# Patient Record
Sex: MALE | Race: WHITE | NOT HISPANIC OR LATINO | Employment: OTHER | URBAN - METROPOLITAN AREA
[De-identification: names, ages, dates, MRNs, and addresses within clinical notes are randomized per-mention and may not be internally consistent; named-entity substitution may affect disease eponyms.]

---

## 2017-01-23 ENCOUNTER — TELEPHONE (OUTPATIENT)
Dept: INTERNAL MEDICINE | Facility: CLINIC | Age: 72
End: 2017-01-23

## 2017-01-23 NOTE — TELEPHONE ENCOUNTER
----- Message from Grisel Munguia sent at 1/23/2017  8:00 AM CST -----  Contact: [phone]  Please call pt spouse in regards to an appt for today/Pt is coughing and need labs drawn. Please advise.

## 2017-01-23 NOTE — TELEPHONE ENCOUNTER
----- Message from Grisel Munguia sent at 1/23/2017  8:00 AM CST -----  Contact: [phone]  Please call pt spouse in regards to an appt for today/Pt is coughing and need labs drawn. Please advise.

## 2017-01-27 ENCOUNTER — OFFICE VISIT (OUTPATIENT)
Dept: INTERNAL MEDICINE | Facility: CLINIC | Age: 72
End: 2017-01-27
Payer: MEDICARE

## 2017-01-27 VITALS
BODY MASS INDEX: 44.54 KG/M2 | HEIGHT: 69 IN | DIASTOLIC BLOOD PRESSURE: 74 MMHG | HEART RATE: 92 BPM | SYSTOLIC BLOOD PRESSURE: 128 MMHG | WEIGHT: 300.69 LBS

## 2017-01-27 DIAGNOSIS — J45.30 MILD PERSISTENT ASTHMA WITHOUT COMPLICATION: ICD-10-CM

## 2017-01-27 DIAGNOSIS — I10 ESSENTIAL HYPERTENSION: ICD-10-CM

## 2017-01-27 DIAGNOSIS — M25.50 POLYARTHRALGIA: ICD-10-CM

## 2017-01-27 DIAGNOSIS — J40 BRONCHITIS: Primary | ICD-10-CM

## 2017-01-27 PROCEDURE — 99214 OFFICE O/P EST MOD 30 MIN: CPT | Mod: S$GLB,,, | Performed by: INTERNAL MEDICINE

## 2017-01-27 PROCEDURE — 3078F DIAST BP <80 MM HG: CPT | Mod: S$GLB,,, | Performed by: INTERNAL MEDICINE

## 2017-01-27 PROCEDURE — 1126F AMNT PAIN NOTED NONE PRSNT: CPT | Mod: S$GLB,,, | Performed by: INTERNAL MEDICINE

## 2017-01-27 PROCEDURE — 3074F SYST BP LT 130 MM HG: CPT | Mod: S$GLB,,, | Performed by: INTERNAL MEDICINE

## 2017-01-27 PROCEDURE — 99999 PR PBB SHADOW E&M-EST. PATIENT-LVL III: CPT | Mod: PBBFAC,,, | Performed by: INTERNAL MEDICINE

## 2017-01-27 PROCEDURE — 1157F ADVNC CARE PLAN IN RCRD: CPT | Mod: S$GLB,,, | Performed by: INTERNAL MEDICINE

## 2017-01-27 PROCEDURE — 1160F RVW MEDS BY RX/DR IN RCRD: CPT | Mod: S$GLB,,, | Performed by: INTERNAL MEDICINE

## 2017-01-27 PROCEDURE — 99499 UNLISTED E&M SERVICE: CPT | Mod: S$GLB,,, | Performed by: INTERNAL MEDICINE

## 2017-01-27 PROCEDURE — 1159F MED LIST DOCD IN RCRD: CPT | Mod: S$GLB,,, | Performed by: INTERNAL MEDICINE

## 2017-01-27 RX ORDER — BENZONATATE 100 MG/1
100 CAPSULE ORAL 3 TIMES DAILY PRN
Qty: 30 CAPSULE | Refills: 0 | Status: SHIPPED | OUTPATIENT
Start: 2017-01-27 | End: 2017-02-06

## 2017-01-27 RX ORDER — MELOXICAM 15 MG/1
TABLET ORAL
Qty: 90 TABLET | Refills: 1 | Status: SHIPPED | OUTPATIENT
Start: 2017-01-27 | End: 2017-07-05 | Stop reason: SDUPTHER

## 2017-01-27 RX ORDER — AZITHROMYCIN 250 MG/1
TABLET, FILM COATED ORAL
Qty: 6 TABLET | Refills: 0 | Status: SHIPPED | OUTPATIENT
Start: 2017-01-27 | End: 2017-01-31 | Stop reason: SDUPTHER

## 2017-01-27 NOTE — PROGRESS NOTES
Subjective:       Patient ID: Lopez Savage is a 71 y.o. male.    Chief Complaint: Follow-up    HPI Pt. Here for f/u for HTN and asthma; he reports productive cough for past 2 months; he is compliant with meds and he has restarted advair but is only taking it QOD and he states it helps; he has not gotten labs yet; weight is elevated but stable; he needs refill on mobic for polyarthralgia in B/L hands and knees which helps  Review of Systems   Constitutional: Negative for chills, fatigue and fever.   HENT: Negative for congestion, rhinorrhea and sore throat.    Respiratory: Positive for cough. Negative for shortness of breath and wheezing.    Cardiovascular: Negative for chest pain.   Gastrointestinal: Negative for abdominal pain, nausea and vomiting.   Genitourinary: Negative for dysuria.   Musculoskeletal: Positive for arthralgias.        B/L intermitent pain in hands, knees and ankles   Skin: Negative for rash.   Neurological: Negative for dizziness and headaches.   Psychiatric/Behavioral: Negative for sleep disturbance. The patient is not nervous/anxious.        Objective:      Physical Exam   Constitutional: He is oriented to person, place, and time.   Morbid obesity   Eyes: EOM are normal.   Neck: Normal range of motion.   Cardiovascular: Normal rate, regular rhythm and normal heart sounds.    Pulmonary/Chest: Effort normal and breath sounds normal.   Abdominal: Soft. There is no tenderness. There is no rebound and no guarding.   Musculoskeletal: Normal range of motion.   Pain noted with ROM of B/L hands, knees and ankles   Neurological: He is alert and oriented to person, place, and time.   Skin: No rash noted.       Assessment:       1. Bronchitis Active   2. Mild persistent asthma without complication Active   3. Essential hypertension Well controlled   4. Polyarthralgia Well controlled   5. Body mass index 40.0-44.9, adult Sub-optimally controlled       Plan:         Bronchitis  Comments:  start z-pack and  tessalon prn  Orders:  -     azithromycin (Z-ALTAGRACIA) 250 MG tablet; Take 2 tablets by mouth on day 1; Take 1 tablet by mouth on days 2-5  Dispense: 6 tablet; Refill: 0  -     benzonatate (TESSALON) 100 MG capsule; Take 1 capsule (100 mg total) by mouth 3 (three) times daily as needed for Cough.  Dispense: 30 capsule; Refill: 0    Mild persistent asthma without complication  Comments:  asked pt. to take advair daily    Essential hypertension  Comments:  continue current regimen and encouraged low Na diet     Polyarthralgia  Comments:  continue mobic prn   Orders:  -     meloxicam (MOBIC) 15 MG tablet; TAKE 1 TABLET ONE TIME DAILY PRN; AVOID ALL OTHER NSAIDs  Dispense: 90 tablet; Refill: 1    Body mass index 40.0-44.9, adult  Comments:  encouraged diet and explained risks

## 2017-01-27 NOTE — MR AVS SNAPSHOT
[facility] Internal Medicine   [place]  [place] [address]  Phone: [phone]  Fax: [phone]                  Lopez Savage   2017 1:40 PM   Office Visit    Description:  Male : 1945   Provider:  Chuy Tristan MD   Department:  [facility]           Reason for Visit     Follow-up           Diagnoses this Visit        Comments    Bronchitis    -  Primary start z-pack and tessalon prn    Mild persistent asthma without complication     asked pt. to take advair daily    Essential hypertension     continue current regimen and encouraged low Na diet     Polyarthralgia     continue mobic prn     Body mass index 40.0-44.9, adult     encouraged diet and explained risks            To Do List           Future Appointments        Provider Department Dept Phone    2017 7:00 AM LAB, [place] [facility] [phone]    2017 7:30 AM SPECIMEN, [place] [facility] [phone]    3/3/2017 2:00 PM Chuy Tristan MD [facility] [phone]      Goals (5 Years of Data)     None      Follow-Up and Disposition     Return in about 1 month (around 2017).       These Medications        Disp Refills Start End    azithromycin (Z-ALTAGRACIA) 250 MG tablet 6 tablet 0 2017    Take 2 tablets by mouth on day 1; Take 1 tablet by mouth on days 2-5    Pharmacy: [facility] - [facility] [address] [facility] Ph #: [phone]       benzonatate (TESSALON) 100 MG capsule 30 capsule 0 2017    Take 1 capsule (100 mg total) by mouth 3 (three) times daily as needed for Cough. - Oral    Pharmacy: [facility] [address] [facility] Ph #: [phone]         [facility]     [facility] Care [facility] -  Assistance  Registered nurses in the [facility] provide clinical advisement, health education, appointment booking, and other advisory  services.  Call for this free service at [phone].             Medications           Message regarding Medications     Verify the changes and/or additions to your medication regime listed below are the same as discussed with your clinician today.  If any of these changes or additions are incorrect, please notify your healthcare provider.        START taking these NEW medications        Refills    azithromycin (Z-ALTAGRACIA) 250 MG tablet 0    Sig: Take 2 tablets by mouth on day 1; Take 1 tablet by mouth on days 2-5    Class: Normal    benzonatate (TESSALON) 100 MG capsule 0    Sig: Take 1 capsule (100 mg total) by mouth 3 (three) times daily as needed for Cough.    Class: Normal    Route: Oral           Verify that the below list of medications is an accurate representation of the medications you are currently taking.  If none reported, the list may be blank. If incorrect, please contact your healthcare provider. Carry this list with you in case of emergency.           Current Medications     albuterol (PROVENTIL HFA) 90 mcg/actuation inhaler Inhale 2 puffs into the lungs every 6 (six) hours as needed for Wheezing or Shortness of Breath.    fluticasone-salmeterol 250-50 mcg/dose (ADVAIR DISKUS) 250-50 mcg/dose diskus inhaler INHALE 1 INHALATION BY MOUTH TWICE A DAY    furosemide (LASIX) 20 MG tablet Take 1 tablet (20 mg total) by mouth 2 (two) times daily.    hydrochlorothiazide (HYDRODIURIL) 25 MG tablet Take 25 mg by mouth once daily.    losartan (COZAAR) 50 MG tablet Take 1 tablet (50 mg total) by mouth once daily.    meloxicam (MOBIC) 15 MG tablet TAKE 1 TABLET ONE TIME DAILY    omeprazole (PRILOSEC) 40 MG capsule TAKE 1 CAPSULE ONE TIME DAILY    potassium chloride SA (KLOR-CON M15) 15 MEQ tablet Take 15 mEq by mouth 2 (two) times daily.    triamcinolone acetonide 0.1% (KENALOG) 0.1 % cream AAA bid x 2-3 weeks then discontinue.  Use prn flares.    azithromycin (Z-ALTAGRACIA) 250 MG tablet Take 2 tablets by mouth on day  "1; Take 1 tablet by mouth on days 2-5    benzonatate (TESSALON) 100 MG capsule Take 1 capsule (100 mg total) by mouth 3 (three) times daily as needed for Cough.    betamethasone dipropionate (DIPROLENE) 0.05 % ointment Apply topically 2 (two) times daily.           Clinical Reference Information           Vital Signs - Last Recorded  Most recent update: 1/27/2017  1:52 PM by Elizabeth Esqueda MA    BP Pulse Ht Wt BMI    128/74 (BP Location: Left arm, Patient Position: Sitting, BP Method: Manual) 92 5' 9" (1.753 m) (!) 136.4 kg (300 lb 11.3 oz) 44.41 kg/m2      Blood Pressure          Most Recent Value    BP  128/74      Allergies as of 1/27/2017     Codeine    Tetanus Vaccines And Toxoid      Immunizations Administered on Date of Encounter - 1/27/2017     None      [facility] Sign-Up     Activating your [facility] account is as easy as 1-2-3!     1) Visit [email or web address], select Sign Up Now, enter this activation code and your date of birth, then select Next.  [email or web address]  Expires: 3/13/2017  2:21 PM      2) Create a username and password to use when you visit [facility] in the future and select a security question in case you lose your password and select Next.    3) Enter your e-mail address and click Sign Up!    Additional Information  If you have questions, please e-mail [email or web address] or call [phone] to talk to our [facility] staff. Remember, [facility] is NOT to be used for urgent needs. For medical emergencies, dial 911.         "

## 2017-01-31 ENCOUNTER — TELEPHONE (OUTPATIENT)
Dept: INTERNAL MEDICINE | Facility: CLINIC | Age: 72
End: 2017-01-31

## 2017-01-31 DIAGNOSIS — J40 BRONCHITIS: ICD-10-CM

## 2017-01-31 RX ORDER — AZITHROMYCIN 250 MG/1
TABLET, FILM COATED ORAL
Qty: 6 TABLET | Refills: 0 | Status: SHIPPED | OUTPATIENT
Start: 2017-01-31 | End: 2017-02-05

## 2017-01-31 NOTE — TELEPHONE ENCOUNTER
Spoke to pt's wife, Brenda and states that pt antibiotics was not received at [facility]. Pls send in Z-Steve.

## 2017-01-31 NOTE — TELEPHONE ENCOUNTER
----- Message from Grisel Munguia sent at 1/31/2017 12:14 PM CST -----  Contact: [phone]  Pt states this medication should have been sent to his local [facility] in [place] azithromycin (Z-ALTAGRACIA) 250 MG tablet/[phone]/Pt ask if it could be done today /Pt states he is getting worse. Please advise.

## 2017-02-03 DIAGNOSIS — J40 BRONCHITIS: ICD-10-CM

## 2017-02-03 RX ORDER — AZITHROMYCIN 250 MG/1
TABLET, FILM COATED ORAL
Qty: 6 TABLET | Refills: 0 | OUTPATIENT
Start: 2017-02-03

## 2017-02-03 RX ORDER — HYDROCHLOROTHIAZIDE 25 MG/1
25 TABLET ORAL DAILY
Qty: 90 TABLET | Refills: 1 | Status: SHIPPED | OUTPATIENT
Start: 2017-02-03 | End: 2017-10-05

## 2017-02-08 RX ORDER — BENZONATATE 100 MG/1
CAPSULE ORAL
Qty: 30 CAPSULE | Refills: 0 | Status: SHIPPED | OUTPATIENT
Start: 2017-02-08 | End: 2017-03-03

## 2017-02-27 ENCOUNTER — LAB VISIT (OUTPATIENT)
Dept: LAB | Facility: HOSPITAL | Age: 72
End: 2017-02-27
Attending: INTERNAL MEDICINE
Payer: MEDICARE

## 2017-02-27 DIAGNOSIS — D64.9 ANEMIA, UNSPECIFIED TYPE: ICD-10-CM

## 2017-02-27 DIAGNOSIS — R79.9 ABNORMAL FINDING OF BLOOD CHEMISTRY: ICD-10-CM

## 2017-02-27 DIAGNOSIS — I10 ESSENTIAL HYPERTENSION: ICD-10-CM

## 2017-02-27 DIAGNOSIS — Z00.00 PREVENTATIVE HEALTH CARE: ICD-10-CM

## 2017-02-27 LAB
ALBUMIN SERPL BCP-MCNC: 3.5 G/DL
ALP SERPL-CCNC: 67 U/L
ALT SERPL W/O P-5'-P-CCNC: 27 U/L
ANION GAP SERPL CALC-SCNC: 10 MMOL/L
AST SERPL-CCNC: 18 U/L
BASOPHILS # BLD AUTO: 0.04 K/UL
BASOPHILS NFR BLD: 0.4 %
BILIRUB SERPL-MCNC: 0.5 MG/DL
BUN SERPL-MCNC: 16 MG/DL
CALCIUM SERPL-MCNC: 9.7 MG/DL
CHLORIDE SERPL-SCNC: 105 MMOL/L
CHOLEST/HDLC SERPL: 4.5 {RATIO}
CO2 SERPL-SCNC: 25 MMOL/L
CREAT SERPL-MCNC: 1.2 MG/DL
DIFFERENTIAL METHOD: ABNORMAL
EOSINOPHIL # BLD AUTO: 0.2 K/UL
EOSINOPHIL NFR BLD: 2.3 %
ERYTHROCYTE [DISTWIDTH] IN BLOOD BY AUTOMATED COUNT: 13.8 %
EST. GFR  (AFRICAN AMERICAN): >60 ML/MIN/1.73 M^2
EST. GFR  (NON AFRICAN AMERICAN): >60 ML/MIN/1.73 M^2
GLUCOSE SERPL-MCNC: 110 MG/DL
HCT VFR BLD AUTO: 39.7 %
HDL/CHOLESTEROL RATIO: 22.4 %
HDLC SERPL-MCNC: 125 MG/DL
HDLC SERPL-MCNC: 28 MG/DL
HGB BLD-MCNC: 12.7 G/DL
LDLC SERPL CALC-MCNC: 68.6 MG/DL
LYMPHOCYTES # BLD AUTO: 1.7 K/UL
LYMPHOCYTES NFR BLD: 18.2 %
MCH RBC QN AUTO: 27.8 PG
MCHC RBC AUTO-ENTMCNC: 32 %
MCV RBC AUTO: 87 FL
MONOCYTES # BLD AUTO: 0.8 K/UL
MONOCYTES NFR BLD: 8.7 %
NEUTROPHILS # BLD AUTO: 6.5 K/UL
NEUTROPHILS NFR BLD: 70.1 %
NONHDLC SERPL-MCNC: 97 MG/DL
PLATELET # BLD AUTO: 203 K/UL
PMV BLD AUTO: 11.4 FL
POTASSIUM SERPL-SCNC: 4.2 MMOL/L
PROT SERPL-MCNC: 7.2 G/DL
RBC # BLD AUTO: 4.57 M/UL
SODIUM SERPL-SCNC: 140 MMOL/L
TRIGL SERPL-MCNC: 142 MG/DL
WBC # BLD AUTO: 9.24 K/UL

## 2017-02-27 PROCEDURE — 80053 COMPREHEN METABOLIC PANEL: CPT

## 2017-02-27 PROCEDURE — 80061 LIPID PANEL: CPT

## 2017-02-27 PROCEDURE — 36415 COLL VENOUS BLD VENIPUNCTURE: CPT | Mod: PO

## 2017-02-27 PROCEDURE — 85025 COMPLETE CBC W/AUTO DIFF WBC: CPT

## 2017-03-03 ENCOUNTER — TELEPHONE (OUTPATIENT)
Dept: INTERNAL MEDICINE | Facility: CLINIC | Age: 72
End: 2017-03-03

## 2017-03-03 ENCOUNTER — OFFICE VISIT (OUTPATIENT)
Dept: INTERNAL MEDICINE | Facility: CLINIC | Age: 72
End: 2017-03-03
Payer: MEDICARE

## 2017-03-03 VITALS
HEIGHT: 69 IN | DIASTOLIC BLOOD PRESSURE: 78 MMHG | SYSTOLIC BLOOD PRESSURE: 134 MMHG | WEIGHT: 299.38 LBS | HEART RATE: 91 BPM | BODY MASS INDEX: 44.34 KG/M2

## 2017-03-03 DIAGNOSIS — I10 ESSENTIAL HYPERTENSION: ICD-10-CM

## 2017-03-03 DIAGNOSIS — H66.92 LEFT OTITIS MEDIA, UNSPECIFIED CHRONICITY, UNSPECIFIED OTITIS MEDIA TYPE: Primary | ICD-10-CM

## 2017-03-03 DIAGNOSIS — N39.0 URINARY TRACT INFECTION WITHOUT HEMATURIA, SITE UNSPECIFIED: ICD-10-CM

## 2017-03-03 DIAGNOSIS — J40 BRONCHITIS: ICD-10-CM

## 2017-03-03 DIAGNOSIS — J45.30 MILD PERSISTENT ASTHMA WITHOUT COMPLICATION: ICD-10-CM

## 2017-03-03 PROCEDURE — 3075F SYST BP GE 130 - 139MM HG: CPT | Mod: S$GLB,,, | Performed by: INTERNAL MEDICINE

## 2017-03-03 PROCEDURE — 99999 PR PBB SHADOW E&M-EST. PATIENT-LVL III: CPT | Mod: PBBFAC,,, | Performed by: INTERNAL MEDICINE

## 2017-03-03 PROCEDURE — 1160F RVW MEDS BY RX/DR IN RCRD: CPT | Mod: S$GLB,,, | Performed by: INTERNAL MEDICINE

## 2017-03-03 PROCEDURE — 1159F MED LIST DOCD IN RCRD: CPT | Mod: S$GLB,,, | Performed by: INTERNAL MEDICINE

## 2017-03-03 PROCEDURE — 1126F AMNT PAIN NOTED NONE PRSNT: CPT | Mod: S$GLB,,, | Performed by: INTERNAL MEDICINE

## 2017-03-03 PROCEDURE — 1157F ADVNC CARE PLAN IN RCRD: CPT | Mod: S$GLB,,, | Performed by: INTERNAL MEDICINE

## 2017-03-03 PROCEDURE — 3078F DIAST BP <80 MM HG: CPT | Mod: S$GLB,,, | Performed by: INTERNAL MEDICINE

## 2017-03-03 PROCEDURE — 99499 UNLISTED E&M SERVICE: CPT | Mod: S$GLB,,, | Performed by: INTERNAL MEDICINE

## 2017-03-03 PROCEDURE — 99214 OFFICE O/P EST MOD 30 MIN: CPT | Mod: S$GLB,,, | Performed by: INTERNAL MEDICINE

## 2017-03-03 RX ORDER — CIPROFLOXACIN 500 MG/1
500 TABLET ORAL 2 TIMES DAILY
Qty: 14 TABLET | Refills: 0 | Status: SHIPPED | OUTPATIENT
Start: 2017-03-03 | End: 2017-03-10

## 2017-03-03 RX ORDER — AMOXICILLIN AND CLAVULANATE POTASSIUM 875; 125 MG/1; MG/1
1 TABLET, FILM COATED ORAL 2 TIMES DAILY
Qty: 14 TABLET | Refills: 0 | Status: SHIPPED | OUTPATIENT
Start: 2017-03-03 | End: 2017-03-03

## 2017-03-03 NOTE — PROGRESS NOTES
Subjective:       Patient ID: Lopez Savage is a 71 y.o. male.    Chief Complaint: Follow-up (6 mths follow-up)    HPI Pt. Here for f/u for bronchitis; he states z-pack helped bronchitis and has  residual cough; he is taking advair daily; he is compliant with meds; weight is elevated but stable; pt. Reports L earache for past 1 week; I reviewed labs dated 2/27/17; he has mild signs of UTI and he reports occassional dysuria; he has seen urology in the past and I advised him to f/u   Review of Systems   Constitutional: Negative for chills, fatigue and fever.   HENT: Negative for congestion, rhinorrhea and sore throat.         L earache   Respiratory: Positive for cough. Negative for shortness of breath and wheezing.    Cardiovascular: Negative for chest pain.   Gastrointestinal: Negative for abdominal pain, nausea and vomiting.   Genitourinary: Positive for dysuria.   Musculoskeletal: Negative for arthralgias.   Skin: Negative for rash.   Neurological: Negative for dizziness and headaches.   Psychiatric/Behavioral: Negative for sleep disturbance. The patient is not nervous/anxious.        Objective:      Physical Exam   Constitutional: He is oriented to person, place, and time.   Morbid obesity    HENT:   L ear TM erythema   Eyes: EOM are normal.   Neck: Normal range of motion.   Cardiovascular: Normal rate, regular rhythm and normal heart sounds.    Pulmonary/Chest: Effort normal and breath sounds normal.   Abdominal: Soft. There is no tenderness. There is no rebound and no guarding.   Musculoskeletal: Normal range of motion.   Neurological: He is alert and oriented to person, place, and time.   Skin: No rash noted.       Assessment:       1. Left otitis media, unspecified chronicity, unspecified otitis media type Active   2. Urinary tract infection without hematuria, site unspecified Active   3. Bronchitis Well controlled   4. Essential hypertension Well controlled   5. Mild persistent asthma without complication  Well controlled   6. Body mass index 40.0-44.9, adult Sub-optimally controlled       Plan:         Left otitis media, unspecified chronicity, unspecified otitis media type  Comments:  start cipro x 7 days   Orders:  -     Discontinue: amoxicillin-clavulanate 875-125mg (AUGMENTIN) 875-125 mg per tablet; Take 1 tablet by mouth 2 (two) times daily.  Dispense: 14 tablet; Refill: 0  -     ciprofloxacin HCl (CIPRO) 500 MG tablet; Take 1 tablet (500 mg total) by mouth 2 (two) times daily.  Dispense: 14 tablet; Refill: 0    Urinary tract infection without hematuria, site unspecified  Comments:  start cipro x 7 days and f/u urology   Orders:  -     ciprofloxacin HCl (CIPRO) 500 MG tablet; Take 1 tablet (500 mg total) by mouth 2 (two) times daily.  Dispense: 14 tablet; Refill: 0    Bronchitis  Comments:  residual cough noted; continue tessalon prn     Essential hypertension  Comments:  continue current regimen and encouraged low Na diet and weight loss    Mild persistent asthma without complication  Comments:  continue advair and rescue inhaler prn     Body mass index 40.0-44.9, adult  Comments:  encouraged diet and explained risks

## 2017-03-03 NOTE — TELEPHONE ENCOUNTER
Spoke to pharmacist at [facility] and stated that pt's insurance denied the Cipro. Dr. Tristan substituted the Cipro for Levaquin 500mg QD times 7 days with no refills.

## 2017-03-03 NOTE — MR AVS SNAPSHOT
[facility] Medicine   [place]  [place] [address]  Phone: [phone]  Fax: [phone]                  Lopez Savage   3/3/2017 2:00 PM   Office Visit    Description:  Male : 1945   Provider:  Chuy Tristan MD   Department:  [facility]           Reason for Visit     Follow-up           Diagnoses this Visit        Comments    Left otitis media, unspecified chronicity, unspecified otitis media type    -  Primary start cipro x 7 days     Urinary tract infection without hematuria, site unspecified     start cipro x 7 days and f/u urology     Bronchitis     residual cough noted; continue tessalon prn     Essential hypertension     continue current regimen and encouraged low Na diet and weight loss    Mild persistent asthma without complication     continue advair and rescue inhaler prn     Body mass index 40.0-44.9, adult                To Do List           Future Appointments        Provider Department Dept Phone    3/17/2017 3:40 PM Chuy Tristan MD [facility] [phone]      Goals (5 Years of Data)     None      Follow-Up and Disposition     Return in about 2 weeks (around 3/17/2017).       These Medications        Disp Refills Start End    ciprofloxacin HCl (CIPRO) 500 MG tablet 14 tablet 0 3/3/2017 3/10/2017    Take 1 tablet (500 mg total) by mouth 2 (two) times daily. - Oral    Pharmacy: [id number] - [place] - [address] [facility], [address] Ph #: [phone]         [facility] On Call     [facility] Nurse Care [facility] -  Assistance  Registered nurses in the [facility] provide clinical advisement, health education, appointment booking, and other advisory services.  Call for this free service at [phone].             Medications           Message regarding Medications     Verify the changes and/or additions to your medication regime listed below are the same as discussed with your clinician today.  If any of  these changes or additions are incorrect, please notify your healthcare provider.        START taking these NEW medications        Refills    ciprofloxacin HCl (CIPRO) 500 MG tablet 0    Sig: Take 1 tablet (500 mg total) by mouth 2 (two) times daily.    Class: Normal    Route: Oral      STOP taking these medications     benzonatate (TESSALON) 100 MG capsule TAKE 1 CAPSULE THREE TIMES DAILY AS NEEDED  FOR  COUGH           Verify that the below list of medications is an accurate representation of the medications you are currently taking.  If none reported, the list may be blank. If incorrect, please contact your healthcare provider. Carry this list with you in case of emergency.           Current Medications     albuterol (PROVENTIL HFA) 90 mcg/actuation inhaler Inhale 2 puffs into the lungs every 6 (six) hours as needed for Wheezing or Shortness of Breath.    betamethasone dipropionate (DIPROLENE) 0.05 % ointment Apply topically 2 (two) times daily.    ciprofloxacin HCl (CIPRO) 500 MG tablet Take 1 tablet (500 mg total) by mouth 2 (two) times daily.    fluticasone-salmeterol 250-50 mcg/dose (ADVAIR DISKUS) 250-50 mcg/dose diskus inhaler INHALE 1 INHALATION BY MOUTH TWICE A DAY    furosemide (LASIX) 20 MG tablet Take 1 tablet (20 mg total) by mouth 2 (two) times daily.    hydrochlorothiazide (HYDRODIURIL) 25 MG tablet Take 1 tablet (25 mg total) by mouth once daily.    losartan (COZAAR) 50 MG tablet Take 1 tablet (50 mg total) by mouth once daily.    meloxicam (MOBIC) 15 MG tablet TAKE 1 TABLET ONE TIME DAILY PRN; AVOID ALL OTHER NSAIDs    omeprazole (PRILOSEC) 40 MG capsule TAKE 1 CAPSULE ONE TIME DAILY    potassium chloride SA (KLOR-CON M15) 15 MEQ tablet Take 15 mEq by mouth 2 (two) times daily.    triamcinolone acetonide 0.1% (KENALOG) 0.1 % cream AAA bid x 2-3 weeks then discontinue.  Use prn flares.           Clinical Reference Information           Your Vitals Were     BP Pulse Height Weight BMI    134/78 (BP  "Location: Left arm, Patient Position: Sitting, BP Method: Manual) 91 5' 9" (1.753 m) 135.8 kg (299 lb 6.2 oz) 44.21 kg/m2      Blood Pressure          Most Recent Value    BP  134/78      Allergies as of 3/3/2017     Codeine    Tetanus Vaccines And Toxoid      Immunizations Administered on Date of Encounter - 3/3/2017     None      [facility] Sign-Up     Activating your [facility] account is as easy as 1-2-3!     1) Visit [email or web address], select Sign Up Now, enter this activation code and your date of birth, then select Next.  [email or web address]  Expires: 3/13/2017  2:21 PM      2) Create a username and password to use when you visit [facility] in the future and select a security question in case you lose your password and select Next.    3) Enter your e-mail address and click Sign Up!    Additional Information  If you have questions, please e-mail [email or web address] or call [phone] to talk to our [facility] staff. Remember, [facility] is NOT to be used for urgent needs. For medical emergencies, dial 911.         Language Assistance Services     ATTENTION: Language assistance services are available, free of charge. Please call [phone].      ATENCIÓN: Si habla español, tiene a alexander disposición servicios gratuitos de asistencia lingüística. Llame al [phone].     CHÚ Ý: N?u b?n nói Ti?ng Vi?t, có các d?ch v? h? tr? ngôn ng? mi?n phí dành cho b?n. G?i s? [phone].         [facility] Internal Medicine complies with applicable Federal civil rights laws and does not discriminate on the basis of race, color, national origin, age, disability, or sex.        "

## 2017-03-17 ENCOUNTER — OFFICE VISIT (OUTPATIENT)
Dept: INTERNAL MEDICINE | Facility: CLINIC | Age: 72
End: 2017-03-17
Payer: MEDICARE

## 2017-03-17 VITALS
HEART RATE: 79 BPM | BODY MASS INDEX: 44.21 KG/M2 | DIASTOLIC BLOOD PRESSURE: 72 MMHG | WEIGHT: 298.5 LBS | SYSTOLIC BLOOD PRESSURE: 132 MMHG | HEIGHT: 69 IN

## 2017-03-17 DIAGNOSIS — N39.0 URINARY TRACT INFECTION WITHOUT HEMATURIA, SITE UNSPECIFIED: ICD-10-CM

## 2017-03-17 DIAGNOSIS — R05.9 COUGH: Primary | ICD-10-CM

## 2017-03-17 DIAGNOSIS — K57.90 DIVERTICULOSIS OF INTESTINE WITHOUT BLEEDING, UNSPECIFIED INTESTINAL TRACT LOCATION: ICD-10-CM

## 2017-03-17 DIAGNOSIS — J45.30 MILD PERSISTENT ASTHMA WITHOUT COMPLICATION: ICD-10-CM

## 2017-03-17 DIAGNOSIS — J30.9 ALLERGIC RHINITIS, UNSPECIFIED ALLERGIC RHINITIS TRIGGER, UNSPECIFIED RHINITIS SEASONALITY: ICD-10-CM

## 2017-03-17 DIAGNOSIS — K21.9 GASTROESOPHAGEAL REFLUX DISEASE, ESOPHAGITIS PRESENCE NOT SPECIFIED: ICD-10-CM

## 2017-03-17 DIAGNOSIS — D64.9 ANEMIA, UNSPECIFIED TYPE: ICD-10-CM

## 2017-03-17 DIAGNOSIS — I10 ESSENTIAL HYPERTENSION: ICD-10-CM

## 2017-03-17 DIAGNOSIS — R41.3 MEMORY LOSS: ICD-10-CM

## 2017-03-17 PROCEDURE — 99214 OFFICE O/P EST MOD 30 MIN: CPT | Mod: S$GLB,,, | Performed by: INTERNAL MEDICINE

## 2017-03-17 PROCEDURE — 99999 PR PBB SHADOW E&M-EST. PATIENT-LVL III: CPT | Mod: PBBFAC,,, | Performed by: INTERNAL MEDICINE

## 2017-03-17 PROCEDURE — 99499 UNLISTED E&M SERVICE: CPT | Mod: S$GLB,,, | Performed by: INTERNAL MEDICINE

## 2017-03-17 PROCEDURE — 3078F DIAST BP <80 MM HG: CPT | Mod: S$GLB,,, | Performed by: INTERNAL MEDICINE

## 2017-03-17 PROCEDURE — 1157F ADVNC CARE PLAN IN RCRD: CPT | Mod: S$GLB,,, | Performed by: INTERNAL MEDICINE

## 2017-03-17 PROCEDURE — 3075F SYST BP GE 130 - 139MM HG: CPT | Mod: S$GLB,,, | Performed by: INTERNAL MEDICINE

## 2017-03-17 PROCEDURE — 1159F MED LIST DOCD IN RCRD: CPT | Mod: S$GLB,,, | Performed by: INTERNAL MEDICINE

## 2017-03-17 PROCEDURE — 1160F RVW MEDS BY RX/DR IN RCRD: CPT | Mod: S$GLB,,, | Performed by: INTERNAL MEDICINE

## 2017-03-17 PROCEDURE — 1126F AMNT PAIN NOTED NONE PRSNT: CPT | Mod: S$GLB,,, | Performed by: INTERNAL MEDICINE

## 2017-03-17 NOTE — MR AVS SNAPSHOT
[facility] Internal Medicine   [place]  [place] [address]  Phone: [phone]  Fax: [phone]                  Lopez Savage   3/17/2017 3:40 PM   Office Visit    Description:  Male : 1945   Provider:  Chuy Tristan MD   Department:  [facility] Internal [facility]           Reason for Visit     Cough           Diagnoses this Visit        Comments    Cough    -  Primary get CXR; start claritin for allergies and continue prilosec for GERD; re-evaluate in 1 month     Allergic rhinitis, unspecified allergic rhinitis trigger, unspecified rhinitis seasonality     asked pt. to try OTC claritin prn     Gastroesophageal reflux disease, esophagitis presence not specified     continue prilosec prn     Mild persistent asthma without complication     continue current regimen     Urinary tract infection without hematuria, site unspecified     completed antbx; repeat U/A and f/u urology    Memory loss     refer to neurology    Essential hypertension     continue current regimen and encouraged low Na diet and weight loss    Anemia, unspecified type     monitor    Body mass index 40.0-44.9, adult                To Do List           Future Appointments        Provider Department Dept Phone    3/20/2017 10:00 AM [place] XR1 [address] LB LIMIT [facility] [phone]    3/20/2017 10:30 AM SPECIMEN, [place] [facility] [phone]    2017 2:00 PM Chuy Tristan MD [facility] Internal [facility] [phone]    2017 4:20 PM Jim Diez MD [facility] Neurology [phone]      Goals (5 Years of Data)     None      Follow-Up and Disposition     Return in about 1 month (around 2017).      [facility]     [facility] Care Line -  Assistance  Registered nurses in the [facility] provide clinical advisement, health education, appointment booking, and other advisory services.  Call for this free service at [phone].             Medications     "       Message regarding Medications     Verify the changes and/or additions to your medication regime listed below are the same as discussed with your clinician today.  If any of these changes or additions are incorrect, please notify your healthcare provider.             Verify that the below list of medications is an accurate representation of the medications you are currently taking.  If none reported, the list may be blank. If incorrect, please contact your healthcare provider. Carry this list with you in case of emergency.           Current Medications     albuterol (PROVENTIL HFA) 90 mcg/actuation inhaler Inhale 2 puffs into the lungs every 6 (six) hours as needed for Wheezing or Shortness of Breath.    fluticasone-salmeterol 250-50 mcg/dose (ADVAIR DISKUS) 250-50 mcg/dose diskus inhaler INHALE 1 INHALATION BY MOUTH TWICE A DAY    furosemide (LASIX) 20 MG tablet Take 1 tablet (20 mg total) by mouth 2 (two) times daily.    hydrochlorothiazide (HYDRODIURIL) 25 MG tablet Take 1 tablet (25 mg total) by mouth once daily.    losartan (COZAAR) 50 MG tablet Take 1 tablet (50 mg total) by mouth once daily.    meloxicam (MOBIC) 15 MG tablet TAKE 1 TABLET ONE TIME DAILY PRN; AVOID ALL OTHER NSAIDs    omeprazole (PRILOSEC) 40 MG capsule TAKE 1 CAPSULE ONE TIME DAILY    potassium chloride SA (KLOR-CON M15) 15 MEQ tablet Take 15 mEq by mouth 2 (two) times daily.    triamcinolone acetonide 0.1% (KENALOG) 0.1 % cream AAA bid x 2-3 weeks then discontinue.  Use prn flares.    betamethasone dipropionate (DIPROLENE) 0.05 % ointment Apply topically 2 (two) times daily.           Clinical Reference Information           Your Vitals Were     BP Pulse Height Weight BMI    132/72 (BP Location: Left arm, Patient Position: Sitting, BP Method: Manual) 79 5' 9" (1.753 m) 135.4 kg (298 lb 8.1 oz) 44.08 kg/m2      Blood Pressure          Most Recent Value    BP  132/72      Allergies as of 3/17/2017     Codeine    Tetanus Vaccines And " Toxoid      Immunizations Administered on Date of Encounter - 3/17/2017     None      Orders Placed During Today's Visit      Normal Orders This Visit    Ambulatory Referral to Neurology     Future Labs/Procedures Expected by Expires    Urinalysis  3/17/2017 11/17/2017    X-Ray Chest PA And Lateral  3/17/2017 3/17/2018      Language Assistance Services     ATTENTION: Language assistance services are available, free of charge. Please call [phone].      ATENCIÓN: Si habla español, tiene a alexander disposición servicios gratuitos de asistencia lingüística. Llame al [phone].     CHÚ Ý: N?u b?n nói Ti?ng Vi?t, có các d?ch v? h? tr? ngôn ng? mi?n phí dành cho b?n. G?i s? [phone].         [facility] Internal Medicine complies with applicable Federal civil rights laws and does not discriminate on the basis of race, color, national origin, age, disability, or sex.

## 2017-03-17 NOTE — PROGRESS NOTES
Subjective:       Patient ID: Lopez Savage is a 71 y.o. male.    Chief Complaint: Cough    HPI  Pt. Here for f/u for HTN and reports residual cough with fatigue; he reports allergies and is not on anything for it; he takes prilosec for GERD; he takes advair and uses rescue inhaler 3 x week; he is compliant with meds; I reviewed labs dated 2/27/17; HDL is low; he has completed cipro for UTI and f/u urology who referred him to sub specialist which he cancelled; I advised him to schedule appt.; anemia is stable; he reports short term memory loss and he would like referal to neurology for further evaluation; weight is elevated but stable; colonoscopy dated 9/28/12 showed polyps which were removed and diverticulosis with repeat in 5 years  Review of Systems   Constitutional: Negative for chills, fatigue and fever.   HENT: Positive for postnasal drip and rhinorrhea. Negative for congestion and sore throat.    Respiratory: Positive for cough. Negative for shortness of breath and wheezing.    Cardiovascular: Negative for chest pain.   Gastrointestinal: Negative for abdominal pain, nausea and vomiting.        GERD well controlled with prilosec    Genitourinary: Negative for dysuria.   Musculoskeletal: Negative for arthralgias.   Skin: Negative for rash.   Neurological: Negative for dizziness and headaches.        Short term memory loss   Psychiatric/Behavioral: Negative for sleep disturbance. The patient is not nervous/anxious.        Objective:      Physical Exam   Constitutional: He is oriented to person, place, and time.   obese   Eyes: EOM are normal.   Neck: Normal range of motion.   Cardiovascular: Normal rate, regular rhythm and normal heart sounds.    Pulmonary/Chest: Effort normal and breath sounds normal. No respiratory distress. He has no wheezes. He has no rales.   Abdominal: Soft. There is no tenderness. There is no rebound and no guarding.   Musculoskeletal: Normal range of motion.   Neurological: He is alert  and oriented to person, place, and time.   Skin: No rash noted.       Assessment:       1. Cough Active   2. Allergic rhinitis, unspecified allergic rhinitis trigger, unspecified rhinitis seasonality Active   3. Gastroesophageal reflux disease, esophagitis presence not specified Well controlled   4. Mild persistent asthma without complication Active   5. Urinary tract infection without hematuria, site unspecified Active   6. Memory loss Active   7. Essential hypertension Well controlled   8. Anemia, unspecified type Stable   9. Diverticulosis of intestine without bleeding, unspecified intestinal tract location Active   10. Body mass index 40.0-44.9, adult Sub-optimally controlled       Plan:         Cough  Comments:  get CXR; start claritin for allergies and continue prilosec for GERD; re-evaluate in 1 month   Orders:  -     X-Ray Chest PA And Lateral; Future; Expected date: 3/17/17    Allergic rhinitis, unspecified allergic rhinitis trigger, unspecified rhinitis seasonality  Comments:  asked pt. to try OTC claritin prn     Gastroesophageal reflux disease, esophagitis presence not specified  Comments:  continue prilosec prn     Mild persistent asthma without complication  Comments:  continue current regimen     Urinary tract infection without hematuria, site unspecified  Comments:  completed antbx; repeat U/A and f/u urology  Orders:  -     Urinalysis; Future; Expected date: 3/17/17    Memory loss  Comments:  refer to neurology  Orders:  -     Ambulatory Referral to Neurology    Essential hypertension  Comments:  continue current regimen and encouraged low Na diet and weight loss    Anemia, unspecified type  Comments:  monitor    Diverticulosis of intestine without bleeding, unspecified intestinal tract location    Body mass index 40.0-44.9, adult  Comments:  encouraged diet and explained risks

## 2017-03-20 ENCOUNTER — HOSPITAL ENCOUNTER (OUTPATIENT)
Dept: RADIOLOGY | Facility: HOSPITAL | Age: 72
Discharge: HOME | End: 2017-03-20
Attending: INTERNAL MEDICINE
Payer: MEDICARE

## 2017-03-20 DIAGNOSIS — R05.9 COUGH: ICD-10-CM

## 2017-03-20 PROCEDURE — 71020 XR CHEST PA AND LATERAL: CPT | Mod: 26,,, | Performed by: RADIOLOGY

## 2017-03-20 PROCEDURE — 71020 XR CHEST PA AND LATERAL: CPT | Mod: TC,PO

## 2017-03-24 ENCOUNTER — OFFICE VISIT (OUTPATIENT)
Dept: FAMILY MEDICINE | Facility: CLINIC | Age: 72
End: 2017-03-24
Payer: MEDICARE

## 2017-03-24 VITALS
HEIGHT: 69 IN | WEIGHT: 297.19 LBS | SYSTOLIC BLOOD PRESSURE: 128 MMHG | DIASTOLIC BLOOD PRESSURE: 70 MMHG | BODY MASS INDEX: 44.02 KG/M2 | HEART RATE: 87 BPM | OXYGEN SATURATION: 98 %

## 2017-03-24 DIAGNOSIS — Z00.00 ENCOUNTER FOR PREVENTIVE HEALTH EXAMINATION: Primary | ICD-10-CM

## 2017-03-24 DIAGNOSIS — I77.819 ECTATIC AORTA: ICD-10-CM

## 2017-03-24 DIAGNOSIS — J41.0 SIMPLE CHRONIC BRONCHITIS: ICD-10-CM

## 2017-03-24 DIAGNOSIS — I10 ESSENTIAL HYPERTENSION: ICD-10-CM

## 2017-03-24 DIAGNOSIS — K21.9 GASTROESOPHAGEAL REFLUX DISEASE WITHOUT ESOPHAGITIS: ICD-10-CM

## 2017-03-24 DIAGNOSIS — Z13.5 SCREENING FOR GLAUCOMA: ICD-10-CM

## 2017-03-24 PROBLEM — N39.0 URINARY TRACT INFECTION WITHOUT HEMATURIA: Status: RESOLVED | Noted: 2017-03-17 | Resolved: 2017-03-24

## 2017-03-24 PROCEDURE — 99999 PR PBB SHADOW E&M-EST. PATIENT-LVL IV: CPT | Mod: PBBFAC,,, | Performed by: NURSE PRACTITIONER

## 2017-03-24 PROCEDURE — G0439 PPPS, SUBSEQ VISIT: HCPCS | Mod: S$GLB,,, | Performed by: NURSE PRACTITIONER

## 2017-03-24 PROCEDURE — 99499 UNLISTED E&M SERVICE: CPT | Mod: S$GLB,,, | Performed by: NURSE PRACTITIONER

## 2017-03-24 PROCEDURE — 3074F SYST BP LT 130 MM HG: CPT | Mod: S$GLB,,, | Performed by: NURSE PRACTITIONER

## 2017-03-24 PROCEDURE — 3078F DIAST BP <80 MM HG: CPT | Mod: S$GLB,,, | Performed by: NURSE PRACTITIONER

## 2017-03-24 NOTE — Clinical Note
Primary Care Providers: Chuy Tristan MD, MD (General)  Your patient was seen today for a HRA visit. Gap(s) in care (HEDIS gaps) have been identified during this visit that require additional testing and possible follow up.  Orders Placed This Encounter     Ambulatory referral to Optometry         Referral Priority:Routine         Referral Type:Vision (Optometry)         Referral Reason:Specialty Services Required         Requested Specialty:Optometry         Number of Visits Requested:1   These orders were placed using [facility] approved protocol and any results will be forwarded to your office for appropriate follow up. I have included a copy of my visit note; please review the note and feel free to contact me with any questions.   Thank you for allowing me to participate in the care of your patients. Simran Diez NP

## 2017-03-24 NOTE — PROGRESS NOTES
"Lopez Savage presented for a  Medicare AWV and comprehensive Health Risk Assessment today. The following components were reviewed and updated:    · Medical history  · Family History  · Social history  · Allergies and Current Medications  · Health Risk Assessment  · Health Maintenance  · Care Team     ** See Completed Assessments for Annual Wellness Visit within the encounter summary.**       The following assessments were completed:  · Living Situation  · CAGE  · Depression Screening  · Timed Get Up and Go  · Whisper Test  · Cognitive Function Screening  · Nutrition Screening  · ADL Screening  · PAQ Screening    Vitals:    03/24/17 1308   BP: 128/70   BP Location: Right arm   Patient Position: Sitting   BP Method: Manual   Pulse: 87   SpO2: 98%   Weight: 134.8 kg (297 lb 2.9 oz)   Height: 5' 9" (1.753 m)     Body mass index is 43.89 kg/(m^2).     Physical Exam   Constitutional: He is oriented to person, place, and time. He appears well-developed. No distress.   Morbidly obese   HENT:   Head: Normocephalic and atraumatic.   Eyes: EOM are normal. Pupils are equal, round, and reactive to light.   Neck: Neck supple. No JVD present. No tracheal deviation present.   Cardiovascular: Normal rate, regular rhythm, normal heart sounds and intact distal pulses.    No murmur heard.  Pulmonary/Chest: Effort normal and breath sounds normal. No respiratory distress. He has no wheezes. He has no rales.   Abdominal: Soft. Bowel sounds are normal. He exhibits no distension and no mass. There is no tenderness.   Musculoskeletal: Normal range of motion. He exhibits no edema or tenderness.   Neurological: He is alert and oriented to person, place, and time. Coordination normal.   Skin: Skin is warm and dry. No erythema. No pallor.   Psychiatric: He has a normal mood and affect. His behavior is normal. Judgment and thought content normal. Cognition and memory are normal. He expresses no homicidal and no suicidal ideation.   Nursing note " and vitals reviewed.        Diagnoses and health risks identified today and associated recommendations/orders:    1. Encounter for preventive health examination    2. Essential hypertension  Chronic; stable on medication.  Followed by PCP.    3. Ectatic aorta  Chronic; stable.  Followed by Cardiology.    4. Simple chronic bronchitis  Chronic; stable on medication.  Followed by PCP.    5. Gastroesophageal reflux disease without esophagitis  Chronic; stable on medication.  Followed by PCP.    6. Body mass index 40.0-44.9, adult  Chronic, stable. Therapeutic lifestyle changes discussed. Followed by PCP.    7. Screening for glaucoma  - Ambulatory referral to Optometry      Provided Lopez with a 5-10 year written screening schedule and personal prevention plan. Recommendations were developed using the USPSTF age appropriate recommendations. Education, counseling, and referrals were provided as needed. After Visit Summary printed and given to patient which includes a list of additional screenings\tests needed.    Return in 5 weeks (on 4/28/2017) for follow-up with PCP, HRA visit in 1 year.    Simran Diez NP

## 2017-03-24 NOTE — MR AVS SNAPSHOT
[facility]   [address]  [place] [address]  Phone: [phone]  Fax: [phone]                  Lopez Savage   3/24/2017 1:00 PM   Office Visit    Description:  Male : 1945   Provider:  Simran Diez NP   Department:  [facility]           Reason for Visit     Health Risk Assessment           Diagnoses this Visit        Comments    Encounter for preventive health examination         Screening for glaucoma                To Do List           Future Appointments        Provider Department Dept Phone    3/29/2017 2:45 PM Pedro Ching OD [place] - Optometry [phone]    2017 2:00 PM Chuy Tristan MD [facility] Internal Medicine [phone]    2017 4:20 PM Jim Diez MD [facility] Neurology [phone]      Goals (5 Years of Data)     None      Follow-Up and Disposition     Return in 5 weeks (on 2017) for follow-up with PCP, HRA visit in 1 year.      [facility]     [facility] -  Assistance  Registered nurses in the [facility] provide clinical advisement, health education, appointment booking, and other advisory services.  Call for this free service at [phone].             Medications           Message regarding Medications     Verify the changes and/or additions to your medication regime listed below are the same as discussed with your clinician today.  If any of these changes or additions are incorrect, please notify your healthcare provider.        STOP taking these medications     potassium chloride SA (KLOR-CON M15) 15 MEQ tablet Take 15 mEq by mouth 2 (two) times daily.           Verify that the below list of medications is an accurate representation of the medications you are currently taking.  If none reported, the list may be blank. If incorrect, please contact your healthcare provider. Carry this list with you in case of emergency.           Current Medications     albuterol  "(PROVENTIL HFA) 90 mcg/actuation inhaler Inhale 2 puffs into the lungs every 6 (six) hours as needed for Wheezing or Shortness of Breath.    betamethasone dipropionate (DIPROLENE) 0.05 % ointment Apply topically 2 (two) times daily.    fluticasone-salmeterol 250-50 mcg/dose (ADVAIR DISKUS) 250-50 mcg/dose diskus inhaler INHALE 1 INHALATION BY MOUTH TWICE A DAY    furosemide (LASIX) 20 MG tablet Take 1 tablet (20 mg total) by mouth 2 (two) times daily.    losartan (COZAAR) 50 MG tablet Take 1 tablet (50 mg total) by mouth once daily.    meloxicam (MOBIC) 15 MG tablet TAKE 1 TABLET ONE TIME DAILY PRN; AVOID ALL OTHER NSAIDs    omeprazole (PRILOSEC) 40 MG capsule TAKE 1 CAPSULE ONE TIME DAILY    triamcinolone acetonide 0.1% (KENALOG) 0.1 % cream AAA bid x 2-3 weeks then discontinue.  Use prn flares.    hydrochlorothiazide (HYDRODIURIL) 25 MG tablet Take 1 tablet (25 mg total) by mouth once daily.           Clinical Reference Information           Your Vitals Were     BP Pulse Height Weight SpO2 BMI    128/70 (BP Location: Right arm, Patient Position: Sitting, BP Method: Manual) 87 5' 9" (1.753 m) 134.8 kg (297 lb 2.9 oz) 98% 43.89 kg/m2      Blood Pressure          Most Recent Value    BP  128/70      Allergies as of 3/24/2017     Codeine    Tetanus Vaccines And Toxoid      Immunizations Administered on Date of Encounter - 3/24/2017     None      Orders Placed During Today's Visit      Normal Orders This Visit    Ambulatory referral to Optometry       Instructions      Counseling and Referral of Other Preventative  (Italic type indicates deductible and co-insurance are waived)    Patient Name: Lopez Savage  Today's Date: 3/24/2017      SERVICE LIMITATIONS RECOMMENDATION    Vaccines    · Pneumococcal (once after 65)    · Influenza (annually)    · Hepatitis B (if medium/high risk)    · Prevnar 13      Hepatitis B medium/high risk factors:       - End-stage renal disease       - Hemophiliacs who received Factor VII or    "      IX concentrates       - Clients of institutions for the mentally             retarded       - Persons who live in the same house as          a HepB carrier       - Homosexual men       - Illicit injectable drug abusers     Pneumococcal: Done, no repeat necessary     Influenza: Done, repeat in one year     Hepatitis B: N/A     Prevnar 13: Done, repeat at next scheduled date    Prostate cancer screening (annually to age 75)     Prostate specific antigen (PSA) Shared decision making with Provider. Sometimes a co-pay may be required if the patient decides to have this test. The USPSTF no longer recommends prostate cancer screening routinely in medicine: every 1 year    Colorectal cancer screening (to age 75)    · Fecal occult blood test (annual)  · Flexible sigmoidoscopy (5y)  · Screening colonoscopy (10y)  · Barium enema   Last done 09/28/12, recommend to repeat every 10  years    Diabetes self-management training (no USPSTF recommendations)  Requires referral by treating physician for patient with diabetes or renal disease. 10 hours of initial DSMT sessions of no less than 30 minutes each in a continuous 12-month period. 2 hours of follow-up DSMT in subsequent years.  N/A    Glaucoma screening (no USPSTF recommendation)  Diabetes mellitus, family history   , age 50 or over    American, age 65 or over  Scheduled, see appointments    Medical nutrition therapy for diabetes or renal disease (no recommended schedule)  Requires referral by treating physician for patient with diabetes or renal disease or kidney transplant within the past 3 years.  Can be provided in same year as diabetes self-management training (DSMT), and CMS recommends medical nutrition therapy take place after DSMT. Up to 3 hours for initial year and 2 hours in subsequent years.  N/A    Cardiovascular screening blood tests (every 5 years)  · Fasting lipid panel  Order as a panel if possible  Done this year, repeat every year     Diabetes screening tests (at least every 3 years, Medicare covers annually or at 6-month intervals for prediabetic patients)  · Fasting blood sugar (FBS) or glucose tolerance test (GTT)  Patient must be diagnosed with one of the following:       - Hypertension       - Dyslipidemia       - Obesity (BMI 30kg/m2)       - Previous elevated impaired FBS or GTT       ... or any two of the following:       - Overweight (BMI 25 but <30)       - Family history of diabetes       - Age 65 or older       - History of gestational diabetes or birth of baby weighing more than 9 pounds  Done this year, repeat every year    Abdominal aortic aneurysm screening (once)  · Sonogram   Limited to patients who meet one of the following criteria:       - Men who are 65-75 years old and have smoked more than 100 cigarette in their lifetime       - Anyone with a family history of abdominal aortic aneurysm       - Anyone recommended for screening by the USPSTF  Done this year, repeat every year    HIV screening (annually for increased risk patients)  · HIV-1 and HIV-2 by EIA, or FAMILIA, rapid antibody test or oral mucosa transudate  Patients must be at increased risk for HIV infection per USPSTF guidelines or pregnant. Tests covered annually for patient at increased risk or as requested by the patient. Pregnant patients may receive up to 3 tests during pregnancy.  Risks discussed, screening is not recommended    Smoking cessation counseling (up to 8 sessions per year)  Patients must be asymptomatic of tobacco-related conditions to receive as a preventative service.  Former Smoker    Subsequent annual wellness visit  At least 12 months since last AWV  Return in one year     The following information is provided to all patients.  This information is to help you find resources for any of the problems found today that may be affecting your health:                Living healthy guide: www.[facility]      Understanding Diabetes:  www.diabetes.org      Eating healthy: www.cdc.gov/healthyweight      CDC home safety checklist: www.cdc.gov/steadi/patient.html      Agency on Aging: www.goea.[place].gov      Alcoholics anonymous (AA): www.aa.org      Physical Activity: www.rubén.nih.gov/ap7bmqz      Tobacco use: www.quitwithusla.org          Language Assistance Services     ATTENTION: Language assistance services are available, free of charge. Please call [phone].      ATENCIÓN: Si adalberto george, tiene a alexander disposición servicios gratuitos de asistencia lingüística. Llame al [phone].     CHÚ Ý: N?u b?n nói Ti?ng Vi?t, có các d?ch v? h? tr? ngôn ng? mi?n phí dành cho b?n. G?i s? [phone].         [facility] complies with applicable Federal civil rights laws and does not discriminate on the basis of race, color, national origin, age, disability, or sex.

## 2017-03-24 NOTE — PATIENT INSTRUCTIONS
Counseling and Referral of Other Preventative  (Italic type indicates deductible and co-insurance are waived)    Patient Name: Lopez Savage  Today's Date: 3/24/2017      SERVICE LIMITATIONS RECOMMENDATION    Vaccines    · Pneumococcal (once after 65)    · Influenza (annually)    · Hepatitis B (if medium/high risk)    · Prevnar 13      Hepatitis B medium/high risk factors:       - End-stage renal disease       - Hemophiliacs who received Factor VII or         IX concentrates       - Clients of institutions for the mentally             retarded       - Persons who live in the same house as          a HepB carrier       - Homosexual men       - Illicit injectable drug abusers     Pneumococcal: Done, no repeat necessary     Influenza: Done, repeat in one year     Hepatitis B: N/A     Prevnar 13: Done, repeat at next scheduled date    Prostate cancer screening (annually to age 75)     Prostate specific antigen (PSA) Shared decision making with Provider. Sometimes a co-pay may be required if the patient decides to have this test. The USPSTF no longer recommends prostate cancer screening routinely in medicine: every 1 year    Colorectal cancer screening (to age 75)    · Fecal occult blood test (annual)  · Flexible sigmoidoscopy (5y)  · Screening colonoscopy (10y)  · Barium enema   Last done 09/28/12, recommend to repeat every 10  years    Diabetes self-management training (no USPSTF recommendations)  Requires referral by treating physician for patient with diabetes or renal disease. 10 hours of initial DSMT sessions of no less than 30 minutes each in a continuous 12-month period. 2 hours of follow-up DSMT in subsequent years.  N/A    Glaucoma screening (no USPSTF recommendation)  Diabetes mellitus, family history   , age 50 or over    American, age 65 or over  Scheduled, see appointments    Medical nutrition therapy for diabetes or renal disease (no recommended schedule)  Requires referral by  treating physician for patient with diabetes or renal disease or kidney transplant within the past 3 years.  Can be provided in same year as diabetes self-management training (DSMT), and CMS recommends medical nutrition therapy take place after DSMT. Up to 3 hours for initial year and 2 hours in subsequent years.  N/A    Cardiovascular screening blood tests (every 5 years)  · Fasting lipid panel  Order as a panel if possible  Done this year, repeat every year    Diabetes screening tests (at least every 3 years, Medicare covers annually or at 6-month intervals for prediabetic patients)  · Fasting blood sugar (FBS) or glucose tolerance test (GTT)  Patient must be diagnosed with one of the following:       - Hypertension       - Dyslipidemia       - Obesity (BMI 30kg/m2)       - Previous elevated impaired FBS or GTT       ... or any two of the following:       - Overweight (BMI 25 but <30)       - Family history of diabetes       - Age 65 or older       - History of gestational diabetes or birth of baby weighing more than 9 pounds  Done this year, repeat every year    Abdominal aortic aneurysm screening (once)  · Sonogram   Limited to patients who meet one of the following criteria:       - Men who are 65-75 years old and have smoked more than 100 cigarette in their lifetime       - Anyone with a family history of abdominal aortic aneurysm       - Anyone recommended for screening by the USPSTF  Done this year, repeat every year    HIV screening (annually for increased risk patients)  · HIV-1 and HIV-2 by EIA, or FAMILIA, rapid antibody test or oral mucosa transudate  Patients must be at increased risk for HIV infection per USPSTF guidelines or pregnant. Tests covered annually for patient at increased risk or as requested by the patient. Pregnant patients may receive up to 3 tests during pregnancy.  Risks discussed, screening is not recommended    Smoking cessation counseling (up to 8 sessions per year)  Patients must be  asymptomatic of tobacco-related conditions to receive as a preventative service.  Former Smoker    Subsequent annual wellness visit  At least 12 months since last AWV  Return in one year     The following information is provided to all patients.  This information is to help you find resources for any of the problems found today that may be affecting your health:                Living healthy guide: www.[facility].[place].[facility]      Understanding Diabetes: www.diabetes.org      Eating healthy: www.cdc.gov/healthyweight      CDC home safety checklist: www.cdc.gov/steadi/patient.html      Agency on Aging: www.goea.[place].[facility]      Alcoholics anonymous (AA): www.aa.org      Physical Activity: www.rubén.nih.gov/em1cyhb      Tobacco use: www.quitwithusla.org

## 2017-03-29 ENCOUNTER — OFFICE VISIT (OUTPATIENT)
Dept: OPTOMETRY | Facility: CLINIC | Age: 72
End: 2017-03-29
Payer: MEDICARE

## 2017-03-29 DIAGNOSIS — Z13.5 GLAUCOMA SCREENING: ICD-10-CM

## 2017-03-29 DIAGNOSIS — H52.13 MYOPIA WITH PRESBYOPIA, BILATERAL: ICD-10-CM

## 2017-03-29 DIAGNOSIS — H52.4 MYOPIA WITH PRESBYOPIA, BILATERAL: ICD-10-CM

## 2017-03-29 DIAGNOSIS — H25.13 NUCLEAR SCLEROSIS, BILATERAL: Primary | ICD-10-CM

## 2017-03-29 PROCEDURE — 99999 PR PBB SHADOW E&M-EST. PATIENT-LVL II: CPT | Mod: PBBFAC,,, | Performed by: OPTOMETRIST

## 2017-03-29 PROCEDURE — 92004 COMPRE OPH EXAM NEW PT 1/>: CPT | Mod: S$GLB,,, | Performed by: OPTOMETRIST

## 2017-03-29 PROCEDURE — 92015 DETERMINE REFRACTIVE STATE: CPT | Mod: S$GLB,,, | Performed by: OPTOMETRIST

## 2017-03-29 NOTE — LETTER
March 30, 2017      Simran Diez NP  [address] [place] [address]           [place] - Optometry  [address]  [place] [address]  Phone: [phone]  Fax: [phone]          Patient: Lopez Savage   MR Number: [id number]   YOB: 1945   Date of Visit: 3/29/2017       Dear Simran Diez:    Thank you for referring Lopez Savage to me for evaluation. Attached you will find relevant portions of my assessment and plan of care.    If you have questions, please do not hesitate to call me. I look forward to following Lopez Savage along with you.    Sincerely,    Pedro Ching, OD    Enclosure  CC:  No Recipients    If you would like to receive this communication electronically, please contact [email or web address] or [phone] to request more information on [employer] access.    For providers and/or their staff who would like to refer a patient to [facility], please contact us through our one-stop-shop provider referral line, Frank Summers, at [phone].    If you feel you have received this communication in error or would no longer like to receive these types of communications, please e-mail [email or web address]

## 2017-03-29 NOTE — PROGRESS NOTES
HPI     LOKI: 5+ years  Pt states va has decreased all distances with glasses. Denies f/f  +itching    No gtts        Last edited by Pamela López on 3/29/2017  2:33 PM.     ROS     Positive for: Cardiovascular, Eyes    Negative for: Constitutional, Gastrointestinal, Neurological, Skin,   Genitourinary, Musculoskeletal, HENT, Endocrine, Respiratory, Psychiatric,   Allergic/Imm, Heme/Lymph    Last edited by Pedro Ching, OD on 3/29/2017  2:49 PM. (History)        Assessment /Plan     For exam results, see Encounter Report.    Nuclear sclerosis, bilateral    Glaucoma screening    Myopia with presbyopia, bilateral      1. Reduced VA 2 to Cat OU.  Drivers test due in 6 mos    PLAN:    Pt will discussed w wife, and call back to sched alberto Gibson

## 2017-04-28 ENCOUNTER — HOSPITAL ENCOUNTER (OUTPATIENT)
Dept: RADIOLOGY | Facility: HOSPITAL | Age: 72
Discharge: HOME | End: 2017-04-28
Attending: INTERNAL MEDICINE
Payer: MEDICARE

## 2017-04-28 ENCOUNTER — OFFICE VISIT (OUTPATIENT)
Dept: INTERNAL MEDICINE | Facility: CLINIC | Age: 72
End: 2017-04-28
Payer: MEDICARE

## 2017-04-28 VITALS
HEART RATE: 85 BPM | WEIGHT: 295.19 LBS | DIASTOLIC BLOOD PRESSURE: 76 MMHG | BODY MASS INDEX: 43.72 KG/M2 | SYSTOLIC BLOOD PRESSURE: 134 MMHG | HEIGHT: 69 IN

## 2017-04-28 DIAGNOSIS — M25.571 ARTHRALGIA OF RIGHT FOOT: ICD-10-CM

## 2017-04-28 DIAGNOSIS — R05.9 COUGH: Primary | ICD-10-CM

## 2017-04-28 DIAGNOSIS — J45.30 MILD PERSISTENT ASTHMA WITHOUT COMPLICATION: ICD-10-CM

## 2017-04-28 DIAGNOSIS — J30.9 ALLERGIC RHINITIS, UNSPECIFIED ALLERGIC RHINITIS TRIGGER, UNSPECIFIED RHINITIS SEASONALITY: ICD-10-CM

## 2017-04-28 DIAGNOSIS — I10 ESSENTIAL HYPERTENSION: ICD-10-CM

## 2017-04-28 PROCEDURE — 3075F SYST BP GE 130 - 139MM HG: CPT | Mod: S$GLB,,, | Performed by: INTERNAL MEDICINE

## 2017-04-28 PROCEDURE — 1159F MED LIST DOCD IN RCRD: CPT | Mod: S$GLB,,, | Performed by: INTERNAL MEDICINE

## 2017-04-28 PROCEDURE — 1126F AMNT PAIN NOTED NONE PRSNT: CPT | Mod: S$GLB,,, | Performed by: INTERNAL MEDICINE

## 2017-04-28 PROCEDURE — 3078F DIAST BP <80 MM HG: CPT | Mod: S$GLB,,, | Performed by: INTERNAL MEDICINE

## 2017-04-28 PROCEDURE — 73630 X-RAY EXAM OF FOOT: CPT | Mod: TC,PO,RT

## 2017-04-28 PROCEDURE — 1160F RVW MEDS BY RX/DR IN RCRD: CPT | Mod: S$GLB,,, | Performed by: INTERNAL MEDICINE

## 2017-04-28 PROCEDURE — 99999 PR PBB SHADOW E&M-EST. PATIENT-LVL III: CPT | Mod: PBBFAC,,, | Performed by: INTERNAL MEDICINE

## 2017-04-28 PROCEDURE — 99214 OFFICE O/P EST MOD 30 MIN: CPT | Mod: S$GLB,,, | Performed by: INTERNAL MEDICINE

## 2017-04-28 PROCEDURE — 99499 UNLISTED E&M SERVICE: CPT | Mod: S$GLB,,, | Performed by: INTERNAL MEDICINE

## 2017-04-28 PROCEDURE — 73630 X-RAY EXAM OF FOOT: CPT | Mod: 26,RT,, | Performed by: RADIOLOGY

## 2017-04-28 RX ORDER — DICLOFENAC SODIUM 10 MG/G
2 GEL TOPICAL 2 TIMES DAILY PRN
Qty: 100 G | Refills: 0 | Status: SHIPPED | OUTPATIENT
Start: 2017-04-28 | End: 2018-10-04

## 2017-04-28 RX ORDER — FLUTICASONE PROPIONATE 50 MCG
1 SPRAY, SUSPENSION (ML) NASAL DAILY PRN
Qty: 1 BOTTLE | Refills: 1 | Status: SHIPPED | OUTPATIENT
Start: 2017-04-28 | End: 2017-10-04

## 2017-04-28 NOTE — PROGRESS NOTES
Subjective:       Patient ID: Lopez Savage is a 71 y.o. male.    Chief Complaint: No chief complaint on file.    HPI Pt. Here for f/u for cough; CXR was negative; he continues to have cough; claritin does not help allergies; he has lost a few pounds; he is using advair and asthma is well controlled; pt. Reports R lateral foot pain for past 1 month intermittently; he denies injury; pain is worse with ambulation; pain is 7/10; tyelenol helps some   Review of Systems   Constitutional: Negative for chills, fatigue and fever.   HENT: Positive for postnasal drip and rhinorrhea. Negative for congestion and sore throat.    Respiratory: Negative for cough, shortness of breath and wheezing.    Cardiovascular: Negative for chest pain.   Gastrointestinal: Negative for abdominal pain, nausea and vomiting.   Genitourinary: Negative for dysuria.   Musculoskeletal: Positive for arthralgias.        R lateral foot pain which is worse with walking and standing   Skin: Negative for rash.   Neurological: Negative for dizziness and headaches.   Psychiatric/Behavioral: Negative for sleep disturbance. The patient is not nervous/anxious.        Objective:      Physical Exam   Constitutional: He is oriented to person, place, and time.   Morbid obesity   Eyes: EOM are normal.   Neck: Normal range of motion.   Cardiovascular: Normal rate, regular rhythm and normal heart sounds.    Pulmonary/Chest: Effort normal and breath sounds normal.   Abdominal: Soft. There is no tenderness. There is no rebound and no guarding.   Musculoskeletal: Normal range of motion. He exhibits tenderness.   R lateral foot tenderness    Neurological: He is alert and oriented to person, place, and time.   Skin: No rash noted.       Assessment:       1. Cough Active   2. Allergic rhinitis, unspecified allergic rhinitis trigger, unspecified rhinitis seasonality Sub-optimally controlled   3. Essential hypertension Well controlled   4. Mild persistent asthma without  complication Well controlled   5. Arthralgia of right foot Active   6. Body mass index 40.0-44.9, adult Sub-optimally controlled       Plan:         Diagnoses and all orders for this visit:    Cough  Comments:  monitor with better control of allergies    Allergic rhinitis, unspecified allergic rhinitis trigger, unspecified rhinitis seasonality  Comments:  asked pt. to take claritin QPM and start flonase QAM  Orders:  -     fluticasone (FLONASE) 50 mcg/actuation nasal spray; 1 spray by Each Nare route daily as needed for Rhinitis or Allergies.    Essential hypertension  Comments:  continue current regimen and encouraged low Na diet and weight loss    Mild persistent asthma without complication  Comments:  continue current regimen     Arthralgia of right foot  Comments:  start voltaren gel PRN BID; get R foot xray  Orders:  -     X-Ray Foot 2 View Right; Future  -     diclofenac sodium (VOLTAREN) 1 % Gel; Apply 2 g topically 2 (two) times daily as needed.    Body mass index 40.0-44.9, adult  Comments:  encouraged diet and explained risks

## 2017-04-28 NOTE — MR AVS SNAPSHOT
[facility]   [address]  [place] [address]  Phone: [phone]  Fax: [phone]                  Lopez Savage   2017 2:00 PM   Office Visit    Description:  Male : 1945   Provider:  Chuy Tristan MD   Department:  [facility]           Diagnoses this Visit        Comments    Cough    -  Primary monitor with better control of allergies    Allergic rhinitis, unspecified allergic rhinitis trigger, unspecified rhinitis seasonality     asked pt. to take claritin QPM and start flonase QAM    Essential hypertension     continue current regimen and encouraged low Na diet and weight loss    Mild persistent asthma without complication     continue current regimen     Arthralgia of right foot     start voltaren gel PRN BID; get R foot xray    Body mass index 40.0-44.9, adult     encouraged diet and explained risks            To Do List           Future Appointments        Provider Department Dept Phone    2017 4:20 PM Jim Diez MD [facility] Neurology [phone]    2017 1:40 PM Chuy Tristan MD [facility] Medicine [phone]      Goals (5 Years of Data)     None      Follow-Up and Disposition     Return in about 1 month (around 2017).       These Medications        Disp Refills Start End    fluticasone (FLONASE) 50 mcg/actuation nasal spray 1 Bottle 1 2017     1 spray by Each Nare route daily as needed for Rhinitis or Allergies. - Each Nare    Pharmacy: [id number] - [place] - [address] [address], [address] Ph #: [phone]       diclofenac sodium (VOLTAREN) 1 % Gel 100 g 0 2017     Apply 2 g topically 2 (two) times daily as needed. - Topical    Pharmacy: [id number] - [place] - [address] [address], [address] Ph #: [phone]         [facility]     [facility] Care [facility] -  Assistance  Unless otherwise directed by your provider, please contact [facility], our nurse care line  that is available for  assistance.     Registered nurses in the [facility] provide: appointment scheduling, clinical advisement, health education, and other advisory services.  Call: [phone] (toll free)               Medications           Message regarding Medications     Verify the changes and/or additions to your medication regime listed below are the same as discussed with your clinician today.  If any of these changes or additions are incorrect, please notify your healthcare provider.        START taking these NEW medications        Refills    fluticasone (FLONASE) 50 mcg/actuation nasal spray 1    Si spray by Each Nare route daily as needed for Rhinitis or Allergies.    Class: Normal    Route: Each Nare    diclofenac sodium (VOLTAREN) 1 % Gel 0    Sig: Apply 2 g topically 2 (two) times daily as needed.    Class: Normal    Route: Topical           Verify that the below list of medications is an accurate representation of the medications you are currently taking.  If none reported, the list may be blank. If incorrect, please contact your healthcare provider. Carry this list with you in case of emergency.           Current Medications     albuterol (PROVENTIL HFA) 90 mcg/actuation inhaler Inhale 2 puffs into the lungs every 6 (six) hours as needed for Wheezing or Shortness of Breath.    fluticasone-salmeterol 250-50 mcg/dose (ADVAIR DISKUS) 250-50 mcg/dose diskus inhaler INHALE 1 INHALATION BY MOUTH TWICE A DAY    hydrochlorothiazide (HYDRODIURIL) 25 MG tablet Take 1 tablet (25 mg total) by mouth once daily.    meloxicam (MOBIC) 15 MG tablet TAKE 1 TABLET ONE TIME DAILY PRN; AVOID ALL OTHER NSAIDs    omeprazole (PRILOSEC) 40 MG capsule TAKE 1 CAPSULE ONE TIME DAILY    triamcinolone acetonide 0.1% (KENALOG) 0.1 % cream AAA bid x 2-3 weeks then discontinue.  Use prn flares.    betamethasone dipropionate (DIPROLENE) 0.05 % ointment Apply topically 2 (two) times daily.    diclofenac sodium  "(VOLTAREN) 1 % Gel Apply 2 g topically 2 (two) times daily as needed.    fluticasone (FLONASE) 50 mcg/actuation nasal spray 1 spray by Each Nare route daily as needed for Rhinitis or Allergies.    furosemide (LASIX) 20 MG tablet Take 1 tablet (20 mg total) by mouth 2 (two) times daily.    losartan (COZAAR) 50 MG tablet Take 1 tablet (50 mg total) by mouth once daily.           Clinical Reference Information           Your Vitals Were     BP Pulse Height Weight BMI    134/76 (BP Location: Left arm, Patient Position: Sitting, BP Method: Manual) 85 5' 9" (1.753 m) 133.9 kg (295 lb 3.1 oz) 43.59 kg/m2      Blood Pressure          Most Recent Value    BP  134/76      Allergies as of 4/28/2017     Codeine    Tetanus Vaccines And Toxoid      Immunizations Administered on Date of Encounter - 4/28/2017     None      Orders Placed During Today's Visit     Future Labs/Procedures Expected by Expires    X-Ray Foot 2 View Right  4/28/2017 4/28/2018      Language Assistance Services     ATTENTION: Language assistance services are available, free of charge. Please call [phone].      ATENCIÓN: Si habla español, tiene a alexander disposición servicios gratuitos de asistencia lingüística. Llame al [phone].     VIOLETA Ý: N?u b?n nói Ti?ng Vi?t, có các d?ch v? h? tr? ngôn ng? mi?n phí dành cho b?n. G?i s? [phone].         [facility] complies with applicable Federal civil rights laws and does not discriminate on the basis of race, color, national origin, age, disability, or sex.        "

## 2017-05-04 ENCOUNTER — LAB VISIT (OUTPATIENT)
Dept: LAB | Facility: HOSPITAL | Age: 72
End: 2017-05-04
Attending: PSYCHIATRY & NEUROLOGY
Payer: MEDICARE

## 2017-05-04 ENCOUNTER — OFFICE VISIT (OUTPATIENT)
Dept: NEUROLOGY | Facility: CLINIC | Age: 72
End: 2017-05-04
Payer: MEDICARE

## 2017-05-04 VITALS
BODY MASS INDEX: 43.95 KG/M2 | HEIGHT: 69 IN | DIASTOLIC BLOOD PRESSURE: 86 MMHG | HEART RATE: 84 BPM | SYSTOLIC BLOOD PRESSURE: 135 MMHG | WEIGHT: 296.75 LBS

## 2017-05-04 DIAGNOSIS — G47.19 EXCESSIVE DAYTIME SLEEPINESS: ICD-10-CM

## 2017-05-04 DIAGNOSIS — R41.3 MEMORY CHANGES: ICD-10-CM

## 2017-05-04 DIAGNOSIS — R42 VERTIGO: ICD-10-CM

## 2017-05-04 DIAGNOSIS — R41.3 MEMORY CHANGES: Primary | ICD-10-CM

## 2017-05-04 LAB
CREAT SERPL-MCNC: 1 MG/DL
EST. GFR  (AFRICAN AMERICAN): >60 ML/MIN/1.73 M^2
EST. GFR  (NON AFRICAN AMERICAN): >60 ML/MIN/1.73 M^2
TSH SERPL DL<=0.005 MIU/L-ACNC: 1.53 UIU/ML
VIT B12 SERPL-MCNC: 376 PG/ML

## 2017-05-04 PROCEDURE — 36415 COLL VENOUS BLD VENIPUNCTURE: CPT

## 2017-05-04 PROCEDURE — 99205 OFFICE O/P NEW HI 60 MIN: CPT | Mod: S$GLB,,, | Performed by: PSYCHIATRY & NEUROLOGY

## 2017-05-04 PROCEDURE — 1126F AMNT PAIN NOTED NONE PRSNT: CPT | Mod: S$GLB,,, | Performed by: PSYCHIATRY & NEUROLOGY

## 2017-05-04 PROCEDURE — 3079F DIAST BP 80-89 MM HG: CPT | Mod: S$GLB,,, | Performed by: PSYCHIATRY & NEUROLOGY

## 2017-05-04 PROCEDURE — 99999 PR PBB SHADOW E&M-EST. PATIENT-LVL IV: CPT | Mod: PBBFAC,,, | Performed by: PSYCHIATRY & NEUROLOGY

## 2017-05-04 PROCEDURE — 1159F MED LIST DOCD IN RCRD: CPT | Mod: S$GLB,,, | Performed by: PSYCHIATRY & NEUROLOGY

## 2017-05-04 PROCEDURE — 86592 SYPHILIS TEST NON-TREP QUAL: CPT

## 2017-05-04 PROCEDURE — 82607 VITAMIN B-12: CPT

## 2017-05-04 PROCEDURE — 84443 ASSAY THYROID STIM HORMONE: CPT

## 2017-05-04 PROCEDURE — 82565 ASSAY OF CREATININE: CPT

## 2017-05-04 PROCEDURE — 3075F SYST BP GE 130 - 139MM HG: CPT | Mod: S$GLB,,, | Performed by: PSYCHIATRY & NEUROLOGY

## 2017-05-04 RX ORDER — MECLIZINE HCL 12.5 MG 12.5 MG/1
12.5 TABLET ORAL 3 TIMES DAILY PRN
Qty: 270 TABLET | Refills: 3 | Status: SHIPPED | OUTPATIENT
Start: 2017-05-04 | End: 2018-10-11

## 2017-05-04 NOTE — MR AVS SNAPSHOT
[facility]  [address] [place] [address] Ave  [place] [address]  Phone: [phone]  Fax: [phone]                  Lopez Savage   2017 4:20 PM   Office Visit    Description:  Male : 1945   Provider:  Jim Diez MD   Department:  [place] - Neurology           Reason for Visit     Memory Loss           Diagnoses this Visit        Comments    Memory changes    -  Primary     Excessive daytime sleepiness         Vertigo                To Do List           Future Appointments        Provider Department Dept Phone    2017 1:40 PM Chuy Tristan MD [facility] Internal Medicine [phone]      Goals (5 Years of Data)     None      Follow-Up and Disposition     Return in about 3 months (around 2017).       These Medications        Disp Refills Start End    meclizine (ANTIVERT) 12.5 mg tablet 270 tablet 3 2017     Take 1 tablet (12.5 mg total) by mouth 3 (three) times daily as needed. - Oral    Pharm[facility]: [id number] [place] [address] [address], [address] Ph #: [phone]         [facility]     [facility] Nurse Care Line -  Assistance  Unless otherwise directed by your provider, please contact [facility], our nurse care line that is available for  assistance.     Registered nurses in the [facility] provide: appointment scheduling, clinical advisement, health education, and other advisory services.  Call: [phone] (toll free)               Medications           Message regarding Medications     Verify the changes and/or additions to your medication regime listed below are the same as discussed with your clinician today.  If any of these changes or additions are incorrect, please notify your healthcare provider.        START taking these NEW medications        Refills    meclizine (ANTIVERT) 12.5 mg tablet 3    Sig: Take 1 tablet (12.5 mg total) by mouth 3 (three) times daily as needed.    Class: Normal    Route: Oral          "  Verify that the below list of medications is an accurate representation of the medications you are currently taking.  If none reported, the list may be blank. If incorrect, please contact your healthcare provider. Carry this list with you in case of emergency.           Current Medications     albuterol (PROVENTIL HFA) 90 mcg/actuation inhaler Inhale 2 puffs into the lungs every 6 (six) hours as needed for Wheezing or Shortness of Breath.    diclofenac sodium (VOLTAREN) 1 % Gel Apply 2 g topically 2 (two) times daily as needed.    fluticasone (FLONASE) 50 mcg/actuation nasal spray 1 spray by Each Nare route daily as needed for Rhinitis or Allergies.    fluticasone-salmeterol 250-50 mcg/dose (ADVAIR DISKUS) 250-50 mcg/dose diskus inhaler INHALE 1 INHALATION BY MOUTH TWICE A DAY    hydrochlorothiazide (HYDRODIURIL) 25 MG tablet Take 1 tablet (25 mg total) by mouth once daily.    meloxicam (MOBIC) 15 MG tablet TAKE 1 TABLET ONE TIME DAILY PRN; AVOID ALL OTHER NSAIDs    omeprazole (PRILOSEC) 40 MG capsule TAKE 1 CAPSULE ONE TIME DAILY    triamcinolone acetonide 0.1% (KENALOG) 0.1 % cream AAA bid x 2-3 weeks then discontinue.  Use prn flares.    betamethasone dipropionate (DIPROLENE) 0.05 % ointment Apply topically 2 (two) times daily.    furosemide (LASIX) 20 MG tablet Take 1 tablet (20 mg total) by mouth 2 (two) times daily.    losartan (COZAAR) 50 MG tablet Take 1 tablet (50 mg total) by mouth once daily.    meclizine (ANTIVERT) 12.5 mg tablet Take 1 tablet (12.5 mg total) by mouth 3 (three) times daily as needed.           Clinical Reference Information           Your Vitals Were     BP Pulse Height Weight BMI    135/86 84 5' 9" (1.753 m) 134.6 kg (296 lb 11.8 oz) 43.82 kg/m2      Blood Pressure          Most Recent Value    BP  135/86      Allergies as of 5/4/2017     Codeine    Tetanus Vaccines And Toxoid      Immunizations Administered on Date of Encounter - 5/4/2017     None      Orders Placed During Today's " Visit      Normal Orders This Visit    Ambulatory consult to Sleep Disorders     Ambulatory Referral to Neuropsychology     Ambulatory Referral to Physical/Occupational Therapy     Future Labs/Procedures Expected by Expires    Creatinine, serum  5/4/2017 7/3/2018    MRI Brain W WO Contrast  5/4/2017 5/4/2018    RPR  5/4/2017 7/3/2018    TSH  5/4/2017 7/3/2018    Vitamin B12  5/4/2017 7/3/2018      Instructions      Magnetic Resonance Imaging (MRI)     You will be asked to hold very still during the scan.   Magnetic resonance imaging (MRI) is a test that lets your doctor see detailed pictures of the inside of your body. MRI combines the use of strong magnets and radio waves to form an MRI image.  How do I get ready for an MRI?  · You may need to stop eating or drinking before the test. Each health care facility has its own guidelines on this. It also depends on the type of exam you are having. Ask your health care provider if you should stop eating or drinking before the test.  · Ask your provider if you should stop taking any medicine before the test.  · Follow your normal daily routine unless your provider tells you otherwise.  · You'll be asked to remove your watch, jewelry, hearing aids, credit cards, pens, pocket knives, eyeglasses, and other metal objects.  · You may be asked to remove your makeup. Makeup may contain some metal.  · Most MRI tests take 30 to 60 minutes. Depending on the type of MRI you are having, the test may take longer. Give yourself extra time to check in.     MRI uses strong magnets. Metal is affected by magnets and can distort the image. The magnet used in MRI can cause metal objects in your body to move. If you have a metal implant, you may not be able to have an MRI unless the implant is certified as MRI safe. People with these implants should not have an MRI:  · Ear (cochlear) implants  · Certain clips used for brain aneurysms  · Certain metal coils put in blood vessels  · Most  defibrillators  · Most pacemakers  Be sure to tell the radiologist or technologist if you:  · Have had any previous surgeries  · Have a pacemaker, surgical clips, metal plate or pins, an artificial joint, staples or screws, ear (cochlear) implants, or other implants  · Wear a medicated adhesive patch  · Have metal splinters in your body  · Have implanted nerve stimulators or drug-infusion ports  · Have tattoos or body piercings. Some tattoo inks contain metal.  · Work with metal  · Have braces. You must remove any dental work.  · Have a bullet or other metal in your body  Also tell the radiologist or technologist if you:  · Are pregnant or think you may be  · Are afraid of small, enclosed spaces (claustrophobic)  · Are allergic to X-ray dye (contrast medium), iodine, shellfish, or any medicines  · Have other allergies  · Are breastfeeding  · Have a history of cancer  · Have any serious health problems. This includes kidney disease or a liver transplant. You may not be able to have the contrast material used for MRI.      What happens during an MRI?  · You may be asked to wear a hospital gown.  · You may be given earplugs to wear if you need them.  · You may be injected with a special dye (contrast) that improves the MRI image.   · Youll lie down on a platform that slides into the magnet.  What happens after an MRI?  · You can get back to normal activities right away. If you were given contrast, it will pass naturally through your body within a day. You may be told to drink more water or other fluids during this time.   · Your doctor will discuss the test results with you during a follow-up appointment or over the phone.  · Your next appointment is: __________________  Date Last Reviewed: 6/2/2015  © 4145-0830 "[employer]. [address] [place] [address], [place] [address]. All rights reserved. This information is not intended as a substitute for professional medical care. Always follow your healthcare  professional's instructions.             Language Assistance Services     ATTENTION: Language assistance services are available, free of charge. Please call [phone].      ATENCIÓN: Si habla george, tiene a alexander disposición servicios gratuitos de asistencia lingüística. Llame al [phone].     CHÚ Ý: N?u b?n nói Ti?ng Vi?t, có các d?ch v? h? tr? ngôn ng? mi?n phí dành cho b?n. G?i s? [phone].         [facility] complies with applicable Federal civil rights laws and does not discriminate on the basis of race, color, national origin, age, disability, or sex.

## 2017-05-04 NOTE — LETTER
May 10, 2017      Chuy Tristan MD  [address] [facility]  [place] [address]           [facility]  [address] [place] [address]  [place] [address]  Phone: [phone]  Fax: [phone]          Patient: Lopez Savage   MR Number: [id number]   YOB: 1945   Date of Visit: 5/4/2017       Dear Dr. Chuy Tristan:    Thank you for referring Lopez Savage to me for evaluation. Attached you will find relevant portions of my assessment and plan of care.    If you have questions, please do not hesitate to call me. I look forward to following Lopez Savage along with you.    Sincerely,    Jim Diez MD    Enclosure  CC:  No Recipients    If you would like to receive this communication electronically, please contact [email or web address] or [phone] to request more information on [employer] access.    For providers and/or their staff who would like to refer a patient to [facility], please contact us through our one-stop-shop provider referral line, Frank Summers, at [phone].    If you feel you have received this communication in error or would no longer like to receive these types of communications, please e-mail [email or web address]

## 2017-05-05 LAB — RPR SER QL: NORMAL

## 2017-05-10 NOTE — PROGRESS NOTES
[facility] NEUROLOGY  [facility] [place]    Date: May 10, 2017   Patient Name: Lopez Savage   MRN: [id number]   PCP: Chuy Tristan  Referring Provider: Chuy Tristan MD    Assessment:   Lopez Savage is a 71 y.o. male presenting with mild progressive memory changes with borderline performance on MOCA (22/30).  We'll expand workup a full neuropsychiatric evaluation as well as lab screening for reversible causes of dementia and brain imaging to rule out underlying pathology. With regard to his excessive daytime sleepiness and somnolence, have provided a referral to sleep medicine as patient will likely benefit from a sleep study.  It is possible that ARTURO could be contributing to some of his cognitive issues.  Lastly, with regard to the patient's vertigo, have provided the patient with referral to vestibular rehabilitation with PT/OT.    Plan:     Problem List Items Addressed This Visit     None      Visit Diagnoses     Memory changes    -  Primary    Relevant Orders    Vitamin B12 (Completed)    TSH (Completed)    RPR (Completed)    MRI Brain W WO Contrast    Ambulatory Referral to Neuropsychology    Creatinine, serum (Completed)    Excessive daytime sleepiness        Relevant Orders    Ambulatory consult to Sleep Disorders    Vertigo        Relevant Orders    Ambulatory Referral to Physical/Occupational Therapy        Jim Diez MD  [facility]   Department of Neurology  Patient note was created using Dragon Dictation.  Any errors in syntax or even information may not have been identified and edited on initial review prior to signing this note.  Subjective:   Patient seen in consultation at the request of Dr. Tristan for the evaluation of memory changes. A copy of this note will be sent to the referring physician.      HPI:   Mr. Lopez Savage is a 71 y.o. male who presents with a chief complaint of Memory changes.  The patient reports that he has been increasingly forgetful over the  past year and often forgets stepsor forgets things on his to do list.  Occasionally, he struggles to find a word.  He denies any major changes in his personality or behavior and denies any dangerous behaviors such as getting lostin familiar areas or forgetting to turn off the stove.  He is managing all of his activities of daily living including his finances.  He does feel that his forgetfulness have progressively worsened within the last 6 months in particular the point that other family members are noticing it.  He also states that he does not sleepand wakes multiple times per night due to urinary frequency.  He complains that he must sleep nearly sitting up on pillows and has been told that he snores.  He does not feel refreshed in the morning despite getting a full night sleep.  Lastly, he complains of vertigo stating that he experiences a sensation of the world and he does not.  He states this is often made worse by lying on his left side in bed  He states that lying on his right side and keeping still seems to alleviate the sensation.    PAST MEDICAL HISTORY:  Past Medical History:   Diagnosis Date    Allergy     Asthma     CHRONIC BRONCHITIS     GERD (gastroesophageal reflux disease)     HTN (hypertension)     HTN (hypertension) 10/15/2014    Joint pain     Mitral regurgitation     3-4+ under surveillance CT surgery    Mitral regurgitation     Mitral valve regurgitation     Obesity     Osteoarthritis of knee     Ulcerative colitis     Urinary tract infection     Valvular regurgitation      PAST SURGICAL HISTORY:  Past Surgical History:   Procedure Laterality Date    ADENOIDECTOMY      CYSTOSCOPY W/ RETROGRADES      with penile biopsy    JOINT REPLACEMENT      Penile Biopsy      TONSILLECTOMY      TOTAL KNEE ARTHROPLASTY      bilaeral     CURRENT MEDS:  Current Outpatient Prescriptions   Medication Sig Dispense Refill    albuterol (PROVENTIL HFA) 90 mcg/actuation inhaler Inhale 2 puffs  into the lungs every 6 (six) hours as needed for Wheezing or Shortness of Breath. 1 Inhaler 3    diclofenac sodium (VOLTAREN) 1 % Gel Apply 2 g topically 2 (two) times daily as needed. 100 g 0    fluticasone (FLONASE) 50 mcg/actuation nasal spray 1 spray by Each Nare route daily as needed for Rhinitis or Allergies. 1 Bottle 1    fluticasone-salmeterol 250-50 mcg/dose (ADVAIR DISKUS) 250-50 mcg/dose diskus inhaler INHALE 1 INHALATION BY MOUTH TWICE A DAY 60 each 6    hydrochlorothiazide (HYDRODIURIL) 25 MG tablet Take 1 tablet (25 mg total) by mouth once daily. 90 tablet 1    meloxicam (MOBIC) 15 MG tablet TAKE 1 TABLET ONE TIME DAILY PRN; AVOID ALL OTHER NSAIDs 90 tablet 1    omeprazole (PRILOSEC) 40 MG capsule TAKE 1 CAPSULE ONE TIME DAILY 90 capsule 3    triamcinolone acetonide 0.1% (KENALOG) 0.1 % cream AAA bid x 2-3 weeks then discontinue.  Use prn flares. 454 g 3    betamethasone dipropionate (DIPROLENE) 0.05 % ointment Apply topically 2 (two) times daily. 45 g 1    furosemide (LASIX) 20 MG tablet Take 1 tablet (20 mg total) by mouth 2 (two) times daily. 60 tablet 11    losartan (COZAAR) 50 MG tablet Take 1 tablet (50 mg total) by mouth once daily. 90 tablet 3    meclizine (ANTIVERT) 12.5 mg tablet Take 1 tablet (12.5 mg total) by mouth 3 (three) times daily as needed. 270 tablet 3     No current facility-administered medications for this visit.      ALLERGIES:  Review of patient's allergies indicates:   Allergen Reactions    Codeine      Other reaction(s): Unknown    Tetanus vaccines and toxoid      Other reaction(s): Unknown     FAMILY HISTORY:  Family History   Problem Relation Age of Onset    Cancer Mother     Bone cancer Mother     Glaucoma Mother     Cataracts Mother     Kidney disease Father     COPD Sister     Lung disease Daughter     Heart disease Daughter      congenital    Melanoma Neg Hx     Prostate cancer Neg Hx     Macular degeneration Neg Hx     Retinal detachment Neg  "Hx     Strabismus Neg Hx     Amblyopia Neg Hx     Blindness Neg Hx      SOCIAL HISTORY:  Social History   Substance Use Topics    Smoking status: Former Smoker     Packs/day: 2.00     Years: 6.00     Quit date: 1/1/1960    Smokeless tobacco: None    Alcohol use No      Comment: remote hx of heavy alcohol use     Review of Systems:  12 review of systems is negative except for the symptoms mentioned in HPI.      Objective:     Vitals:    05/04/17 1559   BP: 135/86   Pulse: 84   Weight: 134.6 kg (296 lb 11.8 oz)   Height: 5' 9" (1.753 m)     General: NAD, well nourished   Eyes: no tearing, discharge, no erythema   ENT: moist mucous membranes of the oral cavity, nares patent    Neck: Supple, full range of motion  Cardiovascular: Warm and well perfused, pulses equal and symmetrical  Lungs: Normal work of breathing, normal chest wall excursions  Skin: No rash, lesions, or breakdown on exposed skin  Psychiatry: Mood and affect are appropriate   Abdomen: soft, non tender, non distended  Extremeties: No cyanosis, clubbing or edema.    Neurological   MENTAL STATUS: Alert and oriented to person, place, and time. Attention and concentration within normal limits. Speech without dysarthria, able to name and repeat without difficulty. Recent and remote memory within normal limits   CRANIAL NERVES: Visual fields intact. PERRL. EOMI. Facial sensation intact. Face symmetrical. Hearing grossly intact. Full shoulder shrug bilaterally. Tongue protrudes midline   SENSORY: Sensation is intact to light touch throughout.    MOTOR: Normal bulk and tone.  5/5 deltoid, biceps, triceps, interosseous, hand  bilaterally. 5/5 iliopsoas, knee extension/flexion, foot dorsi/plantarflexion bilaterally.    REFLEXES: Symmetric and 2+ throughout.   CEREBELLAR/COORDINATION/GAIT: Gait steady with normal arm swing and stride length.  Heel to shin intact. Finger to nose intact. Normal rapid alternating movements.     MOCA Results: "   Visuospatial/Executive:   Namin/3  Attention: 3/6  Language: 3/3  Abstraction:   Delayed Recall: 3/5  Orientation:   Total:   (Extra point for education level)

## 2017-05-11 DIAGNOSIS — I10 ESSENTIAL HYPERTENSION: ICD-10-CM

## 2017-05-12 RX ORDER — LOSARTAN POTASSIUM 50 MG/1
50 TABLET ORAL DAILY
Qty: 90 TABLET | Refills: 3 | Status: SHIPPED | OUTPATIENT
Start: 2017-05-12 | End: 2018-06-15 | Stop reason: SDUPTHER

## 2017-05-16 ENCOUNTER — HOSPITAL ENCOUNTER (OUTPATIENT)
Dept: RADIOLOGY | Facility: HOSPITAL | Age: 72
Discharge: HOME | End: 2017-05-16
Attending: PSYCHIATRY & NEUROLOGY
Payer: MEDICARE

## 2017-05-16 DIAGNOSIS — R41.3 MEMORY CHANGES: ICD-10-CM

## 2017-05-16 PROCEDURE — A9585 GADOBUTROL INJECTION: HCPCS | Mod: PO | Performed by: PSYCHIATRY & NEUROLOGY

## 2017-05-16 PROCEDURE — 25500020 PHARM REV CODE 255: Mod: PO | Performed by: PSYCHIATRY & NEUROLOGY

## 2017-05-16 PROCEDURE — 70553 MRI BRAIN STEM W/O & W/DYE: CPT | Mod: TC,PO

## 2017-05-16 RX ORDER — GADOBUTROL 604.72 MG/ML
10 INJECTION INTRAVENOUS
Status: COMPLETED | OUTPATIENT
Start: 2017-05-16 | End: 2017-05-16

## 2017-05-16 RX ADMIN — GADOBUTROL 10 ML: 604.72 INJECTION INTRAVENOUS at 02:05

## 2017-05-23 ENCOUNTER — TELEPHONE (OUTPATIENT)
Dept: INTERNAL MEDICINE | Facility: CLINIC | Age: 72
End: 2017-05-23

## 2017-05-23 NOTE — TELEPHONE ENCOUNTER
I spoke with patient's wife Mrs Brenda Savage i explain to her that she need to call Dr Jim Diez office in order for them to get test results. Verbalizes understanding.

## 2017-05-23 NOTE — TELEPHONE ENCOUNTER
----- Message from Jane Kelly sent at 5/23/2017  8:14 AM CDT -----  Contact: [phone]  Patient's wife called. Patient has been waiting for test results. Please contact as soon as possible.

## 2017-06-12 ENCOUNTER — OFFICE VISIT (OUTPATIENT)
Dept: INTERNAL MEDICINE | Facility: CLINIC | Age: 72
End: 2017-06-12
Payer: MEDICARE

## 2017-06-12 VITALS
HEIGHT: 69 IN | HEART RATE: 92 BPM | BODY MASS INDEX: 44.6 KG/M2 | WEIGHT: 301.13 LBS | SYSTOLIC BLOOD PRESSURE: 136 MMHG | DIASTOLIC BLOOD PRESSURE: 64 MMHG

## 2017-06-12 DIAGNOSIS — J30.9 ALLERGIC RHINITIS, UNSPECIFIED ALLERGIC RHINITIS TRIGGER, UNSPECIFIED RHINITIS SEASONALITY: ICD-10-CM

## 2017-06-12 DIAGNOSIS — I10 ESSENTIAL HYPERTENSION: Primary | ICD-10-CM

## 2017-06-12 DIAGNOSIS — L30.9 DERMATITIS: ICD-10-CM

## 2017-06-12 DIAGNOSIS — E66.01 MORBID OBESITY WITH BMI OF 40.0-44.9, ADULT: ICD-10-CM

## 2017-06-12 DIAGNOSIS — M25.571 ARTHRALGIA OF RIGHT FOOT: ICD-10-CM

## 2017-06-12 DIAGNOSIS — R41.3 MEMORY LOSS: ICD-10-CM

## 2017-06-12 PROCEDURE — 99999 PR PBB SHADOW E&M-EST. PATIENT-LVL III: CPT | Mod: PBBFAC,,, | Performed by: INTERNAL MEDICINE

## 2017-06-12 PROCEDURE — 1159F MED LIST DOCD IN RCRD: CPT | Mod: S$GLB,,, | Performed by: INTERNAL MEDICINE

## 2017-06-12 PROCEDURE — 1126F AMNT PAIN NOTED NONE PRSNT: CPT | Mod: S$GLB,,, | Performed by: INTERNAL MEDICINE

## 2017-06-12 PROCEDURE — 99499 UNLISTED E&M SERVICE: CPT | Mod: S$GLB,,, | Performed by: INTERNAL MEDICINE

## 2017-06-12 PROCEDURE — 99214 OFFICE O/P EST MOD 30 MIN: CPT | Mod: S$GLB,,, | Performed by: INTERNAL MEDICINE

## 2017-06-12 RX ORDER — MOMETASONE FUROATE 1 MG/G
CREAM TOPICAL DAILY PRN
Qty: 45 G | Refills: 0 | Status: SHIPPED | OUTPATIENT
Start: 2017-06-12 | End: 2017-10-04

## 2017-06-12 NOTE — PROGRESS NOTES
Subjective:       Patient ID: Lpoez Savage is a 71 y.o. male.    Chief Complaint: Follow-up    HPI Pt. Here for f/u for allergies; he is taking claritin and flonase prn which did not help much; I offered astelin and allergy referral and he refuses both; he continues to have cough and CXR dated 3/17/17 was negative; he states he will deal with symptoms; he has rash to R medial ankle which itches after walking in back yard without shoes; he is not taking anything for it; he states voltaren gel helps R foot lateral pain; R foot xray showed plantar spur and moderate degenerative changes; he has gained weight; of note, pt. Has f/u neurology for memory loss; workup is underway; he also was refereed to sleep medicine for sleep study; he has not f/u; he refuses sleep study; I explained risks of non-compliance  Review of Systems   Constitutional: Negative for chills, fatigue and fever.   HENT: Positive for postnasal drip and rhinorrhea. Negative for congestion and sore throat.    Respiratory: Positive for cough. Negative for shortness of breath and wheezing.    Cardiovascular: Negative for chest pain.   Gastrointestinal: Negative for abdominal pain, nausea and vomiting.   Genitourinary: Negative for dysuria.   Musculoskeletal: Negative for arthralgias.   Skin: Positive for rash.        Silver dollar size area of itchy rash to medial ankle   Neurological: Negative for dizziness and headaches.   Psychiatric/Behavioral: Negative for sleep disturbance. The patient is not nervous/anxious.        Objective:      Physical Exam   Constitutional: He is oriented to person, place, and time.   Morbid obesity   Eyes: EOM are normal.   Neck: Normal range of motion.   Cardiovascular: Normal rate, regular rhythm and normal heart sounds.    Pulmonary/Chest: Effort normal and breath sounds normal.   Abdominal: Soft. There is no tenderness. There is no rebound and no guarding.   Musculoskeletal: Normal range of motion.   Neurological: He is  alert and oriented to person, place, and time.   Skin: Rash noted.   silver dollar size area of papular rash to R medial ankle area       Assessment:       1. Essential hypertension Well controlled   2. Dermatitis Active   3. Allergic rhinitis, unspecified allergic rhinitis trigger, unspecified rhinitis seasonality Sub-optimally controlled   4. Memory loss Active   5. Arthralgia of right foot Well controlled   6. Morbid obesity with BMI of 40.0-44.9, adult Sub-optimally controlled       Plan:         Lopez was seen today for follow-up.    Diagnoses and all orders for this visit:    Essential hypertension  Comments:  continue current regimen and encouraged low  Na diet and weight loss    Dermatitis  Comments:  start elocon prn and re-evaluate in 1 month   Orders:  -     mometasone 0.1% (ELOCON) 0.1 % cream; Apply topically daily as needed.    Allergic rhinitis, unspecified allergic rhinitis trigger, unspecified rhinitis seasonality  Comments:  continue current regimen; I advised pt. that cough is likeley related to refractory allergies; he refuses to try astelin as alternative to flonase prn; he also refuses allergy referal     Memory loss  Comments:  f/u neurology who is managing; pt. refuses sleep study as per neurology request    Arthralgia of right foot  Comments:  continue voltaren gel prn    Morbid obesity with BMI of 40.0-44.9, adult  Comments:  encouraged diet and explained risks

## 2017-07-05 DIAGNOSIS — M25.50 POLYARTHRALGIA: ICD-10-CM

## 2017-07-05 RX ORDER — MELOXICAM 15 MG/1
TABLET ORAL
Qty: 90 TABLET | Refills: 1 | Status: SHIPPED | OUTPATIENT
Start: 2017-07-05 | End: 2018-10-11 | Stop reason: ALTCHOICE

## 2017-08-04 ENCOUNTER — OFFICE VISIT (OUTPATIENT)
Dept: NEUROLOGY | Facility: CLINIC | Age: 72
End: 2017-08-04
Payer: MEDICARE

## 2017-08-04 VITALS
HEIGHT: 69 IN | HEART RATE: 85 BPM | BODY MASS INDEX: 44.6 KG/M2 | SYSTOLIC BLOOD PRESSURE: 121 MMHG | DIASTOLIC BLOOD PRESSURE: 79 MMHG | WEIGHT: 301.13 LBS

## 2017-08-04 DIAGNOSIS — E66.01 MORBID OBESITY WITH BMI OF 40.0-44.9, ADULT: ICD-10-CM

## 2017-08-04 DIAGNOSIS — R41.3 MEMORY LOSS: ICD-10-CM

## 2017-08-04 DIAGNOSIS — R42 VERTIGO: Primary | ICD-10-CM

## 2017-08-04 PROCEDURE — 3074F SYST BP LT 130 MM HG: CPT | Mod: S$GLB,,, | Performed by: PSYCHIATRY & NEUROLOGY

## 2017-08-04 PROCEDURE — 99999 PR PBB SHADOW E&M-EST. PATIENT-LVL III: CPT | Mod: PBBFAC,,, | Performed by: PSYCHIATRY & NEUROLOGY

## 2017-08-04 PROCEDURE — 1159F MED LIST DOCD IN RCRD: CPT | Mod: S$GLB,,, | Performed by: PSYCHIATRY & NEUROLOGY

## 2017-08-04 PROCEDURE — 3078F DIAST BP <80 MM HG: CPT | Mod: S$GLB,,, | Performed by: PSYCHIATRY & NEUROLOGY

## 2017-08-04 PROCEDURE — 99214 OFFICE O/P EST MOD 30 MIN: CPT | Mod: S$GLB,,, | Performed by: PSYCHIATRY & NEUROLOGY

## 2017-08-04 PROCEDURE — 3008F BODY MASS INDEX DOCD: CPT | Mod: S$GLB,,, | Performed by: PSYCHIATRY & NEUROLOGY

## 2017-08-04 PROCEDURE — 99499 UNLISTED E&M SERVICE: CPT | Mod: S$GLB,,, | Performed by: PSYCHIATRY & NEUROLOGY

## 2017-08-04 PROCEDURE — 1125F AMNT PAIN NOTED PAIN PRSNT: CPT | Mod: S$GLB,,, | Performed by: PSYCHIATRY & NEUROLOGY

## 2017-08-04 RX ORDER — DONEPEZIL HYDROCHLORIDE 5 MG/1
5 TABLET, FILM COATED ORAL NIGHTLY
Qty: 90 TABLET | Refills: 3 | Status: SHIPPED | OUTPATIENT
Start: 2017-08-04 | End: 2019-02-15 | Stop reason: SDUPTHER

## 2017-08-04 RX ORDER — DONEPEZIL HYDROCHLORIDE 5 MG/1
5 TABLET, FILM COATED ORAL NIGHTLY
Qty: 30 TABLET | Refills: 11 | Status: SHIPPED | OUTPATIENT
Start: 2017-08-04 | End: 2017-09-14 | Stop reason: SDUPTHER

## 2017-08-04 NOTE — PATIENT INSTRUCTIONS
Taking Medicine Safely    Medicine is given to help treat or prevent illness. But if you dont take it correctly, it might not help. It might even harm you. Your healthcare provider or pharmacist can help you learn the right way to take your medicine. Listed below are some tips to help you take medicine safely.  Safety tips  Do's and don'ts include the following:   · Have a routine for taking each medicine. Make it part of something you do each day, such as brushing your teeth or eating a meal.  · When you go to the hospital or your healthcare providers office, bring all your current medicines in their original boxes or bottles. If you cant do that, bring an up-to-date list of your medicines.  · Don't stop taking a prescription medicine unless your healthcare provider tells you to. Doing so could make your condition worse.  · Don't share medicines.  · Let your healthcare provider and pharmacist know of any allergies you have and if there have been any changes in your health since you were last prescribed these medicines.   · Taking prescription medicines with alcohol, street drugs, herbs, supplements, or even some over-the-counter medicines can be harmful. Talk to your healthcare provider or pharmacist before using any of these things while taking a prescription medicine.  · When filling your prescriptions, try using the same pharmacy for all your medicines. If not, let the pharmacist know what medicines you are already on.  · Consider putting a week's worth of medicines in a container marked for each day of the week.   · Keep medicines out of the reach of children and pets. Be sure all medicine bottles have safety caps.  · Keep narcotics and sedatives out of sight or in a locked box or safe.  · Keep your medicines in a dry and cool location (not in the bathroom.)   · Don't use medicine that has  or that doesnt look or smell right. Get rid of it properly. To find out the right way to get rid of  medicine:  ¨ Call your [facility] or [facility] household trash and recycling service and ask if a medicine take-back program is available in your community.  ¨ Call your local pharmacy and ask the right way to get rid of the medicine.  ¨ Go to www.fda.gov/ForConsumers/ConsumerUpdates/vax413957 to learn how to get rid of medicines safely.  · Medicine that comes in a container for a single dose should be used only 1 time. If you use the container a second time, it may have germs in it that can cause illness. These illnesses include hepatitis B and C. They also include infections of the brain or spinal cord (meningitis and epidural abscess).   Using generic medicines  Medicines have brand names and generic (chemical) names. When a medicine is first made, it is sold only under its brand name. Later, it can be made and sold as a generic. Generic medicines cost less than brand-name medicines and most work just as well. Most people can use the generic medicine instead of the brand-name medicine, unless their healthcare provider says otherwise.   Date Last Reviewed: 8/1/2016  © 0961-8669 [employer]. [address] [place] [address], [place] [address]. All rights reserved. This information is not intended as a substitute for professional medical care. Always follow your healthcare professional's instructions.

## 2017-08-04 NOTE — PROGRESS NOTES
[facility] NEUROLOGY  [facility] [place]    Date: August 12, 2017   Patient Name: Lopez Savage   MRN: [id number]   PCP: Chuy Tristan  Referring Provider: No ref. provider found    Assessment:   Lopez Savage is a 71 y.o. male presenting with mild progressive memory changes with borderline performance on MOCA (22/30).  Dementia workup has returned largely negative. Will start trial of aricept 5 mg. Patient may use meclizine as needed for vertigo. He denies sleep study at present but concern remains that it could be contributing to his cognitive symptoms.    Plan:     Problem List Items Addressed This Visit        Neuro    Memory loss    Current Assessment & Plan     -- start aricept 5  Mg daily            ENT    Vertigo - Primary    Current Assessment & Plan     -- start meclizine PRN            Endocrine    Morbid obesity with BMI of 40.0-44.9, adult    Current Assessment & Plan     -- reinforced suspicions of ARTURO, patient precontemplative about sleep study           Other Visit Diagnoses    None.         Jim Diez MD  [facility]   Department of Neurology  Patient note was created using Dragon Dictation.  Any errors in syntax or even information may not have been identified and edited on initial review prior to signing this note.  Subjective:   Patient seen in consultation at the request of Dr. Tristan for the evaluation of memory changes. A copy of this note will be sent to the referring physician.      HPI:   Mr. Lopez Savage is a 71 y.o. male who presents with a chief complaint of Memory changes.  Since the patient's last visit, he reports he has been doing relatively well.  He denies any significant cognitive changes but is persistently bothered by her short-term memory loss.  He did complete workup for reversible causes of dementia with B12, TSH, and RPR (normal) and MRI brain was notable for mild chronic ischemic change. He endorses an isolated episode of self limited vertigo  that began as he was climbing on his horse. He states that once he was in the saddle and sat still with his eyes closed, his dizziness subsided within minutes. He continues to endorse symptoms of ARTURO but is precontemplative about undergoing a sleep study.    PAST MEDICAL HISTORY:  Past Medical History:   Diagnosis Date    Allergy     Asthma     CHRONIC BRONCHITIS     GERD (gastroesophageal reflux disease)     HTN (hypertension)     HTN (hypertension) 10/15/2014    Joint pain     Mitral regurgitation     3-4+ under surveillance CT surgery    Mitral regurgitation     Mitral valve regurgitation     Obesity     Osteoarthritis of knee     Ulcerative colitis     Urinary tract infection     Valvular regurgitation      PAST SURGICAL HISTORY:  Past Surgical History:   Procedure Laterality Date    ADENOIDECTOMY      CYSTOSCOPY W/ RETROGRADES      with penile biopsy    JOINT REPLACEMENT      Penile Biopsy      TONSILLECTOMY      TOTAL KNEE ARTHROPLASTY      bilaeral     CURRENT MEDS:  Current Outpatient Prescriptions   Medication Sig Dispense Refill    albuterol (PROVENTIL HFA) 90 mcg/actuation inhaler Inhale 2 puffs into the lungs every 6 (six) hours as needed for Wheezing or Shortness of Breath. 1 Inhaler 3    diclofenac sodium (VOLTAREN) 1 % Gel Apply 2 g topically 2 (two) times daily as needed. 100 g 0    fluticasone-salmeterol 250-50 mcg/dose (ADVAIR DISKUS) 250-50 mcg/dose diskus inhaler INHALE 1 INHALATION BY MOUTH TWICE A DAY 60 each 6    hydrochlorothiazide (HYDRODIURIL) 25 MG tablet Take 1 tablet (25 mg total) by mouth once daily. 90 tablet 1    losartan (COZAAR) 50 MG tablet Take 1 tablet (50 mg total) by mouth once daily. 90 tablet 3    meclizine (ANTIVERT) 12.5 mg tablet Take 1 tablet (12.5 mg total) by mouth 3 (three) times daily as needed. 270 tablet 3    meloxicam (MOBIC) 15 MG tablet TAKE 1 TABLET ONE TIME DAILY AS NEEDED; AVOID ALL OTHER NSAIDS. 90 tablet 1    betamethasone  "dipropionate (DIPROLENE) 0.05 % ointment Apply topically 2 (two) times daily. 45 g 1    donepezil (ARICEPT) 5 MG tablet Take 1 tablet (5 mg total) by mouth every evening. 30 tablet 11    donepezil (ARICEPT) 5 MG tablet Take 1 tablet (5 mg total) by mouth every evening. 90 tablet 3    fluticasone (FLONASE) 50 mcg/actuation nasal spray 1 spray by Each Nare route daily as needed for Rhinitis or Allergies. 1 Bottle 1    furosemide (LASIX) 20 MG tablet Take 1 tablet (20 mg total) by mouth 2 (two) times daily. 60 tablet 11    mometasone 0.1% (ELOCON) 0.1 % cream Apply topically daily as needed. 45 g 0    omeprazole (PRILOSEC) 40 MG capsule Take 1 capsule (40 mg total) by mouth once daily. 90 capsule 3     No current facility-administered medications for this visit.      ALLERGIES:  Review of patient's allergies indicates:   Allergen Reactions    Codeine      Other reaction(s): Unknown    Tetanus vaccines and toxoid      Other reaction(s): Unknown     FAMILY HISTORY:  Family History   Problem Relation Age of Onset    Cancer Mother     Bone cancer Mother     Glaucoma Mother     Cataracts Mother     Kidney disease Father     COPD Sister     Lung disease Daughter     Heart disease Daughter      congenital    Melanoma Neg Hx     Prostate cancer Neg Hx     Macular degeneration Neg Hx     Retinal detachment Neg Hx     Strabismus Neg Hx     Amblyopia Neg Hx     Blindness Neg Hx      SOCIAL HISTORY:  Social History   Substance Use Topics    Smoking status: Former Smoker     Packs/day: 2.00     Years: 6.00     Quit date: 1/1/1960    Smokeless tobacco: Not on file    Alcohol use No      Comment: remote hx of heavy alcohol use     Review of Systems:  12 review of systems is negative except for the symptoms mentioned in HPI.      Objective:     Vitals:    08/04/17 1401   BP: 121/79   Pulse: 85   Weight: (!) 136.6 kg (301 lb 2.4 oz)   Height: 5' 9" (1.753 m)     General: NAD, well nourished   Eyes: no tearing, " discharge, no erythema   ENT: moist mucous membranes of the oral cavity, nares patent    Neck: Supple, full range of motion  Cardiovascular: Warm and well perfused, pulses equal and symmetrical  Lungs: Normal work of breathing, normal chest wall excursions  Skin: No rash, lesions, or breakdown on exposed skin  Psychiatry: Mood and affect are appropriate   Abdomen: soft, non tender, non distended  Extremeties: No cyanosis, clubbing or edema.    Neurological   MENTAL STATUS: Alert and oriented to person, place, and time. Attention and concentration within normal limits. Speech without dysarthria, able to name and repeat without difficulty. Recent and remote memory within normal limits   CRANIAL NERVES: Visual fields intact. PERRL. EOMI. Facial sensation intact. Face symmetrical. Hearing grossly intact. Full shoulder shrug bilaterally. Tongue protrudes midline   SENSORY: Sensation is intact to light touch throughout.    MOTOR: Normal bulk and tone.  5/5 deltoid, biceps, triceps, interosseous, hand  bilaterally. 5/5 iliopsoas, knee extension/flexion, foot dorsi/plantarflexion bilaterally.    REFLEXES: Symmetric and 2+ throughout.   CEREBELLAR/COORDINATION/GAIT: Gait steady with normal arm swing and stride length.  Normal rapid alternating movements.     MOCA Results 17:   Visuospatial/Executive:   Namin/3  Attention: 3/6  Language: 3/3  Abstraction:   Delayed Recall: 3/5  Orientation:   Total:   (Extra point for education level)

## 2017-08-10 DIAGNOSIS — L30.9 DERMATITIS: ICD-10-CM

## 2017-08-10 RX ORDER — OMEPRAZOLE 40 MG/1
40 CAPSULE, DELAYED RELEASE ORAL DAILY
Qty: 90 CAPSULE | Refills: 3 | Status: SHIPPED | OUTPATIENT
Start: 2017-08-10 | End: 2017-12-04 | Stop reason: ALTCHOICE

## 2017-08-10 NOTE — TELEPHONE ENCOUNTER
----- Message from Elaine Munguia sent at 8/10/2017  1:54 PM CDT -----  Contact: Brenda (wife)/[phone]  Patient is requesting a refill on their medication.  Please advise as it needs to be sent to [facility] mail order pharmacy.    omeprazole (PRILOSEC) 40 MG capsule    [facility] - [place], [place] - [address]

## 2017-08-10 NOTE — TELEPHONE ENCOUNTER
----- Message from Elaine Munguia sent at 8/10/2017  1:54 PM CDT -----  Contact: Brenda (wife)/[phone]  Patient is requesting a refill on their medication.  Please advise as it needs to be sent to [facility] mail order pharmacy.    omeprazole (PRILOSEC) 40 MG capsule    [facility]IL [facility] - [place], [place] - [address]

## 2017-08-12 PROBLEM — R42 VERTIGO: Status: ACTIVE | Noted: 2017-08-12

## 2017-08-31 ENCOUNTER — TELEPHONE (OUTPATIENT)
Dept: OPTOMETRY | Facility: CLINIC | Age: 72
End: 2017-08-31

## 2017-08-31 DIAGNOSIS — I34.0 MITRAL VALVE INSUFFICIENCY, UNSPECIFIED ETIOLOGY: Primary | ICD-10-CM

## 2017-08-31 DIAGNOSIS — Z12.11 ENCOUNTER FOR SCREENING COLONOSCOPY: Primary | ICD-10-CM

## 2017-08-31 RX ORDER — POLYETHYLENE GLYCOL 3350, SODIUM SULFATE ANHYDROUS, SODIUM BICARBONATE, SODIUM CHLORIDE, POTASSIUM CHLORIDE 236; 22.74; 6.74; 5.86; 2.97 G/4L; G/4L; G/4L; G/4L; G/4L
4 POWDER, FOR SOLUTION ORAL ONCE
Qty: 4000 ML | Refills: 0 | Status: SHIPPED | OUTPATIENT
Start: 2017-08-31 | End: 2017-08-31

## 2017-08-31 NOTE — TELEPHONE ENCOUNTER
Patient scheduled for COLONOSCOPY with Dr. Duarte on  10/03/2017.  Arm[facility] instructions explained and mailed to patient . Lab will call two days before with arrival time at [facility]. Make sure to have someone to drive you home after procedure.  Verbalized understanding.

## 2017-08-31 NOTE — TELEPHONE ENCOUNTER
----- Message from Ivonne Whipple sent at 8/31/2017  8:54 AM CDT -----  Contact: nany friedman  Mr. Friedman was advised contact Pamela to schedule an appointment. You can reach Mrs. Friedman at [phone] or [phone]

## 2017-08-31 NOTE — TELEPHONE ENCOUNTER
----- Message from Saima Quinteros sent at 8/31/2017  9:16 AM CDT -----  Contact: mrs. friedman [phone]  Mrs. Friedman would like to know when patient has to do another scope

## 2017-09-14 ENCOUNTER — HOSPITAL ENCOUNTER (OUTPATIENT)
Dept: CARDIOLOGY | Facility: CLINIC | Age: 72
Discharge: HOME | End: 2017-09-14
Payer: MEDICARE

## 2017-09-14 ENCOUNTER — OFFICE VISIT (OUTPATIENT)
Dept: CARDIOTHORACIC SURGERY | Facility: CLINIC | Age: 72
End: 2017-09-14
Payer: MEDICARE

## 2017-09-14 VITALS
WEIGHT: 300 LBS | OXYGEN SATURATION: 99 % | HEIGHT: 69 IN | TEMPERATURE: 98 F | BODY MASS INDEX: 44.43 KG/M2 | SYSTOLIC BLOOD PRESSURE: 132 MMHG | DIASTOLIC BLOOD PRESSURE: 69 MMHG | HEART RATE: 82 BPM

## 2017-09-14 DIAGNOSIS — I34.0 MITRAL VALVE INSUFFICIENCY, UNSPECIFIED ETIOLOGY: ICD-10-CM

## 2017-09-14 DIAGNOSIS — I34.0 MITRAL VALVE INSUFFICIENCY, UNSPECIFIED ETIOLOGY: Primary | ICD-10-CM

## 2017-09-14 LAB
AORTIC VALVE REGURGITATION: ABNORMAL
ESTIMATED PA SYSTOLIC PRESSURE: 33.64
MITRAL VALVE MOBILITY: ABNORMAL
MITRAL VALVE REGURGITATION: ABNORMAL
RETIRED EF AND QEF - SEE NOTES: 55 (ref 55–65)
TRICUSPID VALVE REGURGITATION: ABNORMAL

## 2017-09-14 PROCEDURE — 93306 TTE W/DOPPLER COMPLETE: CPT | Mod: S$GLB,,, | Performed by: INTERNAL MEDICINE

## 2017-09-14 PROCEDURE — 1126F AMNT PAIN NOTED NONE PRSNT: CPT | Mod: S$GLB,,, | Performed by: THORACIC SURGERY (CARDIOTHORACIC VASCULAR SURGERY)

## 2017-09-14 PROCEDURE — 1159F MED LIST DOCD IN RCRD: CPT | Mod: S$GLB,,, | Performed by: THORACIC SURGERY (CARDIOTHORACIC VASCULAR SURGERY)

## 2017-09-14 PROCEDURE — 3078F DIAST BP <80 MM HG: CPT | Mod: S$GLB,,, | Performed by: THORACIC SURGERY (CARDIOTHORACIC VASCULAR SURGERY)

## 2017-09-14 PROCEDURE — 3075F SYST BP GE 130 - 139MM HG: CPT | Mod: S$GLB,,, | Performed by: THORACIC SURGERY (CARDIOTHORACIC VASCULAR SURGERY)

## 2017-09-14 PROCEDURE — 3008F BODY MASS INDEX DOCD: CPT | Mod: S$GLB,,, | Performed by: THORACIC SURGERY (CARDIOTHORACIC VASCULAR SURGERY)

## 2017-09-14 PROCEDURE — 99999 PR PBB SHADOW E&M-EST. PATIENT-LVL III: CPT | Mod: PBBFAC,,, | Performed by: THORACIC SURGERY (CARDIOTHORACIC VASCULAR SURGERY)

## 2017-09-14 PROCEDURE — 99214 OFFICE O/P EST MOD 30 MIN: CPT | Mod: S$GLB,,, | Performed by: THORACIC SURGERY (CARDIOTHORACIC VASCULAR SURGERY)

## 2017-09-14 NOTE — PROGRESS NOTES
" 71 year old male with known mitral regurgitation. Here today for 1 year follow up.  He currently does not complain of syncope, chest pain, or pre syncope. He uses his inhaler for occassional SOB when he over exerts himself. Reports having a cough for the past month but otherwise unremarkable.  He denies orthopnea, PND, or pedal edema. He presents for echo results. He feels he is gaining weight.  Earlier this month was placed on Aricept for some "memory" concerns.  He says his dementia work up was negative.    Allergies: reviewed  Medications: reviewed  Past medical history: unchanged except as described in HPI  Past surgical history: reviewed and unchanged from previous    ROS:  Review of Systems   Constitutional: Negative for fever and malaise/fatigue.   HENT: Negative for congestion and sore throat.    Eyes: Negative for blurred vision, double vision and discharge.   Respiratory: see HPI  Cardiovascular: see HPI  Gastrointestinal: Negative for abdominal pain, constipation, diarrhea, nausea and vomiting.   Genitourinary: Negative for dysuria, frequency and urgency.   Musculoskeletal: Negative for back pain and myalgias.   Skin: Negative for itching and rash.   Neurological: Negative for dizziness, speech change, seizures, weakness and headaches.   Endo/Heme/Allergies: Does not bruise/bleed easily.   Psychiatric/Behavioral: Negative for depression. The patient is not nervous/anxious.      PE;  Physical Exam   Constitutional: Patient appears well-developed and well-nourished.   HENT:   Head: Normocephalic and atraumatic.   Eyes: Pupils are equal, round, and reactive to light.   Neck: Normal range of motion. Neck supple.   Cardiovascular: Normal rate, regular rhythm and normal heart sounds.  + murmur  Pulmonary/Chest: Effort normal and breath sounds normal.   Abdominal: Soft. Bowel sounds are normal.   Musculoskeletal: Normal range of motion.   Neurological: Alert and oriented to person, place, and time.   Skin: Skin " is warm and dry.   Psychiatric: Normal mood and affect. Behavior is normal.       Data:  1 - Normal left ventricular systolic function (EF 55-60%).     2 - No wall motion abnormalities.     3 - Moderate left atrial enlargement.     4 - Normal right ventricular systolic function .     5 - The estimated PA systolic pressure is greater than 34 mmHg.     6 - Trivial to mild aortic regurgitation.     7 - Moderate to severe mitral regurgitation.     8 - Mild tricuspid regurgitation.     9 - Prolapse and flail of the posterior antonio valve leaflet ( see text).   LA volume index is 44.83 (33.87 2016)    Assessment:  Moderate to severe MR    Plan:    CTS Attending Note:    I have personally taken the history and examined this patient and agree with the NP's note as stated above. Minimal sx, and even that not clearly related to his MR. ECHO reviewed. LV size and function remain NL. LA may be slightly more dilated. Given his co morbid conditions, most worrisome the apparent recent concern regarding memory, risk > benefit for mitral repair.

## 2017-10-02 ENCOUNTER — TELEPHONE (OUTPATIENT)
Dept: GASTROENTEROLOGY | Facility: CLINIC | Age: 72
End: 2017-10-02

## 2017-10-02 DIAGNOSIS — Z12.11 SCREENING FOR COLON CANCER: Primary | ICD-10-CM

## 2017-10-02 NOTE — TELEPHONE ENCOUNTER
Spoke with pt wife and she is very upset about her  not being scheduled on 10-3-17 with Dr. Duarte. Pt wife stated that they already mixed the Golytely and thought that the procedure will be scheduled tomorrow. Pt has been scheduled on 10-11-17 with Dr. Duarte. Prep and instruction has been given to pt already. Verbal Understanding.

## 2017-10-04 ENCOUNTER — OFFICE VISIT (OUTPATIENT)
Dept: FAMILY MEDICINE | Facility: CLINIC | Age: 72
End: 2017-10-04
Payer: MEDICARE

## 2017-10-04 VITALS
OXYGEN SATURATION: 97 % | DIASTOLIC BLOOD PRESSURE: 78 MMHG | HEART RATE: 75 BPM | RESPIRATION RATE: 18 BRPM | TEMPERATURE: 98 F | BODY MASS INDEX: 43.69 KG/M2 | HEIGHT: 69 IN | WEIGHT: 295 LBS | SYSTOLIC BLOOD PRESSURE: 124 MMHG

## 2017-10-04 DIAGNOSIS — R05.9 COUGH: ICD-10-CM

## 2017-10-04 DIAGNOSIS — R09.82 POST-NASAL DRIP: Primary | ICD-10-CM

## 2017-10-04 PROCEDURE — 99214 OFFICE O/P EST MOD 30 MIN: CPT | Mod: S$GLB,,, | Performed by: FAMILY MEDICINE

## 2017-10-04 PROCEDURE — 99999 PR PBB SHADOW E&M-EST. PATIENT-LVL III: CPT | Mod: PBBFAC,,, | Performed by: FAMILY MEDICINE

## 2017-10-04 NOTE — PROGRESS NOTES
HPI:  Lopez Savage is a 72 y.o. year old male that  presents with a cough and sore throat that occurs after going [place] the horse barn. The cough has been giving him trouble for a month but he has been going to the barn for years.He wheezes some times. This morning he coughed up gray phlegm. He sucks on cough drop to help his sore throat.  Chief Complaint   Patient presents with    Sore Throat     off and on--usually after coming from barn    Cough    Establish Care   .     HPI      Past Medical History:   Diagnosis Date    Allergy     Asthma     CHRONIC BRONCHITIS     GERD (gastroesophageal reflux disease)     HTN (hypertension)     HTN (hypertension) 10/15/2014    Joint pain     Mitral regurgitation     3-4+ under surveillance CT surgery    Mitral regurgitation     Mitral valve regurgitation     Obesity     Osteoarthritis of knee     Ulcerative colitis     Urinary tract infection     Valvular regurgitation      Social History     Social History    Marital status:      Spouse name: N/A    Number of children: N/A    Years of education: N/A     Occupational History    Not on file.     Social History Main Topics    Smoking status: Former Smoker     Packs/day: 2.00     Years: 6.00     Quit date: 1/1/1960    Smokeless tobacco: Never Used    Alcohol use No      Comment: remote hx of heavy alcohol use    Drug use: No    Sexual activity: Not Currently     Partners: Female     Other Topics Concern    Not on file     Social History Narrative    No narrative on file     Past Surgical History:   Procedure Laterality Date    ADENOIDECTOMY      CYSTOSCOPY W/ RETROGRADES      with penile biopsy    JOINT REPLACEMENT      Penile Biopsy      TONSILLECTOMY      TOTAL KNEE ARTHROPLASTY Bilateral      Family History   Problem Relation Age of Onset    Bone cancer Mother     Glaucoma Mother     Cataracts Mother     Kidney disease Father     COPD Sister     Heart disease Daughter       "congenital    No Known Problems Daughter     Melanoma Neg Hx     Prostate cancer Neg Hx     Macular degeneration Neg Hx     Retinal detachment Neg Hx     Strabismus Neg Hx     Amblyopia Neg Hx     Blindness Neg Hx            Review of Systems  General ROS: negative for chills, fever or weight loss  ENT ROS: negative for epistaxis, sore throat or rhinorrhea  Respiratory ROS: no cough, shortness of breath, or wheezing  Cardiovascular ROS: no chest pain or dyspnea on exertion  Gastrointestinal ROS: no abdominal pain, change in bowel habits, or black/ bloody stools    Physical Exam:  /78 (BP Location: Right arm, Patient Position: Sitting, BP Method: Medium (Manual))   Pulse 75   Temp 98.4 °F (36.9 °C) (Oral)   Resp 18   Ht 5' 9" (1.753 m)   Wt 133.8 kg (295 lb)   SpO2 97%   BMI 43.56 kg/m²   General appearance: alert, cooperative, no distress  Constitutional:Oriented to person, place, and time.appears well-developed and well-nourished.  HEENT: Normocephalic, atraumatic, neck symmetrical, no nasal discharge, TM- clear bilaterally  Lungs: clear to auscultation bilaterally, no dullness to percussion bilaterally  Heart: regular rate and rhythm without rub; no displacement of the PMI , S1&S2 present  Abdomen: soft, non-tender; bowel sounds normoactive; no organomegaly  Physical Exam    LABS:    Complete Blood Count  Lab Results   Component Value Date    RBC 4.57 (L) 02/27/2017    HGB 12.7 (L) 02/27/2017    HCT 39.7 (L) 02/27/2017    MCV 87 02/27/2017    MCH 27.8 02/27/2017    MCHC 32.0 02/27/2017    RDW 13.8 02/27/2017     02/27/2017    MPV 11.4 02/27/2017    GRAN 6.5 02/27/2017    GRAN 70.1 02/27/2017    LYMPH 1.7 02/27/2017    LYMPH 18.2 02/27/2017    MONO 0.8 02/27/2017    MONO 8.7 02/27/2017    EOS 0.2 02/27/2017    BASO 0.04 02/27/2017    EOSINOPHIL 2.3 02/27/2017    BASOPHIL 0.4 02/27/2017    DIFFMETHOD Automated 02/27/2017       Comprehensive Metabolic Panel  Lab Results   Component Value " Date     02/27/2017    BUN 16 02/27/2017    CREATININE 1.0 05/04/2017     02/27/2017    K 4.2 02/27/2017     02/27/2017    PROT 7.2 02/27/2017    ALBUMIN 3.5 02/27/2017    BILITOT 0.5 02/27/2017    AST 18 02/27/2017    ALKPHOS 67 02/27/2017    CO2 25 02/27/2017    ALT 27 02/27/2017    ANIONGAP 10 02/27/2017    EGFRNONAA >60 05/04/2017    ESTGFRAFRICA >60 05/04/2017       LIPID  Lab Results   Component Value Date    CHOL 125 02/27/2017    HDL 28 (L) 02/27/2017         TSH  Lab Results   Component Value Date    TSH 1.529 05/04/2017       Current Outpatient Prescriptions   Medication Sig Dispense Refill    albuterol (PROVENTIL HFA) 90 mcg/actuation inhaler Inhale 2 puffs into the lungs every 6 (six) hours as needed for Wheezing or Shortness of Breath. 1 Inhaler 3    diclofenac sodium (VOLTAREN) 1 % Gel Apply 2 g topically 2 (two) times daily as needed. 100 g 0    donepezil (ARICEPT) 5 MG tablet Take 1 tablet (5 mg total) by mouth every evening. 90 tablet 3    fluticasone-salmeterol 250-50 mcg/dose (ADVAIR DISKUS) 250-50 mcg/dose diskus inhaler INHALE 1 INHALATION BY MOUTH TWICE A DAY 60 each 6    hydrochlorothiazide (HYDRODIURIL) 25 MG tablet Take 1 tablet (25 mg total) by mouth once daily. 90 tablet 1    losartan (COZAAR) 50 MG tablet Take 1 tablet (50 mg total) by mouth once daily. 90 tablet 3    meclizine (ANTIVERT) 12.5 mg tablet Take 1 tablet (12.5 mg total) by mouth 3 (three) times daily as needed. 270 tablet 3    meloxicam (MOBIC) 15 MG tablet TAKE 1 TABLET ONE TIME DAILY AS NEEDED; AVOID ALL OTHER NSAIDS. 90 tablet 1    omeprazole (PRILOSEC) 40 MG capsule Take 1 capsule (40 mg total) by mouth once daily. 90 capsule 3    betamethasone dipropionate (DIPROLENE) 0.05 % ointment Apply topically 2 (two) times daily. 45 g 1     No current facility-administered medications for this visit.        Assessment:    ICD-10-CM ICD-9-CM    1. Post-nasal drip R09.82 784.91    2. Cough R05 786.2           Plan:  Use Alavert -D OTC bid to control congestion and cough  Return if symptoms worsen or fail to improve.          Lucie Munguia MD

## 2017-10-05 ENCOUNTER — TELEPHONE (OUTPATIENT)
Dept: FAMILY MEDICINE | Facility: CLINIC | Age: 72
End: 2017-10-05

## 2017-10-05 RX ORDER — POLYETHYLENE GLYCOL-3350 AND ELECTROLYTES 236; 6.74; 5.86; 2.97; 22.74 G/274.31G; G/274.31G; G/274.31G; G/274.31G; G/274.31G
POWDER, FOR SOLUTION ORAL
COMMUNITY
Start: 2017-08-31 | End: 2017-12-04

## 2017-10-05 NOTE — TELEPHONE ENCOUNTER
----- Message from Mady Cook sent at 10/5/2017 11:33 AM CDT -----  Contact: [phone]/Wife Brenda  Patient's wife requesting to speak with you regarding patient's medications. Please call and advise.

## 2017-10-09 ENCOUNTER — OFFICE VISIT (OUTPATIENT)
Dept: OPHTHALMOLOGY | Facility: CLINIC | Age: 72
End: 2017-10-09
Payer: MEDICARE

## 2017-10-09 DIAGNOSIS — H25.13 NUCLEAR SCLEROSIS OF BOTH EYES: Primary | ICD-10-CM

## 2017-10-09 DIAGNOSIS — H52.7 REFRACTIVE ERROR: ICD-10-CM

## 2017-10-09 DIAGNOSIS — I10 ESSENTIAL HYPERTENSION: ICD-10-CM

## 2017-10-09 PROCEDURE — 99499 UNLISTED E&M SERVICE: CPT | Mod: S$GLB,,, | Performed by: OPHTHALMOLOGY

## 2017-10-09 PROCEDURE — 92014 COMPRE OPH EXAM EST PT 1/>: CPT | Mod: S$GLB,,, | Performed by: OPHTHALMOLOGY

## 2017-10-09 PROCEDURE — 99999 PR PBB SHADOW E&M-EST. PATIENT-LVL II: CPT | Mod: PBBFAC,,, | Performed by: OPHTHALMOLOGY

## 2017-10-09 NOTE — PROGRESS NOTES
Subjective:       Patient ID: Lopez Savage is a 72 y.o. male.    Chief Complaint: Cataract    HPI  Review of Systems    Objective:      Physical Exam    Assessment:       1. Nuclear sclerosis of both eyes    2. Essential hypertension    3. Refractive error        Plan:       Cataracts- Not visually significant per BCVA or BAT. Pt denies any problems with his vision at this time.  HTN-No rettinopathy OU.  RE-No need to change Rx.      RTC Dr Ching in 1 yr.

## 2017-10-11 ENCOUNTER — ANESTHESIA (OUTPATIENT)
Dept: ENDOSCOPY | Facility: HOSPITAL | Age: 72
End: 2017-10-11
Payer: MEDICARE

## 2017-10-11 ENCOUNTER — SURGERY (OUTPATIENT)
Age: 72
End: 2017-10-11

## 2017-10-11 ENCOUNTER — HOSPITAL ENCOUNTER (OUTPATIENT)
Facility: HOSPITAL | Age: 72
Discharge: HOME | End: 2017-10-11
Attending: INTERNAL MEDICINE | Admitting: INTERNAL MEDICINE
Payer: MEDICARE

## 2017-10-11 ENCOUNTER — ANESTHESIA EVENT (OUTPATIENT)
Dept: ENDOSCOPY | Facility: HOSPITAL | Age: 72
End: 2017-10-11
Payer: MEDICARE

## 2017-10-11 DIAGNOSIS — Z12.11 SCREENING FOR COLON CANCER: ICD-10-CM

## 2017-10-11 PROCEDURE — 27201089 HC SNARE, DISP (ANY): Performed by: INTERNAL MEDICINE

## 2017-10-11 PROCEDURE — 37000009 HC ANESTHESIA EA ADD 15 MINS: Performed by: INTERNAL MEDICINE

## 2017-10-11 PROCEDURE — 88305 TISSUE EXAM BY PATHOLOGIST: CPT | Mod: 26,,, | Performed by: PATHOLOGY

## 2017-10-11 PROCEDURE — 25000003 PHARM REV CODE 250: Performed by: INTERNAL MEDICINE

## 2017-10-11 PROCEDURE — 37000008 HC ANESTHESIA 1ST 15 MINUTES: Performed by: INTERNAL MEDICINE

## 2017-10-11 PROCEDURE — 27201012 HC FORCEPS, HOT/COLD, DISP: Performed by: INTERNAL MEDICINE

## 2017-10-11 PROCEDURE — 45385 COLONOSCOPY W/LESION REMOVAL: CPT | Mod: PT,,, | Performed by: INTERNAL MEDICINE

## 2017-10-11 PROCEDURE — 45380 COLONOSCOPY AND BIOPSY: CPT | Mod: 59,,, | Performed by: INTERNAL MEDICINE

## 2017-10-11 PROCEDURE — 63600175 PHARM REV CODE 636 W HCPCS: Performed by: NURSE ANESTHETIST, CERTIFIED REGISTERED

## 2017-10-11 PROCEDURE — 45380 COLONOSCOPY AND BIOPSY: CPT | Performed by: INTERNAL MEDICINE

## 2017-10-11 PROCEDURE — 88305 TISSUE EXAM BY PATHOLOGIST: CPT | Performed by: PATHOLOGY

## 2017-10-11 PROCEDURE — 45385 COLONOSCOPY W/LESION REMOVAL: CPT | Performed by: INTERNAL MEDICINE

## 2017-10-11 RX ORDER — LIDOCAINE HCL/PF 100 MG/5ML
SYRINGE (ML) INTRAVENOUS
Status: DISCONTINUED | OUTPATIENT
Start: 2017-10-11 | End: 2017-10-11

## 2017-10-11 RX ORDER — PROPOFOL 10 MG/ML
VIAL (ML) INTRAVENOUS
Status: DISCONTINUED | OUTPATIENT
Start: 2017-10-11 | End: 2017-10-11

## 2017-10-11 RX ORDER — SODIUM CHLORIDE 9 MG/ML
INJECTION, SOLUTION INTRAVENOUS CONTINUOUS
Status: DISCONTINUED | OUTPATIENT
Start: 2017-10-11 | End: 2017-10-11 | Stop reason: HOSPADM

## 2017-10-11 RX ORDER — PROPOFOL 10 MG/ML
VIAL (ML) INTRAVENOUS CONTINUOUS PRN
Status: DISCONTINUED | OUTPATIENT
Start: 2017-10-11 | End: 2017-10-11

## 2017-10-11 RX ADMIN — LIDOCAINE HYDROCHLORIDE 80 MG: 20 INJECTION, SOLUTION INTRAVENOUS at 12:10

## 2017-10-11 RX ADMIN — SODIUM CHLORIDE: 0.9 INJECTION, SOLUTION INTRAVENOUS at 10:10

## 2017-10-11 RX ADMIN — PROPOFOL 50 MG: 10 INJECTION, EMULSION INTRAVENOUS at 12:10

## 2017-10-11 RX ADMIN — PROPOFOL 150 MCG/KG/MIN: 10 INJECTION, EMULSION INTRAVENOUS at 12:10

## 2017-10-11 NOTE — DISCHARGE INSTRUCTIONS
Discharge Summary/Instructions for after Colonoscopy with Biopsy/Polypectomy    Lopez Savage  10/11/2017  Shanae Duarte MD    Restrictions on Activity:    - Do not drive car, or operate machinery, make critical decisions, or do activities that   require coordination or balance for 24 hours.  - The following day: return to full activity including work.  - For 3 days: No heavy lifting, straining or running.  - Diet: Eat and drink normally unless instructed otherwise.    Treatment for Common Side Effects:  - Mild abdominal pain and bloating or excessive gas: rest, eat lightly and use a heating pad.     Symptoms to watch for and report to your physician:  1. Severe abdominal pain.  2. Fever within 24 hours after a procedure.  3. A large amount of rectal bleeding. (A small amount of blood from the rectum is not serious, especially if hemorrhoids are present.)  4. Because air was put into your colon during the procedure, expelling large amount of air from your rectum is normal.  5. You may not have a bowel movement for 1-3 days because of the colonoscopy prep. This is normal.  6. Go directly to the emergency room if you notice any of the following:     Chills and/or fever over 101   Persistent vomiting   Severe abdominal pain, other than gas cramps   Severe chest pain   Black, tarry stools   Any bleeding - exceeding one tablespoon    If you have any questions or problems, please call your Physician:    Shanae Duarte MD    Lab Results: [phone]    If a complication or emergency situation arises and you are unable to reach your Physician - GO TO THE EMERGENCY ROOM.

## 2017-10-11 NOTE — TRANSFER OF CARE
"Anesthesia Transfer of Care Note    Patient: Lopez Savage    Procedure(s) Performed: Procedure(s) (LRB):  COLONOSCOPY Golytely (N/A)    Patient location: GI    Anesthesia Type: MAC    Transport from OR: Transported from OR on room air with adequate spontaneous ventilation    Post pain: adequate analgesia    Post assessment: no apparent anesthetic complications and tolerated procedure well    Post vital signs: stable    Level of consciousness: awake, alert and oriented    Nausea/Vomiting: no nausea/vomiting    Complications: none          Last vitals:   Visit Vitals  BP (!) 148/75 (Patient Position: Lying)   Pulse 78   Temp 37 °C (98.6 °F) (Oral)   Resp 20   Ht 5' 9" (1.753 m)   Wt 127 kg (280 lb)   SpO2 98%   BMI 41.35 kg/m²     "

## 2017-10-11 NOTE — OR NURSING
Discharge Instructions given to patient. Verbalized understanding. Alert and oriented x4, VSS.    Waiting on Dr Duarte to talk to the patient.

## 2017-10-11 NOTE — H&P
History and Physical      Chief complaints: Requesting screening colonscopy    History of Presenting Illness    Patient requesting colonoscopy.  Patient denies any abdominal pain, weight loss or blood in the stool.  There is no family history of colon cancer. Patient has a history of colon polyp    Review of Systems   Constitutional: Negative for fever and appetite change.   HENT: Negative for sore throat, trouble swallowing and neck pain.   Eyes: Negative for photophobia and visual disturbance.   Respiratory: Negative for wheezing.   Cardiovascular: Negative for chest pain and palpitations.   Gastrointestinal: See HPI for details   Musculoskeletal: Negative for joint swelling and arthralgias.   Skin: Negative for rash and wound.   Neurological: Negative for dizziness, tremors and weakness.   Hematological: Negative.   Psychiatric/Behavioral: Negative for suicidal ideas and behavioral problems.     Past Medical History:   Diagnosis Date    Allergy     Asthma     CHRONIC BRONCHITIS     GERD (gastroesophageal reflux disease)     HTN (hypertension)     HTN (hypertension) 10/15/2014    Joint pain     Mitral regurgitation     3-4+ under surveillance CT surgery    Mitral regurgitation     Mitral valve regurgitation     Obesity     Osteoarthritis of knee     Ulcerative colitis     Urinary tract infection     Valvular regurgitation        Past Surgical History:   Procedure Laterality Date    ADENOIDECTOMY      CYSTOSCOPY W/ RETROGRADES      with penile biopsy    JOINT REPLACEMENT      Penile Biopsy      TONSILLECTOMY      TOTAL KNEE ARTHROPLASTY Bilateral        Family History   Problem Relation Age of Onset    Bone cancer Mother     Glaucoma Mother     Cataracts Mother     Kidney disease Father     COPD Sister     Heart disease Daughter      congenital    No Known Problems Daughter     Melanoma Neg Hx     Prostate cancer Neg Hx     Macular degeneration Neg Hx     Retinal detachment Neg  Hx     Strabismus Neg Hx     Amblyopia Neg Hx     Blindness Neg Hx        Social History     Social History    Marital status:      Spouse name: N/A    Number of children: N/A    Years of education: N/A     Social History Main Topics    Smoking status: Former Smoker     Packs/day: 2.00     Years: 6.00     Quit date: 1/1/1960    Smokeless tobacco: Never Used    Alcohol use No      Comment: remote hx of heavy alcohol use    Drug use: No    Sexual activity: Not Currently     Partners: Female     Other Topics Concern    None     Social History Narrative    None       Current Facility-Administered Medications   Medication Dose Route Frequency Provider Last Rate Last Dose    0.9%  NaCl infusion   Intravenous Continuous Shanae Duarte MD 20 mL/hr at 10/11/17 1049         Review of patient's allergies indicates:   Allergen Reactions    Codeine      Other reaction(s): Unknown    Tetanus vaccines and toxoid      Other reaction(s): Unknown       Objective:      Vitals:    10/11/17 1042   BP: (!) 148/75   Pulse: 78   Resp: 20   Temp: 98.6 °F (37 °C)     Physical Exam   Constitutional: Patient is oriented to person, place, and time. Appears well-nourished.   HENT:   Mouth/Throat: Oropharynx is clear and moist.   Eyes: Pupils are equal, round, and reactive to light.   Neck: Neck supple.   Cardiovascular: Normal heart sounds.   Pulmonary/Chest: Effort normal and breath sounds normal.   Abdominal: Soft. Exhibits no mass. There is no tenderness. There is no guarding.   Musculoskeletal: Normal range of motion.   Lymphadenopathy: Has no cervical adenopathy.   Neurological:Alert and oriented to person, place, and time.   Skin: Skin is warm. No rash noted.   Psychiatric: Has a normal mood and affect.     Assessment:  History of colon polyp    Plan:  Colonoscopy       I have reviewed the patient's medical history in detail and updated the computerized patient record

## 2017-10-11 NOTE — ANESTHESIA POSTPROCEDURE EVALUATION
"Anesthesia Post Evaluation    Patient: Lopez Savgae    Procedure(s) Performed: Procedure(s) (LRB):  COLONOSCOPY Golytely (N/A)    Final Anesthesia Type: MAC  Patient location during evaluation: GI PACU  Patient participation: Yes- Able to Participate  Level of consciousness: awake and alert and oriented  Post-procedure vital signs: reviewed and stable  Pain management: adequate  Airway patency: patent  PONV status at discharge: No PONV  Anesthetic complications: no      Cardiovascular status: blood pressure returned to baseline and hemodynamically stable  Respiratory status: unassisted  Hydration status: euvolemic  Follow-up not needed.        Visit Vitals  BP (!) 148/75 (Patient Position: Lying)   Pulse 78   Temp 37 °C (98.6 °F) (Oral)   Resp 20   Ht 5' 9" (1.753 m)   Wt 127 kg (280 lb)   SpO2 98%   BMI 41.35 kg/m²       Pain/Ja Score: Presence of Pain: kaylin (10/11/2017 10:39 AM)      "

## 2017-10-11 NOTE — ANESTHESIA PREPROCEDURE EVALUATION
10/11/2017  Lopez Savage is a 72 y.o., male for colonoscopy under MAC. Morbid obesity with poss sleep apnea.    Anesthesia Evaluation     I have reviewed the Nursing Notes.   I have reviewed the Medications.     Review of Systems  Anesthesia Hx:   Denies Personal Hx of Anesthesia complications.   Social:  Former Smoker, No Alcohol Use   Hematology/Oncology:         -- Anemia:   Cardiovascular:   Hypertension Valvular problems/Murmurs, MR    Pulmonary:   Asthma Pulm HTN   Hepatic/GI:   Bowel Prep. PUD, GERD UC   Musculoskeletal:   Arthritis         Physical Exam  General:  Morbid Obesity       Chest/Lungs:  Chest/Lungs Clear    Heart/Vascular:  Heart Findings: Normal            CONCLUSIONS     1 - Normal left ventricular systolic function (EF 55-60%).     2 - No wall motion abnormalities.     3 - Moderate left atrial enlargement.     4 - Normal right ventricular systolic function .     5 - The estimated PA systolic pressure is greater than 34 mmHg.     6 - Trivial to mild aortic regurgitation.     7 - Moderate to severe mitral regurgitation.     8 - Mild tricuspid regurgitation.     9 - Prolapse and flail of the posterior antonio valve leaflet ( see text).     This document has been electronically    SIGNED BY: Roz Hernandez MD On: 09/14/2017 12:18    Anesthesia Plan  Type of Anesthesia, risks & benefits discussed:  Anesthesia Type:  MAC  Patient's Preference:   Intra-op Monitoring Plan:   Intra-op Monitoring Plan Comments:   Post Op Pain Control Plan:   Post Op Pain Control Plan Comments:   Induction:    Beta Blocker:  Patient is not currently on a Beta-Blocker (No further documentation required).       Informed Consent: Patient understands risks and agrees with Anesthesia plan.  Questions answered. Anesthesia consent signed with patient.  ASA Score: 3     Day of Surgery Review of History & Physical:             Ready For Surgery From Anesthesia Perspective.

## 2017-10-12 VITALS
TEMPERATURE: 98 F | SYSTOLIC BLOOD PRESSURE: 147 MMHG | BODY MASS INDEX: 41.47 KG/M2 | WEIGHT: 280 LBS | HEIGHT: 69 IN | OXYGEN SATURATION: 96 % | DIASTOLIC BLOOD PRESSURE: 75 MMHG | RESPIRATION RATE: 14 BRPM | HEART RATE: 80 BPM

## 2017-10-25 ENCOUNTER — PATIENT MESSAGE (OUTPATIENT)
Dept: GASTROENTEROLOGY | Facility: HOSPITAL | Age: 72
End: 2017-10-25

## 2017-12-04 ENCOUNTER — OFFICE VISIT (OUTPATIENT)
Dept: FAMILY MEDICINE | Facility: CLINIC | Age: 72
End: 2017-12-04
Payer: MEDICARE

## 2017-12-04 VITALS
OXYGEN SATURATION: 97 % | SYSTOLIC BLOOD PRESSURE: 118 MMHG | DIASTOLIC BLOOD PRESSURE: 62 MMHG | TEMPERATURE: 98 F | HEART RATE: 78 BPM | RESPIRATION RATE: 18 BRPM | HEIGHT: 69 IN | WEIGHT: 293.88 LBS | BODY MASS INDEX: 43.53 KG/M2

## 2017-12-04 DIAGNOSIS — K21.9 GASTROESOPHAGEAL REFLUX DISEASE, ESOPHAGITIS PRESENCE NOT SPECIFIED: Primary | ICD-10-CM

## 2017-12-04 DIAGNOSIS — R05.9 COUGH: ICD-10-CM

## 2017-12-04 PROCEDURE — 99999 PR PBB SHADOW E&M-EST. PATIENT-LVL IV: CPT | Mod: PBBFAC,,, | Performed by: FAMILY MEDICINE

## 2017-12-04 PROCEDURE — 99214 OFFICE O/P EST MOD 30 MIN: CPT | Mod: S$GLB,,, | Performed by: FAMILY MEDICINE

## 2017-12-04 PROCEDURE — 99499 UNLISTED E&M SERVICE: CPT | Mod: S$GLB,,, | Performed by: FAMILY MEDICINE

## 2017-12-04 NOTE — PROGRESS NOTES
HPI:  Lopez Savage is a 72 y.o. year old male that  presents with concern about an intermittent sore throat with cough. This is accomapnied by vomiting  And then the throat pain occurs for a day. He  Used to take zantac OTC . It worked well.   He was not able to tolerate the claritine kept him awake for two days.  Chief Complaint   Patient presents with    Sore Throat     pt started  taking Claritin but kept him up for 2 days    Cough   .     HPI      Past Medical History:   Diagnosis Date    Allergy     Asthma     CHRONIC BRONCHITIS     GERD (gastroesophageal reflux disease)     HTN (hypertension)     HTN (hypertension) 10/15/2014    Joint pain     Mitral regurgitation     3-4+ under surveillance CT surgery    Mitral regurgitation     Mitral valve regurgitation     Obesity     Osteoarthritis of knee     Ulcerative colitis     Urinary tract infection     Valvular regurgitation      Social History     Social History    Marital status:      Spouse name: N/A    Number of children: N/A    Years of education: N/A     Occupational History    Not on file.     Social History Main Topics    Smoking status: Former Smoker     Packs/day: 2.00     Years: 6.00     Quit date: 1/1/1960    Smokeless tobacco: Never Used    Alcohol use No      Comment: remote hx of heavy alcohol use    Drug use: No    Sexual activity: Not Currently     Partners: Female     Other Topics Concern    Not on file     Social History Narrative    No narrative on file     Past Surgical History:   Procedure Laterality Date    ADENOIDECTOMY      COLONOSCOPY N/A 10/11/2017    Procedure: COLONOSCOPY Golytely;  Surgeon: Shanae Duarte MD;  Location: [facility];  Service: Endoscopy;  Laterality: N/A;    CYSTOSCOPY W/ RETROGRADES      with penile biopsy    JOINT REPLACEMENT      Penile Biopsy      TONSILLECTOMY      TOTAL KNEE ARTHROPLASTY Bilateral      Family History   Problem Relation Age of Onset    Bone  "cancer Mother     Glaucoma Mother     Cataracts Mother     Kidney disease Father     COPD Sister     Heart disease Daughter      congenital    No Known Problems Daughter     Melanoma Neg Hx     Prostate cancer Neg Hx     Macular degeneration Neg Hx     Retinal detachment Neg Hx     Strabismus Neg Hx     Amblyopia Neg Hx     Blindness Neg Hx            Review of Systems  General ROS: negative for chills, fever or weight loss  ENT ROS: negative for epistaxis, sore throat or rhinorrhea  Respiratory ROS: no cough, shortness of breath, or wheezing  Cardiovascular ROS: no chest pain or dyspnea on exertion  Gastrointestinal ROS: no abdominal pain, change in bowel habits, or black/ bloody stools    Physical Exam:  /62 (BP Location: Left arm, Patient Position: Sitting, BP Method: Medium (Manual))   Pulse 78   Temp 97.8 °F (36.6 °C) (Oral)   Resp 18   Ht 5' 9" (1.753 m)   Wt 133.3 kg (293 lb 14 oz)   SpO2 97%   BMI 43.40 kg/m²   General appearance: alert, cooperative, no distress  Constitutional:Oriented to person, place, and time.appears well-developed and well-nourished.  HEENT: Normocephalic, atraumatic, neck symmetrical, no nasal discharge, TM- clear bilaterally  Lungs: clear to auscultation bilaterally, no dullness to percussion bilaterally  Heart: regular rate and rhythm without rub; no displacement of the PMI , S1&S2 present  Abdomen: soft, non-tender; bowel sounds normoactive; no organomegaly  Physical Exam    LABS:    Complete Blood Count  Lab Results   Component Value Date    RBC 4.57 (L) 02/27/2017    HGB 12.7 (L) 02/27/2017    HCT 39.7 (L) 02/27/2017    MCV 87 02/27/2017    MCH 27.8 02/27/2017    MCHC 32.0 02/27/2017    RDW 13.8 02/27/2017     02/27/2017    MPV 11.4 02/27/2017    GRAN 6.5 02/27/2017    GRAN 70.1 02/27/2017    LYMPH 1.7 02/27/2017    LYMPH 18.2 02/27/2017    MONO 0.8 02/27/2017    MONO 8.7 02/27/2017    EOS 0.2 02/27/2017    BASO 0.04 02/27/2017    EOSINOPHIL 2.3 " 02/27/2017    BASOPHIL 0.4 02/27/2017    DIFFMETHOD Automated 02/27/2017       Comprehensive Metabolic Panel  Lab Results   Component Value Date     02/27/2017    BUN 16 02/27/2017    CREATININE 1.0 05/04/2017     02/27/2017    K 4.2 02/27/2017     02/27/2017    PROT 7.2 02/27/2017    ALBUMIN 3.5 02/27/2017    BILITOT 0.5 02/27/2017    AST 18 02/27/2017    ALKPHOS 67 02/27/2017    CO2 25 02/27/2017    ALT 27 02/27/2017    ANIONGAP 10 02/27/2017    EGFRNONAA >60 05/04/2017    ESTGFRAFRICA >60 05/04/2017       LIPID  Lab Results   Component Value Date    CHOL 125 02/27/2017    HDL 28 (L) 02/27/2017         TSH  Lab Results   Component Value Date    TSH 1.529 05/04/2017       Current Outpatient Prescriptions   Medication Sig Dispense Refill    albuterol (PROVENTIL HFA) 90 mcg/actuation inhaler Inhale 2 puffs into the lungs every 6 (six) hours as needed for Wheezing or Shortness of Breath. 1 Inhaler 3    diclofenac sodium (VOLTAREN) 1 % Gel Apply 2 g topically 2 (two) times daily as needed. 100 g 0    donepezil (ARICEPT) 5 MG tablet Take 1 tablet (5 mg total) by mouth every evening. 90 tablet 3    fluticasone-salmeterol 250-50 mcg/dose (ADVAIR DISKUS) 250-50 mcg/dose diskus inhaler INHALE 1 INHALATION BY MOUTH TWICE A DAY 60 each 6    losartan (COZAAR) 50 MG tablet Take 1 tablet (50 mg total) by mouth once daily. 90 tablet 3    meclizine (ANTIVERT) 12.5 mg tablet Take 1 tablet (12.5 mg total) by mouth 3 (three) times daily as needed. 270 tablet 3    meloxicam (MOBIC) 15 MG tablet TAKE 1 TABLET ONE TIME DAILY AS NEEDED; AVOID ALL OTHER NSAIDS. 90 tablet 1    betamethasone dipropionate (DIPROLENE) 0.05 % ointment Apply topically 2 (two) times daily. 45 g 1    ranitidine (ZANTAC) 150 MG tablet Take 1 tablet (150 mg total) by mouth 2 (two) times daily. 60 tablet 2     No current facility-administered medications for this visit.        Assessment:    ICD-10-CM ICD-9-CM    1. Gastroesophageal  reflux disease, esophagitis presence not specified K21.9 530.81 ranitidine (ZANTAC) 150 MG tablet      Ambulatory Referral to Gastroenterology   2. Cough R05 786.2 Ambulatory Referral to Gastroenterology         Plan:    Return in 3 months (on 3/4/2018).          Lucie Munguia MD

## 2018-01-06 DIAGNOSIS — J45.30 MILD PERSISTENT ASTHMA WITHOUT COMPLICATION: ICD-10-CM

## 2018-01-09 RX ORDER — FLUTICASONE PROPIONATE AND SALMETEROL 50; 250 UG/1; UG/1
POWDER RESPIRATORY (INHALATION)
Qty: 60 EACH | Refills: 3 | Status: SHIPPED | OUTPATIENT
Start: 2018-01-09 | End: 2018-04-23 | Stop reason: SDUPTHER

## 2018-01-29 ENCOUNTER — OFFICE VISIT (OUTPATIENT)
Dept: GASTROENTEROLOGY | Facility: CLINIC | Age: 73
End: 2018-01-29
Payer: MEDICARE

## 2018-01-29 VITALS
BODY MASS INDEX: 43.49 KG/M2 | HEART RATE: 82 BPM | SYSTOLIC BLOOD PRESSURE: 129 MMHG | DIASTOLIC BLOOD PRESSURE: 68 MMHG | WEIGHT: 293.63 LBS | HEIGHT: 69 IN

## 2018-01-29 DIAGNOSIS — K21.9 GASTROESOPHAGEAL REFLUX DISEASE, ESOPHAGITIS PRESENCE NOT SPECIFIED: Primary | ICD-10-CM

## 2018-01-29 PROCEDURE — 99499 UNLISTED E&M SERVICE: CPT | Mod: S$GLB,,, | Performed by: INTERNAL MEDICINE

## 2018-01-29 PROCEDURE — 99214 OFFICE O/P EST MOD 30 MIN: CPT | Mod: S$GLB,,, | Performed by: INTERNAL MEDICINE

## 2018-01-29 PROCEDURE — 99999 PR PBB SHADOW E&M-EST. PATIENT-LVL III: CPT | Mod: PBBFAC,,, | Performed by: INTERNAL MEDICINE

## 2018-01-29 NOTE — LETTER
January 29, 2018      Lucie Munguia MD  [address]  [address]  [place] [address]           [facility]  [address]  [place] [address]  Phone: [phone]          Patient: Lopez Savage   MR Number: [id number]   YOB: 1945   Date of Visit: 1/29/2018       Dear Dr. Lucie Munguia:    Thank you for referring Lopez Savage to me for evaluation. Attached you will find relevant portions of my assessment and plan of care.    If you have questions, please do not hesitate to call me. I look forward to following Lopez Savage along with you.    Sincerely,    Shanae Duarte MD    Enclosure  CC:  No Recipients    If you would like to receive this communication electronically, please contact [email or web address] or [phone] to request more information on [employer] access.    For providers and/or their staff who would like to refer a patient to [facility], please contact us through our one-stop-shop provider referral line, [facility], at [phone].    If you feel you have received this communication in error or would no longer like to receive these types of communications, please e-mail [email or web address]

## 2018-01-29 NOTE — PROGRESS NOTES
Subjective:       Patient ID: Lopez Savage is a 72 y.o. male.    Chief Complaint: Gastroesophageal Reflux    Gastroesophageal Reflux   He complains of coughing, heartburn and a hoarse voice. He reports no abdominal pain, no sore throat or no wheezing. This is a chronic problem. The problem occurs frequently. The heartburn is located in the substernum. The heartburn does not wake him from sleep. The heartburn does not limit his activity. The heartburn changes with position. He has tried a PPI for the symptoms. The treatment provided significant relief.     Review of Systems   Constitutional: Negative for fever.   HENT: Positive for hoarse voice. Negative for sore throat.    Eyes: Negative for visual disturbance.   Respiratory: Positive for cough. Negative for shortness of breath and wheezing.    Gastrointestinal: Positive for heartburn. Negative for abdominal pain and diarrhea.   Genitourinary: Negative for frequency and hematuria.   Neurological: Negative for weakness and headaches.   Psychiatric/Behavioral: Negative for behavioral problems and suicidal ideas.       Objective:      Physical Exam   Constitutional: He is oriented to person, place, and time. He appears well-nourished.   Eyes: Pupils are equal, round, and reactive to light.   Cardiovascular: Normal rate, regular rhythm and normal heart sounds.    Pulmonary/Chest: No respiratory distress. He has no wheezes.   Abdominal: He exhibits no mass. There is no tenderness. There is no rebound and no guarding.   Neurological: He is alert and oriented to person, place, and time.   Psychiatric: He has a normal mood and affect.       Assessment:       1. Gastroesophageal reflux disease, esophagitis presence not specified        Plan:       Lopez was seen today for gastroesophageal reflux.    Diagnoses and all orders for this visit:    Gastroesophageal reflux disease, esophagitis presence not specified    Other orders  -     Case request GI:  ESOPHAGOGASTRODUODENOSCOPY (EGD)    Follow an antireflux regimen.  This includes:       - Do not lie down for at least 3 to 4 hours after meals.        - Raise the head of the bed 4 to 6 inches.        - Decrease excess weight.        - Avoid citrus juices and other acidic foods, alcohol, chocolate, mints, coffee and other        caffeinated beverages, carbonated beverages, fatty and fried foods.        - Avoid tight-fitting clothing.        - Avoid cigarettes and other tobacco products.

## 2018-02-07 ENCOUNTER — ANESTHESIA EVENT (OUTPATIENT)
Dept: ENDOSCOPY | Facility: HOSPITAL | Age: 73
End: 2018-02-07
Payer: MEDICARE

## 2018-02-07 ENCOUNTER — HOSPITAL ENCOUNTER (OUTPATIENT)
Facility: HOSPITAL | Age: 73
Discharge: HOME | End: 2018-02-07
Attending: INTERNAL MEDICINE | Admitting: INTERNAL MEDICINE
Payer: MEDICARE

## 2018-02-07 ENCOUNTER — ANESTHESIA (OUTPATIENT)
Dept: ENDOSCOPY | Facility: HOSPITAL | Age: 73
End: 2018-02-07
Payer: MEDICARE

## 2018-02-07 DIAGNOSIS — K21.9 GASTROESOPHAGEAL REFLUX DISEASE, ESOPHAGITIS PRESENCE NOT SPECIFIED: Primary | ICD-10-CM

## 2018-02-07 DIAGNOSIS — K21.9 GERD (GASTROESOPHAGEAL REFLUX DISEASE): ICD-10-CM

## 2018-02-07 PROCEDURE — 37000009 HC ANESTHESIA EA ADD 15 MINS: Performed by: INTERNAL MEDICINE

## 2018-02-07 PROCEDURE — 25000003 PHARM REV CODE 250: Performed by: INTERNAL MEDICINE

## 2018-02-07 PROCEDURE — 87077 CULTURE AEROBIC IDENTIFY: CPT | Performed by: INTERNAL MEDICINE

## 2018-02-07 PROCEDURE — 27201012 HC FORCEPS, HOT/COLD, DISP: Performed by: INTERNAL MEDICINE

## 2018-02-07 PROCEDURE — 37000008 HC ANESTHESIA 1ST 15 MINUTES: Performed by: INTERNAL MEDICINE

## 2018-02-07 PROCEDURE — 63600175 PHARM REV CODE 636 W HCPCS: Performed by: NURSE ANESTHETIST, CERTIFIED REGISTERED

## 2018-02-07 PROCEDURE — 88305 TISSUE EXAM BY PATHOLOGIST: CPT | Mod: 26,,, | Performed by: PATHOLOGY

## 2018-02-07 PROCEDURE — 43239 EGD BIOPSY SINGLE/MULTIPLE: CPT | Mod: ,,, | Performed by: INTERNAL MEDICINE

## 2018-02-07 PROCEDURE — 25000003 PHARM REV CODE 250: Performed by: NURSE ANESTHETIST, CERTIFIED REGISTERED

## 2018-02-07 PROCEDURE — 43239 EGD BIOPSY SINGLE/MULTIPLE: CPT | Performed by: INTERNAL MEDICINE

## 2018-02-07 PROCEDURE — 88305 TISSUE EXAM BY PATHOLOGIST: CPT | Performed by: PATHOLOGY

## 2018-02-07 RX ORDER — PROPOFOL 10 MG/ML
VIAL (ML) INTRAVENOUS
Status: DISCONTINUED | OUTPATIENT
Start: 2018-02-07 | End: 2018-02-07

## 2018-02-07 RX ORDER — LIDOCAINE HCL/PF 100 MG/5ML
SYRINGE (ML) INTRAVENOUS
Status: DISCONTINUED | OUTPATIENT
Start: 2018-02-07 | End: 2018-02-07

## 2018-02-07 RX ORDER — SODIUM CHLORIDE 9 MG/ML
INJECTION, SOLUTION INTRAVENOUS CONTINUOUS
Status: DISCONTINUED | OUTPATIENT
Start: 2018-02-07 | End: 2018-02-07 | Stop reason: HOSPADM

## 2018-02-07 RX ADMIN — PROPOFOL 40 MG: 10 INJECTION, EMULSION INTRAVENOUS at 12:02

## 2018-02-07 RX ADMIN — LIDOCAINE HYDROCHLORIDE 60 MG: 20 INJECTION, SOLUTION INTRAVENOUS at 12:02

## 2018-02-07 RX ADMIN — SODIUM CHLORIDE: 0.9 INJECTION, SOLUTION INTRAVENOUS at 11:02

## 2018-02-07 RX ADMIN — TOPICAL ANESTHETIC 1 EACH: 200 SPRAY DENTAL; PERIODONTAL at 12:02

## 2018-02-07 NOTE — PLAN OF CARE
PIV DISCONTINUED WITH CATHETER INTACT. NO SIGNS OR SYMPTOMS OF REDNESS OR SWELLING AT INSERTION SITE. PRESSURE DRESSING APPLIED WITH GAUZE AND COBAN. INSTRUCTED PATIENT TO KEEP DRESSING ON FOR AT LEAST 20-30 MINUTES.

## 2018-02-07 NOTE — ANESTHESIA POSTPROCEDURE EVALUATION
"Anesthesia Post Evaluation    Patient: Lopez Savage    Procedure(s) Performed: Procedure(s) (LRB):  ESOPHAGOGASTRODUODENOSCOPY (EGD) (N/A)    Final Anesthesia Type: MAC  Patient location during evaluation: GI PACU  Patient participation: Yes- Able to Participate  Level of consciousness: awake and alert and oriented  Post-procedure vital signs: reviewed and stable  Pain management: adequate  Airway patency: patent  PONV status at discharge: No PONV  Anesthetic complications: no      Cardiovascular status: blood pressure returned to baseline and hemodynamically stable  Respiratory status: unassisted, spontaneous ventilation and room air  Hydration status: euvolemic  Follow-up not needed.        Visit Vitals  /67   Pulse 80   Temp 36.7 °C (98.1 °F) (Oral)   Resp 16   Ht 5' 9" (1.753 m)   Wt 132.9 kg (293 lb)   SpO2 (!) 94%   BMI 43.27 kg/m²       Pain/Ja Score: Pain Assessment Performed: Yes (2/7/2018 11:02 AM)  Presence of Pain: denies (2/7/2018 11:02 AM)      "

## 2018-02-07 NOTE — DISCHARGE INSTRUCTIONS
Post EGD Discharge Instruction    Lopez Savage  2/7/2018  Shanae Duarte MD    RESTRICTIONS ON ACTIVITY:    -DO NOT drive a car, operate machinery or make critical decisions, or do activities that require coordination or balance for 24 hours.  -Following Day: Return to full activities including work.  -Diet: Eat and drink normally unless instructed otherwise.    TREATMENT FOR COMMON SIDE EFFECTS:  *Sore Throat - treat with throat lozenges, gargle with warm salt water.  *Mild abdominal pain & bloating- rest and take liquids only.    SYMPTOMS TO WATCH FOR AND REPORT TO YOUR PHYSICIAN:  1. Chills or fever occurring 24 hours after procedure.  2. Pain in chest.  3. SEVERE abdominal pain or bloating.  4. Rectal bleeding which could be maroon or black.    If you have any questions or problems, please call your Physician:    Shanae Duarte MD     If a complication or emergency situation arises and you are unable to reach your Physician - GO TO THE EMERGENCY ROOM.

## 2018-02-07 NOTE — TRANSFER OF CARE
"Anesthesia Transfer of Care Note    Patient: Lopez Savage    Procedure(s) Performed: Procedure(s) (LRB):  ESOPHAGOGASTRODUODENOSCOPY (EGD) (N/A)    Patient location: GI    Anesthesia Type: MAC    Transport from OR: Transported from OR on room air with adequate spontaneous ventilation    Post pain: adequate analgesia    Post assessment: no apparent anesthetic complications and tolerated procedure well    Post vital signs: stable    Level of consciousness: awake, alert and oriented    Nausea/Vomiting: no nausea/vomiting    Complications: none    Transfer of care protocol was followed      Last vitals:   Visit Vitals  /67   Pulse 80   Temp 36.7 °C (98.1 °F) (Oral)   Resp 16   Ht 5' 9" (1.753 m)   Wt 132.9 kg (293 lb)   SpO2 (!) 94%   BMI 43.27 kg/m²     "

## 2018-02-07 NOTE — ANESTHESIA PREPROCEDURE EVALUATION
02/07/2018  Lopez Savage is a 72 y.o., male for EGD under MAC. Morbid obesity with poss sleep apnea.    Pre-op Assessment     I have reviewed the Nursing Notes.   I have reviewed the Medications.     Review of Systems  Anesthesia Hx:   Denies Personal Hx of Anesthesia complications.   Social:  Former Smoker, No Alcohol Use    Hematology/Oncology:         -- Anemia:   Cardiovascular:   Hypertension Valvular problems/Murmurs, MR    Pulmonary:   Asthma Pulm HTN   Hepatic/GI:   Bowel Prep. PUD, GERD UC   Musculoskeletal:   Arthritis         Physical Exam  General:  Morbid Obesity       Chest/Lungs:  Chest/Lungs Clear    Heart/Vascular:  Heart Findings: Normal            CONCLUSIONS     1 - Normal left ventricular systolic function (EF 55-60%).     2 - No wall motion abnormalities.     3 - Moderate left atrial enlargement.     4 - Normal right ventricular systolic function .     5 - The estimated PA systolic pressure is greater than 34 mmHg.     6 - Trivial to mild aortic regurgitation.     7 - Moderate to severe mitral regurgitation.     8 - Mild tricuspid regurgitation.     9 - Prolapse and flail of the posterior antonio valve leaflet ( see text).     This document has been electronically    SIGNED BY: Roz Hernandez MD On: 09/14/2017 12:18    Anesthesia Plan  Type of Anesthesia, risks & benefits discussed:  Anesthesia Type:  MAC  Patient's Preference:   Intra-op Monitoring Plan:   Intra-op Monitoring Plan Comments:   Post Op Pain Control Plan:   Post Op Pain Control Plan Comments:   Induction:    Beta Blocker:  Patient is not currently on a Beta-Blocker (No further documentation required).       Informed Consent: Patient understands risks and agrees with Anesthesia plan.  Questions answered. Anesthesia consent signed with patient.  ASA Score: 3     Day of Surgery Review of History & Physical:             Ready For Surgery From Anesthesia Perspective.

## 2018-02-07 NOTE — PLAN OF CARE
DISCHARGE INSTRUCTIONS GIVEN TO PATIENT AND SPOUSE. BOTH VERBALIZED UNDERSTANDING. PATIENT VITAL SIGNS STABLE WITH NO SIGNS/SYMPTOMS OF DISTRESS OR DISCOMFORT NOTED. PATIENT READY FOR DISCHARGE.

## 2018-02-08 VITALS
WEIGHT: 293 LBS | HEIGHT: 69 IN | SYSTOLIC BLOOD PRESSURE: 130 MMHG | RESPIRATION RATE: 18 BRPM | BODY MASS INDEX: 43.4 KG/M2 | OXYGEN SATURATION: 96 % | DIASTOLIC BLOOD PRESSURE: 76 MMHG | HEART RATE: 76 BPM | TEMPERATURE: 99 F

## 2018-02-09 ENCOUNTER — TELEPHONE (OUTPATIENT)
Dept: ENDOSCOPY | Facility: HOSPITAL | Age: 73
End: 2018-02-09

## 2018-02-09 DIAGNOSIS — K21.9 GASTROESOPHAGEAL REFLUX DISEASE, ESOPHAGITIS PRESENCE NOT SPECIFIED: Primary | ICD-10-CM

## 2018-02-09 LAB — H PYLORI INDEX VALUE: NEGATIVE

## 2018-02-12 ENCOUNTER — PATIENT MESSAGE (OUTPATIENT)
Dept: GASTROENTEROLOGY | Facility: CLINIC | Age: 73
End: 2018-02-12

## 2018-02-14 ENCOUNTER — TELEPHONE (OUTPATIENT)
Dept: GASTROENTEROLOGY | Facility: CLINIC | Age: 73
End: 2018-02-14

## 2018-02-14 NOTE — TELEPHONE ENCOUNTER
Spoke with pt and was able to schedule his 4 week procedure f/u with Dr. Duarte on 3/19/18 at 3:30pm. Verbal Understanding.

## 2018-02-20 ENCOUNTER — PES CALL (OUTPATIENT)
Dept: ADMINISTRATIVE | Facility: CLINIC | Age: 73
End: 2018-02-20

## 2018-03-05 ENCOUNTER — PES CALL (OUTPATIENT)
Dept: ADMINISTRATIVE | Facility: CLINIC | Age: 73
End: 2018-03-05

## 2018-03-19 ENCOUNTER — OFFICE VISIT (OUTPATIENT)
Dept: GASTROENTEROLOGY | Facility: CLINIC | Age: 73
End: 2018-03-19
Payer: MEDICARE

## 2018-03-19 VITALS
HEIGHT: 69 IN | BODY MASS INDEX: 43.16 KG/M2 | SYSTOLIC BLOOD PRESSURE: 119 MMHG | DIASTOLIC BLOOD PRESSURE: 74 MMHG | WEIGHT: 291.44 LBS

## 2018-03-19 DIAGNOSIS — K21.9 GASTROESOPHAGEAL REFLUX DISEASE, ESOPHAGITIS PRESENCE NOT SPECIFIED: Primary | ICD-10-CM

## 2018-03-19 PROCEDURE — 99213 OFFICE O/P EST LOW 20 MIN: CPT | Mod: S$GLB,,, | Performed by: INTERNAL MEDICINE

## 2018-03-19 PROCEDURE — 99999 PR PBB SHADOW E&M-EST. PATIENT-LVL II: CPT | Mod: PBBFAC,,, | Performed by: INTERNAL MEDICINE

## 2018-03-19 PROCEDURE — 3078F DIAST BP <80 MM HG: CPT | Mod: CPTII,S$GLB,, | Performed by: INTERNAL MEDICINE

## 2018-03-19 PROCEDURE — 3074F SYST BP LT 130 MM HG: CPT | Mod: CPTII,S$GLB,, | Performed by: INTERNAL MEDICINE

## 2018-03-19 PROCEDURE — 99499 UNLISTED E&M SERVICE: CPT | Mod: S$GLB,,, | Performed by: INTERNAL MEDICINE

## 2018-03-19 NOTE — PROGRESS NOTES
Subjective:       Patient ID: Lopez Savage is a 72 y.o. male.    Chief Complaint: Follow-up    Gastroesophageal Reflux   He complains of heartburn. He reports no abdominal pain, no sore throat or no wheezing. The problem has been unchanged. The heartburn is located in the substernum. Past procedures include an EGD.   He has no interest in lifestyle modification. He reports that he loves to cook and eat.  Review of Systems   Constitutional: Negative for fever.   HENT: Negative for sore throat.    Eyes: Negative for visual disturbance.   Respiratory: Negative for shortness of breath and wheezing.    Gastrointestinal: Positive for heartburn. Negative for abdominal pain and diarrhea.   Genitourinary: Negative for frequency and hematuria.   Neurological: Negative for weakness and headaches.   Psychiatric/Behavioral: Negative for behavioral problems and suicidal ideas.       Objective:      Physical Exam   Constitutional: He is oriented to person, place, and time. He appears well-nourished.   Eyes: Pupils are equal, round, and reactive to light.   Cardiovascular: Normal rate, regular rhythm and normal heart sounds.    Pulmonary/Chest: No respiratory distress. He has no wheezes.   Abdominal: He exhibits no mass. There is no tenderness. There is no rebound and no guarding.   Neurological: He is alert and oriented to person, place, and time.   Psychiatric: He has a normal mood and affect.       Assessment:       1. Gastroesophageal reflux disease, esophagitis presence not specified        Plan:       Lopez was seen today for follow-up.    Diagnoses and all orders for this visit:    Gastroesophageal reflux disease, esophagitis presence not specified    Continue current treatment plan.  RTC prn

## 2018-03-20 NOTE — PROGRESS NOTES
Patient, Lopez Savage (MRN #[id number]), presented with a recorded BMI of 43.04 kg/m^2 consistent with the definition of morbid obesity (ICD-10 E66.01). The patient's morbid obesity was monitored, evaluated and addressed. Patient not interested in lifestyle modification.This addendum to the medical record is made on 03/20/2018.

## 2018-04-16 ENCOUNTER — OFFICE VISIT (OUTPATIENT)
Dept: FAMILY MEDICINE | Facility: CLINIC | Age: 73
End: 2018-04-16
Payer: MEDICARE

## 2018-04-16 VITALS
TEMPERATURE: 98 F | RESPIRATION RATE: 18 BRPM | HEART RATE: 85 BPM | DIASTOLIC BLOOD PRESSURE: 68 MMHG | BODY MASS INDEX: 43.59 KG/M2 | OXYGEN SATURATION: 97 % | WEIGHT: 294.31 LBS | SYSTOLIC BLOOD PRESSURE: 124 MMHG | HEIGHT: 69 IN

## 2018-04-16 DIAGNOSIS — I10 ESSENTIAL HYPERTENSION: ICD-10-CM

## 2018-04-16 DIAGNOSIS — R09.81 SINUS CONGESTION: ICD-10-CM

## 2018-04-16 DIAGNOSIS — R09.82 POST-NASAL DRIP: ICD-10-CM

## 2018-04-16 DIAGNOSIS — Z00.00 ANNUAL PHYSICAL EXAM: Primary | ICD-10-CM

## 2018-04-16 PROCEDURE — 99499 UNLISTED E&M SERVICE: CPT | Mod: S$PBB,,, | Performed by: FAMILY MEDICINE

## 2018-04-16 PROCEDURE — 99214 OFFICE O/P EST MOD 30 MIN: CPT | Mod: S$GLB,,, | Performed by: FAMILY MEDICINE

## 2018-04-16 PROCEDURE — 3074F SYST BP LT 130 MM HG: CPT | Mod: CPTII,S$GLB,, | Performed by: FAMILY MEDICINE

## 2018-04-16 PROCEDURE — 3078F DIAST BP <80 MM HG: CPT | Mod: CPTII,S$GLB,, | Performed by: FAMILY MEDICINE

## 2018-04-16 PROCEDURE — 99999 PR PBB SHADOW E&M-EST. PATIENT-LVL III: CPT | Mod: PBBFAC,,, | Performed by: FAMILY MEDICINE

## 2018-04-16 RX ORDER — FLUTICASONE PROPIONATE 50 MCG
2 SPRAY, SUSPENSION (ML) NASAL DAILY
Qty: 1 BOTTLE | Refills: 2 | Status: SHIPPED | OUTPATIENT
Start: 2018-04-16 | End: 2019-02-01 | Stop reason: SDUPTHER

## 2018-04-16 RX ORDER — OMEPRAZOLE 40 MG/1
CAPSULE, DELAYED RELEASE ORAL
COMMUNITY
Start: 2018-03-28 | End: 2019-06-12 | Stop reason: SDUPTHER

## 2018-04-16 RX ORDER — CETIRIZINE HYDROCHLORIDE 10 MG/1
10 TABLET ORAL DAILY
Qty: 30 TABLET | Refills: 2 | Status: SHIPPED | OUTPATIENT
Start: 2018-04-16 | End: 2018-07-03 | Stop reason: SDUPTHER

## 2018-04-16 RX ORDER — BENZONATATE 200 MG/1
200 CAPSULE ORAL 3 TIMES DAILY PRN
Qty: 30 CAPSULE | Refills: 1 | Status: SHIPPED | OUTPATIENT
Start: 2018-04-16 | End: 2018-04-26

## 2018-04-17 ENCOUNTER — PATIENT MESSAGE (OUTPATIENT)
Dept: FAMILY MEDICINE | Facility: CLINIC | Age: 73
End: 2018-04-17

## 2018-04-17 ENCOUNTER — LAB VISIT (OUTPATIENT)
Dept: LAB | Facility: HOSPITAL | Age: 73
End: 2018-04-17
Attending: FAMILY MEDICINE
Payer: MEDICARE

## 2018-04-17 DIAGNOSIS — Z00.00 ANNUAL PHYSICAL EXAM: ICD-10-CM

## 2018-04-17 DIAGNOSIS — I10 ESSENTIAL HYPERTENSION: ICD-10-CM

## 2018-04-17 LAB
ALBUMIN SERPL BCP-MCNC: 3.4 G/DL
ALP SERPL-CCNC: 71 U/L
ALT SERPL W/O P-5'-P-CCNC: 20 U/L
ANION GAP SERPL CALC-SCNC: 8 MMOL/L
AST SERPL-CCNC: 16 U/L
BASOPHILS # BLD AUTO: 0.14 K/UL
BASOPHILS NFR BLD: 1.5 %
BILIRUB SERPL-MCNC: 0.4 MG/DL
BUN SERPL-MCNC: 15 MG/DL
CALCIUM SERPL-MCNC: 9.9 MG/DL
CHLORIDE SERPL-SCNC: 103 MMOL/L
CHOLEST SERPL-MCNC: 117 MG/DL
CHOLEST/HDLC SERPL: 3.9 {RATIO}
CO2 SERPL-SCNC: 28 MMOL/L
CREAT SERPL-MCNC: 1.2 MG/DL
DIFFERENTIAL METHOD: ABNORMAL
EOSINOPHIL # BLD AUTO: 0.2 K/UL
EOSINOPHIL NFR BLD: 2.3 %
ERYTHROCYTE [DISTWIDTH] IN BLOOD BY AUTOMATED COUNT: 13.7 %
EST. GFR  (AFRICAN AMERICAN): >60 ML/MIN/1.73 M^2
EST. GFR  (NON AFRICAN AMERICAN): >60 ML/MIN/1.73 M^2
GLUCOSE SERPL-MCNC: 110 MG/DL
HCT VFR BLD AUTO: 39.4 %
HDLC SERPL-MCNC: 30 MG/DL
HDLC SERPL: 25.6 %
HGB BLD-MCNC: 12.6 G/DL
LDLC SERPL CALC-MCNC: 62.6 MG/DL
LYMPHOCYTES # BLD AUTO: 1.7 K/UL
LYMPHOCYTES NFR BLD: 17.8 %
MCH RBC QN AUTO: 27.9 PG
MCHC RBC AUTO-ENTMCNC: 32 G/DL
MCV RBC AUTO: 87 FL
MONOCYTES # BLD AUTO: 0.9 K/UL
MONOCYTES NFR BLD: 9.1 %
NEUTROPHILS # BLD AUTO: 6.6 K/UL
NEUTROPHILS NFR BLD: 69.3 %
NONHDLC SERPL-MCNC: 87 MG/DL
PLATELET # BLD AUTO: 234 K/UL
PMV BLD AUTO: 11.4 FL
POTASSIUM SERPL-SCNC: 4.2 MMOL/L
PROT SERPL-MCNC: 7.3 G/DL
RBC # BLD AUTO: 4.52 M/UL
SODIUM SERPL-SCNC: 139 MMOL/L
TRIGL SERPL-MCNC: 122 MG/DL
TSH SERPL DL<=0.005 MIU/L-ACNC: 2.31 UIU/ML
WBC # BLD AUTO: 9.47 K/UL

## 2018-04-17 PROCEDURE — 84443 ASSAY THYROID STIM HORMONE: CPT

## 2018-04-17 PROCEDURE — 85025 COMPLETE CBC W/AUTO DIFF WBC: CPT

## 2018-04-17 PROCEDURE — 80053 COMPREHEN METABOLIC PANEL: CPT

## 2018-04-17 PROCEDURE — 36415 COLL VENOUS BLD VENIPUNCTURE: CPT

## 2018-04-17 PROCEDURE — 80061 LIPID PANEL: CPT

## 2018-04-19 ENCOUNTER — PATIENT MESSAGE (OUTPATIENT)
Dept: FAMILY MEDICINE | Facility: CLINIC | Age: 73
End: 2018-04-19

## 2018-04-23 ENCOUNTER — PATIENT MESSAGE (OUTPATIENT)
Dept: FAMILY MEDICINE | Facility: CLINIC | Age: 73
End: 2018-04-23

## 2018-04-23 DIAGNOSIS — J45.30 MILD PERSISTENT ASTHMA WITHOUT COMPLICATION: ICD-10-CM

## 2018-04-23 RX ORDER — FLUTICASONE PROPIONATE AND SALMETEROL 250; 50 UG/1; UG/1
1 POWDER RESPIRATORY (INHALATION) 2 TIMES DAILY
Qty: 60 EACH | Refills: 3 | Status: SHIPPED | OUTPATIENT
Start: 2018-04-23 | End: 2019-02-15 | Stop reason: SDUPTHER

## 2018-04-23 NOTE — PROGRESS NOTES
HPI:  Lopez Savage is a 72 y.o. year old male that  Presents for evaluation of a cough adeel he has had for about 2 weeks that is not getting any better.He denies any fever or production with his cough.  Chief Complaint   Patient presents with    Cough     for about 2 weeks--sometimes sore throat, causing pain in between shoulder blades    .     HPI      Past Medical History:   Diagnosis Date    Allergy     Asthma     CHRONIC BRONCHITIS     GERD (gastroesophageal reflux disease)     HTN (hypertension)     HTN (hypertension) 10/15/2014    Joint pain     Mitral regurgitation     3-4+ under surveillance CT surgery    Mitral regurgitation     Mitral valve regurgitation     Obesity     Osteoarthritis of knee     Ulcerative colitis     Urinary tract infection     Valvular regurgitation      Social History     Social History    Marital status:      Spouse name: N/A    Number of children: N/A    Years of education: N/A     Occupational History    Not on file.     Social History Main Topics    Smoking status: Former Smoker     Packs/day: 2.00     Years: 6.00     Quit date: 1/1/1960    Smokeless tobacco: Never Used    Alcohol use No      Comment: remote hx of heavy alcohol use    Drug use: No    Sexual activity: Not Currently     Partners: Female     Other Topics Concern    Not on file     Social History Narrative    No narrative on file     Past Surgical History:   Procedure Laterality Date    ADENOIDECTOMY      COLONOSCOPY N/A 10/11/2017    Procedure: COLONOSCOPY Golytely;  Surgeon: Shanae Duarte MD;  Location: [facility];  Service: Endoscopy;  Laterality: N/A;    CYSTOSCOPY W/ RETROGRADES      with penile biopsy    JOINT REPLACEMENT      Penile Biopsy      TONSILLECTOMY      TOTAL KNEE ARTHROPLASTY Bilateral      Family History   Problem Relation Age of Onset    Bone cancer Mother     Glaucoma Mother     Cataracts Mother     Kidney disease Father     COPD Sister      "Heart disease Daughter      congenital    No Known Problems Daughter     Melanoma Neg Hx     Prostate cancer Neg Hx     Macular degeneration Neg Hx     Retinal detachment Neg Hx     Strabismus Neg Hx     Amblyopia Neg Hx     Blindness Neg Hx            Review of Systems  General ROS: negative for chills, fever or weight loss  ENT ROS: negative for epistaxis, sore throat or rhinorrhea  Respiratory ROS: pos cough, no shortness of breath,or wheezing  Cardiovascular ROS: no chest pain or dyspnea on exertion  Gastrointestinal ROS: no abdominal pain, change in bowel habits, or black/ bloody stools    Physical Exam:  /68 (BP Location: Right arm, Patient Position: Sitting, BP Method: Large (Manual))   Pulse 85   Temp 98.2 °F (36.8 °C) (Oral)   Resp 18   Ht 5' 9" (1.753 m)   Wt 133.5 kg (294 lb 5 oz)   SpO2 97%   BMI 43.46 kg/m²   General appearance: alert, cooperative, no distress  Constitutional:Oriented to person, place, and time.appears well-developed and well-nourished.  HEENT: Normocephalic, atraumatic, neck symmetrical, no nasal discharge, TM- clear bilaterally  Lungs: clear to auscultation bilaterally, no dullness to percussion bilaterally  Heart: regular rate and rhythm without rub; no displacement of the PMI , S1&S2 present  Abdomen: soft, non-tender; bowel sounds normoactive; no organomegaly  Physical Exam    LABS:    Complete Blood Count  Lab Results   Component Value Date    RBC 4.52 (L) 04/17/2018    HGB 12.6 (L) 04/17/2018    HCT 39.4 (L) 04/17/2018    MCV 87 04/17/2018    MCH 27.9 04/17/2018    MCHC 32.0 04/17/2018    RDW 13.7 04/17/2018     04/17/2018    MPV 11.4 04/17/2018    GRAN 6.6 04/17/2018    GRAN 69.3 04/17/2018    LYMPH 1.7 04/17/2018    LYMPH 17.8 (L) 04/17/2018    MONO 0.9 04/17/2018    MONO 9.1 04/17/2018    EOS 0.2 04/17/2018    BASO 0.14 04/17/2018    EOSINOPHIL 2.3 04/17/2018    BASOPHIL 1.5 04/17/2018    DIFFMETHOD Automated 04/17/2018       Comprehensive Metabolic " Panel  Lab Results   Component Value Date     04/17/2018    BUN 15 04/17/2018    CREATININE 1.2 04/17/2018     04/17/2018    K 4.2 04/17/2018     04/17/2018    PROT 7.3 04/17/2018    ALBUMIN 3.4 (L) 04/17/2018    BILITOT 0.4 04/17/2018    AST 16 04/17/2018    ALKPHOS 71 04/17/2018    CO2 28 04/17/2018    ALT 20 04/17/2018    ANIONGAP 8 04/17/2018    EGFRNONAA >60 04/17/2018    ESTGFRAFRICA >60 04/17/2018       LIPID  Lab Results   Component Value Date    CHOL 117 (L) 04/17/2018    HDL 30 (L) 04/17/2018         TSH  Lab Results   Component Value Date    TSH 2.308 04/17/2018       Current Outpatient Prescriptions   Medication Sig Dispense Refill    ADVAIR DISKUS 250-50 mcg/dose diskus inhaler INHALE ONE PUFF BY MOUTH TWICE DAILY 60 each 3    albuterol (PROVENTIL HFA) 90 mcg/actuation inhaler Inhale 2 puffs into the lungs every 6 (six) hours as needed for Wheezing or Shortness of Breath. 1 Inhaler 3    diclofenac sodium (VOLTAREN) 1 % Gel Apply 2 g topically 2 (two) times daily as needed. 100 g 0    donepezil (ARICEPT) 5 MG tablet Take 1 tablet (5 mg total) by mouth every evening. 90 tablet 3    losartan (COZAAR) 50 MG tablet Take 1 tablet (50 mg total) by mouth once daily. 90 tablet 3    meclizine (ANTIVERT) 12.5 mg tablet Take 1 tablet (12.5 mg total) by mouth 3 (three) times daily as needed. 270 tablet 3    meloxicam (MOBIC) 15 MG tablet TAKE 1 TABLET ONE TIME DAILY AS NEEDED; AVOID ALL OTHER NSAIDS. 90 tablet 1    omeprazole (PRILOSEC) 40 MG capsule       benzonatate (TESSALON) 200 MG capsule Take 1 capsule (200 mg total) by mouth 3 (three) times daily as needed for Cough. 30 capsule 1    betamethasone dipropionate (DIPROLENE) 0.05 % ointment Apply topically 2 (two) times daily. 45 g 1    cetirizine (ZYRTEC) 10 MG tablet Take 1 tablet (10 mg total) by mouth once daily. 30 tablet 2    fluticasone (FLONASE) 50 mcg/actuation nasal spray 2 sprays (100 mcg total) by Each Nare route once  daily. 1 Bottle 2     No current facility-administered medications for this visit.        Assessment:    ICD-10-CM ICD-9-CM    1. Annual physical exam Z00.00 V70.0 Comprehensive metabolic panel      Lipid panel      CBC auto differential      TSH   2. Post-nasal drip R09.82 784.91 benzonatate (TESSALON) 200 MG capsule      fluticasone (FLONASE) 50 mcg/actuation nasal spray      cetirizine (ZYRTEC) 10 MG tablet   3. Sinus congestion R09.81 478.19 benzonatate (TESSALON) 200 MG capsule      fluticasone (FLONASE) 50 mcg/actuation nasal spray      cetirizine (ZYRTEC) 10 MG tablet   4. Essential hypertension I10 401.9 Comprehensive metabolic panel      Lipid panel      CBC auto differential      TSH         Plan:    Follow-up if symptoms worsen or fail to improve.          Lucie Munguia MD

## 2018-04-24 ENCOUNTER — PATIENT MESSAGE (OUTPATIENT)
Dept: FAMILY MEDICINE | Facility: CLINIC | Age: 73
End: 2018-04-24

## 2018-04-24 ENCOUNTER — TELEPHONE (OUTPATIENT)
Dept: FAMILY MEDICINE | Facility: CLINIC | Age: 73
End: 2018-04-24

## 2018-04-25 DIAGNOSIS — Z12.5 PROSTATE CANCER SCREENING: Primary | ICD-10-CM

## 2018-04-26 ENCOUNTER — LAB VISIT (OUTPATIENT)
Dept: LAB | Facility: HOSPITAL | Age: 73
End: 2018-04-26
Attending: FAMILY MEDICINE
Payer: MEDICARE

## 2018-04-26 DIAGNOSIS — Z12.5 PROSTATE CANCER SCREENING: ICD-10-CM

## 2018-04-26 LAB — COMPLEXED PSA SERPL-MCNC: 0.1 NG/ML

## 2018-04-26 PROCEDURE — 84153 ASSAY OF PSA TOTAL: CPT

## 2018-04-26 PROCEDURE — 36415 COLL VENOUS BLD VENIPUNCTURE: CPT

## 2018-06-15 DIAGNOSIS — I10 ESSENTIAL HYPERTENSION: ICD-10-CM

## 2018-06-15 RX ORDER — LOSARTAN POTASSIUM 50 MG/1
TABLET ORAL
Qty: 90 TABLET | Refills: 2 | Status: SHIPPED | OUTPATIENT
Start: 2018-06-15 | End: 2019-05-15 | Stop reason: SDUPTHER

## 2018-07-02 ENCOUNTER — TELEPHONE (OUTPATIENT)
Dept: FAMILY MEDICINE | Facility: CLINIC | Age: 73
End: 2018-07-02

## 2018-07-02 ENCOUNTER — OFFICE VISIT (OUTPATIENT)
Dept: UROLOGY | Facility: CLINIC | Age: 73
End: 2018-07-02
Payer: MEDICARE

## 2018-07-02 VITALS
SYSTOLIC BLOOD PRESSURE: 125 MMHG | HEART RATE: 108 BPM | HEIGHT: 69 IN | BODY MASS INDEX: 43.46 KG/M2 | DIASTOLIC BLOOD PRESSURE: 73 MMHG | WEIGHT: 293.44 LBS

## 2018-07-02 DIAGNOSIS — E66.01 MORBID OBESITY WITH BMI OF 40.0-44.9, ADULT: ICD-10-CM

## 2018-07-02 DIAGNOSIS — R31.29 HEMATURIA, MICROSCOPIC: ICD-10-CM

## 2018-07-02 DIAGNOSIS — N39.0 ACUTE UTI: Primary | ICD-10-CM

## 2018-07-02 DIAGNOSIS — N35.9 URETHRAL STRICTURE, UNSPECIFIED STRICTURE TYPE: ICD-10-CM

## 2018-07-02 DIAGNOSIS — N48.0 BXO (BALANITIS XEROTICA OBLITERANS): ICD-10-CM

## 2018-07-02 PROBLEM — N35.919 URETHRAL STRICTURE: Status: ACTIVE | Noted: 2018-07-02

## 2018-07-02 PROCEDURE — 3074F SYST BP LT 130 MM HG: CPT | Mod: CPTII,S$GLB,, | Performed by: UROLOGY

## 2018-07-02 PROCEDURE — 99999 PR PBB SHADOW E&M-EST. PATIENT-LVL IV: CPT | Mod: PBBFAC,,, | Performed by: UROLOGY

## 2018-07-02 PROCEDURE — 99499 UNLISTED E&M SERVICE: CPT | Mod: S$GLB,,, | Performed by: UROLOGY

## 2018-07-02 PROCEDURE — 99214 OFFICE O/P EST MOD 30 MIN: CPT | Mod: S$GLB,,, | Performed by: UROLOGY

## 2018-07-02 PROCEDURE — 3078F DIAST BP <80 MM HG: CPT | Mod: CPTII,S$GLB,, | Performed by: UROLOGY

## 2018-07-02 RX ORDER — BETAMETHASONE DIPROPIONATE 0.5 MG/G
OINTMENT TOPICAL 2 TIMES DAILY
Qty: 45 G | Refills: 1 | Status: SHIPPED | OUTPATIENT
Start: 2018-07-02 | End: 2018-07-09 | Stop reason: SDUPTHER

## 2018-07-02 RX ORDER — AMOXICILLIN AND CLAVULANATE POTASSIUM 875; 125 MG/1; MG/1
1 TABLET, FILM COATED ORAL 2 TIMES DAILY
Qty: 14 TABLET | Refills: 0 | Status: SHIPPED | OUTPATIENT
Start: 2018-07-02 | End: 2018-07-09

## 2018-07-02 NOTE — PROGRESS NOTES
Subjective:       Patient ID: Lopez Savage is a 72 y.o. male.    Chief Complaint: urethral stricture.    HPI   Patient is a 72 y.o. male referred by Dr. Garcia for evaluation and management of recurrent urethral stricture.  He was seen by Dr. Garcia initially back in 2016 and was initially referred by their PCP, Dr. Tristan for evaluation of UTI and meatal stenosis.  Penile biopsy on 4/27/16 showed:  FINAL PATHOLOGIC DIAGNOSIS  PENIS, BIOPSY:  -Lichen sclerosus (Balanitis xerotica obliterans, BXO) with ulceration and reactive changes.  -Negative for dysplasia or malignancy.    He reports significant difficulty urinating.  He states he has to sit to urinate.  His stream goes in many different directions.  He has been on steroid cream since his initial evaluation.  This is not improved significantly.    He gets chills but doesn't not have any dysuria or fevers.  No hematuria.  No known urinary tract infections since his surgery.      Review of Systems    All other systems reviewed and negative except pertinent positives noted in HPI.    Objective:      Physical Exam    Nursing note and vitals reviewed.  Constitutional: He is oriented to person, place, and time. Vital signs are normal. He appears well-developed and well-nourished. He is cooperative.   HENT:   Head: Normocephalic.   Neck: No tracheal deviation present.   Cardiovascular: Normal rate and intact distal pulses.   Pulmonary/Chest: Effort normal. No accessory muscle usage. No tachypnea. No respiratory distress.   Abdominal: Soft. Normal appearance. He exhibits no distension, no fluid wave, no ascites and no mass. There is no tenderness. There is no rebound and no CVA tenderness. No hernia. Hernia confirmed negative in the right inguinal area and confirmed negative in the left inguinal area.   Liver/spleen non-palpable.  Morbidly obese.   Genitourinary: Prostate normal, testes normal and penis normal. Rectal exam shows no external hemorrhoid, no mass, no  tenderness and anal tone normal. Prostate is not tender. Right testis shows no mass and no tenderness. Right testis is descended. Left testis shows no mass and no tenderness. Left testis is descended. Circumcised.         Scrotum showed no rashes or lesions.  Anus/perineum without lesion. Epididymis showed no masses or tenderness. +++ severe meatal stenosis, meatus in normal location. Positive urine discharge but no penile discharge upon palpating the glands of the penis, no further ulcerative lesion of the glans.       Musculoskeletal: Normal range of motion.   Lymphadenopathy: No inguinal adenopathy noted on the right or left side.   Right: No inguinal adenopathy present.   Left: No inguinal adenopathy present.   Neurological: He is alert and oriented to person, place, and time. He has normal strength.   Skin: No bruising and no rash noted.     Psychiatric: He has a normal mood and affect. His speech is normal and behavior is normal. Thought content normal.         Assessment:       Acute UTI  -     POCT urine dipstick without microscope  -     Urine culture  -     Urine culture; Future; Expected date: 07/03/2018  -     amoxicillin-clavulanate 875-125mg (AUGMENTIN) 875-125 mg per tablet; Take 1 tablet by mouth 2 (two) times daily. for 7 days  Dispense: 14 tablet; Refill: 0    Hematuria, microscopic    BXO (balanitis xerotica obliterans)  -     betamethasone dipropionate (DIPROLENE) 0.05 % ointment; Apply topically 2 (two) times daily.  Dispense: 45 g; Refill: 1  -     FL Urethrogram Retrograde (xpd); Future; Expected date: 07/02/2018    Urethral stricture, unspecified stricture type  -     betamethasone dipropionate (DIPROLENE) 0.05 % ointment; Apply topically 2 (two) times daily.  Dispense: 45 g; Refill: 1  -     FL Urethrogram Retrograde (xpd); Future; Expected date: 07/02/2018    Morbid obesity with BMI of 40.0-44.9, adult      Plan:       1. BXO:  -Discussed with the patient at length that the cause of this is  largely unknown.  He does not have any autoimmune disorders.  -Can continue steroid cream for now.  However, this is unlikely to resolve or significantly improve with conservative management.  -plan RUG to evaluate his urethral.  - will see him for discussion of surgical options regarding meatal stenosis and urethral stricture disease in the setting of balanitis xerotica obliterans  2. UTI:  -Urine for culture today to rule out infection prior to taking abx   3. Meatal stenosis:  -Likely need repeat retrograde urethrogram performed at [facility] for surgical planning.  Patient to return to ED if unable to void.  4. BMI:  -discussed diet and exercise plan for weight loss.  He will continue to have this conversation with primary care physician    Follow-up for RUG.

## 2018-07-02 NOTE — TELEPHONE ENCOUNTER
----- Message from Edelmira Ramires sent at 7/2/2018  8:25 AM CDT -----  Contact: Wife. [phone]  Patient would like to speak with you about being seen today for cough and vomit. Please advise.

## 2018-07-03 ENCOUNTER — OFFICE VISIT (OUTPATIENT)
Dept: FAMILY MEDICINE | Facility: CLINIC | Age: 73
End: 2018-07-03
Payer: MEDICARE

## 2018-07-03 VITALS
WEIGHT: 291 LBS | HEIGHT: 69 IN | DIASTOLIC BLOOD PRESSURE: 68 MMHG | HEART RATE: 92 BPM | RESPIRATION RATE: 20 BRPM | TEMPERATURE: 98 F | OXYGEN SATURATION: 97 % | SYSTOLIC BLOOD PRESSURE: 118 MMHG | BODY MASS INDEX: 43.1 KG/M2

## 2018-07-03 DIAGNOSIS — R09.82 POST-NASAL DRIP: ICD-10-CM

## 2018-07-03 DIAGNOSIS — R05.3 COUGH, PERSISTENT: Primary | ICD-10-CM

## 2018-07-03 DIAGNOSIS — R09.81 SINUS CONGESTION: ICD-10-CM

## 2018-07-03 PROCEDURE — 3074F SYST BP LT 130 MM HG: CPT | Mod: CPTII,S$GLB,, | Performed by: FAMILY MEDICINE

## 2018-07-03 PROCEDURE — 99214 OFFICE O/P EST MOD 30 MIN: CPT | Mod: S$GLB,,, | Performed by: FAMILY MEDICINE

## 2018-07-03 PROCEDURE — 3078F DIAST BP <80 MM HG: CPT | Mod: CPTII,S$GLB,, | Performed by: FAMILY MEDICINE

## 2018-07-03 PROCEDURE — 99999 PR PBB SHADOW E&M-EST. PATIENT-LVL III: CPT | Mod: PBBFAC,,, | Performed by: FAMILY MEDICINE

## 2018-07-03 RX ORDER — CETIRIZINE HYDROCHLORIDE 10 MG/1
10 TABLET ORAL DAILY
Qty: 30 TABLET | Refills: 2 | Status: SHIPPED | OUTPATIENT
Start: 2018-07-03 | End: 2018-10-11

## 2018-07-03 NOTE — PROGRESS NOTES
HPI:  Lopez Savage is a 72 y.o. year old male that  Presents for a persistent cough with vomiting now. He states that's the cough is present all of the time and it is worse when he goes to the barn.  Chief Complaint   Patient presents with    Cough     pt has been throwing up phelgm--having chest pain from coughing   .     HPI      Past Medical History:   Diagnosis Date    Allergy     Asthma     CHRONIC BRONCHITIS     GERD (gastroesophageal reflux disease)     HTN (hypertension)     HTN (hypertension) 10/15/2014    Joint pain     Mitral regurgitation     3-4+ under surveillance CT surgery    Mitral regurgitation     Mitral valve regurgitation     Obesity     Osteoarthritis of knee     Ulcerative colitis     Urinary tract infection     Valvular regurgitation      Social History     Social History    Marital status:      Spouse name: N/A    Number of children: N/A    Years of education: N/A     Occupational History    Not on file.     Social History Main Topics    Smoking status: Former Smoker     Packs/day: 2.00     Years: 6.00     Quit date: 1/1/1960    Smokeless tobacco: Never Used    Alcohol use No      Comment: remote hx of heavy alcohol use    Drug use: No    Sexual activity: Not Currently     Partners: Female     Other Topics Concern    Not on file     Social History Narrative    No narrative on file     Past Surgical History:   Procedure Laterality Date    ADENOIDECTOMY      COLONOSCOPY N/A 10/11/2017    Procedure: COLONOSCOPY Golytely;  Surgeon: Shanae Duarte MD;  Location: [facility];  Service: Endoscopy;  Laterality: N/A;    CYSTOSCOPY W/ RETROGRADES      with penile biopsy    JOINT REPLACEMENT      Penile Biopsy      TONSILLECTOMY      TOTAL KNEE ARTHROPLASTY Bilateral      Family History   Problem Relation Age of Onset    Bone cancer Mother     Glaucoma Mother     Cataracts Mother     Kidney disease Father     COPD Sister     Heart disease Daughter  "        congenital    No Known Problems Daughter     Melanoma Neg Hx     Prostate cancer Neg Hx     Macular degeneration Neg Hx     Retinal detachment Neg Hx     Strabismus Neg Hx     Amblyopia Neg Hx     Blindness Neg Hx            Review of Systems  General ROS: negative for chills, fever or weight loss  ENT ROS: negative for epistaxis, sore throat or rhinorrhea  Respiratory ROS: no cough, shortness of breath, or wheezing  Cardiovascular ROS: no chest pain or dyspnea on exertion  Gastrointestinal ROS: no abdominal pain, change in bowel habits, or black/ bloody stools    Physical Exam:  /68   Pulse 92   Temp 98.3 °F (36.8 °C) (Oral)   Resp 20   Ht 5' 9" (1.753 m)   Wt 132 kg (291 lb)   SpO2 97%   BMI 42.97 kg/m²   General appearance: alert, cooperative, no distress  Constitutional:Oriented to person, place, and time.appears well-developed and well-nourished.  HEENT: Normocephalic, atraumatic, neck symmetrical, no nasal discharge, TM- clear bilaterally  Lungs: clear to auscultation bilaterally, no dullness to percussion bilaterally  Heart: regular rate and rhythm without rub; no displacement of the PMI , S1&S2 present  Abdomen: soft, non-tender; bowel sounds normoactive; no organomegaly  Physical Exam    LABS:    Complete Blood Count  Lab Results   Component Value Date    RBC 4.52 (L) 04/17/2018    HGB 12.6 (L) 04/17/2018    HCT 39.4 (L) 04/17/2018    MCV 87 04/17/2018    MCH 27.9 04/17/2018    MCHC 32.0 04/17/2018    RDW 13.7 04/17/2018     04/17/2018    MPV 11.4 04/17/2018    GRAN 6.6 04/17/2018    GRAN 69.3 04/17/2018    LYMPH 1.7 04/17/2018    LYMPH 17.8 (L) 04/17/2018    MONO 0.9 04/17/2018    MONO 9.1 04/17/2018    EOS 0.2 04/17/2018    BASO 0.14 04/17/2018    EOSINOPHIL 2.3 04/17/2018    BASOPHIL 1.5 04/17/2018    DIFFMETHOD Automated 04/17/2018       Comprehensive Metabolic Panel  Lab Results   Component Value Date     04/17/2018    BUN 15 04/17/2018    CREATININE 1.2 " 04/17/2018     04/17/2018    K 4.2 04/17/2018     04/17/2018    PROT 7.3 04/17/2018    ALBUMIN 3.4 (L) 04/17/2018    BILITOT 0.4 04/17/2018    AST 16 04/17/2018    ALKPHOS 71 04/17/2018    CO2 28 04/17/2018    ALT 20 04/17/2018    ANIONGAP 8 04/17/2018    EGFRNONAA >60 04/17/2018    ESTGFRAFRICA >60 04/17/2018       LIPID  Lab Results   Component Value Date    CHOL 117 (L) 04/17/2018    HDL 30 (L) 04/17/2018         TSH  Lab Results   Component Value Date    TSH 2.308 04/17/2018       Current Outpatient Prescriptions   Medication Sig Dispense Refill    albuterol (PROVENTIL HFA) 90 mcg/actuation inhaler Inhale 2 puffs into the lungs every 6 (six) hours as needed for Wheezing or Shortness of Breath. 1 Inhaler 3    amoxicillin-clavulanate 875-125mg (AUGMENTIN) 875-125 mg per tablet Take 1 tablet by mouth 2 (two) times daily. for 7 days 14 tablet 0    betamethasone dipropionate (DIPROLENE) 0.05 % ointment Apply topically 2 (two) times daily. 45 g 1    cetirizine (ZYRTEC) 10 MG tablet Take 1 tablet (10 mg total) by mouth once daily. 30 tablet 2    diclofenac sodium (VOLTAREN) 1 % Gel Apply 2 g topically 2 (two) times daily as needed. 100 g 0    donepezil (ARICEPT) 5 MG tablet Take 1 tablet (5 mg total) by mouth every evening. 90 tablet 3    fluticasone (FLONASE) 50 mcg/actuation nasal spray 2 sprays (100 mcg total) by Each Nare route once daily. 1 Bottle 2    fluticasone-salmeterol 250-50 mcg/dose (ADVAIR DISKUS) 250-50 mcg/dose diskus inhaler Inhale 1 puff into the lungs 2 (two) times daily. Controller 60 each 3    losartan (COZAAR) 50 MG tablet TAKE ONE TABLET BY MOUTH EVERY DAY 90 tablet 2    meclizine (ANTIVERT) 12.5 mg tablet Take 1 tablet (12.5 mg total) by mouth 3 (three) times daily as needed. 270 tablet 3    meloxicam (MOBIC) 15 MG tablet TAKE 1 TABLET ONE TIME DAILY AS NEEDED; AVOID ALL OTHER NSAIDS. 90 tablet 1    omeprazole (PRILOSEC) 40 MG capsule        No current  facility-administered medications for this visit.        Assessment:    ICD-10-CM ICD-9-CM    1. Cough, persistent R05 786.2    2. Post-nasal drip R09.82 784.91 cetirizine (ZYRTEC) 10 MG tablet   3. Sinus congestion R09.81 478.19 cetirizine (ZYRTEC) 10 MG tablet         Plan:  Will try taking Zytrtec daily . He states that Tessalon Perles have not worked.   Follow-up if symptoms worsen or fail to improve.          Lucie Munguia MD

## 2018-07-05 ENCOUNTER — HOSPITAL ENCOUNTER (OUTPATIENT)
Dept: RADIOLOGY | Facility: HOSPITAL | Age: 73
Discharge: HOME | End: 2018-07-05
Attending: UROLOGY
Payer: MEDICARE

## 2018-07-05 DIAGNOSIS — N35.9 URETHRAL STRICTURE, UNSPECIFIED STRICTURE TYPE: ICD-10-CM

## 2018-07-05 DIAGNOSIS — N48.0 BXO (BALANITIS XEROTICA OBLITERANS): ICD-10-CM

## 2018-07-05 PROCEDURE — 74450 X-RAY URETHRA/BLADDER: CPT | Mod: TC

## 2018-07-05 PROCEDURE — 74450 X-RAY URETHRA/BLADDER: CPT | Mod: 26,,, | Performed by: RADIOLOGY

## 2018-07-05 PROCEDURE — 25500020 PHARM REV CODE 255: Performed by: UROLOGY

## 2018-07-05 PROCEDURE — 51610 INJECTION FOR BLADDER X-RAY: CPT | Mod: ,,, | Performed by: RADIOLOGY

## 2018-07-05 RX ADMIN — IOTHALAMATE MEGLUMINE 250 ML: 172 INJECTION URETERAL at 12:07

## 2018-07-09 ENCOUNTER — OFFICE VISIT (OUTPATIENT)
Dept: UROLOGY | Facility: CLINIC | Age: 73
End: 2018-07-09
Payer: MEDICARE

## 2018-07-09 VITALS
BODY MASS INDEX: 43.1 KG/M2 | WEIGHT: 291 LBS | SYSTOLIC BLOOD PRESSURE: 119 MMHG | DIASTOLIC BLOOD PRESSURE: 73 MMHG | HEART RATE: 77 BPM | HEIGHT: 69 IN

## 2018-07-09 DIAGNOSIS — N35.9 URETHRAL STRICTURE, UNSPECIFIED STRICTURE TYPE: ICD-10-CM

## 2018-07-09 DIAGNOSIS — N40.1 BPH WITH URINARY OBSTRUCTION: ICD-10-CM

## 2018-07-09 DIAGNOSIS — N48.0 BXO (BALANITIS XEROTICA OBLITERANS): Primary | ICD-10-CM

## 2018-07-09 DIAGNOSIS — N13.8 BPH WITH URINARY OBSTRUCTION: ICD-10-CM

## 2018-07-09 DIAGNOSIS — N39.0 ACUTE UTI: ICD-10-CM

## 2018-07-09 PROCEDURE — 99215 OFFICE O/P EST HI 40 MIN: CPT | Mod: S$GLB,,, | Performed by: UROLOGY

## 2018-07-09 PROCEDURE — 3074F SYST BP LT 130 MM HG: CPT | Mod: CPTII,S$GLB,, | Performed by: UROLOGY

## 2018-07-09 PROCEDURE — 3078F DIAST BP <80 MM HG: CPT | Mod: CPTII,S$GLB,, | Performed by: UROLOGY

## 2018-07-09 PROCEDURE — 99499 UNLISTED E&M SERVICE: CPT | Mod: S$GLB,,, | Performed by: UROLOGY

## 2018-07-09 PROCEDURE — 99999 PR PBB SHADOW E&M-EST. PATIENT-LVL III: CPT | Mod: PBBFAC,,, | Performed by: UROLOGY

## 2018-07-09 RX ORDER — CIPROFLOXACIN 500 MG/1
500 TABLET ORAL DAILY
Qty: 14 TABLET | Refills: 0 | Status: SHIPPED | OUTPATIENT
Start: 2018-07-09 | End: 2018-07-23

## 2018-07-09 RX ORDER — BETAMETHASONE DIPROPIONATE 0.5 MG/G
OINTMENT TOPICAL 2 TIMES DAILY
Qty: 45 G | Refills: 1 | Status: SHIPPED | OUTPATIENT
Start: 2018-07-09 | End: 2018-08-14 | Stop reason: SDUPTHER

## 2018-07-09 NOTE — PATIENT INSTRUCTIONS
discussed Rezum therapy for BPH with obstruction.  Its brochure given along with its website.   He watched the educational video.  He understands that his urinary symptoms may not be better for the first month, and even  up to 3 months when the denaturing and absorption of the prostate tissues will be completed.  Expect to keep an indwelling catheter up to 1 wk.  He needs to stop blood thinning medications 1 wk before the procedure.  Needs to have someone to drive in and out for your procedure.  Nothing by mouth for anesthesia for 8 hours before the procedure.    Heather [phone]

## 2018-07-09 NOTE — PROGRESS NOTES
Subjective:       Patient ID: Lopez Savage is a 72 y.o. male.    Chief Complaint: weak urine flow, hx of recurrent meatal stenosis    Urinary Tract Infection         Patient is a 72 y.o. male referred by Dr. Garcia for evaluation and management of recurrent urethral stricture.  He was seen by Dr. Garcia initially back in 2016 and was initially referred by their PCP, Dr. Tristan for evaluation of UTI and meatal stenosis.  Penile biopsy on 4/27/16 showed:  FINAL PATHOLOGIC DIAGNOSIS  PENIS, BIOPSY:  -Lichen sclerosus (Balanitis xerotica obliterans, BXO) with ulceration and reactive changes.  -Negative for dysplasia or malignancy.    He reports significant difficulty urinating.  He states he has to sit to urinate.  His stream goes in many different directions.  He has been on steroid cream since his initial evaluation.  This is not improved significantly.  I saw initially on 7/2/18 and arranged a RUG by radiology.  He is here with his wife to discuss the findings of the RUG.    Denies any dysuria or fevers.  No hematuria.  No known urinary tract infections since his surgery.      Review of Systems    All other systems reviewed and negative except pertinent positives noted in HPI.    Objective:      Physical Exam    Nursing note and vitals reviewed.  Constitutional: He is oriented to person, place, and time. Vital signs are normal. He appears well-developed and well-nourished. He is cooperative.   HENT:   Head: Normocephalic.   Neck: No tracheal deviation present.   Cardiovascular: Normal rate and intact distal pulses.   Pulmonary/Chest: Effort normal. No accessory muscle usage. No tachypnea. No respiratory distress.   Abdominal: Soft. Normal appearance. He exhibits no distension, no fluid wave, no ascites and no mass. There is no tenderness. There is no rebound and no CVA tenderness. No hernia. Hernia confirmed negative in the right inguinal area and confirmed negative in the left inguinal area.   Liver/spleen  non-palpable.  Morbidly obese.   Genitourinary: Prostate normal, testes normal and penis normal. Rectal exam shows no external hemorrhoid, no mass, no tenderness and anal tone normal. Prostate is not tender. Right testis shows no mass and no tenderness. Right testis is descended. Left testis shows no mass and no tenderness. Left testis is descended. Circumcised.         Scrotum showed no rashes or lesions.  Anus/perineum without lesion. Epididymis showed no masses or tenderness. +++ severe meatal stenosis, meatus in normal location. Positive urine discharge but no penile discharge upon palpating the glands of the penis, no further ulcerative lesion of the glans.       Musculoskeletal: Normal range of motion.   Lymphadenopathy: No inguinal adenopathy noted on the right or left side.   Right: No inguinal adenopathy present.   Left: No inguinal adenopathy present.   Neurological: He is alert and oriented to person, place, and time. He has normal strength.   Skin: No bruising and no rash noted.     Psychiatric: He has a normal mood and affect. His speech is normal and behavior is normal. Thought content normal.       Lab Results   Component Value Date    PSA 0.10 04/26/2018    PSA 0.07 07/09/2015    PSA 0.20 08/06/2013     RUG 7/5/18  The urethral meatus was catheterized using a pediatric catheter.  Cysto-Conray was slowly infused into the urethra demonstrating mild stricture at the level of the fossa navicularis which appears improved when compared to prior study of 04/27/2016.  There is mild irregularity of the urethra with no other areas of significant stricturing or filling defects.  Contrast did not reflux into the prostatic urethra.    Assessment:       BXO (balanitis xerotica obliterans)  -     betamethasone dipropionate (DIPROLENE) 0.05 % ointment; Apply topically 2 (two) times daily.  Dispense: 45 g; Refill: 1  -     CBC Without Differential; Future; Expected date: 07/09/2018  -     Basic metabolic panel;  Future; Expected date: 07/09/2018  -     EKG 12-lead; Future    BPH with urinary obstruction    Urethral stricture, unspecified stricture type  -     betamethasone dipropionate (DIPROLENE) 0.05 % ointment; Apply topically 2 (two) times daily.  Dispense: 45 g; Refill: 1  -     CBC Without Differential; Future; Expected date: 07/09/2018  -     Basic metabolic panel; Future; Expected date: 07/09/2018  -     EKG 12-lead; Future    Acute UTI  -     ciprofloxacin HCl (CIPRO) 500 MG tablet; Take 1 tablet (500 mg total) by mouth once daily. for 14 doses  Dispense: 14 tablet; Refill: 0      Plan:       1. BXO:  -Discussed with the patient at length that the cause of this is largely unknown.  He does not have any autoimmune disorders.  -Can continue steroid cream for now.  However, this is unlikely to resolve or significantly improve with conservative management.  -plan RUG to evaluate his urethral.  - will see him for discussion of surgical options regarding meatal stenosis and urethral stricture disease in the setting of balanitis xerotica obliterans  2. UTI:  -Urine culture was negative.  Start cipro daily as suppressive abx until his surgery.  3. Meatal stenosis:  -most likely he will need open urethroplasty using a penile flap for more lasting result.  Based on the RUG, I feel that BXO involved at the meatus and the urethral stricture may extend to the fossa navicularis.  Thus I recommend him that I should further evaluate him with cysto, metal dilation ( possible meatoplasty), and evaluate the rest of the urethral.  In addition, I think that I should evaluate his BPH to make sure that it is not contributing to weak urine flow.  If BPH seems obstructive, I should consider doing Rezum Therapy to treat his BPH, since he will require to keep a Salgado catheter for a few days after urethral surgery.    He watched a video of Rezume Therapy.  He is agreeable to undergo Rezum therapy for BPH as well as cysto urethral dilation.   For now continue betamethasone cream to the meatus.  I spent 40 minutes with the patient of which more than half was spent in direct consultation with the patient in regards to our treatment and plan.    4. BMI:  -discussed diet and exercise plan for weight loss.  He will continue to have this conversation with primary care physician    Follow-up in about 9 days (around 7/18/2018) for cysto,  meatal dilation, possible meatoplasty and Rezum Therapy.

## 2018-07-12 ENCOUNTER — TELEPHONE (OUTPATIENT)
Dept: UROLOGY | Facility: CLINIC | Age: 73
End: 2018-07-12

## 2018-07-12 DIAGNOSIS — N48.0 BXO (BALANITIS XEROTICA OBLITERANS): Primary | ICD-10-CM

## 2018-07-12 DIAGNOSIS — N13.8 BPH WITH URINARY OBSTRUCTION: ICD-10-CM

## 2018-07-12 DIAGNOSIS — N40.1 BPH WITH URINARY OBSTRUCTION: ICD-10-CM

## 2018-07-27 ENCOUNTER — ANESTHESIA EVENT (OUTPATIENT)
Dept: SURGERY | Facility: HOSPITAL | Age: 73
End: 2018-07-27
Payer: MEDICARE

## 2018-07-27 NOTE — ANESTHESIA PREPROCEDURE EVALUATION
Anesthesia Assessment: Preoperative EQUATION     Planned Procedure: Procedure(s) (LRB):  CYSTOSCOPY (N/A)  DILATION, URETHRA (N/A)  MEATOPLASTY, URETHRA (N/A)  DESTRUCTION-PROSTATE-TRANSURETHRAL (N/A)  Requested Anesthesia Type:General  Surgeon: Adan Cuenca MD  Service: Urology  Known or anticipated Date of Surgery:8/8/2018     Surgeon notes: reviewed     Electronic QUestionnaire Assessment completed via nurse interview with patient.         No Aq           Triage considerations:            Previous anesthesia records:MAC 2/7/18  EGD     Last PCP note: within 1 month , within [facility] Dr. Lucie Munguia  Subspecialty notes: Cardiology: General, Gastroenterology, Neurology, sports medicine// urology     Other important co-morbidities:        Diagnosis Date    Allergy      Asthma      CHRONIC BRONCHITIS      GERD (gastroesophageal reflux disease)      HTN (hypertension)      Joint pain      Mitral regurgitation       3-4+ under surveillance CT surgery    Mitral valve regurgitation      Obesity      Osteoarthritis of knee      Ulcerative colitis      Urinary tract infection      Valvular regurgitation                 Tests already available:  No recent tests./// tests being ordered by Dr Cuenca    2D ECHO WITH COLOR FLOW DOPPLER   2D echo with color flow doppler   Order: [id number]   Status:  Final result   Visible to patient:  Yes (Patient Portal) Next appt:  08/01/2018 at 09:50 AM in Lab ([facility]) Dx:  Mitral valve insufficiency, unspecifi...     Ref Range & Units 10mo ago  (9/14/17) 2yr ago  (4/25/16) 2yr ago  (4/1/16) 3yr ago  (6/2/15) 4yr ago  (6/10/14)    EF 55 - 65 55  55  55  55  65R     Mitral Valve Regurgitation  MODERATE TO SEVERE   MODERATE TO SEVERE   MODERATE TO SEVERE   MODERATE   moderate to severe     Aortic Valve Regurgitation  TRIVIAL TO MILD         Est. PA Systolic Pressure  33.64   38       Mitral Valve Mobility  POSTERIOR LEAFLET IA...  POSTERIOR LEAFLET IA...   PARTIALLY FLAIL POST...  FLAIL POSTERIOR     POSTERIOR LEAFLET PROLAPSE    Tricuspid Valve Regurgitation  MILD   MILD      Resulting Agency  CVIS CVIS CVIS CVIS CVIS   Narrative     Date of Procedure: 09/14/2017              CONCLUSIONS     1 - Normal left ventricular systolic function (EF 55-60%).     2 - No wall motion abnormalities.     3 - Moderate left atrial enlargement.     4 - Normal right ventricular systolic function .     5 - The estimated PA systolic pressure is greater than 34 mmHg.     6 - Trivial to mild aortic regurgitation.     7 - Moderate to severe mitral regurgitation.     8 - Mild tricuspid regurgitation.     9 - Prolapse and flail of the posterior antonio valve leaflet ( see text).             This document has been electronically    SIGNED BY: Roz Hernandez MD On: 09/14/2017 12:18                              Instructions given. (See in Nurse's note)     Optimization:  Pt seen by PCP this week                Plan:    Testing:  CBC and BMP/ ekg   Pre-anesthesia  visit                                        Visit focus: none                           Consultation:none                             Navigation: Tests Scheduled.                      .                        Results will be tracked by Preop Clinic.                                                                                                            07/27/2018  Lopez Savage is a 72 y.o., male.    Anesthesia Evaluation         Review of Systems  Anesthesia Hx:  No problems with previous Anesthesia  History of prior surgery of interest to airway management or planning: Previous anesthesia: MAC  egd with MAC.  Denies Family Hx of Anesthesia complications.   Denies Personal Hx of Anesthesia complications.   Social:  Former Smoker, Social Alcohol Use    Hematology/Oncology:     Oncology Normal   Hematology Comments: Past hx of anemia   EENT/Dental:   Hx of sinusitis   Cardiovascular:   Exercise tolerance: poor Hypertension  Valvular problems/Murmurs, MR Mitral regurgitation Functional Capacity 3 METS  Valvular Heart Disease: Mitral Regurgitation (MR)   Hypertension, Essential Hypertension    Pulmonary:   Asthma mild Inhalers prn// instructed to bring inhaler day of surgery   Renal/:   BPH Meatal stenosis  BXO (balanitis xerotica obliterans)  Urethral stricture  BPH with obstruction   Hepatic/GI:   PUD, GERD, well controlled Hx of diverticulosis   Musculoskeletal:   Arthritis  Chondromalacia of right knee  osteoarthritis   Neurological:   Hx of memory loss   Endocrine:  Endocrine Normal    Dermatological:  Skin Normal    Psych:   Hx of memory loss         Physical Exam  General:  Well nourished, Morbid Obesity    Airway/Jaw/Neck:  Airway Findings: Mouth Opening: Normal Tongue: Normal  General Airway Assessment: Adult  Mallampati: I  Improves to II with phonation.  TM Distance: 4 - 6 cm  Jaw/Neck Findings:  Neck ROM: Normal ROM      Dental:  Dental Findings: In tact   Chest/Lungs:  Chest/Lungs Findings: Clear to auscultation     Heart/Vascular:  Heart Findings: Rate: Normal  Rhythm: Regular Rhythm  Sounds: Normal        Mental Status:  Mental Status Findings:  Cooperative         Anesthesia Plan  Type of Anesthesia, risks & benefits discussed:  Anesthesia Type:  general  Patient's Preference:   Intra-op Monitoring Plan:   Intra-op Monitoring Plan Comments:   Post Op Pain Control Plan:   Post Op Pain Control Plan Comments:   Induction:   IV  Beta Blocker:  Patient is not currently on a Beta-Blocker (No further documentation required).       Informed Consent: Patient understands risks and agrees with Anesthesia plan.  Questions answered. Anesthesia consent signed with patient.  ASA Score: 3     Day of Surgery Review of History & Physical:    H&P update referred to the surgeon.         Ready For Surgery From Anesthesia Perspective.

## 2018-07-27 NOTE — PRE ADMISSION SCREENING
Anesthesia Assessment: Preoperative EQUATION    Planned Procedure: Procedure(s) (LRB):  CYSTOSCOPY (N/A)  DILATION, URETHRA (N/A)  MEATOPLASTY, URETHRA (N/A)  DESTRUCTION-PROSTATE-TRANSURETHRAL (N/A)  Requested Anesthesia Type:General  Surgeon: Adan Cuenca MD  Service: Urology  Known or anticipated Date of Surgery:8/8/2018    Surgeon notes: reviewed    Electronic QUestionnaire Assessment completed via nurse interview with patient.        No Aq        Triage considerations:         Previous anesthesia records:MAC 2/7/18  EGD    Last PCP note: within 1 month , within [facility] Dr. Lucie Munguia  Subspecialty notes: Cardiology: General, Gastroenterology, Neurology, sports medicine// urology    Other important co-morbidities:   Diagnosis Date    Allergy      Asthma      CHRONIC BRONCHITIS      GERD (gastroesophageal reflux disease)      HTN (hypertension)      Joint pain      Mitral regurgitation       3-4+ under surveillance CT surgery    Mitral valve regurgitation      Obesity      Osteoarthritis of knee      Ulcerative colitis      Urinary tract infection      Valvular regurgitation             Tests already available:  No recent tests./// tests being ordered by Dr Cuenca            Instructions given. (See in Nurse's note)    Optimization:  Pt seen by PCP this week      Plan:    Testing:  CBC and BMP/ ekg   Pre-anesthesia  visit       Visit focus: none     Consultation:none       Navigation: Tests Scheduled.           .             Results will be tracked by Preop Clinic.

## 2018-08-01 ENCOUNTER — CLINICAL SUPPORT (OUTPATIENT)
Dept: LAB | Facility: HOSPITAL | Age: 73
End: 2018-08-01
Attending: UROLOGY
Payer: MEDICARE

## 2018-08-01 DIAGNOSIS — N35.9 URETHRAL STRICTURE, UNSPECIFIED STRICTURE TYPE: ICD-10-CM

## 2018-08-01 DIAGNOSIS — N48.0 BXO (BALANITIS XEROTICA OBLITERANS): ICD-10-CM

## 2018-08-01 PROCEDURE — 93010 EKG 12-LEAD: ICD-10-PCS | Mod: ,,, | Performed by: INTERNAL MEDICINE

## 2018-08-01 PROCEDURE — 93005 ELECTROCARDIOGRAM TRACING: CPT

## 2018-08-01 PROCEDURE — 93010 ELECTROCARDIOGRAM REPORT: CPT | Mod: ,,, | Performed by: INTERNAL MEDICINE

## 2018-08-05 ENCOUNTER — PATIENT MESSAGE (OUTPATIENT)
Dept: FAMILY MEDICINE | Facility: CLINIC | Age: 73
End: 2018-08-05

## 2018-08-06 ENCOUNTER — PATIENT MESSAGE (OUTPATIENT)
Dept: FAMILY MEDICINE | Facility: CLINIC | Age: 73
End: 2018-08-06

## 2018-08-07 ENCOUNTER — TELEPHONE (OUTPATIENT)
Dept: UROLOGY | Facility: CLINIC | Age: 73
End: 2018-08-07

## 2018-08-07 NOTE — TELEPHONE ENCOUNTER
Called pt's wife to confirm 11:30am arrival time for procedure. Gave pt's wife NPO instructions and gave pt's wife opportunity to ask questions. Pt's wife verbalized understanding.

## 2018-08-08 ENCOUNTER — ANESTHESIA (OUTPATIENT)
Dept: SURGERY | Facility: HOSPITAL | Age: 73
End: 2018-08-08
Payer: MEDICARE

## 2018-08-08 ENCOUNTER — SURGERY (OUTPATIENT)
Age: 73
End: 2018-08-08

## 2018-08-08 ENCOUNTER — HOSPITAL ENCOUNTER (OUTPATIENT)
Facility: HOSPITAL | Age: 73
Discharge: HOME | End: 2018-08-09
Attending: UROLOGY | Admitting: UROLOGY
Payer: MEDICARE

## 2018-08-08 DIAGNOSIS — N40.0 BPH (BENIGN PROSTATIC HYPERPLASIA): ICD-10-CM

## 2018-08-08 DIAGNOSIS — N48.0 BXO (BALANITIS XEROTICA OBLITERANS): ICD-10-CM

## 2018-08-08 PROCEDURE — 63600175 PHARM REV CODE 636 W HCPCS: Performed by: ANESTHESIOLOGY

## 2018-08-08 PROCEDURE — 36000706: Performed by: UROLOGY

## 2018-08-08 PROCEDURE — 63600175 PHARM REV CODE 636 W HCPCS: Performed by: NURSE ANESTHETIST, CERTIFIED REGISTERED

## 2018-08-08 PROCEDURE — 25000003 PHARM REV CODE 250: Performed by: UROLOGY

## 2018-08-08 PROCEDURE — 52281 CYSTOSCOPY AND TREATMENT: CPT | Mod: 59,,, | Performed by: UROLOGY

## 2018-08-08 PROCEDURE — 36000707: Performed by: UROLOGY

## 2018-08-08 PROCEDURE — 53852 PROSTATIC RF THERMOTX: CPT | Mod: ,,, | Performed by: UROLOGY

## 2018-08-08 PROCEDURE — D9220A PRA ANESTHESIA: Mod: ANES,,, | Performed by: ANESTHESIOLOGY

## 2018-08-08 PROCEDURE — 27000221 HC OXYGEN, UP TO 24 HOURS

## 2018-08-08 PROCEDURE — 63600175 PHARM REV CODE 636 W HCPCS: Performed by: STUDENT IN AN ORGANIZED HEALTH CARE EDUCATION/TRAINING PROGRAM

## 2018-08-08 PROCEDURE — 71000039 HC RECOVERY, EACH ADD'L HOUR: Performed by: UROLOGY

## 2018-08-08 PROCEDURE — 94761 N-INVAS EAR/PLS OXIMETRY MLT: CPT

## 2018-08-08 PROCEDURE — 37000008 HC ANESTHESIA 1ST 15 MINUTES: Performed by: UROLOGY

## 2018-08-08 PROCEDURE — 25000003 PHARM REV CODE 250: Performed by: NURSE ANESTHETIST, CERTIFIED REGISTERED

## 2018-08-08 PROCEDURE — 71000033 HC RECOVERY, INTIAL HOUR: Performed by: UROLOGY

## 2018-08-08 PROCEDURE — D9220A PRA ANESTHESIA: Mod: CRNA,,, | Performed by: NURSE ANESTHETIST, CERTIFIED REGISTERED

## 2018-08-08 PROCEDURE — S0028 INJECTION, FAMOTIDINE, 20 MG: HCPCS | Performed by: NURSE ANESTHETIST, CERTIFIED REGISTERED

## 2018-08-08 PROCEDURE — 94799 UNLISTED PULMONARY SVC/PX: CPT

## 2018-08-08 PROCEDURE — 37000009 HC ANESTHESIA EA ADD 15 MINS: Performed by: UROLOGY

## 2018-08-08 PROCEDURE — 25000003 PHARM REV CODE 250: Performed by: STUDENT IN AN ORGANIZED HEALTH CARE EDUCATION/TRAINING PROGRAM

## 2018-08-08 RX ORDER — OXYCODONE AND ACETAMINOPHEN 5; 325 MG/1; MG/1
1 TABLET ORAL EVERY 4 HOURS PRN
Qty: 15 TABLET | Refills: 0 | Status: SHIPPED | OUTPATIENT
Start: 2018-08-08 | End: 2018-10-04

## 2018-08-08 RX ORDER — LIDOCAINE HYDROCHLORIDE 20 MG/ML
JELLY TOPICAL
Status: DISCONTINUED | OUTPATIENT
Start: 2018-08-08 | End: 2018-08-08

## 2018-08-08 RX ORDER — IBUPROFEN 600 MG/1
600 TABLET ORAL EVERY 6 HOURS
Status: DISCONTINUED | OUTPATIENT
Start: 2018-08-08 | End: 2018-08-09 | Stop reason: HOSPADM

## 2018-08-08 RX ORDER — OXYCODONE HYDROCHLORIDE 5 MG/1
10 TABLET ORAL EVERY 4 HOURS PRN
Status: DISCONTINUED | OUTPATIENT
Start: 2018-08-08 | End: 2018-08-08

## 2018-08-08 RX ORDER — CETIRIZINE HYDROCHLORIDE 10 MG/1
10 TABLET ORAL DAILY
Status: DISCONTINUED | OUTPATIENT
Start: 2018-08-08 | End: 2018-08-09 | Stop reason: HOSPADM

## 2018-08-08 RX ORDER — SODIUM CHLORIDE 9 MG/ML
INJECTION, SOLUTION INTRAVENOUS CONTINUOUS
Status: DISCONTINUED | OUTPATIENT
Start: 2018-08-08 | End: 2018-08-08

## 2018-08-08 RX ORDER — FENTANYL CITRATE 50 UG/ML
25 INJECTION, SOLUTION INTRAMUSCULAR; INTRAVENOUS EVERY 5 MIN PRN
Status: COMPLETED | OUTPATIENT
Start: 2018-08-08 | End: 2018-08-08

## 2018-08-08 RX ORDER — IBUPROFEN 600 MG/1
600 TABLET ORAL EVERY 6 HOURS PRN
Qty: 56 TABLET | Refills: 0 | Status: SHIPPED | OUTPATIENT
Start: 2018-08-08 | End: 2019-02-15 | Stop reason: SDUPTHER

## 2018-08-08 RX ORDER — OXYBUTYNIN CHLORIDE 5 MG/1
5 TABLET ORAL 3 TIMES DAILY PRN
Qty: 30 TABLET | Refills: 0 | Status: SHIPPED | OUTPATIENT
Start: 2018-08-08 | End: 2018-08-14

## 2018-08-08 RX ORDER — PHENAZOPYRIDINE HYDROCHLORIDE 100 MG/1
100 TABLET, FILM COATED ORAL 3 TIMES DAILY PRN
Qty: 21 TABLET | Refills: 0 | Status: SHIPPED | OUTPATIENT
Start: 2018-08-08 | End: 2018-08-18

## 2018-08-08 RX ORDER — TAMSULOSIN HYDROCHLORIDE 0.4 MG/1
0.4 CAPSULE ORAL NIGHTLY
Status: DISCONTINUED | OUTPATIENT
Start: 2018-08-08 | End: 2018-08-09 | Stop reason: HOSPADM

## 2018-08-08 RX ORDER — PROPOFOL 10 MG/ML
VIAL (ML) INTRAVENOUS
Status: DISCONTINUED | OUTPATIENT
Start: 2018-08-08 | End: 2018-08-08

## 2018-08-08 RX ORDER — SODIUM CHLORIDE 9 MG/ML
INJECTION, SOLUTION INTRAVENOUS CONTINUOUS
Status: ACTIVE | OUTPATIENT
Start: 2018-08-08 | End: 2018-08-08

## 2018-08-08 RX ORDER — GLYCOPYRROLATE 0.2 MG/ML
INJECTION INTRAMUSCULAR; INTRAVENOUS
Status: DISCONTINUED | OUTPATIENT
Start: 2018-08-08 | End: 2018-08-08

## 2018-08-08 RX ORDER — LIDOCAINE HCL/PF 100 MG/5ML
SYRINGE (ML) INTRAVENOUS
Status: DISCONTINUED | OUTPATIENT
Start: 2018-08-08 | End: 2018-08-08

## 2018-08-08 RX ORDER — FLUTICASONE FUROATE AND VILANTEROL 100; 25 UG/1; UG/1
2 POWDER RESPIRATORY (INHALATION) 2 TIMES DAILY
Status: DISCONTINUED | OUTPATIENT
Start: 2018-08-08 | End: 2018-08-09 | Stop reason: HOSPADM

## 2018-08-08 RX ORDER — POLYETHYLENE GLYCOL 3350 17 G/17G
17 POWDER, FOR SOLUTION ORAL DAILY
Qty: 30 PACKET | Refills: 0 | Status: SHIPPED | OUTPATIENT
Start: 2018-08-08 | End: 2018-10-04

## 2018-08-08 RX ORDER — PHENAZOPYRIDINE HYDROCHLORIDE 100 MG/1
200 TABLET, FILM COATED ORAL
Status: DISCONTINUED | OUTPATIENT
Start: 2018-08-08 | End: 2018-08-09 | Stop reason: HOSPADM

## 2018-08-08 RX ORDER — FAMOTIDINE 10 MG/ML
INJECTION INTRAVENOUS
Status: DISCONTINUED | OUTPATIENT
Start: 2018-08-08 | End: 2018-08-08

## 2018-08-08 RX ORDER — MIDAZOLAM HYDROCHLORIDE 1 MG/ML
INJECTION, SOLUTION INTRAMUSCULAR; INTRAVENOUS
Status: DISCONTINUED | OUTPATIENT
Start: 2018-08-08 | End: 2018-08-08

## 2018-08-08 RX ORDER — OXYCODONE HCL 5 MG/5 ML
5 SOLUTION, ORAL ORAL EVERY 4 HOURS PRN
Status: DISCONTINUED | OUTPATIENT
Start: 2018-08-08 | End: 2018-08-09 | Stop reason: HOSPADM

## 2018-08-08 RX ORDER — PANTOPRAZOLE SODIUM 40 MG/1
40 TABLET, DELAYED RELEASE ORAL DAILY
Status: DISCONTINUED | OUTPATIENT
Start: 2018-08-08 | End: 2018-08-09 | Stop reason: HOSPADM

## 2018-08-08 RX ORDER — AMOXICILLIN AND CLAVULANATE POTASSIUM 500; 125 MG/1; MG/1
1 TABLET, FILM COATED ORAL 2 TIMES DAILY
Qty: 10 TABLET | Refills: 0 | Status: SHIPPED | OUTPATIENT
Start: 2018-08-08 | End: 2018-08-13

## 2018-08-08 RX ORDER — MEPERIDINE HYDROCHLORIDE 50 MG/ML
12.5 INJECTION INTRAMUSCULAR; INTRAVENOUS; SUBCUTANEOUS ONCE AS NEEDED
Status: DISCONTINUED | OUTPATIENT
Start: 2018-08-08 | End: 2018-08-08 | Stop reason: HOSPADM

## 2018-08-08 RX ORDER — OXYCODONE HYDROCHLORIDE 5 MG/1
5 TABLET ORAL EVERY 4 HOURS PRN
Status: DISCONTINUED | OUTPATIENT
Start: 2018-08-08 | End: 2018-08-08

## 2018-08-08 RX ORDER — DEXAMETHASONE SODIUM PHOSPHATE 4 MG/ML
INJECTION, SOLUTION INTRA-ARTICULAR; INTRALESIONAL; INTRAMUSCULAR; INTRAVENOUS; SOFT TISSUE
Status: DISCONTINUED | OUTPATIENT
Start: 2018-08-08 | End: 2018-08-08

## 2018-08-08 RX ORDER — ROCURONIUM BROMIDE 10 MG/ML
INJECTION, SOLUTION INTRAVENOUS
Status: DISCONTINUED | OUTPATIENT
Start: 2018-08-08 | End: 2018-08-08

## 2018-08-08 RX ORDER — CEFAZOLIN SODIUM 1 G/3ML
2 INJECTION, POWDER, FOR SOLUTION INTRAMUSCULAR; INTRAVENOUS
Status: COMPLETED | OUTPATIENT
Start: 2018-08-08 | End: 2018-08-08

## 2018-08-08 RX ORDER — SUCCINYLCHOLINE CHLORIDE 20 MG/ML
INJECTION INTRAMUSCULAR; INTRAVENOUS
Status: DISCONTINUED | OUTPATIENT
Start: 2018-08-08 | End: 2018-08-08

## 2018-08-08 RX ORDER — LABETALOL HYDROCHLORIDE 5 MG/ML
10 INJECTION, SOLUTION INTRAVENOUS EVERY 6 HOURS PRN
Status: DISCONTINUED | OUTPATIENT
Start: 2018-08-08 | End: 2018-08-09 | Stop reason: HOSPADM

## 2018-08-08 RX ORDER — ALBUTEROL SULFATE 90 UG/1
2 AEROSOL, METERED RESPIRATORY (INHALATION) EVERY 6 HOURS PRN
Status: DISCONTINUED | OUTPATIENT
Start: 2018-08-08 | End: 2018-08-09 | Stop reason: HOSPADM

## 2018-08-08 RX ORDER — OXYCODONE HCL 5 MG/5 ML
10 SOLUTION, ORAL ORAL EVERY 4 HOURS PRN
Status: DISCONTINUED | OUTPATIENT
Start: 2018-08-08 | End: 2018-08-09 | Stop reason: HOSPADM

## 2018-08-08 RX ORDER — LIDOCAINE HYDROCHLORIDE 10 MG/ML
1 INJECTION, SOLUTION EPIDURAL; INFILTRATION; INTRACAUDAL; PERINEURAL ONCE
Status: COMPLETED | OUTPATIENT
Start: 2018-08-08 | End: 2018-08-08

## 2018-08-08 RX ORDER — ONDANSETRON 2 MG/ML
4 INJECTION INTRAMUSCULAR; INTRAVENOUS EVERY 6 HOURS PRN
Status: DISCONTINUED | OUTPATIENT
Start: 2018-08-08 | End: 2018-08-09 | Stop reason: HOSPADM

## 2018-08-08 RX ORDER — FENTANYL CITRATE 50 UG/ML
INJECTION, SOLUTION INTRAMUSCULAR; INTRAVENOUS
Status: DISCONTINUED | OUTPATIENT
Start: 2018-08-08 | End: 2018-08-08

## 2018-08-08 RX ORDER — OXYBUTYNIN CHLORIDE 5 MG/1
5 TABLET ORAL 3 TIMES DAILY
Status: DISCONTINUED | OUTPATIENT
Start: 2018-08-08 | End: 2018-08-09 | Stop reason: HOSPADM

## 2018-08-08 RX ADMIN — SODIUM CHLORIDE: 0.9 INJECTION, SOLUTION INTRAVENOUS at 11:08

## 2018-08-08 RX ADMIN — FAMOTIDINE 20 MG: 10 INJECTION, SOLUTION INTRAVENOUS at 12:08

## 2018-08-08 RX ADMIN — FENTANYL CITRATE 25 MCG: 50 INJECTION INTRAMUSCULAR; INTRAVENOUS at 02:08

## 2018-08-08 RX ADMIN — SUGAMMADEX 400 MG: 100 INJECTION, SOLUTION INTRAVENOUS at 02:08

## 2018-08-08 RX ADMIN — SODIUM CHLORIDE, SODIUM GLUCONATE, SODIUM ACETATE, POTASSIUM CHLORIDE, MAGNESIUM CHLORIDE, SODIUM PHOSPHATE, DIBASIC, AND POTASSIUM PHOSPHATE: .53; .5; .37; .037; .03; .012; .00082 INJECTION, SOLUTION INTRAVENOUS at 01:08

## 2018-08-08 RX ADMIN — GLYCOPYRROLATE 0.1 MG: 0.2 INJECTION, SOLUTION INTRAMUSCULAR; INTRAVENOUS at 01:08

## 2018-08-08 RX ADMIN — PROPOFOL 150 MG: 10 INJECTION, EMULSION INTRAVENOUS at 12:08

## 2018-08-08 RX ADMIN — SODIUM CHLORIDE: 0.9 INJECTION, SOLUTION INTRAVENOUS at 04:08

## 2018-08-08 RX ADMIN — LIDOCAINE HYDROCHLORIDE 0.2 MG: 10 INJECTION, SOLUTION EPIDURAL; INFILTRATION; INTRACAUDAL; PERINEURAL at 11:08

## 2018-08-08 RX ADMIN — FENTANYL CITRATE 25 MCG: 50 INJECTION INTRAMUSCULAR; INTRAVENOUS at 03:08

## 2018-08-08 RX ADMIN — ROCURONIUM BROMIDE 40 MG: 10 INJECTION, SOLUTION INTRAVENOUS at 01:08

## 2018-08-08 RX ADMIN — SUCCINYLCHOLINE CHLORIDE 140 MG: 20 INJECTION, SOLUTION INTRAMUSCULAR; INTRAVENOUS at 12:08

## 2018-08-08 RX ADMIN — LIDOCAINE HYDROCHLORIDE 100 MG: 20 INJECTION, SOLUTION INTRAVENOUS at 12:08

## 2018-08-08 RX ADMIN — ROCURONIUM BROMIDE 5 MG: 10 INJECTION, SOLUTION INTRAVENOUS at 12:08

## 2018-08-08 RX ADMIN — DEXAMETHASONE SODIUM PHOSPHATE 12 MG: 4 INJECTION, SOLUTION INTRAMUSCULAR; INTRAVENOUS at 01:08

## 2018-08-08 RX ADMIN — OXYCODONE HYDROCHLORIDE 5 MG: 5 SOLUTION ORAL at 08:08

## 2018-08-08 RX ADMIN — PROPOFOL 100 MG: 10 INJECTION, EMULSION INTRAVENOUS at 01:08

## 2018-08-08 RX ADMIN — CEFAZOLIN 2 G: 330 INJECTION, POWDER, FOR SOLUTION INTRAMUSCULAR; INTRAVENOUS at 01:08

## 2018-08-08 RX ADMIN — LIDOCAINE HYDROCHLORIDE 10 ML: 20 JELLY TOPICAL at 01:08

## 2018-08-08 RX ADMIN — MIDAZOLAM HYDROCHLORIDE 2 MG: 1 INJECTION, SOLUTION INTRAMUSCULAR; INTRAVENOUS at 12:08

## 2018-08-08 RX ADMIN — PROPOFOL 50 MG: 10 INJECTION, EMULSION INTRAVENOUS at 01:08

## 2018-08-08 RX ADMIN — FENTANYL CITRATE 50 MCG: 50 INJECTION, SOLUTION INTRAMUSCULAR; INTRAVENOUS at 12:08

## 2018-08-08 NOTE — NURSING TRANSFER
Nursing Transfer Note      8/8/2018     Transfer To: 542    Transfer via stretcher    Transfer with IVF    Transported by pct    Medicines sent: throat spray    Chart send with patient: Yes    Notified: spouse

## 2018-08-08 NOTE — TRANSFER OF CARE
"Anesthesia Transfer of Care Note    Patient: Lopez Savage    Procedure(s) Performed: Procedure(s) (LRB):  CYSTOSCOPY (N/A)  DILATION, URETHRA (N/A)  MEATOPLASTY, URETHRA (N/A)  DESTRUCTION-PROSTATE-TRANSURETHRAL (N/A)    Patient location: PACU    Anesthesia Type: general    Transport from OR: Transported from OR on 100% O2 by closed face mask with adequate spontaneous ventilation. Continuous SpO2 monitoring in transport    Post pain: adequate analgesia    Post assessment: tolerated procedure well    Post vital signs: stable    Level of consciousness: awake    Nausea/Vomiting: no nausea/vomiting    Complications: other (see comments), Difficult airway    Transfer of care protocol was followed      Last vitals:   Visit Vitals  /86   Pulse 100   Temp 36.4 °C (97.6 °F) (Axillary)   Resp (!) 24   Ht 5' 9" (1.753 m)   Wt 136.1 kg (300 lb)   SpO2 95%   BMI 44.30 kg/m²     "

## 2018-08-08 NOTE — PROGRESS NOTES
Patient sitting up in bed c/o throat pain. IV Fentanyl administered for pain, no new orders. Patient reports difficulty swallowing/increase in pain with water. No gagging noted. No respiratory distress noted. 95% on RA. Dr. Mcdermott at the bedside. Patient will be admitted for observation. Dr. Uribe with Urology updated on patient care. Notified of admit and difficulty w/ swallowing water due to pain. Unable to swallow pain pills at this time, requesting elixir. Verbalized understanding, awaiting new orders.

## 2018-08-08 NOTE — INTERVAL H&P NOTE
The patient has been examined and the H&P has been reviewed:    I concur with the findings and no changes have occurred since H&P was written.    Anesthesia/Surgery risks, benefits and alternative options discussed and understood by patient/family.          Active Hospital Problems    Diagnosis  POA    BPH (benign prostatic hyperplasia) [N40.0]  Yes      Resolved Hospital Problems    Diagnosis Date Resolved POA   No resolved problems to display.

## 2018-08-08 NOTE — ANESTHESIA POSTPROCEDURE EVALUATION
"Anesthesia Post Evaluation    Patient: Lopez Savage    Procedure(s) Performed: Procedure(s) (LRB):  CYSTOSCOPY (N/A)  DILATION, URETHRA (N/A)  MEATOPLASTY, URETHRA (N/A)  DESTRUCTION-PROSTATE-TRANSURETHRAL (N/A)    Final Anesthesia Type: general  Patient location during evaluation: PACU  Patient participation: Yes- Able to Participate  Level of consciousness: awake and alert and oriented  Post-procedure vital signs: reviewed and stable  Pain management: adequate  Airway patency: patent  PONV status at discharge: No PONV  Anesthetic complications: no      Cardiovascular status: hemodynamically stable  Respiratory status: unassisted  Hydration status: euvolemic  Follow-up not needed.        Visit Vitals  BP (!) 149/78   Pulse 84   Temp 36.4 °C (97.6 °F) (Axillary)   Resp 18   Ht 5' 9" (1.753 m)   Wt 136.1 kg (300 lb)   SpO2 100%   BMI 44.30 kg/m²       Pain/Ja Score: Pain Assessment Performed: Yes (8/8/2018  2:45 PM)  Presence of Pain: complains of pain/discomfort (8/8/2018  2:45 PM)  Pain Rating Prior to Med Admin: 8 (8/8/2018  3:13 PM)  Ja Score: 8 (8/8/2018  2:45 PM)      "

## 2018-08-08 NOTE — OP NOTE
[facility]  Operative Note     Date: 08/08/2018     Pre-Op Diagnosis:  1.  Balanitis xerotica obliterans  2.  Urethral stricture  3.  BPH with obstruction     Patient Active Problem List   Diagnosis    GERD (gastroesophageal reflux disease)    Chondromalacia of right knee    Essential hypertension    Morbid obesity with BMI of 40.0-44.9, adult    Asthma    Chronic bronchitis    Diverticulosis of intestine without bleeding    Absolute anemia    Nocturia    Meatal stenosis    Ectatic aorta    Refused pneumococcal vaccination    History of bilateral knee arthroplasty    Mitral regurgitation    Anemia    Melanocytic nevus    Polyarthralgia    Allergic rhinitis    Acute UTI    Memory loss    Cough    Arthralgia of right foot    Dermatitis    Vertigo    Nuclear sclerosis of both eyes    Refractive error    Screening for colon cancer    Hematuria, microscopic    BXO (balanitis xerotica obliterans)    Urethral stricture    BPH with urinary obstruction    BPH (benign prostatic hyperplasia)        Post-Op Diagnosis: same     Procedure(s) Performed:   1.  Urethral dilation  2.  meatoplasty   3.  Cystoscopy   4. Transurethral destruction of prostate; radiofrequency thermotherapy      Specimen(s): none     Staff Surgeon: Adan Cuenca MD     Assistant Surgeon: Baldemar Kim MD     Anesthesia:  General endotracheal anesthesia     Indications: Lopez Savage is a 72 y.o. male with BXO, meatal stenosis, and BPH presenting for surgical intervention.      Findings:    - BXO of glans with meatal stricture, approximately 12 fr  - meatus dilated to 24 fr, but still appeared tight, so meatoplasty was performed   - multiple scarred areas of urethra including pendulous urethra and bulbar urethra, narrowest appeared approximately 16 fr in the pendulous urethra, but a 22 fr scope passed this with mild resistance   - mildly enlarged lateral lobes of prostate, treated with Rezum x 2       Estimated Blood Loss: minimal     Drains:   1.  18 Fr garcia catheter     Procedure in detail: After informed consent was obtained and all questions were answered, the patient was brought to the operating suite and placed in the lithotomy position. General anesthesia was administered. The intubation was difficult, and required bronchoscopy.  See anesthesia note for details.   SCDs were applied and working prior to induction of anesthesia. That patient was then prepped and draped in the usual sterile fashion. Time out was performed and preoperative antibiotics were confirmed.      There was BXO of the glans with a tight meatal stenosis.  This was dilated with [place] sounds from 14 fr to 24 fr. The meatus still appeared tight, so we performed meatoplasty.  A hemostat was used to clamp longitudinally extending from the ventral aspect of the meatus proximally for approximately 7mm.  The hemostat was removed.  This was then incised sharply. 5-0 monocryl was used to approximate the urethral mucosa to the glanular mucosa in interrupted fashion.      A 22 fr rigid cystoscope was inserted per urethra.  The urethra demonstrated multiple scarred and/or strictured, blanched areas.  The most prominent was in the pendulous urethra near the bulb, and appeared to be approximately 16 fr.  The 22 fr scope was able to pass this with only mild resistance.  The sphincter coapted nicely.  The prostate appeared mildly enlarged with lateral lobe hypertrophy.  The bladder was normal in appearance with no stones, trabeculations, or tumors.  The ureteral orifices were in the normal anatomic position.        The cystoscope was removed and the Rezum scope was advanced into the bladder.  RF was then used to create thermal energy to selectively ablate prostate tissue 1 cm from the bladder neck to the proximal end of the veru spaced 1 cm apart.      2 total treatments were given. Specifically 1 treatments were given in each to 3 and 9 o'clock  positions.    At the completion of the procedure the device was removed. There was a careful check that there were no clots in the bladder or injury to the bladder, prostate or urethra.      18 fr garcia catheter was placed with 10 mL of sterile water in the balloon     The patient tolerated the procedure well and was transferred to the PACU in stable condition.       Disposition:  He will be placed in observation due to the difficult intubation.  The patient will subsequently be discharged home with 5 days of antibiotic therapy, 1 week of pyridium, 1 week of ditropan, 2 weeks of NSAIDS, and pain medications. He will follow up with Dr. Cuenca in clinic in 5 days to have his garcia catheter removed.

## 2018-08-09 ENCOUNTER — TELEPHONE (OUTPATIENT)
Dept: UROLOGY | Facility: CLINIC | Age: 73
End: 2018-08-09

## 2018-08-09 VITALS
WEIGHT: 296.5 LBS | DIASTOLIC BLOOD PRESSURE: 70 MMHG | BODY MASS INDEX: 43.92 KG/M2 | RESPIRATION RATE: 18 BRPM | HEIGHT: 69 IN | HEART RATE: 82 BPM | TEMPERATURE: 96 F | OXYGEN SATURATION: 95 % | SYSTOLIC BLOOD PRESSURE: 144 MMHG

## 2018-08-09 PROCEDURE — 25000003 PHARM REV CODE 250: Performed by: STUDENT IN AN ORGANIZED HEALTH CARE EDUCATION/TRAINING PROGRAM

## 2018-08-09 PROCEDURE — 25000242 PHARM REV CODE 250 ALT 637 W/ HCPCS: Performed by: STUDENT IN AN ORGANIZED HEALTH CARE EDUCATION/TRAINING PROGRAM

## 2018-08-09 PROCEDURE — 63600175 PHARM REV CODE 636 W HCPCS: Performed by: STUDENT IN AN ORGANIZED HEALTH CARE EDUCATION/TRAINING PROGRAM

## 2018-08-09 RX ADMIN — CETIRIZINE HYDROCHLORIDE 10 MG: 10 TABLET, FILM COATED ORAL at 08:08

## 2018-08-09 RX ADMIN — OXYCODONE HYDROCHLORIDE 10 MG: 5 SOLUTION ORAL at 08:08

## 2018-08-09 RX ADMIN — OXYCODONE HYDROCHLORIDE 10 MG: 5 SOLUTION ORAL at 04:08

## 2018-08-09 RX ADMIN — PANTOPRAZOLE SODIUM 40 MG: 40 TABLET, DELAYED RELEASE ORAL at 08:08

## 2018-08-09 RX ADMIN — FLUTICASONE FUROATE AND VILANTEROL TRIFENATATE 2 PUFF: 100; 25 POWDER RESPIRATORY (INHALATION) at 08:08

## 2018-08-09 RX ADMIN — OXYBUTYNIN CHLORIDE 5 MG: 5 TABLET ORAL at 08:08

## 2018-08-09 RX ADMIN — PHENAZOPYRIDINE HYDROCHLORIDE 200 MG: 100 TABLET ORAL at 08:08

## 2018-08-09 RX ADMIN — OXYCODONE HYDROCHLORIDE 10 MG: 5 SOLUTION ORAL at 12:08

## 2018-08-09 NOTE — PLAN OF CARE
Problem: Patient Care Overview  Goal: Plan of Care Review  Outcome: Ongoing (interventions implemented as appropriate)  Patient AAOx4. Constant complaints of throat pain. VS stable, afrebrile. Neurovascular intact. Skin intact, with exception of penile incision. Free from falls. Frequent rounds made for safety, pain and comfort. Bed at lowest position, call light within reach, side rails up x2. Spouse at bedside.

## 2018-08-09 NOTE — NURSING
Pt discharged home, accompanied by spouse. Aaox4. Vss. Fpley cath draining clear yellow urine. Teaching done for garcia cath maintenance and switched to leg bag. RX and D/C instructions given -- understanding verbalized.

## 2018-08-09 NOTE — TELEPHONE ENCOUNTER
----- Message from Keesha Nixon MA sent at 8/9/2018  3:07 PM CDT -----  Contact: Mobile: [phone]     Needs Advice    Reason for call:  Pt's wife called, pt had hospital cysto today and left with a cath.  he has urine going in to the bag but there's quite a lot of blood      Communication Preference: Mobile: [phone]

## 2018-08-09 NOTE — ANESTHESIA POSTPROCEDURE EVALUATION
"Anesthesia Post Evaluation    Patient: Lopez Savage    Procedure(s) Performed: Procedure(s) (LRB):  CYSTOSCOPY (N/A)  DILATION, URETHRA (N/A)  MEATOPLASTY, URETHRA (N/A)  DESTRUCTION-PROSTATE-TRANSURETHRAL (N/A)    Final Anesthesia Type: general  Patient location during evaluation: PACU  Patient participation: Yes- Able to Participate  Level of consciousness: awake and alert  Post-procedure vital signs: reviewed and stable  Pain management: adequate  Airway patency: patent  PONV status at discharge: No PONV  Anesthetic complications: no      Cardiovascular status: blood pressure returned to baseline  Respiratory status: unassisted  Hydration status: euvolemic  Follow-up not needed.        Visit Vitals  /75 (BP Location: Left arm, Patient Position: Lying)   Pulse 88   Temp 35.7 °C (96.3 °F) (Oral)   Resp 18   Ht 5' 9" (1.753 m)   Wt 134.5 kg (296 lb 8.3 oz)   SpO2 97%   BMI 43.79 kg/m²       Pain/Ja Score: Pain Assessment Performed: Yes (8/9/2018  4:30 AM)  Presence of Pain: complains of pain/discomfort (8/9/2018  4:30 AM)  Pain Rating Prior to Med Admin: 8 (8/9/2018  4:30 AM)  Pain Rating Post Med Admin: 5 (8/8/2018  4:45 PM)  Ja Score: 10 (8/8/2018  5:30 PM)      "

## 2018-08-09 NOTE — TELEPHONE ENCOUNTER
Spoke to wife, urine is draining without problems, it is just bloody, told her to monitor for clots and to increase water intake, if not clearing up by tomorrow after increasing water she can call us back. Also advised that he may see intermittent hematuria for approximately 4 weeks.

## 2018-08-09 NOTE — DISCHARGE SUMMARY
"Chief Complaint   Patient presents with     Consult     chronic sinusitis - referred by [facility].        Initial /76 (BP Location: Right arm, Patient Position: Chair, Cuff Size: Adult Regular)  Pulse 62  Temp 97.6  F (36.4  C) (Tympanic)  Ht 5' 6.5\" (1.689 m)  Wt 155 lb (70.3 kg)  SpO2 97%  BMI 24.64 kg/m2 Estimated body mass index is 24.64 kg/(m^2) as calculated from the following:    Height as of this encounter: 5' 6.5\" (1.689 m).    Weight as of this encounter: 155 lb (70.3 kg).  Medication Reconciliation: complete     Neris Gaitan LPN      " [facility]  Urology  Discharge Summary      Patient Name: Lopez Savage  MRN: [id number]  Admission Date: 8/8/2018  Hospital Length of Stay: 0 days  Discharge Date and Time:  08/09/2018 10:35 AM  Attending Physician: Adan Cuenca MD   Discharging Provider: Baldemar Kim MD  Primary Care Physician: Lucie Munguia MD    HPI:     72 YOM with BXO, meatal stenosis, and BPH POD 1 s/p meatoplasty, urethral dilation, and transurethral destruction of the prostate with radiofrequency thermoablation    Procedure(s) (LRB):  CYSTOSCOPY (N/A)  DILATION, URETHRA (N/A)  MEATOPLASTY, URETHRA (N/A)  DESTRUCTION-PROSTATE-TRANSURETHRAL (N/A)     Indwelling Lines/Drains at time of discharge:   Lines/Drains/Airways     Drain                 Urethral Catheter 08/08/18 Double-lumen;Latex 18 Fr. 1 day                Hospital Course (synopsis of major diagnoses, care, treatment, and services provided during the course of the hospital stay):     Patient was admitted on 8/8/2018 for above procedure.  Patient was a very difficult intubation, requiring bronchoscopy-guided intubation.  There was significant edema from this, so anesthesia treated him with steroids and recommended observation overnight.  Otherwise, the patient tolerated the procedure well.  On POD 0 in the evening, he was extubated and breathing without difficulty and saturating well on room air.  He did complain of throat pain.  On POD 1, his voice and discomfort were improved.  He ambulated and tolerated a mechanical soft diet without difficulty.  The patient was discharged home in good condition with pain well controlled on 8/9/2018 with garcia catheter in place and draining clear yellow urine.      Consults:     Significant Diagnostic Studies:     Pending Diagnostic Studies:     None          Final Active Diagnoses:    Diagnosis Date Noted POA    BPH (benign prostatic hyperplasia) [N40.0] 08/08/2018 Yes    Urethral stricture [N35.9] 07/02/2018 Yes     BXO (balanitis xerotica obliterans) [N48.0] 07/02/2018 Yes    Allergic rhinitis [J30.9] 03/17/2017 Yes    Polyarthralgia [M25.50] 01/27/2017 Yes    Mitral regurgitation [I34.0] 06/02/2016 Yes    History of bilateral knee arthroplasty [Z96.653] 05/25/2016 Not Applicable    Meatal stenosis [N35.9] 04/27/2016 Yes    Nocturia [R35.1] 04/18/2016 Yes    Asthma [J45.909] 07/22/2015 Yes    Chronic bronchitis [J42] 07/22/2015 Yes    Morbid obesity with BMI of 40.0-44.9, adult [E66.01, Z68.41] 07/08/2015 Not Applicable    Essential hypertension [I10] 10/15/2014 Yes    GERD (gastroesophageal reflux disease) [K21.9]  Yes      Problems Resolved During this Admission:    Diagnosis Date Noted Date Resolved POA       Discharged Condition: good    Disposition: Home or Self Care    Follow Up:  Follow-up Information     [facility] - Urology 4th Floor On 8/13/2018.    Specialty:  Urology  Why:  voiding trial s/p Rezum, meatoplasty, urethral dilation-- resume betamethasone to glans after garcia removal  Contact information:  [facility] [address] [phone]  Additional information:  [facility] - 4th Floor           Adan Cuenca MD In 4 weeks.    Specialty:  Urology  Why:  post op meatoplasty, rezum  Contact information:  [address]  [place] [id number] [phone]                 Patient Instructions:     Call MD for:  temperature >100.4     Call MD for:  persistent nausea and vomiting or diarrhea     Call MD for:  severe uncontrolled pain     Call MD for:  redness, tenderness, or signs of infection (pain, swelling, redness, odor or green/yellow discharge around incision site)     Call MD for:  difficulty breathing or increased cough     Call MD for:  severe persistent headache     Call MD for:  worsening rash     Call MD for:  persistent dizziness, light-headedness, or visual disturbances     Call MD for:  increased confusion or weakness     No dressing needed     No driving until:    Order Comments: Off narcotics     Other restrictions (specify):   Order Comments: May shower.  Keep incision on tip of penis clean and dry.  May apply bacitracin ointment to this twice daily.     Activity as tolerated       Medications:  Reconciled Home Medications:      Medication List      START taking these medications    amoxicillin-clavulanate 500-125mg 500-125 mg Tab  Commonly known as:  AUGMENTIN  Take 1 tablet (500 mg total) by mouth 2 (two) times daily. for 5 days     ibuprofen 600 MG tablet  Commonly known as:  ADVIL,MOTRIN  Take 1 tablet (600 mg total) by mouth every 6 (six) hours as needed for Pain.     oxybutynin 5 MG Tab  Commonly known as:  DITROPAN  Take 1 tablet (5 mg total) by mouth 3 (three) times daily as needed.     oxyCODONE-acetaminophen 5-325 mg per tablet  Commonly known as:  PERCOCET  Take 1 tablet by mouth every 4 (four) hours as needed.     phenazopyridine 100 MG tablet  Commonly known as:  PYRIDIUM  Take 1 tablet (100 mg total) by mouth 3 (three) times daily as needed for Pain.     polyethylene glycol 17 gram Pwpk  Commonly known as:  GLYCOLAX  Take 17 g by mouth once daily.        CONTINUE taking these medications    albuterol 90 mcg/actuation inhaler  Commonly known as:  PROVENTIL HFA  Inhale 2 puffs into the lungs every 6 (six) hours as needed for Wheezing or Shortness of Breath.     betamethasone dipropionate 0.05 % ointment  Commonly known as:  DIPROLENE  Apply topically 2 (two) times daily.     cetirizine 10 MG tablet  Commonly known as:  ZYRTEC  Take 1 tablet (10 mg total) by mouth once daily.     diclofenac sodium 1 % Gel  Commonly known as:  VOLTAREN  Apply 2 g topically 2 (two) times daily as needed.     donepezil 5 MG tablet  Commonly known as:  ARICEPT  Take 1 tablet (5 mg total) by mouth every evening.     fluticasone 50 mcg/actuation nasal spray  Commonly known as:  FLONASE  2 sprays (100 mcg total) by Each Nare route once daily.     fluticasone-salmeterol 250-50 mcg/dose  250-50 mcg/dose diskus inhaler  Commonly known as:  ADVAIR DISKUS  Inhale 1 puff into the lungs 2 (two) times daily. Controller     losartan 50 MG tablet  Commonly known as:  COZAAR  TAKE ONE TABLET BY MOUTH EVERY DAY     meclizine 12.5 mg tablet  Commonly known as:  ANTIVERT  Take 1 tablet (12.5 mg total) by mouth 3 (three) times daily as needed.     meloxicam 15 MG tablet  Commonly known as:  MOBIC  TAKE 1 TABLET ONE TIME DAILY AS NEEDED; AVOID ALL OTHER NSAIDS.     omeprazole 40 MG capsule  Commonly known as:  PRILOSEC            Time spent on the discharge of patient: 30 minutes    Baldemar Kim MD  Urology  [facility]

## 2018-08-09 NOTE — SUBJECTIVE & OBJECTIVE
Interval History:   Kept overnight due to laryngeal swelling after difficult intubation  Tolerating clears, difficulty swallowing  Pain controlled    Review of Systems  Objective:     Temp:  [96.3 °F (35.7 °C)-98.8 °F (37.1 °C)] 96.3 °F (35.7 °C)  Pulse:  [] 88  Resp:  [12-24] 18  SpO2:  [92 %-100 %] 97 %  BP: (119-176)/(58-95) 138/75     Body mass index is 43.79 kg/m².            Drains     Drain                 Urethral Catheter 08/08/18 Double-lumen;Latex 18 Fr. 1 day                Physical Exam   Nursing note and vitals reviewed.  Constitutional: He is oriented to person, place, and time. He appears well-developed and well-nourished. No distress.   HENT:   Head: Normocephalic and atraumatic.   Sore throat    Eyes: Pupils are equal, round, and reactive to light. Right eye exhibits no discharge. Left eye exhibits no discharge.   Neck: Normal range of motion. Neck supple.   Cardiovascular: Normal rate and intact distal pulses.    Pulmonary/Chest: Effort normal. No respiratory distress.   Abdominal: Soft. Bowel sounds are normal. He exhibits no distension.   Genitourinary: Penis normal.   Genitourinary Comments: bxo present on glans   Salgado draining clear urine   Musculoskeletal: Normal range of motion. He exhibits no edema.   Neurological: He is alert and oriented to person, place, and time. No cranial nerve deficit.   Skin: Skin is warm and dry. He is not diaphoretic. No erythema.     Psychiatric: He has a normal mood and affect. His behavior is normal. Judgment and thought content normal.       Significant Labs:    BMP:  No results for input(s): NA, K, CL, CO2, BUN, CREATININE, LABGLOM, GLUCOSE, CALCIUM in the last 168 hours.    CBC:   No results for input(s): WBC, HGB, HCT, PLT in the last 168 hours.    All pertinent labs results from the past 24 hours have been reviewed.    Significant Imaging:  All pertinent imaging results/findings from the past 24 hours have been reviewed.

## 2018-08-09 NOTE — ASSESSMENT & PLAN NOTE
73 yo M 1 Day Post-Op urethral dilation/ rezum.    Kept overnight for monitoring after laryngeal swelling after difficult intubation by anesthesia (had to use bronchoscopy to place ET tube) Some difficulty swallowing this AM, Respirations fine on RA  -Continue garcia until Monday  -soft diet  -PO oxycodone elixir  -Will see if tolerates breakfast PO, if not then may need ENT evaluation

## 2018-08-09 NOTE — PROGRESS NOTES
[facility]  Urology  Progress Note    Patient Name: Lopez Savage  MRN: [id number]  Admission Date: 8/8/2018  Hospital Length of Stay: 0 days  Code Status: No Order   Attending Provider: Adan Cuenca MD   Primary Care Physician: Lucie Munguia MD    Subjective:     HPI:  No notes on file    Interval History:   Kept overnight due to laryngeal swelling after difficult intubation  Tolerating clears, difficulty swallowing  Pain controlled    Review of Systems  Objective:     Temp:  [96.3 °F (35.7 °C)-98.8 °F (37.1 °C)] 96.3 °F (35.7 °C)  Pulse:  [] 88  Resp:  [12-24] 18  SpO2:  [92 %-100 %] 97 %  BP: (119-176)/(58-95) 138/75     Body mass index is 43.79 kg/m².            Drains     Drain                 Urethral Catheter 08/08/18 Double-lumen;Latex 18 Fr. 1 day                Physical Exam   Nursing note and vitals reviewed.  Constitutional: He is oriented to person, place, and time. He appears well-developed and well-nourished. No distress.   HENT:   Head: Normocephalic and atraumatic.   Sore throat    Eyes: Pupils are equal, round, and reactive to light. Right eye exhibits no discharge. Left eye exhibits no discharge.   Neck: Normal range of motion. Neck supple.   Cardiovascular: Normal rate and intact distal pulses.    Pulmonary/Chest: Effort normal. No respiratory distress.   Abdominal: Soft. Bowel sounds are normal. He exhibits no distension.   Genitourinary: Penis normal.   Genitourinary Comments: bxo present on glans   Salgado draining clear urine   Musculoskeletal: Normal range of motion. He exhibits no edema.   Neurological: He is alert and oriented to person, place, and time. No cranial nerve deficit.   Skin: Skin is warm and dry. He is not diaphoretic. No erythema.     Psychiatric: He has a normal mood and affect. His behavior is normal. Judgment and thought content normal.       Significant Labs:    BMP:  No results for input(s): NA, K, CL, CO2, BUN, CREATININE, LABGLOM,  GLUCOSE, CALCIUM in the last 168 hours.    CBC:   No results for input(s): WBC, HGB, HCT, PLT in the last 168 hours.    All pertinent labs results from the past 24 hours have been reviewed.    Significant Imaging:  All pertinent imaging results/findings from the past 24 hours have been reviewed.                  Assessment/Plan:     BPH (benign prostatic hyperplasia)    73 yo M 1 Day Post-Op urethral dilation/ rezum.    Kept overnight for monitoring after laryngeal swelling after difficult intubation by anesthesia (had to use bronchoscopy to place ET tube) Some difficulty swallowing this AM, Respirations fine on RA  -Continue garcia until Monday  -soft diet  -PO oxycodone elixir  -Will see if tolerates breakfast PO, if not then may need ENT evaluation              VTE Risk Mitigation         Ordered     Place KAYY hose  Until discontinued      08/08/18 1427     Place sequential compression device  Until discontinued      08/08/18 1427     IP VTE HIGH RISK PATIENT  Once      08/08/18 1426          Jean-Pierre Weaver MD  Urology  [facility]

## 2018-08-14 ENCOUNTER — OFFICE VISIT (OUTPATIENT)
Dept: UROLOGY | Facility: CLINIC | Age: 73
End: 2018-08-14
Payer: MEDICARE

## 2018-08-14 VITALS
WEIGHT: 288.81 LBS | BODY MASS INDEX: 42.78 KG/M2 | DIASTOLIC BLOOD PRESSURE: 77 MMHG | HEART RATE: 70 BPM | HEIGHT: 69 IN | SYSTOLIC BLOOD PRESSURE: 153 MMHG

## 2018-08-14 DIAGNOSIS — L29.3 ITCHING OF PENIS: ICD-10-CM

## 2018-08-14 DIAGNOSIS — N40.1 BPH WITH URINARY OBSTRUCTION: ICD-10-CM

## 2018-08-14 DIAGNOSIS — Z98.890 POST-OPERATIVE STATE: Primary | ICD-10-CM

## 2018-08-14 DIAGNOSIS — N35.9 URETHRAL STRICTURE, UNSPECIFIED STRICTURE TYPE: ICD-10-CM

## 2018-08-14 DIAGNOSIS — N48.0 BXO (BALANITIS XEROTICA OBLITERANS): ICD-10-CM

## 2018-08-14 DIAGNOSIS — N13.8 BPH WITH URINARY OBSTRUCTION: ICD-10-CM

## 2018-08-14 PROCEDURE — 99999 PR PBB SHADOW E&M-EST. PATIENT-LVL III: CPT | Mod: PBBFAC,,, | Performed by: NURSE PRACTITIONER

## 2018-08-14 PROCEDURE — 99024 POSTOP FOLLOW-UP VISIT: CPT | Mod: S$GLB,,, | Performed by: NURSE PRACTITIONER

## 2018-08-14 PROCEDURE — 51700 IRRIGATION OF BLADDER: CPT | Mod: 58,S$GLB,, | Performed by: NURSE PRACTITIONER

## 2018-08-14 RX ORDER — CLOTRIMAZOLE 1 %
CREAM (GRAM) TOPICAL 2 TIMES DAILY
Qty: 35 G | Refills: 0 | Status: SHIPPED | OUTPATIENT
Start: 2018-08-14 | End: 2018-12-04

## 2018-08-14 RX ORDER — BETAMETHASONE DIPROPIONATE 0.5 MG/G
OINTMENT TOPICAL 2 TIMES DAILY
Qty: 45 G | Refills: 1 | Status: SHIPPED | OUTPATIENT
Start: 2018-08-14 | End: 2018-09-13

## 2018-08-14 NOTE — PROGRESS NOTES
CHIEF COMPLAINT:    Mr. Savage is a 72 y.o. male presenting for garcia removal  PRESENTING ILLNESS:    Lopez Savage is a 72 y.o. male who presents for garcia removal.     8/8/18 Procedure(s) Performed w/ Dr. Cuenca:   1.  Urethral dilation  2.  meatoplasty   3.  Cystoscopy   4. Transurethral destruction of prostate; radiofrequency thermotherapy    He presents today with his wife to have his garcia catheter removed after Rezum with Dr. Cuenca on 8/8/18. He reports the catheter has been draining well with darion urine. He has completed the augmentin. His pain is tolerable. He is taking pyridium, ditropan, and advil.     REVIEW OF SYSTEMS:    Review of Systems    Constitutional: Negative for fever and chills.   HENT: Negative for hearing loss.   Eyes: Negative for visual disturbance.   Respiratory: Negative for shortness of breath.   Cardiovascular: Negative for chest pain.   Gastrointestinal: Negative for nausea, vomiting, and constipation.   Genitourinary:  See above  Neurological: Negative for dizziness.   Hematological: Does not bruise/bleed easily.   Psychiatric/Behavioral: Negative for confusion.       PATIENT HISTORY:    Past Medical History:   Diagnosis Date    Allergy     Asthma     CHRONIC BRONCHITIS     GERD (gastroesophageal reflux disease)     HTN (hypertension)     HTN (hypertension) 10/15/2014    Joint pain     Mitral regurgitation     3-4+ under surveillance CT surgery    Mitral regurgitation     Mitral valve regurgitation     Obesity     Osteoarthritis of knee     Ulcerative colitis     Urinary tract infection     Valvular regurgitation        Past Surgical History:   Procedure Laterality Date    ADENOIDECTOMY      CYSTOSCOPY W/ RETROGRADES      with penile biopsy    JOINT REPLACEMENT      Penile Biopsy      TONSILLECTOMY      TOTAL KNEE ARTHROPLASTY Bilateral        Family History   Problem Relation Age of Onset    Bone cancer Mother     Glaucoma Mother     Cataracts Mother      Kidney disease Father     COPD Sister     Heart disease Daughter         congenital    No Known Problems Daughter     Melanoma Neg Hx     Prostate cancer Neg Hx     Macular degeneration Neg Hx     Retinal detachment Neg Hx     Strabismus Neg Hx     Amblyopia Neg Hx     Blindness Neg Hx        Social History     Socioeconomic History    Marital status:      Spouse name: Not on file    Number of children: Not on file    Years of education: Not on file    Highest education level: Not on file   Social Needs    Financial resource strain: Not on file    Food insecurity - worry: Not on file    Food insecurity - inability: Not on file    Transportation needs - medical: Not on file    Transportation needs - non-medical: Not on file   Occupational History    Not on file   Tobacco Use    Smoking status: Former Smoker     Packs/day: 2.00     Years: 6.00     Pack years: 12.00     Last attempt to quit: 1960     Years since quittin.6    Smokeless tobacco: Never Used   Substance and Sexual Activity    Alcohol use: No     Alcohol/week: 0.0 oz     Comment: remote hx of heavy alcohol use    Drug use: No    Sexual activity: Not Currently     Partners: Female   Other Topics Concern    Not on file   Social History Narrative    Not on file       Allergies:  Codeine and Tetanus vaccines and toxoid    Medications:    Current Outpatient Medications:     albuterol (PROVENTIL HFA) 90 mcg/actuation inhaler, Inhale 2 puffs into the lungs every 6 (six) hours as needed for Wheezing or Shortness of Breath., Disp: 1 Inhaler, Rfl: 3    cetirizine (ZYRTEC) 10 MG tablet, Take 1 tablet (10 mg total) by mouth once daily., Disp: 30 tablet, Rfl: 2    diclofenac sodium (VOLTAREN) 1 % Gel, Apply 2 g topically 2 (two) times daily as needed., Disp: 100 g, Rfl: 0    fluticasone (FLONASE) 50 mcg/actuation nasal spray, 2 sprays (100 mcg total) by Each Nare route once daily., Disp: 1 Bottle, Rfl: 2     fluticasone-salmeterol 250-50 mcg/dose (ADVAIR DISKUS) 250-50 mcg/dose diskus inhaler, Inhale 1 puff into the lungs 2 (two) times daily. Controller, Disp: 60 each, Rfl: 3    ibuprofen (ADVIL,MOTRIN) 600 MG tablet, Take 1 tablet (600 mg total) by mouth every 6 (six) hours as needed for Pain., Disp: 56 tablet, Rfl: 0    losartan (COZAAR) 50 MG tablet, TAKE ONE TABLET BY MOUTH EVERY DAY, Disp: 90 tablet, Rfl: 2    meclizine (ANTIVERT) 12.5 mg tablet, Take 1 tablet (12.5 mg total) by mouth 3 (three) times daily as needed., Disp: 270 tablet, Rfl: 3    meloxicam (MOBIC) 15 MG tablet, TAKE 1 TABLET ONE TIME DAILY AS NEEDED; AVOID ALL OTHER NSAIDS., Disp: 90 tablet, Rfl: 1    omeprazole (PRILOSEC) 40 MG capsule, , Disp: , Rfl:     oxyCODONE-acetaminophen (PERCOCET) 5-325 mg per tablet, Take 1 tablet by mouth every 4 (four) hours as needed., Disp: 15 tablet, Rfl: 0    phenazopyridine (PYRIDIUM) 100 MG tablet, Take 1 tablet (100 mg total) by mouth 3 (three) times daily as needed for Pain., Disp: 21 tablet, Rfl: 0    polyethylene glycol (GLYCOLAX) 17 gram PwPk, Take 17 g by mouth once daily., Disp: 30 packet, Rfl: 0    betamethasone dipropionate (DIPROLENE) 0.05 % ointment, Apply topically 2 (two) times daily., Disp: 45 g, Rfl: 1    clotrimazole (LOTRIMIN) 1 % cream, Apply topically 2 (two) times daily. for 14 days, Disp: 35 g, Rfl: 0    donepezil (ARICEPT) 5 MG tablet, Take 1 tablet (5 mg total) by mouth every evening., Disp: 90 tablet, Rfl: 3    PHYSICAL EXAMINATION:    Constitutional: He is oriented to person, place, and time. He appears well-developed and well-nourished.  He is in no apparent distress.    Neck: No tracheal deviation present.     Cardiovascular: Normal rate.      Pulmonary/Chest: Effort normal. No respiratory distress.     Abdominal:  He exhibits no distension.      Neurological: He is alert and oriented to person, place, and time.     Skin: Skin is warm and dry.     Extremities: No pitting edema  noted in lower extremities bilaterally    Psych: Cooperative with normal affect.    Genitourinary: The penis is circumcised. The urethral meatus is healing well. The scrotum is normal in size and contour.    Catheter intact with orange urine to drainage bag.     Physical Exam      LABS:    Lab Results   Component Value Date    PSA 0.10 04/26/2018    PSA 0.07 07/09/2015    PSA 0.20 08/06/2013       IMPRESSION:    Encounter Diagnoses   Name Primary?    Post-operative state Yes    BPH with urinary obstruction     Urethral stricture, unspecified stricture type     BXO (balanitis xerotica obliterans)     Itching of penis          PLAN:    -Voiding trial performed by Nurse Disla.  120 ml of sterile water was instilled into bladder.  Garcia catheter was removed. Patient unable to urinate.   Patient was instructed to drink plenty of fluids today.  Instructed patient to call at 1 p.m. to give an update on urine output.  Informed patient to return to clinic or emergency department (if after clinic hours) to have garcia catheter put back in if unable to urinate within 5 hours of garcia catheter removal or starts to experience bladder pressure/pain, decrease flow, straining/difficulty urinating.    Continue betamethasone to glans after garcia removal.   RTC in 1 month for appt with Dr. Cuenca.   Patient voiced understanding  I encouraged him or any of his family members to call or email me with questions and/or concerns.    Jennifer Delgado, OLIVA-C    1400 Pt reports he voided 4 times without difficulties.

## 2018-08-14 NOTE — PATIENT INSTRUCTIONS
-Stop Ditropan  -Continue Advil  -Continue Percocet and Pyridium as needed for pain and burning with urination  -Complete Augmentin      Urinary Retention (Male)  Urinary retention is the medical term for difficulty or inability to pass urine, even though your bladder is full.  Causes  The most common cause of urinary retention in men is the bladder outlet being blocked. This can be due to an enlarged prostate gland or a bladder infection. Certain medicines can also cause this problem. This condition is more likely to occur as men get older.    This condition is treated by insertion of a catheter into the bladder to drain the urine. This provides immediate relief. The catheter may need to remain in place for a few days to prevent a recurrence. The catheter has a balloon on the tip which was inflated after insertion. This prevents the catheter from falling out.  Symptoms  Common symptoms of urinary retention include:  · Pain (not experienced by everyone)  · Frequent urination  · Feeling that the bladder is still full after urinating  · Incontinence (not being able to control the release of urine)  · Swollen abdomen  Treatment  This condition is treated by inserting a tube (catheter) into the bladder to drain the urine. This provides immediate relief. The catheter may need to stay in place for a few days. The catheter has a balloon on the tip, which is inflated after insertion. This prevents the catheter from falling out.  Home care  · If you were given antibiotics, take them until they are used up, or your healthcare provider tells you to stop. It is important to finish the antibiotics even though you feel better. This is to make sure your infection has cleared.  · If a catheter was left in place, it is important to keep bacteria from getting into the collection bag. Do not disconnect the catheter from the collection bag.  · Use a leg band to secure the drainage tube, so it does not pull on the catheter. Drain the  collection bag when it becomes full using the drain spout at the bottom of the bag.  · Do not pull on or try to remove your catheter. This will injure your urethra. The catheter must be removed by a healthcare provider.  Follow-up care  Follow up with your healthcare provider, or as advised.  If a catheter was left in place, it can usually be removed within 3 to 7 days. Some conditions require the catheter to stay in longer. Your healthcare provider will tell you when to return to have the catheter removed.  When to seek medical advice  Call your healthcare provider right away if any of these occur:  · Fever of 101.4ºF (38ºC) or higher, or as directed by your healthcare provider  · Bladder or lower-abdominal pain or fullness  · Abdominal swelling, nausea, vomiting, or back pain  · Blood or urine leakage around the catheter  · Bloody urine coming from the catheter (if a new symptom)  · Weakness, dizziness, or fainting  · Confusion or change in usual level of alertness  · If a catheter was left in place, return if:  ¨ Catheter falls out  ¨ Catheter stops draining for 6 hours  Date Last Reviewed: 7/26/2015  © 0139-0238 [employer]. [address] [place] [address], [place] [address]. All rights reserved. This information is not intended as a substitute for professional medical care. Always follow your healthcare professional's instructions.

## 2018-08-23 ENCOUNTER — PES CALL (OUTPATIENT)
Dept: ADMINISTRATIVE | Facility: CLINIC | Age: 73
End: 2018-08-23

## 2018-09-10 RX ORDER — MECLIZINE HCL 12.5 MG 12.5 MG/1
12.5 TABLET ORAL 3 TIMES DAILY PRN
Qty: 270 TABLET | Refills: 2 | OUTPATIENT
Start: 2018-09-10

## 2018-09-18 ENCOUNTER — TELEPHONE (OUTPATIENT)
Dept: UROLOGY | Facility: CLINIC | Age: 73
End: 2018-09-18

## 2018-09-18 NOTE — TELEPHONE ENCOUNTER
Message           ----- Message -----   From: Kala Friedman   Sent: 9/18/2018   7:02 AM   To: [facility] Urology Clinical Staff   Subject: Appointment Cancellation Request                   ----- Message from [employer], System Message sent at 9/18/2018  7:02 AM CDT -----      Kala Friedman would like to cancel the following appointments:      Adan Cuenca MD in [facility] UROLOGY 4TH FLOOR ([id number]), 9/18/2018  9:00 AM      Comments:   Please cancel my appointment on September 18, 2018. I will reschedule. Thank you kala friedman

## 2018-10-02 ENCOUNTER — TELEPHONE (OUTPATIENT)
Dept: UROLOGY | Facility: CLINIC | Age: 73
End: 2018-10-02

## 2018-10-02 NOTE — TELEPHONE ENCOUNTER
----- Message from Lei Lozano sent at 10/2/2018 10:38 AM CDT -----  Contact: Pt:[phone]  .Patient Requesting Sooner Appointment.     Reason for sooner appt.:Pt missed his post op and is having some burning pt would like to reschedule.    When is the first available appointment?11/12 [place] office    Communication Preference:Pt:[phone]    Additional Information:

## 2018-10-04 ENCOUNTER — OFFICE VISIT (OUTPATIENT)
Dept: UROLOGY | Facility: CLINIC | Age: 73
End: 2018-10-04
Payer: MEDICARE

## 2018-10-04 ENCOUNTER — PES CALL (OUTPATIENT)
Dept: ADMINISTRATIVE | Facility: CLINIC | Age: 73
End: 2018-10-04

## 2018-10-04 VITALS
SYSTOLIC BLOOD PRESSURE: 132 MMHG | HEART RATE: 73 BPM | BODY MASS INDEX: 41.64 KG/M2 | WEIGHT: 282 LBS | DIASTOLIC BLOOD PRESSURE: 85 MMHG

## 2018-10-04 DIAGNOSIS — N40.1 BENIGN PROSTATIC HYPERPLASIA WITH LOWER URINARY TRACT SYMPTOMS, SYMPTOM DETAILS UNSPECIFIED: Primary | ICD-10-CM

## 2018-10-04 DIAGNOSIS — N48.0 BXO (BALANITIS XEROTICA OBLITERANS): ICD-10-CM

## 2018-10-04 PROCEDURE — 99213 OFFICE O/P EST LOW 20 MIN: CPT | Mod: PBBFAC | Performed by: UROLOGY

## 2018-10-04 PROCEDURE — 53600 DILATE URETHRA STRICTURE: CPT | Mod: S$PBB,58,, | Performed by: UROLOGY

## 2018-10-04 PROCEDURE — 99024 POSTOP FOLLOW-UP VISIT: CPT | Mod: S$PBB,,, | Performed by: UROLOGY

## 2018-10-04 PROCEDURE — 53600 DILATE URETHRA STRICTURE: CPT | Mod: PBBFAC | Performed by: UROLOGY

## 2018-10-04 PROCEDURE — 3075F SYST BP GE 130 - 139MM HG: CPT | Mod: CPTII,,, | Performed by: UROLOGY

## 2018-10-04 PROCEDURE — 3079F DIAST BP 80-89 MM HG: CPT | Mod: CPTII,,, | Performed by: UROLOGY

## 2018-10-04 PROCEDURE — 1101F PT FALLS ASSESS-DOCD LE1/YR: CPT | Mod: CPTII,,, | Performed by: UROLOGY

## 2018-10-04 PROCEDURE — 99999 PR PBB SHADOW E&M-EST. PATIENT-LVL III: CPT | Mod: PBBFAC,,, | Performed by: UROLOGY

## 2018-10-04 NOTE — PROGRESS NOTES
CHIEF COMPLAINT:    Mr. Savage is a 73 y.o. male is here after urethral dilation and Rezum Theapy.  PRESENTING ILLNESS:    Lopez Savage is a 73 y.o. male has a hx of severe BXO of the meatus.    8/8/18 Procedure(s) Performed w/ Dr. Cuenca:   1.  Urethral dilation  2.  meatoplasty   3.  Cystoscopy   4. Transurethral destruction of prostate; radiofrequency thermotherapy    Following his surgery, he did well with better voiding.  Lately he noted recurrent weak urine flow with spraying of his urine.  Denies dysuria.    REVIEW OF SYSTEMS:    Review of Systems    Constitutional: Negative for fever and chills.   HENT: Negative for hearing loss.   Eyes: Negative for visual disturbance.   Respiratory: Negative for shortness of breath.   Cardiovascular: Negative for chest pain.   Gastrointestinal: Negative for nausea, vomiting, and constipation.   Genitourinary:  See above  Neurological: Negative for dizziness.   Hematological: Does not bruise/bleed easily.   Psychiatric/Behavioral: Negative for confusion.       PATIENT HISTORY:    Past Medical History:   Diagnosis Date    Allergy     Asthma     BPH (benign prostatic hyperplasia)     CHRONIC BRONCHITIS     GERD (gastroesophageal reflux disease)     HTN (hypertension)     HTN (hypertension) 10/15/2014    Joint pain     Mitral regurgitation     3-4+ under surveillance CT surgery    Mitral regurgitation     Mitral valve regurgitation     Obesity     Osteoarthritis of knee     Ulcerative colitis     Urinary tract infection     Valvular regurgitation        Past Surgical History:   Procedure Laterality Date    ADENOIDECTOMY      BIOPSY-PENIS N/A 4/27/2016    Performed by [facility] at [facility] OR    CATHETERIZATION, HEART, LEFT N/A 3/7/2013    [facility] LAB    COLONOSCOPY N/A 10/11/2017    Procedure: COLONOSCOPY Golytely;  Surgeon: Shanae Duarte MD;  Location: [facility] ENDO;  Service: Endoscopy;  Laterality:  N/A;    COLONOSCOPY Golytely N/A 10/11/2017    Performed by [facility] MD at [facility] ENDO    CYSTOSCOPY N/A 8/8/2018    Procedure: CYSTOSCOPY;  Surgeon: Adan Cuenca MD;  Location: [facility] OR [address] [place] [address];  Service: Urology;  Laterality: N/A;  1 hour    CYSTOSCOPY N/A 8/8/2018    Performed by Adan Cuenca MD at [facility] OR [address] [place] [address]    CYSTOSCOPY W/ RETROGRADES      with penile biopsy    DESTRUCTION-PROSTATE-TRANSURETHRAL N/A 8/8/2018    Performed by Adan Cuenca MD at [facility] OR [address] [place] [address]    DILATION OF URETHRA N/A 8/8/2018    Procedure: DILATION, URETHRA;  Surgeon: Adan Cuenca MD;  Location: [facility] OR [address] [place] [address];  Service: Urology;  Laterality: N/A;    DILATION, URETHRA N/A 8/8/2018    Performed by Adan Cuenca MD at [facility] OR [address] [place] [address]    EGD (ESOPHAGOGASTRODUODENOSCOPY) N/A 3/12/2014    Performed by [facility] at [facility] ENDO    ESOPHAGOGASTRODUODENOSCOPY (EGD) N/A 2/7/2018    Performed by [facility] MD at [facility] ENDO    JOINT REPLACEMENT      MEATOPLASTY, URETHRA N/A 8/8/2018    Performed by Adan Cuenca MD at [facility] OR [address] [place] [address]    Penile Biopsy      TONSILLECTOMY      TOTAL KNEE ARTHROPLASTY Bilateral     URETHRAL MEATOPLASTY N/A 8/8/2018    Procedure: MEATOPLASTY, URETHRA;  Surgeon: Adan Cuenca MD;  Location: [facility] OR [address] [place] [address];  Service: Urology;  Laterality: N/A;    URETHROGRAM-RETROGRADE Bilateral 4/27/2016    Performed by Kris Garcia [facility] at [facility] OR       Family History   Problem Relation Age of Onset    Bone cancer Mother     Glaucoma Mother     Cataracts Mother     Kidney disease Father     COPD Sister     Heart disease Daughter         congenital    No Known Problems Daughter     Melanoma Neg Hx     Prostate cancer Neg Hx     Macular degeneration Neg Hx     Retinal detachment Neg Hx     Strabismus Neg Hx     Amblyopia Neg Hx     Blindness Neg Hx        Social History     Socioeconomic History    Marital status:      Spouse name: Not on file     Number of children: Not on file    Years of education: Not on file    Highest education level: Not on file   Social Needs    Financial resource strain: Not on file    Food insecurity - worry: Not on file    Food insecurity - inability: Not on file    Transportation needs - medical: Not on file    Transportation needs - non-medical: Not on file   Occupational History    Not on file   Tobacco Use    Smoking status: Former Smoker     Packs/day: 2.00     Years: 6.00     Pack years: 12.00     Last attempt to quit: 1960     Years since quittin.7    Smokeless tobacco: Never Used   Substance and Sexual Activity    Alcohol use: No     Alcohol/week: 0.0 oz     Comment: remote hx of heavy alcohol use    Drug use: No    Sexual activity: Not Currently     Partners: Female   Other Topics Concern    Not on file   Social History Narrative    Not on file       Allergies:  Codeine and Tetanus vaccines and toxoid    Medications:    Current Outpatient Medications:     albuterol (PROVENTIL HFA) 90 mcg/actuation inhaler, Inhale 2 puffs into the lungs every 6 (six) hours as needed for Wheezing or Shortness of Breath., Disp: 1 Inhaler, Rfl: 3    cetirizine (ZYRTEC) 10 MG tablet, Take 1 tablet (10 mg total) by mouth once daily., Disp: 30 tablet, Rfl: 2    fluticasone (FLONASE) 50 mcg/actuation nasal spray, 2 sprays (100 mcg total) by Each Nare route once daily., Disp: 1 Bottle, Rfl: 2    fluticasone-salmeterol 250-50 mcg/dose (ADVAIR DISKUS) 250-50 mcg/dose diskus inhaler, Inhale 1 puff into the lungs 2 (two) times daily. Controller, Disp: 60 each, Rfl: 3    ibuprofen (ADVIL,MOTRIN) 600 MG tablet, Take 1 tablet (600 mg total) by mouth every 6 (six) hours as needed for Pain., Disp: 56 tablet, Rfl: 0    losartan (COZAAR) 50 MG tablet, TAKE ONE TABLET BY MOUTH EVERY DAY, Disp: 90 tablet, Rfl: 2    meclizine (ANTIVERT) 12.5 mg tablet, Take 1 tablet (12.5 mg total) by mouth 3 (three) times daily as needed., Disp:  270 tablet, Rfl: 3    meloxicam (MOBIC) 15 MG tablet, TAKE 1 TABLET ONE TIME DAILY AS NEEDED; AVOID ALL OTHER NSAIDS., Disp: 90 tablet, Rfl: 1    omeprazole (PRILOSEC) 40 MG capsule, , Disp: , Rfl:     betamethasone dipropionate (DIPROLENE) 0.05 % ointment, Apply topically 2 (two) times daily., Disp: 45 g, Rfl: 1    clotrimazole (LOTRIMIN) 1 % cream, Apply topically 2 (two) times daily. for 14 days, Disp: 35 g, Rfl: 0    donepezil (ARICEPT) 5 MG tablet, Take 1 tablet (5 mg total) by mouth every evening., Disp: 90 tablet, Rfl: 3    PHYSICAL EXAMINATION:    Constitutional: He is oriented to person, place, and time. He appears well-developed and well-nourished.  He is in no apparent distress.    Neck: No tracheal deviation present.     Cardiovascular: Normal rate.      Pulmonary/Chest: Effort normal. No respiratory distress.     Abdominal:  He exhibits no distension.      Neurological: He is alert and oriented to person, place, and time.     Skin: Skin is warm and dry.     Extremities: No pitting edema noted in lower extremities bilaterally    Psych: Cooperative with normal affect.    Genitourinary: The penis is circumcised. The urethral meatus is healed.  Although his meatal opening looks better, stenosis appears to come back.   The scrotum is normal in size and contour.    Catheter intact with orange urine to drainage bag.     Physical Exam      LABS:    Lab Results   Component Value Date    PSA 0.10 04/26/2018    PSA 0.07 07/09/2015    PSA 0.20 08/06/2013       IMPRESSION:    Encounter Diagnoses   Name Primary?    Benign prostatic hyperplasia with lower urinary tract symptoms, symptom details unspecified Yes    BXO (balanitis xerotica obliterans)      Procedure  Using a blue meatal dilator, the meatal stenosis dilated after using Urojet and [place] jells.  I showed his wife how to do the meatal dilation.  She has to keep his penis well stretched while she is dilating the meatus daily.    PLAN:    Daily meatal  dilation instructed.  If not improving his urine flow, then we will plan cysto under anesthesia.  I spent 25 minutes with the patient of which more than half was spent in direct consultation with the patient in regards to our treatment and plan.      Follow-up in about 2 months (around 12/4/2018).

## 2018-10-11 ENCOUNTER — OFFICE VISIT (OUTPATIENT)
Dept: FAMILY MEDICINE | Facility: CLINIC | Age: 73
End: 2018-10-11
Payer: MEDICARE

## 2018-10-11 VITALS
BODY MASS INDEX: 42.36 KG/M2 | HEIGHT: 69 IN | RESPIRATION RATE: 18 BRPM | OXYGEN SATURATION: 98 % | WEIGHT: 286 LBS | SYSTOLIC BLOOD PRESSURE: 133 MMHG | DIASTOLIC BLOOD PRESSURE: 77 MMHG | HEART RATE: 84 BPM

## 2018-10-11 DIAGNOSIS — J41.0 SIMPLE CHRONIC BRONCHITIS: ICD-10-CM

## 2018-10-11 DIAGNOSIS — K21.9 GASTROESOPHAGEAL REFLUX DISEASE, ESOPHAGITIS PRESENCE NOT SPECIFIED: ICD-10-CM

## 2018-10-11 DIAGNOSIS — E66.01 MORBID OBESITY WITH BMI OF 40.0-44.9, ADULT: ICD-10-CM

## 2018-10-11 DIAGNOSIS — Z23 IMMUNIZATION DUE: ICD-10-CM

## 2018-10-11 DIAGNOSIS — J45.909 UNCOMPLICATED ASTHMA, UNSPECIFIED ASTHMA SEVERITY, UNSPECIFIED WHETHER PERSISTENT: ICD-10-CM

## 2018-10-11 DIAGNOSIS — I10 ESSENTIAL HYPERTENSION: ICD-10-CM

## 2018-10-11 DIAGNOSIS — I77.819 ECTATIC AORTA: ICD-10-CM

## 2018-10-11 DIAGNOSIS — R41.3 MEMORY LOSS: ICD-10-CM

## 2018-10-11 DIAGNOSIS — M25.50 POLYARTHRALGIA: ICD-10-CM

## 2018-10-11 DIAGNOSIS — Z00.00 ENCOUNTER FOR PREVENTIVE HEALTH EXAMINATION: Primary | ICD-10-CM

## 2018-10-11 DIAGNOSIS — N40.1 BENIGN PROSTATIC HYPERPLASIA WITH LOWER URINARY TRACT SYMPTOMS, SYMPTOM DETAILS UNSPECIFIED: ICD-10-CM

## 2018-10-11 DIAGNOSIS — Z96.653 HISTORY OF BILATERAL KNEE ARTHROPLASTY: ICD-10-CM

## 2018-10-11 PROCEDURE — 99499 UNLISTED E&M SERVICE: CPT | Mod: S$GLB,,, | Performed by: NURSE PRACTITIONER

## 2018-10-11 PROCEDURE — 99214 OFFICE O/P EST MOD 30 MIN: CPT | Mod: PBBFAC,PN | Performed by: NURSE PRACTITIONER

## 2018-10-11 PROCEDURE — 3078F DIAST BP <80 MM HG: CPT | Mod: CPTII,S$GLB,, | Performed by: NURSE PRACTITIONER

## 2018-10-11 PROCEDURE — 99999 PR PBB SHADOW E&M-EST. PATIENT-LVL IV: CPT | Mod: PBBFAC,,, | Performed by: NURSE PRACTITIONER

## 2018-10-11 PROCEDURE — G0439 PPPS, SUBSEQ VISIT: HCPCS | Mod: S$GLB,,, | Performed by: NURSE PRACTITIONER

## 2018-10-11 PROCEDURE — G0008 ADMIN INFLUENZA VIRUS VAC: HCPCS | Mod: PBBFAC

## 2018-10-11 PROCEDURE — 90662 IIV NO PRSV INCREASED AG IM: CPT | Mod: PBBFAC,PN | Performed by: NURSE PRACTITIONER

## 2018-10-11 PROCEDURE — 3075F SYST BP GE 130 - 139MM HG: CPT | Mod: CPTII,S$GLB,, | Performed by: NURSE PRACTITIONER

## 2018-10-11 NOTE — PATIENT INSTRUCTIONS
Counseling and Referral of Other Preventative  (Italic type indicates deductible and co-insurance are waived)    Patient Name: Lopez Savage  Today's Date: 10/11/2018    Health Maintenance       Date Due Completion Date    Pneumococcal (65+) (2 of 2 - PCV13) 08/05/2014 8/5/2013    Influenza Vaccine 08/01/2018 1/30/2018    TETANUS VACCINE 04/05/2022 4/5/2012    Lipid Panel 04/17/2023 4/17/2018    Colonoscopy 10/11/2027 10/11/2017        Orders Placed This Encounter   Procedures    Influenza - High Dose (65+) (PF) (IM)     The following information is provided to all patients.  This information is to help you find resources for any of the problems found today that may be affecting your health:                Living healthy guide: www.[facility].[place].gov      Understanding Diabetes: www.diabetes.org      Eating healthy: www.cdc.gov/healthyweight      [facility] home safety checklist: www.cdc.gov/steadi/patient.html      Agency on Aging: www.goea.[place].gov      Alcoholics anonymous (AA): www.aa.org      Physical Activity: www.rubén.nih.gov/st3bxia      Tobacco use: www.quitwithusla.org

## 2018-10-11 NOTE — Clinical Note
Primary Care Providers:Lucie Munguia MD, MD (General)Your patient was seen today for a HRA visit. Gap(s) in care (HEDIS gaps) have been identified during this visit that require additional testing and possible follow up.Orders Placed This Encounter    Influenza - High Dose (65+) (PF) (IM)These orders were placed using [facility] approved protocol and any results will be forwarded to your office for appropriate follow up. I have included a copy of my visit note; please review the note and feel free to contact me with any questions. Thank you for allowing me to participate in the care of your patients.Jose Francisco Capone NP

## 2018-10-12 NOTE — PROGRESS NOTES
"Lopez Savage presented for a  Medicare AWV and comprehensive Health Risk Assessment today. The following components were reviewed and updated:    · Medical history  · Family History  · Social history  · Allergies and Current Medications  · Health Risk Assessment  · Health Maintenance  · Care Team     ** See Completed Assessments for Annual Wellness Visit within the encounter summary.**       The following assessments were completed:  · Living Situation  · CAGE  · Depression Screening  · Timed Get Up and Go  · Whisper Test  · Cognitive Function Screening  · Nutrition Screening  · ADL Screening  · PAQ Screening    Vitals:    10/11/18 1354   BP: 133/77   Pulse: 84   Resp: 18   SpO2: 98%   Weight: 129.7 kg (286 lb)   Height: 5' 9" (1.753 m)     Body mass index is 42.23 kg/m².  Physical Exam   Constitutional: He is oriented to person, place, and time. No distress.   Morbid obese   HENT:   Head: Normocephalic and atraumatic.   Eyes: EOM are normal. Pupils are equal, round, and reactive to light.   Neck: Normal range of motion.   Cardiovascular: Normal rate, regular rhythm and normal heart sounds.   Pulmonary/Chest: Effort normal and breath sounds normal. No respiratory distress.   Musculoskeletal: Normal range of motion. He exhibits no edema.   Neurological: He is alert and oriented to person, place, and time. Coordination normal.   Skin: Skin is warm and dry.   Psychiatric: He has a normal mood and affect. His behavior is normal. Judgment and thought content normal.   Nursing note and vitals reviewed.        Diagnoses and health risks identified today and associated recommendations/orders:    1. Encounter for preventive health examination    2. Morbid obesity with BMI of 40.0-44.9, adult  Current BMI 42.23. Lifestyle modifications discussed with patient.     3. Ectatic aorta  Noted on chest xray dated 3/30/2016. Patient followed by Cardiology (Dr. Castellano).    4. Simple chronic bronchitis  Chronic; stable. Continue " current treatment plan as previously prescribed by PCP.     5. Uncomplicated asthma, unspecified asthma severity, unspecified whether persistent  Chronic; stable. Continue current treatment plan as previously prescribed by PCP.     6. Essential hypertension  Chronic; stable. Continue current treatment plan as previously prescribed by PCP.     7. Benign prostatic hyperplasia with lower urinary tract symptoms, symptom details unspecified  Chronic; stable. Continue current treatment plan as previously prescribed by Urology (Dr. Cuenca).    8. Gastroesophageal reflux disease, esophagitis presence not specified  Chronic; stable. Continue current treatment plan as previously prescribed by PCP.     9. Polyarthralgia  Chronic; stable. Continue current treatment plan as previously prescribed by PCP.     10. History of bilateral knee arthroplasty  Chronic; stable. Continue current treatment plan as previously prescribed by PCP.     11. Memory loss  Chronic; stable. Continue current treatment plan as previously prescribed by Neurology (Dr. Diez).     12. Immunization due  Administered today in clinic.   - Influenza - High Dose (65+) (PF) (IM)      Provided Lopez with a 5-10 year written screening schedule and personal prevention plan. Recommendations were developed using the USPSTF age appropriate recommendations. Education, counseling, and referrals were provided as needed. After Visit Summary printed and given to patient which includes a list of additional screenings\tests needed.    Follow-up for HRA visit in 1 year.    Jose Francisco Capone NP

## 2018-12-04 ENCOUNTER — OFFICE VISIT (OUTPATIENT)
Dept: UROLOGY | Facility: CLINIC | Age: 73
End: 2018-12-04
Payer: MEDICARE

## 2018-12-04 VITALS
HEART RATE: 87 BPM | SYSTOLIC BLOOD PRESSURE: 125 MMHG | DIASTOLIC BLOOD PRESSURE: 76 MMHG | WEIGHT: 290.56 LBS | HEIGHT: 69 IN | BODY MASS INDEX: 43.04 KG/M2

## 2018-12-04 DIAGNOSIS — N48.0 BXO (BALANITIS XEROTICA OBLITERANS): Primary | ICD-10-CM

## 2018-12-04 DIAGNOSIS — N40.1 BPH WITH URINARY OBSTRUCTION: ICD-10-CM

## 2018-12-04 DIAGNOSIS — N13.8 BPH WITH URINARY OBSTRUCTION: ICD-10-CM

## 2018-12-04 PROCEDURE — 3288F FALL RISK ASSESSMENT DOCD: CPT | Mod: CPTII,HCNC,S$GLB, | Performed by: UROLOGY

## 2018-12-04 PROCEDURE — 3074F SYST BP LT 130 MM HG: CPT | Mod: CPTII,HCNC,S$GLB, | Performed by: UROLOGY

## 2018-12-04 PROCEDURE — 3078F DIAST BP <80 MM HG: CPT | Mod: CPTII,HCNC,S$GLB, | Performed by: UROLOGY

## 2018-12-04 PROCEDURE — 99999 PR PBB SHADOW E&M-EST. PATIENT-LVL III: CPT | Mod: PBBFAC,HCNC,, | Performed by: UROLOGY

## 2018-12-04 PROCEDURE — 1100F PTFALLS ASSESS-DOCD GE2>/YR: CPT | Mod: CPTII,HCNC,S$GLB, | Performed by: UROLOGY

## 2018-12-04 PROCEDURE — 99214 OFFICE O/P EST MOD 30 MIN: CPT | Mod: HCNC,S$GLB,, | Performed by: UROLOGY

## 2018-12-04 RX ORDER — BETAMETHASONE DIPROPIONATE 0.5 MG/G
OINTMENT TOPICAL 2 TIMES DAILY
Qty: 45 G | Refills: 1 | Status: SHIPPED | OUTPATIENT
Start: 2018-12-04 | End: 2019-07-29 | Stop reason: SDUPTHER

## 2018-12-04 RX ORDER — LIDOCAINE HYDROCHLORIDE 20 MG/ML
JELLY TOPICAL DAILY
Qty: 30 ML | Refills: 11 | Status: SHIPPED | OUTPATIENT
Start: 2018-12-04 | End: 2019-01-03

## 2018-12-04 NOTE — PROGRESS NOTES
CHIEF COMPLAINT:    Mr. Savage is a 73 y.o. male is here after urethral dilation and meatoplasty for BXO and Rezum Theapy for BPH.  PRESENTING ILLNESS:    Lopez Savage is a 73 y.o. male has a hx of severe BXO of the meatus.    8/8/18 Procedure(s) Performed w/ Dr. Cuenca:   1.  Urethral dilation  2.  meatoplasty   3.  Cystoscopy   4. Transurethral destruction of prostate; radiofrequency thermotherapy    Following his surgery, he did well with better voiding.  He does not need to dilate the meatus anymore.  Has been using a topical ointment BID so far.  He is happy with his urine flow.  Denies dysuria.    REVIEW OF SYSTEMS:    Review of Systems    Constitutional: Negative for fever and chills.   HENT: Negative for hearing loss.   Eyes: Negative for visual disturbance.   Respiratory: Negative for shortness of breath.   Cardiovascular: Negative for chest pain.   Gastrointestinal: Negative for nausea, vomiting, and constipation.   Genitourinary:  See above  Neurological: Negative for dizziness.   Hematological: Does not bruise/bleed easily.   Psychiatric/Behavioral: Negative for confusion.       PATIENT HISTORY:    Past Medical History:   Diagnosis Date    Allergy     Asthma     BPH (benign prostatic hyperplasia)     CHRONIC BRONCHITIS     GERD (gastroesophageal reflux disease)     HTN (hypertension)     HTN (hypertension) 10/15/2014    Joint pain     Mitral regurgitation     3-4+ under surveillance CT surgery    Mitral regurgitation     Mitral valve regurgitation     Obesity     Osteoarthritis of knee     Trouble in sleeping     Ulcerative colitis     Urinary tract infection     Valvular regurgitation        Past Surgical History:   Procedure Laterality Date    BIOPSY-PENIS N/A 4/27/2016    Performed by [facility] at [facility] OR    CATHETERIZATION, HEART, LEFT N/A 3/7/2013    Per[facility]ATH LAB    COLONOSCOPY N/A 10/11/2017    Procedure: COLONOSCOPY Golytely;   Surgeon: Shanae Duarte MD;  Location: [facility];  Service: Endoscopy;  Laterality: N/A;    COLONOSCOPY Golytely N/A 10/11/2017    Performed by [facility] at [facility] ENDO    CYSTOSCOPY N/A 8/8/2018    Procedure: CYSTOSCOPY;  Surgeon: Adan Cuenca MD;  Location: [facility] OR [address] [place] [address];  Service: Urology;  Laterality: N/A;  1 hour    CYSTOSCOPY N/A 8/8/2018    Performed by Adan [facility]a MD at [facility] OR [address] [place] [address]    CYSTOSCOPY W/ RETROGRADES      with penile biopsy    DESTRUCTION-PROSTATE-TRANSURETHRAL N/A 8/8/2018    Performed by Adan Cu[facility]a MD at [facility] OR [address] [place] [address]    DILATION OF URETHRA N/A 8/8/2018    Procedure: DILATION, URETHRA;  Surgeon: Adan Cuenca MD;  Location: [facility] OR [address] [place] [address];  Service: Urology;  Laterality: N/A;    DILATION, URETHRA N/A 8/8/2018    Performed by Adan Cuenca MD at [facility] OR [address] [place] [address]    EGD (ESOPHAGOGASTRODUODENOSCOPY) N/A 3/12/2014    [facility] at [facility]    ESOPHAGOGASTRODUODENOSCOPY (EGD) N/A 2/7/2018    [facility] at [facility]    HERNIA REPAIR Right 1956    inguinal    JOINT REPLACEMENT Bilateral     knees    MEATOPLASTY, URETHRA N/A 8/8/2018    Performed by [facility] at [facility] OR [address] [place] [address]    Penile Biopsy      TONSILLECTOMY      TOTAL KNEE ARTHROPLASTY Bilateral     URETHRAL MEATOPLASTY N/A 8/8/2018    Procedure: MEATOPLASTY, URETHRA;  Surgeon: Adan Cuenca MD;  Location: [facility] OR [address] [place] [address];  Service: Urology;  Laterality: N/A;    URETHROGRAM-RETROGRADE Bilateral 4/27/2016    Performed by [facility] at [facility] OR       Family History   Problem Relation Age of Onset    Bone cancer Mother     Glaucoma Mother     Cataracts Mother     Cancer Mother         bone cancer    Kidney disease Father     COPD Sister     Heart disease Daughter         congenital    Other Daughter 2        fibr. tumor on bronchial tube; lung removed    Other Daughter         Bronchitis    Melanoma Neg Hx      Prostate cancer Neg Hx     Macular degeneration Neg Hx     Retinal detachment Neg Hx     Strabismus Neg Hx     Amblyopia Neg Hx     Blindness Neg Hx        Social History     Socioeconomic History    Marital status:      Spouse name: Not on file    Number of children: Not on file    Years of education: Not on file    Highest education level: Not on file   Social Needs    Financial resource strain: Not on file    Food insecurity - worry: Not on file    Food insecurity - inability: Not on file    Transportation needs - medical: Not on file    Transportation needs - non-medical: Not on file   Occupational History    Not on file   Tobacco Use    Smoking status: Former Smoker     Packs/day: 2.00     Years: 6.00     Pack years: 12.00     Types: Cigarettes     Last attempt to quit: 1969     Years since quittin.9    Smokeless tobacco: Former User     Types: Chew    Tobacco comment: Quit all forms of tobacco in .   Substance and Sexual Activity    Alcohol use: No     Alcohol/week: 0.0 oz     Comment: remote hx of heavy alcohol use    Drug use: No    Sexual activity: Not Currently     Partners: Female   Other Topics Concern    Not on file   Social History Narrative    Not on file       Allergies:  Codeine and Tetanus vaccines and toxoid    Medications:    Current Outpatient Medications:     albuterol (PROVENTIL HFA) 90 mcg/actuation inhaler, Inhale 2 puffs into the lungs every 6 (six) hours as needed for Wheezing or Shortness of Breath., Disp: 1 Inhaler, Rfl: 3    betamethasone dipropionate (DIPROLENE) 0.05 % ointment, Apply topically 2 (two) times daily., Disp: 45 g, Rfl: 1    donepezil (ARICEPT) 5 MG tablet, Take 1 tablet (5 mg total) by mouth every evening., Disp: 90 tablet, Rfl: 3    fluticasone (FLONASE) 50 mcg/actuation nasal spray, 2 sprays (100 mcg total) by Each Nare route once daily., Disp: 1 Bottle, Rfl: 2    fluticasone-salmeterol 250-50 mcg/dose (ADVAIR DISKUS)  250-50 mcg/dose diskus inhaler, Inhale 1 puff into the lungs 2 (two) times daily. Controller, Disp: 60 each, Rfl: 3    ibuprofen (ADVIL,MOTRIN) 600 MG tablet, Take 1 tablet (600 mg total) by mouth every 6 (six) hours as needed for Pain., Disp: 56 tablet, Rfl: 0    lidocaine HCL 2% (XYLOCAINE) 2 % jelly, Apply topically once daily. Apply into the urethra as directed, Disp: 30 mL, Rfl: 11    losartan (COZAAR) 50 MG tablet, TAKE ONE TABLET BY MOUTH EVERY DAY, Disp: 90 tablet, Rfl: 2    omeprazole (PRILOSEC) 40 MG capsule, , Disp: , Rfl:     PHYSICAL EXAMINATION:    Constitutional: He is oriented to person, place, and time. He appears well-developed and well-nourished.  He is in no apparent distress.    Neck: No tracheal deviation present.     Cardiovascular: Normal rate.      Pulmonary/Chest: Effort normal. No respiratory distress.     Abdominal:  He exhibits no distension.      Neurological: He is alert and oriented to person, place, and time.     Skin: Skin is warm and dry.     Extremities: No pitting edema noted in lower extremities bilaterally    Psych: Cooperative with normal affect.    Genitourinary: The penis is circumcised. The urethral meatus is healed.  Although his meatal opening looks better, stenosis appears to come back.   The scrotum is normal in size and contour.    Catheter intact with orange urine to drainage bag.     Physical Exam      LABS:    Lab Results   Component Value Date    PSA 0.10 04/26/2018    PSA 0.07 07/09/2015    PSA 0.20 08/06/2013       IMPRESSION:    BXO (balanitis xerotica obliterans)  -     betamethasone dipropionate (DIPROLENE) 0.05 % ointment; Apply topically 2 (two) times daily.  Dispense: 45 g; Refill: 1  -     lidocaine HCL 2% (XYLOCAINE) 2 % jelly; Apply topically once daily. Apply into the urethra as directed  Dispense: 30 mL; Refill: 11    BPH with urinary obstruction    Procedure  Using a blue meatal dilator, the meatal stenosis dilated after using Urojet and [place]  michael.  I showed his wife how to do the meatal dilation.  She has to keep his penis well stretched while she is dilating the meatus daily.    PLAN:    Weekly meatal dilation instructed after using xylocaine.  If not improving his urine flow, then we will plan cysto under anesthesia.  Use betamethasone ointment to the meatus BID to prevent recurrent meatal stenosis from BXO.  I spent 25 minutes with the patient of which more than half was spent in direct consultation with the patient in regards to our treatment and plan.      Follow-up in about 6 months (around 6/4/2019).

## 2019-01-01 ENCOUNTER — OFFICE VISIT (OUTPATIENT)
Dept: FAMILY MEDICINE | Facility: CLINIC | Age: 74
End: 2019-01-01
Payer: MEDICARE

## 2019-01-01 ENCOUNTER — HOSPITAL ENCOUNTER (OUTPATIENT)
Dept: RADIOLOGY | Facility: HOSPITAL | Age: 74
Discharge: HOME | End: 2019-11-19
Attending: INTERNAL MEDICINE
Payer: MEDICARE

## 2019-01-01 ENCOUNTER — TELEPHONE (OUTPATIENT)
Dept: FAMILY MEDICINE | Facility: CLINIC | Age: 74
End: 2019-01-01

## 2019-01-01 VITALS
DIASTOLIC BLOOD PRESSURE: 78 MMHG | HEIGHT: 69 IN | BODY MASS INDEX: 41.58 KG/M2 | TEMPERATURE: 98 F | HEART RATE: 99 BPM | RESPIRATION RATE: 18 BRPM | OXYGEN SATURATION: 98 % | WEIGHT: 280.75 LBS | SYSTOLIC BLOOD PRESSURE: 122 MMHG

## 2019-01-01 DIAGNOSIS — J41.1 MUCOPURULENT CHRONIC BRONCHITIS: Primary | ICD-10-CM

## 2019-01-01 DIAGNOSIS — R05.9 COUGH: ICD-10-CM

## 2019-01-01 DIAGNOSIS — R09.82 POST-NASAL DRIP: ICD-10-CM

## 2019-01-01 DIAGNOSIS — R11.0 NAUSEA: ICD-10-CM

## 2019-01-01 DIAGNOSIS — R09.81 SINUS CONGESTION: ICD-10-CM

## 2019-01-01 DIAGNOSIS — R11.2 NON-INTRACTABLE VOMITING WITH NAUSEA: ICD-10-CM

## 2019-01-01 DIAGNOSIS — J41.1 MUCOPURULENT CHRONIC BRONCHITIS: ICD-10-CM

## 2019-01-01 PROCEDURE — 71046 X-RAY EXAM CHEST 2 VIEWS: CPT | Mod: 26,,, | Performed by: RADIOLOGY

## 2019-01-01 PROCEDURE — 3078F PR MOST RECENT DIASTOLIC BLOOD PRESSURE < 80 MM HG: ICD-10-PCS | Mod: CPTII,S$GLB,, | Performed by: INTERNAL MEDICINE

## 2019-01-01 PROCEDURE — 99499 RISK ADDL DX/OHS AUDIT: ICD-10-PCS | Mod: S$GLB,,, | Performed by: INTERNAL MEDICINE

## 2019-01-01 PROCEDURE — 71046 X-RAY EXAM CHEST 2 VIEWS: CPT | Mod: TC,FY

## 2019-01-01 PROCEDURE — 3074F PR MOST RECENT SYSTOLIC BLOOD PRESSURE < 130 MM HG: ICD-10-PCS | Mod: CPTII,S$GLB,, | Performed by: INTERNAL MEDICINE

## 2019-01-01 PROCEDURE — 99499 UNLISTED E&M SERVICE: CPT | Mod: S$GLB,,, | Performed by: INTERNAL MEDICINE

## 2019-01-01 PROCEDURE — 99999 PR PBB SHADOW E&M-EST. PATIENT-LVL IV: CPT | Mod: PBBFAC,,, | Performed by: INTERNAL MEDICINE

## 2019-01-01 PROCEDURE — 99214 PR OFFICE/OUTPT VISIT, EST, LEVL IV, 30-39 MIN: ICD-10-PCS | Mod: S$GLB,,, | Performed by: INTERNAL MEDICINE

## 2019-01-01 PROCEDURE — 1101F PR PT FALLS ASSESS DOC 0-1 FALLS W/OUT INJ PAST YR: ICD-10-PCS | Mod: CPTII,S$GLB,, | Performed by: INTERNAL MEDICINE

## 2019-01-01 PROCEDURE — 3074F SYST BP LT 130 MM HG: CPT | Mod: CPTII,S$GLB,, | Performed by: INTERNAL MEDICINE

## 2019-01-01 PROCEDURE — 99999 PR PBB SHADOW E&M-EST. PATIENT-LVL IV: ICD-10-PCS | Mod: PBBFAC,,, | Performed by: INTERNAL MEDICINE

## 2019-01-01 PROCEDURE — 3078F DIAST BP <80 MM HG: CPT | Mod: CPTII,S$GLB,, | Performed by: INTERNAL MEDICINE

## 2019-01-01 PROCEDURE — 1101F PT FALLS ASSESS-DOCD LE1/YR: CPT | Mod: CPTII,S$GLB,, | Performed by: INTERNAL MEDICINE

## 2019-01-01 PROCEDURE — 71046 XR CHEST PA AND LATERAL: ICD-10-PCS | Mod: 26,,, | Performed by: RADIOLOGY

## 2019-01-01 PROCEDURE — 99214 OFFICE O/P EST MOD 30 MIN: CPT | Mod: S$GLB,,, | Performed by: INTERNAL MEDICINE

## 2019-01-01 RX ORDER — ONDANSETRON 4 MG/1
4 TABLET, FILM COATED ORAL EVERY 8 HOURS PRN
Qty: 20 TABLET | Refills: 2 | Status: SHIPPED | OUTPATIENT
Start: 2019-01-01 | End: 2020-01-01 | Stop reason: CLARIF

## 2019-01-01 RX ORDER — LEVOFLOXACIN 500 MG/1
500 TABLET, FILM COATED ORAL DAILY
Qty: 7 TABLET | Refills: 0 | Status: SHIPPED | OUTPATIENT
Start: 2019-01-01 | End: 2019-01-01 | Stop reason: SDUPTHER

## 2019-01-01 RX ORDER — LEVOFLOXACIN 500 MG/1
500 TABLET, FILM COATED ORAL DAILY
Qty: 7 TABLET | Refills: 0 | Status: SHIPPED | OUTPATIENT
Start: 2019-01-01 | End: 2020-01-01

## 2019-01-01 RX ORDER — FLUTICASONE PROPIONATE 50 MCG
2 SPRAY, SUSPENSION (ML) NASAL DAILY
Qty: 1 BOTTLE | Refills: 2 | Status: SHIPPED | OUTPATIENT
Start: 2019-01-01 | End: 2020-01-01 | Stop reason: SDUPTHER

## 2019-01-01 RX ORDER — PREDNISONE 50 MG/1
50 TABLET ORAL DAILY
Qty: 7 TABLET | Refills: 0 | Status: SHIPPED | OUTPATIENT
Start: 2019-01-01 | End: 2020-01-01 | Stop reason: SDUPTHER

## 2019-01-04 ENCOUNTER — PES CALL (OUTPATIENT)
Dept: ADMINISTRATIVE | Facility: CLINIC | Age: 74
End: 2019-01-04

## 2019-02-01 ENCOUNTER — OFFICE VISIT (OUTPATIENT)
Dept: FAMILY MEDICINE | Facility: CLINIC | Age: 74
End: 2019-02-01
Payer: MEDICARE

## 2019-02-01 VITALS
DIASTOLIC BLOOD PRESSURE: 78 MMHG | BODY MASS INDEX: 42.36 KG/M2 | HEART RATE: 72 BPM | OXYGEN SATURATION: 98 % | SYSTOLIC BLOOD PRESSURE: 134 MMHG | HEIGHT: 69 IN | TEMPERATURE: 98 F | RESPIRATION RATE: 20 BRPM | WEIGHT: 286 LBS

## 2019-02-01 DIAGNOSIS — D50.9 IRON DEFICIENCY ANEMIA, UNSPECIFIED IRON DEFICIENCY ANEMIA TYPE: Primary | ICD-10-CM

## 2019-02-01 DIAGNOSIS — L82.1 KERATOSIS SEBORRHEICA: ICD-10-CM

## 2019-02-01 DIAGNOSIS — R09.81 SINUS CONGESTION: ICD-10-CM

## 2019-02-01 DIAGNOSIS — R09.82 POST-NASAL DRIP: ICD-10-CM

## 2019-02-01 DIAGNOSIS — I10 ESSENTIAL HYPERTENSION: ICD-10-CM

## 2019-02-01 PROCEDURE — 99215 PR OFFICE/OUTPT VISIT, EST, LEVL V, 40-54 MIN: ICD-10-PCS | Mod: S$GLB,,, | Performed by: FAMILY MEDICINE

## 2019-02-01 PROCEDURE — 99999 PR PBB SHADOW E&M-EST. PATIENT-LVL IV: ICD-10-PCS | Mod: PBBFAC,,, | Performed by: FAMILY MEDICINE

## 2019-02-01 PROCEDURE — 3078F PR MOST RECENT DIASTOLIC BLOOD PRESSURE < 80 MM HG: ICD-10-PCS | Mod: CPTII,S$GLB,, | Performed by: FAMILY MEDICINE

## 2019-02-01 PROCEDURE — 3078F DIAST BP <80 MM HG: CPT | Mod: CPTII,S$GLB,, | Performed by: FAMILY MEDICINE

## 2019-02-01 PROCEDURE — 3075F PR MOST RECENT SYSTOLIC BLOOD PRESS GE 130-139MM HG: ICD-10-PCS | Mod: CPTII,S$GLB,, | Performed by: FAMILY MEDICINE

## 2019-02-01 PROCEDURE — 99499 UNLISTED E&M SERVICE: CPT | Mod: S$GLB,,, | Performed by: FAMILY MEDICINE

## 2019-02-01 PROCEDURE — 99999 PR PBB SHADOW E&M-EST. PATIENT-LVL IV: CPT | Mod: PBBFAC,,, | Performed by: FAMILY MEDICINE

## 2019-02-01 PROCEDURE — 99215 OFFICE O/P EST HI 40 MIN: CPT | Mod: S$GLB,,, | Performed by: FAMILY MEDICINE

## 2019-02-01 PROCEDURE — 1101F PT FALLS ASSESS-DOCD LE1/YR: CPT | Mod: CPTII,S$GLB,, | Performed by: FAMILY MEDICINE

## 2019-02-01 PROCEDURE — 3075F SYST BP GE 130 - 139MM HG: CPT | Mod: CPTII,S$GLB,, | Performed by: FAMILY MEDICINE

## 2019-02-01 PROCEDURE — 99499 RISK ADDL DX/OHS AUDIT: ICD-10-PCS | Mod: S$GLB,,, | Performed by: FAMILY MEDICINE

## 2019-02-01 PROCEDURE — 1101F PR PT FALLS ASSESS DOC 0-1 FALLS W/OUT INJ PAST YR: ICD-10-PCS | Mod: CPTII,S$GLB,, | Performed by: FAMILY MEDICINE

## 2019-02-01 RX ORDER — CETIRIZINE HYDROCHLORIDE 10 MG/1
10 TABLET ORAL DAILY
Refills: 0 | COMMUNITY
Start: 2019-02-01 | End: 2020-01-01

## 2019-02-01 RX ORDER — FLUTICASONE PROPIONATE 50 MCG
2 SPRAY, SUSPENSION (ML) NASAL DAILY
Qty: 1 BOTTLE | Refills: 2 | Status: SHIPPED | OUTPATIENT
Start: 2019-02-01 | End: 2019-01-01 | Stop reason: SDUPTHER

## 2019-02-01 RX ORDER — FLUTICASONE PROPIONATE 50 MCG
2 SPRAY, SUSPENSION (ML) NASAL DAILY
Qty: 1 BOTTLE | Refills: 2 | Status: SHIPPED | OUTPATIENT
Start: 2019-02-01 | End: 2019-02-01 | Stop reason: SDUPTHER

## 2019-02-01 NOTE — PROGRESS NOTES
Patient, Lopez Savage (MRN #[id number]), presented with a recorded BMI of 42.23 kg/m^2 consistent with the definition of morbid obesity (ICD-10 E66.01). The patient's morbid obesity was monitored, evaluated, addressed and/or treated. This addendum to the medical record is made on 02/01/2019.

## 2019-02-01 NOTE — PROGRESS NOTES
HPI:  Lopez Savage is a 73 y.o. year old male that  presents with concern about feeling cold in his hands and feet all of the time , even with the heater on. He has a cough that is not productive. It was controlled with the Flonase but he is out of it.  He also has a lesion on his back that feels like a pin is sticking in it.  Chief Complaint   Patient presents with    Cough    Generalized Body Aches     pt would like to be prescribed meloxicam again    Mass     on back   .           Past Medical History:   Diagnosis Date    Allergy     Asthma     BPH (benign prostatic hyperplasia)     CHRONIC BRONCHITIS     GERD (gastroesophageal reflux disease)     HTN (hypertension)     HTN (hypertension) 10/15/2014    Joint pain     Mitral regurgitation     3-4+ under surveillance CT surgery    Mitral regurgitation     Mitral valve regurgitation     Obesity     Osteoarthritis of knee     Trouble in sleeping     Ulcerative colitis     Urinary tract infection     Valvular regurgitation      Social History     Socioeconomic History    Marital status:      Spouse name: Not on file    Number of children: Not on file    Years of education: Not on file    Highest education level: Not on file   Social Needs    Financial resource strain: Not on file    Food insecurity - worry: Not on file    Food insecurity - inability: Not on file    Transportation needs - medical: Not on file    Transportation needs - non-medical: Not on file   Occupational History    Not on file   Tobacco Use    Smoking status: Former Smoker     Packs/day: 2.00     Years: 6.00     Pack years: 12.00     Types: Cigarettes     Last attempt to quit: 1969     Years since quittin.1    Smokeless tobacco: Former User     Types: Chew    Tobacco comment: Quit all forms of tobacco in .   Substance and Sexual Activity    Alcohol use: No     Alcohol/week: 0.0 oz     Comment: remote hx of heavy alcohol use    Drug use: No     Sexual activity: Not Currently     Partners: Female   Other Topics Concern    Not on file   Social History Narrative    Not on file     Past Surgical History:   Procedure Laterality Date    BIOPSY-PENIS N/A 4/27/2016    [facility]    CATHETERIZATION, HEART, LEFT N/A 3/7/2013    [facility] CATH LAB    COLONOSCOPY Golytely N/A 10/11/2017    [facility] by [facility]e [facility] at [facility] ENDO    CYSTOSCOPY N/A 8/8/2018    Performed by [facility]an [facility]enca [facility] at [facility] OR [facility]    CYSTOSCOPY W/ RETROGRADES      with penile biopsy    DESTRUCTION-PROSTATE-TRANSURETHRAL N/A 8/8/2018    Per[facility]    DILATION, URETHRA N/A 8/8/2018    Performed by [facility] at [facility] OR [facility]    EGD (ESOPHAGOGASTRODUODENOSCOPY) N/A 3/12/2014    [facility] by [facility] at [facility] ENDO    ESOPHAGOGASTRODUODENOSCOPY (EGD) N/A 2/7/2018    Performed by [facility] at [facility] ENDO    HERNIA REPAIR Right 1956    inguinal    JOINT REPLACEMENT Bilateral     knees    MEATOPLASTY, URETHRA N/A 8/8/2018    [facility] at [facility] OR [facility]    Penile Biopsy      TONSILLECTOMY      TOTAL KNEE ARTHROPLASTY Bilateral     URETHROGRAM-RETROGRADE Bilateral 4/27/2016    [facility] by [facility] at [facility] OR     Family History   Problem Relation Age of Onset    Bone cancer Mother     Glaucoma Mother     Cataracts Mother     Cancer Mother         bone cancer    Kidney disease Father     COPD Sister     Heart disease Daughter         congenital    Other Daughter 2        fibr. tumor on bronchial tube; lung removed    Other Daughter         Bronchitis    Melanoma Neg Hx     Prostate cancer Neg Hx     Macular degeneration Neg Hx     Retinal detachment Neg Hx     Strabismus Neg Hx     Amblyopia Neg Hx     Blindness Neg Hx            Review of Systems  General ROS: negative for  "chills, fever or weight loss  ENT ROS: negative for epistaxis, sore throat or rhinorrhea  Respiratory ROS: no cough, shortness of breath, or wheezing  Cardiovascular ROS: no chest pain or dyspnea on exertion  Gastrointestinal ROS: no abdominal pain, change in bowel habits, or black/ bloody stools    Physical Exam:  /78   Pulse 72   Temp 97.8 °F (36.6 °C) (Oral)   Resp 20   Ht 5' 9" (1.753 m)   Wt 129.7 kg (286 lb)   SpO2 98%   BMI 42.23 kg/m²   General appearance: alert, cooperative, no distress  Constitutional:Oriented to person, place, and time.appears well-developed and well-nourished.  HEENT: Normocephalic, atraumatic, neck symmetrical, no nasal discharge, TM- clear bilaterally  Lungs: clear to auscultation bilaterally, no dullness to percussion bilaterally  Heart: regular rate and rhythm without rub, pos murmur; no displacement of the PMI , S1&S2 present  Abdomen: soft, non-tender; bowel sounds normoactive; no organomegaly  Skin:  Multiple flat dry dark papules on patient's back with no signs of irregular borders or vasculature  Physical Exam      LABS:    Complete Blood Count  Lab Results   Component Value Date    RBC 4.44 (L) 08/01/2018    HGB 12.1 (L) 08/01/2018    HCT 38.5 (L) 08/01/2018    MCV 87 08/01/2018    MCH 27.3 08/01/2018    MCHC 31.4 (L) 08/01/2018    RDW 13.6 08/01/2018     08/01/2018    MPV 10.7 08/01/2018    GRAN 6.6 04/17/2018    GRAN 69.3 04/17/2018    LYMPH 1.7 04/17/2018    LYMPH 17.8 (L) 04/17/2018    MONO 0.9 04/17/2018    MONO 9.1 04/17/2018    EOS 0.2 04/17/2018    BASO 0.14 04/17/2018    EOSINOPHIL 2.3 04/17/2018    BASOPHIL 1.5 04/17/2018    DIFFMETHOD Automated 04/17/2018       Comprehensive Metabolic Panel  Lab Results   Component Value Date     (H) 08/01/2018    BUN 14 08/01/2018    CREATININE 1.2 08/01/2018     08/01/2018    K 3.8 08/01/2018     08/01/2018    PROT 7.3 04/17/2018    ALBUMIN 3.4 (L) 04/17/2018    BILITOT 0.4 04/17/2018    AST " 16 04/17/2018    ALKPHOS 71 04/17/2018    CO2 29 08/01/2018    ALT 20 04/17/2018    ANIONGAP 3 (L) 08/01/2018    EGFRNONAA >60 08/01/2018    ESTGFRAFRICA >60 08/01/2018       LIPID  Lab Results   Component Value Date    CHOL 117 (L) 04/17/2018    HDL 30 (L) 04/17/2018         TSH  Lab Results   Component Value Date    TSH 2.308 04/17/2018       Current Outpatient Medications   Medication Sig Dispense Refill    albuterol (PROVENTIL HFA) 90 mcg/actuation inhaler Inhale 2 puffs into the lungs every 6 (six) hours as needed for Wheezing or Shortness of Breath. 1 Inhaler 3    betamethasone dipropionate (DIPROLENE) 0.05 % ointment Apply topically 2 (two) times daily. 45 g 1    donepezil (ARICEPT) 5 MG tablet Take 1 tablet (5 mg total) by mouth every evening. 90 tablet 3    fluticasone (FLONASE) 50 mcg/actuation nasal spray 2 sprays (100 mcg total) by Each Nare route once daily. 1 Bottle 2    fluticasone-salmeterol 250-50 mcg/dose (ADVAIR DISKUS) 250-50 mcg/dose diskus inhaler Inhale 1 puff into the lungs 2 (two) times daily. Controller 60 each 3    losartan (COZAAR) 50 MG tablet TAKE ONE TABLET BY MOUTH EVERY DAY 90 tablet 2    omeprazole (PRILOSEC) 40 MG capsule       cetirizine (ZYRTEC) 10 MG tablet Take 1 tablet (10 mg total) by mouth once daily.  0    ibuprofen (ADVIL,MOTRIN) 600 MG tablet Take 1 tablet (600 mg total) by mouth every 6 (six) hours as needed for Pain. 56 tablet 0     No current facility-administered medications for this visit.        Assessment:    ICD-10-CM ICD-9-CM    1. Iron deficiency anemia, unspecified iron deficiency anemia type D50.9 280.9 CBC auto differential   2. 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 Essential hypertension I10 401.9    4. Post-nasal drip R09.82 784.91 fluticasone (FLONASE) 50 mcg/actuation nasal spray      cetirizine (ZYRTEC) 10 MG tablet      DISCONTINUED: fluticasone (FLONASE) 50 mcg/actuation nasal spray   5. Sinus congestion R09.81 478.19 fluticasone (FLONASE)  50 mcg/actuation nasal spray      DISCONTINUED: fluticasone (FLONASE) 50 mcg/actuation nasal spray         Plan:  Patient refused Dermatology referral at this time  Follow-up in 1 month (on 3/1/2019).          Lucie Munguia MD

## 2019-02-07 ENCOUNTER — LAB VISIT (OUTPATIENT)
Dept: LAB | Facility: HOSPITAL | Age: 74
End: 2019-02-07
Attending: FAMILY MEDICINE
Payer: MEDICARE

## 2019-02-07 DIAGNOSIS — D50.9 IRON DEFICIENCY ANEMIA, UNSPECIFIED IRON DEFICIENCY ANEMIA TYPE: ICD-10-CM

## 2019-02-07 LAB
BASOPHILS # BLD AUTO: 0.07 K/UL
BASOPHILS NFR BLD: 0.7 %
DIFFERENTIAL METHOD: ABNORMAL
EOSINOPHIL # BLD AUTO: 0.2 K/UL
EOSINOPHIL NFR BLD: 1.6 %
ERYTHROCYTE [DISTWIDTH] IN BLOOD BY AUTOMATED COUNT: 13.4 %
HCT VFR BLD AUTO: 39.1 %
HGB BLD-MCNC: 12.4 G/DL
LYMPHOCYTES # BLD AUTO: 1.8 K/UL
LYMPHOCYTES NFR BLD: 17.3 %
MCH RBC QN AUTO: 27.4 PG
MCHC RBC AUTO-ENTMCNC: 31.7 G/DL
MCV RBC AUTO: 87 FL
MONOCYTES # BLD AUTO: 1.1 K/UL
MONOCYTES NFR BLD: 10.7 %
NEUTROPHILS # BLD AUTO: 7.2 K/UL
NEUTROPHILS NFR BLD: 69.3 %
PLATELET # BLD AUTO: 231 K/UL
PMV BLD AUTO: 10.4 FL
RBC # BLD AUTO: 4.52 M/UL
WBC # BLD AUTO: 10.42 K/UL

## 2019-02-07 PROCEDURE — 36415 COLL VENOUS BLD VENIPUNCTURE: CPT

## 2019-02-07 PROCEDURE — 85025 COMPLETE CBC W/AUTO DIFF WBC: CPT

## 2019-02-08 ENCOUNTER — PATIENT MESSAGE (OUTPATIENT)
Dept: FAMILY MEDICINE | Facility: CLINIC | Age: 74
End: 2019-02-08

## 2019-02-11 ENCOUNTER — TELEPHONE (OUTPATIENT)
Dept: FAMILY MEDICINE | Facility: CLINIC | Age: 74
End: 2019-02-11

## 2019-02-11 DIAGNOSIS — R79.9 ABNORMAL BLOOD CHEMISTRY: Primary | ICD-10-CM

## 2019-02-11 NOTE — TELEPHONE ENCOUNTER
----- Message from Phyllis Hussein sent at 2/11/2019  1:56 PM CST -----  No. [phone]   Patient returned your call.

## 2019-02-12 ENCOUNTER — LAB VISIT (OUTPATIENT)
Dept: LAB | Facility: HOSPITAL | Age: 74
End: 2019-02-12
Attending: FAMILY MEDICINE
Payer: MEDICARE

## 2019-02-12 DIAGNOSIS — R79.9 ABNORMAL BLOOD CHEMISTRY: ICD-10-CM

## 2019-02-12 LAB
ALBUMIN SERPL BCP-MCNC: 3.4 G/DL
ALP SERPL-CCNC: 63 U/L
ALT SERPL W/O P-5'-P-CCNC: 21 U/L
ANION GAP SERPL CALC-SCNC: 8 MMOL/L
AST SERPL-CCNC: 17 U/L
BILIRUB SERPL-MCNC: 0.6 MG/DL
BUN SERPL-MCNC: 20 MG/DL
CALCIUM SERPL-MCNC: 9.4 MG/DL
CHLORIDE SERPL-SCNC: 103 MMOL/L
CO2 SERPL-SCNC: 24 MMOL/L
CREAT SERPL-MCNC: 1.2 MG/DL
EST. GFR  (AFRICAN AMERICAN): >60 ML/MIN/1.73 M^2
EST. GFR  (NON AFRICAN AMERICAN): >60 ML/MIN/1.73 M^2
ESTIMATED AVG GLUCOSE: 111 MG/DL
GLUCOSE SERPL-MCNC: 112 MG/DL
HBA1C MFR BLD HPLC: 5.5 %
POTASSIUM SERPL-SCNC: 4.2 MMOL/L
PROT SERPL-MCNC: 7 G/DL
SODIUM SERPL-SCNC: 135 MMOL/L

## 2019-02-12 PROCEDURE — 83036 HEMOGLOBIN GLYCOSYLATED A1C: CPT

## 2019-02-12 PROCEDURE — 36415 COLL VENOUS BLD VENIPUNCTURE: CPT

## 2019-02-12 PROCEDURE — 80053 COMPREHEN METABOLIC PANEL: CPT

## 2019-02-15 ENCOUNTER — OFFICE VISIT (OUTPATIENT)
Dept: FAMILY MEDICINE | Facility: CLINIC | Age: 74
End: 2019-02-15
Payer: MEDICARE

## 2019-02-15 VITALS
TEMPERATURE: 98 F | HEART RATE: 80 BPM | BODY MASS INDEX: 42.51 KG/M2 | RESPIRATION RATE: 18 BRPM | WEIGHT: 287 LBS | OXYGEN SATURATION: 95 % | HEIGHT: 69 IN | SYSTOLIC BLOOD PRESSURE: 122 MMHG | DIASTOLIC BLOOD PRESSURE: 60 MMHG

## 2019-02-15 DIAGNOSIS — I10 ESSENTIAL HYPERTENSION: Primary | ICD-10-CM

## 2019-02-15 DIAGNOSIS — J45.30 MILD PERSISTENT ASTHMA WITHOUT COMPLICATION: ICD-10-CM

## 2019-02-15 DIAGNOSIS — R79.89 ABNORMAL CBC: ICD-10-CM

## 2019-02-15 PROCEDURE — 99214 OFFICE O/P EST MOD 30 MIN: CPT | Mod: S$GLB,,, | Performed by: FAMILY MEDICINE

## 2019-02-15 PROCEDURE — 99999 PR PBB SHADOW E&M-EST. PATIENT-LVL III: ICD-10-PCS | Mod: PBBFAC,,, | Performed by: FAMILY MEDICINE

## 2019-02-15 PROCEDURE — 99499 RISK ADDL DX/OHS AUDIT: ICD-10-PCS | Mod: S$GLB,,, | Performed by: FAMILY MEDICINE

## 2019-02-15 PROCEDURE — 1101F PT FALLS ASSESS-DOCD LE1/YR: CPT | Mod: CPTII,S$GLB,, | Performed by: FAMILY MEDICINE

## 2019-02-15 PROCEDURE — 99499 UNLISTED E&M SERVICE: CPT | Mod: S$GLB,,, | Performed by: FAMILY MEDICINE

## 2019-02-15 PROCEDURE — 99999 PR PBB SHADOW E&M-EST. PATIENT-LVL III: CPT | Mod: PBBFAC,,, | Performed by: FAMILY MEDICINE

## 2019-02-15 PROCEDURE — 99214 PR OFFICE/OUTPT VISIT, EST, LEVL IV, 30-39 MIN: ICD-10-PCS | Mod: S$GLB,,, | Performed by: FAMILY MEDICINE

## 2019-02-15 PROCEDURE — 3074F SYST BP LT 130 MM HG: CPT | Mod: CPTII,S$GLB,, | Performed by: FAMILY MEDICINE

## 2019-02-15 PROCEDURE — 1101F PR PT FALLS ASSESS DOC 0-1 FALLS W/OUT INJ PAST YR: ICD-10-PCS | Mod: CPTII,S$GLB,, | Performed by: FAMILY MEDICINE

## 2019-02-15 PROCEDURE — 3078F DIAST BP <80 MM HG: CPT | Mod: CPTII,S$GLB,, | Performed by: FAMILY MEDICINE

## 2019-02-15 PROCEDURE — 3074F PR MOST RECENT SYSTOLIC BLOOD PRESSURE < 130 MM HG: ICD-10-PCS | Mod: CPTII,S$GLB,, | Performed by: FAMILY MEDICINE

## 2019-02-15 PROCEDURE — 3078F PR MOST RECENT DIASTOLIC BLOOD PRESSURE < 80 MM HG: ICD-10-PCS | Mod: CPTII,S$GLB,, | Performed by: FAMILY MEDICINE

## 2019-02-15 RX ORDER — FLUTICASONE PROPIONATE AND SALMETEROL 250; 50 UG/1; UG/1
1 POWDER RESPIRATORY (INHALATION) 2 TIMES DAILY
Qty: 60 EACH | Refills: 3 | Status: SHIPPED | OUTPATIENT
Start: 2019-02-15 | End: 2020-01-01 | Stop reason: SDUPTHER

## 2019-02-15 RX ORDER — IBUPROFEN 600 MG/1
600 TABLET ORAL EVERY 6 HOURS PRN
Qty: 60 TABLET | Refills: 2 | Status: SHIPPED | OUTPATIENT
Start: 2019-02-15 | End: 2020-01-01

## 2019-02-15 RX ORDER — DONEPEZIL HYDROCHLORIDE 5 MG/1
5 TABLET, FILM COATED ORAL NIGHTLY
Qty: 90 TABLET | Refills: 0 | Status: SHIPPED | OUTPATIENT
Start: 2019-02-15 | End: 2019-10-02

## 2019-02-15 NOTE — PROGRESS NOTES
HPI:  Lopez Savage is a 73 y.o. year old male that  presents for lab results.  Chief Complaint   Patient presents with    Follow-up     lab results   .           Past Medical History:   Diagnosis Date    Allergy     Asthma     BPH (benign prostatic hyperplasia)     CHRONIC BRONCHITIS     GERD (gastroesophageal reflux disease)     HTN (hypertension)     HTN (hypertension) 10/15/2014    Joint pain     Mitral regurgitation     3-4+ under surveillance CT surgery    Mitral regurgitation     Mitral valve regurgitation     Obesity     Osteoarthritis of knee     Trouble in sleeping     Ulcerative colitis     Urinary tract infection     Valvular regurgitation      Social History     Socioeconomic History    Marital status:      Spouse name: Not on file    Number of children: Not on file    Years of education: Not on file    Highest education level: Not on file   Social Needs    Financial resource strain: Not on file    Food insecurity - worry: Not on file    Food insecurity - inability: Not on file    Transportation needs - medical: Not on file    Transportation needs - non-medical: Not on file   Occupational History    Not on file   Tobacco Use    Smoking status: Former Smoker     Packs/day: 2.00     Years: 6.00     Pack years: 12.00     Types: Cigarettes     Last attempt to quit: 1969     Years since quittin.1    Smokeless tobacco: Former User     Types: Chew    Tobacco comment: Quit all forms of tobacco in .   Substance and Sexual Activity    Alcohol use: No     Alcohol/week: 0.0 oz     Comment: remote hx of heavy alcohol use    Drug use: No    Sexual activity: Not Currently     Partners: Female   Other Topics Concern    Not on file   Social History Narrative    Not on file     Past Surgical History:   Procedure Laterality Date    BIOPSY-PENIS N/A 2016    Performed by [facility] at [facility] OR    CATHETERIZATION, HEART, LEFT N/A 3/7/2013    Performed  by [facility]as [facility]an [facility] at [facility] CATH LAB    COLONOSCOPY Golytely N/A 10/11/2017    [facility] ENDO    CYSTOSCOPY N/A 8/8/2018    Performed by Adan [facility]enca [facility] at [facility] OR 1ST FLR    CYSTOSCOPY W/ RETROGRADES      with penile biopsy    DESTRUCTION-PROSTATE-TRANSURETHRAL N/A 8/8/2018    Performed by [facility] at [facility] OR 1ST FLR    DILATION, URETHRA N/A 8/8/2018    Performed by Adan Cuenca MD at [facility] OR 1ST FLR    EGD (ESOPHAGOGASTRODUODENOSCOPY) N/A 3/12/2014    Performed by [facility] at [facility] ENDO    ESOPHAGOGASTRODUODENOSCOPY (EGD) N/A 2/7/2018    Performed by [facility] at [facility] ENDO    HERNIA REPAIR Right 1956    inguinal    JOINT REPLACEMENT Bilateral     knees    MEATOPLASTY, URETHRA N/A 8/8/2018    Performed by [facility] at [facility] OR 1ST FLR    Penile Biopsy      TONSILLECTOMY      TOTAL KNEE ARTHROPLASTY Bilateral     URETHROGRAM-RETROGRADE Bilateral 4/27/2016    Performed by [facility] at [facility] OR     Family History   Problem Relation Age of Onset    Bone cancer Mother     Glaucoma Mother     Cataracts Mother     Cancer Mother         bone cancer    Kidney disease Father     COPD Sister     Heart disease Daughter         congenital    Other Daughter 2        fibr. tumor on bronchial tube; lung removed    Other Daughter         Bronchitis    Melanoma Neg Hx     Prostate cancer Neg Hx     Macular degeneration Neg Hx     Retinal detachment Neg Hx     Strabismus Neg Hx     Amblyopia Neg Hx     Blindness Neg Hx            Review of Systems  General ROS: negative for chills, fever or weight loss  ENT ROS: negative for epistaxis, sore throat or rhinorrhea  Respiratory ROS: no cough, shortness of breath, or wheezing  Cardiovascular ROS: no chest pain or dyspnea on exertion  Gastrointestinal ROS: no abdominal pain, change in bowel habits, or black/ bloody stools    Physical Exam:  /60    "Pulse 80   Temp 98.1 °F (36.7 °C) (Oral)   Resp 18   Ht 5' 9" (1.753 m)   Wt 130.2 kg (287 lb)   SpO2 95%   BMI 42.38 kg/m²   General appearance: alert, cooperative, no distress  Constitutional:Oriented to person, place, and time.appears well-developed and well-nourished.  HEENT: Normocephalic, atraumatic, neck symmetrical, no nasal discharge, TM- clear bilaterally  Lungs: clear to auscultation bilaterally, no dullness to percussion bilaterally  Heart: regular rate and rhythm without rub; no displacement of the PMI , S1&S2 present  Abdomen: soft, non-tender; bowel sounds normoactive; no organomegaly  Physical Exam      LABS:    Complete Blood Count  Lab Results   Component Value Date    RBC 4.52 (L) 02/07/2019    HGB 12.4 (L) 02/07/2019    HCT 39.1 (L) 02/07/2019    MCV 87 02/07/2019    MCH 27.4 02/07/2019    MCHC 31.7 (L) 02/07/2019    RDW 13.4 02/07/2019     02/07/2019    MPV 10.4 02/07/2019    GRAN 7.2 02/07/2019    GRAN 69.3 02/07/2019    LYMPH 1.8 02/07/2019    LYMPH 17.3 (L) 02/07/2019    MONO 1.1 (H) 02/07/2019    MONO 10.7 02/07/2019    EOS 0.2 02/07/2019    BASO 0.07 02/07/2019    EOSINOPHIL 1.6 02/07/2019    BASOPHIL 0.7 02/07/2019    DIFFMETHOD Automated 02/07/2019       Comprehensive Metabolic Panel  Lab Results   Component Value Date     (H) 02/12/2019    BUN 20 02/12/2019    CREATININE 1.2 02/12/2019     (L) 02/12/2019    K 4.2 02/12/2019     02/12/2019    PROT 7.0 02/12/2019    ALBUMIN 3.4 (L) 02/12/2019    BILITOT 0.6 02/12/2019    AST 17 02/12/2019    ALKPHOS 63 02/12/2019    CO2 24 02/12/2019    ALT 21 02/12/2019    ANIONGAP 8 02/12/2019    EGFRNONAA >60 02/12/2019    ESTGFRAFRICA >60 02/12/2019       LIPID  Lab Results   Component Value Date    CHOL 117 (L) 04/17/2018    HDL 30 (L) 04/17/2018         TSH  Lab Results   Component Value Date    TSH 2.308 04/17/2018       Current Outpatient Medications   Medication Sig Dispense Refill    albuterol (PROVENTIL HFA) 90 " mcg/actuation inhaler Inhale 2 puffs into the lungs every 6 (six) hours as needed for Wheezing or Shortness of Breath. 1 Inhaler 3    betamethasone dipropionate (DIPROLENE) 0.05 % ointment Apply topically 2 (two) times daily. 45 g 1    cetirizine (ZYRTEC) 10 MG tablet Take 1 tablet (10 mg total) by mouth once daily.  0    donepezil (ARICEPT) 5 MG tablet Take 1 tablet (5 mg total) by mouth every evening. 90 tablet 0    fluticasone-salmeterol 250-50 mcg/dose (ADVAIR DISKUS) 250-50 mcg/dose diskus inhaler Inhale 1 puff into the lungs 2 (two) times daily. Controller 60 each 3    ibuprofen (ADVIL,MOTRIN) 600 MG tablet Take 1 tablet (600 mg total) by mouth every 6 (six) hours as needed for Pain. 60 tablet 2    losartan (COZAAR) 50 MG tablet TAKE ONE TABLET BY MOUTH EVERY DAY 90 tablet 2    omeprazole (PRILOSEC) 40 MG capsule       fluticasone (FLONASE) 50 mcg/actuation nasal spray 2 sprays (100 mcg total) by Each Nare route once daily. 1 Bottle 2     No current facility-administered medications for this visit.        Assessment:    ICD-10-CM ICD-9-CM    1. Essential hypertension I10 401.9    2. Abnormal CBC R79.89 790.6 Ambulatory Referral to Hematology / Oncology   3. Mild persistent asthma without complicationActive J45.30 493.90 fluticasone-salmeterol 250-50 mcg/dose (ADVAIR DISKUS) 250-50 mcg/dose diskus inhaler    restart advair and continue proventil prn         Plan:  With persistent history of abnormal CBC will refer to Hematology for their review.  No Follow-up on file.          Lucie Munguia MD

## 2019-02-18 NOTE — PROGRESS NOTES
Patient Lopez Savage, MRN [id number], was prescribed Advair during his visit on 2/15/19 in order to treat chronic obstructive pulmonary disease (ICD 10 J44.9). This addendum is made to the medical record on 02/18/2019.

## 2019-02-19 PROBLEM — D64.9 NORMOCYTIC ANEMIA: Status: ACTIVE | Noted: 2019-02-19

## 2019-02-19 NOTE — PROGRESS NOTES
PATIENT: Lopez Savage  MRN: [id number]  DATE: 2/19/2019    Diagnosis:   1. Normocytic anemia    2. Morbid obesity    3. Advance care planning      Chief Complaint: Anemia    Subjective:    History of Present Illness: Mr. Savage is a 73 y.o. male who presents for evaluation and management of normocytic anemia. He is referred by his family medicine physician Dr. Munguia. He has the following comorbid conditions: morbid obesity, hypertension, osteoarthritis, gastroesophageal reflux.    Reviewing his labs in our system, he has had an anemia since 2007. It has remained relatively stable over time with a hemoglobin between 10 - 12 g/dL.    Today, he complains of chronic cold intolerance. He endorses fatigue, excess weight, and nausea related to acid reflux. He denies shortness of breath, chest pain, diarrhea, constipation.    Past medical, surgical, family, and social histories have been reviewed and updated below.    Past Medical History:   Past Medical History:   Diagnosis Date    Allergy     Asthma     BPH (benign prostatic hyperplasia)     CHRONIC BRONCHITIS     GERD (gastroesophageal reflux disease)     HTN (hypertension)     HTN (hypertension) 10/15/2014    Joint pain     Mitral regurgitation     3-4+ under surveillance CT surgery    Mitral regurgitation     Mitral valve regurgitation     Obesity     Osteoarthritis of knee     Trouble in sleeping     Ulcerative colitis     Urinary tract infection     Valvular regurgitation        Past Surgical History:   Past Surgical History:   Procedure Laterality Date    BIOPSY-PENIS N/A 4/27/2016    Performed by [facility] OR    CATHETERIZATION, HEART, LEFT N/A 3/7/2013    Performed by [facility] at [facility] CATH LAB    COLONOSCOPY Golytely N/A 10/11/2017    Performed by [facility]e Duarte MD at [facility] ENDO    CYSTOSCOPY N/A 8/8/2018    Performed by [facility] at [facility] OR 1ST FLR    CYSTOSCOPY W/ RETROGRADES      with  penile biopsy    DESTRUCTION-PROSTATE-TRANSURETHRAL N/A 8/8/2018    [facility]formed by [facility] at [facility] OR 1ST FLR    DILATION, URETHRA N/A 8/8/2018    Performed by [facility] at [facility] OR 1ST FLR    EGD (ESOPHAGOGASTRODUODENOSCOPY) N/A 3/12/2014    Performed by [facility] at [facility] ENDO    ESOPHAGOGASTRODUODENOSCOPY (EGD) N/A 2/7/2018    Performed by [facility] at [facility] ENDO    HERNIA REPAIR Right 1956    inguinal    JOINT REPLACEMENT Bilateral     knees    MEATOPLASTY, URETHRA N/A 8/8/2018    Performed by [facility]an [facility] at [facility] OR 1ST FLR    Penile Biopsy      TONSILLECTOMY      TOTAL KNEE ARTHROPLASTY Bilateral     URETHROGRAM-RETROGRADE Bilateral 4/27/2016    Performed by Kris Garcia [facility] at [facility] OR       Family History:   Family History   Problem Relation Age of Onset    Bone cancer Mother     Glaucoma Mother     Cataracts Mother     Cancer Mother         bone cancer    Kidney disease Father     COPD Sister     Heart disease Daughter         congenital    Other Daughter 2        fibr. tumor on bronchial tube; lung removed    Other Daughter         Bronchitis    Melanoma Neg Hx     Prostate cancer Neg Hx     Macular degeneration Neg Hx     Retinal detachment Neg Hx     Strabismus Neg Hx     Amblyopia Neg Hx     Blindness Neg Hx        Social History:  reports that he quit smoking about 50 years ago. His smoking use included cigarettes. He has a 12.00 pack-year smoking history. He has quit using smokeless tobacco. His smokeless tobacco use included chew. He reports that he does not drink alcohol or use drugs.    Allergies:  Review of patient's allergies indicates:   Allergen Reactions    Codeine Nausea Only    Tetanus vaccines and toxoid Hives and Rash       Medications:  Current Outpatient Medications   Medication Sig Dispense Refill    albuterol (PROVENTIL HFA) 90 mcg/actuation inhaler Inhale 2 puffs into the lungs every 6 (six) hours as  "needed for Wheezing or Shortness of Breath. 1 Inhaler 3    betamethasone dipropionate (DIPROLENE) 0.05 % ointment Apply topically 2 (two) times daily. 45 g 1    cetirizine (ZYRTEC) 10 MG tablet Take 1 tablet (10 mg total) by mouth once daily.  0    donepezil (ARICEPT) 5 MG tablet Take 1 tablet (5 mg total) by mouth every evening. 90 tablet 0    fluticasone (FLONASE) 50 mcg/actuation nasal spray 2 sprays (100 mcg total) by Each Nare route once daily. 1 Bottle 2    fluticasone-salmeterol 250-50 mcg/dose (ADVAIR DISKUS) 250-50 mcg/dose diskus inhaler Inhale 1 puff into the lungs 2 (two) times daily. Controller 60 each 3    ibuprofen (ADVIL,MOTRIN) 600 MG tablet Take 1 tablet (600 mg total) by mouth every 6 (six) hours as needed for Pain. 60 tablet 2    losartan (COZAAR) 50 MG tablet TAKE ONE TABLET BY MOUTH EVERY DAY 90 tablet 2    omeprazole (PRILOSEC) 40 MG capsule        No current facility-administered medications for this visit.        Review of Systems   Constitutional: Positive for fatigue.   HENT: Negative for sore throat.    Eyes: Negative for visual disturbance.   Respiratory: Negative for cough and shortness of breath.    Gastrointestinal: Positive for nausea (acid reflux). Negative for diarrhea and vomiting.   Endocrine: Positive for cold intolerance.   Genitourinary: Negative for dysuria.   Musculoskeletal: Negative for back pain.   Neurological: Negative for headaches.   Hematological: Negative for adenopathy.   Psychiatric/Behavioral: The patient is not nervous/anxious.        ECOG Performance Status:   ECOG SCORE 1       Objective:      Vitals:   Vitals:    02/20/19 1403   BP: 131/73   Pulse: 73   Temp: 97.5 °F (36.4 °C)   TempSrc: Oral   SpO2: 95%   Weight: 130.5 kg (287 lb 11.2 oz)   Height: 5' 9" (1.753 m)     BMI: Body mass index is 42.49 kg/m².    Physical Exam   Constitutional: He is oriented to person, place, and time. He appears well-developed and well-nourished.   Obesity noted.   HENT: "   Head: Normocephalic and atraumatic.   Eyes: EOM are normal. Pupils are equal, round, and reactive to light.   Neck: Normal range of motion. Neck supple.   Cardiovascular: Normal rate and regular rhythm.   No murmur heard.  Pulmonary/Chest: Effort normal and breath sounds normal.   Abdominal: Soft. Bowel sounds are normal.   Musculoskeletal: Normal range of motion.   Neurological: He is alert and oriented to person, place, and time.   Psychiatric: He has a normal mood and affect. His behavior is normal. Judgment and thought content normal.   Nursing note and vitals reviewed.    Laboratory Data:  Labs have been reviewed.    Lab Results   Component Value Date    WBC 10.42 02/07/2019    HGB 12.4 (L) 02/07/2019    HCT 39.1 (L) 02/07/2019    MCV 87 02/07/2019     02/07/2019           Assessment:       1. Normocytic anemia    2. Morbid obesity    3. Advance care planning           Plan:     1. Normocytic anemia  - Reviewing his labs in our system, he has had an anemia since 2007. It has remained relatively stable over time with a hemoglobin between 10 - 12 g/dL.  - he has had iron studies checked in the past. In 2013, his iron and iron saturation were decreased, but his total iron binding capacity was normal, and his ferritin was elevated. These findings are consistent with anemia of chronic disease/inflammation.  - I have ordered the following tests today: CBC, reticulocytes, pathologist interpretation differential, direct antiglobulin test, CRP, ESR, SPEP, immunofixation, ferritin, iron/TIBC  - My suspicion is that he has anemia of chronic disease/inflammation, possibly related to his morbid obesity.  - I will send a message through [facility] with the results of these tests. If needed, I will schedule a follow-up appointment.    2. Morbid obesity  - Body mass index is 42.49 kg/m².  - I have encouraged weight loss and exercise. I told him that adipose tissue is inflammatory and can possibly be a cause of his  anemia (anemia of chronic inflammation).    3. Advance care planning  - I initiated the process of advance care planning today and explained the importance of this process to the patient.  I introduced the concept of advance directives to the patient, as well. Then the patient received detailed information about the importance of designating a Health Care Power of  (HCPOA). He was also instructed to communicate with this person about their wishes for future healthcare, should he become sick and lose decision-making capacity. The patient has not previously appointed a HCPOA. After our discussion, the patient has decided to complete a HCPOA and has appointed his wife Brenda Savage ([phone]). The forms were completed and will be scanned into EPIC. I spent a total time of 16 minutes discussing this issue with the patient.    - I will send a message through [facility] with the results of these tests. If needed, I will schedule a follow-up appointment.    Howard Matos M.D.  Hematology/Oncology  [facility] - [address], [address]  [place] [address]  Phone: [phone]  Fax: [phone]

## 2019-02-20 ENCOUNTER — LAB VISIT (OUTPATIENT)
Dept: LAB | Facility: HOSPITAL | Age: 74
End: 2019-02-20
Attending: INTERNAL MEDICINE
Payer: MEDICARE

## 2019-02-20 ENCOUNTER — OFFICE VISIT (OUTPATIENT)
Dept: HEMATOLOGY/ONCOLOGY | Facility: CLINIC | Age: 74
End: 2019-02-20
Payer: MEDICARE

## 2019-02-20 VITALS
HEART RATE: 73 BPM | OXYGEN SATURATION: 95 % | DIASTOLIC BLOOD PRESSURE: 73 MMHG | TEMPERATURE: 98 F | BODY MASS INDEX: 42.61 KG/M2 | SYSTOLIC BLOOD PRESSURE: 131 MMHG | HEIGHT: 69 IN | WEIGHT: 287.69 LBS

## 2019-02-20 DIAGNOSIS — E66.01 MORBID OBESITY: ICD-10-CM

## 2019-02-20 DIAGNOSIS — Z71.89 ADVANCE CARE PLANNING: ICD-10-CM

## 2019-02-20 DIAGNOSIS — D64.9 NORMOCYTIC ANEMIA: ICD-10-CM

## 2019-02-20 DIAGNOSIS — D64.9 NORMOCYTIC ANEMIA: Primary | ICD-10-CM

## 2019-02-20 LAB
BASOPHILS # BLD AUTO: 0.04 K/UL
BASOPHILS NFR BLD: 0.4 %
CRP SERPL-MCNC: 18.3 MG/L
DAT IGG-SP REAG RBC-IMP: NORMAL
DIFFERENTIAL METHOD: ABNORMAL
EOSINOPHIL # BLD AUTO: 0.1 K/UL
EOSINOPHIL NFR BLD: 1.3 %
ERYTHROCYTE [DISTWIDTH] IN BLOOD BY AUTOMATED COUNT: 13.5 %
ERYTHROCYTE [SEDIMENTATION RATE] IN BLOOD BY WESTERGREN METHOD: 52 MM/HR
FERRITIN SERPL-MCNC: 523 NG/ML
HCT VFR BLD AUTO: 38.1 %
HGB BLD-MCNC: 12.3 G/DL
IRON SERPL-MCNC: 40 UG/DL
LYMPHOCYTES # BLD AUTO: 1.3 K/UL
LYMPHOCYTES NFR BLD: 13.2 %
MCH RBC QN AUTO: 27.8 PG
MCHC RBC AUTO-ENTMCNC: 32.3 G/DL
MCV RBC AUTO: 86 FL
MONOCYTES # BLD AUTO: 0.8 K/UL
MONOCYTES NFR BLD: 7.6 %
NEUTROPHILS # BLD AUTO: 7.8 K/UL
NEUTROPHILS NFR BLD: 77.3 %
PATH REV BLD -IMP: NORMAL
PLATELET # BLD AUTO: 222 K/UL
PMV BLD AUTO: 10.6 FL
RBC # BLD AUTO: 4.42 M/UL
RETICS/RBC NFR AUTO: 1.3 %
SATURATED IRON: 15 %
TOTAL IRON BINDING CAPACITY: 269 UG/DL
TRANSFERRIN SERPL-MCNC: 182 MG/DL
WBC # BLD AUTO: 10.1 K/UL

## 2019-02-20 PROCEDURE — 85652 RBC SED RATE AUTOMATED: CPT

## 2019-02-20 PROCEDURE — 3075F SYST BP GE 130 - 139MM HG: CPT | Mod: CPTII,S$GLB,, | Performed by: INTERNAL MEDICINE

## 2019-02-20 PROCEDURE — 99497 PR ADVNCD CARE PLAN 30 MIN: ICD-10-PCS | Mod: S$GLB,,, | Performed by: INTERNAL MEDICINE

## 2019-02-20 PROCEDURE — 82728 ASSAY OF FERRITIN: CPT

## 2019-02-20 PROCEDURE — 86140 C-REACTIVE PROTEIN: CPT

## 2019-02-20 PROCEDURE — 99999 PR PBB SHADOW E&M-EST. PATIENT-LVL III: CPT | Mod: PBBFAC,,, | Performed by: INTERNAL MEDICINE

## 2019-02-20 PROCEDURE — 86334 PATHOLOGIST INTERPRETATION IFE: ICD-10-PCS | Mod: 26,,, | Performed by: PATHOLOGY

## 2019-02-20 PROCEDURE — 85060 BLOOD SMEAR INTERPRETATION: CPT | Mod: ,,, | Performed by: PATHOLOGY

## 2019-02-20 PROCEDURE — 99499 RISK ADDL DX/OHS AUDIT: ICD-10-PCS | Mod: S$GLB,,, | Performed by: INTERNAL MEDICINE

## 2019-02-20 PROCEDURE — 99497 ADVNCD CARE PLAN 30 MIN: CPT | Mod: S$GLB,,, | Performed by: INTERNAL MEDICINE

## 2019-02-20 PROCEDURE — 83540 ASSAY OF IRON: CPT

## 2019-02-20 PROCEDURE — 3078F DIAST BP <80 MM HG: CPT | Mod: CPTII,S$GLB,, | Performed by: INTERNAL MEDICINE

## 2019-02-20 PROCEDURE — 3075F PR MOST RECENT SYSTOLIC BLOOD PRESS GE 130-139MM HG: ICD-10-PCS | Mod: CPTII,S$GLB,, | Performed by: INTERNAL MEDICINE

## 2019-02-20 PROCEDURE — 85060 PATHOLOGIST REVIEW: ICD-10-PCS | Mod: ,,, | Performed by: PATHOLOGY

## 2019-02-20 PROCEDURE — 99499 UNLISTED E&M SERVICE: CPT | Mod: S$GLB,,, | Performed by: INTERNAL MEDICINE

## 2019-02-20 PROCEDURE — 99204 PR OFFICE/OUTPT VISIT, NEW, LEVL IV, 45-59 MIN: ICD-10-PCS | Mod: S$GLB,,, | Performed by: INTERNAL MEDICINE

## 2019-02-20 PROCEDURE — 1101F PR PT FALLS ASSESS DOC 0-1 FALLS W/OUT INJ PAST YR: ICD-10-PCS | Mod: CPTII,S$GLB,, | Performed by: INTERNAL MEDICINE

## 2019-02-20 PROCEDURE — 99999 PR PBB SHADOW E&M-EST. PATIENT-LVL III: ICD-10-PCS | Mod: PBBFAC,,, | Performed by: INTERNAL MEDICINE

## 2019-02-20 PROCEDURE — 36415 COLL VENOUS BLD VENIPUNCTURE: CPT

## 2019-02-20 PROCEDURE — 85025 COMPLETE CBC W/AUTO DIFF WBC: CPT

## 2019-02-20 PROCEDURE — 99204 OFFICE O/P NEW MOD 45 MIN: CPT | Mod: S$GLB,,, | Performed by: INTERNAL MEDICINE

## 2019-02-20 PROCEDURE — 3078F PR MOST RECENT DIASTOLIC BLOOD PRESSURE < 80 MM HG: ICD-10-PCS | Mod: CPTII,S$GLB,, | Performed by: INTERNAL MEDICINE

## 2019-02-20 PROCEDURE — 86334 IMMUNOFIX E-PHORESIS SERUM: CPT

## 2019-02-20 PROCEDURE — 1101F PT FALLS ASSESS-DOCD LE1/YR: CPT | Mod: CPTII,S$GLB,, | Performed by: INTERNAL MEDICINE

## 2019-02-20 PROCEDURE — 86880 COOMBS TEST DIRECT: CPT

## 2019-02-20 PROCEDURE — 84165 PATHOLOGIST INTERPRETATION SPE: ICD-10-PCS | Mod: 26,,, | Performed by: PATHOLOGY

## 2019-02-20 PROCEDURE — 84165 PROTEIN E-PHORESIS SERUM: CPT | Mod: 26,,, | Performed by: PATHOLOGY

## 2019-02-20 PROCEDURE — 85045 AUTOMATED RETICULOCYTE COUNT: CPT

## 2019-02-20 PROCEDURE — 86334 IMMUNOFIX E-PHORESIS SERUM: CPT | Mod: 26,,, | Performed by: PATHOLOGY

## 2019-02-20 PROCEDURE — 84165 PROTEIN E-PHORESIS SERUM: CPT

## 2019-02-20 NOTE — LETTER
February 20, 2019      Lucie Munguia MD  [address]  [address]  [place] [address]           [facility] Hematology Oncology  [address]  [place] [address]  Phone: [phone]          Patient: Lopez Savage   MR Number: [id number]   YOB: 1945   Date of Visit: 2/20/2019       Dear Dr. Lucie Munguia:    Thank you for referring Lopez Savage to me for evaluation. Attached you will find relevant portions of my assessment and plan of care.    If you have questions, please do not hesitate to call me. I look forward to following Lopez Savage along with you.    Sincerely,    Howard Matos MD    Enclosure  CC:  No Recipients    If you would like to receive this communication electronically, please contact [email or web address] or [phone] to request more information on [employer] access.    For providers and/or their staff who would like to refer a patient to [facility], please contact us through our one-stop-shop provider referral line, [facility] , at [phone].    If you feel you have received this communication in error or would no longer like to receive these types of communications, please e-mail [email or web address]

## 2019-02-21 LAB
ALBUMIN SERPL ELPH-MCNC: 3.46 G/DL
ALPHA1 GLOB SERPL ELPH-MCNC: 0.38 G/DL
ALPHA2 GLOB SERPL ELPH-MCNC: 0.72 G/DL
B-GLOBULIN SERPL ELPH-MCNC: 0.83 G/DL
GAMMA GLOB SERPL ELPH-MCNC: 1.31 G/DL
INTERPRETATION SERPL IFE-IMP: NORMAL
PATH REV BLD -IMP: NORMAL
PROT SERPL-MCNC: 6.7 G/DL

## 2019-02-22 ENCOUNTER — PATIENT MESSAGE (OUTPATIENT)
Dept: HEMATOLOGY/ONCOLOGY | Facility: CLINIC | Age: 74
End: 2019-02-22

## 2019-02-22 ENCOUNTER — TELEPHONE (OUTPATIENT)
Dept: HEMATOLOGY/ONCOLOGY | Facility: CLINIC | Age: 74
End: 2019-02-22

## 2019-02-22 LAB
PATHOLOGIST INTERPRETATION IFE: NORMAL
PATHOLOGIST INTERPRETATION SPE: NORMAL

## 2019-02-23 ENCOUNTER — PATIENT MESSAGE (OUTPATIENT)
Dept: HEMATOLOGY/ONCOLOGY | Facility: CLINIC | Age: 74
End: 2019-02-23

## 2019-02-27 ENCOUNTER — PATIENT MESSAGE (OUTPATIENT)
Dept: NEUROLOGY | Facility: CLINIC | Age: 74
End: 2019-02-27

## 2019-04-26 ENCOUNTER — OFFICE VISIT (OUTPATIENT)
Dept: FAMILY MEDICINE | Facility: CLINIC | Age: 74
End: 2019-04-26
Payer: MEDICARE

## 2019-04-26 VITALS
HEART RATE: 67 BPM | BODY MASS INDEX: 42.56 KG/M2 | OXYGEN SATURATION: 97 % | WEIGHT: 287.38 LBS | HEIGHT: 69 IN | DIASTOLIC BLOOD PRESSURE: 74 MMHG | RESPIRATION RATE: 18 BRPM | SYSTOLIC BLOOD PRESSURE: 118 MMHG | TEMPERATURE: 98 F

## 2019-04-26 DIAGNOSIS — I10 ESSENTIAL HYPERTENSION: ICD-10-CM

## 2019-04-26 DIAGNOSIS — J06.9 UPPER RESPIRATORY TRACT INFECTION, UNSPECIFIED TYPE: ICD-10-CM

## 2019-04-26 DIAGNOSIS — R42 VERTIGO: Primary | ICD-10-CM

## 2019-04-26 DIAGNOSIS — Z23 NEED FOR VACCINATION AGAINST STREPTOCOCCUS PNEUMONIAE USING PNEUMOCOCCAL CONJUGATE VACCINE 13: ICD-10-CM

## 2019-04-26 PROCEDURE — 99999 PR PBB SHADOW E&M-EST. PATIENT-LVL III: CPT | Mod: PBBFAC,,, | Performed by: FAMILY MEDICINE

## 2019-04-26 PROCEDURE — 1101F PT FALLS ASSESS-DOCD LE1/YR: CPT | Mod: CPTII,S$GLB,, | Performed by: FAMILY MEDICINE

## 2019-04-26 PROCEDURE — 3074F SYST BP LT 130 MM HG: CPT | Mod: CPTII,S$GLB,, | Performed by: FAMILY MEDICINE

## 2019-04-26 PROCEDURE — 90670 PCV13 VACCINE IM: CPT | Mod: S$GLB,,, | Performed by: FAMILY MEDICINE

## 2019-04-26 PROCEDURE — 99214 OFFICE O/P EST MOD 30 MIN: CPT | Mod: 25,S$GLB,, | Performed by: FAMILY MEDICINE

## 2019-04-26 PROCEDURE — 1101F PR PT FALLS ASSESS DOC 0-1 FALLS W/OUT INJ PAST YR: ICD-10-PCS | Mod: CPTII,S$GLB,, | Performed by: FAMILY MEDICINE

## 2019-04-26 PROCEDURE — G0009 PNEUMOCOCCAL CONJUGATE VACCINE 13-VALENT LESS THAN 5YO & GREATER THAN: ICD-10-PCS | Mod: S$GLB,,, | Performed by: FAMILY MEDICINE

## 2019-04-26 PROCEDURE — 90670 PNEUMOCOCCAL CONJUGATE VACCINE 13-VALENT LESS THAN 5YO & GREATER THAN: ICD-10-PCS | Mod: S$GLB,,, | Performed by: FAMILY MEDICINE

## 2019-04-26 PROCEDURE — 3078F PR MOST RECENT DIASTOLIC BLOOD PRESSURE < 80 MM HG: ICD-10-PCS | Mod: CPTII,S$GLB,, | Performed by: FAMILY MEDICINE

## 2019-04-26 PROCEDURE — G0009 ADMIN PNEUMOCOCCAL VACCINE: HCPCS | Mod: S$GLB,,, | Performed by: FAMILY MEDICINE

## 2019-04-26 PROCEDURE — 99214 PR OFFICE/OUTPT VISIT, EST, LEVL IV, 30-39 MIN: ICD-10-PCS | Mod: 25,S$GLB,, | Performed by: FAMILY MEDICINE

## 2019-04-26 PROCEDURE — 99999 PR PBB SHADOW E&M-EST. PATIENT-LVL III: ICD-10-PCS | Mod: PBBFAC,,, | Performed by: FAMILY MEDICINE

## 2019-04-26 PROCEDURE — 3074F PR MOST RECENT SYSTOLIC BLOOD PRESSURE < 130 MM HG: ICD-10-PCS | Mod: CPTII,S$GLB,, | Performed by: FAMILY MEDICINE

## 2019-04-26 PROCEDURE — 3078F DIAST BP <80 MM HG: CPT | Mod: CPTII,S$GLB,, | Performed by: FAMILY MEDICINE

## 2019-04-26 RX ORDER — MECLIZINE HCL 12.5 MG 12.5 MG/1
12.5 TABLET ORAL 3 TIMES DAILY
Qty: 270 TABLET | Refills: 0 | Status: SHIPPED | OUTPATIENT
Start: 2019-04-26 | End: 2020-01-01 | Stop reason: SDUPTHER

## 2019-04-26 NOTE — PROGRESS NOTES
HPI:  Lopez Savage is a 73 y.o. year old male that  presents with concern about Vertigo that he has had off and on for 2 years. He just recently ran out of the prescription recently and needs a refill.  Chief Complaint   Patient presents with    Dizziness     was diagnosis 2 years with vertigo need RX for meclizine    Cough    URI     having a sorethroat/itchy eyes   .           Past Medical History:   Diagnosis Date    Allergy     Asthma     BPH (benign prostatic hyperplasia)     CHRONIC BRONCHITIS     GERD (gastroesophageal reflux disease)     HTN (hypertension)     HTN (hypertension) 10/15/2014    Joint pain     Mitral regurgitation     3-4+ under surveillance CT surgery    Mitral regurgitation     Mitral valve regurgitation     Obesity     Osteoarthritis of knee     Trouble in sleeping     Ulcerative colitis     Urinary tract infection     Valvular regurgitation      Social History     Socioeconomic History    Marital status:      Spouse name: Not on file    Number of children: Not on file    Years of education: Not on file    Highest education level: Not on file   Occupational History    Not on file   Social Needs    Financial resource strain: Not on file    Food insecurity:     Worry: Not on file     Inability: Not on file    Transportation needs:     Medical: Not on file     Non-medical: Not on file   Tobacco Use    Smoking status: Former Smoker     Packs/day: 2.00     Years: 6.00     Pack years: 12.00     Types: Cigarettes     Last attempt to quit: 1969     Years since quittin.3    Smokeless tobacco: Former User     Types: Chew    Tobacco comment: Quit all forms of tobacco in .   Substance and Sexual Activity    Alcohol use: No     Alcohol/week: 0.0 oz     Comment: remote hx of heavy alcohol use    Drug use: No    Sexual activity: Not Currently     Partners: Female   Lifestyle    Physical activity:     Days per week: Not on file     Minutes per  session: Not on file    Stress: Not on file   Relationships    Social connections:     Talks on phone: Not on file     Gets together: Not on file     Attends Alevism service: Not on file     Active member of club or organization: Not on file     Attends meetings of clubs or organizations: Not on file     Relationship status: Not on file   Other Topics Concern    Not on file   Social History Narrative    Not on file     Past Surgical History:   Procedure Laterality Date    BIOPSY-PENIS N/A 4/27/2016    Performed by [facility] at [facility] OR    CATHETERIZATION, HEART, LEFT N/A 3/7/2013    Performed by [facility] at [facility] CATH LAB    COLONOSCOPY Golytely N/A 10/11/2017    Performed by Shanae Duarte MD at [facility] ENDO    CYSTOSCOPY N/A 8/8/2018    Performed by [facility]an [facility]a [facility] at [facility] OR 1ST FLR    CYSTOSCOPY W/ RETROGRADES      with penile biopsy    DESTRUCTION-PROSTATE-TRANSURETHRAL N/A 8/8/2018    Performed by [facility] at [facility] OR [facility]    DILATION, URETHRA N/A 8/8/2018    Performed by [facility] at [facility] OR [facility]    EGD (ESOPHAGOGASTRODUODENOSCOPY) N/A 3/12/2014    Performed by Sh[facility]e [facility]arte MD at [facility] ENDO    ESOPHAGOGASTRODUODENOSCOPY (EGD) N/A 2/7/2018    Performed by Sh[facility]e [facility]arte MD at [facility] ENDO    HERNIA REPAIR Right 1956    inguinal    JOINT REPLACEMENT Bilateral     knees    MEATOPLASTY, URETHRA N/A 8/8/2018    Performed by [facility] at [facility] OR [facility]    Penile Biopsy      TONSILLECTOMY      TOTAL KNEE ARTHROPLASTY Bilateral     URETHROGRAM-RETROGRADE Bilateral 4/27/2016    Performed by Kris Garcia MD at [facility] OR     Family History   Problem Relation Age of Onset    Bone cancer Mother     Glaucoma Mother     Cataracts Mother     Cancer Mother         bone cancer    Kidney disease Father     COPD Sister     Heart disease Daughter         congenital    Other Daughter 2        fibr. tumor on bronchial tube;  "lung removed    Other Daughter         Bronchitis    Melanoma Neg Hx     Prostate cancer Neg Hx     Macular degeneration Neg Hx     Retinal detachment Neg Hx     Strabismus Neg Hx     Amblyopia Neg Hx     Blindness Neg Hx            Review of Systems  General ROS: negative for chills, fever or weight loss  ENT ROS: negative for epistaxis, sore throat or rhinorrhea  Respiratory ROS:pos cough, no shortness of breath, or wheezing  Cardiovascular ROS: no chest pain or dyspnea on exertion  Gastrointestinal ROS: no abdominal pain, change in bowel habits, or black/ bloody stools    Physical Exam:  /74 (BP Location: Left arm, Patient Position: Sitting, BP Method: Large (Manual))   Pulse 67   Temp 98.3 °F (36.8 °C) (Oral)   Resp 18   Ht 5' 9" (1.753 m)   Wt 130.3 kg (287 lb 5.9 oz)   SpO2 97%   BMI 42.44 kg/m²   General appearance: alert, cooperative, no distress  Constitutional:Oriented to person, place, and time.appears well-developed and well-nourished.  HEENT: Normocephalic, atraumatic, neck symmetrical, no nasal discharge, TM- clear bilaterally  Lungs: clear to auscultation bilaterally, no dullness to percussion bilaterally  Heart: regular rate and rhythm without rub; no displacement of the PMI , S1&S2 present  Abdomen: soft, non-tender; bowel sounds normoactive; no organomegaly  Physical Exam      LABS:    Complete Blood Count  Lab Results   Component Value Date    RBC 4.42 (L) 02/20/2019    HGB 12.3 (L) 02/20/2019    HCT 38.1 (L) 02/20/2019    MCV 86 02/20/2019    MCH 27.8 02/20/2019    MCHC 32.3 02/20/2019    RDW 13.5 02/20/2019     02/20/2019    MPV 10.6 02/20/2019    GRAN 7.8 (H) 02/20/2019    GRAN 77.3 (H) 02/20/2019    LYMPH 1.3 02/20/2019    LYMPH 13.2 (L) 02/20/2019    MONO 0.8 02/20/2019    MONO 7.6 02/20/2019    EOS 0.1 02/20/2019    BASO 0.04 02/20/2019    EOSINOPHIL 1.3 02/20/2019    BASOPHIL 0.4 02/20/2019    DIFFMETHOD Automated 02/20/2019       Comprehensive Metabolic " Panel  Lab Results   Component Value Date     (H) 02/12/2019    BUN 20 02/12/2019    CREATININE 1.2 02/12/2019     (L) 02/12/2019    K 4.2 02/12/2019     02/12/2019    PROT 7.0 02/12/2019    ALBUMIN 3.4 (L) 02/12/2019    BILITOT 0.6 02/12/2019    AST 17 02/12/2019    ALKPHOS 63 02/12/2019    CO2 24 02/12/2019    ALT 21 02/12/2019    ANIONGAP 8 02/12/2019    EGFRNONAA >60 02/12/2019    ESTGFRAFRICA >60 02/12/2019       LIPID  Lab Results   Component Value Date    CHOL 117 (L) 04/17/2018    HDL 30 (L) 04/17/2018         TSH  Lab Results   Component Value Date    TSH 2.308 04/17/2018       Current Outpatient Medications   Medication Sig Dispense Refill    albuterol (PROVENTIL HFA) 90 mcg/actuation inhaler Inhale 2 puffs into the lungs every 6 (six) hours as needed for Wheezing or Shortness of Breath. 1 Inhaler 3    betamethasone dipropionate (DIPROLENE) 0.05 % ointment Apply topically 2 (two) times daily. 45 g 1    cetirizine (ZYRTEC) 10 MG tablet Take 1 tablet (10 mg total) by mouth once daily.  0    donepezil (ARICEPT) 5 MG tablet Take 1 tablet (5 mg total) by mouth every evening. 90 tablet 0    fluticasone (FLONASE) 50 mcg/actuation nasal spray 2 sprays (100 mcg total) by Each Nare route once daily. 1 Bottle 2    fluticasone-salmeterol 250-50 mcg/dose (ADVAIR DISKUS) 250-50 mcg/dose diskus inhaler Inhale 1 puff into the lungs 2 (two) times daily. Controller 60 each 3    ibuprofen (ADVIL,MOTRIN) 600 MG tablet Take 1 tablet (600 mg total) by mouth every 6 (six) hours as needed for Pain. 60 tablet 2    losartan (COZAAR) 50 MG tablet TAKE ONE TABLET BY MOUTH EVERY DAY 90 tablet 2    omeprazole (PRILOSEC) 40 MG capsule       meclizine (ANTIVERT) 12.5 mg tablet Take 1 tablet (12.5 mg total) by mouth 3 (three) times daily. 270 tablet 0     No current facility-administered medications for this visit.        Assessment:    ICD-10-CM ICD-9-CM    1. Vertigo R42 780.4 meclizine (ANTIVERT) 12.5 mg  tablet   2. Upper respiratory tract infection, unspecified type J06.9 465.9    3. Need for vaccination against Streptococcus pneumoniae using pneumococcal conjugate vaccine 13 Z23 V03.82 (In Office Administered) Pneumococcal Conjugate Vaccine (13 Valent) (IM)         Plan:    Follow up in about 6 months (around 10/26/2019).          Lucie Munguia MD

## 2019-05-15 DIAGNOSIS — I10 ESSENTIAL HYPERTENSION: ICD-10-CM

## 2019-05-15 RX ORDER — LOSARTAN POTASSIUM 50 MG/1
50 TABLET ORAL DAILY
Qty: 90 TABLET | Refills: 0 | Status: SHIPPED | OUTPATIENT
Start: 2019-05-15 | End: 2019-08-23 | Stop reason: SDUPTHER

## 2019-06-11 ENCOUNTER — PATIENT MESSAGE (OUTPATIENT)
Dept: FAMILY MEDICINE | Facility: CLINIC | Age: 74
End: 2019-06-11

## 2019-06-12 ENCOUNTER — PATIENT MESSAGE (OUTPATIENT)
Dept: FAMILY MEDICINE | Facility: CLINIC | Age: 74
End: 2019-06-12

## 2019-06-13 RX ORDER — OMEPRAZOLE 40 MG/1
40 CAPSULE, DELAYED RELEASE ORAL EVERY MORNING
Qty: 90 CAPSULE | Refills: 0 | Status: SHIPPED | OUTPATIENT
Start: 2019-06-13 | End: 2020-01-01 | Stop reason: SDUPTHER

## 2019-07-29 ENCOUNTER — OFFICE VISIT (OUTPATIENT)
Dept: UROLOGY | Facility: CLINIC | Age: 74
End: 2019-07-29
Payer: MEDICARE

## 2019-07-29 VITALS
HEIGHT: 69 IN | BODY MASS INDEX: 42.65 KG/M2 | SYSTOLIC BLOOD PRESSURE: 111 MMHG | DIASTOLIC BLOOD PRESSURE: 66 MMHG | WEIGHT: 287.94 LBS | HEART RATE: 73 BPM

## 2019-07-29 DIAGNOSIS — R35.1 NOCTURIA: ICD-10-CM

## 2019-07-29 DIAGNOSIS — N48.0 BXO (BALANITIS XEROTICA OBLITERANS): Primary | ICD-10-CM

## 2019-07-29 DIAGNOSIS — N40.1 BENIGN PROSTATIC HYPERPLASIA WITH LOWER URINARY TRACT SYMPTOMS, SYMPTOM DETAILS UNSPECIFIED: ICD-10-CM

## 2019-07-29 PROCEDURE — 1101F PT FALLS ASSESS-DOCD LE1/YR: CPT | Mod: CPTII,S$GLB,, | Performed by: UROLOGY

## 2019-07-29 PROCEDURE — 3074F SYST BP LT 130 MM HG: CPT | Mod: CPTII,S$GLB,, | Performed by: UROLOGY

## 2019-07-29 PROCEDURE — 3078F DIAST BP <80 MM HG: CPT | Mod: CPTII,S$GLB,, | Performed by: UROLOGY

## 2019-07-29 PROCEDURE — 99499 RISK ADDL DX/OHS AUDIT: ICD-10-PCS | Mod: S$GLB,,, | Performed by: UROLOGY

## 2019-07-29 PROCEDURE — 1101F PR PT FALLS ASSESS DOC 0-1 FALLS W/OUT INJ PAST YR: ICD-10-PCS | Mod: CPTII,S$GLB,, | Performed by: UROLOGY

## 2019-07-29 PROCEDURE — 99214 OFFICE O/P EST MOD 30 MIN: CPT | Mod: S$GLB,,, | Performed by: UROLOGY

## 2019-07-29 PROCEDURE — 3078F PR MOST RECENT DIASTOLIC BLOOD PRESSURE < 80 MM HG: ICD-10-PCS | Mod: CPTII,S$GLB,, | Performed by: UROLOGY

## 2019-07-29 PROCEDURE — 99499 UNLISTED E&M SERVICE: CPT | Mod: S$GLB,,, | Performed by: UROLOGY

## 2019-07-29 PROCEDURE — 99999 PR PBB SHADOW E&M-EST. PATIENT-LVL III: CPT | Mod: PBBFAC,,, | Performed by: UROLOGY

## 2019-07-29 PROCEDURE — 99214 PR OFFICE/OUTPT VISIT, EST, LEVL IV, 30-39 MIN: ICD-10-PCS | Mod: S$GLB,,, | Performed by: UROLOGY

## 2019-07-29 PROCEDURE — 99999 PR PBB SHADOW E&M-EST. PATIENT-LVL III: ICD-10-PCS | Mod: PBBFAC,,, | Performed by: UROLOGY

## 2019-07-29 PROCEDURE — 3074F PR MOST RECENT SYSTOLIC BLOOD PRESSURE < 130 MM HG: ICD-10-PCS | Mod: CPTII,S$GLB,, | Performed by: UROLOGY

## 2019-07-29 RX ORDER — LIDOCAINE HYDROCHLORIDE 20 MG/ML
JELLY TOPICAL EVERY OTHER DAY
Qty: 15 APPLICATOR | Refills: 11 | Status: ON HOLD | OUTPATIENT
Start: 2019-07-29 | End: 2020-01-01

## 2019-07-29 RX ORDER — BETAMETHASONE DIPROPIONATE 0.5 MG/G
OINTMENT TOPICAL 2 TIMES DAILY
Qty: 45 G | Refills: 3 | Status: SHIPPED | OUTPATIENT
Start: 2019-07-29 | End: 2020-01-01 | Stop reason: CLARIF

## 2019-07-29 NOTE — PROGRESS NOTES
CHIEF COMPLAINT:    Mr. Savage is a 73 y.o. male is here after urethral dilation and meatoplasty for BXO and Rezum Theapy for BPH.  PRESENTING ILLNESS:    Lopez Savage is a 73 y.o. male has a hx of severe BXO of the meatus.  He ran out of Urojet ( lidocaine jelly) and has not able to do urethral/meatal dilation.  States that he has to sit down and urinate.  C/o nocturia 5 x, day time frequency every 2 hours.    8/8/18 Procedure(s) Performed w/ Dr. Cuenca:   1.  Urethral dilation  2.  meatoplasty   3.  Cystoscopy   4. Transurethral destruction of prostate; radiofrequency thermotherapy  Findings:    - BXO of glans with meatal stricture, approximately 12 fr  - meatus dilated to 24 fr, but still appeared tight, so meatoplasty was performed   - multiple scarred areas of urethra including pendulous urethra and bulbar urethra, narrowest appeared approximately 16 fr in the pendulous urethra, but a 22 fr scope passed this with mild resistance   - mildly enlarged lateral lobes of prostate, treated with Rezum x 2     Following his surgery, he did well with better voiding.  He does not need to dilate the meatus anymore.  Has been using a topical ointment BID so far.  He is happy with his urine flow.  Denies dysuria.    REVIEW OF SYSTEMS:    Review of Systems    Constitutional: Negative for fever and chills.   HENT: Negative for hearing loss.   Eyes: Negative for visual disturbance.   Respiratory: Negative for shortness of breath.   Cardiovascular: Negative for chest pain.   Gastrointestinal: Negative for nausea, vomiting, and constipation.   Genitourinary:  See above  Neurological: Negative for dizziness.   Hematological: Does not bruise/bleed easily.   Psychiatric/Behavioral: Negative for confusion.       PATIENT HISTORY:    Past Medical History:   Diagnosis Date    Allergy     Asthma     BPH (benign prostatic hyperplasia)     CHRONIC BRONCHITIS     GERD (gastroesophageal reflux disease)     HTN (hypertension)     HTN  (hypertension) 10/15/2014    Joint pain     Mitral regurgitation     3-4+ under surveillance CT surgery    Mitral regurgitation     Mitral valve regurgitation     Obesity     Osteoarthritis of knee     Trouble in sleeping     Ulcerative colitis     Urinary tract infection     Valvular regurgitation        Past Surgical History:   Procedure Laterality Date    BIOPSY-PENIS N/A 4/27/2016    Performed by [facility] at [facility] OR    CATHETERIZATION, HEART, LEFT N/A 3/7/2013    Performed by [facility] CATH LAB    COLONOSCOPY Golytely N/A 10/11/2017    Performed by [facility]e Duarte MD at [facility] ENDO    CYSTOSCOPY N/A 8/8/2018    Performed by [facility]an [facility] MD at [facility] OR 1ST [facility]LR    CYSTOSCOPY W/ RETROGRADES      with penile biopsy    DESTRUCTION-PROSTATE-TRANSURETHRAL N/A 8/8/2018    Performed [facility] at [facility] OR 1ST [facility]LR    DILATION, URETHRA N/A 8/8/2018    Performed by [facility] at [facility] OR 1ST FLR    EGD (ESOPHAGOGASTRODUODENOSCOPY) N/A 3/12/2014    Performed by [facility]e [facility]arte MD at [facility] ENDO    ESOPHAGOGASTRODUODENOSCOPY (EGD) N/A 2/7/2018    Performed by Sh[facility]e Duarte MD at [facility] ENDO    HERNIA REPAIR Right 1956    inguinal    JOINT REPLACEMENT Bilateral     knees    MEATOPLASTY, URETHRA N/A 8/8/2018    [facility] at [facility] OR [facility]    Penile Biopsy      TONSILLECTOMY      TOTAL KNEE ARTHROPLASTY Bilateral     URETHROGRAM-RETROGRADE Bilateral 4/27/2016    Performed by Kris Garcia MD at [facility] OR       Family History   Problem Relation Age of Onset    Bone cancer Mother     Glaucoma Mother     Cataracts Mother     Cancer Mother         bone cancer    Kidney disease Father     COPD Sister     Heart disease Daughter         congenital    Other Daughter 2        fibr. tumor on bronchial tube; lung removed    Other Daughter         Bronchitis    Melanoma Neg Hx     Prostate cancer Neg Hx      Macular degeneration Neg Hx     Retinal detachment Neg Hx     Strabismus Neg Hx     Amblyopia Neg Hx     Blindness Neg Hx        Social History     Socioeconomic History    Marital status:      Spouse name: Not on file    Number of children: Not on file    Years of education: Not on file    Highest education level: Not on file   Occupational History    Not on file   Social Needs    Financial resource strain: Not on file    Food insecurity:     Worry: Not on file     Inability: Not on file    Transportation needs:     Medical: Not on file     Non-medical: Not on file   Tobacco Use    Smoking status: Former Smoker     Packs/day: 2.00     Years: 6.00     Pack years: 12.00     Types: Cigarettes     Last attempt to quit: 1969     Years since quittin.6    Smokeless tobacco: Former User     Types: Chew    Tobacco comment: Quit all forms of tobacco in .   Substance and Sexual Activity    Alcohol use: No     Alcohol/week: 0.0 oz     Comment: remote hx of heavy alcohol use    Drug use: No    Sexual activity: Not Currently     Partners: Female   Lifestyle    Physical activity:     Days per week: Not on file     Minutes per session: Not on file    Stress: Not on file   Relationships    Social connections:     Talks on phone: Not on file     Gets together: Not on file     Attends Christianity service: Not on file     Active member of club or organization: Not on file     Attends meetings of clubs or organizations: Not on file     Relationship status: Not on file   Other Topics Concern    Not on file   Social History Narrative    Not on file       Allergies:  Codeine and Tetanus vaccines and toxoid    Medications:    Current Outpatient Medications:     albuterol (PROVENTIL HFA) 90 mcg/actuation inhaler, Inhale 2 puffs into the lungs every 6 (six) hours as needed for Wheezing or Shortness of Breath., Disp: 1 Inhaler, Rfl: 3    betamethasone dipropionate (DIPROLENE) 0.05 % ointment, Apply  topically 2 (two) times daily., Disp: 45 g, Rfl: 3    cetirizine (ZYRTEC) 10 MG tablet, Take 1 tablet (10 mg total) by mouth once daily., Disp: , Rfl: 0    donepezil (ARICEPT) 5 MG tablet, Take 1 tablet (5 mg total) by mouth every evening., Disp: 90 tablet, Rfl: 0    fluticasone (FLONASE) 50 mcg/actuation nasal spray, 2 sprays (100 mcg total) by Each Nare route once daily., Disp: 1 Bottle, Rfl: 2    fluticasone-salmeterol 250-50 mcg/dose (ADVAIR DISKUS) 250-50 mcg/dose diskus inhaler, Inhale 1 puff into the lungs 2 (two) times daily. Controller, Disp: 60 each, Rfl: 3    ibuprofen (ADVIL,MOTRIN) 600 MG tablet, Take 1 tablet (600 mg total) by mouth every 6 (six) hours as needed for Pain., Disp: 60 tablet, Rfl: 2    losartan (COZAAR) 50 MG tablet, Take 1 tablet (50 mg total) by mouth once daily., Disp: 90 tablet, Rfl: 0    meclizine (ANTIVERT) 12.5 mg tablet, Take 1 tablet (12.5 mg total) by mouth 3 (three) times daily., Disp: 270 tablet, Rfl: 0    omeprazole (PRILOSEC) 40 MG capsule, Take 1 capsule (40 mg total) by mouth every morning., Disp: 90 capsule, Rfl: 0    lidocaine HCl 2% (XYLOCAINE) 2 % JelP urojet, by Mucous Membrane route every other day. 5 ml Urojet to apply into the urethra for each urethral meatal dilation, Disp: 15 applicator, Rfl: 11    PHYSICAL EXAMINATION:    Constitutional: He is oriented to person, place, and time. He appears well-developed and well-nourished.  He is in no apparent distress.    Neck: No tracheal deviation present.     Cardiovascular: Normal rate.      Pulmonary/Chest: Effort normal. No respiratory distress.     Abdominal:  He exhibits no distension.      Neurological: He is alert and oriented to person, place, and time.     Skin: Skin is warm and dry.     Extremities: No pitting edema noted in lower extremities bilaterally    Psych: Cooperative with normal affect.    Genitourinary: The penis is circumcised. The urethral meatus is healed.  Although his meatal opening looks  better, stenosis appears to come back.   The scrotum is normal in size and contour.    Catheter intact with orange urine to drainage bag.     Physical Exam      LABS:    Lab Results   Component Value Date    PSA 0.10 04/26/2018    PSA 0.07 07/09/2015    PSA 0.20 08/06/2013       IMPRESSION:    BXO (balanitis xerotica obliterans)  -     lidocaine HCl 2% (XYLOCAINE) 2 % JelP urojet; by Mucous Membrane route every other day. 5 ml Urojet to apply into the urethra for each urethral meatal dilation  Dispense: 15 applicator; Refill: 11  -     betamethasone dipropionate (DIPROLENE) 0.05 % ointment; Apply topically 2 (two) times daily.  Dispense: 45 g; Refill: 3    Nocturia    Benign prostatic hyperplasia with lower urinary tract symptoms, symptom details unspecified      PLAN:    recommend meatal dilation instructed after using xylocaine ( Urojet) every other day.  We tried weekly dilation but his meatal stricture returned quickly.    If not improving his urine flow, then we will plan cysto under anesthesia.  Use betamethasone ointment to the meatus BID to prevent recurrent meatal stenosis from BXO.  I spent 25 minutes with the patient of which more than half was spent in direct consultation with the patient in regards to our treatment and plan.      Follow up:  Follow up in about 6 months (around 1/29/2020).

## 2019-08-23 DIAGNOSIS — I10 ESSENTIAL HYPERTENSION: ICD-10-CM

## 2019-08-23 RX ORDER — LOSARTAN POTASSIUM 50 MG/1
50 TABLET ORAL DAILY
Qty: 90 TABLET | Refills: 0 | Status: SHIPPED | OUTPATIENT
Start: 2019-08-23 | End: 2019-10-02 | Stop reason: SDUPTHER

## 2019-09-25 ENCOUNTER — TELEPHONE (OUTPATIENT)
Dept: FAMILY MEDICINE | Facility: CLINIC | Age: 74
End: 2019-09-25

## 2019-09-25 NOTE — TELEPHONE ENCOUNTER
----- Message from Emma Gan sent at 9/25/2019 11:57 AM CDT -----  Contact: [phone]  Pt is requesting a callback concerning a recall on bp medicine.

## 2019-09-25 NOTE — TELEPHONE ENCOUNTER
Spoke to Noemi at [facility], his lot of the Losartan has not been recalled. Pt aware asked if he still wanted to change he stated no he is fine

## 2019-10-02 ENCOUNTER — TELEPHONE (OUTPATIENT)
Dept: FAMILY MEDICINE | Facility: CLINIC | Age: 74
End: 2019-10-02

## 2019-10-02 ENCOUNTER — OFFICE VISIT (OUTPATIENT)
Dept: FAMILY MEDICINE | Facility: CLINIC | Age: 74
End: 2019-10-02
Payer: MEDICARE

## 2019-10-02 VITALS
DIASTOLIC BLOOD PRESSURE: 80 MMHG | SYSTOLIC BLOOD PRESSURE: 128 MMHG | HEART RATE: 79 BPM | BODY MASS INDEX: 42.5 KG/M2 | HEIGHT: 69 IN | WEIGHT: 286.94 LBS | OXYGEN SATURATION: 97 % | TEMPERATURE: 98 F

## 2019-10-02 DIAGNOSIS — K21.9 GASTROESOPHAGEAL REFLUX DISEASE WITHOUT ESOPHAGITIS: ICD-10-CM

## 2019-10-02 DIAGNOSIS — N40.1 BENIGN PROSTATIC HYPERPLASIA WITH LOWER URINARY TRACT SYMPTOMS, SYMPTOM DETAILS UNSPECIFIED: ICD-10-CM

## 2019-10-02 DIAGNOSIS — F02.80 EARLY ONSET ALZHEIMER'S DEMENTIA WITHOUT BEHAVIORAL DISTURBANCE: Primary | ICD-10-CM

## 2019-10-02 DIAGNOSIS — I10 ESSENTIAL HYPERTENSION: ICD-10-CM

## 2019-10-02 DIAGNOSIS — F41.9 ANXIETY: ICD-10-CM

## 2019-10-02 DIAGNOSIS — G30.0 EARLY ONSET ALZHEIMER'S DEMENTIA WITHOUT BEHAVIORAL DISTURBANCE: Primary | ICD-10-CM

## 2019-10-02 DIAGNOSIS — E78.2 HYPERLIPIDEMIA, MIXED: ICD-10-CM

## 2019-10-02 DIAGNOSIS — Z12.5 SCREENING FOR MALIGNANT NEOPLASM OF PROSTATE: ICD-10-CM

## 2019-10-02 DIAGNOSIS — J30.9 ALLERGIC RHINITIS, UNSPECIFIED SEASONALITY, UNSPECIFIED TRIGGER: ICD-10-CM

## 2019-10-02 DIAGNOSIS — I34.0 NON-RHEUMATIC MITRAL REGURGITATION: ICD-10-CM

## 2019-10-02 DIAGNOSIS — Z23 NEED FOR PROPHYLACTIC VACCINATION AND INOCULATION AGAINST INFLUENZA: ICD-10-CM

## 2019-10-02 DIAGNOSIS — E66.01 MORBID OBESITY WITH BMI OF 40.0-44.9, ADULT: ICD-10-CM

## 2019-10-02 PROBLEM — R31.29 HEMATURIA, MICROSCOPIC: Status: RESOLVED | Noted: 2018-07-02 | Resolved: 2019-10-02

## 2019-10-02 PROBLEM — N13.8 BPH WITH URINARY OBSTRUCTION: Status: RESOLVED | Noted: 2018-07-09 | Resolved: 2019-10-02

## 2019-10-02 PROBLEM — G30.9 ALZHEIMER'S DISEASE: Status: ACTIVE | Noted: 2017-03-17

## 2019-10-02 PROBLEM — N48.0 BXO (BALANITIS XEROTICA OBLITERANS): Status: RESOLVED | Noted: 2018-07-02 | Resolved: 2019-10-02

## 2019-10-02 PROBLEM — N35.919 URETHRAL STRICTURE: Status: RESOLVED | Noted: 2018-07-02 | Resolved: 2019-10-02

## 2019-10-02 PROBLEM — N39.0 ACUTE UTI: Status: RESOLVED | Noted: 2017-03-17 | Resolved: 2019-10-02

## 2019-10-02 PROBLEM — L30.9 DERMATITIS: Status: RESOLVED | Noted: 2017-06-12 | Resolved: 2019-10-02

## 2019-10-02 PROBLEM — R05.9 COUGH: Status: RESOLVED | Noted: 2017-04-28 | Resolved: 2019-10-02

## 2019-10-02 PROBLEM — M25.571 ARTHRALGIA OF RIGHT FOOT: Status: RESOLVED | Noted: 2017-04-28 | Resolved: 2019-10-02

## 2019-10-02 PROBLEM — D64.9 NORMOCYTIC ANEMIA: Status: RESOLVED | Noted: 2019-02-19 | Resolved: 2019-10-02

## 2019-10-02 PROBLEM — M25.50 POLYARTHRALGIA: Status: RESOLVED | Noted: 2017-01-27 | Resolved: 2019-10-02

## 2019-10-02 PROBLEM — R42 VERTIGO: Status: RESOLVED | Noted: 2017-08-12 | Resolved: 2019-10-02

## 2019-10-02 PROCEDURE — 99499 RISK ADDL DX/OHS AUDIT: ICD-10-PCS | Mod: S$GLB,,, | Performed by: INTERNAL MEDICINE

## 2019-10-02 PROCEDURE — 3074F PR MOST RECENT SYSTOLIC BLOOD PRESSURE < 130 MM HG: ICD-10-PCS | Mod: CPTII,S$GLB,, | Performed by: INTERNAL MEDICINE

## 2019-10-02 PROCEDURE — 3079F PR MOST RECENT DIASTOLIC BLOOD PRESSURE 80-89 MM HG: ICD-10-PCS | Mod: CPTII,S$GLB,, | Performed by: INTERNAL MEDICINE

## 2019-10-02 PROCEDURE — 99499 UNLISTED E&M SERVICE: CPT | Mod: S$GLB,,, | Performed by: INTERNAL MEDICINE

## 2019-10-02 PROCEDURE — 99999 PR PBB SHADOW E&M-EST. PATIENT-LVL III: CPT | Mod: PBBFAC,,, | Performed by: INTERNAL MEDICINE

## 2019-10-02 PROCEDURE — 99999 PR PBB SHADOW E&M-EST. PATIENT-LVL III: ICD-10-PCS | Mod: PBBFAC,,, | Performed by: INTERNAL MEDICINE

## 2019-10-02 PROCEDURE — 3079F DIAST BP 80-89 MM HG: CPT | Mod: CPTII,S$GLB,, | Performed by: INTERNAL MEDICINE

## 2019-10-02 PROCEDURE — 1101F PT FALLS ASSESS-DOCD LE1/YR: CPT | Mod: CPTII,S$GLB,, | Performed by: INTERNAL MEDICINE

## 2019-10-02 PROCEDURE — 99214 PR OFFICE/OUTPT VISIT, EST, LEVL IV, 30-39 MIN: ICD-10-PCS | Mod: 25,S$GLB,, | Performed by: INTERNAL MEDICINE

## 2019-10-02 PROCEDURE — 99214 OFFICE O/P EST MOD 30 MIN: CPT | Mod: 25,S$GLB,, | Performed by: INTERNAL MEDICINE

## 2019-10-02 PROCEDURE — G0008 FLU VACCINE - HIGH DOSE (65+) PRESERVATIVE FREE IM: ICD-10-PCS | Mod: S$GLB,,, | Performed by: INTERNAL MEDICINE

## 2019-10-02 PROCEDURE — 90662 FLU VACCINE - HIGH DOSE (65+) PRESERVATIVE FREE IM: ICD-10-PCS | Mod: S$GLB,,, | Performed by: INTERNAL MEDICINE

## 2019-10-02 PROCEDURE — 90662 IIV NO PRSV INCREASED AG IM: CPT | Mod: S$GLB,,, | Performed by: INTERNAL MEDICINE

## 2019-10-02 PROCEDURE — 1101F PR PT FALLS ASSESS DOC 0-1 FALLS W/OUT INJ PAST YR: ICD-10-PCS | Mod: CPTII,S$GLB,, | Performed by: INTERNAL MEDICINE

## 2019-10-02 PROCEDURE — 3074F SYST BP LT 130 MM HG: CPT | Mod: CPTII,S$GLB,, | Performed by: INTERNAL MEDICINE

## 2019-10-02 PROCEDURE — G0008 ADMIN INFLUENZA VIRUS VAC: HCPCS | Mod: S$GLB,,, | Performed by: INTERNAL MEDICINE

## 2019-10-02 RX ORDER — DONEPEZIL HYDROCHLORIDE 10 MG/1
10 TABLET, FILM COATED ORAL DAILY
Qty: 90 TABLET | Refills: 3 | Status: ON HOLD | OUTPATIENT
Start: 2019-10-02 | End: 2020-01-01 | Stop reason: SDUPTHER

## 2019-10-02 RX ORDER — LOSARTAN POTASSIUM 50 MG/1
50 TABLET ORAL DAILY
Qty: 90 TABLET | Refills: 3 | Status: SHIPPED | OUTPATIENT
Start: 2019-10-02 | End: 2020-01-01

## 2019-10-02 RX ORDER — ESCITALOPRAM OXALATE 5 MG/1
5 TABLET ORAL DAILY
Qty: 30 TABLET | Refills: 11 | Status: SHIPPED | OUTPATIENT
Start: 2019-10-02 | End: 2020-01-01

## 2019-10-02 NOTE — PROGRESS NOTES
[facility] Note    Chief Complaint      Chief Complaint   Patient presents with    Establish Care    Sore Throat    Left ear itching    Anxiety       History of Present Illness      Lopez Savage is a 74 y.o. male with chronic conditions of HTN, GERD, dementia who presents today for: establish care and review chronic conditions.  Complains of left external ear canal pruritus.  Also complains of sore throat and sinus congestion.  Complains of anxiety.    HTN: BP at goal on losartan.    GERD: controlled on omeprazole.  No recent exacerbation.  Dementia: On aricept but has not seen any significant improvement.   BPH, urethral strictures: Sees Dr. Cuenca.  Has been dilating urethra meatus at home.      Past Medical History:  Past Medical History:   Diagnosis Date    Allergy     Asthma     BPH (benign prostatic hyperplasia)     CHRONIC BRONCHITIS     GERD (gastroesophageal reflux disease)     HTN (hypertension)     HTN (hypertension) 10/15/2014    Joint pain     Mitral regurgitation     3-4+ under surveillance CT surgery    Mitral regurgitation     Mitral valve regurgitation     Obesity     Osteoarthritis of knee     Trouble in sleeping     Ulcerative colitis     Urinary tract infection     Valvular regurgitation        Past Surgical History:   has a past surgical history that includes Total knee arthroplasty (Bilateral); Tonsillectomy; Penile Biopsy; Cystoscopy w/ retrogrades; Colonoscopy (N/A, 10/11/2017); Cystoscopy (N/A, 8/8/2018); Dilation of urethra (N/A, 8/8/2018); Urethral meatoplasty (N/A, 8/8/2018); Joint replacement (Bilateral); and Hernia repair (Right, 1956).    Family History:  family history includes Bone cancer in his mother; COPD in his sister; Cancer in his mother; Cataracts in his mother; Glaucoma in his mother; Heart disease in his daughter; Kidney disease in his father; Other in his daughter; Other (age of onset: 2) in his daughter.     Social History:  Social  History     Tobacco Use    Smoking status: Former Smoker     Packs/day: 2.00     Years: 6.00     Pack years: 12.00     Types: Cigarettes     Last attempt to quit: 1969     Years since quittin.7    Smokeless tobacco: Former User     Types: Chew    Tobacco comment: Quit all forms of tobacco in .   Substance Use Topics    Alcohol use: No     Alcohol/week: 0.0 standard drinks     Comment: remote hx of heavy alcohol use    Drug use: No       Review of Systems   Constitutional: Negative for chills, fever and malaise/fatigue.   Respiratory: Negative for shortness of breath.    Cardiovascular: Negative for chest pain.   Gastrointestinal: Negative for constipation, diarrhea, nausea and vomiting.   Skin: Negative for rash.   Neurological: Negative for weakness.        Medications:  Outpatient Encounter Medications as of 10/2/2019   Medication Sig Note Dispense Refill    albuterol (PROVENTIL HFA) 90 mcg/actuation inhaler Inhale 2 puffs into the lungs every 6 (six) hours as needed for Wheezing or Shortness of Breath.  1 Inhaler 3    betamethasone dipropionate (DIPROLENE) 0.05 % ointment Apply topically 2 (two) times daily.  45 g 3    cetirizine (ZYRTEC) 10 MG tablet Take 1 tablet (10 mg total) by mouth once daily.   0    fluticasone (FLONASE) 50 mcg/actuation nasal spray 2 sprays (100 mcg total) by Each Nare route once daily.  1 Bottle 2    fluticasone-salmeterol 250-50 mcg/dose (ADVAIR DISKUS) 250-50 mcg/dose diskus inhaler Inhale 1 puff into the lungs 2 (two) times daily. Controller  60 each 3    ibuprofen (ADVIL,MOTRIN) 600 MG tablet Take 1 tablet (600 mg total) by mouth every 6 (six) hours as needed for Pain. 2019: As needed 60 tablet 2    lidocaine HCl 2% (XYLOCAINE) 2 % JelP urojet by Mucous Membrane route every other day. 5 ml Urojet to apply into the urethra for each urethral meatal dilation  15 applicator 11    losartan (COZAAR) 50 MG tablet Take 1 tablet (50 mg total) by mouth once  "daily.  90 tablet 3    meclizine (ANTIVERT) 12.5 mg tablet Take 1 tablet (12.5 mg total) by mouth 3 (three) times daily.  270 tablet 0    omeprazole (PRILOSEC) 40 MG capsule Take 1 capsule (40 mg total) by mouth every morning.  90 capsule 0    [DISCONTINUED] losartan (COZAAR) 50 MG tablet Take 1 tablet (50 mg total) by mouth once daily.  90 tablet 0    donepezil (ARICEPT) 10 MG tablet Take 1 tablet (10 mg total) by mouth once daily.  90 tablet 3    escitalopram oxalate (LEXAPRO) 5 MG Tab Take 1 tablet (5 mg total) by mouth once daily.  30 tablet 11    [DISCONTINUED] donepezil (ARICEPT) 5 MG tablet Take 1 tablet (5 mg total) by mouth every evening. (Patient not taking: Reported on 10/2/2019)  90 tablet 0     No facility-administered encounter medications on file as of 10/2/2019.        Allergies:  Review of patient's allergies indicates:   Allergen Reactions    Codeine Nausea Only    Tetanus vaccines and toxoid Hives and Rash       Health Maintenance:  Immunization History   Administered Date(s) Administered    Influenza 10/14/2008    Influenza - High Dose - PF (65 years and older) 10/15/2014, 10/27/2016, 01/30/2018, 10/11/2018    Influenza - Trivalent (ADULT) 10/14/2008    Influenza Split 10/14/2008    Pneumococcal Conjugate - 13 Valent 04/26/2019    Pneumococcal Polysaccharide - 23 Valent 10/14/2008, 08/05/2013    Tdap 04/05/2012      Health Maintenance   Topic Date Due    TETANUS VACCINE  04/05/2022    Lipid Panel  04/17/2023    Colonoscopy  10/11/2027    Hepatitis C Screening  Completed    Pneumococcal Vaccine (65+ Low/Medium Risk)  Completed    Abdominal Aortic Aneurysm Screening  Completed        Physical Exam      Vital Signs  Temp: 97.9 °F (36.6 °C)  Temp src: Oral  Pulse: 79  SpO2: 97 %  BP: 128/80  BP Location: Right arm  Patient Position: Sitting  Height and Weight  Height: 5' 9" (175.3 cm)  Weight: 130.2 kg (286 lb 14.9 oz)  BSA (Calculated - sq m): 2.52 sq meters  BMI (Calculated): " 42.5  Weight in (lb) to have BMI = 25: 168.9]    Physical Exam   Constitutional: He appears well-developed and well-nourished.   HENT:   Head: Normocephalic and atraumatic.   Right Ear: External ear normal.   Left Ear: External ear normal.   Mouth/Throat: Oropharynx is clear and moist.   Eyes: Pupils are equal, round, and reactive to light. Conjunctivae and EOM are normal.   Cardiovascular: Normal rate, regular rhythm, normal heart sounds and intact distal pulses.   No murmur heard.  Pulmonary/Chest: Effort normal and breath sounds normal. He has no wheezes. He has no rales.   Abdominal: Soft. Bowel sounds are normal. He exhibits no distension and no abdominal bruit. There is no hepatosplenomegaly. There is no tenderness.   Vitals reviewed.       Laboratory:  CBC:  Recent Labs   Lab 08/01/18  0915 02/07/19  1205 02/20/19  1454   WBC 9.14 10.42 10.10   RBC 4.44 L 4.52 L 4.42 L   Hemoglobin 12.1 L 12.4 L 12.3 L   Hematocrit 38.5 L 39.1 L 38.1 L   Platelets 205 231 222   Mean Corpuscular Volume 87 87 86   Mean Corpuscular Hemoglobin 27.3 27.4 27.8   Mean Corpuscular Hemoglobin Conc 31.4 L 31.7 L 32.3     CMP:  Recent Labs   Lab 02/27/17  0705 04/17/18  0748  02/12/19  1024   Glucose 110 110   < > 112 H   Calcium 9.7 9.9   < > 9.4   Albumin 3.5 3.4 L  --  3.4 L   Total Protein 7.2 7.3  --  7.0   Sodium 140 139   < > 135 L   Potassium 4.2 4.2   < > 4.2   CO2 25 28   < > 24   Chloride 105 103   < > 103   BUN, Bld 16 15   < > 20   Alkaline Phosphatase 67 71  --  63   ALT 27 20  --  21   AST 18 16  --  17   Total Bilirubin 0.5 0.4  --  0.6    < > = values in this interval not displayed.     URINALYSIS:  Recent Labs   Lab 03/20/17  1029   Color, UA Yellow   Specific Gravity, UA 1.020   pH, UA 6.0   Protein, UA Negative   Bacteria Rare   Nitrite, UA Negative   Leukocytes, UA Trace A   Urobilinogen, UA Negative      LIPIDS:  Recent Labs   Lab 02/27/17  0705 05/04/17  1657 04/17/18  0748   TSH  --  1.529 2.308   HDL 28 L  --   30 L   Cholesterol 125  --  117 L   Triglycerides 142  --  122   LDL Cholesterol 68.6  --  62.6 L   Hdl/Cholesterol Ratio 22.4  --  25.6   Non-HDL Cholesterol 97  --  87   Total Cholesterol/HDL Ratio 4.5  --  3.9     TSH:  Recent Labs   Lab 05/04/17  1657 04/17/18  0748   TSH 1.529 2.308     A1C:  Recent Labs   Lab 02/12/19  1024   Hemoglobin A1C 5.5       Assessment/Plan     Lopez Savage is a 74 y.o.male with:    1. Early onset Alzheimer's dementia without behavioral disturbance  - donepezil (ARICEPT) 10 MG tablet; Take 1 tablet (10 mg total) by mouth once daily.  Dispense: 90 tablet; Refill: 3  - Echo Color Flow Doppler? Yes; Future  - CBC auto differential; Future  - Comprehensive metabolic panel; Future  - Lipid panel; Future  Continue current meds.    2. Essential hypertension  - losartan (COZAAR) 50 MG tablet; Take 1 tablet (50 mg total) by mouth once daily.  Dispense: 90 tablet; Refill: 3  - Echo Color Flow Doppler? Yes; Future  - CBC auto differential; Future  - Comprehensive metabolic panel; Future  - Lipid panel; Future  Continue current meds.    3. Anxiety  - escitalopram oxalate (LEXAPRO) 5 MG Tab; Take 1 tablet (5 mg total) by mouth once daily.  Dispense: 30 tablet; Refill: 11  Continue current meds.    4. Non-rheumatic mitral regurgitation  - Echo Color Flow Doppler? Yes; Future  Update echo.  5. Morbid obesity with BMI of 40.0-44.9, adult    6. Gastroesophageal reflux disease without esophagitis    7. Allergic rhinitis, unspecified seasonality, unspecified trigger    8. Benign prostatic hyperplasia with lower urinary tract symptoms, symptom details unspecified  - PSA, Screening; Future    9. Hyperlipidemia, mixed  - Lipid panel; Future    10. Screening for malignant neoplasm of prostate  - PSA, Screening; Future       Chronic conditions status updated as per HPI.  Other than changes above, cont current medications and maintain follow up with specialists.  Return to clinic in 6 months.    Prasanna CHI  MD Yobany  [facility]                Flu consent signed by patient. Flu vaccine administered.

## 2019-10-04 ENCOUNTER — LAB VISIT (OUTPATIENT)
Dept: LAB | Facility: HOSPITAL | Age: 74
End: 2019-10-04
Attending: INTERNAL MEDICINE
Payer: MEDICARE

## 2019-10-04 DIAGNOSIS — G30.0 EARLY ONSET ALZHEIMER'S DEMENTIA WITHOUT BEHAVIORAL DISTURBANCE: ICD-10-CM

## 2019-10-04 DIAGNOSIS — Z12.5 SCREENING FOR MALIGNANT NEOPLASM OF PROSTATE: ICD-10-CM

## 2019-10-04 DIAGNOSIS — I10 ESSENTIAL HYPERTENSION: ICD-10-CM

## 2019-10-04 DIAGNOSIS — E78.2 HYPERLIPIDEMIA, MIXED: ICD-10-CM

## 2019-10-04 DIAGNOSIS — N40.1 BENIGN PROSTATIC HYPERPLASIA WITH LOWER URINARY TRACT SYMPTOMS, SYMPTOM DETAILS UNSPECIFIED: ICD-10-CM

## 2019-10-04 DIAGNOSIS — F02.80 EARLY ONSET ALZHEIMER'S DEMENTIA WITHOUT BEHAVIORAL DISTURBANCE: ICD-10-CM

## 2019-10-04 LAB
ALBUMIN SERPL BCP-MCNC: 3.5 G/DL (ref 3.5–5.2)
ALP SERPL-CCNC: 60 U/L (ref 55–135)
ALT SERPL W/O P-5'-P-CCNC: 24 U/L (ref 10–44)
ANION GAP SERPL CALC-SCNC: 10 MMOL/L (ref 8–16)
AST SERPL-CCNC: 18 U/L (ref 10–40)
BASOPHILS # BLD AUTO: 0.06 K/UL (ref 0–0.2)
BASOPHILS NFR BLD: 0.6 % (ref 0–1.9)
BILIRUB SERPL-MCNC: 0.4 MG/DL (ref 0.1–1)
BUN SERPL-MCNC: 16 MG/DL (ref 8–23)
CALCIUM SERPL-MCNC: 9.6 MG/DL (ref 8.7–10.5)
CHLORIDE SERPL-SCNC: 105 MMOL/L (ref 95–110)
CHOLEST SERPL-MCNC: 137 MG/DL (ref 120–199)
CHOLEST/HDLC SERPL: 4.3 {RATIO} (ref 2–5)
CO2 SERPL-SCNC: 24 MMOL/L (ref 23–29)
COMPLEXED PSA SERPL-MCNC: 0.11 NG/ML (ref 0–4)
CREAT SERPL-MCNC: 1.1 MG/DL (ref 0.5–1.4)
DIFFERENTIAL METHOD: ABNORMAL
EOSINOPHIL # BLD AUTO: 0.2 K/UL (ref 0–0.5)
EOSINOPHIL NFR BLD: 2.4 % (ref 0–8)
ERYTHROCYTE [DISTWIDTH] IN BLOOD BY AUTOMATED COUNT: 13.2 % (ref 11.5–14.5)
EST. GFR  (AFRICAN AMERICAN): >60 ML/MIN/1.73 M^2
EST. GFR  (NON AFRICAN AMERICAN): >60 ML/MIN/1.73 M^2
GLUCOSE SERPL-MCNC: 105 MG/DL (ref 70–110)
HCT VFR BLD AUTO: 40.2 % (ref 40–54)
HDLC SERPL-MCNC: 32 MG/DL (ref 40–75)
HDLC SERPL: 23.4 % (ref 20–50)
HGB BLD-MCNC: 12.8 G/DL (ref 14–18)
LDLC SERPL CALC-MCNC: 88.2 MG/DL (ref 63–159)
LYMPHOCYTES # BLD AUTO: 1.5 K/UL (ref 1–4.8)
LYMPHOCYTES NFR BLD: 15.2 % (ref 18–48)
MCH RBC QN AUTO: 28.4 PG (ref 27–31)
MCHC RBC AUTO-ENTMCNC: 31.8 G/DL (ref 32–36)
MCV RBC AUTO: 89 FL (ref 82–98)
MONOCYTES # BLD AUTO: 0.8 K/UL (ref 0.3–1)
MONOCYTES NFR BLD: 7.8 % (ref 4–15)
NEUTROPHILS # BLD AUTO: 7.3 K/UL (ref 1.8–7.7)
NEUTROPHILS NFR BLD: 74 % (ref 38–73)
NONHDLC SERPL-MCNC: 105 MG/DL
PLATELET # BLD AUTO: 215 K/UL (ref 150–350)
PMV BLD AUTO: 10.9 FL (ref 9.2–12.9)
POTASSIUM SERPL-SCNC: 4.2 MMOL/L (ref 3.5–5.1)
PROT SERPL-MCNC: 7.2 G/DL (ref 6–8.4)
RBC # BLD AUTO: 4.51 M/UL (ref 4.6–6.2)
SODIUM SERPL-SCNC: 139 MMOL/L (ref 136–145)
TRIGL SERPL-MCNC: 84 MG/DL (ref 30–150)
WBC # BLD AUTO: 9.99 K/UL (ref 3.9–12.7)

## 2019-10-04 PROCEDURE — 80061 LIPID PANEL: CPT

## 2019-10-04 PROCEDURE — 36415 COLL VENOUS BLD VENIPUNCTURE: CPT

## 2019-10-04 PROCEDURE — 80053 COMPREHEN METABOLIC PANEL: CPT

## 2019-10-04 PROCEDURE — 84153 ASSAY OF PSA TOTAL: CPT

## 2019-10-04 PROCEDURE — 85025 COMPLETE CBC W/AUTO DIFF WBC: CPT

## 2019-10-08 ENCOUNTER — TELEPHONE (OUTPATIENT)
Dept: FAMILY MEDICINE | Facility: CLINIC | Age: 74
End: 2019-10-08

## 2019-10-08 NOTE — TELEPHONE ENCOUNTER
----- Message from Phyllis Hussein sent at 10/8/2019 11:42 AM CDT -----  Patient's wife, Brenda, returned your call.   No. [phone]

## 2019-11-18 NOTE — TELEPHONE ENCOUNTER
----- Message from Suzette Castañeda sent at 11/18/2019  8:07 AM CST -----  Contact: Pt wife Brenda  Pt wife called to see if the pt can be seen today. Pt have congestion, cough, chiles and body aches. Pt wife asked for a call back..      Pt wife contact # [phone].      Thanks

## 2019-11-18 NOTE — PROGRESS NOTES
[facility] Note    Chief Complaint      Chief Complaint   Patient presents with    Emesis    Chills    Cough    Fatigue     History of Present Illness      Lopez Savage is a 74 y.o. male who presents today for nausea/vomiting/diarrhea.  Patient comes to appointment alone.    Problem List Items Addressed This Visit     Non-intractable vomiting with nausea    Current Assessment & Plan     For the last 6 weeks, had flu shot at last visit.  Had chills 2 days later, no fever.  Has been SOB unloading groceries.  Sleeps flat in bed, sleeps on 2 pillows.  No BLE edema.  Started coughing 1 week after.  Coughing up lots of clear mucous, ears itch/feel full.  Has lots of post nasal drip.  Has been taking 1/2 bottle of zyrtec and Flonase. Threw up twice this AM after eating and after brushing teeth.  No diarrhea. No trouble swallowing. Drinking lots of water.  Has COPD, using advair every day, used albuterol for the first time yesterday.         Relevant Medications    ondansetron (ZOFRAN) 4 MG tablet    Chronic bronchitis    Relevant Medications    levoFLOXacin (LEVAQUIN) 500 MG tablet    predniSONE (DELTASONE) 50 MG Tab      Other Visit Diagnoses     Cough    -  Primary    Relevant Orders    X-Ray Chest PA And Lateral    Nausea        Relevant Medications    ondansetron (ZOFRAN) 4 MG tablet          Health Maintenance   Topic Date Due    TETANUS VACCINE  04/05/2022    Lipid Panel  10/04/2024    Colonoscopy  10/11/2027    Hepatitis C Screening  Completed    Pneumococcal Vaccine (65+ Low/Medium Risk)  Completed    Abdominal Aortic Aneurysm Screening  Completed       Past Medical History:   Diagnosis Date    Allergy     Asthma     BPH (benign prostatic hyperplasia)     CHRONIC BRONCHITIS     GERD (gastroesophageal reflux disease)     HTN (hypertension)     HTN (hypertension) 10/15/2014    Joint pain     Mitral regurgitation     3-4+ under surveillance CT surgery    Mitral  regurgitation     Mitral valve regurgitation     Obesity     Osteoarthritis of knee     Trouble in sleeping     Ulcerative colitis     Urinary tract infection     Valvular regurgitation        Past Surgical History:   Procedure Laterality Date    COLONOSCOPY N/A 10/11/2017    Procedure: COLONOSCOPY Golytely;  Surgeon: Shanae Duarte MD;  Location: [facility];  Service: Endoscopy;  Laterality: N/A;    CYSTOSCOPY N/A 2018    Procedure: CYSTOSCOPY;  Surgeon: Adan Cuenca MD;  Location: [address];  Service: Urology;  Laterality: N/A;  1 hour    CYSTOSCOPY W/ RETROGRADES      with penile biopsy    DILATION OF URETHRA N/A 2018    Procedure: DILATION, URETHRA;  Surgeon: Adan Cuenca MD;  Location: [address];  Service: Urology;  Laterality: N/A;    HERNIA REPAIR Right     inguinal    JOINT REPLACEMENT Bilateral     knees    Penile Biopsy      TONSILLECTOMY      TOTAL KNEE ARTHROPLASTY Bilateral     URETHRAL MEATOPLASTY N/A 2018    Procedure: MEATOPLASTY, URETHRA;  Surgeon: Adan Cuenca MD;  Location: [address];  Service: Urology;  Laterality: N/A;       family history includes Bone cancer in his mother; COPD in his sister; Cancer in his mother; Cataracts in his mother; Glaucoma in his mother; Heart disease in his daughter; Kidney disease in his father; Other in his daughter; Other (age of onset: 2) in his daughter.     Social History     Tobacco Use    Smoking status: Former Smoker     Packs/day: 2.00     Years: 6.00     Pack years: 12.00     Types: Cigarettes     Last attempt to quit: 1969     Years since quittin.9    Smokeless tobacco: Former User     Types: Chew    Tobacco comment: Quit all forms of tobacco in .   Substance Use Topics    Alcohol use: No     Alcohol/week: 0.0 standard drinks     Comment: remote hx of heavy alcohol use    Drug use: No     Review of Systems   Constitutional: Negative for chills and fever.   HENT: Negative for  congestion and sore throat.    Eyes: Negative for blurred vision and discharge.   Respiratory: Negative for cough and shortness of breath.    Cardiovascular: Negative for chest pain and palpitations.   Gastrointestinal: Negative for constipation, diarrhea, nausea and vomiting.   Genitourinary: Negative for dysuria and hematuria.   Musculoskeletal: Negative for falls and myalgias.   Skin: Negative for itching and rash.   Neurological: Negative for dizziness and headaches.        Outpatient Encounter Medications as of 11/18/2019   Medication Sig Note Dispense Refill    albuterol (PROVENTIL HFA) 90 mcg/actuation inhaler Inhale 2 puffs into the lungs every 6 (six) hours as needed for Wheezing or Shortness of Breath.  1 Inhaler 3    betamethasone dipropionate (DIPROLENE) 0.05 % ointment Apply topically 2 (two) times daily.  45 g 3    cetirizine (ZYRTEC) 10 MG tablet Take 1 tablet (10 mg total) by mouth once daily.   0    donepezil (ARICEPT) 10 MG tablet Take 1 tablet (10 mg total) by mouth once daily.  90 tablet 3    escitalopram oxalate (LEXAPRO) 5 MG Tab Take 1 tablet (5 mg total) by mouth once daily.  30 tablet 11    fluticasone (FLONASE) 50 mcg/actuation nasal spray 2 sprays (100 mcg total) by Each Nare route once daily.  1 Bottle 2    fluticasone-salmeterol 250-50 mcg/dose (ADVAIR DISKUS) 250-50 mcg/dose diskus inhaler Inhale 1 puff into the lungs 2 (two) times daily. Controller  60 each 3    ibuprofen (ADVIL,MOTRIN) 600 MG tablet Take 1 tablet (600 mg total) by mouth every 6 (six) hours as needed for Pain. 4/26/2019: As needed 60 tablet 2    lidocaine HCl 2% (XYLOCAINE) 2 % JelP urojet by Mucous Membrane route every other day. 5 ml Urojet to apply into the urethra for each urethral meatal dilation  15 applicator 11    losartan (COZAAR) 50 MG tablet Take 1 tablet (50 mg total) by mouth once daily.  90 tablet 3    meclizine (ANTIVERT) 12.5 mg tablet Take 1 tablet (12.5 mg total) by mouth 3 (three) times  "daily.  270 tablet 0    omeprazole (PRILOSEC) 40 MG capsule Take 1 capsule (40 mg total) by mouth every morning.  90 capsule 0    levoFLOXacin (LEVAQUIN) 500 MG tablet Take 1 tablet (500 mg total) by mouth once daily.  7 tablet 0    ondansetron (ZOFRAN) 4 MG tablet Take 1 tablet (4 mg total) by mouth every 8 (eight) hours as needed for Nausea.  20 tablet 2    predniSONE (DELTASONE) 50 MG Tab Take 1 tablet (50 mg total) by mouth once daily.  7 tablet 0     No facility-administered encounter medications on file as of 11/18/2019.        Review of patient's allergies indicates:   Allergen Reactions    Codeine Nausea Only    Tetanus vaccines and toxoid Hives and Rash       Physical Exam      Vital Signs  Temp: 97.9 °F (36.6 °C)  Temp src: Oral  Pulse: 99  Resp: 18  SpO2: 98 %  BP: 122/78  BP Location: Left arm  Patient Position: Sitting  Height and Weight  Height: 5' 9" (175.3 cm)  Weight: 127.3 kg (280 lb 12.1 oz)  BSA (Calculated - sq m): 2.49 sq meters  BMI (Calculated): 41.4  Weight in (lb) to have BMI = 25: 168.9]    Physical Exam   Constitutional: He is oriented to person, place, and time. He appears well-developed and well-nourished.   HENT:   Head: Normocephalic and atraumatic.   Right Ear: External ear normal.   Left Ear: External ear normal.   Eyes: Right eye exhibits no discharge. Left eye exhibits no discharge.   Neck: Normal range of motion. No thyromegaly present.   Cardiovascular: Normal rate, regular rhythm and intact distal pulses.   No murmur heard.  Pulmonary/Chest: Effort normal and breath sounds normal. No respiratory distress.   Abdominal: Soft. Bowel sounds are normal. He exhibits no distension. There is no tenderness.   Musculoskeletal: Normal range of motion. He exhibits no deformity.   Neurological: He is alert and oriented to person, place, and time.   Skin: Skin is warm and dry. No rash noted.   Psychiatric: He has a normal mood and affect. His behavior is normal.    "     Laboratory:  CBC:  Recent Labs   Lab Result Units 10/04/19  0912   WBC K/uL 9.99   RBC M/uL 4.51*   Hemoglobin g/dL 12.8*   Hematocrit % 40.2   Platelets K/uL 215   Mean Corpuscular Volume fL 89   Mean Corpuscular Hemoglobin pg 28.4   Mean Corpuscular Hemoglobin Conc g/dL 31.8*     CMP:  Recent Labs   Lab Result Units 10/04/19  0912   Glucose mg/dL 105   Calcium mg/dL 9.6   Albumin g/dL 3.5   Total Protein g/dL 7.2   Sodium mmol/L 139   Potassium mmol/L 4.2   CO2 mmol/L 24   Chloride mmol/L 105   BUN, Bld mg/dL 16   Alkaline Phosphatase U/L 60   ALT U/L 24   AST U/L 18   Total Bilirubin mg/dL 0.4     URINALYSIS:  No results for input(s): COLORU, CLARITYU, SPECGRAV, PHUR, PROTEINUA, GLUCOSEU, BILIRUBINCON, BLOODU, WBCU, RBCU, BACTERIA, MUCUS, NITRITE, LEUKOCYTESUR, UROBILINOGEN, HYALINECASTS in the last 2160 hours.   LIPIDS:  Recent Labs   Lab Result Units 10/04/19  0912   HDL mg/dL 32*   Cholesterol mg/dL 137   Triglycerides mg/dL 84   LDL Cholesterol mg/dL 88.2   Hdl/Cholesterol Ratio % 23.4   Non-HDL Cholesterol mg/dL 105   Total Cholesterol/HDL Ratio  4.3     TSH:  No results for input(s): TSH in the last 2160 hours.  A1C:  No results for input(s): HGBA1C in the last 2160 hours.    Radiology:  No imaging on file    Assessment/Plan     Lopez Savage is a 74 y.o.male with:    1. Cough  - X-Ray Chest PA And Lateral; Future    2. Mucopurulent chronic bronchitis  - levoFLOXacin (LEVAQUIN) 500 MG tablet; Take 1 tablet (500 mg total) by mouth once daily.  Dispense: 7 tablet; Refill: 0  - predniSONE (DELTASONE) 50 MG Tab; Take 1 tablet (50 mg total) by mouth once daily.  Dispense: 7 tablet; Refill: 0    3. Nausea  - ondansetron (ZOFRAN) 4 MG tablet; Take 1 tablet (4 mg total) by mouth every 8 (eight) hours as needed for Nausea.  Dispense: 20 tablet; Refill: 2    4. Non-intractable vomiting with nausea    -Concerned for COPD exacerbation, will start steroids and abx.  Zofran sent for nausea.  Flonase/zyrtec/mucinex  BID to help with mucous production.  Counseled on using albuterol PRN.  -Will get CXR tomorrow AM, can't go today  -Continue current medications and maintain follow up with specialists.  Return to clinic with Dr. Haywood as scheduled.       Trinidad Ellis MD  [facility] [place]

## 2019-11-18 NOTE — ASSESSMENT & PLAN NOTE
For the last 6 weeks, had flu shot at last visit.  Had chills 2 days later, no fever.  Has been SOB unloading groceries.  Sleeps flat in bed, sleeps on 2 pillows.  No BLE edema.  Started coughing 1 week after.  Coughing up lots of clear mucous, ears itch/feel full.  Has lots of post nasal drip.  Has been taking 1/2 bottle of zyrtec and Flonase. Threw up twice this AM after eating and after brushing teeth.  No diarrhea. No trouble swallowing. Drinking lots of water.  Has COPD, using advair every day, used albuterol for the first time yesterday.

## 2019-11-26 NOTE — TELEPHONE ENCOUNTER
----- Message from Steffanie Hurtado sent at 11/26/2019  3:56 PM CST -----  Contact: [phone]/Wife/Brenda  Type:  Needs Medical Advice    Who Called:  Wife/Brenda  Symptoms (please be specific):  Same symptoms are back again   How long has patient had these symptoms:     Pharmacy name and phone #:   [id number] - [id number], [place] - [id number] [facility] [address]  Would the patient rather a call back or a response via [facility]?  call  Best Call Back Number:    Additional Information:  2 days after he completed the medication the symptoms came back again.   Can he come in tomorrow? Please call

## 2019-11-27 NOTE — TELEPHONE ENCOUNTER
Flonase refill sent.  Also sending another round of levaquin.  Since he's not having nausea, I don't think we need to send in zofran.  Sounds like its mostly phlegm.

## 2019-11-27 NOTE — TELEPHONE ENCOUNTER
"Pt states he is regurgitating phlegm, has sinus issues, states hes "going downhill". C/o sweating and freezing. Not vomiting, just spitting up phlegm. Was on zyrtec and flonase and was doing great. Now he is out of the zyrtec and flonase and feels horrible again, asking for a refill on on flonase.   "

## 2019-11-27 NOTE — TELEPHONE ENCOUNTER
----- Message from Heather Burden sent at 11/27/2019  8:02 AM CST -----  Contact: Wife Brenda [phone]/self   Patient's wife states she is returning your call regarding pt's symptoms.  Please advise.

## 2020-01-01 ENCOUNTER — LAB VISIT (OUTPATIENT)
Dept: INTERNAL MEDICINE | Facility: CLINIC | Age: 75
End: 2020-01-01
Payer: MEDICARE

## 2020-01-01 ENCOUNTER — HOSPITAL ENCOUNTER (INPATIENT)
Facility: HOSPITAL | Age: 75
LOS: 13 days | DRG: 100 | End: 2020-10-25
Attending: EMERGENCY MEDICINE | Admitting: PSYCHIATRY & NEUROLOGY
Payer: MEDICARE

## 2020-01-01 ENCOUNTER — TELEPHONE (OUTPATIENT)
Dept: HEMATOLOGY/ONCOLOGY | Facility: CLINIC | Age: 75
End: 2020-01-01

## 2020-01-01 ENCOUNTER — DOCUMENT SCAN (OUTPATIENT)
Dept: HOME HEALTH SERVICES | Facility: HOSPITAL | Age: 75
End: 2020-01-01
Payer: MEDICARE

## 2020-01-01 ENCOUNTER — TELEPHONE (OUTPATIENT)
Dept: FAMILY MEDICINE | Facility: CLINIC | Age: 75
End: 2020-01-01

## 2020-01-01 ENCOUNTER — TELEPHONE (OUTPATIENT)
Dept: SPORTS MEDICINE | Facility: CLINIC | Age: 75
End: 2020-01-01

## 2020-01-01 ENCOUNTER — ANESTHESIA (OUTPATIENT)
Dept: CARDIOLOGY | Facility: HOSPITAL | Age: 75
DRG: 377 | End: 2020-01-01
Payer: MEDICARE

## 2020-01-01 ENCOUNTER — HOSPITAL ENCOUNTER (OUTPATIENT)
Dept: CARDIOLOGY | Facility: HOSPITAL | Age: 75
Discharge: HOME | End: 2020-06-18
Attending: THORACIC SURGERY (CARDIOTHORACIC VASCULAR SURGERY)
Payer: MEDICARE

## 2020-01-01 ENCOUNTER — OFFICE VISIT (OUTPATIENT)
Dept: HEMATOLOGY/ONCOLOGY | Facility: CLINIC | Age: 75
End: 2020-01-01
Payer: MEDICARE

## 2020-01-01 ENCOUNTER — HOSPITAL ENCOUNTER (INPATIENT)
Facility: HOSPITAL | Age: 75
LOS: 4 days | Discharge: ANOTHER HOSPITAL | DRG: 288 | End: 2020-10-12
Attending: HOSPITALIST | Admitting: INTERNAL MEDICINE
Payer: MEDICARE

## 2020-01-01 ENCOUNTER — PATIENT MESSAGE (OUTPATIENT)
Dept: FAMILY MEDICINE | Facility: CLINIC | Age: 75
End: 2020-01-01

## 2020-01-01 ENCOUNTER — DOCUMENTATION ONLY (OUTPATIENT)
Dept: HEMATOLOGY/ONCOLOGY | Facility: CLINIC | Age: 75
End: 2020-01-01

## 2020-01-01 ENCOUNTER — HOSPITAL ENCOUNTER (INPATIENT)
Facility: HOSPITAL | Age: 75
LOS: 5 days | Discharge: ANOTHER HOSPITAL | DRG: 064 | End: 2020-09-13
Attending: EMERGENCY MEDICINE | Admitting: INTERNAL MEDICINE
Payer: MEDICARE

## 2020-01-01 ENCOUNTER — TELEPHONE (OUTPATIENT)
Dept: OTOLARYNGOLOGY | Facility: CLINIC | Age: 75
End: 2020-01-01

## 2020-01-01 ENCOUNTER — TELEPHONE (OUTPATIENT)
Dept: PULMONOLOGY | Facility: CLINIC | Age: 75
End: 2020-01-01

## 2020-01-01 ENCOUNTER — ANESTHESIA EVENT (OUTPATIENT)
Dept: ENDOSCOPY | Facility: HOSPITAL | Age: 75
DRG: 377 | End: 2020-01-01
Payer: MEDICARE

## 2020-01-01 ENCOUNTER — PATIENT MESSAGE (OUTPATIENT)
Dept: SPORTS MEDICINE | Facility: CLINIC | Age: 75
End: 2020-01-01

## 2020-01-01 ENCOUNTER — HOSPITAL ENCOUNTER (OUTPATIENT)
Dept: PULMONOLOGY | Facility: CLINIC | Age: 75
Discharge: HOME | End: 2020-06-12
Payer: MEDICARE

## 2020-01-01 ENCOUNTER — TELEPHONE (OUTPATIENT)
Dept: CARDIOTHORACIC SURGERY | Facility: CLINIC | Age: 75
End: 2020-01-01

## 2020-01-01 ENCOUNTER — HOSPITAL ENCOUNTER (INPATIENT)
Facility: HOSPITAL | Age: 75
LOS: 4 days | Discharge: HOME | DRG: 289 | End: 2020-08-21
Attending: HOSPITALIST | Admitting: HOSPITALIST
Payer: MEDICARE

## 2020-01-01 ENCOUNTER — TELEPHONE (OUTPATIENT)
Dept: GASTROENTEROLOGY | Facility: CLINIC | Age: 75
End: 2020-01-01

## 2020-01-01 ENCOUNTER — ANESTHESIA EVENT (OUTPATIENT)
Dept: ENDOSCOPY | Facility: HOSPITAL | Age: 75
DRG: 064 | End: 2020-01-01
Payer: MEDICARE

## 2020-01-01 ENCOUNTER — PATIENT OUTREACH (OUTPATIENT)
Dept: ADMINISTRATIVE | Facility: CLINIC | Age: 75
End: 2020-01-01

## 2020-01-01 ENCOUNTER — OFFICE VISIT (OUTPATIENT)
Dept: OTOLARYNGOLOGY | Facility: CLINIC | Age: 75
End: 2020-01-01
Payer: MEDICARE

## 2020-01-01 ENCOUNTER — HOSPITAL ENCOUNTER (INPATIENT)
Facility: HOSPITAL | Age: 75
LOS: 25 days | Discharge: NURSING, REHAB OR LONG-TERM CARE | DRG: 064 | End: 2020-10-08
Attending: PSYCHIATRY & NEUROLOGY | Admitting: PSYCHIATRY & NEUROLOGY
Payer: MEDICARE

## 2020-01-01 ENCOUNTER — TELEPHONE (OUTPATIENT)
Dept: NEUROLOGY | Facility: CLINIC | Age: 75
End: 2020-01-01

## 2020-01-01 ENCOUNTER — PATIENT OUTREACH (OUTPATIENT)
Dept: ADMINISTRATIVE | Facility: OTHER | Age: 75
End: 2020-01-01

## 2020-01-01 ENCOUNTER — HOSPITAL ENCOUNTER (INPATIENT)
Facility: HOSPITAL | Age: 75
LOS: 4 days | Discharge: ANOTHER HOSPITAL | DRG: 377 | End: 2020-08-17
Attending: EMERGENCY MEDICINE | Admitting: INTERNAL MEDICINE
Payer: MEDICARE

## 2020-01-01 ENCOUNTER — OFFICE VISIT (OUTPATIENT)
Dept: CARDIOTHORACIC SURGERY | Facility: CLINIC | Age: 75
End: 2020-01-01
Payer: MEDICARE

## 2020-01-01 ENCOUNTER — NURSE TRIAGE (OUTPATIENT)
Dept: ADMINISTRATIVE | Facility: CLINIC | Age: 75
End: 2020-01-01

## 2020-01-01 ENCOUNTER — OFFICE VISIT (OUTPATIENT)
Dept: FAMILY MEDICINE | Facility: CLINIC | Age: 75
End: 2020-01-01
Payer: MEDICARE

## 2020-01-01 ENCOUNTER — EXTERNAL HOME HEALTH (OUTPATIENT)
Dept: HOME HEALTH SERVICES | Facility: HOSPITAL | Age: 75
End: 2020-01-01
Payer: MEDICARE

## 2020-01-01 ENCOUNTER — HOSPITAL ENCOUNTER (OUTPATIENT)
Dept: RADIOLOGY | Facility: HOSPITAL | Age: 75
Discharge: HOME | End: 2020-06-15
Attending: NURSE PRACTITIONER
Payer: MEDICARE

## 2020-01-01 ENCOUNTER — CLINICAL SUPPORT (OUTPATIENT)
Dept: URGENT CARE | Facility: CLINIC | Age: 75
End: 2020-01-01
Payer: MEDICARE

## 2020-01-01 ENCOUNTER — INITIAL CONSULT (OUTPATIENT)
Dept: CARDIOLOGY | Facility: CLINIC | Age: 75
End: 2020-01-01
Payer: MEDICARE

## 2020-01-01 ENCOUNTER — ANESTHESIA (OUTPATIENT)
Dept: ENDOSCOPY | Facility: HOSPITAL | Age: 75
DRG: 377 | End: 2020-01-01
Payer: MEDICARE

## 2020-01-01 ENCOUNTER — OFFICE VISIT (OUTPATIENT)
Dept: PULMONOLOGY | Facility: CLINIC | Age: 75
End: 2020-01-01
Payer: MEDICARE

## 2020-01-01 ENCOUNTER — HOSPITAL ENCOUNTER (OUTPATIENT)
Facility: HOSPITAL | Age: 75
Discharge: HOME | End: 2020-07-07
Attending: INTERNAL MEDICINE | Admitting: INTERNAL MEDICINE
Payer: MEDICARE

## 2020-01-01 ENCOUNTER — PATIENT MESSAGE (OUTPATIENT)
Dept: CARDIOLOGY | Facility: CLINIC | Age: 75
End: 2020-01-01

## 2020-01-01 ENCOUNTER — TELEPHONE (OUTPATIENT)
Dept: INFECTIOUS DISEASES | Facility: CLINIC | Age: 75
End: 2020-01-01

## 2020-01-01 ENCOUNTER — TELEPHONE (OUTPATIENT)
Dept: PRIMARY CARE CLINIC | Facility: CLINIC | Age: 75
End: 2020-01-01

## 2020-01-01 ENCOUNTER — OFFICE VISIT (OUTPATIENT)
Dept: SPORTS MEDICINE | Facility: CLINIC | Age: 75
End: 2020-01-01
Payer: MEDICARE

## 2020-01-01 ENCOUNTER — ANESTHESIA EVENT (OUTPATIENT)
Dept: CARDIOLOGY | Facility: HOSPITAL | Age: 75
DRG: 377 | End: 2020-01-01
Payer: MEDICARE

## 2020-01-01 ENCOUNTER — ANESTHESIA (OUTPATIENT)
Dept: ENDOSCOPY | Facility: HOSPITAL | Age: 75
DRG: 064 | End: 2020-01-01
Payer: MEDICARE

## 2020-01-01 ENCOUNTER — OFFICE VISIT (OUTPATIENT)
Dept: INFECTIOUS DISEASES | Facility: CLINIC | Age: 75
End: 2020-01-01
Payer: MEDICARE

## 2020-01-01 VITALS
HEIGHT: 69 IN | SYSTOLIC BLOOD PRESSURE: 131 MMHG | WEIGHT: 251.13 LBS | HEART RATE: 95 BPM | RESPIRATION RATE: 18 BRPM | DIASTOLIC BLOOD PRESSURE: 61 MMHG | BODY MASS INDEX: 37.2 KG/M2 | OXYGEN SATURATION: 95 % | TEMPERATURE: 98 F

## 2020-01-01 VITALS
BODY MASS INDEX: 37.77 KG/M2 | HEIGHT: 69 IN | SYSTOLIC BLOOD PRESSURE: 103 MMHG | TEMPERATURE: 98 F | HEART RATE: 86 BPM | OXYGEN SATURATION: 98 % | WEIGHT: 255 LBS | DIASTOLIC BLOOD PRESSURE: 57 MMHG | RESPIRATION RATE: 16 BRPM

## 2020-01-01 VITALS
SYSTOLIC BLOOD PRESSURE: 117 MMHG | DIASTOLIC BLOOD PRESSURE: 70 MMHG | RESPIRATION RATE: 18 BRPM | TEMPERATURE: 98 F | OXYGEN SATURATION: 95 % | OXYGEN SATURATION: 98 % | HEART RATE: 68 BPM | BODY MASS INDEX: 38.25 KG/M2 | SYSTOLIC BLOOD PRESSURE: 132 MMHG | WEIGHT: 259.06 LBS | HEART RATE: 76 BPM | DIASTOLIC BLOOD PRESSURE: 67 MMHG | WEIGHT: 261.44 LBS | TEMPERATURE: 98 F | BODY MASS INDEX: 38.72 KG/M2 | HEIGHT: 69 IN

## 2020-01-01 VITALS
WEIGHT: 257 LBS | DIASTOLIC BLOOD PRESSURE: 70 MMHG | BODY MASS INDEX: 38.06 KG/M2 | WEIGHT: 257 LBS | SYSTOLIC BLOOD PRESSURE: 110 MMHG | HEIGHT: 69 IN | HEIGHT: 69 IN | BODY MASS INDEX: 38.06 KG/M2 | HEART RATE: 87 BPM

## 2020-01-01 VITALS
DIASTOLIC BLOOD PRESSURE: 54 MMHG | WEIGHT: 238.13 LBS | OXYGEN SATURATION: 95 % | TEMPERATURE: 98 F | SYSTOLIC BLOOD PRESSURE: 89 MMHG | BODY MASS INDEX: 36.09 KG/M2 | HEIGHT: 68 IN

## 2020-01-01 VITALS
HEART RATE: 100 BPM | DIASTOLIC BLOOD PRESSURE: 62 MMHG | BODY MASS INDEX: 38.06 KG/M2 | SYSTOLIC BLOOD PRESSURE: 109 MMHG | HEIGHT: 69 IN | WEIGHT: 257 LBS | OXYGEN SATURATION: 95 % | HEART RATE: 104 BPM | WEIGHT: 279 LBS | HEIGHT: 69 IN | BODY MASS INDEX: 41.32 KG/M2 | TEMPERATURE: 98 F

## 2020-01-01 VITALS
OXYGEN SATURATION: 96 % | DIASTOLIC BLOOD PRESSURE: 52 MMHG | HEART RATE: 95 BPM | SYSTOLIC BLOOD PRESSURE: 87 MMHG | BODY MASS INDEX: 38.53 KG/M2 | HEIGHT: 69 IN | WEIGHT: 260.13 LBS

## 2020-01-01 VITALS
TEMPERATURE: 98 F | OXYGEN SATURATION: 96 % | BODY MASS INDEX: 41.28 KG/M2 | SYSTOLIC BLOOD PRESSURE: 138 MMHG | WEIGHT: 279.56 LBS | DIASTOLIC BLOOD PRESSURE: 80 MMHG | HEART RATE: 68 BPM

## 2020-01-01 VITALS
WEIGHT: 251 LBS | DIASTOLIC BLOOD PRESSURE: 67 MMHG | RESPIRATION RATE: 20 BRPM | HEIGHT: 69 IN | TEMPERATURE: 98 F | SYSTOLIC BLOOD PRESSURE: 156 MMHG | BODY MASS INDEX: 37.18 KG/M2 | HEART RATE: 82 BPM | OXYGEN SATURATION: 94 %

## 2020-01-01 VITALS
TEMPERATURE: 99 F | OXYGEN SATURATION: 98 % | DIASTOLIC BLOOD PRESSURE: 78 MMHG | HEART RATE: 91 BPM | RESPIRATION RATE: 25 BRPM | WEIGHT: 257 LBS | HEIGHT: 69 IN | SYSTOLIC BLOOD PRESSURE: 159 MMHG | BODY MASS INDEX: 38.06 KG/M2

## 2020-01-01 VITALS
HEIGHT: 69 IN | SYSTOLIC BLOOD PRESSURE: 136 MMHG | WEIGHT: 248 LBS | BODY MASS INDEX: 36.73 KG/M2 | DIASTOLIC BLOOD PRESSURE: 63 MMHG | OXYGEN SATURATION: 96 % | TEMPERATURE: 97 F | RESPIRATION RATE: 18 BRPM | HEART RATE: 87 BPM

## 2020-01-01 VITALS
OXYGEN SATURATION: 94 % | TEMPERATURE: 98 F | HEART RATE: 85 BPM | SYSTOLIC BLOOD PRESSURE: 141 MMHG | RESPIRATION RATE: 23 BRPM | DIASTOLIC BLOOD PRESSURE: 84 MMHG

## 2020-01-01 DIAGNOSIS — N17.0 ATN (ACUTE TUBULAR NECROSIS): ICD-10-CM

## 2020-01-01 DIAGNOSIS — I34.0 NONRHEUMATIC MITRAL VALVE REGURGITATION: Primary | ICD-10-CM

## 2020-01-01 DIAGNOSIS — G40.901 STATUS EPILEPTICUS: ICD-10-CM

## 2020-01-01 DIAGNOSIS — K56.7 ILEUS: ICD-10-CM

## 2020-01-01 DIAGNOSIS — J41.1 MUCOPURULENT CHRONIC BRONCHITIS: Primary | ICD-10-CM

## 2020-01-01 DIAGNOSIS — D59.10 AUTOIMMUNE HEMOLYTIC ANEMIA: Primary | ICD-10-CM

## 2020-01-01 DIAGNOSIS — Z92.89 HISTORY OF ETT: ICD-10-CM

## 2020-01-01 DIAGNOSIS — I63.432 EMBOLIC STROKE INVOLVING LEFT POSTERIOR CEREBRAL ARTERY: ICD-10-CM

## 2020-01-01 DIAGNOSIS — I33.0 SUBACUTE BACTERIAL ENDOCARDITIS: ICD-10-CM

## 2020-01-01 DIAGNOSIS — R11.2 NAUSEA AND VOMITING, INTRACTABILITY OF VOMITING NOT SPECIFIED, UNSPECIFIED VOMITING TYPE: ICD-10-CM

## 2020-01-01 DIAGNOSIS — R05.9 COUGH: Primary | ICD-10-CM

## 2020-01-01 DIAGNOSIS — J41.1 MUCOPURULENT CHRONIC BRONCHITIS: ICD-10-CM

## 2020-01-01 DIAGNOSIS — J45.30 MILD PERSISTENT ASTHMA WITHOUT COMPLICATION: ICD-10-CM

## 2020-01-01 DIAGNOSIS — D64.9 SYMPTOMATIC ANEMIA: Primary | ICD-10-CM

## 2020-01-01 DIAGNOSIS — K92.2 ACUTE UPPER GI BLEED: Primary | ICD-10-CM

## 2020-01-01 DIAGNOSIS — H91.93 BILATERAL HEARING LOSS, UNSPECIFIED HEARING LOSS TYPE: ICD-10-CM

## 2020-01-01 DIAGNOSIS — I10 ESSENTIAL HYPERTENSION: ICD-10-CM

## 2020-01-01 DIAGNOSIS — I66.9: ICD-10-CM

## 2020-01-01 DIAGNOSIS — D64.9 NORMOCYTIC ANEMIA: Primary | ICD-10-CM

## 2020-01-01 DIAGNOSIS — R41.89 UNRESPONSIVE: ICD-10-CM

## 2020-01-01 DIAGNOSIS — K25.9 GASTRIC ULCER, UNSPECIFIED CHRONICITY, UNSPECIFIED WHETHER GASTRIC ULCER HEMORRHAGE OR PERFORATION PRESENT: Primary | ICD-10-CM

## 2020-01-01 DIAGNOSIS — I38 ENDOCARDITIS, UNSPECIFIED CHRONICITY, UNSPECIFIED ENDOCARDITIS TYPE: ICD-10-CM

## 2020-01-01 DIAGNOSIS — R09.02 HYPOXIA: ICD-10-CM

## 2020-01-01 DIAGNOSIS — R16.1 SPLENOMEGALY: ICD-10-CM

## 2020-01-01 DIAGNOSIS — R29.818 OTHER SYMPTOMS AND SIGNS INVOLVING THE NERVOUS SYSTEM: ICD-10-CM

## 2020-01-01 DIAGNOSIS — D59.10 AUTOIMMUNE HEMOLYTIC ANEMIA: ICD-10-CM

## 2020-01-01 DIAGNOSIS — I33.0 CHRONIC BACTERIAL ENDOCARDITIS: ICD-10-CM

## 2020-01-01 DIAGNOSIS — M25.562 CHRONIC PAIN OF LEFT KNEE: Primary | ICD-10-CM

## 2020-01-01 DIAGNOSIS — J44.9 CHRONIC OBSTRUCTIVE PULMONARY DISEASE, UNSPECIFIED COPD TYPE: ICD-10-CM

## 2020-01-01 DIAGNOSIS — R56.9 SEIZURE: ICD-10-CM

## 2020-01-01 DIAGNOSIS — Z71.89 ADVANCE CARE PLANNING: ICD-10-CM

## 2020-01-01 DIAGNOSIS — E66.01 MORBID OBESITY WITH BMI OF 40.0-44.9, ADULT: ICD-10-CM

## 2020-01-01 DIAGNOSIS — R55 SYNCOPE: ICD-10-CM

## 2020-01-01 DIAGNOSIS — I63.9 STROKE: ICD-10-CM

## 2020-01-01 DIAGNOSIS — I63.89 OTHER CEREBRAL INFARCTION: ICD-10-CM

## 2020-01-01 DIAGNOSIS — D53.9 NUTRITIONAL ANEMIA, UNSPECIFIED: ICD-10-CM

## 2020-01-01 DIAGNOSIS — I38 ENDOCARDITIS: ICD-10-CM

## 2020-01-01 DIAGNOSIS — I34.0 MITRAL VALVE INSUFFICIENCY, UNSPECIFIED ETIOLOGY: Primary | ICD-10-CM

## 2020-01-01 DIAGNOSIS — Z71.89 GOALS OF CARE, COUNSELING/DISCUSSION: ICD-10-CM

## 2020-01-01 DIAGNOSIS — G06.0 BRAIN ABSCESS: ICD-10-CM

## 2020-01-01 DIAGNOSIS — D62 ACUTE BLOOD LOSS ANEMIA: ICD-10-CM

## 2020-01-01 DIAGNOSIS — I10 ESSENTIAL HYPERTENSION: Chronic | ICD-10-CM

## 2020-01-01 DIAGNOSIS — R06.02 SHORTNESS OF BREATH: ICD-10-CM

## 2020-01-01 DIAGNOSIS — N17.9 AKI (ACUTE KIDNEY INJURY): Primary | ICD-10-CM

## 2020-01-01 DIAGNOSIS — M25.561 CHRONIC PAIN OF RIGHT KNEE: ICD-10-CM

## 2020-01-01 DIAGNOSIS — D64.9 NORMOCYTIC ANEMIA: ICD-10-CM

## 2020-01-01 DIAGNOSIS — J18.9 PNEUMONIA OF LEFT LOWER LOBE DUE TO INFECTIOUS ORGANISM: ICD-10-CM

## 2020-01-01 DIAGNOSIS — R78.81 STREPTOCOCCAL BACTEREMIA: ICD-10-CM

## 2020-01-01 DIAGNOSIS — I63.432 EMBOLIC STROKE INVOLVING LEFT POSTERIOR CEREBRAL ARTERY: Primary | ICD-10-CM

## 2020-01-01 DIAGNOSIS — K92.2 GI BLEED: Primary | ICD-10-CM

## 2020-01-01 DIAGNOSIS — K83.1: ICD-10-CM

## 2020-01-01 DIAGNOSIS — K92.2 GASTROINTESTINAL HEMORRHAGE, UNSPECIFIED GASTROINTESTINAL HEMORRHAGE TYPE: ICD-10-CM

## 2020-01-01 DIAGNOSIS — I34.0 MITRAL VALVE INSUFFICIENCY, UNSPECIFIED ETIOLOGY: ICD-10-CM

## 2020-01-01 DIAGNOSIS — J18.9 PNEUMONIA OF LEFT LOWER LOBE DUE TO INFECTIOUS ORGANISM: Primary | ICD-10-CM

## 2020-01-01 DIAGNOSIS — R78.81 GRAM-POSITIVE BACTEREMIA: ICD-10-CM

## 2020-01-01 DIAGNOSIS — Z01.89 ENCOUNTER FOR LABORATORY TEST: Primary | ICD-10-CM

## 2020-01-01 DIAGNOSIS — Z11.4 SCREENING FOR HIV (HUMAN IMMUNODEFICIENCY VIRUS): ICD-10-CM

## 2020-01-01 DIAGNOSIS — R41.82 ALTERED MENTAL STATUS: ICD-10-CM

## 2020-01-01 DIAGNOSIS — Z20.822 COVID-19 VIRUS NOT DETECTED: ICD-10-CM

## 2020-01-01 DIAGNOSIS — D52.8 OTHER FOLATE DEFICIENCY ANEMIAS: ICD-10-CM

## 2020-01-01 DIAGNOSIS — G93.40 ENCEPHALOPATHY ACUTE: ICD-10-CM

## 2020-01-01 DIAGNOSIS — E87.6 HYPOKALEMIA: ICD-10-CM

## 2020-01-01 DIAGNOSIS — F02.80 EARLY ONSET ALZHEIMER'S DEMENTIA WITHOUT BEHAVIORAL DISTURBANCE: ICD-10-CM

## 2020-01-01 DIAGNOSIS — R57.0 CARDIOGENIC SHOCK: ICD-10-CM

## 2020-01-01 DIAGNOSIS — R11.0 NAUSEA: ICD-10-CM

## 2020-01-01 DIAGNOSIS — I50.9 CHF (CONGESTIVE HEART FAILURE): ICD-10-CM

## 2020-01-01 DIAGNOSIS — R78.81 STREPTOCOCCAL BACTEREMIA: Primary | ICD-10-CM

## 2020-01-01 DIAGNOSIS — K76.9 HEPATOCELLULAR INJURY: ICD-10-CM

## 2020-01-01 DIAGNOSIS — R94.31 QT PROLONGATION: ICD-10-CM

## 2020-01-01 DIAGNOSIS — G93.40 ACUTE ENCEPHALOPATHY: ICD-10-CM

## 2020-01-01 DIAGNOSIS — E66.01 SEVERE OBESITY (BMI 35.0-39.9) WITH COMORBIDITY: ICD-10-CM

## 2020-01-01 DIAGNOSIS — I33.9 SUBACUTE ENDOCARDITIS, UNSPECIFIED ENDOCARDITIS TYPE: ICD-10-CM

## 2020-01-01 DIAGNOSIS — I34.0 NONRHEUMATIC MITRAL VALVE REGURGITATION: ICD-10-CM

## 2020-01-01 DIAGNOSIS — G89.29 CHRONIC PAIN OF LEFT KNEE: Primary | ICD-10-CM

## 2020-01-01 DIAGNOSIS — Z51.5 PALLIATIVE CARE ENCOUNTER: ICD-10-CM

## 2020-01-01 DIAGNOSIS — I63.9 CEREBROVASCULAR ACCIDENT (CVA), UNSPECIFIED MECHANISM: ICD-10-CM

## 2020-01-01 DIAGNOSIS — R05.3 COUGH, PERSISTENT: ICD-10-CM

## 2020-01-01 DIAGNOSIS — G40.909 SEIZURE DISORDER: ICD-10-CM

## 2020-01-01 DIAGNOSIS — K21.9 GASTROESOPHAGEAL REFLUX DISEASE, ESOPHAGITIS PRESENCE NOT SPECIFIED: Chronic | ICD-10-CM

## 2020-01-01 DIAGNOSIS — I63.9 CEREBELLAR STROKE: ICD-10-CM

## 2020-01-01 DIAGNOSIS — I63.9 CVA (CEREBRAL VASCULAR ACCIDENT): ICD-10-CM

## 2020-01-01 DIAGNOSIS — N18.30 CKD (CHRONIC KIDNEY DISEASE) STAGE 3, GFR 30-59 ML/MIN: ICD-10-CM

## 2020-01-01 DIAGNOSIS — I76: ICD-10-CM

## 2020-01-01 DIAGNOSIS — R94.01 ABNORMAL EEG: ICD-10-CM

## 2020-01-01 DIAGNOSIS — G30.0 EARLY ONSET ALZHEIMER'S DEMENTIA WITHOUT BEHAVIORAL DISTURBANCE: ICD-10-CM

## 2020-01-01 DIAGNOSIS — R06.02 SHORTNESS OF BREATH: Primary | ICD-10-CM

## 2020-01-01 DIAGNOSIS — R40.20 LOSS OF CONSCIOUSNESS: ICD-10-CM

## 2020-01-01 DIAGNOSIS — R79.89 TROPONIN LEVEL ELEVATED: ICD-10-CM

## 2020-01-01 DIAGNOSIS — R41.82 ALTERED MENTAL STATUS, UNSPECIFIED ALTERED MENTAL STATUS TYPE: ICD-10-CM

## 2020-01-01 DIAGNOSIS — D63.8 ANEMIA OF CHRONIC DISEASE: ICD-10-CM

## 2020-01-01 DIAGNOSIS — J31.0 CHRONIC RHINITIS: ICD-10-CM

## 2020-01-01 DIAGNOSIS — D69.6 THROMBOCYTOPENIA: ICD-10-CM

## 2020-01-01 DIAGNOSIS — I34.0 SEVERE MITRAL REGURGITATION: ICD-10-CM

## 2020-01-01 DIAGNOSIS — G93.6 CYTOTOXIC BRAIN EDEMA: ICD-10-CM

## 2020-01-01 DIAGNOSIS — R56.9 CONVULSIONS, UNSPECIFIED CONVULSION TYPE: ICD-10-CM

## 2020-01-01 DIAGNOSIS — A41.9 SEPTIC SHOCK: ICD-10-CM

## 2020-01-01 DIAGNOSIS — R09.81 SINUS CONGESTION: ICD-10-CM

## 2020-01-01 DIAGNOSIS — Z11.59 SCREENING FOR VIRAL DISEASE: Primary | ICD-10-CM

## 2020-01-01 DIAGNOSIS — R13.11 ORAL PHASE DYSPHAGIA: ICD-10-CM

## 2020-01-01 DIAGNOSIS — I33.0 ACUTE BACTERIAL ENDOCARDITIS: ICD-10-CM

## 2020-01-01 DIAGNOSIS — I63.431 EMBOLIC STROKE INVOLVING RIGHT POSTERIOR CEREBRAL ARTERY: ICD-10-CM

## 2020-01-01 DIAGNOSIS — H69.93 ETD (EUSTACHIAN TUBE DYSFUNCTION), BILATERAL: Primary | ICD-10-CM

## 2020-01-01 DIAGNOSIS — J44.9 CHRONIC OBSTRUCTIVE PULMONARY DISEASE, UNSPECIFIED COPD TYPE: Primary | ICD-10-CM

## 2020-01-01 DIAGNOSIS — R65.21 SEPTIC SHOCK: ICD-10-CM

## 2020-01-01 DIAGNOSIS — R05.9 COUGH: ICD-10-CM

## 2020-01-01 DIAGNOSIS — D59.10 AUTOIMMUNE HEMOLYTIC ANEMIA: Chronic | ICD-10-CM

## 2020-01-01 DIAGNOSIS — R63.4 UNINTENTIONAL WEIGHT LOSS: ICD-10-CM

## 2020-01-01 DIAGNOSIS — I33.0 BACTERIAL ENDOCARDITIS, UNSPECIFIED CHRONICITY: ICD-10-CM

## 2020-01-01 DIAGNOSIS — R09.82 POST-NASAL DRIP: ICD-10-CM

## 2020-01-01 DIAGNOSIS — Z91.89: ICD-10-CM

## 2020-01-01 DIAGNOSIS — Z96.653 STATUS POST TOTAL BILATERAL KNEE REPLACEMENT: Primary | ICD-10-CM

## 2020-01-01 DIAGNOSIS — B95.5 STREPTOCOCCAL BACTEREMIA: ICD-10-CM

## 2020-01-01 DIAGNOSIS — K92.2 GASTROINTESTINAL HEMORRHAGE: ICD-10-CM

## 2020-01-01 DIAGNOSIS — R42 VERTIGO: ICD-10-CM

## 2020-01-01 DIAGNOSIS — G89.29 CHRONIC PAIN OF RIGHT KNEE: ICD-10-CM

## 2020-01-01 DIAGNOSIS — J41.0 SIMPLE CHRONIC BRONCHITIS: Primary | ICD-10-CM

## 2020-01-01 DIAGNOSIS — K92.1 MELENA: Primary | ICD-10-CM

## 2020-01-01 DIAGNOSIS — K57.92 DIVERTICULITIS: ICD-10-CM

## 2020-01-01 DIAGNOSIS — R42 DIZZINESS: ICD-10-CM

## 2020-01-01 DIAGNOSIS — D64.9 SYMPTOMATIC ANEMIA: ICD-10-CM

## 2020-01-01 DIAGNOSIS — F41.9 ANXIETY: ICD-10-CM

## 2020-01-01 DIAGNOSIS — K21.9 GASTROESOPHAGEAL REFLUX DISEASE WITHOUT ESOPHAGITIS: ICD-10-CM

## 2020-01-01 DIAGNOSIS — B95.5 STREPTOCOCCAL BACTEREMIA: Primary | ICD-10-CM

## 2020-01-01 DIAGNOSIS — I34.0 NONRHEUMATIC MITRAL VALVE REGURGITATION: Chronic | ICD-10-CM

## 2020-01-01 DIAGNOSIS — J41.0 SIMPLE CHRONIC BRONCHITIS: ICD-10-CM

## 2020-01-01 DIAGNOSIS — K21.9 GASTROESOPHAGEAL REFLUX DISEASE, ESOPHAGITIS PRESENCE NOT SPECIFIED: Primary | ICD-10-CM

## 2020-01-01 DIAGNOSIS — R56.9 SEIZURE-LIKE ACTIVITY: ICD-10-CM

## 2020-01-01 DIAGNOSIS — R53.81 DEBILITY: ICD-10-CM

## 2020-01-01 DIAGNOSIS — D64.9 ANEMIA, UNSPECIFIED TYPE: Primary | ICD-10-CM

## 2020-01-01 LAB
ABO + RH BLD: NORMAL
ALBUMIN SERPL BCP-MCNC: 1.1 G/DL (ref 3.5–5.2)
ALBUMIN SERPL BCP-MCNC: 1.2 G/DL (ref 3.5–5.2)
ALBUMIN SERPL BCP-MCNC: 1.3 G/DL (ref 3.5–5.2)
ALBUMIN SERPL BCP-MCNC: 1.4 G/DL (ref 3.5–5.2)
ALBUMIN SERPL BCP-MCNC: 1.5 G/DL (ref 3.5–5.2)
ALBUMIN SERPL BCP-MCNC: 1.7 G/DL (ref 3.5–5.2)
ALBUMIN SERPL BCP-MCNC: 1.9 G/DL (ref 3.5–5.2)
ALBUMIN SERPL BCP-MCNC: 2.1 G/DL (ref 3.5–5.2)
ALBUMIN SERPL BCP-MCNC: 2.1 G/DL (ref 3.5–5.2)
ALBUMIN SERPL BCP-MCNC: 2.2 G/DL (ref 3.5–5.2)
ALBUMIN SERPL BCP-MCNC: 2.3 G/DL (ref 3.5–5.2)
ALBUMIN SERPL BCP-MCNC: 2.4 G/DL (ref 3.5–5.2)
ALBUMIN SERPL BCP-MCNC: 2.5 G/DL (ref 3.5–5.2)
ALBUMIN SERPL BCP-MCNC: 2.6 G/DL (ref 3.5–5.2)
ALBUMIN SERPL BCP-MCNC: 2.7 G/DL (ref 3.5–5.2)
ALBUMIN SERPL BCP-MCNC: 2.7 G/DL (ref 3.5–5.2)
ALBUMIN SERPL BCP-MCNC: 2.8 G/DL (ref 3.5–5.2)
ALBUMIN SERPL BCP-MCNC: 2.8 G/DL (ref 3.5–5.2)
ALBUMIN SERPL BCP-MCNC: 2.9 G/DL (ref 3.5–5.2)
ALBUMIN SERPL BCP-MCNC: 2.9 G/DL (ref 3.5–5.2)
ALLENS TEST: ABNORMAL
ALP SERPL-CCNC: 283 U/L (ref 55–135)
ALP SERPL-CCNC: 389 U/L (ref 55–135)
ALP SERPL-CCNC: 41 U/L (ref 55–135)
ALP SERPL-CCNC: 41 U/L (ref 55–135)
ALP SERPL-CCNC: 43 U/L (ref 55–135)
ALP SERPL-CCNC: 44 U/L (ref 55–135)
ALP SERPL-CCNC: 45 U/L (ref 55–135)
ALP SERPL-CCNC: 46 U/L (ref 55–135)
ALP SERPL-CCNC: 46 U/L (ref 55–135)
ALP SERPL-CCNC: 47 U/L (ref 55–135)
ALP SERPL-CCNC: 48 U/L (ref 55–135)
ALP SERPL-CCNC: 49 U/L (ref 55–135)
ALP SERPL-CCNC: 50 U/L (ref 55–135)
ALP SERPL-CCNC: 505 U/L (ref 55–135)
ALP SERPL-CCNC: 51 U/L (ref 55–135)
ALP SERPL-CCNC: 51 U/L (ref 55–135)
ALP SERPL-CCNC: 510 U/L (ref 55–135)
ALP SERPL-CCNC: 52 U/L (ref 55–135)
ALP SERPL-CCNC: 53 U/L (ref 55–135)
ALP SERPL-CCNC: 535 U/L (ref 55–135)
ALP SERPL-CCNC: 542 U/L (ref 55–135)
ALP SERPL-CCNC: 55 U/L (ref 55–135)
ALP SERPL-CCNC: 56 U/L (ref 55–135)
ALP SERPL-CCNC: 56 U/L (ref 55–135)
ALP SERPL-CCNC: 570 U/L (ref 55–135)
ALP SERPL-CCNC: 58 U/L (ref 55–135)
ALP SERPL-CCNC: 58 U/L (ref 55–135)
ALP SERPL-CCNC: 587 U/L (ref 55–135)
ALP SERPL-CCNC: 59 U/L (ref 55–135)
ALP SERPL-CCNC: 61 U/L (ref 55–135)
ALP SERPL-CCNC: 610 U/L (ref 55–135)
ALP SERPL-CCNC: 63 U/L (ref 55–135)
ALP SERPL-CCNC: 632 U/L (ref 55–135)
ALP SERPL-CCNC: 656 U/L (ref 55–135)
ALP SERPL-CCNC: 666 U/L (ref 55–135)
ALP SERPL-CCNC: 710 U/L (ref 55–135)
ALP SERPL-CCNC: 719 U/L (ref 55–135)
ALP SERPL-CCNC: 721 U/L (ref 55–135)
ALP SERPL-CCNC: 725 U/L (ref 55–135)
ALP SERPL-CCNC: 767 U/L (ref 55–135)
ALP SERPL-CCNC: 783 U/L (ref 55–135)
ALP SERPL-CCNC: 93 U/L (ref 55–135)
ALT SERPL W/O P-5'-P-CCNC: 10 U/L (ref 10–44)
ALT SERPL W/O P-5'-P-CCNC: 11 U/L (ref 10–44)
ALT SERPL W/O P-5'-P-CCNC: 110 U/L (ref 10–44)
ALT SERPL W/O P-5'-P-CCNC: 12 U/L (ref 10–44)
ALT SERPL W/O P-5'-P-CCNC: 13 U/L (ref 10–44)
ALT SERPL W/O P-5'-P-CCNC: 14 U/L (ref 10–44)
ALT SERPL W/O P-5'-P-CCNC: 14 U/L (ref 10–44)
ALT SERPL W/O P-5'-P-CCNC: 149 U/L (ref 10–44)
ALT SERPL W/O P-5'-P-CCNC: 15 U/L (ref 10–44)
ALT SERPL W/O P-5'-P-CCNC: 15 U/L (ref 10–44)
ALT SERPL W/O P-5'-P-CCNC: 16 U/L (ref 10–44)
ALT SERPL W/O P-5'-P-CCNC: 165 U/L (ref 10–44)
ALT SERPL W/O P-5'-P-CCNC: 166 U/L (ref 10–44)
ALT SERPL W/O P-5'-P-CCNC: 173 U/L (ref 10–44)
ALT SERPL W/O P-5'-P-CCNC: 176 U/L (ref 10–44)
ALT SERPL W/O P-5'-P-CCNC: 176 U/L (ref 10–44)
ALT SERPL W/O P-5'-P-CCNC: 183 U/L (ref 10–44)
ALT SERPL W/O P-5'-P-CCNC: 187 U/L (ref 10–44)
ALT SERPL W/O P-5'-P-CCNC: 192 U/L (ref 10–44)
ALT SERPL W/O P-5'-P-CCNC: 196 U/L (ref 10–44)
ALT SERPL W/O P-5'-P-CCNC: 198 U/L (ref 10–44)
ALT SERPL W/O P-5'-P-CCNC: 20 U/L (ref 10–44)
ALT SERPL W/O P-5'-P-CCNC: 20 U/L (ref 10–44)
ALT SERPL W/O P-5'-P-CCNC: 207 U/L (ref 10–44)
ALT SERPL W/O P-5'-P-CCNC: 210 U/L (ref 10–44)
ALT SERPL W/O P-5'-P-CCNC: 232 U/L (ref 10–44)
ALT SERPL W/O P-5'-P-CCNC: 234 U/L (ref 10–44)
ALT SERPL W/O P-5'-P-CCNC: 42 U/L (ref 10–44)
ALT SERPL W/O P-5'-P-CCNC: 5 U/L (ref 10–44)
ALT SERPL W/O P-5'-P-CCNC: 6 U/L (ref 10–44)
ALT SERPL W/O P-5'-P-CCNC: 69 U/L (ref 10–44)
ALT SERPL W/O P-5'-P-CCNC: 7 U/L (ref 10–44)
ALT SERPL W/O P-5'-P-CCNC: 8 U/L (ref 10–44)
ALT SERPL W/O P-5'-P-CCNC: 8 U/L (ref 10–44)
ALT SERPL W/O P-5'-P-CCNC: 9 U/L (ref 10–44)
ALT SERPL W/O P-5'-P-CCNC: <5 U/L (ref 10–44)
AMORPH CRY UR QL COMP ASSIST: ABNORMAL
AMYLASE SERPL-CCNC: 41 U/L (ref 20–110)
AMYLASE SERPL-CCNC: 52 U/L (ref 20–110)
ANION GAP SERPL CALC-SCNC: 10 MMOL/L (ref 8–16)
ANION GAP SERPL CALC-SCNC: 11 MMOL/L (ref 8–16)
ANION GAP SERPL CALC-SCNC: 12 MMOL/L (ref 8–16)
ANION GAP SERPL CALC-SCNC: 13 MMOL/L (ref 8–16)
ANION GAP SERPL CALC-SCNC: 13 MMOL/L (ref 8–16)
ANION GAP SERPL CALC-SCNC: 14 MMOL/L (ref 8–16)
ANION GAP SERPL CALC-SCNC: 15 MMOL/L (ref 8–16)
ANION GAP SERPL CALC-SCNC: 16 MMOL/L (ref 8–16)
ANION GAP SERPL CALC-SCNC: 17 MMOL/L (ref 8–16)
ANION GAP SERPL CALC-SCNC: 17 MMOL/L (ref 8–16)
ANION GAP SERPL CALC-SCNC: 18 MMOL/L (ref 8–16)
ANION GAP SERPL CALC-SCNC: 19 MMOL/L (ref 8–16)
ANION GAP SERPL CALC-SCNC: 20 MMOL/L (ref 8–16)
ANION GAP SERPL CALC-SCNC: 21 MMOL/L (ref 8–16)
ANION GAP SERPL CALC-SCNC: 21 MMOL/L (ref 8–16)
ANION GAP SERPL CALC-SCNC: 23 MMOL/L (ref 8–16)
ANION GAP SERPL CALC-SCNC: 25 MMOL/L (ref 8–16)
ANION GAP SERPL CALC-SCNC: 25 MMOL/L (ref 8–16)
ANION GAP SERPL CALC-SCNC: 3 MMOL/L (ref 8–16)
ANION GAP SERPL CALC-SCNC: 5 MMOL/L (ref 8–16)
ANION GAP SERPL CALC-SCNC: 6 MMOL/L (ref 8–16)
ANION GAP SERPL CALC-SCNC: 7 MMOL/L (ref 8–16)
ANION GAP SERPL CALC-SCNC: 8 MMOL/L (ref 8–16)
ANION GAP SERPL CALC-SCNC: 9 MMOL/L (ref 8–16)
ANISOCYTOSIS BLD QL SMEAR: SLIGHT
ANISOCYTOSIS BLD QL SMEAR: SLIGHT
ANNOTATION COMMENT IMP: NORMAL
AORTIC ROOT ANNULUS: 3.12 CM
AORTIC ROOT ANNULUS: 3.32 CM
AORTIC VALVE CUSP SEPERATION: 1.71 CM
APTT BLDCRRT: 27.1 SEC (ref 21–32)
APTT BLDCRRT: 30.3 SEC (ref 21–32)
ASCENDING AORTA: 3.63 CM
ASCENDING AORTA: 3.84 CM
AST SERPL-CCNC: 10 U/L (ref 10–40)
AST SERPL-CCNC: 106 U/L (ref 10–40)
AST SERPL-CCNC: 109 U/L (ref 10–40)
AST SERPL-CCNC: 11 U/L (ref 10–40)
AST SERPL-CCNC: 12 U/L (ref 10–40)
AST SERPL-CCNC: 129 U/L (ref 10–40)
AST SERPL-CCNC: 129 U/L (ref 10–40)
AST SERPL-CCNC: 13 U/L (ref 10–40)
AST SERPL-CCNC: 133 U/L (ref 10–40)
AST SERPL-CCNC: 14 U/L (ref 10–40)
AST SERPL-CCNC: 14 U/L (ref 10–40)
AST SERPL-CCNC: 149 U/L (ref 10–40)
AST SERPL-CCNC: 15 U/L (ref 10–40)
AST SERPL-CCNC: 16 U/L (ref 10–40)
AST SERPL-CCNC: 171 U/L (ref 10–40)
AST SERPL-CCNC: 18 U/L (ref 10–40)
AST SERPL-CCNC: 185 U/L (ref 10–40)
AST SERPL-CCNC: 19 U/L (ref 10–40)
AST SERPL-CCNC: 19 U/L (ref 10–40)
AST SERPL-CCNC: 199 U/L (ref 10–40)
AST SERPL-CCNC: 202 U/L (ref 10–40)
AST SERPL-CCNC: 205 U/L (ref 10–40)
AST SERPL-CCNC: 206 U/L (ref 10–40)
AST SERPL-CCNC: 211 U/L (ref 10–40)
AST SERPL-CCNC: 236 U/L (ref 10–40)
AST SERPL-CCNC: 50 U/L (ref 10–40)
AST SERPL-CCNC: 69 U/L (ref 10–40)
AST SERPL-CCNC: 7 U/L (ref 10–40)
AST SERPL-CCNC: 8 U/L (ref 10–40)
AST SERPL-CCNC: 8 U/L (ref 10–40)
AST SERPL-CCNC: 9 U/L (ref 10–40)
AST SERPL-CCNC: 96 U/L (ref 10–40)
AST SERPL-CCNC: 99 U/L (ref 10–40)
AV INDEX (PROSTH): 0.16
AV INDEX (PROSTH): 0.66
AV INDEX (PROSTH): 0.68
AV INDEX (PROSTH): 0.69
AV INDEX (PROSTH): 0.78
AV MEAN GRADIENT: 7 MMHG
AV MEAN GRADIENT: 80 MMHG
AV PEAK GRADIENT: 10 MMHG
AV PEAK GRADIENT: 108 MMHG
AV PEAK GRADIENT: 11 MMHG
AV PEAK GRADIENT: 13 MMHG
AV PEAK GRADIENT: 16 MMHG
AV VALVE AREA: 0.55 CM2
AV VALVE AREA: 2.45 CM2
AV VALVE AREA: 2.66 CM2
AV VALVE AREA: 2.75 CM2
AV VALVE AREA: 2.84 CM2
AV VELOCITY RATIO: 0.2
AV VELOCITY RATIO: 0.58
AV VELOCITY RATIO: 0.61
AV VELOCITY RATIO: 0.66
AV VELOCITY RATIO: 0.68
BACTERIA #/AREA URNS AUTO: ABNORMAL /HPF
BACTERIA #/AREA URNS HPF: ABNORMAL /HPF
BACTERIA BLD CULT: ABNORMAL
BACTERIA BLD CULT: NORMAL
BACTERIA CSF CULT: NO GROWTH
BACTERIA SPEC AEROBE CULT: NORMAL
BACTERIA UR CULT: ABNORMAL
BACTERIA UR CULT: NO GROWTH
BASOPHILS # BLD AUTO: 0.03 K/UL (ref 0–0.2)
BASOPHILS # BLD AUTO: 0.04 K/UL (ref 0–0.2)
BASOPHILS # BLD AUTO: 0.05 K/UL (ref 0–0.2)
BASOPHILS # BLD AUTO: 0.06 K/UL (ref 0–0.2)
BASOPHILS # BLD AUTO: 0.07 K/UL (ref 0–0.2)
BASOPHILS # BLD AUTO: 0.08 K/UL (ref 0–0.2)
BASOPHILS # BLD AUTO: 0.09 K/UL (ref 0–0.2)
BASOPHILS # BLD AUTO: 0.1 K/UL (ref 0–0.2)
BASOPHILS # BLD AUTO: 0.11 K/UL (ref 0–0.2)
BASOPHILS # BLD AUTO: 0.11 K/UL (ref 0–0.2)
BASOPHILS # BLD AUTO: 0.12 K/UL (ref 0–0.2)
BASOPHILS # BLD AUTO: 0.13 K/UL (ref 0–0.2)
BASOPHILS # BLD AUTO: 0.15 K/UL (ref 0–0.2)
BASOPHILS # BLD AUTO: 0.15 K/UL (ref 0–0.2)
BASOPHILS # BLD AUTO: 0.18 K/UL (ref 0–0.2)
BASOPHILS NFR BLD: 0 % (ref 0–1.9)
BASOPHILS NFR BLD: 0.2 % (ref 0–1.9)
BASOPHILS NFR BLD: 0.3 % (ref 0–1.9)
BASOPHILS NFR BLD: 0.4 % (ref 0–1.9)
BASOPHILS NFR BLD: 0.5 % (ref 0–1.9)
BASOPHILS NFR BLD: 0.6 % (ref 0–1.9)
BASOPHILS NFR BLD: 0.7 % (ref 0–1.9)
BASOPHILS NFR BLD: 0.8 % (ref 0–1.9)
BASOPHILS NFR BLD: 0.9 % (ref 0–1.9)
BASOPHILS NFR BLD: 1 % (ref 0–1.9)
BASOPHILS NFR BLD: 1.1 % (ref 0–1.9)
BASOPHILS NFR CSF MANUAL: 1 %
BILIRUB SERPL-MCNC: 0.1 MG/DL (ref 0.1–1)
BILIRUB SERPL-MCNC: 0.2 MG/DL (ref 0.1–1)
BILIRUB SERPL-MCNC: 0.3 MG/DL (ref 0.1–1)
BILIRUB SERPL-MCNC: 0.4 MG/DL (ref 0.1–1)
BILIRUB SERPL-MCNC: 0.5 MG/DL (ref 0.1–1)
BILIRUB SERPL-MCNC: 1.1 MG/DL (ref 0.1–1)
BILIRUB SERPL-MCNC: 1.4 MG/DL (ref 0.1–1)
BILIRUB SERPL-MCNC: 2.8 MG/DL (ref 0.1–1)
BILIRUB SERPL-MCNC: 3.8 MG/DL (ref 0.1–1)
BILIRUB SERPL-MCNC: 4.8 MG/DL (ref 0.1–1)
BILIRUB SERPL-MCNC: 5.3 MG/DL (ref 0.1–1)
BILIRUB SERPL-MCNC: 5.8 MG/DL (ref 0.1–1)
BILIRUB SERPL-MCNC: 6.6 MG/DL (ref 0.1–1)
BILIRUB SERPL-MCNC: 7.4 MG/DL (ref 0.1–1)
BILIRUB SERPL-MCNC: 7.4 MG/DL (ref 0.1–1)
BILIRUB SERPL-MCNC: 7.5 MG/DL (ref 0.1–1)
BILIRUB SERPL-MCNC: 7.7 MG/DL (ref 0.1–1)
BILIRUB SERPL-MCNC: 7.9 MG/DL (ref 0.1–1)
BILIRUB SERPL-MCNC: 8.1 MG/DL (ref 0.1–1)
BILIRUB SERPL-MCNC: <0.1 MG/DL (ref 0.1–1)
BILIRUB SERPL-MCNC: <0.1 MG/DL (ref 0.1–1)
BILIRUB UR QL STRIP: NEGATIVE
BLD GP AB SCN CELLS X3 SERPL QL: NORMAL
BLD PROD TYP BPU: NORMAL
BLOOD GROUP ANTIBODIES SERPL: NORMAL
BLOOD UNIT EXPIRATION DATE: NORMAL
BLOOD UNIT TYPE CODE: 9500
BLOOD UNIT TYPE: NORMAL
BNP SERPL-MCNC: 110 PG/ML (ref 0–99)
BNP SERPL-MCNC: 123 PG/ML (ref 0–99)
BNP SERPL-MCNC: 137 PG/ML (ref 0–99)
BNP SERPL-MCNC: 182 PG/ML (ref 0–99)
BNP SERPL-MCNC: 340 PG/ML (ref 0–99)
BNP SERPL-MCNC: 358 PG/ML (ref 0–99)
BNP SERPL-MCNC: 615 PG/ML (ref 0–99)
BNP SERPL-MCNC: 90 PG/ML (ref 0–99)
BODY SITE - BONE MARROW: NORMAL
BSA FOR ECHO PROCEDURE: 2.24 M2
BSA FOR ECHO PROCEDURE: 2.34 M2
BSA FOR ECHO PROCEDURE: 2.35 M2
BSA FOR ECHO PROCEDURE: 2.38 M2
BUN SERPL-MCNC: 105 MG/DL (ref 8–23)
BUN SERPL-MCNC: 14 MG/DL (ref 8–23)
BUN SERPL-MCNC: 14 MG/DL (ref 8–23)
BUN SERPL-MCNC: 15 MG/DL (ref 8–23)
BUN SERPL-MCNC: 16 MG/DL (ref 8–23)
BUN SERPL-MCNC: 16 MG/DL (ref 8–23)
BUN SERPL-MCNC: 17 MG/DL (ref 8–23)
BUN SERPL-MCNC: 18 MG/DL (ref 8–23)
BUN SERPL-MCNC: 19 MG/DL (ref 8–23)
BUN SERPL-MCNC: 19 MG/DL (ref 8–23)
BUN SERPL-MCNC: 20 MG/DL (ref 8–23)
BUN SERPL-MCNC: 20 MG/DL (ref 8–23)
BUN SERPL-MCNC: 21 MG/DL (ref 8–23)
BUN SERPL-MCNC: 22 MG/DL (ref 8–23)
BUN SERPL-MCNC: 23 MG/DL (ref 8–23)
BUN SERPL-MCNC: 24 MG/DL (ref 8–23)
BUN SERPL-MCNC: 25 MG/DL (ref 8–23)
BUN SERPL-MCNC: 25 MG/DL (ref 8–23)
BUN SERPL-MCNC: 26 MG/DL (ref 8–23)
BUN SERPL-MCNC: 27 MG/DL (ref 8–23)
BUN SERPL-MCNC: 28 MG/DL (ref 8–23)
BUN SERPL-MCNC: 31 MG/DL (ref 8–23)
BUN SERPL-MCNC: 31 MG/DL (ref 8–23)
BUN SERPL-MCNC: 32 MG/DL (ref 8–23)
BUN SERPL-MCNC: 32 MG/DL (ref 8–23)
BUN SERPL-MCNC: 33 MG/DL (ref 8–23)
BUN SERPL-MCNC: 34 MG/DL (ref 8–23)
BUN SERPL-MCNC: 35 MG/DL (ref 8–23)
BUN SERPL-MCNC: 35 MG/DL (ref 8–23)
BUN SERPL-MCNC: 36 MG/DL (ref 8–23)
BUN SERPL-MCNC: 37 MG/DL (ref 8–23)
BUN SERPL-MCNC: 38 MG/DL (ref 8–23)
BUN SERPL-MCNC: 39 MG/DL (ref 8–23)
BUN SERPL-MCNC: 40 MG/DL (ref 8–23)
BUN SERPL-MCNC: 57 MG/DL (ref 8–23)
BUN SERPL-MCNC: 64 MG/DL (ref 8–23)
BUN SERPL-MCNC: 66 MG/DL (ref 8–23)
BUN SERPL-MCNC: 80 MG/DL (ref 8–23)
BUN SERPL-MCNC: 80 MG/DL (ref 8–23)
BUN SERPL-MCNC: 82 MG/DL (ref 8–23)
BUN SERPL-MCNC: 83 MG/DL (ref 8–23)
BUN SERPL-MCNC: 83 MG/DL (ref 8–23)
BUN SERPL-MCNC: 84 MG/DL (ref 8–23)
BUN SERPL-MCNC: 86 MG/DL (ref 8–23)
BUN SERPL-MCNC: 87 MG/DL (ref 8–23)
BUN SERPL-MCNC: 87 MG/DL (ref 8–23)
BUN SERPL-MCNC: 91 MG/DL (ref 8–23)
BUN SERPL-MCNC: 92 MG/DL (ref 8–23)
BUN SERPL-MCNC: 92 MG/DL (ref 8–23)
BUN SERPL-MCNC: 94 MG/DL (ref 8–23)
BUN SERPL-MCNC: 97 MG/DL (ref 8–23)
BUN SERPL-MCNC: 98 MG/DL (ref 8–23)
BUN SERPL-MCNC: 99 MG/DL (ref 8–23)
C3 SERPL-MCNC: 127 MG/DL (ref 50–180)
C4 SERPL-MCNC: 38 MG/DL (ref 11–44)
CALCIUM SERPL-MCNC: 10 MG/DL (ref 8.7–10.5)
CALCIUM SERPL-MCNC: 10.1 MG/DL (ref 8.7–10.5)
CALCIUM SERPL-MCNC: 10.2 MG/DL (ref 8.7–10.5)
CALCIUM SERPL-MCNC: 10.3 MG/DL (ref 8.7–10.5)
CALCIUM SERPL-MCNC: 10.4 MG/DL (ref 8.7–10.5)
CALCIUM SERPL-MCNC: 10.5 MG/DL (ref 8.7–10.5)
CALCIUM SERPL-MCNC: 8 MG/DL (ref 8.7–10.5)
CALCIUM SERPL-MCNC: 8.1 MG/DL (ref 8.7–10.5)
CALCIUM SERPL-MCNC: 8.1 MG/DL (ref 8.7–10.5)
CALCIUM SERPL-MCNC: 8.2 MG/DL (ref 8.7–10.5)
CALCIUM SERPL-MCNC: 8.3 MG/DL (ref 8.7–10.5)
CALCIUM SERPL-MCNC: 8.4 MG/DL (ref 8.7–10.5)
CALCIUM SERPL-MCNC: 8.4 MG/DL (ref 8.7–10.5)
CALCIUM SERPL-MCNC: 8.5 MG/DL (ref 8.7–10.5)
CALCIUM SERPL-MCNC: 8.6 MG/DL (ref 8.7–10.5)
CALCIUM SERPL-MCNC: 8.7 MG/DL (ref 8.7–10.5)
CALCIUM SERPL-MCNC: 8.8 MG/DL (ref 8.7–10.5)
CALCIUM SERPL-MCNC: 8.8 MG/DL (ref 8.7–10.5)
CALCIUM SERPL-MCNC: 8.9 MG/DL (ref 8.7–10.5)
CALCIUM SERPL-MCNC: 9 MG/DL (ref 8.7–10.5)
CALCIUM SERPL-MCNC: 9 MG/DL (ref 8.7–10.5)
CALCIUM SERPL-MCNC: 9.1 MG/DL (ref 8.7–10.5)
CALCIUM SERPL-MCNC: 9.2 MG/DL (ref 8.7–10.5)
CALCIUM SERPL-MCNC: 9.2 MG/DL (ref 8.7–10.5)
CALCIUM SERPL-MCNC: 9.3 MG/DL (ref 8.7–10.5)
CALCIUM SERPL-MCNC: 9.5 MG/DL (ref 8.7–10.5)
CALCIUM SERPL-MCNC: 9.6 MG/DL (ref 8.7–10.5)
CALCIUM SERPL-MCNC: 9.7 MG/DL (ref 8.7–10.5)
CALCIUM SERPL-MCNC: 9.8 MG/DL (ref 8.7–10.5)
CALCIUM SERPL-MCNC: 9.9 MG/DL (ref 8.7–10.5)
CHLORIDE SERPL-SCNC: 100 MMOL/L (ref 95–110)
CHLORIDE SERPL-SCNC: 101 MMOL/L (ref 95–110)
CHLORIDE SERPL-SCNC: 102 MMOL/L (ref 95–110)
CHLORIDE SERPL-SCNC: 103 MMOL/L (ref 95–110)
CHLORIDE SERPL-SCNC: 104 MMOL/L (ref 95–110)
CHLORIDE SERPL-SCNC: 105 MMOL/L (ref 95–110)
CHLORIDE SERPL-SCNC: 106 MMOL/L (ref 95–110)
CHLORIDE SERPL-SCNC: 107 MMOL/L (ref 95–110)
CHLORIDE SERPL-SCNC: 107 MMOL/L (ref 95–110)
CHLORIDE SERPL-SCNC: 108 MMOL/L (ref 95–110)
CHLORIDE SERPL-SCNC: 108 MMOL/L (ref 95–110)
CHLORIDE SERPL-SCNC: 109 MMOL/L (ref 95–110)
CHLORIDE SERPL-SCNC: 110 MMOL/L (ref 95–110)
CHLORIDE SERPL-SCNC: 111 MMOL/L (ref 95–110)
CHLORIDE SERPL-SCNC: 112 MMOL/L (ref 95–110)
CHLORIDE SERPL-SCNC: 113 MMOL/L (ref 95–110)
CHLORIDE SERPL-SCNC: 114 MMOL/L (ref 95–110)
CHLORIDE SERPL-SCNC: 99 MMOL/L (ref 95–110)
CHOLEST SERPL-MCNC: 105 MG/DL (ref 120–199)
CHOLEST SERPL-MCNC: 72 MG/DL (ref 120–199)
CHOLEST SERPL-MCNC: 91 MG/DL (ref 120–199)
CHOLEST/HDLC SERPL: 2.8 {RATIO} (ref 2–5)
CHOLEST/HDLC SERPL: 3.8 {RATIO} (ref 2–5)
CHOLEST/HDLC SERPL: 3.8 {RATIO} (ref 2–5)
CHROM BANDING METHOD: NORMAL
CHROMOSOME ANALYSIS BM ADDITIONAL INFORMATION: NORMAL
CHROMOSOME ANALYSIS BM RELEASED BY: NORMAL
CHROMOSOME ANALYSIS BM RESULT SUMMARY: NORMAL
CK MB SERPL-MCNC: 1.2 NG/ML (ref 0.1–6.5)
CK MB SERPL-MCNC: 2.7 NG/ML (ref 0.1–6.5)
CK MB SERPL-RTO: 4.8 % (ref 0–5)
CK MB SERPL-RTO: 6.8 % (ref 0–5)
CK SERPL-CCNC: 25 U/L (ref 20–200)
CK SERPL-CCNC: 38 U/L (ref 20–200)
CK SERPL-CCNC: 40 U/L (ref 20–200)
CLARITY CSF: CLEAR
CLARITY CSF: CLEAR
CLARITY UR REFRACT.AUTO: ABNORMAL
CLARITY UR: CLEAR
CLINICAL CYTOGENETICIST REVIEW: NORMAL
CLINICAL DIAGNOSIS - BONE MARROW: NORMAL
CO2 SERPL-SCNC: 12 MMOL/L (ref 23–29)
CO2 SERPL-SCNC: 13 MMOL/L (ref 23–29)
CO2 SERPL-SCNC: 14 MMOL/L (ref 23–29)
CO2 SERPL-SCNC: 14 MMOL/L (ref 23–29)
CO2 SERPL-SCNC: 15 MMOL/L (ref 23–29)
CO2 SERPL-SCNC: 15 MMOL/L (ref 23–29)
CO2 SERPL-SCNC: 16 MMOL/L (ref 23–29)
CO2 SERPL-SCNC: 17 MMOL/L (ref 23–29)
CO2 SERPL-SCNC: 17 MMOL/L (ref 23–29)
CO2 SERPL-SCNC: 18 MMOL/L (ref 23–29)
CO2 SERPL-SCNC: 19 MMOL/L (ref 23–29)
CO2 SERPL-SCNC: 20 MMOL/L (ref 23–29)
CO2 SERPL-SCNC: 20 MMOL/L (ref 23–29)
CO2 SERPL-SCNC: 21 MMOL/L (ref 23–29)
CO2 SERPL-SCNC: 21 MMOL/L (ref 23–29)
CO2 SERPL-SCNC: 22 MMOL/L (ref 23–29)
CO2 SERPL-SCNC: 23 MMOL/L (ref 23–29)
CO2 SERPL-SCNC: 24 MMOL/L (ref 23–29)
CO2 SERPL-SCNC: 25 MMOL/L (ref 23–29)
CO2 SERPL-SCNC: 25 MMOL/L (ref 23–29)
CO2 SERPL-SCNC: 26 MMOL/L (ref 23–29)
CO2 SERPL-SCNC: 27 MMOL/L (ref 23–29)
CO2 SERPL-SCNC: 28 MMOL/L (ref 23–29)
CO2 SERPL-SCNC: 29 MMOL/L (ref 23–29)
CO2 SERPL-SCNC: 30 MMOL/L (ref 23–29)
CO2 SERPL-SCNC: 31 MMOL/L (ref 23–29)
CO2 SERPL-SCNC: 32 MMOL/L (ref 23–29)
CODING SYSTEM: NORMAL
COLOR CSF: COLORLESS
COLOR CSF: COLORLESS
COLOR UR AUTO: YELLOW
COLOR UR: YELLOW
COMMENT: NORMAL
CREAT SERPL-MCNC: 1 MG/DL (ref 0.5–1.4)
CREAT SERPL-MCNC: 1.1 MG/DL (ref 0.5–1.4)
CREAT SERPL-MCNC: 1.2 MG/DL (ref 0.5–1.4)
CREAT SERPL-MCNC: 1.3 MG/DL (ref 0.5–1.4)
CREAT SERPL-MCNC: 1.4 MG/DL (ref 0.5–1.4)
CREAT SERPL-MCNC: 1.5 MG/DL (ref 0.5–1.4)
CREAT SERPL-MCNC: 1.6 MG/DL (ref 0.5–1.4)
CREAT SERPL-MCNC: 1.7 MG/DL (ref 0.5–1.4)
CREAT SERPL-MCNC: 1.8 MG/DL (ref 0.5–1.4)
CREAT SERPL-MCNC: 1.9 MG/DL (ref 0.5–1.4)
CREAT SERPL-MCNC: 2 MG/DL (ref 0.5–1.4)
CREAT SERPL-MCNC: 2 MG/DL (ref 0.5–1.4)
CREAT SERPL-MCNC: 2.1 MG/DL (ref 0.5–1.4)
CREAT SERPL-MCNC: 2.2 MG/DL (ref 0.5–1.4)
CREAT SERPL-MCNC: 2.3 MG/DL (ref 0.5–1.4)
CREAT SERPL-MCNC: 2.6 MG/DL (ref 0.5–1.4)
CREAT SERPL-MCNC: 3.1 MG/DL (ref 0.5–1.4)
CREAT SERPL-MCNC: 3.1 MG/DL (ref 0.5–1.4)
CREAT SERPL-MCNC: 3.2 MG/DL (ref 0.5–1.4)
CREAT SERPL-MCNC: 3.3 MG/DL (ref 0.5–1.4)
CREAT SERPL-MCNC: 3.3 MG/DL (ref 0.5–1.4)
CREAT SERPL-MCNC: 3.4 MG/DL (ref 0.5–1.4)
CREAT SERPL-MCNC: 3.5 MG/DL (ref 0.5–1.4)
CREAT SERPL-MCNC: 3.8 MG/DL (ref 0.5–1.4)
CREAT SERPL-MCNC: 3.9 MG/DL (ref 0.5–1.4)
CREAT SERPL-MCNC: 3.9 MG/DL (ref 0.5–1.4)
CREAT SERPL-MCNC: 4.1 MG/DL (ref 0.5–1.4)
CREAT SERPL-MCNC: 4.3 MG/DL (ref 0.5–1.4)
CREAT SERPL-MCNC: 4.4 MG/DL (ref 0.5–1.4)
CREAT SERPL-MCNC: 4.6 MG/DL (ref 0.5–1.4)
CREAT SERPL-MCNC: 4.6 MG/DL (ref 0.5–1.4)
CREAT SERPL-MCNC: 4.7 MG/DL (ref 0.5–1.4)
CREAT SERPL-MCNC: 4.9 MG/DL (ref 0.5–1.4)
CTP QC/QA: YES
CV ECHO LV RWT: 0.26 CM
CV ECHO LV RWT: 0.27 CM
CV ECHO LV RWT: 0.3 CM
CV ECHO LV RWT: 0.32 CM
CV ECHO LV RWT: 0.34 CM
DAT IGG-SP REAG RBC-IMP: NORMAL
DELSYS: ABNORMAL
DELSYS: NORMAL
DIFFERENTIAL METHOD: ABNORMAL
DISPENSE STATUS: NORMAL
DNA/RNA EXTRACT AND HOLD RESULT: NORMAL
DNA/RNA EXTRACTION: NORMAL
DOP CALC AO PEAK VEL: 1.58 M/S
DOP CALC AO PEAK VEL: 1.69 M/S
DOP CALC AO PEAK VEL: 1.8 M/S
DOP CALC AO PEAK VEL: 2.01 M/S
DOP CALC AO PEAK VEL: 5.2 M/S
DOP CALC AO VTI: 138.68 CM
DOP CALC AO VTI: 27.58 CM
DOP CALC AO VTI: 31.29 CM
DOP CALC AO VTI: 31.63 CM
DOP CALC AO VTI: 34.05 CM
DOP CALC LVOT AREA: 3.5 CM2
DOP CALC LVOT AREA: 3.6 CM2
DOP CALC LVOT AREA: 3.6 CM2
DOP CALC LVOT AREA: 3.9 CM2
DOP CALC LVOT AREA: 4.2 CM2
DOP CALC LVOT DIAMETER: 2.11 CM
DOP CALC LVOT DIAMETER: 2.14 CM
DOP CALC LVOT DIAMETER: 2.15 CM
DOP CALC LVOT DIAMETER: 2.22 CM
DOP CALC LVOT DIAMETER: 2.31 CM
DOP CALC LVOT PEAK VEL: 0.91 M/S
DOP CALC LVOT PEAK VEL: 1.04 M/S
DOP CALC LVOT PEAK VEL: 1.1 M/S
DOP CALC LVOT PEAK VEL: 1.15 M/S
DOP CALC LVOT PEAK VEL: 1.33 M/S
DOP CALC LVOT STROKE VOLUME: 67.62 CM3
DOP CALC LVOT STROKE VOLUME: 75.98 CM3
DOP CALC LVOT STROKE VOLUME: 83.3 CM3
DOP CALC LVOT STROKE VOLUME: 89.95 CM3
DOP CALC LVOT STROKE VOLUME: 93.58 CM3
DOP CALCLVOT PEAK VEL VTI: 18.81 CM
DOP CALCLVOT PEAK VEL VTI: 21.53 CM
DOP CALCLVOT PEAK VEL VTI: 21.74 CM
DOP CALCLVOT PEAK VEL VTI: 22.34 CM
DOP CALCLVOT PEAK VEL VTI: 24.79 CM
E WAVE DECELERATION TIME: 146.33 MSEC
E WAVE DECELERATION TIME: 160.28 MSEC
E WAVE DECELERATION TIME: 172.72 MSEC
E WAVE DECELERATION TIME: 198.2 MSEC
E/A RATIO: 1.22
E/A RATIO: 1.29
E/A RATIO: 1.34
E/A RATIO: 1.34
E/E' RATIO: 10.26 M/S
E/E' RATIO: 12.13 M/S
E/E' RATIO: 13.37 M/S
E/E' RATIO: 13.65 M/S
ECHO LV POSTERIOR WALL: 0.75 CM (ref 0.6–1.1)
ECHO LV POSTERIOR WALL: 0.78 CM (ref 0.6–1.1)
ECHO LV POSTERIOR WALL: 0.9 CM (ref 0.6–1.1)
ECHO LV POSTERIOR WALL: 1 CM (ref 0.6–1.1)
ECHO LV POSTERIOR WALL: 1.04 CM (ref 0.6–1.1)
EOSINOPHIL # BLD AUTO: 0 K/UL (ref 0–0.5)
EOSINOPHIL # BLD AUTO: 0.1 K/UL (ref 0–0.5)
EOSINOPHIL # BLD AUTO: 0.2 K/UL (ref 0–0.5)
EOSINOPHIL # BLD AUTO: 0.3 K/UL (ref 0–0.5)
EOSINOPHIL # BLD AUTO: 0.4 K/UL (ref 0–0.5)
EOSINOPHIL # BLD AUTO: 0.5 K/UL (ref 0–0.5)
EOSINOPHIL # BLD AUTO: 0.6 K/UL (ref 0–0.5)
EOSINOPHIL NFR BLD: 0 % (ref 0–8)
EOSINOPHIL NFR BLD: 0.1 % (ref 0–8)
EOSINOPHIL NFR BLD: 0.2 % (ref 0–8)
EOSINOPHIL NFR BLD: 0.4 % (ref 0–8)
EOSINOPHIL NFR BLD: 0.4 % (ref 0–8)
EOSINOPHIL NFR BLD: 0.5 % (ref 0–8)
EOSINOPHIL NFR BLD: 0.5 % (ref 0–8)
EOSINOPHIL NFR BLD: 0.7 % (ref 0–8)
EOSINOPHIL NFR BLD: 0.7 % (ref 0–8)
EOSINOPHIL NFR BLD: 0.9 % (ref 0–8)
EOSINOPHIL NFR BLD: 1.3 % (ref 0–8)
EOSINOPHIL NFR BLD: 1.6 % (ref 0–8)
EOSINOPHIL NFR BLD: 1.6 % (ref 0–8)
EOSINOPHIL NFR BLD: 1.7 % (ref 0–8)
EOSINOPHIL NFR BLD: 1.9 % (ref 0–8)
EOSINOPHIL NFR BLD: 2 % (ref 0–8)
EOSINOPHIL NFR BLD: 2 % (ref 0–8)
EOSINOPHIL NFR BLD: 2.1 % (ref 0–8)
EOSINOPHIL NFR BLD: 2.1 % (ref 0–8)
EOSINOPHIL NFR BLD: 2.3 % (ref 0–8)
EOSINOPHIL NFR BLD: 2.4 % (ref 0–8)
EOSINOPHIL NFR BLD: 2.6 % (ref 0–8)
EOSINOPHIL NFR BLD: 2.6 % (ref 0–8)
EOSINOPHIL NFR BLD: 2.7 % (ref 0–8)
EOSINOPHIL NFR BLD: 2.7 % (ref 0–8)
EOSINOPHIL NFR BLD: 2.8 % (ref 0–8)
EOSINOPHIL NFR BLD: 3.1 % (ref 0–8)
EOSINOPHIL NFR BLD: 3.2 % (ref 0–8)
EOSINOPHIL NFR BLD: 3.3 % (ref 0–8)
EOSINOPHIL NFR BLD: 3.3 % (ref 0–8)
EOSINOPHIL NFR BLD: 3.5 % (ref 0–8)
EOSINOPHIL NFR BLD: 3.6 % (ref 0–8)
EOSINOPHIL NFR BLD: 4 % (ref 0–8)
EOSINOPHIL NFR BLD: 4 % (ref 0–8)
EOSINOPHIL NFR BLD: 4.1 % (ref 0–8)
EOSINOPHIL NFR BLD: 4.4 % (ref 0–8)
EOSINOPHIL NFR BLD: 4.4 % (ref 0–8)
EOSINOPHIL NFR BLD: 4.5 % (ref 0–8)
EOSINOPHIL NFR BLD: 4.6 % (ref 0–8)
EOSINOPHIL NFR BLD: 4.6 % (ref 0–8)
EOSINOPHIL NFR BLD: 4.7 % (ref 0–8)
EOSINOPHIL NFR BLD: 4.7 % (ref 0–8)
EOSINOPHIL NFR BLD: 4.8 % (ref 0–8)
EOSINOPHIL NFR BLD: 5 % (ref 0–8)
EOSINOPHIL NFR BLD: 5.1 % (ref 0–8)
EOSINOPHIL NFR BLD: 5.2 % (ref 0–8)
EOSINOPHIL NFR BLD: 5.3 % (ref 0–8)
EOSINOPHIL NFR BLD: 5.4 % (ref 0–8)
EOSINOPHIL NFR BLD: 5.7 % (ref 0–8)
EOSINOPHIL NFR BLD: 5.7 % (ref 0–8)
EOSINOPHIL NFR BLD: 5.8 % (ref 0–8)
EOSINOPHIL NFR BLD: 6.1 % (ref 0–8)
EOSINOPHIL NFR CSF MANUAL: 1 %
ERYTHROCYTE [DISTWIDTH] IN BLOOD BY AUTOMATED COUNT: 14.4 % (ref 11.5–14.5)
ERYTHROCYTE [DISTWIDTH] IN BLOOD BY AUTOMATED COUNT: 14.5 % (ref 11.5–14.5)
ERYTHROCYTE [DISTWIDTH] IN BLOOD BY AUTOMATED COUNT: 14.6 % (ref 11.5–14.5)
ERYTHROCYTE [DISTWIDTH] IN BLOOD BY AUTOMATED COUNT: 14.7 % (ref 11.5–14.5)
ERYTHROCYTE [DISTWIDTH] IN BLOOD BY AUTOMATED COUNT: 14.9 % (ref 11.5–14.5)
ERYTHROCYTE [DISTWIDTH] IN BLOOD BY AUTOMATED COUNT: 15 % (ref 11.5–14.5)
ERYTHROCYTE [DISTWIDTH] IN BLOOD BY AUTOMATED COUNT: 15 % (ref 11.5–14.5)
ERYTHROCYTE [DISTWIDTH] IN BLOOD BY AUTOMATED COUNT: 15.2 % (ref 11.5–14.5)
ERYTHROCYTE [DISTWIDTH] IN BLOOD BY AUTOMATED COUNT: 15.3 % (ref 11.5–14.5)
ERYTHROCYTE [DISTWIDTH] IN BLOOD BY AUTOMATED COUNT: 15.4 % (ref 11.5–14.5)
ERYTHROCYTE [DISTWIDTH] IN BLOOD BY AUTOMATED COUNT: 15.5 % (ref 11.5–14.5)
ERYTHROCYTE [DISTWIDTH] IN BLOOD BY AUTOMATED COUNT: 15.6 % (ref 11.5–14.5)
ERYTHROCYTE [DISTWIDTH] IN BLOOD BY AUTOMATED COUNT: 15.7 % (ref 11.5–14.5)
ERYTHROCYTE [DISTWIDTH] IN BLOOD BY AUTOMATED COUNT: 15.8 % (ref 11.5–14.5)
ERYTHROCYTE [DISTWIDTH] IN BLOOD BY AUTOMATED COUNT: 15.8 % (ref 11.5–14.5)
ERYTHROCYTE [DISTWIDTH] IN BLOOD BY AUTOMATED COUNT: 15.9 % (ref 11.5–14.5)
ERYTHROCYTE [DISTWIDTH] IN BLOOD BY AUTOMATED COUNT: 16 % (ref 11.5–14.5)
ERYTHROCYTE [DISTWIDTH] IN BLOOD BY AUTOMATED COUNT: 16.1 % (ref 11.5–14.5)
ERYTHROCYTE [DISTWIDTH] IN BLOOD BY AUTOMATED COUNT: 16.2 % (ref 11.5–14.5)
ERYTHROCYTE [DISTWIDTH] IN BLOOD BY AUTOMATED COUNT: 16.2 % (ref 11.5–14.5)
ERYTHROCYTE [DISTWIDTH] IN BLOOD BY AUTOMATED COUNT: 16.4 % (ref 11.5–14.5)
ERYTHROCYTE [DISTWIDTH] IN BLOOD BY AUTOMATED COUNT: 16.5 % (ref 11.5–14.5)
ERYTHROCYTE [DISTWIDTH] IN BLOOD BY AUTOMATED COUNT: 16.6 % (ref 11.5–14.5)
ERYTHROCYTE [DISTWIDTH] IN BLOOD BY AUTOMATED COUNT: 16.7 % (ref 11.5–14.5)
ERYTHROCYTE [DISTWIDTH] IN BLOOD BY AUTOMATED COUNT: 16.9 % (ref 11.5–14.5)
ERYTHROCYTE [DISTWIDTH] IN BLOOD BY AUTOMATED COUNT: 17 % (ref 11.5–14.5)
ERYTHROCYTE [DISTWIDTH] IN BLOOD BY AUTOMATED COUNT: 17.2 % (ref 11.5–14.5)
ERYTHROCYTE [DISTWIDTH] IN BLOOD BY AUTOMATED COUNT: 17.2 % (ref 11.5–14.5)
ERYTHROCYTE [DISTWIDTH] IN BLOOD BY AUTOMATED COUNT: 17.5 % (ref 11.5–14.5)
ERYTHROCYTE [DISTWIDTH] IN BLOOD BY AUTOMATED COUNT: 17.5 % (ref 11.5–14.5)
ERYTHROCYTE [DISTWIDTH] IN BLOOD BY AUTOMATED COUNT: 17.7 % (ref 11.5–14.5)
ERYTHROCYTE [SEDIMENTATION RATE] IN BLOOD BY WESTERGREN METHOD: 14 MM/H
ERYTHROCYTE [SEDIMENTATION RATE] IN BLOOD BY WESTERGREN METHOD: 22 MM/H
ERYTHROCYTE [SEDIMENTATION RATE] IN BLOOD BY WESTERGREN METHOD: 24 MM/H
EST. GFR  (AFRICAN AMERICAN): 12.5 ML/MIN/1.73 M^2
EST. GFR  (AFRICAN AMERICAN): 13.1 ML/MIN/1.73 M^2
EST. GFR  (AFRICAN AMERICAN): 13.5 ML/MIN/1.73 M^2
EST. GFR  (AFRICAN AMERICAN): 13.5 ML/MIN/1.73 M^2
EST. GFR  (AFRICAN AMERICAN): 14.2 ML/MIN/1.73 M^2
EST. GFR  (AFRICAN AMERICAN): 14.6 ML/MIN/1.73 M^2
EST. GFR  (AFRICAN AMERICAN): 15.5 ML/MIN/1.73 M^2
EST. GFR  (AFRICAN AMERICAN): 16.5 ML/MIN/1.73 M^2
EST. GFR  (AFRICAN AMERICAN): 16.5 ML/MIN/1.73 M^2
EST. GFR  (AFRICAN AMERICAN): 17 ML/MIN/1.73 M^2
EST. GFR  (AFRICAN AMERICAN): 18.8 ML/MIN/1.73 M^2
EST. GFR  (AFRICAN AMERICAN): 19.4 ML/MIN/1.73 M^2
EST. GFR  (AFRICAN AMERICAN): 20.1 ML/MIN/1.73 M^2
EST. GFR  (AFRICAN AMERICAN): 20.1 ML/MIN/1.73 M^2
EST. GFR  (AFRICAN AMERICAN): 20.9 ML/MIN/1.73 M^2
EST. GFR  (AFRICAN AMERICAN): 21.7 ML/MIN/1.73 M^2
EST. GFR  (AFRICAN AMERICAN): 21.7 ML/MIN/1.73 M^2
EST. GFR  (AFRICAN AMERICAN): 26.9 ML/MIN/1.73 M^2
EST. GFR  (AFRICAN AMERICAN): 31.2 ML/MIN/1.73 M^2
EST. GFR  (AFRICAN AMERICAN): 32.9 ML/MIN/1.73 M^2
EST. GFR  (AFRICAN AMERICAN): 34.8 ML/MIN/1.73 M^2
EST. GFR  (AFRICAN AMERICAN): 36.9 ML/MIN/1.73 M^2
EST. GFR  (AFRICAN AMERICAN): 36.9 ML/MIN/1.73 M^2
EST. GFR  (AFRICAN AMERICAN): 39.3 ML/MIN/1.73 M^2
EST. GFR  (AFRICAN AMERICAN): 41.9 ML/MIN/1.73 M^2
EST. GFR  (AFRICAN AMERICAN): 44.9 ML/MIN/1.73 M^2
EST. GFR  (AFRICAN AMERICAN): 48.3 ML/MIN/1.73 M^2
EST. GFR  (AFRICAN AMERICAN): 52.3 ML/MIN/1.73 M^2
EST. GFR  (AFRICAN AMERICAN): 53 ML/MIN/1.73 M^2
EST. GFR  (AFRICAN AMERICAN): 56.8 ML/MIN/1.73 M^2
EST. GFR  (AFRICAN AMERICAN): 57 ML/MIN/1.73 M^2
EST. GFR  (AFRICAN AMERICAN): 57 ML/MIN/1.73 M^2
EST. GFR  (AFRICAN AMERICAN): >60 ML/MIN/1.73 M^2
EST. GFR  (NON AFRICAN AMERICAN): 10.8 ML/MIN/1.73 M^2
EST. GFR  (NON AFRICAN AMERICAN): 11.4 ML/MIN/1.73 M^2
EST. GFR  (NON AFRICAN AMERICAN): 11.7 ML/MIN/1.73 M^2
EST. GFR  (NON AFRICAN AMERICAN): 11.7 ML/MIN/1.73 M^2
EST. GFR  (NON AFRICAN AMERICAN): 12.3 ML/MIN/1.73 M^2
EST. GFR  (NON AFRICAN AMERICAN): 12.7 ML/MIN/1.73 M^2
EST. GFR  (NON AFRICAN AMERICAN): 13.4 ML/MIN/1.73 M^2
EST. GFR  (NON AFRICAN AMERICAN): 14.2 ML/MIN/1.73 M^2
EST. GFR  (NON AFRICAN AMERICAN): 14.2 ML/MIN/1.73 M^2
EST. GFR  (NON AFRICAN AMERICAN): 14.7 ML/MIN/1.73 M^2
EST. GFR  (NON AFRICAN AMERICAN): 16.2 ML/MIN/1.73 M^2
EST. GFR  (NON AFRICAN AMERICAN): 16.8 ML/MIN/1.73 M^2
EST. GFR  (NON AFRICAN AMERICAN): 17.4 ML/MIN/1.73 M^2
EST. GFR  (NON AFRICAN AMERICAN): 17.4 ML/MIN/1.73 M^2
EST. GFR  (NON AFRICAN AMERICAN): 18.1 ML/MIN/1.73 M^2
EST. GFR  (NON AFRICAN AMERICAN): 18.8 ML/MIN/1.73 M^2
EST. GFR  (NON AFRICAN AMERICAN): 18.8 ML/MIN/1.73 M^2
EST. GFR  (NON AFRICAN AMERICAN): 23.3 ML/MIN/1.73 M^2
EST. GFR  (NON AFRICAN AMERICAN): 27 ML/MIN/1.73 M^2
EST. GFR  (NON AFRICAN AMERICAN): 28.5 ML/MIN/1.73 M^2
EST. GFR  (NON AFRICAN AMERICAN): 30.1 ML/MIN/1.73 M^2
EST. GFR  (NON AFRICAN AMERICAN): 31.9 ML/MIN/1.73 M^2
EST. GFR  (NON AFRICAN AMERICAN): 31.9 ML/MIN/1.73 M^2
EST. GFR  (NON AFRICAN AMERICAN): 34 ML/MIN/1.73 M^2
EST. GFR  (NON AFRICAN AMERICAN): 36.3 ML/MIN/1.73 M^2
EST. GFR  (NON AFRICAN AMERICAN): 38.9 ML/MIN/1.73 M^2
EST. GFR  (NON AFRICAN AMERICAN): 41.8 ML/MIN/1.73 M^2
EST. GFR  (NON AFRICAN AMERICAN): 45.2 ML/MIN/1.73 M^2
EST. GFR  (NON AFRICAN AMERICAN): 46 ML/MIN/1.73 M^2
EST. GFR  (NON AFRICAN AMERICAN): 49 ML/MIN/1.73 M^2
EST. GFR  (NON AFRICAN AMERICAN): 49 ML/MIN/1.73 M^2
EST. GFR  (NON AFRICAN AMERICAN): 49.1 ML/MIN/1.73 M^2
EST. GFR  (NON AFRICAN AMERICAN): 53.8 ML/MIN/1.73 M^2
EST. GFR  (NON AFRICAN AMERICAN): 54 ML/MIN/1.73 M^2
EST. GFR  (NON AFRICAN AMERICAN): 54 ML/MIN/1.73 M^2
EST. GFR  (NON AFRICAN AMERICAN): 54.1 ML/MIN/1.73 M^2
EST. GFR  (NON AFRICAN AMERICAN): 59.2 ML/MIN/1.73 M^2
EST. GFR  (NON AFRICAN AMERICAN): 59.2 ML/MIN/1.73 M^2
EST. GFR  (NON AFRICAN AMERICAN): 59.6 ML/MIN/1.73 M^2
EST. GFR  (NON AFRICAN AMERICAN): 60 ML/MIN/1.73 M^2
EST. GFR  (NON AFRICAN AMERICAN): 60 ML/MIN/1.73 M^2
EST. GFR  (NON AFRICAN AMERICAN): >60 ML/MIN/1.73 M^2
ESTIMATED AVG GLUCOSE: 203 MG/DL (ref 68–131)
ESTIMATED AVG GLUCOSE: 80 MG/DL (ref 68–131)
ESTIMATED AVG GLUCOSE: 91 MG/DL (ref 68–131)
EV RNA SPEC QL NAA+PROBE: NEGATIVE
EXHR SPECIMEN TYPE: NORMAL
FERRITIN SERPL-MCNC: 1261 NG/ML (ref 20–300)
FIBRINOGEN PPP-MCNC: 608 MG/DL (ref 182–366)
FINAL PATHOLOGIC DIAGNOSIS: NORMAL
FIO2: 100
FIO2: 40
FIO2: 44
FLOW CYTOMETRY ANTIBODIES ANALYZED - BONE MARROW: NORMAL
FLOW CYTOMETRY COMMENT - BONE MARROW: NORMAL
FLOW CYTOMETRY INTERPRETATION - BONE MARROW: NORMAL
FLOW: 6
FOLATE SERPL-MCNC: 3.6 NG/ML (ref 4–24)
FOLATE SERPL-MCNC: 6.6 NG/ML (ref 4–24)
FRACTIONAL SHORTENING: 20 % (ref 28–44)
FRACTIONAL SHORTENING: 25 % (ref 28–44)
FRACTIONAL SHORTENING: 28 % (ref 28–44)
FRACTIONAL SHORTENING: 36 % (ref 28–44)
FRACTIONAL SHORTENING: 39 % (ref 28–44)
GIANT PLATELETS BLD QL SMEAR: PRESENT
GLUCOSE CSF-MCNC: 56 MG/DL (ref 40–70)
GLUCOSE SERPL-MCNC: 101 MG/DL (ref 70–110)
GLUCOSE SERPL-MCNC: 103 MG/DL (ref 70–110)
GLUCOSE SERPL-MCNC: 104 MG/DL (ref 70–110)
GLUCOSE SERPL-MCNC: 107 MG/DL (ref 70–110)
GLUCOSE SERPL-MCNC: 108 MG/DL (ref 70–110)
GLUCOSE SERPL-MCNC: 109 MG/DL (ref 70–110)
GLUCOSE SERPL-MCNC: 111 MG/DL (ref 70–110)
GLUCOSE SERPL-MCNC: 111 MG/DL (ref 70–110)
GLUCOSE SERPL-MCNC: 114 MG/DL (ref 70–110)
GLUCOSE SERPL-MCNC: 123 MG/DL (ref 70–110)
GLUCOSE SERPL-MCNC: 125 MG/DL (ref 70–110)
GLUCOSE SERPL-MCNC: 126 MG/DL (ref 70–110)
GLUCOSE SERPL-MCNC: 128 MG/DL (ref 70–110)
GLUCOSE SERPL-MCNC: 129 MG/DL (ref 70–110)
GLUCOSE SERPL-MCNC: 133 MG/DL (ref 70–110)
GLUCOSE SERPL-MCNC: 136 MG/DL (ref 70–110)
GLUCOSE SERPL-MCNC: 137 MG/DL (ref 70–110)
GLUCOSE SERPL-MCNC: 150 MG/DL (ref 70–110)
GLUCOSE SERPL-MCNC: 156 MG/DL (ref 70–110)
GLUCOSE SERPL-MCNC: 156 MG/DL (ref 70–110)
GLUCOSE SERPL-MCNC: 158 MG/DL (ref 70–110)
GLUCOSE SERPL-MCNC: 167 MG/DL (ref 70–110)
GLUCOSE SERPL-MCNC: 178 MG/DL (ref 70–110)
GLUCOSE SERPL-MCNC: 181 MG/DL (ref 70–110)
GLUCOSE SERPL-MCNC: 182 MG/DL (ref 70–110)
GLUCOSE SERPL-MCNC: 189 MG/DL (ref 70–110)
GLUCOSE SERPL-MCNC: 194 MG/DL (ref 70–110)
GLUCOSE SERPL-MCNC: 205 MG/DL (ref 70–110)
GLUCOSE SERPL-MCNC: 212 MG/DL (ref 70–110)
GLUCOSE SERPL-MCNC: 212 MG/DL (ref 70–110)
GLUCOSE SERPL-MCNC: 220 MG/DL (ref 70–110)
GLUCOSE SERPL-MCNC: 79 MG/DL (ref 70–110)
GLUCOSE SERPL-MCNC: 79 MG/DL (ref 70–110)
GLUCOSE SERPL-MCNC: 80 MG/DL (ref 70–110)
GLUCOSE SERPL-MCNC: 81 MG/DL (ref 70–110)
GLUCOSE SERPL-MCNC: 82 MG/DL (ref 70–110)
GLUCOSE SERPL-MCNC: 83 MG/DL (ref 70–110)
GLUCOSE SERPL-MCNC: 84 MG/DL (ref 70–110)
GLUCOSE SERPL-MCNC: 84 MG/DL (ref 70–110)
GLUCOSE SERPL-MCNC: 86 MG/DL (ref 70–110)
GLUCOSE SERPL-MCNC: 86 MG/DL (ref 70–110)
GLUCOSE SERPL-MCNC: 87 MG/DL (ref 70–110)
GLUCOSE SERPL-MCNC: 88 MG/DL (ref 70–110)
GLUCOSE SERPL-MCNC: 89 MG/DL (ref 70–110)
GLUCOSE SERPL-MCNC: 89 MG/DL (ref 70–110)
GLUCOSE SERPL-MCNC: 90 MG/DL (ref 70–110)
GLUCOSE SERPL-MCNC: 91 MG/DL (ref 70–110)
GLUCOSE SERPL-MCNC: 91 MG/DL (ref 70–110)
GLUCOSE SERPL-MCNC: 92 MG/DL (ref 70–110)
GLUCOSE SERPL-MCNC: 93 MG/DL (ref 70–110)
GLUCOSE SERPL-MCNC: 94 MG/DL (ref 70–110)
GLUCOSE SERPL-MCNC: 95 MG/DL (ref 70–110)
GLUCOSE SERPL-MCNC: 95 MG/DL (ref 70–110)
GLUCOSE SERPL-MCNC: 96 MG/DL (ref 70–110)
GLUCOSE SERPL-MCNC: 97 MG/DL (ref 70–110)
GLUCOSE SERPL-MCNC: 98 MG/DL (ref 70–110)
GLUCOSE SERPL-MCNC: 98 MG/DL (ref 70–110)
GLUCOSE SERPL-MCNC: 99 MG/DL (ref 70–110)
GLUCOSE SERPL-MCNC: 99 MG/DL (ref 70–110)
GLUCOSE UR QL STRIP: NEGATIVE
GRAM STN SPEC: NORMAL
GROSS: NORMAL
HAPTOGLOB SERPL-MCNC: 134 MG/DL (ref 30–250)
HBA1C MFR BLD HPLC: 4.4 % (ref 4–5.6)
HBA1C MFR BLD HPLC: 4.8 % (ref 4–5.6)
HBA1C MFR BLD HPLC: 8.7 % (ref 4–5.6)
HCO3 UR-SCNC: 13.2 MMOL/L (ref 24–28)
HCO3 UR-SCNC: 13.4 MMOL/L (ref 24–28)
HCO3 UR-SCNC: 15 MMOL/L (ref 24–28)
HCO3 UR-SCNC: 15.4 MMOL/L (ref 24–28)
HCO3 UR-SCNC: 15.5 MMOL/L (ref 24–28)
HCO3 UR-SCNC: 15.6 MMOL/L (ref 24–28)
HCO3 UR-SCNC: 15.9 MMOL/L (ref 24–28)
HCO3 UR-SCNC: 16 MMOL/L (ref 24–28)
HCO3 UR-SCNC: 16.7 MMOL/L (ref 24–28)
HCO3 UR-SCNC: 17.1 MMOL/L (ref 24–28)
HCO3 UR-SCNC: 17.1 MMOL/L (ref 24–28)
HCO3 UR-SCNC: 17.2 MMOL/L (ref 24–28)
HCO3 UR-SCNC: 17.3 MMOL/L (ref 24–28)
HCO3 UR-SCNC: 17.4 MMOL/L (ref 24–28)
HCO3 UR-SCNC: 17.8 MMOL/L (ref 24–28)
HCO3 UR-SCNC: 18.2 MMOL/L (ref 24–28)
HCO3 UR-SCNC: 18.3 MMOL/L (ref 24–28)
HCO3 UR-SCNC: 18.4 MMOL/L (ref 24–28)
HCO3 UR-SCNC: 18.5 MMOL/L (ref 24–28)
HCO3 UR-SCNC: 19 MMOL/L (ref 24–28)
HCO3 UR-SCNC: 19.5 MMOL/L (ref 24–28)
HCO3 UR-SCNC: 19.8 MMOL/L (ref 24–28)
HCO3 UR-SCNC: 20.6 MMOL/L (ref 24–28)
HCO3 UR-SCNC: 20.8 MMOL/L (ref 24–28)
HCO3 UR-SCNC: 21 MMOL/L (ref 24–28)
HCO3 UR-SCNC: 21.3 MMOL/L (ref 24–28)
HCO3 UR-SCNC: 21.5 MMOL/L (ref 24–28)
HCO3 UR-SCNC: 21.5 MMOL/L (ref 24–28)
HCO3 UR-SCNC: 21.6 MMOL/L (ref 24–28)
HCO3 UR-SCNC: 23.1 MMOL/L (ref 24–28)
HCO3 UR-SCNC: 23.4 MMOL/L (ref 24–28)
HCO3 UR-SCNC: 31.8 MMOL/L (ref 24–28)
HCT VFR BLD AUTO: 19.4 % (ref 40–54)
HCT VFR BLD AUTO: 20.6 % (ref 40–54)
HCT VFR BLD AUTO: 20.8 % (ref 40–54)
HCT VFR BLD AUTO: 21.2 % (ref 40–54)
HCT VFR BLD AUTO: 21.2 % (ref 40–54)
HCT VFR BLD AUTO: 22.1 % (ref 40–54)
HCT VFR BLD AUTO: 22.3 % (ref 40–54)
HCT VFR BLD AUTO: 22.4 % (ref 40–54)
HCT VFR BLD AUTO: 22.4 % (ref 40–54)
HCT VFR BLD AUTO: 23 % (ref 40–54)
HCT VFR BLD AUTO: 23.2 % (ref 40–54)
HCT VFR BLD AUTO: 23.2 % (ref 40–54)
HCT VFR BLD AUTO: 23.4 % (ref 40–54)
HCT VFR BLD AUTO: 23.5 % (ref 40–54)
HCT VFR BLD AUTO: 23.6 % (ref 40–54)
HCT VFR BLD AUTO: 23.7 % (ref 40–54)
HCT VFR BLD AUTO: 23.8 % (ref 40–54)
HCT VFR BLD AUTO: 23.9 % (ref 40–54)
HCT VFR BLD AUTO: 24 % (ref 40–54)
HCT VFR BLD AUTO: 24.2 % (ref 40–54)
HCT VFR BLD AUTO: 24.2 % (ref 40–54)
HCT VFR BLD AUTO: 24.3 % (ref 40–54)
HCT VFR BLD AUTO: 24.3 % (ref 40–54)
HCT VFR BLD AUTO: 24.5 % (ref 40–54)
HCT VFR BLD AUTO: 24.6 % (ref 40–54)
HCT VFR BLD AUTO: 24.8 % (ref 40–54)
HCT VFR BLD AUTO: 25.2 % (ref 40–54)
HCT VFR BLD AUTO: 25.3 % (ref 40–54)
HCT VFR BLD AUTO: 25.5 % (ref 40–54)
HCT VFR BLD AUTO: 25.7 % (ref 40–54)
HCT VFR BLD AUTO: 25.8 % (ref 40–54)
HCT VFR BLD AUTO: 26.3 % (ref 40–54)
HCT VFR BLD AUTO: 26.4 % (ref 40–54)
HCT VFR BLD AUTO: 26.6 % (ref 40–54)
HCT VFR BLD AUTO: 26.6 % (ref 40–54)
HCT VFR BLD AUTO: 27.1 % (ref 40–54)
HCT VFR BLD AUTO: 27.2 % (ref 40–54)
HCT VFR BLD AUTO: 27.2 % (ref 40–54)
HCT VFR BLD AUTO: 27.4 % (ref 40–54)
HCT VFR BLD AUTO: 27.5 % (ref 40–54)
HCT VFR BLD AUTO: 27.6 % (ref 40–54)
HCT VFR BLD AUTO: 27.8 % (ref 40–54)
HCT VFR BLD AUTO: 27.8 % (ref 40–54)
HCT VFR BLD AUTO: 27.9 % (ref 40–54)
HCT VFR BLD AUTO: 28.3 % (ref 40–54)
HCT VFR BLD AUTO: 28.4 % (ref 40–54)
HCT VFR BLD AUTO: 28.4 % (ref 40–54)
HCT VFR BLD AUTO: 28.5 % (ref 40–54)
HCT VFR BLD AUTO: 28.8 % (ref 40–54)
HCT VFR BLD AUTO: 28.9 % (ref 40–54)
HCT VFR BLD AUTO: 29 % (ref 40–54)
HCT VFR BLD AUTO: 29.1 % (ref 40–54)
HCT VFR BLD AUTO: 29.4 % (ref 40–54)
HCT VFR BLD AUTO: 29.9 % (ref 40–54)
HCT VFR BLD AUTO: 30.4 % (ref 40–54)
HCT VFR BLD AUTO: 30.4 % (ref 40–54)
HCT VFR BLD AUTO: 30.9 % (ref 40–54)
HCT VFR BLD AUTO: 30.9 % (ref 40–54)
HCT VFR BLD AUTO: 31.2 % (ref 40–54)
HCT VFR BLD AUTO: 31.3 % (ref 40–54)
HCT VFR BLD AUTO: 31.3 % (ref 40–54)
HCT VFR BLD AUTO: 32.1 % (ref 40–54)
HCT VFR BLD AUTO: 32.3 % (ref 40–54)
HCT VFR BLD AUTO: 33.9 % (ref 40–54)
HCT VFR BLD CALC: 29 %PCV (ref 36–54)
HDLC SERPL-MCNC: 24 MG/DL (ref 40–75)
HDLC SERPL-MCNC: 26 MG/DL (ref 40–75)
HDLC SERPL-MCNC: 28 MG/DL (ref 40–75)
HDLC SERPL: 26.4 % (ref 20–50)
HDLC SERPL: 26.7 % (ref 20–50)
HDLC SERPL: 36.1 % (ref 20–50)
HGB BLD-MCNC: 10.2 G/DL (ref 14–18)
HGB BLD-MCNC: 6.2 G/DL (ref 14–18)
HGB BLD-MCNC: 6.9 G/DL (ref 14–18)
HGB BLD-MCNC: 6.9 G/DL (ref 14–18)
HGB BLD-MCNC: 7 G/DL (ref 14–18)
HGB BLD-MCNC: 7.1 G/DL (ref 14–18)
HGB BLD-MCNC: 7.1 G/DL (ref 14–18)
HGB BLD-MCNC: 7.2 G/DL (ref 14–18)
HGB BLD-MCNC: 7.2 G/DL (ref 14–18)
HGB BLD-MCNC: 7.3 G/DL (ref 14–18)
HGB BLD-MCNC: 7.3 G/DL (ref 14–18)
HGB BLD-MCNC: 7.4 G/DL (ref 14–18)
HGB BLD-MCNC: 7.5 G/DL (ref 14–18)
HGB BLD-MCNC: 7.5 G/DL (ref 14–18)
HGB BLD-MCNC: 7.6 G/DL (ref 14–18)
HGB BLD-MCNC: 7.6 G/DL (ref 14–18)
HGB BLD-MCNC: 7.7 G/DL (ref 14–18)
HGB BLD-MCNC: 7.8 G/DL (ref 14–18)
HGB BLD-MCNC: 7.8 G/DL (ref 14–18)
HGB BLD-MCNC: 7.9 G/DL (ref 14–18)
HGB BLD-MCNC: 7.9 G/DL (ref 14–18)
HGB BLD-MCNC: 8 G/DL (ref 14–18)
HGB BLD-MCNC: 8 G/DL (ref 14–18)
HGB BLD-MCNC: 8.1 G/DL (ref 14–18)
HGB BLD-MCNC: 8.2 G/DL (ref 14–18)
HGB BLD-MCNC: 8.3 G/DL (ref 14–18)
HGB BLD-MCNC: 8.4 G/DL (ref 14–18)
HGB BLD-MCNC: 8.5 G/DL (ref 14–18)
HGB BLD-MCNC: 8.6 G/DL (ref 14–18)
HGB BLD-MCNC: 8.8 G/DL (ref 14–18)
HGB BLD-MCNC: 8.9 G/DL (ref 14–18)
HGB BLD-MCNC: 9 G/DL (ref 14–18)
HGB BLD-MCNC: 9.1 G/DL (ref 14–18)
HGB BLD-MCNC: 9.1 G/DL (ref 14–18)
HGB BLD-MCNC: 9.3 G/DL (ref 14–18)
HGB BLD-MCNC: 9.3 G/DL (ref 14–18)
HGB BLD-MCNC: 9.4 G/DL (ref 14–18)
HGB BLD-MCNC: 9.4 G/DL (ref 14–18)
HGB BLD-MCNC: 9.5 G/DL (ref 14–18)
HGB BLD-MCNC: 9.5 G/DL (ref 14–18)
HGB BLD-MCNC: 9.6 G/DL (ref 14–18)
HGB BLD-MCNC: 9.6 G/DL (ref 14–18)
HGB BLD-MCNC: 9.7 G/DL (ref 14–18)
HGB UR QL STRIP: ABNORMAL
HSV1, PCR, CSF: NEGATIVE
HSV2, PCR, CSF: NEGATIVE
HYALINE CASTS #/AREA URNS LPF: 2 /LPF
HYALINE CASTS UR QL AUTO: 0 /LPF
HYALINE CASTS UR QL AUTO: 1 /LPF
HYPOCHROMIA BLD QL SMEAR: ABNORMAL
HYPOCHROMIA BLD QL SMEAR: ABNORMAL
IMM GRANULOCYTES # BLD AUTO: 0.03 K/UL (ref 0–0.04)
IMM GRANULOCYTES # BLD AUTO: 0.04 K/UL (ref 0–0.04)
IMM GRANULOCYTES # BLD AUTO: 0.05 K/UL (ref 0–0.04)
IMM GRANULOCYTES # BLD AUTO: 0.06 K/UL (ref 0–0.04)
IMM GRANULOCYTES # BLD AUTO: 0.07 K/UL (ref 0–0.04)
IMM GRANULOCYTES # BLD AUTO: 0.08 K/UL (ref 0–0.04)
IMM GRANULOCYTES # BLD AUTO: 0.09 K/UL (ref 0–0.04)
IMM GRANULOCYTES # BLD AUTO: 0.1 K/UL (ref 0–0.04)
IMM GRANULOCYTES # BLD AUTO: 0.11 K/UL (ref 0–0.04)
IMM GRANULOCYTES # BLD AUTO: 0.12 K/UL (ref 0–0.04)
IMM GRANULOCYTES # BLD AUTO: 0.13 K/UL (ref 0–0.04)
IMM GRANULOCYTES # BLD AUTO: 0.14 K/UL (ref 0–0.04)
IMM GRANULOCYTES # BLD AUTO: 0.14 K/UL (ref 0–0.04)
IMM GRANULOCYTES # BLD AUTO: 0.15 K/UL (ref 0–0.04)
IMM GRANULOCYTES # BLD AUTO: 0.16 K/UL (ref 0–0.04)
IMM GRANULOCYTES # BLD AUTO: 0.16 K/UL (ref 0–0.04)
IMM GRANULOCYTES # BLD AUTO: 0.21 K/UL (ref 0–0.04)
IMM GRANULOCYTES # BLD AUTO: 0.43 K/UL (ref 0–0.04)
IMM GRANULOCYTES # BLD AUTO: 0.45 K/UL (ref 0–0.04)
IMM GRANULOCYTES # BLD AUTO: ABNORMAL K/UL (ref 0–0.04)
IMM GRANULOCYTES NFR BLD AUTO: 0.3 % (ref 0–0.5)
IMM GRANULOCYTES NFR BLD AUTO: 0.4 % (ref 0–0.5)
IMM GRANULOCYTES NFR BLD AUTO: 0.5 % (ref 0–0.5)
IMM GRANULOCYTES NFR BLD AUTO: 0.6 % (ref 0–0.5)
IMM GRANULOCYTES NFR BLD AUTO: 0.7 % (ref 0–0.5)
IMM GRANULOCYTES NFR BLD AUTO: 0.8 % (ref 0–0.5)
IMM GRANULOCYTES NFR BLD AUTO: 0.9 % (ref 0–0.5)
IMM GRANULOCYTES NFR BLD AUTO: 1 % (ref 0–0.5)
IMM GRANULOCYTES NFR BLD AUTO: 1.1 % (ref 0–0.5)
IMM GRANULOCYTES NFR BLD AUTO: 1.2 % (ref 0–0.5)
IMM GRANULOCYTES NFR BLD AUTO: 1.3 % (ref 0–0.5)
IMM GRANULOCYTES NFR BLD AUTO: 1.3 % (ref 0–0.5)
IMM GRANULOCYTES NFR BLD AUTO: 1.4 % (ref 0–0.5)
IMM GRANULOCYTES NFR BLD AUTO: 1.5 % (ref 0–0.5)
IMM GRANULOCYTES NFR BLD AUTO: 1.7 % (ref 0–0.5)
IMM GRANULOCYTES NFR BLD AUTO: 2.3 % (ref 0–0.5)
IMM GRANULOCYTES NFR BLD AUTO: 2.8 % (ref 0–0.5)
IMM GRANULOCYTES NFR BLD AUTO: ABNORMAL % (ref 0–0.5)
INR PPP: 0.9 (ref 0.8–1.2)
INR PPP: 1 (ref 0.8–1.2)
INR PPP: 1.1 (ref 0.8–1.2)
INR PPP: 1.1 (ref 0.8–1.2)
INR PPP: 1.2 (ref 0.8–1.2)
INR PPP: 1.7 (ref 0.8–1.2)
INTERVENTRICULAR SEPTUM: 0.71 CM (ref 0.6–1.1)
INTERVENTRICULAR SEPTUM: 0.78 CM (ref 0.6–1.1)
INTERVENTRICULAR SEPTUM: 0.9 CM (ref 0.6–1.1)
INTERVENTRICULAR SEPTUM: 1 CM (ref 0.6–1.1)
INTERVENTRICULAR SEPTUM: 1.29 CM (ref 0.6–1.1)
IRON SERPL-MCNC: 20 UG/DL (ref 45–160)
IVRT: 87.54 MSEC
KARYOTYP MAR: NORMAL
KETONES UR QL STRIP: NEGATIVE
LA MAJOR: 4.8 CM
LA MAJOR: 4.95 CM
LA MAJOR: 5.33 CM
LA MAJOR: 6.53 CM
LA MAJOR: 6.65 CM
LA MINOR: 4.73 CM
LA MINOR: 5.49 CM
LA MINOR: 5.57 CM
LA MINOR: 5.73 CM
LA MINOR: 5.8 CM
LA WIDTH: 3.79 CM
LA WIDTH: 4.33 CM
LA WIDTH: 4.57 CM
LA WIDTH: 4.69 CM
LA WIDTH: 4.85 CM
LACOSAMIDE: 5.5 MCG/ML (ref 1–10)
LACTATE SERPL-SCNC: 0.5 MMOL/L (ref 0.5–2.2)
LACTATE SERPL-SCNC: 0.7 MMOL/L (ref 0.5–2.2)
LACTATE SERPL-SCNC: 0.7 MMOL/L (ref 0.5–2.2)
LACTATE SERPL-SCNC: 1 MMOL/L (ref 0.5–2.2)
LACTATE SERPL-SCNC: 1.3 MMOL/L (ref 0.5–2.2)
LACTATE SERPL-SCNC: 2 MMOL/L (ref 0.5–2.2)
LACTATE SERPL-SCNC: 4.9 MMOL/L (ref 0.5–2.2)
LDH SERPL L TO P-CCNC: 156 U/L (ref 110–260)
LDH SERPL L TO P-CCNC: 263 U/L (ref 110–260)
LDLC SERPL CALC-MCNC: 37 MG/DL (ref 63–159)
LDLC SERPL CALC-MCNC: 46.8 MG/DL (ref 63–159)
LDLC SERPL CALC-MCNC: 63 MG/DL (ref 63–159)
LEFT ATRIUM SIZE: 3.51 CM
LEFT ATRIUM SIZE: 3.79 CM
LEFT ATRIUM SIZE: 4.16 CM
LEFT ATRIUM SIZE: 4.62 CM
LEFT ATRIUM SIZE: 5.18 CM
LEFT ATRIUM VOLUME INDEX MOD: 45.7 ML/M2
LEFT ATRIUM VOLUME INDEX: 31.6 ML/M2
LEFT ATRIUM VOLUME INDEX: 33.1 ML/M2
LEFT ATRIUM VOLUME INDEX: 39.5 ML/M2
LEFT ATRIUM VOLUME INDEX: 44.1 ML/M2
LEFT ATRIUM VOLUME INDEX: 47 ML/M2
LEFT ATRIUM VOLUME MOD: 105 CM3
LEFT ATRIUM VOLUME: 102.75 CM3
LEFT ATRIUM VOLUME: 72.83 CM3
LEFT ATRIUM VOLUME: 75.3 CM3
LEFT ATRIUM VOLUME: 90.84 CM3
LEFT ATRIUM VOLUME: 99.83 CM3
LEFT INTERNAL DIMENSION IN SYSTOLE: 3.74 CM (ref 2.1–4)
LEFT INTERNAL DIMENSION IN SYSTOLE: 3.81 CM (ref 2.1–4)
LEFT INTERNAL DIMENSION IN SYSTOLE: 4.3 CM (ref 2.1–4)
LEFT INTERNAL DIMENSION IN SYSTOLE: 4.44 CM (ref 2.1–4)
LEFT INTERNAL DIMENSION IN SYSTOLE: 4.7 CM (ref 2.1–4)
LEFT VENTRICLE DIASTOLIC VOLUME INDEX: 66.1 ML/M2
LEFT VENTRICLE DIASTOLIC VOLUME INDEX: 74.24 ML/M2
LEFT VENTRICLE DIASTOLIC VOLUME INDEX: 77.74 ML/M2
LEFT VENTRICLE DIASTOLIC VOLUME INDEX: 83.55 ML/M2
LEFT VENTRICLE DIASTOLIC VOLUME INDEX: 91.96 ML/M2
LEFT VENTRICLE DIASTOLIC VOLUME: 150.38 ML
LEFT VENTRICLE DIASTOLIC VOLUME: 170.61 ML
LEFT VENTRICLE DIASTOLIC VOLUME: 179.25 ML
LEFT VENTRICLE DIASTOLIC VOLUME: 189.11 ML
LEFT VENTRICLE DIASTOLIC VOLUME: 200.83 ML
LEFT VENTRICLE MASS INDEX: 123 G/M2
LEFT VENTRICLE MASS INDEX: 138 G/M2
LEFT VENTRICLE MASS INDEX: 68 G/M2
LEFT VENTRICLE MASS INDEX: 74 G/M2
LEFT VENTRICLE MASS INDEX: 94 G/M2
LEFT VENTRICLE SYSTOLIC VOLUME INDEX: 26.3 ML/M2
LEFT VENTRICLE SYSTOLIC VOLUME INDEX: 27 ML/M2
LEFT VENTRICLE SYSTOLIC VOLUME INDEX: 35.7 ML/M2
LEFT VENTRICLE SYSTOLIC VOLUME INDEX: 39.4 ML/M2
LEFT VENTRICLE SYSTOLIC VOLUME INDEX: 47 ML/M2
LEFT VENTRICLE SYSTOLIC VOLUME: 102.57 ML
LEFT VENTRICLE SYSTOLIC VOLUME: 59.58 ML
LEFT VENTRICLE SYSTOLIC VOLUME: 62.16 ML
LEFT VENTRICLE SYSTOLIC VOLUME: 81.98 ML
LEFT VENTRICLE SYSTOLIC VOLUME: 89.54 ML
LEFT VENTRICULAR INTERNAL DIMENSION IN DIASTOLE: 5.55 CM (ref 3.5–6)
LEFT VENTRICULAR INTERNAL DIMENSION IN DIASTOLE: 5.99 CM (ref 3.5–6)
LEFT VENTRICULAR INTERNAL DIMENSION IN DIASTOLE: 6 CM (ref 3.5–6)
LEFT VENTRICULAR INTERNAL DIMENSION IN DIASTOLE: 6.13 CM (ref 3.5–6)
LEFT VENTRICULAR INTERNAL DIMENSION IN DIASTOLE: 6.3 CM (ref 3.5–6)
LEFT VENTRICULAR MASS: 153.64 G
LEFT VENTRICULAR MASS: 169.98 G
LEFT VENTRICULAR MASS: 215.72 G
LEFT VENTRICULAR MASS: 268.29 G
LEFT VENTRICULAR MASS: 312.72 G
LEUKOCYTE ESTERASE UR QL STRIP: ABNORMAL
LEUKOCYTE ESTERASE UR QL STRIP: NEGATIVE
LEVETIRACETAM SERPL-MCNC: 25.8 UG/ML (ref 3–60)
LIPASE SERPL-CCNC: 16 U/L (ref 4–60)
LIPASE SERPL-CCNC: 47 U/L (ref 4–60)
LV LATERAL E/E' RATIO: 11.38 M/S
LV LATERAL E/E' RATIO: 11.55 M/S
LV LATERAL E/E' RATIO: 13.08 M/S
LV LATERAL E/E' RATIO: 9.08 M/S
LV SEPTAL E/E' RATIO: 11.8 M/S
LV SEPTAL E/E' RATIO: 13 M/S
LV SEPTAL E/E' RATIO: 14.27 M/S
LV SEPTAL E/E' RATIO: 15.88 M/S
LYMPHOCYTES # BLD AUTO: 0.4 K/UL (ref 1–4.8)
LYMPHOCYTES # BLD AUTO: 0.5 K/UL (ref 1–4.8)
LYMPHOCYTES # BLD AUTO: 0.6 K/UL (ref 1–4.8)
LYMPHOCYTES # BLD AUTO: 0.7 K/UL (ref 1–4.8)
LYMPHOCYTES # BLD AUTO: 0.8 K/UL (ref 1–4.8)
LYMPHOCYTES # BLD AUTO: 0.9 K/UL (ref 1–4.8)
LYMPHOCYTES # BLD AUTO: 1 K/UL (ref 1–4.8)
LYMPHOCYTES # BLD AUTO: 1.1 K/UL (ref 1–4.8)
LYMPHOCYTES # BLD AUTO: 1.2 K/UL (ref 1–4.8)
LYMPHOCYTES # BLD AUTO: 1.3 K/UL (ref 1–4.8)
LYMPHOCYTES # BLD AUTO: 1.4 K/UL (ref 1–4.8)
LYMPHOCYTES # BLD AUTO: 1.5 K/UL (ref 1–4.8)
LYMPHOCYTES NFR BLD: 10 % (ref 18–48)
LYMPHOCYTES NFR BLD: 10.1 % (ref 18–48)
LYMPHOCYTES NFR BLD: 10.2 % (ref 18–48)
LYMPHOCYTES NFR BLD: 10.3 % (ref 18–48)
LYMPHOCYTES NFR BLD: 10.5 % (ref 18–48)
LYMPHOCYTES NFR BLD: 10.5 % (ref 18–48)
LYMPHOCYTES NFR BLD: 10.7 % (ref 18–48)
LYMPHOCYTES NFR BLD: 10.7 % (ref 18–48)
LYMPHOCYTES NFR BLD: 10.8 % (ref 18–48)
LYMPHOCYTES NFR BLD: 11 % (ref 18–48)
LYMPHOCYTES NFR BLD: 11.2 % (ref 18–48)
LYMPHOCYTES NFR BLD: 11.3 % (ref 18–48)
LYMPHOCYTES NFR BLD: 11.3 % (ref 18–48)
LYMPHOCYTES NFR BLD: 11.5 % (ref 18–48)
LYMPHOCYTES NFR BLD: 11.8 % (ref 18–48)
LYMPHOCYTES NFR BLD: 11.9 % (ref 18–48)
LYMPHOCYTES NFR BLD: 12.4 % (ref 18–48)
LYMPHOCYTES NFR BLD: 12.8 % (ref 18–48)
LYMPHOCYTES NFR BLD: 13 % (ref 18–48)
LYMPHOCYTES NFR BLD: 13 % (ref 18–48)
LYMPHOCYTES NFR BLD: 13.5 % (ref 18–48)
LYMPHOCYTES NFR BLD: 13.5 % (ref 18–48)
LYMPHOCYTES NFR BLD: 13.9 % (ref 18–48)
LYMPHOCYTES NFR BLD: 4 % (ref 18–48)
LYMPHOCYTES NFR BLD: 4.1 % (ref 18–48)
LYMPHOCYTES NFR BLD: 4.3 % (ref 18–48)
LYMPHOCYTES NFR BLD: 4.6 % (ref 18–48)
LYMPHOCYTES NFR BLD: 4.8 % (ref 18–48)
LYMPHOCYTES NFR BLD: 4.9 % (ref 18–48)
LYMPHOCYTES NFR BLD: 5.7 % (ref 18–48)
LYMPHOCYTES NFR BLD: 5.9 % (ref 18–48)
LYMPHOCYTES NFR BLD: 6 % (ref 18–48)
LYMPHOCYTES NFR BLD: 6.6 % (ref 18–48)
LYMPHOCYTES NFR BLD: 7 % (ref 18–48)
LYMPHOCYTES NFR BLD: 7.2 % (ref 18–48)
LYMPHOCYTES NFR BLD: 7.2 % (ref 18–48)
LYMPHOCYTES NFR BLD: 7.4 % (ref 18–48)
LYMPHOCYTES NFR BLD: 7.6 % (ref 18–48)
LYMPHOCYTES NFR BLD: 7.7 % (ref 18–48)
LYMPHOCYTES NFR BLD: 7.7 % (ref 18–48)
LYMPHOCYTES NFR BLD: 8 % (ref 18–48)
LYMPHOCYTES NFR BLD: 8.1 % (ref 18–48)
LYMPHOCYTES NFR BLD: 8.4 % (ref 18–48)
LYMPHOCYTES NFR BLD: 8.6 % (ref 18–48)
LYMPHOCYTES NFR BLD: 9.1 % (ref 18–48)
LYMPHOCYTES NFR BLD: 9.2 % (ref 18–48)
LYMPHOCYTES NFR BLD: 9.3 % (ref 18–48)
LYMPHOCYTES NFR BLD: 9.4 % (ref 18–48)
LYMPHOCYTES NFR BLD: 9.6 % (ref 18–48)
LYMPHOCYTES NFR BLD: 9.6 % (ref 18–48)
LYMPHOCYTES NFR BLD: 9.7 % (ref 18–48)
LYMPHOCYTES NFR BLD: 9.7 % (ref 18–48)
LYMPHOCYTES NFR BLD: 9.8 % (ref 18–48)
LYMPHOCYTES NFR BLD: 9.8 % (ref 18–48)
LYMPHOCYTES NFR BLD: 9.9 % (ref 18–48)
LYMPHOCYTES NFR CSF MANUAL: 71 % (ref 40–80)
LYMPHOCYTES NFR CSF MANUAL: 71 % (ref 40–80)
MAGNESIUM SERPL-MCNC: 1.3 MG/DL (ref 1.6–2.6)
MAGNESIUM SERPL-MCNC: 1.3 MG/DL (ref 1.6–2.6)
MAGNESIUM SERPL-MCNC: 1.4 MG/DL (ref 1.6–2.6)
MAGNESIUM SERPL-MCNC: 1.5 MG/DL (ref 1.6–2.6)
MAGNESIUM SERPL-MCNC: 1.5 MG/DL (ref 1.6–2.6)
MAGNESIUM SERPL-MCNC: 1.6 MG/DL (ref 1.6–2.6)
MAGNESIUM SERPL-MCNC: 1.7 MG/DL (ref 1.6–2.6)
MAGNESIUM SERPL-MCNC: 1.8 MG/DL (ref 1.6–2.6)
MAGNESIUM SERPL-MCNC: 1.9 MG/DL (ref 1.6–2.6)
MAGNESIUM SERPL-MCNC: 2 MG/DL (ref 1.6–2.6)
MAGNESIUM SERPL-MCNC: 2.1 MG/DL (ref 1.6–2.6)
MAGNESIUM SERPL-MCNC: 2.1 MG/DL (ref 1.6–2.6)
MAGNESIUM SERPL-MCNC: 2.2 MG/DL (ref 1.6–2.6)
MAGNESIUM SERPL-MCNC: 2.3 MG/DL (ref 1.6–2.6)
MAGNESIUM SERPL-MCNC: 2.5 MG/DL (ref 1.6–2.6)
MAYO MISCELLANEOUS RESULT (REF): NORMAL
MCH RBC QN AUTO: 27.1 PG (ref 27–31)
MCH RBC QN AUTO: 27.2 PG (ref 27–31)
MCH RBC QN AUTO: 27.2 PG (ref 27–31)
MCH RBC QN AUTO: 27.3 PG (ref 27–31)
MCH RBC QN AUTO: 27.4 PG (ref 27–31)
MCH RBC QN AUTO: 27.5 PG (ref 27–31)
MCH RBC QN AUTO: 27.6 PG (ref 27–31)
MCH RBC QN AUTO: 27.6 PG (ref 27–31)
MCH RBC QN AUTO: 27.7 PG (ref 27–31)
MCH RBC QN AUTO: 27.8 PG (ref 27–31)
MCH RBC QN AUTO: 27.9 PG (ref 27–31)
MCH RBC QN AUTO: 28 PG (ref 27–31)
MCH RBC QN AUTO: 28.1 PG (ref 27–31)
MCH RBC QN AUTO: 28.2 PG (ref 27–31)
MCH RBC QN AUTO: 28.3 PG (ref 27–31)
MCH RBC QN AUTO: 28.4 PG (ref 27–31)
MCH RBC QN AUTO: 28.5 PG (ref 27–31)
MCH RBC QN AUTO: 28.6 PG (ref 27–31)
MCH RBC QN AUTO: 28.6 PG (ref 27–31)
MCH RBC QN AUTO: 28.7 PG (ref 27–31)
MCH RBC QN AUTO: 28.9 PG (ref 27–31)
MCH RBC QN AUTO: 28.9 PG (ref 27–31)
MCH RBC QN AUTO: 29 PG (ref 27–31)
MCH RBC QN AUTO: 29 PG (ref 27–31)
MCH RBC QN AUTO: 29.1 PG (ref 27–31)
MCH RBC QN AUTO: 29.2 PG (ref 27–31)
MCH RBC QN AUTO: 29.3 PG (ref 27–31)
MCHC RBC AUTO-ENTMCNC: 28.6 G/DL (ref 32–36)
MCHC RBC AUTO-ENTMCNC: 29.2 G/DL (ref 32–36)
MCHC RBC AUTO-ENTMCNC: 29.4 G/DL (ref 32–36)
MCHC RBC AUTO-ENTMCNC: 29.6 G/DL (ref 32–36)
MCHC RBC AUTO-ENTMCNC: 29.7 G/DL (ref 32–36)
MCHC RBC AUTO-ENTMCNC: 29.8 G/DL (ref 32–36)
MCHC RBC AUTO-ENTMCNC: 29.9 G/DL (ref 32–36)
MCHC RBC AUTO-ENTMCNC: 30 G/DL (ref 32–36)
MCHC RBC AUTO-ENTMCNC: 30.1 G/DL (ref 32–36)
MCHC RBC AUTO-ENTMCNC: 30.2 G/DL (ref 32–36)
MCHC RBC AUTO-ENTMCNC: 30.5 G/DL (ref 32–36)
MCHC RBC AUTO-ENTMCNC: 30.6 G/DL (ref 32–36)
MCHC RBC AUTO-ENTMCNC: 30.7 G/DL (ref 32–36)
MCHC RBC AUTO-ENTMCNC: 30.7 G/DL (ref 32–36)
MCHC RBC AUTO-ENTMCNC: 30.9 G/DL (ref 32–36)
MCHC RBC AUTO-ENTMCNC: 31 G/DL (ref 32–36)
MCHC RBC AUTO-ENTMCNC: 31 G/DL (ref 32–36)
MCHC RBC AUTO-ENTMCNC: 31.1 G/DL (ref 32–36)
MCHC RBC AUTO-ENTMCNC: 31.2 G/DL (ref 32–36)
MCHC RBC AUTO-ENTMCNC: 31.3 G/DL (ref 32–36)
MCHC RBC AUTO-ENTMCNC: 31.4 G/DL (ref 32–36)
MCHC RBC AUTO-ENTMCNC: 31.4 G/DL (ref 32–36)
MCHC RBC AUTO-ENTMCNC: 31.6 G/DL (ref 32–36)
MCHC RBC AUTO-ENTMCNC: 31.8 G/DL (ref 32–36)
MCHC RBC AUTO-ENTMCNC: 31.8 G/DL (ref 32–36)
MCHC RBC AUTO-ENTMCNC: 31.9 G/DL (ref 32–36)
MCHC RBC AUTO-ENTMCNC: 32 G/DL (ref 32–36)
MCHC RBC AUTO-ENTMCNC: 32.1 G/DL (ref 32–36)
MCHC RBC AUTO-ENTMCNC: 32.2 G/DL (ref 32–36)
MCHC RBC AUTO-ENTMCNC: 32.2 G/DL (ref 32–36)
MCHC RBC AUTO-ENTMCNC: 32.4 G/DL (ref 32–36)
MCHC RBC AUTO-ENTMCNC: 32.5 G/DL (ref 32–36)
MCHC RBC AUTO-ENTMCNC: 32.6 G/DL (ref 32–36)
MCHC RBC AUTO-ENTMCNC: 32.9 G/DL (ref 32–36)
MCHC RBC AUTO-ENTMCNC: 33.1 G/DL (ref 32–36)
MCHC RBC AUTO-ENTMCNC: 33.2 G/DL (ref 32–36)
MCHC RBC AUTO-ENTMCNC: 33.2 G/DL (ref 32–36)
MCHC RBC AUTO-ENTMCNC: 33.5 G/DL (ref 32–36)
MCHC RBC AUTO-ENTMCNC: 33.7 G/DL (ref 32–36)
MCHC RBC AUTO-ENTMCNC: 34 G/DL (ref 32–36)
MCV RBC AUTO: 83 FL (ref 82–98)
MCV RBC AUTO: 83 FL (ref 82–98)
MCV RBC AUTO: 85 FL (ref 82–98)
MCV RBC AUTO: 86 FL (ref 82–98)
MCV RBC AUTO: 86 FL (ref 82–98)
MCV RBC AUTO: 87 FL (ref 82–98)
MCV RBC AUTO: 88 FL (ref 82–98)
MCV RBC AUTO: 89 FL (ref 82–98)
MCV RBC AUTO: 90 FL (ref 82–98)
MCV RBC AUTO: 91 FL (ref 82–98)
MCV RBC AUTO: 92 FL (ref 82–98)
MCV RBC AUTO: 93 FL (ref 82–98)
MCV RBC AUTO: 94 FL (ref 82–98)
MCV RBC AUTO: 96 FL (ref 82–98)
METHYLMALONATE SERPL-SCNC: 0.38 UMOL/L
MICROSCOPIC COMMENT: ABNORMAL
MICROSCOPIC EXAM: NORMAL
MIN VOL: 10.3
MIN VOL: 10.5
MIN VOL: 10.7
MIN VOL: 10.9
MIN VOL: 10.9
MIN VOL: 11.9
MIN VOL: 13.2
MIN VOL: 14
MIN VOL: 6.61
MIN VOL: 8.76
MIN VOL: 9.7
MIN VOL: 9.94
MODE: ABNORMAL
MONOCYTES # BLD AUTO: 0.7 K/UL (ref 0.3–1)
MONOCYTES # BLD AUTO: 0.8 K/UL (ref 0.3–1)
MONOCYTES # BLD AUTO: 0.9 K/UL (ref 0.3–1)
MONOCYTES # BLD AUTO: 1 K/UL (ref 0.3–1)
MONOCYTES # BLD AUTO: 1.1 K/UL (ref 0.3–1)
MONOCYTES # BLD AUTO: 1.2 K/UL (ref 0.3–1)
MONOCYTES # BLD AUTO: 1.3 K/UL (ref 0.3–1)
MONOCYTES # BLD AUTO: 1.7 K/UL (ref 0.3–1)
MONOCYTES # BLD AUTO: 1.9 K/UL (ref 0.3–1)
MONOCYTES NFR BLD: 10.1 % (ref 4–15)
MONOCYTES NFR BLD: 10.2 % (ref 4–15)
MONOCYTES NFR BLD: 10.6 % (ref 4–15)
MONOCYTES NFR BLD: 10.9 % (ref 4–15)
MONOCYTES NFR BLD: 11 % (ref 4–15)
MONOCYTES NFR BLD: 11 % (ref 4–15)
MONOCYTES NFR BLD: 11.3 % (ref 4–15)
MONOCYTES NFR BLD: 13.8 % (ref 4–15)
MONOCYTES NFR BLD: 4 % (ref 4–15)
MONOCYTES NFR BLD: 4.8 % (ref 4–15)
MONOCYTES NFR BLD: 5.7 % (ref 4–15)
MONOCYTES NFR BLD: 6 % (ref 4–15)
MONOCYTES NFR BLD: 6 % (ref 4–15)
MONOCYTES NFR BLD: 6.3 % (ref 4–15)
MONOCYTES NFR BLD: 6.4 % (ref 4–15)
MONOCYTES NFR BLD: 6.5 % (ref 4–15)
MONOCYTES NFR BLD: 6.5 % (ref 4–15)
MONOCYTES NFR BLD: 6.6 % (ref 4–15)
MONOCYTES NFR BLD: 6.9 % (ref 4–15)
MONOCYTES NFR BLD: 7 % (ref 4–15)
MONOCYTES NFR BLD: 7.1 % (ref 4–15)
MONOCYTES NFR BLD: 7.1 % (ref 4–15)
MONOCYTES NFR BLD: 7.3 % (ref 4–15)
MONOCYTES NFR BLD: 7.3 % (ref 4–15)
MONOCYTES NFR BLD: 7.4 % (ref 4–15)
MONOCYTES NFR BLD: 7.5 % (ref 4–15)
MONOCYTES NFR BLD: 7.6 % (ref 4–15)
MONOCYTES NFR BLD: 7.8 % (ref 4–15)
MONOCYTES NFR BLD: 7.9 % (ref 4–15)
MONOCYTES NFR BLD: 8.1 % (ref 4–15)
MONOCYTES NFR BLD: 8.2 % (ref 4–15)
MONOCYTES NFR BLD: 8.2 % (ref 4–15)
MONOCYTES NFR BLD: 8.5 % (ref 4–15)
MONOCYTES NFR BLD: 8.5 % (ref 4–15)
MONOCYTES NFR BLD: 8.6 % (ref 4–15)
MONOCYTES NFR BLD: 8.7 % (ref 4–15)
MONOCYTES NFR BLD: 8.8 % (ref 4–15)
MONOCYTES NFR BLD: 8.9 % (ref 4–15)
MONOCYTES NFR BLD: 9 % (ref 4–15)
MONOCYTES NFR BLD: 9 % (ref 4–15)
MONOCYTES NFR BLD: 9.2 % (ref 4–15)
MONOCYTES NFR BLD: 9.3 % (ref 4–15)
MONOCYTES NFR BLD: 9.3 % (ref 4–15)
MONOCYTES NFR BLD: 9.4 % (ref 4–15)
MONOCYTES NFR BLD: 9.5 % (ref 4–15)
MONOCYTES NFR BLD: 9.6 % (ref 4–15)
MONOCYTES NFR BLD: 9.8 % (ref 4–15)
MONOCYTES NFR BLD: 9.9 % (ref 4–15)
MONOS+MACROS NFR CSF MANUAL: 27 % (ref 15–45)
MONOS+MACROS NFR CSF MANUAL: 27 % (ref 15–45)
MV PEAK A VEL: 0.68 M/S
MV PEAK A VEL: 0.88 M/S
MV PEAK A VEL: 1.04 M/S
MV PEAK A VEL: 1.22 M/S
MV PEAK E VEL: 0.91 M/S
MV PEAK E VEL: 1.18 M/S
MV PEAK E VEL: 1.27 M/S
MV PEAK E VEL: 1.57 M/S
MV STENOSIS PRESSURE HALF TIME: 42.44 MS
MV STENOSIS PRESSURE HALF TIME: 46.48 MS
MV STENOSIS PRESSURE HALF TIME: 50.09 MS
MV STENOSIS PRESSURE HALF TIME: 57.48 MS
MV VALVE AREA P 1/2 METHOD: 3.83 CM2
MV VALVE AREA P 1/2 METHOD: 4.39 CM2
MV VALVE AREA P 1/2 METHOD: 4.73 CM2
MV VALVE AREA P 1/2 METHOD: 5.18 CM2
NEUTROPHILS # BLD AUTO: 10 K/UL (ref 1.8–7.7)
NEUTROPHILS # BLD AUTO: 10.1 K/UL (ref 1.8–7.7)
NEUTROPHILS # BLD AUTO: 10.1 K/UL (ref 1.8–7.7)
NEUTROPHILS # BLD AUTO: 10.2 K/UL (ref 1.8–7.7)
NEUTROPHILS # BLD AUTO: 10.4 K/UL (ref 1.8–7.7)
NEUTROPHILS # BLD AUTO: 10.6 K/UL (ref 1.8–7.7)
NEUTROPHILS # BLD AUTO: 10.8 K/UL (ref 1.8–7.7)
NEUTROPHILS # BLD AUTO: 10.9 K/UL (ref 1.8–7.7)
NEUTROPHILS # BLD AUTO: 10.9 K/UL (ref 1.8–7.7)
NEUTROPHILS # BLD AUTO: 11.3 K/UL (ref 1.8–7.7)
NEUTROPHILS # BLD AUTO: 11.6 K/UL (ref 1.8–7.7)
NEUTROPHILS # BLD AUTO: 11.8 K/UL (ref 1.8–7.7)
NEUTROPHILS # BLD AUTO: 11.8 K/UL (ref 1.8–7.7)
NEUTROPHILS # BLD AUTO: 12.1 K/UL (ref 1.8–7.7)
NEUTROPHILS # BLD AUTO: 12.6 K/UL (ref 1.8–7.7)
NEUTROPHILS # BLD AUTO: 13.5 K/UL (ref 1.8–7.7)
NEUTROPHILS # BLD AUTO: 14.9 K/UL (ref 1.8–7.7)
NEUTROPHILS # BLD AUTO: 15.5 K/UL (ref 1.8–7.7)
NEUTROPHILS # BLD AUTO: 5.6 K/UL (ref 1.8–7.7)
NEUTROPHILS # BLD AUTO: 6 K/UL (ref 1.8–7.7)
NEUTROPHILS # BLD AUTO: 6.1 K/UL (ref 1.8–7.7)
NEUTROPHILS # BLD AUTO: 6.2 K/UL (ref 1.8–7.7)
NEUTROPHILS # BLD AUTO: 6.3 K/UL (ref 1.8–7.7)
NEUTROPHILS # BLD AUTO: 6.5 K/UL (ref 1.8–7.7)
NEUTROPHILS # BLD AUTO: 6.6 K/UL (ref 1.8–7.7)
NEUTROPHILS # BLD AUTO: 6.7 K/UL (ref 1.8–7.7)
NEUTROPHILS # BLD AUTO: 6.8 K/UL (ref 1.8–7.7)
NEUTROPHILS # BLD AUTO: 6.9 K/UL (ref 1.8–7.7)
NEUTROPHILS # BLD AUTO: 7.1 K/UL (ref 1.8–7.7)
NEUTROPHILS # BLD AUTO: 7.2 K/UL (ref 1.8–7.7)
NEUTROPHILS # BLD AUTO: 7.2 K/UL (ref 1.8–7.7)
NEUTROPHILS # BLD AUTO: 7.3 K/UL (ref 1.8–7.7)
NEUTROPHILS # BLD AUTO: 7.3 K/UL (ref 1.8–7.7)
NEUTROPHILS # BLD AUTO: 7.4 K/UL (ref 1.8–7.7)
NEUTROPHILS # BLD AUTO: 7.4 K/UL (ref 1.8–7.7)
NEUTROPHILS # BLD AUTO: 7.6 K/UL (ref 1.8–7.7)
NEUTROPHILS # BLD AUTO: 7.7 K/UL (ref 1.8–7.7)
NEUTROPHILS # BLD AUTO: 7.7 K/UL (ref 1.8–7.7)
NEUTROPHILS # BLD AUTO: 7.8 K/UL (ref 1.8–7.7)
NEUTROPHILS # BLD AUTO: 8 K/UL (ref 1.8–7.7)
NEUTROPHILS # BLD AUTO: 8 K/UL (ref 1.8–7.7)
NEUTROPHILS # BLD AUTO: 8.1 K/UL (ref 1.8–7.7)
NEUTROPHILS # BLD AUTO: 8.1 K/UL (ref 1.8–7.7)
NEUTROPHILS # BLD AUTO: 8.2 K/UL (ref 1.8–7.7)
NEUTROPHILS # BLD AUTO: 8.5 K/UL (ref 1.8–7.7)
NEUTROPHILS # BLD AUTO: 8.7 K/UL (ref 1.8–7.7)
NEUTROPHILS # BLD AUTO: 8.7 K/UL (ref 1.8–7.7)
NEUTROPHILS # BLD AUTO: 8.8 K/UL (ref 1.8–7.7)
NEUTROPHILS # BLD AUTO: 8.9 K/UL (ref 1.8–7.7)
NEUTROPHILS # BLD AUTO: 9 K/UL (ref 1.8–7.7)
NEUTROPHILS # BLD AUTO: 9.1 K/UL (ref 1.8–7.7)
NEUTROPHILS # BLD AUTO: 9.4 K/UL (ref 1.8–7.7)
NEUTROPHILS # BLD AUTO: 9.8 K/UL (ref 1.8–7.7)
NEUTROPHILS NFR BLD: 68.8 % (ref 38–73)
NEUTROPHILS NFR BLD: 72.2 % (ref 38–73)
NEUTROPHILS NFR BLD: 72.7 % (ref 38–73)
NEUTROPHILS NFR BLD: 72.8 % (ref 38–73)
NEUTROPHILS NFR BLD: 72.9 % (ref 38–73)
NEUTROPHILS NFR BLD: 73.1 % (ref 38–73)
NEUTROPHILS NFR BLD: 73.2 % (ref 38–73)
NEUTROPHILS NFR BLD: 73.2 % (ref 38–73)
NEUTROPHILS NFR BLD: 73.3 % (ref 38–73)
NEUTROPHILS NFR BLD: 73.4 % (ref 38–73)
NEUTROPHILS NFR BLD: 73.4 % (ref 38–73)
NEUTROPHILS NFR BLD: 73.6 % (ref 38–73)
NEUTROPHILS NFR BLD: 73.7 % (ref 38–73)
NEUTROPHILS NFR BLD: 73.8 % (ref 38–73)
NEUTROPHILS NFR BLD: 73.9 % (ref 38–73)
NEUTROPHILS NFR BLD: 73.9 % (ref 38–73)
NEUTROPHILS NFR BLD: 74.1 % (ref 38–73)
NEUTROPHILS NFR BLD: 74.3 % (ref 38–73)
NEUTROPHILS NFR BLD: 74.3 % (ref 38–73)
NEUTROPHILS NFR BLD: 74.4 % (ref 38–73)
NEUTROPHILS NFR BLD: 74.5 % (ref 38–73)
NEUTROPHILS NFR BLD: 75 % (ref 38–73)
NEUTROPHILS NFR BLD: 75 % (ref 38–73)
NEUTROPHILS NFR BLD: 75.2 % (ref 38–73)
NEUTROPHILS NFR BLD: 75.2 % (ref 38–73)
NEUTROPHILS NFR BLD: 75.3 % (ref 38–73)
NEUTROPHILS NFR BLD: 75.9 % (ref 38–73)
NEUTROPHILS NFR BLD: 75.9 % (ref 38–73)
NEUTROPHILS NFR BLD: 76 % (ref 38–73)
NEUTROPHILS NFR BLD: 76.3 % (ref 38–73)
NEUTROPHILS NFR BLD: 76.4 % (ref 38–73)
NEUTROPHILS NFR BLD: 76.6 % (ref 38–73)
NEUTROPHILS NFR BLD: 76.8 % (ref 38–73)
NEUTROPHILS NFR BLD: 76.9 % (ref 38–73)
NEUTROPHILS NFR BLD: 78.3 % (ref 38–73)
NEUTROPHILS NFR BLD: 78.4 % (ref 38–73)
NEUTROPHILS NFR BLD: 78.4 % (ref 38–73)
NEUTROPHILS NFR BLD: 78.5 % (ref 38–73)
NEUTROPHILS NFR BLD: 79 % (ref 38–73)
NEUTROPHILS NFR BLD: 80.1 % (ref 38–73)
NEUTROPHILS NFR BLD: 80.7 % (ref 38–73)
NEUTROPHILS NFR BLD: 80.9 % (ref 38–73)
NEUTROPHILS NFR BLD: 80.9 % (ref 38–73)
NEUTROPHILS NFR BLD: 81.1 % (ref 38–73)
NEUTROPHILS NFR BLD: 81.1 % (ref 38–73)
NEUTROPHILS NFR BLD: 82.1 % (ref 38–73)
NEUTROPHILS NFR BLD: 83.1 % (ref 38–73)
NEUTROPHILS NFR BLD: 83.4 % (ref 38–73)
NEUTROPHILS NFR BLD: 83.5 % (ref 38–73)
NEUTROPHILS NFR BLD: 83.7 % (ref 38–73)
NEUTROPHILS NFR BLD: 83.8 % (ref 38–73)
NEUTROPHILS NFR BLD: 84.2 % (ref 38–73)
NEUTROPHILS NFR BLD: 84.3 % (ref 38–73)
NEUTROPHILS NFR BLD: 84.5 % (ref 38–73)
NEUTROPHILS NFR BLD: 85.2 % (ref 38–73)
NEUTROPHILS NFR BLD: 86.1 % (ref 38–73)
NEUTROPHILS NFR BLD: 87 % (ref 38–73)
NEUTROPHILS NFR BLD: 87.2 % (ref 38–73)
NEUTROPHILS NFR BLD: 87.4 % (ref 38–73)
NEUTROPHILS NFR BLD: 87.9 % (ref 38–73)
NEUTROPHILS NFR BLD: 89 % (ref 38–73)
NEUTROPHILS NFR CSF MANUAL: 2 % (ref 0–6)
NGS CLINCIAL TRIALS: NORMAL
NGS INDICATION OF TEST: NORMAL
NGS INTERPRETATION: NORMAL
NGS ONCOHEME PANEL GENE LIST: NORMAL
NGS PATHOGENIC MUTATIONS DETECTED: NORMAL
NGS REVIEWED BY:: NORMAL
NGS VARIANTS OF UNKNOWN SIGNIFICANCE: NORMAL
NGSHM RESULT, BONE MARROW: NORMAL
NITRITE UR QL STRIP: NEGATIVE
NONHDLC SERPL-MCNC: 46 MG/DL
NONHDLC SERPL-MCNC: 67 MG/DL
NONHDLC SERPL-MCNC: 77 MG/DL
NRBC BLD-RTO: 0 /100 WBC
NRBC BLD-RTO: 1 /100 WBC
NUM UNITS TRANS PACKED RBC: NORMAL
NUM UNITS TRANS PACKED RBC: NORMAL
OB PNL STL: POSITIVE
OVALOCYTES BLD QL SMEAR: ABNORMAL
PCO2 BLDA: 24.5 MMHG (ref 35–45)
PCO2 BLDA: 25 MMHG (ref 35–45)
PCO2 BLDA: 27.2 MMHG (ref 35–45)
PCO2 BLDA: 28.8 MMHG (ref 35–45)
PCO2 BLDA: 28.9 MMHG (ref 35–45)
PCO2 BLDA: 28.9 MMHG (ref 35–45)
PCO2 BLDA: 29.2 MMHG (ref 35–45)
PCO2 BLDA: 29.4 MMHG (ref 35–45)
PCO2 BLDA: 29.6 MMHG (ref 35–45)
PCO2 BLDA: 30.9 MMHG (ref 35–45)
PCO2 BLDA: 31.1 MMHG (ref 35–45)
PCO2 BLDA: 31.5 MMHG (ref 35–45)
PCO2 BLDA: 31.6 MMHG (ref 35–45)
PCO2 BLDA: 32.3 MMHG (ref 35–45)
PCO2 BLDA: 32.7 MMHG (ref 35–45)
PCO2 BLDA: 32.9 MMHG (ref 35–45)
PCO2 BLDA: 33 MMHG (ref 35–45)
PCO2 BLDA: 33.2 MMHG (ref 35–45)
PCO2 BLDA: 34.2 MMHG (ref 35–45)
PCO2 BLDA: 34.3 MMHG (ref 35–45)
PCO2 BLDA: 35 MMHG (ref 35–45)
PCO2 BLDA: 35.4 MMHG (ref 35–45)
PCO2 BLDA: 35.5 MMHG (ref 35–45)
PCO2 BLDA: 35.6 MMHG (ref 35–45)
PCO2 BLDA: 36.2 MMHG (ref 35–45)
PCO2 BLDA: 36.2 MMHG (ref 35–45)
PCO2 BLDA: 37.1 MMHG (ref 35–45)
PCO2 BLDA: 37.8 MMHG (ref 35–45)
PCO2 BLDA: 38.9 MMHG (ref 35–45)
PCO2 BLDA: 40.2 MMHG (ref 35–45)
PCO2 BLDA: 43.3 MMHG (ref 35–45)
PCO2 BLDA: 52.2 MMHG (ref 35–45)
PEEP: 10
PEEP: 18
PEEP: 5
PEEP: 7
PH SMN: 7.22 [PH] (ref 7.35–7.45)
PH SMN: 7.26 [PH] (ref 7.35–7.45)
PH SMN: 7.27 [PH] (ref 7.35–7.45)
PH SMN: 7.32 [PH] (ref 7.35–7.45)
PH SMN: 7.33 [PH] (ref 7.35–7.45)
PH SMN: 7.34 [PH] (ref 7.35–7.45)
PH SMN: 7.35 [PH] (ref 7.35–7.45)
PH SMN: 7.37 [PH] (ref 7.35–7.45)
PH SMN: 7.38 [PH] (ref 7.35–7.45)
PH SMN: 7.38 [PH] (ref 7.35–7.45)
PH SMN: 7.39 [PH] (ref 7.35–7.45)
PH SMN: 7.39 [PH] (ref 7.35–7.45)
PH SMN: 7.4 [PH] (ref 7.35–7.45)
PH SMN: 7.4 [PH] (ref 7.35–7.45)
PH SMN: 7.41 [PH] (ref 7.35–7.45)
PH SMN: 7.42 [PH] (ref 7.35–7.45)
PH SMN: 7.44 [PH] (ref 7.35–7.45)
PH SMN: 7.45 [PH] (ref 7.35–7.45)
PH SMN: 7.48 [PH] (ref 7.35–7.45)
PH UR STRIP: 5 [PH] (ref 5–8)
PH UR STRIP: 6 [PH] (ref 5–8)
PH UR STRIP: 6 [PH] (ref 5–8)
PHOSPHATE SERPL-MCNC: 3.5 MG/DL (ref 2.7–4.5)
PHOSPHATE SERPL-MCNC: 3.6 MG/DL (ref 2.7–4.5)
PHOSPHATE SERPL-MCNC: 3.8 MG/DL (ref 2.7–4.5)
PHOSPHATE SERPL-MCNC: 3.8 MG/DL (ref 2.7–4.5)
PHOSPHATE SERPL-MCNC: 4 MG/DL (ref 2.7–4.5)
PHOSPHATE SERPL-MCNC: 4.1 MG/DL (ref 2.7–4.5)
PHOSPHATE SERPL-MCNC: 4.1 MG/DL (ref 2.7–4.5)
PHOSPHATE SERPL-MCNC: 4.2 MG/DL (ref 2.7–4.5)
PHOSPHATE SERPL-MCNC: 4.2 MG/DL (ref 2.7–4.5)
PHOSPHATE SERPL-MCNC: 4.3 MG/DL (ref 2.7–4.5)
PHOSPHATE SERPL-MCNC: 4.4 MG/DL (ref 2.7–4.5)
PHOSPHATE SERPL-MCNC: 4.5 MG/DL (ref 2.7–4.5)
PHOSPHATE SERPL-MCNC: 4.6 MG/DL (ref 2.7–4.5)
PHOSPHATE SERPL-MCNC: 4.7 MG/DL (ref 2.7–4.5)
PHOSPHATE SERPL-MCNC: 4.8 MG/DL (ref 2.7–4.5)
PHOSPHATE SERPL-MCNC: 4.9 MG/DL (ref 2.7–4.5)
PHOSPHATE SERPL-MCNC: 4.9 MG/DL (ref 2.7–4.5)
PHOSPHATE SERPL-MCNC: 5 MG/DL (ref 2.7–4.5)
PHOSPHATE SERPL-MCNC: 5 MG/DL (ref 2.7–4.5)
PHOSPHATE SERPL-MCNC: 5.1 MG/DL (ref 2.7–4.5)
PHOSPHATE SERPL-MCNC: 5.1 MG/DL (ref 2.7–4.5)
PHOSPHATE SERPL-MCNC: 5.5 MG/DL (ref 2.7–4.5)
PHOSPHATE SERPL-MCNC: 5.5 MG/DL (ref 2.7–4.5)
PHOSPHATE SERPL-MCNC: 5.8 MG/DL (ref 2.7–4.5)
PHOSPHATE SERPL-MCNC: 6.2 MG/DL (ref 2.7–4.5)
PIP: 20
PIP: 21
PIP: 24
PIP: 25
PIP: 26
PIP: 27
PIP: 28
PIP: 30
PIP: 32
PISA MRMAX VEL: 0.04 M/S
PISA RADIUS: 0.74 CM
PISA TR MAX VEL: 2.24 M/S
PISA TR MAX VEL: 2.46 M/S
PISA TR MAX VEL: 2.93 M/S
PISA TR VN NYQUIST: 0 M/S
PLATELET # BLD AUTO: 103 K/UL (ref 150–350)
PLATELET # BLD AUTO: 104 K/UL (ref 150–350)
PLATELET # BLD AUTO: 105 K/UL (ref 150–350)
PLATELET # BLD AUTO: 107 K/UL (ref 150–350)
PLATELET # BLD AUTO: 115 K/UL (ref 150–350)
PLATELET # BLD AUTO: 130 K/UL (ref 150–350)
PLATELET # BLD AUTO: 130 K/UL (ref 150–350)
PLATELET # BLD AUTO: 135 K/UL (ref 150–350)
PLATELET # BLD AUTO: 141 K/UL (ref 150–350)
PLATELET # BLD AUTO: 142 K/UL (ref 150–350)
PLATELET # BLD AUTO: 149 K/UL (ref 150–350)
PLATELET # BLD AUTO: 157 K/UL (ref 150–350)
PLATELET # BLD AUTO: 160 K/UL (ref 150–350)
PLATELET # BLD AUTO: 167 K/UL (ref 150–350)
PLATELET # BLD AUTO: 171 K/UL (ref 150–350)
PLATELET # BLD AUTO: 181 K/UL (ref 150–350)
PLATELET # BLD AUTO: 181 K/UL (ref 150–350)
PLATELET # BLD AUTO: 182 K/UL (ref 150–350)
PLATELET # BLD AUTO: 183 K/UL (ref 150–350)
PLATELET # BLD AUTO: 187 K/UL (ref 150–350)
PLATELET # BLD AUTO: 188 K/UL (ref 150–350)
PLATELET # BLD AUTO: 188 K/UL (ref 150–350)
PLATELET # BLD AUTO: 190 K/UL (ref 150–350)
PLATELET # BLD AUTO: 190 K/UL (ref 150–350)
PLATELET # BLD AUTO: 195 K/UL (ref 150–350)
PLATELET # BLD AUTO: 196 K/UL (ref 150–350)
PLATELET # BLD AUTO: 197 K/UL (ref 150–350)
PLATELET # BLD AUTO: 199 K/UL (ref 150–350)
PLATELET # BLD AUTO: 199 K/UL (ref 150–350)
PLATELET # BLD AUTO: 201 K/UL (ref 150–350)
PLATELET # BLD AUTO: 204 K/UL (ref 150–350)
PLATELET # BLD AUTO: 204 K/UL (ref 150–350)
PLATELET # BLD AUTO: 205 K/UL (ref 150–350)
PLATELET # BLD AUTO: 206 K/UL (ref 150–350)
PLATELET # BLD AUTO: 209 K/UL (ref 150–350)
PLATELET # BLD AUTO: 209 K/UL (ref 150–350)
PLATELET # BLD AUTO: 211 K/UL (ref 150–350)
PLATELET # BLD AUTO: 211 K/UL (ref 150–350)
PLATELET # BLD AUTO: 212 K/UL (ref 150–350)
PLATELET # BLD AUTO: 212 K/UL (ref 150–350)
PLATELET # BLD AUTO: 213 K/UL (ref 150–350)
PLATELET # BLD AUTO: 214 K/UL (ref 150–350)
PLATELET # BLD AUTO: 215 K/UL (ref 150–350)
PLATELET # BLD AUTO: 217 K/UL (ref 150–350)
PLATELET # BLD AUTO: 218 K/UL (ref 150–350)
PLATELET # BLD AUTO: 218 K/UL (ref 150–350)
PLATELET # BLD AUTO: 220 K/UL (ref 150–350)
PLATELET # BLD AUTO: 222 K/UL (ref 150–350)
PLATELET # BLD AUTO: 226 K/UL (ref 150–350)
PLATELET # BLD AUTO: 227 K/UL (ref 150–350)
PLATELET # BLD AUTO: 229 K/UL (ref 150–350)
PLATELET # BLD AUTO: 229 K/UL (ref 150–350)
PLATELET # BLD AUTO: 233 K/UL (ref 150–350)
PLATELET # BLD AUTO: 233 K/UL (ref 150–350)
PLATELET # BLD AUTO: 262 K/UL (ref 150–350)
PLATELET # BLD AUTO: 269 K/UL (ref 150–350)
PLATELET # BLD AUTO: 320 K/UL (ref 150–350)
PLATELET # BLD AUTO: 94 K/UL (ref 150–350)
PLATELET # BLD AUTO: 98 K/UL (ref 150–350)
PLATELET BLD QL SMEAR: ABNORMAL
PMV BLD AUTO: 10 FL (ref 9.2–12.9)
PMV BLD AUTO: 10.1 FL (ref 9.2–12.9)
PMV BLD AUTO: 10.4 FL (ref 9.2–12.9)
PMV BLD AUTO: 10.4 FL (ref 9.2–12.9)
PMV BLD AUTO: 10.5 FL (ref 9.2–12.9)
PMV BLD AUTO: 10.5 FL (ref 9.2–12.9)
PMV BLD AUTO: 10.6 FL (ref 9.2–12.9)
PMV BLD AUTO: 10.7 FL (ref 9.2–12.9)
PMV BLD AUTO: 10.8 FL (ref 9.2–12.9)
PMV BLD AUTO: 10.9 FL (ref 9.2–12.9)
PMV BLD AUTO: 11 FL (ref 9.2–12.9)
PMV BLD AUTO: 11.1 FL (ref 9.2–12.9)
PMV BLD AUTO: 11.1 FL (ref 9.2–12.9)
PMV BLD AUTO: 11.2 FL (ref 9.2–12.9)
PMV BLD AUTO: 11.3 FL (ref 9.2–12.9)
PMV BLD AUTO: 11.3 FL (ref 9.2–12.9)
PMV BLD AUTO: 11.4 FL (ref 9.2–12.9)
PMV BLD AUTO: 11.5 FL (ref 9.2–12.9)
PMV BLD AUTO: 12.1 FL (ref 9.2–12.9)
PMV BLD AUTO: 12.2 FL (ref 9.2–12.9)
PMV BLD AUTO: 12.9 FL (ref 9.2–12.9)
PMV BLD AUTO: 13.5 FL (ref 9.2–12.9)
PMV BLD AUTO: 13.7 FL (ref 9.2–12.9)
PMV BLD AUTO: 9.2 FL (ref 9.2–12.9)
PMV BLD AUTO: 9.4 FL (ref 9.2–12.9)
PMV BLD AUTO: 9.5 FL (ref 9.2–12.9)
PMV BLD AUTO: 9.5 FL (ref 9.2–12.9)
PMV BLD AUTO: 9.6 FL (ref 9.2–12.9)
PMV BLD AUTO: 9.7 FL (ref 9.2–12.9)
PMV BLD AUTO: 9.7 FL (ref 9.2–12.9)
PMV BLD AUTO: 9.8 FL (ref 9.2–12.9)
PMV BLD AUTO: 9.9 FL (ref 9.2–12.9)
PMV BLD AUTO: ABNORMAL FL (ref 9.2–12.9)
PO2 BLDA: 106 MMHG (ref 80–100)
PO2 BLDA: 108 MMHG (ref 80–100)
PO2 BLDA: 111 MMHG (ref 80–100)
PO2 BLDA: 121 MMHG (ref 80–100)
PO2 BLDA: 122 MMHG (ref 80–100)
PO2 BLDA: 130 MMHG (ref 80–100)
PO2 BLDA: 133 MMHG (ref 80–100)
PO2 BLDA: 135 MMHG (ref 80–100)
PO2 BLDA: 137 MMHG (ref 80–100)
PO2 BLDA: 140 MMHG (ref 80–100)
PO2 BLDA: 142 MMHG (ref 80–100)
PO2 BLDA: 154 MMHG (ref 80–100)
PO2 BLDA: 162 MMHG (ref 80–100)
PO2 BLDA: 169 MMHG (ref 80–100)
PO2 BLDA: 175 MMHG (ref 80–100)
PO2 BLDA: 178 MMHG (ref 80–100)
PO2 BLDA: 181 MMHG (ref 80–100)
PO2 BLDA: 186 MMHG (ref 80–100)
PO2 BLDA: 193 MMHG (ref 80–100)
PO2 BLDA: 29 MMHG (ref 40–60)
PO2 BLDA: 294 MMHG (ref 80–100)
PO2 BLDA: 31 MMHG (ref 40–60)
PO2 BLDA: 32 MMHG (ref 40–60)
PO2 BLDA: 34 MMHG (ref 40–60)
PO2 BLDA: 35 MMHG (ref 40–60)
PO2 BLDA: 35 MMHG (ref 40–60)
PO2 BLDA: 36 MMHG (ref 40–60)
PO2 BLDA: 38 MMHG (ref 40–60)
PO2 BLDA: 39 MMHG (ref 40–60)
PO2 BLDA: 390 MMHG (ref 80–100)
PO2 BLDA: 44 MMHG (ref 40–60)
PO2 BLDA: 76 MMHG (ref 80–100)
POC BE: -10 MMOL/L
POC BE: -10 MMOL/L
POC BE: -11 MMOL/L
POC BE: -12 MMOL/L
POC BE: -13 MMOL/L
POC BE: -14 MMOL/L
POC BE: -2 MMOL/L
POC BE: -3 MMOL/L
POC BE: -4 MMOL/L
POC BE: -4 MMOL/L
POC BE: -5 MMOL/L
POC BE: -6 MMOL/L
POC BE: -6 MMOL/L
POC BE: -7 MMOL/L
POC BE: -7 MMOL/L
POC BE: -8 MMOL/L
POC BE: -9 MMOL/L
POC BE: 0 MMOL/L
POC BE: 7 MMOL/L
POC IONIZED CALCIUM: 1.08 MMOL/L (ref 1.06–1.42)
POC PCO2 TEMP: 34.7 MMHG
POC PH TEMP: 7.4
POC PO2 TEMP: 151 MMHG
POC PTINR: 1.5 (ref 0.9–1.2)
POC PTWBT: 17.3 SEC (ref 9.7–14.3)
POC SATURATED O2: 100 % (ref 95–100)
POC SATURATED O2: 51 % (ref 95–100)
POC SATURATED O2: 55 % (ref 95–100)
POC SATURATED O2: 59 % (ref 95–100)
POC SATURATED O2: 60 % (ref 95–100)
POC SATURATED O2: 63 % (ref 95–100)
POC SATURATED O2: 63 % (ref 95–100)
POC SATURATED O2: 64 % (ref 95–100)
POC SATURATED O2: 65 % (ref 95–100)
POC SATURATED O2: 66 % (ref 95–100)
POC SATURATED O2: 73 % (ref 95–100)
POC SATURATED O2: 95 % (ref 95–100)
POC SATURATED O2: 98 % (ref 95–100)
POC SATURATED O2: 99 % (ref 95–100)
POC TCO2: 14 MMOL/L (ref 23–27)
POC TCO2: 14 MMOL/L (ref 23–27)
POC TCO2: 16 MMOL/L (ref 23–27)
POC TCO2: 16 MMOL/L (ref 23–27)
POC TCO2: 16 MMOL/L (ref 24–29)
POC TCO2: 17 MMOL/L (ref 23–27)
POC TCO2: 17 MMOL/L (ref 24–29)
POC TCO2: 17 MMOL/L (ref 24–29)
POC TCO2: 18 MMOL/L (ref 23–27)
POC TCO2: 18 MMOL/L (ref 24–29)
POC TCO2: 18 MMOL/L (ref 24–29)
POC TCO2: 19 MMOL/L (ref 23–27)
POC TCO2: 19 MMOL/L (ref 24–29)
POC TCO2: 20 MMOL/L (ref 23–27)
POC TCO2: 20 MMOL/L (ref 24–29)
POC TCO2: 21 MMOL/L (ref 23–27)
POC TCO2: 21 MMOL/L (ref 23–27)
POC TCO2: 22 MMOL/L (ref 23–27)
POC TCO2: 22 MMOL/L (ref 24–29)
POC TCO2: 23 MMOL/L (ref 23–27)
POC TCO2: 23 MMOL/L (ref 24–29)
POC TCO2: 24 MMOL/L (ref 23–27)
POC TCO2: 24 MMOL/L (ref 24–29)
POC TCO2: 33 MMOL/L (ref 23–27)
POC TEMPERATURE: ABNORMAL
POCT GLUCOSE: 100 MG/DL (ref 70–110)
POCT GLUCOSE: 100 MG/DL (ref 70–110)
POCT GLUCOSE: 101 MG/DL (ref 70–110)
POCT GLUCOSE: 102 MG/DL (ref 70–110)
POCT GLUCOSE: 102 MG/DL (ref 70–110)
POCT GLUCOSE: 104 MG/DL (ref 70–110)
POCT GLUCOSE: 104 MG/DL (ref 70–110)
POCT GLUCOSE: 105 MG/DL (ref 70–110)
POCT GLUCOSE: 106 MG/DL (ref 70–110)
POCT GLUCOSE: 107 MG/DL (ref 70–110)
POCT GLUCOSE: 109 MG/DL (ref 70–110)
POCT GLUCOSE: 110 MG/DL (ref 70–110)
POCT GLUCOSE: 111 MG/DL (ref 70–110)
POCT GLUCOSE: 112 MG/DL (ref 70–110)
POCT GLUCOSE: 112 MG/DL (ref 70–110)
POCT GLUCOSE: 113 MG/DL (ref 70–110)
POCT GLUCOSE: 114 MG/DL (ref 70–110)
POCT GLUCOSE: 116 MG/DL (ref 70–110)
POCT GLUCOSE: 117 MG/DL (ref 70–110)
POCT GLUCOSE: 117 MG/DL (ref 70–110)
POCT GLUCOSE: 118 MG/DL (ref 70–110)
POCT GLUCOSE: 122 MG/DL (ref 70–110)
POCT GLUCOSE: 122 MG/DL (ref 70–110)
POCT GLUCOSE: 123 MG/DL (ref 70–110)
POCT GLUCOSE: 125 MG/DL (ref 70–110)
POCT GLUCOSE: 126 MG/DL (ref 70–110)
POCT GLUCOSE: 127 MG/DL (ref 70–110)
POCT GLUCOSE: 128 MG/DL (ref 70–110)
POCT GLUCOSE: 129 MG/DL (ref 70–110)
POCT GLUCOSE: 130 MG/DL (ref 70–110)
POCT GLUCOSE: 131 MG/DL (ref 70–110)
POCT GLUCOSE: 131 MG/DL (ref 70–110)
POCT GLUCOSE: 134 MG/DL (ref 70–110)
POCT GLUCOSE: 135 MG/DL (ref 70–110)
POCT GLUCOSE: 137 MG/DL (ref 70–110)
POCT GLUCOSE: 138 MG/DL (ref 70–110)
POCT GLUCOSE: 138 MG/DL (ref 70–110)
POCT GLUCOSE: 142 MG/DL (ref 70–110)
POCT GLUCOSE: 142 MG/DL (ref 70–110)
POCT GLUCOSE: 144 MG/DL (ref 70–110)
POCT GLUCOSE: 145 MG/DL (ref 70–110)
POCT GLUCOSE: 147 MG/DL (ref 70–110)
POCT GLUCOSE: 148 MG/DL (ref 70–110)
POCT GLUCOSE: 152 MG/DL (ref 70–110)
POCT GLUCOSE: 153 MG/DL (ref 70–110)
POCT GLUCOSE: 153 MG/DL (ref 70–110)
POCT GLUCOSE: 156 MG/DL (ref 70–110)
POCT GLUCOSE: 159 MG/DL (ref 70–110)
POCT GLUCOSE: 164 MG/DL (ref 70–110)
POCT GLUCOSE: 165 MG/DL (ref 70–110)
POCT GLUCOSE: 165 MG/DL (ref 70–110)
POCT GLUCOSE: 166 MG/DL (ref 70–110)
POCT GLUCOSE: 166 MG/DL (ref 70–110)
POCT GLUCOSE: 169 MG/DL (ref 70–110)
POCT GLUCOSE: 169 MG/DL (ref 70–110)
POCT GLUCOSE: 171 MG/DL (ref 70–110)
POCT GLUCOSE: 180 MG/DL (ref 70–110)
POCT GLUCOSE: 187 MG/DL (ref 70–110)
POCT GLUCOSE: 196 MG/DL (ref 70–110)
POCT GLUCOSE: 198 MG/DL (ref 70–110)
POCT GLUCOSE: 200 MG/DL (ref 70–110)
POCT GLUCOSE: 202 MG/DL (ref 70–110)
POCT GLUCOSE: 203 MG/DL (ref 70–110)
POCT GLUCOSE: 203 MG/DL (ref 70–110)
POCT GLUCOSE: 207 MG/DL (ref 70–110)
POCT GLUCOSE: 234 MG/DL (ref 70–110)
POCT GLUCOSE: 235 MG/DL (ref 70–110)
POCT GLUCOSE: 72 MG/DL (ref 70–110)
POCT GLUCOSE: 81 MG/DL (ref 70–110)
POCT GLUCOSE: 81 MG/DL (ref 70–110)
POCT GLUCOSE: 84 MG/DL (ref 70–110)
POCT GLUCOSE: 86 MG/DL (ref 70–110)
POCT GLUCOSE: 86 MG/DL (ref 70–110)
POCT GLUCOSE: 87 MG/DL (ref 70–110)
POCT GLUCOSE: 87 MG/DL (ref 70–110)
POCT GLUCOSE: 88 MG/DL (ref 70–110)
POCT GLUCOSE: 90 MG/DL (ref 70–110)
POCT GLUCOSE: 90 MG/DL (ref 70–110)
POCT GLUCOSE: 92 MG/DL (ref 70–110)
POCT GLUCOSE: 94 MG/DL (ref 70–110)
POCT GLUCOSE: 95 MG/DL (ref 70–110)
POCT GLUCOSE: 96 MG/DL (ref 70–110)
POCT GLUCOSE: 97 MG/DL (ref 70–110)
POCT GLUCOSE: 98 MG/DL (ref 70–110)
POCT GLUCOSE: 98 MG/DL (ref 70–110)
POCT GLUCOSE: 99 MG/DL (ref 70–110)
POCT GLUCOSE: 99 MG/DL (ref 70–110)
POIKILOCYTOSIS BLD QL SMEAR: SLIGHT
POIKILOCYTOSIS BLD QL SMEAR: SLIGHT
POLYCHROMASIA BLD QL SMEAR: ABNORMAL
POLYCHROMASIA BLD QL SMEAR: ABNORMAL
POTASSIUM BLD-SCNC: 3.4 MMOL/L (ref 3.5–5.1)
POTASSIUM SERPL-SCNC: 2.8 MMOL/L (ref 3.5–5.1)
POTASSIUM SERPL-SCNC: 2.9 MMOL/L (ref 3.5–5.1)
POTASSIUM SERPL-SCNC: 3 MMOL/L (ref 3.5–5.1)
POTASSIUM SERPL-SCNC: 3 MMOL/L (ref 3.5–5.1)
POTASSIUM SERPL-SCNC: 3.1 MMOL/L (ref 3.5–5.1)
POTASSIUM SERPL-SCNC: 3.2 MMOL/L (ref 3.5–5.1)
POTASSIUM SERPL-SCNC: 3.3 MMOL/L (ref 3.5–5.1)
POTASSIUM SERPL-SCNC: 3.4 MMOL/L (ref 3.5–5.1)
POTASSIUM SERPL-SCNC: 3.5 MMOL/L (ref 3.5–5.1)
POTASSIUM SERPL-SCNC: 3.6 MMOL/L (ref 3.5–5.1)
POTASSIUM SERPL-SCNC: 3.7 MMOL/L (ref 3.5–5.1)
POTASSIUM SERPL-SCNC: 3.8 MMOL/L (ref 3.5–5.1)
POTASSIUM SERPL-SCNC: 3.9 MMOL/L (ref 3.5–5.1)
POTASSIUM SERPL-SCNC: 4 MMOL/L (ref 3.5–5.1)
POTASSIUM SERPL-SCNC: 4.1 MMOL/L (ref 3.5–5.1)
POTASSIUM SERPL-SCNC: 4.3 MMOL/L (ref 3.5–5.1)
POTASSIUM SERPL-SCNC: 4.3 MMOL/L (ref 3.5–5.1)
POTASSIUM SERPL-SCNC: 4.6 MMOL/L (ref 3.5–5.1)
POTASSIUM SERPL-SCNC: 4.7 MMOL/L (ref 3.5–5.1)
POTASSIUM SERPL-SCNC: 4.8 MMOL/L (ref 3.5–5.1)
POTASSIUM SERPL-SCNC: 5 MMOL/L (ref 3.5–5.1)
PROCALCITONIN SERPL IA-MCNC: 1.33 NG/ML
PROCALCITONIN SERPL IA-MCNC: 1.98 NG/ML
PROCALCITONIN SERPL IA-MCNC: 2.28 NG/ML
PROCALCITONIN SERPL IA-MCNC: 3.14 NG/ML
PROT CSF-MCNC: 31 MG/DL (ref 15–40)
PROT SERPL-MCNC: 5.6 G/DL (ref 6–8.4)
PROT SERPL-MCNC: 5.8 G/DL (ref 6–8.4)
PROT SERPL-MCNC: 5.9 G/DL (ref 6–8.4)
PROT SERPL-MCNC: 6 G/DL (ref 6–8.4)
PROT SERPL-MCNC: 6.1 G/DL (ref 6–8.4)
PROT SERPL-MCNC: 6.2 G/DL (ref 6–8.4)
PROT SERPL-MCNC: 6.3 G/DL (ref 6–8.4)
PROT SERPL-MCNC: 6.4 G/DL (ref 6–8.4)
PROT SERPL-MCNC: 6.5 G/DL (ref 6–8.4)
PROT SERPL-MCNC: 6.6 G/DL (ref 6–8.4)
PROT SERPL-MCNC: 6.6 G/DL (ref 6–8.4)
PROT SERPL-MCNC: 6.7 G/DL (ref 6–8.4)
PROT SERPL-MCNC: 6.8 G/DL (ref 6–8.4)
PROT SERPL-MCNC: 6.9 G/DL (ref 6–8.4)
PROT SERPL-MCNC: 7 G/DL (ref 6–8.4)
PROT SERPL-MCNC: 7.1 G/DL (ref 6–8.4)
PROT SERPL-MCNC: 7.2 G/DL (ref 6–8.4)
PROT SERPL-MCNC: 7.4 G/DL (ref 6–8.4)
PROT SERPL-MCNC: 7.4 G/DL (ref 6–8.4)
PROT SERPL-MCNC: 7.5 G/DL (ref 6–8.4)
PROT UR QL STRIP: ABNORMAL
PROTHROMBIN TIME: 10.4 SEC (ref 9–12.5)
PROTHROMBIN TIME: 10.7 SEC (ref 9–12.5)
PROTHROMBIN TIME: 11.4 SEC (ref 9–12.5)
PROTHROMBIN TIME: 12.3 SEC (ref 9–12.5)
PROTHROMBIN TIME: 12.8 SEC (ref 9–12.5)
PROTHROMBIN TIME: 18.3 SEC (ref 9–12.5)
PULM VEIN S/D RATIO: 1.3
PULM VEIN S/D RATIO: 1.62
PV PEAK D VEL: 0.47 M/S
PV PEAK D VEL: 0.54 M/S
PV PEAK S VEL: 0.7 M/S
PV PEAK S VEL: 0.76 M/S
PV PEAK VELOCITY: 1.17 CM/S
RA MAJOR: 4.4 CM
RA MAJOR: 4.47 CM
RA MAJOR: 4.83 CM
RA MAJOR: 4.96 CM
RA MAJOR: 5.33 CM
RA PRESSURE: 3 MMHG
RA PRESSURE: 8 MMHG
RA WIDTH: 3.23 CM
RA WIDTH: 3.34 CM
RA WIDTH: 3.39 CM
RA WIDTH: 3.41 CM
RA WIDTH: 3.84 CM
RBC # BLD AUTO: 2.21 M/UL (ref 4.6–6.2)
RBC # BLD AUTO: 2.39 M/UL (ref 4.6–6.2)
RBC # BLD AUTO: 2.47 M/UL (ref 4.6–6.2)
RBC # BLD AUTO: 2.51 M/UL (ref 4.6–6.2)
RBC # BLD AUTO: 2.54 M/UL (ref 4.6–6.2)
RBC # BLD AUTO: 2.55 M/UL (ref 4.6–6.2)
RBC # BLD AUTO: 2.55 M/UL (ref 4.6–6.2)
RBC # BLD AUTO: 2.56 M/UL (ref 4.6–6.2)
RBC # BLD AUTO: 2.61 M/UL (ref 4.6–6.2)
RBC # BLD AUTO: 2.62 M/UL (ref 4.6–6.2)
RBC # BLD AUTO: 2.62 M/UL (ref 4.6–6.2)
RBC # BLD AUTO: 2.63 M/UL (ref 4.6–6.2)
RBC # BLD AUTO: 2.63 M/UL (ref 4.6–6.2)
RBC # BLD AUTO: 2.64 M/UL (ref 4.6–6.2)
RBC # BLD AUTO: 2.64 M/UL (ref 4.6–6.2)
RBC # BLD AUTO: 2.65 M/UL (ref 4.6–6.2)
RBC # BLD AUTO: 2.66 M/UL (ref 4.6–6.2)
RBC # BLD AUTO: 2.68 M/UL (ref 4.6–6.2)
RBC # BLD AUTO: 2.69 M/UL (ref 4.6–6.2)
RBC # BLD AUTO: 2.7 M/UL (ref 4.6–6.2)
RBC # BLD AUTO: 2.7 M/UL (ref 4.6–6.2)
RBC # BLD AUTO: 2.71 M/UL (ref 4.6–6.2)
RBC # BLD AUTO: 2.72 M/UL (ref 4.6–6.2)
RBC # BLD AUTO: 2.72 M/UL (ref 4.6–6.2)
RBC # BLD AUTO: 2.75 M/UL (ref 4.6–6.2)
RBC # BLD AUTO: 2.77 M/UL (ref 4.6–6.2)
RBC # BLD AUTO: 2.82 M/UL (ref 4.6–6.2)
RBC # BLD AUTO: 2.84 M/UL (ref 4.6–6.2)
RBC # BLD AUTO: 2.86 M/UL (ref 4.6–6.2)
RBC # BLD AUTO: 2.87 M/UL (ref 4.6–6.2)
RBC # BLD AUTO: 2.89 M/UL (ref 4.6–6.2)
RBC # BLD AUTO: 2.9 M/UL (ref 4.6–6.2)
RBC # BLD AUTO: 2.92 M/UL (ref 4.6–6.2)
RBC # BLD AUTO: 2.95 M/UL (ref 4.6–6.2)
RBC # BLD AUTO: 2.98 M/UL (ref 4.6–6.2)
RBC # BLD AUTO: 3.01 M/UL (ref 4.6–6.2)
RBC # BLD AUTO: 3.01 M/UL (ref 4.6–6.2)
RBC # BLD AUTO: 3.02 M/UL (ref 4.6–6.2)
RBC # BLD AUTO: 3.03 M/UL (ref 4.6–6.2)
RBC # BLD AUTO: 3.05 M/UL (ref 4.6–6.2)
RBC # BLD AUTO: 3.06 M/UL (ref 4.6–6.2)
RBC # BLD AUTO: 3.06 M/UL (ref 4.6–6.2)
RBC # BLD AUTO: 3.1 M/UL (ref 4.6–6.2)
RBC # BLD AUTO: 3.11 M/UL (ref 4.6–6.2)
RBC # BLD AUTO: 3.13 M/UL (ref 4.6–6.2)
RBC # BLD AUTO: 3.14 M/UL (ref 4.6–6.2)
RBC # BLD AUTO: 3.14 M/UL (ref 4.6–6.2)
RBC # BLD AUTO: 3.15 M/UL (ref 4.6–6.2)
RBC # BLD AUTO: 3.16 M/UL (ref 4.6–6.2)
RBC # BLD AUTO: 3.24 M/UL (ref 4.6–6.2)
RBC # BLD AUTO: 3.29 M/UL (ref 4.6–6.2)
RBC # BLD AUTO: 3.29 M/UL (ref 4.6–6.2)
RBC # BLD AUTO: 3.31 M/UL (ref 4.6–6.2)
RBC # BLD AUTO: 3.34 M/UL (ref 4.6–6.2)
RBC # BLD AUTO: 3.37 M/UL (ref 4.6–6.2)
RBC # BLD AUTO: 3.4 M/UL (ref 4.6–6.2)
RBC # BLD AUTO: 3.4 M/UL (ref 4.6–6.2)
RBC # BLD AUTO: 3.41 M/UL (ref 4.6–6.2)
RBC # BLD AUTO: 3.47 M/UL (ref 4.6–6.2)
RBC # BLD AUTO: 3.55 M/UL (ref 4.6–6.2)
RBC # BLD AUTO: 3.69 M/UL (ref 4.6–6.2)
RBC # CSF: 21 /CU MM
RBC # CSF: 6 /CU MM
RBC #/AREA URNS AUTO: 0 /HPF (ref 0–4)
RBC #/AREA URNS AUTO: 13 /HPF (ref 0–4)
RBC #/AREA URNS AUTO: 27 /HPF (ref 0–4)
RBC #/AREA URNS AUTO: 36 /HPF (ref 0–4)
RBC #/AREA URNS HPF: 30 /HPF (ref 0–4)
RBC CASTS #/AREA URNS LPF: 3 /LPF
REASON FOR REFERRAL (NARRATIVE): NORMAL
REF LAB TEST METHOD: NORMAL
REF LAB TEST METHOD: NORMAL
RIGHT VENTRICULAR END-DIASTOLIC DIMENSION: 2.87 CM
RIGHT VENTRICULAR END-DIASTOLIC DIMENSION: 3.35 CM
RIGHT VENTRICULAR END-DIASTOLIC DIMENSION: 3.45 CM
RPR SER QL: NORMAL
RV TISSUE DOPPLER FREE WALL SYSTOLIC VELOCITY 1 (APICAL 4 CHAMBER VIEW): 19.07 CM/S
RV TISSUE DOPPLER FREE WALL SYSTOLIC VELOCITY 1 (APICAL 4 CHAMBER VIEW): 20.98 CM/S
SAMPLE: ABNORMAL
SAMPLE: NORMAL
SARS-COV-2 RDRP RESP QL NAA+PROBE: NEGATIVE
SARS-COV-2 RNA RESP QL NAA+PROBE: NOT DETECTED
SATURATED IRON: 12 % (ref 20–50)
SINUS: 2.73 CM
SINUS: 3.3 CM
SINUS: 3.86 CM
SITE: ABNORMAL
SODIUM BLD-SCNC: 143 MMOL/L (ref 136–145)
SODIUM SERPL-SCNC: 133 MMOL/L (ref 136–145)
SODIUM SERPL-SCNC: 134 MMOL/L (ref 136–145)
SODIUM SERPL-SCNC: 134 MMOL/L (ref 136–145)
SODIUM SERPL-SCNC: 135 MMOL/L (ref 136–145)
SODIUM SERPL-SCNC: 136 MMOL/L (ref 136–145)
SODIUM SERPL-SCNC: 137 MMOL/L (ref 136–145)
SODIUM SERPL-SCNC: 138 MMOL/L (ref 136–145)
SODIUM SERPL-SCNC: 138 MMOL/L (ref 136–145)
SODIUM SERPL-SCNC: 139 MMOL/L (ref 136–145)
SODIUM SERPL-SCNC: 140 MMOL/L (ref 136–145)
SODIUM SERPL-SCNC: 141 MMOL/L (ref 136–145)
SODIUM SERPL-SCNC: 142 MMOL/L (ref 136–145)
SODIUM SERPL-SCNC: 143 MMOL/L (ref 136–145)
SODIUM SERPL-SCNC: 144 MMOL/L (ref 136–145)
SP GR UR STRIP: 1.01 (ref 1–1.03)
SP GR UR STRIP: 1.02 (ref 1–1.03)
SP GR UR STRIP: >=1.03 (ref 1–1.03)
SP02: 100
SP02: 92
SP02: 97
SP02: 97
SP02: 98
SP02: 99
SP02: 99
SPECIMEN SOURCE: NORMAL
SPECIMEN VOL CSF: 0.5 ML
SPECIMEN VOL CSF: 0.5 ML
SPECIMEN: NORMAL
SQUAMOUS #/AREA URNS AUTO: 1 /HPF
SQUAMOUS #/AREA URNS AUTO: 4 /HPF
SQUAMOUS #/AREA URNS HPF: 2 /HPF
STJ: 3.25 CM
STJ: 3.39 CM
STJ: 3.62 CM
SUPPLEMENTAL DIAGNOSIS: NORMAL
T PALLIDUM AB SER QL IF: NORMAL
T4 FREE SERPL-MCNC: 0.9 NG/DL (ref 0.71–1.51)
TARGETS BLD QL SMEAR: ABNORMAL
TDI LATERAL: 0.08 M/S
TDI LATERAL: 0.11 M/S
TDI LATERAL: 0.12 M/S
TDI LATERAL: 0.13 M/S
TDI SEPTAL: 0.07 M/S
TDI SEPTAL: 0.08 M/S
TDI SEPTAL: 0.1 M/S
TDI SEPTAL: 0.11 M/S
TDI: 0.08 M/S
TDI: 0.1 M/S
TDI: 0.12 M/S
TDI: 0.12 M/S
TEST PERFORMANCE INFO SPEC: NORMAL
TOTAL IRON BINDING CAPACITY: 163 UG/DL (ref 250–450)
TR MAX PG: 20 MMHG
TR MAX PG: 24 MMHG
TR MAX PG: 34 MMHG
TRANS ERYTHROCYTES VOL PATIENT: NORMAL ML
TRANSFERRIN SERPL-MCNC: 110 MG/DL (ref 200–375)
TRICUSPID ANNULAR PLANE SYSTOLIC EXCURSION: 2.09 CM
TRICUSPID ANNULAR PLANE SYSTOLIC EXCURSION: 2.34 CM
TRICUSPID ANNULAR PLANE SYSTOLIC EXCURSION: 3 CM
TRIGL SERPL-MCNC: 101 MG/DL (ref 30–150)
TRIGL SERPL-MCNC: 45 MG/DL (ref 30–150)
TRIGL SERPL-MCNC: 70 MG/DL (ref 30–150)
TROPONIN I SERPL DL<=0.01 NG/ML-MCNC: 0.05 NG/ML (ref 0–0.03)
TROPONIN I SERPL DL<=0.01 NG/ML-MCNC: 0.08 NG/ML (ref 0–0.03)
TROPONIN I SERPL DL<=0.01 NG/ML-MCNC: 0.15 NG/ML (ref 0–0.03)
TROPONIN I SERPL DL<=0.01 NG/ML-MCNC: 0.16 NG/ML (ref 0–0.03)
TSH SERPL DL<=0.005 MIU/L-ACNC: 1.59 UIU/ML (ref 0.4–4)
TSH SERPL DL<=0.005 MIU/L-ACNC: 2.3 UIU/ML (ref 0.4–4)
TSH SERPL DL<=0.005 MIU/L-ACNC: 5.42 UIU/ML (ref 0.4–4)
TV REST PULMONARY ARTERY PRESSURE: 28 MMHG
TV REST PULMONARY ARTERY PRESSURE: 32 MMHG
TV REST PULMONARY ARTERY PRESSURE: 42 MMHG
URN SPEC COLLECT METH UR: ABNORMAL
UROBILINOGEN UR STRIP-ACNC: NEGATIVE EU/DL
VANCOMYCIN SERPL-MCNC: 15.2 UG/ML
VANCOMYCIN SERPL-MCNC: 15.7 UG/ML
VANCOMYCIN SERPL-MCNC: 17.2 UG/ML
VANCOMYCIN SERPL-MCNC: 17.3 UG/ML
VANCOMYCIN SERPL-MCNC: 17.6 UG/ML
VANCOMYCIN SERPL-MCNC: 17.9 UG/ML
VANCOMYCIN SERPL-MCNC: 18.1 UG/ML
VANCOMYCIN SERPL-MCNC: 18.7 UG/ML
VANCOMYCIN SERPL-MCNC: 18.8 UG/ML
VANCOMYCIN SERPL-MCNC: 19.6 UG/ML
VANCOMYCIN SERPL-MCNC: 19.8 UG/ML
VANCOMYCIN SERPL-MCNC: 20.7 UG/ML
VANCOMYCIN SERPL-MCNC: 20.9 UG/ML
VANCOMYCIN SERPL-MCNC: 21.3 UG/ML
VANCOMYCIN SERPL-MCNC: 21.5 UG/ML
VANCOMYCIN SERPL-MCNC: 22.5 UG/ML
VANCOMYCIN SERPL-MCNC: 24 UG/ML
VANCOMYCIN SERPL-MCNC: 24 UG/ML
VANCOMYCIN SERPL-MCNC: 26.3 UG/ML
VANCOMYCIN SERPL-MCNC: 33.2 UG/ML
VANCOMYCIN TROUGH SERPL-MCNC: 13.8 UG/ML (ref 10–22)
VANCOMYCIN TROUGH SERPL-MCNC: 19.6 UG/ML (ref 10–22)
VANCOMYCIN TROUGH SERPL-MCNC: 21.1 UG/ML (ref 10–22)
VANCOMYCIN TROUGH SERPL-MCNC: 21.4 UG/ML (ref 10–22)
VANCOMYCIN TROUGH SERPL-MCNC: 22.1 UG/ML (ref 10–22)
VANCOMYCIN TROUGH SERPL-MCNC: 23.3 UG/ML (ref 10–22)
VANCOMYCIN TROUGH SERPL-MCNC: 27.4 UG/ML (ref 10–22)
VANCOMYCIN TROUGH SERPL-MCNC: 32.9 UG/ML (ref 10–22)
VDRL CSF QL: NORMAL
VIT B12 SERPL-MCNC: 468 PG/ML (ref 210–950)
VT: 420
VT: 470
WBC # BLD AUTO: 10.02 K/UL (ref 3.9–12.7)
WBC # BLD AUTO: 10.09 K/UL (ref 3.9–12.7)
WBC # BLD AUTO: 10.11 K/UL (ref 3.9–12.7)
WBC # BLD AUTO: 10.29 K/UL (ref 3.9–12.7)
WBC # BLD AUTO: 10.32 K/UL (ref 3.9–12.7)
WBC # BLD AUTO: 10.34 K/UL (ref 3.9–12.7)
WBC # BLD AUTO: 10.47 K/UL (ref 3.9–12.7)
WBC # BLD AUTO: 10.84 K/UL (ref 3.9–12.7)
WBC # BLD AUTO: 10.92 K/UL (ref 3.9–12.7)
WBC # BLD AUTO: 10.92 K/UL (ref 3.9–12.7)
WBC # BLD AUTO: 11.12 K/UL (ref 3.9–12.7)
WBC # BLD AUTO: 11.21 K/UL (ref 3.9–12.7)
WBC # BLD AUTO: 11.22 K/UL (ref 3.9–12.7)
WBC # BLD AUTO: 11.39 K/UL (ref 3.9–12.7)
WBC # BLD AUTO: 11.39 K/UL (ref 3.9–12.7)
WBC # BLD AUTO: 11.46 K/UL (ref 3.9–12.7)
WBC # BLD AUTO: 11.62 K/UL (ref 3.9–12.7)
WBC # BLD AUTO: 11.64 K/UL (ref 3.9–12.7)
WBC # BLD AUTO: 11.74 K/UL (ref 3.9–12.7)
WBC # BLD AUTO: 12.02 K/UL (ref 3.9–12.7)
WBC # BLD AUTO: 12.49 K/UL (ref 3.9–12.7)
WBC # BLD AUTO: 12.88 K/UL (ref 3.9–12.7)
WBC # BLD AUTO: 13 K/UL (ref 3.9–12.7)
WBC # BLD AUTO: 13.09 K/UL (ref 3.9–12.7)
WBC # BLD AUTO: 13.41 K/UL (ref 3.9–12.7)
WBC # BLD AUTO: 13.44 K/UL (ref 3.9–12.7)
WBC # BLD AUTO: 13.45 K/UL (ref 3.9–12.7)
WBC # BLD AUTO: 13.51 K/UL (ref 3.9–12.7)
WBC # BLD AUTO: 13.56 K/UL (ref 3.9–12.7)
WBC # BLD AUTO: 13.65 K/UL (ref 3.9–12.7)
WBC # BLD AUTO: 13.77 K/UL (ref 3.9–12.7)
WBC # BLD AUTO: 14.04 K/UL (ref 3.9–12.7)
WBC # BLD AUTO: 14.44 K/UL (ref 3.9–12.7)
WBC # BLD AUTO: 16.26 K/UL (ref 3.9–12.7)
WBC # BLD AUTO: 17.63 K/UL (ref 3.9–12.7)
WBC # BLD AUTO: 19.09 K/UL (ref 3.9–12.7)
WBC # BLD AUTO: 20.12 K/UL (ref 3.9–12.7)
WBC # BLD AUTO: 8.07 K/UL (ref 3.9–12.7)
WBC # BLD AUTO: 8.27 K/UL (ref 3.9–12.7)
WBC # BLD AUTO: 8.34 K/UL (ref 3.9–12.7)
WBC # BLD AUTO: 8.36 K/UL (ref 3.9–12.7)
WBC # BLD AUTO: 8.43 K/UL (ref 3.9–12.7)
WBC # BLD AUTO: 8.44 K/UL (ref 3.9–12.7)
WBC # BLD AUTO: 8.47 K/UL (ref 3.9–12.7)
WBC # BLD AUTO: 8.57 K/UL (ref 3.9–12.7)
WBC # BLD AUTO: 8.75 K/UL (ref 3.9–12.7)
WBC # BLD AUTO: 8.9 K/UL (ref 3.9–12.7)
WBC # BLD AUTO: 9 K/UL (ref 3.9–12.7)
WBC # BLD AUTO: 9.01 K/UL (ref 3.9–12.7)
WBC # BLD AUTO: 9.02 K/UL (ref 3.9–12.7)
WBC # BLD AUTO: 9.06 K/UL (ref 3.9–12.7)
WBC # BLD AUTO: 9.08 K/UL (ref 3.9–12.7)
WBC # BLD AUTO: 9.1 K/UL (ref 3.9–12.7)
WBC # BLD AUTO: 9.14 K/UL (ref 3.9–12.7)
WBC # BLD AUTO: 9.28 K/UL (ref 3.9–12.7)
WBC # BLD AUTO: 9.29 K/UL (ref 3.9–12.7)
WBC # BLD AUTO: 9.31 K/UL (ref 3.9–12.7)
WBC # BLD AUTO: 9.37 K/UL (ref 3.9–12.7)
WBC # BLD AUTO: 9.42 K/UL (ref 3.9–12.7)
WBC # BLD AUTO: 9.44 K/UL (ref 3.9–12.7)
WBC # BLD AUTO: 9.54 K/UL (ref 3.9–12.7)
WBC # BLD AUTO: 9.57 K/UL (ref 3.9–12.7)
WBC # BLD AUTO: 9.58 K/UL (ref 3.9–12.7)
WBC # BLD AUTO: 9.7 K/UL (ref 3.9–12.7)
WBC # BLD AUTO: 9.86 K/UL (ref 3.9–12.7)
WBC # CSF: 21 /CU MM (ref 0–5)
WBC # CSF: 24 /CU MM (ref 0–5)
WBC #/AREA URNS AUTO: 24 /HPF (ref 0–5)
WBC #/AREA URNS AUTO: >100 /HPF (ref 0–5)
WBC #/AREA URNS HPF: 5 /HPF (ref 0–5)
WBC CLUMPS UR QL AUTO: ABNORMAL
WBC CLUMPS UR QL AUTO: ABNORMAL
WBC TOXIC VACUOLES BLD QL SMEAR: PRESENT
YEAST UR QL AUTO: ABNORMAL

## 2020-01-01 PROCEDURE — 99900035 HC TECH TIME PER 15 MIN (STAT)

## 2020-01-01 PROCEDURE — 88311 DECALCIFY TISSUE: CPT | Mod: 26,,, | Performed by: PATHOLOGY

## 2020-01-01 PROCEDURE — 63600175 PHARM REV CODE 636 W HCPCS: Performed by: PSYCHIATRY & NEUROLOGY

## 2020-01-01 PROCEDURE — 99214 OFFICE O/P EST MOD 30 MIN: CPT | Mod: 95,,, | Performed by: OTOLARYNGOLOGY

## 2020-01-01 PROCEDURE — 86870 RBC ANTIBODY IDENTIFICATION: CPT

## 2020-01-01 PROCEDURE — 87070 CULTURE OTHR SPECIMN AEROBIC: CPT

## 2020-01-01 PROCEDURE — 25000003 PHARM REV CODE 250: Performed by: HOSPITALIST

## 2020-01-01 PROCEDURE — 83880 ASSAY OF NATRIURETIC PEPTIDE: CPT

## 2020-01-01 PROCEDURE — 95700 EEG CONT REC W/VID EEG TECH: CPT

## 2020-01-01 PROCEDURE — 25000242 PHARM REV CODE 250 ALT 637 W/ HCPCS: Performed by: NURSE PRACTITIONER

## 2020-01-01 PROCEDURE — 99499 RISK ADDL DX/OHS AUDIT: ICD-10-PCS | Mod: S$GLB,,, | Performed by: INTERNAL MEDICINE

## 2020-01-01 PROCEDURE — 63600175 PHARM REV CODE 636 W HCPCS: Performed by: NURSE PRACTITIONER

## 2020-01-01 PROCEDURE — 25000003 PHARM REV CODE 250: Performed by: STUDENT IN AN ORGANIZED HEALTH CARE EDUCATION/TRAINING PROGRAM

## 2020-01-01 PROCEDURE — 63600175 PHARM REV CODE 636 W HCPCS: Performed by: STUDENT IN AN ORGANIZED HEALTH CARE EDUCATION/TRAINING PROGRAM

## 2020-01-01 PROCEDURE — 82272 OCCULT BLD FECES 1-3 TESTS: CPT

## 2020-01-01 PROCEDURE — 25000003 PHARM REV CODE 250: Performed by: PSYCHIATRY & NEUROLOGY

## 2020-01-01 PROCEDURE — 82803 BLOOD GASES ANY COMBINATION: CPT

## 2020-01-01 PROCEDURE — 99291 CRITICAL CARE FIRST HOUR: CPT | Mod: 25,GC,, | Performed by: PSYCHIATRY & NEUROLOGY

## 2020-01-01 PROCEDURE — 97530 THERAPEUTIC ACTIVITIES: CPT

## 2020-01-01 PROCEDURE — 11000001 HC ACUTE MED/SURG PRIVATE ROOM

## 2020-01-01 PROCEDURE — G0180 MD CERTIFICATION HHA PATIENT: HCPCS | Mod: ,,, | Performed by: INTERNAL MEDICINE

## 2020-01-01 PROCEDURE — 27200966 HC CLOSED SUCTION SYSTEM

## 2020-01-01 PROCEDURE — P9021 RED BLOOD CELLS UNIT: HCPCS

## 2020-01-01 PROCEDURE — 63600175 PHARM REV CODE 636 W HCPCS: Performed by: HOSPITALIST

## 2020-01-01 PROCEDURE — 83735 ASSAY OF MAGNESIUM: CPT

## 2020-01-01 PROCEDURE — A9585 GADOBUTROL INJECTION: HCPCS | Performed by: HOSPITALIST

## 2020-01-01 PROCEDURE — 99233 PR SUBSEQUENT HOSPITAL CARE,LEVL III: ICD-10-PCS | Mod: GC,,, | Performed by: PSYCHIATRY & NEUROLOGY

## 2020-01-01 PROCEDURE — A4216 STERILE WATER/SALINE, 10 ML: HCPCS | Performed by: PSYCHIATRY & NEUROLOGY

## 2020-01-01 PROCEDURE — 97110 THERAPEUTIC EXERCISES: CPT

## 2020-01-01 PROCEDURE — 85025 COMPLETE CBC W/AUTO DIFF WBC: CPT

## 2020-01-01 PROCEDURE — 20000000 HC ICU ROOM

## 2020-01-01 PROCEDURE — 99233 PR SUBSEQUENT HOSPITAL CARE,LEVL III: ICD-10-PCS | Mod: ,,, | Performed by: PSYCHIATRY & NEUROLOGY

## 2020-01-01 PROCEDURE — 80053 COMPREHEN METABOLIC PANEL: CPT | Mod: 91

## 2020-01-01 PROCEDURE — 94640 AIRWAY INHALATION TREATMENT: CPT

## 2020-01-01 PROCEDURE — 94761 N-INVAS EAR/PLS OXIMETRY MLT: CPT

## 2020-01-01 PROCEDURE — 84100 ASSAY OF PHOSPHORUS: CPT

## 2020-01-01 PROCEDURE — 99233 PR SUBSEQUENT HOSPITAL CARE,LEVL III: ICD-10-PCS | Mod: GT,,, | Performed by: PSYCHIATRY & NEUROLOGY

## 2020-01-01 PROCEDURE — 94003 VENT MGMT INPAT SUBQ DAY: CPT

## 2020-01-01 PROCEDURE — 84443 ASSAY THYROID STIM HORMONE: CPT

## 2020-01-01 PROCEDURE — 80053 COMPREHEN METABOLIC PANEL: CPT

## 2020-01-01 PROCEDURE — 80202 ASSAY OF VANCOMYCIN: CPT

## 2020-01-01 PROCEDURE — 95714 VEEG EA 12-26 HR UNMNTR: CPT

## 2020-01-01 PROCEDURE — 99233 PR SUBSEQUENT HOSPITAL CARE,LEVL III: ICD-10-PCS | Mod: ,,, | Performed by: PHYSICIAN ASSISTANT

## 2020-01-01 PROCEDURE — 25500020 PHARM REV CODE 255: Performed by: PSYCHIATRY & NEUROLOGY

## 2020-01-01 PROCEDURE — 36556 INSERT NON-TUNNEL CV CATH: CPT | Mod: GC,,, | Performed by: PSYCHIATRY & NEUROLOGY

## 2020-01-01 PROCEDURE — 93010 ELECTROCARDIOGRAM REPORT: CPT | Mod: ,,, | Performed by: INTERNAL MEDICINE

## 2020-01-01 PROCEDURE — 25000003 PHARM REV CODE 250: Performed by: INTERNAL MEDICINE

## 2020-01-01 PROCEDURE — 25000003 PHARM REV CODE 250: Performed by: NURSE PRACTITIONER

## 2020-01-01 PROCEDURE — 25000242 PHARM REV CODE 250 ALT 637 W/ HCPCS: Performed by: INTERNAL MEDICINE

## 2020-01-01 PROCEDURE — 99291 CRITICAL CARE FIRST HOUR: CPT | Mod: GC,,, | Performed by: PSYCHIATRY & NEUROLOGY

## 2020-01-01 PROCEDURE — 80235 DRUG ASSAY LACOSAMIDE: CPT

## 2020-01-01 PROCEDURE — 86900 BLOOD TYPING SEROLOGIC ABO: CPT

## 2020-01-01 PROCEDURE — 88342 IMHCHEM/IMCYTCHM 1ST ANTB: CPT | Performed by: PATHOLOGY

## 2020-01-01 PROCEDURE — S0028 INJECTION, FAMOTIDINE, 20 MG: HCPCS | Performed by: PSYCHIATRY & NEUROLOGY

## 2020-01-01 PROCEDURE — 99999 PR PBB SHADOW E&M-EST. PATIENT-LVL V: ICD-10-PCS | Mod: PBBFAC,,, | Performed by: THORACIC SURGERY (CARDIOTHORACIC VASCULAR SURGERY)

## 2020-01-01 PROCEDURE — 37000009 HC ANESTHESIA EA ADD 15 MINS: Performed by: INTERNAL MEDICINE

## 2020-01-01 PROCEDURE — 3075F SYST BP GE 130 - 139MM HG: CPT | Mod: CPTII,S$GLB,, | Performed by: INTERNAL MEDICINE

## 2020-01-01 PROCEDURE — 99223 1ST HOSP IP/OBS HIGH 75: CPT | Mod: ,,, | Performed by: PHYSICIAN ASSISTANT

## 2020-01-01 PROCEDURE — 1101F PR PT FALLS ASSESS DOC 0-1 FALLS W/OUT INJ PAST YR: ICD-10-PCS | Mod: CPTII,S$GLB,, | Performed by: NURSE PRACTITIONER

## 2020-01-01 PROCEDURE — 93010 EKG 12-LEAD: ICD-10-PCS | Mod: ,,, | Performed by: INTERNAL MEDICINE

## 2020-01-01 PROCEDURE — 1101F PR PT FALLS ASSESS DOC 0-1 FALLS W/OUT INJ PAST YR: ICD-10-PCS | Mod: CPTII,95,, | Performed by: INTERNAL MEDICINE

## 2020-01-01 PROCEDURE — G0427 INPT/ED TELECONSULT70: HCPCS | Mod: GT,,, | Performed by: NURSE PRACTITIONER

## 2020-01-01 PROCEDURE — 86922 COMPATIBILITY TEST ANTIGLOB: CPT

## 2020-01-01 PROCEDURE — 3074F PR MOST RECENT SYSTOLIC BLOOD PRESSURE < 130 MM HG: ICD-10-PCS | Mod: CPTII,S$GLB,, | Performed by: THORACIC SURGERY (CARDIOTHORACIC VASCULAR SURGERY)

## 2020-01-01 PROCEDURE — 99233 PR SUBSEQUENT HOSPITAL CARE,LEVL III: ICD-10-PCS | Mod: GC,,, | Performed by: INTERNAL MEDICINE

## 2020-01-01 PROCEDURE — 97165 OT EVAL LOW COMPLEX 30 MIN: CPT

## 2020-01-01 PROCEDURE — 93005 ELECTROCARDIOGRAM TRACING: CPT

## 2020-01-01 PROCEDURE — 99499 UNLISTED E&M SERVICE: CPT | Mod: 95,,, | Performed by: INTERNAL MEDICINE

## 2020-01-01 PROCEDURE — 85025 COMPLETE CBC W/AUTO DIFF WBC: CPT | Mod: 91

## 2020-01-01 PROCEDURE — 1159F PR MEDICATION LIST DOCUMENTED IN MEDICAL RECORD: ICD-10-PCS | Mod: ,,, | Performed by: OTOLARYNGOLOGY

## 2020-01-01 PROCEDURE — 99900026 HC AIRWAY MAINTENANCE (STAT)

## 2020-01-01 PROCEDURE — 88313 PR  SPECIAL STAINS,GROUP II: ICD-10-PCS | Mod: 26,,, | Performed by: PATHOLOGY

## 2020-01-01 PROCEDURE — 99291 PR CRITICAL CARE, E/M 30-74 MINUTES: ICD-10-PCS | Mod: CS,,, | Performed by: EMERGENCY MEDICINE

## 2020-01-01 PROCEDURE — 87040 BLOOD CULTURE FOR BACTERIA: CPT

## 2020-01-01 PROCEDURE — C9254 INJECTION, LACOSAMIDE: HCPCS | Performed by: STUDENT IN AN ORGANIZED HEALTH CARE EDUCATION/TRAINING PROGRAM

## 2020-01-01 PROCEDURE — 99999 PR PBB SHADOW E&M-EST. PATIENT-LVL III: ICD-10-PCS | Mod: PBBFAC,,, | Performed by: INTERNAL MEDICINE

## 2020-01-01 PROCEDURE — 87086 URINE CULTURE/COLONY COUNT: CPT

## 2020-01-01 PROCEDURE — 1159F PR MEDICATION LIST DOCUMENTED IN MEDICAL RECORD: ICD-10-PCS | Mod: S$GLB,,, | Performed by: INTERNAL MEDICINE

## 2020-01-01 PROCEDURE — 63600175 PHARM REV CODE 636 W HCPCS: Performed by: PHYSICIAN ASSISTANT

## 2020-01-01 PROCEDURE — 99233 SBSQ HOSP IP/OBS HIGH 50: CPT | Mod: GC,,, | Performed by: INTERNAL MEDICINE

## 2020-01-01 PROCEDURE — 97112 NEUROMUSCULAR REEDUCATION: CPT

## 2020-01-01 PROCEDURE — 3074F PR MOST RECENT SYSTOLIC BLOOD PRESSURE < 130 MM HG: ICD-10-PCS | Mod: CPTII,S$GLB,, | Performed by: NURSE PRACTITIONER

## 2020-01-01 PROCEDURE — 36415 COLL VENOUS BLD VENIPUNCTURE: CPT

## 2020-01-01 PROCEDURE — 96365 THER/PROPH/DIAG IV INF INIT: CPT

## 2020-01-01 PROCEDURE — 1101F PT FALLS ASSESS-DOCD LE1/YR: CPT | Mod: CPTII,S$GLB,, | Performed by: NURSE PRACTITIONER

## 2020-01-01 PROCEDURE — 27201038 HC PROBE, BI-POLAR: Performed by: INTERNAL MEDICINE

## 2020-01-01 PROCEDURE — 95812 PR EEG,EXTENDED MONITORING,41-60 MINUTES: ICD-10-PCS | Mod: 26,,, | Performed by: PSYCHIATRY & NEUROLOGY

## 2020-01-01 PROCEDURE — 3079F DIAST BP 80-89 MM HG: CPT | Mod: CPTII,S$GLB,, | Performed by: INTERNAL MEDICINE

## 2020-01-01 PROCEDURE — 94799 UNLISTED PULMONARY SVC/PX: CPT

## 2020-01-01 PROCEDURE — 99291 PR CRITICAL CARE, E/M 30-74 MINUTES: ICD-10-PCS | Mod: GC,,, | Performed by: PSYCHIATRY & NEUROLOGY

## 2020-01-01 PROCEDURE — 1159F PR MEDICATION LIST DOCUMENTED IN MEDICAL RECORD: ICD-10-PCS | Mod: S$GLB,,, | Performed by: THORACIC SURGERY (CARDIOTHORACIC VASCULAR SURGERY)

## 2020-01-01 PROCEDURE — 80048 BASIC METABOLIC PNL TOTAL CA: CPT

## 2020-01-01 PROCEDURE — 27000221 HC OXYGEN, UP TO 24 HOURS

## 2020-01-01 PROCEDURE — 3074F PR MOST RECENT SYSTOLIC BLOOD PRESSURE < 130 MM HG: ICD-10-PCS | Mod: CPTII,S$GLB,, | Performed by: INTERNAL MEDICINE

## 2020-01-01 PROCEDURE — 99223 1ST HOSP IP/OBS HIGH 75: CPT | Mod: GC,,, | Performed by: PSYCHIATRY & NEUROLOGY

## 2020-01-01 PROCEDURE — 83010 ASSAY OF HAPTOGLOBIN QUANT: CPT

## 2020-01-01 PROCEDURE — 1159F MED LIST DOCD IN RCRD: CPT | Mod: 95,,, | Performed by: OTOLARYNGOLOGY

## 2020-01-01 PROCEDURE — 85610 PROTHROMBIN TIME: CPT

## 2020-01-01 PROCEDURE — 86900 BLOOD TYPING SEROLOGIC ABO: CPT | Mod: 91

## 2020-01-01 PROCEDURE — 99214 PR OFFICE/OUTPT VISIT, EST, LEVL IV, 30-39 MIN: ICD-10-PCS | Mod: 95,,, | Performed by: INTERNAL MEDICINE

## 2020-01-01 PROCEDURE — 97535 SELF CARE MNGMENT TRAINING: CPT

## 2020-01-01 PROCEDURE — 99233 SBSQ HOSP IP/OBS HIGH 50: CPT | Mod: ,,, | Performed by: NURSE PRACTITIONER

## 2020-01-01 PROCEDURE — 1101F PR PT FALLS ASSESS DOC 0-1 FALLS W/OUT INJ PAST YR: ICD-10-PCS | Mod: CPTII,95,, | Performed by: OTOLARYNGOLOGY

## 2020-01-01 PROCEDURE — C9113 INJ PANTOPRAZOLE SODIUM, VIA: HCPCS | Performed by: NURSE PRACTITIONER

## 2020-01-01 PROCEDURE — 63600175 PHARM REV CODE 636 W HCPCS: Performed by: INTERNAL MEDICINE

## 2020-01-01 PROCEDURE — G0180 PR HOME HEALTH MD CERTIFICATION: ICD-10-PCS | Mod: ,,, | Performed by: INTERNAL MEDICINE

## 2020-01-01 PROCEDURE — 95720 EEG PHY/QHP EA INCR W/VEEG: CPT | Mod: ,,, | Performed by: PSYCHIATRY & NEUROLOGY

## 2020-01-01 PROCEDURE — 99000 SPECIMEN HANDLING OFFICE-LAB: CPT

## 2020-01-01 PROCEDURE — 84145 PROCALCITONIN (PCT): CPT

## 2020-01-01 PROCEDURE — 99232 SBSQ HOSP IP/OBS MODERATE 35: CPT | Mod: ,,, | Performed by: NURSE PRACTITIONER

## 2020-01-01 PROCEDURE — 3074F SYST BP LT 130 MM HG: CPT | Mod: CPTII,S$GLB,, | Performed by: THORACIC SURGERY (CARDIOTHORACIC VASCULAR SURGERY)

## 2020-01-01 PROCEDURE — 92526 ORAL FUNCTION THERAPY: CPT

## 2020-01-01 PROCEDURE — 94668 MNPJ CHEST WALL SBSQ: CPT

## 2020-01-01 PROCEDURE — 3075F PR MOST RECENT SYSTOLIC BLOOD PRESS GE 130-139MM HG: ICD-10-PCS | Mod: CPTII,S$GLB,, | Performed by: INTERNAL MEDICINE

## 2020-01-01 PROCEDURE — 99223 PR INITIAL HOSPITAL CARE,LEVL III: ICD-10-PCS | Mod: AI,GC,, | Performed by: PSYCHIATRY & NEUROLOGY

## 2020-01-01 PROCEDURE — 99233 SBSQ HOSP IP/OBS HIGH 50: CPT | Mod: ,,, | Performed by: PHYSICIAN ASSISTANT

## 2020-01-01 PROCEDURE — 25000003 PHARM REV CODE 250: Performed by: PHYSICIAN ASSISTANT

## 2020-01-01 PROCEDURE — 99233 SBSQ HOSP IP/OBS HIGH 50: CPT | Mod: ,,, | Performed by: PSYCHIATRY & NEUROLOGY

## 2020-01-01 PROCEDURE — 99232 PR SUBSEQUENT HOSPITAL CARE,LEVL II: ICD-10-PCS | Mod: ,,, | Performed by: NURSE PRACTITIONER

## 2020-01-01 PROCEDURE — 88311 DECALCIFY TISSUE: CPT | Performed by: PATHOLOGY

## 2020-01-01 PROCEDURE — 27100171 HC OXYGEN HIGH FLOW UP TO 24 HOURS

## 2020-01-01 PROCEDURE — A4216 STERILE WATER/SALINE, 10 ML: HCPCS | Performed by: HOSPITALIST

## 2020-01-01 PROCEDURE — G0427 PR INPT TELEHEALTH CON 70/>M: ICD-10-PCS | Mod: GT,,, | Performed by: NURSE PRACTITIONER

## 2020-01-01 PROCEDURE — 31500 INSERT EMERGENCY AIRWAY: CPT | Mod: GC,,, | Performed by: PSYCHIATRY & NEUROLOGY

## 2020-01-01 PROCEDURE — 31500 PR INSERT, EMERGENCY ENDOTRACH AIRWAY: ICD-10-PCS | Mod: GC,,, | Performed by: PSYCHIATRY & NEUROLOGY

## 2020-01-01 PROCEDURE — 99233 PR SUBSEQUENT HOSPITAL CARE,LEVL III: ICD-10-PCS | Mod: ,,, | Performed by: INTERNAL MEDICINE

## 2020-01-01 PROCEDURE — 3074F SYST BP LT 130 MM HG: CPT | Mod: CPTII,S$GLB,, | Performed by: INTERNAL MEDICINE

## 2020-01-01 PROCEDURE — 99233 PR SUBSEQUENT HOSPITAL CARE,LEVL III: ICD-10-PCS | Mod: ,,, | Performed by: HOSPITALIST

## 2020-01-01 PROCEDURE — 99233 SBSQ HOSP IP/OBS HIGH 50: CPT | Mod: ,,, | Performed by: HOSPITALIST

## 2020-01-01 PROCEDURE — 51798 US URINE CAPACITY MEASURE: CPT

## 2020-01-01 PROCEDURE — 95812 EEG 41-60 MINUTES: CPT | Mod: 26,,, | Performed by: PSYCHIATRY & NEUROLOGY

## 2020-01-01 PROCEDURE — 99292 CRITICAL CARE ADDL 30 MIN: CPT

## 2020-01-01 PROCEDURE — 99223 1ST HOSP IP/OBS HIGH 75: CPT | Mod: ,,, | Performed by: PSYCHIATRY & NEUROLOGY

## 2020-01-01 PROCEDURE — 99499 RISK ADDL DX/OHS AUDIT: ICD-10-PCS | Mod: 95,,, | Performed by: INTERNAL MEDICINE

## 2020-01-01 PROCEDURE — G0408 INPT/TELE FOLLOW UP 35: HCPCS | Mod: GT,,, | Performed by: NURSE PRACTITIONER

## 2020-01-01 PROCEDURE — C1751 CATH, INF, PER/CENT/MIDLINE: HCPCS

## 2020-01-01 PROCEDURE — 90792 PSYCH DIAG EVAL W/MED SRVCS: CPT | Mod: ,,, | Performed by: PSYCHIATRY & NEUROLOGY

## 2020-01-01 PROCEDURE — 95720 PR EEG, W/VIDEO, CONT RECORD, I&R, >12<26 HRS: ICD-10-PCS | Mod: ,,, | Performed by: PSYCHIATRY & NEUROLOGY

## 2020-01-01 PROCEDURE — 93010 ELECTROCARDIOGRAM REPORT: CPT | Mod: 77,,, | Performed by: INTERNAL MEDICINE

## 2020-01-01 PROCEDURE — 1159F PR MEDICATION LIST DOCUMENTED IN MEDICAL RECORD: ICD-10-PCS | Mod: 95,,, | Performed by: INTERNAL MEDICINE

## 2020-01-01 PROCEDURE — 84157 ASSAY OF PROTEIN OTHER: CPT

## 2020-01-01 PROCEDURE — 83615 LACTATE (LD) (LDH) ENZYME: CPT

## 2020-01-01 PROCEDURE — 86880 COOMBS TEST DIRECT: CPT

## 2020-01-01 PROCEDURE — 1101F PT FALLS ASSESS-DOCD LE1/YR: CPT | Mod: CPTII,95,, | Performed by: INTERNAL MEDICINE

## 2020-01-01 PROCEDURE — 99233 SBSQ HOSP IP/OBS HIGH 50: CPT | Mod: GC,,, | Performed by: PSYCHIATRY & NEUROLOGY

## 2020-01-01 PROCEDURE — 25000242 PHARM REV CODE 250 ALT 637 W/ HCPCS: Performed by: HOSPITALIST

## 2020-01-01 PROCEDURE — 92507 TX SP LANG VOICE COMM INDIV: CPT

## 2020-01-01 PROCEDURE — U0002 COVID-19 LAB TEST NON-CDC: HCPCS

## 2020-01-01 PROCEDURE — 99232 SBSQ HOSP IP/OBS MODERATE 35: CPT | Mod: GC,,, | Performed by: INTERNAL MEDICINE

## 2020-01-01 PROCEDURE — 1159F MED LIST DOCD IN RCRD: CPT | Mod: ,,, | Performed by: OTOLARYNGOLOGY

## 2020-01-01 PROCEDURE — 25000003 PHARM REV CODE 250: Performed by: FAMILY MEDICINE

## 2020-01-01 PROCEDURE — 99292 CRITICAL CARE ADDL 30 MIN: CPT | Mod: CS,,, | Performed by: EMERGENCY MEDICINE

## 2020-01-01 PROCEDURE — 1101F PT FALLS ASSESS-DOCD LE1/YR: CPT | Mod: CPTII,S$GLB,, | Performed by: THORACIC SURGERY (CARDIOTHORACIC VASCULAR SURGERY)

## 2020-01-01 PROCEDURE — 88305 TISSUE EXAM BY PATHOLOGIST: CPT | Performed by: PATHOLOGY

## 2020-01-01 PROCEDURE — 99214 OFFICE O/P EST MOD 30 MIN: CPT | Mod: S$GLB,,, | Performed by: INTERNAL MEDICINE

## 2020-01-01 PROCEDURE — 86901 BLOOD TYPING SEROLOGIC RH(D): CPT

## 2020-01-01 PROCEDURE — 88189 PR  FLOWCYTOMETRY/READ, 16 & > MARKERS: ICD-10-PCS | Mod: ,,, | Performed by: PATHOLOGY

## 2020-01-01 PROCEDURE — 97110 THERAPEUTIC EXERCISES: CPT | Mod: CQ

## 2020-01-01 PROCEDURE — 97802 MEDICAL NUTRITION INDIV IN: CPT

## 2020-01-01 PROCEDURE — 82565 ASSAY OF CREATININE: CPT

## 2020-01-01 PROCEDURE — 62270 DX LMBR SPI PNXR: CPT | Mod: ,,, | Performed by: PSYCHIATRY & NEUROLOGY

## 2020-01-01 PROCEDURE — 80061 LIPID PANEL: CPT

## 2020-01-01 PROCEDURE — 1125F AMNT PAIN NOTED PAIN PRSNT: CPT | Mod: S$GLB,,, | Performed by: INTERNAL MEDICINE

## 2020-01-01 PROCEDURE — A9585 GADOBUTROL INJECTION: HCPCS | Performed by: PSYCHIATRY & NEUROLOGY

## 2020-01-01 PROCEDURE — S0030 INJECTION, METRONIDAZOLE: HCPCS | Performed by: STUDENT IN AN ORGANIZED HEALTH CARE EDUCATION/TRAINING PROGRAM

## 2020-01-01 PROCEDURE — 25000003 PHARM REV CODE 250

## 2020-01-01 PROCEDURE — 3078F DIAST BP <80 MM HG: CPT | Mod: CPTII,S$GLB,, | Performed by: THORACIC SURGERY (CARDIOTHORACIC VASCULAR SURGERY)

## 2020-01-01 PROCEDURE — 1159F MED LIST DOCD IN RCRD: CPT | Mod: 95,,, | Performed by: INTERNAL MEDICINE

## 2020-01-01 PROCEDURE — 38222 DX BONE MARROW BX & ASPIR: CPT | Mod: LT,,, | Performed by: INTERNAL MEDICINE

## 2020-01-01 PROCEDURE — 3074F SYST BP LT 130 MM HG: CPT | Mod: CPTII,S$GLB,, | Performed by: NURSE PRACTITIONER

## 2020-01-01 PROCEDURE — 99999 PR PBB SHADOW E&M-EST. PATIENT-LVL V: CPT | Mod: PBBFAC,,, | Performed by: THORACIC SURGERY (CARDIOTHORACIC VASCULAR SURGERY)

## 2020-01-01 PROCEDURE — 37000008 HC ANESTHESIA 1ST 15 MINUTES: Performed by: INTERNAL MEDICINE

## 2020-01-01 PROCEDURE — 99223 PR INITIAL HOSPITAL CARE,LEVL III: ICD-10-PCS | Mod: ,,, | Performed by: PHYSICIAN ASSISTANT

## 2020-01-01 PROCEDURE — 94667 MNPJ CHEST WALL 1ST: CPT

## 2020-01-01 PROCEDURE — 99214 PR OFFICE/OUTPT VISIT, EST, LEVL IV, 30-39 MIN: ICD-10-PCS | Mod: S$GLB,,, | Performed by: INTERNAL MEDICINE

## 2020-01-01 PROCEDURE — 85018 HEMOGLOBIN: CPT

## 2020-01-01 PROCEDURE — 99233 SBSQ HOSP IP/OBS HIGH 50: CPT | Mod: ,,, | Performed by: NEUROLOGICAL SURGERY

## 2020-01-01 PROCEDURE — 36620 INSERTION CATHETER ARTERY: CPT | Mod: GC,,, | Performed by: PSYCHIATRY & NEUROLOGY

## 2020-01-01 PROCEDURE — 93306 TTE W/DOPPLER COMPLETE: CPT

## 2020-01-01 PROCEDURE — 99204 PR OFFICE/OUTPT VISIT, NEW, LEVL IV, 45-59 MIN: ICD-10-PCS | Mod: 25,S$GLB,, | Performed by: NURSE PRACTITIONER

## 2020-01-01 PROCEDURE — 99223 PR INITIAL HOSPITAL CARE,LEVL III: ICD-10-PCS | Mod: ,,, | Performed by: NURSE PRACTITIONER

## 2020-01-01 PROCEDURE — 80202 ASSAY OF VANCOMYCIN: CPT | Mod: 91

## 2020-01-01 PROCEDURE — 92523 SPEECH SOUND LANG COMPREHEN: CPT

## 2020-01-01 PROCEDURE — 99291 CRITICAL CARE FIRST HOUR: CPT | Mod: 25

## 2020-01-01 PROCEDURE — 83690 ASSAY OF LIPASE: CPT

## 2020-01-01 PROCEDURE — C9254 INJECTION, LACOSAMIDE: HCPCS | Performed by: PHYSICIAN ASSISTANT

## 2020-01-01 PROCEDURE — 88341 PR IHC OR ICC EACH ADD'L SINGLE ANTIBODY  STAINPR: ICD-10-PCS | Mod: 26,59,, | Performed by: PATHOLOGY

## 2020-01-01 PROCEDURE — 36600 WITHDRAWAL OF ARTERIAL BLOOD: CPT

## 2020-01-01 PROCEDURE — 85384 FIBRINOGEN ACTIVITY: CPT

## 2020-01-01 PROCEDURE — 43255 EGD CONTROL BLEEDING ANY: CPT | Mod: ,,, | Performed by: INTERNAL MEDICINE

## 2020-01-01 PROCEDURE — 97167 OT EVAL HIGH COMPLEX 60 MIN: CPT

## 2020-01-01 PROCEDURE — 97161 PT EVAL LOW COMPLEX 20 MIN: CPT

## 2020-01-01 PROCEDURE — 1159F MED LIST DOCD IN RCRD: CPT | Mod: S$GLB,,, | Performed by: NURSE PRACTITIONER

## 2020-01-01 PROCEDURE — 85097 PR  BONE MARROW,SMEAR INTERPRETATION: ICD-10-PCS | Mod: ,,, | Performed by: PATHOLOGY

## 2020-01-01 PROCEDURE — S0030 INJECTION, METRONIDAZOLE: HCPCS | Performed by: NURSE PRACTITIONER

## 2020-01-01 PROCEDURE — 43255 EGD CONTROL BLEEDING ANY: CPT | Performed by: INTERNAL MEDICINE

## 2020-01-01 PROCEDURE — 99223 1ST HOSP IP/OBS HIGH 75: CPT | Mod: GC,,, | Performed by: INTERNAL MEDICINE

## 2020-01-01 PROCEDURE — 82800 BLOOD PH: CPT

## 2020-01-01 PROCEDURE — 99999 PR PBB SHADOW E&M-EST. PATIENT-LVL V: CPT | Mod: PBBFAC,,, | Performed by: INTERNAL MEDICINE

## 2020-01-01 PROCEDURE — 85347 COAGULATION TIME ACTIVATED: CPT

## 2020-01-01 PROCEDURE — 63600175 PHARM REV CODE 636 W HCPCS: Performed by: NURSE ANESTHETIST, CERTIFIED REGISTERED

## 2020-01-01 PROCEDURE — 99232 PR SUBSEQUENT HOSPITAL CARE,LEVL II: ICD-10-PCS | Mod: GC,,, | Performed by: INTERNAL MEDICINE

## 2020-01-01 PROCEDURE — 36800 PR INSERT CANNULA,VEIN-VEIN: ICD-10-PCS | Mod: GC,,, | Performed by: PSYCHIATRY & NEUROLOGY

## 2020-01-01 PROCEDURE — 94010 BREATHING CAPACITY TEST: ICD-10-PCS | Mod: S$GLB,,, | Performed by: INTERNAL MEDICINE

## 2020-01-01 PROCEDURE — 25000003 PHARM REV CODE 250: Performed by: NURSE ANESTHETIST, CERTIFIED REGISTERED

## 2020-01-01 PROCEDURE — 83735 ASSAY OF MAGNESIUM: CPT | Mod: 91

## 2020-01-01 PROCEDURE — 88189 FLOWCYTOMETRY/READ 16 & >: CPT | Mod: ,,, | Performed by: PATHOLOGY

## 2020-01-01 PROCEDURE — 51702 INSERT TEMP BLADDER CATH: CPT

## 2020-01-01 PROCEDURE — 1159F MED LIST DOCD IN RCRD: CPT | Mod: S$GLB,,, | Performed by: INTERNAL MEDICINE

## 2020-01-01 PROCEDURE — C9113 INJ PANTOPRAZOLE SODIUM, VIA: HCPCS | Performed by: STUDENT IN AN ORGANIZED HEALTH CARE EDUCATION/TRAINING PROGRAM

## 2020-01-01 PROCEDURE — 63600175 PHARM REV CODE 636 W HCPCS: Performed by: EMERGENCY MEDICINE

## 2020-01-01 PROCEDURE — 37799 UNLISTED PX VASCULAR SURGERY: CPT

## 2020-01-01 PROCEDURE — 99233 SBSQ HOSP IP/OBS HIGH 50: CPT | Mod: GT,,, | Performed by: PSYCHIATRY & NEUROLOGY

## 2020-01-01 PROCEDURE — 99232 SBSQ HOSP IP/OBS MODERATE 35: CPT | Mod: ,,, | Performed by: HOSPITALIST

## 2020-01-01 PROCEDURE — 1159F PR MEDICATION LIST DOCUMENTED IN MEDICAL RECORD: ICD-10-PCS | Mod: S$GLB,,, | Performed by: NURSE PRACTITIONER

## 2020-01-01 PROCEDURE — 1101F PR PT FALLS ASSESS DOC 0-1 FALLS W/OUT INJ PAST YR: ICD-10-PCS | Mod: CPTII,,, | Performed by: PHYSICIAN ASSISTANT

## 2020-01-01 PROCEDURE — 11000004 HC SNF PRIVATE

## 2020-01-01 PROCEDURE — 99999 PR PBB SHADOW E&M-EST. PATIENT-LVL V: CPT | Mod: PBBFAC,,, | Performed by: NURSE PRACTITIONER

## 2020-01-01 PROCEDURE — 1101F PT FALLS ASSESS-DOCD LE1/YR: CPT | Mod: CPTII,,, | Performed by: OTOLARYNGOLOGY

## 2020-01-01 PROCEDURE — 37000008 HC ANESTHESIA 1ST 15 MINUTES

## 2020-01-01 PROCEDURE — 63600175 PHARM REV CODE 636 W HCPCS

## 2020-01-01 PROCEDURE — 97803 MED NUTRITION INDIV SUBSEQ: CPT

## 2020-01-01 PROCEDURE — 99232 PR SUBSEQUENT HOSPITAL CARE,LEVL II: ICD-10-PCS | Mod: ,,, | Performed by: INTERNAL MEDICINE

## 2020-01-01 PROCEDURE — 85007 BL SMEAR W/DIFF WBC COUNT: CPT

## 2020-01-01 PROCEDURE — 99223 1ST HOSP IP/OBS HIGH 75: CPT | Mod: AI,GC,, | Performed by: PSYCHIATRY & NEUROLOGY

## 2020-01-01 PROCEDURE — 94002 VENT MGMT INPAT INIT DAY: CPT

## 2020-01-01 PROCEDURE — 99305 PR NURSING FACILITY CARE, INIT, MOD SEVERITY: ICD-10-PCS | Mod: AI,,, | Performed by: INTERNAL MEDICINE

## 2020-01-01 PROCEDURE — 97162 PT EVAL MOD COMPLEX 30 MIN: CPT

## 2020-01-01 PROCEDURE — 1159F PR MEDICATION LIST DOCUMENTED IN MEDICAL RECORD: ICD-10-PCS | Mod: 95,,, | Performed by: PHYSICIAN ASSISTANT

## 2020-01-01 PROCEDURE — 1159F PR MEDICATION LIST DOCUMENTED IN MEDICAL RECORD: ICD-10-PCS | Mod: ,,, | Performed by: PHYSICIAN ASSISTANT

## 2020-01-01 PROCEDURE — 99233 SBSQ HOSP IP/OBS HIGH 50: CPT | Mod: ,,, | Performed by: INTERNAL MEDICINE

## 2020-01-01 PROCEDURE — 87040 BLOOD CULTURE FOR BACTERIA: CPT | Mod: 59

## 2020-01-01 PROCEDURE — P9047 ALBUMIN (HUMAN), 25%, 50ML: HCPCS | Mod: JG | Performed by: NURSE PRACTITIONER

## 2020-01-01 PROCEDURE — 99239 PR HOSPITAL DISCHARGE DAY,>30 MIN: ICD-10-PCS | Mod: GC,,, | Performed by: INTERNAL MEDICINE

## 2020-01-01 PROCEDURE — 71250 CT THORAX DX C-: CPT | Mod: 26,,, | Performed by: RADIOLOGY

## 2020-01-01 PROCEDURE — 99233 SBSQ HOSP IP/OBS HIGH 50: CPT | Mod: GT,,, | Performed by: INTERNAL MEDICINE

## 2020-01-01 PROCEDURE — 38222 BONE MARROW: ICD-10-PCS | Mod: LT,,, | Performed by: INTERNAL MEDICINE

## 2020-01-01 PROCEDURE — 25000242 PHARM REV CODE 250 ALT 637 W/ HCPCS: Performed by: STUDENT IN AN ORGANIZED HEALTH CARE EDUCATION/TRAINING PROGRAM

## 2020-01-01 PROCEDURE — 3078F DIAST BP <80 MM HG: CPT | Mod: CPTII,S$GLB,, | Performed by: NURSE PRACTITIONER

## 2020-01-01 PROCEDURE — 86160 COMPLEMENT ANTIGEN: CPT | Mod: 59

## 2020-01-01 PROCEDURE — 83605 ASSAY OF LACTIC ACID: CPT

## 2020-01-01 PROCEDURE — 99222 1ST HOSP IP/OBS MODERATE 55: CPT | Mod: AI,GC,, | Performed by: HOSPITALIST

## 2020-01-01 PROCEDURE — 95816 EEG AWAKE AND DROWSY: CPT | Mod: 26,,, | Performed by: PSYCHIATRY & NEUROLOGY

## 2020-01-01 PROCEDURE — 36430 TRANSFUSION BLD/BLD COMPNT: CPT

## 2020-01-01 PROCEDURE — D9220A PRA ANESTHESIA: Mod: ANES,,, | Performed by: ANESTHESIOLOGY

## 2020-01-01 PROCEDURE — 82150 ASSAY OF AMYLASE: CPT

## 2020-01-01 PROCEDURE — C9113 INJ PANTOPRAZOLE SODIUM, VIA: HCPCS | Performed by: INTERNAL MEDICINE

## 2020-01-01 PROCEDURE — 1159F MED LIST DOCD IN RCRD: CPT | Mod: ,,, | Performed by: PHYSICIAN ASSISTANT

## 2020-01-01 PROCEDURE — 63600175 PHARM REV CODE 636 W HCPCS: Mod: JG | Performed by: NURSE PRACTITIONER

## 2020-01-01 PROCEDURE — 88341 IMHCHEM/IMCYTCHM EA ADD ANTB: CPT | Mod: 59 | Performed by: PATHOLOGY

## 2020-01-01 PROCEDURE — 96366 THER/PROPH/DIAG IV INF ADDON: CPT

## 2020-01-01 PROCEDURE — 37000009 HC ANESTHESIA EA ADD 15 MINS

## 2020-01-01 PROCEDURE — 31500 INSERT EMERGENCY AIRWAY: CPT

## 2020-01-01 PROCEDURE — 1101F PT FALLS ASSESS-DOCD LE1/YR: CPT | Mod: CPTII,95,, | Performed by: OTOLARYNGOLOGY

## 2020-01-01 PROCEDURE — 1126F AMNT PAIN NOTED NONE PRSNT: CPT | Mod: S$GLB,,, | Performed by: THORACIC SURGERY (CARDIOTHORACIC VASCULAR SURGERY)

## 2020-01-01 PROCEDURE — 94618 PULMONARY STRESS TESTING: ICD-10-PCS | Mod: S$GLB,,, | Performed by: INTERNAL MEDICINE

## 2020-01-01 PROCEDURE — 1126F PR PAIN SEVERITY QUANTIFIED, NO PAIN PRESENT: ICD-10-PCS | Mod: S$GLB,,, | Performed by: INTERNAL MEDICINE

## 2020-01-01 PROCEDURE — 99204 OFFICE O/P NEW MOD 45 MIN: CPT | Mod: 25,S$GLB,, | Performed by: NURSE PRACTITIONER

## 2020-01-01 PROCEDURE — 99223 1ST HOSP IP/OBS HIGH 75: CPT | Mod: ,,, | Performed by: CLINICAL NURSE SPECIALIST

## 2020-01-01 PROCEDURE — 99497 PR ADVNCD CARE PLAN 30 MIN: ICD-10-PCS | Mod: 25,,, | Performed by: CLINICAL NURSE SPECIALIST

## 2020-01-01 PROCEDURE — 96361 HYDRATE IV INFUSION ADD-ON: CPT

## 2020-01-01 PROCEDURE — 88305 TISSUE EXAM BY PATHOLOGIST: ICD-10-PCS | Mod: 26,,, | Performed by: PATHOLOGY

## 2020-01-01 PROCEDURE — 25000003 PHARM REV CODE 250: Performed by: EMERGENCY MEDICINE

## 2020-01-01 PROCEDURE — 99214 PR OFFICE/OUTPT VISIT, EST, LEVL IV, 30-39 MIN: ICD-10-PCS | Mod: 95,,, | Performed by: OTOLARYNGOLOGY

## 2020-01-01 PROCEDURE — 1101F PR PT FALLS ASSESS DOC 0-1 FALLS W/OUT INJ PAST YR: ICD-10-PCS | Mod: CPTII,S$GLB,, | Performed by: INTERNAL MEDICINE

## 2020-01-01 PROCEDURE — 97166 OT EVAL MOD COMPLEX 45 MIN: CPT

## 2020-01-01 PROCEDURE — 1159F MED LIST DOCD IN RCRD: CPT | Mod: 95,,, | Performed by: PHYSICIAN ASSISTANT

## 2020-01-01 PROCEDURE — 1159F PR MEDICATION LIST DOCUMENTED IN MEDICAL RECORD: ICD-10-PCS | Mod: 95,,, | Performed by: OTOLARYNGOLOGY

## 2020-01-01 PROCEDURE — 92611 MOTION FLUOROSCOPY/SWALLOW: CPT

## 2020-01-01 PROCEDURE — 99223 PR INITIAL HOSPITAL CARE,LEVL III: ICD-10-PCS | Mod: ,,, | Performed by: PSYCHIATRY & NEUROLOGY

## 2020-01-01 PROCEDURE — 71250 CT CHEST WITHOUT CONTRAST: ICD-10-PCS | Mod: 26,,, | Performed by: RADIOLOGY

## 2020-01-01 PROCEDURE — 83036 HEMOGLOBIN GLYCOSYLATED A1C: CPT

## 2020-01-01 PROCEDURE — 82553 CREATINE MB FRACTION: CPT

## 2020-01-01 PROCEDURE — 99291 PR CRITICAL CARE, E/M 30-74 MINUTES: ICD-10-PCS | Mod: 25,GC,, | Performed by: PSYCHIATRY & NEUROLOGY

## 2020-01-01 PROCEDURE — 93306 TTE W/DOPPLER COMPLETE: CPT | Mod: 26,,, | Performed by: INTERNAL MEDICINE

## 2020-01-01 PROCEDURE — 36800 INSERTION OF CANNULA: CPT | Mod: GC,,, | Performed by: PSYCHIATRY & NEUROLOGY

## 2020-01-01 PROCEDURE — 95819 EEG AWAKE AND ASLEEP: CPT

## 2020-01-01 PROCEDURE — 71250 CT THORAX DX C-: CPT | Mod: TC

## 2020-01-01 PROCEDURE — 84484 ASSAY OF TROPONIN QUANT: CPT | Mod: 91

## 2020-01-01 PROCEDURE — 71000033 HC RECOVERY, INTIAL HOUR

## 2020-01-01 PROCEDURE — 82550 ASSAY OF CK (CPK): CPT

## 2020-01-01 PROCEDURE — 99499 UNLISTED E&M SERVICE: CPT | Mod: S$GLB,,, | Performed by: INTERNAL MEDICINE

## 2020-01-01 PROCEDURE — 85027 COMPLETE CBC AUTOMATED: CPT

## 2020-01-01 PROCEDURE — 43255 PR EGD, FLEX, W/CTRL BLEED, ANY METHOD: ICD-10-PCS | Mod: ,,, | Performed by: INTERNAL MEDICINE

## 2020-01-01 PROCEDURE — 88313 SPECIAL STAINS GROUP 2: CPT | Performed by: PATHOLOGY

## 2020-01-01 PROCEDURE — 1101F PR PT FALLS ASSESS DOC 0-1 FALLS W/OUT INJ PAST YR: ICD-10-PCS | Mod: CPTII,,, | Performed by: OTOLARYNGOLOGY

## 2020-01-01 PROCEDURE — 99233 PR SUBSEQUENT HOSPITAL CARE,LEVL III: ICD-10-PCS | Mod: ,,, | Performed by: NEUROLOGICAL SURGERY

## 2020-01-01 PROCEDURE — 99291 CRITICAL CARE FIRST HOUR: CPT | Mod: CS,,, | Performed by: EMERGENCY MEDICINE

## 2020-01-01 PROCEDURE — 88341 IMHCHEM/IMCYTCHM EA ADD ANTB: CPT | Mod: 26,59,, | Performed by: PATHOLOGY

## 2020-01-01 PROCEDURE — 99285 EMERGENCY DEPT VISIT HI MDM: CPT | Mod: 25

## 2020-01-01 PROCEDURE — 36573 INSJ PICC RS&I 5 YR+: CPT

## 2020-01-01 PROCEDURE — 99223 PR INITIAL HOSPITAL CARE,LEVL III: ICD-10-PCS | Mod: ,,, | Performed by: CLINICAL NURSE SPECIALIST

## 2020-01-01 PROCEDURE — 81001 URINALYSIS AUTO W/SCOPE: CPT

## 2020-01-01 PROCEDURE — 36556 PR INSERT NON-TUNNEL CV CATH 5+ YRS OLD: ICD-10-PCS | Mod: GC,,, | Performed by: PSYCHIATRY & NEUROLOGY

## 2020-01-01 PROCEDURE — 82746 ASSAY OF FOLIC ACID SERUM: CPT

## 2020-01-01 PROCEDURE — 84484 ASSAY OF TROPONIN QUANT: CPT

## 2020-01-01 PROCEDURE — 86850 RBC ANTIBODY SCREEN: CPT

## 2020-01-01 PROCEDURE — 99213 PR OFFICE/OUTPT VISIT, EST, LEVL III, 20-29 MIN: ICD-10-PCS | Mod: 95,,, | Performed by: PHYSICIAN ASSISTANT

## 2020-01-01 PROCEDURE — 99214 OFFICE O/P EST MOD 30 MIN: CPT | Mod: S$GLB,,, | Performed by: THORACIC SURGERY (CARDIOTHORACIC VASCULAR SURGERY)

## 2020-01-01 PROCEDURE — 88299 UNLISTED CYTOGENETIC STUDY: CPT

## 2020-01-01 PROCEDURE — 93306 ECHO (CUPID ONLY): ICD-10-PCS | Mod: 26,,, | Performed by: INTERNAL MEDICINE

## 2020-01-01 PROCEDURE — 97168 OT RE-EVAL EST PLAN CARE: CPT

## 2020-01-01 PROCEDURE — 1125F PR PAIN SEVERITY QUANTIFIED, PAIN PRESENT: ICD-10-PCS | Mod: S$GLB,,, | Performed by: INTERNAL MEDICINE

## 2020-01-01 PROCEDURE — 99291 CRITICAL CARE FIRST HOUR: CPT | Mod: ,,, | Performed by: NURSE PRACTITIONER

## 2020-01-01 PROCEDURE — 99999 PR PBB SHADOW E&M-EST. PATIENT-LVL V: ICD-10-PCS | Mod: PBBFAC,,, | Performed by: NURSE PRACTITIONER

## 2020-01-01 PROCEDURE — P9022 WASHED RED BLOOD CELLS UNIT: HCPCS

## 2020-01-01 PROCEDURE — 99205 PR OFFICE/OUTPT VISIT, NEW, LEVL V, 60-74 MIN: ICD-10-PCS | Mod: S$GLB,,, | Performed by: INTERNAL MEDICINE

## 2020-01-01 PROCEDURE — 99222 PR INITIAL HOSPITAL CARE,LEVL II: ICD-10-PCS | Mod: GC,,, | Performed by: INTERNAL MEDICINE

## 2020-01-01 PROCEDURE — 1101F PR PT FALLS ASSESS DOC 0-1 FALLS W/OUT INJ PAST YR: ICD-10-PCS | Mod: CPTII,95,, | Performed by: PHYSICIAN ASSISTANT

## 2020-01-01 PROCEDURE — 82607 VITAMIN B-12: CPT

## 2020-01-01 PROCEDURE — 99202 PR OFFICE/OUTPT VISIT, NEW, LEVL II, 15-29 MIN: ICD-10-PCS | Mod: 95,,, | Performed by: PHYSICIAN ASSISTANT

## 2020-01-01 PROCEDURE — 95719 PR EEG, W/O VIDEO, CONT RECORD, I&R, >12<26 HRS: ICD-10-PCS | Mod: ,,, | Performed by: PSYCHIATRY & NEUROLOGY

## 2020-01-01 PROCEDURE — 84132 ASSAY OF SERUM POTASSIUM: CPT

## 2020-01-01 PROCEDURE — 99291 PR CRITICAL CARE, E/M 30-74 MINUTES: ICD-10-PCS | Mod: ,,, | Performed by: NURSE PRACTITIONER

## 2020-01-01 PROCEDURE — 86905 BLOOD TYPING RBC ANTIGENS: CPT

## 2020-01-01 PROCEDURE — D9220A PRA ANESTHESIA: Mod: CRNA,,, | Performed by: NURSE ANESTHETIST, CERTIFIED REGISTERED

## 2020-01-01 PROCEDURE — 27200997: Performed by: INTERNAL MEDICINE

## 2020-01-01 PROCEDURE — 99233 PR SUBSEQUENT HOSPITAL CARE,LEVL III: ICD-10-PCS | Mod: GT,,, | Performed by: INTERNAL MEDICINE

## 2020-01-01 PROCEDURE — G0508 CRIT CARE TELEHEA CONSULT 60: HCPCS | Mod: GT,,, | Performed by: PSYCHIATRY & NEUROLOGY

## 2020-01-01 PROCEDURE — 81450 HL NEO GSAP 5-50DNA/DNA&RNA: CPT

## 2020-01-01 PROCEDURE — 1101F PT FALLS ASSESS-DOCD LE1/YR: CPT | Mod: CPTII,,, | Performed by: PHYSICIAN ASSISTANT

## 2020-01-01 PROCEDURE — 87498 ENTEROVIRUS PROBE&REVRS TRNS: CPT

## 2020-01-01 PROCEDURE — 1101F PT FALLS ASSESS-DOCD LE1/YR: CPT | Mod: CPTII,95,, | Performed by: PHYSICIAN ASSISTANT

## 2020-01-01 PROCEDURE — 93010 EKG 12-LEAD: ICD-10-PCS | Mod: 77,,, | Performed by: INTERNAL MEDICINE

## 2020-01-01 PROCEDURE — 81000 URINALYSIS NONAUTO W/SCOPE: CPT

## 2020-01-01 PROCEDURE — 87205 SMEAR GRAM STAIN: CPT

## 2020-01-01 PROCEDURE — 99204 PR OFFICE/OUTPT VISIT, NEW, LEVL IV, 45-59 MIN: ICD-10-PCS | Mod: 95,,, | Performed by: OTOLARYNGOLOGY

## 2020-01-01 PROCEDURE — 38221 DX BONE MARROW BIOPSIES: CPT | Performed by: INTERNAL MEDICINE

## 2020-01-01 PROCEDURE — 30000890 MAYO MISCELLANEOUS TEST (REFLEX): Mod: 59

## 2020-01-01 PROCEDURE — 36620 INSERTION CATHETER ARTERY: CPT

## 2020-01-01 PROCEDURE — 99223 PR INITIAL HOSPITAL CARE,LEVL III: ICD-10-PCS | Mod: ,,, | Performed by: NEUROLOGICAL SURGERY

## 2020-01-01 PROCEDURE — U0003 INFECTIOUS AGENT DETECTION BY NUCLEIC ACID (DNA OR RNA); SEVERE ACUTE RESPIRATORY SYNDROME CORONAVIRUS 2 (SARS-COV-2) (CORONAVIRUS DISEASE [COVID-19]), AMPLIFIED PROBE TECHNIQUE, MAKING USE OF HIGH THROUGHPUT TECHNOLOGIES AS DESCRIBED BY CMS-2020-01-R: HCPCS

## 2020-01-01 PROCEDURE — 99215 PR OFFICE/OUTPT VISIT, EST, LEVL V, 40-54 MIN: ICD-10-PCS | Mod: 95,,, | Performed by: INTERNAL MEDICINE

## 2020-01-01 PROCEDURE — 90792 PR PSYCHIATRIC DIAGNOSTIC EVALUATION W/MEDICAL SERVICES: ICD-10-PCS | Mod: ,,, | Performed by: PSYCHIATRY & NEUROLOGY

## 2020-01-01 PROCEDURE — 94729 DIFFUSING CAPACITY: CPT | Mod: S$GLB,,, | Performed by: INTERNAL MEDICINE

## 2020-01-01 PROCEDURE — 82728 ASSAY OF FERRITIN: CPT

## 2020-01-01 PROCEDURE — U0002 COVID-19 LAB TEST NON-CDC: HCPCS | Performed by: EMERGENCY MEDICINE

## 2020-01-01 PROCEDURE — 99215 OFFICE O/P EST HI 40 MIN: CPT | Mod: 95,,, | Performed by: INTERNAL MEDICINE

## 2020-01-01 PROCEDURE — 95816 PR EEG,W/AWAKE & DROWSY RECORD: ICD-10-PCS | Mod: 26,,, | Performed by: PSYCHIATRY & NEUROLOGY

## 2020-01-01 PROCEDURE — 97530 THERAPEUTIC ACTIVITIES: CPT | Mod: CQ

## 2020-01-01 PROCEDURE — 94010 BREATHING CAPACITY TEST: CPT | Mod: S$GLB,,, | Performed by: INTERNAL MEDICINE

## 2020-01-01 PROCEDURE — 86780 TREPONEMA PALLIDUM: CPT

## 2020-01-01 PROCEDURE — P9038 RBC IRRADIATED: HCPCS

## 2020-01-01 PROCEDURE — 36620 PR INSERT CATH,ART,PERCUT,SHORTTERM: ICD-10-PCS | Mod: GC,,, | Performed by: PSYCHIATRY & NEUROLOGY

## 2020-01-01 PROCEDURE — 99214 OFFICE O/P EST MOD 30 MIN: CPT | Mod: 95,,, | Performed by: INTERNAL MEDICINE

## 2020-01-01 PROCEDURE — 25500020 PHARM REV CODE 255: Performed by: HOSPITALIST

## 2020-01-01 PROCEDURE — 94727 PR PULM FUNCTION TEST BY GAS: ICD-10-PCS | Mod: S$GLB,,, | Performed by: INTERNAL MEDICINE

## 2020-01-01 PROCEDURE — G0408 PR TELHEALTH INPT CONSULT 35MIN: ICD-10-PCS | Mod: GT,,, | Performed by: NURSE PRACTITIONER

## 2020-01-01 PROCEDURE — 99232 SBSQ HOSP IP/OBS MODERATE 35: CPT | Mod: ,,, | Performed by: INTERNAL MEDICINE

## 2020-01-01 PROCEDURE — 1126F PR PAIN SEVERITY QUANTIFIED, NO PAIN PRESENT: ICD-10-PCS | Mod: S$GLB,,, | Performed by: THORACIC SURGERY (CARDIOTHORACIC VASCULAR SURGERY)

## 2020-01-01 PROCEDURE — 88305 TISSUE EXAM BY PATHOLOGIST: CPT | Mod: 26,,, | Performed by: PATHOLOGY

## 2020-01-01 PROCEDURE — 99204 OFFICE O/P NEW MOD 45 MIN: CPT | Mod: 95,,, | Performed by: OTOLARYNGOLOGY

## 2020-01-01 PROCEDURE — A9698 NON-RAD CONTRAST MATERIALNOC: HCPCS | Performed by: HOSPITALIST

## 2020-01-01 PROCEDURE — 38222 DX BONE MARROW BX & ASPIR: CPT | Performed by: INTERNAL MEDICINE

## 2020-01-01 PROCEDURE — 99292 PR CRITICAL CARE, ADDL 30 MIN: ICD-10-PCS | Mod: CS,,, | Performed by: EMERGENCY MEDICINE

## 2020-01-01 PROCEDURE — 94727 GAS DIL/WSHOT DETER LNG VOL: CPT | Mod: S$GLB,,, | Performed by: INTERNAL MEDICINE

## 2020-01-01 PROCEDURE — 88311 PR  DECALCIFY TISSUE: ICD-10-PCS | Mod: 26,,, | Performed by: PATHOLOGY

## 2020-01-01 PROCEDURE — 76937 US GUIDE VASCULAR ACCESS: CPT

## 2020-01-01 PROCEDURE — 99233 PR SUBSEQUENT HOSPITAL CARE,LEVL III: ICD-10-PCS | Mod: ,,, | Performed by: CLINICAL NURSE SPECIALIST

## 2020-01-01 PROCEDURE — 88313 SPECIAL STAINS GROUP 2: CPT | Mod: 26,,, | Performed by: PATHOLOGY

## 2020-01-01 PROCEDURE — G0508 PR CRITICAL CARE TELEHLTH INITIAL CONSULT 60MIN: ICD-10-PCS | Mod: GT,,, | Performed by: PSYCHIATRY & NEUROLOGY

## 2020-01-01 PROCEDURE — 99232 PR SUBSEQUENT HOSPITAL CARE,LEVL II: ICD-10-PCS | Mod: ,,, | Performed by: HOSPITALIST

## 2020-01-01 PROCEDURE — 1159F MED LIST DOCD IN RCRD: CPT | Mod: S$GLB,,, | Performed by: THORACIC SURGERY (CARDIOTHORACIC VASCULAR SURGERY)

## 2020-01-01 PROCEDURE — 94618 PULMONARY STRESS TESTING: CPT | Mod: S$GLB,,, | Performed by: INTERNAL MEDICINE

## 2020-01-01 PROCEDURE — 88185 FLOWCYTOMETRY/TC ADD-ON: CPT | Performed by: PATHOLOGY

## 2020-01-01 PROCEDURE — 99223 PR INITIAL HOSPITAL CARE,LEVL III: ICD-10-PCS | Mod: GC,,, | Performed by: INTERNAL MEDICINE

## 2020-01-01 PROCEDURE — 96374 THER/PROPH/DIAG INJ IV PUSH: CPT

## 2020-01-01 PROCEDURE — 87106 FUNGI IDENTIFICATION YEAST: CPT

## 2020-01-01 PROCEDURE — 1101F PT FALLS ASSESS-DOCD LE1/YR: CPT | Mod: CPTII,S$GLB,, | Performed by: INTERNAL MEDICINE

## 2020-01-01 PROCEDURE — 88271 CYTOGENETICS DNA PROBE: CPT | Mod: 59

## 2020-01-01 PROCEDURE — 85097 BONE MARROW INTERPRETATION: CPT | Mod: ,,, | Performed by: PATHOLOGY

## 2020-01-01 PROCEDURE — 99292 CRITICAL CARE ADDL 30 MIN: CPT | Mod: 25,GC,, | Performed by: PSYCHIATRY & NEUROLOGY

## 2020-01-01 PROCEDURE — 3078F PR MOST RECENT DIASTOLIC BLOOD PRESSURE < 80 MM HG: ICD-10-PCS | Mod: CPTII,S$GLB,, | Performed by: THORACIC SURGERY (CARDIOTHORACIC VASCULAR SURGERY)

## 2020-01-01 PROCEDURE — 88342 IMHCHEM/IMCYTCHM 1ST ANTB: CPT | Mod: 26,59,, | Performed by: PATHOLOGY

## 2020-01-01 PROCEDURE — 89051 BODY FLUID CELL COUNT: CPT

## 2020-01-01 PROCEDURE — 3078F DIAST BP <80 MM HG: CPT | Mod: CPTII,S$GLB,, | Performed by: INTERNAL MEDICINE

## 2020-01-01 PROCEDURE — 86592 SYPHILIS TEST NON-TREP QUAL: CPT

## 2020-01-01 PROCEDURE — 99999 PR PBB SHADOW E&M-EST. PATIENT-LVL III: CPT | Mod: PBBFAC,,, | Performed by: INTERNAL MEDICINE

## 2020-01-01 PROCEDURE — 84439 ASSAY OF FREE THYROXINE: CPT

## 2020-01-01 PROCEDURE — 99239 HOSP IP/OBS DSCHRG MGMT >30: CPT | Mod: GC,,, | Performed by: INTERNAL MEDICINE

## 2020-01-01 PROCEDURE — 62270 LUMBAR PUNCTURE: ICD-10-PCS | Mod: ,,, | Performed by: PSYCHIATRY & NEUROLOGY

## 2020-01-01 PROCEDURE — 95719 EEG PHYS/QHP EA INCR W/O VID: CPT | Mod: ,,, | Performed by: PSYCHIATRY & NEUROLOGY

## 2020-01-01 PROCEDURE — 3078F PR MOST RECENT DIASTOLIC BLOOD PRESSURE < 80 MM HG: ICD-10-PCS | Mod: CPTII,S$GLB,, | Performed by: INTERNAL MEDICINE

## 2020-01-01 PROCEDURE — 99223 1ST HOSP IP/OBS HIGH 75: CPT | Mod: ,,, | Performed by: INTERNAL MEDICINE

## 2020-01-01 PROCEDURE — 99305 1ST NF CARE MODERATE MDM 35: CPT | Mod: AI,,, | Performed by: INTERNAL MEDICINE

## 2020-01-01 PROCEDURE — 99202 OFFICE O/P NEW SF 15 MIN: CPT | Mod: 95,,, | Performed by: PHYSICIAN ASSISTANT

## 2020-01-01 PROCEDURE — 99223 1ST HOSP IP/OBS HIGH 75: CPT | Mod: ,,, | Performed by: NEUROLOGICAL SURGERY

## 2020-01-01 PROCEDURE — 99222 1ST HOSP IP/OBS MODERATE 55: CPT | Mod: GC,,, | Performed by: INTERNAL MEDICINE

## 2020-01-01 PROCEDURE — 36410 VNPNXR 3YR/> PHY/QHP DX/THER: CPT

## 2020-01-01 PROCEDURE — 83540 ASSAY OF IRON: CPT

## 2020-01-01 PROCEDURE — 3078F PR MOST RECENT DIASTOLIC BLOOD PRESSURE < 80 MM HG: ICD-10-PCS | Mod: CPTII,S$GLB,, | Performed by: NURSE PRACTITIONER

## 2020-01-01 PROCEDURE — 85014 HEMATOCRIT: CPT

## 2020-01-01 PROCEDURE — 99223 PR INITIAL HOSPITAL CARE,LEVL III: ICD-10-PCS | Mod: ,,, | Performed by: INTERNAL MEDICINE

## 2020-01-01 PROCEDURE — 92610 EVALUATE SWALLOWING FUNCTION: CPT

## 2020-01-01 PROCEDURE — 99223 PR INITIAL HOSPITAL CARE,LEVL III: ICD-10-PCS | Mod: GC,,, | Performed by: PSYCHIATRY & NEUROLOGY

## 2020-01-01 PROCEDURE — 85730 THROMBOPLASTIN TIME PARTIAL: CPT

## 2020-01-01 PROCEDURE — 82945 GLUCOSE OTHER FLUID: CPT

## 2020-01-01 PROCEDURE — 83921 ORGANIC ACID SINGLE QUANT: CPT

## 2020-01-01 PROCEDURE — 99223 1ST HOSP IP/OBS HIGH 75: CPT | Mod: ,,, | Performed by: NURSE PRACTITIONER

## 2020-01-01 PROCEDURE — 99222 PR INITIAL HOSPITAL CARE,LEVL II: ICD-10-PCS | Mod: AI,GC,, | Performed by: HOSPITALIST

## 2020-01-01 PROCEDURE — D9220A PRA ANESTHESIA: ICD-10-PCS | Mod: ANES,,, | Performed by: ANESTHESIOLOGY

## 2020-01-01 PROCEDURE — 99999 PR PBB SHADOW E&M-EST. PATIENT-LVL V: ICD-10-PCS | Mod: PBBFAC,,, | Performed by: INTERNAL MEDICINE

## 2020-01-01 PROCEDURE — 88184 FLOWCYTOMETRY/ TC 1 MARKER: CPT | Performed by: PATHOLOGY

## 2020-01-01 PROCEDURE — 88237 TISSUE CULTURE BONE MARROW: CPT

## 2020-01-01 PROCEDURE — 94729 PR C02/MEMBANE DIFFUSE CAPACITY: ICD-10-PCS | Mod: S$GLB,,, | Performed by: INTERNAL MEDICINE

## 2020-01-01 PROCEDURE — 1126F AMNT PAIN NOTED NONE PRSNT: CPT | Mod: S$GLB,,, | Performed by: INTERNAL MEDICINE

## 2020-01-01 PROCEDURE — 3079F PR MOST RECENT DIASTOLIC BLOOD PRESSURE 80-89 MM HG: ICD-10-PCS | Mod: CPTII,S$GLB,, | Performed by: INTERNAL MEDICINE

## 2020-01-01 PROCEDURE — 88342 CHG IMMUNOCYTOCHEMISTRY: ICD-10-PCS | Mod: 26,59,, | Performed by: PATHOLOGY

## 2020-01-01 PROCEDURE — 99205 OFFICE O/P NEW HI 60 MIN: CPT | Mod: S$GLB,,, | Performed by: INTERNAL MEDICINE

## 2020-01-01 PROCEDURE — 97112 NEUROMUSCULAR REEDUCATION: CPT | Mod: CQ

## 2020-01-01 PROCEDURE — 18500000 HC LEAVE OF ABSENCE HOSPITAL SERVICES

## 2020-01-01 PROCEDURE — 99292 PR CRITICAL CARE, ADDL 30 MIN: ICD-10-PCS | Mod: 25,GC,, | Performed by: PSYCHIATRY & NEUROLOGY

## 2020-01-01 PROCEDURE — 99213 OFFICE O/P EST LOW 20 MIN: CPT | Mod: 95,,, | Performed by: PHYSICIAN ASSISTANT

## 2020-01-01 PROCEDURE — 87529 HSV DNA AMP PROBE: CPT

## 2020-01-01 PROCEDURE — 80177 DRUG SCRN QUAN LEVETIRACETAM: CPT

## 2020-01-01 PROCEDURE — 99222 1ST HOSP IP/OBS MODERATE 55: CPT | Mod: 25,GC,, | Performed by: INTERNAL MEDICINE

## 2020-01-01 PROCEDURE — 99222 PR INITIAL HOSPITAL CARE,LEVL II: ICD-10-PCS | Mod: 25,GC,, | Performed by: INTERNAL MEDICINE

## 2020-01-01 PROCEDURE — 99233 SBSQ HOSP IP/OBS HIGH 50: CPT | Mod: ,,, | Performed by: CLINICAL NURSE SPECIALIST

## 2020-01-01 PROCEDURE — 99214 PR OFFICE/OUTPT VISIT, EST, LEVL IV, 30-39 MIN: ICD-10-PCS | Mod: S$GLB,,, | Performed by: THORACIC SURGERY (CARDIOTHORACIC VASCULAR SURGERY)

## 2020-01-01 PROCEDURE — 82962 GLUCOSE BLOOD TEST: CPT

## 2020-01-01 PROCEDURE — 99497 ADVNCD CARE PLAN 30 MIN: CPT | Mod: 25,,, | Performed by: CLINICAL NURSE SPECIALIST

## 2020-01-01 PROCEDURE — 1101F PR PT FALLS ASSESS DOC 0-1 FALLS W/OUT INJ PAST YR: ICD-10-PCS | Mod: CPTII,S$GLB,, | Performed by: THORACIC SURGERY (CARDIOTHORACIC VASCULAR SURGERY)

## 2020-01-01 PROCEDURE — 93010 ELECTROCARDIOGRAM REPORT: CPT | Mod: 76,,, | Performed by: INTERNAL MEDICINE

## 2020-01-01 PROCEDURE — 99233 PR SUBSEQUENT HOSPITAL CARE,LEVL III: ICD-10-PCS | Mod: ,,, | Performed by: NURSE PRACTITIONER

## 2020-01-01 PROCEDURE — D9220A PRA ANESTHESIA: ICD-10-PCS | Mod: CRNA,,, | Performed by: NURSE ANESTHETIST, CERTIFIED REGISTERED

## 2020-01-01 PROCEDURE — 86592 SYPHILIS TEST NON-TREP QUAL: CPT | Mod: 91

## 2020-01-01 PROCEDURE — 86160 COMPLEMENT ANTIGEN: CPT

## 2020-01-01 RX ORDER — POTASSIUM CHLORIDE 1.5 G/1.58G
40 POWDER, FOR SOLUTION ORAL ONCE
Status: COMPLETED | OUTPATIENT
Start: 2020-01-01 | End: 2020-01-01

## 2020-01-01 RX ORDER — LEVALBUTEROL INHALATION SOLUTION 0.63 MG/3ML
0.63 SOLUTION RESPIRATORY (INHALATION) EVERY 8 HOURS
Status: DISCONTINUED | OUTPATIENT
Start: 2020-01-01 | End: 2020-01-01

## 2020-01-01 RX ORDER — FAMOTIDINE 20 MG/1
20 TABLET, FILM COATED ORAL DAILY
Status: DISCONTINUED | OUTPATIENT
Start: 2020-01-01 | End: 2020-01-01

## 2020-01-01 RX ORDER — PROPOFOL 10 MG/ML
20 VIAL (ML) INTRAVENOUS ONCE
Status: DISCONTINUED | OUTPATIENT
Start: 2020-01-01 | End: 2020-01-01

## 2020-01-01 RX ORDER — DIPHENHYDRAMINE HCL 25 MG
25 CAPSULE ORAL
Status: CANCELLED | OUTPATIENT
Start: 2020-01-01

## 2020-01-01 RX ORDER — SODIUM BICARBONATE 1 MEQ/ML
50 SYRINGE (ML) INTRAVENOUS ONCE
Status: DISCONTINUED | OUTPATIENT
Start: 2020-01-01 | End: 2020-01-01

## 2020-01-01 RX ORDER — PROPOFOL 10 MG/ML
VIAL (ML) INTRAVENOUS
Status: DISCONTINUED | OUTPATIENT
Start: 2020-01-01 | End: 2020-01-01

## 2020-01-01 RX ORDER — MIDAZOLAM HYDROCHLORIDE 1 MG/ML
2 INJECTION INTRAMUSCULAR; INTRAVENOUS ONCE
Status: COMPLETED | OUTPATIENT
Start: 2020-01-01 | End: 2020-01-01

## 2020-01-01 RX ORDER — MONTELUKAST SODIUM 10 MG/1
10 TABLET ORAL DAILY
Status: ON HOLD | COMMUNITY
End: 2020-01-01 | Stop reason: HOSPADM

## 2020-01-01 RX ORDER — HEPARIN SODIUM 5000 [USP'U]/ML
5000 INJECTION, SOLUTION INTRAVENOUS; SUBCUTANEOUS EVERY 8 HOURS
Status: DISCONTINUED | OUTPATIENT
Start: 2020-01-01 | End: 2020-01-01

## 2020-01-01 RX ORDER — ROCURONIUM BROMIDE 10 MG/ML
INJECTION, SOLUTION INTRAVENOUS
Status: DISPENSED
Start: 2020-01-01 | End: 2020-01-01

## 2020-01-01 RX ORDER — POTASSIUM CHLORIDE 7.45 MG/ML
40 INJECTION INTRAVENOUS
Status: DISCONTINUED | OUTPATIENT
Start: 2020-01-01 | End: 2020-01-01

## 2020-01-01 RX ORDER — MAGNESIUM SULFATE/D5W 2 G/50 ML
2 INTRAVENOUS SOLUTION, PIGGYBACK (ML) INTRAVENOUS ONCE
Status: COMPLETED | OUTPATIENT
Start: 2020-01-01 | End: 2020-01-01

## 2020-01-01 RX ORDER — PREDNISONE 20 MG/1
20 TABLET ORAL DAILY
Qty: 14 TABLET | Refills: 1 | Status: SHIPPED | OUTPATIENT
Start: 2020-01-01 | End: 2020-01-01

## 2020-01-01 RX ORDER — LACOSAMIDE 50 MG/1
200 TABLET ORAL EVERY 12 HOURS
Status: DISCONTINUED | OUTPATIENT
Start: 2020-01-01 | End: 2020-01-01 | Stop reason: HOSPADM

## 2020-01-01 RX ORDER — ATORVASTATIN CALCIUM 40 MG/1
40 TABLET, FILM COATED ORAL DAILY
Qty: 90 TABLET | Refills: 3 | Status: ON HOLD | OUTPATIENT
Start: 2020-01-01 | End: 2020-01-01 | Stop reason: CLARIF

## 2020-01-01 RX ORDER — LACOSAMIDE 10 MG/ML
200 SOLUTION ORAL EVERY 12 HOURS
Status: DISCONTINUED | OUTPATIENT
Start: 2020-01-01 | End: 2020-01-01

## 2020-01-01 RX ORDER — VANCOMYCIN HCL IN 5 % DEXTROSE 1G/250ML
1000 PLASTIC BAG, INJECTION (ML) INTRAVENOUS ONCE
Status: COMPLETED | OUTPATIENT
Start: 2020-01-01 | End: 2020-01-01

## 2020-01-01 RX ORDER — LACOSAMIDE 200 MG/1
200 TABLET, FILM COATED ORAL EVERY 12 HOURS
COMMUNITY

## 2020-01-01 RX ORDER — MORPHINE SULFATE 1 MG/ML
2 INJECTION, SOLUTION INTRAVENOUS CONTINUOUS
Status: DISCONTINUED | OUTPATIENT
Start: 2020-01-01 | End: 2020-01-01 | Stop reason: HOSPADM

## 2020-01-01 RX ORDER — GLUCAGON 1 MG
1 KIT INJECTION
Status: DISCONTINUED | OUTPATIENT
Start: 2020-01-01 | End: 2020-01-01

## 2020-01-01 RX ORDER — DEXTROSE MONOHYDRATE 100 MG/ML
INJECTION, SOLUTION INTRAVENOUS CONTINUOUS PRN
Status: DISCONTINUED | OUTPATIENT
Start: 2020-01-01 | End: 2020-01-01

## 2020-01-01 RX ORDER — POTASSIUM CHLORIDE 20 MEQ/15ML
40 SOLUTION ORAL ONCE
Status: DISCONTINUED | OUTPATIENT
Start: 2020-01-01 | End: 2020-01-01

## 2020-01-01 RX ORDER — SODIUM CHLORIDE 0.9 % (FLUSH) 0.9 %
10 SYRINGE (ML) INJECTION
Status: DISCONTINUED | OUTPATIENT
Start: 2020-01-01 | End: 2020-01-01 | Stop reason: HOSPADM

## 2020-01-01 RX ORDER — AMLODIPINE BESYLATE 5 MG/1
5 TABLET ORAL DAILY
Status: DISCONTINUED | OUTPATIENT
Start: 2020-01-01 | End: 2020-01-01 | Stop reason: HOSPADM

## 2020-01-01 RX ORDER — SODIUM CHLORIDE 0.9 % (FLUSH) 0.9 %
10 SYRINGE (ML) INJECTION EVERY 6 HOURS
Status: DISCONTINUED | OUTPATIENT
Start: 2020-01-01 | End: 2020-01-01 | Stop reason: HOSPADM

## 2020-01-01 RX ORDER — PANTOPRAZOLE SODIUM 40 MG/1
40 TABLET, DELAYED RELEASE ORAL
Qty: 60 TABLET | Refills: 2 | Status: SHIPPED | OUTPATIENT
Start: 2020-01-01 | End: 2020-11-19

## 2020-01-01 RX ORDER — GLUCAGON 1 MG
1 KIT INJECTION
Status: DISCONTINUED | OUTPATIENT
Start: 2020-01-01 | End: 2020-01-01 | Stop reason: HOSPADM

## 2020-01-01 RX ORDER — LEVOCETIRIZINE DIHYDROCHLORIDE 5 MG/1
5 TABLET, FILM COATED ORAL NIGHTLY
Qty: 30 TABLET | Refills: 11 | Status: ON HOLD | OUTPATIENT
Start: 2020-01-01 | End: 2020-01-01

## 2020-01-01 RX ORDER — ATORVASTATIN CALCIUM 20 MG/1
40 TABLET, FILM COATED ORAL DAILY
Status: DISCONTINUED | OUTPATIENT
Start: 2020-01-01 | End: 2020-01-01

## 2020-01-01 RX ORDER — ACETAMINOPHEN 325 MG/1
650 TABLET ORAL EVERY 6 HOURS PRN
Status: DISCONTINUED | OUTPATIENT
Start: 2020-01-01 | End: 2020-01-01 | Stop reason: HOSPADM

## 2020-01-01 RX ORDER — LACOSAMIDE 10 MG/ML
100 SOLUTION ORAL EVERY 12 HOURS
Status: DISCONTINUED | OUTPATIENT
Start: 2020-01-01 | End: 2020-01-01 | Stop reason: HOSPADM

## 2020-01-01 RX ORDER — EPINEPHRINE 0.1 MG/ML
1 INJECTION INTRAVENOUS ONCE
Status: DISCONTINUED | OUTPATIENT
Start: 2020-01-01 | End: 2020-01-01

## 2020-01-01 RX ORDER — PANTOPRAZOLE SODIUM 40 MG/1
40 TABLET, DELAYED RELEASE ORAL DAILY
Status: DISCONTINUED | OUTPATIENT
Start: 2020-01-01 | End: 2020-01-01 | Stop reason: HOSPADM

## 2020-01-01 RX ORDER — VANCOMYCIN HCL IN 5 % DEXTROSE 1G/250ML
1000 PLASTIC BAG, INJECTION (ML) INTRAVENOUS ONCE
Status: DISCONTINUED | OUTPATIENT
Start: 2020-01-01 | End: 2020-01-01

## 2020-01-01 RX ORDER — ONDANSETRON 2 MG/ML
INJECTION INTRAMUSCULAR; INTRAVENOUS
Status: DISCONTINUED | OUTPATIENT
Start: 2020-01-01 | End: 2020-01-01

## 2020-01-01 RX ORDER — IPRATROPIUM BROMIDE AND ALBUTEROL SULFATE 2.5; .5 MG/3ML; MG/3ML
3 SOLUTION RESPIRATORY (INHALATION) EVERY 6 HOURS PRN
Status: DISCONTINUED | OUTPATIENT
Start: 2020-01-01 | End: 2020-01-01 | Stop reason: HOSPADM

## 2020-01-01 RX ORDER — ATORVASTATIN CALCIUM 20 MG/1
40 TABLET, FILM COATED ORAL DAILY
Status: DISCONTINUED | OUTPATIENT
Start: 2020-01-01 | End: 2020-01-01 | Stop reason: HOSPADM

## 2020-01-01 RX ORDER — PROMETHAZINE HYDROCHLORIDE AND DEXTROMETHORPHAN HYDROBROMIDE 6.25; 15 MG/5ML; MG/5ML
5 SYRUP ORAL EVERY 4 HOURS PRN
Qty: 118 ML | Refills: 0 | Status: SHIPPED | OUTPATIENT
Start: 2020-01-01 | End: 2020-01-01

## 2020-01-01 RX ORDER — ETOMIDATE 2 MG/ML
INJECTION INTRAVENOUS
Status: DISCONTINUED | OUTPATIENT
Start: 2020-01-01 | End: 2020-01-01

## 2020-01-01 RX ORDER — AMLODIPINE BESYLATE 5 MG/1
5 TABLET ORAL DAILY
COMMUNITY

## 2020-01-01 RX ORDER — LOSARTAN POTASSIUM 50 MG/1
50 TABLET ORAL DAILY
Status: DISCONTINUED | OUTPATIENT
Start: 2020-01-01 | End: 2020-01-01

## 2020-01-01 RX ORDER — SODIUM CHLORIDE 0.9 % (FLUSH) 0.9 %
10 SYRINGE (ML) INJECTION
Status: CANCELLED | OUTPATIENT
Start: 2020-01-01

## 2020-01-01 RX ORDER — ALBUTEROL SULFATE 2.5 MG/.5ML
2.5 SOLUTION RESPIRATORY (INHALATION) EVERY 4 HOURS
Status: DISCONTINUED | OUTPATIENT
Start: 2020-01-01 | End: 2020-01-01

## 2020-01-01 RX ORDER — SODIUM CHLORIDE 9 MG/ML
INJECTION, SOLUTION INTRAVENOUS CONTINUOUS PRN
Status: DISCONTINUED | OUTPATIENT
Start: 2020-01-01 | End: 2020-01-01

## 2020-01-01 RX ORDER — METRONIDAZOLE 500 MG/100ML
500 INJECTION, SOLUTION INTRAVENOUS
Status: DISCONTINUED | OUTPATIENT
Start: 2020-01-01 | End: 2020-01-01

## 2020-01-01 RX ORDER — MIDAZOLAM HYDROCHLORIDE 1 MG/ML
2 INJECTION INTRAMUSCULAR; INTRAVENOUS
Status: DISCONTINUED | OUTPATIENT
Start: 2020-01-01 | End: 2020-01-01

## 2020-01-01 RX ORDER — SUCRALFATE 1 G/1
1 TABLET ORAL
Status: DISCONTINUED | OUTPATIENT
Start: 2020-01-01 | End: 2020-01-01 | Stop reason: HOSPADM

## 2020-01-01 RX ORDER — POTASSIUM CHLORIDE 1.5 G/1.58G
40 POWDER, FOR SOLUTION ORAL 3 TIMES DAILY
Status: COMPLETED | OUTPATIENT
Start: 2020-01-01 | End: 2020-01-01

## 2020-01-01 RX ORDER — LEVETIRACETAM 5 MG/ML
500 INJECTION INTRAVASCULAR EVERY 12 HOURS
Qty: 6000 ML | Refills: 0 | Status: ON HOLD | OUTPATIENT
Start: 2020-01-01 | End: 2020-01-01 | Stop reason: HOSPADM

## 2020-01-01 RX ORDER — HYDROCODONE BITARTRATE AND ACETAMINOPHEN 500; 5 MG/1; MG/1
TABLET ORAL
Status: DISCONTINUED | OUTPATIENT
Start: 2020-01-01 | End: 2020-01-01 | Stop reason: HOSPADM

## 2020-01-01 RX ORDER — SODIUM BICARBONATE 650 MG/1
1300 TABLET ORAL 3 TIMES DAILY
Status: DISCONTINUED | OUTPATIENT
Start: 2020-01-01 | End: 2020-01-01

## 2020-01-01 RX ORDER — FUROSEMIDE 10 MG/ML
20 INJECTION INTRAMUSCULAR; INTRAVENOUS ONCE
Status: DISCONTINUED | OUTPATIENT
Start: 2020-01-01 | End: 2020-01-01

## 2020-01-01 RX ORDER — FAMOTIDINE 10 MG/ML
20 INJECTION INTRAVENOUS DAILY
Status: DISCONTINUED | OUTPATIENT
Start: 2020-01-01 | End: 2020-01-01

## 2020-01-01 RX ORDER — AZITHROMYCIN 250 MG/1
TABLET, FILM COATED ORAL
Qty: 6 TABLET | Refills: 0 | Status: SHIPPED | OUTPATIENT
Start: 2020-01-01 | End: 2020-01-01

## 2020-01-01 RX ORDER — PANTOPRAZOLE SODIUM 40 MG/1
40 TABLET, DELAYED RELEASE ORAL 2 TIMES DAILY
COMMUNITY

## 2020-01-01 RX ORDER — VANCOMYCIN HCL IN 5 % DEXTROSE 1G/250ML
1000 PLASTIC BAG, INJECTION (ML) INTRAVENOUS
Status: DISCONTINUED | OUTPATIENT
Start: 2020-01-01 | End: 2020-01-01

## 2020-01-01 RX ORDER — POTASSIUM CHLORIDE 750 MG/1
30 CAPSULE, EXTENDED RELEASE ORAL ONCE
Status: DISCONTINUED | OUTPATIENT
Start: 2020-01-01 | End: 2020-01-01

## 2020-01-01 RX ORDER — FUROSEMIDE 10 MG/ML
120 INJECTION INTRAMUSCULAR; INTRAVENOUS ONCE
Status: DISCONTINUED | OUTPATIENT
Start: 2020-01-01 | End: 2020-01-01

## 2020-01-01 RX ORDER — MIDAZOLAM HYDROCHLORIDE 1 MG/ML
1 INJECTION INTRAMUSCULAR; INTRAVENOUS EVERY 4 HOURS PRN
Status: DISCONTINUED | OUTPATIENT
Start: 2020-01-01 | End: 2020-01-01

## 2020-01-01 RX ORDER — HYDROCODONE BITARTRATE AND ACETAMINOPHEN 500; 5 MG/1; MG/1
TABLET ORAL ONCE
Status: CANCELLED | OUTPATIENT
Start: 2020-01-01 | End: 2020-01-01

## 2020-01-01 RX ORDER — SUCCINYLCHOLINE CHLORIDE 20 MG/ML
INJECTION INTRAMUSCULAR; INTRAVENOUS
Status: DISPENSED
Start: 2020-01-01 | End: 2020-01-01

## 2020-01-01 RX ORDER — METRONIDAZOLE 500 MG/100ML
500 INJECTION, SOLUTION INTRAVENOUS
Status: DISCONTINUED | OUTPATIENT
Start: 2020-01-01 | End: 2020-01-01 | Stop reason: HOSPADM

## 2020-01-01 RX ORDER — OXYMETAZOLINE HCL 0.05 %
2 SPRAY, NON-AEROSOL (ML) NASAL 2 TIMES DAILY PRN
Status: DISPENSED | OUTPATIENT
Start: 2020-01-01 | End: 2020-01-01

## 2020-01-01 RX ORDER — PANTOPRAZOLE SODIUM 40 MG/10ML
40 INJECTION, POWDER, LYOPHILIZED, FOR SOLUTION INTRAVENOUS DAILY
Status: DISCONTINUED | OUTPATIENT
Start: 2020-01-01 | End: 2020-01-01

## 2020-01-01 RX ORDER — SODIUM BICARBONATE 1 MEQ/ML
SYRINGE (ML) INTRAVENOUS
Status: COMPLETED
Start: 2020-01-01 | End: 2020-01-01

## 2020-01-01 RX ORDER — INSULIN ASPART 100 [IU]/ML
1-10 INJECTION, SOLUTION INTRAVENOUS; SUBCUTANEOUS EVERY 4 HOURS PRN
Status: DISCONTINUED | OUTPATIENT
Start: 2020-01-01 | End: 2020-01-01

## 2020-01-01 RX ORDER — LOSARTAN POTASSIUM 50 MG/1
50 TABLET ORAL DAILY
Qty: 90 TABLET | Refills: 3 | Status: ON HOLD | OUTPATIENT
Start: 2020-01-01 | End: 2020-01-01 | Stop reason: HOSPADM

## 2020-01-01 RX ORDER — AMOXICILLIN 250 MG
1 CAPSULE ORAL 2 TIMES DAILY
Status: DISCONTINUED | OUTPATIENT
Start: 2020-01-01 | End: 2020-01-01

## 2020-01-01 RX ORDER — PHENYLEPHRINE HYDROCHLORIDE 10 MG/ML
INJECTION INTRAVENOUS
Status: DISCONTINUED | OUTPATIENT
Start: 2020-01-01 | End: 2020-01-01

## 2020-01-01 RX ORDER — PREDNISONE 20 MG/1
TABLET ORAL
Status: ON HOLD | COMMUNITY
Start: 2020-01-01 | End: 2020-01-01 | Stop reason: SDUPTHER

## 2020-01-01 RX ORDER — NOREPINEPHRINE BITARTRATE/D5W 4MG/250ML
0.02 PLASTIC BAG, INJECTION (ML) INTRAVENOUS CONTINUOUS
Status: DISCONTINUED | OUTPATIENT
Start: 2020-01-01 | End: 2020-01-01

## 2020-01-01 RX ORDER — CEFEPIME HYDROCHLORIDE 2 G/1
2 INJECTION, POWDER, FOR SOLUTION INTRAVENOUS
Status: DISCONTINUED | OUTPATIENT
Start: 2020-01-01 | End: 2020-01-01

## 2020-01-01 RX ORDER — ALUMINUM HYDROXIDE, MAGNESIUM HYDROXIDE, AND SIMETHICONE 2400; 240; 2400 MG/30ML; MG/30ML; MG/30ML
30 SUSPENSION ORAL EVERY 6 HOURS PRN
Status: DISCONTINUED | OUTPATIENT
Start: 2020-01-01 | End: 2020-01-01 | Stop reason: HOSPADM

## 2020-01-01 RX ORDER — SODIUM CHLORIDE 0.9 % (FLUSH) 0.9 %
10 SYRINGE (ML) INJECTION
Status: DISCONTINUED | OUTPATIENT
Start: 2020-01-01 | End: 2020-01-01

## 2020-01-01 RX ORDER — IBUPROFEN 200 MG
24 TABLET ORAL
Status: DISCONTINUED | OUTPATIENT
Start: 2020-01-01 | End: 2020-01-01 | Stop reason: HOSPADM

## 2020-01-01 RX ORDER — LIDOCAINE HYDROCHLORIDE 10 MG/ML
3 INJECTION INFILTRATION; PERINEURAL ONCE
Status: COMPLETED | OUTPATIENT
Start: 2020-01-01 | End: 2020-01-01

## 2020-01-01 RX ORDER — FUROSEMIDE 10 MG/ML
120 INJECTION INTRAMUSCULAR; INTRAVENOUS ONCE
Status: COMPLETED | OUTPATIENT
Start: 2020-01-01 | End: 2020-01-01

## 2020-01-01 RX ORDER — DONEPEZIL HYDROCHLORIDE 10 MG/1
10 TABLET, FILM COATED ORAL DAILY
Status: ON HOLD | COMMUNITY
End: 2020-01-01 | Stop reason: HOSPADM

## 2020-01-01 RX ORDER — ESCITALOPRAM OXALATE 10 MG/1
10 TABLET ORAL DAILY
Qty: 90 TABLET | Refills: 3 | Status: SHIPPED | OUTPATIENT
Start: 2020-01-01 | End: 2020-01-01 | Stop reason: CLARIF

## 2020-01-01 RX ORDER — FLUTICASONE FUROATE AND VILANTEROL 100; 25 UG/1; UG/1
1 POWDER RESPIRATORY (INHALATION) DAILY
Status: DISCONTINUED | OUTPATIENT
Start: 2020-01-01 | End: 2020-01-01 | Stop reason: HOSPADM

## 2020-01-01 RX ORDER — MAGNESIUM SULFATE HEPTAHYDRATE 40 MG/ML
2 INJECTION, SOLUTION INTRAVENOUS
Status: DISCONTINUED | OUTPATIENT
Start: 2020-01-01 | End: 2020-01-01

## 2020-01-01 RX ORDER — SODIUM BICARBONATE 1 MEQ/ML
50 SYRINGE (ML) INTRAVENOUS ONCE
Status: COMPLETED | OUTPATIENT
Start: 2020-01-01 | End: 2020-01-01

## 2020-01-01 RX ORDER — INSULIN ASPART 100 [IU]/ML
0-5 INJECTION, SOLUTION INTRAVENOUS; SUBCUTANEOUS EVERY 6 HOURS PRN
Qty: 3 ML | Refills: 0 | Status: SHIPPED | OUTPATIENT
Start: 2020-01-01 | End: 2020-01-01

## 2020-01-01 RX ORDER — PROPOFOL 10 MG/ML
INJECTION, EMULSION INTRAVENOUS
Status: DISPENSED
Start: 2020-01-01 | End: 2020-01-01

## 2020-01-01 RX ORDER — LEVETIRACETAM 5 MG/ML
500 INJECTION INTRAVASCULAR EVERY 12 HOURS
Status: DISCONTINUED | OUTPATIENT
Start: 2020-01-01 | End: 2020-01-01

## 2020-01-01 RX ORDER — ETOMIDATE 2 MG/ML
10 INJECTION INTRAVENOUS ONCE
Status: DISCONTINUED | OUTPATIENT
Start: 2020-01-01 | End: 2020-01-01

## 2020-01-01 RX ORDER — HYDROCODONE BITARTRATE AND ACETAMINOPHEN 500; 5 MG/1; MG/1
TABLET ORAL
Status: DISCONTINUED | OUTPATIENT
Start: 2020-01-01 | End: 2020-01-01

## 2020-01-01 RX ORDER — ENOXAPARIN SODIUM 100 MG/ML
40 INJECTION SUBCUTANEOUS EVERY 24 HOURS
Status: DISCONTINUED | OUTPATIENT
Start: 2020-01-01 | End: 2020-01-01 | Stop reason: HOSPADM

## 2020-01-01 RX ORDER — ALBUTEROL SULFATE 90 UG/1
2 AEROSOL, METERED RESPIRATORY (INHALATION) EVERY 6 HOURS PRN
Status: DISCONTINUED | OUTPATIENT
Start: 2020-01-01 | End: 2020-01-01 | Stop reason: HOSPADM

## 2020-01-01 RX ORDER — SODIUM BICARBONATE 1 MEQ/ML
200 SYRINGE (ML) INTRAVENOUS ONCE
Status: COMPLETED | OUTPATIENT
Start: 2020-01-01 | End: 2020-01-01

## 2020-01-01 RX ORDER — SODIUM CHLORIDE 9 MG/ML
INJECTION, SOLUTION INTRAVENOUS ONCE
Status: DISCONTINUED | OUTPATIENT
Start: 2020-01-01 | End: 2020-01-01

## 2020-01-01 RX ORDER — DEXMEDETOMIDINE HYDROCHLORIDE 4 UG/ML
0.2 INJECTION, SOLUTION INTRAVENOUS CONTINUOUS
Status: DISCONTINUED | OUTPATIENT
Start: 2020-01-01 | End: 2020-01-01

## 2020-01-01 RX ORDER — ACETAMINOPHEN 325 MG/1
650 TABLET ORAL
Status: CANCELLED | OUTPATIENT
Start: 2020-01-01

## 2020-01-01 RX ORDER — FERROUS GLUCONATE 324(38)MG
324 TABLET ORAL
Status: ON HOLD | COMMUNITY
End: 2020-01-01

## 2020-01-01 RX ORDER — FLUTICASONE PROPIONATE 50 MCG
2 SPRAY, SUSPENSION (ML) NASAL DAILY
Qty: 9.9 ML | Refills: 11 | Status: SHIPPED | OUTPATIENT
Start: 2020-01-01 | End: 2020-01-01 | Stop reason: CLARIF

## 2020-01-01 RX ORDER — FENTANYL CITRATE 50 UG/ML
50 INJECTION, SOLUTION INTRAMUSCULAR; INTRAVENOUS
Status: DISCONTINUED | OUTPATIENT
Start: 2020-01-01 | End: 2020-01-01 | Stop reason: HOSPADM

## 2020-01-01 RX ORDER — SILODOSIN 4 MG/1
4 CAPSULE ORAL DAILY
COMMUNITY

## 2020-01-01 RX ORDER — INSULIN ASPART 100 [IU]/ML
1-10 INJECTION, SOLUTION INTRAVENOUS; SUBCUTANEOUS EVERY 6 HOURS PRN
Status: DISCONTINUED | OUTPATIENT
Start: 2020-01-01 | End: 2020-01-01

## 2020-01-01 RX ORDER — MUPIROCIN 20 MG/G
OINTMENT TOPICAL 2 TIMES DAILY
Status: DISPENSED | OUTPATIENT
Start: 2020-01-01 | End: 2020-01-01

## 2020-01-01 RX ORDER — PROPOFOL 10 MG/ML
100 VIAL (ML) INTRAVENOUS ONCE
Status: COMPLETED | OUTPATIENT
Start: 2020-01-01 | End: 2020-01-01

## 2020-01-01 RX ORDER — IPRATROPIUM BROMIDE AND ALBUTEROL SULFATE 2.5; .5 MG/3ML; MG/3ML
3 SOLUTION RESPIRATORY (INHALATION) EVERY 4 HOURS PRN
Qty: 1 BOX | Refills: 0
Start: 2020-01-01 | End: 2021-10-05

## 2020-01-01 RX ORDER — HYDROCODONE BITARTRATE AND ACETAMINOPHEN 500; 5 MG/1; MG/1
TABLET ORAL
Status: CANCELLED | OUTPATIENT
Start: 2020-01-01

## 2020-01-01 RX ORDER — IPRATROPIUM BROMIDE AND ALBUTEROL SULFATE 2.5; .5 MG/3ML; MG/3ML
3 SOLUTION RESPIRATORY (INHALATION) EVERY 4 HOURS
Status: DISCONTINUED | OUTPATIENT
Start: 2020-01-01 | End: 2020-01-01

## 2020-01-01 RX ORDER — MUPIROCIN 20 MG/G
OINTMENT TOPICAL 2 TIMES DAILY
Status: DISCONTINUED | OUTPATIENT
Start: 2020-01-01 | End: 2020-01-01 | Stop reason: HOSPADM

## 2020-01-01 RX ORDER — TAMSULOSIN HYDROCHLORIDE 0.4 MG/1
0.4 CAPSULE ORAL DAILY
Status: DISCONTINUED | OUTPATIENT
Start: 2020-01-01 | End: 2020-01-01

## 2020-01-01 RX ORDER — TALC
6 POWDER (GRAM) TOPICAL NIGHTLY
Status: DISCONTINUED | OUTPATIENT
Start: 2020-01-01 | End: 2020-01-01

## 2020-01-01 RX ORDER — FENTANYL CITRATE 50 UG/ML
25 INJECTION, SOLUTION INTRAMUSCULAR; INTRAVENOUS EVERY 5 MIN PRN
Status: DISCONTINUED | OUTPATIENT
Start: 2020-01-01 | End: 2020-01-01 | Stop reason: HOSPADM

## 2020-01-01 RX ORDER — QUETIAPINE FUMARATE 25 MG/1
25 TABLET, FILM COATED ORAL ONCE
Status: COMPLETED | OUTPATIENT
Start: 2020-01-01 | End: 2020-01-01

## 2020-01-01 RX ORDER — MAGNESIUM SULFATE HEPTAHYDRATE 40 MG/ML
2 INJECTION, SOLUTION INTRAVENOUS ONCE
Status: COMPLETED | OUTPATIENT
Start: 2020-01-01 | End: 2020-01-01

## 2020-01-01 RX ORDER — ESCITALOPRAM OXALATE 10 MG/1
10 TABLET ORAL DAILY
Status: ON HOLD | COMMUNITY
End: 2020-01-01 | Stop reason: HOSPADM

## 2020-01-01 RX ORDER — SUCRALFATE 1 G/1
1 TABLET ORAL
Status: ON HOLD | COMMUNITY
End: 2020-01-01 | Stop reason: HOSPADM

## 2020-01-01 RX ORDER — OMEPRAZOLE 40 MG/1
40 CAPSULE, DELAYED RELEASE ORAL EVERY MORNING
Qty: 90 CAPSULE | Refills: 3 | Status: ON HOLD | OUTPATIENT
Start: 2020-01-01 | End: 2020-01-01 | Stop reason: HOSPADM

## 2020-01-01 RX ORDER — LORAZEPAM 2 MG/ML
4 INJECTION INTRAMUSCULAR ONCE
Status: COMPLETED | OUTPATIENT
Start: 2020-01-01 | End: 2020-01-01

## 2020-01-01 RX ORDER — LOSARTAN POTASSIUM 50 MG/1
50 TABLET, FILM COATED ORAL DAILY
Status: ON HOLD | COMMUNITY
Start: 2020-01-01 | End: 2020-01-01 | Stop reason: HOSPADM

## 2020-01-01 RX ORDER — SUCRALFATE 1 G/1
1 TABLET ORAL 4 TIMES DAILY
Qty: 56 TABLET | Refills: 0 | Status: SHIPPED | OUTPATIENT
Start: 2020-01-01 | End: 2020-01-01

## 2020-01-01 RX ORDER — ONDANSETRON 2 MG/ML
4 INJECTION INTRAMUSCULAR; INTRAVENOUS DAILY PRN
Status: DISCONTINUED | OUTPATIENT
Start: 2020-01-01 | End: 2020-01-01 | Stop reason: HOSPADM

## 2020-01-01 RX ORDER — POTASSIUM CHLORIDE 29.8 MG/ML
40 INJECTION INTRAVENOUS ONCE
Status: COMPLETED | OUTPATIENT
Start: 2020-01-01 | End: 2020-01-01

## 2020-01-01 RX ORDER — ALBUTEROL SULFATE 90 UG/1
2 AEROSOL, METERED RESPIRATORY (INHALATION) EVERY 6 HOURS PRN
Qty: 18 G | Refills: 3 | Status: SHIPPED | OUTPATIENT
Start: 2020-01-01 | End: 2020-01-01 | Stop reason: CLARIF

## 2020-01-01 RX ORDER — MIDAZOLAM HYDROCHLORIDE 5 MG/ML
5 INJECTION INTRAMUSCULAR; INTRAVENOUS
Status: DISCONTINUED | OUTPATIENT
Start: 2020-01-01 | End: 2020-01-01 | Stop reason: HOSPADM

## 2020-01-01 RX ORDER — GLYCOPYRROLATE 0.2 MG/ML
0.1 INJECTION INTRAMUSCULAR; INTRAVENOUS EVERY 4 HOURS PRN
Status: DISCONTINUED | OUTPATIENT
Start: 2020-01-01 | End: 2020-01-01 | Stop reason: HOSPADM

## 2020-01-01 RX ORDER — MIDAZOLAM HYDROCHLORIDE 1 MG/ML
INJECTION INTRAMUSCULAR; INTRAVENOUS
Status: DISPENSED
Start: 2020-01-01 | End: 2020-01-01

## 2020-01-01 RX ORDER — MAGNESIUM SULFATE HEPTAHYDRATE 40 MG/ML
INJECTION, SOLUTION INTRAVENOUS
Status: DISPENSED
Start: 2020-01-01 | End: 2020-01-01

## 2020-01-01 RX ORDER — ALBUTEROL SULFATE 108 UG/1
AEROSOL, METERED RESPIRATORY (INHALATION)
Status: ON HOLD | COMMUNITY
Start: 2020-01-01 | End: 2020-01-01 | Stop reason: HOSPADM

## 2020-01-01 RX ORDER — DONEPEZIL HYDROCHLORIDE 10 MG/1
TABLET, FILM COATED ORAL
Status: ON HOLD | COMMUNITY
Start: 2020-01-01 | End: 2020-01-01

## 2020-01-01 RX ORDER — ATORVASTATIN CALCIUM 40 MG/1
40 TABLET, FILM COATED ORAL DAILY
Qty: 90 TABLET | Refills: 3
Start: 2020-01-01 | End: 2021-10-06

## 2020-01-01 RX ORDER — LEVOFLOXACIN 500 MG/1
TABLET, FILM COATED ORAL
Status: ON HOLD | COMMUNITY
Start: 2020-01-01 | End: 2020-01-01

## 2020-01-01 RX ORDER — ALBUMIN HUMAN 250 G/1000ML
50 SOLUTION INTRAVENOUS ONCE
Status: COMPLETED | OUTPATIENT
Start: 2020-01-01 | End: 2020-01-01

## 2020-01-01 RX ORDER — LOSARTAN POTASSIUM 50 MG/1
50 TABLET ORAL DAILY
Status: DISCONTINUED | OUTPATIENT
Start: 2020-01-01 | End: 2020-01-01 | Stop reason: HOSPADM

## 2020-01-01 RX ORDER — PROPOFOL 10 MG/ML
10 INJECTION, EMULSION INTRAVENOUS
Status: COMPLETED | OUTPATIENT
Start: 2020-01-01 | End: 2020-01-01

## 2020-01-01 RX ORDER — LOSARTAN POTASSIUM 50 MG/1
TABLET ORAL
Qty: 90 TABLET | Refills: 3 | Status: ON HOLD | OUTPATIENT
Start: 2020-01-01 | End: 2020-01-01

## 2020-01-01 RX ORDER — HALOPERIDOL 2 MG/1
2 TABLET ORAL EVERY 6 HOURS PRN
Status: DISCONTINUED | OUTPATIENT
Start: 2020-01-01 | End: 2020-01-01 | Stop reason: HOSPADM

## 2020-01-01 RX ORDER — FLUTICASONE PROPIONATE 50 MCG
1 SPRAY, SUSPENSION (ML) NASAL DAILY
Status: ON HOLD | COMMUNITY
End: 2020-01-01 | Stop reason: HOSPADM

## 2020-01-01 RX ORDER — LORAZEPAM 2 MG/ML
INJECTION INTRAMUSCULAR
Status: DISPENSED
Start: 2020-01-01 | End: 2020-01-01

## 2020-01-01 RX ORDER — CLOBAZAM 10 MG/1
10 TABLET ORAL 2 TIMES DAILY
Status: DISCONTINUED | OUTPATIENT
Start: 2020-01-01 | End: 2020-01-01

## 2020-01-01 RX ORDER — ETOMIDATE 2 MG/ML
INJECTION INTRAVENOUS
Status: DISPENSED
Start: 2020-01-01 | End: 2020-01-01

## 2020-01-01 RX ORDER — SODIUM CHLORIDE 9 MG/ML
INJECTION, SOLUTION INTRAVENOUS ONCE
Status: COMPLETED | OUTPATIENT
Start: 2020-01-01 | End: 2020-01-01

## 2020-01-01 RX ORDER — INSULIN ASPART 100 [IU]/ML
0-5 INJECTION, SOLUTION INTRAVENOUS; SUBCUTANEOUS EVERY 6 HOURS PRN
Status: DISCONTINUED | OUTPATIENT
Start: 2020-01-01 | End: 2020-01-01 | Stop reason: HOSPADM

## 2020-01-01 RX ORDER — PANTOPRAZOLE SODIUM 40 MG/10ML
40 INJECTION, POWDER, LYOPHILIZED, FOR SOLUTION INTRAVENOUS 2 TIMES DAILY
Status: DISCONTINUED | OUTPATIENT
Start: 2020-01-01 | End: 2020-01-01

## 2020-01-01 RX ORDER — POTASSIUM CHLORIDE 7.45 MG/ML
10 INJECTION INTRAVENOUS ONCE
Status: COMPLETED | OUTPATIENT
Start: 2020-01-01 | End: 2020-01-01

## 2020-01-01 RX ORDER — LEVETIRACETAM 5 MG/ML
500 INJECTION INTRAVASCULAR EVERY 12 HOURS
Status: DISCONTINUED | OUTPATIENT
Start: 2020-01-01 | End: 2020-01-01 | Stop reason: HOSPADM

## 2020-01-01 RX ORDER — POTASSIUM CHLORIDE 1.5 G/1.58G
20 POWDER, FOR SOLUTION ORAL EVERY 4 HOURS
Status: COMPLETED | OUTPATIENT
Start: 2020-01-01 | End: 2020-01-01

## 2020-01-01 RX ORDER — LORAZEPAM 2 MG/ML
INJECTION INTRAMUSCULAR
Status: COMPLETED
Start: 2020-01-01 | End: 2020-01-01

## 2020-01-01 RX ORDER — METHYLPREDNISOLONE 4 MG/1
TABLET ORAL
Qty: 1 PACKAGE | Refills: 0 | Status: ON HOLD | OUTPATIENT
Start: 2020-01-01 | End: 2020-01-01 | Stop reason: HOSPADM

## 2020-01-01 RX ORDER — LANOLIN ALCOHOL/MO/W.PET/CERES
400 CREAM (GRAM) TOPICAL 2 TIMES DAILY
Status: DISCONTINUED | OUTPATIENT
Start: 2020-01-01 | End: 2020-01-01 | Stop reason: HOSPADM

## 2020-01-01 RX ORDER — GADOBUTROL 604.72 MG/ML
10 INJECTION INTRAVENOUS
Status: COMPLETED | OUTPATIENT
Start: 2020-01-01 | End: 2020-01-01

## 2020-01-01 RX ORDER — HALOPERIDOL 5 MG/ML
5 INJECTION INTRAMUSCULAR ONCE
Status: COMPLETED | OUTPATIENT
Start: 2020-01-01 | End: 2020-01-01

## 2020-01-01 RX ORDER — PANTOPRAZOLE SODIUM 40 MG/1
40 TABLET, DELAYED RELEASE ORAL DAILY
Status: DISCONTINUED | OUTPATIENT
Start: 2020-01-01 | End: 2020-01-01

## 2020-01-01 RX ORDER — NOREPINEPHRINE BITARTRATE/D5W 4MG/250ML
PLASTIC BAG, INJECTION (ML) INTRAVENOUS
Status: DISPENSED
Start: 2020-01-01 | End: 2020-01-01

## 2020-01-01 RX ORDER — FLUTICASONE PROPIONATE AND SALMETEROL 250; 50 UG/1; UG/1
1 POWDER RESPIRATORY (INHALATION) 2 TIMES DAILY
Status: ON HOLD | COMMUNITY
End: 2020-01-01 | Stop reason: HOSPADM

## 2020-01-01 RX ORDER — PANTOPRAZOLE SODIUM 40 MG/1
40 FOR SUSPENSION ORAL DAILY
Status: DISCONTINUED | OUTPATIENT
Start: 2020-01-01 | End: 2020-01-01

## 2020-01-01 RX ORDER — FUROSEMIDE 10 MG/ML
20 INJECTION INTRAMUSCULAR; INTRAVENOUS ONCE
Status: COMPLETED | OUTPATIENT
Start: 2020-01-01 | End: 2020-01-01

## 2020-01-01 RX ORDER — LACOSAMIDE 10 MG/ML
100 SOLUTION ORAL EVERY 12 HOURS
Status: DISCONTINUED | OUTPATIENT
Start: 2020-01-01 | End: 2020-01-01

## 2020-01-01 RX ORDER — LIDOCAINE HYDROCHLORIDE 20 MG/ML
INJECTION INTRAVENOUS
Status: DISCONTINUED | OUTPATIENT
Start: 2020-01-01 | End: 2020-01-01

## 2020-01-01 RX ORDER — SODIUM CHLORIDE 9 MG/ML
INJECTION, SOLUTION INTRAVENOUS CONTINUOUS
Status: DISCONTINUED | OUTPATIENT
Start: 2020-01-01 | End: 2020-01-01

## 2020-01-01 RX ORDER — CEFEPIME HYDROCHLORIDE 1 G/50ML
2 INJECTION, SOLUTION INTRAVENOUS
Status: DISCONTINUED | OUTPATIENT
Start: 2020-01-01 | End: 2020-01-01

## 2020-01-01 RX ORDER — ONDANSETRON 4 MG/1
4 TABLET, FILM COATED ORAL EVERY 8 HOURS PRN
Status: ON HOLD | COMMUNITY
End: 2020-01-01 | Stop reason: HOSPADM

## 2020-01-01 RX ORDER — SILODOSIN 4 MG/1
4 CAPSULE ORAL DAILY
Status: DISCONTINUED | OUTPATIENT
Start: 2020-01-01 | End: 2020-01-01 | Stop reason: HOSPADM

## 2020-01-01 RX ORDER — LABETALOL HCL 20 MG/4 ML
10 SYRINGE (ML) INTRAVENOUS EVERY 4 HOURS PRN
Status: DISCONTINUED | OUTPATIENT
Start: 2020-01-01 | End: 2020-01-01 | Stop reason: HOSPADM

## 2020-01-01 RX ORDER — ATORVASTATIN CALCIUM 40 MG/1
40 TABLET, FILM COATED ORAL DAILY
Status: DISCONTINUED | OUTPATIENT
Start: 2020-01-01 | End: 2020-01-01 | Stop reason: HOSPADM

## 2020-01-01 RX ORDER — PROPOFOL 10 MG/ML
VIAL (ML) INTRAVENOUS CONTINUOUS PRN
Status: DISCONTINUED | OUTPATIENT
Start: 2020-01-01 | End: 2020-01-01

## 2020-01-01 RX ORDER — FLUTICASONE PROPIONATE AND SALMETEROL 250; 50 UG/1; UG/1
1 POWDER RESPIRATORY (INHALATION) 2 TIMES DAILY
Qty: 60 EACH | Refills: 3 | Status: SHIPPED | OUTPATIENT
Start: 2020-01-01 | End: 2020-01-01 | Stop reason: CLARIF

## 2020-01-01 RX ORDER — PROPOFOL 10 MG/ML
10 INJECTION, EMULSION INTRAVENOUS CONTINUOUS
Status: DISCONTINUED | OUTPATIENT
Start: 2020-01-01 | End: 2020-01-01

## 2020-01-01 RX ORDER — ACETAMINOPHEN 325 MG/1
650 TABLET ORAL EVERY 8 HOURS PRN
Status: DISCONTINUED | OUTPATIENT
Start: 2020-01-01 | End: 2020-01-01 | Stop reason: HOSPADM

## 2020-01-01 RX ORDER — DOXYCYCLINE 100 MG/1
100 CAPSULE ORAL EVERY 12 HOURS
Qty: 14 CAPSULE | Refills: 0 | Status: SHIPPED | OUTPATIENT
Start: 2020-01-01 | End: 2020-01-01 | Stop reason: CLARIF

## 2020-01-01 RX ORDER — ASPIRIN 325 MG
325 TABLET ORAL DAILY
Status: DISCONTINUED | OUTPATIENT
Start: 2020-01-01 | End: 2020-01-01

## 2020-01-01 RX ORDER — HYDROCODONE BITARTRATE AND ACETAMINOPHEN 500; 5 MG/1; MG/1
TABLET ORAL CONTINUOUS
Status: DISCONTINUED | OUTPATIENT
Start: 2020-01-01 | End: 2020-01-01

## 2020-01-01 RX ORDER — DICLOFENAC SODIUM 10 MG/G
GEL TOPICAL
Status: ON HOLD | COMMUNITY
Start: 2020-01-01 | End: 2020-01-01 | Stop reason: SDUPTHER

## 2020-01-01 RX ORDER — SUCRALFATE 1 G/1
1 TABLET ORAL
Status: CANCELLED | OUTPATIENT
Start: 2020-01-01

## 2020-01-01 RX ORDER — ROCURONIUM BROMIDE 10 MG/ML
INJECTION, SOLUTION INTRAVENOUS
Status: COMPLETED
Start: 2020-01-01 | End: 2020-01-01

## 2020-01-01 RX ORDER — FUROSEMIDE 10 MG/ML
80 INJECTION INTRAMUSCULAR; INTRAVENOUS ONCE
Status: DISCONTINUED | OUTPATIENT
Start: 2020-01-01 | End: 2020-01-01

## 2020-01-01 RX ORDER — FLUTICASONE FUROATE AND VILANTEROL TRIFENATATE 100; 25 UG/1; UG/1
1 POWDER RESPIRATORY (INHALATION) DAILY
Status: ON HOLD | COMMUNITY
End: 2020-01-01 | Stop reason: HOSPADM

## 2020-01-01 RX ORDER — CEFTRIAXONE SODIUM 2 G/1
2 INJECTION, POWDER, FOR SOLUTION INTRAVENOUS DAILY
Status: ON HOLD | COMMUNITY
Start: 2020-01-01 | End: 2020-01-01 | Stop reason: HOSPADM

## 2020-01-01 RX ORDER — LIDOCAINE HYDROCHLORIDE 10 MG/ML
1 INJECTION INFILTRATION; PERINEURAL
Status: DISCONTINUED | OUTPATIENT
Start: 2020-01-01 | End: 2020-01-01 | Stop reason: HOSPADM

## 2020-01-01 RX ORDER — MECLIZINE HCL 12.5 MG 12.5 MG/1
12.5 TABLET ORAL 3 TIMES DAILY
Status: ON HOLD | COMMUNITY
End: 2020-01-01 | Stop reason: HOSPADM

## 2020-01-01 RX ORDER — MONTELUKAST SODIUM 10 MG/1
10 TABLET ORAL DAILY
COMMUNITY
Start: 2020-01-01 | End: 2020-01-01 | Stop reason: CLARIF

## 2020-01-01 RX ORDER — FUROSEMIDE 10 MG/ML
40 INJECTION INTRAMUSCULAR; INTRAVENOUS ONCE
Status: COMPLETED | OUTPATIENT
Start: 2020-01-01 | End: 2020-01-01

## 2020-01-01 RX ORDER — MECLIZINE HCL 12.5 MG 12.5 MG/1
12.5 TABLET ORAL 3 TIMES DAILY
Qty: 270 TABLET | Refills: 3 | Status: SHIPPED | OUTPATIENT
Start: 2020-01-01 | End: 2020-01-01 | Stop reason: CLARIF

## 2020-01-01 RX ORDER — ALBUTEROL SULFATE 0.83 MG/ML
2.5 SOLUTION RESPIRATORY (INHALATION) EVERY 6 HOURS PRN
Status: ON HOLD | COMMUNITY
End: 2020-01-01 | Stop reason: HOSPADM

## 2020-01-01 RX ORDER — LIDOCAINE HYDROCHLORIDE 10 MG/ML
10 INJECTION INFILTRATION; PERINEURAL ONCE
Status: COMPLETED | OUTPATIENT
Start: 2020-01-01 | End: 2020-01-01

## 2020-01-01 RX ORDER — ALBUTEROL SULFATE 0.63 MG/3ML
0.63 SOLUTION RESPIRATORY (INHALATION) EVERY 6 HOURS PRN
Status: ON HOLD | COMMUNITY
End: 2020-01-01 | Stop reason: HOSPADM

## 2020-01-01 RX ORDER — LACOSAMIDE 10 MG/ML
200 SOLUTION ORAL ONCE
Status: COMPLETED | OUTPATIENT
Start: 2020-01-01 | End: 2020-01-01

## 2020-01-01 RX ORDER — LORAZEPAM 2 MG/ML
INJECTION INTRAMUSCULAR
Status: DISCONTINUED
Start: 2020-01-01 | End: 2020-01-01 | Stop reason: WASHOUT

## 2020-01-01 RX ORDER — CLOBAZAM 10 MG/1
40 TABLET ORAL 2 TIMES DAILY
Status: DISCONTINUED | OUTPATIENT
Start: 2020-01-01 | End: 2020-01-01

## 2020-01-01 RX ORDER — POTASSIUM CHLORIDE 14.9 MG/ML
20 INJECTION INTRAVENOUS
Status: DISPENSED | OUTPATIENT
Start: 2020-01-01 | End: 2020-01-01

## 2020-01-01 RX ORDER — POTASSIUM CHLORIDE 20 MEQ/1
40 TABLET, EXTENDED RELEASE ORAL ONCE
Status: COMPLETED | OUTPATIENT
Start: 2020-01-01 | End: 2020-01-01

## 2020-01-01 RX ORDER — PANTOPRAZOLE SODIUM 40 MG/10ML
40 INJECTION, POWDER, LYOPHILIZED, FOR SOLUTION INTRAVENOUS
Status: COMPLETED | OUTPATIENT
Start: 2020-01-01 | End: 2020-01-01

## 2020-01-01 RX ORDER — FLUTICASONE FUROATE AND VILANTEROL 100; 25 UG/1; UG/1
1 POWDER RESPIRATORY (INHALATION) DAILY
Start: 2020-01-01

## 2020-01-01 RX ORDER — LIDOCAINE 50 MG/G
1 PATCH TOPICAL
Status: DISCONTINUED | OUTPATIENT
Start: 2020-01-01 | End: 2020-01-01 | Stop reason: HOSPADM

## 2020-01-01 RX ORDER — DOXYLAMINE SUCCINATE 25 MG
TABLET ORAL 2 TIMES DAILY
COMMUNITY
End: 2020-01-01 | Stop reason: CLARIF

## 2020-01-01 RX ORDER — ENOXAPARIN SODIUM 100 MG/ML
40 INJECTION SUBCUTANEOUS EVERY 24 HOURS
Status: DISCONTINUED | OUTPATIENT
Start: 2020-01-01 | End: 2020-01-01

## 2020-01-01 RX ORDER — POTASSIUM CHLORIDE 14.9 MG/ML
20 INJECTION INTRAVENOUS
Status: COMPLETED | OUTPATIENT
Start: 2020-01-01 | End: 2020-01-01

## 2020-01-01 RX ORDER — POTASSIUM CHLORIDE 750 MG/1
30 CAPSULE, EXTENDED RELEASE ORAL ONCE
Status: COMPLETED | OUTPATIENT
Start: 2020-01-01 | End: 2020-01-01

## 2020-01-01 RX ORDER — SUCRALFATE 1 G/1
1 TABLET ORAL 4 TIMES DAILY
Status: DISCONTINUED | OUTPATIENT
Start: 2020-01-01 | End: 2020-01-01 | Stop reason: HOSPADM

## 2020-01-01 RX ORDER — PROMETHAZINE HYDROCHLORIDE AND DEXTROMETHORPHAN HYDROBROMIDE 6.25; 15 MG/5ML; MG/5ML
5 SYRUP ORAL EVERY 4 HOURS PRN
Qty: 118 ML | Refills: 0 | Status: SHIPPED | OUTPATIENT
Start: 2020-01-01 | End: 2020-01-01 | Stop reason: SDUPTHER

## 2020-01-01 RX ORDER — ESCITALOPRAM OXALATE 10 MG/1
10 TABLET ORAL DAILY
Status: CANCELLED | OUTPATIENT
Start: 2020-01-01

## 2020-01-01 RX ORDER — MECLIZINE HCL 12.5 MG 12.5 MG/1
12.5 TABLET ORAL 3 TIMES DAILY
Status: DISCONTINUED | OUTPATIENT
Start: 2020-01-01 | End: 2020-01-01 | Stop reason: HOSPADM

## 2020-01-01 RX ORDER — ALBUTEROL SULFATE 0.83 MG/ML
2.5 SOLUTION RESPIRATORY (INHALATION) EVERY 6 HOURS PRN
Qty: 1 BOX | Refills: 11 | Status: SHIPPED | OUTPATIENT
Start: 2020-01-01 | End: 2020-01-01 | Stop reason: CLARIF

## 2020-01-01 RX ORDER — INDOMETHACIN 25 MG/1
100 CAPSULE ORAL ONCE
Status: DISCONTINUED | OUTPATIENT
Start: 2020-01-01 | End: 2020-01-01

## 2020-01-01 RX ORDER — ALBUTEROL SULFATE 0.83 MG/ML
SOLUTION RESPIRATORY (INHALATION)
Status: ON HOLD | COMMUNITY
Start: 2020-01-01 | End: 2020-01-01 | Stop reason: SDUPTHER

## 2020-01-01 RX ORDER — PANTOPRAZOLE SODIUM 40 MG/1
40 TABLET, DELAYED RELEASE ORAL
Status: DISCONTINUED | OUTPATIENT
Start: 2020-01-01 | End: 2020-01-01 | Stop reason: HOSPADM

## 2020-01-01 RX ORDER — SODIUM CHLORIDE 9 MG/ML
INJECTION, SOLUTION INTRAVENOUS CONTINUOUS
Status: DISCONTINUED | OUTPATIENT
Start: 2020-01-01 | End: 2020-01-01 | Stop reason: HOSPADM

## 2020-01-01 RX ORDER — LORAZEPAM 2 MG/ML
1 INJECTION INTRAMUSCULAR
Status: DISCONTINUED | OUTPATIENT
Start: 2020-01-01 | End: 2020-01-01 | Stop reason: HOSPADM

## 2020-01-01 RX ORDER — POTASSIUM CHLORIDE 7.45 MG/ML
10 INJECTION INTRAVENOUS
Status: DISCONTINUED | OUTPATIENT
Start: 2020-01-01 | End: 2020-01-01

## 2020-01-01 RX ORDER — FUROSEMIDE 10 MG/ML
40 INJECTION INTRAMUSCULAR; INTRAVENOUS 2 TIMES DAILY
Status: DISCONTINUED | OUTPATIENT
Start: 2020-01-01 | End: 2020-01-01

## 2020-01-01 RX ORDER — FLUTICASONE FUROATE AND VILANTEROL TRIFENATATE 100; 25 UG/1; UG/1
1 POWDER RESPIRATORY (INHALATION) 2 TIMES DAILY
COMMUNITY

## 2020-01-01 RX ORDER — IPRATROPIUM BROMIDE AND ALBUTEROL SULFATE 2.5; .5 MG/3ML; MG/3ML
3 SOLUTION RESPIRATORY (INHALATION) EVERY 6 HOURS PRN
COMMUNITY

## 2020-01-01 RX ORDER — ROCURONIUM BROMIDE 10 MG/ML
INJECTION, SOLUTION INTRAVENOUS
Status: DISCONTINUED
Start: 2020-01-01 | End: 2020-01-01 | Stop reason: WASHOUT

## 2020-01-01 RX ORDER — ESCITALOPRAM OXALATE 10 MG/1
10 TABLET ORAL DAILY
Status: DISCONTINUED | OUTPATIENT
Start: 2020-01-01 | End: 2020-01-01 | Stop reason: HOSPADM

## 2020-01-01 RX ORDER — LANOLIN ALCOHOL/MO/W.PET/CERES
400 CREAM (GRAM) TOPICAL 2 TIMES DAILY
COMMUNITY

## 2020-01-01 RX ORDER — LIDOCAINE HCL/PF 100 MG/5ML
SYRINGE (ML) INTRAVENOUS
Status: DISCONTINUED | OUTPATIENT
Start: 2020-01-01 | End: 2020-01-01

## 2020-01-01 RX ORDER — ONDANSETRON 4 MG/1
4 TABLET, FILM COATED ORAL EVERY 8 HOURS PRN
Status: DISCONTINUED | OUTPATIENT
Start: 2020-01-01 | End: 2020-01-01 | Stop reason: HOSPADM

## 2020-01-01 RX ORDER — INDOMETHACIN 25 MG/1
CAPSULE ORAL
Status: DISCONTINUED
Start: 2020-01-01 | End: 2020-01-01 | Stop reason: HOSPADM

## 2020-01-01 RX ORDER — ATORVASTATIN CALCIUM 40 MG/1
40 TABLET, FILM COATED ORAL DAILY
COMMUNITY

## 2020-01-01 RX ORDER — MONTELUKAST SODIUM 10 MG/1
TABLET, FILM COATED ORAL
Status: ON HOLD | COMMUNITY
Start: 2020-01-01 | End: 2020-01-01 | Stop reason: HOSPADM

## 2020-01-01 RX ORDER — ROCURONIUM BROMIDE 10 MG/ML
100 INJECTION, SOLUTION INTRAVENOUS ONCE
Status: COMPLETED | OUTPATIENT
Start: 2020-01-01 | End: 2020-01-01

## 2020-01-01 RX ORDER — LORAZEPAM 2 MG/ML
2 INJECTION INTRAMUSCULAR
Status: DISCONTINUED | OUTPATIENT
Start: 2020-01-01 | End: 2020-01-01 | Stop reason: HOSPADM

## 2020-01-01 RX ORDER — LACOSAMIDE 200 MG/1
200 TABLET ORAL EVERY 12 HOURS
Qty: 60 TABLET | Refills: 5 | Status: SHIPPED | OUTPATIENT
Start: 2020-01-01 | End: 2021-10-05

## 2020-01-01 RX ORDER — FLUTICASONE PROPIONATE 50 MCG
2 SPRAY, SUSPENSION (ML) NASAL DAILY
Qty: 1 BOTTLE | Refills: 11 | Status: SHIPPED | OUTPATIENT
Start: 2020-01-01 | End: 2020-01-01

## 2020-01-01 RX ORDER — PROPOFOL 10 MG/ML
0-50 INJECTION, EMULSION INTRAVENOUS CONTINUOUS
Status: DISCONTINUED | OUTPATIENT
Start: 2020-01-01 | End: 2020-01-01

## 2020-01-01 RX ORDER — FUROSEMIDE 20 MG/1
20 TABLET ORAL DAILY
Status: DISCONTINUED | OUTPATIENT
Start: 2020-01-01 | End: 2020-01-01

## 2020-01-01 RX ORDER — POTASSIUM CHLORIDE 7.45 MG/ML
10 INJECTION INTRAVENOUS
Status: COMPLETED | OUTPATIENT
Start: 2020-01-01 | End: 2020-01-01

## 2020-01-01 RX ORDER — LOSARTAN POTASSIUM 50 MG/1
50 TABLET ORAL DAILY
Status: CANCELLED | OUTPATIENT
Start: 2020-01-01

## 2020-01-01 RX ORDER — DEXMEDETOMIDINE HYDROCHLORIDE 4 UG/ML
INJECTION, SOLUTION INTRAVENOUS
Status: COMPLETED
Start: 2020-01-01 | End: 2020-01-01

## 2020-01-01 RX ORDER — LORAZEPAM 2 MG/ML
2 INJECTION INTRAMUSCULAR ONCE AS NEEDED
Status: DISCONTINUED | OUTPATIENT
Start: 2020-01-01 | End: 2020-01-01

## 2020-01-01 RX ORDER — SILODOSIN 8 MG/1
8 CAPSULE ORAL DAILY
Status: DISCONTINUED | OUTPATIENT
Start: 2020-01-01 | End: 2020-01-01

## 2020-01-01 RX ORDER — MAGNESIUM SULFATE HEPTAHYDRATE 40 MG/ML
4 INJECTION, SOLUTION INTRAVENOUS
Status: DISCONTINUED | OUTPATIENT
Start: 2020-01-01 | End: 2020-01-01

## 2020-01-01 RX ORDER — METRONIDAZOLE 500 MG/100ML
500 INJECTION, SOLUTION INTRAVENOUS EVERY 8 HOURS
Qty: 2100 ML | Refills: 0 | Status: ON HOLD | OUTPATIENT
Start: 2020-01-01 | End: 2020-01-01 | Stop reason: CLARIF

## 2020-01-01 RX ORDER — TALC
6 POWDER (GRAM) TOPICAL NIGHTLY PRN
Status: DISCONTINUED | OUTPATIENT
Start: 2020-01-01 | End: 2020-01-01 | Stop reason: HOSPADM

## 2020-01-01 RX ORDER — POLYETHYLENE GLYCOL 3350 17 G/17G
17 POWDER, FOR SOLUTION ORAL EVERY 4 HOURS
Status: DISCONTINUED | OUTPATIENT
Start: 2020-01-01 | End: 2020-01-01 | Stop reason: HOSPADM

## 2020-01-01 RX ORDER — SODIUM BICARBONATE 650 MG/1
1300 TABLET ORAL 4 TIMES DAILY
Status: DISCONTINUED | OUTPATIENT
Start: 2020-01-01 | End: 2020-01-01

## 2020-01-01 RX ORDER — AMLODIPINE BESYLATE 5 MG/1
5 TABLET ORAL DAILY
Qty: 30 TABLET | Refills: 11
Start: 2020-01-01 | End: 2021-10-09

## 2020-01-01 RX ORDER — DOPAMINE HYDROCHLORIDE 160 MG/100ML
5 INJECTION, SOLUTION INTRAVENOUS CONTINUOUS
Status: DISCONTINUED | OUTPATIENT
Start: 2020-01-01 | End: 2020-01-01

## 2020-01-01 RX ORDER — IPRATROPIUM BROMIDE AND ALBUTEROL SULFATE 2.5; .5 MG/3ML; MG/3ML
3 SOLUTION RESPIRATORY (INHALATION) EVERY 4 HOURS PRN
Status: DISCONTINUED | OUTPATIENT
Start: 2020-01-01 | End: 2020-01-01

## 2020-01-01 RX ORDER — PANTOPRAZOLE SODIUM 40 MG/1
40 TABLET, DELAYED RELEASE ORAL 2 TIMES DAILY
Status: CANCELLED | OUTPATIENT
Start: 2020-01-01

## 2020-01-01 RX ORDER — IBUPROFEN 200 MG
16 TABLET ORAL
Status: DISCONTINUED | OUTPATIENT
Start: 2020-01-01 | End: 2020-01-01 | Stop reason: HOSPADM

## 2020-01-01 RX ORDER — PREDNISONE 20 MG/1
20 TABLET ORAL DAILY
Qty: 14 TABLET | Refills: 0 | Status: ON HOLD | OUTPATIENT
Start: 2020-01-01 | End: 2020-01-01 | Stop reason: HOSPADM

## 2020-01-01 RX ORDER — LOSARTAN POTASSIUM 50 MG/1
50 TABLET ORAL DAILY
Status: ON HOLD | COMMUNITY
End: 2020-01-01 | Stop reason: HOSPADM

## 2020-01-01 RX ORDER — IPRATROPIUM BROMIDE AND ALBUTEROL SULFATE 2.5; .5 MG/3ML; MG/3ML
3 SOLUTION RESPIRATORY (INHALATION) EVERY 4 HOURS
Status: DISCONTINUED | OUTPATIENT
Start: 2020-01-01 | End: 2020-01-01 | Stop reason: HOSPADM

## 2020-01-01 RX ORDER — LACOSAMIDE 10 MG/ML
100 SOLUTION ORAL EVERY 12 HOURS
Qty: 600 ML | Refills: 11 | Status: ON HOLD | OUTPATIENT
Start: 2020-01-01 | End: 2020-01-01 | Stop reason: HOSPADM

## 2020-01-01 RX ORDER — SODIUM BICARBONATE 650 MG/1
1300 TABLET ORAL EVERY 4 HOURS
Status: DISCONTINUED | OUTPATIENT
Start: 2020-01-01 | End: 2020-01-01

## 2020-01-01 RX ORDER — ENOXAPARIN SODIUM 100 MG/ML
40 INJECTION SUBCUTANEOUS DAILY
Qty: 12 ML | Refills: 0 | Status: ON HOLD | OUTPATIENT
Start: 2020-01-01 | End: 2020-01-01 | Stop reason: CLARIF

## 2020-01-01 RX ORDER — SODIUM BICARBONATE 650 MG/1
650 TABLET ORAL 2 TIMES DAILY
Status: DISCONTINUED | OUTPATIENT
Start: 2020-01-01 | End: 2020-01-01

## 2020-01-01 RX ORDER — LOSARTAN POTASSIUM 25 MG/1
25 TABLET ORAL DAILY
Status: DISCONTINUED | OUTPATIENT
Start: 2020-01-01 | End: 2020-01-01

## 2020-01-01 RX ORDER — LOSARTAN POTASSIUM 50 MG/1
50 TABLET ORAL DAILY
Qty: 90 TABLET | Refills: 3 | Status: SHIPPED | OUTPATIENT
Start: 2020-01-01 | End: 2020-01-01 | Stop reason: CLARIF

## 2020-01-01 RX ORDER — FENTANYL CITRATE 50 UG/ML
INJECTION, SOLUTION INTRAMUSCULAR; INTRAVENOUS
Status: DISCONTINUED | OUTPATIENT
Start: 2020-01-01 | End: 2020-01-01

## 2020-01-01 RX ORDER — PANTOPRAZOLE SODIUM 40 MG/1
40 FOR SUSPENSION ORAL DAILY
Status: DISCONTINUED | OUTPATIENT
Start: 2020-01-01 | End: 2020-01-01 | Stop reason: HOSPADM

## 2020-01-01 RX ORDER — DONEPEZIL HYDROCHLORIDE 10 MG/1
1 TABLET, FILM COATED ORAL DAILY
Status: ON HOLD | COMMUNITY
Start: 2020-01-01 | End: 2020-01-01 | Stop reason: SDUPTHER

## 2020-01-01 RX ORDER — IPRATROPIUM BROMIDE AND ALBUTEROL SULFATE 2.5; .5 MG/3ML; MG/3ML
3 SOLUTION RESPIRATORY (INHALATION)
Status: DISCONTINUED | OUTPATIENT
Start: 2020-01-01 | End: 2020-01-01 | Stop reason: HOSPADM

## 2020-01-01 RX ORDER — PANTOPRAZOLE SODIUM 40 MG/1
40 TABLET, DELAYED RELEASE ORAL 2 TIMES DAILY
Status: DISCONTINUED | OUTPATIENT
Start: 2020-01-01 | End: 2020-01-01 | Stop reason: HOSPADM

## 2020-01-01 RX ORDER — DEXTROMETHORPHAN/PSEUDOEPHED 2.5-7.5/.8
DROPS ORAL
Status: COMPLETED | OUTPATIENT
Start: 2020-01-01 | End: 2020-01-01

## 2020-01-01 RX ORDER — PANTOPRAZOLE SODIUM 40 MG/1
40 TABLET, DELAYED RELEASE ORAL 2 TIMES DAILY
Status: ON HOLD | COMMUNITY
End: 2020-01-01 | Stop reason: HOSPADM

## 2020-01-01 RX ORDER — PROPOFOL 10 MG/ML
INJECTION, EMULSION INTRAVENOUS
Status: COMPLETED
Start: 2020-01-01 | End: 2020-01-01

## 2020-01-01 RX ORDER — FENTANYL CITRATE 50 UG/ML
100 INJECTION, SOLUTION INTRAMUSCULAR; INTRAVENOUS
Status: DISCONTINUED | OUTPATIENT
Start: 2020-01-01 | End: 2020-01-01 | Stop reason: HOSPADM

## 2020-01-01 RX ORDER — PREDNISONE 50 MG/1
50 TABLET ORAL DAILY PRN
Qty: 7 TABLET | Refills: 0 | Status: SHIPPED | OUTPATIENT
Start: 2020-01-01 | End: 2020-01-01

## 2020-01-01 RX ORDER — DOXYCYCLINE 100 MG/1
100 CAPSULE ORAL EVERY 12 HOURS
Qty: 14 CAPSULE | Refills: 0
Start: 2020-01-01 | End: 2020-01-01 | Stop reason: SDUPTHER

## 2020-01-01 RX ORDER — PHENYLEPHRINE HCL IN 0.9% NACL 1 MG/10 ML
400 SYRINGE (ML) INTRAVENOUS ONCE
Status: COMPLETED | OUTPATIENT
Start: 2020-01-01 | End: 2020-01-01

## 2020-01-01 RX ORDER — DICLOFENAC SODIUM 10 MG/G
2 GEL TOPICAL 3 TIMES DAILY
Qty: 1 TUBE | Refills: 0 | Status: SHIPPED | OUTPATIENT
Start: 2020-01-01 | End: 2020-01-01 | Stop reason: CLARIF

## 2020-01-01 RX ADMIN — LEVETIRACETAM 500 MG: 5 INJECTION INTRAVENOUS at 08:10

## 2020-01-01 RX ADMIN — HYDROCORTISONE SODIUM SUCCINATE 100 MG: 100 INJECTION, POWDER, FOR SOLUTION INTRAMUSCULAR; INTRAVENOUS at 10:10

## 2020-01-01 RX ADMIN — HEPARIN SODIUM 5000 UNITS: 5000 INJECTION INTRAVENOUS; SUBCUTANEOUS at 01:10

## 2020-01-01 RX ADMIN — Medication 10 ML: at 11:10

## 2020-01-01 RX ADMIN — LACOSAMIDE 200 MG: 10 SOLUTION ORAL at 11:09

## 2020-01-01 RX ADMIN — HALOPERIDOL 2 MG: 2 TABLET ORAL at 03:10

## 2020-01-01 RX ADMIN — PANTOPRAZOLE SODIUM 40 MG: 40 TABLET, DELAYED RELEASE ORAL at 09:08

## 2020-01-01 RX ADMIN — HEPARIN SODIUM 5000 UNITS: 5000 INJECTION INTRAVENOUS; SUBCUTANEOUS at 05:10

## 2020-01-01 RX ADMIN — DOXYCYCLINE 100 MG: 100 INJECTION, POWDER, LYOPHILIZED, FOR SOLUTION INTRAVENOUS at 10:09

## 2020-01-01 RX ADMIN — ASPIRIN 325 MG ORAL TABLET 325 MG: 325 PILL ORAL at 09:09

## 2020-01-01 RX ADMIN — FAMOTIDINE 20 MG: 10 INJECTION INTRAVENOUS at 09:10

## 2020-01-01 RX ADMIN — ENOXAPARIN SODIUM 40 MG: 40 INJECTION SUBCUTANEOUS at 04:09

## 2020-01-01 RX ADMIN — MAGNESIUM SULFATE IN WATER 2 G: 40 INJECTION, SOLUTION INTRAVENOUS at 09:10

## 2020-01-01 RX ADMIN — LEVETIRACETAM 500 MG: 5 INJECTION INTRAVENOUS at 09:10

## 2020-01-01 RX ADMIN — Medication 0.12 MCG/KG/MIN: at 09:10

## 2020-01-01 RX ADMIN — LACOSAMIDE 200 MG: 10 INJECTION INTRAVENOUS at 08:10

## 2020-01-01 RX ADMIN — VANCOMYCIN HYDROCHLORIDE 1500 MG: 1.5 INJECTION, POWDER, LYOPHILIZED, FOR SOLUTION INTRAVENOUS at 09:10

## 2020-01-01 RX ADMIN — PERAMPANEL 12 MG: 12 TABLET ORAL at 08:10

## 2020-01-01 RX ADMIN — LIDOCAINE HYDROCHLORIDE 100 MG: 20 INJECTION, SOLUTION INTRAVENOUS at 09:08

## 2020-01-01 RX ADMIN — IPRATROPIUM BROMIDE AND ALBUTEROL SULFATE 3 ML: .5; 2.5 SOLUTION RESPIRATORY (INHALATION) at 11:09

## 2020-01-01 RX ADMIN — URSODIOL 518.4 MG: 300 CAPSULE ORAL at 08:10

## 2020-01-01 RX ADMIN — AMPICILLIN SODIUM 2 G: 2 INJECTION, POWDER, FOR SOLUTION INTRAMUSCULAR; INTRAVENOUS at 12:09

## 2020-01-01 RX ADMIN — ACETAMINOPHEN 650 MG: 325 TABLET ORAL at 06:09

## 2020-01-01 RX ADMIN — Medication 10 ML: at 09:10

## 2020-01-01 RX ADMIN — VASOPRESSIN 0.04 UNITS/MIN: 20 INJECTION INTRAVENOUS at 09:10

## 2020-01-01 RX ADMIN — IPRATROPIUM BROMIDE AND ALBUTEROL SULFATE 3 ML: .5; 2.5 SOLUTION RESPIRATORY (INHALATION) at 01:09

## 2020-01-01 RX ADMIN — CEFTRIAXONE SODIUM 2 G: 2 INJECTION, SOLUTION INTRAVENOUS at 09:10

## 2020-01-01 RX ADMIN — POTASSIUM BICARBONATE 25 MEQ: 978 TABLET, EFFERVESCENT ORAL at 10:10

## 2020-01-01 RX ADMIN — HEPARIN SODIUM 5000 UNITS: 5000 INJECTION INTRAVENOUS; SUBCUTANEOUS at 06:09

## 2020-01-01 RX ADMIN — Medication 10 ML: at 06:10

## 2020-01-01 RX ADMIN — SILODOSIN 4 MG: 4 CAPSULE ORAL at 08:10

## 2020-01-01 RX ADMIN — LACOSAMIDE 200 MG: 10 INJECTION INTRAVENOUS at 09:10

## 2020-01-01 RX ADMIN — ENOXAPARIN SODIUM 40 MG: 40 INJECTION SUBCUTANEOUS at 06:10

## 2020-01-01 RX ADMIN — LORAZEPAM 2 MG: 2 INJECTION INTRAMUSCULAR; INTRAVENOUS at 06:09

## 2020-01-01 RX ADMIN — SODIUM BICARBONATE 650 MG TABLET 1300 MG: at 08:10

## 2020-01-01 RX ADMIN — CLOBAZAM 10 MG: 10 TABLET ORAL at 09:10

## 2020-01-01 RX ADMIN — MICONAZOLE NITRATE: 20 OINTMENT TOPICAL at 08:10

## 2020-01-01 RX ADMIN — MIDAZOLAM 4 MG: 5 INJECTION INTRAMUSCULAR; INTRAVENOUS at 08:07

## 2020-01-01 RX ADMIN — LACOSAMIDE 200 MG: 50 TABLET, FILM COATED ORAL at 12:10

## 2020-01-01 RX ADMIN — HEPARIN SODIUM 5000 UNITS: 5000 INJECTION INTRAVENOUS; SUBCUTANEOUS at 10:09

## 2020-01-01 RX ADMIN — PROPOFOL 20 MCG/KG/MIN: 10 INJECTION, EMULSION INTRAVENOUS at 12:09

## 2020-01-01 RX ADMIN — MICONAZOLE NITRATE: 20 OINTMENT TOPICAL at 09:09

## 2020-01-01 RX ADMIN — Medication 10 ML: at 05:10

## 2020-01-01 RX ADMIN — IPRATROPIUM BROMIDE AND ALBUTEROL SULFATE 3 ML: .5; 2.5 SOLUTION RESPIRATORY (INHALATION) at 10:10

## 2020-01-01 RX ADMIN — Medication 400 MCG: at 04:10

## 2020-01-01 RX ADMIN — Medication 1 SPRAY: at 10:10

## 2020-01-01 RX ADMIN — SUCRALFATE 1 G: 1 TABLET ORAL at 05:08

## 2020-01-01 RX ADMIN — TRAZODONE HYDROCHLORIDE 25 MG: 50 TABLET ORAL at 08:10

## 2020-01-01 RX ADMIN — Medication 10 ML: at 12:09

## 2020-01-01 RX ADMIN — ESCITALOPRAM OXALATE 10 MG: 10 TABLET ORAL at 10:08

## 2020-01-01 RX ADMIN — LORAZEPAM 1 MG: 2 INJECTION INTRAMUSCULAR; INTRAVENOUS at 11:10

## 2020-01-01 RX ADMIN — ACETAMINOPHEN 650 MG: 325 TABLET ORAL at 12:09

## 2020-01-01 RX ADMIN — LACOSAMIDE 200 MG: 10 SOLUTION ORAL at 08:09

## 2020-01-01 RX ADMIN — Medication 10 ML: at 11:09

## 2020-01-01 RX ADMIN — IPRATROPIUM BROMIDE AND ALBUTEROL SULFATE 3 ML: .5; 2.5 SOLUTION RESPIRATORY (INHALATION) at 07:09

## 2020-01-01 RX ADMIN — SUCRALFATE 1 G: 1 TABLET ORAL at 10:08

## 2020-01-01 RX ADMIN — CEFTRIAXONE SODIUM 2 G: 2 INJECTION, SOLUTION INTRAVENOUS at 09:09

## 2020-01-01 RX ADMIN — PROPOFOL 30 MG: 10 INJECTION, EMULSION INTRAVENOUS at 09:08

## 2020-01-01 RX ADMIN — MICONAZOLE NITRATE: 20 OINTMENT TOPICAL at 08:09

## 2020-01-01 RX ADMIN — HEPARIN SODIUM 5000 UNITS: 5000 INJECTION INTRAVENOUS; SUBCUTANEOUS at 03:10

## 2020-01-01 RX ADMIN — ATORVASTATIN CALCIUM 40 MG: 20 TABLET, FILM COATED ORAL at 08:09

## 2020-01-01 RX ADMIN — FLUTICASONE FUROATE AND VILANTEROL TRIFENATATE 1 PUFF: 100; 25 POWDER RESPIRATORY (INHALATION) at 09:10

## 2020-01-01 RX ADMIN — DEXMEDETOMIDINE HYDROCHLORIDE 0.6 MCG/KG/HR: 4 INJECTION, SOLUTION INTRAVENOUS at 08:09

## 2020-01-01 RX ADMIN — PIPERACILLIN SODIUM AND TAZOBACTAM SODIUM 4.5 G: 4; .5 INJECTION, POWDER, LYOPHILIZED, FOR SOLUTION INTRAVENOUS at 01:10

## 2020-01-01 RX ADMIN — PERAMPANEL 12 MG: 12 TABLET ORAL at 09:10

## 2020-01-01 RX ADMIN — Medication 1 CAPSULE: at 08:10

## 2020-01-01 RX ADMIN — ESCITALOPRAM OXALATE 10 MG: 10 TABLET ORAL at 09:08

## 2020-01-01 RX ADMIN — KETAMINE HYDROCHLORIDE 50 MCG/KG/MIN: 100 INJECTION, SOLUTION, CONCENTRATE INTRAMUSCULAR; INTRAVENOUS at 06:10

## 2020-01-01 RX ADMIN — INSULIN ASPART 1 UNITS: 100 INJECTION, SOLUTION INTRAVENOUS; SUBCUTANEOUS at 09:10

## 2020-01-01 RX ADMIN — METRONIDAZOLE 500 MG: 500 INJECTION, SOLUTION INTRAVENOUS at 06:09

## 2020-01-01 RX ADMIN — LACOSAMIDE 200 MG: 50 TABLET, FILM COATED ORAL at 08:10

## 2020-01-01 RX ADMIN — AMPICILLIN SODIUM 2 G: 2 INJECTION, POWDER, FOR SOLUTION INTRAMUSCULAR; INTRAVENOUS at 04:09

## 2020-01-01 RX ADMIN — IPRATROPIUM BROMIDE AND ALBUTEROL SULFATE 3 ML: .5; 2.5 SOLUTION RESPIRATORY (INHALATION) at 09:09

## 2020-01-01 RX ADMIN — PANTOPRAZOLE SODIUM 40 MG: 40 INJECTION, POWDER, LYOPHILIZED, FOR SOLUTION INTRAVENOUS at 05:08

## 2020-01-01 RX ADMIN — PROPOFOL 20 MCG/KG/MIN: 10 INJECTION, EMULSION INTRAVENOUS at 06:09

## 2020-01-01 RX ADMIN — METRONIDAZOLE 500 MG: 500 INJECTION, SOLUTION INTRAVENOUS at 10:09

## 2020-01-01 RX ADMIN — POTASSIUM CHLORIDE 10 MEQ: 10 INJECTION, SOLUTION INTRAVENOUS at 07:09

## 2020-01-01 RX ADMIN — HEPARIN SODIUM 5000 UNITS: 5000 INJECTION INTRAVENOUS; SUBCUTANEOUS at 09:10

## 2020-01-01 RX ADMIN — ENOXAPARIN SODIUM 40 MG: 40 INJECTION SUBCUTANEOUS at 05:09

## 2020-01-01 RX ADMIN — POTASSIUM CHLORIDE 40 MEQ: 1.5 POWDER, FOR SOLUTION ORAL at 11:10

## 2020-01-01 RX ADMIN — MICONAZOLE NITRATE: 20 OINTMENT TOPICAL at 03:10

## 2020-01-01 RX ADMIN — KETAMINE HYDROCHLORIDE 35 MCG/KG/MIN: 100 INJECTION, SOLUTION, CONCENTRATE INTRAMUSCULAR; INTRAVENOUS at 10:10

## 2020-01-01 RX ADMIN — IPRATROPIUM BROMIDE AND ALBUTEROL SULFATE 3 ML: .5; 2.5 SOLUTION RESPIRATORY (INHALATION) at 05:09

## 2020-01-01 RX ADMIN — LACOSAMIDE 200 MG: 50 TABLET, FILM COATED ORAL at 09:10

## 2020-01-01 RX ADMIN — IPRATROPIUM BROMIDE AND ALBUTEROL SULFATE 3 ML: .5; 2.5 SOLUTION RESPIRATORY (INHALATION) at 03:10

## 2020-01-01 RX ADMIN — LOSARTAN POTASSIUM 25 MG: 25 TABLET, FILM COATED ORAL at 08:09

## 2020-01-01 RX ADMIN — PANTOPRAZOLE SODIUM 40 MG: 40 GRANULE, DELAYED RELEASE ORAL at 09:10

## 2020-01-01 RX ADMIN — Medication 200 MEQ: at 09:10

## 2020-01-01 RX ADMIN — HEPARIN SODIUM 5000 UNITS: 5000 INJECTION INTRAVENOUS; SUBCUTANEOUS at 09:09

## 2020-01-01 RX ADMIN — PANTOPRAZOLE SODIUM 40 MG: 40 GRANULE, DELAYED RELEASE ORAL at 08:09

## 2020-01-01 RX ADMIN — LEVALBUTEROL HYDROCHLORIDE 0.63 MG: 0.63 SOLUTION RESPIRATORY (INHALATION) at 03:08

## 2020-01-01 RX ADMIN — IPRATROPIUM BROMIDE AND ALBUTEROL SULFATE 3 ML: .5; 2.5 SOLUTION RESPIRATORY (INHALATION) at 12:09

## 2020-01-01 RX ADMIN — IPRATROPIUM BROMIDE AND ALBUTEROL SULFATE 3 ML: .5; 2.5 SOLUTION RESPIRATORY (INHALATION) at 04:09

## 2020-01-01 RX ADMIN — SODIUM CHLORIDE 500 ML: 0.9 INJECTION, SOLUTION INTRAVENOUS at 07:10

## 2020-01-01 RX ADMIN — Medication 10 ML: at 06:09

## 2020-01-01 RX ADMIN — POTASSIUM CHLORIDE 40 MEQ: 1.5 POWDER, FOR SOLUTION ORAL at 12:10

## 2020-01-01 RX ADMIN — ACETAMINOPHEN 650 MG: 325 TABLET ORAL at 10:09

## 2020-01-01 RX ADMIN — HEPARIN SODIUM 5000 UNITS: 5000 INJECTION INTRAVENOUS; SUBCUTANEOUS at 10:10

## 2020-01-01 RX ADMIN — Medication 0.16 MCG/KG/MIN: at 05:10

## 2020-01-01 RX ADMIN — SODIUM BICARBONATE 650 MG TABLET 1300 MG: at 01:10

## 2020-01-01 RX ADMIN — CEFTRIAXONE SODIUM 2 G: 2 INJECTION, SOLUTION INTRAVENOUS at 10:10

## 2020-01-01 RX ADMIN — SUCRALFATE 1 G: 1 TABLET ORAL at 09:08

## 2020-01-01 RX ADMIN — SODIUM CHLORIDE: 0.9 INJECTION, SOLUTION INTRAVENOUS at 11:10

## 2020-01-01 RX ADMIN — LEVETIRACETAM 500 MG: 5 INJECTION INTRAVENOUS at 09:09

## 2020-01-01 RX ADMIN — Medication 10 ML: at 12:10

## 2020-01-01 RX ADMIN — ENOXAPARIN SODIUM 40 MG: 40 INJECTION SUBCUTANEOUS at 09:09

## 2020-01-01 RX ADMIN — HEPARIN SODIUM 5000 UNITS: 5000 INJECTION INTRAVENOUS; SUBCUTANEOUS at 02:09

## 2020-01-01 RX ADMIN — LEVETIRACETAM INJECTION 500 MG: 5 INJECTION INTRAVENOUS at 04:09

## 2020-01-01 RX ADMIN — IPRATROPIUM BROMIDE AND ALBUTEROL SULFATE 3 ML: .5; 2.5 SOLUTION RESPIRATORY (INHALATION) at 08:09

## 2020-01-01 RX ADMIN — IPRATROPIUM BROMIDE AND ALBUTEROL SULFATE 3 ML: .5; 2.5 SOLUTION RESPIRATORY (INHALATION) at 08:10

## 2020-01-01 RX ADMIN — POTASSIUM CHLORIDE 20 MEQ: 200 INJECTION, SOLUTION INTRAVENOUS at 03:09

## 2020-01-01 RX ADMIN — PIPERACILLIN SODIUM AND TAZOBACTAM SODIUM 4.5 G: 4; .5 INJECTION, POWDER, LYOPHILIZED, FOR SOLUTION INTRAVENOUS at 02:10

## 2020-01-01 RX ADMIN — INSULIN ASPART 2 UNITS: 100 INJECTION, SOLUTION INTRAVENOUS; SUBCUTANEOUS at 11:10

## 2020-01-01 RX ADMIN — MECLIZINE 12.5 MG: 12.5 TABLET ORAL at 03:08

## 2020-01-01 RX ADMIN — DOCUSATE SODIUM AND SENNOSIDES 1 TABLET: 8.6; 5 TABLET, FILM COATED ORAL at 08:10

## 2020-01-01 RX ADMIN — MUPIROCIN: 20 OINTMENT TOPICAL at 08:10

## 2020-01-01 RX ADMIN — POTASSIUM CHLORIDE 10 MEQ: 7.46 INJECTION, SOLUTION INTRAVENOUS at 08:09

## 2020-01-01 RX ADMIN — PROPOFOL 150 MCG/KG/MIN: 10 INJECTION, EMULSION INTRAVENOUS at 04:08

## 2020-01-01 RX ADMIN — CIPROFLOXACIN HYDROCHLORIDE 750 MG: 250 TABLET, FILM COATED ORAL at 08:09

## 2020-01-01 RX ADMIN — VANCOMYCIN HYDROCHLORIDE 1000 MG: 1 INJECTION, POWDER, LYOPHILIZED, FOR SOLUTION INTRAVENOUS at 11:10

## 2020-01-01 RX ADMIN — FUROSEMIDE 120 MG: 10 INJECTION, SOLUTION INTRAMUSCULAR; INTRAVENOUS at 10:10

## 2020-01-01 RX ADMIN — DOXYCYCLINE 100 MG: 100 INJECTION, POWDER, LYOPHILIZED, FOR SOLUTION INTRAVENOUS at 07:09

## 2020-01-01 RX ADMIN — FAMOTIDINE 20 MG: 20 TABLET, FILM COATED ORAL at 09:10

## 2020-01-01 RX ADMIN — SODIUM FERRIC GLUCONATE COMPLEX 125 MG: 12.5 INJECTION INTRAVENOUS at 09:09

## 2020-01-01 RX ADMIN — LORAZEPAM 2 MG: 2 INJECTION INTRAMUSCULAR; INTRAVENOUS at 11:09

## 2020-01-01 RX ADMIN — CEFTRIAXONE 2 G: 2 INJECTION, SOLUTION INTRAVENOUS at 05:08

## 2020-01-01 RX ADMIN — TRAZODONE HYDROCHLORIDE 25 MG: 50 TABLET ORAL at 09:09

## 2020-01-01 RX ADMIN — ATORVASTATIN CALCIUM 40 MG: 20 TABLET, FILM COATED ORAL at 09:09

## 2020-01-01 RX ADMIN — PANTOPRAZOLE SODIUM 40 MG: 40 TABLET, DELAYED RELEASE ORAL at 08:08

## 2020-01-01 RX ADMIN — SODIUM BICARBONATE 650 MG TABLET 1300 MG: at 03:10

## 2020-01-01 RX ADMIN — LACOSAMIDE 200 MG: 10 SOLUTION ORAL at 09:09

## 2020-01-01 RX ADMIN — PANTOPRAZOLE SODIUM 40 MG: 40 TABLET, DELAYED RELEASE ORAL at 10:10

## 2020-01-01 RX ADMIN — SODIUM BICARBONATE 650 MG TABLET 1300 MG: at 09:10

## 2020-01-01 RX ADMIN — IPRATROPIUM BROMIDE AND ALBUTEROL SULFATE 3 ML: .5; 2.5 SOLUTION RESPIRATORY (INHALATION) at 12:10

## 2020-01-01 RX ADMIN — VANCOMYCIN HYDROCHLORIDE 2250 MG: 100 INJECTION, POWDER, LYOPHILIZED, FOR SOLUTION INTRAVENOUS at 09:09

## 2020-01-01 RX ADMIN — FLUTICASONE FUROATE AND VILANTEROL TRIFENATATE 1 PUFF: 100; 25 POWDER RESPIRATORY (INHALATION) at 10:10

## 2020-01-01 RX ADMIN — IPRATROPIUM BROMIDE AND ALBUTEROL SULFATE 3 ML: .5; 2.5 SOLUTION RESPIRATORY (INHALATION) at 03:09

## 2020-01-01 RX ADMIN — LACOSAMIDE 200 MG: 50 TABLET, FILM COATED ORAL at 09:09

## 2020-01-01 RX ADMIN — CEFTRIAXONE SODIUM 2 G: 2 INJECTION, SOLUTION INTRAVENOUS at 12:10

## 2020-01-01 RX ADMIN — IPRATROPIUM BROMIDE AND ALBUTEROL SULFATE 3 ML: .5; 2.5 SOLUTION RESPIRATORY (INHALATION) at 01:10

## 2020-01-01 RX ADMIN — CEFTRIAXONE SODIUM 2 G: 2 INJECTION, SOLUTION INTRAVENOUS at 08:09

## 2020-01-01 RX ADMIN — LACOSAMIDE 200 MG: 10 SOLUTION ORAL at 10:09

## 2020-01-01 RX ADMIN — SODIUM BICARBONATE 650 MG TABLET 1300 MG: at 05:10

## 2020-01-01 RX ADMIN — PANTOPRAZOLE SODIUM 40 MG: 40 TABLET, DELAYED RELEASE ORAL at 09:10

## 2020-01-01 RX ADMIN — Medication 4 MG/HR: at 11:10

## 2020-01-01 RX ADMIN — IPRATROPIUM BROMIDE AND ALBUTEROL SULFATE 3 ML: .5; 2.5 SOLUTION RESPIRATORY (INHALATION) at 07:10

## 2020-01-01 RX ADMIN — CEFTRIAXONE 2 G: 2 INJECTION, SOLUTION INTRAVENOUS at 10:10

## 2020-01-01 RX ADMIN — IPRATROPIUM BROMIDE AND ALBUTEROL SULFATE 3 ML: .5; 2.5 SOLUTION RESPIRATORY (INHALATION) at 10:09

## 2020-01-01 RX ADMIN — LORAZEPAM 4 MG: 2 INJECTION INTRAMUSCULAR; INTRAVENOUS at 12:10

## 2020-01-01 RX ADMIN — INSULIN ASPART 2 UNITS: 100 INJECTION, SOLUTION INTRAVENOUS; SUBCUTANEOUS at 01:10

## 2020-01-01 RX ADMIN — LACOSAMIDE 200 MG: 50 TABLET, FILM COATED ORAL at 10:10

## 2020-01-01 RX ADMIN — HEPARIN SODIUM 5000 UNITS: 5000 INJECTION INTRAVENOUS; SUBCUTANEOUS at 05:09

## 2020-01-01 RX ADMIN — SUCRALFATE 1 G: 1 TABLET ORAL at 08:08

## 2020-01-01 RX ADMIN — ATORVASTATIN CALCIUM 40 MG: 20 TABLET, FILM COATED ORAL at 09:10

## 2020-01-01 RX ADMIN — CHLOROTHIAZIDE SODIUM 500 MG: 500 INJECTION, POWDER, LYOPHILIZED, FOR SOLUTION INTRAVENOUS at 09:10

## 2020-01-01 RX ADMIN — SILODOSIN 8 MG: 8 CAPSULE ORAL at 10:09

## 2020-01-01 RX ADMIN — HEPARIN SODIUM 5000 UNITS: 5000 INJECTION INTRAVENOUS; SUBCUTANEOUS at 02:10

## 2020-01-01 RX ADMIN — INSULIN ASPART 4 UNITS: 100 INJECTION, SOLUTION INTRAVENOUS; SUBCUTANEOUS at 05:10

## 2020-01-01 RX ADMIN — MICONAZOLE NITRATE: 20 OINTMENT TOPICAL at 10:09

## 2020-01-01 RX ADMIN — MECLIZINE 12.5 MG: 12.5 TABLET ORAL at 08:08

## 2020-01-01 RX ADMIN — VANCOMYCIN HYDROCHLORIDE 1000 MG: 1 INJECTION, POWDER, LYOPHILIZED, FOR SOLUTION INTRAVENOUS at 02:09

## 2020-01-01 RX ADMIN — PANTOPRAZOLE SODIUM 40 MG: 40 INJECTION, POWDER, LYOPHILIZED, FOR SOLUTION INTRAVENOUS at 08:10

## 2020-01-01 RX ADMIN — Medication 1 SPRAY: at 10:09

## 2020-01-01 RX ADMIN — MIDAZOLAM 2 MG: 1 INJECTION INTRAMUSCULAR; INTRAVENOUS at 12:10

## 2020-01-01 RX ADMIN — PSYLLIUM HUSK 1 PACKET: 3.4 POWDER ORAL at 03:09

## 2020-01-01 RX ADMIN — AMPICILLIN SODIUM 2 G: 2 INJECTION, POWDER, FOR SOLUTION INTRAMUSCULAR; INTRAVENOUS at 01:09

## 2020-01-01 RX ADMIN — FLUTICASONE FUROATE AND VILANTEROL TRIFENATATE 1 PUFF: 100; 25 POWDER RESPIRATORY (INHALATION) at 08:10

## 2020-01-01 RX ADMIN — SODIUM BICARBONATE: 84 INJECTION, SOLUTION INTRAVENOUS at 04:10

## 2020-01-01 RX ADMIN — SILODOSIN 8 MG: 8 CAPSULE ORAL at 08:09

## 2020-01-01 RX ADMIN — PANTOPRAZOLE SODIUM 40 MG: 40 GRANULE, DELAYED RELEASE ORAL at 09:09

## 2020-01-01 RX ADMIN — PANTOPRAZOLE SODIUM 40 MG: 40 INJECTION, POWDER, FOR SOLUTION INTRAVENOUS at 09:09

## 2020-01-01 RX ADMIN — IPRATROPIUM BROMIDE AND ALBUTEROL SULFATE 3 ML: .5; 2.5 SOLUTION RESPIRATORY (INHALATION) at 05:10

## 2020-01-01 RX ADMIN — SILODOSIN 4 MG: 4 CAPSULE ORAL at 12:10

## 2020-01-01 RX ADMIN — Medication 1 CAPSULE: at 10:10

## 2020-01-01 RX ADMIN — Medication 10 ML: at 05:09

## 2020-01-01 RX ADMIN — CEFTRIAXONE 2 G: 2 INJECTION, SOLUTION INTRAVENOUS at 04:10

## 2020-01-01 RX ADMIN — AMLODIPINE BESYLATE 5 MG: 5 TABLET ORAL at 09:10

## 2020-01-01 RX ADMIN — FLUTICASONE FUROATE AND VILANTEROL TRIFENATATE 1 PUFF: 100; 25 POWDER RESPIRATORY (INHALATION) at 12:10

## 2020-01-01 RX ADMIN — SODIUM CHLORIDE: 0.9 INJECTION, SOLUTION INTRAVENOUS at 05:09

## 2020-01-01 RX ADMIN — CHLOROTHIAZIDE SODIUM 500 MG: 500 INJECTION INTRAVENOUS at 03:10

## 2020-01-01 RX ADMIN — PROPOFOL 20 MG: 10 INJECTION, EMULSION INTRAVENOUS at 12:09

## 2020-01-01 RX ADMIN — TRAZODONE HYDROCHLORIDE 25 MG: 50 TABLET ORAL at 11:09

## 2020-01-01 RX ADMIN — PROPOFOL 15 MCG/KG/MIN: 10 INJECTION, EMULSION INTRAVENOUS at 06:09

## 2020-01-01 RX ADMIN — CEFTRIAXONE 2 G: 2 INJECTION, SOLUTION INTRAVENOUS at 09:08

## 2020-01-01 RX ADMIN — CEFTRIAXONE 2 G: 2 INJECTION, SOLUTION INTRAVENOUS at 10:08

## 2020-01-01 RX ADMIN — PROPOFOL 150 MCG/KG/MIN: 10 INJECTION, EMULSION INTRAVENOUS at 09:08

## 2020-01-01 RX ADMIN — SILODOSIN 8 MG: 8 CAPSULE ORAL at 09:09

## 2020-01-01 RX ADMIN — LEVETIRACETAM INJECTION 500 MG: 5 INJECTION INTRAVENOUS at 08:09

## 2020-01-01 RX ADMIN — FAMOTIDINE 20 MG: 10 INJECTION INTRAVENOUS at 08:10

## 2020-01-01 RX ADMIN — SILODOSIN 4 MG: 4 CAPSULE ORAL at 09:10

## 2020-01-01 RX ADMIN — Medication 1 CAPSULE: at 09:10

## 2020-01-01 RX ADMIN — MICONAZOLE NITRATE: 20 OINTMENT TOPICAL at 09:10

## 2020-01-01 RX ADMIN — ACETAMINOPHEN 650 MG: 325 TABLET ORAL at 11:09

## 2020-01-01 RX ADMIN — FAMOTIDINE 20 MG: 20 TABLET, FILM COATED ORAL at 08:10

## 2020-01-01 RX ADMIN — URSODIOL 518.4 MG: 300 CAPSULE ORAL at 09:10

## 2020-01-01 RX ADMIN — LACOSAMIDE 200 MG: 50 TABLET, FILM COATED ORAL at 07:10

## 2020-01-01 RX ADMIN — HYDROCORTISONE SODIUM SUCCINATE 100 MG: 100 INJECTION, POWDER, FOR SOLUTION INTRAMUSCULAR; INTRAVENOUS at 05:10

## 2020-01-01 RX ADMIN — LACOSAMIDE 200 MG: 10 INJECTION INTRAVENOUS at 04:10

## 2020-01-01 RX ADMIN — LIDOCAINE 1 PATCH: 50 PATCH TOPICAL at 11:08

## 2020-01-01 RX ADMIN — IPRATROPIUM BROMIDE AND ALBUTEROL SULFATE 3 ML: .5; 2.5 SOLUTION RESPIRATORY (INHALATION) at 09:10

## 2020-01-01 RX ADMIN — CEFTRIAXONE 2 G: 2 INJECTION, SOLUTION INTRAVENOUS at 03:09

## 2020-01-01 RX ADMIN — DEXTROSE MONOHYDRATE 2500 MG: 50 INJECTION, SOLUTION INTRAVENOUS at 06:09

## 2020-01-01 RX ADMIN — PIPERACILLIN SODIUM AND TAZOBACTAM SODIUM 4.5 G: 4; .5 INJECTION, POWDER, LYOPHILIZED, FOR SOLUTION INTRAVENOUS at 05:10

## 2020-01-01 RX ADMIN — VASOPRESSIN 0.04 UNITS/MIN: 20 INJECTION INTRAVENOUS at 06:10

## 2020-01-01 RX ADMIN — AMLODIPINE BESYLATE 5 MG: 5 TABLET ORAL at 10:10

## 2020-01-01 RX ADMIN — MELATONIN TAB 3 MG 6 MG: 3 TAB at 07:10

## 2020-01-01 RX ADMIN — MUPIROCIN: 20 OINTMENT TOPICAL at 09:10

## 2020-01-01 RX ADMIN — PIPERACILLIN SODIUM AND TAZOBACTAM SODIUM 4.5 G: 4; .5 INJECTION, POWDER, LYOPHILIZED, FOR SOLUTION INTRAVENOUS at 08:10

## 2020-01-01 RX ADMIN — ENOXAPARIN SODIUM 40 MG: 40 INJECTION SUBCUTANEOUS at 06:09

## 2020-01-01 RX ADMIN — ROCURONIUM BROMIDE 100 MG: 10 INJECTION, SOLUTION INTRAVENOUS at 04:10

## 2020-01-01 RX ADMIN — SUCRALFATE 1 G: 1 TABLET ORAL at 11:08

## 2020-01-01 RX ADMIN — ONDANSETRON 4 MG: 2 INJECTION, SOLUTION INTRAMUSCULAR; INTRAVENOUS at 12:09

## 2020-01-01 RX ADMIN — LOSARTAN POTASSIUM 25 MG: 25 TABLET, FILM COATED ORAL at 10:09

## 2020-01-01 RX ADMIN — DOCUSATE SODIUM 50MG AND SENNOSIDES 8.6MG 1 TABLET: 8.6; 5 TABLET, FILM COATED ORAL at 08:10

## 2020-01-01 RX ADMIN — TRAZODONE HYDROCHLORIDE 25 MG: 50 TABLET ORAL at 10:09

## 2020-01-01 RX ADMIN — BARIUM SULFATE 55 ML: 0.81 POWDER, FOR SUSPENSION ORAL at 01:09

## 2020-01-01 RX ADMIN — INSULIN ASPART 2 UNITS: 100 INJECTION, SOLUTION INTRAVENOUS; SUBCUTANEOUS at 08:10

## 2020-01-01 RX ADMIN — Medication 0.16 MCG/KG/MIN: at 01:10

## 2020-01-01 RX ADMIN — DEXMEDETOMIDINE HYDROCHLORIDE 0.4 MCG/KG/HR: 4 INJECTION, SOLUTION INTRAVENOUS at 03:09

## 2020-01-01 RX ADMIN — SILODOSIN 4 MG: 4 CAPSULE ORAL at 10:10

## 2020-01-01 RX ADMIN — FUROSEMIDE 40 MG: 10 INJECTION, SOLUTION INTRAMUSCULAR; INTRAVENOUS at 09:09

## 2020-01-01 RX ADMIN — VANCOMYCIN HYDROCHLORIDE 1250 MG: 1.25 INJECTION, POWDER, LYOPHILIZED, FOR SOLUTION INTRAVENOUS at 11:09

## 2020-01-01 RX ADMIN — CEFTRIAXONE 2 G: 2 INJECTION, SOLUTION INTRAVENOUS at 08:10

## 2020-01-01 RX ADMIN — CEFTRIAXONE SODIUM 2 G: 2 INJECTION, SOLUTION INTRAVENOUS at 10:09

## 2020-01-01 RX ADMIN — HEPARIN SODIUM 5000 UNITS: 5000 INJECTION INTRAVENOUS; SUBCUTANEOUS at 01:09

## 2020-01-01 RX ADMIN — LORAZEPAM 4 MG: 2 INJECTION INTRAMUSCULAR; INTRAVENOUS at 11:10

## 2020-01-01 RX ADMIN — FENTANYL CITRATE 50 MCG: 50 INJECTION INTRAMUSCULAR; INTRAVENOUS at 05:09

## 2020-01-01 RX ADMIN — LACOSAMIDE 100 MG: 10 SOLUTION ORAL at 08:09

## 2020-01-01 RX ADMIN — METRONIDAZOLE 500 MG: 500 INJECTION, SOLUTION INTRAVENOUS at 03:09

## 2020-01-01 RX ADMIN — POTASSIUM CHLORIDE 10 MEQ: 7.46 INJECTION, SOLUTION INTRAVENOUS at 11:09

## 2020-01-01 RX ADMIN — LORAZEPAM 4 MG: 2 INJECTION INTRAMUSCULAR at 12:10

## 2020-01-01 RX ADMIN — LEVALBUTEROL HYDROCHLORIDE 0.63 MG: 0.63 SOLUTION RESPIRATORY (INHALATION) at 08:08

## 2020-01-01 RX ADMIN — POTASSIUM CHLORIDE 10 MEQ: 10 INJECTION, SOLUTION INTRAVENOUS at 04:09

## 2020-01-01 RX ADMIN — VANCOMYCIN HYDROCHLORIDE 1000 MG: 1 INJECTION, POWDER, LYOPHILIZED, FOR SOLUTION INTRAVENOUS at 08:10

## 2020-01-01 RX ADMIN — PROPOFOL 20 MCG/KG/MIN: 10 INJECTION, EMULSION INTRAVENOUS at 05:10

## 2020-01-01 RX ADMIN — Medication 1 SPRAY: at 06:09

## 2020-01-01 RX ADMIN — NOREPINEPHRINE BITARTRATE 1.9 MCG/KG/MIN: 1 INJECTION, SOLUTION, CONCENTRATE INTRAVENOUS at 07:10

## 2020-01-01 RX ADMIN — CHLOROTHIAZIDE SODIUM 500 MG: 500 INJECTION INTRAVENOUS at 01:10

## 2020-01-01 RX ADMIN — Medication 2 MG/HR: at 11:10

## 2020-01-01 RX ADMIN — SODIUM BICARBONATE 650 MG TABLET 1300 MG: at 04:10

## 2020-01-01 RX ADMIN — FENTANYL CITRATE 50 MCG: 50 INJECTION INTRAMUSCULAR; INTRAVENOUS at 12:09

## 2020-01-01 RX ADMIN — LACOSAMIDE 200 MG: 50 TABLET, FILM COATED ORAL at 04:09

## 2020-01-01 RX ADMIN — SODIUM BICARBONATE: 84 INJECTION, SOLUTION INTRAVENOUS at 07:10

## 2020-01-01 RX ADMIN — ACETAMINOPHEN 650 MG: 325 TABLET ORAL at 04:10

## 2020-01-01 RX ADMIN — ENOXAPARIN SODIUM 40 MG: 40 INJECTION SUBCUTANEOUS at 05:10

## 2020-01-01 RX ADMIN — ENOXAPARIN SODIUM 40 MG: 40 INJECTION SUBCUTANEOUS at 10:10

## 2020-01-01 RX ADMIN — PROPOFOL 10 MCG/KG/MIN: 10 INJECTION, EMULSION INTRAVENOUS at 10:09

## 2020-01-01 RX ADMIN — KETAMINE HYDROCHLORIDE 45 MCG/KG/MIN: 100 INJECTION, SOLUTION, CONCENTRATE INTRAMUSCULAR; INTRAVENOUS at 09:10

## 2020-01-01 RX ADMIN — MAGNESIUM SULFATE IN WATER 2 G: 40 INJECTION, SOLUTION INTRAVENOUS at 10:09

## 2020-01-01 RX ADMIN — QUETIAPINE FUMARATE 25 MG: 25 TABLET, FILM COATED ORAL at 10:10

## 2020-01-01 RX ADMIN — CHLOROTHIAZIDE SODIUM 500 MG: 500 INJECTION INTRAVENOUS at 08:10

## 2020-01-01 RX ADMIN — FAMOTIDINE 20 MG: 10 INJECTION INTRAVENOUS at 10:10

## 2020-01-01 RX ADMIN — VASOPRESSIN 0.04 UNITS/MIN: 20 INJECTION INTRAVENOUS at 01:10

## 2020-01-01 RX ADMIN — URSODIOL 518.4 MG: 300 CAPSULE ORAL at 10:10

## 2020-01-01 RX ADMIN — URSODIOL 518.4 MG: 300 CAPSULE ORAL at 03:10

## 2020-01-01 RX ADMIN — ATORVASTATIN CALCIUM 40 MG: 20 TABLET, FILM COATED ORAL at 08:10

## 2020-01-01 RX ADMIN — DOXYCYCLINE 100 MG: 100 INJECTION, POWDER, LYOPHILIZED, FOR SOLUTION INTRAVENOUS at 12:09

## 2020-01-01 RX ADMIN — POTASSIUM CHLORIDE 40 MEQ: 1.5 FOR SOLUTION ORAL at 06:09

## 2020-01-01 RX ADMIN — CEFEPIME HYDROCHLORIDE 2 G: 2 INJECTION, SOLUTION INTRAVENOUS at 08:09

## 2020-01-01 RX ADMIN — Medication 1 CAPSULE: at 07:10

## 2020-01-01 RX ADMIN — CLOBAZAM 40 MG: 10 TABLET ORAL at 09:10

## 2020-01-01 RX ADMIN — ACETAMINOPHEN 650 MG: 325 TABLET ORAL at 04:09

## 2020-01-01 RX ADMIN — CEFTRIAXONE 2 G: 2 INJECTION, SOLUTION INTRAVENOUS at 09:10

## 2020-01-01 RX ADMIN — METRONIDAZOLE 500 MG: 500 INJECTION, SOLUTION INTRAVENOUS at 08:09

## 2020-01-01 RX ADMIN — CLOBAZAM 40 MG: 10 TABLET ORAL at 08:10

## 2020-01-01 RX ADMIN — INSULIN ASPART 4 UNITS: 100 INJECTION, SOLUTION INTRAVENOUS; SUBCUTANEOUS at 08:10

## 2020-01-01 RX ADMIN — INSULIN ASPART 1 UNITS: 100 INJECTION, SOLUTION INTRAVENOUS; SUBCUTANEOUS at 04:10

## 2020-01-01 RX ADMIN — ETOMIDATE 4 MG: 2 INJECTION, SOLUTION INTRAVENOUS at 12:09

## 2020-01-01 RX ADMIN — MUPIROCIN: 20 OINTMENT TOPICAL at 10:10

## 2020-01-01 RX ADMIN — SODIUM BICARBONATE 200 MEQ: 84 INJECTION, SOLUTION INTRAVENOUS at 09:10

## 2020-01-01 RX ADMIN — PROPOFOL 100 MG: 10 INJECTION, EMULSION INTRAVENOUS at 04:10

## 2020-01-01 RX ADMIN — FUROSEMIDE 120 MG: 10 INJECTION, SOLUTION INTRAMUSCULAR; INTRAVENOUS at 09:10

## 2020-01-01 RX ADMIN — POTASSIUM CHLORIDE 40 MEQ: 1500 TABLET, EXTENDED RELEASE ORAL at 09:08

## 2020-01-01 RX ADMIN — POTASSIUM CHLORIDE 40 MEQ: 1.5 POWDER, FOR SOLUTION ORAL at 01:09

## 2020-01-01 RX ADMIN — PANTOPRAZOLE SODIUM 40 MG: 40 TABLET, DELAYED RELEASE ORAL at 08:10

## 2020-01-01 RX ADMIN — NOREPINEPHRINE BITARTRATE 0.16 MCG/KG/MIN: 1 INJECTION, SOLUTION, CONCENTRATE INTRAVENOUS at 11:10

## 2020-01-01 RX ADMIN — SODIUM FERRIC GLUCONATE COMPLEX 125 MG: 12.5 INJECTION INTRAVENOUS at 08:10

## 2020-01-01 RX ADMIN — PANTOPRAZOLE SODIUM 40 MG: 40 GRANULE, DELAYED RELEASE ORAL at 08:10

## 2020-01-01 RX ADMIN — Medication 10 ML: at 08:09

## 2020-01-01 RX ADMIN — FENTANYL CITRATE 100 MCG: 50 INJECTION INTRAMUSCULAR; INTRAVENOUS at 09:07

## 2020-01-01 RX ADMIN — SUCRALFATE 1 G: 1 TABLET ORAL at 02:08

## 2020-01-01 RX ADMIN — FUROSEMIDE 120 MG: 10 INJECTION, SOLUTION INTRAMUSCULAR; INTRAVENOUS at 01:10

## 2020-01-01 RX ADMIN — Medication 0.06 MCG/KG/MIN: at 05:10

## 2020-01-01 RX ADMIN — INSULIN ASPART 1 UNITS: 100 INJECTION, SOLUTION INTRAVENOUS; SUBCUTANEOUS at 12:10

## 2020-01-01 RX ADMIN — PANTOPRAZOLE SODIUM 40 MG: 40 TABLET, DELAYED RELEASE ORAL at 04:08

## 2020-01-01 RX ADMIN — KETAMINE HYDROCHLORIDE 25 MCG/KG/MIN: 100 INJECTION, SOLUTION, CONCENTRATE INTRAMUSCULAR; INTRAVENOUS at 11:10

## 2020-01-01 RX ADMIN — CEFTRIAXONE SODIUM 2 G: 2 INJECTION, SOLUTION INTRAVENOUS at 11:10

## 2020-01-01 RX ADMIN — ATORVASTATIN CALCIUM 40 MG: 20 TABLET, FILM COATED ORAL at 10:09

## 2020-01-01 RX ADMIN — SUCRALFATE 1 G: 1 TABLET ORAL at 03:08

## 2020-01-01 RX ADMIN — ATORVASTATIN CALCIUM 40 MG: 40 TABLET, FILM COATED ORAL at 08:09

## 2020-01-01 RX ADMIN — LEVETIRACETAM 500 MG: 5 INJECTION INTRAVENOUS at 10:10

## 2020-01-01 RX ADMIN — SODIUM BICARBONATE 650 MG TABLET 650 MG: at 10:10

## 2020-01-01 RX ADMIN — POTASSIUM CHLORIDE 40 MEQ: 29.8 INJECTION, SOLUTION INTRAVENOUS at 03:10

## 2020-01-01 RX ADMIN — POLYETHYLENE GLYCOL 3350 17 G: 17 POWDER, FOR SOLUTION ORAL at 05:10

## 2020-01-01 RX ADMIN — PROPOFOL 50 MG: 10 INJECTION, EMULSION INTRAVENOUS at 04:08

## 2020-01-01 RX ADMIN — VANCOMYCIN HYDROCHLORIDE 1750 MG: 750 INJECTION, POWDER, LYOPHILIZED, FOR SOLUTION INTRAVENOUS at 01:09

## 2020-01-01 RX ADMIN — INSULIN ASPART 1 UNITS: 100 INJECTION, SOLUTION INTRAVENOUS; SUBCUTANEOUS at 03:10

## 2020-01-01 RX ADMIN — INSULIN ASPART 1 UNITS: 100 INJECTION, SOLUTION INTRAVENOUS; SUBCUTANEOUS at 11:10

## 2020-01-01 RX ADMIN — PANTOPRAZOLE SODIUM 40 MG: 40 INJECTION, POWDER, FOR SOLUTION INTRAVENOUS at 08:09

## 2020-01-01 RX ADMIN — VANCOMYCIN HYDROCHLORIDE 1000 MG: 1 INJECTION, POWDER, LYOPHILIZED, FOR SOLUTION INTRAVENOUS at 09:10

## 2020-01-01 RX ADMIN — LIDOCAINE HYDROCHLORIDE 50 MG: 20 INJECTION, SOLUTION INTRAVENOUS at 04:08

## 2020-01-01 RX ADMIN — INSULIN ASPART 2 UNITS: 100 INJECTION, SOLUTION INTRAVENOUS; SUBCUTANEOUS at 12:10

## 2020-01-01 RX ADMIN — INSULIN ASPART 2 UNITS: 100 INJECTION, SOLUTION INTRAVENOUS; SUBCUTANEOUS at 04:10

## 2020-01-01 RX ADMIN — SODIUM CHLORIDE: 0.9 INJECTION, SOLUTION INTRAVENOUS at 09:10

## 2020-01-01 RX ADMIN — AMPICILLIN SODIUM 2 G: 2 INJECTION, POWDER, FOR SOLUTION INTRAMUSCULAR; INTRAVENOUS at 08:09

## 2020-01-01 RX ADMIN — HALOPERIDOL LACTATE 5 MG: 5 INJECTION, SOLUTION INTRAMUSCULAR at 09:09

## 2020-01-01 RX ADMIN — ATORVASTATIN CALCIUM 40 MG: 20 TABLET, FILM COATED ORAL at 12:10

## 2020-01-01 RX ADMIN — SODIUM CHLORIDE 500 ML: 0.9 INJECTION, SOLUTION INTRAVENOUS at 11:10

## 2020-01-01 RX ADMIN — SODIUM CHLORIDE: 0.9 INJECTION, SOLUTION INTRAVENOUS at 04:09

## 2020-01-01 RX ADMIN — GADOBUTROL 10 ML: 604.72 INJECTION INTRAVENOUS at 02:09

## 2020-01-01 RX ADMIN — SODIUM CHLORIDE: 9 INJECTION, SOLUTION INTRAVENOUS at 09:08

## 2020-01-01 RX ADMIN — Medication 1 SPRAY: at 06:10

## 2020-01-01 RX ADMIN — POTASSIUM CHLORIDE 20 MEQ: 1.5 POWDER, FOR SOLUTION ORAL at 05:10

## 2020-01-01 RX ADMIN — IPRATROPIUM BROMIDE AND ALBUTEROL SULFATE 3 ML: .5; 2.5 SOLUTION RESPIRATORY (INHALATION) at 04:10

## 2020-01-01 RX ADMIN — FUROSEMIDE 40 MG: 10 INJECTION, SOLUTION INTRAMUSCULAR; INTRAVENOUS at 10:09

## 2020-01-01 RX ADMIN — HALOPERIDOL 2 MG: 2 TABLET ORAL at 11:10

## 2020-01-01 RX ADMIN — MICONAZOLE NITRATE: 20 OINTMENT TOPICAL at 04:09

## 2020-01-01 RX ADMIN — FUROSEMIDE 120 MG: 10 INJECTION, SOLUTION INTRAMUSCULAR; INTRAVENOUS at 03:10

## 2020-01-01 RX ADMIN — CIPROFLOXACIN HYDROCHLORIDE 750 MG: 250 TABLET, FILM COATED ORAL at 10:09

## 2020-01-01 RX ADMIN — AMPICILLIN SODIUM 2 G: 2 INJECTION, POWDER, FOR SOLUTION INTRAMUSCULAR; INTRAVENOUS at 09:09

## 2020-01-01 RX ADMIN — TRAZODONE HYDROCHLORIDE 25 MG: 50 TABLET ORAL at 07:10

## 2020-01-01 RX ADMIN — PANTOPRAZOLE SODIUM 40 MG: 40 INJECTION, POWDER, LYOPHILIZED, FOR SOLUTION INTRAVENOUS at 08:08

## 2020-01-01 RX ADMIN — VANCOMYCIN HYDROCHLORIDE 1000 MG: 1 INJECTION, POWDER, LYOPHILIZED, FOR SOLUTION INTRAVENOUS at 12:09

## 2020-01-01 RX ADMIN — Medication 5 MG/HR: at 11:10

## 2020-01-01 RX ADMIN — SODIUM BICARBONATE 50 MEQ: 84 INJECTION INTRAVENOUS at 01:10

## 2020-01-01 RX ADMIN — ATORVASTATIN CALCIUM 40 MG: 40 TABLET, FILM COATED ORAL at 09:09

## 2020-01-01 RX ADMIN — CHLOROTHIAZIDE SODIUM 500 MG: 500 INJECTION INTRAVENOUS at 10:10

## 2020-01-01 RX ADMIN — PIPERACILLIN SODIUM AND TAZOBACTAM SODIUM 4.5 G: 4; .5 INJECTION, POWDER, LYOPHILIZED, FOR SOLUTION INTRAVENOUS at 12:10

## 2020-01-01 RX ADMIN — LACOSAMIDE 200 MG: 10 SOLUTION ORAL at 03:09

## 2020-01-01 RX ADMIN — VASOPRESSIN 0.04 UNITS/MIN: 20 INJECTION INTRAVENOUS at 10:10

## 2020-01-01 RX ADMIN — DEXMEDETOMIDINE HYDROCHLORIDE 400 MCG: 4 INJECTION, SOLUTION INTRAVENOUS at 03:09

## 2020-01-01 RX ADMIN — INSULIN ASPART 4 UNITS: 100 INJECTION, SOLUTION INTRAVENOUS; SUBCUTANEOUS at 07:10

## 2020-01-01 RX ADMIN — MICONAZOLE NITRATE: 20 OINTMENT TOPICAL at 10:10

## 2020-01-01 RX ADMIN — FUROSEMIDE 40 MG: 10 INJECTION, SOLUTION INTRAVENOUS at 09:10

## 2020-01-01 RX ADMIN — Medication 0.09 MCG/KG/MIN: at 12:10

## 2020-01-01 RX ADMIN — SODIUM CHLORIDE 1000 ML: 0.9 INJECTION, SOLUTION INTRAVENOUS at 03:09

## 2020-01-01 RX ADMIN — FUROSEMIDE 40 MG: 10 INJECTION, SOLUTION INTRAMUSCULAR; INTRAVENOUS at 05:10

## 2020-01-01 RX ADMIN — Medication 0.06 MCG/KG/MIN: at 03:10

## 2020-01-01 RX ADMIN — Medication 1 SPRAY: at 02:09

## 2020-01-01 RX ADMIN — PROPOFOL 10 MCG/KG/MIN: 10 INJECTION, EMULSION INTRAVENOUS at 05:09

## 2020-01-01 RX ADMIN — Medication 1 CAPSULE: at 12:10

## 2020-01-01 RX ADMIN — ESCITALOPRAM OXALATE 10 MG: 10 TABLET ORAL at 08:08

## 2020-01-01 RX ADMIN — Medication 10 MG: at 06:09

## 2020-01-01 RX ADMIN — PANTOPRAZOLE SODIUM 40 MG: 40 INJECTION, POWDER, FOR SOLUTION INTRAVENOUS at 09:08

## 2020-01-01 RX ADMIN — ALBUTEROL SULFATE 2.5 MG: 2.5 SOLUTION RESPIRATORY (INHALATION) at 04:08

## 2020-01-01 RX ADMIN — METRONIDAZOLE 500 MG: 500 INJECTION, SOLUTION INTRAVENOUS at 09:09

## 2020-01-01 RX ADMIN — VANCOMYCIN HYDROCHLORIDE 1000 MG: 1 INJECTION, POWDER, LYOPHILIZED, FOR SOLUTION INTRAVENOUS at 04:09

## 2020-01-01 RX ADMIN — POTASSIUM CHLORIDE 20 MEQ: 14.9 INJECTION, SOLUTION INTRAVENOUS at 01:09

## 2020-01-01 RX ADMIN — Medication 1 CAPSULE: at 09:09

## 2020-01-01 RX ADMIN — TRAZODONE HYDROCHLORIDE 25 MG: 50 TABLET ORAL at 08:09

## 2020-01-01 RX ADMIN — SODIUM BICARBONATE 650 MG TABLET 650 MG: at 08:10

## 2020-01-01 RX ADMIN — Medication 2 MG/HR: at 10:10

## 2020-01-01 RX ADMIN — POTASSIUM CHLORIDE 20 MEQ: 1.5 POWDER, FOR SOLUTION ORAL at 09:10

## 2020-01-01 RX ADMIN — DOXYCYCLINE 100 MG: 100 INJECTION, POWDER, LYOPHILIZED, FOR SOLUTION INTRAVENOUS at 06:09

## 2020-01-01 RX ADMIN — POTASSIUM CHLORIDE 20 MEQ: 200 INJECTION, SOLUTION INTRAVENOUS at 06:09

## 2020-01-01 RX ADMIN — KETAMINE HYDROCHLORIDE 45 MCG/KG/MIN: 100 INJECTION, SOLUTION, CONCENTRATE INTRAMUSCULAR; INTRAVENOUS at 02:10

## 2020-01-01 RX ADMIN — Medication 0.02 MCG/KG/MIN: at 10:10

## 2020-01-01 RX ADMIN — PANTOPRAZOLE SODIUM 40 MG: 40 GRANULE, DELAYED RELEASE ORAL at 10:09

## 2020-01-01 RX ADMIN — DOCUSATE SODIUM 50MG AND SENNOSIDES 8.6MG 1 TABLET: 8.6; 5 TABLET, FILM COATED ORAL at 09:10

## 2020-01-01 RX ADMIN — CEFTRIAXONE 2 G: 2 INJECTION, SOLUTION INTRAVENOUS at 08:09

## 2020-01-01 RX ADMIN — IPRATROPIUM BROMIDE AND ALBUTEROL SULFATE 3 ML: .5; 2.5 SOLUTION RESPIRATORY (INHALATION) at 11:10

## 2020-01-01 RX ADMIN — SODIUM BICARBONATE 50 MEQ: 84 INJECTION INTRAVENOUS at 05:10

## 2020-01-01 RX ADMIN — ATORVASTATIN CALCIUM 40 MG: 20 TABLET, FILM COATED ORAL at 10:10

## 2020-01-01 RX ADMIN — SODIUM BICARBONATE: 84 INJECTION, SOLUTION INTRAVENOUS at 02:10

## 2020-01-01 RX ADMIN — ENOXAPARIN SODIUM 40 MG: 40 INJECTION SUBCUTANEOUS at 04:10

## 2020-01-01 RX ADMIN — MIDAZOLAM 2 MG: 1 INJECTION INTRAMUSCULAR; INTRAVENOUS at 07:10

## 2020-01-01 RX ADMIN — POTASSIUM CHLORIDE 10 MEQ: 7.46 INJECTION, SOLUTION INTRAVENOUS at 09:09

## 2020-01-01 RX ADMIN — HALOPERIDOL 2 MG: 2 TABLET ORAL at 07:10

## 2020-01-01 RX ADMIN — CEFEPIME HYDROCHLORIDE 2 G: 2 INJECTION, SOLUTION INTRAVENOUS at 09:09

## 2020-01-01 RX ADMIN — FLUTICASONE FUROATE AND VILANTEROL TRIFENATATE 1 PUFF: 100; 25 POWDER RESPIRATORY (INHALATION) at 08:08

## 2020-01-01 RX ADMIN — PANTOPRAZOLE SODIUM 40 MG: 40 TABLET, DELAYED RELEASE ORAL at 07:08

## 2020-01-01 RX ADMIN — SODIUM BICARBONATE 50 MEQ: 84 INJECTION INTRAVENOUS at 11:10

## 2020-01-01 RX ADMIN — SODIUM CHLORIDE, SODIUM GLUCONATE, SODIUM ACETATE, POTASSIUM CHLORIDE, MAGNESIUM CHLORIDE, SODIUM PHOSPHATE, DIBASIC, AND POTASSIUM PHOSPHATE: .53; .5; .37; .037; .03; .012; .00082 INJECTION, SOLUTION INTRAVENOUS at 12:09

## 2020-01-01 RX ADMIN — METRONIDAZOLE 500 MG: 500 INJECTION, SOLUTION INTRAVENOUS at 02:09

## 2020-01-01 RX ADMIN — PROPOFOL 20 MCG/KG/MIN: 10 INJECTION, EMULSION INTRAVENOUS at 09:10

## 2020-01-01 RX ADMIN — PHENYLEPHRINE HYDROCHLORIDE 100 MCG: 10 INJECTION INTRAVENOUS at 09:08

## 2020-01-01 RX ADMIN — LEVETIRACETAM INJECTION 500 MG: 5 INJECTION INTRAVENOUS at 06:09

## 2020-01-01 RX ADMIN — KETAMINE HYDROCHLORIDE 25 MCG/KG/MIN: 100 INJECTION, SOLUTION, CONCENTRATE INTRAMUSCULAR; INTRAVENOUS at 05:10

## 2020-01-01 RX ADMIN — MAGNESIUM SULFATE IN WATER 2 G: 40 INJECTION, SOLUTION INTRAVENOUS at 05:09

## 2020-01-01 RX ADMIN — LEVALBUTEROL HYDROCHLORIDE 0.63 MG: 0.63 SOLUTION RESPIRATORY (INHALATION) at 11:08

## 2020-01-01 RX ADMIN — MAGNESIUM SULFATE HEPTAHYDRATE 1 G: 500 INJECTION, SOLUTION INTRAMUSCULAR; INTRAVENOUS at 10:10

## 2020-01-01 RX ADMIN — FLUTICASONE FUROATE AND VILANTEROL TRIFENATATE 1 PUFF: 100; 25 POWDER RESPIRATORY (INHALATION) at 09:08

## 2020-01-01 RX ADMIN — NOREPINEPHRINE BITARTRATE 2.2 MCG/KG/MIN: 1 INJECTION, SOLUTION, CONCENTRATE INTRAVENOUS at 09:10

## 2020-01-01 RX ADMIN — VASOPRESSIN 0.04 UNITS/MIN: 20 INJECTION INTRAVENOUS at 03:10

## 2020-01-01 RX ADMIN — NOREPINEPHRINE BITARTRATE 1.4 MCG/KG/MIN: 1 INJECTION, SOLUTION, CONCENTRATE INTRAVENOUS at 05:10

## 2020-01-01 RX ADMIN — SODIUM CHLORIDE 400 MG: 9 INJECTION, SOLUTION INTRAVENOUS at 01:10

## 2020-01-01 RX ADMIN — KETAMINE HYDROCHLORIDE 25 MCG/KG/MIN: 100 INJECTION, SOLUTION, CONCENTRATE INTRAMUSCULAR; INTRAVENOUS at 09:10

## 2020-01-01 RX ADMIN — DOXYCYCLINE 100 MG: 100 INJECTION, POWDER, LYOPHILIZED, FOR SOLUTION INTRAVENOUS at 11:09

## 2020-01-01 RX ADMIN — LOSARTAN POTASSIUM 50 MG: 50 TABLET ORAL at 09:08

## 2020-01-01 RX ADMIN — ENOXAPARIN SODIUM 40 MG: 40 INJECTION SUBCUTANEOUS at 03:09

## 2020-01-01 RX ADMIN — AMPICILLIN SODIUM 2 G: 2 INJECTION, POWDER, FOR SOLUTION INTRAMUSCULAR; INTRAVENOUS at 05:09

## 2020-01-01 RX ADMIN — POTASSIUM CHLORIDE 40 MEQ: 1.5 POWDER, FOR SOLUTION ORAL at 04:10

## 2020-01-01 RX ADMIN — MELATONIN TAB 3 MG 6 MG: 3 TAB at 08:10

## 2020-01-01 RX ADMIN — FUROSEMIDE 20 MG: 10 INJECTION, SOLUTION INTRAMUSCULAR; INTRAVENOUS at 09:10

## 2020-01-01 RX ADMIN — CEFTRIAXONE SODIUM 2 G: 2 INJECTION, SOLUTION INTRAVENOUS at 07:09

## 2020-01-01 RX ADMIN — VASOPRESSIN 0.04 UNITS/MIN: 20 INJECTION INTRAVENOUS at 04:10

## 2020-01-01 RX ADMIN — VANCOMYCIN HYDROCHLORIDE 2000 MG: 10 INJECTION, POWDER, LYOPHILIZED, FOR SOLUTION INTRAVENOUS at 12:09

## 2020-01-01 RX ADMIN — LEVETIRACETAM 2000 MG: 100 INJECTION, SOLUTION INTRAVENOUS at 02:10

## 2020-01-01 RX ADMIN — METRONIDAZOLE 500 MG: 500 INJECTION, SOLUTION INTRAVENOUS at 04:09

## 2020-01-01 RX ADMIN — SODIUM FERRIC GLUCONATE COMPLEX 125 MG: 12.5 INJECTION INTRAVENOUS at 10:09

## 2020-01-01 RX ADMIN — POTASSIUM CHLORIDE 10 MEQ: 10 INJECTION, SOLUTION INTRAVENOUS at 05:09

## 2020-01-01 RX ADMIN — LEVETIRACETAM 500 MG: 5 INJECTION INTRAVENOUS at 08:09

## 2020-01-01 RX ADMIN — EPINEPHRINE 0.04 MCG/KG/MIN: 1 INJECTION PARENTERAL at 09:10

## 2020-01-01 RX ADMIN — VANCOMYCIN HYDROCHLORIDE 1000 MG: 1 INJECTION, POWDER, LYOPHILIZED, FOR SOLUTION INTRAVENOUS at 11:09

## 2020-01-01 RX ADMIN — SUCRALFATE 1 G: 1 TABLET ORAL at 12:08

## 2020-01-01 RX ADMIN — TRAZODONE HYDROCHLORIDE 25 MG: 50 TABLET ORAL at 09:10

## 2020-01-01 RX ADMIN — PANTOPRAZOLE SODIUM 40 MG: 40 TABLET, DELAYED RELEASE ORAL at 10:08

## 2020-01-01 RX ADMIN — VANCOMYCIN HYDROCHLORIDE 1250 MG: 1.25 INJECTION, POWDER, LYOPHILIZED, FOR SOLUTION INTRAVENOUS at 10:09

## 2020-01-01 RX ADMIN — PROPOFOL 10 MCG/KG/MIN: 10 INJECTION, EMULSION INTRAVENOUS at 04:10

## 2020-01-01 RX ADMIN — POTASSIUM CHLORIDE 40 MEQ: 1.5 POWDER, FOR SOLUTION ORAL at 10:09

## 2020-01-01 RX ADMIN — FUROSEMIDE 20 MG/HR: 10 INJECTION, SOLUTION INTRAMUSCULAR; INTRAVENOUS at 06:10

## 2020-01-01 RX ADMIN — VANCOMYCIN HYDROCHLORIDE 1000 MG: 1 INJECTION, POWDER, LYOPHILIZED, FOR SOLUTION INTRAVENOUS at 03:10

## 2020-01-01 RX ADMIN — LIDOCAINE HYDROCHLORIDE 10 ML: 10 INJECTION, SOLUTION INFILTRATION; PERINEURAL at 03:09

## 2020-01-01 RX ADMIN — DOCUSATE SODIUM AND SENNOSIDES 1 TABLET: 8.6; 5 TABLET, FILM COATED ORAL at 09:10

## 2020-01-01 RX ADMIN — LOSARTAN POTASSIUM 50 MG: 50 TABLET ORAL at 10:08

## 2020-01-01 RX ADMIN — POTASSIUM CHLORIDE 40 MEQ: 1.5 POWDER, FOR SOLUTION ORAL at 08:10

## 2020-01-01 RX ADMIN — MAGNESIUM SULFATE HEPTAHYDRATE 2 G: 40 INJECTION, SOLUTION INTRAVENOUS at 08:08

## 2020-01-01 RX ADMIN — PERAMPANEL 12 MG: 12 TABLET ORAL at 06:10

## 2020-01-01 RX ADMIN — PIPERACILLIN SODIUM AND TAZOBACTAM SODIUM 4.5 G: 4; .5 INJECTION, POWDER, LYOPHILIZED, FOR SOLUTION INTRAVENOUS at 06:10

## 2020-01-01 RX ADMIN — INSULIN ASPART 4 UNITS: 100 INJECTION, SOLUTION INTRAVENOUS; SUBCUTANEOUS at 04:10

## 2020-01-01 RX ADMIN — Medication 0.02 MCG/KG/MIN: at 08:10

## 2020-01-01 RX ADMIN — POTASSIUM BICARBONATE 25 MEQ: 978 TABLET, EFFERVESCENT ORAL at 03:10

## 2020-01-01 RX ADMIN — PROPOFOL 10 MCG/KG/MIN: 10 INJECTION, EMULSION INTRAVENOUS at 02:09

## 2020-01-01 RX ADMIN — MELATONIN TAB 3 MG 6 MG: 3 TAB at 11:09

## 2020-01-01 RX ADMIN — NOREPINEPHRINE BITARTRATE 0.9 MCG/KG/MIN: 1 INJECTION, SOLUTION, CONCENTRATE INTRAVENOUS at 11:10

## 2020-01-01 RX ADMIN — PANTOPRAZOLE SODIUM 40 MG: 40 TABLET, DELAYED RELEASE ORAL at 12:10

## 2020-01-01 RX ADMIN — LORAZEPAM 2 MG: 2 INJECTION INTRAMUSCULAR; INTRAVENOUS at 05:09

## 2020-01-01 RX ADMIN — SODIUM CHLORIDE: 0.9 INJECTION, SOLUTION INTRAVENOUS at 03:08

## 2020-01-01 RX ADMIN — SODIUM FERRIC GLUCONATE COMPLEX 125 MG: 12.5 INJECTION INTRAVENOUS at 08:09

## 2020-01-01 RX ADMIN — HALOPERIDOL 2 MG: 2 TABLET ORAL at 11:09

## 2020-01-01 RX ADMIN — DOXYCYCLINE 100 MG: 100 INJECTION, POWDER, LYOPHILIZED, FOR SOLUTION INTRAVENOUS at 09:09

## 2020-01-01 RX ADMIN — SODIUM BICARBONATE: 84 INJECTION, SOLUTION INTRAVENOUS at 09:10

## 2020-01-01 RX ADMIN — SUCRALFATE 1 G: 1 TABLET ORAL at 06:08

## 2020-01-01 RX ADMIN — LOSARTAN POTASSIUM 25 MG: 25 TABLET, FILM COATED ORAL at 09:09

## 2020-01-01 RX ADMIN — ALBUMIN (HUMAN) 50 G: 12.5 SOLUTION INTRAVENOUS at 09:10

## 2020-01-01 RX ADMIN — GADOBUTROL 10 ML: 604.72 INJECTION INTRAVENOUS at 03:09

## 2020-01-01 RX ADMIN — POTASSIUM CHLORIDE 10 MEQ: 10 INJECTION, SOLUTION INTRAVENOUS at 10:09

## 2020-01-01 RX ADMIN — Medication 0.04 MCG/KG/MIN: at 02:10

## 2020-01-01 RX ADMIN — VANCOMYCIN HYDROCHLORIDE 1000 MG: 1 INJECTION, POWDER, LYOPHILIZED, FOR SOLUTION INTRAVENOUS at 01:10

## 2020-01-01 RX ADMIN — PANTOPRAZOLE SODIUM 40 MG: 40 INJECTION, POWDER, LYOPHILIZED, FOR SOLUTION INTRAVENOUS at 10:10

## 2020-01-01 RX ADMIN — Medication 10 ML: at 09:09

## 2020-01-01 RX ADMIN — DEXTROSE MONOHYDRATE 2250 MG: 50 INJECTION, SOLUTION INTRAVENOUS at 06:09

## 2020-01-01 RX ADMIN — HEPARIN SODIUM 5000 UNITS: 5000 INJECTION INTRAVENOUS; SUBCUTANEOUS at 06:10

## 2020-01-01 RX ADMIN — ASPIRIN 325 MG ORAL TABLET 325 MG: 325 PILL ORAL at 08:09

## 2020-01-01 RX ADMIN — DOXYCYCLINE 100 MG: 100 INJECTION, POWDER, LYOPHILIZED, FOR SOLUTION INTRAVENOUS at 05:09

## 2020-01-01 RX ADMIN — CLOBAZAM 10 MG: 10 TABLET ORAL at 08:10

## 2020-01-01 RX ADMIN — POTASSIUM CHLORIDE 10 MEQ: 7.46 INJECTION, SOLUTION INTRAVENOUS at 10:09

## 2020-01-01 RX ADMIN — IPRATROPIUM BROMIDE AND ALBUTEROL SULFATE 3 ML: .5; 2.5 SOLUTION RESPIRATORY (INHALATION) at 02:10

## 2020-01-01 RX ADMIN — LIDOCAINE 1 PATCH: 50 PATCH TOPICAL at 12:08

## 2020-01-01 RX ADMIN — LEVETIRACETAM 3000 MG: 100 INJECTION, SOLUTION INTRAVENOUS at 09:09

## 2020-01-01 RX ADMIN — ALBUTEROL SULFATE 2.5 MG: 2.5 SOLUTION RESPIRATORY (INHALATION) at 01:08

## 2020-01-01 RX ADMIN — LIDOCAINE HYDROCHLORIDE 3 ML: 10 INJECTION, SOLUTION INFILTRATION; PERINEURAL at 12:09

## 2020-01-01 RX ADMIN — VANCOMYCIN HYDROCHLORIDE 1000 MG: 1 INJECTION, POWDER, LYOPHILIZED, FOR SOLUTION INTRAVENOUS at 03:09

## 2020-01-01 RX ADMIN — PANTOPRAZOLE SODIUM 40 MG: 40 INJECTION, POWDER, LYOPHILIZED, FOR SOLUTION INTRAVENOUS at 02:08

## 2020-01-01 RX ADMIN — HUMAN ALBUMIN MICROSPHERES AND PERFLUTREN 0.66 MG: 10; .22 INJECTION, SOLUTION INTRAVENOUS at 02:10

## 2020-01-01 RX ADMIN — SODIUM CHLORIDE: 0.9 INJECTION, SOLUTION INTRAVENOUS at 10:10

## 2020-01-01 RX ADMIN — LEVALBUTEROL HYDROCHLORIDE 0.63 MG: 0.63 SOLUTION RESPIRATORY (INHALATION) at 05:08

## 2020-01-01 RX ADMIN — PERAMPANEL 12 MG: 12 TABLET ORAL at 10:10

## 2020-01-01 RX ADMIN — PIPERACILLIN SODIUM AND TAZOBACTAM SODIUM 4.5 G: 4; .5 INJECTION, POWDER, LYOPHILIZED, FOR SOLUTION INTRAVENOUS at 09:10

## 2020-01-01 RX ADMIN — KETAMINE HYDROCHLORIDE 50 MCG/KG/MIN: 100 INJECTION, SOLUTION, CONCENTRATE INTRAMUSCULAR; INTRAVENOUS at 03:10

## 2020-01-01 RX ADMIN — VANCOMYCIN HYDROCHLORIDE 1000 MG: 1 INJECTION, POWDER, LYOPHILIZED, FOR SOLUTION INTRAVENOUS at 01:09

## 2020-01-01 RX ADMIN — CEFTRIAXONE 2 G: 2 INJECTION, SOLUTION INTRAVENOUS at 09:09

## 2020-01-01 RX ADMIN — LORAZEPAM 4 MG: 2 INJECTION INTRAMUSCULAR; INTRAVENOUS at 09:10

## 2020-01-01 RX ADMIN — CEFTRIAXONE 2 G: 2 INJECTION, SOLUTION INTRAVENOUS at 06:08

## 2020-01-01 RX ADMIN — LOSARTAN POTASSIUM 50 MG: 50 TABLET ORAL at 08:08

## 2020-01-01 RX ADMIN — SODIUM BICARBONATE 50 MEQ: 84 INJECTION INTRAVENOUS at 02:10

## 2020-01-01 RX ADMIN — CLOBAZAM 10 MG: 10 TABLET ORAL at 11:10

## 2020-01-01 RX ADMIN — INSULIN ASPART 2 UNITS: 100 INJECTION, SOLUTION INTRAVENOUS; SUBCUTANEOUS at 07:10

## 2020-01-01 RX ADMIN — Medication 400 MG: at 11:10

## 2020-01-01 RX ADMIN — MELATONIN TAB 3 MG 6 MG: 3 TAB at 09:10

## 2020-01-01 RX ADMIN — AMLODIPINE BESYLATE 5 MG: 5 TABLET ORAL at 08:10

## 2020-01-01 RX ADMIN — PANTOPRAZOLE SODIUM 40 MG: 40 INJECTION, POWDER, LYOPHILIZED, FOR SOLUTION INTRAVENOUS at 11:08

## 2020-01-01 RX ADMIN — PSYLLIUM HUSK 1 PACKET: 3.4 POWDER ORAL at 11:09

## 2020-01-01 RX ADMIN — DOXYCYCLINE 100 MG: 100 INJECTION, POWDER, LYOPHILIZED, FOR SOLUTION INTRAVENOUS at 08:09

## 2020-01-01 RX ADMIN — INSULIN ASPART 4 UNITS: 100 INJECTION, SOLUTION INTRAVENOUS; SUBCUTANEOUS at 11:10

## 2020-01-01 RX ADMIN — ENOXAPARIN SODIUM 40 MG: 40 INJECTION SUBCUTANEOUS at 07:09

## 2020-01-01 RX ADMIN — HEPARIN SODIUM 5000 UNITS: 5000 INJECTION INTRAVENOUS; SUBCUTANEOUS at 04:09

## 2020-01-01 RX ADMIN — FENTANYL CITRATE 25 MCG: 50 INJECTION, SOLUTION INTRAMUSCULAR; INTRAVENOUS at 12:09

## 2020-01-01 RX ADMIN — Medication 100 MG: at 04:10

## 2020-01-01 RX ADMIN — ACETAMINOPHEN 650 MG: 325 TABLET ORAL at 11:10

## 2020-01-01 RX ADMIN — FUROSEMIDE 5 MG/HR: 10 INJECTION, SOLUTION INTRAMUSCULAR; INTRAVENOUS at 04:10

## 2020-01-01 RX ADMIN — PANTOPRAZOLE SODIUM 40 MG: 40 INJECTION, POWDER, LYOPHILIZED, FOR SOLUTION INTRAVENOUS at 09:10

## 2020-01-01 RX ADMIN — Medication 0.1 MCG/KG/MIN: at 07:10

## 2020-01-01 RX ADMIN — SODIUM CHLORIDE 1000 ML: 0.9 INJECTION, SOLUTION INTRAVENOUS at 04:10

## 2020-01-01 RX ADMIN — FUROSEMIDE 40 MG: 10 INJECTION, SOLUTION INTRAMUSCULAR; INTRAVENOUS at 03:10

## 2020-01-01 RX ADMIN — POTASSIUM CHLORIDE 10 MEQ: 10 INJECTION, SOLUTION INTRAVENOUS at 12:09

## 2020-01-01 RX ADMIN — PANTOPRAZOLE SODIUM 40 MG: 40 GRANULE, DELAYED RELEASE ORAL at 01:09

## 2020-01-01 RX ADMIN — FUROSEMIDE 40 MG: 10 INJECTION, SOLUTION INTRAMUSCULAR; INTRAVENOUS at 03:09

## 2020-01-01 RX ADMIN — SIMETHICONE 40 MG: 20 SUSPENSION/ DROPS ORAL at 04:08

## 2020-01-01 RX ADMIN — DOPAMINE HYDROCHLORIDE IN DEXTROSE 5 MCG/KG/MIN: 1.6 INJECTION, SOLUTION INTRAVENOUS at 09:10

## 2020-01-01 RX ADMIN — PROPOFOL 10 MG: 10 INJECTION, EMULSION INTRAVENOUS at 12:09

## 2020-01-01 RX ADMIN — SODIUM CHLORIDE 500 ML: 0.9 INJECTION, SOLUTION INTRAVENOUS at 05:08

## 2020-01-01 RX ADMIN — ACETAMINOPHEN 650 MG: 325 TABLET ORAL at 10:08

## 2020-01-01 RX ADMIN — FUROSEMIDE 120 MG: 10 INJECTION, SOLUTION INTRAMUSCULAR; INTRAVENOUS at 11:10

## 2020-01-01 RX ADMIN — POTASSIUM CHLORIDE 40 MEQ: 1.5 POWDER, FOR SOLUTION ORAL at 09:10

## 2020-01-01 RX ADMIN — KETAMINE HYDROCHLORIDE 10 MCG/KG/MIN: 100 INJECTION, SOLUTION, CONCENTRATE INTRAMUSCULAR; INTRAVENOUS at 11:10

## 2020-01-01 RX ADMIN — CEFTRIAXONE 2 G: 2 INJECTION, SOLUTION INTRAVENOUS at 07:09

## 2020-01-01 RX ADMIN — VANCOMYCIN HYDROCHLORIDE 1000 MG: 1 INJECTION, POWDER, LYOPHILIZED, FOR SOLUTION INTRAVENOUS at 02:10

## 2020-01-01 RX ADMIN — AMLODIPINE BESYLATE 5 MG: 5 TABLET ORAL at 12:10

## 2020-01-01 RX ADMIN — Medication 10 ML: at 10:09

## 2020-01-01 RX ADMIN — VASOPRESSIN 0.04 UNITS/MIN: 20 INJECTION INTRAVENOUS at 12:10

## 2020-01-01 RX ADMIN — CEFEPIME 2 G: 2 INJECTION, POWDER, FOR SOLUTION INTRAVENOUS at 04:09

## 2020-01-01 RX ADMIN — EPINEPHRINE 0.08 MCG/KG/MIN: 1 INJECTION PARENTERAL at 07:10

## 2020-01-01 RX ADMIN — SODIUM BICARBONATE 650 MG TABLET 1300 MG: at 02:10

## 2020-01-01 RX ADMIN — METRONIDAZOLE 500 MG: 500 INJECTION, SOLUTION INTRAVENOUS at 07:09

## 2020-01-01 RX ADMIN — POTASSIUM CHLORIDE 30 MEQ: 750 CAPSULE, EXTENDED RELEASE ORAL at 12:10

## 2020-01-01 RX ADMIN — MECLIZINE 12.5 MG: 12.5 TABLET ORAL at 10:08

## 2020-01-01 RX ADMIN — LACOSAMIDE 200 MG: 10 SOLUTION ORAL at 07:09

## 2020-01-01 RX ADMIN — FUROSEMIDE 7.5 MG/HR: 10 INJECTION, SOLUTION INTRAMUSCULAR; INTRAVENOUS at 07:10

## 2020-01-01 RX ADMIN — SODIUM BICARBONATE 50 MEQ: 84 INJECTION INTRAVENOUS at 09:10

## 2020-01-01 RX ADMIN — Medication 1 CAPSULE: at 03:09

## 2020-01-01 RX ADMIN — GLYCOPYRROLATE 0.1 MG: 0.2 INJECTION INTRAMUSCULAR; INTRAVENOUS at 11:10

## 2020-01-01 RX ADMIN — SUCRALFATE 1 G: 1 TABLET ORAL at 04:08

## 2020-01-01 RX ADMIN — CEFEPIME 2 G: 2 INJECTION, POWDER, FOR SOLUTION INTRAVENOUS at 07:09

## 2020-01-01 RX ADMIN — PERAMPANEL 12 MG: 12 TABLET ORAL at 11:10

## 2020-01-01 RX ADMIN — MECLIZINE 12.5 MG: 12.5 TABLET ORAL at 09:08

## 2020-01-01 RX ADMIN — VANCOMYCIN HYDROCHLORIDE 750 MG: 750 INJECTION, POWDER, LYOPHILIZED, FOR SOLUTION INTRAVENOUS at 12:10

## 2020-01-01 RX ADMIN — METRONIDAZOLE 500 MG: 500 INJECTION, SOLUTION INTRAVENOUS at 01:09

## 2020-01-01 RX ADMIN — FLUTICASONE FUROATE AND VILANTEROL TRIFENATATE 1 PUFF: 100; 25 POWDER RESPIRATORY (INHALATION) at 07:08

## 2020-01-01 RX ADMIN — VASOPRESSIN 0.04 UNITS/MIN: 20 INJECTION INTRAVENOUS at 07:10

## 2020-01-01 RX ADMIN — KETAMINE HYDROCHLORIDE 15 MCG/KG/MIN: 100 INJECTION, SOLUTION INTRAMUSCULAR; INTRAVENOUS at 10:10

## 2020-01-01 RX ADMIN — LORAZEPAM 4 MG: 2 INJECTION INTRAMUSCULAR at 11:10

## 2020-01-01 RX ADMIN — MELATONIN TAB 3 MG 6 MG: 3 TAB at 11:10

## 2020-01-01 RX ADMIN — NOREPINEPHRINE BITARTRATE 0.18 MCG/KG/MIN: 1 INJECTION, SOLUTION, CONCENTRATE INTRAVENOUS at 10:10

## 2020-01-01 RX ADMIN — LOSARTAN POTASSIUM 50 MG: 50 TABLET ORAL at 03:09

## 2020-01-01 RX ADMIN — DOXYCYCLINE 100 MG: 100 INJECTION, POWDER, LYOPHILIZED, FOR SOLUTION INTRAVENOUS at 01:09

## 2020-01-01 RX ADMIN — LEVETIRACETAM INJECTION 500 MG: 5 INJECTION INTRAVENOUS at 09:09

## 2020-01-01 RX ADMIN — POTASSIUM CHLORIDE 10 MEQ: 10 INJECTION, SOLUTION INTRAVENOUS at 03:09

## 2020-01-16 NOTE — PROGRESS NOTES
[facility] Note    Chief Complaint      Chief Complaint   Patient presents with    Follow-up     u/c f/u     Knee Pain    Medication Refill     History of Present Illness      Lopez Savage is a 74 y.o. male who presents today for left knee pain.  Patient comes to appointment alone.    Seen by 11/18/19 for COPD exacerbation, treated with abx/steroids. Got better for about 1 week, then started with cough/congestion. Went to  on 12/31/19 for cough/congestion and frequent urination.  Dx'ed with sinusitis, put on augmentin and zofran PRN.  Had labs which showed mild anemia and mild leukocytosis of 15 with left shift.  UCx no growth.  Felt better afterward and has been doing well since.    Tripped on [employer]'s walker and fell on left knee in 11/2019.  No refills on ibuprofen.  Has been taking OTC 3 in AM and 3 at night. Cr 1.2 on  labs, slightly worse from Dr. Haywood's labs in 10/2019.    Problem List Items Addressed This Visit     Chronic bronchitis    Relevant Medications    predniSONE (DELTASONE) 50 MG Tab      Other Visit Diagnoses     Chronic pain of left knee    -  Primary    Nausea        Vertigo        Relevant Medications    meclizine (ANTIVERT) 12.5 mg tablet    Mild persistent asthma without complication   (Active)      restart advair and continue proventil prn    Post-nasal drip        Relevant Medications    fluticasone propionate (FLONASE) 50 mcg/actuation nasal spray    Sinus congestion        Relevant Medications    fluticasone propionate (FLONASE) 50 mcg/actuation nasal spray          Health Maintenance   Topic Date Due    TETANUS VACCINE  04/05/2022    Lipid Panel  10/04/2024    Colonoscopy  10/11/2027    Hepatitis C Screening  Completed    Pneumococcal Vaccine (65+ Low/Medium Risk)  Completed    Abdominal Aortic Aneurysm Screening  Completed       Past Medical History:   Diagnosis Date    Allergy     Asthma     BPH (benign prostatic hyperplasia)      CHRONIC BRONCHITIS     GERD (gastroesophageal reflux disease)     HTN (hypertension)     HTN (hypertension) 10/15/2014    Joint pain     Mitral regurgitation     3-4+ under surveillance CT surgery    Mitral regurgitation     Mitral valve regurgitation     Obesity     Osteoarthritis of knee     Trouble in sleeping     Ulcerative colitis     Urinary tract infection     Valvular regurgitation        Past Surgical History:   Procedure Laterality Date    COLONOSCOPY N/A 10/11/2017    Procedure: COLONOSCOPY Gol[place];  Surgeon: Shanae Duarte MD;  Location: [facility];  Service: Endoscopy;  Laterality: N/A;    CYSTOSCOPY N/A 2018    Procedure: CYSTOSCOPY;  Surgeon: Adan Cuenca MD;  Location: [address];  Service: Urology;  Laterality: N/A;  1 hour    CYSTOSCOPY W/ RETROGRADES      with penile biopsy    DILATION OF URETHRA N/A 2018    Procedure: DILATION, URETHRA;  Surgeon: Adan Ceunca MD;  Location: [address];  Service: Urology;  Laterality: N/A;    HERNIA REPAIR Right     inguinal    JOINT REPLACEMENT Bilateral     knees    Penile Biopsy      TONSILLECTOMY      TOTAL KNEE ARTHROPLASTY Bilateral     URETHRAL MEATOPLASTY N/A 2018    Procedure: MEATOPLASTY, URETHRA;  Surgeon: Adan Cuenca MD;  Location: [address];  Service: Urology;  Laterality: N/A;       family history includes Bone cancer in his mother; COPD in his sister; Cancer in his mother; Cataracts in his mother; Glaucoma in his mother; Heart disease in his daughter; Kidney disease in his father; Other in his daughter; Other (age of onset: 2) in his daughter.     Social History     Tobacco Use    Smoking status: Former Smoker     Packs/day: 2.00     Years: 6.00     Pack years: 12.00     Types: Cigarettes     Last attempt to quit: 1969     Years since quittin.0    Smokeless tobacco: Former User     Types: Chew    Tobacco comment: Quit all forms of tobacco in .   Substance Use  Topics    Alcohol use: No     Alcohol/week: 0.0 standard drinks     Comment: remote hx of heavy alcohol use    Drug use: No       Review of Systems   Constitutional: Negative for chills and fever.   HENT: Negative for congestion and sore throat.    Eyes: Negative for blurred vision and discharge.   Respiratory: Negative for cough and shortness of breath.    Cardiovascular: Negative for chest pain and palpitations.   Gastrointestinal: Negative for constipation, diarrhea, nausea and vomiting.   Genitourinary: Negative for dysuria and hematuria.   Musculoskeletal: Negative for falls and myalgias.   Skin: Negative for itching and rash.   Neurological: Negative for dizziness and headaches.        Outpatient Encounter Medications as of 1/16/2020   Medication Sig Note Dispense Refill    albuterol (PROVENTIL HFA) 90 mcg/actuation inhaler Inhale 2 puffs into the lungs every 6 (six) hours as needed for Wheezing or Shortness of Breath.  1 Inhaler 3    betamethasone dipropionate (DIPROLENE) 0.05 % ointment Apply topically 2 (two) times daily.  45 g 3    cetirizine (ZYRTEC) 10 MG tablet Take 1 tablet (10 mg total) by mouth once daily.   0    donepezil (ARICEPT) 10 MG tablet Take 1 tablet (10 mg total) by mouth once daily.  90 tablet 3    escitalopram oxalate (LEXAPRO) 5 MG Tab Take 1 tablet (5 mg total) by mouth once daily.  30 tablet 11    fluticasone propionate (FLONASE) 50 mcg/actuation nasal spray 2 sprays (100 mcg total) by Each Nostril route once daily.  1 Bottle 11    fluticasone-salmeterol 250-50 mcg/dose (ADVAIR DISKUS) 250-50 mcg/dose diskus inhaler Inhale 1 puff into the lungs 2 (two) times daily. Controller  60 each 3    lidocaine HCl 2% (XYLOCAINE) 2 % JelP urojet by Mucous Membrane route every other day. 5 ml Urojet to apply into the urethra for each urethral meatal dilation  15 applicator 11    losartan (COZAAR) 50 MG tablet Take 1 tablet (50 mg total) by mouth once daily.  90 tablet 3    meclizine  (ANTIVERT) 12.5 mg tablet Take 1 tablet (12.5 mg total) by mouth 3 (three) times daily.  270 tablet 3    omeprazole (PRILOSEC) 40 MG capsule Take 1 capsule (40 mg total) by mouth every morning.  90 capsule 3    ondansetron (ZOFRAN) 4 MG tablet Take 1 tablet (4 mg total) by mouth every 8 (eight) hours as needed for Nausea.  20 tablet 2    [DISCONTINUED] fluticasone propionate (FLONASE) 50 mcg/actuation nasal spray 2 sprays (100 mcg total) by Each Nostril route once daily.  1 Bottle 2    [DISCONTINUED] levoFLOXacin (LEVAQUIN) 500 MG tablet Take 1 tablet (500 mg total) by mouth once daily.  7 tablet 0    [DISCONTINUED] meclizine (ANTIVERT) 12.5 mg tablet Take 1 tablet (12.5 mg total) by mouth 3 (three) times daily.  270 tablet 0    [DISCONTINUED] omeprazole (PRILOSEC) 40 MG capsule Take 1 capsule (40 mg total) by mouth every morning.  90 capsule 0    predniSONE (DELTASONE) 50 MG Tab Take 1 tablet (50 mg total) by mouth daily as needed (for wheezing/shortness of breath).  7 tablet 0    [DISCONTINUED] ibuprofen (ADVIL,MOTRIN) 600 MG tablet Take 1 tablet (600 mg total) by mouth every 6 (six) hours as needed for Pain. (Patient not taking: Reported on 1/16/2020) 4/26/2019: As needed 60 tablet 2    [DISCONTINUED] predniSONE (DELTASONE) 50 MG Tab Take 1 tablet (50 mg total) by mouth once daily. (Patient not taking: Reported on 1/16/2020)  7 tablet 0     No facility-administered encounter medications on file as of 1/16/2020.        Review of patient's allergies indicates:   Allergen Reactions    Codeine Nausea Only    Tetanus vaccines and toxoid Hives and Rash       Physical Exam      Vital Signs  Temp: 98.1 °F (36.7 °C)  Temp src: Oral  Pulse: 68  SpO2: 96 %  BP: 138/80  BP Location: Left arm  Patient Position: Sitting  Pain Score:   4  Pain Loc: Knee  Height and Weight  Weight: 126.8 kg (279 lb 8.7 oz)]    Physical Exam   Constitutional: He is oriented to person, place, and time. He appears well-developed and  well-nourished.   HENT:   Head: Normocephalic and atraumatic.   Right Ear: External ear normal.   Left Ear: External ear normal.   Eyes: Right eye exhibits no discharge. Left eye exhibits no discharge.   Neck: Normal range of motion. No thyromegaly present.   Cardiovascular: Normal rate, regular rhythm and intact distal pulses.   No murmur heard.  Pulmonary/Chest: Effort normal and breath sounds normal. No respiratory distress.   Abdominal: Soft. Bowel sounds are normal. He exhibits no distension. There is no tenderness.   Musculoskeletal: Normal range of motion. He exhibits no deformity.   Neurological: He is alert and oriented to person, place, and time.   Skin: Skin is warm and dry. No rash noted.   Psychiatric: He has a normal mood and affect. His behavior is normal.        Laboratory:  CBC:  No results for input(s): WBC, RBC, HGB, HCT, PLT, MCV, MCH, MCHC in the last 2160 hours.  CMP:  No results for input(s): GLU, CALCIUM, ALBUMIN, PROT, NA, K, CO2, CL, BUN, ALKPHOS, ALT, AST, BILITOT in the last 2160 hours.    Invalid input(s): CREATININ  URINALYSIS:  No results for input(s): COLORU, CLARITYU, SPECGRAV, PHUR, PROTEINUA, GLUCOSEU, BILIRUBINCON, BLOODU, WBCU, RBCU, BACTERIA, MUCUS, NITRITE, LEUKOCYTESUR, UROBILINOGEN, HYALINECASTS in the last 2160 hours.   LIPIDS:  No results for input(s): TSH, HDL, CHOL, TRIG, LDLCALC, CHOLHDL, NONHDLCHOL, TOTALCHOLEST in the last 2160 hours.  TSH:  No results for input(s): TSH in the last 2160 hours.  A1C:  No results for input(s): HGBA1C in the last 2160 hours.    Radiology:  11/18/2019 CXR: WNL    Assessment/Plan     Lopez Savage is a 74 y.o.male with:    1. Chronic pain of left knee    2. Mucopurulent chronic bronchitis  - predniSONE (DELTASONE) 50 MG Tab; Take 1 tablet (50 mg total) by mouth daily as needed (for wheezing/shortness of breath).  Dispense: 7 tablet; Refill: 0    3. Nausea    4. Vertigo  - meclizine (ANTIVERT) 12.5 mg tablet; Take 1 tablet (12.5 mg total)  by mouth 3 (three) times daily.  Dispense: 270 tablet; Refill: 3    5. Mild persistent asthma without complication    6. Post-nasal drip  - fluticasone propionate (FLONASE) 50 mcg/actuation nasal spray; 2 sprays (100 mcg total) by Each Nostril route once daily.  Dispense: 1 Bottle; Refill: 11    7. Sinus congestion  - fluticasone propionate (FLONASE) 50 mcg/actuation nasal spray; 2 sprays (100 mcg total) by Each Nostril route once daily.  Dispense: 1 Bottle; Refill: 11    -Counseled to stop NSAIDS, will try Tylenol instead given slight decrease in kidney function.  Sees Dr. Gan (orthopedist) who did his knee replacement later this month.  -Will start mucinex, rx sent for prednisone if needed   -Continue current medications and maintain follow up with specialists.  Return to clinic as scheduled with Dr. Haywood.       Trinidad Ellis MD  [facility] [place]

## 2020-03-10 NOTE — TELEPHONE ENCOUNTER
Pt called &appt made and put in the mail ,  4/23/2020 echo at 1pm -Dr Duque appt 3:00pm . Wife verbalized understanding, slip mail on 3/10/2020

## 2020-03-11 NOTE — TELEPHONE ENCOUNTER
I would recommend getting evaluation by ENT at this point to make sure we're addressing the correct diagnosis.

## 2020-03-11 NOTE — TELEPHONE ENCOUNTER
----- Message from Mechelle Pfeiffer sent at 3/11/2020  8:47 AM CDT -----  Contact: Self [phone]  Patient is calling to see if he can be seen today due to his bronchitis.

## 2020-03-11 NOTE — TELEPHONE ENCOUNTER
Patient is having a cough-clear mucus, x-months, you and  have both Rxed meds the last time is 01/30  sent in [facility] and recommended ENT, they have not seen or tried to see ENT yet

## 2020-03-25 NOTE — TELEPHONE ENCOUNTER
Patient wife is calling to see if you can order albuterol for his nebulizer at home. Patient has an appointment April 15 to see the ENT.

## 2020-03-25 NOTE — TELEPHONE ENCOUNTER
----- Message from Edelmira Ramires sent at 3/25/2020 10:23 AM CDT -----  Contact: Wife [phone]  Patient would like to speak with you about a personal matter. Please advise

## 2020-04-02 NOTE — TELEPHONE ENCOUNTER
Patient's wife is concerned that the patient needs x-rays due to a fall about a month ago. I explained to her that we are trying to limit the amount of patients in clinic due to COVID19 concerns. I also explained to her that Mr. Marroquin is at a high risk of sharyn the virus at his age and with the health issues that he has. I informed the patient's wife that I will be reaching out to Dr. Gan's PA about her concerns with her  needing x-rays.     About a month ago the patient tripped over something and landed directly on the right knee. He has been using a heating pad on it and has been taking tylenol BID and thinks that it is getting better. The patient's wife doesn't see swelling but the patient says he feels swelling.     I offered for the patient to see BHARGAV Ma tomorrow at 3pm and so that Kirill can determine if he needs to come in to the clinic.     ----- Message from Candie Roman sent at 2020 11:03 AM CDT -----  Contact: PTs wife Brenda  RE-PT Lopez Savage # [id number]   [phone]  @#   PTs wife Brenda  RE-PT Lopez Savage # [id number]   46  @#     Pts wife calling due to a message notifying of a telemed visit for patient. Wife states will need x-ray and would like to discuss with staff.     Please contact back PTs wife Brenda  RASTA-PT Lopez Savage # [id number]   46  @#

## 2020-04-03 NOTE — PROGRESS NOTES
Subjective:          Chief Complaint: Lopez Savage is a 74 y.o. male who had concerns including Pain and Swelling of the Right Knee.    The patient location is: [facility]  The chief complaint leading to consultation is: right knee pain  Visit type: Virtual visit with synchronous audio and video  Total time spent with patient: 15 min  Each patient to whom he or she provides medical services by telemedicine is:  (1) informed of the relationship between the physician and patient and the respective role of any other health care provider with respect to management of the patient; and (2) notified that he or she may decline to receive medical services by telemedicine and may withdraw from such care at any time.    Lopez Savage presents via telemedicine visit for right knee pain. The pain started 2 weeks ago after tripping on a children's toy and landing directly on on bilateral knees.  Swelling was immediate in the right knee with pain with pressing over the patella.  Pain is located over (points to) patella.  Pain has mildly improved. He reports that the pain is a 6 /10 sore pain today and not responding adequately to conservative measures which have included activity modifications, rest, and oral medication. Is affecting ADLs and limiting desired level of activity. Denies numbness, tingling, radiation, and inability to bear weight.  Pain is 6 /10 at its worst    Mechanical symptoms:  None  Subjective instability: (--)   Worse with palpation  Better with rest.   Nocturnal symptoms: (--)    Previous surgeries:    Procedure date: 06/19/2012  Bilateral total knee arthroplasty with Ric Gan MD            Review of Systems   Constitution: Negative.   Respiratory: Negative.    Endocrine: Negative.    Skin: Negative.    Musculoskeletal: Positive for arthritis, falls, joint pain, joint swelling and stiffness.                   Objective:        General: Lopez is well-developed, well-nourished, appears stated age,  in no acute distress, alert and oriented to time, place and person.     General    Vitals reviewed.  Constitutional: He is oriented to person, place, and time. He appears well-developed and well-nourished. No distress.   HENT:   Head: Normocephalic and atraumatic.   Nose: Nose normal.   Eyes: Conjunctivae and EOM are normal. Pupils are equal, round, and reactive to light.   Cardiovascular: Normal rate, regular rhythm and intact distal pulses.    Pulmonary/Chest: Breath sounds normal. No respiratory distress.   Abdominal: Soft. Bowel sounds are normal. He exhibits no distension.   Neurological: He is alert and oriented to person, place, and time. He has normal reflexes.   Psychiatric: He has a normal mood and affect. His behavior is normal. Judgment and thought content normal.           Right Knee Exam     Inspection   Scars: present  Swelling: present    Tenderness   The patient is tender to palpation of the patella.    Range of Motion   Extension: 0   Flexion:  90 abnormal     Tests   Patella   Patellar Grind: positive    Left Knee Exam     Inspection   Scars: present          Assessment:       Encounter Diagnoses   Name Primary?    Status post total bilateral knee replacement Yes    Chronic pain of right knee           Plan:       I made the decision to obtain old records of the patient including previous notes and imaging. I independently reviewed and interpreted lab results today as well as prior imaging.      1. Diclofenac gel TID as needed for pain management.  2. HEP 40572 - Kirill Ma PA-C, instructed and demonstrated a patellofemoral HEP. The patient then demonstrated understanding of exercises and proper technique. This program was performed for 10 minutes.   3. Ice compress to the affected area 2-3x a day for 15-20 minutes as needed for pain management.  4. RTC to see Kirill Ma PA-C as needed for follow-up.      All of the patient's questions were answered and the patient will contact us if  they have any questions or concerns in the interim.          [facility] patient questionnaires have been collected today.

## 2020-04-07 NOTE — TELEPHONE ENCOUNTER
Attempted to call patient to reschedule appointment for a virtual visit. No answer. Left voicemail to return my call

## 2020-04-08 NOTE — TELEPHONE ENCOUNTER
Spoke with pts wife. She states that he was referred to Dr. Gandara for a cough that started in January. I scheduled a virtual visit for 04/17 at 1:40. Mrs Savage verbalized understanding.

## 2020-04-08 NOTE — TELEPHONE ENCOUNTER
----- Message from Edelmira Ramires sent at 4/8/2020  7:57 AM CDT -----  Contact: Wife [phone]  Patient would like to speak with you about to have a virtual visit. Please advise

## 2020-04-17 NOTE — PROGRESS NOTES
Otolaryngology Telemedicine Note    Lopez Savage  Encounter Date: 4/17/2020   YOB: 1945  Referring Physician: Prasanna Haywood Md  [address] [facility] [address]  [address]  [place] [address]   PCP: Prasanna Haywood MD    This is a telehealth visit that was performed at [facility]. Verbal consent to participate in video visit was obtained. This particular visit occurred during the 2020 COVID19 outbreak.     Each patient to whom he or she provides medical services by telemedicine is:  (1) informed of the relationship between the physician and patient and the respective role of any other health care provider with respect to management of the patient; and (2) notified that he or she may decline to receive medical services by telemedicine and may withdraw from such care at any time.  I discussed with the patient that:  - I would evaluate the patient and recommend diagnostics and treatments based on my assessment  - Our sessions are not being recorded and that personal health information is protected  - Our team would provide follow up care in person if/when the patient needs it    The patient location is: [place]  The chief complaint leading to consultation is: cough  Visit type: Virtual visit with synchronous audio and video  Total time spent with patient: 45 minutes    HPI: Lopez Savage is a 74 y.o. male who presents for evaluation of cough for 6 months.  He reports that it is worsening over the last 2 weeks. He reports that he has had cough upon exertion. In January saw Dr. Ellis where he was treated for COPD exacerbation and was prescribed Levaquin and prednisone with some relief. He then went to urgent care 3 weeks later prescribed steroids. He felt that this was helpful.   He notes that he is coughing at night which is both productive and dry in nature. He admits to shortness of breath with the coughing episodes.  He has had weight loss.  Hx of COPD with prescribed medication regimen. He is using  "Advair inhaler only once per day as opposed to BID as prescribed and not using albuterol inhaler or nebulizer because he feels it gives him "the shakes."    He feels his cough has worsened above his baseline over the last 1 week. He denies fever or shortness of breath outside of his coughing episodes since the symptoms worsened.   He states that he had nasal congestion but feels that this has been improved with Flonase. He denies facial pain and pressure, headaches, post-nasal drip, change of sense of smell or taste.         Review of Systems   Constitutional: Negative for chills and fever.   Eyes: Negative for blurred vision and double vision.   Respiratory: Positive for cough. Negative for hemoptysis and shortness of breath.    Cardiovascular: Negative for chest pain and palpitations.   Gastrointestinal: Negative for heartburn and nausea.   Skin: Negative for itching and rash.   Neurological: Negative for dizziness and seizures.   Endo/Heme/Allergies: Negative for environmental allergies. Does not bruise/bleed easily.   Psychiatric/Behavioral: Negative for depression and memory loss.        Review of patient's allergies indicates:   Allergen Reactions    Codeine Nausea Only    Tetanus vaccines and toxoid Hives and Rash       Past Medical History:   Diagnosis Date    Allergy     Asthma     BPH (benign prostatic hyperplasia)     CHRONIC BRONCHITIS     GERD (gastroesophageal reflux disease)     HTN (hypertension)     HTN (hypertension) 10/15/2014    Joint pain     Mitral regurgitation     3-4+ under surveillance CT surgery    Mitral regurgitation     Mitral valve regurgitation     Obesity     Osteoarthritis of knee     Trouble in sleeping     Ulcerative colitis     Urinary tract infection     Valvular regurgitation        Past Surgical History:   Procedure Laterality Date    COLONOSCOPY N/A 10/11/2017    Procedure: COLONOSCOPY Gol[place];  Surgeon: Shanae Duarte MD;  Location: [facility] ENDO;  " Service: Endoscopy;  Laterality: N/A;    CYSTOSCOPY N/A 2018    Procedure: CYSTOSCOPY;  Surgeon: Adan Cuenca MD;  Location: [address];  Service: Urology;  Laterality: N/A;  1 hour    CYSTOSCOPY W/ RETROGRADES      with penile biopsy    DILATION OF URETHRA N/A 2018    Procedure: DILATION, URETHRA;  Surgeon: Adan Cuenca MD;  Location: [address];  Service: Urology;  Laterality: N/A;    HERNIA REPAIR Right     inguinal    JOINT REPLACEMENT Bilateral     knees    Penile Biopsy      TONSILLECTOMY      TOTAL KNEE ARTHROPLASTY Bilateral     URETHRAL MEATOPLASTY N/A 2018    Procedure: MEATOPLASTY, URETHRA;  Surgeon: Adan Cuenca MD;  Location: [facility] OR [address] [place] [address];  Service: Urology;  Laterality: N/A;       Social History     Socioeconomic History    Marital status:      Spouse name: Not on file    Number of children: Not on file    Years of education: Not on file    Highest education level: Not on file   Occupational History    Not on file   Social Needs    Financial resource strain: Not on file    Food insecurity:     Worry: Not on file     Inability: Not on file    Transportation needs:     Medical: Not on file     Non-medical: Not on file   Tobacco Use    Smoking status: Former Smoker     Packs/day: 2.00     Years: 6.00     Pack years: 12.00     Types: Cigarettes     Last attempt to quit: 1969     Years since quittin.3    Smokeless tobacco: Former User     Types: Chew    Tobacco comment: Quit all forms of tobacco in .   Substance and Sexual Activity    Alcohol use: No     Alcohol/week: 0.0 standard drinks     Comment: remote hx of heavy alcohol use    Drug use: No    Sexual activity: Not Currently     Partners: Female   Lifestyle    Physical activity:     Days per week: Not on file     Minutes per session: Not on file    Stress: Not on file   Relationships    Social connections:     Talks on phone: Not on file     Gets together: Not on file      Attends [employer] service: Not on file     Active member of club or organization: Not on file     Attends meetings of clubs or organizations: Not on file     Relationship status: Not on file   Other Topics Concern    Not on file   Social History Narrative    Not on file       Family History   Problem Relation Age of Onset    Bone cancer Mother     Glaucoma Mother     Cataracts Mother     Cancer Mother         bone cancer    Kidney disease Father     COPD Sister     Heart disease Daughter         congenital    Other Daughter 2        fibr. tumor on bronchial tube; lung removed    Other Daughter         Bronchitis    Melanoma Neg Hx     Prostate cancer Neg Hx     Macular degeneration Neg Hx     Retinal detachment Neg Hx     Strabismus Neg Hx     Amblyopia Neg Hx     Blindness Neg Hx        Outpatient Encounter Medications as of 4/17/2020   Medication Sig Dispense Refill    albuterol (PROVENTIL HFA) 90 mcg/actuation inhaler Inhale 2 puffs into the lungs every 6 (six) hours as needed for Wheezing or Shortness of Breath. 1 Inhaler 3    albuterol (PROVENTIL) 2.5 mg /3 mL (0.083 %) nebulizer solution Take 3 mLs (2.5 mg total) by nebulization every 6 (six) hours as needed for Wheezing. Rescue 1 Box 11    betamethasone dipropionate (DIPROLENE) 0.05 % ointment Apply topically 2 (two) times daily. 45 g 3    cetirizine (ZYRTEC) 10 MG tablet Take 1 tablet (10 mg total) by mouth once daily.  0    diclofenac sodium (VOLTAREN) 1 % Gel Apply 2 g topically 3 (three) times daily. 1 Tube 0    donepezil (ARICEPT) 10 MG tablet Take 1 tablet (10 mg total) by mouth once daily. 90 tablet 3    escitalopram oxalate (LEXAPRO) 5 MG Tab Take 1 tablet (5 mg total) by mouth once daily. 30 tablet 11    fluticasone propionate (FLONASE) 50 mcg/actuation nasal spray 2 sprays (100 mcg total) by Each Nostril route once daily. 1 Bottle 11    fluticasone-salmeterol 250-50 mcg/dose (ADVAIR DISKUS) 250-50 mcg/dose diskus inhaler  Inhale 1 puff into the lungs 2 (two) times daily. Controller 60 each 3    lidocaine HCl 2% (XYLOCAINE) 2 % JelP urojet by Mucous Membrane route every other day. 5 ml Urojet to apply into the urethra for each urethral meatal dilation 15 applicator 11    losartan (COZAAR) 50 MG tablet TAKE ONE TABLET BY MOUTH EVERY DAY 90 tablet 3    meclizine (ANTIVERT) 12.5 mg tablet Take 1 tablet (12.5 mg total) by mouth 3 (three) times daily. 270 tablet 3    omeprazole (PRILOSEC) 40 MG capsule Take 1 capsule (40 mg total) by mouth every morning. 90 capsule 3    ondansetron (ZOFRAN) 4 MG tablet Take 1 tablet (4 mg total) by mouth every 8 (eight) hours as needed for Nausea. 20 tablet 2     No facility-administered encounter medications on file as of 4/17/2020.        Physical Exam:  There were no vitals filed for this visit.    Constitutional  General Appearance: well nourished, well-developed, alert, oriented, in no acute distress  Communication: ability, understanding, voice quality normal  Head and Face  Inspection: normocephalic, atraumatic, no scars, lesions or masses    Eyes  Ocular Motility / Alignment: normal alignment, motility, no proptosis  Conjunctiva: not injected  Eyelids: no entropion or ectropion, no edema  Ears  Hearing: speech reception thresholds grossly normal  External Ears: no auricle lesions, no apparent edema or erythema of tragus/lateral aspect of canal    Nose  External Nose: no lesions, trauma or deformity  Oral Cavity / Oropharynx  Lips: upper and lower lips pink and moist  Tongue: normal mobility, no obvious lesions  Oropharynx: 1+ , no obvious mass or lesion, no erythema  Neck  Inspection: no erythema, no tenderness per patient report on palpation of self, no obvious large masses visible  Chest / Respiratory  Chest: no stridor or retractions, normal effort and expansion  Neurological  Cranial Nerves: grossly intact  General: alert and oriented  Psychiatric  Mood and Affect: no depression, anxiety  or agitation      Assessment and Plan:  Lopez Savage is a 74 y.o. male with     There are no diagnoses linked to this encounter.     Acute Worsening cough in patient with COPD- order COVID-19 testing.   I discussed with the patient his current prescriptions for COPD and have asked him to take as directed.   Continue Floanse  Add xyzal 5mg PO qhs  Prescription for promethazine DM given.   Follow up in 3-4 weeks for in office evaluation. I discussed that his symptoms do not seem to be related to upper airway etiology but will need follow up for in person assessment.     Marcie Tauzin Wilkinson, MD [facility]

## 2020-04-17 NOTE — TELEPHONE ENCOUNTER
----- Message from Tresa Darby LPN sent at 4/17/2020  2:31 PM CDT -----  Pt had a virtual visit with Dr. Gandara and asked if we could reach out to Dr. Haywood's office for a refill on Albuterol and Advair. Please advise.

## 2020-04-17 NOTE — LETTER
April 17, 2020      Prasanna Haywood MD  [address] [address] [facility] [address] [facility] [address]  [address]  [place] [address]           [place] - O[facility]hin[facility]  [address]  [place] [address]  Phone: [phone]  Fax: [phone]          Patient: Lopez Savage   MR Number: [id number]   YOB: 1945   Date of Visit: 4/17/2020       Dear Dr. Prasanna Haywood:    Thank you for referring Lopez Savage to me for evaluation. Attached you will find relevant portions of my assessment and plan of care.    If you have questions, please do not hesitate to call me. I look forward to following Lopez Savage along with you.    Sincerely,    Natalie Trinh MD    Enclosure  CC:  No Recipients    If you would like to receive this communication electronically, please contact [email or web address] or [phone] to request more information on [employer] Link access.    For providers and/or their staff who would like to refer a patient to [facility], please contact us through our one-stop-shop provider referral line, [facility], at [phone].    If you feel you have received this communication in error or would no longer like to receive these types of communications, please e-mail [email or web address]

## 2020-04-20 NOTE — TELEPHONE ENCOUNTER
----- Message from Natalie Trinh MD sent at 4/20/2020 10:00 AM CDT -----  Please call patient and notify of normal results.  Your test was NEGATIVE for COVID-19 (coronavirus).  If you still have symptoms, treat with rest, fluids, and over-the-counter medications.  Continue to stay home and practice proper handwashing.     If your symptoms worsen or if you have any other concerns, please contact [facility] at [phone].     Sincerely,    Natalie Trinh MD

## 2020-04-20 NOTE — TELEPHONE ENCOUNTER
----- Message from Kaylee De Luna MA sent at 4/20/2020 10:14 AM CDT -----  Please call patient and notify of normal results.   Your test was NEGATIVE for COVID-19 (coronavirus).  If you still have symptoms, treat with rest, fluids, and over-the-counter medications.  Continue to stay home and practice proper handwashing.       If your symptoms worsen or if you have any other concerns, please contact [facility] at [phone].

## 2020-04-20 NOTE — TELEPHONE ENCOUNTER
Per ,  Notified  that her husbands Covid-19 test was negative. I asked if  was still having symptoms. Mrs. Savage stated he is doing much better sine  put him on the antihistamine and the cough syrup. She wanted to know if  would refill the cough syrup for a couple of more day.I told have to send  a message. I gave her the [facility] on call number [phone].

## 2020-05-06 NOTE — TELEPHONE ENCOUNTER
----- Message from Mechelle Pfeiffer sent at 5/6/2020 10:24 AM CDT -----  Contact: Self [phone]  Patient Returning Your Phone Call

## 2020-05-06 NOTE — TELEPHONE ENCOUNTER
----- Message from [facility] sent at 5/6/2020  8:48 AM CDT -----  Contact: pt wife  Pt wife called asking if pt prescription dose for escitalopram oxalate (LEXAPRO) 5 MG Tab could be raised for pt. Please advise pt wife on this matter at [phone]

## 2020-05-06 NOTE — TELEPHONE ENCOUNTER
Spoke with patient's wife, Brenda. Brenda states that the patient has been a little more anxious lately and would like to know if the Lexapro could be increased a little. Informed Brenda that I would forward this message to Dr Haywood and once he responds someone would give her a call back.

## 2020-05-20 NOTE — TELEPHONE ENCOUNTER
----- Message from Esvin Mejia sent at 5/20/2020  1:37 PM CDT -----  Contact: nany , wife / [phone]   Patient's wife would like to speak with your office regarding the patient's test results from urgent care, also would like to discuss a referral for a pulmonary doctor. Please Advise.

## 2020-05-20 NOTE — TELEPHONE ENCOUNTER
Pt went to see Dr Gandara who  State that he needs to see a pulmonary doctor. He went to urgent care on 05/09 for his cough. Had labs done and his hemoglobin was 9 and hematocit was 29.9.

## 2020-05-22 NOTE — TELEPHONE ENCOUNTER
----- Message from Michelle Bajwa sent at 5/22/2020  1:35 PM CDT -----  Contact: pt wife  Pt wife called would like a call back in regards to her husbands blood work please advise       Pt wife can be reached at [phone]

## 2020-05-22 NOTE — TELEPHONE ENCOUNTER
Dr. Haywood Patient.....  Patient had labs drawn at Urgent Care(see scan), Wife is concerned about iron?? Can you look at results please.

## 2020-05-22 NOTE — TELEPHONE ENCOUNTER
They didn't check his iron levels.  He does have an anemia.  Based on his iron levels drawn in 2/2019 (and 4 years ago too), he does not have iron deficiency.  He has anemia of chronic disease, which is common in older adults with multiple chronic medical problems.

## 2020-06-02 NOTE — TELEPHONE ENCOUNTER
----- Message from Martin Stevens sent at 6/2/2020 12:53 PM CDT -----  Contact: Pt's wife Brenda      The caller states that she would like to know if she can go with the Pt to his appt with you on 6/12/20 ?  Please contact the caller to confirm.    Phone # [phone]

## 2020-06-02 NOTE — TELEPHONE ENCOUNTER
Spoke with patient wife, informed her that I have recieved her message. Patient wife states that patient is hard of hearing and does not remember everything so well so she would like to know if its ok for her to come up with patient on patient appointment. I advised patient wife that she can attend the visit with patient, I also advised patient wife that patient has Pulmonary Function Test to complete prior to his visit. I also advised patient wife that due to Covid-19, we are experiencing Social Distance and asking patients that has to complete any Pulmonary Function Test, to complete a Covid Screening Test (2) days before patient appointment. Patient wife verbalized that she understand and states that patient will complete Covid Screening Test (2) days before patient appointment. I verbalized to patient wife that I understand.

## 2020-06-08 NOTE — TELEPHONE ENCOUNTER
Spoke with pt's wife with permission. Pt has pulmonary function study scheduled for this Friday and was told to have Covid testing done prior to that.  Has not heard back from physician's office and does not know what to do. No triage. [facility] sent to Dr. Leyla Ramirez, pulmonary dept.

## 2020-06-09 NOTE — PROGRESS NOTES
Subjective:       Patient ID: Lopez Savage is a 74 y.o. male.    Vitals:  vitals were not taken for this visit.     Chief Complaint: COVID-19 Concerns    Covid-19 Swab      Constitution: Negative for chills, fatigue and fever.   HENT: Negative for congestion and sore throat.    Neck: Negative for painful lymph nodes.   Cardiovascular: Negative for chest pain and leg swelling.   Eyes: Negative for double vision and blurred vision.   Respiratory: Positive for cough. Negative for shortness of breath.    Gastrointestinal: Negative for nausea, vomiting and diarrhea.   Genitourinary: Negative for dysuria, frequency and urgency.   Musculoskeletal: Negative for joint pain, joint swelling, muscle cramps and muscle ache.   Skin: Negative for color change, pale and rash.   Allergic/Immunologic: Negative for seasonal allergies.   Neurological: Negative for dizziness, history of vertigo, light-headedness, passing out and headaches.   Hematologic/Lymphatic: Negative for swollen lymph nodes, easy bruising/bleeding and history of blood clots. Does not bruise/bleed easily.   Psychiatric/Behavioral: Negative for nervous/anxious, sleep disturbance and depression. The patient is not nervous/anxious.        Objective:      Physical Exam      Assessment:       1. Chronic obstructive pulmonary disease, unspecified COPD type        Plan:         Chronic obstructive pulmonary disease, unspecified COPD type  -     COVID-19 Routine Screening

## 2020-06-12 NOTE — TELEPHONE ENCOUNTER
Called pt to discuss decreased hemoglobin.  Pt says he's feeling ok: shortness of breath and cough are manageable at this time.  Discussed going to ER for blood transfusion evaluation and he would like to wait until he sees Dr. Matos on 6/15 if his symptoms don't worsen.  I instructed him to go to ER if his symptoms do worsen.      Please try to get this CT scheduled on Monday.  I would like it done before he sees Dr. Matos.

## 2020-06-12 NOTE — TELEPHONE ENCOUNTER
----- Message from Gustavo Flaherty sent at 6/12/2020  1:01 PM CDT -----  Contact: Ms.Richards Garcia called in and wanted to speak with someone at the office regarding her . She mention that she only wanted to inform the doctor that  last visit to after hours was on May 09 2020. She can be reached at    [phone]

## 2020-06-12 NOTE — PATIENT INSTRUCTIONS
Get you blood work  Get your CT scan  Complete the walking test    I will contact you with the results

## 2020-06-12 NOTE — PROCEDURES
Lopez Savage is a 74 y.o.  male patient, who presents for a 6 minute walk test ordered by MAIRA Ramirez.  The diagnosis is Qualify for Oxygen; Cough; Shortness of Breath.  The patient's BMI is 37.9 kg/m2.  Predicted distance (lower limit of normal) is 267.76 meters.      Test Results:    The test was completed without stopping.  The total time walked was 360 seconds.  During walking, the patient reported:  Dyspnea, Lightheadedness.  The patient used no assistive devices during testing.     06/12/2020---------Distance: 182.88 meters (600 feet)     O2 Sat % Supplemental Oxygen Heart Rate Blood Pressure Sherry Scale   Pre-exercise  (Resting) 97 % Room Air 99 bpm 118/59 mmHg 0   During Exercise 98 % Room Air 107 bpm 133/61 mmHg 3   Post-exercise  (Recovery) 98 % Room Air  100 bpm       Recovery Time: 75 seconds    Performing nurse/tech: Teagan MANUEL      PREVIOUS STUDY:   The patient has not had a previous study.      CLINICAL INTERPRETATION:  Six minute walk distance is 182.88 meters (600 feet) with moderate dyspnea.  During exercise, there was no desaturation while breathing room air.  Both blood pressure and heart rate remained stable with walking.  The patient reported non-pulmonary symptoms during exercise.  Significant exercise impairment is likely due to subjective symptoms.  The patient did complete the study, walking 360 seconds of the 360 second test.  No previous study performed.  Based upon age and body mass index, exercise capacity is less than predicted.

## 2020-06-12 NOTE — TELEPHONE ENCOUNTER
Pt.'s wife requested/confirmed appt. With Dr. Arciniega at 4pm (to arrive at 2pm) on 6/15/20.    ----- Message from Michelle Bajwa sent at 6/12/2020  1:52 PM CDT -----  Contact: pt wife  Pt wife called stated dr mena called dr arciniega to get this pt in asap please advise pt    Pt can be reached at [phone]

## 2020-06-12 NOTE — LETTER
June 12, 2020      Prasanna Haywood MD  [address] [facility] [address] [facility]  [address]  [facility] [facility] [address]           [facility] - [facility]monary Services  [address]  [place] [address]  Phone: [phone]          Patient: Lopez Savage   MR Number: [id number]   YOB: 1945   Date of Visit: 6/12/2020       Dear Dr. Prasanna Haywood:    Thank you for referring Lopez Savage to me for evaluation. Attached you will find relevant portions of my assessment and plan of care.    If you have questions, please do not hesitate to call me. I look forward to following Lopez Savage along with you.    Sincerely,    Leyla Ramirez, DNP    Enclosure  CC:  No Recipients    If you would like to receive this communication electronically, please contact [email or web address] or [phone] to request more information on [employer] Link access.    For providers and/or their staff who would like to refer a patient to [facility], please contact us through our one-stop-shop provider referral line, [facility], at [phone].    If you feel you have received this communication in error or would no longer like to receive these types of communications, please e-mail [email or web address]

## 2020-06-12 NOTE — PROGRESS NOTES
Patient presents for evaluation of chronic cough spells  Was sent to ENT January, very fatigued, retired about 5-6 years ago  Worked in a grain elevator setting for 42 years  Started smoking at age 17 smoked .75-1 pack a day or so, quit 1968.  Used to throw up every morning after coughing  He has tremulousness that starts when he gets cold.  He will warm up and get very cold and then he gets very hot.  Then he takes off the covers and gets cold again.   Uses albuterol twice daily  Tried nebs and couldn't tolerate as he has palpitations  He started coughing after getting up to go to the bathroom and took some robitussin and he sat up for an hour and was able to go back to sleep  Unknown alleviating or exacerbating factors  Steroids helped, he had lots of energy  Has an Echo pending on July 18th      Review of Systems   Constitutional: Positive for chills and weight loss (25 lbs since March). Negative for activity change, fatigue and night sweats.   HENT: Negative for postnasal drip, rhinorrhea, trouble swallowing and congestion.    Eyes: Negative for itching.   Respiratory: Positive for cough, sputum production (clear) and dyspnea on extertion. Negative for hemoptysis, choking, chest tightness, shortness of breath, wheezing and use of rescue inhaler.    Cardiovascular: Positive for chest pain (left sided, started in March). Negative for palpitations.   Genitourinary: Negative for difficulty urinating.   Endocrine: Positive for cold intolerance. Negative for heat intolerance.    Musculoskeletal: Negative for arthralgias.   Skin: Negative for rash.   Gastrointestinal: Negative for nausea, vomiting and acid reflux.   Neurological: Negative for dizziness and light-headedness.   Hematological: Negative for adenopathy.   Psychiatric/Behavioral: Positive for sleep disturbance.         Social History     Tobacco Use    Smoking status: Former Smoker     Packs/day: 2.00     Years: 6.00     Pack years: 12.00     Types:  Cigarettes     Last attempt to quit: 1969     Years since quittin.4    Smokeless tobacco: Former User     Types: Chew    Tobacco comment: Quit all forms of tobacco in .   Substance Use Topics    Alcohol use: No     Alcohol/week: 0.0 standard drinks     Comment: remote hx of heavy alcohol use       Review of patient's allergies indicates:   Allergen Reactions    Codeine Nausea Only    Tetanus vaccines and toxoid Hives and Rash     Past Medical History:   Diagnosis Date    Allergy     Asthma     BPH (benign prostatic hyperplasia)     CHRONIC BRONCHITIS     GERD (gastroesophageal reflux disease)     HTN (hypertension)     HTN (hypertension) 10/15/2014    Joint pain     Mitral regurgitation     3-4+ under surveillance CT surgery    Mitral regurgitation     Mitral valve regurgitation     Obesity     Osteoarthritis of knee     Trouble in sleeping     Ulcerative colitis     Urinary tract infection     Valvular regurgitation      Past Surgical History:   Procedure Laterality Date    COLONOSCOPY N/A 10/11/2017    Procedure: COLONOSCOPY Golytely;  Surgeon: Shanae Duarte MD;  Location: [facility];  Service: Endoscopy;  Laterality: N/A;    CYSTOSCOPY N/A 2018    Procedure: CYSTOSCOPY;  Surgeon: Adan Cuenca MD;  Location: [address];  Service: Urology;  Laterality: N/A;  1 hour    CYSTOSCOPY W/ RETROGRADES      with penile biopsy    DILATION OF URETHRA N/A 2018    Procedure: DILATION, URETHRA;  Surgeon: Adan Cuenca MD;  Location: [address];  Service: Urology;  Laterality: N/A;    HERNIA REPAIR Right     inguinal    JOINT REPLACEMENT Bilateral     knees    Penile Biopsy      TONSILLECTOMY      TOTAL KNEE ARTHROPLASTY Bilateral     URETHRAL MEATOPLASTY N/A 2018    Procedure: MEATOPLASTY, URETHRA;  Surgeon: Adan Cuenca MD;  Location: [address];  Service: Urology;  Laterality: N/A;     Current Outpatient Medications on File Prior to Visit    Medication Sig    albuterol (PROVENTIL HFA) 90 mcg/actuation inhaler Inhale 2 puffs into the lungs every 6 (six) hours as needed for Wheezing or Shortness of Breath.    albuterol (PROVENTIL) 2.5 mg /3 mL (0.083 %) nebulizer solution Take 3 mLs (2.5 mg total) by nebulization every 6 (six) hours as needed for Wheezing. Rescue    albuterol (PROVENTIL) 2.5 mg /3 mL (0.083 %) nebulizer solution     ARICEPT 10 mg tablet     betamethasone dipropionate (DIPROLENE) 0.05 % ointment Apply topically 2 (two) times daily.    cetirizine (ZYRTEC) 10 MG tablet Take 1 tablet (10 mg total) by mouth once daily.    COZAAR 50 mg tablet     diclofenac sodium (VOLTAREN) 1 % Gel Apply 2 g topically 3 (three) times daily.    diclofenac sodium (VOLTAREN) 1 % Gel     donepezil (ARICEPT) 10 MG tablet Take 1 tablet (10 mg total) by mouth once daily.    escitalopram oxalate (LEXAPRO) 10 MG tablet Take 1 tablet (10 mg total) by mouth once daily.    ferrous gluconate (FERGON) 324 MG tablet Take 324 mg by mouth daily with breakfast.    fluticasone propionate (FLONASE) 50 mcg/actuation nasal spray 2 sprays (100 mcg total) by Each Nostril route once daily.    fluticasone-salmeterol diskus inhaler 250-50 mcg Inhale 1 puff into the lungs 2 (two) times daily. Controller    levocetirizine (XYZAL) 5 MG tablet Take 1 tablet (5 mg total) by mouth every evening.    levoFLOXacin (LEVAQUIN) 500 MG tablet     levoFLOXacin (LEVAQUIN) 500 MG tablet     lidocaine HCl 2% (XYLOCAINE) 2 % JelP urojet by Mucous Membrane route every other day. 5 ml Urojet to apply into the urethra for each urethral meatal dilation    losartan (COZAAR) 50 MG tablet TAKE ONE TABLET BY MOUTH EVERY DAY    meclizine (ANTIVERT) 12.5 mg tablet Take 1 tablet (12.5 mg total) by mouth 3 (three) times daily.    montelukast (SINGULAIR) 10 mg tablet     omeprazole (PRILOSEC) 40 MG capsule Take 1 capsule (40 mg total) by mouth every morning.    ondansetron (ZOFRAN) 4 MG  tablet Take 1 tablet (4 mg total) by mouth every 8 (eight) hours as needed for Nausea.    PROVENTIL HFA 90 mcg/actuation inhaler     SINGULAIR 10 mg tablet      No current facility-administered medications on file prior to visit.        Objective:      Vitals:    06/12/20 0942   BP: 109/62   Pulse: 100     Physical Exam   Constitutional: He is oriented to person, place, and time. He appears well-developed and well-nourished. No distress. He is obese.   HENT:   Head: Normocephalic.   Neck: Normal range of motion. Neck supple.   Cardiovascular: Normal rate and regular rhythm.   No murmur heard.  Pulmonary/Chest: Normal expansion, symmetric chest wall expansion, effort normal and breath sounds normal. No respiratory distress. He has no decreased breath sounds. He has no wheezes. He has no rhonchi. He has no rales.   Abdominal: Soft. He exhibits no distension. There is no hepatosplenomegaly. There is no abdominal tenderness.   Musculoskeletal: Normal range of motion.         General: No edema.   Lymphadenopathy:     He has no cervical adenopathy.   Neurological: He is alert and oriented to person, place, and time. Gait normal.   Skin: Skin is warm and dry. No cyanosis. There is pallor. Nails show no clubbing.   Psychiatric: He has a normal mood and affect.   Vitals reviewed.    Personal Diagnostic Review    PFTs personally reviewed and discussed with patient  Imaging personally reviewed with patient CT 6/15/2020          Assessment:       Problem List Items Addressed This Visit        Pulmonary    Chronic bronchitis    Overview     Concern that HF may be contributory  Await Echo  BNP negative  CT rather unremarkable         Current Assessment & Plan     Will get a course of steroids per Heme/Onc  No cause for cough evident on PFT or CT  Can't rule out PND as causative            Other    Shortness of breath - Primary    Overview     Restriction and DLCO impairment noted on PFTs  Awaiting Echo and cards visit  Anemia  noted on labs  IgE, BNP negative           Current Assessment & Plan     Seeing Dr. Matos to suss out potential causes of anemia         Relevant Orders    CBC auto differential (Completed)    IGE (Completed)    B-TYPE NATRIURETIC PEPTIDE (Completed)    Comprehensive metabolic panel (Completed)    Six Minute Walk Test to qualify for Home Oxygen (Completed)      Other Visit Diagnoses     Chronic obstructive pulmonary disease, unspecified COPD type        Cough, persistent        Relevant Orders    CT Chest Without Contrast (Completed)

## 2020-06-12 NOTE — TELEPHONE ENCOUNTER
"Spoke with patient wife (Mrs. Savage) informed her that I have received her message. Patient wife states that MAIRA Mayer contacted her and asked "When was patient last blood work in after hours". Patient wife states that patient last blood work in after hours were May 9,2020. I verbalized to patient wife that I understand and that I will forward her message to MAIRA Mayer to review/advise. Patient wife verbalized that she understand.   "

## 2020-06-14 PROBLEM — D64.9 NORMOCYTIC ANEMIA: Status: ACTIVE | Noted: 2020-01-01

## 2020-06-15 NOTE — PROGRESS NOTES
PATIENT: Lopez Savage  MRN: [id number]  DATE: 6/14/2020    Diagnosis:   1. Normocytic anemia    2. Anemia of chronic disease    3. Severe obesity (BMI 35.0-39.9) with comorbidity    4. Unintentional weight loss      Chief Complaint: Anemia    Subjective:    History of Present Illness: Mr. Savage is a 74 y.o. male who presented in February 2019 for evaluation and management of normocytic anemia. He was referred by his family medicine physician Dr. Munguia. He has the following comorbid conditions: morbid obesity, hypertension, osteoarthritis, gastroesophageal reflux.    Reviewing his labs in our system, he has had an anemia since 2007. It has remained relatively stable over time with a hemoglobin between 10 - 12 g/dL.    Interval history:  - he presents for a follow-up appointment for his anemia.  - CBC on 6/12/20 revealed an acute worsening of his anemia; hemoglobin was 7.8 g/dL.  - he underwent a CT scan today.  - for the past 6 months, he notes cough, fatigue, night sweats, unintentional weight loss.  - he felt better when he was taking steroids previously for his symptoms.    Past medical, surgical, family, and social histories have been reviewed and updated below.    Past Medical History:   Past Medical History:   Diagnosis Date    Allergy     Asthma     BPH (benign prostatic hyperplasia)     CHRONIC BRONCHITIS     GERD (gastroesophageal reflux disease)     HTN (hypertension)     HTN (hypertension) 10/15/2014    Joint pain     Mitral regurgitation     3-4+ under surveillance CT surgery    Mitral regurgitation     Mitral valve regurgitation     Obesity     Osteoarthritis of knee     Trouble in sleeping     Ulcerative colitis     Urinary tract infection     Valvular regurgitation        Past Surgical History:   Past Surgical History:   Procedure Laterality Date    COLONOSCOPY N/A 10/11/2017    Procedure: COLONOSCOPY Golytely;  Surgeon: Shanae Duarte MD;  Location: [facility];  Service:  Endoscopy;  Laterality: N/A;    CYSTOSCOPY N/A 8/8/2018    Procedure: CYSTOSCOPY;  Surgeon: Adan Cuenca MD;  Location: [facility] OR [address] [place] [address];  Service: Urology;  Laterality: N/A;  1 hour    CYSTOSCOPY W/ RETROGRADES      with penile biopsy    DILATION OF URETHRA N/A 8/8/2018    Procedure: DILATION, URETHRA;  Surgeon: Adan Cuenca MD;  Location: [facility] OR [address] [place] [address];  Service: Urology;  Laterality: N/A;    HERNIA REPAIR Right 1956    inguinal    JOINT REPLACEMENT Bilateral     knees    Penile Biopsy      TONSILLECTOMY      TOTAL KNEE ARTHROPLASTY Bilateral     URETHRAL MEATOPLASTY N/A 8/8/2018    Procedure: MEATOPLASTY, URETHRA;  Surgeon: Adan Cuenca MD;  Location: [facility] OR [address] [place] [address];  Service: Urology;  Laterality: N/A;       Family History:   Family History   Problem Relation Age of Onset    Bone cancer Mother     Glaucoma Mother     Cataracts Mother     Cancer Mother         bone cancer    Kidney disease Father     COPD Sister     Heart disease Daughter         congenital    Other Daughter 2        fibr. tumor on bronchial tube; lung removed    Other Daughter         Bronchitis    Melanoma Neg Hx     Prostate cancer Neg Hx     Macular degeneration Neg Hx     Retinal detachment Neg Hx     Strabismus Neg Hx     Amblyopia Neg Hx     Blindness Neg Hx        Social History:  reports that he quit smoking about 51 years ago. His smoking use included cigarettes. He has a 12.00 pack-year smoking history. He has quit using smokeless tobacco.  His smokeless tobacco use included chew. He reports that he does not drink alcohol or use drugs.    Allergies:  Review of patient's allergies indicates:   Allergen Reactions    Codeine Nausea Only    Tetanus vaccines and toxoid Hives and Rash       Medications:  Current Outpatient Medications   Medication Sig Dispense Refill    albuterol (PROVENTIL HFA) 90 mcg/actuation inhaler Inhale 2 puffs into the lungs every 6 (six) hours as needed for Wheezing or Shortness  of Breath. 18 g 3    albuterol (PROVENTIL) 2.5 mg /3 mL (0.083 %) nebulizer solution Take 3 mLs (2.5 mg total) by nebulization every 6 (six) hours as needed for Wheezing. Rescue (Patient not taking: Reported on 6/12/2020) 1 Box 11    albuterol (PROVENTIL) 2.5 mg /3 mL (0.083 %) nebulizer solution       ARICEPT 10 mg tablet       betamethasone dipropionate (DIPROLENE) 0.05 % ointment Apply topically 2 (two) times daily. 45 g 3    cetirizine (ZYRTEC) 10 MG tablet Take 1 tablet (10 mg total) by mouth once daily.  0    COZAAR 50 mg tablet       diclofenac sodium (VOLTAREN) 1 % Gel Apply 2 g topically 3 (three) times daily. 1 Tube 0    diclofenac sodium (VOLTAREN) 1 % Gel       donepezil (ARICEPT) 10 MG tablet Take 1 tablet (10 mg total) by mouth once daily. (Patient not taking: Reported on 6/12/2020) 90 tablet 3    escitalopram oxalate (LEXAPRO) 10 MG tablet Take 1 tablet (10 mg total) by mouth once daily. 90 tablet 3    ferrous gluconate (FERGON) 324 MG tablet Take 324 mg by mouth daily with breakfast.      fluticasone propionate (FLONASE) 50 mcg/actuation nasal spray 2 sprays (100 mcg total) by Each Nostril route once daily. 1 Bottle 11    fluticasone-salmeterol diskus inhaler 250-50 mcg Inhale 1 puff into the lungs 2 (two) times daily. Controller 60 each 3    levocetirizine (XYZAL) 5 MG tablet Take 1 tablet (5 mg total) by mouth every evening. (Patient not taking: Reported on 6/12/2020) 30 tablet 11    levoFLOXacin (LEVAQUIN) 500 MG tablet       levoFLOXacin (LEVAQUIN) 500 MG tablet       lidocaine HCl 2% (XYLOCAINE) 2 % JelP urojet by Mucous Membrane route every other day. 5 ml Urojet to apply into the urethra for each urethral meatal dilation 15 applicator 11    losartan (COZAAR) 50 MG tablet TAKE ONE TABLET BY MOUTH EVERY DAY (Patient not taking: Reported on 6/12/2020) 90 tablet 3    meclizine (ANTIVERT) 12.5 mg tablet Take 1 tablet (12.5 mg total) by mouth 3 (three) times daily. 270 tablet 3     montelukast (SINGULAIR) 10 mg tablet       omeprazole (PRILOSEC) 40 MG capsule Take 1 capsule (40 mg total) by mouth every morning. 90 capsule 3    ondansetron (ZOFRAN) 4 MG tablet Take 1 tablet (4 mg total) by mouth every 8 (eight) hours as needed for Nausea. (Patient not taking: Reported on 6/12/2020) 20 tablet 2    PROVENTIL HFA 90 mcg/actuation inhaler       SINGULAIR 10 mg tablet        No current facility-administered medications for this visit.        Review of Systems   Constitutional: Positive for fatigue and unexpected weight change.   HENT: Negative for sore throat.    Eyes: Negative for visual disturbance.   Respiratory: Positive for cough. Negative for shortness of breath.    Gastrointestinal: Positive for nausea (acid reflux). Negative for diarrhea and vomiting.   Endocrine: Positive for cold intolerance.   Genitourinary: Negative for dysuria.   Musculoskeletal: Negative for back pain.   Neurological: Positive for dizziness. Negative for headaches.   Hematological: Negative for adenopathy.   Psychiatric/Behavioral: The patient is not nervous/anxious.    Physical Exam    ECOG Performance Status:   ECOG SCORE 1       Objective:      Vitals:   Vitals:    06/15/20 1432   BP: 132/70   Pulse: 68   Resp: 18   Temp: 98.4 °F (36.9 °C)   TempSrc: Oral   SpO2: 95%   Weight: 117.5 kg (259 lb 0.7 oz)     BMI: Body mass index is 38.25 kg/m².    Physical Exam  Vitals signs and nursing note reviewed.   Constitutional:       Appearance: He is well-developed.      Comments: Obesity noted.   HENT:      Head: Normocephalic and atraumatic.   Eyes:      Pupils: Pupils are equal, round, and reactive to light.   Neck:      Musculoskeletal: Normal range of motion and neck supple.   Cardiovascular:      Rate and Rhythm: Normal rate and regular rhythm.      Heart sounds: No murmur.   Pulmonary:      Effort: Pulmonary effort is normal.      Breath sounds: Normal breath sounds.   Abdominal:      General: Bowel sounds are  normal.      Palpations: Abdomen is soft.   Musculoskeletal: Normal range of motion.   Neurological:      Mental Status: He is alert and oriented to person, place, and time.   Psychiatric:         Behavior: Behavior normal.         Thought Content: Thought content normal.         Judgment: Judgment normal.       Laboratory Data:  Labs have been reviewed.    Lab Results   Component Value Date    WBC 16.41 (H) 06/12/2020    HGB 7.8 (L) 06/12/2020    HCT 26.4 (L) 06/12/2020    MCV 88 06/12/2020     06/12/2020         CT chest (6/15/20):  1. 0.5 cm perifissural nodule in the lateral basal segment of the right lower lobe and few micronodules scattered bilaterally.  Perifissural nodules have a strong association with benign etiology and typically do not require dedicated follow-up.  For multiple solid nodules all <6 mm, Fleischner Society 2017 guidelines recommend no routine follow up for a low risk patient, or follow up with non-contrast chest CT at 12 months after discovery in a high risk patient.  2. Upper limit of normal size right pretracheal node and few prominent mediastinal nodes.  Appearance commonly reflects normal ludy reactivity with infection or inflammation but is nonspecific.  3. 0.9 cm sclerotic focus in the L1 vertebral body.  4. Splenomegaly      Assessment:       1. Normocytic anemia    2. Anemia of chronic disease    3. Severe obesity (BMI 35.0-39.9) with comorbidity    4. Unintentional weight loss           Plan:     1. Normocytic anemia  - Reviewing his labs in our system, he has had an anemia since 2007. It has remained relatively stable over time with a hemoglobin between 10 - 12 g/dL.  - he has had iron studies checked in the past. In 2013, his iron and iron saturation were decreased, but his total iron binding capacity was normal, and his ferritin was elevated. These findings are consistent with anemia of chronic disease/inflammation.  - CBC on 6/12/20 revealed an acute worsening of his  anemia; hemoglobin was 7.8 g/dL.  - CT scan (6/15/20) revealed splenomegaly. Given his systemic symptoms and splenomegaly, I am concerned about a hematologic process, possibly a hematologic malignancy such as lymphoma.  - I will repeat some testing: CBC, sedimentation rate, c-reactive protein, SPEP, immunofixation, free light chains, reticulocytes, LDH, uric acid, BCR/ABL.  - I will send a prescription for steroids.  - return to clinic in 3 weeks to review results.    2. Severe obesity  - Body mass index is 38.25 kg/m².  - I have encouraged weight loss and exercise. I told him that adipose tissue is inflammatory and can possibly be a cause of his anemia (anemia of chronic inflammation).    3. Advance care planning  - I initiated the process of advance care planning today and explained the importance of this process to the patient.  I introduced the concept of advance directives to the patient, as well. Then the patient received detailed information about the importance of designating a Health Care Power of  (HCPOA). He was also instructed to communicate with this person about their wishes for future healthcare, should he become sick and lose decision-making capacity. The patient has not previously appointed a HCPOA. After our discussion, the patient has decided to complete a HCPOA and has appointed his wife Brenda Savage ([phone]). The forms were completed and will be scanned into [employer]. I spent a total time of 16 minutes discussing this issue with the patient.     - return to clinic in 3 weeks to review results.    Howard Matos M.D.  Hematology/Oncology  [facility] - [address], [address]  [place] [address]  Phone: [phone]  Fax: [phone]

## 2020-06-15 NOTE — ASSESSMENT & PLAN NOTE
Will get a course of steroids per Heme/Onc  No cause for cough evident on PFT or CT  Can't rule out PND as causative

## 2020-06-15 NOTE — TELEPHONE ENCOUNTER
CT results discussed, patient and wife saw Dr. Matos today and a workup is being initiated to explore anemia further

## 2020-06-16 NOTE — TELEPHONE ENCOUNTER
MsCyndi Janette confirmed [facility] infusion appt., for blood transfusion, On 6/17/20 at 9:30am and that Dr. Matos will call her prior to appt. Regarding lab results and for verbal blood consent.

## 2020-06-17 PROBLEM — R16.1 SPLENOMEGALY: Status: ACTIVE | Noted: 2020-01-01

## 2020-06-17 NOTE — TELEPHONE ENCOUNTER
I called and spoke to  And Mrs. Savage. I went over the risks/benefits of blood transfusion. He is willing to undergo blood transfusion. I have signed a consent form, and two witnesses were present as well. We will fax the blood consent form to the infusion center for his transfusion today.    I recommend getting a CT abdomen/pelvis to evaluate his splenomegaly and look for lymphadenopathy.    Patient and his wife are in agreement with the proposed treatment plan. All questions were answered to their satisfaction.    Howard Matos M.D.  Hematology/Oncology  [facility] - [address], [address]  [place] [address]  Phone: [phone]  Fax: [phone]

## 2020-06-18 NOTE — LETTER
June 18, 2020        Papi Pires MD  [address]  [place] [address]             [facility]  [address]  [facility] [address]  Phone: [phone]   Patient: Lopez Savage   MR Number: [id number]   YOB: 1945   Date of Visit: 6/18/2020       Dear Dr. Pires:    Thank you for referring Lopez Savage to me for evaluation. Below are the relevant portions of my assessment and plan of care.            If you have questions, please do not hesitate to call me. I look forward to following Lopez along with you.    Sincerely,      Néstor Duque MD           CC  No Recipients

## 2020-06-18 NOTE — TELEPHONE ENCOUNTER
3:28pm:  LM for pt. To confirm CT scan appt. At 9am on 6/22/20.    3:30pm:  LM for pt. To confirm CT scan appt. At 9am on 6/22/20 at [facility].

## 2020-06-18 NOTE — PROGRESS NOTES
Subjective:      Patient ID: Lopez Savage is a 74 y.o. male.    Chief Complaint: No chief complaint on file.      HPI:  Lopez Savage is a 74 y.o. male who presents for follow up MR. Medical conditions include COPD, anemia, Alzheimer's. Last seen in clinic on 9/14/17 at which time Dr. Duque thought the risks outweighed the benefits regarding surgical correction of the valve. Patient was having memory issues at that time. Wife reports today that his memory still is not good but he is on medication. Patient reports some mild SOB but states that he is not walking around much on a daily basis. Denies any swelling, chest pain, palpitations, or orthopnea. Occasional dizziness. Reports energy level is very low. No prior strokes, seizures, blood clots, stents, or surgeries to chest. Quit smoking in 1969. Does not drink. Reports losing around 25 pounds since March. Had 6 minute walk test to qualify for home oxygen per chart review and was able to walk 600 feet without desaturation.     Current medications Reviewed    Review of Systems   Constitutional: Positive for fatigue.   HENT: Positive for hearing loss.    Eyes: Negative for visual disturbance.   Respiratory: Positive for shortness of breath.    Cardiovascular: Negative for chest pain, palpitations and leg swelling.   Gastrointestinal: Negative for nausea.   Musculoskeletal: Negative for gait problem.   Skin: Negative for color change.   Neurological: Negative for dizziness and seizures.   Hematological: Does not bruise/bleed easily.   Psychiatric/Behavioral: Negative for sleep disturbance.     Objective:   Physical Exam  Vitals signs reviewed.   Constitutional:       General: He is not in acute distress.     Appearance: He is well-developed. He is not diaphoretic.   HENT:      Head: Normocephalic and atraumatic.   Eyes:      Pupils: Pupils are equal, round, and reactive to light.   Neck:      Musculoskeletal: Normal range of motion.      Vascular: No JVD.    Cardiovascular:      Rate and Rhythm: Normal rate.   Pulmonary:      Effort: Pulmonary effort is normal. No respiratory distress.   Abdominal:      General: There is no distension.      Palpations: Abdomen is soft.   Musculoskeletal: Normal range of motion.   Skin:     General: Skin is warm and dry.      Capillary Refill: Capillary refill takes less than 2 seconds.   Neurological:      Mental Status: He is alert.   Psychiatric:         Mood and Affect: Mood normal.         Speech: Speech normal.         Diagnotic Results: reviewed   CT Chest reviewed   TTE 6/18/2020  · Normal left ventricular systolic function. The estimated ejection fraction is 60%.  · Mild left ventricular enlargement. LVEDD 6.0cm, LVESD 4.3cm.  · Normal LV diastolic function.  · Mild aortic regurgitation.  · Prolapse of the posterior mitral leaflet with anteriorly directed mitral regurgitation. Moderate-to-severe mitral regurgitation.  · Normal right ventricular systolic function.  · The estimated PA systolic pressure is 32 mmHg.  · Intermediate central venous pressure (8 mmHg).  · Moderate left atrial enlargement.    6MW 6/12/2020  Six minute walk distance is 182.88 meters (600 feet) with   moderate dyspnea.   During exercise, there was no desaturation while breathing room   air.   Both blood pressure and heart rate remained stable with walking.   The patient reported non-pulmonary symptoms during exercise.   Significant exercise impairment is likely due to subjective   symptoms.   The patient did complete the study, walking 360 seconds of the   360 second test.   No previous study performed.   Based upon age and body mass index, exercise capacity is less   than predicted.                TTE 9/14/2017   1 - Normal left ventricular systolic function (EF 55-60%).     2 - No wall motion abnormalities.     3 - Moderate left atrial enlargement.     4 - Normal right ventricular systolic function .     5 - The estimated PA systolic pressure is  greater than 34 mmHg.     6 - Trivial to mild aortic regurgitation.     7 - Moderate to severe mitral regurgitation.     8 - Mild tricuspid regurgitation.     9 - Prolapse and flail of the posterior antonio valve leaflet ( see text).  LVIDD 5.1  Assessment:   MR with prolapse of posterior leaflet   Plan:   Not a surgical candidate. Can consider mitral clip evaluation.       CTS Attending Note:    I have personally taken the history and examined this patient and agree with the SYBIL's note as stated above.  Very pleasant 74-year-old gentleman who I last saw several years ago for mitral regurgitation.  He had a repeat echo which demonstrated a significant increase in his left ventricular end-diastolic diameter.  He does have some shortness of breath, but he also has COPD.  Perhaps most significantly, he has memory issues for which he is on medication.  I believe this makes him inoperable.  Given his progressive left ventricular enlargement, I think it would be reasonable for him to be evaluated for mitral clip.  We will make arrangements for him to be seen by Dr. Pires.

## 2020-06-23 NOTE — PROGRESS NOTES
Prasanna Haywood MD  @Cardiology@    Subjective:   Patient ID:  Lopez Savage is a 74 y.o. male who presents for evaluation of Mitral Regurgitation        Review of Systems   Constitution: Negative for chills and decreased appetite.   HENT: Negative for congestion, ear discharge and ear pain.    Eyes: Negative for blurred vision and discharge.   Cardiovascular: Positive for dyspnea on exertion. Negative for chest pain, claudication and cyanosis.   Respiratory: Negative for cough and hemoptysis.    Endocrine: Negative for cold intolerance and heat intolerance.   Skin: Negative for color change and nail changes.   Musculoskeletal: Negative for arthritis and back pain.   Gastrointestinal: Negative for bloating and abdominal pain.   Genitourinary: Negative for bladder incontinence and decreased libido.   Neurological: Positive for light-headedness. Negative for aphonia and brief paralysis.       Past Medical History:   Diagnosis Date    Allergy     Asthma     BPH (benign prostatic hyperplasia)     CHRONIC BRONCHITIS     GERD (gastroesophageal reflux disease)     HTN (hypertension)     HTN (hypertension) 10/15/2014    Joint pain     Mitral regurgitation     3-4+ under surveillance CT surgery    Mitral regurgitation     Mitral valve regurgitation     Obesity     Osteoarthritis of knee     Trouble in sleeping     Ulcerative colitis     Urinary tract infection     Valvular regurgitation        Past Surgical History:   Procedure Laterality Date    COLONOSCOPY N/A 10/11/2017    Procedure: COLONOSCOPY [place];  Surgeon: Shanae Duarte MD;  Location: [facility];  Service: Endoscopy;  Laterality: N/A;    CYSTOSCOPY N/A 8/8/2018    Procedure: CYSTOSCOPY;  Surgeon: Adan Cuenca MD;  Location: [address];  Service: Urology;  Laterality: N/A;  1 hour    CYSTOSCOPY W/ RETROGRADES      with penile biopsy    DILATION OF URETHRA N/A 8/8/2018    Procedure: DILATION, URETHRA;  Surgeon: Adan Cuenca  MD;  Location: [facility] OR [address] [place] [address];  Service: Urology;  Laterality: N/A;    HERNIA REPAIR Right 1956    inguinal    JOINT REPLACEMENT Bilateral     knees    Penile Biopsy      TONSILLECTOMY      TOTAL KNEE ARTHROPLASTY Bilateral     URETHRAL MEATOPLASTY N/A 8/8/2018    Procedure: MEATOPLASTY, URETHRA;  Surgeon: Adan Cuenca MD;  Location: [facility] OR [address] [place] [address];  Service: Urology;  Laterality: N/A;       Family History   Problem Relation Age of Onset    Bone cancer Mother     Glaucoma Mother     Cataracts Mother     Cancer Mother         bone cancer    Kidney disease Father     COPD Sister     Heart disease Daughter         congenital    Other Daughter 2        fibr. tumor on bronchial tube; lung removed    Other Daughter         Bronchitis    Melanoma Neg Hx     Prostate cancer Neg Hx     Macular degeneration Neg Hx     Retinal detachment Neg Hx     Strabismus Neg Hx     Amblyopia Neg Hx     Blindness Neg Hx          Current Outpatient Medications:     albuterol (PROVENTIL HFA) 90 mcg/actuation inhaler, Inhale 2 puffs into the lungs every 6 (six) hours as needed for Wheezing or Shortness of Breath., Disp: 18 g, Rfl: 3    albuterol (PROVENTIL) 2.5 mg /3 mL (0.083 %) nebulizer solution, Take 3 mLs (2.5 mg total) by nebulization every 6 (six) hours as needed for Wheezing. Rescue, Disp: 1 Box, Rfl: 11    ARICEPT 10 mg tablet, , Disp: , Rfl:     betamethasone dipropionate (DIPROLENE) 0.05 % ointment, Apply topically 2 (two) times daily., Disp: 45 g, Rfl: 3    COZAAR 50 mg tablet, Take 50 mg by mouth once daily. , Disp: , Rfl:     diclofenac sodium (VOLTAREN) 1 % Gel, Apply 2 g topically 3 (three) times daily., Disp: 1 Tube, Rfl: 0    donepezil (ARICEPT) 10 MG tablet, Take 1 tablet (10 mg total) by mouth once daily., Disp: 90 tablet, Rfl: 3    escitalopram oxalate (LEXAPRO) 10 MG tablet, Take 1 tablet (10 mg total) by mouth once daily., Disp: 90 tablet, Rfl: 3    fluticasone propionate (FLONASE) 50  mcg/actuation nasal spray, 2 sprays (100 mcg total) by Each Nostril route once daily., Disp: 1 Bottle, Rfl: 11    fluticasone-salmeterol diskus inhaler 250-50 mcg, Inhale 1 puff into the lungs 2 (two) times daily. Controller, Disp: 60 each, Rfl: 3    levocetirizine (XYZAL) 5 MG tablet, Take 1 tablet (5 mg total) by mouth every evening., Disp: 30 tablet, Rfl: 11    lidocaine HCl 2% (XYLOCAINE) 2 % JelP urojet, by Mucous Membrane route every other day. 5 ml Urojet to apply into the urethra for each urethral meatal dilation, Disp: 15 applicator, Rfl: 11    losartan (COZAAR) 50 MG tablet, TAKE ONE TABLET BY MOUTH EVERY DAY, Disp: 90 tablet, Rfl: 3    meclizine (ANTIVERT) 12.5 mg tablet, Take 1 tablet (12.5 mg total) by mouth 3 (three) times daily., Disp: 270 tablet, Rfl: 3    montelukast (SINGULAIR) 10 mg tablet, , Disp: , Rfl:     multivit with iron,minerals (MULTI-VITAMINS WITH IRON ORAL), Take 1 tablet by mouth once daily., Disp: , Rfl:     predniSONE (DELTASONE) 20 MG tablet, Take 1 tablet (20 mg total) by mouth once daily., Disp: 14 tablet, Rfl: 0    PROVENTIL HFA 90 mcg/actuation inhaler, , Disp: , Rfl:     SINGULAIR 10 mg tablet, , Disp: , Rfl:     albuterol (PROVENTIL) 2.5 mg /3 mL (0.083 %) nebulizer solution, , Disp: , Rfl:     cetirizine (ZYRTEC) 10 MG tablet, Take 1 tablet (10 mg total) by mouth once daily. (Patient not taking: Reported on 6/23/2020), Disp: , Rfl: 0    diclofenac sodium (VOLTAREN) 1 % Gel, , Disp: , Rfl:     ferrous gluconate (FERGON) 324 MG tablet, Take 324 mg by mouth daily with breakfast., Disp: , Rfl:     levoFLOXacin (LEVAQUIN) 500 MG tablet, , Disp: , Rfl:     levoFLOXacin (LEVAQUIN) 500 MG tablet, , Disp: , Rfl:     omeprazole (PRILOSEC) 40 MG capsule, Take 1 capsule (40 mg total) by mouth every morning. (Patient not taking: Reported on 6/23/2020), Disp: 90 capsule, Rfl: 3    ondansetron (ZOFRAN) 4 MG tablet, Take 1 tablet (4 mg total) by mouth every 8 (eight) hours  "as needed for Nausea. (Patient not taking: Reported on 6/23/2020), Disp: 20 tablet, Rfl: 2    predniSONE (DELTASONE) 20 MG tablet, , Disp: , Rfl:   No current facility-administered medications for this visit.   Objective:   BP (!) 87/52 (BP Location: Right arm, Patient Position: Sitting, BP Method: Large (Automatic))   Pulse 95   Ht 5' 9.29" (1.76 m)   Wt 118 kg (260 lb 2.3 oz)   SpO2 96%   BMI 38.09 kg/m²      Physical Exam   Constitutional: He is oriented to person, place, and time.   Obese   HENT:   Head: Normocephalic and atraumatic.   Nose: Nose normal.   Mouth/Throat: No oropharyngeal exudate.   Eyes: Right eye exhibits no discharge. Left eye exhibits no discharge. No scleral icterus.   Neck: Normal range of motion. Neck supple. No JVD present.   Cardiovascular: Normal rate, regular rhythm, S1 normal and S2 normal. Exam reveals no gallop, no S3, no S4, no distant heart sounds, no friction rub, no midsystolic click and no opening snap.   Pulses:       Radial pulses are 2+ on the right side and 2+ on the left side.        Femoral pulses are 2+ on the right side and 2+ on the left side.  Holosystolic murmur, louder on apical focus   Pulmonary/Chest: Effort normal and breath sounds normal. No respiratory distress. He has no wheezes. He has no rales. He exhibits no tenderness.   Abdominal: Soft. Bowel sounds are normal. He exhibits no distension. There is no abdominal tenderness. There is no rebound.   Musculoskeletal: Normal range of motion.         General: No tenderness, deformity or edema.   Lymphadenopathy:     He has no cervical adenopathy.   Neurological: He is alert and oriented to person, place, and time. No cranial nerve deficit.   Skin: Skin is warm and dry.   Psychiatric: He has a normal mood and affect. His behavior is normal.       Lab Results   Component Value Date     (L) 06/12/2020    K 4.1 06/12/2020     06/12/2020    CO2 24 06/12/2020    BUN 20 06/12/2020    CREATININE 1.4 " 06/12/2020    ANIONGAP 8 06/12/2020     Lab Results   Component Value Date    HGBA1C 5.5 02/12/2019     Lab Results   Component Value Date    BNP 59 06/12/2020    BNP <10 03/31/2016    BNP <10 03/05/2014       Lab Results   Component Value Date    WBC 12.44 06/15/2020    HGB 6.7 (L) 06/15/2020    HCT 22.7 (L) 06/15/2020     06/15/2020    GRAN 9.8 (H) 06/15/2020    GRAN 79.0 (H) 06/15/2020     Lab Results   Component Value Date    CHOL 137 10/04/2019    HDL 32 (L) 10/04/2019    LDLCALC 88.2 10/04/2019    TRIG 84 10/04/2019        Assessment:     1. Nonrheumatic mitral valve regurgitation    2. Early onset Alzheimer's dementia without behavioral disturbance    3. Essential hypertension    4. Normocytic anemia    5. Morbid obesity with BMI of 40.0-44.9, adult    6. Gastroesophageal reflux disease without esophagitis    7. Splenomegaly        Plan:   Alzheimer's disease  On donepezil  Wife seems to help a lot with daily activities and as care giver    Essential hypertension  On losartan 50 mg daily  Hypotensive now, SBP 80s today. He did not eat, take losartan, or drank anything today  SBP used to be in the 130s last year  -Hold losartan if SBP is <90 or if lightheaded  -Patient and wife plan to have breakfast, drink coffee and gatorade as they leave clinics    Normocytic anemia  Being evaluated by hematology  Hgb was 6 few days ago, got 1 U PRBC  Iron/ferritin panel -> anemia of chronic diseases  IgG and free light chains are elevated  -F/U with hematology and reach back to us once hematologic issues are stable to readdress whether MitraClip would be beneficial for him or not    Morbid obesity with BMI of 40.0-44.9, adult  Concerning that he has had 25 lb unintended weight loss  Suspect hematologic malignancy    Splenomegaly  As per anemia section    Mitral regurgitation  Moderate -Severe MR with prolapsing posterior leaflet and anteriorly directed jet  LVEF 60%  ESD 4.3  ANA 6 cm  Mild AI  Has been grossly  stable for several years  Unsure if current dyspnea on exertion is due to MR, he considers it does not improve with rescue inhalers but his anemia seems to be the most important culprit  -He and his wife will call us back after hematologic work-up ends, so that we can readdress his MR. Right now, we suspect risks outweighs the benefits            Interventional Cardiology Staff  I have personally taken the history and examined this patient. I have discussed and agree with the resident's findings and plan as documented in the resident's note.  Will await Hematology Oncology bone marrow evaluation for final disposition, however, cyst severe mitral regurgitation with prolapsing posterior leaflet is amendable to repair with a MitraClip.  Due to severe anemia the procedure is contraindicated at this point in time pending evaluation from Hematology-Oncology week after next to determine if MitraClip is indicated.    Papi Pires

## 2020-06-23 NOTE — ASSESSMENT & PLAN NOTE
Moderate -Severe MR with prolapsing posterior leaflet and anteriorly directed jet  LVEF 60%  ESD 4.3  ANA 6 cm  Mild AI  Has been grossly stable for several years  Unsure if current dyspnea on exertion is due to MR, he considers it does not improve with rescue inhalers but his anemia seems to be the most important culprit  -He and his wife will call us back after hematologic work-up ends, so that we can readdress his MR. Right now, we suspect risks outweighs the benefits

## 2020-06-23 NOTE — ASSESSMENT & PLAN NOTE
On losartan 50 mg daily  Hypotensive now, SBP 80s today. He did not eat, take losartan, or drank anything today  SBP used to be in the 130s last year  -Hold losartan if SBP is <90 or if lightheaded  -Patient and wife plan to have breakfast, drink coffee and gatorade as they leave clinics

## 2020-06-23 NOTE — LETTER
June 23, 2020      Néstor Duque MD  [address]  [facility] [address]           [facility]  [address]  [facility] [address]  Phone: [phone]          Patient: Lopez Savage   MR Number: [id number]   YOB: 1945   Date of Visit: 6/23/2020       Dear Dr. Néstor Duque:    Thank you for referring Lopez Savage to me for evaluation. Attached you will find relevant portions of my assessment and plan of care.    If you have questions, please do not hesitate to call me. I look forward to following Lopez Savage along with you.    Sincerely,    Papi Pires MD    Enclosure  CC:  No Recipients    If you would like to receive this communication electronically, please contact [email or web address] or [phone] to request more information on [employer] Link access.    For providers and/or their staff who would like to refer a patient to [facility], please contact us through our one-stop-shop provider referral line, [facility], at [phone].    If you feel you have received this communication in error or would no longer like to receive these types of communications, please e-mail [email or web address]

## 2020-06-30 PROBLEM — D59.10 AUTOIMMUNE HEMOLYTIC ANEMIA: Status: ACTIVE | Noted: 2020-01-01

## 2020-06-30 NOTE — TELEPHONE ENCOUNTER
Requested an morning appt, and it changed to a virtual..apt changed.. ----- Message from Asia Sharpe sent at 6/30/2020  3:13 PM CDT -----  Patient would like a video visit on 07/06/20 , he also would like this in the morning, please contact his wife Cleopatra if you can change his appointment . [phone]

## 2020-07-02 NOTE — PLAN OF CARE
Spoke to patient regarding covid screening. Screening will need to be done on Saturday at [facility] in [place]. Orders placed in system. Pt verbalized understanding.

## 2020-07-02 NOTE — TELEPHONE ENCOUNTER
Pt.'s wife confirmed bmb at 8am on 7/7/20. Pre procedure Instructions given to fast after midnight, except for sips of water with medication, and to arrive b/w 6am-6:30am at hospital registration desk morning of appt.  Pt. Verbalized understanding with teach back given.

## 2020-07-06 NOTE — PROGRESS NOTES
PATIENT: Lopez Savage  MRN: [id number]  DATE: 7/5/2020    Diagnosis:   1. Autoimmune hemolytic anemia    2. Normocytic anemia    3. Splenomegaly    4. Morbid obesity with BMI of 40.0-44.9, adult    5. Early onset Alzheimer's dementia without behavioral disturbance      Chief Complaint: Anemia    Subjective:    History of Present Illness: Mr. Savage is a 74 y.o. male who presented in February 2019 for evaluation and management of normocytic anemia. He was referred by his family medicine physician Dr. Munguia. He has the following comorbid conditions: morbid obesity, hypertension, osteoarthritis, gastroesophageal reflux.    Telemedicine visit:  The patient location is: home  The chief complaint leading to consultation is: anemia    Visit type: audiovisual    Face to Face time with patient: 15 minutes  25 minutes of total time spent on the encounter, which includes face to face time and non-face to face time preparing to see the patient (eg, review of tests), Obtaining and/or reviewing separately obtained history, Documenting clinical information in the electronic or other health record, Independently interpreting results (not separately reported) and communicating results to the patient/family/caregiver, or Care coordination (not separately reported).     Each patient to whom he or she provides medical services by telemedicine is:  (1) informed of the relationship between the physician and patient and the respective role of any other health care provider with respect to management of the patient; and (2) notified that he or she may decline to receive medical services by telemedicine and may withdraw from such care at any time.    Notes: see below    Reviewing his labs in our system, he has had an anemia since 2007. It has remained relatively stable over time with a hemoglobin between 10 - 12 g/dL.    Interval history:  - he presents for a follow-up appointment for his anemia.  - he underwent CT abdomen/pelvis on  6/22/20.  - he felt better when he was taking steroids. However, after he finished the course of prednisone, his fatigue and other symptoms quickly returned.    Past medical, surgical, family, and social histories have been reviewed and updated below.    Past Medical History:   Past Medical History:   Diagnosis Date    Allergy     Asthma     BPH (benign prostatic hyperplasia)     CHRONIC BRONCHITIS     GERD (gastroesophageal reflux disease)     HTN (hypertension)     HTN (hypertension) 10/15/2014    Joint pain     Mitral regurgitation     3-4+ under surveillance CT surgery    Mitral regurgitation     Mitral valve regurgitation     Obesity     Osteoarthritis of knee     Trouble in sleeping     Ulcerative colitis     Urinary tract infection     Valvular regurgitation        Past Surgical History:   Past Surgical History:   Procedure Laterality Date    COLONOSCOPY N/A 10/11/2017    Procedure: COLONOSCOPY [place];  Surgeon: Shanae Duarte MD;  Location: [facility];  Service: Endoscopy;  Laterality: N/A;    CYSTOSCOPY N/A 8/8/2018    Procedure: CYSTOSCOPY;  Surgeon: Adan Cuenca MD;  Location: [address];  Service: Urology;  Laterality: N/A;  1 hour    CYSTOSCOPY W/ RETROGRADES      with penile biopsy    DILATION OF URETHRA N/A 8/8/2018    Procedure: DILATION, URETHRA;  Surgeon: Adan Cuenca MD;  Location: [address];  Service: Urology;  Laterality: N/A;    HERNIA REPAIR Right 1956    inguinal    JOINT REPLACEMENT Bilateral     knees    Penile Biopsy      TONSILLECTOMY      TOTAL KNEE ARTHROPLASTY Bilateral     URETHRAL MEATOPLASTY N/A 8/8/2018    Procedure: MEATOPLASTY, URETHRA;  Surgeon: Adan Cuenca MD;  Location: [address];  Service: Urology;  Laterality: N/A;       Family History:   Family History   Problem Relation Age of Onset    Bone cancer Mother     Glaucoma Mother     Cataracts Mother     Cancer Mother         bone cancer    Kidney disease Father      COPD Sister     Heart disease Daughter         congenital    Other Daughter 2        fibr. tumor on bronchial tube; lung removed    Other Daughter         Bronchitis    Melanoma Neg Hx     Prostate cancer Neg Hx     Macular degeneration Neg Hx     Retinal detachment Neg Hx     Strabismus Neg Hx     Amblyopia Neg Hx     Blindness Neg Hx        Social History:  reports that he quit smoking about 51 years ago. His smoking use included cigarettes. He has a 12.00 pack-year smoking history. He has quit using smokeless tobacco.  His smokeless tobacco use included chew. He reports that he does not drink alcohol or use drugs.    Allergies:  Review of patient's allergies indicates:   Allergen Reactions    Codeine Nausea Only    Tetanus vaccines and toxoid Hives and Rash       Medications:  Current Outpatient Medications   Medication Sig Dispense Refill    albuterol (PROVENTIL HFA) 90 mcg/actuation inhaler Inhale 2 puffs into the lungs every 6 (six) hours as needed for Wheezing or Shortness of Breath. 18 g 3    albuterol (PROVENTIL) 2.5 mg /3 mL (0.083 %) nebulizer solution Take 3 mLs (2.5 mg total) by nebulization every 6 (six) hours as needed for Wheezing. Rescue 1 Box 11    albuterol (PROVENTIL) 2.5 mg /3 mL (0.083 %) nebulizer solution       ARICEPT 10 mg tablet       ARICEPT 10 mg tablet 1 tablet once daily.      betamethasone dipropionate (DIPROLENE) 0.05 % ointment Apply topically 2 (two) times daily. 45 g 3    COZAAR 50 mg tablet Take 50 mg by mouth once daily.       diclofenac sodium (VOLTAREN) 1 % Gel Apply 2 g topically 3 (three) times daily. 1 Tube 0    diclofenac sodium (VOLTAREN) 1 % Gel       donepezil (ARICEPT) 10 MG tablet Take 1 tablet (10 mg total) by mouth once daily. 90 tablet 3    escitalopram oxalate (LEXAPRO) 10 MG tablet Take 1 tablet (10 mg total) by mouth once daily. 90 tablet 3    ferrous gluconate (FERGON) 324 MG tablet Take 324 mg by mouth daily with breakfast.       fluticasone propionate (FLONASE) 50 mcg/actuation nasal spray 2 sprays (100 mcg total) by Each Nostril route once daily. 1 Bottle 11    fluticasone-salmeterol diskus inhaler 250-50 mcg Inhale 1 puff into the lungs 2 (two) times daily. Controller 60 each 3    levocetirizine (XYZAL) 5 MG tablet Take 1 tablet (5 mg total) by mouth every evening. 30 tablet 11    levoFLOXacin (LEVAQUIN) 500 MG tablet       levoFLOXacin (LEVAQUIN) 500 MG tablet       lidocaine HCl 2% (XYLOCAINE) 2 % JelP urojet by Mucous Membrane route every other day. 5 ml Urojet to apply into the urethra for each urethral meatal dilation 15 applicator 11    losartan (COZAAR) 50 MG tablet TAKE ONE TABLET BY MOUTH EVERY DAY 90 tablet 3    meclizine (ANTIVERT) 12.5 mg tablet Take 1 tablet (12.5 mg total) by mouth 3 (three) times daily. 270 tablet 3    montelukast (SINGULAIR) 10 mg tablet       multivit with iron,minerals (MULTI-VITAMINS WITH IRON ORAL) Take 1 tablet by mouth once daily.      omeprazole (PRILOSEC) 40 MG capsule Take 1 capsule (40 mg total) by mouth every morning. (Patient not taking: Reported on 6/23/2020) 90 capsule 3    ondansetron (ZOFRAN) 4 MG tablet Take 1 tablet (4 mg total) by mouth every 8 (eight) hours as needed for Nausea. (Patient not taking: Reported on 6/23/2020) 20 tablet 2    predniSONE (DELTASONE) 20 MG tablet Take 1 tablet (20 mg total) by mouth once daily. 14 tablet 0    predniSONE (DELTASONE) 20 MG tablet       PROVENTIL HFA 90 mcg/actuation inhaler       SINGULAIR 10 mg tablet        No current facility-administered medications for this visit.      Review of Systems   Constitutional: Positive for appetite change, fatigue and unexpected weight change.   HENT: Negative for sore throat.    Eyes: Negative for visual disturbance.   Respiratory: Positive for cough. Negative for shortness of breath.    Cardiovascular: Negative for chest pain.   Gastrointestinal: Positive for nausea (acid reflux). Negative for  abdominal pain, diarrhea and vomiting.   Endocrine: Positive for cold intolerance.   Genitourinary: Positive for frequency. Negative for dysuria.   Musculoskeletal: Negative for back pain.   Skin: Negative for rash.   Neurological: Positive for dizziness. Negative for headaches.   Hematological: Negative for adenopathy.   Psychiatric/Behavioral: The patient is not nervous/anxious.    Physical Exam  Deferred since telemedicine visit.    ECOG Performance Status:   ECOG SCORE 1       Objective:      Vitals:   There were no vitals filed for this visit.  BMI: There is no height or weight on file to calculate BMI.  Deferred since telemedicine visit.    Physical Exam  Deferred since telemedicine visit.    Laboratory Data:  Labs have been reviewed.    Lab Results   Component Value Date    WBC 12.44 06/15/2020    HGB 6.7 (L) 06/15/2020    HCT 22.7 (L) 06/15/2020    MCV 89 06/15/2020     06/15/2020         CT abdomen/pelvis (6/22/20): I have personally reviewed the images  - Splenomegaly.  Present measurement 16.3 x 5.5 cm.  Linear band identified along the anteromedial margin the spleen raising possibility of the focal contusion and less likely incomplete laceration measuring approximately 3 cm.  There is no adjacent subcapsular hematoma or findings to suggest recent trauma in the perisplenic soft tissues, anterior lateral abdominal or lower chest walls.  Correlate clinically.  - Cholelithiasis.  - Diverticulosis without diverticulitis.  - Probable bone islands within the right sacral ala and L1 and L2 vertebral bodies.  - No significant bulky or matted adenopathy within the abdomen or pelvis.      CT chest (6/15/20):  1. 0.5 cm perifissural nodule in the lateral basal segment of the right lower lobe and few micronodules scattered bilaterally.  Perifissural nodules have a strong association with benign etiology and typically do not require dedicated follow-up.  For multiple solid nodules all <6 mm, Fleischner Society  2017 guidelines recommend no routine follow up for a low risk patient, or follow up with non-contrast chest CT at 12 months after discovery in a high risk patient.  2. Upper limit of normal size right pretracheal node and few prominent mediastinal nodes.  Appearance commonly reflects normal ludy reactivity with infection or inflammation but is nonspecific.  3. 0.9 cm sclerotic focus in the L1 vertebral body.  4. Splenomegaly      Assessment:       1. Autoimmune hemolytic anemia    2. Normocytic anemia    3. Splenomegaly    4. Morbid obesity with BMI of 40.0-44.9, adult    5. Early onset Alzheimer's dementia without behavioral disturbance           Plan:     1. Normocytic anemia  - Reviewing his labs in our system, he has had an anemia since 2007. It has remained relatively stable over time with a hemoglobin between 10 - 12 g/dL.  - he has had iron studies checked in the past. In 2013, his iron and iron saturation were decreased, but his total iron binding capacity was normal, and his ferritin was elevated. These findings are consistent with anemia of chronic disease/inflammation.  - CBC on 6/12/20 revealed an acute worsening of his anemia; hemoglobin was 7.8 g/dL.  - CT scan (6/15/20) revealed splenomegaly. Given his systemic symptoms and splenomegaly, I am concerned about a hematologic process, possibly a hematologic malignancy such as lymphoma.  -CT abdomen/pelvis (6/22/20) confirmed the splenomegaly but did not reveal lymphadenopathy.  - I recommend a bone marrow biopsy for further evaluation. He will sign the consent form tomorrow morning before the procedure.  - plan to perform the bone marrow biopsy tomorrow, 7/7/20.  - return to clinic in 2 weeks to review results.    2. Severe obesity  - last documented BMI (6/23/20) was 38.09 kg/m2  - I have encouraged weight loss and exercise.  - continue to monitor.    3. Advance care planning  - I initiated the process of advance care planning today and explained the  importance of this process to the patient.  I introduced the concept of advance directives to the patient, as well. Then the patient received detailed information about the importance of designating a Health Care Power of  (HCPOA). He was also instructed to communicate with this person about their wishes for future healthcare, should he become sick and lose decision-making capacity. The patient has not previously appointed a HCPOA. After our discussion, the patient has decided to complete a HCPOA and has appointed his wife Brenda Savage ([phone]). The forms were completed and were scanned into [employer].     - return to clinic in 2 weeks to review results.    Howard Matos M.D.  Hematology/Oncology  [facility] - [address], [address]  [place] [address]  Phone: [phone]  Fax: [phone]

## 2020-07-06 NOTE — H&P (VIEW-ONLY)
PATIENT: Lopez Savage  MRN: [id number]  DATE: 7/5/2020    Diagnosis:   1. Autoimmune hemolytic anemia    2. Normocytic anemia    3. Splenomegaly    4. Morbid obesity with BMI of 40.0-44.9, adult    5. Early onset Alzheimer's dementia without behavioral disturbance      Chief Complaint: Anemia    Subjective:    History of Present Illness: Mr. Savage is a 74 y.o. male who presented in February 2019 for evaluation and management of normocytic anemia. He was referred by his family medicine physician Dr. Munguia. He has the following comorbid conditions: morbid obesity, hypertension, osteoarthritis, gastroesophageal reflux.    Telemedicine visit:  The patient location is: home  The chief complaint leading to consultation is: anemia    Visit type: audiovisual    Face to Face time with patient: 15 minutes  25 minutes of total time spent on the encounter, which includes face to face time and non-face to face time preparing to see the patient (eg, review of tests), Obtaining and/or reviewing separately obtained history, Documenting clinical information in the electronic or other health record, Independently interpreting results (not separately reported) and communicating results to the patient/family/caregiver, or Care coordination (not separately reported).     Each patient to whom he or she provides medical services by telemedicine is:  (1) informed of the relationship between the physician and patient and the respective role of any other health care provider with respect to management of the patient; and (2) notified that he or she may decline to receive medical services by telemedicine and may withdraw from such care at any time.    Notes: see below    Reviewing his labs in our system, he has had an anemia since 2007. It has remained relatively stable over time with a hemoglobin between 10 - 12 g/dL.    Interval history:  - he presents for a follow-up appointment for his anemia.  - he underwent CT abdomen/pelvis on  6/22/20.  - he felt better when he was taking steroids. However, after he finished the course of prednisone, his fatigue and other symptoms quickly returned.    Past medical, surgical, family, and social histories have been reviewed and updated below.    Past Medical History:   Past Medical History:   Diagnosis Date    Allergy     Asthma     BPH (benign prostatic hyperplasia)     CHRONIC BRONCHITIS     GERD (gastroesophageal reflux disease)     HTN (hypertension)     HTN (hypertension) 10/15/2014    Joint pain     Mitral regurgitation     3-4+ under surveillance CT surgery    Mitral regurgitation     Mitral valve regurgitation     Obesity     Osteoarthritis of knee     Trouble in sleeping     Ulcerative colitis     Urinary tract infection     Valvular regurgitation        Past Surgical History:   Past Surgical History:   Procedure Laterality Date    COLONOSCOPY N/A 10/11/2017    Procedure: COLONOSCOPY [place];  Surgeon: Shanae Duarte MD;  Location: [facility];  Service: Endoscopy;  Laterality: N/A;    CYSTOSCOPY N/A 8/8/2018    Procedure: CYSTOSCOPY;  Surgeon: Adan Cuenca MD;  Location: [address];  Service: Urology;  Laterality: N/A;  1 hour    CYSTOSCOPY W/ RETROGRADES      with penile biopsy    DILATION OF URETHRA N/A 8/8/2018    Procedure: DILATION, URETHRA;  Surgeon: Adan Cuenca MD;  Location: [address];  Service: Urology;  Laterality: N/A;    HERNIA REPAIR Right 1956    inguinal    JOINT REPLACEMENT Bilateral     knees    Penile Biopsy      TONSILLECTOMY      TOTAL KNEE ARTHROPLASTY Bilateral     URETHRAL MEATOPLASTY N/A 8/8/2018    Procedure: MEATOPLASTY, URETHRA;  Surgeon: Adan Cuenca MD;  Location: [address];  Service: Urology;  Laterality: N/A;       Family History:   Family History   Problem Relation Age of Onset    Bone cancer Mother     Glaucoma Mother     Cataracts Mother     Cancer Mother         bone cancer    Kidney disease Father      COPD Sister     Heart disease Daughter         congenital    Other Daughter 2        fibr. tumor on bronchial tube; lung removed    Other Daughter         Bronchitis    Melanoma Neg Hx     Prostate cancer Neg Hx     Macular degeneration Neg Hx     Retinal detachment Neg Hx     Strabismus Neg Hx     Amblyopia Neg Hx     Blindness Neg Hx        Social History:  reports that he quit smoking about 51 years ago. His smoking use included cigarettes. He has a 12.00 pack-year smoking history. He has quit using smokeless tobacco.  His smokeless tobacco use included chew. He reports that he does not drink alcohol or use drugs.    Allergies:  Review of patient's allergies indicates:   Allergen Reactions    Codeine Nausea Only    Tetanus vaccines and toxoid Hives and Rash       Medications:  Current Outpatient Medications   Medication Sig Dispense Refill    albuterol (PROVENTIL HFA) 90 mcg/actuation inhaler Inhale 2 puffs into the lungs every 6 (six) hours as needed for Wheezing or Shortness of Breath. 18 g 3    albuterol (PROVENTIL) 2.5 mg /3 mL (0.083 %) nebulizer solution Take 3 mLs (2.5 mg total) by nebulization every 6 (six) hours as needed for Wheezing. Rescue 1 Box 11    albuterol (PROVENTIL) 2.5 mg /3 mL (0.083 %) nebulizer solution       ARICEPT 10 mg tablet       ARICEPT 10 mg tablet 1 tablet once daily.      betamethasone dipropionate (DIPROLENE) 0.05 % ointment Apply topically 2 (two) times daily. 45 g 3    COZAAR 50 mg tablet Take 50 mg by mouth once daily.       diclofenac sodium (VOLTAREN) 1 % Gel Apply 2 g topically 3 (three) times daily. 1 Tube 0    diclofenac sodium (VOLTAREN) 1 % Gel       donepezil (ARICEPT) 10 MG tablet Take 1 tablet (10 mg total) by mouth once daily. 90 tablet 3    escitalopram oxalate (LEXAPRO) 10 MG tablet Take 1 tablet (10 mg total) by mouth once daily. 90 tablet 3    ferrous gluconate (FERGON) 324 MG tablet Take 324 mg by mouth daily with breakfast.       fluticasone propionate (FLONASE) 50 mcg/actuation nasal spray 2 sprays (100 mcg total) by Each Nostril route once daily. 1 Bottle 11    fluticasone-salmeterol diskus inhaler 250-50 mcg Inhale 1 puff into the lungs 2 (two) times daily. Controller 60 each 3    levocetirizine (XYZAL) 5 MG tablet Take 1 tablet (5 mg total) by mouth every evening. 30 tablet 11    levoFLOXacin (LEVAQUIN) 500 MG tablet       levoFLOXacin (LEVAQUIN) 500 MG tablet       lidocaine HCl 2% (XYLOCAINE) 2 % JelP urojet by Mucous Membrane route every other day. 5 ml Urojet to apply into the urethra for each urethral meatal dilation 15 applicator 11    losartan (COZAAR) 50 MG tablet TAKE ONE TABLET BY MOUTH EVERY DAY 90 tablet 3    meclizine (ANTIVERT) 12.5 mg tablet Take 1 tablet (12.5 mg total) by mouth 3 (three) times daily. 270 tablet 3    montelukast (SINGULAIR) 10 mg tablet       multivit with iron,minerals (MULTI-VITAMINS WITH IRON ORAL) Take 1 tablet by mouth once daily.      omeprazole (PRILOSEC) 40 MG capsule Take 1 capsule (40 mg total) by mouth every morning. (Patient not taking: Reported on 6/23/2020) 90 capsule 3    ondansetron (ZOFRAN) 4 MG tablet Take 1 tablet (4 mg total) by mouth every 8 (eight) hours as needed for Nausea. (Patient not taking: Reported on 6/23/2020) 20 tablet 2    predniSONE (DELTASONE) 20 MG tablet Take 1 tablet (20 mg total) by mouth once daily. 14 tablet 0    predniSONE (DELTASONE) 20 MG tablet       PROVENTIL HFA 90 mcg/actuation inhaler       SINGULAIR 10 mg tablet        No current facility-administered medications for this visit.      Review of Systems   Constitutional: Positive for appetite change, fatigue and unexpected weight change.   HENT: Negative for sore throat.    Eyes: Negative for visual disturbance.   Respiratory: Positive for cough. Negative for shortness of breath.    Cardiovascular: Negative for chest pain.   Gastrointestinal: Positive for nausea (acid reflux). Negative for  abdominal pain, diarrhea and vomiting.   Endocrine: Positive for cold intolerance.   Genitourinary: Positive for frequency. Negative for dysuria.   Musculoskeletal: Negative for back pain.   Skin: Negative for rash.   Neurological: Positive for dizziness. Negative for headaches.   Hematological: Negative for adenopathy.   Psychiatric/Behavioral: The patient is not nervous/anxious.    Physical Exam  Deferred since telemedicine visit.    ECOG Performance Status:   ECOG SCORE 1       Objective:      Vitals:   There were no vitals filed for this visit.  BMI: There is no height or weight on file to calculate BMI.  Deferred since telemedicine visit.    Physical Exam  Deferred since telemedicine visit.    Laboratory Data:  Labs have been reviewed.    Lab Results   Component Value Date    WBC 12.44 06/15/2020    HGB 6.7 (L) 06/15/2020    HCT 22.7 (L) 06/15/2020    MCV 89 06/15/2020     06/15/2020         CT abdomen/pelvis (6/22/20): I have personally reviewed the images  - Splenomegaly.  Present measurement 16.3 x 5.5 cm.  Linear band identified along the anteromedial margin the spleen raising possibility of the focal contusion and less likely incomplete laceration measuring approximately 3 cm.  There is no adjacent subcapsular hematoma or findings to suggest recent trauma in the perisplenic soft tissues, anterior lateral abdominal or lower chest walls.  Correlate clinically.  - Cholelithiasis.  - Diverticulosis without diverticulitis.  - Probable bone islands within the right sacral ala and L1 and L2 vertebral bodies.  - No significant bulky or matted adenopathy within the abdomen or pelvis.      CT chest (6/15/20):  1. 0.5 cm perifissural nodule in the lateral basal segment of the right lower lobe and few micronodules scattered bilaterally.  Perifissural nodules have a strong association with benign etiology and typically do not require dedicated follow-up.  For multiple solid nodules all <6 mm, Fleischner Society  2017 guidelines recommend no routine follow up for a low risk patient, or follow up with non-contrast chest CT at 12 months after discovery in a high risk patient.  2. Upper limit of normal size right pretracheal node and few prominent mediastinal nodes.  Appearance commonly reflects normal ludy reactivity with infection or inflammation but is nonspecific.  3. 0.9 cm sclerotic focus in the L1 vertebral body.  4. Splenomegaly      Assessment:       1. Autoimmune hemolytic anemia    2. Normocytic anemia    3. Splenomegaly    4. Morbid obesity with BMI of 40.0-44.9, adult    5. Early onset Alzheimer's dementia without behavioral disturbance           Plan:     1. Normocytic anemia  - Reviewing his labs in our system, he has had an anemia since 2007. It has remained relatively stable over time with a hemoglobin between 10 - 12 g/dL.  - he has had iron studies checked in the past. In 2013, his iron and iron saturation were decreased, but his total iron binding capacity was normal, and his ferritin was elevated. These findings are consistent with anemia of chronic disease/inflammation.  - CBC on 6/12/20 revealed an acute worsening of his anemia; hemoglobin was 7.8 g/dL.  - CT scan (6/15/20) revealed splenomegaly. Given his systemic symptoms and splenomegaly, I am concerned about a hematologic process, possibly a hematologic malignancy such as lymphoma.  -CT abdomen/pelvis (6/22/20) confirmed the splenomegaly but did not reveal lymphadenopathy.  - I recommend a bone marrow biopsy for further evaluation. He will sign the consent form tomorrow morning before the procedure.  - plan to perform the bone marrow biopsy tomorrow, 7/7/20.  - return to clinic in 2 weeks to review results.    2. Severe obesity  - last documented BMI (6/23/20) was 38.09 kg/m2  - I have encouraged weight loss and exercise.  - continue to monitor.    3. Advance care planning  - I initiated the process of advance care planning today and explained the  importance of this process to the patient.  I introduced the concept of advance directives to the patient, as well. Then the patient received detailed information about the importance of designating a Health Care Power of  (HCPOA). He was also instructed to communicate with this person about their wishes for future healthcare, should he become sick and lose decision-making capacity. The patient has not previously appointed a HCPOA. After our discussion, the patient has decided to complete a HCPOA and has appointed his wife Brenda Savage ([phone]). The forms were completed and were scanned into [employer].     - return to clinic in 2 weeks to review results.    Howard Matos M.D.  Hematology/Oncology  [facility] - [address], [address]  [place] [address]  Phone: [phone]  Fax: [phone]

## 2020-07-07 NOTE — INTERVAL H&P NOTE
The patient has been examined and the H&P has been reviewed:    I concur with the findings and no changes have occurred since H&P was written.    Anesthesia/Surgery risks, benefits and alternative options discussed and understood by patient/family.          Active Hospital Problems    Diagnosis  POA    *Normocytic anemia [D64.9]  Yes    Autoimmune hemolytic anemia [D59.1]  Yes      Resolved Hospital Problems   No resolved problems to display.

## 2020-07-07 NOTE — DISCHARGE INSTRUCTIONS
Bone Marrow Aspiration and Biopsy    Bone marrow is the soft, spongy part inside bones. It makes most of the bodys blood cells. Aspiration and biopsy are procedures done to take a sample of bone marrow out of the body for examination. To perform either procedure, a needle is inserted into one of your bones, usually the back of the hip bone. Then a sample of bone marrow is removed.   If a sample of fluid and cells is taken, it is called bone marrow aspiration. If a solid sample of bone marrow tissue is removed, it is called bone marrow biopsy. In either case, the samples are sent to a lab and studied. The procedures can be done alone, but are most often done together. This sheet tells you more about what to expect.  Why the procedures are done  The procedures may be done for a number of reasons. They can help diagnose certain blood or bone marrow disorders or infections. They may help find certain cancers, such as leukemia. They can show if cancer in other areas of the body has spread to the bone marrow. They can be used during cancer treatment, such as chemotherapy, to monitor treatment progress. And they may be done before certain treatments, such as a stem cell transplant, which require a bone marrow sample. Your healthcare provider will explain why you need the procedure and answer any questions you have.  Preparing for the procedure  Prepare for the procedure as told. In addition:  · Tell your healthcare provider:  ¨ What medicines you take. This includes blood thinners, such as aspirin. This also includes over-the-counter medicines, herbs, and other supplements. You may need to stop taking some or all of them before the procedure.  ¨ If you are allergic to any medicines. Also mention if you have ever had a reaction to medicines used during other tests or procedures in the past.  ¨ If you have a history of bleeding problems.  ¨ If you are pregnant or may be pregnant.  · Follow any directions youre given  for not eating or drinking before the procedure.  The day of the procedure  The procedures can be done at a hospital, clinic, or healthcare providers office. They are performed by a healthcare provider or trained healthcare provider. Whether youre having one or both procedures, plan to be at the facility for 1 to 2 hours. Youll likely go home the same day.  Before the procedure begins  What to expect before the procedure:  · Youll change into a patient gown.  · An IV line may be put into a vein in your arm or hand. This line supplies fluids and medicines.  · Youll be given a sedative to help you relax, if needed. This medicine is given by pill, injection, or through the IV line.  During the procedure  What to expect during the procedure:  · Youll lie on your side or your stomach.  · The site to be used for the bone marrow samples is marked and cleaned. The most common site is the back of the hip bone. Less common sites include the front of the hip bone or breastbone.  · Numbing medicine (local anesthesia) is injected at the site.  · A small cut is made through the numbed skin.  · One or both procedures are then done. Be sure to lie still for each procedure. It is normal to feel some pressure or pain during each procedure.  ¨ For the bone marrow aspiration, a thin needle is put through the cut and into the bone. A syringe is then attached to the needle and used to remove a sample of bone marrow fluid and cells.  ¨ For the bone marrow biopsy, a different needle is put through the same cut and into the bone. A small amount of bone marrow tissue is then removed.  · The samples are sent to a lab to be evaluated.  · When the procedure is complete, pressure is applied to the site for 10 to 15 minutes to help stop bleeding. The site is then bandaged.  After the procedure  Most people can go home after a short period of observation. If you need it, youll be given medicine to manage pain. If you were given a sedative,  you may be taken to a recovery room to rest until the medicine wears off. An adult family member or friend must drive you home afterward.  Recovering at home  Once at home, follow any instructions youre given. Be sure to:  · Take all medicines as directed.  · Care for the procedure site as instructed.  · Check for signs of infection at the procedure site (see below).  · Avoid getting the procedure site wet. Do not bathe or shower until your healthcare providers say it is OK to do so. If you wish, you may wash with a sponge or washcloth.  · Avoid heavy lifting and other strenuous activities as directed.     Call the healthcare provider  Call your healthcare provider if you have any of the following:  · Fever of 100.4 ºF (38 ºC) or higher, or as directed by your healthcare provider  · Signs of infection at the procedure site, such as increased redness or swelling, warmth, worsening pain, bleeding, or foul-smelling drainage   Follow-up  Your healthcare provider will discuss the results with you when they are ready. This is usually within a week after the procedure.     Risks and possible complications of these procedures  These include:  · Severe bleeding or bruising at the procedure site  · Infection at the procedure site  · Bone fracture  · Bone infection   Date Last Reviewed: 10/8/2015  © 3684-1686 [employer]. [address] [place] [address], [place] [address]. All rights reserved. This information is not intended as a substitute for professional medical care. Always follow your healthcare professional's instructions.          Prednisone tablets  What is this medicine?  PREDNISONE (PRED ni sone) is a corticosteroid. It is commonly used to treat inflammation of the skin, joints, lungs, and other organs. Common conditions treated include asthma, allergies, and arthritis. It is also used for other conditions, such as blood disorders and diseases of the adrenal glands.  How should I use this medicine?  Take this  medicine by mouth with a glass of water. Follow the directions on the prescription label. Take this medicine with food. If you are taking this medicine once a day, take it in the morning. Do not take more medicine than you are told to take. Do not suddenly stop taking your medicine because you may develop a severe reaction. Your doctor will tell you how much medicine to take. If your doctor wants you to stop the medicine, the dose may be slowly lowered over time to avoid any side effects.  Talk to your pediatrician regarding the use of this medicine in children. Special care may be needed.  What side effects may I notice from receiving this medicine?  Side effects that you should report to your doctor or health care professional as soon as possible:  · allergic reactions like skin rash, itching or hives, swelling of the face, lips, or tongue  · changes in emotions or moods  · changes in vision  · depressed mood  · eye pain  · fever or chills, cough, sore throat, pain or difficulty passing urine  · increased thirst  · swelling of ankles, feet  Side effects that usually do not require medical attention (report to your doctor or health care professional if they continue or are bothersome):  · confusion, excitement, restlessness  · headache  · nausea, vomiting  · skin problems, acne, thin and shiny skin  · trouble sleeping  · weight gain  What may interact with this medicine?  Do not take this medicine with any of the following medications:  · metyrapone  · mifepristone  This medicine may also interact with the following medications:  · aminoglutethimide  · amphotericin B  · aspirin and aspirin-like medicines  · barbiturates  · certain medicines for diabetes, like glipizide or glyburide  · cholestyramine  · cholinesterase inhibitors  · cyclosporine  · digoxin  · diuretics  · ephedrine  · female hormones, like estrogens and birth control pills  · isoniazid  · ketoconazole  · NSAIDS, medicines for pain and inflammation,  like ibuprofen or naproxen  · phenytoin  · rifampin  · toxoids  · vaccines  · warfarin  What if I miss a dose?  If you miss a dose, take it as soon as you can. If it is almost time for your next dose, talk to your doctor or health care professional. You may need to miss a dose or take an extra dose. Do not take double or extra doses without advice.  Where should I keep my medicine?  Keep out of the reach of children.  Store at room temperature between 15 and 30 degrees C (59 and 86 degrees F). Protect from light. Keep container tightly closed. Throw away any unused medicine after the expiration date.  What should I tell my health care provider before I take this medicine?  They need to know if you have any of these conditions:  · Cushing's syndrome  · diabetes  · glaucoma  · heart disease  · high blood pressure  · infection (especially a virus infection such as chickenpox, cold sores, or herpes)  · kidney disease  · liver disease  · mental illness  · myasthenia gravis  · osteoporosis  · seizures  · stomach or intestine problems  · thyroid disease  · an unusual or allergic reaction to lactose, prednisone, other medicines, foods, dyes, or preservatives  · pregnant or trying to get pregnant  · breast-feeding  What should I watch for while using this medicine?  Visit your doctor or health care professional for regular checks on your progress. If you are taking this medicine over a prolonged period, carry an identification card with your name and address, the type and dose of your medicine, and your doctor's name and address.  This medicine may increase your risk of getting an infection. Tell your doctor or health care professional if you are around anyone with measles or chickenpox, or if you develop sores or blisters that do not heal properly.  If you are going to have surgery, tell your doctor or health care professional that you have taken this medicine within the last twelve months.  Ask your doctor or health care  professional about your diet. You may need to lower the amount of salt you eat.  This medicine may affect blood sugar levels. If you have diabetes, check with your doctor or health care professional before you change your diet or the dose of your diabetic medicine.  NOTE:This sheet is a summary. It may not cover all possible information. If you have questions about this medicine, talk to your doctor, pharmacist, or health care provider. Copyright© 2017 [employer]          ANESTHESIA  -For the first 24 hours after surgery:  Do not drive, use heavy equipment, make important decisions, or drink alcohol  -It is normal to feel sleepy for several hours.  Rest until you are more awake.  -Have someone stay with you, if needed.  They can watch for problems and help keep you safe.  -Some possible post anesthesia side effects include: nausea and vomiting, sore throat and hoarseness, sleepiness, and dizziness.    PAIN  -If you have pain after surgery, pain medicine will help you feel better.  Take it as directed, before pain becomes severe.  Most pain relievers taken by mouth need at least 20-30 minutes to start working.  -Do not drive or drink alcohol while taking pain medicine.  -Pain medication can upset your stomach.  Taking them with a little food may help.  -Other ways to help control pain: elevation, ice, and relaxation  -Call your surgeon if still having unmanageable pain an hour after taking pain medicine.  -Pain medicine can cause constipation.  Taking an over-the counter stool softener while on prescription pain medicine and drinking plenty of fluids can prevent this side effect.  -Call your surgeon if you have severe side effects like: breathing problems, trouble waking up, dizziness, confusion, or severe constipation.    NAUSEA  -Some people have nausea after surgery.  This is often because of anesthesia, pain, pain medicine, or the stress of surgery.  -Do not push yourself to eat.  Start off with clear liquids  and soup.  Slowly move to solid foods.  Don't eat fatty, rich, spicy foods at first.  Eat smaller amounts.  -If you develop persistent nausea and vomiting please notify your surgeon immediately.    BLEEDING  -Different types of surgery require different types of care and dressing changes.  It is important to follow all instructions and advice from your surgeon.  Change dressing as directed.  Call your surgeon for any concerns regarding postop bleeding.    SIGNS OF INFECTION  -Signs of infection include: fever, swelling, drainage, and redness  -Notify your surgeon if you have a fever of 100.4 F (38.0 C) or higher.  -Notify your surgeon if you notice redness, swelling, increased pain, pus, or a foul smell at the incision site.

## 2020-07-07 NOTE — PROCEDURES
"Lopez Savage is a 74 y.o. male patient.    Temp: 98.1 °F (36.7 °C) (07/07/20 0718)  Pulse: 87 (07/07/20 0718)  Resp: 16 (07/07/20 0718)  BP: (!) 119/57 (07/07/20 0719)  Weight: 115.7 kg (255 lb) (07/07/20 0718)  Height: 5' 9" (175.3 cm) (07/07/20 0718)  Mallampati Scale: Class I  ASA Classification: Class 1    Bone marrow    Date/Time: 7/7/2020 8:36 AM  Performed by: Howard Matos MD  Authorized by: Howard Matos MD     Consent Done?:  Yes (Written)   I was present for the entire procedure.  Anesthesia:  Local infiltration  Local anesthetic:  Lidocaine 1% without epinephrine  Aspiration?: Yes    Biopsy?: Yes    Number of Specimens::  3  Estimated blood loss (cc):  5   Patient tolerated the procedure well with no immediate complications.   Post-operative instructions were provided for the patient.   Patient was discharged and will follow up if any complications occur    Pre-Op Diagnosis: Normocytic anemia    Post-Op Diagnosis: Normocytic anemia    Estimated Blood Loss: 2 cc    Informed consent obtained from patient. Time-out done. Patient was then placed in right lateral position. Conscious sedation was administered by me using a combination of Versed and Fentanyl. The procedure started at approximately 8:18 a.m. and was completed by 8:33 a.m. The patient was monitored (heart rate, blood pressure, respirations, oxygen saturation, heart rhythm) throughout the procedure by a trained nurse.    Under strict aseptic precautions Left posterior iliac crest was prepped and draped in a sterile fashion. 1% lidocaine was used for infiltration of the periosteum. Using Jamshidi needle, under aseptic precautions bone marrow aspiration was performed. The needle was then removed. Hemostasis was achieved. Specimen was examined and felt to be adequate. It was sent for appropriate studies.     Under aseptic precautions, the same Jamshidi needle was then advanced through the previous entrance site but the periosteum was entered " at a slightly different location than the aspirate location. A core biopsy was obtained. The needle was then removed and hemostasis was achieved. Core biopsy specimen was examined and felt to be adequate. It was sent off for appropriate studies.     Patient tolerated the procedure well. No immediate post procedure complications were noted. The patient will continue to be monitored by a trained nurse until the effect of fentanyl and versed wears off and will be discharged appropriately.    Fentanyl used: 100 mcg  Midazolam used: 4mg    Howard Matos  7/7/2020

## 2020-07-07 NOTE — DISCHARGE SUMMARY
"[facility]  Discharge Summary     Patient ID:  Lopez Savage  [id number]  74 y.o.  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 He tolerated the procedure well (see procedure note). He was discharged the same day.     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       Discharged Condition: good    Discharge Exam:  BP (!) 119/57   Pulse 87   Temp 98.1 °F (36.7 °C) (Skin)   Resp 16   Ht 5' 9" (1.753 m)   Wt 115.7 kg (255 lb)   BMI 37.66 kg/m²     General Appearance:    Alert, cooperative, no distress, appears stated age   Head:    Normocephalic, without obvious abnormality, atraumatic   Eyes:    PERRL   Ears:    Normal external ear canals, both ears   Nose:   Nares normal   Throat:   Lips, mucosa, and tongue normal; teeth and gums normal   Neck:   Supple, symmetrical   Back:     Symmetric, no curvature, ROM normal, no CVA tenderness   Lungs:     Clear to auscultation bilaterally, respirations unlabored   Chest wall:    No tenderness or deformity   Heart:    Regular rate and rhythm   Abdomen:     Soft, non-tender           Extremities:   Extremities normal, atraumatic, no cyanosis or edema   Pulses:   2+ and " symmetric all extremities   Skin:   Skin color, texture, turgor normal, no rashes or lesions       Neurologic:   Normal strength       Disposition: Home or Self Care    Follow Up/Patient Instructions:     Medications:  Reconciled Home Medications:      Medication List      CONTINUE taking these medications    * albuterol 2.5 mg /3 mL (0.083 %) nebulizer solution  Commonly known as: PROVENTIL  Take 3 mLs (2.5 mg total) by nebulization every 6 (six) hours as needed for Wheezing. Rescue     * albuterol 90 mcg/actuation inhaler  Commonly known as: PROVENTIL HFA  Inhale 2 puffs into the lungs every 6 (six) hours as needed for Wheezing or Shortness of Breath.     * PROVENTIL HFA 90 mcg/actuation inhaler  Generic drug: albuterol     betamethasone dipropionate 0.05 % ointment  Commonly known as: DIPROLENE  Apply topically 2 (two) times daily.     diclofenac sodium 1 % Gel  Commonly known as: VOLTAREN  Apply 2 g topically 3 (three) times daily.     * donepeziL 10 MG tablet  Commonly known as: ARICEPT  Take 1 tablet (10 mg total) by mouth once daily.     * ARICEPT 10 MG tablet  Generic drug: donepeziL     escitalopram oxalate 10 MG tablet  Commonly known as: LEXAPRO  Take 1 tablet (10 mg total) by mouth once daily.     ferrous gluconate 324 MG tablet  Commonly known as: FERGON  Take 324 mg by mouth daily with breakfast.     fluticasone propionate 50 mcg/actuation nasal spray  Commonly known as: FLONASE  2 sprays (100 mcg total) by Each Nostril route once daily.     fluticasone-salmeterol 250-50 mcg/dose 250-50 mcg/dose diskus inhaler  Commonly known as: ADVAIR DISKUS  Inhale 1 puff into the lungs 2 (two) times daily. Controller     * losartan 50 MG tablet  Commonly known as: COZAAR  TAKE ONE TABLET BY MOUTH EVERY DAY     * COZAAR 50 MG tablet  Generic drug: losartan  Take 50 mg by mouth once daily.     meclizine 12.5 mg tablet  Commonly known as: ANTIVERT  Take 1 tablet (12.5 mg total) by mouth 3 (three) times daily.      MULTI-VITAMINS WITH IRON ORAL  Take 1 tablet by mouth once daily.     omeprazole 40 MG capsule  Commonly known as: PRILOSEC  Take 1 capsule (40 mg total) by mouth every morning.     ondansetron 4 MG tablet  Commonly known as: ZOFRAN  Take 1 tablet (4 mg total) by mouth every 8 (eight) hours as needed for Nausea.     predniSONE 20 MG tablet  Commonly known as: DELTASONE  Take 1 tablet (20 mg total) by mouth once daily.     * SINGULAIR 10 mg tablet  Generic drug: montelukast     * montelukast 10 mg tablet  Commonly known as: SINGULAIR         * This list has 9 medication(s) that are the same as other medications prescribed for you. Read the directions carefully, and ask your doctor or other care provider to review them with you.              Discharge Procedure Orders   COVID-19 Routine Screening   Standing Status: Future Number of Occurrences: 1 Standing Exp. Date: 08/31/21     Order Specific Question Answer Comments   Is the patient symptomatic? No      Diet Adult Regular     Notify your health care provider if you experience any of the following:  temperature >100.4     Notify your health care provider if you experience any of the following:  severe uncontrolled pain     Notify your health care provider if you experience any of the following:  redness, tenderness, or signs of infection (pain, swelling, redness, odor or green/yellow discharge around incision site)     Activity as tolerated     Follow-up Information     Howard Matos MD On 7/17/2020.    Specialty: Hematology and Oncology  Contact information:  [address]  [address]  [facility] [address]  [phone]                 Activity: activity as tolerated  Diet: regular diet  Wound Care: keep wound clean and dry    Follow-up with Dr. Matos on 7/17/20.    Signed:  Howard Matos  7/7/2020  8:39 AM

## 2020-07-17 NOTE — TELEPHONE ENCOUNTER
Ms. Savage confirmed pt.'s lab appt. At [facility] location on 7/20/20 at 11:20am and telemedicine appt. 11am on 7/24/20.  Instructed pt.'s wife to ensure pt. Keeps arm band on for 3 days or until blood transfusion is given.  Ms. Savage verbalized understanding with teach back given.

## 2020-07-17 NOTE — PROGRESS NOTES
PATIENT: Lopez Savage  MRN: [id number]  DATE: 7/17/2020    Diagnosis:   1. Normocytic anemia    2. Nutritional anemia, unspecified     3. Severe obesity (BMI 35.0-39.9) with comorbidity    4. Splenomegaly      Chief Complaint: No chief complaint on file.    Subjective:    History of Present Illness: Mr. Savage is a 74 y.o. male who presented in February 2019 for evaluation and management of normocytic anemia. He was referred by his family medicine physician Dr. Munguia. He has the following comorbid conditions: morbid obesity, hypertension, osteoarthritis, gastroesophageal reflux.    Telemedicine visit:  The patient location is: home  The chief complaint leading to consultation is: anemia    Visit type: audiovisual    Face to Face time with patient: 15 minutes  25 minutes of total time spent on the encounter, which includes face to face time and non-face to face time preparing to see the patient (eg, review of tests), Obtaining and/or reviewing separately obtained history, Documenting clinical information in the electronic or other health record, Independently interpreting results (not separately reported) and communicating results to the patient/family/caregiver, or Care coordination (not separately reported).     Each patient to whom he or she provides medical services by telemedicine is:  (1) informed of the relationship between the physician and patient and the respective role of any other health care provider with respect to management of the patient; and (2) notified that he or she may decline to receive medical services by telemedicine and may withdraw from such care at any time.    Notes: see below    Reviewing his labs in our system, he has had an anemia since 2007. It has remained relatively stable over time with a hemoglobin between 10 - 12 g/dL.  - he underwent CT abdomen/pelvis on 6/22/20.    Interval history:  - he presents for a follow-up appointment for his anemia.  - he underwent bone marrow  "biopsy on 7/7/20.  - his wife reports that he is "not doing well." He is getting weaker. The steroids did not help over the past week. He is using a walker now. They request a wheelchair.      Past medical, surgical, family, and social histories have been reviewed and updated below.    Past Medical History:   Past Medical History:   Diagnosis Date    Allergy     Asthma     BPH (benign prostatic hyperplasia)     CHRONIC BRONCHITIS     GERD (gastroesophageal reflux disease)     HTN (hypertension)     HTN (hypertension) 10/15/2014    Joint pain     Mitral regurgitation     3-4+ under surveillance CT surgery    Mitral regurgitation     Mitral valve regurgitation     Obesity     Osteoarthritis of knee     Trouble in sleeping     Ulcerative colitis     Urinary tract infection     Valvular regurgitation        Past Surgical History:   Past Surgical History:   Procedure Laterality Date    BONE MARROW BIOPSY Left 7/7/2020    Procedure: Biopsy-bone marrow;  Surgeon: Howard Matos MD;  Location: [facility] OR;  Service: Oncology;  Laterality: Left;    COLONOSCOPY N/A 10/11/2017    Procedure: COLONOSCOPY [place];  Surgeon: Shanea Duarte MD;  Location: [facility] ENDO;  Service: Endoscopy;  Laterality: N/A;    CYSTOSCOPY N/A 8/8/2018    Procedure: CYSTOSCOPY;  Surgeon: Adan Cuenca MD;  Location: [address];  Service: Urology;  Laterality: N/A;  1 hour    CYSTOSCOPY W/ RETROGRADES      with penile biopsy    DILATION OF URETHRA N/A 8/8/2018    Procedure: DILATION, URETHRA;  Surgeon: Adan Cuenca MD;  Location: [address];  Service: Urology;  Laterality: N/A;    HERNIA REPAIR Right 1956    inguinal    JOINT REPLACEMENT Bilateral     knees    Penile Biopsy      TONSILLECTOMY      TOTAL KNEE ARTHROPLASTY Bilateral     URETHRAL MEATOPLASTY N/A 8/8/2018    Procedure: MEATOPLASTY, URETHRA;  Surgeon: Adan Cuenca MD;  Location: [address];  Service: Urology;  Laterality: N/A;       Family " History:   Family History   Problem Relation Age of Onset    Bone cancer Mother     Glaucoma Mother     Cataracts Mother     Cancer Mother         bone cancer    Kidney disease Father     COPD Sister     Heart disease Daughter         congenital    Other Daughter 2        fibr. tumor on bronchial tube; lung removed    Other Daughter         Bronchitis    Melanoma Neg Hx     Prostate cancer Neg Hx     Macular degeneration Neg Hx     Retinal detachment Neg Hx     Strabismus Neg Hx     Amblyopia Neg Hx     Blindness Neg Hx        Social History:  reports that he quit smoking about 51 years ago. His smoking use included cigarettes. He has a 12.00 pack-year smoking history. He has quit using smokeless tobacco.  His smokeless tobacco use included chew. He reports that he does not drink alcohol or use drugs.    Allergies:  Review of patient's allergies indicates:   Allergen Reactions    Codeine Nausea Only    Tetanus vaccines and toxoid Hives and Rash       Medications:  Current Outpatient Medications   Medication Sig Dispense Refill    albuterol (PROVENTIL HFA) 90 mcg/actuation inhaler Inhale 2 puffs into the lungs every 6 (six) hours as needed for Wheezing or Shortness of Breath. 18 g 3    albuterol (PROVENTIL) 2.5 mg /3 mL (0.083 %) nebulizer solution Take 3 mLs (2.5 mg total) by nebulization every 6 (six) hours as needed for Wheezing. Rescue 1 Box 11    ARICEPT 10 mg tablet       betamethasone dipropionate (DIPROLENE) 0.05 % ointment Apply topically 2 (two) times daily. 45 g 3    COZAAR 50 mg tablet Take 50 mg by mouth once daily.       diclofenac sodium (VOLTAREN) 1 % Gel Apply 2 g topically 3 (three) times daily. 1 Tube 0    donepezil (ARICEPT) 10 MG tablet Take 1 tablet (10 mg total) by mouth once daily. 90 tablet 3    escitalopram oxalate (LEXAPRO) 10 MG tablet Take 1 tablet (10 mg total) by mouth once daily. 90 tablet 3    ferrous gluconate (FERGON) 324 MG tablet Take 324 mg by mouth  daily with breakfast.      fluticasone propionate (FLONASE) 50 mcg/actuation nasal spray 2 sprays (100 mcg total) by Each Nostril route once daily. 1 Bottle 11    fluticasone-salmeterol diskus inhaler 250-50 mcg Inhale 1 puff into the lungs 2 (two) times daily. Controller 60 each 3    losartan (COZAAR) 50 MG tablet TAKE ONE TABLET BY MOUTH EVERY DAY 90 tablet 3    meclizine (ANTIVERT) 12.5 mg tablet Take 1 tablet (12.5 mg total) by mouth 3 (three) times daily. 270 tablet 3    montelukast (SINGULAIR) 10 mg tablet       multivit with iron,minerals (MULTI-VITAMINS WITH IRON ORAL) Take 1 tablet by mouth once daily.      omeprazole (PRILOSEC) 40 MG capsule Take 1 capsule (40 mg total) by mouth every morning. 90 capsule 3    ondansetron (ZOFRAN) 4 MG tablet Take 1 tablet (4 mg total) by mouth every 8 (eight) hours as needed for Nausea. 20 tablet 2    predniSONE (DELTASONE) 20 MG tablet Take 1 tablet (20 mg total) by mouth once daily. 14 tablet 1    PROVENTIL HFA 90 mcg/actuation inhaler       SINGULAIR 10 mg tablet        No current facility-administered medications for this visit.      Review of Systems   Constitutional: Positive for appetite change, fatigue and unexpected weight change.   HENT: Negative for sore throat.    Eyes: Negative for visual disturbance.   Respiratory: Positive for cough. Negative for shortness of breath.    Cardiovascular: Negative for chest pain.   Gastrointestinal: Positive for nausea (acid reflux). Negative for abdominal pain, diarrhea and vomiting.   Endocrine: Positive for cold intolerance.   Genitourinary: Positive for frequency. Negative for dysuria.   Musculoskeletal: Negative for back pain.   Skin: Negative for rash.   Neurological: Positive for dizziness. Negative for headaches.   Hematological: Negative for adenopathy.   Psychiatric/Behavioral: The patient is not nervous/anxious.    Physical Exam  Deferred since telemedicine visit.    ECOG Performance Status:   ECOG SCORE 1        Objective:      Vitals:   There were no vitals filed for this visit.  BMI: There is no height or weight on file to calculate BMI.  Deferred since telemedicine visit.    Physical Exam  Deferred since telemedicine visit.    Laboratory Data:  Labs have been reviewed.    Lab Results   Component Value Date    WBC 12.44 06/15/2020    HGB 6.7 (L) 06/15/2020    HCT 22.7 (L) 06/15/2020    MCV 89 06/15/2020     06/15/2020         Bone marrow (7/7/20):  BONE MARROW ASPIRATE, CLOT, AND DECALCIFIED NEEDLE CORE BIOPSY:  LEFT POSTEROSUPERIOR ILIAC CREST  - SUBOPTIMAL SPECIMEN (see bone marrow adequacy portion of this report)  - Hypercellular marrow (60-70% total cellularity) with mild granulocytic hyperplasia and marked  erythroid hypoplasia  - Mildly increased stainable histiocytic iron stores (4-5+)    CT abdomen/pelvis (6/22/20): I have personally reviewed the images  - Splenomegaly.  Present measurement 16.3 x 5.5 cm.  Linear band identified along the anteromedial margin the spleen raising possibility of the focal contusion and less likely incomplete laceration measuring approximately 3 cm.  There is no adjacent subcapsular hematoma or findings to suggest recent trauma in the perisplenic soft tissues, anterior lateral abdominal or lower chest walls.  Correlate clinically.  - Cholelithiasis.  - Diverticulosis without diverticulitis.  - Probable bone islands within the right sacral ala and L1 and L2 vertebral bodies.  - No significant bulky or matted adenopathy within the abdomen or pelvis.    CT chest (6/15/20):  1. 0.5 cm perifissural nodule in the lateral basal segment of the right lower lobe and few micronodules scattered bilaterally.  Perifissural nodules have a strong association with benign etiology and typically do not require dedicated follow-up.  For multiple solid nodules all <6 mm, Fleischner Society 2017 guidelines recommend no routine follow up for a low risk patient, or follow up with non-contrast  chest CT at 12 months after discovery in a high risk patient.  2. Upper limit of normal size right pretracheal node and few prominent mediastinal nodes.  Appearance commonly reflects normal ludy reactivity with infection or inflammation but is nonspecific.  3. 0.9 cm sclerotic focus in the L1 vertebral body.  4. Splenomegaly      Assessment:       1. Normocytic anemia    2. Nutritional anemia, unspecified     3. Severe obesity (BMI 35.0-39.9) with comorbidity    4. Splenomegaly           Plan:     1. Normocytic anemia  - Reviewing his labs in our system, he has had an anemia since 2007. It has remained relatively stable over time with a hemoglobin between 10 - 12 g/dL.  - he has had iron studies checked in the past. In 2013, his iron and iron saturation were decreased, but his total iron binding capacity was normal, and his ferritin was elevated. These findings are consistent with anemia of chronic disease/inflammation.  - CBC on 6/12/20 revealed an acute worsening of his anemia; hemoglobin was 7.8 g/dL.  - CT scan (6/15/20) revealed splenomegaly. Given his systemic symptoms and splenomegaly, I am concerned about a hematologic process, possibly a hematologic malignancy such as lymphoma.  - CT abdomen/pelvis (6/22/20) confirmed the splenomegaly but did not reveal lymphadenopathy.  - bone marrow biopsy (7/7/20) revealed hyperplasia with some dysplasia in granulopoiesis. Marked erythroid hypoplasia was noted. Follow up next generation sequencing.  - check labs next week. He will likely need a blood transfusion.  - return to clinic in 1 week to review results.    2. Severe obesity  - last documented BMI (6/23/20) was 38.09 kg/m2  - I have encouraged weight loss and exercise.  - continue to monitor.    3. Advance care planning  - I initiated the process of advance care planning today and explained the importance of this process to the patient.  I introduced the concept of advance directives to the patient, as well. Then  the patient received detailed information about the importance of designating a Health Care Power of  (HCPOA). He was also instructed to communicate with this person about their wishes for future healthcare, should he become sick and lose decision-making capacity. The patient has not previously appointed a HCPOA. After our discussion, the patient has decided to complete a HCPOA and has appointed his wife Brenda Savage ([phone]). The forms were completed and were scanned into [email or web address].     - return to clinic in 1 week to review results.    Howard Matos M.D.  Hematology/Oncology  [facility] [address], [address]  [place] [address]  Phone: [phone]  Fax: [phone]

## 2020-07-17 NOTE — Clinical Note
1. Schedule labs on 7/20/20 at [place]: CBC, CMP, uric acid, LDH, reticulocytes, JAK2 with reflex testing, type/screen, folate, B12, copper.  2. He will probably need blood on Tuesday at [facility]. So, perhaps give infusion a heads up.  3. Schedule f/u telemedicine visit in 1 week.    Howard

## 2020-07-20 NOTE — TELEPHONE ENCOUNTER
Pt.'s wife, Ms. Savage, reports pt. Fell in bathroom 2 days ago and paramedics came to home to ensure he was ok and that he has a difficult time trying to transfer to bed at night.  Instructed her to ensure she assists him to bathroom and to bed at night to ensure his safety.  She verbalized understanding and requests Dr. Matos call her tomorrow to advise what next step will be.  Assured her that Dr. Matos will be notified.

## 2020-07-21 NOTE — PROGRESS NOTES
Otolaryngology Telemedicine Note    Lopez Savage  Encounter Date: 7/21/2020   YOB: 1945  Referring Physician: No referring provider defined for this encounter.   PCP: Prasanna Haywood MD    This is a telehealth visit that was performed at [facility]. Verbal consent to participate in video visit was obtained. This particular visit occurred during the 2020 COVID19 outbreak.      Each patient to whom he or she provides medical services by telemedicine is:  (1) informed of the relationship between the physician and patient and the respective role of any other health care provider with respect to management of the patient; and (2) notified that he or she may decline to receive medical services by telemedicine and may withdraw from such care at any time.  I discussed with the patient that:  - I would evaluate the patient and recommend diagnostics and treatments based on my assessment  - Our sessions are not being recorded and that personal health information is protected  - Our team would provide follow up care in person if/when the patient needs it     The patient location is: [place], [place]  The chief complaint leading to consultation is: follow up sinusitis  Visit type: Virtual visit with synchronous audio and video  Total time spent with patient: 26  minutes      HPI: Lopez Savage is a 74 y.o. male who presents for evaluation of cough for 6 months.  He reports that it is worsening over the last 2 weeks. He reports that he has had cough upon exertion. In January saw Dr. Ellis where he was treated for COPD exacerbation and was prescribed Levaquin and prednisone with some relief. He then went to urgent care 3 weeks later prescribed steroids. He felt that this was helpful.   He notes that he is coughing at night which is both productive and dry in nature. He admits to shortness of breath with the coughing episodes.  He has had weight loss.  Hx of COPD with prescribed medication regimen. He is using  "Advair inhaler only once per day as opposed to BID as prescribed and not using albuterol inhaler or nebulizer because he feels it gives him "the shakes."    He feels his cough has worsened above his baseline over the last 1 week. He denies fever or shortness of breath outside of his coughing episodes since the symptoms worsened.   He states that he had nasal congestion but feels that this has been improved with Flonase. He denies facial pain and pressure, headaches, post-nasal drip, change of sense of smell or taste.      Interval HPI 7/21/2020:  Mr. Savage reports that he has a feeling of water in his ears.  He states that his hearing is muffled.  He notes that it is bilateral in nature.  It has been present for 3 weeks. Has not noted any otorrhea, post-auricular pain, or ear pain.  He denies any alleviating factors. Has tried tylenol without relief.      Review of Systems   Constitutional: Negative for chills and fever.   Eyes: Negative for blurred vision and double vision.   Respiratory: Positive for cough. Negative for hemoptysis and shortness of breath.    Cardiovascular: Negative for chest pain and palpitations.   Gastrointestinal: Negative for heartburn and nausea.   Skin: Negative for itching and rash.   Neurological: Negative for dizziness and seizures.   Endo/Heme/Allergies: Negative for environmental allergies. Does not bruise/bleed easily.   Psychiatric/Behavioral: Negative for depression and memory loss.        Review of patient's allergies indicates:   Allergen Reactions    Codeine Nausea Only    Tetanus vaccines and toxoid Hives and Rash       Past Medical History:   Diagnosis Date    Allergy     Asthma     BPH (benign prostatic hyperplasia)     CHRONIC BRONCHITIS     GERD (gastroesophageal reflux disease)     HTN (hypertension)     HTN (hypertension) 10/15/2014    Joint pain     Mitral regurgitation     3-4+ under surveillance CT surgery    Mitral regurgitation     Mitral valve " regurgitation     Obesity     Osteoarthritis of knee     Trouble in sleeping     Ulcerative colitis     Urinary tract infection     Valvular regurgitation        Past Surgical History:   Procedure Laterality Date    BONE MARROW BIOPSY Left 2020    Procedure: Biopsy-bone marrow;  Surgeon: Howard Matos MD;  Location: [facility] OR;  Service: Oncology;  Laterality: Left;    COLONOSCOPY N/A 10/11/2017    Procedure: COLONOSCOPY Gol[place]ely;  Surgeon: Shanae Duarte MD;  Location: [facility] ENDO;  Service: Endoscopy;  Laterality: N/A;    CYSTOSCOPY N/A 2018    Procedure: CYSTOSCOPY;  Surgeon: Adan Cuenca MD;  Location: [address];  Service: Urology;  Laterality: N/A;  1 hour    CYSTOSCOPY W/ RETROGRADES      with penile biopsy    DILATION OF URETHRA N/A 2018    Procedure: DILATION, URETHRA;  Surgeon: Adan Cuenca MD;  Location: [address];  Service: Urology;  Laterality: N/A;    HERNIA REPAIR Right     inguinal    JOINT REPLACEMENT Bilateral     knees    Penile Biopsy      TONSILLECTOMY      TOTAL KNEE ARTHROPLASTY Bilateral     URETHRAL MEATOPLASTY N/A 2018    Procedure: MEATOPLASTY, URETHRA;  Surgeon: Adan Cuenca MD;  Location: [address];  Service: Urology;  Laterality: N/A;       Social History     Socioeconomic History    Marital status:      Spouse name: Not on file    Number of children: Not on file    Years of education: Not on file    Highest education level: Not on file   Occupational History    Not on file   Social Needs    Financial resource strain: Not on file    Food insecurity     Worry: Not on file     Inability: Not on file    Transportation needs     Medical: Not on file     Non-medical: Not on file   Tobacco Use    Smoking status: Former Smoker     Packs/day: 2.00     Years: 6.00     Pack years: 12.00     Types: Cigarettes     Quit date: 1969     Years since quittin.5    Smokeless tobacco: Former User     Types: Chew     Tobacco comment: Quit all forms of tobacco in 1969.   Substance and Sexual Activity    Alcohol use: No     Alcohol/week: 0.0 standard drinks     Comment: remote hx of heavy alcohol use    Drug use: No    Sexual activity: Not Currently     Partners: Female   Lifestyle    Physical activity     Days per week: Not on file     Minutes per session: Not on file    Stress: Not on file   Relationships    Social connections     Talks on phone: Not on file     Gets together: Not on file     Attends Rastafari service: Not on file     Active member of club or organization: Not on file     Attends meetings of clubs or organizations: Not on file     Relationship status: Not on file   Other Topics Concern    Not on file   Social History Narrative    Not on file       Family History   Problem Relation Age of Onset    Bone cancer Mother     Glaucoma Mother     Cataracts Mother     Cancer Mother         bone cancer    Kidney disease Father     COPD Sister     Heart disease Daughter         congenital    Other Daughter 2        fibr. tumor on bronchial tube; lung removed    Other Daughter         Bronchitis    Melanoma Neg Hx     Prostate cancer Neg Hx     Macular degeneration Neg Hx     Retinal detachment Neg Hx     Strabismus Neg Hx     Amblyopia Neg Hx     Blindness Neg Hx        Outpatient Encounter Medications as of 7/21/2020   Medication Sig Dispense Refill    albuterol (PROVENTIL HFA) 90 mcg/actuation inhaler Inhale 2 puffs into the lungs every 6 (six) hours as needed for Wheezing or Shortness of Breath. 18 g 3    albuterol (PROVENTIL) 2.5 mg /3 mL (0.083 %) nebulizer solution Take 3 mLs (2.5 mg total) by nebulization every 6 (six) hours as needed for Wheezing. Rescue 1 Box 11    ARICEPT 10 mg tablet       betamethasone dipropionate (DIPROLENE) 0.05 % ointment Apply topically 2 (two) times daily. 45 g 3    COZAAR 50 mg tablet Take 50 mg by mouth once daily.       diclofenac sodium (VOLTAREN) 1 % Gel  Apply 2 g topically 3 (three) times daily. 1 Tube 0    donepezil (ARICEPT) 10 MG tablet Take 1 tablet (10 mg total) by mouth once daily. 90 tablet 3    escitalopram oxalate (LEXAPRO) 10 MG tablet Take 1 tablet (10 mg total) by mouth once daily. 90 tablet 3    ferrous gluconate (FERGON) 324 MG tablet Take 324 mg by mouth daily with breakfast.      fluticasone propionate (FLONASE) 50 mcg/actuation nasal spray 2 sprays (100 mcg total) by Each Nostril route once daily. 9.9 mL 11    fluticasone-salmeterol diskus inhaler 250-50 mcg Inhale 1 puff into the lungs 2 (two) times daily. Controller 60 each 3    losartan (COZAAR) 50 MG tablet TAKE ONE TABLET BY MOUTH EVERY DAY 90 tablet 3    meclizine (ANTIVERT) 12.5 mg tablet Take 1 tablet (12.5 mg total) by mouth 3 (three) times daily. 270 tablet 3    methylPREDNISolone (MEDROL DOSEPACK) 4 mg tablet use as directed 1 Package 0    montelukast (SINGULAIR) 10 mg tablet       multivit with iron,minerals (MULTI-VITAMINS WITH IRON ORAL) Take 1 tablet by mouth once daily.      omeprazole (PRILOSEC) 40 MG capsule Take 1 capsule (40 mg total) by mouth every morning. 90 capsule 3    ondansetron (ZOFRAN) 4 MG tablet Take 1 tablet (4 mg total) by mouth every 8 (eight) hours as needed for Nausea. 20 tablet 2    PROVENTIL HFA 90 mcg/actuation inhaler       SINGULAIR 10 mg tablet       [DISCONTINUED] fluticasone propionate (FLONASE) 50 mcg/actuation nasal spray 2 sprays (100 mcg total) by Each Nostril route once daily. 1 Bottle 11    [DISCONTINUED] predniSONE (DELTASONE) 20 MG tablet Take 1 tablet (20 mg total) by mouth once daily. 14 tablet 1     No facility-administered encounter medications on file as of 7/21/2020.        Physical Exam:  There were no vitals filed for this visit.    Constitutional  General Appearance: well nourished, well-developed, alert, oriented, in no acute distress  Communication: ability, understanding, voice quality normal  Head and Face  Inspection:  normocephalic, atraumatic, no scars, lesions or masses    Eyes  Ocular Motility / Alignment: normal alignment, motility, no proptosis  Conjunctiva: not injected  Eyelids: no entropion or ectropion, no edema  Ears  Hearing: speech reception thresholds grossly normal  External Ears: no auricle lesions, no apparent edema or erythema of tragus/lateral aspect of canal    Nose  External Nose: no lesions, trauma or deformity  Oral Cavity / Oropharynx  Lips: upper and lower lips pink and moist  Tongue: normal mobility, no obvious lesions  Oropharynx: 1+ , no obvious mass or lesion, no erythema  Neck  Inspection: no erythema, no tenderness per patient report on palpation of self, no obvious large masses visible  Chest / Respiratory  Chest: no stridor or retractions, normal effort and expansion  Neurological  Cranial Nerves: grossly intact  General: alert and oriented  Psychiatric  Mood and Affect: no depression, anxiety or agitation      Assessment and Plan:  Lopez Savage is a 74 y.o. male with     ETD (Eustachian tube dysfunction), bilateral    Bilateral hearing loss, unspecified hearing loss type    Chronic rhinitis    Other orders  -     methylPREDNISolone (MEDROL DOSEPACK) 4 mg tablet; use as directed  Dispense: 1 Package; Refill: 0  -     fluticasone propionate (FLONASE) 50 mcg/actuation nasal spray; 2 sprays (100 mcg total) by Each Nostril route once daily.  Dispense: 9.9 mL; Refill: 11         Start medrol dose malik   Increase Flonase 2 sprays per nostril BID for 10 days then back to daily.   Continue xyzal 5mg PO qhs  We had a long discussion regarding the anatomy and function of the eustachian tube.  We discussed that the eustachian tube acts as a pump to keep the appropriate amount of pressure behind the ear drum. Recommended autoinsufflation exercises.     Follow up for in-office evaluation of ears and audiogram if not improved with above regimen.       Natalie Trinh MD  [facility]

## 2020-07-21 NOTE — TELEPHONE ENCOUNTER
Pt.'s wife confirmed infusion appt. (for blood transfusion) in [facility] at 9:30am on 7/22/20.

## 2020-07-24 NOTE — Clinical Note
1. Schedule rituximab weekly x 4 doses at [facility]. He will need CBC, CMP, mag phos before each dose. Add copper and JAK2 testing with his next lab. Orders should be in.  2. Schedule f/u appt before dose #1 of rituximab.    Thanks!

## 2020-07-24 NOTE — PROGRESS NOTES
PATIENT: Lopez Savage  MRN: [id number]  DATE: 7/24/2020    Diagnosis:   1. Autoimmune hemolytic anemia    2. Morbid obesity with BMI of 40.0-44.9, adult    3. Splenomegaly    4. Early onset Alzheimer's dementia without behavioral disturbance      Chief Complaint: Anemia    Subjective:    History of Present Illness: Mr. Savage is a 74 y.o. male who presented in February 2019 for evaluation and management of normocytic anemia. He was referred by his family medicine physician Dr. Munguia. He has the following comorbid conditions: morbid obesity, hypertension, osteoarthritis, gastroesophageal reflux.    Telemedicine visit:  The patient location is: home  The chief complaint leading to consultation is: anemia    Visit type: audiovisual    Face to Face time with patient: 15 minutes  25 minutes of total time spent on the encounter, which includes face to face time and non-face to face time preparing to see the patient (eg, review of tests), Obtaining and/or reviewing separately obtained history, Documenting clinical information in the electronic or other health record, Independently interpreting results (not separately reported) and communicating results to the patient/family/caregiver, or Care coordination (not separately reported).     Each patient to whom he or she provides medical services by telemedicine is:  (1) informed of the relationship between the physician and patient and the respective role of any other health care provider with respect to management of the patient; and (2) notified that he or she may decline to receive medical services by telemedicine and may withdraw from such care at any time.    Notes: see below    Reviewing his labs in our system, he has had an anemia since 2007. It has remained relatively stable over time with a hemoglobin between 10 - 12 g/dL.  - he underwent CT abdomen/pelvis on 6/22/20.  - he underwent bone marrow biopsy on 7/7/20.    Interval history:  - he presents for a follow-up  appointment for his anemia.  - he received a unit of blood on 7/22/20. He reports improvement after receiving the blood.      Past medical, surgical, family, and social histories have been reviewed and updated below.    Past Medical History:   Past Medical History:   Diagnosis Date    Allergy     Asthma     BPH (benign prostatic hyperplasia)     CHRONIC BRONCHITIS     GERD (gastroesophageal reflux disease)     HTN (hypertension)     HTN (hypertension) 10/15/2014    Joint pain     Mitral regurgitation     3-4+ under surveillance CT surgery    Mitral regurgitation     Mitral valve regurgitation     Obesity     Osteoarthritis of knee     Trouble in sleeping     Ulcerative colitis     Urinary tract infection     Valvular regurgitation        Past Surgical History:   Past Surgical History:   Procedure Laterality Date    BONE MARROW BIOPSY Left 7/7/2020    Procedure: Biopsy-bone marrow;  Surgeon: Howard Matos MD;  Location: [facility] OR;  Service: Oncology;  Laterality: Left;    COLONOSCOPY N/A 10/11/2017    Procedure: COLONOSCOPY [place];  Surgeon: Shanae Duarte MD;  Location: [facility] ENDO;  Service: Endoscopy;  Laterality: N/A;    CYSTOSCOPY N/A 8/8/2018    Procedure: CYSTOSCOPY;  Surgeon: Adan Cuenca MD;  Location: [address];  Service: Urology;  Laterality: N/A;  1 hour    CYSTOSCOPY W/ RETROGRADES      with penile biopsy    DILATION OF URETHRA N/A 8/8/2018    Procedure: DILATION, URETHRA;  Surgeon: Adan Cuenca MD;  Location: [address];  Service: Urology;  Laterality: N/A;    HERNIA REPAIR Right 1956    inguinal    JOINT REPLACEMENT Bilateral     knees    Penile Biopsy      TONSILLECTOMY      TOTAL KNEE ARTHROPLASTY Bilateral     URETHRAL MEATOPLASTY N/A 8/8/2018    Procedure: MEATOPLASTY, URETHRA;  Surgeon: Adan Cuenca MD;  Location: [address];  Service: Urology;  Laterality: N/A;       Family History:   Family History   Problem Relation Age of Onset    Bone  cancer Mother     Glaucoma Mother     Cataracts Mother     Cancer Mother         bone cancer    Kidney disease Father     COPD Sister     Heart disease Daughter         congenital    Other Daughter 2        fibr. tumor on bronchial tube; lung removed    Other Daughter         Bronchitis    Melanoma Neg Hx     Prostate cancer Neg Hx     Macular degeneration Neg Hx     Retinal detachment Neg Hx     Strabismus Neg Hx     Amblyopia Neg Hx     Blindness Neg Hx        Social History:  reports that he quit smoking about 51 years ago. His smoking use included cigarettes. He has a 12.00 pack-year smoking history. He has quit using smokeless tobacco.  His smokeless tobacco use included chew. He reports that he does not drink alcohol or use drugs.    Allergies:  Review of patient's allergies indicates:   Allergen Reactions    Codeine Nausea Only    Tetanus vaccines and toxoid Hives and Rash       Medications:  Current Outpatient Medications   Medication Sig Dispense Refill    albuterol (PROVENTIL HFA) 90 mcg/actuation inhaler Inhale 2 puffs into the lungs every 6 (six) hours as needed for Wheezing or Shortness of Breath. 18 g 3    albuterol (PROVENTIL) 2.5 mg /3 mL (0.083 %) nebulizer solution Take 3 mLs (2.5 mg total) by nebulization every 6 (six) hours as needed for Wheezing. Rescue 1 Box 11    ARICEPT 10 mg tablet       betamethasone dipropionate (DIPROLENE) 0.05 % ointment Apply topically 2 (two) times daily. 45 g 3    COZAAR 50 mg tablet Take 50 mg by mouth once daily.       diclofenac sodium (VOLTAREN) 1 % Gel Apply 2 g topically 3 (three) times daily. 1 Tube 0    donepezil (ARICEPT) 10 MG tablet Take 1 tablet (10 mg total) by mouth once daily. 90 tablet 3    escitalopram oxalate (LEXAPRO) 10 MG tablet Take 1 tablet (10 mg total) by mouth once daily. 90 tablet 3    ferrous gluconate (FERGON) 324 MG tablet Take 324 mg by mouth daily with breakfast.      fluticasone propionate (FLONASE) 50  mcg/actuation nasal spray 2 sprays (100 mcg total) by Each Nostril route once daily. 9.9 mL 11    fluticasone-salmeterol diskus inhaler 250-50 mcg Inhale 1 puff into the lungs 2 (two) times daily. Controller 60 each 3    losartan (COZAAR) 50 MG tablet TAKE ONE TABLET BY MOUTH EVERY DAY 90 tablet 3    meclizine (ANTIVERT) 12.5 mg tablet Take 1 tablet (12.5 mg total) by mouth 3 (three) times daily. 270 tablet 3    methylPREDNISolone (MEDROL DOSEPACK) 4 mg tablet use as directed 1 Package 0    montelukast (SINGULAIR) 10 mg tablet       multivit with iron,minerals (MULTI-VITAMINS WITH IRON ORAL) Take 1 tablet by mouth once daily.      omeprazole (PRILOSEC) 40 MG capsule Take 1 capsule (40 mg total) by mouth every morning. 90 capsule 3    ondansetron (ZOFRAN) 4 MG tablet Take 1 tablet (4 mg total) by mouth every 8 (eight) hours as needed for Nausea. 20 tablet 2    PROVENTIL HFA 90 mcg/actuation inhaler       SINGULAIR 10 mg tablet        Current Facility-Administered Medications   Medication Dose Route Frequency Provider Last Rate Last Dose    acetaminophen tablet 650 mg  650 mg Oral PRN Howard Matos MD        diphenhydrAMINE capsule 25 mg  25 mg Oral PRN Howard Matos MD         Review of Systems   Constitutional: Positive for appetite change, fatigue (improved) and unexpected weight change.   HENT: Negative for sore throat.    Eyes: Negative for visual disturbance.   Respiratory: Negative for shortness of breath.    Cardiovascular: Negative for chest pain.   Gastrointestinal: Positive for nausea (acid reflux). Negative for abdominal pain, diarrhea and vomiting.   Genitourinary: Positive for frequency. Negative for dysuria.   Musculoskeletal: Negative for back pain.   Skin: Negative for rash.   Neurological: Positive for dizziness. Negative for headaches.   Hematological: Negative for adenopathy.   Psychiatric/Behavioral: The patient is not nervous/anxious.    Physical Exam  Deferred since telemedicine  visit.    ECOG Performance Status:   ECOG SCORE 1       Objective:      Vitals:   There were no vitals filed for this visit.  BMI: There is no height or weight on file to calculate BMI.  Deferred since telemedicine visit.    Physical Exam  Deferred since telemedicine visit.    Laboratory Data:  Labs have been reviewed.    Lab Results   Component Value Date    WBC 19.97 (H) 07/20/2020    HGB 6.9 (L) 07/20/2020    HCT 23.8 (L) 07/20/2020    MCV 89 07/20/2020     07/20/2020         Bone marrow (7/7/20):  BONE MARROW ASPIRATE, CLOT, AND DECALCIFIED NEEDLE CORE BIOPSY:  LEFT POSTEROSUPERIOR ILIAC CREST  - Hypercellular marrow (60-70% total cellularity) with mild granulocytic hyperplasia and marked  erythroid hypoplasia  - Mildly increased stainable histiocytic iron stores (4-5+)    CT abdomen/pelvis (6/22/20): I have personally reviewed the images  - Splenomegaly.  Present measurement 16.3 x 5.5 cm.  Linear band identified along the anteromedial margin the spleen raising possibility of the focal contusion and less likely incomplete laceration measuring approximately 3 cm.  There is no adjacent subcapsular hematoma or findings to suggest recent trauma in the perisplenic soft tissues, anterior lateral abdominal or lower chest walls.  Correlate clinically.  - Cholelithiasis.  - Diverticulosis without diverticulitis.  - Probable bone islands within the right sacral ala and L1 and L2 vertebral bodies.  - No significant bulky or matted adenopathy within the abdomen or pelvis.    CT chest (6/15/20):  1. 0.5 cm perifissural nodule in the lateral basal segment of the right lower lobe and few micronodules scattered bilaterally.  Perifissural nodules have a strong association with benign etiology and typically do not require dedicated follow-up.  For multiple solid nodules all <6 mm, Fleischner Society 2017 guidelines recommend no routine follow up for a low risk patient, or follow up with non-contrast chest CT at 12 months  after discovery in a high risk patient.  2. Upper limit of normal size right pretracheal node and few prominent mediastinal nodes.  Appearance commonly reflects normal ludy reactivity with infection or inflammation but is nonspecific.  3. 0.9 cm sclerotic focus in the L1 vertebral body.  4. Splenomegaly      Assessment:       1. Autoimmune hemolytic anemia    2. Morbid obesity with BMI of 40.0-44.9, adult    3. Splenomegaly    4. Early onset Alzheimer's dementia without behavioral disturbance           Plan:     1. Normocytic anemia  - Reviewing his labs in our system, he has had an anemia since 2007. It has remained relatively stable over time with a hemoglobin between 10 - 12 g/dL.  - he has had iron studies checked in the past. In 2013, his iron and iron saturation were decreased, but his total iron binding capacity was normal, and his ferritin was elevated. These findings are consistent with anemia of chronic disease/inflammation.  - CBC on 6/12/20 revealed an acute worsening of his anemia; hemoglobin was 7.8 g/dL.  - CT scan (6/15/20) revealed splenomegaly. Given his systemic symptoms and splenomegaly, I am concerned about a hematologic process, possibly a hematologic malignancy such as lymphoma.  - CT abdomen/pelvis (6/22/20) confirmed the splenomegaly but did not reveal lymphadenopathy.  - bone marrow biopsy (7/7/20) revealed hyperplasia with some dysplasia in granulopoiesis. Marked erythroid hypoplasia was noted. Follow up next generation sequencing.  - clinically, I suspect he may have an immune-mediated anemia.  - while awaiting for next-generation sequencing, I recommend rituximab weekly x 4 doses. I have placed a treatment order and will work to get it approved.  - return to clinic in 1-2 weeks in preparation for dose #1 of rituximab.    2. Severe obesity  - last documented BMI (6/23/20) was 38.09 kg/m2  - I have encouraged weight loss and exercise.  - continue to monitor.    3. Advance care planning  -  I initiated the process of advance care planning today and explained the importance of this process to the patient.  I introduced the concept of advance directives to the patient, as well. Then the patient received detailed information about the importance of designating a Health Care Power of  (HCPOA). He was also instructed to communicate with this person about their wishes for future healthcare, should he become sick and lose decision-making capacity. The patient has not previously appointed a HCPOA. After our discussion, the patient has decided to complete a HCPOA and has appointed his wife Brenda Savage ([phone]). The forms were completed and were scanned into [employer].     - return to clinic in 1-2 weeks in preparation for dose #1 of rituximab.    Howard Matos M.D.  Hematology/Oncology  [facility] - [address], [address]  [place] [address]  Phone: [phone]  Fax: [phone]

## 2020-07-24 NOTE — PLAN OF CARE
START OFF PATHWAY REGIMEN - Other            Rituximab-xxxx       Rituximab and hyaluronidase human (Rituxan Hycela)           Additional Orders: Hepatitis B and C screening recommended prior to   initiation of rituximab. Patients must receive one full dose of IV rituximab   prior to subcutaneous dosing. Ref: Rg LUCAS et al. J Clin Oncol.   1999;17(6):6650-5071.    **Always confirm dose/schedule in your pharmacy ordering system**    Patient Characteristics:  Intent of Therapy:  Curative Intent, Discussed with Patient

## 2020-07-31 PROBLEM — E86.0 DEHYDRATION: Status: ACTIVE | Noted: 2020-01-01

## 2020-07-31 PROBLEM — N17.9 AKI (ACUTE KIDNEY INJURY): Status: ACTIVE | Noted: 2020-01-01

## 2020-07-31 PROBLEM — K57.92 DIVERTICULITIS: Status: ACTIVE | Noted: 2020-01-01

## 2020-07-31 PROBLEM — N10 ACUTE PYELONEPHRITIS: Status: ACTIVE | Noted: 2020-01-01

## 2020-07-31 PROBLEM — D64.9 SYMPTOMATIC ANEMIA: Status: ACTIVE | Noted: 2020-01-01

## 2020-07-31 NOTE — TELEPHONE ENCOUNTER
Ms. Janette states that she's not allowed in the emergency room with pt. And requests that Dr. Matos update her when possible on pt.'s condition.  Assured her that Dr. Matos will be notified.

## 2020-07-31 NOTE — TELEPHONE ENCOUNTER
"Pt.'s daughter reports that upon returing home from lab appt. At approx. 11am this morning Pt. Stopped at front porch with c/o sob and sat down on step stating "I'm too weak to go any further."  She said he has been sitting there in the heat x3 hours and still won't come in.  She said he denies chest pain, fever, nausea and vomiting.  Instructed her to call 911 immediately.  Daughter states, "Ok, I'll call 911 and let my mom know also." Dr. Matos notified.      ----- Message from Abby Feliz sent at 7/31/2020  2:02 PM CDT -----  Type:  Needs Medical Advice    Who Called: pt's wife  Advice Regarding: patient's symptoms, she is calling 911 now  Would the patient rather a call back or a response via [facility]? call  Best Call Back Number: [phone]  Additional Information: n/a          "

## 2020-08-01 PROBLEM — K57.92 ACUTE DIVERTICULITIS: Status: ACTIVE | Noted: 2020-01-01

## 2020-08-01 PROBLEM — E87.1 HYPONATREMIA: Status: ACTIVE | Noted: 2020-01-01

## 2020-08-02 PROBLEM — E87.1 HYPONATREMIA: Status: RESOLVED | Noted: 2020-01-01 | Resolved: 2020-01-01

## 2020-08-03 PROBLEM — D64.9 SYMPTOMATIC ANEMIA: Status: RESOLVED | Noted: 2020-01-01 | Resolved: 2020-01-01

## 2020-08-03 PROBLEM — N18.30 CKD (CHRONIC KIDNEY DISEASE) STAGE 3, GFR 30-59 ML/MIN: Status: ACTIVE | Noted: 2020-01-01

## 2020-08-03 PROBLEM — N17.9 AKI (ACUTE KIDNEY INJURY): Status: RESOLVED | Noted: 2020-01-01 | Resolved: 2020-01-01

## 2020-08-03 PROBLEM — R78.81 GRAM-POSITIVE BACTEREMIA: Status: ACTIVE | Noted: 2020-01-01

## 2020-08-10 NOTE — TELEPHONE ENCOUNTER
"Pt. States that he feels "better but still fatigued" since hospital d/c.  He confirmed that his wife will  specimen cup from lab for his occult stool test.      "

## 2020-08-11 NOTE — TELEPHONE ENCOUNTER
Kavitha, home health nurse, confirmed that Dr. Matos ordered occult stool and that she would collect and send to [facility].    ----- Message from Alie Sheehan sent at 8/11/2020  2:26 PM CDT -----  Regarding: Stool Specimen  Contact: Family Home care nurse  Reason: Calling to speak with staff pertaining to stool specimen. Nurse says she was told this by Patient and is calling to verify.    Communication: [phone] ext [phone] Kavitha

## 2020-08-12 NOTE — TELEPHONE ENCOUNTER
----- Message from Elaine Munguia sent at 8/12/2020  1:07 PM CDT -----  Regarding: Call back  Type:  Patient Returning Call    Who Called: Sharifa from [facility]  Who Left Message for Patient:   Does the patient know what this is regarding?: yes  Would the patient rather a call back or a response via [facility]?  Call back  Best Call Back Number: [phone]  Additional Information:  please call

## 2020-08-12 NOTE — TELEPHONE ENCOUNTER
----- Message from Mechelle Pfeiffer sent at 8/12/2020 10:56 AM CDT -----  Regarding: Sharifa Calling from [facility] [phone] ext [phone]  Contact: Sharifa Calling from [facility] [phone] ext [phone]  Calling to get Labs on the patient to get tested for Hepatitis Panel and HIV panel since nurse stuck herself with needles while doing patients labs.

## 2020-08-13 PROBLEM — K92.2 GI BLEED: Status: ACTIVE | Noted: 2020-01-01

## 2020-08-13 NOTE — FIRST PROVIDER EVALUATION
Emergency Department TeleTRIAGE Encounter Note      CHIEF COMPLAINT    Chief Complaint   Patient presents with    Rectal Bleeding     C/o black stool recently. Pt was admitted to the hospital for diverticulitis earlier this month. Called Dr. Matos, who ordered an occult blood test, which came back positive.     Fatigue     Pt was diagnosed with autoimmune anemia. Reports he has been feeling extremely weak and has been having frequent falls since January       VITAL SIGNS   Initial Vitals [08/13/20 1448]   BP Pulse Resp Temp SpO2   138/61 89 20 99.1 °F (37.3 °C) 98 %      MAP       --            ALLERGIES    Review of patient's allergies indicates:   Allergen Reactions    Codeine Nausea Only    Tetanus vaccines and toxoid Hives and Rash       PROVIDER TRIAGE NOTE  Patient was admitted at [facility] recently.  His wife reports that he is having black stool starting 8/5. She contacted the patient hematologist and had stool sample obtained.  The nurse informed the patient to come to the ER for evaluation due to + heme occult stool.  Patient has autoimmune anemia and recently diagnosed with diverticulitis.  Patient has port and is currently on antibiotics.  Patient has generalized weakness and lightheadedness.  Patient is not currently taking oral anticoagulation.  Will order labs, type and screen, IV fluids, pending ED provider evaluation.         ORDERS  Labs Reviewed - No data to display    ED Orders (720h ago, onward)    None            Virtual Visit Note: The provider triage portion of this emergency department evaluation and documentation was performed via [employer], a HIPAA-compliant telemedicine application, in concert with a tele-presenter in the room. A face to face patient evaluation with one of my colleagues will occur once the patient is placed in an emergency department room.      DISCLAIMER: This note was prepared with M*Modal voice recognition transcription software. Garbled syntax,  mangled pronouns, and other bizarre constructions may be attributed to that software system.

## 2020-08-13 NOTE — ED PROVIDER NOTES
"Encounter Date: 8/13/2020       History     Chief Complaint   Patient presents with    Rectal Bleeding     C/o black stool recently. Pt was admitted to the hospital for diverticulitis earlier this month. Called Dr. Matos, who ordered an occult blood test, which came back positive.     Fatigue     Pt was diagnosed with autoimmune anemia. Reports he has been feeling extremely weak and has been having frequent falls since January     74-year-old male with past medical history of hypertension, UTI, diverticulitis, ulcerative colitis, UTIs, GERD, chronic bronchitis, recent admission for bacteremia due to diverticulitis, and autoimmune hemolytic anemia presents to the ED for abnormal labs.  The patient is established with Dr. Matos for history of hemolytic anemia.  On 08/10 the patient had labs performed for "black stools" and he was informed today that his stools are positive for blood and that he is anemic.  He was advised to come to the emergency department.  The patient has no complaints.  No fever, abdominal pain, vomiting or anticoagulant use.  His last bowel movement was this morning and it was "dark."  He has been having dark stools since 08/05.  The patient reports intermittent lightheadedness and shortness of breath.  No chest pain.  The patient is currently on Rocephin IV for bacteremia due to diverticulitis.  He has 4 more doses to completion of his antibiotics.  The patient reports that he has been extremely fatigued, having multiple falls since January due to feeling lightheaded.  No reported syncope.  No other complaints at this time.    The history is provided by the patient, the spouse and medical records.     Review of patient's allergies indicates:   Allergen Reactions    Codeine Nausea Only    Tetanus vaccines and toxoid Hives and Rash     Past Medical History:   Diagnosis Date    Acute pyelonephritis 7/31/2020    Allergy     Asthma     BPH (benign prostatic hyperplasia)     CHRONIC " BRONCHITIS     GERD (gastroesophageal reflux disease)     HTN (hypertension)     HTN (hypertension) 10/15/2014    Joint pain     Mitral regurgitation     3-4+ under surveillance CT surgery    Mitral regurgitation     Mitral valve regurgitation     Obesity     Osteoarthritis of knee     Trouble in sleeping     Ulcerative colitis     Urinary tract infection     Valvular regurgitation      Past Surgical History:   Procedure Laterality Date    BONE MARROW BIOPSY Left 7/7/2020    Procedure: Biopsy-bone marrow;  Surgeon: Howard Matos MD;  Location: [facility] OR;  Service: Oncology;  Laterality: Left;    COLONOSCOPY N/A 10/11/2017    Procedure: COLONOSCOPY Gol[place];  Surgeon: Shanae Duarte MD;  Location: [facility] ENDO;  Service: Endoscopy;  Laterality: N/A;    CYSTOSCOPY N/A 8/8/2018    Procedure: CYSTOSCOPY;  Surgeon: Adan Cuenca MD;  Location: [address];  Service: Urology;  Laterality: N/A;  1 hour    CYSTOSCOPY W/ RETROGRADES      with penile biopsy    DILATION OF URETHRA N/A 8/8/2018    Procedure: DILATION, URETHRA;  Surgeon: Adan Cuenca MD;  Location: [address];  Service: Urology;  Laterality: N/A;    HERNIA REPAIR Right 1956    inguinal    JOINT REPLACEMENT Bilateral     knees    Penile Biopsy      TONSILLECTOMY      TOTAL KNEE ARTHROPLASTY Bilateral     URETHRAL MEATOPLASTY N/A 8/8/2018    Procedure: MEATOPLASTY, URETHRA;  Surgeon: Adan Cuenca MD;  Location: [address];  Service: Urology;  Laterality: N/A;     Family History   Problem Relation Age of Onset    Bone cancer Mother     Glaucoma Mother     Cataracts Mother     Cancer Mother         bone cancer    Kidney disease Father     COPD Sister     Heart disease Daughter         congenital    Other Daughter 2        fibr. tumor on bronchial tube; lung removed    Other Daughter         Bronchitis    Melanoma Neg Hx     Prostate cancer Neg Hx     Macular degeneration Neg Hx     Retinal detachment Neg  Hx     Strabismus Neg Hx     Amblyopia Neg Hx     Blindness Neg Hx      Social History     Tobacco Use    Smoking status: Former Smoker     Packs/day: 2.00     Years: 6.00     Pack years: 12.00     Types: Cigarettes     Quit date: 1969     Years since quittin.6    Smokeless tobacco: Former User     Types: Chew    Tobacco comment: Quit all forms of tobacco in .   Substance Use Topics    Alcohol use: No     Alcohol/week: 0.0 standard drinks     Comment: remote hx of heavy alcohol use    Drug use: No     Review of Systems   Constitutional: Positive for fatigue. Negative for activity change and fever.   HENT: Negative for congestion and trouble swallowing.    Respiratory: Positive for cough and shortness of breath. Negative for chest tightness.    Cardiovascular: Negative for chest pain.   Gastrointestinal: Positive for blood in stool. Negative for abdominal pain, diarrhea, nausea and vomiting.   Musculoskeletal: Negative for back pain, joint swelling, myalgias and neck pain.   Skin: Negative.    Neurological: Positive for light-headedness. Negative for weakness and headaches.   Hematological: Does not bruise/bleed easily.   Psychiatric/Behavioral: Negative for confusion.   All other systems reviewed and are negative.      Physical Exam     Initial Vitals [20 1448]   BP Pulse Resp Temp SpO2   138/61 89 20 99.1 °F (37.3 °C) 98 %      MAP       --         Physical Exam    Nursing note and vitals reviewed.  Constitutional: He appears well-developed and well-nourished. He is Obese . He is active and cooperative. He is easily aroused.  Non-toxic appearance. He does not have a sickly appearance. He does not appear ill. No distress.   HENT:   Head: Normocephalic and atraumatic.   Mouth/Throat: Mucous membranes are normal.   Eyes: Conjunctivae are normal.   Neck: Normal range of motion and phonation normal.   Cardiovascular: Normal rate, regular rhythm and normal heart sounds.   PICC RUE with dressing  C/D/I.    Pulmonary/Chest: Effort normal. Tachypnea noted. He has wheezes (faint end expiratory bilaterally).   Abdominal: Soft. Normal appearance and bowel sounds are normal. He exhibits no distension. There is no abdominal tenderness. There is no rigidity, no rebound, no guarding and no CVA tenderness.   Genitourinary: Rectum:      Guaiac result positive.      No rectal mass, tenderness, external hemorrhoid, internal hemorrhoid or abnormal anal tone.   Guaiac positive stool. : Acceptable.   Genitourinary Comments: Witnessed by KAIT Gallagher    +Melena.  No BRB.      Neurological: He is alert, oriented to person, place, and time and easily aroused. He has normal strength. Coordination normal. GCS eye subscore is 4. GCS verbal subscore is 5. GCS motor subscore is 6.   Skin: Skin is warm, dry and intact. No bruising and no rash noted. No erythema. There is pallor.   Psychiatric: He has a normal mood and affect. His speech is normal and behavior is normal. Judgment and thought content normal. Cognition and memory are normal.         ED Course   Procedures  Labs Reviewed   CBC W/ AUTO DIFFERENTIAL - Abnormal; Notable for the following components:       Result Value    RBC 2.61 (*)     Hemoglobin 7.1 (*)     Hematocrit 23.2 (*)     Mean Corpuscular Hemoglobin Conc 30.6 (*)     RDW 16.0 (*)     Immature Granulocytes 1.1 (*)     Gran # (ANC) 9.0 (*)     Immature Grans (Abs) 0.12 (*)     Gran% 79.0 (*)     Lymph% 9.9 (*)     All other components within normal limits   COMPREHENSIVE METABOLIC PANEL - Abnormal; Notable for the following components:    Sodium 134 (*)     Calcium 8.6 (*)     Albumin 2.3 (*)     Alkaline Phosphatase 52 (*)     AST 7 (*)     ALT <5 (*)     Anion Gap 7 (*)     All other components within normal limits   OCCULT BLOOD X 1, STOOL - Abnormal; Notable for the following components:    Occult Blood Positive (*)     All other components within normal limits   PROTIME-INR   SARS-COV-2 RNA  AMPLIFICATION, QUAL   TYPE & SCREEN   ANTIBODY IDENTIFICATION   DIRECT ANTIGLOBULIN TEST   PREPARE RBC SOFT          Imaging Results    None          Medical Decision Making:   History:   Old Medical Records: I decided to obtain old medical records.  Old Records Summarized: records from previous admission(s).  Differential Diagnosis:   GI bleed, anemia,infection  Clinical Tests:   Lab Tests: Ordered and Reviewed  Medical Tests: Ordered and Reviewed  ED Management:  Labs, IV fluids, PRBC transfusion, IV protonix    Pt is anemic and hemoccult is positive.  His vitals are stable.  No anticoagulant use.  Pt reports near-syncopal episodes since January, worsening fatigue and frequent falls due light-headedness no fall today.  He is anemic today and is hemoccult positive with melena on exam.  He was transfused with 1 unit of PRBCs.  No anticoagulant use.  Pt's vitals are stable. Afebrile. WBC normal. NGTD on most recent blood cultures.   After consultation with GI, pt will be admitted for GI bleed with plans for EGD in the AM.  Pt and his wife were updated with the plan and they both agree.  HDS in the ED.    Other:   I have discussed this case with another health care provider.       <> Summary of the Discussion: Discussed with , GI-  Advised PPI BID, NPO, admit to medicine team.  They will plan for EGD in the AM.     Discussed with [facility] IM resident who will accept the patient.      Discussed with  who agrees with ED course and disposition.                                  Clinical Impression:       ICD-10-CM ICD-9-CM   1. GI bleed  K92.2 578.9   2. Dizziness  R42 780.4   3. Symptomatic anemia  D64.9 285.9                                Nena Quintana, [facility]  08/13/20 1822       Nena Quintana, [facility]  08/13/20 1833       Nena Quintana, [facility]  08/13/20 1852

## 2020-08-13 NOTE — TELEPHONE ENCOUNTER
I called and spoke to his wife. She notes that Mr. Savage is having black, tarry stools. His stool was positive for occult blood today. Hemoglobin on labs from home health was 7.1 g/dL. I recommended he go to the emergency room for evaluation of melena and symptomatic anemia.    Patient's wife is in agreement with the proposed treatment plan. All questions were answered to her satisfaction.    Howard Matos M.D.  Hematology/Oncology  [facility] - [address], [address]  [place] [address]  Phone: [phone]  Fax: [phone]

## 2020-08-13 NOTE — ED NOTES
Pt cleaned and dried at this time. Pt wears diapers and reports that he cannot feel when he has to urinate.

## 2020-08-13 NOTE — TELEPHONE ENCOUNTER
"----- Message from Ravinder Fidel sent at 8/13/2020 12:33 PM CDT -----  Consult/Advisory:    Name Of Caller: Brenda Savage  Relationship to the Pt?: Wife   Contact Preference?: [phone]    Provider Name: Dr Matos     What is the nature of the call?:  Homehealth nurse states blood in stole and she feels pt should be taken to ER right now. Please contact to discuss     Additional Notes:  "Thank you for all that you do for our patients'"           "

## 2020-08-13 NOTE — TELEPHONE ENCOUNTER
Kavitha, with [facility], confirmed correct fax number for Dr. Matos's office due to sending labs to incorrect number (per [facility] records) this morning.  She states occult stool test results not in yet.      ----- Message from Karen Sanchez sent at 8/13/2020 10:01 AM CDT -----  Regarding: call back  Contact: [facility] # [phone] ext [phone]  [facility] is calling back to the office for the results for the pt [phone] ext [phone]

## 2020-08-14 NOTE — H&P
"[facility] Medicine H&P Note     Admitting Team: [facility] Hospitalist Team B  Attending Physician: Dr. Rodri Ramirez  Resident: Dr. Mondragon  Intern: [place]     Date of Admit: 8/13/2020    Chief Complaint   Melena x 2-3 days    Subjective:      History of Present Illness:  Mr. Lopez Savage is a 75 yo M with a PMH notable for HTN, GERD, auto-immune hemolytic anemia, mitral regurgitation, obesity, and diverticulitis who presents to the ED today for a 2-3 day history of "black stools." Of note, the patient was recently hospitalized in late July for an HENRY/diverticulitis flair, was discharged home, when blood cultures returned positive, and the patient was asked to return once again to the hospital, where he was started on a 14 day course of Rocephin, and discharged home on 8/5/20 with a PICC and the necessary antibiotics. He states that he has four more days on this regimen.    The patient states that the change in character of his bowel movements has also been accompanied by a change in consistency, as they are now diarrhea-like, even sometimes water-like. He states that he then went to his PCP for guidance, and was sent home with an FOBT that returned postive. His PCP then referred him to the emergency department for further work-up. He states that he has had these episodes before, and that his last colonoscopy was approximately four years ago. He was told that he had several polyps, and to return in five years' time. He denies regular NSAID use, endorsing only occasional Advil use.     He denies being on any blood thinners, as well as any dizziness or palpitations or lightheadedness. He denies any chest pain, constipatios, or hematochezia either. He does endorse SOB, YANCEY, and fatigue however.     Past Medical History:  Past Medical History:   Diagnosis Date    Acute pyelonephritis 7/31/2020    Allergy     Asthma     BPH (benign prostatic hyperplasia)     CHRONIC BRONCHITIS     GERD (gastroesophageal reflux " disease)     HTN (hypertension)     HTN (hypertension) 10/15/2014    Joint pain     Mitral regurgitation     3-4+ under surveillance CT surgery    Mitral regurgitation     Mitral valve regurgitation     Obesity     Osteoarthritis of knee     Trouble in sleeping     Ulcerative colitis     Urinary tract infection     Valvular regurgitation        Past Surgical History:  Past Surgical History:   Procedure Laterality Date    BONE MARROW BIOPSY Left 7/7/2020    Procedure: Biopsy-bone marrow;  Surgeon: Howard Matos MD;  Location: [facility] OR;  Service: Oncology;  Laterality: Left;    COLONOSCOPY N/A 10/11/2017    Procedure: COLONOSCOPY Gol[place];  Surgeon: Shanae Duarte MD;  Location: [facility] ENDO;  Service: Endoscopy;  Laterality: N/A;    CYSTOSCOPY N/A 8/8/2018    Procedure: CYSTOSCOPY;  Surgeon: Adan Cuenca MD;  Location: [address];  Service: Urology;  Laterality: N/A;  1 hour    CYSTOSCOPY W/ RETROGRADES      with penile biopsy    DILATION OF URETHRA N/A 8/8/2018    Procedure: DILATION, URETHRA;  Surgeon: Adan Cuenca MD;  Location: [address];  Service: Urology;  Laterality: N/A;    HERNIA REPAIR Right 1956    inguinal    JOINT REPLACEMENT Bilateral     knees    Penile Biopsy      TONSILLECTOMY      TOTAL KNEE ARTHROPLASTY Bilateral     URETHRAL MEATOPLASTY N/A 8/8/2018    Procedure: MEATOPLASTY, URETHRA;  Surgeon: Adan Cuenca MD;  Location: [address];  Service: Urology;  Laterality: N/A;       Allergies:  Review of patient's allergies indicates:   Allergen Reactions    Codeine Nausea Only    Tetanus vaccines and toxoid Hives and Rash       Home Medications:  Prior to Admission medications    Medication Sig Start Date End Date Taking? Authorizing Provider   albuterol (PROVENTIL HFA) 90 mcg/actuation inhaler Inhale 2 puffs into the lungs every 6 (six) hours as needed for Wheezing or Shortness of Breath. 4/17/20  Yes Prasanna Haywood MD   albuterol (PROVENTIL) 2.5  mg /3 mL (0.083 %) nebulizer solution Take 3 mLs (2.5 mg total) by nebulization every 6 (six) hours as needed for Wheezing. Rescue 3/25/20 3/25/21 Yes Prasanna Haywood MD   betamethasone dipropionate (DIPROLENE) 0.05 % ointment Apply topically 2 (two) times daily. 7/29/19  Yes Adan Cuenca MD   cefTRIAXone (ROCEPHIN) 2 g/50 mL PgBk IVPB Inject 50 mLs (2 g total) into the vein every 24 hours as needed. 8/5/20 8/17/20 Yes Johnson Lyman MD   COZAAR 50 mg tablet Take 50 mg by mouth once daily.  4/8/20  Yes Historical Provider, MD   diclofenac sodium (VOLTAREN) 1 % Gel Apply 2 g topically 3 (three) times daily. 4/3/20  Yes Yoan Ma PA-C   donepeziL (ARICEPT) 10 MG tablet Take 1 tablet (10 mg total) by mouth once daily. HOLD FOR ONE WEEK UNTIL ANTIBIOTIC COURSE IS COMPLETED. 8/2/20 8/2/21 Yes Pallavi Sunkara, MD   escitalopram oxalate (LEXAPRO) 10 MG tablet Take 1 tablet (10 mg total) by mouth once daily. 5/6/20 5/6/21 Yes Prasanna Haywood MD   ferrous gluconate (FERGON) 324 MG tablet Take 324 mg by mouth daily with breakfast.   Yes Historical Provider, MD   fluticasone propionate (FLONASE) 50 mcg/actuation nasal spray 2 sprays (100 mcg total) by Each Nostril route once daily. 7/21/20  Yes Natalie Trinh MD   fluticasone-salmeterol diskus inhaler 250-50 mcg Inhale 1 puff into the lungs 2 (two) times daily. Controller 4/17/20  Yes Prasanna Haywood MD   meclizine (ANTIVERT) 12.5 mg tablet Take 1 tablet (12.5 mg total) by mouth 3 (three) times daily. 1/16/20  Yes Trinidad Ellis MD   montelukast (SINGULAIR) 10 mg tablet  5/9/20  Yes Historical Provider, MD   multivit with iron,minerals (MULTI-VITAMINS WITH IRON ORAL) Take 1 tablet by mouth once daily.   Yes Historical Provider, MD   omeprazole (PRILOSEC) 40 MG capsule Take 1 capsule (40 mg total) by mouth every morning. 1/16/20  Yes Trinidad Ellis MD   ondansetron (ZOFRAN) 4 MG tablet Take 1 tablet (4 mg total) by mouth every 8 (eight)  "hours as needed for Nausea. 19  Yes Trinidad Ellis MD   loperamide (IMODIUM) 2 mg capsule Take 1 capsule (2 mg total) by mouth 4 (four) times daily as needed for Diarrhea. 20  Pallavi Sunkara, MD       Family History:  Family History   Problem Relation Age of Onset    Bone cancer Mother     Glaucoma Mother     Cataracts Mother     Cancer Mother         bone cancer    Kidney disease Father     COPD Sister     Heart disease Daughter         congenital    Other Daughter 2        fibr. tumor on bronchial tube; lung removed    Other Daughter         Bronchitis    Melanoma Neg Hx     Prostate cancer Neg Hx     Macular degeneration Neg Hx     Retinal detachment Neg Hx     Strabismus Neg Hx     Amblyopia Neg Hx     Blindness Neg Hx        Social History:  Social History     Tobacco Use    Smoking status: Former Smoker     Packs/day: 2.00     Years: 6.00     Pack years: 12.00     Types: Cigarettes     Quit date: 1969     Years since quittin.6    Smokeless tobacco: Former User     Types: Chew    Tobacco comment: Quit all forms of tobacco in .   Substance Use Topics    Alcohol use: No     Alcohol/week: 0.0 standard drinks     Comment: remote hx of heavy alcohol use    Drug use: No       Review of Systems:  See HPI; all other systems are reviewed and are negative.     Objective:   Last 24 Hour Vital Signs:  BP  Min: 124/78  Max: 143/69  Temp  Av.1 °F (37.3 °C)  Min: 99.1 °F (37.3 °C)  Max: 99.1 °F (37.3 °C)  Pulse  Av  Min: 85  Max: 91  Resp  Av.4  Min: 20  Max: 26  SpO2  Av %  Min: 96 %  Max: 98 %  Height  Av' 9" (175.3 cm)  Min: 5' 9" (175.3 cm)  Max: 5' 9" (175.3 cm)  Weight  Av.1 kg (256 lb)  Min: 116.1 kg (256 lb)  Max: 116.1 kg (256 lb)  Body mass index is 37.8 kg/m².  I/O last 3 completed shifts:  In: 500 [IV Piggyback:500]  Out: -     Physical Examination:  Gen: Obese male in NAD  Neuro: GCS 15, no gross neurological deficits noted  CV: " RRR, no murmurs, rubs, or gallops  Pulm: CTAB, no rhonchi, rales, or wheezes. On room air.  GI: Non-distended, non-tender, obese abdomen     Extrem: PICC in [facility]    Laboratory:  Most Recent Data:  CBC:   Lab Results   Component Value Date    WBC 11.39 08/13/2020    HGB 7.1 (L) 08/13/2020    HCT 23.2 (L) 08/13/2020     08/13/2020    MCV 89 08/13/2020    RDW 16.0 (H) 08/13/2020     BMP:   Lab Results   Component Value Date     (L) 08/13/2020    K 3.7 08/13/2020     08/13/2020    CO2 26 08/13/2020    BUN 20 08/13/2020    CREATININE 1.2 08/13/2020    GLU 96 08/13/2020    CALCIUM 8.6 (L) 08/13/2020    MG 1.7 07/31/2020    PHOS 4.3 07/31/2020     LFTs:   Lab Results   Component Value Date    PROT 6.5 08/13/2020    ALBUMIN 2.3 (L) 08/13/2020    BILITOT 0.2 08/13/2020    AST 7 (L) 08/13/2020    ALKPHOS 52 (L) 08/13/2020    ALT <5 (L) 08/13/2020     Coags:   Lab Results   Component Value Date    INR 1.0 08/13/2020     FLP:   Lab Results   Component Value Date    CHOL 137 10/04/2019    HDL 32 (L) 10/04/2019    LDLCALC 88.2 10/04/2019    TRIG 84 10/04/2019    CHOLHDL 23.4 10/04/2019     DM:   Lab Results   Component Value Date    HGBA1C 5.5 02/12/2019    HGBA1C 5.4 06/22/2012    HGBA1C 5.7 11/08/2008    LDLCALC 88.2 10/04/2019    CREATININE 1.2 08/13/2020     Thyroid:   Lab Results   Component Value Date    TSH 2.308 04/17/2018     Anemia:   Lab Results   Component Value Date    IRON 16 (L) 06/15/2020    TIBC 225 (L) 06/15/2020    FERRITIN 792 (H) 06/15/2020    BIRLAZGM09 349 07/20/2020    FOLATE 5.3 07/20/2020     Cardiac:   Lab Results   Component Value Date    BNP 59 06/12/2020     Urinalysis:   Lab Results   Component Value Date    LABURIN No growth 07/31/2020    COLORU Yellow 07/31/2020    SPECGRAV 1.015 07/31/2020    NITRITE Negative 07/31/2020    KETONESU Negative 07/31/2020    UROBILINOGEN Negative 07/31/2020    WBCUA >100 (H) 07/31/2020       Trended Lab Data:  Recent Labs   Lab 08/13/20  1630   WBC  11.39   HGB 7.1*   HCT 23.2*      MCV 89   RDW 16.0*   *   K 3.7      CO2 26   BUN 20   CREATININE 1.2   GLU 96   PROT 6.5   ALBUMIN 2.3*   BILITOT 0.2   AST 7*   ALKPHOS 52*   ALT <5*       Trended Cardiac Data:  No results for input(s): TROPONINI, CKTOTAL, CKMB, BNP in the last 168 hours.     Assessment:     Lopez Savage is a 74 y.o. male with:  Patient Active Problem List    Diagnosis Date Noted    GI bleed 08/13/2020    Gram-positive bacteremia 08/03/2020    CKD (chronic kidney disease) stage 3, GFR 30-59 ml/min 08/03/2020    Acute diverticulitis 07/31/2020    Autoimmune hemolytic anemia 06/30/2020    Splenomegaly 06/17/2020    Normocytic anemia 06/14/2020    BPH (benign prostatic hyperplasia) 08/08/2018    Screening for colon cancer 10/11/2017    Nuclear sclerosis of both eyes 10/09/2017    Refractive error 10/09/2017    Cough 04/28/2017    Allergic rhinitis 03/17/2017    Alzheimer's disease 03/17/2017    Mitral regurgitation 06/02/2016    Chronic bronchitis 07/22/2015    Morbid obesity with BMI of 40.0-44.9, adult 07/08/2015    Essential hypertension 10/15/2014    GERD (gastroesophageal reflux disease)         Plan:     Lower Gastrointestinal Bleed:  - Patient endorsees melena of 2-3 days' duration, along with accompanying SOB, YANCEY, and fatigue. FOBT at home and in the ED returned postive.  - Labs showed a Hg from 8.3 approximately 10 days ago  - Hg in the ED was found to be 7.1, while vitals were WNL, but notable for orthostatic hypotension  - Gastroenterology was consulted, the patient was placed on Protonix, 500 mL NS bolus given and a unit of blood was ordered to be transfused by the ED.  - Will continue Protonix 50mg IV BID, and keep patient NPO for scope with GI in AM    Gram-Positive Bacteremia:  - Diagnosed by cultures at OSH after bout of diverticulitis, sent home with PICC for 14 days Rocephin IV, through 8/17  - WBC count of 11.4 in ED, afebrile, no  tachycardia  - Will continue IV Rocephin through 8/17    Essential Hypertension:  - Patient currently appears volume down (orthostatic hypotension)  - Will hold home hypertensive medicines at this time    GERD:  - Continuing Protonix BID IV    Morbid obesity:  - Will continue to take opportunities to educate the patient regarding healthy lifestyle choices    Autoimmune hemolytic anemia  - Direct Blair found to be negative  - Not likely to be the driving cause behind the patient's anemia  - Currently being followed by hem/onc, Dr. Matos      Code Status:     Full    Huy Goff MD  [facility] Internal Medicine HO-I    [facility] Medicine Hospitalist Pager numbers:   [facility] Hospitalist Medicine Team A (Tyra/Ruby): [phone]  [facility] Hospitalist Medicine Team B (Tor/James):  [phone]

## 2020-08-14 NOTE — NURSING
Blood transfusion finished at 0018. No signs or symptoms of transfusion reaction. Vital signs stable throughout.

## 2020-08-14 NOTE — NURSING
Patient arrived on unit at 2000 from ED to room 480. Oriented to room, plan of care reviewed. Vitals taken. 1 unit PRBCs started at 2150.

## 2020-08-14 NOTE — HPI
74 year old male with PMHx of HTN, CKD 3, autoimmune hemolytic anemia, gram positive bacteremia, chronic bronchitis, obesity, diverticulitis, GERD who presents with gradual onset, progressively worsening, frequent, black stools since 3 days ago. He states he was in his usual state of health when he noticed his stool progressively becoming black 2 days ago. He reports it is associated with an increase in frequency of bowel movements, stating he typically has 2-3 bm per day and is currently having 4 bm per day that are completely black/tarry but have no bright red blood. He denies abdominal pain, nausea, vomiting, diarrhea, constipation. He has had one episode of black stool in the past, about 20 years ago, at that time he underwent EGD which he states revealed an ulcer. He takes PPI daily for the past few years.

## 2020-08-14 NOTE — ASSESSMENT & PLAN NOTE
73 YO M with pmh of GERD and peptic ulcer who has a 3 day history of melena. Last hgb 6.9.   - PPI BID  - Maintain active type and cross. Transfuse to goal of hgb>7  - Keep NPO till endoscopy today  - Further recs to follow after procedure.

## 2020-08-14 NOTE — PROGRESS NOTES
"[facility] Internal Medicine Resident HOJean PierreI Progress Note    HPI: Mr. Lopez Savage is a 74 year old male with a PMH notable for HTN, GERD, diverticulitis, autoimmune hemolytic anemia, and mitral regurgitation who presented on 20 for melena of 2-3 days' duration.    Subjective:    This morning on interview, Mr. Savage was seen sleeping comfortably in bed. He denies any new complaints, but did experience another melanotic BM overnight. He states that he is hungry.     Objective:   Last 24 Hour Vital Signs:  BP  Min: 90/52  Max: 143/64  Temp  Av.7 °F (36.5 °C)  Min: 96.8 °F (36 °C)  Max: 99.1 °F (37.3 °C)  Pulse  Av.4  Min: 74  Max: 94  Resp  Av.9  Min: 18  Max: 26  SpO2  Av.7 %  Min: 92 %  Max: 98 %  Height  Av' 9" (175.3 cm)  Min: 5' 9" (175.3 cm)  Max: 5' 9" (175.3 cm)  Weight  Av.5 kg (252 lb 7.2 oz)  Min: 112.9 kg (248 lb 14.4 oz)  Max: 116.1 kg (256 lb)  I/O last 3 completed shifts:  In: 1010 [Blood:510; IV Piggyback:500]  Out: -     Physical Examination:  Gen: Obese male in NAD  Neuro: GCS 15, no gross neurological deficits observed  CV: RRR, grade 3/6 systolic murmur noted  Pulm: CTAB, no rhonchi, rales, or wheezes. On room air.  GI: Non-distended, non-tender, obese abdomen      Current Medications:     Scheduled:   cefTRIAXone  2 g Intravenous Q24H    escitalopram oxalate  10 mg Oral Daily    pantoprazole  40 mg Intravenous BID        PRN:  sodium chloride, sodium chloride, sodium chloride 0.9%    Assessment:     Lopez Savage is a 74 y.o.male with  Patient Active Problem List    Diagnosis Date Noted    GI bleed 2020    Gram-positive bacteremia 2020    CKD (chronic kidney disease) stage 3, GFR 30-59 ml/min 2020    Acute diverticulitis 2020    Autoimmune hemolytic anemia 2020    Splenomegaly 2020    Normocytic anemia 2020    BPH (benign prostatic hyperplasia) 2018    Screening for colon cancer 10/11/2017    Nuclear " sclerosis of both eyes 10/09/2017    Refractive error 10/09/2017    Cough 04/28/2017    Allergic rhinitis 03/17/2017    Alzheimer's disease 03/17/2017    Mitral regurgitation 06/02/2016    Chronic bronchitis 07/22/2015    Morbid obesity with BMI of 40.0-44.9, adult 07/08/2015    Essential hypertension 10/15/2014    GERD (gastroesophageal reflux disease)         Plan:     Lower Gastrointestinal Bleed:  - Patient endorses melena of 2-3 days' duration, along with accompanying SOB, YANCEY, and fatigue. FOBT at home and in the ED returned postive.  - Labs showed a Hg from 8.3 approximately 10 days ago  - Hg in the ED was found to be 7.1, while vitals were WNL, but notable for orthostatic hypotension  - Gastroenterology was consulted, the patient was placed on Protonix, 500 mL NS bolus given and a unit of blood was ordered to be transfused by the ED.  - Will continue Protonix 50mg IV BID, and keep patient NPO for scope with GI in AM  - Patient required one unit of pRBCs overnight (last Hg was measured to be 6.9 at 0129) in addition to the unit he received in the emergency room. Continue q6h H/H  - Discussed patient with the GI team, will go for upper endoscopy later today, will keep NPO until that time.     Gram-Positive Bacteremia:  - Diagnosed by cultures at OSH after bout of diverticulitis, sent home with PICC for 14 days Rocephin IV, through 8/17  - WBC count of 11.4 in ED, afebrile, no tachycardia  - Will continue IV Rocephin through 8/17     Essential Hypertension:  - Patient currently appears volume down (orthostatic hypotension), with blood pressures on the lower end of normal on recent measures, but without tachycardia  - Will hold home hypertensive medicines at this time     GERD:  - Continuing Protonix BID IV     Morbid obesity:  - Will continue to take opportunities to educate the patient regarding healthy lifestyle choices     Autoimmune hemolytic anemia  - Direct Blair found to be negative  - Not  likely to be the driving cause behind the patient's anemia  - Currently being followed by hem/onc, Dr. Carlo Gfof  [facility] Internal Medicine HO-I  [facility] Internal Medicine Service Team B    [facility] Medicine Hospitalist Pager numbers:   [facility] Hospitalist Medicine Team A (Tyra/Ruby): [phone]  [facility] Hospitalist Medicine Team B (Tor/James):  [phone]

## 2020-08-14 NOTE — TRANSFER OF CARE
"Anesthesia Transfer of Care Note    Patient: Lopez Savage    Procedure(s) Performed: Procedure(s) (LRB):  EGD (ESOPHAGOGASTRODUODENOSCOPY) (N/A)    Patient location: GI    Anesthesia Type: MAC    Transport from OR: Transported from OR on room air with adequate spontaneous ventilation    Post pain: adequate analgesia    Post assessment: no apparent anesthetic complications    Post vital signs: stable    Level of consciousness: awake    Nausea/Vomiting: no nausea/vomiting    Complications: none    Transfer of care protocol was followed      Last vitals:   Visit Vitals  BP (!) 136/53   Pulse 90   Temp 36.9 °C (98.4 °F)   Resp 16   Ht 5' 9" (1.753 m)   Wt 112.5 kg (248 lb)   SpO2 98%   BMI 36.62 kg/m²     "

## 2020-08-14 NOTE — PLAN OF CARE
Report called to attending floor RN. Recovery complete.  Report reviewed with patient. Sent back to room via transport

## 2020-08-14 NOTE — ANESTHESIA PREPROCEDURE EVALUATION
Pre-operative evaluation for Procedure(s) (LRB):  EGD (ESOPHAGOGASTRODUODENOSCOPY) (N/A)    Lopez Savage is a 74 y.o. male with HTN, GERD, diverticulitis, autoimmune hemolytic anemia, and mitral regurgitation now with concern for GI bleed. He is scheduled for the above procedure.     Of note patient also on IV Rocephin for previous diagnosis of Gram-positive bacteremia.     Last hgb 6.9 this AM     Previously difficult airway per chart 8/8/2018 for cystoscopy    LDA:   Right brachial PICC line    Prev airway:   Method of Intubation: Fiberoptic Intubation; Inserted by: Anesthesia MD; Airway Device: Endotracheal Tube; Mask Ventilation: Mod Diff - oral; Intubated: Postinduction; Airway Device Size: 7.5; Style: Cuffed; Cuff Inflation: Minimal occlusive pressure; Inflation Amount: 8; Placement Verified By: Auscultation, Capnometry; Grade: Grade I (unable to pass tube with glidescope/FOB intubation ); Complicating Factors: Obesity, Oropharyngeal edema or fat, Poor neck/head extension, Anterior larynx, Short neck; Intubation Findings: Positive EtCO2, Bilateral breath sounds, Atraumatic/Condition of teeth unchanged;  Depth of Insertion: 25; Securment: Lips; Breath Sounds: Equal Bilateral; Insertion Attempts: 4 (enter comment) (attempted glidescope by CRNA X2 repositioned attempted Glidescope MD, FOB per MD)    Patient Active Problem List   Diagnosis    GERD (gastroesophageal reflux disease)    Essential hypertension    Morbid obesity with BMI of 40.0-44.9, adult    Chronic bronchitis    Mitral regurgitation    Allergic rhinitis    Alzheimer's disease    Cough    Nuclear sclerosis of both eyes    Refractive error    Screening for colon cancer    BPH (benign prostatic hyperplasia)    Normocytic anemia    Splenomegaly    Autoimmune hemolytic anemia    Acute diverticulitis    Gram-positive bacteremia    CKD (chronic kidney disease) stage 3, GFR 30-59 ml/min    GI bleed       Review of patient's allergies  indicates:   Allergen Reactions    Codeine Nausea Only    Tetanus vaccines and toxoid Hives and Rash        No current facility-administered medications on file prior to encounter.      Current Outpatient Medications on File Prior to Encounter   Medication Sig Dispense Refill    albuterol (PROVENTIL HFA) 90 mcg/actuation inhaler Inhale 2 puffs into the lungs every 6 (six) hours as needed for Wheezing or Shortness of Breath. 18 g 3    albuterol (PROVENTIL) 2.5 mg /3 mL (0.083 %) nebulizer solution Take 3 mLs (2.5 mg total) by nebulization every 6 (six) hours as needed for Wheezing. Rescue 1 Box 11    betamethasone dipropionate (DIPROLENE) 0.05 % ointment Apply topically 2 (two) times daily. 45 g 3    cefTRIAXone (ROCEPHIN) 2 g/50 mL PgBk IVPB Inject 50 mLs (2 g total) into the vein every 24 hours as needed. 12 each 0    COZAAR 50 mg tablet Take 50 mg by mouth once daily.       diclofenac sodium (VOLTAREN) 1 % Gel Apply 2 g topically 3 (three) times daily. 1 Tube 0    donepeziL (ARICEPT) 10 MG tablet Take 1 tablet (10 mg total) by mouth once daily. HOLD FOR ONE WEEK UNTIL ANTIBIOTIC COURSE IS COMPLETED. 90 tablet 3    escitalopram oxalate (LEXAPRO) 10 MG tablet Take 1 tablet (10 mg total) by mouth once daily. 90 tablet 3    fluticasone propionate (FLONASE) 50 mcg/actuation nasal spray 2 sprays (100 mcg total) by Each Nostril route once daily. 9.9 mL 11    fluticasone-salmeterol diskus inhaler 250-50 mcg Inhale 1 puff into the lungs 2 (two) times daily. Controller 60 each 3    montelukast (SINGULAIR) 10 mg tablet       omeprazole (PRILOSEC) 40 MG capsule Take 1 capsule (40 mg total) by mouth every morning. 90 capsule 3    ondansetron (ZOFRAN) 4 MG tablet Take 1 tablet (4 mg total) by mouth every 8 (eight) hours as needed for Nausea. 20 tablet 2    ferrous gluconate (FERGON) 324 MG tablet Take 324 mg by mouth daily with breakfast.      meclizine (ANTIVERT) 12.5 mg tablet Take 1 tablet (12.5 mg total) by  mouth 3 (three) times daily. 270 tablet 3    multivit with iron,minerals (MULTI-VITAMINS WITH IRON ORAL) Take 1 tablet by mouth once daily.         Past Surgical History:   Procedure Laterality Date    BONE MARROW BIOPSY Left 2020    Procedure: Biopsy-bone marrow;  Surgeon: Howard Matos MD;  Location: [facility] OR;  Service: Oncology;  Laterality: Left;    COLONOSCOPY N/A 10/11/2017    Procedure: COLONOSCOPY Golytely;  Surgeon: Shanae Duarte MD;  Location: [facility] ENDO;  Service: Endoscopy;  Laterality: N/A;    CYSTOSCOPY N/A 2018    Procedure: CYSTOSCOPY;  Surgeon: Adan Cuenca MD;  Location: [facility] OR [address] [place] [address];  Service: Urology;  Laterality: N/A;  1 hour    CYSTOSCOPY W/ RETROGRADES      with penile biopsy    DILATION OF URETHRA N/A 2018    Procedure: DILATION, URETHRA;  Surgeon: Adan Cuenca MD;  Location: [facility] OR [address] [place] [address];  Service: Urology;  Laterality: N/A;    HERNIA REPAIR Right     inguinal    JOINT REPLACEMENT Bilateral     knees    Penile Biopsy      TONSILLECTOMY      TOTAL KNEE ARTHROPLASTY Bilateral     URETHRAL MEATOPLASTY N/A 2018    Procedure: MEATOPLASTY, URETHRA;  Surgeon: Adan Cuenca MD;  Location: [address];  Service: Urology;  Laterality: N/A;       Social History     Socioeconomic History    Marital status:      Spouse name: Not on file    Number of children: Not on file    Years of education: Not on file    Highest education level: Not on file   Occupational History    Not on file   Social Needs    Financial resource strain: Not on file    Food insecurity     Worry: Not on file     Inability: Not on file    Transportation needs     Medical: Not on file     Non-medical: Not on file   Tobacco Use    Smoking status: Former Smoker     Packs/day: 2.00     Years: 6.00     Pack years: 12.00     Types: Cigarettes     Quit date: 1969     Years since quittin.6    Smokeless tobacco: Former User     Types: Chew    Tobacco comment:  Quit all forms of tobacco in .   Substance and Sexual Activity    Alcohol use: No     Alcohol/week: 0.0 standard drinks     Comment: remote hx of heavy alcohol use    Drug use: No    Sexual activity: Not Currently     Partners: Female   Lifestyle    Physical activity     Days per week: Not on file     Minutes per session: Not on file    Stress: Not on file   Relationships    Social connections     Talks on phone: Not on file     Gets together: Not on file     Attends Rastafarian service: Not on file     Active member of club or organization: Not on file     Attends meetings of clubs or organizations: Not on file     Relationship status: Not on file   Other Topics Concern    Not on file   Social History Narrative    Not on file         Vital Signs Range (Last 24H):  Temp:  [36 °C (96.8 °F)-37.3 °C (99.1 °F)]   Pulse:  [74-94]   Resp:  [18-26]   BP: ()/(48-78)   SpO2:  [92 %-98 %]       CBC:   Recent Labs     20  1630 20  0129   WBC 11.39  --    RBC 2.61*  --    HGB 7.1* 6.9*   HCT 23.2*  --      --    MCV 89  --    MCH 27.2  --    MCHC 30.6*  --        CMP:   Recent Labs     20  1630   *   K 3.7      CO2 26   BUN 20   CREATININE 1.2   GLU 96   CALCIUM 8.6*   ALBUMIN 2.3*   PROT 6.5   ALKPHOS 52*   ALT <5*   AST 7*   BILITOT 0.2       INR  Recent Labs     20  1630   INR 1.0         EK18   Normal sinus rhythm   Left anterior fascicular block   LVH with QRS widening   Abnormal ECG   When compared with ECG of 01-AUG-2018 09:22,   No significant change was found   Confirmed by Baljit Carias MD ([id number]) on 2018 12:09:11 PM    2D Echo:  20   · Normal left ventricular systolic function. The estimated ejection fraction is 60%.  · Mild left ventricular enlargement. LVEDD 6.0cm, LVESD 4.3cm.  · Normal LV diastolic function.  · Mild aortic regurgitation.  · Prolapse of the posterior mitral leaflet with anteriorly directed mitral regurgitation.  Moderate-to-severe mitral regurgitation.  · Normal right ventricular systolic function.  · The estimated PA systolic pressure is 32 mmHg.  · Intermediate central venous pressure (8 mmHg).  · Moderate left atrial enlargement.        Anesthesia Evaluation    I have reviewed the Patient Summary Reports.    I have reviewed the Nursing Notes.       Review of Systems  Anesthesia Hx:  No problems with previous Anesthesia  History of prior surgery of interest to airway management or planning: Previous anesthesia: MAC  egd with MAC.  Denies Family Hx of Anesthesia complications.  Personal Hx of Anesthesia complications  Difficult Intubation, documented in [facility] anesthesia history, fiberoptic intubation used   Social:  Former Smoker, Social Alcohol Use    Hematology/Oncology:     Oncology Normal   Hematology Comments: Autoimmune hemolytic anemia    EENT/Dental:   Hx of sinusitis   Cardiovascular:   Exercise tolerance: poor Hypertension Valvular problems/Murmurs, MR Denies MI.   Denies CABG/stent.      YANCEY Mitral regurgitation Functional Capacity 3 METS  Valvular Heart Disease: Mitral Regurgitation (MR)   Hypertension, Essential Hypertension    Pulmonary:   Asthma mild    Renal/:   Chronic Renal Disease, CRI BPH    Hepatic/GI:   PUD, GERD, well controlled Hx of diverticulosis   Musculoskeletal:   Arthritis  Chondromalacia of right knee  osteoarthritis   Neurological:   Denies Seizures. Hx of memory loss. Oriented to self, place, year, and president during evaluation   Endocrine:  Endocrine Normal    Dermatological:  Skin Normal    Psych:   Hx of memory loss         Physical Exam  General:  Well nourished, Morbid Obesity    Airway/Jaw/Neck:  Airway Findings: Mouth Opening: Normal Tongue: Normal  General Airway Assessment: Adult  Mallampati: IV  Improves to III with phonation.  TM Distance: 4 - 6 cm  Jaw/Neck Findings:  Neck ROM: Normal ROM      Dental:  Dental Findings: In tact   Chest/Lungs:  Chest/Lungs Findings: Clear to  auscultation     Heart/Vascular:  Heart Findings: Rate: Normal  Heart Murmur  Systolic        Mental Status:  Mental Status Findings:  Cooperative         Anesthesia Plan  Type of Anesthesia, risks & benefits discussed:  Anesthesia Type:  general, MAC  Patient's Preference:   Intra-op Monitoring Plan: standard ASA monitors  Intra-op Monitoring Plan Comments:   Post Op Pain Control Plan: per primary service following discharge from PACU  Post Op Pain Control Plan Comments:   Induction:   IV  Beta Blocker:  Patient is not currently on a Beta-Blocker (No further documentation required).       Informed Consent: Patient understands risks and agrees with Anesthesia plan.  Questions answered. Anesthesia consent signed with patient.  ASA Score: 3     Day of Surgery Review of History & Physical:    H&P update referred to the surgeon.         Ready For Surgery From Anesthesia Perspective.

## 2020-08-14 NOTE — [employer] PATIENT INSTRUCTIONS
Discharge Summary/Instructions after an Endoscopic Procedure  Patient Name: Lopez Savage  Patient MRN: [id number]  Patient YOB: 1945 Friday, August 14, 2020  Yoan Hamilton MD  Your health is very important to us during the Covid Crisis. Following your   procedure today, you will receive a daily text for 2 weeks asking about   signs or symptoms of Covid 19.  Please respond to this text when you   receive it so we can follow up and keep you as safe as possible.   RESTRICTIONS:  During your procedure today, you received medications for sedation.  These   medications may affect your judgment, balance and coordination.  Therefore,   for 24 hours, you have the following restrictions:   - DO NOT drive a car, operate machinery, make legal/financial decisions,   sign important papers or drink alcohol.    ACTIVITY:  Today: no heavy lifting, straining or running due to procedural   sedation/anesthesia.  The following day: return to full activity including work.  DIET:  Eat and drink normally unless instructed otherwise.     TREATMENT FOR COMMON SIDE EFFECTS:  - Mild abdominal pain, nausea, belching, bloating or excessive gas:  rest,   eat lightly and use a heating pad.  - Sore Throat: treat with throat lozenges and/or gargle with warm salt   water.  - Because air was used during the procedure, expelling large amounts of air   from your rectum or belching is normal.  - If a bowel prep was taken, you may not have a bowel movement for 1-3 days.    This is normal.  SYMPTOMS TO WATCH FOR AND REPORT TO YOUR PHYSICIAN:  1. Abdominal pain or bloating, other than gas cramps.  2. Chest pain.  3. Back pain.  4. Signs of infection such as: chills or fever occurring within 24 hours   after the procedure.  5. Rectal bleeding, which would show as bright red, maroon, or black stools.   (A tablespoon of blood from the rectum is not serious, especially if   hemorrhoids are present.)  6. Vomiting.  7. Weakness or  dizziness.  GO DIRECTLY TO THE NEAREST EMERGENCY ROOM IF YOU HAVE ANY OF THE FOLLOWING:      Difficulty breathing              Chills and/or fever over 101 F   Persistent vomiting and/or vomiting blood   Severe abdominal pain   Severe chest pain   Black, tarry stools   Bleeding- more than one tablespoon   Any other symptom or condition that you feel may need urgent attention  Your doctor recommends these additional instructions:  If any biopsies were taken, your doctors clinic will contact you in 1 to 2   weeks with any results.  - Return patient to hospital [facility] for ongoing care.   - Advance diet as tolerated.   - Check hemoglobin q 8 hours until stable.  - Perform an H. pylori serology at appointment to be scheduled.   - Use Prilosec (omeprazole) 40 mg PO daily.   - Continue present medications.  For questions, problems or results please call your physician - Yoan Hamilton MD.  EMERGENCY PHONE NUMBER: [phone],  LAB RESULTS: [phone]  IF A COMPLICATION OR EMERGENCY SITUATION ARISES AND YOU ARE UNABLE TO REACH   YOUR PHYSICIAN - GO DIRECTLY TO THE EMERGENCY ROOM.  Yoan Hamilton MD  8/14/2020 4:59:47 PM  This report has been verified and signed electronically.  [employer]

## 2020-08-14 NOTE — CONSULTS
[facility]  Gastroenterology  Consult Note    Patient Name: Lopez Savage  MRN: [id number]  Admission Date: 8/13/2020  Hospital Length of Stay: 1 days  Code Status: Full Code   Attending Provider: Rodri Ramirez MD   Consulting Provider: Silas Lowery MD  Primary Care Physician: Prasanna Haywood MD  Principal Problem:GI bleed    Inpatient consult to [facility]  Consult performed by: Silas Lowery MD  Consult ordered by: Huy Goff MD        Subjective:     HPI:  74 year old male with PMHx of HTN, CKD 3, autoimmune hemolytic anemia, gram positive bacteremia, chronic bronchitis, obesity, diverticulitis, GERD who presents with gradual onset, progressively worsening, frequent, black stools since 3 days ago. He states he was in his usual state of health when he noticed his stool progressively becoming black 2 days ago. He reports it is associated with an increase in frequency of bowel movements, stating he typically has 2-3 bm per day and is currently having 4 bm per day that are completely black/tarry but have no bright red blood. He denies abdominal pain, nausea, vomiting, diarrhea, constipation. He has had one episode of black stool in the past, about 20 years ago, at that time he underwent EGD which he states revealed an ulcer. He takes PPI daily for the past few years.    Past Medical History:   Diagnosis Date    Acute pyelonephritis 7/31/2020    Allergy     Asthma     BPH (benign prostatic hyperplasia)     CHRONIC BRONCHITIS     GERD (gastroesophageal reflux disease)     HTN (hypertension)     HTN (hypertension) 10/15/2014    Joint pain     Mitral regurgitation     3-4+ under surveillance CT surgery    Mitral regurgitation     Mitral valve regurgitation     Obesity     Osteoarthritis of knee     Trouble in sleeping     Ulcerative colitis     Urinary tract infection     Valvular regurgitation        Past Surgical History:    Procedure Laterality Date    BONE MARROW BIOPSY Left 2020    Procedure: Biopsy-bone marrow;  Surgeon: Howard Matos MD;  Location: [facility] OR;  Service: Oncology;  Laterality: Left;    COLONOSCOPY N/A 10/11/2017    Procedure: COLONOSCOPY Golytely;  Surgeon: Shanae Duarte MD;  Location: [facility] ENDO;  Service: Endoscopy;  Laterality: N/A;    CYSTOSCOPY N/A 2018    Procedure: CYSTOSCOPY;  Surgeon: Adan Cuenca MD;  Location: [address];  Service: Urology;  Laterality: N/A;  1 hour    CYSTOSCOPY W/ RETROGRADES      with penile biopsy    DILATION OF URETHRA N/A 2018    Procedure: DILATION, URETHRA;  Surgeon: Adan Cuenca MD;  Location: [address];  Service: Urology;  Laterality: N/A;    HERNIA REPAIR Right     inguinal    JOINT REPLACEMENT Bilateral     knees    Penile Biopsy      TONSILLECTOMY      TOTAL KNEE ARTHROPLASTY Bilateral     URETHRAL MEATOPLASTY N/A 2018    Procedure: MEATOPLASTY, URETHRA;  Surgeon: Adan Cuenca MD;  Location: [address];  Service: Urology;  Laterality: N/A;       Review of patient's allergies indicates:   Allergen Reactions    Codeine Nausea Only    Tetanus vaccines and toxoid Hives and Rash     Family History     Problem Relation (Age of Onset)    Bone cancer Mother    COPD Sister    Cancer Mother    Cataracts Mother    Glaucoma Mother    Heart disease Daughter    Kidney disease Father    Other Daughter (2), Daughter        Tobacco Use    Smoking status: Former Smoker     Packs/day: 2.00     Years: 6.00     Pack years: 12.00     Types: Cigarettes     Quit date: 1969     Years since quittin.6    Smokeless tobacco: Former User     Types: Chew    Tobacco comment: Quit all forms of tobacco in .   Substance and Sexual Activity    Alcohol use: No     Alcohol/week: 0.0 standard drinks     Comment: remote hx of heavy alcohol use    Drug use: No    Sexual activity: Not Currently     Partners: Female     Review of  Systems   Constitutional: Positive for fatigue. Negative for chills and fever.   Respiratory: Positive for shortness of breath. Negative for wheezing.    Cardiovascular: Negative for chest pain and palpitations.   Gastrointestinal: Negative for abdominal distention, abdominal pain, anal bleeding, constipation, diarrhea, nausea, rectal pain and vomiting.   Genitourinary: Negative for difficulty urinating and flank pain.   Skin: Negative for pallor and rash.   All other systems reviewed and are negative.    Objective:     Vital Signs (Most Recent):  Temp: 97.6 °F (36.4 °C) (08/14/20 0734)  Pulse: 74 (08/14/20 0737)  Resp: 18 (08/14/20 0734)  BP: (!) 116/59 (08/14/20 0734)  SpO2: 97 % (08/14/20 0844) Vital Signs (24h Range):  Temp:  [96.8 °F (36 °C)-99.1 °F (37.3 °C)] 97.6 °F (36.4 °C)  Pulse:  [74-94] 74  Resp:  [18-26] 18  SpO2:  [92 %-98 %] 97 %  BP: ()/(48-78) 116/59     Weight: 112.9 kg (248 lb 14.4 oz) (08/13/20 2006)  Body mass index is 36.76 kg/m².      Intake/Output Summary (Last 24 hours) at 8/14/2020 1020  Last data filed at 8/14/2020 0640  Gross per 24 hour   Intake 1010 ml   Output --   Net 1010 ml       Lines/Drains/Airways     Peripherally Inserted Central Catheter Line            PICC Double Lumen 08/05/20 0955 right brachial 9 days                Physical Exam  Constitutional:       General: He is not in acute distress.     Appearance: Normal appearance.   HENT:      Head: Normocephalic and atraumatic.      Mouth/Throat:      Mouth: Mucous membranes are moist.   Cardiovascular:      Rate and Rhythm: Normal rate and regular rhythm.      Heart sounds: Murmur present.   Pulmonary:      Effort: Pulmonary effort is normal. No respiratory distress.   Abdominal:      General: Bowel sounds are normal. There is no distension.      Palpations: Abdomen is soft. There is no mass.      Tenderness: There is no abdominal tenderness. There is no guarding or rebound.      Hernia: No hernia is present.   Skin:      General: Skin is warm and dry.      Capillary Refill: Capillary refill takes less than 2 seconds.   Neurological:      General: No focal deficit present.      Mental Status: He is alert and oriented to person, place, and time.         Significant Labs:  CBC:   Recent Labs   Lab 08/13/20  1630 08/14/20  0129 08/14/20  0913   WBC 11.39  --  11.21   HGB 7.1* 6.9* 7.5*   HCT 23.2*  --  24.0*     --  211     CMP:   Recent Labs   Lab 08/14/20  0913   GLU 87   CALCIUM 8.5*   ALBUMIN 2.2*   PROT 5.9*      K 3.6   CO2 26      BUN 21   CREATININE 1.0   ALKPHOS 41*   ALT 5*   AST 8*   BILITOT 1.1*     Assessment/Plan:     * GI bleed  75 YO M with pmh of GERD and peptic ulcer who has a 3 day history of melena. Last hgb 6.9.   - PPI BID  - Maintain active type and cross. Transfuse to goal of hgb>7  - Keep NPO till endoscopy today  - Further recs to follow after procedure.        Thank you for your consult. GI will follow-up with patient. Please contact us if you have any additional questions.    Case discussed with Dr. Franco.    Silas Lowery MD  [facility] \[facility] [place] [facility]\""II

## 2020-08-14 NOTE — NURSING
VIRTUAL NURSE:  Cued into patient's room.  Permission received per patient to turn camera to view patient. Nurse at bedside. Introduced as VN for night shift that will be working with floor nurse and nursing assistant.  Educated patient on VN's role in patient care and  VIP model.  Plan of care reviewed with patient.  Education per flowsheet.   Informed patient that staff will round on them every 2 hours but to use call light for any other needs they may have; informed of fall risk and fall precautions.  Patient verbalized understanding.  Call light within reach; bed siderails up x2.  Opportunity given for questions and questions answered.  Admission assessment questions answered, request that I call wife to update med rec.  Patient denies complaints or any needs at this time. Instructed to call for assistance.  Will cont to monitor and intervene as needed.    Labs, notes, orders, and careplan reviewed.

## 2020-08-14 NOTE — PLAN OF CARE
Pt lives in [place] with his wife Brenda ([phone]).  He uses [employer]W to ambulate.  He says he is sedentary most of the day in his recliner with his dog.  Brenda or his daughter drive him as needed.  He is a pt of Dr. Matos.  Brenda will transport him home when discharged.  Pt is on Rocephin IV at home that will end 8/17/20.  Pt was not sure the name of home health or infusion company that he uses.  He is requesting I call his wife to find out that information.  I called and she will call me back.       08/14/20 1001   Discharge Assessment   Assessment Type Discharge Planning Assessment   Confirmed/corrected address and phone number on facesheet? Yes   Assessment information obtained from? Patient   Communicated expected length of stay with patient/caregiver yes   Prior to hospitilization cognitive status: Alert/Oriented   Prior to hospitalization functional status: Assistive Equipment   Current cognitive status: Alert/Oriented   Current Functional Status: Assistive Equipment   Facility Arrived From: home   Lives With spouse;child(bianca), adult   Able to Return to Prior Arrangements yes   Is patient able to care for self after discharge? Yes   Who are your caregiver(s) and their phone number(s)? Brenda Savage (pt's wife) [phone]   Patient's perception of discharge disposition home or selfcare   Readmission Within the Last 30 Days current reason for admission unrelated to previous admission   If yes, most recent facility name: [facility]   Patient currently being followed by outpatient case management? No   Patient currently receives any other outside agency services? No   Equipment Currently Used at Home walker, rolling   Do you have any problems affording any of your prescribed medications? No   Is the patient taking medications as prescribed? yes   Does the patient have transportation home? Yes   Transportation Anticipated family or friend will provide   Does the patient receive services at the  [facility]? No   Discharge Plan A Home with family   Discharge Plan B Home Health   DME Needed Upon Discharge  none   Patient/Family in Agreement with Plan yes   Readmission Questionnaire   At the time of your discharge, did someone talk to you about what your health problems were? Yes   At the time of discharge, did someone talk to you about what to watch out for regarding worsening of your health problem? Yes   At the time of discharge, did someone talk to you about what to do if you experienced worsening of your health problem? Yes   At the time of discharge, did someone talk to you about which medication to take when you left the hospital and which ones to stop taking? Yes   At the time of discharge, did someone talk to you about when and where to follow up with a doctor after you left the hospital? Yes   What do you believe caused you to be sick enough to be re-admitted? GIB   How often do you need to have someone help you when you read instructions, pamphlets, or other written material from your doctor or pharmacy? Often   Do you have problems taking your medications as prescribed? No   Do you have any problems affording any of  your prescribed medications? No   Do you have problems obtaining/receiving your medications? No   Does the patient have transportation to healthcare appointments? Yes   Living Arrangements house   Does the patient have family/friends to help with healtcare needs after discharge? yes   Does your caregiver provide all the help you need? Yes   Are you currently feeling confused? No   Are you currently having problems thinking? No   Are you currently having memory problems? No     Nikita Martin RN,   [phone]

## 2020-08-14 NOTE — ANESTHESIA POSTPROCEDURE EVALUATION
Anesthesia Post Evaluation    Patient: Lopez Savage    Procedure(s) Performed: Procedure(s) (LRB):  EGD (ESOPHAGOGASTRODUODENOSCOPY) (N/A)    Final Anesthesia Type: MAC    Patient location during evaluation: PACU  Patient participation: Yes- Able to Participate  Level of consciousness: awake and alert and oriented  Post-procedure vital signs: reviewed and stable  Pain management: adequate  Airway patency: patent    PONV status at discharge: No PONV  Anesthetic complications: no      Cardiovascular status: blood pressure returned to baseline and hemodynamically stable  Respiratory status: unassisted  Hydration status: euvolemic  Follow-up not needed.          Vitals Value Taken Time   BP  08/14/20 1637   Temp  08/14/20 1637   Pulse  08/14/20 1637   Resp  08/14/20 1637   SpO2  08/14/20 1637         No case tracking events are documented in the log.      Pain/Ja Score: No data recorded

## 2020-08-14 NOTE — SUBJECTIVE & OBJECTIVE
Past Medical History:   Diagnosis Date    Acute pyelonephritis 7/31/2020    Allergy     Asthma     BPH (benign prostatic hyperplasia)     CHRONIC BRONCHITIS     GERD (gastroesophageal reflux disease)     HTN (hypertension)     HTN (hypertension) 10/15/2014    Joint pain     Mitral regurgitation     3-4+ under surveillance CT surgery    Mitral regurgitation     Mitral valve regurgitation     Obesity     Osteoarthritis of knee     Trouble in sleeping     Ulcerative colitis     Urinary tract infection     Valvular regurgitation        Past Surgical History:   Procedure Laterality Date    BONE MARROW BIOPSY Left 7/7/2020    Procedure: Biopsy-bone marrow;  Surgeon: Howard Matso MD;  Location: [facility] OR;  Service: Oncology;  Laterality: Left;    COLONOSCOPY N/A 10/11/2017    Procedure: COLONOSCOPY Golytely;  Surgeon: Shanae Duarte MD;  Location: [facility] ENDO;  Service: Endoscopy;  Laterality: N/A;    CYSTOSCOPY N/A 8/8/2018    Procedure: CYSTOSCOPY;  Surgeon: Adan Cuenca MD;  Location: [address];  Service: Urology;  Laterality: N/A;  1 hour    CYSTOSCOPY W/ RETROGRADES      with penile biopsy    DILATION OF URETHRA N/A 8/8/2018    Procedure: DILATION, URETHRA;  Surgeon: Adan Cuenca MD;  Location: [facility] OR [address] [place] [address];  Service: Urology;  Laterality: N/A;    HERNIA REPAIR Right 1956    inguinal    JOINT REPLACEMENT Bilateral     knees    Penile Biopsy      TONSILLECTOMY      TOTAL KNEE ARTHROPLASTY Bilateral     URETHRAL MEATOPLASTY N/A 8/8/2018    Procedure: MEATOPLASTY, URETHRA;  Surgeon: Adan Cuenca MD;  Location: [address];  Service: Urology;  Laterality: N/A;       Review of patient's allergies indicates:   Allergen Reactions    Codeine Nausea Only    Tetanus vaccines and toxoid Hives and Rash     Family History     Problem Relation (Age of Onset)    Bone cancer Mother    COPD Sister    Cancer Mother    Cataracts Mother    Glaucoma Mother    Heart disease Daughter     Kidney disease Father    Other Daughter (2), Daughter        Tobacco Use    Smoking status: Former Smoker     Packs/day: 2.00     Years: 6.00     Pack years: 12.00     Types: Cigarettes     Quit date: 1969     Years since quittin.6    Smokeless tobacco: Former User     Types: Chew    Tobacco comment: Quit all forms of tobacco in .   Substance and Sexual Activity    Alcohol use: No     Alcohol/week: 0.0 standard drinks     Comment: remote hx of heavy alcohol use    Drug use: No    Sexual activity: Not Currently     Partners: Female     Review of Systems   Constitutional: Positive for fatigue. Negative for chills and fever.   Respiratory: Positive for shortness of breath. Negative for wheezing.    Cardiovascular: Negative for chest pain and palpitations.   Gastrointestinal: Negative for abdominal distention, abdominal pain, anal bleeding, constipation, diarrhea, nausea, rectal pain and vomiting.   Genitourinary: Negative for difficulty urinating and flank pain.   Skin: Negative for pallor and rash.   All other systems reviewed and are negative.    Objective:     Vital Signs (Most Recent):  Temp: 97.6 °F (36.4 °C) (20)  Pulse: 74 (20 0737)  Resp: 18 (20)  BP: (!) 116/59 (20)  SpO2: 97 % (20 0844) Vital Signs (24h Range):  Temp:  [96.8 °F (36 °C)-99.1 °F (37.3 °C)] 97.6 °F (36.4 °C)  Pulse:  [74-94] 74  Resp:  [18-26] 18  SpO2:  [92 %-98 %] 97 %  BP: ()/(48-78) 116/59     Weight: 112.9 kg (248 lb 14.4 oz) (20)  Body mass index is 36.76 kg/m².      Intake/Output Summary (Last 24 hours) at 2020 1020  Last data filed at 2020 0640  Gross per 24 hour   Intake 1010 ml   Output --   Net 1010 ml       Lines/Drains/Airways     Peripherally Inserted Central Catheter Line            PICC Double Lumen 20 0955 right brachial 9 days                Physical Exam  Constitutional:       General: He is not in acute distress.      Appearance: Normal appearance.   HENT:      Head: Normocephalic and atraumatic.      Mouth/Throat:      Mouth: Mucous membranes are moist.   Cardiovascular:      Rate and Rhythm: Normal rate and regular rhythm.      Heart sounds: Murmur present.   Pulmonary:      Effort: Pulmonary effort is normal. No respiratory distress.   Abdominal:      General: Bowel sounds are normal. There is no distension.      Palpations: Abdomen is soft. There is no mass.      Tenderness: There is no abdominal tenderness. There is no guarding or rebound.      Hernia: No hernia is present.   Skin:     General: Skin is warm and dry.      Capillary Refill: Capillary refill takes less than 2 seconds.   Neurological:      General: No focal deficit present.      Mental Status: He is alert and oriented to person, place, and time.         Significant Labs:  CBC:   Recent Labs   Lab 08/13/20  1630 08/14/20  0129 08/14/20  0913   WBC 11.39  --  11.21   HGB 7.1* 6.9* 7.5*   HCT 23.2*  --  24.0*     --  211     CMP:   Recent Labs   Lab 08/14/20  0913   GLU 87   CALCIUM 8.5*   ALBUMIN 2.2*   PROT 5.9*      K 3.6   CO2 26      BUN 21   CREATININE 1.0   ALKPHOS 41*   ALT 5*   AST 8*   BILITOT 1.1*

## 2020-08-14 NOTE — TELEPHONE ENCOUNTER
----- Message from Ayanna Martin RN sent at 8/14/2020  2:47 PM CDT -----  Good afternoon,    Pt will discharge from [facility] over the weekend and will need a hospital follow-up appt please.    Thank you for your assistance,    Nikita Martin, RN,   [phone]

## 2020-08-15 PROBLEM — E66.01 MORBID OBESITY: Status: ACTIVE | Noted: 2020-01-01

## 2020-08-15 PROBLEM — B95.5 STREPTOCOCCAL BACTEREMIA: Status: ACTIVE | Noted: 2020-01-01

## 2020-08-15 NOTE — PLAN OF CARE
Plan of care reviewed with patient, understanding verbalized.  Pt remains SR on tele.  Patient had several dark black stools overnight. Bed alarm on, call light in reach, fall precautions in place.

## 2020-08-15 NOTE — SUBJECTIVE & OBJECTIVE
Subjective:     Interval History: EGD yesterday with 4 oozing spots in the antrum but no ulcer and no visible vessel.  Two of the oozing spots were clipped and 2 were burned with good control of bleeding noted on pictures.  Pt with one episode of melena since procedure.  He states he feels good and would like to go home.  He is tolerating diet without issue.    Review of Systems   Constitutional: Negative for appetite change.   Respiratory: Negative for chest tightness, shortness of breath and wheezing.    Cardiovascular: Negative for chest pain, palpitations and leg swelling.     Objective:     Vital Signs (Most Recent):  Temp: 96.3 °F (35.7 °C) (08/15/20 0502)  Pulse: 88 (08/15/20 0756)  Resp: 18 (08/15/20 0502)  BP: (!) 157/75 (08/15/20 0502)  SpO2: 95 % (08/15/20 0835) Vital Signs (24h Range):  Temp:  [96.2 °F (35.7 °C)-98.4 °F (36.9 °C)] 96.3 °F (35.7 °C)  Pulse:  [] 88  Resp:  [16-22] 18  SpO2:  [94 %-100 %] 95 %  BP: (112-157)/(53-78) 157/75     Weight: 112.5 kg (248 lb) (08/14/20 1157)  Body mass index is 36.62 kg/m².      Intake/Output Summary (Last 24 hours) at 8/15/2020 1134  Last data filed at 8/15/2020 1000  Gross per 24 hour   Intake 450 ml   Output --   Net 450 ml       Lines/Drains/Airways     Peripherally Inserted Central Catheter Line            PICC Double Lumen 08/05/20 0955 right brachial 10 days                Physical Exam  Constitutional:       Appearance: Normal appearance.   HENT:      Head: Normocephalic and atraumatic.   Eyes:      Extraocular Movements: Extraocular movements intact.      Pupils: Pupils are equal, round, and reactive to light.   Abdominal:      General: There is no distension.      Tenderness: There is no abdominal tenderness.   Neurological:      General: No focal deficit present.      Mental Status: He is alert and oriented to person, place, and time.   Psychiatric:         Mood and Affect: Mood normal.         Behavior: Behavior normal.         Significant  Labs:  CBC:   Recent Labs   Lab 08/14/20  0913 08/14/20  2227 08/15/20  0601   WBC 11.21 9.42 9.44   HGB 7.5* 7.4* 7.3*   HCT 24.0* 23.8* 23.5*    215 197         Significant Imaging:  Imaging results within the past 24 hours have been reviewed.

## 2020-08-15 NOTE — PLAN OF CARE
Plan of care reviewed with patient and wife. Pt AAO and able to call staff for assistance. Wife here most of the day to keep pt company. Probable Dc Monday. Pt continues IV ABT for diverticulitis with 0 ase noted. 0 episodes of bleeding noted. Pt denies any pain or discomfort. Verbalizes to staff understanding. NSR on monitor with 0 red alarms noted.  No acute distress observed at this time. Side rails x2, bed in low position, call bell within reach. Bed alarm in place for patient safety. Will relay to oncoming nurse to monitor for changes in condition.

## 2020-08-15 NOTE — ASSESSMENT & PLAN NOTE
No visible vessel or ulcer present therefore pt can be d/c from GI standpoint when other issues are fully addressed.  He and his wife were educated NO NSAIDS, and to continue the omeprazole he already has.  I also recommend 14 days of carafate, please provide Rx at discharge.    He lives in [place]; I will set up a virtual GI followup with me (wife requested virtual).

## 2020-08-15 NOTE — PROGRESS NOTES
"[facility] Internal Medicine HO-II Progress Note    Subjective:      Mr. Lopez Savage is a 74 year old male with a PMH notable for HTN, GERD, diverticulitis, autoimmune hemolytic anemia, and mitral regurgitation who presented on 20 for melena of 2-3 days' duration.    Patient states that he had a melanotic BM overnight. Patient denies any new complaints. Shortness of breath has not worsened. No chest pain, abdominal pain, nausea, vomiting, lightheadedness, dizziness.      Objective:   Last 24 Hour Vital Signs:  BP  Min: 112/59  Max: 157/75  Temp  Av.5 °F (36.4 °C)  Min: 96.2 °F (35.7 °C)  Max: 98.4 °F (36.9 °C)  Pulse  Av  Min: 80  Max: 103  Resp  Av.2  Min: 16  Max: 22  SpO2  Av.9 %  Min: 94 %  Max: 100 %  Height  Av' 9" (175.3 cm)  Min: 5' 9" (175.3 cm)  Max: 5' 9" (175.3 cm)  Weight  Av.5 kg (248 lb)  Min: 112.5 kg (248 lb)  Max: 112.5 kg (248 lb)  I/O last 3 completed shifts:  In: 710 [I.V.:200; Blood:510]  Out: 208 [Urine:208]    Physical Examination:  General appearance: alert, appears stated age, cooperative and morbidly obese  Head: Normocephalic, without obvious abnormality, atraumatic  Eyes: conjunctivae/corneas clear. PERRL, EOM's intact.  Lungs: clear to auscultation bilaterally and no crackles or wheezes  Heart: regular rate and rhythym, grade 3/6 systolic murmur noted  Abdomen: soft, non-tender; bowel sounds normal; no masses,  no organomegaly, obese  Extremities: extremities normal, atraumatic, no cyanosis or edema  Pulses: 2+ and symmetric  Skin: Skin color, texture, turgor normal. No rashes or lesions  Neurologic: Grossly normal      Laboratory:  Laboratory Data Reviewed: yes  Pertinent Findings:  Trended Lab Data:  Recent Labs   Lab 20  1630  20  0913 20  2227 08/15/20  0601   WBC 11.39  --  11.21 9.42 9.44   HGB 7.1*   < > 7.5* 7.4* 7.3*   HCT 23.2*  --  24.0* 23.8* 23.5*     --  211 215 197   MCV 89  --  90 90 89   RDW 16.0*  --  15.5* 15.8* " 15.9*   *  --  136  --  136   K 3.7  --  3.6  --  3.4*     --  105  --  104   CO2 26  --  26  --  26   BUN 20  --  21  --  19   CREATININE 1.2  --  1.0  --  1.1   GLU 96  --  87  --  87   PROT 6.5  --  5.9*  --  6.1   ALBUMIN 2.3*  --  2.2*  --  2.3*   BILITOT 0.2  --  1.1*  --  0.3   AST 7*  --  8*  --  9*   ALKPHOS 52*  --  41*  --  43*   ALT <5*  --  5*  --  5*    < > = values in this interval not displayed.     Upper Endoscopy 8/15/20:  Impression:   - Normal esophagus.                         - Medium-sized hiatal hernia.                         - Acute gastritis with hemorrhage. Clips were                         placed. Treated with bipolar cautery.                         - Normal examined duodenum.                         - No specimens collected.     Current Medications:       Scheduled:   cefTRIAXone  2 g Intravenous Q24H    escitalopram oxalate  10 mg Oral Daily    pantoprazole  40 mg Oral Daily        PRN:  sodium chloride, sodium chloride, sodium chloride 0.9%    Antibiotics and Day Number of Therapy:  Rocephin 8/3- (ends 8/17)    Lines and Day Number of Therapy:  PICC 8/3-  PIV 8/13-     Assessment:     Lopez Savage is a 74 y.o.male with  Patient Active Problem List    Diagnosis Date Noted    GI bleed 08/13/2020    Gram-positive bacteremia 08/03/2020    CKD (chronic kidney disease) stage 3, GFR 30-59 ml/min 08/03/2020    Acute diverticulitis 07/31/2020    Autoimmune hemolytic anemia 06/30/2020    Splenomegaly 06/17/2020    Normocytic anemia 06/14/2020    BPH (benign prostatic hyperplasia) 08/08/2018    Screening for colon cancer 10/11/2017    Nuclear sclerosis of both eyes 10/09/2017    Refractive error 10/09/2017    Dizziness 08/12/2017    Cough 04/28/2017    Allergic rhinitis 03/17/2017    Alzheimer's disease 03/17/2017    Mitral regurgitation 06/02/2016    Chronic bronchitis 07/22/2015    Morbid obesity with BMI of 40.0-44.9, adult 07/08/2015    Essential  hypertension 10/15/2014    GERD (gastroesophageal reflux disease)      Doing well s/p upper GI endoscopy.      Plan:     Upper Gastrointestinal Bleed:  - GI following, appreciate recs  - would expect continued melena for 1-2 days as blood from prior bleed passes  - s/p upper endoscopy yesterday with clips placed  - no further blood transfusions required, Hgb stable at 7.3  - transitioned to PO protonix per GI recs  - GI outpatient f/u for H. Pylori serology     Gram-Positive Bacteremia:  - Diagnosed by cultures at OSH after bout of diverticulitis, sent home with PICC for 14 days Rocephin IV, through 8/17  - WBC count of 11.4 in ED, afebrile, no tachycardia  - Will continue IV Rocephin through 8/17     Essential Hypertension:  - Will hold home hypertensive medicines at this time, restart at time of discharge     GERD:  - protonix 40mg daily     Morbid obesity:  - Will continue to take opportunities to educate the patient regarding healthy lifestyle choices     Autoimmune hemolytic anemia  - Direct Blair found to be negative  - Not likely to be the driving cause behind the patient's anemia  - Currently being followed by hem/onc, Dr. Matos    Dispo: home today, follow up with GI outpatient    Wyatt Lanier  [facility] Medical Student - MS4  [facility] Hospitalist Medicine Service Team B    Rosas Stewart MD  [facility] Medicine-Pediatrics -2  Cell [phone]  08/15/2020 8:44 AM    [facility] Medicine Hospitalist Pager numbers:   [facility] Hospitalist Medicine Team A (Tyra/Ruby): [phone]  [facility] Hospitalist Medicine Team B (Tor/James):  [phone]

## 2020-08-15 NOTE — PLAN OF CARE
Problem: Adult Inpatient Plan of Care  Goal: Plan of Care Review  Description: Chart and Care plan reviewed.  Care plan updated and individualized.     Outcome: Ongoing, Progressing

## 2020-08-15 NOTE — PROGRESS NOTES
[facility]  Gastroenterology  Progress Note    Patient Name: Lopez Savage  MRN: [id number]  Admission Date: 8/13/2020  Hospital Length of Stay: 2 days  Code Status: Full Code   Attending Provider: Rodri Ramirez MD  Consulting Provider: Crystal English MD  Primary Care Physician: Prasanna Hayowod MD  Principal Problem: GI bleed      Subjective:     Interval History: EGD yesterday with 4 oozing spots in the antrum but no ulcer and no visible vessel.  Two of the oozing spots were clipped and 2 were burned with good control of bleeding noted on pictures.  Pt with one episode of melena since procedure.  He states he feels good and would like to go home.  He is tolerating diet without issue.    Review of Systems   Constitutional: Negative for appetite change.   Respiratory: Negative for chest tightness, shortness of breath and wheezing.    Cardiovascular: Negative for chest pain, palpitations and leg swelling.     Objective:     Vital Signs (Most Recent):  Temp: 96.3 °F (35.7 °C) (08/15/20 0502)  Pulse: 88 (08/15/20 0756)  Resp: 18 (08/15/20 0502)  BP: (!) 157/75 (08/15/20 0502)  SpO2: 95 % (08/15/20 0835) Vital Signs (24h Range):  Temp:  [96.2 °F (35.7 °C)-98.4 °F (36.9 °C)] 96.3 °F (35.7 °C)  Pulse:  [] 88  Resp:  [16-22] 18  SpO2:  [94 %-100 %] 95 %  BP: (112-157)/(53-78) 157/75     Weight: 112.5 kg (248 lb) (08/14/20 1157)  Body mass index is 36.62 kg/m².      Intake/Output Summary (Last 24 hours) at 8/15/2020 1134  Last data filed at 8/15/2020 1000  Gross per 24 hour   Intake 450 ml   Output --   Net 450 ml       Lines/Drains/Airways     Peripherally Inserted Central Catheter Line            PICC Double Lumen 08/05/20 0955 right brachial 10 days                Physical Exam  Constitutional:       Appearance: Normal appearance.   HENT:      Head: Normocephalic and atraumatic.   Eyes:      Extraocular Movements: Extraocular movements intact.      Pupils: Pupils are equal, round, and  reactive to light.   Abdominal:      General: There is no distension.      Tenderness: There is no abdominal tenderness.   Neurological:      General: No focal deficit present.      Mental Status: He is alert and oriented to person, place, and time.   Psychiatric:         Mood and Affect: Mood normal.         Behavior: Behavior normal.         Significant Labs:  CBC:   Recent Labs   Lab 08/14/20  0913 08/14/20  2227 08/15/20  0601   WBC 11.21 9.42 9.44   HGB 7.5* 7.4* 7.3*   HCT 24.0* 23.8* 23.5*    215 197         Significant Imaging:  Imaging results within the past 24 hours have been reviewed.    Assessment/Plan:     * GI bleed  No visible vessel or ulcer present therefore pt can be d/c from GI standpoint when other issues are fully addressed.  He and his wife were educated NO NSAIDS, and to continue the omeprazole he already has.  I also recommend 14 days of carafate, please provide Rx at discharge.    He lives in [place]; I will set up a virtual GI followup with me (wife requested virtual).        Thank you for your consult. I will sign off. Please contact us if you have any additional questions.    Crystal English MD  Gastroenterology  [facility]

## 2020-08-16 NOTE — PROGRESS NOTES
[facility] Resident HO-2 Progress Note    Subjective:      Mr. Lopez Savage is a 74 year old male with a PMH notable for HTN, GERD, diverticulitis, autoimmune hemolytic anemia, and mitral regurgitation who presented on 20 for melena of 2-3 days' duration.     Patient states that he has not had to use any diapers overnight; no further bowel incontinence. Patient denies any new complaints. Shortness of breath has not worsened. No chest pain, abdominal pain, nausea, vomiting, lightheadedness, dizziness.      Objective:   Last 24 Hour Vital Signs:  BP  Min: 128/59  Max: 157/73  Temp  Av.1 °F (36.2 °C)  Min: 96 °F (35.6 °C)  Max: 97.9 °F (36.6 °C)  Pulse  Av.2  Min: 83  Max: 94  Resp  Av.3  Min: 18  Max: 21  SpO2  Av.5 %  Min: 94 %  Max: 99 %  I/O last 3 completed shifts:  In: 950 [P.O.:950]  Out: -     Physical Examination:  General appearance: alert, appears stated age, cooperative and morbidly obese  Head: Normocephalic, without obvious abnormality, atraumatic  Eyes: conjunctivae/corneas clear. PERRL, EOM's intact.  Lungs: clear to auscultation bilaterally and no crackles or wheezes  Heart: regular rate and rhythym, grade 3/6 systolic murmur noted  Abdomen: soft, non-tender; bowel sounds normal; no masses,  no organomegaly, obese  Extremities: extremities normal, atraumatic, no cyanosis or edema  Pulses: 2+ and symmetric  Skin: Skin color, texture, turgor normal. No rashes or lesions  Neurologic: Grossly normal     Laboratory:  Laboratory Data Reviewed: yes  Pertinent Findings:  Trended Lab Data:  Recent Labs   Lab 20  0913 20  2227 08/15/20  0601 20  0535   WBC 11.21 9.42 9.44 9.06   HGB 7.5* 7.4* 7.3* 7.0*   HCT 24.0* 23.8* 23.5* 22.4*    215 197 213   MCV 90 90 89 89   RDW 15.5* 15.8* 15.9* 16.1*     --  136 136   K 3.6  --  3.4* 3.4*     --  104 104   CO2 26  --  26 29   BUN 21  --  19 17   CREATININE 1.0  --  1.1 1.1   GLU 87  --  87 94    PROT 5.9*  --  6.1 6.1   ALBUMIN 2.2*  --  2.3* 2.4*   BILITOT 1.1*  --  0.3 0.3   AST 8*  --  9* 9*   ALKPHOS 41*  --  43* 44*   ALT 5*  --  5* <5*     Current Medications:       Scheduled:   cefTRIAXone  2 g Intravenous Q24H    escitalopram oxalate  10 mg Oral Daily    magnesium sulfate IVPB  2 g Intravenous Once    pantoprazole  40 mg Oral BID    potassium chloride  40 mEq Oral Once    sucralfate  1 g Oral QID (AC & HS)        PRN:  sodium chloride, sodium chloride, sodium chloride 0.9%    Antibiotics and Day Number of Therapy:  Rocephin 8/3- (ends 9/14)    Lines and Day Number of Therapy:  PICC 8/3-  PIV 8/13-     Assessment:     Lopez Savage is a 74 y.o.male with  Patient Active Problem List    Diagnosis Date Noted    Morbid obesity 08/15/2020    Upper GI bleed 08/13/2020    Streptococcal bacteremia 08/03/2020    CKD (chronic kidney disease) stage 3, GFR 30-59 ml/min 08/03/2020    Acute diverticulitis 07/31/2020    Autoimmune hemolytic anemia 06/30/2020    Splenomegaly 06/17/2020    Normocytic anemia 06/14/2020    BPH (benign prostatic hyperplasia) 08/08/2018    Screening for colon cancer 10/11/2017    Nuclear sclerosis of both eyes 10/09/2017    Refractive error 10/09/2017    Dizziness 08/12/2017    Cough 04/28/2017    Allergic rhinitis 03/17/2017    Alzheimer's disease 03/17/2017    Mitral regurgitation 06/02/2016    Chronic bronchitis 07/22/2015    Morbid obesity with BMI of 40.0-44.9, adult 07/08/2015    Essential hypertension 10/15/2014    GERD (gastroesophageal reflux disease)         Plan:     Upper Gastrointestinal Bleed:  - GI following, appreciate recs  - would expect continued melena for 1-2 days as blood from prior bleed passes  - s/p upper endoscopy Friday with clips placed for gastritis  - no further blood transfusions required, Hgb stable at 7.0  - continue PO protonix, carafate per GI recs  - GI outpatient f/u for H. Pylori serology     Gram-Positive Bacteremia,  complicated by pericarditis:  - Bacteremia diagnosed by cultures at OSH after bout of diverticulitis, sent home with PICC for 14 days   - WBC count of 11.4 in ED, afebrile, no tachycardia  - TTE showed vegetative growth on mitral and aortic valves, SILVANA on Monday  - Cardiology consulted  - Will continue IV Rocephin through 9/14     Electrolyte Abnormalities  - KCl 40 mEq once  - Mg 2g once    Essential Hypertension:  - restarted home losartan; hypotension on admission has resolved   - BP to high of 157 systolic in last 24hrs     GERD:  - protonix 40mg daily     Morbid obesity:  - Will continue to take opportunities to educate the patient regarding healthy lifestyle choices     Autoimmune hemolytic anemia  - Direct Blair found to be negative  - Not likely to be the driving cause behind the patient's anemia  - Currently being followed by hem/onc, Dr. Matos     Dispo: home tomorrow following SILVANA, follow up with GI and Cardiology outpatient     Wyatt Lanier  \[facility]\"" Medical Student - MS4  \[facility]\"" Hospitalist Medicine Service Team B    Rosas Stewart MD  \[facility]\"" Medicine-Pediatrics HO-2  Cell [phone]  08/16/2020 8:20 AM    \[facility]\"" Medicine Hospitalist Pager numbers:   \[facility]\"" Hospitalist Medicine Team A (Tyra/Ruby): [phone]  \[facility]\"" Hospitalist Medicine Team B (Tor/Jamse):  [phone]

## 2020-08-16 NOTE — CONSULTS
"[facility] Infectious Disease Consult     Primary Team:   Consultant Attending: Dr. Casanova  Date of Admit: 8/13/2020    Reason for Consult     Endocarditis    Assessment     Lopez Savage is a 74 y.o. 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 He has poor dentition and has not seen a dentist since 1969, this may be a likely source of current infection. Patient appears to have been on appropriate therapy since 8/3 and is well into his treatment course. Recommend a total of 4 weeks of treatment.      Recommendations     - Recommend continuing CTX at 2 g q24h for a total course of 4 weeks from 8/3 with end date of 8/31  - Will follow up on SILVANA    Thank you for this consult. We will follow.   Nathen Larson  Fellow, [facility] Infectious Disease  [facility] Pager: [phone]     History of Present Illness     Mr. Lopez Savage is a 75 yo M with a PMH notable for HTN, GERD, auto-immune hemolytic anemia, mitral regurgitation, obesity, and diverticulitis who presents to the ED today for a 2-3 day history of "black stools."     The patient states that the change in character of his bowel movements has also been accompanied by a change in consistency, as they are now diarrhea-like, even sometimes water-like. He states that he then went to his PCP for guidance, and was sent home with an FOBT that returned postive. His PCP then referred him to the emergency department for further work-up. He states that he has had these episodes before, and that his last colonoscopy was approximately four years ago. He was told that he had several polyps, and to return in five years' time. He denies regular NSAID use, endorsing only occasional Advil use.     He underwent an upper endoscopy and noted to have acute gastritis with hemorrhage which has been treated with clips and cautery.     Of note, the patient was recently hospitalized from 8/3-8/5 for an HENRY/diverticulitis flair. He " initially presented on 7/31 and was discharged home, when blood cultures from 7/31 returned positive for strep mutans in both sets, and the patient was asked to return once again to the hospital, where he was started on a 14 day course of Rocephin. Subsequent blood cultures on 8/3 showed no growth. He was discharged home on 8/5/20 with a PICC and the necessary antibiotics. He was adherent with his regimen and CTX was resumed at appropriate dose since current admission on 8/13. Notably no TTE was performed during 8/3-8/5 admission. 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      On admission patient complained of SOB, YANCEY, and fatigue.     Review of Systems (positives in bold):  Constitutional: wt loss, fever, chills, night sweats, fatigue, malaise  HEENT: dysphonia, epistaxis, tinnitus, vertigo, otalgia, otorrhea, visual changes, lacrimation, changes in hearing, rhinorrhea, sore throat  Urogenital: frequency, nocturia, dysuria, hematuria, retention, incontinence, discharge  Neurological: headaches, syncope, weakness, numbness, paresthesia, dysequilibrium, falls, amnesia, dysarthria, facial assymetry  Gastrointestinal: anorexia, nausea, vomiting, melena, hematochezia, abdominal pain, hematemesis, diarrhea, constipation, dyspepsia, dysphagia, odynophagia, dysgeusia  Respiratory: dyspnea, cough, sputum production, wheeze, hemoptysis, cyanosis, apnea  Integumentary: hypo/hyperpigmentation, rash, lesions, pruritus, alopecia, nail changes  Cardiovascular: chest pain, orthopnea, cyanosis, edema, claudication, syncope, palpitations  Hematological: bleeding, bruising, lymphadenopathy  Psych: insomnia, mood changes, hallucinations, anxiety  Immune/Allergy: urticaria, localized pain/erythema/warmth/swelling  Musculoskeletal: arthralgia, myalgia, joint swelling, stiffness, wasting, deformity, weakness, back pain    Allergies:  Review of patient's allergies indicates:    Allergen Reactions    Codeine Nausea Only    Tetanus vaccines and toxoid Hives and Rash     Medications:   In-Hospital Scheduled Medications:   cefTRIAXone  2 g Intravenous Q24H    escitalopram oxalate  10 mg Oral Daily    losartan  50 mg Oral Daily    magnesium sulfate IVPB  2 g Intravenous Once    pantoprazole  40 mg Oral BID    sucralfate  1 g Oral QID (AC & HS)      In-Hospital PRN Medications:  sodium chloride, sodium chloride, sodium chloride, sodium chloride 0.9%   In-Hospital IV Infusion Medications:    Relevant Home Medications:    Antibiotics and Day Number of Therapy:  CTX 8/14-current  Past Medical History:  Past Medical History:   Diagnosis Date    Acute pyelonephritis 7/31/2020    Allergy     Asthma     BPH (benign prostatic hyperplasia)     CHRONIC BRONCHITIS     GERD (gastroesophageal reflux disease)     HTN (hypertension)     HTN (hypertension) 10/15/2014    Joint pain     Mitral regurgitation     3-4+ under surveillance CT surgery    Mitral regurgitation     Mitral valve regurgitation     Obesity     Osteoarthritis of knee     Trouble in sleeping     Ulcerative colitis     Urinary tract infection     Valvular regurgitation      Past Surgical History/ObGyn Hx if gender appropriate:  Past Surgical History:   Procedure Laterality Date    BONE MARROW BIOPSY Left 7/7/2020    Procedure: Biopsy-bone marrow;  Surgeon: Howard Matos MD;  Location: [facility] OR;  Service: Oncology;  Laterality: Left;    COLONOSCOPY N/A 10/11/2017    Procedure: COLONOSCOPY Golytely;  Surgeon: Shanae Duarte MD;  Location: [facility];  Service: Endoscopy;  Laterality: N/A;    CYSTOSCOPY N/A 8/8/2018    Procedure: CYSTOSCOPY;  Surgeon: Adan Cuenca MD;  Location: [address];  Service: Urology;  Laterality: N/A;  1 hour    CYSTOSCOPY W/ RETROGRADES      with penile biopsy    DILATION OF URETHRA N/A 8/8/2018    Procedure: DILATION, URETHRA;  Surgeon: Adan Cuenca MD;  [facility]  OR 1ST FLR;  Service: Urology;  Laterality: N/A;    HERNIA REPAIR Right     inguinal    JOINT REPLACEMENT Bilateral     knees    Penile Biopsy      TONSILLECTOMY      TOTAL KNEE ARTHROPLASTY Bilateral     URETHRAL MEATOPLASTY N/A 2018    Procedure: MEATOPLASTY, URETHRA;  Surgeon: Adan Cuenca MD;  Location: [facility] OR 1ST FLR;  Service: Urology;  Laterality: N/A;     Family History:  Family History   Problem Relation Age of Onset    Bone cancer Mother     Glaucoma Mother     Cataracts Mother     Cancer Mother         bone cancer    Kidney disease Father     COPD Sister     Heart disease Daughter         congenital    Other Daughter 2        fibr. tumor on bronchial tube; lung removed    Other Daughter         Bronchitis    Melanoma Neg Hx     Prostate cancer Neg Hx     Macular degeneration Neg Hx     Retinal detachment Neg Hx     Strabismus Neg Hx     Amblyopia Neg Hx     Blindness Neg Hx      Social History:  Social History     Tobacco Use    Smoking status: Former Smoker     Packs/day: 2.00     Years: 6.00     Pack years: 12.00     Types: Cigarettes     Quit date: 1969     Years since quittin.6    Smokeless tobacco: Former User     Types: Chew    Tobacco comment: Quit all forms of tobacco in .   Substance Use Topics    Alcohol use: No     Alcohol/week: 0.0 standard drinks     Comment: remote hx of heavy alcohol use    Drug use: No     Objective   Last 24 Hour Vital Signs:  BP  Min: 128/59  Max: 157/73  Temp  Av.1 °F (36.2 °C)  Min: 96 °F (35.6 °C)  Max: 97.9 °F (36.6 °C)  Pulse  Av.2  Min: 83  Max: 94  Resp  Av.3  Min: 18  Max: 21  SpO2  Av.7 %  Min: 94 %  Max: 99 %    Lines, Drains, Airway:  Peripherally Inserted Central Catheter Line    PICC Double Lumen 20 0955 right brachial 11 days     Physical Examination:    Examination  General: well appearing, comfortable   HEENT: normal oral mucosa, poor dentition with prominently yellow and  decaying teeth, conjunctiva normal, pupils normal, extraocular motion normal  Neck: no thyromegaly, no JVD   Cardiac: no murmurs, pulse regular    Pulmonary/Chest: chest clear, no respiratory distress   GI/Rectal: no organomegaly, no masses, non tender, normal bowel sounds, rectal exam deferred   : deferred   Msk: normal motor screening exam  Vascular: normal peripheral perfusion   Lymph nodes: none palpated  Skin/ Extremities: no rash, no pedal edema, no ulceration    Neurology/ Mental status: alert oriented     Laboratory:  CBC:   Lab Results   Component Value Date    WBC 9.06 08/16/2020    HGB 7.0 (L) 08/16/2020     08/16/2020    MCV 89 08/16/2020    RDW 16.1 (H) 08/16/2020       Estimated Creatinine Clearance: 73.9 mL/min (based on SCr of 1.1 mg/dL).    Microbiology Data:    Procedure Component Value Units Date/Time   Blood culture [[id number]]    Order Status: No result Specimen: Blood    Blood culture [[id number]]    Order Status: No result Specimen: Blood    Blood culture [[id number]] Collected: 08/03/20 1150   Order Status: Completed Specimen: Blood Updated: 08/08/20 1612    Blood Culture, Routine No growth after 5 days.   Blood culture [[id number]] Collected: 08/03/20 1148   Order Status: Completed Specimen: Blood Updated: 08/08/20 1612    Blood Culture, Routine No growth after 5 days.   Urine culture [[id number]] Collected: 07/31/20 1750   Order Status: Completed Specimen: Urine Updated: 08/02/20 0745    Urine Culture, Routine No growth   Narrative:     Specimen Source->Urine   Blood Culture #2 **CANNOT BE ORDERED STAT** [[id number]] (Abnormal)  Collected: 07/31/20 1702   Order Status: Completed Specimen: Blood from Peripheral, Antecubital, Left Updated: 08/04/20 1010    Blood Culture, Routine Gram stain ingrid bottle: Gram positive cocci in chains resembling Strep      Results called to and read back by: Marian Beltran RN 08/02/2020       04:28     Gram stain aer bottle: Gram positive cocci in chains  resembling Strep      Positive results previously called 08/02/2020  08:07     STREPTOCOCCUS MUTANSAbnormal    Susceptibility     Streptococcus mutans     CULTURE, BLOOD     Cefepime <=0.25 mcg/mL Sensitive     Ceftriaxone <=0.25 mcg/mL Sensitive     Penicillin <=0.03 mcg/mL Sensitive     Vancomycin 1 mcg/mL Sensitive            Linear View         Blood Culture #1 **CANNOT BE ORDERED STAT** [[id number]] (Abnormal) Collected: 07/31/20 1525   Order Status: Completed Specimen: Blood from Peripheral, Antecubital, Left Updated: 08/04/20 1010    Blood Culture, Routine Gram stain ingrid bottle: Gram positive cocci in chains resembling Strep      Results called to and read back by: Marian Beltran RN 08/02/2020       04:28     Gram stain aer bottle: Gram positive cocci in chains resembling Strep      Positive results previously called 08/02/2020  07:57     STREPTOCOCCUS MUTANS   For susceptibility see order #[id number]      Other Results:    Radiology:  None during current admission    Results of procedures/ Findings     Upper GI Endoscopy 8/14    Impression:           - Normal esophagus.                         - Medium-sized hiatal hernia.                         - Acute gastritis with hemorrhage. Clips were                         placed. Treated with bipolar cautery.                         - Normal examined duodenum.                         - No specimens collected.     TTE on 8/14    · Normal left ventricular systolic function. The estimated ejection fraction is 60%.  · Indeterminate left ventricular diastolic function.  · Normal right ventricular systolic function.  · Moderate left atrial enlargement.  · A mobile vegetation is present of the aortic valve; suggest SILVANA.  · Mild-to-moderate mitral regurgitation; etiology not well determined.  · Intermediate central venous pressure (8 mmHg).  · The estimated PA systolic pressure is 42 mmHg.  · Pulmonary hypertension present.  · Mild tricuspid regurgitation.

## 2020-08-16 NOTE — PLAN OF CARE
Care plan explained & understood by pt. NSR on Tele. Meds given as per MAR. Completed I unit blood transfusion without reaction. V/S monitored. Pt complained of pain on left mid lower abd, score 1 dull pain, pt refused analgesia. MD informed. Safety maintained at all times. Call bell within reach. Bed on low position. Bed alarm on. Will continue to monitor.

## 2020-08-16 NOTE — PLAN OF CARE
VN note: Patient chart, labs, and vitals reviewed. VN to continue to monitor and be available as needed.      1 or 2

## 2020-08-16 NOTE — PLAN OF CARE
Progress notes reviewed. Evening rounds completed. Introduced self as VN for this shift. Educated pt on VN's role in patient's care.  Plan of care reviewed with patient. Opportunity given for pt's questions. No questions or concerns expressed at this time. Safety precautions explained, instructed to call for assistance. Patient verbalizes understanding.  VN to continue to monitor.

## 2020-08-17 PROBLEM — I38 ENDOCARDITIS: Status: ACTIVE | Noted: 2020-01-01

## 2020-08-17 NOTE — ANESTHESIA PREPROCEDURE EVALUATION
Pre-operative evaluation for Procedure(s) (LRB):  EGD (ESOPHAGOGASTRODUODENOSCOPY) (N/A)    Lopez Savage is a 74 y.o. male with HTN, GERD, diverticulitis, autoimmune hemolytic anemia, and mitral regurgitation now with concern for GI bleed. He is scheduled for the above procedure.     Of note patient also on IV Rocephin for previous diagnosis of Gram-positive bacteremia.     Last hgb 6.9 this AM     Previously difficult airway per chart 8/8/2018 for cystoscopy    LDA:   Right brachial PICC line    Prev airway:   Method of Intubation: Fiberoptic Intubation; Inserted by: Anesthesia MD; Airway Device: Endotracheal Tube; Mask Ventilation: Mod Diff - oral; Intubated: Postinduction; Airway Device Size: 7.5; Style: Cuffed; Cuff Inflation: Minimal occlusive pressure; Inflation Amount: 8; Placement Verified By: Auscultation, Capnometry; Grade: Grade I (unable to pass tube with glidescope/FOB intubation ); Complicating Factors: Obesity, Oropharyngeal edema or fat, Poor neck/head extension, Anterior larynx, Short neck; Intubation Findings: Positive EtCO2, Bilateral breath sounds, Atraumatic/Condition of teeth unchanged;  Depth of Insertion: 25; Securment: Lips; Breath Sounds: Equal Bilateral; Insertion Attempts: 4 (enter comment) (attempted glidescope by CRNA X2 repositioned attempted Glidescope MD, FOB per MD)    Patient Active Problem List   Diagnosis    GERD (gastroesophageal reflux disease)    Essential hypertension    Morbid obesity with BMI of 40.0-44.9, adult    Chronic bronchitis    Mitral regurgitation    Allergic rhinitis    Alzheimer's disease    Cough    Dizziness    Nuclear sclerosis of both eyes    Refractive error    Screening for colon cancer    BPH (benign prostatic hyperplasia)    Normocytic anemia    Splenomegaly    Autoimmune hemolytic anemia    Acute diverticulitis    Streptococcal bacteremia    CKD (chronic kidney disease) stage 3, GFR 30-59 ml/min    Gastrointestinal hemorrhage     Morbid obesity       Review of patient's allergies indicates:   Allergen Reactions    Codeine Nausea Only    Tetanus vaccines and toxoid Hives and Rash        Current Facility-Administered Medications on File Prior to Visit   Medication Dose Route Frequency Provider Last Rate Last Dose    0.9%  NaCl infusion (for blood administration)   Intravenous Q24H PRN Huy Goff MD        0.9%  NaCl infusion (for blood administration)   Intravenous Q24H PRN Elpidio Pang MD        0.9%  NaCl infusion (for blood administration)   Intravenous Q24H PRN Prasanna Ball MD        cefTRIAXone (ROCEPHIN) 2 g/50 mL D5W IVPB  2 g Intravenous Q24H Huy Goff MD   2 g at 08/16/20 0900    escitalopram oxalate tablet 10 mg  10 mg Oral Daily Huy Goff MD   10 mg at 08/16/20 0859    losartan tablet 50 mg  50 mg Oral Daily Rosas Stewart MD   50 mg at 08/16/20 0912    pantoprazole EC tablet 40 mg  40 mg Oral BID Rosas Stewart MD   40 mg at 08/16/20 2112    sodium chloride 0.9% flush 10 mL  10 mL Intravenous PRN Huy Goff MD        sucralfate tablet 1 g  1 g Oral QID (AC & HS) Rosas Stewart MD   1 g at 08/16/20 2112     Current Outpatient Medications on File Prior to Visit   Medication Sig Dispense Refill    albuterol (PROVENTIL HFA) 90 mcg/actuation inhaler Inhale 2 puffs into the lungs every 6 (six) hours as needed for Wheezing or Shortness of Breath. 18 g 3    albuterol (PROVENTIL) 2.5 mg /3 mL (0.083 %) nebulizer solution Take 3 mLs (2.5 mg total) by nebulization every 6 (six) hours as needed for Wheezing. Rescue 1 [address]    betamethasone dipropionate (DIPROLENE) 0.05 % ointment Apply topically 2 (two) times daily. 45 g 3    cefTRIAXone (ROCEPHIN) 2 g/50 mL PgBk IVPB Inject 50 mLs (2 g total) into the vein every 24 hours as needed. 12 each 0    COZAAR 50 mg tablet Take 50 mg by mouth once daily.       diclofenac sodium (VOLTAREN) 1 % Gel Apply 2 g topically 3 (three) times  daily. 1 Tube 0    donepeziL (ARICEPT) 10 MG tablet Take 1 tablet (10 mg total) by mouth once daily. HOLD FOR ONE WEEK UNTIL ANTIBIOTIC COURSE IS COMPLETED. 90 tablet 3    escitalopram oxalate (LEXAPRO) 10 MG tablet Take 1 tablet (10 mg total) by mouth once daily. 90 tablet 3    ferrous gluconate (FERGON) 324 MG tablet Take 324 mg by mouth daily with breakfast.      fluticasone propionate (FLONASE) 50 mcg/actuation nasal spray 2 sprays (100 mcg total) by Each Nostril route once daily. 9.9 mL 11    fluticasone-salmeterol diskus inhaler 250-50 mcg Inhale 1 puff into the lungs 2 (two) times daily. Controller 60 each 3    meclizine (ANTIVERT) 12.5 mg tablet Take 1 tablet (12.5 mg total) by mouth 3 (three) times daily. 270 tablet 3    montelukast (SINGULAIR) 10 mg tablet       multivit with iron,minerals (MULTI-VITAMINS WITH IRON ORAL) Take 1 tablet by mouth once daily.      omeprazole (PRILOSEC) 40 MG capsule Take 1 capsule (40 mg total) by mouth every morning. 90 capsule 3    ondansetron (ZOFRAN) 4 MG tablet Take 1 tablet (4 mg total) by mouth every 8 (eight) hours as needed for Nausea. 20 tablet 2    sucralfate (CARAFATE) 1 gram tablet Take 1 tablet (1 g total) by mouth 4 (four) times daily. for 14 days 56 tablet 0       Past Surgical History:   Procedure Laterality Date    BONE MARROW BIOPSY Left 7/7/2020    Procedure: Biopsy-bone marrow;  Surgeon: Howard Matos MD;  Location: [facility] OR;  Service: Oncology;  Laterality: Left;    COLONOSCOPY N/A 10/11/2017    Procedure: COLONOSCOPY Golytely;  Surgeon: Shanae Duarte MD;  Location: [facility] ENDO;  Service: Endoscopy;  Laterality: N/A;    CYSTOSCOPY N/A 8/8/2018    Procedure: CYSTOSCOPY;  Surgeon: Adan Cuenca MD;  Location: [address];  Service: Urology;  Laterality: N/A;  1 hour    CYSTOSCOPY W/ RETROGRADES      with penile biopsy    DILATION OF URETHRA N/A 8/8/2018    Procedure: DILATION, URETHRA;  Surgeon: Adan Cuenca MD;  Location:  [facility] OR [address] [place] [address];  Service: Urology;  Laterality: N/A;    HERNIA REPAIR Right     inguinal    JOINT REPLACEMENT Bilateral     knees    Penile Biopsy      TONSILLECTOMY      TOTAL KNEE ARTHROPLASTY Bilateral     URETHRAL MEATOPLASTY N/A 2018    Procedure: MEATOPLASTY, URETHRA;  Surgeon: Adan Cuenca MD;  Location: [facility] OR [address] [place] [address];  Service: Urology;  Laterality: N/A;       Social History     Socioeconomic History    Marital status:      Spouse name: Not on file    Number of children: Not on file    Years of education: Not on file    Highest education level: Not on file   Occupational History    Not on file   Social Needs    Financial resource strain: Not on file    Food insecurity     Worry: Not on file     Inability: Not on file    Transportation needs     Medical: Not on file     Non-medical: Not on file   Tobacco Use    Smoking status: Former Smoker     Packs/day: 2.00     Years: 6.00     Pack years: 12.00     Types: Cigarettes     Quit date: 1969     Years since quittin.6    Smokeless tobacco: Former User     Types: Chew    Tobacco comment: Quit all forms of tobacco in .   Substance and Sexual Activity    Alcohol use: No     Alcohol/week: 0.0 standard drinks     Comment: remote hx of heavy alcohol use    Drug use: No    Sexual activity: Not Currently     Partners: Female   Lifestyle    Physical activity     Days per week: Not on file     Minutes per session: Not on file    Stress: Not on file   Relationships    Social connections     Talks on phone: Not on file     Gets together: Not on file     Attends Gnosticist service: Not on file     Active member of club or organization: Not on file     Attends meetings of clubs or organizations: Not on file     Relationship status: Not on file   Other Topics Concern    Not on file   Social History Narrative    Not on file         Vital Signs Range (Last 24H):  Temp:  [35.8 °C (96.4 °F)-36.8 °C (98.2 °F)]   Pulse:   [81-96]   Resp:  [18-20]   BP: (105-144)/(55-78)   SpO2:  [90 %-98 %]       CBC:   Recent Labs     20  1736 20  0504   WBC 13.09* 9.02   RBC 2.72* 2.70*   HGB 7.8* 7.8*   HCT 24.5* 24.3*    217   MCV 90 90   MCH 28.7 28.9   MCHC 31.8* 32.1       CMP:   Recent Labs     20  0535 20  0504    135*   K 3.4* 3.9    104   CO2 29 26   BUN 17 16   CREATININE 1.1 1.2   GLU 94 93   MG 1.8 2.3   CALCIUM 8.9 8.8   ALBUMIN 2.4* 2.4*   PROT 6.1 6.1   ALKPHOS 44* 44*   ALT <5* 5*   AST 9* 9*   BILITOT 0.3 0.4       INR  No results for input(s): PT, INR, PROTIME, APTT in the last 72 hours.      EK18   Normal sinus rhythm   Left anterior fascicular block   LVH with QRS widening   Abnormal ECG   When compared with ECG of 01-AUG-2018 09:22,   No significant change was found   Confirmed by Baljit Carias MD ([id number]) on 2018 12:09:11 PM    2D Echo:  20   · Normal left ventricular systolic function. The estimated ejection fraction is 60%.  · Mild left ventricular enlargement. LVEDD 6.0cm, LVESD 4.3cm.  · Normal LV diastolic function.  · Mild aortic regurgitation.  · Prolapse of the posterior mitral leaflet with anteriorly directed mitral regurgitation. Moderate-to-severe mitral regurgitation.  · Normal right ventricular systolic function.  · The estimated PA systolic pressure is 32 mmHg.  · Intermediate central venous pressure (8 mmHg).  · Moderate left atrial enlargement.        Pre-op Assessment    I have reviewed the Patient Summary Reports.     I have reviewed the Nursing Notes.       Review of Systems  Anesthesia Hx:  No problems with previous Anesthesia  History of prior surgery of interest to airway management or planning: Previous anesthesia: MAC  egd with MAC.  Denies Family Hx of Anesthesia complications.  Personal Hx of Anesthesia complications  Difficult Intubation, documented in Epic anesthesia history, fiberoptic intubation used   Social:  Former Smoker, Social  Alcohol Use    Hematology/Oncology:     Oncology Normal   Hematology Comments: Autoimmune hemolytic anemia    EENT/Dental:   Hx of sinusitis   Cardiovascular:   Exercise tolerance: poor Hypertension Valvular problems/Murmurs, MR Denies MI.   Denies CABG/stent.      YANCEY Mitral regurgitation Functional Capacity 3 METS  Valvular Heart Disease: Mitral Regurgitation (MR)   Hypertension, Essential Hypertension    Pulmonary:   Asthma mild    Renal/:   Chronic Renal Disease, CRI BPH    Hepatic/GI:   PUD, GERD, well controlled Hx of diverticulosis   Musculoskeletal:   Arthritis  Chondromalacia of right knee  osteoarthritis   Neurological:   Denies Seizures. Hx of memory loss. Oriented to self, place, year, and president during evaluation   Endocrine:  Endocrine Normal    Dermatological:  Skin Normal    Psych:   Hx of memory loss         Physical Exam  General:  Well nourished, Morbid Obesity    Airway/Jaw/Neck:  Airway Findings: Mouth Opening: Normal Tongue: Normal  General Airway Assessment: Adult  Mallampati: IV  Improves to III with phonation.  TM Distance: 4 - 6 cm  Jaw/Neck Findings:  Neck ROM: Normal ROM      Dental:  Dental Findings: In tact   Chest/Lungs:  Chest/Lungs Findings: Clear to auscultation     Heart/Vascular:  Heart Findings: Rate: Normal  Heart Murmur  Systolic        Mental Status:  Mental Status Findings:  Cooperative         Anesthesia Plan  Type of Anesthesia, risks & benefits discussed:  Anesthesia Type:  general, MAC  Patient's Preference:   Intra-op Monitoring Plan: standard ASA monitors  Intra-op Monitoring Plan Comments:   Post Op Pain Control Plan: per primary service following discharge from PACU  Post Op Pain Control Plan Comments:   Induction:   IV  Beta Blocker:  Patient is not currently on a Beta-Blocker (No further documentation required).       Informed Consent: Patient understands risks and agrees with Anesthesia plan.  Questions answered. Anesthesia consent signed with patient.  ASA  Score: 3     Day of Surgery Review of History & Physical:    H&P update referred to the surgeon.         Ready For Surgery From Anesthesia Perspective.

## 2020-08-17 NOTE — TRANSFER OF CARE
"Anesthesia Transfer of Care Note    Patient: Lopez Savage    Procedure(s) Performed: Procedure(s) (LRB):  Transesophageal echo (SILVANA) intra-procedure log documentation (N/A)    Patient location: Cath Lab    Anesthesia Type: MAC    Transport from OR: Transported from OR on room air with adequate spontaneous ventilation    Post pain: adequate analgesia    Post assessment: no apparent anesthetic complications and tolerated procedure well    Post vital signs: stable    Level of consciousness: awake and responds to stimulation    Nausea/Vomiting: no nausea/vomiting    Complications: none    Transfer of care protocol was followed      Last vitals:   Visit Vitals  /72   Pulse 82   Temp 36.8 °C (98.2 °F) (Oral)   Resp 20   Ht 5' 9" (1.753 m)   Wt 112.5 kg (248 lb)   SpO2 100%   BMI 36.62 kg/m²     "

## 2020-08-17 NOTE — PLAN OF CARE
08/17/20 1545   Final Note   Assessment Type Final Discharge Note   Anticipated Discharge Disposition Short Term   Hospital Follow Up  Appt(s) scheduled? No   Discharge plans and expectations educations in teach back method with documentation complete? Yes   Right Care Referral Info   Post Acute Recommendation Other     Nikita Martin RN,   [phone]

## 2020-08-17 NOTE — H&P
SILVANA H&P    Patient: Lopez Savage  : 1945    Procedure: SILVANA under MAC    HPI:   Mr. Savage is a 75 yo gentleman with a PMH notable for HTN, GERD, auto-immune hemolytic anemia, mitral regurgitation, obesity, and diverticulitis, poor dentition. He was found to have s mutans bacteremia in early august but NGTD on current cultures and TTE has findings suggesitve of IE of aortic and mitral valve. He is to undergo SILVANA this AM.     No CP, syncope, SOB this AM    ROS:  See HPI; all other systems are reviewed and are negative.    Objective:  Last 24 Hour Vital Signs:  BP  Min: 105/55  Max: 144/63  Temp  Av.3 °F (36.3 °C)  Min: 96.4 °F (35.8 °C)  Max: 98 °F (36.7 °C)  Pulse  Av.7  Min: 81  Max: 96  Resp  Av.6  Min: 18  Max: 20  SpO2  Av.5 %  Min: 90 %  Max: 98 %    Labs:   K 3.9  Hb: 7.8  Platelets 217    Physical Exam:  General appearance: alert, appears stated age, cooperative and morbidly obese  Head: Normocephalic, without obvious abnormality, atraumatic  Eyes: conjunctivae/corneas clear. PERRL, EOM's intact.  Lungs: clear to auscultation bilaterally and no crackles or wheezes  Heart: regular rate and rhythym, grade 3/6 systolic murmur noted  Abdomen: soft, non-tender; bowel sounds normal; no masses,  no organomegaly, obese  Extremities: extremities normal, atraumatic, no cyanosis or edema  Pulses: 2+ and symmetric  Skin: Skin color, texture, turgor normal. No rashes or lesions  Neurologic: Grossly normal     A/P:  Mr. Savage is a 75 yo gentleman with a PMH notable for HTN, GERD, auto-immune hemolytic anemia, mitral regurgitation, obesity, and diverticulitis, poor dentition. He was found to have s mutans bacteremia in early august but NGTD on current cultures and TTE has findings suggesitve of IE of aortic and mitral valve. He is to undergo SILVANA this AM.     Plan:  -consent obtained  -proceed with SILVANA under MAC

## 2020-08-17 NOTE — PROGRESS NOTES
"[facility] Infectious Disease Progress Note     Primary Team: [facility] Internal Medicine Service Team B  Consultant Attending: Lopez Casanova MD  Date of Admit: 8/13/2020    Summary of history     Mr. Lopez Savage is a 73 yo M with a PMH notable for HTN, GERD, auto-immune hemolytic anemia, mitral regurgitation, obesity, and diverticulitis who presents to the ED on [id number] for a 2-3 day history of "black stools."      The patient states that the change in character of his bowel movements has also been accompanied by a change in consistency, as they are now diarrhea-like, even sometimes water-like. He states that he then went to his PCP for guidance, and was sent home with an FOBT that returned postive. His PCP then referred him to the emergency department for further work-up. He states that he has had these episodes before, and that his last colonoscopy was approximately four years ago. He was told that he had several polyps, and to return in five years' time. He denies regular NSAID use, endorsing only occasional Advil use.      He underwent an upper endoscopy and noted to have acute gastritis with hemorrhage which has been treated with clips and cautery.      Of note, the patient was recently hospitalized from 8/3-8/5 for an HENRY/diverticulitis flair. He initially presented on 7/31 and was discharged home, when blood cultures from 7/31 returned positive for strep mutans in both sets, and the patient was asked to return once again to the hospital, where he was started on a 14 day course of Rocephin. Subsequent blood cultures on 8/3 showed no growth. He was discharged home on 8/5/20 with a PICC and the necessary antibiotics. He was adherent with his regimen and CTX was resumed at appropriate dose since current admission on 8/13. Notably no TTE was performed during 8/3-8/5 admission. 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      On admission patient complained of SOB, YANCEY, " and fatigue.     Interval events     In the interim, patient without fever or leukocytosis. He has been consented for his SILVANA and will proceed with the procedure this am. [facility]    Subjective     Patient laying comfortably in bed this am. Currently without any new complaints.     Current Medications:     Infusions:       Scheduled:   cefTRIAXone  2 g Intravenous Q24H    escitalopram oxalate  10 mg Oral Daily    losartan  50 mg Oral Daily    pantoprazole  40 mg Oral BID    sucralfate  1 g Oral QID (AC & HS)          PRN:  sodium chloride, sodium chloride, sodium chloride, sodium chloride 0.9%    Antibiotics and Day Number of Therapy:  CTX -current    Objective   Last 24 Hour Vital Signs:  BP  Min: 105/55  Max: 144/63  Temp  Av.4 °F (36.3 °C)  Min: 96.4 °F (35.8 °C)  Max: 98.2 °F (36.8 °C)  Pulse  Av.5  Min: 81  Max: 96  Resp  Av.5  Min: 18  Max: 20  SpO2  Av.5 %  Min: 90 %  Max: 98 %    Lines, drains, airway:  Peripherally Inserted Central Catheter Line    PICC Double Lumen 20 0955 right brachial 11 days         Physical Examination:  Examination  General: well appearing, comfortable   HEENT: normal oral mucosa, poor dentition with prominently yellow and decaying teeth, conjunctiva normal, pupils normal, extraocular motion normal  Neck: no thyromegaly, no JVD   Cardiac: no murmurs, pulse regular    Pulmonary/Chest: chest clear, no respiratory distress   GI/Rectal: no organomegaly, no masses, non tender, normal bowel sounds, rectal exam deferred   : deferred   Msk: normal motor screening exam  Vascular: normal peripheral perfusion, R brachial PICC line in place  Lymph nodes: none palpated  Skin/ Extremities: no rash, no pedal edema, no ulceration    Neurology/ Mental status: alert oriented       Lab data:  Most Recent Data:  CBC:   Recent Labs   Lab 20  0535 20  1736 20  0504   WBC 9.06 13.09* 9.02   HGB 7.0* 7.8* 7.8*   HCT 22.4*  24.5* 24.3*    218 217   MCV 89 90 90   RDW 16.1* 15.9* 16.1*       BMP:   Recent Labs   Lab 08/15/20  0601 08/16/20  0535 08/17/20  0504    136 135*   K 3.4* 3.4* 3.9    104 104   CO2 26 29 26   BUN 19 17 16   CREATININE 1.1 1.1 1.2   GLU 87 94 93   CALCIUM 8.7 8.9 8.8   MG 1.8 1.8 2.3     LFTs:   Recent Labs   Lab 08/15/20  0601 08/16/20  0535 08/17/20  0504   PROT 6.1 6.1 6.1   ALBUMIN 2.3* 2.4* 2.4*   BILITOT 0.3 0.3 0.4   AST 9* 9* 9*   ALKPHOS 43* 44* 44*   ALT 5* <5* 5*     Coags:   Recent Labs   Lab 08/13/20  1630   INR 1.0     DM:   Recent Labs   Lab 08/15/20  0601 08/16/20  0535 08/17/20  0504   CREATININE 1.1 1.1 1.2       Estimated Creatinine Clearance: 67.8 mL/min (based on SCr of 1.2 mg/dL).      Microbiology Data  Microbiology Results (last 7 days)     Procedure Component Value Units Date/Time    Blood culture [[id number]] Collected: 08/16/20 1251    Order Status: Completed Specimen: Blood Updated: 08/17/20 0345     Blood Culture, Routine No Growth to date    Narrative:      FROM PICC    Blood culture [[id number]] Collected: 08/16/20 1305    Order Status: Completed Specimen: Blood Updated: 08/17/20 0345     Blood Culture, Routine No Growth to date    Narrative:      PERIPHERAL    Blood culture [[id number]]     Order Status: Canceled Specimen: Blood     Blood culture [[id number]]     Order Status: Canceled Specimen: Blood     Blood culture [[id number]]     Order Status: Canceled Specimen: Blood            Other Results:  EKG    Radiology  No orders to display        Results of procedures/ Findings   TTE [id number]  · Normal left ventricular systolic function. The estimated ejection fraction is 60%.  · Indeterminate left ventricular diastolic function.  · Normal right ventricular systolic function.  · Moderate left atrial enlargement.  · A mobile vegetation is present of the aortic valve; suggest SILVANA.  · Mild-to-moderate mitral regurgitation; etiology not well determined.  · Intermediate  central venous pressure (8 mmHg).  · The estimated PA systolic pressure is 42 mmHg.  · Pulmonary hypertension present.  · Mild tricuspid regurgitation.    Assessment     Lopez Savage is a 74 y.o. 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 He has poor dentition and has not seen a dentist since 1969, this may be a likely source of current infection. Patient appears to have been on appropriate therapy since 8/3 and is well into his treatment course. Recommend a total of 4 weeks of treatment.     Recommendations     - TTE showing evidence of aortic valve vegetation. Pt blood cultures positive for strep mutans bacteremia. SILVANA completed this am confirming vegetations from TTE. Pt is to be [facility] to [facility] for cardiothoracic surgery evaluation.  - Recommend continuing CTX at 2 g q24h for a total course of 4 weeks from 8/3 with end date of 8/31    Thank you for this consult. We will sign off for now. Please contact us again if there are any further clinical questions we can answer.    Uriel Medel MD  [facility] Internal Medicine [facility]

## 2020-08-17 NOTE — PLAN OF CARE
Pt is transferring to [facility].       08/17/20 1158   Discharge Reassessment   Assessment Type Discharge Planning Reassessment   Provided patient/caregiver education on the expected discharge date and the discharge plan Yes   Do you have any problems affording any of your prescribed medications? No   Discharge Plan A   (TBD)     Nikita Martin RN,   [phone]

## 2020-08-17 NOTE — PROGRESS NOTES
[facility] Internal Medicine Resident HOJean PierreI Progress Note    HPI: Mr. Lopez Savage is a 74 year old male with a PMH notable for HTN, GERD, diverticulitis, autoimmune hemolytic anemia, and mitral regurgitation who presented on 20 for melena of 2-3 days' duration.    Subjective:    This morning on interview, Mr. Savage was seen resting comfortably in bed. He denied any new complaints. Had one darker bowel movement overnight, but less melanotic than before procedure.  Yesterday, he received one unit of pRBCs.     Objective:   Last 24 Hour Vital Signs:  BP  Min: 105/55  Max: 144/63  Temp  Av.3 °F (36.3 °C)  Min: 96.4 °F (35.8 °C)  Max: 98 °F (36.7 °C)  Pulse  Av.7  Min: 81  Max: 96  Resp  Av.6  Min: 18  Max: 20  SpO2  Av.5 %  Min: 90 %  Max: 98 %  I/O last 3 completed shifts:  In: 625 [P.O.:625]  Out: -     Physical Examination:  Gen: Obese male in NAD  Neuro: GCS 15, no gross neurological deficits observed  CV: RRR, 3/6 systolic murmur noted  Pulm: CTAB, no rhonchi, rales, or wheezes. On room air.  GI: Non-distended, non-tender, obese abdomen      Current Medications:      Scheduled:   cefTRIAXone  2 g Intravenous Q24H    escitalopram oxalate  10 mg Oral Daily    losartan  50 mg Oral Daily    pantoprazole  40 mg Oral BID    sucralfate  1 g Oral QID (AC & HS)        PRN:  sodium chloride, sodium chloride, sodium chloride, sodium chloride 0.9%    Assessment:     Lopez Savage is a 74 y.o.male with  Patient Active Problem List    Diagnosis Date Noted    Morbid obesity 08/15/2020    Gastrointestinal hemorrhage 2020    Streptococcal bacteremia 2020    CKD (chronic kidney disease) stage 3, GFR 30-59 ml/min 2020    Acute diverticulitis 2020    Autoimmune hemolytic anemia 2020    Splenomegaly 2020    Normocytic anemia 2020    BPH (benign prostatic hyperplasia) 2018    Screening for colon cancer 10/11/2017    Nuclear sclerosis of both eyes  10/09/2017    Refractive error 10/09/2017    Dizziness 08/12/2017    Cough 04/28/2017    Allergic rhinitis 03/17/2017    Alzheimer's disease 03/17/2017    Mitral regurgitation 06/02/2016    Chronic bronchitis 07/22/2015    Morbid obesity with BMI of 40.0-44.9, adult 07/08/2015    Essential hypertension 10/15/2014    GERD (gastroesophageal reflux disease)         Plan:   Upper Gastrointestinal Bleed:  - GI following, appreciate recs  - Would expect continued melena for 1-2 days as blood from prior bleed passes  - s/p upper endoscopy 8/14 with clips placed for gastritis  - One unit pRBCs given yesterday, Hgb stable  - continue PO protonix, carafate per GI recs  - GI outpatient f/u for H. Pylori serology     Gram-Positive Bacteremia, complicated by pericarditis:  - Bacteremia diagnosed by cultures at OSH after bout of diverticulitis, sent home with PICC for 14 days   - WBC count of 11.4 in ED, afebrile, no tachycardia  - TTE showed vegetative growth on mitral and aortic valves  - Cardiology consulted, patient to go for SILVANA today  - Will continue IV Rocephin through 9/14     Electrolyte Abnormalities  - KCl 40 mEq once  - Mg 2g once     Essential Hypertension:  - restarted home losartan; hypotension on admission has resolved   - BP <180 over the last 24hrs     GERD:  - protonix 40mg daily     Morbid obesity:  - Will continue to take opportunities to educate the patient regarding healthy lifestyle choices     Autoimmune hemolytic anemia  - Direct Blair found to be negative  - Not likely to be the driving cause behind the patient's anemia  - Currently being followed by hem/onc, Dr. Matos     Dispo: home tomorrow following SILVANA, follow up with GI and Cardiology outpatient    Huy Goff  [facility] Internal Medicine HO-I  [facility] Internal Medicine Service Team B    [facility] Medicine Hospitalist Pager numbers:   [facility] Hospitalist Medicine Team A (Tyra/Ruby): [phone]  [facility] Hospitalist Medicine Team B  (Tor/James):  [phone]

## 2020-08-17 NOTE — NURSING
Pt is being discharged and transferred to [facility] to 1143. Gave report to Dayo,Nurse. Cardiac monitor removed. Pt made aware transport will  in an hour.

## 2020-08-17 NOTE — PLAN OF CARE
VN note: VN cued into patient's room for introduction. Wife at bedside. Updated on plan of care. Patient and wife aware of transfer to [facility] [place]. Denies any needs at this time. Allowed time for questions. VN will continue to be available to patient and intervene prn.

## 2020-08-17 NOTE — NURSING
Report called to [facility]. Pt transported on tele monitor. Tele box applied upon arrival to room. Pt moved to bed with no problems. Vitals stable. Pt with no complaints. All belongings sent with pt to room.

## 2020-08-17 NOTE — DISCHARGE SUMMARY
[facility] Medicine Discharge Summary    Primary Team: [facility] Hospitalist Team B  Attending Physician: Rodri Ramirez MD  Resident: Giancarlo  Intern: [place]    Date of Admit: 8/13/2020  Date of Transfer: 8/17/2020    Transfer to: [facility]   Condition: stable    Discharge Diagnoses     Patient Active Problem List   Diagnosis    GERD (gastroesophageal reflux disease)    Essential hypertension    Morbid obesity with BMI of 40.0-44.9, adult    Chronic bronchitis    Mitral regurgitation    Allergic rhinitis    Alzheimer's disease    Cough    Dizziness    Nuclear sclerosis of both eyes    Refractive error    Screening for colon cancer    BPH (benign prostatic hyperplasia)    Normocytic anemia    Splenomegaly    Autoimmune hemolytic anemia    Acute diverticulitis    Streptococcal bacteremia    CKD (chronic kidney disease) stage 3, GFR 30-59 ml/min    Gastrointestinal hemorrhage    Morbid obesity    Endocarditis     Consultants and Procedures     Consultants:  Cardiology. Infectious Disease, GI    Procedures:   None     Imaging:  TTE 8/14:  · Normal left ventricular systolic function. The estimated ejection fraction is 60%.  · Indeterminate left ventricular diastolic function.  · Normal right ventricular systolic function.  · Moderate left atrial enlargement.  · A mobile vegetation is present of the aortic valve; suggest SILVANA.  · Mild-to-moderate mitral regurgitation; etiology not well determined.  · Intermediate central venous pressure (8 mmHg).  · The estimated PA systolic pressure is 42 mmHg.  · Pulmonary hypertension present.  · Mild tricuspid regurgitation.    Brief History of Present Illness      Pt admitted to [facility] 8/13/20 for GI bleed with primary complaint of melena, s/p EGD with cauterization/clipping for acute gastritis with hemorrhage on 8/14/20. Hemoglobin remained stable thereafter. Continued on po protonix and carafate.     On admission, pt continued on ceftriaxone  via PICC, which was started on recent admission at [facility] 8/3-8/5/2020. Blood cultures there were positive for strep mutans, pt was discharged with ceftriaxone x2 weeks. Notably, no echo done at OSH. Here, TTE showed mobile vegetations > 1cm in size each on aortic and mitral valves. Confirmed on SILVANA. CTS evaluation recommended by Cards. As there is no CTS in house, pt transferred to [facility] for CTS evaluation. Pt to continue on IV Ceftriaxone via PICC for now extended course with end date 8/31/20 per ID recs.     For the full HPI please refer to the History & Physical from this admission.    Hospital Course By Problem with Pertinent Findings     Subacute bacterial endocarditis   - strep mutans bacteremia at OSH, discharged with ceftriaxone x 14 days via PICC, no echo done  - TTE here showed vegetations on aortic and mitral valves, each >1cm in size  - SILVANA confirmed vegetations, CTS eval recommended   - pt transferred to [facility] for CTS evaluation  - continue ceftriaxone via PICC, course extended, end date 8/31/20 (four week course from start date 8/3) per ID recs    Upper Gastrointestinal Bleed  - s/p upper endoscopy 8/14 with clips placed for gastritis  - would expect continued melena for 1-2 days as blood from prior bleed passes  - continue PO protonix 40 BID, carafate per GI recs  - transfuse for Hgb > 7  - GI outpatient f/u for H. Pylori serology      Essential Hypertension  - continue home losartan 50 mg daily     Morbid obesity:  -  patient regarding healthy lifestyle choices      Autoimmune hemolytic anemia  - Direct Blair found to be negative  - Not likely to be the driving cause behind the patient's anemia  - Currently being followed by hem/onc, Dr. Matos    Discharge Medications     - Ceftriaxone 2 g q24h via PICC for four week total course, end date 8/31/20  - Protonix 40 mg BID  - Sucralfate 1 g before meals and nightly  - Losartan 50 mg daily  - Escitalopram 10  mg daily    Discharge Information:   Diet:  Regular diet    Physical Activity:  As tolerated    Michelle Mondragon MD  [facility] Internal Medicine HO-II

## 2020-08-17 NOTE — PLAN OF CARE
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, with flail mitral valve, needs CTS eval    Report from Transferring Physician/Hospital course: 75 yo gentleman with Autoimmune hemolytic anemia (Dr Howard Matos: Rituxumab on 8/4/20 ?given), HTN, CKD-3, chronic Mitral regurgitation/flail valve, poor dentition, obesity, GERD, with two recent admits to [facility] this month, first 7/31 to 8/2 for [facility], Acute diverticulitis and symptomatic anemia and d/nichole on oral ABx for diverticulitis, after which Blood Cx turned +ve after d/c and so called in for re-admission, started on empiric IV Rocephin, d/c-ed home in stable condition with PICC and CTX IV ABx x 14d, who was then re-admitted for his 3rd admit but this time to [facility] on 8/13 with GI bleed.   He went to EGD on 8/14 with Dr Franco and was found to have hemorrhagic gastritis, s/p cautery and clipping.  His HM team reviewed case and noticed he had the recent strep mutans bacteremia so ordered a TTE which had findings suggesitve of IE of aortic and mitral valve. SILVANA done today with Mitral and aortic valve endocarditis with P2 scallop mitral flail and severe anteriorly oriented regurgitation with Coanda effect.  See full SILVANA report, and see images in cardiology note. Most recent BCx NGTD     Seen by Dr Duque in CTS clinic in June for his severe MR, and per note, risk > benefit of surgical MVR and he was referred to Dr Pires for eval for Mitral Clip. He saw Dr Pires who said that the valve issue was amenable to Mitral Clip, but he recommended anemia workup first as it wasn't clear that the pt's SOB was due to his MR and may all be due to his anemia.       VS: VSS, see  Epic    Labs: see above    Radiographs: see above    To Do List:   1. Consult CTS. Dr Dumont aware and will consult.   2. Continue ABx  3. Consult ID    A/P (edited from \[facility]\"" [facility] notes)  Upper Gastrointestinal Bleed 2/2 acute gastritis with hemorrhage s/p EGD Friday 8/14 with clips and cautery - Dr Franco   - expect continued melena for 1-2 days as blood from prior bleed passes  - s/p transfusion yesterday 8/15 for Hb 7.0, now passing brown stools without any blood  - Protonix 40, carafate per GI recs  - GI outpatient f/u for H. 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 WBC count of 11.4 in ED, afebrile, no tachycardia. TTE showed vegetative growth on mitral and aortic valves, SILVANA on Monday. Cardiology consulted. On CTX IV.      Autoimmune hemolytic anemia. Follows with [facility] hem/onc, Dr. Matos  - Direct Blair found to be negative; Not likely to be the driving cause behind the patient's anemia     Essential Hypertension. hypotension on admission has resolved.  BP to high of 157 systolic in last 24hrs  - home losartan      Upon patient arrival to floor, please send SecureChat to [facility] or call extension [phone] (if no answer, this will flip to a beeper, so enter your call back number) for Hospital Medicine admit team assignment and for additional admit orders for the patient.  Do not page the attending physician associated with the patient on arrival (this physician may not be on duty at the time of arrival).  Rather, always call [phone] to reach the triage physician for orders and team assignment.    Helen Larson MD  Hospital Medicine Staff  Cell: [phone]

## 2020-08-18 PROBLEM — K29.00 ACUTE GASTRITIS: Status: ACTIVE | Noted: 2020-01-01

## 2020-08-18 PROBLEM — I33.9 SUBACUTE ENDOCARDITIS: Status: ACTIVE | Noted: 2020-01-01

## 2020-08-18 PROBLEM — Z12.11 SCREENING FOR COLON CANCER: Status: RESOLVED | Noted: 2017-10-11 | Resolved: 2020-01-01

## 2020-08-18 PROBLEM — R05.9 COUGH: Status: RESOLVED | Noted: 2017-04-28 | Resolved: 2020-01-01

## 2020-08-18 NOTE — SUBJECTIVE & OBJECTIVE
Past Medical History:   Diagnosis Date    Acute pyelonephritis 7/31/2020    Allergy     Asthma     BPH (benign prostatic hyperplasia)     CHRONIC BRONCHITIS     GERD (gastroesophageal reflux disease)     HTN (hypertension)     HTN (hypertension) 10/15/2014    Joint pain     Mitral regurgitation     3-4+ under surveillance CT surgery    Mitral regurgitation     Mitral valve regurgitation     Obesity     Osteoarthritis of knee     Trouble in sleeping     Ulcerative colitis     Urinary tract infection     Valvular regurgitation        Past Surgical History:   Procedure Laterality Date    BONE MARROW BIOPSY Left 7/7/2020    Procedure: Biopsy-bone marrow;  Surgeon: Howard Matos MD;  Location: [facility];  Service: Oncology;  Laterality: Left;    COLONOSCOPY N/A 10/11/2017    Procedure: COLONOSCOPY Golytely;  Surgeon: Shanae Duarte MD;  Location: [facility];  Service: Endoscopy;  Laterality: N/A;    CYSTOSCOPY N/A 8/8/2018    Procedure: CYSTOSCOPY;  Surgeon: Adan Cuenca MD;  Location: [address];  Service: Urology;  Laterality: N/A;  1 hour    CYSTOSCOPY W/ RETROGRADES      with penile biopsy    DILATION OF URETHRA N/A 8/8/2018    Procedure: DILATION, URETHRA;  Surgeon: Adan Cuenca MD;  Location: [facility] OR [address] [place] [address];  Service: Urology;  Laterality: N/A;    ESOPHAGOGASTRODUODENOSCOPY N/A 8/14/2020    Procedure: EGD (ESOPHAGOGASTRODUODENOSCOPY);  Surgeon: Yoan Hamilton MD;  Location: [facility];  Service: Gastroenterology;  Laterality: N/A;    HERNIA REPAIR Right 1956    inguinal    JOINT REPLACEMENT Bilateral     knees    Penile Biopsy      TONSILLECTOMY      TOTAL KNEE ARTHROPLASTY Bilateral     URETHRAL MEATOPLASTY N/A 8/8/2018    Procedure: MEATOPLASTY, URETHRA;  Surgeon: Adan Cuenca MD;  Location: [facility] OR [address] [place] [address];  Service: Urology;  Laterality: N/A;       Review of patient's allergies indicates:   Allergen Reactions    Codeine Nausea Only    Tetanus vaccines  and toxoid Hives and Rash       Current Facility-Administered Medications on File Prior to Encounter   Medication    [COMPLETED] cefTRIAXone (ROCEPHIN) 2 g/50 mL D5W IVPB    [DISCONTINUED] 0.9%  NaCl infusion (for blood administration)    [DISCONTINUED] 0.9%  NaCl infusion (for blood administration)    [DISCONTINUED] 0.9%  NaCl infusion (for blood administration)    [DISCONTINUED] 0.9%  NaCl infusion    [DISCONTINUED] cefTRIAXone (ROCEPHIN) 2 g/50 mL D5W IVPB    [DISCONTINUED] escitalopram oxalate tablet 10 mg    [DISCONTINUED] lidocaine (cardiac) injection    [DISCONTINUED] losartan tablet 50 mg    [DISCONTINUED] pantoprazole EC tablet 40 mg    [DISCONTINUED] phenylephrine injection    [DISCONTINUED] propofol (DIPRIVAN) 10 mg/mL infusion    [DISCONTINUED] propofol (DIPRIVAN) 10 mg/mL infusion    [DISCONTINUED] sodium chloride 0.9% flush 10 mL    [DISCONTINUED] sucralfate tablet 1 g     Current Outpatient Medications on File Prior to Encounter   Medication Sig    albuterol (PROVENTIL HFA) 90 mcg/actuation inhaler Inhale 2 puffs into the lungs every 6 (six) hours as needed for Wheezing or Shortness of Breath.    albuterol (PROVENTIL) 2.5 mg /3 mL (0.083 %) nebulizer solution Take 3 mLs (2.5 mg total) by nebulization every 6 (six) hours as needed for Wheezing. Rescue    betamethasone dipropionate (DIPROLENE) 0.05 % ointment Apply topically 2 (two) times daily.    [] cefTRIAXone (ROCEPHIN) 2 g/50 mL PgBk IVPB Inject 50 mLs (2 g total) into the vein every 24 hours as needed.    COZAAR 50 mg tablet Take 50 mg by mouth once daily.     diclofenac sodium (VOLTAREN) 1 % Gel Apply 2 g topically 3 (three) times daily.    donepeziL (ARICEPT) 10 MG tablet Take 1 tablet (10 mg total) by mouth once daily. HOLD FOR ONE WEEK UNTIL ANTIBIOTIC COURSE IS COMPLETED.    escitalopram oxalate (LEXAPRO) 10 MG tablet Take 1 tablet (10 mg total) by mouth once daily.    ferrous gluconate (FERGON) 324 MG tablet  Take 324 mg by mouth daily with breakfast.    fluticasone propionate (FLONASE) 50 mcg/actuation nasal spray 2 sprays (100 mcg total) by Each Nostril route once daily.    fluticasone-salmeterol diskus inhaler 250-50 mcg Inhale 1 puff into the lungs 2 (two) times daily. Controller    meclizine (ANTIVERT) 12.5 mg tablet Take 1 tablet (12.5 mg total) by mouth 3 (three) times daily.    montelukast (SINGULAIR) 10 mg tablet     multivit with iron,minerals (MULTI-VITAMINS WITH IRON ORAL) Take 1 tablet by mouth once daily.    omeprazole (PRILOSEC) 40 MG capsule Take 1 capsule (40 mg total) by mouth every morning.    ondansetron (ZOFRAN) 4 MG tablet Take 1 tablet (4 mg total) by mouth every 8 (eight) hours as needed for Nausea.    sucralfate (CARAFATE) 1 gram tablet Take 1 tablet (1 g total) by mouth 4 (four) times daily. for 14 days     Family History     Problem Relation (Age of Onset)    Bone cancer Mother    COPD Sister    Cancer Mother    Cataracts Mother    Glaucoma Mother    Heart disease Daughter    Kidney disease Father    Other Daughter (2), Daughter        Tobacco Use    Smoking status: Former Smoker     Packs/day: 2.00     Years: 6.00     Pack years: 12.00     Types: Cigarettes     Quit date: 1969     Years since quittin.6    Smokeless tobacco: Former User     Types: Chew    Tobacco comment: Quit all forms of tobacco in .   Substance and Sexual Activity    Alcohol use: No     Alcohol/week: 0.0 standard drinks     Comment: remote hx of heavy alcohol use    Drug use: No    Sexual activity: Not Currently     Partners: Female     Review of Systems   Constitutional: Positive for unexpected weight change. Negative for appetite change, chills, diaphoresis, fatigue and fever.   HENT: Positive for dental problem. Negative for sore throat.    Eyes: Negative for visual disturbance.   Respiratory: Positive for cough and shortness of breath. Negative for chest tightness and wheezing.     Cardiovascular: Negative for chest pain, palpitations and leg swelling.   Gastrointestinal: Negative for abdominal pain, blood in stool, constipation, diarrhea, nausea and vomiting.   Genitourinary: Negative for dysuria and hematuria.   Musculoskeletal: Negative for joint swelling and myalgias.   Skin: Negative for pallor and rash.   Neurological: Positive for light-headedness. Negative for tremors, syncope and headaches.   Hematological: Bruises/bleeds easily.   Psychiatric/Behavioral: Negative for agitation.     Objective:     Vital Signs (Most Recent):  Temp: 97.8 °F (36.6 °C) (08/18/20 0425)  Pulse: 87 (08/18/20 0439)  Resp: 20 (08/18/20 0439)  BP: 137/80 (08/17/20 2300)  SpO2: 97 % (08/18/20 0439) Vital Signs (24h Range):  Temp:  [97 °F (36.1 °C)-98.5 °F (36.9 °C)] 97.8 °F (36.6 °C)  Pulse:  [73-92] 87  Resp:  [17-20] 20  SpO2:  [95 %-100 %] 97 %  BP: (102-139)/(55-80) 137/80        There is no height or weight on file to calculate BMI.    Physical Exam  Constitutional:       General: He is not in acute distress.     Appearance: Normal appearance. He is obese. He is not ill-appearing, toxic-appearing or diaphoretic.   HENT:      Head: Normocephalic and atraumatic.      Mouth/Throat:      Mouth: Mucous membranes are moist.      Comments: poor dentition  Eyes:      General: No scleral icterus.     Extraocular Movements: Extraocular movements intact.      Conjunctiva/sclera: Conjunctivae normal.      Pupils: Pupils are equal, round, and reactive to light.   Neck:      Musculoskeletal: Normal range of motion and neck supple.   Cardiovascular:      Rate and Rhythm: Normal rate and regular rhythm.      Heart sounds: No murmur.   Pulmonary:      Effort: Pulmonary effort is normal. No respiratory distress.      Breath sounds: Wheezing present.   Abdominal:      General: Abdomen is flat. Bowel sounds are normal. There is no distension.      Palpations: Abdomen is soft.      Tenderness: There is no abdominal  tenderness.   Musculoskeletal:         General: No swelling.      Right lower leg: No edema.      Left lower leg: No edema.   Skin:     General: Skin is warm and dry.      Capillary Refill: Capillary refill takes less than 2 seconds.      Findings: Bruising present.   Neurological:      General: No focal deficit present.      Mental Status: He is alert.   Psychiatric:         Mood and Affect: Mood normal.         Behavior: Behavior normal.           CRANIAL NERVES     CN III, IV, VI   Pupils are equal, round, and reactive to light.       Significant Labs:   CBC:   Recent Labs   Lab 08/16/20  1736 08/17/20  0504 08/18/20  0329   WBC 13.09* 9.02 9.86   HGB 7.8* 7.8* 7.3*   HCT 24.5* 24.3* 24.3*    217 209     CMP:   Recent Labs   Lab 08/16/20  0535 08/17/20  0504 08/18/20  0329    135* 136   K 3.4* 3.9 3.5    104 104   CO2 29 26 25   GLU 94 93 92   BUN 17 16 16   CREATININE 1.1 1.2 1.1   CALCIUM 8.9 8.8 8.7   PROT 6.1 6.1 6.0   ALBUMIN 2.4* 2.4* 2.3*   BILITOT 0.3 0.4 0.3   ALKPHOS 44* 44* 44*   AST 9* 9* 10   ALT <5* 5* 6*   ANIONGAP 3* 5* 7*   EGFRNONAA >60 60 >60.0     Cardiac Markers: No results for input(s): CKMB, MYOGLOBIN, BNP, TROPISTAT in the last 48 hours.  Coagulation: No results for input(s): PT, INR, APTT in the last 48 hours.  Magnesium:   Recent Labs   Lab 08/16/20  0535 08/17/20  0504 08/18/20  0329   MG 1.8 2.3 2.0       Significant Imaging: I have reviewed all pertinent imaging results/findings within the past 24 hours.

## 2020-08-18 NOTE — SUBJECTIVE & OBJECTIVE
Past Medical History:   Diagnosis Date    Acute pyelonephritis 7/31/2020    Allergy     Asthma     BPH (benign prostatic hyperplasia)     CHRONIC BRONCHITIS     GERD (gastroesophageal reflux disease)     HTN (hypertension)     HTN (hypertension) 10/15/2014    Joint pain     Mitral regurgitation     3-4+ under surveillance CT surgery    Mitral regurgitation     Mitral valve regurgitation     Obesity     Osteoarthritis of knee     Trouble in sleeping     Ulcerative colitis     Urinary tract infection     Valvular regurgitation        Past Surgical History:   Procedure Laterality Date    BONE MARROW BIOPSY Left 7/7/2020    Procedure: Biopsy-bone marrow;  Surgeon: Howard Matos MD;  Location: [facility];  Service: Oncology;  Laterality: Left;    COLONOSCOPY N/A 10/11/2017    Procedure: COLONOSCOPY Golytely;  Surgeon: Shanae Duarte MD;  Location: [facility];  Service: Endoscopy;  Laterality: N/A;    CYSTOSCOPY N/A 8/8/2018    Procedure: CYSTOSCOPY;  Surgeon: Adan Cuenca MD;  Location: [address];  Service: Urology;  Laterality: N/A;  1 hour    CYSTOSCOPY W/ RETROGRADES      with penile biopsy    DILATION OF URETHRA N/A 8/8/2018    Procedure: DILATION, URETHRA;  Surgeon: Adan Cuenca MD;  Location: [facility] [address] [place] [address];  Service: Urology;  Laterality: N/A;    ESOPHAGOGASTRODUODENOSCOPY N/A 8/14/2020    Procedure: EGD (ESOPHAGOGASTRODUODENOSCOPY);  Surgeon: Yoan Hamilton MD;  Location: [facility];  Service: Gastroenterology;  Laterality: N/A;    HERNIA REPAIR Right 1956    inguinal    JOINT REPLACEMENT Bilateral     knees    Penile Biopsy      TONSILLECTOMY      TOTAL KNEE ARTHROPLASTY Bilateral     URETHRAL MEATOPLASTY N/A 8/8/2018    Procedure: MEATOPLASTY, URETHRA;  Surgeon: Adan Cuenca MD;  Location: [facility] OR [address] [place] [address];  Service: Urology;  Laterality: N/A;       Review of patient's allergies indicates:   Allergen Reactions    Codeine Nausea Only    Tetanus vaccines  and toxoid Hives and Rash       Current Facility-Administered Medications on File Prior to Encounter   Medication    [COMPLETED] cefTRIAXone (ROCEPHIN) 2 g/50 mL D5W IVPB    [DISCONTINUED] 0.9%  NaCl infusion (for blood administration)    [DISCONTINUED] 0.9%  NaCl infusion (for blood administration)    [DISCONTINUED] 0.9%  NaCl infusion (for blood administration)    [DISCONTINUED] 0.9%  NaCl infusion    [DISCONTINUED] cefTRIAXone (ROCEPHIN) 2 g/50 mL D5W IVPB    [DISCONTINUED] escitalopram oxalate tablet 10 mg    [DISCONTINUED] lidocaine (cardiac) injection    [DISCONTINUED] losartan tablet 50 mg    [DISCONTINUED] pantoprazole EC tablet 40 mg    [DISCONTINUED] phenylephrine injection    [DISCONTINUED] propofol (DIPRIVAN) 10 mg/mL infusion    [DISCONTINUED] propofol (DIPRIVAN) 10 mg/mL infusion    [DISCONTINUED] sodium chloride 0.9% flush 10 mL    [DISCONTINUED] sucralfate tablet 1 g     Current Outpatient Medications on File Prior to Encounter   Medication Sig    albuterol (PROVENTIL HFA) 90 mcg/actuation inhaler Inhale 2 puffs into the lungs every 6 (six) hours as needed for Wheezing or Shortness of Breath.    albuterol (PROVENTIL) 2.5 mg /3 mL (0.083 %) nebulizer solution Take 3 mLs (2.5 mg total) by nebulization every 6 (six) hours as needed for Wheezing. Rescue    betamethasone dipropionate (DIPROLENE) 0.05 % ointment Apply topically 2 (two) times daily.    [] cefTRIAXone (ROCEPHIN) 2 g/50 mL PgBk IVPB Inject 50 mLs (2 g total) into the vein every 24 hours as needed.    COZAAR 50 mg tablet Take 50 mg by mouth once daily.     diclofenac sodium (VOLTAREN) 1 % Gel Apply 2 g topically 3 (three) times daily.    donepeziL (ARICEPT) 10 MG tablet Take 1 tablet (10 mg total) by mouth once daily. HOLD FOR ONE WEEK UNTIL ANTIBIOTIC COURSE IS COMPLETED.    escitalopram oxalate (LEXAPRO) 10 MG tablet Take 1 tablet (10 mg total) by mouth once daily.    ferrous gluconate (FERGON) 324 MG tablet  Take 324 mg by mouth daily with breakfast.    fluticasone propionate (FLONASE) 50 mcg/actuation nasal spray 2 sprays (100 mcg total) by Each Nostril route once daily.    fluticasone-salmeterol diskus inhaler 250-50 mcg Inhale 1 puff into the lungs 2 (two) times daily. Controller    meclizine (ANTIVERT) 12.5 mg tablet Take 1 tablet (12.5 mg total) by mouth 3 (three) times daily.    montelukast (SINGULAIR) 10 mg tablet     multivit with iron,minerals (MULTI-VITAMINS WITH IRON ORAL) Take 1 tablet by mouth once daily.    omeprazole (PRILOSEC) 40 MG capsule Take 1 capsule (40 mg total) by mouth every morning.    ondansetron (ZOFRAN) 4 MG tablet Take 1 tablet (4 mg total) by mouth every 8 (eight) hours as needed for Nausea.    sucralfate (CARAFATE) 1 gram tablet Take 1 tablet (1 g total) by mouth 4 (four) times daily. for 14 days     Family History     Problem Relation (Age of Onset)    Bone cancer Mother    COPD Sister    Cancer Mother    Cataracts Mother    Glaucoma Mother    Heart disease Daughter    Kidney disease Father    Other Daughter (2), Daughter        Tobacco Use    Smoking status: Former Smoker     Packs/day: 2.00     Years: 6.00     Pack years: 12.00     Types: Cigarettes     Quit date: 1969     Years since quittin.6    Smokeless tobacco: Former User     Types: Chew    Tobacco comment: Quit all forms of tobacco in .   Substance and Sexual Activity    Alcohol use: No     Alcohol/week: 0.0 standard drinks     Comment: remote hx of heavy alcohol use    Drug use: No    Sexual activity: Not Currently     Partners: Female     Review of Systems   Constitutional: Positive for unexpected weight change. Negative for appetite change, chills, diaphoresis, fatigue and fever.   HENT: Positive for dental problem. Negative for drooling, facial swelling, mouth sores, sinus pain and trouble swallowing.    Eyes: Negative for visual disturbance.   Respiratory: Positive for cough and shortness of  breath. Negative for chest tightness.    Cardiovascular: Negative for chest pain, palpitations and leg swelling.   Gastrointestinal: Negative for anal bleeding, blood in stool, constipation, diarrhea, nausea and vomiting.   Endocrine: Negative for polyuria.   Genitourinary: Negative for difficulty urinating and hematuria.   Musculoskeletal: Negative for myalgias.   Skin: Positive for pallor. Negative for color change.   Neurological: Positive for light-headedness. Negative for dizziness and numbness.   Hematological: Bruises/bleeds easily.   Psychiatric/Behavioral: Negative for agitation.     Objective:     Vital Signs (Most Recent):  Temp: 97.8 °F (36.6 °C) (08/18/20 0425)  Pulse: 88 (08/18/20 0500)  Resp: (!) 24 (08/18/20 0500)  BP: (!) 155/91 (08/18/20 0500)  SpO2: (!) 93 % (08/18/20 0500) Vital Signs (24h Range):  Temp:  [97 °F (36.1 °C)-98.5 °F (36.9 °C)] 97.8 °F (36.6 °C)  Pulse:  [73-92] 88  Resp:  [17-24] 24  SpO2:  [93 %-100 %] 93 %  BP: (102-155)/(55-91) 155/91        There is no height or weight on file to calculate BMI.    Physical Exam  Vitals signs and nursing note reviewed.   Constitutional:       General: He is not in acute distress.     Appearance: Normal appearance. He is obese. He is not ill-appearing, toxic-appearing or diaphoretic.   HENT:      Head: Normocephalic and atraumatic.      Mouth/Throat:      Mouth: Mucous membranes are moist.      Comments: Poor dentition  Eyes:      General: No scleral icterus.     Extraocular Movements: Extraocular movements intact.      Conjunctiva/sclera: Conjunctivae normal.      Pupils: Pupils are equal, round, and reactive to light.   Neck:      Musculoskeletal: Normal range of motion and neck supple. No neck rigidity.   Cardiovascular:      Rate and Rhythm: Normal rate and regular rhythm.      Heart sounds: No murmur.   Pulmonary:      Effort: Pulmonary effort is normal. No respiratory distress.      Breath sounds: Wheezing present. No rales.   Abdominal:       General: Abdomen is flat. Bowel sounds are normal. There is no distension.      Palpations: Abdomen is soft.      Tenderness: There is no abdominal tenderness. There is no guarding or rebound.   Musculoskeletal:         General: No swelling.      Right lower leg: No edema.      Left lower leg: No edema.   Lymphadenopathy:      Cervical: No cervical adenopathy.   Skin:     General: Skin is warm and dry.      Capillary Refill: Capillary refill takes less than 2 seconds.      Coloration: Skin is pale. Skin is not jaundiced.   Neurological:      General: No focal deficit present.      Mental Status: He is alert.   Psychiatric:         Mood and Affect: Mood normal.           CRANIAL NERVES     CN III, IV, VI   Pupils are equal, round, and reactive to light.       Significant Labs:   CBC:   Recent Labs   Lab 08/16/20  1736 08/17/20  0504 08/18/20  0329   WBC 13.09* 9.02 9.86   HGB 7.8* 7.8* 7.3*   HCT 24.5* 24.3* 24.3*    217 209     CMP:   Recent Labs   Lab 08/17/20  0504 08/18/20  0329   * 136   K 3.9 3.5    104   CO2 26 25   GLU 93 92   BUN 16 16   CREATININE 1.2 1.1   CALCIUM 8.8 8.7   PROT 6.1 6.0   ALBUMIN 2.4* 2.3*   BILITOT 0.4 0.3   ALKPHOS 44* 44*   AST 9* 10   ALT 5* 6*   ANIONGAP 5* 7*   EGFRNONAA 60 >60.0     Cardiac Markers: No results for input(s): CKMB, MYOGLOBIN, BNP, TROPISTAT in the last 48 hours.  Coagulation:   Recent Labs   Lab 08/18/20  0525   INR 0.9     Lactic Acid: No results for input(s): LACTATE in the last 48 hours.  Magnesium:   Recent Labs   Lab 08/17/20  0504 08/18/20  0329   MG 2.3 2.0     TSH: No results for input(s): TSH in the last 4320 hours.    Significant Imaging: I have reviewed all pertinent imaging results/findings within the past 24 hours.

## 2020-08-18 NOTE — PT/OT/SLP EVAL
Occupational Therapy   Evaluation and Discharge Note    Name: Lopez Savage  MRN: [id number]  Admitting Diagnosis:  Subacute endocarditis      Recommendations:     Discharge Recommendations: home  Discharge Equipment Recommendations:  none(owns however does not use RW, SPC & shower chair)  Barriers to discharge:  None    Assessment:     Lopez Savage is a 74 y.o. male with a medical diagnosis of Subacute endocarditis. At this time, patient is functioning close to their prior level of function and does not require further acute OT services.     Plan:     During this hospitalization, patient does not require further acute OT services.  Please re-consult if situation changes.    · Plan of Care Reviewed with: patient, spouse    Subjective     Chief Complaint: none  Patient/Family Comments/goals: none stated    Occupational Profile:  Living Environment: Pt resides with spouse & daughter in 1 story house with no steps to enter.  Pt was independent with ADL's & ambulation PTA.  Pt has a bathtub for bathing.  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  Pt enjoys spending time with his wife & dog.  Pt owns however does not use RW, SPC, & shower chair.  Equipment Used at home:   none used  Assistance upon Discharge: spouse    Pain/Comfort:  · Pain Rating 1: 0/10  · Pain Rating Post-Intervention 1: 0/10    Patients cultural, spiritual, Sabianist conflicts given the current situation: no    Objective:     Communicated with: RN prior to session.  Patient found standing in bathroom with telemetry upon OT entry to room.  Spouse present in room.    General Precautions: Standard, fall   Orthopedic Precautions:N/A   Braces: N/A     Occupational Performance:    Bed Mobility:    · Patient completed Supine to Sit with modified independence  · Patient completed Sit to Supine with modified independence    Functional Mobility/Transfers:  · Patient completed Sit <> Stand Transfer with supervision  with   no assistive device   · Patient completed Bed <> Chair Transfer using Stand Pivot technique with supervision with no assistive device  · Functional Mobility: SBA in room    Activities of Daily Living:  · Grooming: modified independence standing in bathroom  · Upper Body Dressing: modified independence seated EOB  · Lower Body Dressing: modified independence donning socks seated EOB    Cognitive/Visual Perceptual:  Cognitive/Psychosocial Skills:     -       Oriented to: Person, Place, Time and Situation   -       Follows Commands/attention:Follows multistep  commands  -       Safety awareness/insight to disability: intact     Physical Exam:  Sensation:    -       Intact  Dominant hand:    -       right  Upper Extremity Range of Motion:     -       Right Upper Extremity: WFL  -       Left Upper Extremity: WFL  Upper Extremity Strength:    -       Right Upper Extremity: WFL  -       Left Upper Extremity: WFL   Strength:    -       Right Upper Extremity: WFL  -       Left Upper Extremity: WFL    AMPAC 6 Click ADL:  AMPAC Total Score: 24    Treatment & Education:  Provided education regarding role of OT, POC, & discharge recommendations.  Pt had no further questions & when asked whether there were any concerns pt reported none.    Education:    Patient left seated in chair with all lines intact, call button in reach, RN notified and spouse present      History:     Past Medical History:   Diagnosis Date    Acute pyelonephritis 7/31/2020    Allergy     Asthma     BPH (benign prostatic hyperplasia)     CHRONIC BRONCHITIS     GERD (gastroesophageal reflux disease)     HTN (hypertension)     HTN (hypertension) 10/15/2014    Joint pain     Mitral regurgitation     3-4+ under surveillance CT surgery    Mitral regurgitation     Mitral valve regurgitation     Obesity     Osteoarthritis of knee     Trouble in sleeping     Ulcerative colitis     Urinary tract infection     Valvular regurgitation        Past  Surgical History:   Procedure Laterality Date    BONE MARROW BIOPSY Left 7/7/2020    Procedure: Biopsy-bone marrow;  Surgeon: Howard Matos MD;  Location: [facility];  Service: Oncology;  Laterality: Left;    COLONOSCOPY N/A 10/11/2017    Procedure: COLONOSCOPY Golytely;  Surgeon: Shanae Duarte MD;  Location: [facility];  Service: Endoscopy;  Laterality: N/A;    CYSTOSCOPY N/A 8/8/2018    Procedure: CYSTOSCOPY;  Surgeon: Adan Cuenca MD;  Location: [address];  Service: Urology;  Laterality: N/A;  1 hour    CYSTOSCOPY W/ RETROGRADES      with penile biopsy    DILATION OF URETHRA N/A 8/8/2018    Procedure: DILATION, URETHRA;  Surgeon: Adan Cuenca MD;  Location: [address];  Service: Urology;  Laterality: N/A;    ESOPHAGOGASTRODUODENOSCOPY N/A 8/14/2020    Procedure: EGD (ESOPHAGOGASTRODUODENOSCOPY);  Surgeon: Yoan Hamilton MD;  Location: [facility];  Service: Gastroenterology;  Laterality: N/A;    HERNIA REPAIR Right 1956    inguinal    JOINT REPLACEMENT Bilateral     knees    Penile Biopsy      TONSILLECTOMY      TOTAL KNEE ARTHROPLASTY Bilateral     URETHRAL MEATOPLASTY N/A 8/8/2018    Procedure: MEATOPLASTY, URETHRA;  Surgeon: Adan Cuenca MD;  Location: [address];  Service: Urology;  Laterality: N/A;       Time Tracking:     OT Date of Treatment: 08/18/20  OT Start Time: 1332  OT Stop Time: 1348  OT Total Time (min): 16 min    Billable Minutes:Evaluation 16    ZACH Dominguez  8/18/2020

## 2020-08-18 NOTE — ASSESSMENT & PLAN NOTE
"Patient with recent hospitalization for melena, s/p upper EGD with acute gastritis and cauterization/clipping. Presently denies further melena or hematochezia however HgB 7.3 on admission. Hemodynamically stable    - please see "acute upper GI bleed" above  "

## 2020-08-18 NOTE — CONSULTS
[facility]  Infectious Disease  Consult Note    Patient Name: Lopez Savage  MRN: [id number]  Admission Date: 8/17/2020  Hospital Length of Stay: 1 days  Attending Physician: Arelis Rangel MD  Primary Care Provider: Prasanna Haywood MD         Inpatient consult to Infectious Diseases  Consult performed by: LIVAN Wallace Jr.  Consult ordered by: Shana Brady MD      Consult received.  Full consult to follow.    LIVAN Cervantes  Infectious Disease  [facility]

## 2020-08-18 NOTE — SUBJECTIVE & OBJECTIVE
Current Facility-Administered Medications on File Prior to Encounter   Medication    [COMPLETED] cefTRIAXone (ROCEPHIN) 2 g/50 mL D5W IVPB    [DISCONTINUED] 0.9%  NaCl infusion (for blood administration)    [DISCONTINUED] 0.9%  NaCl infusion (for blood administration)    [DISCONTINUED] 0.9%  NaCl infusion (for blood administration)    [DISCONTINUED] 0.9%  NaCl infusion    [DISCONTINUED] cefTRIAXone (ROCEPHIN) 2 g/50 mL D5W IVPB    [DISCONTINUED] escitalopram oxalate tablet 10 mg    [DISCONTINUED] lidocaine (cardiac) injection    [DISCONTINUED] losartan tablet 50 mg    [DISCONTINUED] pantoprazole EC tablet 40 mg    [DISCONTINUED] phenylephrine injection    [DISCONTINUED] propofol (DIPRIVAN) 10 mg/mL infusion    [DISCONTINUED] propofol (DIPRIVAN) 10 mg/mL infusion    [DISCONTINUED] sodium chloride 0.9% flush 10 mL    [DISCONTINUED] sucralfate tablet 1 g     Current Outpatient Medications on File Prior to Encounter   Medication Sig    albuterol (PROVENTIL HFA) 90 mcg/actuation inhaler Inhale 2 puffs into the lungs every 6 (six) hours as needed for Wheezing or Shortness of Breath.    albuterol (PROVENTIL) 2.5 mg /3 mL (0.083 %) nebulizer solution Take 3 mLs (2.5 mg total) by nebulization every 6 (six) hours as needed for Wheezing. Rescue    betamethasone dipropionate (DIPROLENE) 0.05 % ointment Apply topically 2 (two) times daily.    [] cefTRIAXone (ROCEPHIN) 2 g/50 mL PgBk IVPB Inject 50 mLs (2 g total) into the vein every 24 hours as needed.    COZAAR 50 mg tablet Take 50 mg by mouth once daily.     diclofenac sodium (VOLTAREN) 1 % Gel Apply 2 g topically 3 (three) times daily.    donepeziL (ARICEPT) 10 MG tablet Take 1 tablet (10 mg total) by mouth once daily. HOLD FOR ONE WEEK UNTIL ANTIBIOTIC COURSE IS COMPLETED.    escitalopram oxalate (LEXAPRO) 10 MG tablet Take 1 tablet (10 mg total) by mouth once daily.    ferrous gluconate (FERGON) 324 MG tablet Take 324 mg by mouth daily with  breakfast.    fluticasone propionate (FLONASE) 50 mcg/actuation nasal spray 2 sprays (100 mcg total) by Each Nostril route once daily.    fluticasone-salmeterol diskus inhaler 250-50 mcg Inhale 1 puff into the lungs 2 (two) times daily. Controller    meclizine (ANTIVERT) 12.5 mg tablet Take 1 tablet (12.5 mg total) by mouth 3 (three) times daily.    montelukast (SINGULAIR) 10 mg tablet     multivit with iron,minerals (MULTI-VITAMINS WITH IRON ORAL) Take 1 tablet by mouth once daily.    omeprazole (PRILOSEC) 40 MG capsule Take 1 capsule (40 mg total) by mouth every morning.    ondansetron (ZOFRAN) 4 MG tablet Take 1 tablet (4 mg total) by mouth every 8 (eight) hours as needed for Nausea.    sucralfate (CARAFATE) 1 gram tablet Take 1 tablet (1 g total) by mouth 4 (four) times daily. for 14 days       Review of patient's allergies indicates:   Allergen Reactions    Codeine Nausea Only    Tetanus vaccines and toxoid Hives and Rash       Past Medical History:   Diagnosis Date    Acute pyelonephritis 7/31/2020    Allergy     Asthma     BPH (benign prostatic hyperplasia)     CHRONIC BRONCHITIS     GERD (gastroesophageal reflux disease)     HTN (hypertension)     HTN (hypertension) 10/15/2014    Joint pain     Mitral regurgitation     3-4+ under surveillance CT surgery    Mitral regurgitation     Mitral valve regurgitation     Obesity     Osteoarthritis of knee     Trouble in sleeping     Ulcerative colitis     Urinary tract infection     Valvular regurgitation      Past Surgical History:   Procedure Laterality Date    BONE MARROW BIOPSY Left 7/7/2020    Procedure: Biopsy-bone marrow;  Surgeon: Howard Matos MD;  [facility];  Service: Oncology;  Laterality: Left;    COLONOSCOPY N/A 10/11/2017    Procedure: COLONOSCOPY [place];  Surgeon: Shanae Duarte MD;  Location: [facility] ENDO;  Service: Endoscopy;  Laterality: N/A;    CYSTOSCOPY N/A 8/8/2018    Procedure: CYSTOSCOPY;   Surgeon: Adan Cuenca MD;  Location: [address];  Service: Urology;  Laterality: N/A;  1 hour    CYSTOSCOPY W/ RETROGRADES      with penile biopsy    DILATION OF URETHRA N/A 2018    Procedure: DILATION, URETHRA;  Surgeon: Adan Cuenca MD;  Location: [address];  Service: Urology;  Laterality: N/A;    ESOPHAGOGASTRODUODENOSCOPY N/A 2020    Procedure: EGD (ESOPHAGOGASTRODUODENOSCOPY);  Surgeon: Yoan Hamilton MD;  Location: [facility];  Service: Gastroenterology;  Laterality: N/A;    HERNIA REPAIR Right     inguinal    JOINT REPLACEMENT Bilateral     knees    Penile Biopsy      TONSILLECTOMY      TOTAL KNEE ARTHROPLASTY Bilateral     URETHRAL MEATOPLASTY N/A 2018    Procedure: MEATOPLASTY, URETHRA;  Surgeon: Adan Cuenca MD;  Location: [address];  Service: Urology;  Laterality: N/A;     Family History     Problem Relation (Age of Onset)    Bone cancer Mother    COPD Sister    Cancer Mother    Cataracts Mother    Glaucoma Mother    Heart disease Daughter    Kidney disease Father    Other Daughter (2), Daughter        Tobacco Use    Smoking status: Former Smoker     Packs/day: 2.00     Years: 6.00     Pack years: 12.00     Types: Cigarettes     Quit date: 1969     Years since quittin.6    Smokeless tobacco: Former User     Types: Chew    Tobacco comment: Quit all forms of tobacco in .   Substance and Sexual Activity    Alcohol use: No     Alcohol/week: 0.0 standard drinks     Comment: remote hx of heavy alcohol use    Drug use: No    Sexual activity: Not Currently     Partners: Female     Review of Systems   Constitutional: Positive for activity change and fatigue.   HENT: Negative for nosebleeds and tinnitus.    Eyes: Negative for visual disturbance.   Respiratory: Positive for shortness of breath.    Cardiovascular: Positive for leg swelling. Negative for chest pain and palpitations.   Gastrointestinal: Negative for nausea.   Musculoskeletal:  Negative for gait problem.   Skin: Negative for color change.   Neurological: Positive for dizziness, weakness and light-headedness. Negative for seizures.   Hematological: Does not bruise/bleed easily.   Psychiatric/Behavioral: Negative for sleep disturbance.     Objective:     Vital Signs (Most Recent):  Temp: 97.8 °F (36.6 °C) (08/18/20 0425)  Pulse: 88 (08/18/20 0500)  Resp: (!) 24 (08/18/20 0500)  BP: (!) 155/91 (08/18/20 0500)  SpO2: (!) 93 % (08/18/20 0500) Vital Signs (24h Range):  Temp:  [97 °F (36.1 °C)-98.5 °F (36.9 °C)] 97.8 °F (36.6 °C)  Pulse:  [73-92] 88  Resp:  [17-24] 24  SpO2:  [93 %-100 %] 93 %  BP: (102-155)/(55-91) 155/91        There is no height or weight on file to calculate BMI.    SpO2: (!) 93 %  O2 Device (Oxygen Therapy): room air     Intake/Output - Last 3 Shifts       08/16 0700 - 08/17 0659 08/17 0700 - 08/18 0659 08/18 0700 - 08/19 0659    P.O.  175     Total Intake  175     Net  +175                   Lines/Drains/Airways     Peripherally Inserted Central Catheter Line            PICC Double Lumen 08/05/20 0955 right brachial 12 days                 STS Risk Score: 1.9%    Physical Exam  Vitals signs reviewed.   Constitutional:       General: He is not in acute distress.     Appearance: He is well-developed. He is not diaphoretic.   HENT:      Head: Normocephalic and atraumatic.   Eyes:      Pupils: Pupils are equal, round, and reactive to light.   Neck:      Musculoskeletal: Normal range of motion.      Vascular: No JVD.   Cardiovascular:      Rate and Rhythm: Normal rate and regular rhythm.   Pulmonary:      Effort: Pulmonary effort is normal. No respiratory distress.   Abdominal:      Palpations: Abdomen is soft.   Musculoskeletal:         General: No swelling.   Skin:     General: Skin is warm and dry.   Neurological:      Mental Status: He is alert and oriented to person, place, and time.   Psychiatric:         Speech: Speech normal.         Behavior: Behavior normal.          Thought Content: Thought content normal.         Judgment: Judgment normal.         Significant Labs:  ABGs: No results for input(s): PH, PCO2, PO2, HCO3, POCSATURATED, BE in the last 48 hours.  Amylase: No results for input(s): AMYLASE in the last 48 hours.  BMP:   Recent Labs   Lab 08/18/20  0329   GLU 92      K 3.5      CO2 25   BUN 16   CREATININE 1.1   CALCIUM 8.7   MG 2.0     Cardiac markers: No results for input(s): CKMB, CPKMB, TROPONINT, TROPONINI, MYOGLOBIN in the last 48 hours.  CBC:   Recent Labs   Lab 08/18/20 0329   WBC 9.86   RBC 2.63*   HGB 7.3*   HCT 24.3*      MCV 92   MCH 27.8   MCHC 30.0*     CMP:   Recent Labs   Lab 08/18/20 0329   GLU 92   CALCIUM 8.7   ALBUMIN 2.3*   PROT 6.0      K 3.5   CO2 25      BUN 16   CREATININE 1.1   ALKPHOS 44*   ALT 6*   AST 10   BILITOT 0.3     Coagulation:   Recent Labs   Lab 08/18/20  0525   INR 0.9     Lactic Acid: No results for input(s): LACTATE in the last 48 hours.  LFTs:   Recent Labs   Lab 08/18/20 0329   ALT 6*   AST 10   ALKPHOS 44*   BILITOT 0.3   PROT 6.0   ALBUMIN 2.3*     Lipase: No results for input(s): LIPASE in the last 48 hours.    Significant Diagnostics: reviewed   SILVANA 8/17/2020  · An 18 mm mobile vegetation is present of the both the mitral and aortic valves.  · Severe mitral regurgitation with anteriorly oriented jet due to P2 scallop flail.  · Mild aortic regurgitation.  · Normal left ventricular systolic function. The estimated ejection fraction is 65%.  · Normal right ventricular systolic function.     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    TTE 8/14/2020  · Normal left ventricular systolic function. The estimated ejection fraction is 60%.  · Indeterminate left ventricular diastolic function.  · Normal right ventricular systolic function.  · Moderate left atrial enlargement.  · A mobile vegetation is present of  the aortic valve; suggest SILVANA.  · Mild-to-moderate mitral regurgitation; etiology not well determined.  · Intermediate central venous pressure (8 mmHg).  · The estimated PA systolic pressure is 42 mmHg.  · Pulmonary hypertension present.  · Mild tricuspid regurgitation.  · LVIDD 6.13

## 2020-08-18 NOTE — CONSULTS
[facility]  Cardiothoracic Surgery  Consult Note    Patient Name: Lopez Savage  MRN: [id number]  Admission Date: 8/17/2020  Attending Physician: Arelis Rangel MD  Referring Provider: Rodri Ramirez MD    Patient information was obtained from patient, past medical records and ER records.     Inpatient consult to Cardiothoracic Surgery  Consult performed by: Crystal Felix PA-C  Consult ordered by: Shana Brady MD        Subjective:     Principal Problem: Subacute endocarditis    History of Present Illness: This is a 74 year old male with PMH of hypertension, ulcerative colitis, GERD, chronic bronchitis, autoimmune hemolytic anemia, mitral regurgitation, and diverticulitits transferred to [facility] from [facility] for subacute bacterial endocarditis in the setting of recent upper GI bleed with recommendation for cardiothoracic surgery consultation from [facility] cardiology. Patient has had multiple hospitalizations in the past 6 weeks. Extensive chart review was performed as patient is not a reliable historian. It appears in late July patient was hospitalized for HENRY and acute diverticulitis and was discharged home on oral antibiotics. 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 He was started on ceftriaxone and a PICC line was put in place and he was again discharged home. Patient then returned to the hospital on 8/14 due to melena and was found to have GI bleed. He underwent EGD with cauterization/clipping for acute gastritis. He remained on the ceftriaxone. During this hospital stay, TTE was performed which showed aortic and mitral vegetations both >1cm. This was subsequently confirmed on SILVANA. Cardiology was consulted and they recommended transfer to [facility] for cardiothoracic surgery consultation given patient may need double valve replacement.      On arrival to the floor, patient was not complaining of any acute issue. However  patient is not an accurate historian as he did not recall his recent multiple hospitalizations. He does admit that he does not see a dentist regularly and that the last time he saw one was in the 1960s. He denies fevers, chills, diaphoresis, melena, hematochezia, abdominal pain, chest pain, and leg swelling. He does endorse a chronic cough and dyspnea ongoing for the past 8 months and occasional lightheadedness when rising from sitting to standing.        Current Facility-Administered Medications on File Prior to Encounter   Medication    [COMPLETED] cefTRIAXone (ROCEPHIN) 2 g/50 mL D5W IVPB    [DISCONTINUED] 0.9%  NaCl infusion (for blood administration)    [DISCONTINUED] 0.9%  NaCl infusion (for blood administration)    [DISCONTINUED] 0.9%  NaCl infusion (for blood administration)    [DISCONTINUED] 0.9%  NaCl infusion    [DISCONTINUED] cefTRIAXone (ROCEPHIN) 2 g/50 mL D5W IVPB    [DISCONTINUED] escitalopram oxalate tablet 10 mg    [DISCONTINUED] lidocaine (cardiac) injection    [DISCONTINUED] losartan tablet 50 mg    [DISCONTINUED] pantoprazole EC tablet 40 mg    [DISCONTINUED] phenylephrine injection    [DISCONTINUED] propofol (DIPRIVAN) 10 mg/mL infusion    [DISCONTINUED] propofol (DIPRIVAN) 10 mg/mL infusion    [DISCONTINUED] sodium chloride 0.9% flush 10 mL    [DISCONTINUED] sucralfate tablet 1 g     Current Outpatient Medications on File Prior to Encounter   Medication Sig    albuterol (PROVENTIL HFA) 90 mcg/actuation inhaler Inhale 2 puffs into the lungs every 6 (six) hours as needed for Wheezing or Shortness of Breath.    albuterol (PROVENTIL) 2.5 mg /3 mL (0.083 %) nebulizer solution Take 3 mLs (2.5 mg total) by nebulization every 6 (six) hours as needed for Wheezing. Rescue    betamethasone dipropionate (DIPROLENE) 0.05 % ointment Apply topically 2 (two) times daily.    [] cefTRIAXone (ROCEPHIN) 2 g/50 mL PgBk IVPB Inject 50 mLs (2 g total) into the vein every 24 hours as needed.     COZAAR 50 mg tablet Take 50 mg by mouth once daily.     diclofenac sodium (VOLTAREN) 1 % Gel Apply 2 g topically 3 (three) times daily.    donepeziL (ARICEPT) 10 MG tablet Take 1 tablet (10 mg total) by mouth once daily. HOLD FOR ONE WEEK UNTIL ANTIBIOTIC COURSE IS COMPLETED.    escitalopram oxalate (LEXAPRO) 10 MG tablet Take 1 tablet (10 mg total) by mouth once daily.    ferrous gluconate (FERGON) 324 MG tablet Take 324 mg by mouth daily with breakfast.    fluticasone propionate (FLONASE) 50 mcg/actuation nasal spray 2 sprays (100 mcg total) by Each Nostril route once daily.    fluticasone-salmeterol diskus inhaler 250-50 mcg Inhale 1 puff into the lungs 2 (two) times daily. Controller    meclizine (ANTIVERT) 12.5 mg tablet Take 1 tablet (12.5 mg total) by mouth 3 (three) times daily.    montelukast (SINGULAIR) 10 mg tablet     multivit with iron,minerals (MULTI-VITAMINS WITH IRON ORAL) Take 1 tablet by mouth once daily.    omeprazole (PRILOSEC) 40 MG capsule Take 1 capsule (40 mg total) by mouth every morning.    ondansetron (ZOFRAN) 4 MG tablet Take 1 tablet (4 mg total) by mouth every 8 (eight) hours as needed for Nausea.    sucralfate (CARAFATE) 1 gram tablet Take 1 tablet (1 g total) by mouth 4 (four) times daily. for 14 days       Review of patient's allergies indicates:   Allergen Reactions    Codeine Nausea Only    Tetanus vaccines and toxoid Hives and Rash       Past Medical History:   Diagnosis Date    Acute pyelonephritis 7/31/2020    Allergy     Asthma     BPH (benign prostatic hyperplasia)     CHRONIC BRONCHITIS     GERD (gastroesophageal reflux disease)     HTN (hypertension)     HTN (hypertension) 10/15/2014    Joint pain     Mitral regurgitation     3-4+ under surveillance CT surgery    Mitral regurgitation     Mitral valve regurgitation     Obesity     Osteoarthritis of knee     Trouble in sleeping     Ulcerative colitis     Urinary tract infection     Valvular  regurgitation      Past Surgical History:   Procedure Laterality Date    BONE MARROW BIOPSY Left 2020    Procedure: Biopsy-bone marrow;  Surgeon: Howard Matos MD;  Location[facility];  Service: Oncology;  Laterality: Left;    COLONOSCOPY N/A 10/11/2017    Procedure: COLONOSCOPY Gol[place];  Surgeon: Shanae Durate MD;  Location: [facility];  Service: Endoscopy;  Laterality: N/A;    CYSTOSCOPY N/A 2018    Procedure: CYSTOSCOPY;  Surgeon: Adan Cuenca MD;  Location: [address];  Service: Urology;  Laterality: N/A;  1 hour    CYSTOSCOPY W/ RETROGRADES      with penile biopsy    DILATION OF URETHRA N/A 2018    Procedure: DILATION, URETHRA;  Surgeon: Adan Cuenca MD;  Location: [address];  Service: Urology;  Laterality: N/A;    ESOPHAGOGASTRODUODENOSCOPY N/A 2020    Procedure: EGD (ESOPHAGOGASTRODUODENOSCOPY);  Surgeon: Yoan Hamilton MD;  Location: [facility];  Service: Gastroenterology;  Laterality: N/A;    HERNIA REPAIR Right     inguinal    JOINT REPLACEMENT Bilateral     knees    Penile Biopsy      TONSILLECTOMY      TOTAL KNEE ARTHROPLASTY Bilateral     URETHRAL MEATOPLASTY N/A 2018    Procedure: MEATOPLASTY, URETHRA;  Surgeon: Adan Cuenca MD;  Location: [address];  Service: Urology;  Laterality: N/A;     Family History     Problem Relation (Age of Onset)    Bone cancer Mother    COPD Sister    Cancer Mother    Cataracts Mother    Glaucoma Mother    Heart disease Daughter    Kidney disease Father    Other Daughter (2), Daughter        Tobacco Use    Smoking status: Former Smoker     Packs/day: 2.00     Years: 6.00     Pack years: 12.00     Types: Cigarettes     Quit date: 1969     Years since quittin.6    Smokeless tobacco: Former User     Types: Chew    Tobacco comment: Quit all forms of tobacco in .   Substance and Sexual Activity    Alcohol use: No     Alcohol/week: 0.0 standard drinks     Comment: remote hx of heavy  alcohol use    Drug use: No    Sexual activity: Not Currently     Partners: Female     Review of Systems   Constitutional: Positive for activity change and fatigue.   HENT: Negative for nosebleeds and tinnitus.    Eyes: Negative for visual disturbance.   Respiratory: Positive for shortness of breath.    Cardiovascular: Positive for leg swelling. Negative for chest pain and palpitations.   Gastrointestinal: Negative for nausea.   Musculoskeletal: Negative for gait problem.   Skin: Negative for color change.   Neurological: Positive for dizziness, weakness and light-headedness. Negative for seizures.   Hematological: Does not bruise/bleed easily.   Psychiatric/Behavioral: Negative for sleep disturbance.     Objective:     Vital Signs (Most Recent):  Temp: 97.8 °F (36.6 °C) (08/18/20 0425)  Pulse: 88 (08/18/20 0500)  Resp: (!) 24 (08/18/20 0500)  BP: (!) 155/91 (08/18/20 0500)  SpO2: (!) 93 % (08/18/20 0500) Vital Signs (24h Range):  Temp:  [97 °F (36.1 °C)-98.5 °F (36.9 °C)] 97.8 °F (36.6 °C)  Pulse:  [73-92] 88  Resp:  [17-24] 24  SpO2:  [93 %-100 %] 93 %  BP: (102-155)/(55-91) 155/91        There is no height or weight on file to calculate BMI.    SpO2: (!) 93 %  O2 Device (Oxygen Therapy): room air     Intake/Output - Last 3 Shifts       08/16 0700 - 08/17 0659 08/17 0700 - 08/18 0659 08/18 0700 - 08/19 0659    P.O.  175     Total Intake  175     Net  +175                   Lines/Drains/Airways     Peripherally Inserted Central Catheter Line            PICC Double Lumen 08/05/20 0955 right brachial 12 days                 STS Risk Score: 1.9%    Physical Exam  Vitals signs reviewed.   Constitutional:       General: He is not in acute distress.     Appearance: He is well-developed. He is not diaphoretic.   HENT:      Head: Normocephalic and atraumatic.   Eyes:      Pupils: Pupils are equal, round, and reactive to light.   Neck:      Musculoskeletal: Normal range of motion.      Vascular: No JVD.   Cardiovascular:       Rate and Rhythm: Normal rate and regular rhythm.   Pulmonary:      Effort: Pulmonary effort is normal. No respiratory distress.   Abdominal:      Palpations: Abdomen is soft.   Musculoskeletal:         General: No swelling.   Skin:     General: Skin is warm and dry.   Neurological:      Mental Status: He is alert and oriented to person, place, and time.   Psychiatric:         Speech: Speech normal.         Behavior: Behavior normal.         Thought Content: Thought content normal.         Judgment: Judgment normal.         Significant Labs:  ABGs: No results for input(s): PH, PCO2, PO2, HCO3, POCSATURATED, BE in the last 48 hours.  Amylase: No results for input(s): AMYLASE in the last 48 hours.  BMP:   Recent Labs   Lab 08/18/20  0329   GLU 92      K 3.5      CO2 25   BUN 16   CREATININE 1.1   CALCIUM 8.7   MG 2.0     Cardiac markers: No results for input(s): CKMB, CPKMB, TROPONINT, TROPONINI, MYOGLOBIN in the last 48 hours.  CBC:   Recent Labs   Lab 08/18/20  0329   WBC 9.86   RBC 2.63*   HGB 7.3*   HCT 24.3*      MCV 92   MCH 27.8   MCHC 30.0*     CMP:   Recent Labs   Lab 08/18/20  0329   GLU 92   CALCIUM 8.7   ALBUMIN 2.3*   PROT 6.0      K 3.5   CO2 25      BUN 16   CREATININE 1.1   ALKPHOS 44*   ALT 6*   AST 10   BILITOT 0.3     Coagulation:   Recent Labs   Lab 08/18/20  0525   INR 0.9     Lactic Acid: No results for input(s): LACTATE in the last 48 hours.  LFTs:   Recent Labs   Lab 08/18/20  0329   ALT 6*   AST 10   ALKPHOS 44*   BILITOT 0.3   PROT 6.0   ALBUMIN 2.3*     Lipase: No results for input(s): LIPASE in the last 48 hours.    Significant Diagnostics: reviewed   SILVANA 8/17/2020  · An 18 mm mobile vegetation is present of the both the mitral and aortic valves.  · Severe mitral regurgitation with anteriorly oriented jet due to P2 scallop flail.  · Mild aortic regurgitation.  · Normal left ventricular systolic function. The estimated ejection fraction is 65%.  · Normal  right ventricular systolic function.     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    TTE 8/14/2020  · Normal left ventricular systolic function. The estimated ejection fraction is 60%.  · Indeterminate left ventricular diastolic function.  · Normal right ventricular systolic function.  · Moderate left atrial enlargement.  · A mobile vegetation is present of the aortic valve; suggest SILVANA.  · Mild-to-moderate mitral regurgitation; etiology not well determined.  · Intermediate central venous pressure (8 mmHg).  · The estimated PA systolic pressure is 42 mmHg.  · Pulmonary hypertension present.  · Mild tricuspid regurgitation.  · LVIDD 6.13      Assessment/Plan:         * Subacute endocarditis  This is a 74 year old male with PMH of hypertension, ulcerative colitis, GERD, chronic bronchitis, autoimmune hemolytic anemia, mitral regurgitation, and diverticulitits transferred to [facility] from [facility] for subacute bacterial endocarditis in the setting of recent upper GI bleed.     Wife reports starting in January of this year patient started becoming progressively more weak and coughing. Was treated for URI with steroids and Abx at an urgent care without resolution of symptoms. Saw an ENT and workup was negative. Continued with weakness and had 4 falls. At one point was so weak he could not even stand. Wife reports once the COVID pandemic began they did not really leave their house for several months. Eventually was diagnosed with autoimmune hemolytic anemia.     At the end of July patient was admitted to OSH for HENRY and diverticulitis. Was started on Abx and sent home. However, patient was called to reports back to hospital 8/5 as his blood cultures returned +. He was again discharged and developed melena. Admitted for EGD. Had upper GI bleed that was cauterized and clipped. States prior to this procedure he was having fecal incontinence but  this has resolved.      TTE was done which showed 18mm vegetation on Mitral and Aortic valves with resultant severe MR and Mild AR. EF 65%. Blood cultures since 8/16 per chart review have been negative.     Patient still reports SOB with exertion. At home has been using a walker. Also with occasional lower extremity swelling, and dizziness. Denies chest pain, palpitations, prior strokes, seizures, blood clots, stents, or sternotomies. Quit smoking and drinking 50 years ago.     Currently on Abx with end date of 8/31 which would be a 4 week course. Awaiting ID input here. Continue with medical management, likely needs at least a 6 week course of Abx. Dr. Dumont to review and staff.         Thank you for your consult. I will follow-up with patient. Please contact us if you have any additional questions.    Crystal Felix PA-C  Cardiothoracic Surgery  [facility]    I have seen the patient and reviewed the physician assistant's note above. I have personally interviewed and examined the patient at bedside and agree with the findings.     Mr. Savage is a 74 year old male with dementia, frailty (uses walker to ambulate), ulcerative colitis, chronic bronchitis, autoimmune hemolytic anemia, recent diverticulitis, chronic kidney disease, and known severe mitral regurgitation (being followed by Dr. Duque and deemed inoperable) who presented with aortic and mitral valve endocarditis.  Echocardiogram shows 65% ejection fraction with mild aortic regurgitation and severe mitral regurgitation and about 1.8cm vegetations on aortic and mitral valves, no signs of aortic root abscess.  He is afebrile, WBC normal, and blood cultures positive for strep mutans on 7/31 x 2 and no growth on cultures since.    With his comorbidities, particularly his dementia and frailty, I agree with my colleague Dr. Duque that he is not an operative candidate.  Regarding endocarditis treatment, he also does not have  indications for surgery.  Therefore, I recommend medical management.  I have discussed the findings with Dr. Duque and he agrees.  Please contact me with questions.      Will Dumont MD  Cardiothoracic Surgery  [facility]

## 2020-08-18 NOTE — HPI
This is a 74 year old male with PMH of hypertension, ulcerative colitis, GERD, chronic bronchitis, autoimmune hemolytic anemia, mitral regurgitation, and diverticulitits transferred [place] [facility] [place] [facility] for subacute bacterial endocarditis in the setting of recent upper GI bleed with recommendation for cardiothoracic surgery consultation from [facility] cardiology. Patient has had multiple hospitalizations in the past 6 weeks. Extensive chart review was performed as patient is not a reliable historian. It appears in late July patient was hospitalized for HENRY and acute diverticulitis and was discharged home on oral antibiotics. 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 He was started on ceftriaxone and a PICC line was put in place and he was again discharged home. Patient then returned to the hospital on 8/14 due to melena and was found to have GI bleed. He underwent EGD with cauterization/clipping for acute gastritis. He remained on the ceftriaxone. During this hospital stay, TTE was performed which showed aortic and mitral vegetations both >1cm. This was subsequently confirmed on SILVANA. Cardiology was consulted and they recommended transfer [place] [facility] [place] cardiothoracic surgery consultation given patient may need double valve replacement.      On arrival to the floor, patient was not complaining of any acute issue. However patient is not an accurate historian as he did not recall his recent multiple hospitalizations. He does admit that he does not see a dentist regularly and that the last time he saw one was in the 1960s. He denies fevers, chills, diaphoresis, melena, hematochezia, abdominal pain, chest pain, and leg swelling. He does endorse a chronic cough and dyspnea ongoing for the past 8 months and occasional lightheadedness when rising from sitting to standing.

## 2020-08-18 NOTE — HPI
This is a 74 year old male with PMH of hypertension, ulcerative colitis, GERD, chronic bronchitis, autoimmune hemolytic anemia, mitral regurgitation, and diverticulitits transferred to [facility] from [facility] for subacute bacterial endocarditis in the setting of recent upper GI bleed with recommendation for cardiothoracic surgery consultation from [facility] cardiology. Patient has had multiple hospitalizations in the past 6 weeks. Extensive chart review was performed as patient is not a reliable historian. It appears in late July patient was hospitalized for HENRY and acute diverticulitis and was discharged home on oral antibiotics. 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 He was started on ceftriaxone and a PICC line was put in place and he was again discharged home. Patient then returned to the hospital on 8/14 due to melena and was found to have GI bleed. He underwent EGD with cauterization/clipping for acute gastritis. He remained on the ceftriaxone. During this hospital stay, TTE was performed which showed aortic and mitral vegetations both >1cm. This was subsequently confirmed on SILVANA. Cardiology was consulted and they recommended transfer to [facility] for cardiothoracic surgery consultation given patient may need double valve replacement.     On arrival to the floor, patient was not complaining of any acute issue. However patient is not an accurate historian as he did not recall his recent multiple hospitalizations. He does admit that he does not see a dentist regularly and that the last time he saw one was in the 1960s. He denies fevers, chills, diaphoresis, melena, hematochezia, abdominal pain, chest pain, and leg swelling. He does endorse a chronic cough and dyspnea ongoing for the past 8 months and occasional lightheadedness when rising from sitting to standing.

## 2020-08-18 NOTE — PLAN OF CARE
Patient remained in stable condition through shift. Remained free of falls and other injuries. Able to reposition self independently. AM labs collected and sent to lab per orders. Bed in locked and lowest position, call light in reach, all questions answered, declines any further needs at this time. Will continue to monitor.

## 2020-08-18 NOTE — H&P
[facility] Hospital Medicine  History & Physical    Patient Name: Lopez Savage  MRN: [id number]  Admission Date: 8/17/2020  Attending Physician: Arelis Rangel MD   Primary Care Provider: Prasanna Haywood MD    Hospital Medicine Team: [facility] HOSP MED 5 Shana Brady MD     Patient information was obtained from patient and ER records.     Subjective:     Principal Problem:Subacute endocarditis    Chief Complaint: No chief complaint on file.       HPI: This is a 74 year old male with PMH of hypertension, ulcerative colitis, GERD, chronic bronchitis, autoimmune hemolytic anemia, mitral regurgitation, and diverticulitits transferred to [facility] from [facility] for subacute bacterial endocarditis in the setting of recent upper GI bleed with recommendation for cardiothoracic surgery consultation from [facility]. Patient has had multiple hospitalizations in the past 6 weeks. Extensive chart review was performed as patient is not a reliable historian. It appears in late July patient was hospitalized for HENRY and acute diverticulitis and was discharged home on oral antibiotics. 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 He was started on ceftriaxone and a PICC line was put in place and he was again discharged home. Patient then returned to the hospital on 8/14 due to melena and was found to have GI bleed. He underwent EGD with cauterization/clipping for acute gastritis. He remained on the ceftriaxone. During this hospital stay, TTE was performed which showed aortic and mitral vegetations both >1cm. This was subsequently confirmed on SILVANA. Cardiology was consulted and they recommended transfer to [facility] for cardiothoracic surgery consultation given patient may need double valve replacement.     On arrival to the floor, patient was not complaining of any acute issue. However patient is not an accurate historian as he did not recall his recent multiple  hospitalizations. He does admit that he does not see a dentist regularly and that the last time he saw one was in the 1960s. He denies fevers, chills, diaphoresis, melena, hematochezia, abdominal pain, chest pain, and leg swelling. He does endorse a chronic cough and dyspnea ongoing for the past 8 months and occasional lightheadedness when rising from sitting to standing.         No new subjective & objective note has been filed under this hospital service since the last note was generated.    Assessment/Plan:     * Subacute endocarditis  History of mitral regurgitation. Found to have aortic and mitral vegetations found on TTE on 8/14. Patient was started on ceftriaxone 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 in the setting of acute diverticulitis. Last positive blood culture 7/31. Transferred to [facility] for CTS consultation per [facility] cardiology. Patient stable without s/s of acute HF. He has been on IV abx for about 2 weeks now, projected end date 8/31 (4 week course)    - continue ceftriaxone 2 g daily  - consultation to infectious disease  - will consider consultation to cardiothoracic surgery for evaluation of possible double valve replacement  - continue to monitor vitals       Acute gastritis  Patient with recent hospitalization for melena, s/p upper EGD with acute gastritis and cauterization/clipping. Presently denies further melena or hematochezia however HgB 7.3 on admission. Hemodynamically stable    - protonix 40 BID  - sucralafate  - 2 large bore IV  - transfuse Hgb >7       Streptococcal bacteremia  - see endocarditis      Mitral regurgitation  - see endocarditis      Chronic bronchitis  - continue albuterol nebulizer PRN    Essential hypertension  - continue home losartan    VTE Risk Mitigation (From admission, onward)         Ordered     IP VTE HIGH RISK PATIENT  Once      08/18/20 0039     Place sequential compression device  Until discontinued      08/18/20 0039                   Shana Brady,  MD  Department of Hospital Medicine   [facility]

## 2020-08-18 NOTE — ASSESSMENT & PLAN NOTE
This is a 74 year old male with PMH of hypertension, ulcerative colitis, GERD, chronic bronchitis, autoimmune hemolytic anemia, mitral regurgitation, and diverticulitits transferred to [facility] from [facility] for subacute bacterial endocarditis in the setting of recent upper GI bleed.     Wife reports starting in January of this year patient started becoming progressively more weak and coughing. Was treated for URI with steroids and Abx at an urgent care without resolution of symptoms. Saw an ENT and workup was negative. Continued with weakness and had 4 falls. At one point was so weak he could not even stand. Wife reports once the COVID pandemic began they did not really leave their house for several months. Eventually was diagnosed with autoimmune hemolytic anemia.     At the end of July patient was admitted to OSH for HENRY and diverticulitis. Was started on Abx and sent home. However, patient was called to reports back to hospital 8/5 as his blood cultures returned +. He was again discharged and developed melena. Admitted for EGD. Had upper GI bleed that was cauterized and clipped. States prior to this procedure he was having fecal incontinence but this has resolved.      TTE was done which showed 18mm vegetation on Mitral and Aortic valves with resultant severe MR and Mild AR. EF 65%. Blood cultures since 8/16 per chart review have been negative.     Patient still reports SOB with exertion. At home has been using a walker. Also with occasional lower extremity swelling, and dizziness. Denies chest pain, palpitations, prior strokes, seizures, blood clots, stents, or sternotomies. Quit smoking and drinking 50 years ago.     Currently on Abx with end date of 8/31 which would be a 4 week course. Awaiting ID input here. Continue with medical management, likely needs at least a 6 week course of Abx. Dr. Dumont to review and staff.

## 2020-08-18 NOTE — PLAN OF CARE
Problem: Occupational Therapy Goal  Goal: Occupational Therapy Goal  Outcome: Met     OT eval completed.  No further OT needed.

## 2020-08-18 NOTE — PLAN OF CARE
Problem: Physical Therapy Goal  Goal: Physical Therapy Goal  Description: Goals to be met by: 20     Patient will increase functional independence with mobility by performin. Sit to stand transfer with supervision  2. Bed to chair transfer with Supervision using LRAD  3. Gait  x 150 feet with Stand-by Assistance using LRAD.   4. Stand for 10 minutes with Stand-by Assistance using LRAD    Outcome: Ongoing, Progressing     Initial Evaluation performed and PT POC established this date  Suzanne Flaherty PT, DPT  2020

## 2020-08-18 NOTE — ASSESSMENT & PLAN NOTE
History of mitral regurgitation. Found to have aortic and mitral vegetations found on TTE on 8/14. Patient was started on ceftriaxone 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 in the setting of acute diverticulitis. Last positive blood culture 7/31. Transferred to [facility] for [place]TS consultation per [facility] cardiology. Patient stable without s/s of acute HF. He has been on IV abx for about 2 weeks now, projected end date 8/31 (4 week course)    - continue ceftriaxone 2 g daily  - CT surgery stating patient is not a surgical candidate  - consultation to infectious disease: recommending 6 weeks now given no surgical intervention (End date 9/14)  - continue to monitor vitals

## 2020-08-18 NOTE — PT/OT/SLP EVAL
Physical Therapy Evaluation    Patient Name:  Lopez Savage   MRN:  [id number]    Recommendations:     Discharge Recommendations:  home with home health   Discharge Equipment Recommendations:     Barriers to discharge: None    Assessment:     Lopez Savage is a 74 y.o. male admitted with a medical diagnosis of Subacute endocarditis.  He presents with the following impairments/functional limitations:  weakness, impaired endurance, impaired functional mobilty, gait instability, impaired balance, decreased lower extremity function. Pt is pleasant and agreeable to therapy evaluation this date. Pt reports progressive decline in endurance and functional strength over the last several weeks. Pt able to ambulate 30 ft to and from door x 2 trials with CGA and no AD. Pt had no LOB but with unsteady gait and pt is limited by fatigue and decreased endurance. Pt will continue to benefit from skilled PT services in order to improve functional independence and maximize return to PLOF. Pt will benefit from  PT after discharge.     Rehab Prognosis: Good; patient would benefit from acute skilled PT services to address these deficits and reach maximum level of function.    Recent Surgery: * No surgery found *      Plan:     During this hospitalization, patient to be seen 3 x/week to address the identified rehab impairments via gait training, therapeutic activities, therapeutic exercises, neuromuscular re-education and progress toward the following goals:    · Plan of Care Expires:  09/17/20    Subjective     Chief Complaint: wheezing, feeling short of breath  Patient/Family Comments/goals: to improve endurance and strength, be able to walk longer distances   Pain/Comfort:  · Pain Rating 1: 0/10  · Pain Rating Post-Intervention 1: 0/10    Patients cultural, spiritual, Mormonism conflicts given the current situation: no    Living Environment:  Pt lives in a [facility] with his wife and daughter and 0 ANNAMARIA. 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 He has a RW and single point cane but only uses them intermittently when feeling weak and short of breath. Pt enjoys spending time with his dog and would like to be able to take her on walks again.  Prior to admission, patients level of function was independent.  Equipment used at home: walker, rolling.  DME owned (not currently used): rolling walker and single point cane.  Upon discharge, patient will have assistance from wife and daughter 24/7 as needed.    Objective:     Communicated with RN prior to session.  Patient found supine with telemetry  upon PT entry to room.    General Precautions: Standard, fall   Orthopedic Precautions:N/A   Braces: N/A     Exams:  · Cognitive Exam:  Patient is oriented to Person, Place, Time and Situation  · Sensation:    · -       Intact  · RLE ROM: WFL  · RLE Strength: 4/5 grossly  · LLE ROM: WNL  · LLE Strength: 4/5 grossly    Functional Mobility:  · Bed Mobility:     · Supine to Sit: supervision  · Sit to Supine: supervision  · Transfers:     · Sit to Stand:  stand by assistance with no AD  · Bed > chair: SBA no AD  · Gait: 30' x 2 trials with CGA and no AD. Pt with unsteady gait but no LOB. Pt is limited in distance by fatigue.    Therapeutic Activities and Exercises:   Treatment & Education:  · Therapist provided facilitation and instruction of proper body mechanics, energy conservation, and fall prevention strategies during tasks listed above.  · Encouraged patient to sit up in bedside chair daily to increase OOB/activity tolerance.  · Instructed patient to call nursing staff  for assist with transfers.   · Educated patient on PT POC and answered all questions within PT scope of practice.  · Whiteboard updated       AM-PAC 6 CLICK MOBILITY  Total Score:19     Patient left up in chair with all lines intact, call button in reach and wife present.    GOALS:   Multidisciplinary Problems     Physical Therapy Goals        Problem: Physical  Therapy Goal    Goal Priority Disciplines Outcome Goal Variances Interventions   Physical Therapy Goal     PT, PT/OT Ongoing, Progressing     Description: Goals to be met by: 20     Patient will increase functional independence with mobility by performin. Sit to stand transfer with supervision  2. Bed to chair transfer with Supervision using LRAD  3. Gait  x 150 feet with Stand-by Assistance using LRAD.   4. Stand for 10 minutes with Stand-by Assistance using LRAD                     History:     Past Medical History:   Diagnosis Date    Acute pyelonephritis 2020    Allergy     Asthma     BPH (benign prostatic hyperplasia)     CHRONIC BRONCHITIS     GERD (gastroesophageal reflux disease)     HTN (hypertension)     HTN (hypertension) 10/15/2014    Joint pain     Mitral regurgitation     3-4+ under surveillance CT surgery    Mitral regurgitation     Mitral valve regurgitation     Obesity     Osteoarthritis of knee     Trouble in sleeping     Ulcerative colitis     Urinary tract infection     Valvular regurgitation        Past Surgical History:   Procedure Laterality Date    BONE MARROW BIOPSY Left 2020    Procedure: Biopsy-bone marrow;  Surgeon: Howard Matos MD;  Location: [facility] OR;  Service: Oncology;  Laterality: Left;    COLONOSCOPY N/A 10/11/2017    Procedure: COLONOSCOPY Golytely;  Surgeon: Shanae Duarte MD;  Location: [facility] ENDO;  Service: Endoscopy;  Laterality: N/A;    CYSTOSCOPY N/A 2018    Procedure: CYSTOSCOPY;  Surgeon: Adan Cuenca MD;  Location: [address];  Service: Urology;  Laterality: N/A;  1 hour    CYSTOSCOPY W/ RETROGRADES      with penile biopsy    DILATION OF URETHRA N/A 2018    Procedure: DILATION, URETHRA;  Surgeon: Adan Cuenca MD;  Location: [address];  Service: Urology;  Laterality: N/A;    ESOPHAGOGASTRODUODENOSCOPY N/A 2020    Procedure: EGD (ESOPHAGOGASTRODUODENOSCOPY);  Surgeon: Yoan Hamilton,  MD;  Location: [facility];  Service: Gastroenterology;  Laterality: N/A;    HERNIA REPAIR Right 1956    inguinal    JOINT REPLACEMENT Bilateral     knees    Penile Biopsy      TONSILLECTOMY      TOTAL KNEE ARTHROPLASTY Bilateral     URETHRAL MEATOPLASTY N/A 8/8/2018    Procedure: MEATOPLASTY, URETHRA;  Surgeon: Adan Cuenca MD;  Location: [address];  Service: Urology;  Laterality: N/A;       Time Tracking:     PT Received On: 08/18/20  PT Start Time: 1333     PT Stop Time: 1350  PT Total Time (min): 17 min     Billable Minutes: Evaluation 17      WILL BATRES, PT  08/18/2020

## 2020-08-18 NOTE — H&P
[facility] Hospital Medicine  History & Physical    Patient Name: Lopez Savage  MRN: [id number]  Admission Date: 8/17/2020  Attending Physician: Arelis Rangel MD   Primary Care Provider: Prasanna Haywood MD    Hospital Medicine Team: [facility] 5 Shana Brady MD     Patient information was obtained from patient and ER records.     Subjective:     Principal Problem:Subacute endocarditis    Chief Complaint: No chief complaint on file.       HPI: This is a 74 year old male with PMH of hypertension, ulcerative colitis, GERD, chronic bronchitis, autoimmune hemolytic anemia, mitral regurgitation, and diverticulitits [facility] for subacute bacterial endocarditis in the setting of recent upper GI bleed with recommendation for cardiothoracic surgery consultation from [facility]. Patient has had multiple hospitalizations in the past 6 weeks. Extensive chart review was performed as patient is not a reliable historian. It appears in late July patient was hospitalized for HENRY and acute diverticulitis and was discharged home on oral antibiotics. 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 He was started on ceftriaxone and a PICC line was put in place and he was again discharged home. Patient then returned to the hospital on 8/14 due to melena and was found to have GI bleed. He underwent EGD with cauterization/clipping for acute gastritis. He remained on the ceftriaxone. During this hospital stay, TTE was performed which showed aortic and mitral vegetations both >1cm. This was subsequently confirmed on SILVANA. Cardiology was consulted and they recommended [facility] cardiothoracic surgery consultation given patient may need double valve replacement.     On arrival to the floor, patient was not complaining of any acute issue. However patient is not an accurate historian as he did not recall his recent multiple  hospitalizations. He does admit that he does not see a dentist regularly and that the last time he saw one was in the 1960s. He denies fevers, chills, diaphoresis, melena, hematochezia, abdominal pain, chest pain, and leg swelling. He does endorse a chronic cough and dyspnea ongoing for the past 8 months and occasional lightheadedness when rising from sitting to standing.         Past Medical History:   Diagnosis Date    Acute pyelonephritis 7/31/2020    Allergy     Asthma     BPH (benign prostatic hyperplasia)     CHRONIC BRONCHITIS     GERD (gastroesophageal reflux disease)     HTN (hypertension)     HTN (hypertension) 10/15/2014    Joint pain     Mitral regurgitation     3-4+ under surveillance CT surgery    Mitral regurgitation     Mitral valve regurgitation     Obesity     Osteoarthritis of knee     Trouble in sleeping     Ulcerative colitis     Urinary tract infection     Valvular regurgitation        Past Surgical History:   Procedure Laterality Date    BONE MARROW BIOPSY Left 7/7/2020    Procedure: Biopsy-bone marrow;  Surgeon: Howard Matos MD;  Location: [facility];  Service: Oncology;  Laterality: Left;    COLONOSCOPY N/A 10/11/2017    Procedure: COLONOSCOPY Gol[place];  Surgeon: Shanae Duarte MD;  Location: [facility];  Service: Endoscopy;  Laterality: N/A;    CYSTOSCOPY N/A 8/8/2018    Procedure: CYSTOSCOPY;  Surgeon: Adan Cuenca MD;  Location: [facility] OR [address] [place] [address];  Service: Urology;  Laterality: N/A;  1 hour    CYSTOSCOPY W/ RETROGRADES      with penile biopsy    DILATION OF URETHRA N/A 8/8/2018    Procedure: DILATION, URETHRA;  Surgeon: Adan Cuenca MD;  Location: [facility] OR [address] [place] [address];  Service: Urology;  Laterality: N/A;    ESOPHAGOGASTRODUODENOSCOPY N/A 8/14/2020    Procedure: EGD (ESOPHAGOGASTRODUODENOSCOPY);  Surgeon: Yoan Hamilton MD;  Location: [facility];  Service: Gastroenterology;  Laterality: N/A;    HERNIA REPAIR Right 1956    inguinal    JOINT  REPLACEMENT Bilateral     knees    Penile Biopsy      TONSILLECTOMY      TOTAL KNEE ARTHROPLASTY Bilateral     URETHRAL MEATOPLASTY N/A 2018    Procedure: MEATOPLASTY, URETHRA;  Surgeon: Adan Cuenca MD;  Location: [facility] OR [address] [place] [address];  Service: Urology;  Laterality: N/A;       Review of patient's allergies indicates:   Allergen Reactions    Codeine Nausea Only    Tetanus vaccines and toxoid Hives and Rash       Current Facility-Administered Medications on File Prior to Encounter   Medication    [COMPLETED] cefTRIAXone (ROCEPHIN) 2 g/50 mL D5W IVPB    [DISCONTINUED] 0.9%  NaCl infusion (for blood administration)    [DISCONTINUED] 0.9%  NaCl infusion (for blood administration)    [DISCONTINUED] 0.9%  NaCl infusion (for blood administration)    [DISCONTINUED] 0.9%  NaCl infusion    [DISCONTINUED] cefTRIAXone (ROCEPHIN) 2 g/50 mL D5W IVPB    [DISCONTINUED] escitalopram oxalate tablet 10 mg    [DISCONTINUED] lidocaine (cardiac) injection    [DISCONTINUED] losartan tablet 50 mg    [DISCONTINUED] pantoprazole EC tablet 40 mg    [DISCONTINUED] phenylephrine injection    [DISCONTINUED] propofol (DIPRIVAN) 10 mg/mL infusion    [DISCONTINUED] propofol (DIPRIVAN) 10 mg/mL infusion    [DISCONTINUED] sodium chloride 0.9% flush 10 mL    [DISCONTINUED] sucralfate tablet 1 g     Current Outpatient Medications on File Prior to Encounter   Medication Sig    albuterol (PROVENTIL HFA) 90 mcg/actuation inhaler Inhale 2 puffs into the lungs every 6 (six) hours as needed for Wheezing or Shortness of Breath.    albuterol (PROVENTIL) 2.5 mg /3 mL (0.083 %) nebulizer solution Take 3 mLs (2.5 mg total) by nebulization every 6 (six) hours as needed for Wheezing. Rescue    betamethasone dipropionate (DIPROLENE) 0.05 % ointment Apply topically 2 (two) times daily.    [] cefTRIAXone (ROCEPHIN) 2 g/50 mL PgBk IVPB Inject 50 mLs (2 g total) into the vein every 24 hours as needed.    COZAAR 50 mg tablet Take 50 mg  by mouth once daily.     diclofenac sodium (VOLTAREN) 1 % Gel Apply 2 g topically 3 (three) times daily.    donepeziL (ARICEPT) 10 MG tablet Take 1 tablet (10 mg total) by mouth once daily. HOLD FOR ONE WEEK UNTIL ANTIBIOTIC COURSE IS COMPLETED.    escitalopram oxalate (LEXAPRO) 10 MG tablet Take 1 tablet (10 mg total) by mouth once daily.    ferrous gluconate (FERGON) 324 MG tablet Take 324 mg by mouth daily with breakfast.    fluticasone propionate (FLONASE) 50 mcg/actuation nasal spray 2 sprays (100 mcg total) by Each Nostril route once daily.    fluticasone-salmeterol diskus inhaler 250-50 mcg Inhale 1 puff into the lungs 2 (two) times daily. Controller    meclizine (ANTIVERT) 12.5 mg tablet Take 1 tablet (12.5 mg total) by mouth 3 (three) times daily.    montelukast (SINGULAIR) 10 mg tablet     multivit with iron,minerals (MULTI-VITAMINS WITH IRON ORAL) Take 1 tablet by mouth once daily.    omeprazole (PRILOSEC) 40 MG capsule Take 1 capsule (40 mg total) by mouth every morning.    ondansetron (ZOFRAN) 4 MG tablet Take 1 tablet (4 mg total) by mouth every 8 (eight) hours as needed for Nausea.    sucralfate (CARAFATE) 1 gram tablet Take 1 tablet (1 g total) by mouth 4 (four) times daily. for 14 days     Family History     Problem Relation (Age of Onset)    Bone cancer Mother    COPD Sister    Cancer Mother    Cataracts Mother    Glaucoma Mother    Heart disease Daughter    Kidney disease Father    Other Daughter (2), Daughter        Tobacco Use    Smoking status: Former Smoker     Packs/day: 2.00     Years: 6.00     Pack years: 12.00     Types: Cigarettes     Quit date: 1969     Years since quittin.6    Smokeless tobacco: Former User     Types: Chew    Tobacco comment: Quit all forms of tobacco in .   Substance and Sexual Activity    Alcohol use: No     Alcohol/week: 0.0 standard drinks     Comment: remote hx of heavy alcohol use    Drug use: No    Sexual activity: Not Currently      Partners: Female     Review of Systems   Constitutional: Positive for unexpected weight change. Negative for appetite change, chills, diaphoresis, fatigue and fever.   HENT: Positive for dental problem. Negative for drooling, facial swelling, mouth sores, sinus pain and trouble swallowing.    Eyes: Negative for visual disturbance.   Respiratory: Positive for cough and shortness of breath. Negative for chest tightness.    Cardiovascular: Negative for chest pain, palpitations and leg swelling.   Gastrointestinal: Negative for anal bleeding, blood in stool, constipation, diarrhea, nausea and vomiting.   Endocrine: Negative for polyuria.   Genitourinary: Negative for difficulty urinating and hematuria.   Musculoskeletal: Negative for myalgias.   Skin: Positive for pallor. Negative for color change.   Neurological: Positive for light-headedness. Negative for dizziness and numbness.   Hematological: Bruises/bleeds easily.   Psychiatric/Behavioral: Negative for agitation.     Objective:     Vital Signs (Most Recent):  Temp: 97.8 °F (36.6 °C) (08/18/20 0425)  Pulse: 88 (08/18/20 0500)  Resp: (!) 24 (08/18/20 0500)  BP: (!) 155/91 (08/18/20 0500)  SpO2: (!) 93 % (08/18/20 0500) Vital Signs (24h Range):  Temp:  [97 °F (36.1 °C)-98.5 °F (36.9 °C)] 97.8 °F (36.6 °C)  Pulse:  [73-92] 88  Resp:  [17-24] 24  SpO2:  [93 %-100 %] 93 %  BP: (102-155)/(55-91) 155/91        There is no height or weight on file to calculate BMI.    Physical Exam  Vitals signs and nursing note reviewed.   Constitutional:       General: He is not in acute distress.     Appearance: Normal appearance. He is obese. He is not ill-appearing, toxic-appearing or diaphoretic.   HENT:      Head: Normocephalic and atraumatic.      Mouth/Throat:      Mouth: Mucous membranes are moist.      Comments: Poor dentition  Eyes:      General: No scleral icterus.     Extraocular Movements: Extraocular movements intact.      Conjunctiva/sclera: Conjunctivae normal.       Pupils: Pupils are equal, round, and reactive to light.   Neck:      Musculoskeletal: Normal range of motion and neck supple. No neck rigidity.   Cardiovascular:      Rate and Rhythm: Normal rate and regular rhythm.      Heart sounds: No murmur.   Pulmonary:      Effort: Pulmonary effort is normal. No respiratory distress.      Breath sounds: Wheezing present. No rales.   Abdominal:      General: Abdomen is flat. Bowel sounds are normal. There is no distension.      Palpations: Abdomen is soft.      Tenderness: There is no abdominal tenderness. There is no guarding or rebound.   Musculoskeletal:         General: No swelling.      Right lower leg: No edema.      Left lower leg: No edema.   Lymphadenopathy:      Cervical: No cervical adenopathy.   Skin:     General: Skin is warm and dry.      Capillary Refill: Capillary refill takes less than 2 seconds.      Coloration: Skin is pale. Skin is not jaundiced.   Neurological:      General: No focal deficit present.      Mental Status: He is alert.   Psychiatric:         Mood and Affect: Mood normal.           CRANIAL NERVES     CN III, IV, VI   Pupils are equal, round, and reactive to light.       Significant Labs:   CBC:   Recent Labs   Lab 08/16/20  1736 08/17/20  0504 08/18/20  0329   WBC 13.09* 9.02 9.86   HGB 7.8* 7.8* 7.3*   HCT 24.5* 24.3* 24.3*    217 209     CMP:   Recent Labs   Lab 08/17/20  0504 08/18/20  0329   * 136   K 3.9 3.5    104   CO2 26 25   GLU 93 92   BUN 16 16   CREATININE 1.2 1.1   CALCIUM 8.8 8.7   PROT 6.1 6.0   ALBUMIN 2.4* 2.3*   BILITOT 0.4 0.3   ALKPHOS 44* 44*   AST 9* 10   ALT 5* 6*   ANIONGAP 5* 7*   EGFRNONAA 60 >60.0     Cardiac Markers: No results for input(s): CKMB, MYOGLOBIN, BNP, TROPISTAT in the last 48 hours.  Coagulation:   Recent Labs   Lab 08/18/20  0525   INR 0.9     Lactic Acid: No results for input(s): LACTATE in the last 48 hours.  Magnesium:   Recent Labs   Lab 08/17/20  0504 08/18/20  0329   MG 2.3 2.0      TSH: No results for input(s): TSH in the last 4320 hours.    Significant Imaging: I have reviewed all pertinent imaging results/findings within the past 24 hours.    Assessment/Plan:     * Subacute endocarditis  History of mitral regurgitation. Found to have aortic and mitral vegetations found on TTE on 8/14. Patient was started on ceftriaxone 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 in the setting of acute diverticulitis. Last positive blood culture 7/31. Transferred to [facility] for CTS consultation per [facility] cardiology. Patient stable without s/s of acute HF. He has been on IV abx for about 2 weeks now, projected end date 8/31 (4 week course)    - continue ceftriaxone 2 g daily  - consultation to infectious disease  - will consider consultation to cardiothoracic surgery for evaluation of possible double valve replacement  - continue to monitor vitals       Acute gastritis  Patient with recent hospitalization for melena, s/p upper EGD with acute gastritis and cauterization/clipping. Presently denies further melena or hematochezia however HgB 7.3 on admission. Hemodynamically stable    - protonix 40 BID  - sucralafate  - 2 large bore IV  - transfuse Hgb >7       Streptococcal bacteremia  - see endocarditis      Mitral regurgitation  - see endocarditis      Chronic bronchitis  - continue albuterol nebulizer PRN    Essential hypertension  - continue home losartan      VTE Risk Mitigation (From admission, onward)         Ordered     IP VTE HIGH RISK PATIENT  Once      08/18/20 0039     Place sequential compression device  Until discontinued      08/18/20 0039                   Shana Brady MD  Department of Hospital Medicine   [facility]

## 2020-08-18 NOTE — HPI
This is a 74 year old male with PMH of hypertension, ulcerative colitis, GERD, chronic bronchitis, autoimmune hemolytic anemia, mitral regurgitation, and diverticulitits transferred to [facility] from [facility] for subacute bacterial endocarditis in the setting of recent upper GI bleed with recommendation for cardiothoracic surgery consultation from [facility] cardiology. Patient has had multiple hospitalizations in the past 6 weeks. Extensive chart review was performed as patient was not a reliable historian. In late July patient was hospitalized for HENRY and acute diverticulitis and was discharged home on oral antibiotics. 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 He was started on ceftriaxone and a PICC line was put in place and he was again discharged home. Patient then returned to the hospital on 8/14 due to melena and was found to have GI bleed. He underwent EGD with cauterization/clipping for acute gastritis. He remained on the ceftriaxone. During this hospital stay, TTE was performed which showed aortic and mitral vegetations both >1cm. This was subsequently confirmed on SILVANA. Cardiology was consulted and they recommended transfer to [facility] for cardiothoracic surgery consultation given patient may need double valve replacement. Viktor had been seen by [facility] ID who recommend continuing CTX at 2 g q24h for a total course of 4 weeks from 8/3 with end date of 8/31    Patient transferred to [facility].  He remains on Ceftriaxone 2g IV q24.  He has been afebrile without leukocytosis.  ID consulted for recs. CTSx rec no intervention and conservative management.   The patient denies any recent fever, chills, or sweats.

## 2020-08-18 NOTE — PLAN OF CARE
CM met with patient for discharge planning.  Patient states he lives with his spouse and daughter in a one story house with no steps to enter.  Patient states he is normally independent with ADL's.  His wife provides him with assistance as needed when at home and will provide transport home.  Patient is current with [facility] for home antibiotics.  Plan is to discharge home with continued home health and antibiotics if needed.    Pharmacy:    [id number] - [id number], [place] - [address] [facility], [address]  [address] [facility], [address]  [id number] [address]  Phone: [phone] Fax: [phone]    PCP:  Prasanna Haywood MD    Payor: [employer] MEDICARE / Plan: [email or web address] 65 / Product Type: Medicare Advantage /      08/18/20 1424   Discharge Assessment   Assessment Type Discharge Planning Assessment   Confirmed/corrected address and phone number on facesheet? Yes   Assessment information obtained from? Patient   Expected Length of Stay (days) 3   Prior to hospitilization cognitive status: Alert/Oriented   Prior to hospitalization functional status: Independent   Current cognitive status: Alert/Oriented   Current Functional Status: Needs Assistance   Facility Arrived From: From [facility]   Lives With child(bianca), adult;spouse   Able to Return to Prior Arrangements yes   Is patient able to care for self after discharge? Unable to determine at this time (comments)   Patient's perception of discharge disposition home health   Readmission Within the Last 30 Days previous discharge plan unsuccessful   Patient currently being followed by outpatient case management? No   Patient currently receives any other outside agency services? No   Equipment Currently Used at Home walker, rolling   Do you have any problems affording any of your prescribed medications? No   Is the patient taking medications as prescribed? yes   Does the patient have transportation home? Yes   Transportation  Anticipated family or friend will provide   Does the patient receive services at the [facility]? No   Discharge Plan A Home Health   Discharge Plan B Skilled Nursing Facility   DME Needed Upon Discharge  none   Patient/Family in Agreement with Plan yes   Readmission Questionnaire   At the time of your discharge, did someone talk to you about what your health problems were? Yes   At the time of discharge, did someone talk to you about what to watch out for regarding worsening of your health problem? Yes   At the time of discharge, did someone talk to you about what to do if you experienced worsening of your health problem? Yes   At the time of discharge, did someone talk to you about which medication to take when you left the hospital and which ones to stop taking? Yes   At the time of discharge, did someone talk to you about when and where to follow up with a doctor after you left the hospital? Yes   How often do you need to have someone help you when you read instructions, pamphlets, or other written material from your doctor or pharmacy? Never   Do you have problems taking your medications as prescribed? No   Do you have any problems affording any of  your prescribed medications? No   Do you have problems obtaining/receiving your medications? No   Living Arrangements house   Does the patient have family/friends to help with healtcare needs after discharge? yes   Are you currently feeling confused? No   Are you currently having problems thinking? No   Are you currently having memory problems? No   Have you felt down, depressed, or hopeless? 0   Have you felt little interest or pleasure in doing things? 0

## 2020-08-18 NOTE — CONSULTS
[facility]  Infectious Disease  Consult Note    Patient Name: Lopez Savage  MRN: [id number]  Admission Date: 8/17/2020  Hospital Length of Stay: 1 days  Attending Physician: Arelis Rangel MD  Primary Care Provider: Prasanna Haywood MD     Isolation Status: No active isolations    Patient information was obtained from patient, spouse/SO and ER records.      Consults  Assessment/Plan:     * Subacute endocarditis  - Hx of strep mutans bacteremia - likely cause now with MV and ad AV valve vegetations  - Blood cultures cleared 8/3/20  - on ceftriaxone  - No intervention per CTSX - rec conservative treatment  - stable non septic    Plan:  1. Continue ceftriaxone 2g q24h  2. Rec 6 weeks (previously 4 weeks rec'd by [facility] ID) ceftriaxone from 1st negative cultures 8/3/20 especially since no surgical intervention - EOC  3. Weekly ESR, CBC, CMP and CRP while on Ceftriaxone  4. Will sign off - fu in ID clinic within 2 weeks    Infection:     Discharge antibiotics:   Ceftriaxone 2g IV q 24 hours     End date of IV antibiotics: 9/15/20    Weekly outpatient laboratory on Monday or Tuesday while on IV antibiotics.    CBC   CMP    ESR and CRP      If vancomycin trough is not at target (15-20) prior to discharge, the please perform vancomycin trough before their fourth outpatient dose.    Fax laboratory results to [facility] at [phone] with attn: Acosta Pereira PA-C MPH    Outpatient Infectious Diseases clinic follow up will be arranged and found in patient calendar.    Prior to discharge, please ensure the patient's follow-up has been scheduled.  If there is still no follow-up scheduled in Infectious Diseases clinic, please send an EPIC message to Lolis Stoll LPN in Infectious Diseases.              Streptococcal bacteremia  - see below      Thank you for your consult. I will sign off. Please contact us if you have any additional questions.    LIVAN Cervantes  Infectious  Disease  [facility]    Subjective:     Principal Problem: Subacute endocarditis    HPI: This is a 74 year old male with PMH of hypertension, ulcerative colitis, GERD, chronic bronchitis, autoimmune hemolytic anemia, mitral regurgitation, and diverticulitits transferred to [facility] from [facility] for subacute bacterial endocarditis in the setting of recent upper GI bleed with recommendation for cardiothoracic surgery consultation from [facility] cardiology. Patient has had multiple hospitalizations in the past 6 weeks. Extensive chart review was performed as patient was not a reliable historian. In late July patient was hospitalized for HENRY and acute diverticulitis and was discharged home on oral antibiotics. 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 He was started on ceftriaxone and a PICC line was put in place and he was again discharged home. Patient then returned to the hospital on 8/14 due to melena and was found to have GI bleed. He underwent EGD with cauterization/clipping for acute gastritis. He remained on the ceftriaxone. During this hospital stay, TTE was performed which showed aortic and mitral vegetations both >1cm. This was subsequently confirmed on SILVANA. Cardiology was consulted and they recommended transfer to [facility] for cardiothoracic surgery consultation given patient may need double valve replacement. Viktor had been seen by [facility] ID who recommend continuing CTX at 2 g q24h for a total course of 4 weeks from 8/3 with end date of 8/31    Patient transferred to [facility].  He remains on Ceftriaxone 2g IV q24.  He has been afebrile without leukocytosis.  ID consulted for recs. CTSx rec no intervention and conservative management.   The patient denies any recent fever, chills, or sweats.      Past Medical History:   Diagnosis Date    Acute pyelonephritis 7/31/2020    Allergy     Asthma     BPH (benign prostatic hyperplasia)     CHRONIC  BRONCHITIS     GERD (gastroesophageal reflux disease)     HTN (hypertension)     HTN (hypertension) 10/15/2014    Joint pain     Mitral regurgitation     3-4+ under surveillance CT surgery    Mitral regurgitation     Mitral valve regurgitation     Obesity     Osteoarthritis of knee     Trouble in sleeping     Ulcerative colitis     Urinary tract infection     Valvular regurgitation        Past Surgical History:   Procedure Laterality Date    BONE MARROW BIOPSY Left 7/7/2020    Procedure: Biopsy-bone marrow;  Surgeon: Howard Matos MD;  Location: [facility];  Service: Oncology;  Laterality: Left;    COLONOSCOPY N/A 10/11/2017    Procedure: COLONOSCOPY Golytely;  Surgeon: Shanae Duarte MD;  Location: [facility];  Service: Endoscopy;  Laterality: N/A;    CYSTOSCOPY N/A 8/8/2018    Procedure: CYSTOSCOPY;  Surgeon: Adan Cuenca MD;  Location: [address];  Service: Urology;  Laterality: N/A;  1 hour    CYSTOSCOPY W/ RETROGRADES      with penile biopsy    DILATION OF URETHRA N/A 8/8/2018    Procedure: DILATION, URETHRA;  Surgeon: Adan Cuenca MD;  Location: [facility] OR [address] [place] [address];  Service: Urology;  Laterality: N/A;    ESOPHAGOGASTRODUODENOSCOPY N/A 8/14/2020    Procedure: EGD (ESOPHAGOGASTRODUODENOSCOPY);  Surgeon: Yoan Hamilton MD;  Location: [facility];  Service: Gastroenterology;  Laterality: N/A;    HERNIA REPAIR Right 1956    inguinal    JOINT REPLACEMENT Bilateral     knees    Penile Biopsy      TONSILLECTOMY      TOTAL KNEE ARTHROPLASTY Bilateral     URETHRAL MEATOPLASTY N/A 8/8/2018    Procedure: MEATOPLASTY, URETHRA;  Surgeon: Adan Cuenca MD;  Location: [address];  Service: Urology;  Laterality: N/A;       Review of patient's allergies indicates:   Allergen Reactions    Codeine Nausea Only    Tetanus vaccines and toxoid Hives and Rash       Medications:  Medications Prior to Admission   Medication Sig    albuterol (PROVENTIL HFA) 90 mcg/actuation inhaler  Inhale 2 puffs into the lungs every 6 (six) hours as needed for Wheezing or Shortness of Breath.    albuterol (PROVENTIL) 2.5 mg /3 mL (0.083 %) nebulizer solution Take 3 mLs (2.5 mg total) by nebulization every 6 (six) hours as needed for Wheezing. Rescue    betamethasone dipropionate (DIPROLENE) 0.05 % ointment Apply topically 2 (two) times daily. (Patient taking differently: Apply topically 2 (two) times daily as needed. )    cefTRIAXone (ROCEPHIN) 2 g/50 mL PgBk IVPB Inject 50 mLs (2 g total) into the vein every 24 hours as needed.    COZAAR 50 mg tablet Take 50 mg by mouth once daily.     diclofenac sodium (VOLTAREN) 1 % Gel Apply 2 g topically 3 (three) times daily. (Patient taking differently: Apply 2 g topically 3 (three) times daily as needed (pain). )    escitalopram oxalate (LEXAPRO) 10 MG tablet Take 1 tablet (10 mg total) by mouth once daily.    fluticasone furoate-vilanteroL (BREO ELLIPTA) 100-25 mcg/dose diskus inhaler Inhale 1 puff into the lungs 2 (two) times a day. Controller    fluticasone propionate (FLONASE) 50 mcg/actuation nasal spray 2 sprays (100 mcg total) by Each Nostril route once daily. (Patient taking differently: 1 spray by Each Nostril route 2 (two) times a day. )    meclizine (ANTIVERT) 12.5 mg tablet Take 1 tablet (12.5 mg total) by mouth 3 (three) times daily. (Patient taking differently: Take 12.5 mg by mouth once daily. )    montelukast (SINGULAIR) 10 mg tablet Take 10 mg by mouth once daily.     omeprazole (PRILOSEC) 40 MG capsule Take 1 capsule (40 mg total) by mouth every morning.    ondansetron (ZOFRAN) 4 MG tablet Take 1 tablet (4 mg total) by mouth every 8 (eight) hours as needed for Nausea.    donepeziL (ARICEPT) 10 MG tablet Take 1 tablet (10 mg total) by mouth once daily. HOLD FOR ONE WEEK UNTIL ANTIBIOTIC COURSE IS COMPLETED.    fluticasone-salmeterol diskus inhaler 250-50 mcg Inhale 1 puff into the lungs 2 (two) times daily. Controller    sucralfate  (CARAFATE) 1 gram tablet Take 1 tablet (1 g total) by mouth 4 (four) times daily. for 14 days     Antibiotics (From admission, onward)    Start     Stop Route Frequency Ordered    20 0600  cefTRIAXone (ROCEPHIN) 2 g/50 mL D5W IVPB      -- IV Every 24 hours (non-standard times) 20 0039        Antifungals (From admission, onward)    None        Antivirals (From admission, onward)    None           Immunization History   Administered Date(s) Administered    Influenza 10/14/2008    Influenza - High Dose - PF (65 years and older) 10/15/2014, 10/27/2016, 2018, 10/11/2018, 10/02/2019    Influenza - Trivalent (ADULT) 10/14/2008    Influenza Split 10/14/2008    Pneumococcal Conjugate - 13 Valent 2019    Pneumococcal Polysaccharide - 23 Valent 10/14/2008, 2013    Tdap 2012       Family History     Problem Relation (Age of Onset)    Bone cancer Mother    COPD Sister    Cancer Mother    Cataracts Mother    Glaucoma Mother    Heart disease Daughter    Kidney disease Father    Other Daughter (2), Daughter        Social History     Socioeconomic History    Marital status:      Spouse name: Not on file    Number of children: Not on file    Years of education: Not on file    Highest education level: Not on file   Occupational History    Not on file   Social Needs    Financial resource strain: Not on file    Food insecurity     Worry: Not on file     Inability: Not on file    Transportation needs     Medical: Not on file     Non-medical: Not on file   Tobacco Use    Smoking status: Former Smoker     Packs/day: 2.00     Years: 6.00     Pack years: 12.00     Types: Cigarettes     Quit date: 1969     Years since quittin.6    Smokeless tobacco: Former User     Types: Chew    Tobacco comment: Quit all forms of tobacco in .   Substance and Sexual Activity    Alcohol use: No     Alcohol/week: 0.0 standard drinks     Comment: remote hx of heavy alcohol use    Drug  use: No    Sexual activity: Not Currently     Partners: Female   Lifestyle    Physical activity     Days per week: Not on file     Minutes per session: Not on file    Stress: Not on file   Relationships    Social connections     Talks on phone: Not on file     Gets together: Not on file     Attends Evangelical service: Not on file     Active member of club or organization: Not on file     Attends meetings of clubs or organizations: Not on file     Relationship status: Not on file   Other Topics Concern    Not on file   Social History Narrative    Not on file     Review of Systems   Constitutional: Negative for appetite change, chills, diaphoresis, fatigue, fever and unexpected weight change.   HENT: Negative for congestion, ear pain, sore throat and tinnitus.    Eyes: Negative for pain, redness and visual disturbance.   Respiratory: Negative for cough, shortness of breath and wheezing.    Cardiovascular: Negative for chest pain, palpitations and leg swelling.   Gastrointestinal: Negative for abdominal pain, constipation, diarrhea, rectal pain and vomiting.   Endocrine: Negative for cold intolerance and heat intolerance.   Genitourinary: Negative for dysuria, flank pain, frequency, hematuria and urgency.   Musculoskeletal: Negative for arthralgias, back pain, myalgias and neck pain.   Skin: Negative for rash.   Allergic/Immunologic: Negative for immunocompromised state.   Neurological: Negative for dizziness, light-headedness, numbness and headaches.   Hematological: Negative for adenopathy. Does not bruise/bleed easily.   Psychiatric/Behavioral: Negative for confusion and sleep disturbance. The patient is not nervous/anxious.      Objective:     Vital Signs (Most Recent):  Temp: 98.4 °F (36.9 °C) (08/18/20 1520)  Pulse: 92 (08/18/20 1532)  Resp: (!) 23 (08/18/20 1532)  BP: 118/69 (08/18/20 0900)  SpO2: 96 % (08/18/20 1532) Vital Signs (24h Range):  Temp:  [97 °F (36.1 °C)-98.7 °F (37.1 °C)] 98.4 °F (36.9  °C)  Pulse:  [87-92] 92  Resp:  [17-24] 23  SpO2:  [93 %-99 %] 96 %  BP: (118-155)/(63-91) 118/69        There is no height or weight on file to calculate BMI.    CrCl cannot be calculated (Unknown ideal weight.).    Physical Exam  Constitutional:       General: He is not in acute distress.     Appearance: He is well-developed. He is not diaphoretic.       HENT:      Head: Normocephalic and atraumatic.   Cardiovascular:      Rate and Rhythm: Normal rate and regular rhythm.      Heart sounds: Murmur present. No friction rub. No gallop.    Pulmonary:      Effort: Pulmonary effort is normal. No respiratory distress.      Breath sounds: Normal breath sounds. No wheezing or rales.   Abdominal:      General: Bowel sounds are normal. There is no distension.      Palpations: Abdomen is soft. There is no mass.      Tenderness: There is no abdominal tenderness. There is no guarding or rebound.   Skin:     General: Skin is warm and dry.   Neurological:      Mental Status: He is alert and oriented to person, place, and time.   Psychiatric:         Behavior: Behavior normal.         Significant Labs:   Blood Culture:   Recent Labs   Lab 07/31/20  1702 08/03/20  1148 08/03/20  1150 08/16/20  1251 08/16/20  1305   LABBLOO Gram stain ingrid bottle: Gram positive cocci in chains resembling Strep   Results called to and read back by: Marian Beltran RN 08/02/2020    04:28  Gram stain aer bottle: Gram positive cocci in chains resembling Strep   Positive results previously called 08/02/2020  08:07  STREPTOCOCCUS MUTANS* No growth after 5 days. No growth after 5 days. No Growth to date  No Growth to date No Growth to date  No Growth to date     CBC:   Recent Labs   Lab 08/17/20  0504 08/18/20  0329 08/18/20  1058   WBC 9.02 9.86 10.02   HGB 7.8* 7.3* 7.4*   HCT 24.3* 24.3* 23.9*    209 212     CMP:   Recent Labs   Lab 08/17/20  0504 08/18/20  0329   * 136   K 3.9 3.5    104   CO2 26 25   GLU 93 92   BUN 16 16    CREATININE 1.2 1.1   CALCIUM 8.8 8.7   PROT 6.1 6.0   ALBUMIN 2.4* 2.3*   BILITOT 0.4 0.3   ALKPHOS 44* 44*   AST 9* 10   ALT 5* 6*   ANIONGAP 5* 7*   EGFRNONAA 60 >60.0     All pertinent labs within the past 24 hours have been reviewed.    Significant Imaging: I have reviewed all pertinent imaging results/findings within the past 24 hours. Radiology Results (last 7 days)    ** No results found for the last 168 hours. **   Imaging History    2020  Date Procedure Name Status Accession Number Location   08/05/20 10:12 AM X-Ray Chest 1 View for PICC_Central line Final [id number] [facility]   07/31/20 06:28 PM CT Abdomen Pelvis  Without Contrast Final [id number] [facility]   07/31/20 04:27 PM X-Ray Chest AP Portable Final [id number] [facility]   07/31/20 12:00 AM CARDIAC MONITORING STRIPS Final     08/17/20 10:08 AM Transesophageal echo (SILVANA) Final [id number] [facility]   08/14/20 11:43 AM Echo Color Flow Doppler? Yes Final [id number] [facility]   08/17/20 09:56 AM Intra-Procedure Documentation Final [id number] [facility]

## 2020-08-18 NOTE — ASSESSMENT & PLAN NOTE
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 Continue ceftriaxone 2g q24h  2. Rec 6 weeks (previously 4 weeks rec'd by [facility] ID) ceftriaxone from 1st negative cultures 8/3/20 especially since no surgical intervention - EOC  3. Weekly ESR, CBC, CMP and CRP while on Ceftriaxone  4. Will sign off - fu in ID clinic within 2 weeks    Infection:     Discharge antibiotics:   Ceftriaxone 2g IV q 24 hours     End date of IV antibiotics: 9/15/20    Weekly outpatient laboratory on Monday or Tuesday while on IV antibiotics.    CBC   CMP    ESR and CRP      If vancomycin trough is not at target (15-20) prior to discharge, the please perform vancomycin trough before their fourth outpatient dose.    Fax laboratory results to [facility] at [phone] with attn: Acosta Pereira PA-C MPH    Outpatient Infectious Diseases clinic follow up will be arranged and found in patient calendar.    Prior to discharge, please ensure the patient's follow-up has been scheduled.  If there is still no follow-up scheduled in Infectious Diseases clinic, please send an EPIC message to Lolis Stoll LPN in Infectious Diseases.

## 2020-08-18 NOTE — SUBJECTIVE & OBJECTIVE
Past Medical History:   Diagnosis Date    Acute pyelonephritis 7/31/2020    Allergy     Asthma     BPH (benign prostatic hyperplasia)     CHRONIC BRONCHITIS     GERD (gastroesophageal reflux disease)     HTN (hypertension)     HTN (hypertension) 10/15/2014    Joint pain     Mitral regurgitation     3-4+ under surveillance CT surgery    Mitral regurgitation     Mitral valve regurgitation     Obesity     Osteoarthritis of knee     Trouble in sleeping     Ulcerative colitis     Urinary tract infection     Valvular regurgitation        Past Surgical History:   Procedure Laterality Date    BONE MARROW BIOPSY Left 7/7/2020    Procedure: Biopsy-bone marrow;  Surgeon: Howard Matos MD;  Location: [facility];  Service: Oncology;  Laterality: Left;    COLONOSCOPY N/A 10/11/2017    Procedure: COLONOSCOPY Golytely;  Surgeon: Shanae Duarte MD;  Location: [facility];  Service: Endoscopy;  Laterality: N/A;    CYSTOSCOPY N/A 8/8/2018    Procedure: CYSTOSCOPY;  Surgeon: Adan Cuenca MD;  Location: [address];  Service: Urology;  Laterality: N/A;  1 hour    CYSTOSCOPY W/ RETROGRADES      with penile biopsy    DILATION OF URETHRA N/A 8/8/2018    Procedure: DILATION, URETHRA;  Surgeon: Adan Cuenca MD;  Location: [facility] OR [address] [place] [address];  Service: Urology;  Laterality: N/A;    ESOPHAGOGASTRODUODENOSCOPY N/A 8/14/2020    Procedure: EGD (ESOPHAGOGASTRODUODENOSCOPY);  Surgeon: Yoan Hamilton MD;  Location: [facility];  Service: Gastroenterology;  Laterality: N/A;    HERNIA REPAIR Right 1956    inguinal    JOINT REPLACEMENT Bilateral     knees    Penile Biopsy      TONSILLECTOMY      TOTAL KNEE ARTHROPLASTY Bilateral     URETHRAL MEATOPLASTY N/A 8/8/2018    Procedure: MEATOPLASTY, URETHRA;  Surgeon: Adan Cuenca MD;  Location: [facility] OR [address] [place] [address];  Service: Urology;  Laterality: N/A;       Review of patient's allergies indicates:   Allergen Reactions    Codeine Nausea Only    Tetanus vaccines  and toxoid Hives and Rash       Medications:  Medications Prior to Admission   Medication Sig    albuterol (PROVENTIL HFA) 90 mcg/actuation inhaler Inhale 2 puffs into the lungs every 6 (six) hours as needed for Wheezing or Shortness of Breath.    albuterol (PROVENTIL) 2.5 mg /3 mL (0.083 %) nebulizer solution Take 3 mLs (2.5 mg total) by nebulization every 6 (six) hours as needed for Wheezing. Rescue    betamethasone dipropionate (DIPROLENE) 0.05 % ointment Apply topically 2 (two) times daily.    [] cefTRIAXone (ROCEPHIN) 2 g/50 mL PgBk IVPB Inject 50 mLs (2 g total) into the vein every 24 hours as needed.    COZAAR 50 mg tablet Take 50 mg by mouth once daily.     diclofenac sodium (VOLTAREN) 1 % Gel Apply 2 g topically 3 (three) times daily.    donepeziL (ARICEPT) 10 MG tablet Take 1 tablet (10 mg total) by mouth once daily. HOLD FOR ONE WEEK UNTIL ANTIBIOTIC COURSE IS COMPLETED.    escitalopram oxalate (LEXAPRO) 10 MG tablet Take 1 tablet (10 mg total) by mouth once daily.    ferrous gluconate (FERGON) 324 MG tablet Take 324 mg by mouth daily with breakfast.    fluticasone propionate (FLONASE) 50 mcg/actuation nasal spray 2 sprays (100 mcg total) by Each Nostril route once daily.    fluticasone-salmeterol diskus inhaler 250-50 mcg Inhale 1 puff into the lungs 2 (two) times daily. Controller    meclizine (ANTIVERT) 12.5 mg tablet Take 1 tablet (12.5 mg total) by mouth 3 (three) times daily.    montelukast (SINGULAIR) 10 mg tablet     multivit with iron,minerals (MULTI-VITAMINS WITH IRON ORAL) Take 1 tablet by mouth once daily.    omeprazole (PRILOSEC) 40 MG capsule Take 1 capsule (40 mg total) by mouth every morning.    ondansetron (ZOFRAN) 4 MG tablet Take 1 tablet (4 mg total) by mouth every 8 (eight) hours as needed for Nausea.    sucralfate (CARAFATE) 1 gram tablet Take 1 tablet (1 g total) by mouth 4 (four) times daily. for 14 days     Antibiotics (From admission, onward)    Start      Stop Route Frequency Ordered    20 0600  cefTRIAXone (ROCEPHIN) 2 g/50 mL D5W IVPB      -- IV Every 24 hours (non-standard times) 20 0039        Antifungals (From admission, onward)    None        Antivirals (From admission, onward)    None           Immunization History   Administered Date(s) Administered    Influenza 10/14/2008    Influenza - High Dose - PF (65 years and older) 10/15/2014, 10/27/2016, 2018, 10/11/2018, 10/02/2019    Influenza - Trivalent (ADULT) 10/14/2008    Influenza Split 10/14/2008    Pneumococcal Conjugate - 13 Valent 2019    Pneumococcal Polysaccharide - 23 Valent 10/14/2008, 2013    Tdap 2012       Family History     Problem Relation (Age of Onset)    Bone cancer Mother    COPD Sister    Cancer Mother    Cataracts Mother    Glaucoma Mother    Heart disease Daughter    Kidney disease Father    Other Daughter (2), Daughter        Social History     Socioeconomic History    Marital status:      Spouse name: Not on file    Number of children: Not on file    Years of education: Not on file    Highest education level: Not on file   Occupational History    Not on file   Social Needs    Financial resource strain: Not on file    Food insecurity     Worry: Not on file     Inability: Not on file    Transportation needs     Medical: Not on file     Non-medical: Not on file   Tobacco Use    Smoking status: Former Smoker     Packs/day: 2.00     Years: 6.00     Pack years: 12.00     Types: Cigarettes     Quit date: 1969     Years since quittin.6    Smokeless tobacco: Former User     Types: Chew    Tobacco comment: Quit all forms of tobacco in .   Substance and Sexual Activity    Alcohol use: No     Alcohol/week: 0.0 standard drinks     Comment: remote hx of heavy alcohol use    Drug use: No    Sexual activity: Not Currently     Partners: Female   Lifestyle    Physical activity     Days per week: Not on file     Minutes per  session: Not on file    Stress: Not on file   Relationships    Social connections     Talks on phone: Not on file     Gets together: Not on file     Attends Hoahaoism service: Not on file     Active member of club or organization: Not on file     Attends meetings of clubs or organizations: Not on file     Relationship status: Not on file   Other Topics Concern    Not on file   Social History Narrative    Not on file     Review of Systems   Constitutional: Negative for appetite change, chills, diaphoresis, fatigue, fever and unexpected weight change.   HENT: Negative for congestion, ear pain, sore throat and tinnitus.    Eyes: Negative for pain, redness and visual disturbance.   Respiratory: Negative for cough, shortness of breath and wheezing.    Cardiovascular: Negative for chest pain, palpitations and leg swelling.   Gastrointestinal: Negative for abdominal pain, constipation, diarrhea, rectal pain and vomiting.   Endocrine: Negative for cold intolerance and heat intolerance.   Genitourinary: Negative for dysuria, flank pain, frequency, hematuria and urgency.   Musculoskeletal: Negative for arthralgias, back pain, myalgias and neck pain.   Skin: Negative for rash.   Allergic/Immunologic: Negative for immunocompromised state.   Neurological: Negative for dizziness, light-headedness, numbness and headaches.   Hematological: Negative for adenopathy. Does not bruise/bleed easily.   Psychiatric/Behavioral: Negative for confusion and sleep disturbance. The patient is not nervous/anxious.      Objective:     Vital Signs (Most Recent):  Temp: 97.8 °F (36.6 °C) (08/18/20 0425)  Pulse: 91 (08/18/20 0918)  Resp: 18 (08/18/20 0918)  BP: (!) 155/91 (08/18/20 0500)  SpO2: 98 % (08/18/20 0918) Vital Signs (24h Range):  Temp:  [97 °F (36.1 °C)-98.5 °F (36.9 °C)] 97.8 °F (36.6 °C)  Pulse:  [73-92] 91  Resp:  [17-24] 18  SpO2:  [93 %-99 %] 98 %  BP: (102-155)/(55-91) 155/91        There is no height or weight on file to  calculate BMI.    CrCl cannot be calculated (Unknown ideal weight.).    Physical Exam  Constitutional:       General: He is not in acute distress.     Appearance: He is well-developed. He is not diaphoretic.   HENT:      Head: Normocephalic and atraumatic.   Cardiovascular:      Rate and Rhythm: Normal rate and regular rhythm.      Heart sounds: Normal heart sounds. No murmur. No friction rub. No gallop.    Pulmonary:      Effort: Pulmonary effort is normal. No respiratory distress.      Breath sounds: Normal breath sounds. No wheezing or rales.   Abdominal:      General: Bowel sounds are normal. There is no distension.      Palpations: Abdomen is soft. There is no mass.      Tenderness: There is no abdominal tenderness. There is no guarding or rebound.   Skin:     General: Skin is warm and dry.   Neurological:      Mental Status: He is alert and oriented to person, place, and time.   Psychiatric:         Behavior: Behavior normal.         Significant Labs:   Blood Culture:   Recent Labs   Lab 07/31/20  1702 08/03/20  1148 08/03/20  1150 08/16/20  1251 08/16/20  1305   LABBLOO Gram stain ingrid bottle: Gram positive cocci in chains resembling Strep   Results called to and read back by: Marian Beltran RN 08/02/2020    04:28  Gram stain aer bottle: Gram positive cocci in chains resembling Strep   Positive results previously called 08/02/2020  08:07  STREPTOCOCCUS MUTANS* No growth after 5 days. No growth after 5 days. No Growth to date  No Growth to date No Growth to date  No Growth to date     CBC:   Recent Labs   Lab 08/16/20  1736 08/17/20  0504 08/18/20  0329   WBC 13.09* 9.02 9.86   HGB 7.8* 7.8* 7.3*   HCT 24.5* 24.3* 24.3*    217 209     CMP:   Recent Labs   Lab 08/17/20  0504 08/18/20  0329   * 136   K 3.9 3.5    104   CO2 26 25   GLU 93 92   BUN 16 16   CREATININE 1.2 1.1   CALCIUM 8.8 8.7   PROT 6.1 6.0   ALBUMIN 2.4* 2.3*   BILITOT 0.4 0.3   ALKPHOS 44* 44*   AST 9* 10   ALT 5* 6*    ANIONGAP 5* 7*   EGFRNONAA 60 >60.0     Wound Culture: No results for input(s): LABAERO in the last 4320 hours.  All pertinent labs within the past 24 hours have been reviewed.    Significant Imaging: I have reviewed all pertinent imaging results/findings within the past 24 hours. Radiology Results (last 7 days)    ** No results found for the last 168 hours. **   Imaging History    2020  Date Procedure Name Status Accession Number Location   08/05/20 10:12 AM X-Ray Chest 1 View for PICC_Central line Final [id number] [facility]   07/31/20 06:28 PM CT Abdomen Pelvis  Without Contrast Final [id number] [facility]   07/31/20 04:27 PM X-Ray Chest AP Portable Final [id number] [facility]   07/31/20 12:00 AM CARDIAC MONITORING STRIPS Final     08/17/20 10:08 AM Transesophageal echo (SILVANA) Final [id number] [facility]   08/14/20 11:43 AM Echo Color Flow Doppler? Yes Final [id number] [facility]   08/17/20 09:56 AM Intra-Procedure Documentation Final [id number] [facility]

## 2020-08-18 NOTE — ASSESSMENT & PLAN NOTE
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 Continue ceftriaxone 2g q24h  2. Rec 6 weeks (previously 4 weeks rec'd by [facility]) ceftriaxone from 1st negative cultures 8/3/20 especially since no surgical intervention - EOC  3. Weekly ESR, CBC, CMP and CRP while on Ceftriaxone  4. Will sign off - fu in ID clinic within 2 weeks    Infection:     Discharge antibiotics:   Ceftriaxone 2g IV q 24 hours     End date of IV antibiotics: 9/15/20    Weekly outpatient laboratory on Monday or Tuesday while on IV antibiotics.    CBC   CMP    ESR and CRP      If vancomycin trough is not at target (15-20) prior to discharge, the please perform vancomycin trough before their fourth outpatient dose.    Fax laboratory results to [facility] at [phone] with attn: Acosta Pereira PA-C M[facility]    Outpatient Infectious Diseases clinic follow up will be arranged and found in patient calendar.    Prior to discharge, please ensure the patient's follow-up has been scheduled.  If there is still no follow-up scheduled in Infectious Diseases clinic, please send an EPIC message to Lolis Stoll LPN in Infectious Diseases.

## 2020-08-18 NOTE — SUBJECTIVE & OBJECTIVE
Past Medical History:   Diagnosis Date    Acute pyelonephritis 7/31/2020    Allergy     Asthma     BPH (benign prostatic hyperplasia)     CHRONIC BRONCHITIS     GERD (gastroesophageal reflux disease)     HTN (hypertension)     HTN (hypertension) 10/15/2014    Joint pain     Mitral regurgitation     3-4+ under surveillance CT surgery    Mitral regurgitation     Mitral valve regurgitation     Obesity     Osteoarthritis of knee     Trouble in sleeping     Ulcerative colitis     Urinary tract infection     Valvular regurgitation        Past Surgical History:   Procedure Laterality Date    BONE MARROW BIOPSY Left 7/7/2020    Procedure: Biopsy-bone marrow;  Surgeon: Howard Matos MD;  Location: [facility];  Service: Oncology;  Laterality: Left;    COLONOSCOPY N/A 10/11/2017    Procedure: COLONOSCOPY Golytely;  Surgeon: Shanae Duarte MD;  Location: [facility];  Service: Endoscopy;  Laterality: N/A;    CYSTOSCOPY N/A 8/8/2018    Procedure: CYSTOSCOPY;  Surgeon: Adan Cuenca MD;  Location: [address];  Service: Urology;  Laterality: N/A;  1 hour    CYSTOSCOPY W/ RETROGRADES      with penile biopsy    DILATION OF URETHRA N/A 8/8/2018    Procedure: DILATION, URETHRA;  Surgeon: Adan Cuenca MD;  Location: [facility] OR [address] [place] [address];  Service: Urology;  Laterality: N/A;    ESOPHAGOGASTRODUODENOSCOPY N/A 8/14/2020    Procedure: EGD (ESOPHAGOGASTRODUODENOSCOPY);  Surgeon: Yoan Hamilton MD;  Location: [facility];  Service: Gastroenterology;  Laterality: N/A;    HERNIA REPAIR Right 1956    inguinal    JOINT REPLACEMENT Bilateral     knees    Penile Biopsy      TONSILLECTOMY      TOTAL KNEE ARTHROPLASTY Bilateral     URETHRAL MEATOPLASTY N/A 8/8/2018    Procedure: MEATOPLASTY, URETHRA;  Surgeon: Adan Cuenca MD;  Location: [facility] OR [address] [place] [address];  Service: Urology;  Laterality: N/A;       Review of patient's allergies indicates:   Allergen Reactions    Codeine Nausea Only    Tetanus vaccines  and toxoid Hives and Rash       Medications:  Medications Prior to Admission   Medication Sig    albuterol (PROVENTIL HFA) 90 mcg/actuation inhaler Inhale 2 puffs into the lungs every 6 (six) hours as needed for Wheezing or Shortness of Breath.    albuterol (PROVENTIL) 2.5 mg /3 mL (0.083 %) nebulizer solution Take 3 mLs (2.5 mg total) by nebulization every 6 (six) hours as needed for Wheezing. Rescue    betamethasone dipropionate (DIPROLENE) 0.05 % ointment Apply topically 2 (two) times daily. (Patient taking differently: Apply topically 2 (two) times daily as needed. )    cefTRIAXone (ROCEPHIN) 2 g/50 mL PgBk IVPB Inject 50 mLs (2 g total) into the vein every 24 hours as needed.    COZAAR 50 mg tablet Take 50 mg by mouth once daily.     diclofenac sodium (VOLTAREN) 1 % Gel Apply 2 g topically 3 (three) times daily. (Patient taking differently: Apply 2 g topically 3 (three) times daily as needed (pain). )    escitalopram oxalate (LEXAPRO) 10 MG tablet Take 1 tablet (10 mg total) by mouth once daily.    fluticasone furoate-vilanteroL (BREO ELLIPTA) 100-25 mcg/dose diskus inhaler Inhale 1 puff into the lungs 2 (two) times a day. Controller    fluticasone propionate (FLONASE) 50 mcg/actuation nasal spray 2 sprays (100 mcg total) by Each Nostril route once daily. (Patient taking differently: 1 spray by Each Nostril route 2 (two) times a day. )    meclizine (ANTIVERT) 12.5 mg tablet Take 1 tablet (12.5 mg total) by mouth 3 (three) times daily. (Patient taking differently: Take 12.5 mg by mouth once daily. )    montelukast (SINGULAIR) 10 mg tablet Take 10 mg by mouth once daily.     omeprazole (PRILOSEC) 40 MG capsule Take 1 capsule (40 mg total) by mouth every morning.    ondansetron (ZOFRAN) 4 MG tablet Take 1 tablet (4 mg total) by mouth every 8 (eight) hours as needed for Nausea.    donepeziL (ARICEPT) 10 MG tablet Take 1 tablet (10 mg total) by mouth once daily. HOLD FOR ONE WEEK UNTIL ANTIBIOTIC  COURSE IS COMPLETED.    fluticasone-salmeterol diskus inhaler 250-50 mcg Inhale 1 puff into the lungs 2 (two) times daily. Controller    sucralfate (CARAFATE) 1 gram tablet Take 1 tablet (1 g total) by mouth 4 (four) times daily. for 14 days     Antibiotics (From admission, onward)    Start     Stop Route Frequency Ordered    20 0600  cefTRIAXone (ROCEPHIN) 2 g/50 mL D5W IVPB      -- IV Every 24 hours (non-standard times) 20 0039        Antifungals (From admission, onward)    None        Antivirals (From admission, onward)    None           Immunization History   Administered Date(s) Administered    Influenza 10/14/2008    Influenza - High Dose - PF (65 years and older) 10/15/2014, 10/27/2016, 2018, 10/11/2018, 10/02/2019    Influenza - Trivalent (ADULT) 10/14/2008    Influenza Split 10/14/2008    Pneumococcal Conjugate - 13 Valent 2019    Pneumococcal Polysaccharide - 23 Valent 10/14/2008, 2013    Tdap 2012       Family History     Problem Relation (Age of Onset)    Bone cancer Mother    COPD Sister    Cancer Mother    Cataracts Mother    Glaucoma Mother    Heart disease Daughter    Kidney disease Father    Other Daughter (2), Daughter        Social History     Socioeconomic History    Marital status:      Spouse name: Not on file    Number of children: Not on file    Years of education: Not on file    Highest education level: Not on file   Occupational History    Not on file   Social Needs    Financial resource strain: Not on file    Food insecurity     Worry: Not on file     Inability: Not on file    Transportation needs     Medical: Not on file     Non-medical: Not on file   Tobacco Use    Smoking status: Former Smoker     Packs/day: 2.00     Years: 6.00     Pack years: 12.00     Types: Cigarettes     Quit date: 1969     Years since quittin.6    Smokeless tobacco: Former User     Types: Chew    Tobacco comment: Quit all forms of tobacco in  1969.   Substance and Sexual Activity    Alcohol use: No     Alcohol/week: 0.0 standard drinks     Comment: remote hx of heavy alcohol use    Drug use: No    Sexual activity: Not Currently     Partners: Female   Lifestyle    Physical activity     Days per week: Not on file     Minutes per session: Not on file    Stress: Not on file   Relationships    Social connections     Talks on phone: Not on file     Gets together: Not on file     Attends Latter-day service: Not on file     Active member of club or organization: Not on file     Attends meetings of clubs or organizations: Not on file     Relationship status: Not on file   Other Topics Concern    Not on file   Social History Narrative    Not on file     Review of Systems   Constitutional: Negative for appetite change, chills, diaphoresis, fatigue, fever and unexpected weight change.   HENT: Negative for congestion, ear pain, sore throat and tinnitus.    Eyes: Negative for pain, redness and visual disturbance.   Respiratory: Negative for cough, shortness of breath and wheezing.    Cardiovascular: Negative for chest pain, palpitations and leg swelling.   Gastrointestinal: Negative for abdominal pain, constipation, diarrhea, rectal pain and vomiting.   Endocrine: Negative for cold intolerance and heat intolerance.   Genitourinary: Negative for dysuria, flank pain, frequency, hematuria and urgency.   Musculoskeletal: Negative for arthralgias, back pain, myalgias and neck pain.   Skin: Negative for rash.   Allergic/Immunologic: Negative for immunocompromised state.   Neurological: Negative for dizziness, light-headedness, numbness and headaches.   Hematological: Negative for adenopathy. Does not bruise/bleed easily.   Psychiatric/Behavioral: Negative for confusion and sleep disturbance. The patient is not nervous/anxious.      Objective:     Vital Signs (Most Recent):  Temp: 98.4 °F (36.9 °C) (08/18/20 1520)  Pulse: 92 (08/18/20 1532)  Resp: (!) 23 (08/18/20  1532)  BP: 118/69 (08/18/20 0900)  SpO2: 96 % (08/18/20 1532) Vital Signs (24h Range):  Temp:  [97 °F (36.1 °C)-98.7 °F (37.1 °C)] 98.4 °F (36.9 °C)  Pulse:  [87-92] 92  Resp:  [17-24] 23  SpO2:  [93 %-99 %] 96 %  BP: (118-155)/(63-91) 118/69        There is no height or weight on file to calculate BMI.    CrCl cannot be calculated (Unknown ideal weight.).    Physical Exam  Constitutional:       General: He is not in acute distress.     Appearance: He is well-developed. He is not diaphoretic.       HENT:      Head: Normocephalic and atraumatic.   Cardiovascular:      Rate and Rhythm: Normal rate and regular rhythm.      Heart sounds: Murmur present. No friction rub. No gallop.    Pulmonary:      Effort: Pulmonary effort is normal. No respiratory distress.      Breath sounds: Normal breath sounds. No wheezing or rales.   Abdominal:      General: Bowel sounds are normal. There is no distension.      Palpations: Abdomen is soft. There is no mass.      Tenderness: There is no abdominal tenderness. There is no guarding or rebound.   Skin:     General: Skin is warm and dry.   Neurological:      Mental Status: He is alert and oriented to person, place, and time.   Psychiatric:         Behavior: Behavior normal.         Significant Labs:   Blood Culture:   Recent Labs   Lab 07/31/20  1702 08/03/20  1148 08/03/20  1150 08/16/20  1251 08/16/20  1305   LABBLOO Gram stain ingrid bottle: Gram positive cocci in chains resembling Strep   Results called to and read back by: Marian Beltran RN 08/02/2020    04:28  Gram stain aer bottle: Gram positive cocci in chains resembling Strep   Positive results previously called 08/02/2020  08:07  STREPTOCOCCUS MUTANS* No growth after 5 days. No growth after 5 days. No Growth to date  No Growth to date No Growth to date  No Growth to date     CBC:   Recent Labs   Lab 08/17/20  0504 08/18/20  0329 08/18/20  1058   WBC 9.02 9.86 10.02   HGB 7.8* 7.3* 7.4*   HCT 24.3* 24.3* 23.9*   PLT  217 209 212     CMP:   Recent Labs   Lab 08/17/20  0504 08/18/20  0329   * 136   K 3.9 3.5    104   CO2 26 25   GLU 93 92   BUN 16 16   CREATININE 1.2 1.1   CALCIUM 8.8 8.7   PROT 6.1 6.0   ALBUMIN 2.4* 2.3*   BILITOT 0.4 0.3   ALKPHOS 44* 44*   AST 9* 10   ALT 5* 6*   ANIONGAP 5* 7*   EGFRNONAA 60 >60.0     All pertinent labs within the past 24 hours have been reviewed.    Significant Imaging: I have reviewed all pertinent imaging results/findings within the past 24 hours. Radiology Results (last 7 days)    ** No results found for the last 168 hours. **   Imaging History    2020  Date Procedure Name Status Accession Number Location   08/05/20 10:12 AM X-Ray Chest 1 View for PICC_Central line Final [id number] [facility]   07/31/20 06:28 PM CT Abdomen Pelvis  Without Contrast Final [id number] [facility]   07/31/20 04:27 PM X-Ray Chest AP Portable Final [id number] [facility]   07/31/20 12:00 AM CARDIAC MONITORING STRIPS Final     08/17/20 10:08 AM Transesophageal echo (SILVANA) Final [id number] [facility]   08/14/20 11:43 AM Echo Color Flow Doppler? Yes Final [id number] [facility]   08/17/20 09:56 AM Intra-Procedure Documentation Final [id number] [facility]

## 2020-08-19 PROBLEM — K92.2 ACUTE UPPER GI BLEED: Status: ACTIVE | Noted: 2020-01-01

## 2020-08-19 NOTE — PLAN OF CARE
SW faxed referral to [facility] for HH and IV ABX auth via  for review. [facility] will continue to follow.      08/19/20 2033   Post-Acute Status   Post-Acute Authorization Medications;Home Health   Home Health Status Pending Payor Review   Medication Status Pending Prior Authorization       Jordyn Parson LMSW   - [facility]  Ext. [phone]

## 2020-08-19 NOTE — HOSPITAL COURSE
Patient admitted to hospital medicine AM of 8/18.  Evaluated by cardiothoracic surgery and determined to be too high risk for surgery and to continue antibiotic management. Patient with episode of melena AM of 8/19, transfused 1u pRBC's for symptomatic anemia and active bleeding. GI consulted and patient started on IV ppi bid.   EGD with gastritis with hemorrhage on 8/20.

## 2020-08-19 NOTE — MEDICAL/APP STUDENT
Progress Note  Hospital Medicine    [place] [facility]  Admit Date: 8/17/2020   Length of Stay:  LOS: 2 days   SUBJECTIVE:   Reason for Admission:  Subacute endocarditis    HPI/Interval history:    Mr. Lopez Savage is a 73 y/o male transferred from [facility] for evaluation by CTS at [facility] for subacute bacterial endocarditis found after a recent upper GI bleed.     On a prior admission on 8/5, he was admitted to the hospital due to blood cultures showing Strep. Mutans growth collected in a prior hospital visit. A PICC was placed and ceftriaxone was started and discharged home. He then returned to the hospital on 8/14 due to melena and was found to have a GI bleed. EGD was performed and cauterization/clipping was performed. He had a TTE performed showing aortic and mitral vegetations >1cm confirmed with a SILVANA.     Hospital Course: CTS surgery did not recommend surgical intervention. ID following for medical management of endocarditis. Last positive cultures were on 7/31 on current Ceftriaxone.     Interval History: Patient had another black, tarry BM. Vitals stable. Patient also describes some fatigue.     Review of Systems:  Review of Systems   Constitutional: Positive for malaise/fatigue. Negative for chills and fever.   HENT: Negative for nosebleeds and sore throat.    Respiratory: Negative for shortness of breath, wheezing and stridor.    Cardiovascular: Negative for chest pain, palpitations and leg swelling.   Gastrointestinal: Negative for abdominal pain, blood in stool, constipation, diarrhea, nausea and vomiting.   Genitourinary: Negative for dysuria, flank pain, frequency and hematuria.   Musculoskeletal: Negative for back pain, joint pain and neck pain.   Neurological: Negative for dizziness, focal weakness, weakness and headaches.   Endo/Heme/Allergies: Does not bruise/bleed easily.   Psychiatric/Behavioral: Negative for memory loss. The patient does not have insomnia.         OBJECTIVE:     Temp:   [97 °F (36.1 °C)-98.4 °F (36.9 °C)]   Pulse:  [82-96]   Resp:  [19-27]   BP: (109-132)/(57-77)   SpO2:  [95 %-98 %]  There is no height or weight on file to calculate BMI.  Intake/Outake:  This Shift:  No intake/output data recorded.    Net I/O past 24h:   No intake or output data in the 24 hours ending 08/19/20 1533          Physical Exam:  Physical Exam   Constitutional: He is oriented to person, place, and time and well-developed, well-nourished, and in no distress.   HENT:   Head: Normocephalic and atraumatic.   Neck: Normal range of motion. Neck supple. No JVD present.   Cardiovascular: Normal rate and regular rhythm.   Systolic murmur   Pulmonary/Chest: Effort normal and breath sounds normal. No stridor. No respiratory distress. He has no wheezes.   Abdominal: Soft. Bowel sounds are normal. He exhibits no distension. There is no abdominal tenderness. There is no rebound.   Musculoskeletal: Normal range of motion.         General: No deformity or edema.   Neurological: He is alert and oriented to person, place, and time.   Skin: Skin is warm and dry.   Psychiatric: Affect normal.       Laboratory:  CBC/Anemia Labs: Coags:    Recent Labs   Lab 08/13/20  1657  08/18/20  1058 08/18/20  1618 08/18/20  1702 08/19/20  0936   WBC  --    < > 10.02  --  10.47 10.84   HGB  --    < > 7.4*  --  7.6* 7.6*   HCT  --    < > 23.9*  --  25.3* 24.8*   PLT  --    < > 212  --  226 222   MCV  --    < > 91  --  93 94   RDW  --    < > 16.5*  --  16.5* 16.5*   FOLATE  --   --   --  3.6*  --   --    OCCULTBLOOD Positive*  --   --   --   --   --     < > = values in this interval not displayed.    Recent Labs   Lab 08/13/20  1630 08/18/20  0525   INR 1.0 0.9        Chemistries:   Recent Labs   Lab 08/17/20  0504 08/18/20  0329 08/19/20  0521   * 136 135*   K 3.9 3.5 3.7    104 104   CO2 26 25 26   BUN 16 16 15   CREATININE 1.2 1.1 1.1   CALCIUM 8.8 8.7 8.7   PROT 6.1 6.0 6.1   BILITOT 0.4 0.3 0.3   ALKPHOS 44* 44* 45*   ALT  5* 6* 5*   AST 9* 10 9*   MG 2.3 2.0 2.0   PHOS  --  4.2 4.6*        Medications:  Scheduled Meds:   cefTRIAXone  2 g Intravenous Q24H    escitalopram oxalate  10 mg Oral Daily    fluticasone furoate-vilanteroL  1 puff Inhalation Daily    levalbuterol  0.63 mg Nebulization Q8H    lidocaine  1 patch Transdermal Q24H    meclizine  12.5 mg Oral TID    pantoprazole  40 mg Intravenous BID    sucralfate  1 g Oral QID                             Continuous Infusions:  PRN Meds:.sodium chloride, acetaminophen, albuterol, dextrose 50%, dextrose 50%, glucagon (human recombinant), glucose, glucose, ondansetron, sodium chloride 0.9%     ASSESSMENT/PLAN:     Active Hospital Problems    Diagnosis  POA    *Subacute endocarditis [I33.9]  Yes    Acute gastritis [K29.00]  Yes    Streptococcal bacteremia [R78.81, B95.5]  Yes    Mitral regurgitation [I34.0]  Yes    Chronic bronchitis [J42]  Yes     Concern that HF may be contributory  Await Echo  BNP negative  CT rather unremarkable      Essential hypertension [I10]  Yes      Resolved Hospital Problems   No resolved problems to display.     Infective Endocarditis  TTE showed aortic and mitral vegetations. Started on ceftriaxone on 8/5 for Strep mutans bacteremia. Last positive BCx on 7/31.     - continue ceftriaxone 2g qd  - appreciate ID recs  - continue to monitor vitals.       Upper GI Bleed  History of GI bleed. Last EGD 8/14. Showed acute gastritis. Hemodynamically stable.     - 2 Large Bore IVs  - IV fluids  - Omeprazole 40 mg BID  - consult GI  - transfuse pRBC if Hgb <7  - q8 CBCs      Strep Bacteremia  - see endocarditis    Essential Hypertension  - continue home losartan      DVT ppx- hold all anticoagulation until bleeding resolves  CODE Status- FULL    Dispo- planned to d/c home deferred    Akin Downey MS4

## 2020-08-19 NOTE — SUBJECTIVE & OBJECTIVE
"Interval History: No acute events overnight. Patient reports subjective weakness and not "feeling well." Episode of melena this AM, seen by primary team and nursing. Started on IV PPI, Hg stable but transfused 1u pRBCs for symptomatic anemia and active bleed.    Review of Systems   Constitutional: Positive for unexpected weight change. Negative for appetite change, chills, diaphoresis, fatigue and fever.   HENT: Positive for dental problem. Negative for drooling, facial swelling, mouth sores, sinus pain and trouble swallowing.    Eyes: Negative for visual disturbance.   Respiratory: Positive for cough and shortness of breath. Negative for chest tightness.    Cardiovascular: Negative for chest pain, palpitations and leg swelling.   Gastrointestinal: Positive for blood in stool. Negative for anal bleeding, constipation, diarrhea, nausea and vomiting.   Endocrine: Negative for polyuria.   Genitourinary: Negative for difficulty urinating and hematuria.   Musculoskeletal: Negative for myalgias.   Skin: Positive for pallor. Negative for color change.   Neurological: Positive for light-headedness. Negative for dizziness and numbness.   Hematological: Bruises/bleeds easily.   Psychiatric/Behavioral: Negative for agitation.     Objective:     Vital Signs (Most Recent):  Temp: 98 °F (36.7 °C) (08/19/20 1636)  Pulse: 89 (08/19/20 1636)  Resp: (!) 22 (08/19/20 1636)  BP: 133/63 (08/19/20 1636)  SpO2: 97 % (08/19/20 1636) Vital Signs (24h Range):  Temp:  [97 °F (36.1 °C)-98.4 °F (36.9 °C)] 98 °F (36.7 °C)  Pulse:  [82-96] 89  Resp:  [19-27] 22  SpO2:  [95 %-98 %] 97 %  BP: (101-133)/(51-77) 133/63        There is no height or weight on file to calculate BMI.  No intake or output data in the 24 hours ending 08/19/20 1704   Physical Exam  Vitals signs and nursing note reviewed.   Constitutional:       General: He is not in acute distress.     Appearance: Normal appearance. He is obese. He is not ill-appearing, toxic-appearing or " diaphoretic.   HENT:      Head: Normocephalic and atraumatic.      Mouth/Throat:      Mouth: Mucous membranes are moist.      Comments: Poor dentition  Eyes:      General: No scleral icterus.     Extraocular Movements: Extraocular movements intact.      Conjunctiva/sclera: Conjunctivae normal.      Pupils: Pupils are equal, round, and reactive to light.   Neck:      Musculoskeletal: Normal range of motion and neck supple. No neck rigidity.   Cardiovascular:      Rate and Rhythm: Normal rate and regular rhythm.      Heart sounds: No murmur.   Pulmonary:      Effort: Pulmonary effort is normal. No respiratory distress.      Breath sounds: Wheezing present. No rales.   Abdominal:      General: Abdomen is flat. Bowel sounds are normal. There is no distension.      Palpations: Abdomen is soft.      Tenderness: There is no abdominal tenderness. There is no guarding or rebound.   Musculoskeletal:         General: No swelling.      Right lower leg: No edema.      Left lower leg: No edema.   Lymphadenopathy:      Cervical: No cervical adenopathy.   Skin:     General: Skin is warm and dry.      Capillary Refill: Capillary refill takes less than 2 seconds.      Coloration: Skin is pale. Skin is not jaundiced.   Neurological:      General: No focal deficit present.      Mental Status: He is alert.   Psychiatric:         Mood and Affect: Mood normal.         Significant Labs:   Blood Culture: No results for input(s): LABBLOO in the last 48 hours.  CBC:   Recent Labs   Lab 08/18/20  1058 08/18/20  1702 08/19/20  0936   WBC 10.02 10.47 10.84   HGB 7.4* 7.6* 7.6*   HCT 23.9* 25.3* 24.8*    226 222     CMP:   Recent Labs   Lab 08/18/20  0329 08/19/20  0521    135*   K 3.5 3.7    104   CO2 25 26   GLU 92 86   BUN 16 15   CREATININE 1.1 1.1   CALCIUM 8.7 8.7   PROT 6.0 6.1   ALBUMIN 2.3* 2.3*   BILITOT 0.3 0.3   ALKPHOS 44* 45*   AST 10 9*   ALT 6* 5*   ANIONGAP 7* 5*   EGFRNONAA >60.0 >60.0     All pertinent labs  within the past 24 hours have been reviewed.    Significant Imaging: I have reviewed all pertinent imaging results/findings within the past 24 hours.

## 2020-08-19 NOTE — ANESTHESIA POSTPROCEDURE EVALUATION
Anesthesia Post Evaluation    Patient: Lopez Savage    Procedure(s) Performed: Procedure(s) (LRB):  Transesophageal echo (SILVANA) intra-procedure log documentation (N/A)    Final Anesthesia Type: MAC    Patient location during evaluation: floor  Patient participation: Yes- Able to Participate  Level of consciousness: awake and alert  Post-procedure vital signs: reviewed and stable  Pain management: adequate  Airway patency: patent  ARTURO mitigation strategies: Multimodal analgesia  PONV status at discharge: No PONV  Anesthetic complications: no      Cardiovascular status: hemodynamically stable and blood pressure returned to baseline  Respiratory status: room air, unassisted and spontaneous ventilation  Hydration status: euvolemic  Follow-up not needed.          Vitals Value Taken Time   /57 08/19/20 1352   Temp 36.6 °C (97.9 °F) 08/19/20 1352   Pulse 86 08/19/20 1352   Resp 21 08/19/20 1352   SpO2 96 % 08/19/20 1352         No case tracking events are documented in the log.      Pain/Ja Score: Pain Rating Prior to Med Admin: 6 (8/19/2020 10:44 AM)

## 2020-08-19 NOTE — PLAN OF CARE
[facility] faxed referral to [facility] via  for review. [facility] will continue to follow.        08/19/20 1041   Post-Acute Status   Post-Acute Authorization Medications   Medication Status Rx fund Referral       2:18 PM  [facility] - Declined, patient is current with [facility]. [facility] faxed referral to [facility] via  for review. [facility] will continue to follow.     Jordyn Parson LMSW   - [facility]  Ext. [phone]

## 2020-08-19 NOTE — CONSULTS
[facility]  Gastroenterology  Consult Note    Patient Name: Lopez Savage  MRN: [id number]  Admission Date: 8/17/2020  Hospital Length of Stay: 2 days  Code Status: Full Code   Attending Provider: Arelis Rangel MD   Consulting Provider: Hillary Barnes MD  Primary Care Physician: Prasanna Haywood MD  Principal Problem:Subacute endocarditis    Inpatient consult to Gastroenterology  Consult performed by: Hillary Barnes MD  Consult ordered by: Yahir Roldan MD        Subjective:     HPI: Lopez Savage is a 74 y.o. male with history of HTN, GERD, chronic bronchitis, autoimmune hemolytic anemia, diverticulosis who was admitted as a transfer from [place] for surgical evaluation for endocarditis.  The patient reports that this morning he developed an episode of black tarry stool.  He denies any abdominal pain, hematochezia, nausea or vomiting.  Patient has a recent history of upper GI bleeding due to erosive gastritis for which he underwent an EGD on 08/14 with clips placement and cautery.  Melena resolved 2 days later and he has been having brown bowel movements until this morning when his stools look similar to when he had the GI bleeding in the past.  He has been on PPI and Carafate since his EGD.  GI was consulted for evaluation of melena.  Upon evaluation the patient was afebrile and hemodynamically stable.  Hgb has been stable around 7.5.  The stool in question appeared very dark greenish.      Past Medical History:   Diagnosis Date    Acute pyelonephritis 7/31/2020    Allergy     Asthma     BPH (benign prostatic hyperplasia)     CHRONIC BRONCHITIS     GERD (gastroesophageal reflux disease)     HTN (hypertension)     HTN (hypertension) 10/15/2014    Joint pain     Mitral regurgitation     3-4+ under surveillance CT surgery    Mitral regurgitation     Mitral valve regurgitation     Obesity     Osteoarthritis of knee     Trouble in sleeping     Ulcerative colitis      Urinary tract infection     Valvular regurgitation        Past Surgical History:   Procedure Laterality Date    BONE MARROW BIOPSY Left 7/7/2020    Procedure: Biopsy-bone marrow;  Surgeon: Howard Matos MD;  Location: [facility];  Service: Oncology;  Laterality: Left;    COLONOSCOPY N/A 10/11/2017    Procedure: COLONOSCOPY [place];  Surgeon: Shanae Duarte MD;  Location: [facility];  Service: Endoscopy;  Laterality: N/A;    CYSTOSCOPY N/A 8/8/2018    Procedure: CYSTOSCOPY;  Surgeon: Adan Cuenca MD;  Location: [address];  Service: Urology;  Laterality: N/A;  1 hour    CYSTOSCOPY W/ RETROGRADES      with penile biopsy    DILATION OF URETHRA N/A 8/8/2018    Procedure: DILATION, URETHRA;  Surgeon: Adan Cuenca MD;  Location: [address];  Service: Urology;  Laterality: N/A;    ESOPHAGOGASTRODUODENOSCOPY N/A 8/14/2020    Procedure: EGD (ESOPHAGOGASTRODUODENOSCOPY);  Surgeon: Yoan Hamilton MD;  Location: [facility];  Service: Gastroenterology;  Laterality: N/A;    HERNIA REPAIR Right 1956    inguinal    JOINT REPLACEMENT Bilateral     knees    Penile Biopsy      TONSILLECTOMY      TOTAL KNEE ARTHROPLASTY Bilateral     URETHRAL MEATOPLASTY N/A 8/8/2018    Procedure: MEATOPLASTY, URETHRA;  Surgeon: Adan Cuenca MD;  Location: [address];  Service: Urology;  Laterality: N/A;       Family History   Problem Relation Age of Onset    Bone cancer Mother     Glaucoma Mother     Cataracts Mother     Cancer Mother         bone cancer    Kidney disease Father     COPD Sister     Heart disease Daughter         congenital    Other Daughter 2        fibr. tumor on bronchial tube; lung removed    Other Daughter         Bronchitis    Melanoma Neg Hx     Prostate cancer Neg Hx     Macular degeneration Neg Hx     Retinal detachment Neg Hx     Strabismus Neg Hx     Amblyopia Neg Hx     Blindness Neg Hx        Social History     Socioeconomic History    Marital status:       Spouse name: Not on file    Number of children: Not on file    Years of education: Not on file    Highest education level: Not on file   Occupational History    Not on file   Social Needs    Financial resource strain: Not on file    Food insecurity     Worry: Not on file     Inability: Not on file    Transportation needs     Medical: Not on file     Non-medical: Not on file   Tobacco Use    Smoking status: Former Smoker     Packs/day: 2.00     Years: 6.00     Pack years: 12.00     Types: Cigarettes     Quit date: 1969     Years since quittin.6    Smokeless tobacco: Former User     Types: Chew    Tobacco comment: Quit all forms of tobacco in .   Substance and Sexual Activity    Alcohol use: No     Alcohol/week: 0.0 standard drinks     Comment: remote hx of heavy alcohol use    Drug use: No    Sexual activity: Not Currently     Partners: Female   Lifestyle    Physical activity     Days per week: Not on file     Minutes per session: Not on file    Stress: Not on file   Relationships    Social connections     Talks on phone: Not on file     Gets together: Not on file     Attends Mandaen service: Not on file     Active member of club or organization: Not on file     Attends meetings of clubs or organizations: Not on file     Relationship status: Not on file   Other Topics Concern    Not on file   Social History Narrative    Not on file       No current facility-administered medications on file prior to encounter.      Current Outpatient Medications on File Prior to Encounter   Medication Sig Dispense Refill    albuterol (PROVENTIL HFA) 90 mcg/actuation inhaler Inhale 2 puffs into the lungs every 6 (six) hours as needed for Wheezing or Shortness of Breath. 18 g 3    albuterol (PROVENTIL) 2.5 mg /3 mL (0.083 %) nebulizer solution Take 3 mLs (2.5 mg total) by nebulization every 6 (six) hours as needed for Wheezing. Rescue 1 Box 11    betamethasone dipropionate (DIPROLENE) 0.05  % ointment Apply topically 2 (two) times daily. (Patient taking differently: Apply topically 2 (two) times daily as needed. ) 45 g 3    [] cefTRIAXone (ROCEPHIN) 2 g/50 mL PgBk IVPB Inject 50 mLs (2 g total) into the vein every 24 hours as needed. 12 each 0    COZAAR 50 mg tablet Take 50 mg by mouth once daily.       diclofenac sodium (VOLTAREN) 1 % Gel Apply 2 g topically 3 (three) times daily. (Patient taking differently: Apply 2 g topically 3 (three) times daily as needed (pain). ) 1 Tube 0    escitalopram oxalate (LEXAPRO) 10 MG tablet Take 1 tablet (10 mg total) by mouth once daily. 90 tablet 3    fluticasone furoate-vilanteroL (BREO ELLIPTA) 100-25 mcg/dose diskus inhaler Inhale 1 puff into the lungs 2 (two) times a day. Controller      fluticasone propionate (FLONASE) 50 mcg/actuation nasal spray 2 sprays (100 mcg total) by Each Nostril route once daily. (Patient taking differently: 1 spray by Each Nostril route 2 (two) times a day. ) 9.9 mL 11    meclizine (ANTIVERT) 12.5 mg tablet Take 1 tablet (12.5 mg total) by mouth 3 (three) times daily. (Patient taking differently: Take 12.5 mg by mouth once daily. ) 270 tablet 3    montelukast (SINGULAIR) 10 mg tablet Take 10 mg by mouth once daily.       omeprazole (PRILOSEC) 40 MG capsule Take 1 capsule (40 mg total) by mouth every morning. 90 capsule 3    ondansetron (ZOFRAN) 4 MG tablet Take 1 tablet (4 mg total) by mouth every 8 (eight) hours as needed for Nausea. 20 tablet 2    donepeziL (ARICEPT) 10 MG tablet Take 1 tablet (10 mg total) by mouth once daily. HOLD FOR ONE WEEK UNTIL ANTIBIOTIC COURSE IS COMPLETED. 90 tablet 3    fluticasone-salmeterol diskus inhaler 250-50 mcg Inhale 1 puff into the lungs 2 (two) times daily. Controller 60 each 3    sucralfate (CARAFATE) 1 gram tablet Take 1 tablet (1 g total) by mouth 4 (four) times daily. for 14 days 56 tablet 0       Review of patient's allergies indicates:   Allergen Reactions    Codeine  Nausea Only    Tetanus vaccines and toxoid Hives and Rash       Review of Systems   Constitutional: Negative for chills and fever.   HENT: Negative.    Eyes: Negative.    Respiratory: Positive for shortness of breath.    Cardiovascular: Positive for chest pain.   Gastrointestinal: Positive for melena. Negative for abdominal pain, blood in stool, constipation, diarrhea, nausea and vomiting.   Genitourinary: Negative for dysuria.   Musculoskeletal: Negative.    Neurological: Negative.    Psychiatric/Behavioral: Negative.         Objective:     Vitals:    08/19/20 1407   BP: (P) 122/61   Pulse: (P) 82   Resp: (P) 20   Temp: (P) 97.8 °F (36.6 °C)       Constitutional:  not in acute distress and well developed  HENT: Head: Normal, normocephalic, atraumatic.  Eyes: conjunctiva clear and sclera nonicteric  Cardiovascular: regular rate and rhythm  Respiratory: normal chest expansion & respiratory effort   and no accessory muscle use  GI: soft, non-tender, without masses or organomegaly  Musculoskeletal: no muscular tenderness noted  Skin: normal color  Neurological: alert, oriented x3  Psychiatric: mood and affect are within normal limits, pt is a good historian; no memory problems were noted    Significant Labs:  Recent Labs   Lab 08/18/20  1058 08/18/20  1702 08/19/20  0936   HGB 7.4* 7.6* 7.6*       Lab Results   Component Value Date    WBC 10.84 08/19/2020    HGB 7.6 (L) 08/19/2020    HCT 24.8 (L) 08/19/2020    MCV 94 08/19/2020     08/19/2020       Lab Results   Component Value Date     (L) 08/19/2020    K 3.7 08/19/2020     08/19/2020    CO2 26 08/19/2020    BUN 15 08/19/2020    CREATININE 1.1 08/19/2020    CALCIUM 8.7 08/19/2020    ANIONGAP 5 (L) 08/19/2020    ESTGFRAFRICA >60.0 08/19/2020    EGFRNONAA >60.0 08/19/2020       Lab Results   Component Value Date    ALT 5 (L) 08/19/2020    AST 9 (L) 08/19/2020    ALKPHOS 45 (L) 08/19/2020    BILITOT 0.3 08/19/2020       Lab Results   Component Value  Date    INR 0.9 08/18/2020    INR 1.0 08/13/2020    INR 1.0 03/07/2013       Significant Imaging:  Reviewed pertinent radiology findings.       Assessment/Plan:     Lopez Savage is a 74 y.o. male with history of HTN, GERD, chronic bronchitis, autoimmune hemolytic anemia, diverticulosis who was admitted for management of endocarditis.  He developed melena for which GI was consulted.    Problem List:  1. Melena  2. Chronic anemia  3. Gastritis    Patient was a recurrence melena and stable Hgb.  Recent EGD on 08/14 showing gastritis that was treated with cautery and clips. In the setting of stable Hgb, suspect that active bleeding is unlikely however will continue to watch clinical progression and re-evaluate the need for endoscopic intervention.    Recommendations:  - no endoscopic intervention recommended at this time.  - trend daily Hgb.  - transfuse for Hgb <7  - p.o. PPI is sufficient, no indication for IV PPI at this time.  - advance diet as tolerated  - will re-evaluate on a daily basis the need for endoscopy.    Thank you for involving us in the care of Lopez Savage. Please call with any additional questions, concerns or changes in the patient's clinical status. We will continue to follow.    Hillary Barnes MD  Gastroenterology Fellow PGY IV   [facility]

## 2020-08-20 NOTE — TREATMENT PLAN
GI Treatment Plan    Lopez Savage is a 74 y.o. male admitted to hospital 8/17/2020 (Hospital Day: 4) due to Acute upper GI bleed.     Interval History  Patient one BM with dark brown stool. Hgb stable, responded appropriately to blood transfusion. No further bleeding.    Objective  Temp:  [97.5 °F (36.4 °C)-98.2 °F (36.8 °C)] 97.6 °F (36.4 °C) (08/20 0853)  Pulse:  [79-93] 82 (08/20 0853)  BP: (101-133)/(51-86) 123/80 (08/20 0853)  Resp:  [17-22] 21 (08/20 0853)  SpO2:  [94 %-98 %] 96 % (08/20 0853)    General: Alert, Oriented x3, no distress    Laboratory    Recent Labs   Lab 08/18/20  1702 08/19/20  0936 08/20/20  0608   HGB 7.6* 7.6* 8.1*  8.1*       Lab Results   Component Value Date    WBC 10.92 08/20/2020    WBC 10.92 08/20/2020    HGB 8.1 (L) 08/20/2020    HGB 8.1 (L) 08/20/2020    HCT 26.6 (L) 08/20/2020    HCT 26.6 (L) 08/20/2020    MCV 92 08/20/2020    MCV 92 08/20/2020     08/20/2020     08/20/2020       Lab Results   Component Value Date     (L) 08/20/2020    K 3.8 08/20/2020     08/20/2020    CO2 27 08/20/2020    BUN 14 08/20/2020    CREATININE 1.2 08/20/2020    CALCIUM 8.9 08/20/2020    ANIONGAP 5 (L) 08/20/2020    ESTGFRAFRICA >60.0 08/20/2020    EGFRNONAA 59.6 (A) 08/20/2020       Lab Results   Component Value Date    ALT 5 (L) 08/20/2020    AST 9 (L) 08/20/2020    ALKPHOS 48 (L) 08/20/2020    BILITOT 0.5 08/20/2020       Lab Results   Component Value Date    INR 0.9 08/18/2020    INR 1.0 08/13/2020    INR 1.0 03/07/2013     No further episodes of bleeding, Hgb stable. No further endoscopic procedures planned.     Plan  - Patient can be discharged from the GI standpoing  - Continue daily PPI   - Oral iron at home every day or every other day  - Maintain good bowel regimen while on iron  - Will follow up in clinic in 1 month with CBC prior  - We are signing-off. Please call with any questions.    Thank you for involving us in the care of Lopez Savage. Please call with  any additional questions, concerns or changes in the patient's clinical status.    Hillary Barnes MD  Gastroenterology Fellow

## 2020-08-20 NOTE — ASSESSMENT & PLAN NOTE
History of mitral regurgitation. Found to have aortic and mitral vegetations found on TTE on 8/14. Patient was started on ceftriaxone 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 in the setting of acute diverticulitis. Last positive blood culture 7/31. Transferred [place] [facility] [place] per [facility] cardiology. Patient stable without s/s of acute HF. He has been on IV abx for about 2 weeks now, projected end date 8/31 (4 week course)    - continue ceftriaxone 2 g daily  - CT surgery stating patient is not a surgical candidate  - consultation to infectious disease: recommending 6 weeks now given no surgical intervention (End date 9/14)  - continue to monitor vitals

## 2020-08-20 NOTE — PROGRESS NOTES
[facility] Hospital Medicine  Progress Note    Patient Name: Lopez Savage  MRN: [id number]  Patient Class: IP- Inpatient   Admission Date: 8/17/2020  Length of Stay: 3 days  Attending Physician: Arelis Rangel MD  Primary Care Provider: Prasanna Haywood MD    Hospital Medicine Team: [facility] HOSP MED 5 Sammie Crouch MD    Subjective:     Principal Problem:Acute upper GI bleed        HPI:  This is a 74 year old male with PMH of hypertension, ulcerative colitis, GERD, chronic bronchitis, autoimmune hemolytic anemia, mitral regurgitation, and diverticulitits transferred to [facility] from [facility] for subacute bacterial endocarditis in the setting of recent upper GI bleed with recommendation for cardiothoracic surgery consultation from [facility] cardiology. Patient has had multiple hospitalizations in the past 6 weeks. Extensive chart review was performed as patient is not a reliable historian. It appears in late July patient was hospitalized for HENRY and acute diverticulitis and was discharged home on oral antibiotics. 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 He was started on ceftriaxone and a PICC line was put in place and he was again discharged home. Patient then returned to the hospital on 8/14 due to melena and was found to have GI bleed. He underwent EGD with cauterization/clipping for acute gastritis. He remained on the ceftriaxone. During this hospital stay, TTE was performed which showed aortic and mitral vegetations both >1cm. This was subsequently confirmed on SILVANA. Cardiology was consulted and they recommended transfer to [facility] for cardiothoracic surgery consultation given patient may need double valve replacement.     On arrival to the floor, patient was not complaining of any acute issue. However patient is not an accurate historian as he did not recall his recent multiple hospitalizations. He does admit that he does not see a  dentist regularly and that the last time he saw one was in the 1960s. He denies fevers, chills, diaphoresis, melena, hematochezia, abdominal pain, chest pain, and leg swelling. He does endorse a chronic cough and dyspnea ongoing for the past 8 months and occasional lightheadedness when rising from sitting to standing.         Overview/Hospital Course:  Patient admitted to [place] [facility] medicine AM of 8/18. Evaluated by cardiothoracic surgery and determined to be too high risk for surgery and to continue antibiotic management. Patient with episode of melena AM of 8/19, transfused 1u pRBC's for symptomatic anemia and active bleeding. GI consulted and patient started on IV ppi bid. Plan for EGD on 8/20.    Interval History:   No acute events overnight. Patient with one BM with dark brown stools yesterday. Hgb is stable today after transfusion. Vital signs stable.    Review of Systems   Constitutional: Positive for unexpected weight change. Negative for appetite change, chills, diaphoresis, fatigue and fever.   HENT: Positive for dental problem. Negative for drooling, facial swelling, mouth sores, sinus pain and trouble swallowing.    Eyes: Negative for visual disturbance.   Respiratory: Positive for cough and shortness of breath. Negative for chest tightness.    Cardiovascular: Negative for chest pain, palpitations and leg swelling.   Gastrointestinal: Positive for blood in stool. Negative for anal bleeding, constipation, diarrhea, nausea and vomiting.   Endocrine: Negative for polyuria.   Genitourinary: Negative for difficulty urinating and hematuria.   Musculoskeletal: Negative for myalgias.   Skin: Positive for pallor. Negative for color change.   Neurological: Positive for light-headedness. Negative for dizziness and numbness.   Hematological: Bruises/bleeds easily.   Psychiatric/Behavioral: Negative for agitation.     Objective:     Vital Signs (Most Recent):  Temp: 96.3 °F (35.7 °C) (08/20/20 1212)  Pulse: 83  (08/20/20 1229)  Resp: 20 (08/20/20 1229)  BP: 127/82 (08/20/20 1229)  SpO2: 96 % (08/20/20 1229) Vital Signs (24h Range):  Temp:  [96.3 °F (35.7 °C)-98.2 °F (36.8 °C)] 96.3 °F (35.7 °C)  Pulse:  [79-93] 83  Resp:  [17-22] 20  SpO2:  [94 %-98 %] 96 %  BP: (101-133)/(51-86) 127/82        There is no height or weight on file to calculate BMI.    Intake/Output Summary (Last 24 hours) at 8/20/2020 1608  Last data filed at 8/19/2020 1800  Gross per 24 hour   Intake 240 ml   Output --   Net 240 ml      Physical Exam  Vitals signs and nursing note reviewed.   Constitutional:       General: He is not in acute distress.     Appearance: Normal appearance. He is obese. He is not ill-appearing, toxic-appearing or diaphoretic.   HENT:      Head: Normocephalic and atraumatic.      Mouth/Throat:      Mouth: Mucous membranes are moist.      Comments: Poor dentition  Eyes:      General: No scleral icterus.     Extraocular Movements: Extraocular movements intact.      Conjunctiva/sclera: Conjunctivae normal.      Pupils: Pupils are equal, round, and reactive to light.   Neck:      Musculoskeletal: Normal range of motion and neck supple. No neck rigidity.   Cardiovascular:      Rate and Rhythm: Normal rate and regular rhythm.      Heart sounds: No murmur.   Pulmonary:      Effort: Pulmonary effort is normal. No respiratory distress.      Breath sounds: Wheezing present. No rales.   Abdominal:      General: Abdomen is flat. Bowel sounds are normal. There is no distension.      Palpations: Abdomen is soft.      Tenderness: There is no abdominal tenderness. There is no guarding or rebound.   Musculoskeletal:         General: No swelling.      Right lower leg: No edema.      Left lower leg: No edema.   Lymphadenopathy:      Cervical: No cervical adenopathy.   Skin:     General: Skin is warm and dry.      Capillary Refill: Capillary refill takes less than 2 seconds.      Coloration: Skin is pale. Skin is not jaundiced.   Neurological:       "General: No focal deficit present.      Mental Status: He is alert.   Psychiatric:         Mood and Affect: Mood normal.            Significant Labs:   BMP:   Recent Labs   Lab 08/20/20  0608   GLU 91   *   K 3.8      CO2 27   BUN 14   CREATININE 1.2   CALCIUM 8.9   MG 2.0     CBC:   Recent Labs   Lab 08/18/20  1702 08/19/20  0936 08/20/20  0608   WBC 10.47 10.84 10.92  10.92   HGB 7.6* 7.6* 8.1*  8.1*   HCT 25.3* 24.8* 26.6*  26.6*    222 229  229       Significant Imaging: I have reviewed and interpreted all pertinent imaging results/findings within the past 24 hours.      Assessment/Plan:      * Acute upper GI bleed  Patient initially presented to OSH with melena. S/p EGD on 8/14 with gastritis and some questionable gastritis and clip placement in history. Patient states stools improved and was no longer having dark stools at time of [place] [facility] Episode of dark blood concerning for melena AM of 8/19. Patient hemodynamically stable, with stable hemoglobin.    Plan:  - GI consulted  - PPI IV bid  - Mantaining two large bore IV's  - Transfuse for Hg < 7 (transfused 1 unit on 8/19 due to symptomatic anemia and acute bleed). Hgb today 8.1 (8/20)  -Gastro determined that there wasn't any endoscopic intervention required.      Acute gastritis  Patient with recent hospitalization for melena, s/p upper EGD with acute gastritis and cauterization/clipping. Presently denies further melena or hematochezia however HgB 7.3 on admission. Hemodynamically stable    - please see "acute upper GI bleed" above    Subacute endocarditis  History of mitral regurgitation. Found to have aortic and mitral vegetations found on TTE on 8/14. Patient was started on ceftriaxone 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 in the setting of acute diverticulitis. Last positive blood culture 7/31. [place] [facility] [place] [facility] cardiology. Patient stable without s/s of acute HF. He has been on IV abx " for about 2 weeks now, projected end date 8/31 (4 week course)    - continue ceftriaxone 2 g daily  - CT surgery stating patient is not a surgical candidate  - consultation to infectious disease: recommending 6 weeks now given no surgical intervention (End date 9/14)  - continue to monitor vitals       Streptococcal bacteremia  - see endocarditis      Mitral regurgitation  - see endocarditis      Chronic bronchitis  - continue albuterol nebulizer PRN    Essential hypertension  - Holding home losartan pending evaluation for GI bleed      VTE Risk Mitigation (From admission, onward)         Ordered     IP VTE HIGH RISK PATIENT  Once      08/18/20 0039     Place sequential compression device  Until discontinued      08/18/20 0039                Discharge Planning   ANA: 8/21/2020     Code Status: Full Code   Is the patient medically ready for discharge?:     Reason for patient still in hospital (select all that apply): Patient trending condition  Discharge Plan A: Home Health                  Sammie Crouch MD  Department of Hospital Medicine   [facility]

## 2020-08-20 NOTE — MEDICAL/APP STUDENT
Progress Note  Hospital Medicine    Primary Team: [facility] 5  Admit Date: 8/17/2020   Length of Stay:  LOS: 3 days   SUBJECTIVE:   Reason for Admission:  Acute upper GI bleed    HPI/Interval history:    Mr. Lopez Savage is a 75 y/o male transferred from [facility] for evaluation by CTS at [facility] for subacute bacterial endocarditis found after a recent upper GI bleed.     On a prior admission on 8/5, he was admitted to the hospital due to blood cultures showing Strep. Mutans growth collected in a prior hospital visit. A PICC was placed and ceftriaxone was started and discharged home. He then returned to the hospital on 8/14 due to melena and was found to have a GI bleed. EGD was performed and cauterization/clipping was performed. He had a TTE performed showing aortic and mitral vegetations >1cm confirmed with a SILVANA.     Hospital Course: CTS surgery did not recommend surgical intervention. ID following for medical management of endocarditis. Last positive cultures were on 7/31 on current Ceftriaxone. On 8/19, patient had described dark stool. GI was consulted.    Interval History: Patient stated he had mixed black and normal stool last night. No other complaints    Review of Systems:  Review of Systems   Constitutional: Negative for chills, fever and malaise/fatigue.   HENT: Negative for nosebleeds and sore throat.    Respiratory: Negative for shortness of breath, wheezing and stridor.    Cardiovascular: Negative for chest pain, palpitations and leg swelling.   Gastrointestinal: Negative for abdominal pain, blood in stool, constipation, diarrhea, nausea and vomiting.   Genitourinary: Negative for dysuria, flank pain, frequency and hematuria.   Musculoskeletal: Negative for back pain, joint pain and neck pain.   Neurological: Negative for dizziness, focal weakness, weakness and headaches.   Endo/Heme/Allergies: Does not bruise/bleed easily.   Psychiatric/Behavioral: Negative for memory loss. The patient does not have  insomnia.         OBJECTIVE:     Temp:  [96.3 °F (35.7 °C)-98.2 °F (36.8 °C)]   Pulse:  [79-93]   Resp:  [17-22]   BP: (101-133)/(51-86)   SpO2:  [94 %-98 %]  There is no height or weight on file to calculate BMI.  Intake/[place]ake:  This Shift:  No intake/output data recorded.    Net I/O past 24h:     Intake/Output Summary (Last 24 hours) at 8/20/2020 1400  Last data filed at 8/19/2020 1800  Gross per 24 hour   Intake 240 ml   Output --   Net 240 ml             Physical Exam:  Physical Exam   Constitutional: He is oriented to person, place, and time and well-developed, well-nourished, and in no distress.   HENT:   Head: Normocephalic and atraumatic.   Neck: Normal range of motion. Neck supple. No JVD present.   Cardiovascular: Normal rate and regular rhythm.   Murmur heard.  Systolic murmur   Pulmonary/Chest: Effort normal and breath sounds normal. No stridor. No respiratory distress. He has no wheezes.   Abdominal: Soft. Bowel sounds are normal. He exhibits no distension. There is no abdominal tenderness. There is no rebound.   Musculoskeletal: Normal range of motion.         General: No deformity or edema.   Neurological: He is alert and oriented to person, place, and time.   Skin: Skin is warm and dry.   Psychiatric: Affect normal.       Laboratory:  CBC/Anemia Labs: Coags:    Recent Labs   Lab 08/13/20  1657  08/18/20  1618 08/18/20  1702 08/19/20  0936 08/20/20  0608   WBC  --    < >  --  10.47 10.84 10.92  10.92   HGB  --    < >  --  7.6* 7.6* 8.1*  8.1*   HCT  --    < >  --  25.3* 24.8* 26.6*  26.6*   PLT  --    < >  --  226 222 229  229   MCV  --    < >  --  93 94 92  92   RDW  --    < >  --  16.5* 16.5* 16.4*  16.4*   FOLATE  --   --  3.6*  --   --   --    OCCULTBLOOD Positive*  --   --   --   --   --     < > = values in this interval not displayed.    Recent Labs   Lab 08/13/20  1630 08/18/20  0525   INR 1.0 0.9        Chemistries:   Recent Labs   Lab 08/18/20  0329 08/19/20  0521 08/20/20  0608   NA  136 135* 134*   K 3.5 3.7 3.8    104 102   CO2 25 26 27   BUN 16 15 14   CREATININE 1.1 1.1 1.2   CALCIUM 8.7 8.7 8.9   PROT 6.0 6.1 6.8   BILITOT 0.3 0.3 0.5   ALKPHOS 44* 45* 48*   ALT 6* 5* 5*   AST 10 9* 9*   MG 2.0 2.0 2.0   PHOS 4.2 4.6*  --         Medications:  Scheduled Meds:   cefTRIAXone  2 g Intravenous Q24H    escitalopram oxalate  10 mg Oral Daily    fluticasone furoate-vilanteroL  1 puff Inhalation Daily    levalbuterol  0.63 mg Nebulization Q8H    lidocaine  1 patch Transdermal Q24H    meclizine  12.5 mg Oral TID    pantoprazole  40 mg Oral BID AC    sucralfate  1 g Oral QID                             Continuous Infusions:  PRN Meds:.sodium chloride, acetaminophen, albuterol, dextrose 50%, dextrose 50%, glucagon (human recombinant), glucose, glucose, ondansetron, sodium chloride 0.9%     ASSESSMENT/PLAN:     Active Hospital Problems    Diagnosis  POA    *Acute upper GI bleed [K92.2]  Yes    Acute gastritis [K29.00]  Yes    Subacute endocarditis [I33.9]  Yes    Streptococcal bacteremia [R78.81, B95.5]  Yes    Mitral regurgitation [I34.0]  Yes    Chronic bronchitis [J42]  Yes     Concern that HF may be contributory  Await Echo  BNP negative  CT rather unremarkable      Essential hypertension [I10]  Yes      Resolved Hospital Problems   No resolved problems to display.     Infective Endocarditis  TTE showed aortic and mitral vegetations. Started on ceftriaxone on 8/5 for Strep mutans bacteremia. Last positive BCx on 7/31.     - continue ceftriaxone 2g qd  - appreciate ID recs  - continue to monitor vitals.       Upper GI Bleed  History of GI bleed. Last EGD 8/14. Showed acute gastritis. Hemodynamically stable.     GI recommends   - Patient can be discharged from the GI standpoing  - Continue daily PPI   - Oral iron at home every day or every other day  - Maintain good bowel regimen while on iron  - Will follow up in clinic in 1 month with CBC prior      Strep Bacteremia  - see  endocarditis      Essential Hypertension  - continue home losartan      DVT ppx- hold all anticoagulation until bleeding resolves  CODE Status- FULL    Dispo- plan to discharge tomorrow.     Akin Downey MS4

## 2020-08-20 NOTE — ASSESSMENT & PLAN NOTE
Patient initially presented to OSH with melena. S/p EGD on 8/14 with gastritis and some questionable gastritis and clip placement in history. Patient states stools improved and was no longer having dark stools at time of transfer to [facility]. Episode of dark blood concerning for melena AM of 8/19. Patient hemodynamically stable, with stable hemoglobin.    Plan:  - GI consulted  - PPI IV bid  - Mantaining two large bore IV's  - Transfuse for Hg < 7 (transfused 1 unit on 8/19 due to symptomatic anemia and acute bleed). Hgb today 8.1 (8/20)  -Gastro determined that there wasn't any endoscopic intervention required.

## 2020-08-20 NOTE — ASSESSMENT & PLAN NOTE
- continue albuterol nebulizer PRN   -- Message is from the [facility]--    Called and left voicemail to inform the patient the symptom they entered was not detailed enough for their appointment at the [facility].      ACC AGENT: If the patient calls back, use Symptom  to screen the symptom and then follow the outcome.    ACC AGENT: Cancel the [facility] appointment if it is confirmed that symptoms are not in-scope.

## 2020-08-20 NOTE — PT/OT/SLP PROGRESS
Physical Therapy      Patient Name:  Lopez Savage   MRN:  [id number]      Pt not seen on this date. Chart reviewed and pt remain appropriate for PT services at this time.  Will see pt as schedule allows.      Gurwinder Rowell, PT,DPT  8/20/2020

## 2020-08-20 NOTE — PROGRESS NOTES
[facility] Hospital Medicine  Progress Note    Patient Name: Lopez Savage  MRN: [id number]  Patient Class: IP- Inpatient   Admission Date: 8/17/2020  Length of Stay: 2 days  Attending Physician: Arelis Rangel MD  Primary Care Provider: Prasanna Haywood MD    Hospital Medicine Team: [facility] HOSP MED 5 Yahir Roldan MD    Subjective:     Principal Problem:Acute upper GI bleed        HPI:  This is a 74 year old male with PMH of hypertension, ulcerative colitis, GERD, chronic bronchitis, autoimmune hemolytic anemia, mitral regurgitation, and diverticulitits transferred to [facility] from [facility] for subacute bacterial endocarditis in the setting of recent upper GI bleed with recommendation for cardiothoracic surgery consultation from [facility] cardiology. Patient has had multiple hospitalizations in the past 6 weeks. Extensive chart review was performed as patient is not a reliable historian. It appears in late July patient was hospitalized for HENRY and acute diverticulitis and was discharged home on oral antibiotics. 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 He was started on ceftriaxone and a PICC line was put in place and he was again discharged home. Patient then returned to the hospital on 8/14 due to melena and was found to have GI bleed. He underwent EGD with cauterization/clipping for acute gastritis. He remained on the ceftriaxone. During this hospital stay, TTE was performed which showed aortic and mitral vegetations both >1cm. This was subsequently confirmed on SILVANA. Cardiology was consulted and they recommended transfer to [facility] for cardiothoracic surgery consultation given patient may need double valve replacement.     On arrival to the floor, patient was not complaining of any acute issue. However patient is not an accurate historian as he did not recall his recent multiple hospitalizations. He does admit that he does not see a dentist  "regularly and that the last time he saw one was in the 1960s. He denies fevers, chills, diaphoresis, melena, hematochezia, abdominal pain, chest pain, and leg swelling. He does endorse a chronic cough and dyspnea ongoing for the past 8 months and occasional lightheadedness when rising from sitting to standing.         Overview/Hospital Course:  Patient admitted to [facility] medicine AM of 8/18. Evaluated by cardiothoracic surgery and determined to be too high risk for surgery and to continue antibiotic management. Patient with episode of melena AM of 8/19, transfused 1u pRBC's for symptomatic anemia and active bleeding. GI consulted and patient started on IV ppi bid. Plan for EGD on 8/20.    Interval History: No acute events overnight. Patient reports subjective weakness and not "feeling well." Episode of melena this AM, seen by primary team and nursing. Started on IV PPI, Hg stable but transfused 1u pRBCs for symptomatic anemia and active bleed.    Review of Systems   Constitutional: Positive for unexpected weight change. Negative for appetite change, chills, diaphoresis, fatigue and fever.   HENT: Positive for dental problem. Negative for drooling, facial swelling, mouth sores, sinus pain and trouble swallowing.    Eyes: Negative for visual disturbance.   Respiratory: Positive for cough and shortness of breath. Negative for chest tightness.    Cardiovascular: Negative for chest pain, palpitations and leg swelling.   Gastrointestinal: Positive for blood in stool. Negative for anal bleeding, constipation, diarrhea, nausea and vomiting.   Endocrine: Negative for polyuria.   Genitourinary: Negative for difficulty urinating and hematuria.   Musculoskeletal: Negative for myalgias.   Skin: Positive for pallor. Negative for color change.   Neurological: Positive for light-headedness. Negative for dizziness and numbness.   Hematological: Bruises/bleeds easily.   Psychiatric/Behavioral: Negative for agitation.     Objective: "     Vital Signs (Most Recent):  Temp: 98 °F (36.7 °C) (08/19/20 1636)  Pulse: 89 (08/19/20 1636)  Resp: (!) 22 (08/19/20 1636)  BP: 133/63 (08/19/20 1636)  SpO2: 97 % (08/19/20 1636) Vital Signs (24h Range):  Temp:  [97 °F (36.1 °C)-98.4 °F (36.9 °C)] 98 °F (36.7 °C)  Pulse:  [82-96] 89  Resp:  [19-27] 22  SpO2:  [95 %-98 %] 97 %  BP: (101-133)/(51-77) 133/63        There is no height or weight on file to calculate BMI.  No intake or output data in the 24 hours ending 08/19/20 1704   Physical Exam  Vitals signs and nursing note reviewed.   Constitutional:       General: He is not in acute distress.     Appearance: Normal appearance. He is obese. He is not ill-appearing, toxic-appearing or diaphoretic.   HENT:      Head: Normocephalic and atraumatic.      Mouth/Throat:      Mouth: Mucous membranes are moist.      Comments: Poor dentition  Eyes:      General: No scleral icterus.     Extraocular Movements: Extraocular movements intact.      Conjunctiva/sclera: Conjunctivae normal.      Pupils: Pupils are equal, round, and reactive to light.   Neck:      Musculoskeletal: Normal range of motion and neck supple. No neck rigidity.   Cardiovascular:      Rate and Rhythm: Normal rate and regular rhythm.      Heart sounds: No murmur.   Pulmonary:      Effort: Pulmonary effort is normal. No respiratory distress.      Breath sounds: Wheezing present. No rales.   Abdominal:      General: Abdomen is flat. Bowel sounds are normal. There is no distension.      Palpations: Abdomen is soft.      Tenderness: There is no abdominal tenderness. There is no guarding or rebound.   Musculoskeletal:         General: No swelling.      Right lower leg: No edema.      Left lower leg: No edema.   Lymphadenopathy:      Cervical: No cervical adenopathy.   Skin:     General: Skin is warm and dry.      Capillary Refill: Capillary refill takes less than 2 seconds.      Coloration: Skin is pale. Skin is not jaundiced.   Neurological:      General: No  "focal deficit present.      Mental Status: He is alert.   Psychiatric:         Mood and Affect: Mood normal.         Significant Labs:   Blood Culture: No results for input(s): LABBLOO in the last 48 hours.  CBC:   Recent Labs   Lab 08/18/20  1058 08/18/20  1702 08/19/20  0936   WBC 10.02 10.47 10.84   HGB 7.4* 7.6* 7.6*   HCT 23.9* 25.3* 24.8*    226 222     CMP:   Recent Labs   Lab 08/18/20  0329 08/19/20  0521    135*   K 3.5 3.7    104   CO2 25 26   GLU 92 86   BUN 16 15   CREATININE 1.1 1.1   CALCIUM 8.7 8.7   PROT 6.0 6.1   ALBUMIN 2.3* 2.3*   BILITOT 0.3 0.3   ALKPHOS 44* 45*   AST 10 9*   ALT 6* 5*   ANIONGAP 7* 5*   EGFRNONAA >60.0 >60.0     All pertinent labs within the past 24 hours have been reviewed.    Significant Imaging: I have reviewed all pertinent imaging results/findings within the past 24 hours.      Assessment/Plan:      * Acute upper GI bleed  Patient iniitally presented to OSH with melena. S/p EGD on 8/14 with gastritis and some questionable gastritis and clip placement in history. Patient states stools improved and was no longer having dark stools at time of transfer to [facility]. Episode of dark blood concerning for melena AM of 8/19. Patient hemodynamically stable, with stable hemoglobin.    Plan:  - GI consulted  - PPI IV bid  - Mantaining two large bore IV's  - Transfuse for Hg < 7 (transfused 1 unit on 8/19 due to symptomatic anemia and acute bleed)      Acute gastritis  Patient with recent hospitalization for melena, s/p upper EGD with acute gastritis and cauterization/clipping. Presently denies further melena or hematochezia however HgB 7.3 on admission. Hemodynamically stable    - please see "acute upper GI bleed" above    Subacute endocarditis  History of mitral regurgitation. Found to have aortic and mitral vegetations found on TTE on 8/14. Patient was started on ceftriaxone 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 in the setting of acute diverticulitis. Last " positive blood culture 7/31. [place] [facility] [place] [facility] cardiology. Patient stable without s/s of acute HF. He has been on IV abx for about 2 weeks now, projected end date 8/31 (4 week course)    - continue ceftriaxone 2 g daily  - CT surgery stating patient is not a surgical candidate  - consultation to infectious disease: recommending 6 weeks now given no surgical intervention (End date 9/14)  - continue to monitor vitals       Streptococcal bacteremia  - see endocarditis      Mitral regurgitation  - see endocarditis      Chronic bronchitis  - continue albuterol nebulizer PRN    Essential hypertension  - Holding home losartan pending evaluation for GI bleed      VTE Risk Mitigation (From admission, onward)         Ordered     IP VTE HIGH RISK PATIENT  Once      08/18/20 0039     Place sequential compression device  Until discontinued      08/18/20 0039                Discharge Planning   ANA: 8/21/2020     Code Status: Full Code   Is the patient medically ready for discharge?:     Reason for patient still in hospital (select all that apply): Patient new problem and Treatment  Discharge Plan A: Home Health                  Yahir Roldan MD  Department of Hospital Medicine   [facility]

## 2020-08-20 NOTE — ASSESSMENT & PLAN NOTE
Patient iniitally presented to OSH with melena. S/p EGD on 8/14 with gastritis and some questionable gastritis and clip placement in history. Patient states stools improved and was no longer having dark stools at time of transfer to [facility]. Episode of dark blood concerning for melena AM of 8/19. Patient hemodynamically stable, with stable hemoglobin.    Plan:  - GI consulted  - PPI IV bid  - Mantaining two large bore IV's  - Transfuse for Hg < 7 (transfused 1 unit on 8/19 due to symptomatic anemia and acute bleed)

## 2020-08-20 NOTE — SUBJECTIVE & OBJECTIVE
Interval History:   No acute events overnight. Patient with one BM with dark brown stools yesterday. Hgb is stable today after transfusion. Vital signs stable.      Review of Systems  Objective:     Vital Signs (Most Recent):  Temp: 96.3 °F (35.7 °C) (08/20/20 1212)  Pulse: 83 (08/20/20 1229)  Resp: 20 (08/20/20 1229)  BP: 127/82 (08/20/20 1229)  SpO2: 96 % (08/20/20 1229) Vital Signs (24h Range):  Temp:  [96.3 °F (35.7 °C)-98.2 °F (36.8 °C)] 96.3 °F (35.7 °C)  Pulse:  [79-93] 83  Resp:  [17-22] 20  SpO2:  [94 %-98 %] 96 %  BP: (101-133)/(51-86) 127/82        There is no height or weight on file to calculate BMI.    Intake/Output Summary (Last 24 hours) at 8/20/2020 1608  Last data filed at 8/19/2020 1800  Gross per 24 hour   Intake 240 ml   Output --   Net 240 ml      Physical Exam    Significant Labs:   BMP:   Recent Labs   Lab 08/20/20  0608   GLU 91   *   K 3.8      CO2 27   BUN 14   CREATININE 1.2   CALCIUM 8.9   MG 2.0     CBC:   Recent Labs   Lab 08/18/20  1702 08/19/20  0936 08/20/20  0608   WBC 10.47 10.84 10.92  10.92   HGB 7.6* 7.6* 8.1*  8.1*   HCT 25.3* 24.8* 26.6*  26.6*    222 229  229       Significant Imaging: I have reviewed and interpreted all pertinent imaging results/findings within the past 24 hours.

## 2020-08-21 NOTE — PLAN OF CARE
[facility]    HOME HEALTH ORDERS  FACE TO FACE ENCOUNTER    Patient Name: Lopez Savage  YOB: 1945    PCP: Prasanna Haywood MD   PCP Address: [address] [place] [address] RAJENDRA / RAHEEM BENITO  PCP Phone Number: [phone]  PCP Fax: [phone]    Encounter Date: 08/21/2020    Admit to Home Health    Diagnoses:  Active Hospital Problems    Diagnosis  POA    *Acute upper GI bleed [K92.2]  Yes    Acute gastritis [K29.00]  Yes    Subacute endocarditis [I33.9]  Yes    Streptococcal bacteremia [R78.81, B95.5]  Yes    Mitral regurgitation [I34.0]  Yes    Chronic bronchitis [J42]  Yes     Concern that HF may be contributory  Await Echo  BNP negative  CT rather unremarkable      Essential hypertension [I10]  Yes      Resolved Hospital Problems   No resolved problems to display.       Future Appointments   Date Time Provider Department Center   8/31/2020  8:00 AM [facility]os [facility]   9/1/2020  1:30 PM Crystal English MD [facility]   9/3/2020 10:30 AM Hermilo Jean PA-C [facility]   9/15/2020 11:00 AM LIVAN Wallace Jr. [facility] [place]     Follow-up Information     Howard Matos MD On 8/31/2020.    Specialty: Hematology and Oncology  Why: ESTABLISHED PATIENT - VIRTUAL  8:00AM  Contact information:  [address] [facility] [address]  [phone]             Crystal English MD On 9/1/2020.    Specialty: Gastroenterology  Why: VIRTUAL 1:30PM  Contact information:  [address] [place] [id number]  [phone]             Hermilo Jean PA-C On 9/3/2020.    Specialty: Infectious Diseases  Why: VIRTUAL 10:30AM  Contact information:  [address] [place] [id number]  [phone]                     I have seen and examined this patient face to face today. My clinical findings that support the need for the home health skilled services and home bound status are the following:  Weakness/numbness causing balance and gait  disturbance due to Infection making it taxing to leave home.    Allergies:  Review of patient's allergies indicates:   Allergen Reactions    Codeine Nausea Only    Tetanus vaccines and toxoid Hives and Rash       Diet: cardiac diet    Activities: activity as tolerated    Nursing:   SN to complete comprehensive assessment including routine vital signs. Instruct on disease process and s/s of complications to report to MD. Review/verify medication list sent home with the patient at time of discharge  and instruct patient/caregiver as needed. Frequency may be adjusted depending on start of care date.    Notify MD if SBP > 160 or < 90; DBP > 90 or < 50; HR > 120 or < 50; Temp > 101; Other:       LABS:  Weekly outpatient laboratory on Monday or Tuesday while on IV antibiotics.   · CBC  · CMP   · ESR and CRP        Fax laboratory results to [facility] at [phone] with attn: Acosta Pereira PA-C MPH       CONSULTS:    Physical Therapy to evaluate and treat. Evaluate for home safety and equipment needs; Establish/upgrade home exercise program. Perform / instruct on therapeutic exercises, gait training, transfer training, and Range of Motion.  Occupational Therapy to evaluate and treat. Evaluate home environment for safety and equipment needs. Perform/Instruct on transfers, ADL training, ROM, and therapeutic exercises.    MISCELLANEOUS CARE:  Home Infusion Therapy:   SN to perform Infusion Therapy/Central Line Care.  Review Central Line Care & Central Line Flush with patient.    Administer (drug and dose): ceftriaxone 2g IV    Last dose given: 8/21/2020 5:17am                  Home dose due: 8/22/2020 6:00am    Scrub the Hub: Prior to accessing the line, always perform a 30 second alcohol scrub  Each lumen of the central line is to be flushed at least daily with 10 mL Normal Saline and 3 mL Heparin flush (10 units/mL)  Skilled Nurse (SN) may draw blood from IV access  Blood Draw Procedure:   - Aspirate at least 5  mL of blood   - Discard   - Obtain specimen   - Change injection cap   - Flush with 20 mL Normal Saline followed by a                 3-5 mL Heparin flush (10 units/mL)  Central :   - Sterile dressing changes are done weekly and as needed.   - Use chlor-hexadine scrub to cleanse site, apply Biopatch to insertion site,       apply securement device dressing   - Injection caps are changed weekly and after EVERY lab draw.   - If sterile gauze is under dressing to control oozing,                 dressing change must be performed every 24 hours until gauze is not needed.    WOUND CARE ORDERS  n/a      Medications: Review discharge medications with patient and family and provide education.      Current Discharge Medication List      START taking these medications    Details   pantoprazole (PROTONIX) 40 MG tablet Take 1 tablet (40 mg total) by mouth 2 (two) times daily before meals.  Qty: 60 tablet, Refills: 2         CONTINUE these medications which have CHANGED    Details   cefTRIAXone (ROCEPHIN) 2 g/50 mL PgBk IVPB Inject 50 mLs (2 g total) into the vein Daily. Last dose 9/14/20      losartan (COZAAR) 50 MG tablet Take 1 tablet (50 mg total) by mouth once daily.  Qty: 90 tablet, Refills: 3    Comments: .         CONTINUE these medications which have NOT CHANGED    Details   albuterol (PROVENTIL HFA) 90 mcg/actuation inhaler Inhale 2 puffs into the lungs every 6 (six) hours as needed for Wheezing or Shortness of Breath.  Qty: 18 g, Refills: 3    Associated Diagnoses: Mild persistent asthma without complication      albuterol (PROVENTIL) 2.5 mg /3 mL (0.083 %) nebulizer solution Take 3 mLs (2.5 mg total) by nebulization every 6 (six) hours as needed for Wheezing. Rescue  Qty: 1 Box, Refills: 11    Associated Diagnoses: Simple chronic bronchitis      betamethasone dipropionate (DIPROLENE) 0.05 % ointment Apply topically 2 (two) times daily.  Qty: 45 g, Refills: 3    Associated Diagnoses: BXO (balanitis  xerotica obliterans)      diclofenac sodium (VOLTAREN) 1 % Gel Apply 2 g topically 3 (three) times daily.  Qty: 1 Tube, Refills: 0    Associated Diagnoses: Status post total bilateral knee replacement; Chronic pain of right knee      escitalopram oxalate (LEXAPRO) 10 MG tablet Take 1 tablet (10 mg total) by mouth once daily.  Qty: 90 tablet, Refills: 3    Associated Diagnoses: Anxiety      fluticasone furoate-vilanteroL (BREO ELLIPTA) 100-25 mcg/dose diskus inhaler Inhale 1 puff into the lungs 2 (two) times a day. Controller      fluticasone propionate (FLONASE) 50 mcg/actuation nasal spray 2 sprays (100 mcg total) by Each Nostril route once daily.  Qty: 9.9 mL, Refills: 11      meclizine (ANTIVERT) 12.5 mg tablet Take 1 tablet (12.5 mg total) by mouth 3 (three) times daily.  Qty: 270 tablet, Refills: 3    Associated Diagnoses: Vertigo      montelukast (SINGULAIR) 10 mg tablet Take 10 mg by mouth once daily.       ondansetron (ZOFRAN) 4 MG tablet Take 1 tablet (4 mg total) by mouth every 8 (eight) hours as needed for Nausea.  Qty: 20 tablet, Refills: 2    Associated Diagnoses: Nausea      donepeziL (ARICEPT) 10 MG tablet Take 1 tablet (10 mg total) by mouth once daily. HOLD FOR ONE WEEK UNTIL ANTIBIOTIC COURSE IS COMPLETED.  Qty: 90 tablet, Refills: 3    Associated Diagnoses: Early onset Alzheimer's dementia without behavioral disturbance      fluticasone-salmeterol diskus inhaler 250-50 mcg Inhale 1 puff into the lungs 2 (two) times daily. Controller  Qty: 60 each, Refills: 3    Associated Diagnoses: Mild persistent asthma without complication      sucralfate (CARAFATE) 1 gram tablet Take 1 tablet (1 g total) by mouth 4 (four) times daily. for 14 days  Qty: 56 tablet, Refills: 0         STOP taking these medications       omeprazole (PRILOSEC) 40 MG capsule Comments:   Reason for Stopping:               I certify that this patient is confined to his home and needs intermittent skilled nursing care, physical therapy  and occupational therapy     Arelis Rangel M.D., M.P.H.  Department of [facility] Hospital Medicine  [facility]  (pager) [phone] (spect) [phone]    .

## 2020-08-21 NOTE — PLAN OF CARE
Will send HH orders to [facility], [facility] and [facility] once completed.     10:43 AM  [facility] sent HH orders to [facility], [facility], and [facility]. Will follow up.    11:55 AM  [facility] contacted [facility] to follow up on referral. [facility] informed by N rep that she will work on pt's request next. SW will follow up.    12:29 PM  Halie with [facility] informed that pt is okay to discharge home when ready. Pt is also accepted by [facility].     [facility] informed RN.     Apoorva Sheehan, [place]  [facility]

## 2020-08-21 NOTE — ASSESSMENT & PLAN NOTE
Patient initially presented to OSH with melena. S/p EGD on 8/14 with gastritis and some questionable gastritis and clip placement in history. Patient states stools improved and was no longer having dark stools at time of transfer to [facility]. Episode of dark blood concerning for melena AM of 8/19. Patient hemodynamically stable, with stable hemoglobin.    Plan:  - GI consulted  - PPI IV bid  - Mantaining two large bore IV's  - Transfuse for Hg < 7 (transfused 1 unit on 8/19 due to symptomatic anemia and acute bleed). Hgb today 8.1 (8/20)  -Gastro determined that there wasn't any endoscopic intervention required.

## 2020-08-21 NOTE — PLAN OF CARE
No acute events throughout night. Pt AAO X 4; able to express needs.  No c/o pain.  Assisted patient to bathroom without difficulty x2 . Safety maintained.  Bed in low position,  call  light in reach.    Will continue to monitor

## 2020-08-21 NOTE — PLAN OF CARE
08/21/20 1238   Post-Acute Status   Post-Acute Authorization Home Health   Post-Acute Placement Status Set-up Complete  ([facility]/ [facility] for IV ABX)     Apoorva Sheehan, [place]  [facility]

## 2020-08-21 NOTE — TELEPHONE ENCOUNTER
"Spoke with wife to schedule pt with Dr. Haywood for [facility] f/u. Pt wife stated that she or the pt didn't call to make an appointment. Pt wife refused appt and stated that she "texted" Dr. Haywood's office 3 weeks ago and that the office never got back with her. Informed pt that the last [employer] message that she sent to our office was on 01/30/2020 and that the issue that was discussed in that message thread was resolved. Also, informed pt that the only calls that we have received from them was on 05/22/2020, 06/12/2020, and 08/12/2020, and all of those calls were completed with their verbalization. I asked pt wife if she would like to continue with scheduling an appt with Dr. Haywood. Pt wife proceeds to say that she does not want pt to see Dr. Haywood anymore, they will go with someone else. Pt wife then hangs up on me.     After further research in pt chart. Wife has been messaging Dr. Matos within the last 3 weeks, not Dr. Haywood.          "

## 2020-08-21 NOTE — DISCHARGE SUMMARY
[facility] Hospital Medicine  Discharge Summary      Patient Name: Lopez Savage  MRN: [id number]  Admission Date: 8/17/2020  Hospital Length of Stay: 4 days  Discharge Date and Time:  08/21/2020 2:26 PM  Attending Physician: No att. providers found   Discharging Provider: Sammie Crouch MD  Primary Care Provider: Prasanna Haywood MD  [facility]: [facility] 5 Sammie Crouch MD    HPI:   This is a 74 year old male with PMH of hypertension, ulcerative colitis, GERD, chronic bronchitis, autoimmune hemolytic anemia, mitral regurgitation, and diverticulitits transferred [place] [facility] [place] [facility] for subacute bacterial endocarditis in the setting of recent upper GI bleed with recommendation for cardiothoracic surgery consultation from [facility]. Patient has had multiple hospitalizations in the past 6 weeks. Extensive chart review was performed as patient is not a reliable historian. It appears in late July patient was hospitalized for HENRY and acute diverticulitis and was discharged home on oral antibiotics. 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 He was started on ceftriaxone and a PICC line was put in place and he was again discharged home. Patient then returned to the hospital on 8/14 due to melena and was found to have GI bleed. He underwent EGD with cauterization/clipping for acute gastritis. He remained on the ceftriaxone. During this hospital stay, TTE was performed which showed aortic and mitral vegetations both >1cm. This was subsequently confirmed on SILVANA. Cardiology was consulted and they recommended [place] [facility] [place]oth[place]acic surgery consultation given patient may need double valve replacement.     On arrival to the floor, patient was not complaining of any acute issue. However patient is not an accurate historian as he did not recall his recent multiple hospitalizations. He does admit that  he does not see a dentist regularly and that the last time he saw one was in the 1960s. He denies fevers, chills, diaphoresis, melena, hematochezia, abdominal pain, chest pain, and leg swelling. He does endorse a chronic cough and dyspnea ongoing for the past 8 months and occasional lightheadedness when rising from sitting to standing.         * No surgery found *      Hospital Course:   Patient admitted to [facility] medicine AM of 8/18.  Evaluated by cardiothoracic surgery and determined to be too high risk for surgery and to continue antibiotic management. Patient with episode of melena AM of 8/19, transfused 1u pRBC's for symptomatic anemia and active bleeding. GI consulted and patient started on IV ppi bid.   EGD with gastritis with hemorrhage on 8/20.     Consults:   Consults (From admission, onward)        Status Ordering Provider     Inpatient consult to Cardiothoracic Surgery  Once     Provider:  (Not yet assigned)    Completed GUY STOKES     Inpatient consult to Gastroenterology  Once     Provider:  (Not yet assigned)    Completed LORRAINE DARDEN     Inpatient consult to Infectious Diseases  Once     Provider:  (Not yet assigned)    Completed GUY STOKES     [facility]  Once     Provider:  (Not yet assigned)    Completed NADIYA FLORES        Physical Exam  Vitals signs and nursing note reviewed.   Constitutional:       General: He is not in acute distress.     Appearance: Normal appearance. He is obese. He is not ill-appearing, toxic-appearing or diaphoretic.   HENT:      Head: Normocephalic and atraumatic.      Mouth/Throat:      Mouth: Mucous membranes are moist.      Comments: Poor dentition  Eyes:      General: No scleral icterus.     Extraocular Movements: Extraocular movements intact.      Conjunctiva/sclera: Conjunctivae normal.      Pupils: Pupils are equal, round, and reactive to light.   Neck:      Musculoskeletal: Normal range of motion and neck  "supple. No neck rigidity.   Cardiovascular:      Rate and Rhythm: Normal rate and regular rhythm.      Heart sounds: No murmur.   Pulmonary:      Effort: Pulmonary effort is normal. No respiratory distress.      Breath sounds: Wheezing present. No rales.   Abdominal:      General: Abdomen is flat. Bowel sounds are normal. There is no distension.      Palpations: Abdomen is soft.      Tenderness: There is no abdominal tenderness. There is no guarding or rebound.   Musculoskeletal:         General: No swelling.      Right lower leg: No edema.      Left lower leg: No edema.   Lymphadenopathy:      Cervical: No cervical adenopathy.   Skin:     General: Skin is warm and dry.      Capillary Refill: Capillary refill takes less than 2 seconds.      Coloration: Skin is pale. Skin is not jaundiced.   Neurological:      General: No focal deficit present.      Mental Status: He is alert.   Psychiatric:         Mood and Affect: Mood normal.     * Acute upper GI bleed  Patient initially presented to OSH with melena. S/p EGD on 8/14 with gastritis and some questionable gastritis and clip placement in history. Patient states stools improved and was no longer having dark stools at time of transfer to [facility]. Episode of dark blood concerning for melena AM of 8/19. Patient hemodynamically stable, with stable hemoglobin.    Plan:  - GI consulted  - PPI IV bid  - Mantaining two large bore IV's  - Transfuse for Hg < 7 (transfused 1 unit on 8/19 due to symptomatic anemia and acute bleed). Hgb today 8.1 (8/20)  -Gastro determined that there wasn't any endoscopic intervention required.      Acute gastritis  Patient with recent hospitalization for melena, s/p upper EGD with acute gastritis and cauterization/clipping. Presently denies further melena or hematochezia however HgB 7.3 on admission. Hemodynamically stable    - please see "acute upper GI bleed" above    Subacute endocarditis  History of mitral regurgitation. Found to have aortic and " mitral vegetations found on TTE on 8/14. Patient was started on ceftriaxone 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 in the setting of acute diverticulitis. Last positive blood culture 7/31. Transferred [place] [facility] [place] per [facility] cardiology. Patient stable without s/s of acute HF. He has been on IV abx for about 2 weeks now, projected end date 8/31 (4 week course)    -continue ceftriaxone 2 g daily until 9/14  -CT surgery stating patient is not a surgical candidate  -consultation to infectious disease: recommending 6 weeks now given no surgical intervention (End date 9/14)  -FU with Infectious diseases OP      Streptococcal bacteremia  - see endocarditis      Mitral regurgitation  - see endocarditis      Chronic bronchitis  -Continue with home Advair treatment    Essential hypertension  - Resume home Losartan      Final Active Diagnoses:    Diagnosis Date Noted POA    PRINCIPAL PROBLEM:  Acute upper GI bleed [K92.2] 08/19/2020 Yes    Acute gastritis [K29.00] 08/18/2020 Yes    Subacute endocarditis [I33.9] 08/17/2020 Yes    Streptococcal bacteremia [R78.81, B95.5] 08/03/2020 Yes    Mitral regurgitation [I34.0] 06/02/2016 Yes    Chronic bronchitis [J42] 07/22/2015 Yes    Essential hypertension [I10] 10/15/2014 Yes      Problems Resolved During this Admission:       Discharged Condition: stable    Disposition: Home or Self Care    Follow Up:  Follow-up Information     Howard Matos MD On 8/31/2020.    Specialty: Hematology and Oncology  Why: ESTABLISHED PATIENT - VIRTUAL  8:00AM  Contact information:  [address] [facility] [address]  [phone]             Crystal English MD On 9/1/2020.    Specialty: Gastroenterology  Why: VIRTUAL 1:30PM  Contact information:  [address] [place] [address]  [phone]             Hermilo Jean PA-C On 9/3/2020.    Specialty: Infectious Diseases  Why: VIRTUAL 10:30AM  Contact information:  [address] [place] [address]  [address]  [phone]                 Patient Instructions:      CBC auto differential   Standing Status: Future Standing Exp. Date: 10/19/21     Ambulatory referral/consult to Gastroenterology   Standing Status: Future   Referral Priority: Routine Referral Type: Consultation   Referral Reason: Specialty Services Required   Requested Specialty: Gastroenterology   Number of Visits Requested: 1     Diet Cardiac     Notify your health care provider if you experience any of the following:  temperature >100.4     Notify your health care provider if you experience any of the following:  severe uncontrolled pain     Notify your health care provider if you experience any of the following:  difficulty breathing or increased cough     Notify your health care provider if you experience any of the following:  temperature >100.4     Notify your health care provider if you experience any of the following:  persistent nausea and vomiting or diarrhea     Notify your health care provider if you experience any of the following:  severe uncontrolled pain     Notify your health care provider if you experience any of the following:  redness, tenderness, or signs of infection (pain, swelling, redness, odor or green/yellow discharge around incision site)     Notify your health care provider if you experience any of the following:  difficulty breathing or increased cough     Notify your health care provider if you experience any of the following:  severe persistent headache     Notify your health care provider if you experience any of the following:  worsening rash     Notify your health care provider if you experience any of the following:  persistent dizziness, light-headedness, or visual disturbances     Notify your health care provider if you experience any of the following:  increased confusion or weakness     Activity as tolerated     Activity as tolerated       Significant Diagnostic Studies: Labs:   CMP   Recent Labs   Lab  08/20/20 0608 08/21/20 0520   * 133*   K 3.8 3.7    101   CO2 27 26   GLU 91 87   BUN 14 14   CREATININE 1.2 1.3   CALCIUM 8.9 9.1   PROT 6.8 6.7   ALBUMIN 2.6* 2.6*   BILITOT 0.5 0.4   ALKPHOS 48* 48*   AST 9* 10   ALT 5* 5*   ANIONGAP 5* 6*   ESTGFRAFRICA >60.0 >60.0   EGFRNONAA 59.6* 54.1*    and CBC   Recent Labs   Lab 08/20/20  0608 08/21/20 0520   WBC 10.92  10.92 11.12   HGB 8.1*  8.1* 8.3*   HCT 26.6*  26.6* 26.3*     229 269       Pending Diagnostic Studies:     Procedure Component Value Units Date/Time    Methylmalonic acid, serum [[id number]] Collected: 08/19/20 0521    Order Status: Sent Lab Status: In process Updated: 08/19/20 0533    Specimen: Blood          Medications:  Reconciled Home Medications:      Medication List      START taking these medications    pantoprazole 40 MG tablet  Commonly known as: PROTONIX  Take 1 tablet (40 mg total) by mouth 2 (two) times daily before meals.        CHANGE how you take these medications    betamethasone dipropionate 0.05 % ointment  Commonly known as: DIPROLENE  Apply topically 2 (two) times daily.  What changed:   · when to take this  · reasons to take this     cefTRIAXone 2 g/50 mL Pgbk IVPB  Commonly known as: ROCEPHIN  Inject 50 mLs (2 g total) into the vein Daily. Last dose 9/14/20  What changed:   · when to take this  · reasons to take this  · additional instructions     diclofenac sodium 1 % Gel  Commonly known as: VOLTAREN  Apply 2 g topically 3 (three) times daily.  What changed:   · when to take this  · reasons to take this     fluticasone propionate 50 mcg/actuation nasal spray  Commonly known as: FLONASE  2 sprays (100 mcg total) by Each Nostril route once daily.  What changed:   · how much to take  · when to take this     meclizine 12.5 mg tablet  Commonly known as: ANTIVERT  Take 1 tablet (12.5 mg total) by mouth 3 (three) times daily.  What changed: when to take this        CONTINUE taking these medications    * albuterol  2.5 mg /3 mL (0.083 %) nebulizer solution  Commonly known as: PROVENTIL  Take 3 mLs (2.5 mg total) by nebulization every 6 (six) hours as needed for Wheezing. Rescue     * albuterol 90 mcg/actuation inhaler  Commonly known as: PROVENTIL HFA  Inhale 2 puffs into the lungs every 6 (six) hours as needed for Wheezing or Shortness of Breath.     BREO ELLIPTA 100-25 mcg/dose diskus inhaler  Generic drug: fluticasone furoate-vilanteroL  Inhale 1 puff into the lungs 2 (two) times a day. Controller     donepeziL 10 MG tablet  Commonly known as: ARICEPT  Take 1 tablet (10 mg total) by mouth once daily. HOLD FOR ONE WEEK UNTIL ANTIBIOTIC COURSE IS COMPLETED.     escitalopram oxalate 10 MG tablet  Commonly known as: LEXAPRO  Take 1 tablet (10 mg total) by mouth once daily.     fluticasone-salmeterol 250-50 mcg/dose 250-50 mcg/dose diskus inhaler  Commonly known as: ADVAIR DISKUS  Inhale 1 puff into the lungs 2 (two) times daily. Controller     losartan 50 MG tablet  Commonly known as: COZAAR  Take 1 tablet (50 mg total) by mouth once daily.     montelukast 10 mg tablet  Commonly known as: SINGULAIR  Take 10 mg by mouth once daily.     ondansetron 4 MG tablet  Commonly known as: ZOFRAN  Take 1 tablet (4 mg total) by mouth every 8 (eight) hours as needed for Nausea.         * This list has 2 medication(s) that are the same as other medications prescribed for you. Read the directions carefully, and ask your doctor or other care provider to review them with you.            STOP taking these medications    omeprazole 40 MG capsule  Commonly known as: PRILOSEC        ASK your doctor about these medications    * sucralfate 1 gram tablet  Commonly known as: CARAFATE  Take 1 tablet (1 g total) by mouth 4 (four) times daily. for 14 days  Ask about: Which instructions should I use?     * sucralfate 1 gram tablet  Commonly known as: CARAFATE  Take 1 tablet (1 g total) by mouth 4 (four) times daily.  Ask about: Which instructions should I  use?         * This list has 2 medication(s) that are the same as other medications prescribed for you. Read the directions carefully, and ask your doctor or other care provider to review them with you.                Indwelling Lines/Drains at time of discharge:   Lines/Drains/Airways     Peripherally Inserted Central Catheter Line            PICC Double Lumen 08/05/20 0955 right brachial 16 days                Time spent on the discharge of patient:  30 minutes  Patient was seen and examined on the date of discharge and determined to be suitable for discharge.         Sammie Crouch MD  Department of Hospital Medicine  [facility]

## 2020-08-21 NOTE — ASSESSMENT & PLAN NOTE
History of mitral regurgitation. Found to have aortic and mitral vegetations found on TTE on 8/14. Patient was started on ceftriaxone 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 in the setting of acute diverticulitis. Last positive blood culture 7/31. [place] [facility] [place] [facility] cardiology. Patient stable without s/s of acute HF. He has been on IV abx for about 2 weeks now, projected end date 8/31 (4 week course)    -continue ceftriaxone 2 g daily until 9/14  -CT surgery stating patient is not a surgical candidate  -consultation to infectious disease: recommending 6 weeks now given no surgical intervention (End date 9/14)  -FU with Infectious diseases OP

## 2020-08-21 NOTE — PLAN OF CARE
[facility].    Future Appointments   Date Time Provider Department Center   8/26/2020  9:45 AM Prasanna Haywood MD [facility] [place]   8/31/2020  8:00 AM Howard Matos MD [facility] [place]   9/1/2020  1:30 PM Crystal English MD [facility] [place]   9/3/2020 10:30 AM Hermilo Jean PA-C [facility] [address]   9/15/2020 11:00 AM LIVAN Wallace Jr. [facility] [address]        08/21/20 9115   Final Note   Assessment Type Final Discharge Note   Anticipated Discharge Disposition Home-Health   Right Care Referral Info   Post Acute Recommendation Home-care   Referral Type Home Health   Facility Name [facility]

## 2020-08-21 NOTE — TELEPHONE ENCOUNTER
Post procedure Instructions: Perform an H. pylori serology at appointment to be scheduled. Please contact patient to schedule.

## 2020-08-21 NOTE — TELEPHONE ENCOUNTER
----- Message from Kisha Parra sent at 8/21/2020 10:11 AM CDT -----  Regarding: Appointment  Contact: Self/ [phone]  Patient would like to be seen sooner than the next available appointment. He needs to be seen for a hospital follow up within the next two weeks. Please advise.

## 2020-08-23 NOTE — PATIENT INSTRUCTIONS
When You Have Gastrointestinal (GI) Bleeding    Blood in your vomit or stool can be a sign of gastrointestinal (GI) bleeding. GI bleeding can be scary. But the cause may not be serious. You should always see a doctor if GI bleeding occurs.  The GI tract  The GI tract is the path through which food travels in the body. Food passes from the mouth down the esophagus (the tube from the mouth to the stomach). Food begins to break down in the stomach. It then moves through the duodenum, the first part of the small intestine. Nutrients are absorbed as food travels through the small intestine. What is left passes into the colon (large intestine) as waste. The colon removes water from the waste. Waste continues from the colon to the rectum (where stool is stored). Waste then leaves the body through the anus.  Causes of GI bleeding  GI bleeding can be caused by many different problems. Some of the more common causes include:  · Swollen veins in the anus (hemorrhoids)  · Swollen veins in the esophagus (varices)  · Sore on the lining of the GI tract (ulcer)  · Cuts or scrapes in the mouth or throat  · Infection caused by germs such as bacteria or parasites  · Food allergies, such as milk allergy in young children  · Medicines  · Inflammation of the GI tract (gastritis or esophagitis)  · Colitis (Crohn's disease or ulcerative colitis)  · Cancer (tumors or polyps)  · Abnormal pouches in the colon (diverticula)  · Tears in the esophagus or anus  · Nosebleed  · Abnormal blood vessels in the GI tract (angiodysplasia)  Diagnosing the cause of blood in stool  If blood is coming out in your stool, you may have a lower GI tract problem or a very fast upper GI tract bleed. Bleeding from the GI tract can be bright red. Or it may look dark and tarry. Tests may also find blood in your stool that cant be seen with the eye (occult blood). To find out the cause, tests that may be ordered include:  · Blood tests. A blood sample is taken and  sent to a lab for exam.  · Hemoccult test. Checks a stool sample for blood.  · Stool culture. Checks a stool sample for bacteria or parasites.  · X-ray, ultrasound, or CT scan. Imaging tests that take pictures of the digestive tract.  · Colonoscopy or sigmoidoscopy. This test uses a flexible tube with a tiny camera. The tube is inserted through your anus into your rectum to see the inside of your colon. Your provider can also take a tiny tissue sample (biopsy) and treat a bleeding source  Diagnosing the cause of blood in vomit  If you are vomiting blood or something that looks like coffee grounds, you may have an upper GI tract problem. To find the cause, tests that may be done include:  · Upper Endoscopy. A flexible tube with a tiny camera is inserted through your mouth and throat to see inside your upper GI tract. This lets your provider take a tiny tissue sample (biopsy) and treat a bleeding source.  · Nasogastric lavage. This can tell if you have upper GI or lower GI bleeding.  · X-ray, ultrasound, or CT scan. Imaging tests that take pictures of your digestive tract.  · Upper GI series. X-rays of the upper part of your GI tract taken from inside your body.  · Enteroscopy. This sends a flexible tube or a small, swallowed capsule camera into your small intestine.  When to call your healthcare provider  Call your healthcare provider right away if you have any of the following:  · Bleeding from your mouth or anus that can't be stopped  · Fever of 100.4°F (38.0°) or higher  · Bleeding along with feeling lightheaded or dizzy  · Signs of fluid loss (dehydration). These include a dry, sticky mouth, decreased urine output; and very dark urine.  · Belly (abdominal) pain   Date Last Reviewed: 7/1/2016  © 2219-6449 [employer]. [address] [place] [address], [place] [address]. All rights reserved. This information is not intended as a substitute for professional medical care. Always follow your healthcare  professional's instructions.

## 2020-08-24 NOTE — TELEPHONE ENCOUNTER
----- Message from Hillary Barnes MD sent at 8/20/2020  5:30 PM CDT -----  They told me they wanted to be seen here. Do you mind asking which they prefer? Thanks!  ----- Message -----  From: Junior Fish MA  Sent: 8/20/2020   1:11 PM CDT  To: Hillary Barnes MD    Pt already has a gastro appointment next month at [facility].  ----- Message -----  From: Hillary Barnes MD  Sent: 8/20/2020  11:38 AM CDT  To: Cameron ADMICO Staff    Hi can you please schedule a post-hospital follow up visit in 1 month. Needs CBC 1 week prior. Will order it. They would prefer a virtual visit which we're ok with if the patient is stable and has no new complaints. Thanks!

## 2020-08-24 NOTE — TELEPHONE ENCOUNTER
Spoke with wife Brenda and she stated she wanted to see Dr. Barnes instead. Appt was scheduled for 09/10/2020 @1PM. Pt's wife verbalized understanding. Appointment letter was mailed out.

## 2020-08-26 NOTE — TELEPHONE ENCOUNTER
----- Message from Prasanna Haywood MD sent at 8/26/2020  3:45 PM CDT -----  Please schedule follow up visit in 1 month

## 2020-08-26 NOTE — PROGRESS NOTES
Called and spoke with pt's wife in regards of needing a 1 month follow up appt. Pt's wife made appt for 9/24/2020, and made the appt virtual for pt.

## 2020-08-26 NOTE — PROGRESS NOTES
[facility] Virtual Visit Note    The patient location is: [place]   The chief complaint leading to consultation is: Hospital follow up  Visit type: Virtual visit with synchronous audio and video  Total time spent with patient: 20 min  Each patient to whom he or she provides medical services by telemedicine is:  (1) informed of the relationship between the physician and patient and the respective role of any other health care provider with respect to management of the patient; and (2) notified that he or she may decline to receive medical services by telemedicine and may withdraw from such care at any time.      Chief Complaint      Chief Complaint   Patient presents with    Hospital Follow Up       History of Present Illness      Lopez Savage is a 74 y.o. male with chronic conditions of HTN, ulcerative colitis, GERD, hx of autoimmune hemolytic anemia, dementia who presents today for: hospital follow up.  Pt presented in 7/2020 and hospitalized for acute diverticulitis and HENRY.  He returned to the hospital on 8/5/2020 due to positive blood culture (strep mutans), PICC line placed and started on rocephin home infusions.  On 8/14/2020, he returned to the hospital after having an episode of melena.  EGD showed acute gastritis.  During hospitalization, he had a SILVANA showing vegetations on aortic and mitral valves.  [place] [facility] [place].  Determined he was too high risk and recommended continued antibiotic therapy.    Currently still on rocephin infusions, managed by Dr. Bernard. Denies fevers chills since discharge.    Diverticulitis: Initially treated with cipro and flagyl.  Changed to rocephin due to bacteremia.  Has follow up with Dr. Barnes, GI, 9/10/2020.    Bacteremia: Currently on rocephin as above.  Has appt to see LIVAN Jean, ID on 9/3/2020.   Anemia: Sees Dr. Matos.  Initially attributed to autoimmune hemolytic anemia.  Pt reports main campus hematologist told them it  was more likely due to diverticulitis. Has appto with Dr. Matos on 2020.    Since discharge:     Cough: Pt still coughing daily since 2020 (date of discharge).  Had a CXR on that day which showed clear lung fields.  Cough is productive of clear phlegm.   Nausea/vomiting: has vomited days old food.    Weakness: needs to start with PT.          Past Medical History:  Past Medical History:   Diagnosis Date    Acute pyelonephritis 2020    Allergy     Asthma     BPH (benign prostatic hyperplasia)     CHRONIC BRONCHITIS     GERD (gastroesophageal reflux disease)     HTN (hypertension)     HTN (hypertension) 10/15/2014    Joint pain     Mitral regurgitation     3-4+ under surveillance CT surgery    Mitral regurgitation     Mitral valve regurgitation     Obesity     Osteoarthritis of knee     Trouble in sleeping     Ulcerative colitis     Urinary tract infection     Valvular regurgitation        Past Surgical History:   has a past surgical history that includes Total knee arthroplasty (Bilateral); Tonsillectomy; Penile Biopsy; Cystoscopy w/ retrogrades; Colonoscopy (N/A, 10/11/2017); Cystoscopy (N/A, 2018); Dilation of urethra (N/A, 2018); Urethral meatoplasty (N/A, 2018); Joint replacement (Bilateral); Hernia repair (Right, 195); Bone marrow biopsy (Left, 2020); and Esophagogastroduodenoscopy (N/A, 2020).    Family History:  family history includes Bone cancer in his mother; COPD in his sister; Cancer in his mother; Cataracts in his mother; Glaucoma in his mother; Heart disease in his daughter; Kidney disease in his father; Other in his daughter; Other (age of onset: 2) in his daughter.     Social History:  Social History     Tobacco Use    Smoking status: Former Smoker     Packs/day: 2.00     Years: 6.00     Pack years: 12.00     Types: Cigarettes     Quit date: 1969     Years since quittin.6    Smokeless tobacco: Former User     Types: Chew    Tobacco  comment: Quit all forms of tobacco in 1969.   Substance Use Topics    Alcohol use: No     Alcohol/week: 0.0 standard drinks     Frequency: Never     Drinks per session: Patient refused     Binge frequency: Never     Comment: remote hx of heavy alcohol use    Drug use: No       Review of Systems   Constitutional: Negative for chills, fever and malaise/fatigue.   HENT: Positive for hearing loss.    Eyes: Negative for discharge.   Respiratory: Positive for cough and sputum production (clear). Negative for shortness of breath and wheezing.    Cardiovascular: Negative for chest pain and palpitations.   Gastrointestinal: Positive for blood in stool, diarrhea, nausea and vomiting. Negative for constipation.   Genitourinary: Negative for hematuria and urgency.   Musculoskeletal: Negative for neck pain.   Skin: Negative for rash.   Neurological: Positive for weakness. Negative for headaches.   Endo/Heme/Allergies: Negative for polydipsia.   All other systems reviewed and are negative.       Medications:  Outpatient Encounter Medications as of 8/26/2020   Medication Sig Note Dispense Refill    albuterol (PROVENTIL HFA) 90 mcg/actuation inhaler Inhale 2 puffs into the lungs every 6 (six) hours as needed for Wheezing or Shortness of Breath.  18 g 3    albuterol (PROVENTIL) 2.5 mg /3 mL (0.083 %) nebulizer solution Take 3 mLs (2.5 mg total) by nebulization every 6 (six) hours as needed for Wheezing. Rescue  1 Box 11    betamethasone dipropionate (DIPROLENE) 0.05 % ointment Apply topically 2 (two) times daily. (Patient taking differently: Apply topically 2 (two) times daily as needed. )  45 g 3    cefTRIAXone (ROCEPHIN) 2 g/50 mL PgBk IVPB Inject 50 mLs (2 g total) into the vein Daily. Last dose 9/14/20       diclofenac sodium (VOLTAREN) 1 % Gel Apply 2 g topically 3 (three) times daily. (Patient taking differently: Apply 2 g topically 3 (three) times daily as needed (pain). )  1 Tube 0    donepeziL (ARICEPT) 10 MG tablet  "Take 1 tablet (10 mg total) by mouth once daily. HOLD FOR ONE WEEK UNTIL ANTIBIOTIC COURSE IS COMPLETED. (Patient not taking: Reported on 8/23/2020) 8/13/2020: "not taking while on antibiotic" 90 tablet 3    escitalopram oxalate (LEXAPRO) 10 MG tablet Take 1 tablet (10 mg total) by mouth once daily.  90 tablet 3    fluticasone furoate-vilanteroL (BREO ELLIPTA) 100-25 mcg/dose diskus inhaler Inhale 1 puff into the lungs 2 (two) times a day. Controller       fluticasone propionate (FLONASE) 50 mcg/actuation nasal spray 2 sprays (100 mcg total) by Each Nostril route once daily. (Patient taking differently: 1 spray by Each Nostril route 2 (two) times a day. )  9.9 mL 11    fluticasone-salmeterol diskus inhaler 250-50 mcg Inhale 1 puff into the lungs 2 (two) times daily. Controller  60 each 3    losartan (COZAAR) 50 MG tablet Take 1 tablet (50 mg total) by mouth once daily.  90 tablet 3    meclizine (ANTIVERT) 12.5 mg tablet Take 1 tablet (12.5 mg total) by mouth 3 (three) times daily. (Patient taking differently: Take 12.5 mg by mouth once daily. )  270 tablet 3    montelukast (SINGULAIR) 10 mg tablet Take 10 mg by mouth once daily.        ondansetron (ZOFRAN) 4 MG tablet Take 1 tablet (4 mg total) by mouth every 8 (eight) hours as needed for Nausea.  20 tablet 2    pantoprazole (PROTONIX) 40 MG tablet Take 1 tablet (40 mg total) by mouth 2 (two) times daily before meals.  60 tablet 2    sucralfate (CARAFATE) 1 gram tablet Take 1 tablet (1 g total) by mouth 4 (four) times daily. for 14 days (Patient not taking: Reported on 8/23/2020)  56 tablet 0    sucralfate (CARAFATE) 1 gram tablet Take 1 tablet (1 g total) by mouth 4 (four) times daily.  120 tablet 2     No facility-administered encounter medications on file as of 8/26/2020.        Allergies:  Review of patient's allergies indicates:   Allergen Reactions    Codeine Nausea Only    Tetanus vaccines and toxoid Hives and Rash       Health " Maintenance:  Immunization History   Administered Date(s) Administered    Influenza 10/14/2008    Influenza - High Dose - PF (65 years and older) 10/15/2014, 10/27/2016, 01/30/2018, 10/11/2018, 10/02/2019    Influenza - Trivalent (ADULT) 10/14/2008    Influenza Split 10/14/2008    Pneumococcal Conjugate - 13 Valent 04/26/2019    Pneumococcal Polysaccharide - 23 Valent 10/14/2008, 08/05/2013    Tdap 04/05/2012      Health Maintenance   Topic Date Due    TETANUS VACCINE  04/05/2022    Lipid Panel  10/04/2024    Hepatitis C Screening  Completed    Pneumococcal Vaccine (65+ Low/Medium Risk)  Completed    Abdominal Aortic Aneurysm Screening  Completed        Physical Exam       ]    Physical Exam  Constitutional:       General: He is not in acute distress.     Appearance: He is well-developed. He is not diaphoretic.   HENT:      Head: Normocephalic and atraumatic.   Eyes:      General: No scleral icterus.        Right eye: No discharge.         Left eye: No discharge.      Conjunctiva/sclera: Conjunctivae normal.   Pulmonary:      Effort: Pulmonary effort is normal. No respiratory distress.   Neurological:      Mental Status: He is alert and oriented to person, place, and time.   Psychiatric:         Behavior: Behavior normal.         Thought Content: Thought content normal.         Judgment: Judgment normal.          Laboratory:  CBC:  Recent Labs   Lab 08/19/20  0936 08/20/20  0608 08/21/20  0520   WBC 10.84 10.92  10.92 11.12   RBC 2.65 L 2.89 L  2.89 L 2.92 L   Hemoglobin 7.6 L 8.1 L  8.1 L 8.3 L   Hematocrit 24.8 L 26.6 L  26.6 L 26.3 L   Platelets 222 229  229 269   Mean Corpuscular Volume 94 92  92 90   Mean Corpuscular Hemoglobin 28.7 28.0  28.0 28.4   Mean Corpuscular Hemoglobin Conc 30.6 L 30.5 L  30.5 L 31.6 L     CMP:  Recent Labs   Lab 08/19/20  0521 08/20/20  0608 08/21/20  0520   Glucose 86 91 87   Calcium 8.7 8.9 9.1   Albumin 2.3 L 2.6 L 2.6 L   Total Protein 6.1 6.8 6.7   Sodium 135  L 134 L 133 L   Potassium 3.7 3.8 3.7   CO2 26 27 26   Chloride 104 102 101   BUN, Bld 15 14 14   Alkaline Phosphatase 45 L 48 L 48 L   ALT 5 L 5 L 5 L   AST 9 L 9 L 10   Total Bilirubin 0.3 0.5 0.4     URINALYSIS:  Recent Labs   Lab 07/31/20  1750   Color, UA Yellow   Specific Gravity, UA 1.015   pH, UA 6.0   Protein, UA 1+ A   Bacteria Few A   Nitrite, UA Negative   Leukocytes, UA 2+ A   Urobilinogen, UA Negative   Hyaline Casts, UA 0      LIPIDS:  Recent Labs   Lab 04/17/18  0748 10/04/19  0912   TSH 2.308  --    HDL 30 L 32 L   Cholesterol 117 L 137   Triglycerides 122 84   LDL Cholesterol 62.6 L 88.2   Hdl/Cholesterol Ratio 25.6 23.4   Non-HDL Cholesterol 87 105   Total Cholesterol/HDL Ratio 3.9 4.3     TSH:  Recent Labs   Lab 04/17/18  0748   TSH 2.308     A1C:  Recent Labs   Lab 02/12/19  1024   Hemoglobin A1C 5.5       Assessment/Plan     Lopez Savage is a 74 y.o.male with:    1. Cough  - X-Ray Chest AP Portable; Future  Possibly COPD exacerbation vs pulmonary edema, less likely infectious.  Will repeat CXR to further evaluate  2. Nausea and vomiting, intractability of vomiting not specified, unspecified vomiting type  Likely from gastroparesis from recent acute illness.  3. Diverticulitis  Abdominal pain has improved.  LIkely resolved with antibiotics.  Has appt to see GI soon.  4. Subacute bacterial endocarditis  5. Streptococcal bacteremia  Afebrile.  Not being considered for CT surgery currently.  On Rocephin via PICC line.  Has appt to see ID soon.   6. Autoimmune hemolytic anemia  Had repeat labs from  yesterday.  Will follow up results.  F/U with Dr. Matos as scheduled.    Chronic conditions status updated as per HPI.  Other than changes above, cont current medications and maintain follow up with specialists.  Return to clinic in 1 month.    Prasanna Haywood MD  [facility]        Answers for HPI/ROS submitted by the patient on 8/22/2020   activity change: Yes  unexpected weight  change: Yes  rhinorrhea: No  trouble swallowing: No  visual disturbance: No  chest tightness: No  polyuria: Yes  difficulty urinating: No  joint swelling: No  arthralgias: Yes  confusion: No  dysphoric mood: No

## 2020-08-26 NOTE — TELEPHONE ENCOUNTER
----- Message from Elaine Munguia sent at 8/26/2020  1:28 PM CDT -----  Regarding: Results  Type:  Needs Medical Advice    Who Called:  Kavitha from [facility]  Would the patient rather a call back or a response via [facility]?  Call back  Best Call Back Number:  [phone]  Additional Information: she needs to report that this H and H is still low, the potassium is still low at 3.3 and she will fax over the lab results

## 2020-08-28 NOTE — TELEPHONE ENCOUNTER
On rocephin via PICC line, this should treat any possible PNA.  Can do Mucinex, OTC delsym for cough

## 2020-08-28 NOTE — TELEPHONE ENCOUNTER
----- Message from Saurabh Palafox sent at 8/28/2020  4:33 PM CDT -----  Regarding: Kavitha-[facility]  Type: Patient Call Back    Who called: self    What is the request in detail: pt chest xray positive for pneumonia please check fax    Can the clinic reply by [facility]? no    Would the patient rather a call back or a response via [facility]? Call     Best call back number: [phone]  ext [phone]

## 2020-08-28 NOTE — PROGRESS NOTES
PATIENT: Lopez Savage  MRN: [id number]  DATE: 8/28/2020    Diagnosis:   1. Normocytic anemia    2. Acute blood loss anemia    3. Other folate deficiency anemias    4. Splenomegaly    5. CKD (chronic kidney disease) stage 3, GFR 30-59 ml/min    6. Morbid obesity with BMI of 40.0-44.9, adult      Chief Complaint: No chief complaint on file.    Subjective:    History of Present Illness: Mr. Savage is a 74 y.o. male who presented in February 2019 for evaluation and management of normocytic anemia. He was referred by his family medicine physician Dr. Munguia. He has the following comorbid conditions: morbid obesity, hypertension, osteoarthritis, gastroesophageal reflux.    Telemedicine visit:  The patient location is: home  The chief complaint leading to consultation is: anemia    Visit type: audiovisual    Face to Face time with patient: 15 minutes  25 minutes of total time spent on the encounter, which includes face to face time and non-face to face time preparing to see the patient (eg, review of tests), Obtaining and/or reviewing separately obtained history, Documenting clinical information in the electronic or other health record, Independently interpreting results (not separately reported) and communicating results to the patient/family/caregiver, or Care coordination (not separately reported).     Each patient to whom he or she provides medical services by telemedicine is:  (1) informed of the relationship between the physician and patient and the respective role of any other health care provider with respect to management of the patient; and (2) notified that he or she may decline to receive medical services by telemedicine and may withdraw from such care at any time.    Notes: see below    Reviewing his labs in our system, he has had an anemia since 2007. It has remained relatively stable over time with a hemoglobin between 10 - 12 g/dL.  - he underwent CT abdomen/pelvis on 6/22/20.  - he underwent bone marrow  biopsy on 7/7/20.    Interval history:  - he presents for a follow-up appointment for his anemia.  - he was admitted for 8/17/20 to 8/21/20 for endocarditis and acute blood-loss anemia. He was determined to bee too high risk for cardiovascular surgery.  - upper GI endoscopy (8/14/20) revealed acute gastritis with hemorrhage.  - today, he complains of weakness, fatigue, excessive sleeping.    Past medical, surgical, family, and social histories have been reviewed and updated below.    Past Medical History:   Past Medical History:   Diagnosis Date    Acute pyelonephritis 7/31/2020    Allergy     Asthma     BPH (benign prostatic hyperplasia)     CHRONIC BRONCHITIS     GERD (gastroesophageal reflux disease)     HTN (hypertension)     HTN (hypertension) 10/15/2014    Joint pain     Mitral regurgitation     3-4+ under surveillance CT surgery    Mitral regurgitation     Mitral valve regurgitation     Obesity     Osteoarthritis of knee     Trouble in sleeping     Ulcerative colitis     Urinary tract infection     Valvular regurgitation        Past Surgical History:   Past Surgical History:   Procedure Laterality Date    BONE MARROW BIOPSY Left 7/7/2020    Procedure: Biopsy-bone marrow;  Surgeon: Howard Matos MD;  Location: [facility] OR;  Service: Oncology;  Laterality: Left;    COLONOSCOPY N/A 10/11/2017    Procedure: COLONOSCOPY [place];  Surgeon: Shanae Duarte MD;  Location: [facility];  Service: Endoscopy;  Laterality: N/A;    CYSTOSCOPY N/A 8/8/2018    Procedure: CYSTOSCOPY;  Surgeon: Adan Cuenca MD;  Location: [address];  Service: Urology;  Laterality: N/A;  1 hour    CYSTOSCOPY W/ RETROGRADES      with penile biopsy    DILATION OF URETHRA N/A 8/8/2018    Procedure: DILATION, URETHRA;  Surgeon: Adan Cuenca MD;  Location: [facility] OR [address] [place] [address];  Service: Urology;  Laterality: N/A;    ESOPHAGOGASTRODUODENOSCOPY N/A 8/14/2020    Procedure: EGD (ESOPHAGOGASTRODUODENOSCOPY);  Surgeon:  Yoan Hamilton MD;  Location: [facility];  Service: Gastroenterology;  Laterality: N/A;    HERNIA REPAIR Right 1956    inguinal    JOINT REPLACEMENT Bilateral     knees    Penile Biopsy      TONSILLECTOMY      TOTAL KNEE ARTHROPLASTY Bilateral     URETHRAL MEATOPLASTY N/A 8/8/2018    Procedure: MEATOPLASTY, URETHRA;  Surgeon: Adan Cuenca MD;  Location: [address];  Service: Urology;  Laterality: N/A;       Family History:   Family History   Problem Relation Age of Onset    Bone cancer Mother     Glaucoma Mother     Cataracts Mother     Cancer Mother         bone cancer    Kidney disease Father     COPD Sister     Heart disease Daughter         congenital    Other Daughter 2        fibr. tumor on bronchial tube; lung removed    Other Daughter         Bronchitis    Melanoma Neg Hx     Prostate cancer Neg Hx     Macular degeneration Neg Hx     Retinal detachment Neg Hx     Strabismus Neg Hx     Amblyopia Neg Hx     Blindness Neg Hx        Social History:  reports that he quit smoking about 51 years ago. His smoking use included cigarettes. He has a 12.00 pack-year smoking history. He has quit using smokeless tobacco.  His smokeless tobacco use included chew. He reports that he does not drink alcohol or use drugs.    Allergies:  Review of patient's allergies indicates:   Allergen Reactions    Codeine Nausea Only    Tetanus vaccines and toxoid Hives and Rash       Medications:  Current Outpatient Medications   Medication Sig Dispense Refill    albuterol (PROVENTIL HFA) 90 mcg/actuation inhaler Inhale 2 puffs into the lungs every 6 (six) hours as needed for Wheezing or Shortness of Breath. 18 g 3    albuterol (PROVENTIL) 2.5 mg /3 mL (0.083 %) nebulizer solution Take 3 mLs (2.5 mg total) by nebulization every 6 (six) hours as needed for Wheezing. Rescue 1 Box 11    betamethasone dipropionate (DIPROLENE) 0.05 % ointment Apply topically 2 (two) times daily. (Patient taking  differently: Apply topically 2 (two) times daily as needed. ) 45 g 3    cefTRIAXone (ROCEPHIN) 2 g/50 mL PgBk IVPB Inject 50 mLs (2 g total) into the vein Daily. Last dose 9/14/20      diclofenac sodium (VOLTAREN) 1 % Gel Apply 2 g topically 3 (three) times daily. (Patient taking differently: Apply 2 g topically 3 (three) times daily as needed (pain). ) 1 Tube 0    donepeziL (ARICEPT) 10 MG tablet Take 1 tablet (10 mg total) by mouth once daily. HOLD FOR ONE WEEK UNTIL ANTIBIOTIC COURSE IS COMPLETED. (Patient not taking: Reported on 8/23/2020) 90 tablet 3    escitalopram oxalate (LEXAPRO) 10 MG tablet Take 1 tablet (10 mg total) by mouth once daily. 90 tablet 3    fluticasone furoate-vilanteroL (BREO ELLIPTA) 100-25 mcg/dose diskus inhaler Inhale 1 puff into the lungs 2 (two) times a day. Controller      fluticasone propionate (FLONASE) 50 mcg/actuation nasal spray 2 sprays (100 mcg total) by Each Nostril route once daily. (Patient taking differently: 1 spray by Each Nostril route 2 (two) times a day. ) 9.9 mL 11    fluticasone-salmeterol diskus inhaler 250-50 mcg Inhale 1 puff into the lungs 2 (two) times daily. Controller 60 each 3    losartan (COZAAR) 50 MG tablet Take 1 tablet (50 mg total) by mouth once daily. 90 tablet 3    meclizine (ANTIVERT) 12.5 mg tablet Take 1 tablet (12.5 mg total) by mouth 3 (three) times daily. (Patient taking differently: Take 12.5 mg by mouth once daily. ) 270 tablet 3    montelukast (SINGULAIR) 10 mg tablet Take 10 mg by mouth once daily.       ondansetron (ZOFRAN) 4 MG tablet Take 1 tablet (4 mg total) by mouth every 8 (eight) hours as needed for Nausea. 20 tablet 2    pantoprazole (PROTONIX) 40 MG tablet Take 1 tablet (40 mg total) by mouth 2 (two) times daily before meals. 60 tablet 2    sucralfate (CARAFATE) 1 gram tablet Take 1 tablet (1 g total) by mouth 4 (four) times daily. for 14 days (Patient not taking: Reported on 8/23/2020) 56 tablet 0    sucralfate  (CARAFATE) 1 gram tablet Take 1 tablet (1 g total) by mouth 4 (four) times daily. 120 tablet 2     No current facility-administered medications for this visit.      Review of Systems   Constitutional: Positive for appetite change, fatigue (improved) and unexpected weight change.   HENT: Negative for mouth sores and sore throat.    Eyes: Negative for visual disturbance.   Respiratory: Positive for cough. Negative for shortness of breath.    Cardiovascular: Negative for chest pain.   Gastrointestinal: Positive for nausea (acid reflux). Negative for abdominal pain, diarrhea and vomiting.   Genitourinary: Positive for frequency. Negative for dysuria.   Musculoskeletal: Negative for back pain.   Skin: Negative for rash.   Neurological: Positive for dizziness. Negative for headaches.   Hematological: Negative for adenopathy.   Psychiatric/Behavioral: The patient is not nervous/anxious.    Physical Exam  Deferred since telemedicine visit.    ECOG Performance Status:   ECOG SCORE 1       Objective:      Vitals:   There were no vitals filed for this visit.  BMI: There is no height or weight on file to calculate BMI.  Deferred since telemedicine visit.    Physical Exam  Deferred since telemedicine visit.    Laboratory Data:  Labs have been reviewed.     Lab Results   Component Value Date    WBC 11.12 08/21/2020    HGB 8.3 (L) 08/21/2020    HCT 26.3 (L) 08/21/2020    MCV 90 08/21/2020     08/21/2020           Upper GI endoscopy (8/14/20):  - Normal esophagus.   - Medium-sized hiatal hernia.   - Acute gastritis with hemorrhage. Clips were placed. Treated with bipolar cautery.   - Normal examined duodenum.   - No specimens collected.       Bone marrow (7/7/20):  BONE MARROW ASPIRATE, CLOT, AND DECALCIFIED NEEDLE CORE BIOPSY:  LEFT POSTEROSUPERIOR ILIAC CREST  - Hypercellular marrow (60-70% total cellularity) with mild granulocytic hyperplasia and marked  erythroid hypoplasia  - Mildly increased stainable histiocytic iron  stores (4-5+)    CT abdomen/pelvis (6/22/20): I have personally reviewed the images  - Splenomegaly.  Present measurement 16.3 x 5.5 cm.  Linear band identified along the anteromedial margin the spleen raising possibility of the focal contusion and less likely incomplete laceration measuring approximately 3 cm.  There is no adjacent subcapsular hematoma or findings to suggest recent trauma in the perisplenic soft tissues, anterior lateral abdominal or lower chest walls.  Correlate clinically.  - Cholelithiasis.  - Diverticulosis without diverticulitis.  - Probable bone islands within the right sacral ala and L1 and L2 vertebral bodies.  - No significant bulky or matted adenopathy within the abdomen or pelvis.    CT chest (6/15/20):  1. 0.5 cm perifissural nodule in the lateral basal segment of the right lower lobe and few micronodules scattered bilaterally.  Perifissural nodules have a strong association with benign etiology and typically do not require dedicated follow-up.  For multiple solid nodules all <6 mm, Fleischner Society 2017 guidelines recommend no routine follow up for a low risk patient, or follow up with non-contrast chest CT at 12 months after discovery in a high risk patient.  2. Upper limit of normal size right pretracheal node and few prominent mediastinal nodes.  Appearance commonly reflects normal ludy reactivity with infection or inflammation but is nonspecific.  3. 0.9 cm sclerotic focus in the L1 vertebral body.  4. Splenomegaly      Assessment:       1. Normocytic anemia    2. Acute blood loss anemia    3. Other folate deficiency anemias    4. Splenomegaly    5. CKD (chronic kidney disease) stage 3, GFR 30-59 ml/min    6. Morbid obesity with BMI of 40.0-44.9, adult           Plan:     1. Normocytic anemia / folate-deficiency anemia  - Reviewing his labs in our system, he has had an anemia since 2007. It has remained relatively stable over time with a hemoglobin between 10 - 12 g/dL.  - he  has had iron studies checked in the past. In 2013, his iron and iron saturation were decreased, but his total iron binding capacity was normal, and his ferritin was elevated. These findings are consistent with anemia of chronic disease/inflammation.  - CBC on 6/12/20 revealed an acute worsening of his anemia; hemoglobin was 7.8 g/dL.  - CT scan (6/15/20) revealed splenomegaly. Given his systemic symptoms and splenomegaly, I am concerned about a hematologic process, possibly a hematologic malignancy such as lymphoma.  - CT abdomen/pelvis (6/22/20) confirmed the splenomegaly but did not reveal lymphadenopathy.  - bone marrow biopsy (7/7/20) revealed hyperplasia with some dysplasia in granulopoiesis. Marked erythroid hypoplasia was noted.   - upper GI endoscopy (8/14/20) revealed acute gastritis with hemorrhage.  - given the new evidence of blood-loss anemia, endocarditis, and folate deficiency, I recommend working on these things first before proceeding with rituximab for questionable immune-mediated anemia.  - check labs today, including type/screen and iron studies. We will likely schedule a blood transfusion later this week.  - return to clinic in 2 weeks.    2. Severe obesity  - last documented BMI (6/23/20) was 38.09 kg/m2  - I have encouraged weight loss and exercise.  - continue to monitor.    3. Advance care planning  - I initiated the process of advance care planning today and explained the importance of this process to the patient.  I introduced the concept of advance directives to the patient, as well. Then the patient received detailed information about the importance of designating a Health Care Power of  (HCPOA). He was also instructed to communicate with this person about their wishes for future healthcare, should he become sick and lose decision-making capacity. The patient has not previously appointed a HCPOA. After our discussion, the patient has decided to complete a HCPOA and has appointed  his wife Brenda Savage ([phone]). The forms were completed and were scanned into [employer]     - return to clinic in 2 weeks.    Howard Matos M.D.  Hematology/Oncology  [facility] - [address], [address]  [place] [address]  Phone: [phone]  Fax: [phone]

## 2020-08-31 PROBLEM — D64.9 SYMPTOMATIC ANEMIA: Status: ACTIVE | Noted: 2020-01-01

## 2020-08-31 NOTE — Clinical Note
1. Add labs type/screen, ferritin, iron/TIBC, soluble transferrin receptor, and magnesium to today's labs.  2. Have a blood consent form ready. He will come up later this morning to sign it.  3. Schedule f/u virtual appt in 2 weeks.    Thanks!

## 2020-08-31 NOTE — TELEPHONE ENCOUNTER
Pt.'s wife confirmed blood transfusion appt. At 1pm on 9/1/20 in [facility].

## 2020-09-01 NOTE — TELEPHONE ENCOUNTER
"Please see below message from patient's wife which was originally sent through her own portal.    "Dr medeiros  This is Brenda Janette, Lopez Savage 's wife. I was wondering why you didn't let me know my 's results of his X-ray? I feel like we are not on your priority list. I think we should have heard from you instead of me telling you my  possibly have pneumonia in his left lung. I talked to a couple of doctors and they asked why didn't your primary care doctor call you about the results. My  is very sick and having pneumonia on top of everything should be something important. But apparently it's not important enough of you. I very upset about you neglecting my . What do we do now!"    The xray resulted when I was out of the office for a few days on paternity leave.  Dr. Ellis reviewed the Xray results on the day we received the report which noted "Right sided PICC line in place with tip at mid-level SVC, satisfactory placement... There is left basilar consolidation density with obscuring of the hemidiaphragm and costophrenic angle indicating pleural effusion, atelectasis, possible pneumonia." Dr. Ellis confirmed that he is taking Rocephin which is an acceptable regimen for treating pneumonia and relayed the message to the home health nurse.      Pt has appt to see ID on 9/15/2020 to follow up hospitalization for bacteremia.  I will contact them directly to see if any change to his antibiotic regimen is needed.  "

## 2020-09-02 NOTE — PROGRESS NOTES
Spoke with patient and wife regarding recent CXR and contact from his PCP to me regarding recent CXR and concern for PNA.  Wife reports patient progressively weaker, SOB waxing/waning unsure to tell if worse, new sputum but does not know color.  Getting cold easily but no fever.  Will start on trial of doxycyline 100mg po bid, get procalcitonin with home health, and will have scheduled ID follow up tomorrow.  Patient instructed to contact Dr. Duque's office as suspect needs to be seen as may need diuresis.

## 2020-09-02 NOTE — TELEPHONE ENCOUNTER
Confirmed office received pt.'s labs.  ----- Message from Phuong Fisher sent at 9/2/2020  9:41 AM CDT -----  Contact: Kavitha from [facility]  Type:  Patient Requesting Call    Who Called: Kavitha Partida Call Back Number: [phone] ext [phone]  Additional Information:  would like to call in test results, has already faxed them over to [phone]

## 2020-09-02 NOTE — PROGRESS NOTES
Subjective:       Patient ID: Lopez Savage is a 74 y.o. male.    Chief Complaint: Follow-up    HPI   The patient location is: home  The chief complaint leading to consultation is: hospital follow up     Visit type: audiovisual    Face to Face time with patient: 20  35 minutes of total time spent on the encounter, which includes face to face time and non-face to face time preparing to see the patient (eg, review of tests), Obtaining and/or reviewing separately obtained history, Documenting clinical information in the electronic or other health record, Independently interpreting results (not separately reported) and communicating results to the patient/family/caregiver, or Care coordination (not separately reported).         Each patient to whom he or she provides medical services by telemedicine is:  (1) informed of the relationship between the physician and patient and the respective role of any other health care provider with respect to management of the patient; and (2) notified that he or she may decline to receive medical services by telemedicine and may withdraw from such care at any time.    Notes:     This is a 74 year old male with PMH of hypertension, ulcerative colitis, GERD, chronic bronchitis, autoimmune hemolytic anemia, mitral regurgitation, and diverticulitits transferred to [facility] for subacute bacterial endocarditis in the setting of recent upper GI bleed with recommendation for cardiothoracic surgery consultation from [facility] cardiology for strep bacteremia and endocarditis. He has been on CTX for 6 weeks. No intervention per CTS. He is seen today for hospital follow up      On 8/5, patient was advised to return to the hospital after blood cultures drawn on 7/31 grew strep mutans. He was started on ceftriaxone and a PICC line was put in place and he was again discharged home. Patient then returned to the hospital on 8/14 due to melena and was found to have GI bleed. He underwent EGD with  cauterization/clipping for acute gastritis. He remained on the ceftriaxone. During this hospital stay, TTE was performed which showed aortic and mitral vegetations both >1cm. This was subsequently confirmed on SILVANA. Cardiology was consulted and they recommended transfer to [facility] for cardiothoracic surgery consultation given patient may need double valve replacement. Viktor had been seen by [facility] ID who recommend continuing CTX at 2 g q24h for a total course of 4 weeks from 8/3 with end date of 8/31.Patient transferred to [facility].  He remains on Ceftriaxone 2g IV q24. CTSx rec no intervention and conservative management.       He has been on CTX x 6 weeks, end date 9/15/2020. CXR with left basilar consolidation. Right side clear. PICC line stable. No issues with picc line. No issues with abx. Reports SOB. And mild cough with productive sputum. Denies fever chills or night sweats. H/H low on 9/1 with mild leukocytosis and inflammatory markers elevated at 53.3 from 40 last week.  Denies hematuria or bloody stools. Denies any new wounds. Appetite same. Decrease activity due to fatigue. Has not started doxycycline yet as pharmacy denies receiving the prescription. Resent prescription today. Otherwise no other acute complaints or concerns.     ID LAB F/U 9/1/2020    CBC 12.8 (10)  H/H 6.7/21.2 (underwent blood transfusion 9/1/2020)  Creatinine 1.38  Potassium 3.1  Sodium 135  AST/ALT 6/3  ESR 34  CRP 53 (41.2)         Review of Systems   Constitutional: Positive for activity change and fatigue. Negative for appetite change, chills, diaphoresis and fever.   Respiratory: Positive for cough and shortness of breath.    Cardiovascular: Negative for chest pain.   Gastrointestinal: Negative for abdominal pain, blood in stool, constipation, nausea and vomiting.   Genitourinary: Negative for dysuria and hematuria.   Neurological: Negative for dizziness and headaches.   All other systems reviewed and are negative.        Objective:       Physical Exam  Constitutional:       Appearance: Normal appearance.   Musculoskeletal:      Comments: RUE picc line c/d/i   Neurological:      General: No focal deficit present.      Mental Status: He is alert and oriented to person, place, and time.         Assessment:       1. Streptococcal bacteremia    2. Pneumonia of left lower lobe due to infectious organism    3. Subacute bacterial endocarditis    4. Nonrheumatic mitral valve regurgitation        Plan:       Subacute endocarditis  - Hx of strep mutans bacteremia - likely cause now with MV and ad AV valve vegetations  - Blood cultures cleared 8/3/20  - on ceftriaxone  - No intervention per CTSX - rec conservative treatment  - H/H low 9/1. S/p blood transfusion on 9/1 at 1 PM.   - WBC 12  - Inflammatory markers increased to 53.3 from 41.   - Recent CXR with left basilar consolidation - started doxycycline today      Plan:  1. Continue ceftriaxone 2g q24h EOC 9/15/2020  3. Weekly ESR, CBC, CMP and CRP while on Ceftriaxone  4. Establish care with cardiology  5. Started doxycycline 100 mg po bid for pneumonia  6. If with worsening symptoms (fever chills night sweats, increasing SOB) recommend seeking immediate attn and go to ED  7. CBC today   8. F/U heme/onc and PCP

## 2020-09-03 NOTE — TELEPHONE ENCOUNTER
Pt with cough and evidence for likely pulmonary vascular congestion and pleural fluid on recent CXR.  Already being followed closely by ID and hematology.  Sending referral to cardiology to get on board and hopefully prevent hospital re-admission for something manageable as outpatient.

## 2020-09-08 PROBLEM — I63.431 EMBOLIC STROKE INVOLVING RIGHT POSTERIOR CEREBRAL ARTERY: Status: ACTIVE | Noted: 2020-01-01

## 2020-09-08 PROBLEM — I63.432 EMBOLIC STROKE INVOLVING LEFT POSTERIOR CEREBRAL ARTERY: Status: ACTIVE | Noted: 2020-01-01

## 2020-09-08 PROBLEM — Z87.19 HISTORY OF GASTROINTESTINAL BLEEDING: Status: ACTIVE | Noted: 2020-01-01

## 2020-09-08 PROBLEM — I34.0 MITRAL REGURGITATION: Chronic | Status: ACTIVE | Noted: 2020-01-01

## 2020-09-08 PROBLEM — D59.10 AUTOIMMUNE HEMOLYTIC ANEMIA: Chronic | Status: ACTIVE | Noted: 2020-01-01

## 2020-09-08 PROBLEM — R40.20 LOSS OF CONSCIOUSNESS: Status: ACTIVE | Noted: 2020-01-01

## 2020-09-08 PROBLEM — I33.0 BACTERIAL ENDOCARDITIS: Status: ACTIVE | Noted: 2020-01-01

## 2020-09-08 PROBLEM — J42 CHRONIC BRONCHITIS: Chronic | Status: ACTIVE | Noted: 2020-01-01

## 2020-09-08 PROBLEM — I63.9 CVA (CEREBRAL VASCULAR ACCIDENT): Status: ACTIVE | Noted: 2020-01-01

## 2020-09-08 PROBLEM — K51.90 ULCERATIVE COLITIS: Chronic | Status: ACTIVE | Noted: 2020-01-01

## 2020-09-08 PROBLEM — K21.9 GERD (GASTROESOPHAGEAL REFLUX DISEASE): Chronic | Status: ACTIVE | Noted: 2020-01-01

## 2020-09-08 PROBLEM — I10 ESSENTIAL HYPERTENSION: Chronic | Status: ACTIVE | Noted: 2020-01-01

## 2020-09-08 PROBLEM — I63.9 STROKE: Status: ACTIVE | Noted: 2020-01-01

## 2020-09-08 PROBLEM — I63.81 THALAMIC INFARCT, ACUTE: Status: ACTIVE | Noted: 2020-01-01

## 2020-09-08 NOTE — PLAN OF CARE
Called for rhythmic shaking in right upper and lower extremities, and eye twitching. Reported biting down on ETT. Concern for seizure like activity. Propofol drip was disconnected upon transport. Bite guard put in place.    Physical exam showed continues RUE and RLE rhythmic shaking and eye twitching. Pupillary reflex intact.     Will reassess patient shortly and consider EEG if necessary. Still awaiting MRI.

## 2020-09-08 NOTE — PROGRESS NOTES
Rx Message- Therapy with Vancomycin is complete and/or discontinued by provider and Vancomycin lab and consult is now discontinued. Pharmacy will sign off at this time.Please re-consult as needed. Thanks

## 2020-09-08 NOTE — PT/OT/SLP PROGRESS
Physical Therapy      Patient Name:  Ana Laura Candelaria   MRN:  [id number]  1027 Patient not seen today secondary to (medically unstable--awaiting stat CT of head.). Will follow-up.    Mary Prajapati, PT   9/8/2020

## 2020-09-08 NOTE — ED PROVIDER NOTES
Encounter Date: 9/8/2020       History     Chief Complaint   Patient presents with    Facial Droop     Pt presents to ED via [facility] secondary to unresponsive and right sided facial droop. Per EMS, patient collapsed and family reported right sided facial droop. [facility] also reports patient's family was performing CPR but patient had pulse upon theor arrival. Patient last knwon normal was 45 minutes pta.      Patient's actual name is Lopez Savage [id number].    This is a 74-year-old man who presented from home for a concern stroke, possible seizure, fall.  He was witnessed to have an episode in which he fell to the ground had some jerking hit the back of his head, was unresponsive briefly thereafter, his family did administer CPR briefly to him at home.  It appeared that 1 side of his face was drooping and he was not answering questions at that time at which time the ambulance arrived his initial saturation was 70%, his blood pressure was in the 140/70 range, his blood glucose was normal, he did no other purposeful movements at that time.  Rest of history is limited secondary to this patient's mental status.        Review of patient's allergies indicates:   Allergen Reactions    Codeine     Tetanus vaccines and toxoid      No past medical history on file.  No past surgical history on file.  No family history on file.  Social History     Tobacco Use    Smoking status: Not on file   Substance Use Topics    Alcohol use: Not on file    Drug use: Not on file     Review of Systems   Unable to perform ROS: Patient unresponsive       Physical Exam     Initial Vitals   BP Pulse Resp Temp SpO2   09/08/20 0117 09/08/20 0117 09/08/20 0117 09/08/20 0215 09/08/20 0117   (!) 182/88 102 (!) 28 97.6 °F (36.4 °C) 97 %      MAP       --                Physical Exam  Constitutional:  This is a gentleman in extremis, does not appropriately regard, has blood around the mouth, sonorous respirations  Eyes: Conjunctivae normal, gaze  appears deviated to the right slightly, eyes move in a dysconjugate fashion, pupils are 4 mm and sluggishly reactive  ENT       Head:  Small contusion over the occiput of the head, otherwise atraumatic       Nose: No congestion.       Mouth/Throat:  Blood caked around the lips and teeth, jaw appears clamped  Hematological/Lymphatic/Immunilogical: No cervical lymphadenopathy.  Cardiovascular:  Rapid rate, regular rhythm.   Respiratory:  Minimal respiratory effort. Breath sounds diminished bilaterally  Gastrointestinal: Soft, rounded, nontender, no rebound, no guarding  Musculoskeletal:  0/4 strength in all extremities, no obvious external sign of trauma,  Neurologic:  Unresponsive, no appreciable speech, no response to noxious stimuli  Skin:  Pale cool mottled  Psychiatric:  Unable to assess  ED Course   Critical Care    Date/Time: 9/8/2020 2:00 AM  Performed by: Baldemar Boyce MD  Authorized by: Baldemar Boyce MD   Direct patient critical care time: 20 minutes  Additional history critical care time: 10 minutes  Ordering / reviewing critical care time: 10 minutes  Documentation critical care time: 10 minutes  Consulting other physicians critical care time: 8 minutes  Consult with family critical care time: 8 minutes  Total critical care time (exclusive of procedural time) : 66 minutes  Critical care time was exclusive of separately billable procedures and treating other patients and teaching time.  Critical care was necessary to treat or prevent imminent or life-threatening deterioration of the following conditions: cardiac failure, respiratory failure and CNS failure or compromise.  Critical care was time spent personally by me on the following activities: blood draw for specimens, development of treatment plan with patient or surrogate, discussions with consultants, discussions with primary provider, gastric intubation, interpretation of cardiac output measurements, examination of patient, evaluation of  patient's response to treatment, obtaining history from patient or surrogate, ordering and performing treatments and interventions, ordering and review of radiographic studies, ordering and review of laboratory studies, pulse oximetry, re-evaluation of patient's condition, review of old charts, ventilator management and vascular access procedures.    Intubation    Date/Time: 9/8/2020 5:45 AM  Location procedure was performed: [facility] EMERGENCY DEPARTMENT  Performed by: Baldemar Boyce MD  Authorized by: Baldemar Boyce MD   Consent Done: Emergent Situation  Indications: respiratory distress,  airway protection and  hypoxemia  Intubation method: video-assisted  Patient status: paralyzed (RSI)  Preoxygenation: BVM  Sedatives: see MAR for details  Paralytic: rocuronium  Laryngoscope size: Mac 3  Tube size: 7.5 mm  Tube type: cuffed  Number of attempts: 1  Cricoid pressure: yes  Cords visualized: yes  Post-procedure assessment: chest rise and CO2 detector  Breath sounds: rales/crackles,  wheezing and equal  Cuff inflated: yes  ETT to lip: 24 cm  Tube secured with: adhesive tape, bite block and ETT worthy  Chest x-ray interpreted by other physician and radiologist.  Chest x-ray findings: endotracheal tube in appropriate position  Patient tolerance: Patient tolerated the procedure well with no immediate complications  Complications: No  Estimated blood loss (mL): 0  Specimens: No  Implants: No        Labs Reviewed   CBC W/ AUTO DIFFERENTIAL - Abnormal; Notable for the following components:       Result Value    WBC 19.09 (*)     RBC 3.24 (*)     Hemoglobin 9.1 (*)     Hematocrit 29.4 (*)     Mean Corpuscular Hemoglobin Conc 31.0 (*)     Immature Granulocytes 2.3 (*)     Gran # (ANC) 15.5 (*)     Immature Grans (Abs) 0.43 (*)     Mono # 1.1 (*)     Gran% 81.1 (*)     Lymph% 7.7 (*)     All other components within normal limits   COMPREHENSIVE METABOLIC PANEL - Abnormal; Notable for the following components:     Potassium 3.2 (*)     Glucose 189 (*)     Creatinine 1.5 (*)     Albumin 2.7 (*)     ALT 7 (*)     eGFR if  36 (*)     eGFR if non  31 (*)     All other components within normal limits   TSH - Abnormal; Notable for the following components:    TSH 5.424 (*)     All other components within normal limits   LIPID PANEL - Abnormal; Notable for the following components:    Cholesterol 105 (*)     HDL 28 (*)     All other components within normal limits   TROPONIN I - Abnormal; Notable for the following components:    Troponin I 0.082 (*)     All other components within normal limits   B-TYPE NATRIURETIC PEPTIDE - Abnormal; Notable for the following components:     (*)     All other components within normal limits   URINALYSIS, REFLEX TO URINE CULTURE - Abnormal; Notable for the following components:    Specific Gravity, UA >=1.030 (*)     Protein, UA 2+ (*)     Occult Blood UA 3+ (*)     All other components within normal limits    Narrative:     Specimen Source->Urine   URINALYSIS MICROSCOPIC - Abnormal; Notable for the following components:    RBC, UA 30 (*)     Bacteria Many (*)     Hyaline Casts, UA 2 (*)     RBC Casts, UA 3 (*)     All other components within normal limits    Narrative:     Specimen Source->Urine   ISTAT PROCEDURE - Abnormal; Notable for the following components:    POC PTWBT 17.3 (*)     POC PTINR 1.5 (*)     All other components within normal limits   POCT GLUCOSE - Abnormal; Notable for the following components:    POCT Glucose 128 (*)     All other components within normal limits   ISTAT PROCEDURE - Abnormal; Notable for the following components:    POC PCO2 52.2 (*)     POC PO2 390 (*)     POC HCO3 31.8 (*)     POC TCO2 33 (*)     All other components within normal limits   CULTURE, BLOOD   CULTURE, BLOOD   PROTIME-INR   LACTIC ACID, PLASMA   T4, FREE   SARS-COV-2 RNA AMPLIFICATION, QUAL   TROPONIN I   POCT GLUCOSE, HAND-HELD DEVICE   TYPE & SCREEN   ISTAT  CREATININE   POCT GLUCOSE MONITORING CONTINUOUS   POCT GLUCOSE MONITORING CONTINUOUS          Imaging Results          X-Ray Chest AP Portable (Final result)  Result time 09/08/20 02:21:39    Final result by Kris Renteria MD (09/08/20 02:21:39)                 Impression:      Status post endotracheal tube placement without evidence of a pneumothorax.      Electronically signed by: Kris Renteria  Date:    09/08/2020  Time:    02:21             Narrative:    EXAMINATION:  XR CHEST AP PORTABLE    CLINICAL HISTORY:  Personal history of other medical treatment    TECHNIQUE:  Single frontal view of the chest was performed.    COMPARISON:  September 8, 2020 01:33    FINDINGS:  Single view of the chest indicates that there is endotracheal tube placed which terminates 65 mm away from the josé.  Also a nasogastric tube has been placed which extends beyond the gastro esophageal junction and terminates in the upper aspect of the left abdomen.                               X-Ray Chest AP Portable (Final result)  Result time 09/08/20 01:48:24    Final result by Prasanna Lui MD (09/08/20 01:48:24)                 Impression:      Right-sided PICC line as described with mild prominence of the interstitium and no acute findings.      Electronically signed by: Prasanna Lui  Date:    09/08/2020  Time:    01:48             Narrative:    EXAMINATION:  XR CHEST AP PORTABLE    CLINICAL HISTORY:  Stroke;    TECHNIQUE:  Single frontal view of the chest was performed.    COMPARISON:  None    FINDINGS:  A right arm PICC line is present with its tip overlying the right atrial caval junction.  Mild prominence of the interstitium with no confluent consolidation is evident.  The heart is not enlarged and the trachea is midline.    There is no pneumothorax.                               CT Head Without Contrast (Final result)  Result time 09/08/20 01:44:47    Final result by Prasanna Lui MD (09/08/20 01:44:47)                  Impression:      Broad 1 cm thick scalp hematoma over the left temporoparietal region.  No underlying fracture or intracranial abnormality.      Electronically signed by: Prasanna Lui  Date:    09/08/2020  Time:    01:44             Narrative:    EXAMINATION:  CT HEAD WITHOUT CONTRAST    CLINICAL HISTORY:  Neuro deficit, acute, stroke suspected;    TECHNIQUE:  Low dose axial images were obtained through the head.  Coronal and sagittal reformations were also performed. Contrast was not administered.    COMPARISON:  None.    FINDINGS:  There is a moderate large is scalp hematoma over the left temporal and parietal bone measuring up to 1 cm thick.  No laceration or foreign body is evident.  The underlying calvarium is intact.    The brain parenchyma appears normal in attenuation with normal gray-white matter differentiation.  There is no evidence of mass or mass effect.    Mastoid effusion on the right is present.  The remainder of the air cells are clear.  The orbits and orbital contents appear normal.                                 Medical Decision Making:   Differential Diagnosis:   Subarachnoid hemorrhage, CVA, arrhythmia, cardiac arrest,  Independently Interpreted Test(s):   I have ordered and independently interpreted X-rays - see prior notes.  I have ordered and independently interpreted EKG Reading(s) - see prior notes  Clinical Tests:   Lab Tests: Ordered and Reviewed  Radiological Study: Ordered and Reviewed  Medical Tests: Reviewed and Ordered  ED Management:  This 74-year-old man presented unresponsive, hypoxic, tachycardic  Because of the concern for stroke he was initially evaluated via tele Neurology, 1 for rapid CT imaging of the head which did not demonstrate any intracranial hemorrhage.  As he has a past medical history significant for GI bleeds requiring transfusion in the past, head trauma recently, and a indwelling percutaneous PICC line Helen is a poor candidate for tPA at this time.  He  has a known vegetation on his aortic valve.  Given the severity of his symptoms poor respiratory status and hypoxia decision was made for airway protection to intubate this patient.  IV fluids were administered following intubation as blood pressure transiently down trended, his neurologic examination remained poor throughout.  I discussed with his wife his poor status at this time.                                   Clinical Impression:       ICD-10-CM ICD-9-CM   1. Unresponsive  R41.89 780.09   2. Stroke  I63.9 434.91   3. Syncope  R55 780.2   4. History of ETT  Z92.89 V15.89   5. At risk for difficult intubation  T88.4XXA 999.9   6. Hypoxia  R09.02 799.02   7. Covid-19 Virus not Detected  [id number]    8. Troponin level elevated  R79.89 790.6             ED Disposition Condition    Admit                           Baldemar Boyce MD  09/08/20 0524       Baldemar Boyce MD  09/08/20 0571

## 2020-09-08 NOTE — CONSULTS
[facility]  Vascular Neurology  Comprehensive Stroke Center  Tele-Consultation Note      Consults    Consulting Provider: KORI CONWAY  Current Providers  No providers found    Patient Location:  [facility] EMERGENCY DEPARTMENT Emergency Department  Spoke hospital nurse at bedside with patient assisting consultant.     Patient information was obtained from Dr. Conway.         Assessment/Plan:     STROKE DOCUMENTATION     Acute Stroke Times:   Acute Stroke Times   Symptom Onset Date: 09/08/20  Symptom Onset Time: 0030  Stroke Team Arrival Time: 0130  CT Interpretation Time: 0131    NIH Scale:        Modified [place]    Roni Coma Scale:    ABCD2 Score:    [id number] Score:   HAS -BLED Score:   ICH Score:   Hunt & Newman Classification:       Diagnoses:   No new Assessment & Plan notes have been filed under this hospital service since the last note was generated.  Service: Vascular Neurology      There were no vitals taken for this visit.  Alteplase Eligible?: No  Alteplase Recommendation: Alteplase not recommended due to Active internal bleeding   Possible Interventional Revascularization Candidate? possibly - vascular imaging pending    Disposition Recommendation: pending further studies  admit to inpatient    Subjective:     History of Present Illness:  Limited history from Dr. Conway.  Patient with hx endocarditis, PNA and GIB within last 2 weeks.   Family found him with facial droop followed by LOC.  Last known well time unknown.  CPR administered although patient apparently had pulse          Woke up with symptoms?: no    Recent bleeding noted: yes  Does the patient take any Blood Thinners? no  Medications: No relevant medications      Past Medical History: endocarditis, recent GIB, PNA    Past Surgical History: no relevant surgical history    Family History: no relevant history    Social History: unable to obtain    Allergies: Allergies have not been reviewed  unable to obtain    Review of Systems   Unable to perform ROS: Patient unresponsive     Objective:   Vitals: There were no vitals taken for this visit.     CT READ: Yes  No hemmorhage. No mass effect. No early infarct signs.     Physical Exam  Brief exam only upon arrival from CT. Stuporous. No abnl motor activity.  Decision to intubate so exam discontinued        Recommended the emergency room physician to have a brief discussion with the patient and/or family if available regarding the risks and benefits of treatment, and to briefly document the occurrence of that discussion in his clinical encounter note.     The attending portion of this evaluation, treatment, and documentation was performed per Jose Clifford MD via audiovisual.    Billing code:  (time dependent stroke, complex case, unstable patient, hemorrhages, any intervention, some mimics)    · This patient has a very critical neurological condition/illness, with very high morbidity and mortality.  · There is a very high probability for acute neurological change leading to clinical and possibly life-threatening deterioration requiring highest level of physician preparedness for urgent intervention.  · There is possibility that this condition will require treatment with high risk medications as quickly as possible.  · There is also a possibility that the patient may benefit from further, more advance complex therapies (e.g. endovascular therapy) that will require prompt diagnosis and care.  · Care was coordinated with other physicians involved in the patient's care.  · Radiologic studies and laboratory data were reviewed and interpreted, and plan of care was re-assessed based on the results.  · Diagnosis, treatment options and prognosis may have been discussed with the patient and/or family members or caregiver.  · Further advanced medical management and further evaluation is warranted for his care.      In your opinion, this was a: Tier 1 Van  Positive    Consult End Time: 1:48 AM     Jose Clifford MD  Comprehensive Stroke Center  Vascular Neurology   [facility]

## 2020-09-08 NOTE — PROGRESS NOTES
Patient seen and examined by me. I agree with the trainee's separate note except as modified.     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 Also had HENRY at that time, along with GI bleed. He was deemed not a surgical candidate at that time per the chart for his endocarditis (last + cx was . Discharged home on abx about 2.5 weeks ago.   This time came in with a fall, possible seizure activity. Reportedly he had a facial droop. Wife did CPR although it was unclear if he had a cardiac arrest. Intubated in the ED.      Vitals: Afebrile, HR 80s, MAP 80s, 100% on 60%     Significant lab findings: WBC 19 (12 eight days ago), Hgb 9.1 (transfused on 8/31), creatinine 1.5 (1.3 eight days ago).   7.39/52/390/32/100% on 100%     CXR reviewed by me: ETT in place but too high, right perihilar fullness. Unable to visualize left hemidiphragm. Compared to 8/21 CXR, may have a new LLL process      CT head: Broad 1 cm thick scalp hematoma over the left temporoparietal region.  No underlying fracture or intracranial abnormality.    Most recent SILVANA 8.17: severe MR and mild AI with vegetations on the mitral and aortic valve. LVEF 55%      Key exam findings: spontaneous movements of his feet, but no responsiveness   Ventilator:               Intubation day #: 2              P/F: 390  SAT: Propofol paused this AM without any responsiveness   SBT: not awake enough   RASS: -5   Sedation: off   Vasopressors: none   Other drips: none   Lines and invasive devices:   Fluid Balance: not yet recorded   Creatinine: 1.5  Antibiotics/Day #: ceftriaxone   Nutrition:   Glucose management: 180s   Prophylaxis:               DVT: enoxaparin               GI: PPI   PT/OT: not yet appropriate   GOC/Family discussion: full code     Impression: concern for embolic phenomenon from left sided endocarditis; CVA or seizures are  also being considered    Recommendations:   -on appropriate vent settings   -MRI brain  -neurology consult   -EEG  -holding off further sedation at this point   -cont PPI  -cont ceftriaxone, add doxycycline per ID's outpatient not   -would hold on vanco pending repeat cx's     Critical care time (35min) spent personally by me on the following activities: development of treatment plan with patient or surrogate and bedside caregivers, discussions with consultants, evaluation of patient's response to treatment, examination of patient, ordering and performing treatments and interventions, ordering and review of laboratory studies, ordering and review of radiographic studies, pulse oximetry, re-evaluation of patient's condition. This critical care time did not overlap with that of any other provider or involve time for any procedures.

## 2020-09-08 NOTE — EICU
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  He presents after a witnessed fall with reported seizure like activity. Bystander CPR was done but it was unclear if patient lost pulse. He was noted to have facial asymmetry. Telestroke consulted but deemed not a candidate for thrombolysis given recent history of GI bleed. Head CT with scalp hematoma left temporoparietal region otherwise negative.    · He continues to have jerking movements. I ordered ativan 2 mg IV which seems to have controlled the involuntary movements. He is to be started on propofol. If seizure-like activity persistent will load with Keppra. MRI to be done in AM to assess what seems to be new onset seizures.  · Correct electrolytes.  · Blood cultures obtained and ceftriaxone continued for now. May need to involve IDS if cultures remain positive and possibly re-consult CTS.

## 2020-09-08 NOTE — LOPA/MORA/SWTA/AOC/AEB
[facility] ([facility])  On-Site Evaluation  [facility] Contact # [phone]        Thank you for the referral of this patient to determine suitability for organ, tissue, and eye donation.  A chart review has been conducted (date): 09/08/2020  at (time)  09:23 .  [facility] findings are as follows:    ? Potential candidate for organ donation - KATHY following patient. Any changes in patients condition, discussion of withdrawing the vent or brain death exams, or family mention of donation immediately call [phone].    ? Potential for candidate for tissue and eye donation- call KATHY at [phone] within 2 hours of death for screening as a potential tissue and/or eye donor.      ?      [facility] Representative:  Elena Braswell RN/       [facility] Referral Number:   [id number]

## 2020-09-08 NOTE — PLAN OF CARE
Patient on  with documented settings.  Alarms are set and functioning with adequate volumes.  AMBU bag and mask at bedside.  .

## 2020-09-08 NOTE — PT/OT/SLP PROGRESS
Occupational Therapy      Patient Name:  Ana Laura Candelaria   MRN:  [id number]    Patient not seen today secondary to Other (Comment)(medically unstable--awaiting stat CT of head). Will follow-up .    Bijan Garcia OT  9/8/2020

## 2020-09-08 NOTE — ASSESSMENT & PLAN NOTE
Contributing Nutrition Diagnosis  Inadequate energy intake    Related to (etiology):   CVA s/p intubation    Signs and Symptoms (as evidenced by):   NPO on vent    Interventions:  Collaboration with other providers    Nutrition Diagnosis Status:   New

## 2020-09-08 NOTE — SUBJECTIVE & OBJECTIVE
Woke up with symptoms?: no    Recent bleeding noted: yes  Does the patient take any Blood Thinners? no  Medications: No relevant medications      Past Medical History: endocarditis, recent GIB, PNA    Past Surgical History: no relevant surgical history    Family History: no relevant history    Social History: unable to obtain    Allergies: Allergies have not been reviewed unable to obtain    Review of Systems   Unable to perform ROS: Patient unresponsive     Objective:   Vitals: There were no vitals taken for this visit.     CT READ: Yes  No hemmorhage. No mass effect. No early infarct signs.     Physical Exam  Brief exam only upon arrival from CT. Stuporous. No abnl motor activity.  Decision to intubate so exam discontinued

## 2020-09-08 NOTE — HPI
"75 y/o male with HTN, recent endocarditis (8/2020), recent GIB (8/2020), diverticulitis, GERD who presented to the ED after being found down by the family.  History obtained from wife at bedside.  Patient was alone in the room watching TV when his wife heard his daughter yell out because patient was found on the floor unresponsive.  Wife said he looked "dead" so CPR was started.  The wife stated she did not see any jerking movements at that time. Wife states patient has never had these symptoms before and has no prior history of Stroke or TIA.  Prior to this event he was in his normal state of health, but has been using a walker since his recent discharge due to generalized weakness.    Patient was brought to ED via EMS.  A stroke code was initiated, but was interrupted for intubation due to patients continued poor respiratory status.  Shortly after admission, patient developed seizure like activity requiring treatment.      Previous Discharge note: patient has had multiple hospitalizations in the past 6 weeks. In late July patient was hospitalized for HENRY and acute diverticulitis and was discharged home on oral antibiotics. 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 He was started on ceftriaxone and a PICC line was put in place and he was again discharged home. Patient then returned to the hospital on 8/14 due to melena and was found to have GI bleed. He underwent EGD with cauterization/clipping for acute gastritis. He remained on the ceftriaxone. During this hospital stay, TTE was performed which showed aortic and mitral vegetations both >1cm. This was subsequently confirmed on SILVANA. Cardiology was consulted and they recommended transfer to [facility] for cardiothoracic surgery consultation given patient may need double valve replacement.  Patient admitted to [facility] medicine AM of 8/18. Patient was evaluated by cardiothoracic surgery and determined to be " too high risk for surgery and to continue antibiotic management. Patient with episode of melena AM of 8/19, transfused 1u pRBC's for symptomatic anemia and active bleeding. GI consulted and patient started on IV ppi bid.   EGD with gastritis with hemorrhage on 8/20.     Prior history can be found under MRN [id number].

## 2020-09-08 NOTE — CONSULTS
"  [facility] ICU 5th Floor  Adult Nutrition  Consult Note    SUMMARY     Recommendations    Recommendation:   1. If pt to remain intubated, consider initiation of TF: Isosource 1.5 at 10ml/hr and advance as tolerated to goal rate of  60ml/hr to provide 2160 kcal, 98g protein, & 1100ml free water.   2. If pt extubated, consult SLP for swallow eval.   3. RD to follow to monitor nutrition status.      Goals:   TF or diet will be started by RD follow up  Nutrition Goal Status: new  Communication of RD Recs: reviewed with RN(James)    Reason for Assessment  Reason For Assessment: consult(assess dietary needs)  Diagnosis: stroke/CVA  Relevant Medical History: GERD, diverticulitis, endocarditis, HTN, COPD  General Information Comments: Pt intubated on vent. OG tube. PICC line. NFPE completed today 9/8-appears nourished at this time.   Nutrition Discharge Planning: d/c needs to be determined    Nutrition Risk Screen  Nutrition Risk Screen: no indicators present    Nutrition/Diet History  Food Preferences: unable to assess  Factors Affecting Nutritional Intake: NPO    Anthropometrics  Temp: 99.1 °F (37.3 °C)  Height: 5' 9" (175.3 cm)  Height (inches): 69 in  Weight Method: Estimated  Weight: 112.5 kg (248 lb 0.3 oz)  Weight (lb): 248.02 lb  Ideal Body Weight (IBW), Male: 160 lb  % Ideal Body Weight, Male (lb): 155.01 %  BMI (Calculated): 36.6  BMI Grade: 35 - 39.9 - obesity - grade II     Lab/Procedures/Meds  Pertinent Labs Reviewed: reviewed  Pertinent Labs Comments: K 3.2L, Crea 1.5H, Glu 189H, Alb 2.7L  Pertinent Medications Reviewed: reviewed  Pertinent Medications Comments: aspirin, rocephin, pantoprazole    Estimated/Assessed Needs  Weight Used For Calorie Calculations: 112.5 kg (248 lb 0.3 oz)  Energy Calorie Requirements (kcal): 2127  Energy Need Method: [facility]  Protein Requirements: 94g (1.3g/kg)  Weight Used For Protein Calculations: 72.7 kg (160 lb 4.4 oz)(IBW)  Estimated Fluid " Requirement Method: RDA Method  RDA Method (mL): 2127     Nutrition Prescription Ordered  Current Diet Order: NPO    Evaluation of Received Nutrient/Fluid Intake  I/O: 30/60  Energy Calories Required: not meeting needs  Protein Required: not meeting needs  Fluid Required: not meeting needs  Comments: LBM unknown  % Intake of Estimated Energy Needs: Other: NPO  % Meal Intake: NPO    Nutrition Risk  Level of Risk/Frequency of Follow-up: (2xweekly)     Assessment and Plan  * CVA (cerebral vascular accident)  Contributing Nutrition Diagnosis  Inadequate energy intake    Related to (etiology):   CVA s/p intubation    Signs and Symptoms (as evidenced by):   NPO on vent    Interventions:  Collaboration with other providers    Nutrition Diagnosis Status:   New         Monitor and Evaluation  Food and Nutrient Intake: energy intake  Food and Nutrient Adminstration: diet order, enteral and parenteral nutrition administration  Physical Activity and Function: nutrition-related ADLs and IADLs  Anthropometric Measurements: weight  Biochemical Data, Medical Tests and Procedures: electrolyte and renal panel  Nutrition-Focused Physical Findings: overall appearance     Malnutrition Assessment  Subcutaneous Fat Loss (Final Summary): well nourished  Muscle Loss Evaluation (Final Summary): well nourished       Nutrition Follow-Up  RD Follow-up?: Yes

## 2020-09-08 NOTE — SUBJECTIVE & OBJECTIVE
"  Woke up with symptoms?: {YES NO:17016}    Recent bleeding noted: {Yes / No / Unknown:74}  Does the patient take any Blood Thinners? {Yes / No / Unknown:74}  [facility]}      Past Medical History: {[facility] MEDICAL HX:22749}    [facility]    Family History: [facility]:42347}    Social History: {[facility] SOCIAL HX:06903}    Allergies: Codeine  Tetanus Vaccines And Toxoid {TELEROKE ALLERGIES:68296}    Review of Systems  Objective:   Vitals: Blood pressure 135/61, pulse 81, temperature 99.1 °F (37.3 °C), resp. rate (!) 25, height 5' 9" (1.753 m), weight 112.5 kg (248 lb), SpO2 95 %. [facility]54344}    CT READ: {TELESTROKE CT READ:99413}    Physical Exam      "

## 2020-09-08 NOTE — PLAN OF CARE
Recommendation:   1. If pt to remain intubated, consider initiation of TF: Isosource 1.5 at 10ml/hr and advance as tolerated to goal rate of  60ml/hr to provide 2160 kcal, 98g protein, & 1100ml free water.   2. If pt extubated, consult SLP for swallow eval.   3. RD to follow to monitor nutrition status.      Goals:   TF or diet will be started by RD follow up  Nutrition Goal Status: new  Communication of RD Recs: reviewed with RN(James)

## 2020-09-08 NOTE — ASSESSMENT & PLAN NOTE
Recent admission and treatment  -Stroke Risk Factor  -Recommend repeat Echo   -Last SILVANA 8/17/2020  · An 18 mm mobile vegetation is present of the both the mitral and aortic valves.  · Severe mitral regurgitation with anteriorly oriented jet due to P2 scallop flail.  · Mild aortic regurgitation.  · Normal left ventricular systolic function. The estimated ejection fraction is 65%.  · Normal right ventricular systolic function.

## 2020-09-08 NOTE — CONSULTS
Consult received.  Chart review completed.  Recommend stat CTA head and neck to evaluate vasculature and rule out high-grade stenosis or occlusion that could be leading to patient's current state.  Full consult note to follow.    Lesley Mazariegos, NP-C  Vascular Neurology  [phone]

## 2020-09-08 NOTE — PROGRESS NOTES
Pharmacokinetic Initial Assessment: IV Vancomycin    Assessment/Plan:    Initiate intravenous vancomycin with loading dose of 2250 mg once (reduced loading dose due to bump in scr) followed by a maintenance dose of vancomycin 1250mg IV every 12 hours  Desired empiric serum trough concentration is 15 to 20 mcg/mL  Draw vancomycin trough level 60 min prior to third dose on 9/9 at approximately 0900  Pharmacy will continue to follow and monitor vancomycin.      Please contact pharmacy at extension [phone] with any questions regarding this assessment.     Thank you for the consult,   Melvina Moreno       Patient brief summary:  Ana Laura Candelaria is a 89 y.o. male initiated on antimicrobial therapy with IV Vancomycin for treatment of suspected bacteremia    Drug Allergies:   Review of patient's allergies indicates:   Allergen Reactions    Codeine     Tetanus vaccines and toxoid        Actual Body Weight:   112 kg    Renal Function:   CrCl cannot be calculated (Pharmacy to calculate CrCl).,     Dialysis Method (if applicable):  N/A    CBC (last 72 hours):  Recent Labs   Lab Result Units 09/08/20  0140   WBC K/uL 19.09*   Hemoglobin g/dL 9.1*   Hematocrit % 29.4*   Platelets K/uL 320   Gran% % 81.1*   Lymph% % 7.7*   Mono% % 6.0   Eosinophil% % 2.0   Basophil% % 0.9   Differential Method  Automated       Metabolic Panel (last 72 hours):  Recent Labs   Lab Result Units 09/08/20  0140 09/08/20  0431   Sodium mmol/L 137  --    Potassium mmol/L 3.2*  --    Chloride mmol/L 99  --    CO2 mmol/L 28  --    Glucose mg/dL 189*  --    Glucose, UA   --  Negative   BUN, Bld mg/dL 18  --    Creatinine mg/dL 1.5*  --    Albumin g/dL 2.7*  --    Total Bilirubin mg/dL 0.2  --    Alkaline Phosphatase U/L 61  --    AST U/L 11  --    ALT U/L 7*  --        Drug levels (last 3 results):  No results for input(s): VANCOMYCINRA, VANCOMYCINPE, VANCOMYCINTR in the last 72 hours.    Microbiologic Results:  Microbiology Results (last 7 days)        Procedure Component Value Units Date/Time    Blood culture #2 **CANNOT BE ORDERED STAT** [[id number]] Collected: 09/08/20 0211    Order Status: Sent Specimen: Blood from Peripheral, Forearm, Right Updated: 09/08/20 0228    Blood culture #1 **CANNOT BE ORDERED STAT** [[id number]] Collected: 09/08/20 0140    Order Status: Sent Specimen: Blood from Peripheral, Hand, Right Updated: 09/08/20 0146

## 2020-09-08 NOTE — H&P
[facility] History and Physical - Team A  CRICKET Lewis    Date of Admit: 9/8/2020    Chief Complaint     Loss of Consciousness for 1 day    Subjective:      History of Present Illness:  Ana Laura Candelaria is a 89 y.o.  male with PMH of diverticulitis, MVP, endocarditis with aortic valve vegetations, HTN, and COPD. The patient presented to [facility] on 9/8/2020 with a primary complaint of loss of consciousness. The patient was recently treated for endocarditis w/ IV rocephin through PICC line at [facility] and discharged 2 weeks ago. Since then he has had a worsening cough and was planning to go to the hospital for evaluation. Per wife home health did portable CXR about a week ago w/ concern for fluid vs pneumonia. Patient was given course of doxycycline and finished course yesterday. Last night wife went to bed while  was watching TV and about an hour later her daughter found him non responsive on the ground. Daughter did a few chest compressions before EMS arrived. Patient arrived to ED and non responsive to sternal rub. Pupillary and gag reflex present. Patient intubated for airway protection and sedated w/ propofol. No seizure like activity was observed in ED. TPA contraindicated due to recent GI bleed, last occurring in August. Discussed further stroke work up with wife and plan to obtain MRI head.      Past Medical History:  No past medical history on file.    Past Surgical History:  No past surgical history on file.    Allergies:  Review of patient's allergies indicates:   Allergen Reactions    Codeine     Tetanus vaccines and toxoid        Home Medications:  Prior to Admission medications    Medication Sig Start Date End Date Taking? Authorizing Provider   cefTRIAXone (ROCEPHIN) 2 gram SolR Inject 2 g into the muscle once daily. Endocarditis 8/14/20 9/14/20 Yes Historical Provider, MD   albuterol (ACCUNEB) 0.63 mg/3 mL Nebu Take 0.63 mg by  nebulization every 6 (six) hours as needed. Rescue    Historical Provider, MD   albuterol (PROVENTIL) 2.5 mg /3 mL (0.083 %) nebulizer solution Take 2.5 mg by nebulization every 6 (six) hours as needed for Wheezing. Rescue    Historical Provider, MD   donepeziL (ARICEPT) 10 MG tablet Take 10 mg by mouth once daily.    Historical Provider, MD   escitalopram oxalate (LEXAPRO) 10 MG tablet Take 10 mg by mouth once daily.    Historical Provider, MD   fluticasone furoate-vilanteroL (BREO ELLIPTA) 100-25 mcg/dose diskus inhaler Inhale 1 puff into the lungs once daily. Controller    Historical Provider, MD   fluticasone propionate (FLONASE) 50 mcg/actuation nasal spray 1 spray by Each Nostril route once daily.    Historical Provider, MD   fluticasone-salmeterol diskus inhaler 250-50 mcg Inhale 1 puff into the lungs 2 (two) times daily. Controller    Historical Provider, MD   losartan (COZAAR) 50 MG tablet Take 50 mg by mouth once daily.    Historical Provider, MD   meclizine (ANTIVERT) 12.5 mg tablet Take 12.5 mg by mouth 3 (three) times daily.     Historical Provider, MD   montelukast (SINGULAIR) 10 mg tablet Take 10 mg by mouth once daily.    Historical Provider, MD   ondansetron (ZOFRAN) 4 MG tablet Take 4 mg by mouth every 8 (eight) hours as needed for Nausea.    Historical Provider, MD   pantoprazole (PROTONIX) 40 MG tablet Take 40 mg by mouth 2 (two) times daily.    Historical Provider, MD   sucralfate (CARAFATE) 1 gram tablet Take 1 g by mouth 4 (four) times daily before meals and nightly.    Historical Provider, MD       Family History:  No family history on file.    Social History:  Social History     Tobacco Use    Smoking status: Not on file   Substance Use Topics    Alcohol use: Not on file    Drug use: Not on file       Review of Systems:  Pertinent items are noted in HPI. All other systems are reviewed and are negative.    Health Maintaince :   Primary Care Physician:   Immunizations:   TDap is up to  "date.  Influenza is up to date.  Pneumovax is up to date.  Cancer Screening:  Colonoscopy: is up to date.      Objective:   Last 24 Hour Vital Signs:  BP  Min: 93/55  Max: 182/88  Temp  Av.6 °F (36.4 °C)  Min: 97.6 °F (36.4 °C)  Max: 97.6 °F (36.4 °C)  Pulse  Av.4  Min: 81  Max: 122  Resp  Av.3  Min: 0  Max: 31  SpO2  Av.3 %  Min: 80 %  Max: 100 %  Height  Av' 9" (175.3 cm)  Min: 5' 9" (175.3 cm)  Max: 5' 9" (175.3 cm)  Weight  Av.5 kg (248 lb)  Min: 112.5 kg (248 lb)  Max: 112.5 kg (248 lb)  Body mass index is 36.62 kg/m².  No intake/output data recorded.    Physical Examination:  General: intubated, sedated  Eyes: sclera normal, no redness or drainage in R or L eye, no icterus  HENT: normocephalic, atraumatic, neck supple, intubated, no blood in OP  Cardiac: regular rate, normal rhythm, no murmurs appreciated with vent, no extra heart sounds  Pulm: patient on ventilator, ETT in place   Abdomen: obese abdomen, non distended, normoactive bowel sounds  Extremities: +2 peripheral pulses, no pitting edema, atraumatic  Neuro: Pupillary reflex present, gag reflex present, non responsive to sternal rub  Skin: no rashes, lesions or ulcers present     Laboratory:  Most Recent Data:  CBC:   Lab Results   Component Value Date    WBC 19.09 (H) 2020    HGB 9.1 (L) 2020    HCT 29.4 (L) 2020     2020    MCV 91 2020    RDW 14.5 2020     WBC Differential: 81.1 % N, 0 % Bands, 7.7 % L, 6.0 % M, 2.0 % Eo, 0.9 % Baso, no additional cells seen  BMP:   Lab Results   Component Value Date     2020    K 3.2 (L) 2020    CL 99 2020    CO2 28 2020    BUN 18 2020    CREATININE 1.5 (H) 2020     (H) 2020    CALCIUM 9.6 2020     LFTs:   Lab Results   Component Value Date    PROT 7.4 2020    ALBUMIN 2.7 (L) 2020    BILITOT 0.2 2020    AST 11 2020    ALKPHOS 61 2020    ALT 7 (L) " 09/08/2020     Coags:   Lab Results   Component Value Date    INR 1.1 09/08/2020     FLP:   Lab Results   Component Value Date    CHOL 105 (L) 09/08/2020    HDL 28 (L) 09/08/2020    LDLCALC 63.0 09/08/2020    TRIG 70 09/08/2020    CHOLHDL 26.7 09/08/2020     DM:   Lab Results   Component Value Date    LDLCALC 63.0 09/08/2020    CREATININE 1.5 (H) 09/08/2020     Thyroid:   Lab Results   Component Value Date    TSH 5.424 (H) 09/08/2020    FREET4 0.90 09/08/2020     Anemia: No results found for: IRON, TIBC, FERRITIN, [id number], FOLATE  Cardiac:   Lab Results   Component Value Date    TROPONINI 0.082 (H) 09/08/2020     (H) 09/08/2020     Urinalysis: No results found for: LABURIN, COLORU, CLARITYU, SPECGRAV, LABSPEC, NITRITE, PROTEINUR, GLUCOSEU, KETONESU, UROBILINOGEN, BILIRUBINUR, BLOODU, RBCU, WBCUA    Trended Lab Data:  Recent Labs   Lab 09/08/20  0140   WBC 19.09*   HGB 9.1*   HCT 29.4*      MCV 91   RDW 14.5      K 3.2*   CL 99   CO2 28   BUN 18   CREATININE 1.5*   *   PROT 7.4   ALBUMIN 2.7*   BILITOT 0.2   AST 11   ALKPHOS 61   ALT 7*       Trended Cardiac Data:  Recent Labs   Lab 09/08/20  0140   TROPONINI 0.082*   *       Microbiology Data:  Blood cultures x2 collected    Other Laboratory Data:      Other Results:  EKG (my interpretation): Sinus tachycardia w/ Left fascicular block     Radiology:  Imaging Results          X-Ray Chest AP Portable (Final result)  Result time 09/08/20 02:21:39    Final result by Kris Renteria MD (09/08/20 02:21:39)                 Impression:      Status post endotracheal tube placement without evidence of a pneumothorax.      Electronically signed by: Kris Renteria  Date:    09/08/2020  Time:    02:21             Narrative:    EXAMINATION:  XR CHEST AP PORTABLE    CLINICAL HISTORY:  Personal history of other medical treatment    TECHNIQUE:  Single frontal view of the chest was performed.    COMPARISON:  September 8, 2020  01:33    FINDINGS:  Single view of the chest indicates that there is endotracheal tube placed which terminates 65 mm away from the josé.  Also a nasogastric tube has been placed which extends beyond the gastro esophageal junction and terminates in the upper aspect of the left abdomen.                               X-Ray Chest AP Portable (Final result)  Result time 09/08/20 01:48:24    Final result by Prasanna Lui MD (09/08/20 01:48:24)                 Impression:      Right-sided PICC line as described with mild prominence of the interstitium and no acute findings.      Electronically signed by: Prasanna Lui  Date:    09/08/2020  Time:    01:48             Narrative:    EXAMINATION:  XR CHEST AP PORTABLE    CLINICAL HISTORY:  Stroke;    TECHNIQUE:  Single frontal view of the chest was performed.    COMPARISON:  None    FINDINGS:  A right arm PICC line is present with its tip overlying the right atrial caval junction.  Mild prominence of the interstitium with no confluent consolidation is evident.  The heart is not enlarged and the trachea is midline.    There is no pneumothorax.                               CT Head Without Contrast (Final result)  Result time 09/08/20 01:44:47    Final result by Prasanna Lui MD (09/08/20 01:44:47)                 Impression:      Broad 1 cm thick scalp hematoma over the left temporoparietal region.  No underlying fracture or intracranial abnormality.      Electronically signed by: Prasanna Lui  Date:    09/08/2020  Time:    01:44             Narrative:    EXAMINATION:  CT HEAD WITHOUT CONTRAST    CLINICAL HISTORY:  Neuro deficit, acute, stroke suspected;    TECHNIQUE:  Low dose axial images were obtained through the head.  Coronal and sagittal reformations were also performed. Contrast was not administered.    COMPARISON:  None.    FINDINGS:  There is a moderate large is scalp hematoma over the left temporal and parietal bone measuring up to 1 cm thick.  No  laceration or foreign body is evident.  The underlying calvarium is intact.    The brain parenchyma appears normal in attenuation with normal gray-white matter differentiation.  There is no evidence of mass or mass effect.    Mastoid effusion on the right is present.  The remainder of the air cells are clear.  The orbits and orbital contents appear normal.                                 Assessment:     Ana Laura Candelaria is a 89 y.o. male found unresponsive by daughter at home. Patient intubated in ED, pupillary and gag reflex present, non responsive to sternal rub.   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    Mitral regurgitation 09/08/2020    CVA (cerebral vascular accident) 09/08/2020    Bacterial endocarditis 09/08/2020    History of gastrointestinal bleeding 09/08/2020    GERD (gastroesophageal reflux disease) 09/08/2020    Stroke 09/08/2020        Plan:     Presumed Stroke  - patient found unresponsive at home before arrival to ED  - non responsive to sternal rub, pupillary and gag reflex present  - CT head negative for acute intracranial bleed  - MRI head ordered  - tPA contraindicated due to recent GI bleed  - pt currently intubated for airway protection w/ , Rate 24, PEEp 5, FiO2 30%, sedated w/ propofol  - ABG PH 7.3/ PCO2 52/ PO2 390/ HCO3 31.8  - lactate 2.0  - ordered  daily  - will obtain tight glycemic control  - stroke telemetry   - PT/OT consulted    Intermediate Level Troponin  - Troponin 0.08 on admission  -   - EKG sinus tachycardia w/ left anterior fascicular block   - will continue to trend troponins  - cardiac monitoring    Endocarditis  - initially treated at [facility] and discharged with three week course of IV rocephin  - SILVANA at [facility] showed aortic  valve vegetations  - will continue IV rocephin through PICC line  - blood cultures ordered    Leukocytosis  - WBC 19 on admission  - likely 2/2 to endocarditis  - no reported fevers recently, afebrile since admit  - will continue to monitor     COPD, stable  - pt does not require home O2  - home montelukast and fluticasone     H/o GI Bleed  - h/o bleeding diverticuli and gastric ulcers s/p cauterization  - has required blood transfusions  - most recent GI bleed in August 2020     HENRY vs CKD  - Cr 1.5 on admission, baseline near 1.2-1.3  - eGFR 31    Normocytic Anemia  - H/H on admission 9.1/29.4   - MCV 91  - has had extensive workup at [facility] for anemia in past including autoimmune, luekemia and lymphoma which were negative    Hyperglycemia  - glucose 189 on admission, 128 on recent POCT  - ordered SSI  - A1c ordered  - will obtain tight glycemia control    Hypokalemia  - K 3.2 on admission  - received IV KCl 40mEq in ED    HTN  - holding home BP in setting of possible ischemic stroke  - hypertensive to 180's systolic, has normalized  - Losartan 50mg at home    HLD  - atorvastatin 40mg    Diet: NPO  PPx: lovenox 40mg    Code Status:     Full Code    Luis Manuel Henry  [facility], -I   [facility] Team A

## 2020-09-08 NOTE — NURSING
0540 Patient arrived from ER on 10mcg of propofol intubated. Propofol disconnected for transfer. Upon assessment patient appeared to be seizing. eICU camered into room. [facility] internal medicine paged. [facility] resident came to bedside to assess patient. Stated he had no further plans at the moment and to restart the propofol. eICU ordered 2mg ativan.

## 2020-09-08 NOTE — SUBJECTIVE & OBJECTIVE
"          Review of Systems   Unable to perform ROS: Intubated     Objective:   Vitals: Blood pressure 126/60, pulse 80, temperature 99.3 °F (37.4 °C), resp. rate (!) 24, height 5' 9" (1.753 m), weight 112.5 kg (248 lb 0.3 oz), SpO2 (!) 94 %.       Physical Exam  Constitutional:       General: He is not in acute distress.  Eyes:      Pupils: Pupils are equal, round, and reactive to light.   Cardiovascular:      Rate and Rhythm: Normal rate and regular rhythm.      Comments: As seen on telemetry bedside monitor  Pulmonary:      Effort: Pulmonary effort is normal.      Comments: Intubated     Neurological Exam  LOC: Eyes closed, does not open eyes to voice or painful stimulation; does not follow commands  Language: Intubated  Visual Fields: No blink to threat bilaterally  EOM (CN III, IV, VI): Oculocephalic Reflex  Normal  Pupils (CN II, III): PERRL  Facial Movement (CN VII): possible mild left lower facial weakness on the left  Gag Reflex: present  Cough Reflex: present  Motor: Spontaneous movement to LUE and LLE.  Withdraws RUE and RLE  Sensation: Decreased response to painful stimuli on RUE and RLE      "

## 2020-09-08 NOTE — ASSESSMENT & PLAN NOTE
75 y/o male with HTN, recent endocarditis (8/2020), recent GIB (8/2020), diverticulitis, GERD presented with unresponsiveness, hypoxia requiring intubation, and seizure like activity.  Patient continues with altered mental status, does not open eyes, does not follow commands.  Spontaneous movement on left; weak withdrawal on right. Initial CT with no early infarct signs or hemorrhage. MRI with left PCA infarct and left thalamic infarct along with small right PCA infarct.  MRA demonstrates fetal circulation to left PCA.  Two different distributions makes a cardioembolic source most likely.  Given his recent history of endocarditis this is most likely the etiology.  Would still get repeat Echo to rule out other cardioembolic causes that may have developed since his last echo.  His mentation is most likely due to involvement of thalamus, however would still get EEG to investigate for seizure activity as this may be contributing to his mentation as well.   If there is no seizure activity and his mentation does not improve after leukocytosis resolves, would recommend modafinil to improve mentation.     Recommendations:    Antithrombotics for secondary stroke prevention: Antiplatelets: Aspirin: 325 mg daily    Statins for secondary stroke prevention and hyperlipidemia, if present:   Statins: Atorvastatin- 40 mg daily    Aggressive risk factor modification: HTN, Endocarditis     Rehab efforts: The patient has been evaluated by a stroke team provider and the therapy needs have been fully considered based off the presenting complaints and exam findings. The following therapy evaluations are needed: PT evaluate and treat, OT evaluate and treat, SLP evaluate and treat     Diagnostics ordered/pending: TTE to assess cardiac function/status , Other: EEG    VTE prophylaxis: Enoxaparin 40 mg SQ every 24 hours    BP parameters: Infarct: No intervention, SBP <220

## 2020-09-08 NOTE — PROGRESS NOTES
Therapy with vancomycin complete and/or consult discontinued by provider.  Pharmacy will sign off, please re-consult as needed.    Marlin Hensley, PharmD, BCPS  Clinical Pharmacist  [phone]

## 2020-09-08 NOTE — CONSULTS
[facility]   Vascular Neurology  Comprehensive Stroke Center  TeleStroke Consult Note    Consults  Assessment/Plan:     Patient is a 74 y.o. year old male with:    Embolic stroke involving left posterior cerebral artery  75 y/o male with HTN, recent endocarditis (8/2020), recent GIB (8/2020), diverticulitis, GERD presented with unresponsiveness, hypoxia requiring intubation, and seizure like activity.  Patient continues with altered mental status, does not open eyes, does not follow commands.  Spontaneous movement on left; weak withdrawal on right. Initial CT with no early infarct signs or hemorrhage. MRI with left PCA infarct and left thalamic infarct along with small right PCA infarct.  MRA demonstrates fetal circulation to left PCA.  Two different distributions makes a cardioembolic source most likely.  Given his recent history of endocarditis this is most likely the etiology.  Would still get repeat Echo to rule out other cardioembolic causes that may have developed since his last echo.  His mentation is most likely due to involvement of thalamus, however would still get EEG to investigate for seizure activity as this may be contributing to his mentation as well.   If there is no seizure activity and his mentation does not improve after leukocytosis resolves, would recommend modafinil to improve mentation.     Recommendations:    Antithrombotics for secondary stroke prevention: Antiplatelets: Aspirin: 325 mg daily    Statins for secondary stroke prevention and hyperlipidemia, if present:   Statins: Atorvastatin- 40 mg daily    Aggressive risk factor modification: HTN, Endocarditis     Rehab efforts: The patient has been evaluated by a stroke team provider and the therapy needs have been fully considered based off the presenting complaints and exam findings. The following therapy evaluations are needed: PT evaluate and treat, OT evaluate and treat, SLP evaluate and treat     Diagnostics  ordered/pending: TTE to assess cardiac function/status , Other: EEG    VTE prophylaxis: Enoxaparin 40 mg SQ every 24 hours    BP parameters: Infarct: No intervention, SBP <220    Thalamic Infarct, Acute  See above    Embolic stroke involving right posterior cerebral artery  See above    Bacterial endocarditis  Recent admission and treatment  -Stroke Risk Factor  -Recommend repeat Echo   -Last SILVANA 8/17/2020  · An 18 mm mobile vegetation is present of the both the mitral and aortic valves.  · Severe mitral regurgitation with anteriorly oriented jet due to P2 scallop flail.  · Mild aortic regurgitation.  · Normal left ventricular systolic function. The estimated ejection fraction is 65%.  · Normal right ventricular systolic function.      Essential hypertension  -Stroke Risk Factor  -Currently normotensive  -Goal SBP <220           STROKE DOCUMENTATION     Acute Stroke Times   Symptom Onset Date: 09/08/20  Symptom Onset Time: 0030  Stroke Team Arrival Time: 0130  CT Interpretation Time: 0131    NIH Scale:  1a. Level of Consciousness: 3-->Responds only with reflex motor or autonomic effects or totally unresponsive, flaccid, and areflexic  1b. LOC Questions: 2-->Answers neither question correctly  1c. LOC Commands: 2-->Performs neither task correctly  2. Best Gaze: 0-->Normal  3. Visual: 3-->Bilateral hemianopia (blind including cortical blindness)  4. Facial Palsy: 1-->Minor paralysis (flattened nasolabial fold, asymmetry on smiling)  5a. Motor Arm, Left: 3-->No effort against gravity, limb falls  5b. Motor Arm, Right: 4-->No movement  6a. Motor Leg, Left: 3-->No effort against gravity, leg falls to bed immediately  6b. Motor Leg, Right: 4-->No movement  7. Limb Ataxia: 0-->Absent  8. Sensory: 2-->Severe to total sensory loss, patient is not aware of being touched in the face, arm, and leg  9. Best Language: 3-->Mute, global aphasia, no usable speech or auditory comprehension  10. Dysarthria: (UN) Intubated or other  "physical barrier  11. Extinction and Inattention (formerly Neglect): 0-->No abnormality  Total (NIH Stroke Scale): 30    Modified Tensed Score: 0  Roni Coma Scale:6   ABCD2 Score:    [id number] Score:   HAS -BLED Score:   ICH Score:   Hunt & Newman Classification:       Hemorrhagic change of an Ischemic Stroke: Does this patient have an ischemic stroke with hemorrhagic changes? No     Subjective:     History of Present Illness:  75 y/o male with HTN, recent endocarditis (8/2020), recent GIB (8/2020), diverticulitis, GERD who presented to the ED after being found down by the family.  History obtained from wife at bedside.  Patient was alone in the room watching TV when his wife heard his daughter yell out because patient was found on the floor unresponsive.  Wife said he looked "dead" so CPR was started.  The wife stated she did not see any jerking movements at that time. Wife states patient has never had these symptoms before and has no prior history of Stroke or TIA.  Prior to this event he was in his normal state of health, but has been using a walker since his recent discharge due to generalized weakness.    Patient was brought to ED via EMS.  A stroke code was initiated, but was interrupted for intubation due to patients continued poor respiratory status.  Shortly after admission, patient developed seizure like activity requiring treatment.      Previous Discharge note: patient has had multiple hospitalizations in the past 6 weeks. In late July patient was hospitalized for HENRY and acute diverticulitis and was discharged home on oral antibiotics. 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 He was started on ceftriaxone and a PICC line was put in place and he was again discharged home. Patient then returned to the hospital on 8/14 due to melena and was found to have GI bleed. He underwent EGD with cauterization/clipping for acute gastritis. He " remained on the ceftriaxone. During this hospital stay, TTE was performed which showed aortic and mitral vegetations both >1cm. This was subsequently confirmed on SILVANA. Cardiology was consulted and they recommended transfer to [facility] for cardiothoracic surgery consultation given patient may need double valve replacement.  Patient admitted to hospital medicine AM of 8/18. Patient was evaluated by cardiothoracic surgery and determined to be too high risk for surgery and to continue antibiotic management. Patient with episode of melena AM of 8/19, transfused 1u pRBC's for symptomatic anemia and active bleeding. GI consulted and patient started on IV ppi bid.   EGD with gastritis with hemorrhage on 8/20.     Prior history can be found under MRN [id number].    Past Medical History:   Diagnosis Date    Acute pyelonephritis 7/31/2020    Allergy      Asthma      BPH (benign prostatic hyperplasia)      CHRONIC BRONCHITIS      GERD (gastroesophageal reflux disease)      HTN (hypertension)      HTN (hypertension) 10/15/2014    Joint pain      Mitral regurgitation       3-4+ under surveillance CT surgery    Mitral regurgitation      Mitral valve regurgitation      Obesity      Osteoarthritis of knee      Trouble in sleeping      Ulcerative colitis      Urinary tract infection      Valvular regurgitation                 Past Surgical History:   Procedure Laterality Date    BONE MARROW BIOPSY Left 7/7/2020     Procedure: Biopsy-bone marrow;  Surgeon: Howard Matos MD;  Location: [facility] OR;  Service: Oncology;  Laterality: Left;    COLONOSCOPY N/A 10/11/2017     Procedure: COLONOSCOPY [place];  Surgeon: Shanae Duarte MD;  Location: [facility] ENDO;  Service: Endoscopy;  Laterality: N/A;    CYSTOSCOPY N/A 8/8/2018     Procedure: CYSTOSCOPY;  Surgeon: Adan Cuenca MD;  Location: [address];  Service: Urology;  Laterality: N/A;  1 hour    CYSTOSCOPY W/ RETROGRADES         with penile biopsy    DILATION OF  "URETHRA N/A 8/8/2018     Procedure: DILATION, URETHRA;  Surgeon: Adan Cuenca MD;  Location: [facility] OR [address] [place] [address];  Service: Urology;  Laterality: N/A;    ESOPHAGOGASTRODUODENOSCOPY N/A 8/14/2020     Procedure: EGD (ESOPHAGOGASTRODUODENOSCOPY);  Surgeon: Yoan Hamilton MD;  Location: [facility];  Service: Gastroenterology;  Laterality: N/A;    HERNIA REPAIR Right 1956     inguinal    JOINT REPLACEMENT Bilateral       knees    Penile Biopsy        TONSILLECTOMY        TOTAL KNEE ARTHROPLASTY Bilateral      URETHRAL MEATOPLASTY N/A 8/8/2018     Procedure: MEATOPLASTY, URETHRA;  Surgeon: Adan Cuenca MD;  Location: [facility] OR [address] [place] [address];  Service: Urology;  Laterality: N/A;              Review of patient's allergies indicates:   Allergen Reactions    Codeine Nausea Only    Tetanus vaccines and toxoid Hives and Rash        Review of Systems   Unable to perform ROS: Intubated     Objective:   Vitals: Blood pressure 126/60, pulse 80, temperature 99.3 °F (37.4 °C), resp. rate (!) 24, height 5' 9" (1.753 m), weight 112.5 kg (248 lb 0.3 oz), SpO2 (!) 94 %.       Physical Exam  Constitutional:       General: He is not in acute distress.  Eyes:      Pupils: Pupils are equal, round, and reactive to light.   Cardiovascular:      Rate and Rhythm: Normal rate and regular rhythm.      Comments: As seen on telemetry bedside monitor  Pulmonary:      Effort: Pulmonary effort is normal.      Comments: Intubated     Neurological Exam  LOC: Eyes closed, does not open eyes to voice or painful stimulation; does not follow commands  Language: Intubated  Visual Fields: No blink to threat bilaterally  EOM (CN III, IV, VI): Oculocephalic Reflex  Normal  Pupils (CN II, III): PERRL  Facial Movement (CN VII): possible mild left lower facial weakness on the left  Gag Reflex: present  Cough Reflex: present  Motor: Spontaneous movement to LUE and LLE.  Withdraws RUE and RLE  Sensation: Decreased response to painful stimuli on RUE and " RLE        Brain Imaging  9/8/2020 CT Head  No early infarct signs.  No hemorrhage    9/8/2020 MRI Brain  Diffusion restriction with the left PCA territory and left thalamus.  Small diffusion restriction in right occipital lobe. No hemorrahge.      Vessel Imaging  9/8/2020 MRA brain and neck  Left PCA fetal circulation.  No high grade stenosis or occlusion.  Full radiology report pending.    Cardiac Imaging  Pending    Consult Information      IP Unit  Spoke hospital nurse at bedside with patient assisting consultant.  Exam completed with assistance from nurse.    Patient information was obtained from spouse/SO.     The attending portion of this evaluation, treatment, and documentation was performed per Lesley Mazariegos NP via audiovisual.    Billing code:  (moderate to severe stroke, large areas of edema, some mimics)    · This patient has a critical neurological condition/illness, with high morbidity and mortality.  · There is a high probability for acute neurological change leading to clinical and possibly life-threatening deterioration requiring highest level of physician preparedness for urgent intervention.  · Care was coordinated with other physicians involved in the patient's care.  · Radiologic studies and laboratory data were reviewed and interpreted, and plan of care was re-assessed based on the results.  · Diagnosis, treatment options and prognosis may have been discussed with the patient and/or family members or caregiver.  · Further advanced medical management and further evaluation is warranted for his care.      Consult End Time: 6:33 PM     Lesley Mazariegos NP  [facility]  Vascular Neurology   [facility]  [phone]

## 2020-09-08 NOTE — CONSULTS
".  [facility] ICU 5th Floor  Pulmonology  Consult Note    Patient Name: Lopez Savage  MRN: [id number]  Admission Date: 9/8/2020  Hospital Length of Stay: 0 days  Code Status: Full Code  Attending Physician: Rush Mary MD  Primary Care Provider: Prasanna Haywood MD   Principal Problem: CVA (cerebral vascular accident)    Consults Acute Hypoxemic Respiratory Failure   Subjective:     HPI:   Mr. 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 Per the patient's wife, patient was in a reasonably normal state of health until 7/2020. He complained of progressive fatigue and dry cough for approximately 6 months. Over the past 4-6 weeks, he has been hospitalized multiple times. His most recent admission was on 7/31 at [facility] for severe fatigue and diagnosed with acute diverticulitis. He was discharged on 8/2 with antibiotics. Patient returned on 8/3/20 after blood cultures grew Strep mutans. He was started on ceftriaxone and a PICC line was placed and he was discharged home. He then presented to [facility] on 8/13/2014 due to melena and was found to have GI bleed. EGD with cauterization/clipping for acute gastritis. TTE was performed which showed aortic and mitral vegetations both >1cm. SILVANA confirmed finding and patient was transferred to [facility] for [facility] surg evaluation but deemed too high risk secondary to acute bleed. Patient discharged on home on  8/21/2020 with IV rocephin. ID continued to follow outpatient.     The patient's wife reports last seeing him "normal" (talking, ambulating well with walker) at approximately 10pm prior to her going to bed. The patient decided to stay up to watch television. At approximately 1am, the wife heard the " daughter scream after finding the patient on the floor in the kitchen (away from his walker). Unclear if the patient loss a pulse prior to EMS arrival, but daughter did a few chest compressions before EMS arrived. Per daughter, patient with left-sided facial droop and EMS noted saturations in the 70s    In the ED, the patient was unresponsive to sternal rub. Pupillary and gag reflex present. Patient intubated for airway protection and hypoxemia? No seizure like activity was observed in ED.     Interval History: Overnight patient afebrile. Nursing noted seizure like movements. eICU ordered 2mg ativan and propofol was started.       Review of patient's allergies indicates:   Allergen Reactions    Codeine     Tetanus vaccines and toxoid        Family History     None        Tobacco Use    Smoking status: Not on file   Substance and Sexual Activity    Alcohol use: Not on file    Drug use: Not on file    Sexual activity: Not on file        Review of Systems   Unable to perform ROS: Intubated     Objective:     Vital Signs (Most Recent):  Temp: 99.1 °F (37.3 °C) (09/08/20 1530)  Pulse: 76 (09/08/20 1530)  Resp: (!) 27 (09/08/20 1530)  BP: 121/60 (09/08/20 1530)  SpO2: 95 % (09/08/20 1530) Vital Signs (24h Range):  Temp:  [97.6 °F (36.4 °C)-99.3 °F (37.4 °C)] 99.1 °F (37.3 °C)  Pulse:  [] 76  Resp:  [0-40] 27  SpO2:  [80 %-100 %] 95 %  BP: ()/(47-90) 121/60     Weight: 112.5 kg (248 lb)  Body mass index is 36.62 kg/m².      Intake/Output Summary (Last 24 hours) at 9/8/2020 1642  Last data filed at 9/8/2020 1500  Gross per 24 hour   Intake 30.03 ml   Output 194 ml   Net -163.97 ml       Physical Exam  Constitutional:       General: He is not in acute distress.     Comments: Intubated. RASS -5   HENT:      Head: Normocephalic.      Mouth/Throat:      Mouth: Mucous membranes are moist.      Comments: ETT in place  Eyes:      Pupils: Pupils are equal, round, and reactive to light.   Cardiovascular:      Rate  and Rhythm: Normal rate and regular rhythm.      Heart sounds: Murmur present.   Pulmonary:      Effort: No respiratory distress.      Breath sounds: No wheezing, rhonchi or rales.   Abdominal:      General: Bowel sounds are normal. There is no distension.      Palpations: Abdomen is soft.   Genitourinary:     Comments: Salgado in place    Skin:     General: Skin is warm and dry.      Capillary Refill: Capillary refill takes less than 2 seconds.   Neurological:      Comments: RASS -5         Vents:  Vent Mode: A/C (09/08/20 1345)  Ventilator Initiated: Yes (09/08/20 0151)  Set Rate: 24 BPM (09/08/20 1345)  Vt Set: 420 mL (09/08/20 1345)  PEEP/CPAP: 5 cmH20 (09/08/20 1345)  Oxygen Concentration (%): 25 (09/08/20 1530)  Peak Airway Pressure: 22 cmH2O (09/08/20 1345)  Plateau Pressure: 0 cmH20 (09/08/20 1345)  Total Ve: 9.97 mL (09/08/20 1345)  F/VT Ratio<105 (RSBI): (!) 57.28 (09/08/20 1345)    Lines/Drains/Airways     Peripherally Inserted Central Catheter Line            PICC Double Lumen right brachial -- days          Drain                 NG/OG Tube 09/08/20 0316 [place] sump 18 Fr. Left mouth less than 1 day         Urethral Catheter 09/08/20 0315 [place] 14 Fr. less than 1 day          Airway                 Airway - Non-Surgical 09/08/20 0145 Endotracheal Tube less than 1 day          Peripheral Intravenous Line                 Peripheral IV - Single Lumen 09/08/20 0143 18 G Left Antecubital less than 1 day                Significant Labs:    CBC/Anemia Profile:  Recent Labs   Lab 09/08/20 0140   WBC 19.09*   HGB 9.1*   HCT 29.4*      MCV 91   RDW 14.5        Chemistries:  Recent Labs   Lab 09/08/20 0140      K 3.2*   CL 99   CO2 28   BUN 18   CREATININE 1.5*   CALCIUM 9.6   ALBUMIN 2.7*   PROT 7.4   BILITOT 0.2   ALKPHOS 61   ALT 7*   AST 11     Assessment/Plan:     Active Diagnoses:    Diagnosis Date Noted POA    PRINCIPAL PROBLEM:  CVA (cerebral vascular accident) [I63.9] 09/08/2020 Yes    Loss  of consciousness [R40.20] 09/08/2020 Yes    Essential hypertension [I10] 09/08/2020 Yes     Chronic    Ulcerative colitis [K51.90] 09/08/2020 Yes     Chronic    Chronic bronchitis [J42] 09/08/2020 Yes     Chronic    Autoimmune hemolytic anemia [D59.1] 09/08/2020 Yes     Chronic    Mitral regurgitation [I34.0] 09/08/2020 Yes     Chronic    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] 09/08/2020 Yes    History of gastrointestinal bleeding [Z87.19] 09/08/2020 Not Applicable    GERD (gastroesophageal reflux disease) [K21.9] 09/08/2020 Yes     Chronic    Stroke [I63.9] 09/08/2020 Yes    Unresponsive [R41.89]  Yes      Problems Resolved During this Admission:      Neurologic  Acute Encephalopathy   Left Scalp hematoma  - possibly secondary to stroke vs. Seizure vs. Anoxic injury  - Initial CT head w/o acute abnormality  - neurology consulted and recommend CTA head and neck vs. MRA/MRI.  - would also recommend EEG for evaluation of seizure  - hold sedation for now.  - ICU delirium precautions    Cardiovascular  Mitral and Aortic Endocarditis (1.8cm mobile mass on mitral and aortic valves)  Severe Mitral Insufficiency  EF 65% on 8/17/20 SILVANA  - Per [facility] [place]/cardiology team, patient not an operative candidate because of recent acute GI bleed  - Patient not currently in shock  - repeat TTE pending for today.    Respiratory  Acute Hypoxemic Respiratory Failure   Restrictive Defect   - 6/12/20 PFT: FEV1/FVC 93% FVC 52% DLCO 43%  -  CXR with right hilar fullness. No evidence of volume overload or areas of opacification   -  Advance ETT 3-4cm.   - continue supplemental oxygen for goal SpO2 88-92% or PaO2 55-88mmHg  - daily SBTs     Gastrointestinal  Documented Hx Ulcerative Colitis (no colonoscopy evidence or biopsy evidence noted in chart)  GERD  Recent GIB (Gastritis and Diverticular Bleed)  - OGT in place.  - Stress ulcer prophylaxis with protonix  - will reassess initiation of tube feeding pending radiographic studies.      Renal/Genitourinary  Baseline Cr. 1.3.    - Current Cr. 1.5  - Will monitor I&O  - replete electrolytes PRN  - bolus ivf prn     Hematologic  Autoimmune Hemolytic Anemia (followed by Dr. Matos ([facility]   - Bone marrow biopsy 7/7/2020  Frequent transfusions   - Patient was scheduled to received Rituximab weekly for 4 doses but hospitalized for acute illness (8/2020)  - Hemoglobin on admission 9.1  - no evidence of GIB at this time  - DVT PPx Lovenox    Endocrine  - last A1c 5.5  - goal -180    Infectious Disease  Streptococcus mutans Mitral and Aortic Endocarditis (Last negative blood Cx. 7/31/20)  - Patient on rocephin and doxycycline per ID (following outpatient)  - will continue therapy for 6 week course    Disposition: pending stroke work up and EEG.    Thank you for allowing us to participate in the care of this patient. Please do not hesitate to contact us with further questions or concerns.     Damari Witt MD  [facility] Fellow  Pager #: [phone]

## 2020-09-09 NOTE — PROGRESS NOTES
"Progress Note  [facility]    Attending: Dr. Osorio  Fellow: Dr. Witt  Intern: Dr. Damon  Admit Date: 9/8/2020  Today's Date: 09/09/2020      SUBJECTIVE:     Mr. Savage is a 74M w/hx of hypertension, ulcerative colitis??, GERD, chronic bronchitis, autoimmune hemolytic anemia, mitral regurgitation, and diverticulitits who presented to [facility] after being found unresponsive on the kitchen floor by his daughter at 1am on 9/8. Per the patient's wife, patient was in a reasonably normal state of health until 7/2020. He complained of progressive fatigue and dry cough for approximately 6 months. Over the past 4-6 weeks, he has been hospitalized multiple times. His most recent admission was on 7/31 at [facility] for severe fatigue and diagnosed with acute diverticulitis. He was discharged on 8/2 with antibiotics. Patient returned on 8/3/20 after blood cultures grew Strep mutans. He was started on ceftriaxone and a PICC line was placed and he was discharged home. He then presented to [facility] on 8/13/20 due to melena and was found to have GI bleed. EGD with cauterization/clipping for acute gastritis. TTE was performed which showed aortic and mitral vegetations both >1cm. SILVANA confirmed finding and patient was transferred to [facility] for CT surg evaluation but deemed too high risk secondary to acute bleed. Patient discharged on home on  8/21/2020 with IV rocephin. ID continued to follow outpatient.      The patient's wife reports last seeing him "normal" (talking, ambulating well with walker) at approximately 10pm prior to her going to bed. The patient decided to stay up to watch television. At approximately 1am, the wife heard the daughter scream after finding the patient on the floor in the kitchen (away from his walker). Unclear if the patient loss a pulse prior to EMS arrival, but daughter did a few chest compressions before EMS arrived. Per daughter, patient with left-sided facial " droop and EMS noted saturations in the 70s     In the ED, the patient was unresponsive to sternal rub. Pupillary and gag reflex present. Patient intubated for airway protection and hypoxemia? No seizure like activity was observed in ED.     Interval Hx: Patient remained on ventilator overnight, no acute events.  This AM he continues mechanical ventilation. He has spontaneous terrie LE movements.     OBJECTIVE:     Vital Signs Trends/Hx Reviewed  Vitals:    09/09/20 1000 09/09/20 1025 09/09/20 1150 09/09/20 1245   BP: (!) 94/53 (!) 97/52 (!) 90/54 (!) 94/52   Pulse: 70 66 66 66   Resp: (!) 24 (!) 24 (!) 24 (!) 24   Temp:  98.7 °F (37.1 °C)  98.5 °F (36.9 °C)   TempSrc:  Oral  Oral   SpO2: 99% 97% 96% 96%   Weight:       Height:           Ventilator settings:  /24/5/35% %  Physical Exam:  Physical Exam  Constitutional:       Appearance: He is ill-appearing.      Comments: intubated   HENT:      Head: Normocephalic and atraumatic.      Mouth/Throat:      Comments: Gag reflex present  Eyes:      Comments: Pupillary reflex present   Neck:      Musculoskeletal: Normal range of motion and neck supple.   Cardiovascular:      Rate and Rhythm: Normal rate and regular rhythm.      Heart sounds: Murmur present.   Pulmonary:      Effort: Pulmonary effort is normal.      Breath sounds: Normal breath sounds. No wheezing, rhonchi or rales.   Abdominal:      General: Abdomen is flat. Bowel sounds are normal. There is no distension.      Tenderness: There is no abdominal tenderness. There is no guarding.   Musculoskeletal: Normal range of motion.      Right lower leg: No edema.      Left lower leg: No edema.   Skin:     General: Skin is warm and dry.      Findings: No erythema.   Neurological:      Comments: Intubated and sedated  Spontaneous terrie LE movements             Laboratory:  No results for input(s): PH, PCO2, PO2, HCO3, POCSATURATED, BE in the last 24 hours.  Recent Labs   Lab 09/09/20  0513   WBC 9.08   RBC 2.21*    HGB 6.2*   HCT 19.4*      MCV 88   MCH 28.1   MCHC 32.0     Recent Labs   Lab 20  0513   *   K 2.8*      CO2 26   BUN 21   CREATININE 1.4   MG 1.6       Microbiology Data:   Microbiology Results (last 7 days)     Procedure Component Value Units Date/Time    Culture, Respiratory with Gram Stain [[id number]] Collected: 20 1425    Order Status: Completed Specimen: Respiratory from Endotracheal Aspirate Updated: 20     Gram Stain (Respiratory) <10 epithelial cells per low power field.     Gram Stain (Respiratory) Rare WBC's     Gram Stain (Respiratory) No organisms seen    Blood culture #2 **CANNOT BE ORDERED STAT** [[id number]] Collected: 20    Order Status: Completed Specimen: Blood from Peripheral, Forearm, Right Updated: 20 011     Blood Culture, Routine No Growth to date    Blood culture #1 **CANNOT BE ORDERED STAT** [[id number]] Collected: 200    Order Status: Completed Specimen: Blood from Peripheral, Hand, Right Updated: 20     Blood Culture, Routine No Growth to date           Imagin/8 CT head  Broad 1 cm thick scalp hematoma over the left temporoparietal region.  No underlying fracture or intracranial abnormality.      CXR x2  1) mild prominence of the interstitium and no acute findings.  2) correct ETT placement     MRI/MRA Brain  MRI/MRA:   1. Left PCA distribution acute infarction.  2. Focus of restricted diffusion within the right occipital lobe with adjacent vasogenic edema, concerning for underlying lesion or septic embolus.  Further evaluation with contrast-enhanced MRI brain is recommended.  3. MRA head: No evidence of occlusion or high-grade stenosis of the major intracranial arteries.  4. MRA neck: No evidence of occlusion or high-grade stenosis of the major arteries of the neck.  5. Moderate bilateral pleural effusions.     CXR  Worsened bilateral opacities -   Pulmonary vascular congestion with bilateral  parahilar and basilar pulmonary opacities.  Findings may be related to mild to moderate pulmonary edema although bilateral pneumonia cannot be excluded.         Infusions:     propofoL 10 mcg/kg/min (09/09/20 0930)       Scheduled Medications:    aspirin  325 mg Per NG tube Daily    atorvastatin  40 mg Per NG tube Daily    cefTRIAXone (ROCEPHIN) IVPB  2 g Intravenous Q24H    doxycycline (VIBRAMYCIN) IVPB  100 mg Intravenous Q12H    enoxaparin  40 mg Subcutaneous Q24H    pantoprazole  40 mg Intravenous Daily    potassium chloride  20 mEq Intravenous Q2H       PRN Medications:   sodium chloride, dextrose 50%, glucagon (human recombinant), insulin aspart U-100, lorazepam, sodium chloride 0.9%    Problem List:   Patient Active Problem List   Diagnosis    Loss of consciousness    Essential hypertension    Ulcerative colitis    Chronic bronchitis    Autoimmune hemolytic anemia    Mitral regurgitation    CVA (cerebral vascular accident)    Bacterial endocarditis    History of gastrointestinal bleeding    GERD (gastroesophageal reflux disease)    Embolic stroke involving left posterior cerebral artery    Unresponsive    Embolic stroke involving right posterior cerebral artery    Thalamic infarct, acute       ASSESSMENT & RECOMMENDATIONS     Mr. Savage is a 74yoM with PMHx of recent mitral and aortic valve endocarditis, HTN, UC, GERD, chronic bronchitis, autoimmune hemolytic anemia, mitral regurgitation, and diverticulitits who presents with embolic CVA.    Neuro  Embolic CVA  -initially found down by daughter in middle of night. In ED non responsive to sternal rub, pupillary and gag reflex present.  -9/8 CT head - L temporoparietal hematoma  -MRI revealed left PCA distribution acute infarction and focus of restricted diffusion within the right occipital lobe with adjacent vasogenic edema, concerning for underlying lesion or septic embolus.  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 SILVANA confirmed finding and patient was transferred to [facility] for CT surg evaluation but deemed too high risk secondary to acute bleed. Patient discharged on home on  8/21/2020 with IV rocephin.  -no pressor support required currently    Elevated troponin  -likely 2/2 demand from CVA  -EKG sinus tachycardia w/ left anterior fascicular block   -trending down 0.082-->0.160-->0.147    HTN  -permissive HTN in setting of CVA, goal SBP <220    Resp  AHRF 2/2 aspiration vs endocarditis MR vs DAH  -resp cx negative  -CXR 9/9 compared to 9/9 revealed worsened terrie opacities  -continue doxy  -continue vent management, wean as tolerable  -SAT/SBT not indicated due to pending EEG    Renal  HENRY on CKD3a  -Cr 1.5-->1.4  -baseline near 1.2-1.3    GI/FEN  Hx GI bleeds 2/2 diverticulitis and gastritis  -On 7/31 pt was hospitalized at [facility] for severe fatigue and diagnosed with acute diverticulitis. He was discharged on 8/2 with antibiotics. Patient returned on 8/3/20 after blood cultures grew Strep mutans. He was started on ceftriaxone and a PICC line was placed and he was discharged home. He then presented to [facility] on 8/13/20 due to melena and was found to have GI bleed. EGD with cauterization/clipping for acute gastritis.   -Hbg 6.2-->transfused 2 PRBCs  -recommend inc pantoprazole to BID    F: net positive 660mls  E: K 2.8, repleted  N: NPO    Heme  Leukocytosis, resolved  -WBC 13.4-->9  -2/2 endocarditis vs inflammation    Autoimmune Hemolytic Anemia  -Hbg 9.1-->7.2-->6.2  -transfused 2U PRBCs    ID  Strep  mutans endocarditis of aortic and mitral valves  -TTE was performed during previous hospitalization which showed aortic and mitral vegetations both >1cm. SILVANA confirmed finding and patient was transferred to [facility] for CT surg evaluation but deemed too high risk secondary to acute bleed. Patient discharged on home on 8/21/2020 with IV rocephin.  -continue ceftriaxone (6wk course end date 9/14), doxycycline added 9/8  -Bcx NGTD  -ID consulted    Endocrine  -SSI and accuchecks    Feeding: NPO  Analgesia: none  Sedation: propofol  Thrombo Ppx: lovenox  Head Up: 30 degrees  Ulcer Ppx: PPI  Glycemic Control: SSI  SAT/SBT: pending EEG  Bowel Regimen: none  Indwelling Catheter Removal: ETT, R PICC, jose, JOLENE   De-escalation of abxs: continue rocephin, doxy    Dispo: patient will have EEG done today, continue mechanical ventilation    Thank you for allowing us to participate in the care of this patient. Please call with questions.    Pedro Damon,   [facility]I  [facility]

## 2020-09-09 NOTE — PT/OT/SLP PROGRESS
Physical Therapy      Patient Name:  Lopez Savage   MRN:  [id number]  [id number]  Patient not seen today secondary to nursing hold-[place]uncion (Patient intubated/sedated, low H/H). Will follow-up .    Mary Prajapati, PT   9/9/2020

## 2020-09-09 NOTE — PROGRESS NOTES
Patient seen and examined by me. I agree with the trainee's separate note except as modified.     Major overnight events: None reported     Vitals: Afebrile, HR 60-70s, MAP 70s, 98% on 35% and 5 PEEP      Significant lab findings: Hgb 9.1 to 7.2 to 6.2. WBC down to 9. K 2.8, creatinine 1.4.      CXR reviewed by me: worsened bilateral opacities      MRI/MRA:   1. Left PCA distribution acute infarction.  2. Focus of restricted diffusion within the right occipital lobe with adjacent vasogenic edema, concerning for underlying lesion or septic embolus.  Further evaluation with contrast-enhanced MRI brain is recommended.  3. MRA head: No evidence of occlusion or high-grade stenosis of the major intracranial arteries.  4. MRA neck: No evidence of occlusion or high-grade stenosis of the major arteries of the neck.  5. Moderate bilateral pleural effusions.    TTE: Moderate MR, LVEF 50%      Key exam findings: unresponsive, on vent   Ventilator:               Settings: AC/420/24/35/5              P/F: 234  SAT:   SBT: not appropriate   RASS: -5  Sedation: propofol    Vasopressors: none  Other drips: none  Lines and invasive devices:   Fluid Balance: 24 hr: +645  Overall: +600  Creatinine: 1.4  Antibiotics/Day #: ceftriaxone and doxycycline  Nutrition:   Glucose management:   Prophylaxis:               DVT: SCDs              GI: PPI  PT/OT: not appropriate   GOC/Family discussion: full code       Impression: appears to have septic emboli to the brain.    CXR worsening could be ARDS related to aspiration, cardiogenic pulmonary edema, or even DAH in the setting of endocarditis     Recommendations:   -EEG pending   -ASA and statin  -cont ceftriaxone and doxy. Would consult ID due to failure of antibiotic treatment   -would increase PPI to BID due to recent GI bleed    Critical care time (30min) spent personally by me on the following activities: development of treatment plan with patient or surrogate and bedside caregivers,  discussions with consultants, evaluation of patient's response to treatment, examination of patient, ordering and performing treatments and interventions, ordering and review of laboratory studies, ordering and review of radiographic studies, pulse oximetry, re-evaluation of patient's condition. This critical care time did not overlap with that of any other provider or involve time for any procedures.

## 2020-09-09 NOTE — PLAN OF CARE
Patient is ventilator with settings stated on the flow sheet. All volumes and alarms are on and working properly. Will continue to monitor.

## 2020-09-09 NOTE — PROGRESS NOTES
"[facility] Hospital Medicine Progress Note    Primary Team: [facility] Hospitalist Team A  Attending Physician: Rush Mary MD  Resident: Marry  Intern: Harshal    Subjective:      Pt is intubated and sedated at this time.      Objective:     Last 24 Hour Vital Signs:  BP  Min: 99/53  Max: 147/72  Temp  Av °F (37.2 °C)  Min: 98.1 °F (36.7 °C)  Max: 99.3 °F (37.4 °C)  Pulse  Av.8  Min: 67  Max: 86  Resp  Av.1  Min: 4  Max: 40  SpO2  Av.3 %  Min: 92 %  Max: 100 %  Height  Av' 9" (175.3 cm)  Min: 5' 9" (175.3 cm)  Max: 5' 9" (175.3 cm)  Weight  Av.5 kg (248 lb 0.1 oz)  Min: 112.5 kg (248 lb)  Max: 112.5 kg (248 lb 0.3 oz)  I/O last 3 completed shifts:  In: 1093.5 [I.V.:43.5; NG/GT:150; IV Piggyback:900]  Out: 433 [Urine:433]    Physical Examination:  General: intubated, sedated  Cardiac: regular rate, normal rhythm, no murmurs appreciated with vent, no extra heart sounds  Pulm: patient on ventilator, ETT in place   Abdomen: obese abdomen, non distended, normoactive bowel sounds  Extremities: +2 peripheral pulses, no pitting edema, atraumatic  Skin: no rashes, lesions or ulcers present     Laboratory:  Laboratory Data Reviewed: yes  Pertinent Findings:  K 2.8  Mag 1.6    Microbiology Data Reviewed: yes  Pertinent Findings:   blood cultures x2: NG   resp culture: no organisms     Other Results:  EKG (my interpretation): none new     Radiology Data Reviewed: yes  Pertinent Findings:  MRA Neck without contrast   Final Result   Addendum 1 of 1      Dr. Hurtado discussed critical findings with Tyra by telephone at 18:38 on    2020.         Electronically signed by: Thad Hurtado   Date:    2020   Time:    18:45      Final      MRI Brain Without Contrast   Final Result   Addendum 1 of 1      Dr. Hurtado discussed critical findings with [facility] by telephone at 18:38 on    2020.         Electronically signed by: Thad Hurtado   Date:    2020   Time:    18:45      Final      MRA " Brain   Final Result   Addendum 1 of 1      Dr. Hurtado discussed critical findings with Tyra by telephone at 18:38 on    09/08/2020.         Electronically signed by: Thad Hurtado   Date:    09/08/2020   Time:    18:45      Final      X-Ray Abdomen AP 1 View   Final Result      Splenomegaly         Electronically signed by: Prasanna Jackson Jr   Date:    09/08/2020   Time:    11:58      X-Ray Chest AP Portable   Final Result      Status post endotracheal tube placement without evidence of a pneumothorax.         Electronically signed by: Kris Renteria   Date:    09/08/2020   Time:    02:21      X-Ray Chest AP Portable   Final Result      Right-sided PICC line as described with mild prominence of the interstitium and no acute findings.         Electronically signed by: Prasanna Lui   Date:    09/08/2020   Time:    01:48      CT Head Without Contrast   Final Result      Broad 1 cm thick scalp hematoma over the left temporoparietal region.  No underlying fracture or intracranial abnormality.         Electronically signed by: Prasanna Lui   Date:    09/08/2020   Time:    01:44      X-Ray Chest AP Portable    (Results Pending)   X-ray Abdomen for NG Tube Placement (Nursing should notify Radiology after placement)    (Results Pending)   X-Ray Chest AP Portable    (Results Pending)         Current Medications:     Infusions:   propofoL 20 mcg/kg/min (09/09/20 0640)        Scheduled:   aspirin  325 mg Per NG tube Daily    atorvastatin  40 mg Per NG tube Daily    cefTRIAXone (ROCEPHIN) IVPB  2 g Intravenous Q24H    doxycycline (VIBRAMYCIN) IVPB  100 mg Intravenous Q12H    enoxaparin  40 mg Subcutaneous Q24H    magnesium sulfate IVPB  2 g Intravenous Once    pantoprazole  40 mg Intravenous Daily    potassium chloride  20 mEq Intravenous Q2H        PRN:  sodium chloride, dextrose 50%, glucagon (human recombinant), insulin aspart U-100, lorazepam, sodium chloride 0.9%    Antibiotics and Day Number of  Therapy:  vanc 9/8-current  Ceftriaxone 9/8-current     Lines and Day Number of Therapy:  PICC   NG  ETT  Naomi    Assessment:     Lopez Savage is a 74 y.o.male with  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    Mitral regurgitation 09/08/2020    CVA (cerebral vascular accident) 09/08/2020    Bacterial endocarditis 09/08/2020    History of gastrointestinal bleeding 09/08/2020    GERD (gastroesophageal reflux disease) 09/08/2020    Embolic stroke involving left posterior cerebral artery 09/08/2020    Embolic stroke involving right posterior cerebral artery 09/08/2020    Thalamic infarct, 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    Unresponsive    who presented after being found down, with confirmed PCA ischemic stroke, currently intubated and sedated pending improvement.      Plan:     Acute hypoxic respiratory failure 2/2 Ischemic cardiovascular event of the PCA   - patient found unresponsive at home before arrival to ED  - non responsive to sternal rub, pupillary and gag reflex present  - CT head negative for acute intracranial bleed  - MRI head showed PCA infarct and right occipital lobe edema   - tPA contraindicated due to recent GI bleed  - pt currently intubated for airway protection   - PT/OT consulted  - Neuro vascular is consulted  - pt on aspirin 325 mg, atorvastatin 40 mg daily, tight glucose control, BP parameters SBP <220     Normocytic Anemia  - H/H on admission 9.1/29.4, MCV 91  - has had extensive workup at [facility] for anemia in past including autoimmune, luekemia and lymphoma which were negative  - H/H this AM 6.2/19.4; 2 units of blood ordered for transfusion     Seizure-like activity   - after admission when propofol was weans, nursing witnessed seizure like activity from pt, right arm and had jerking  - no previous  reports from family  - EEG ordered     Intermediate Level Troponin  - Troponin 0.08 on admission  -   - EKG sinus tachycardia w/ left anterior fascicular block   - secondary to demand from CVA      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   - initially treated at [facility] main [facility] and discharged with three week course of IV rocephin  - strepto mutans; 6 week course set to end on 9/14/2020  - SILVANA at [facility] showed aortic valve vegetations  - will continue IV rocephin through PICC line  - blood cultures NGTD     Leukocytosis (resolved)   - WBC 19 on admission  - likely 2/2 to endocarditis  - no reported fevers recently, afebrile since admit  - will continue to monitor, 9.00 today     COPD, stable  - pt does not require home O2  - home montelukast and fluticasone   - pt currently intubated      H/o GI Bleed  - h/o bleeding diverticuli and gastric ulcers s/p cauterization  - has required blood transfusions  - most recent GI bleed in August 2020   - transfusing 2 units today for anemia      HENRY vs CKD  - Cr 1.5 on admission, baseline near 1.2-1.3  - eGFR 31  - creatinine this AM: 1.4      Hyperglycemia  - glucose 189 on admission, 128 on recent POCT  - ordered SSI  - A1c ordered  - will obtain tight glycemia control for neurological protection      Hypokalemia  - K 3.2 on admission  - received IV KCl 40mEq in ED  - K this AM: 2.8, will replete with IV riders      HTN  - holding home BP in setting of possible ischemic stroke  - hypertensive to 180's systolic, has normalized  - Losartan 50mg at home, holding  - maintaining goal of SBP <220     HLD  - atorvastatin 40mg, continuing for secondary stroke prevention      Diet: NPO  PPx: lovenox 40mg     Code Status:      Full Code      Jermaine Tuttle MD  [facility]    [facility] Hospitalist Pager numbers:   [facility] Medicine Team A (Tyra/Ruby): [phone]  [facility] Hospitalist  Medicine Team B (Tor/James):  [phone]

## 2020-09-09 NOTE — PLAN OF CARE
Loss of Consciousness for 1 day     Subjective:      History of Present Illness:  Ana Laura Candelaria is a 89 y.o.  male with PMH of diverticulitis, MVP, endocarditis with aortic valve vegetations, HTN, and COPD. The patient presented to [facility] on 9/8/2020 with a primary complaint of loss of consciousness. The patient was recently treated for endocarditis w/ IV rocephin through PICC line at [facility] and discharged 2 weeks ago. Since then he has had a worsening cough and was planning to go to the hospital for evaluation. Per wife home health did portable CXR about a week ago w/ concern for fluid vs pneumonia. Patient was given course of doxycycline and finished course yesterday. Last night wife went to bed while  was watching TV and about an hour later her daughter found him non responsive on the ground. Daughter did a few chest compressions before EMS arrived. Patient arrived to ED and non responsive to sternal rub. Pupillary and gag reflex present. Patient intubated for airway protection and sedated w/ propofol. No seizure like activity was observed in ED. TPA contraindicated due to recent GI bleed, last occurring in August. Discussed further stroke work up with wife and plan to obtain MRI head.     The pt's currently in ICU,intubated. The pt is a 30 day readmit. The pt was d/c'd from [facility] 8-21-20. The pt currently sees Dr. Matos.The pt's current with [facility]  & [facility] for home iv abx. The Sw spoke to the pt's wife Brenda Savage [phone] via phone to complete the assessment. The pt lives with his wife and daughter Ifrah Savage [phone] in [place]. The pt is independent with his adl's but his family assists him prn. The pt has a rolling walker,tub bench and bars in the bathtub at home. The pt's wife or dtr will transport him home at d/c. The Sw completed the white board in the pt's room including her name and contact info. The Sw left a d/c  brochure at bedside for his family. The Sw gave the pt's wife her name and contact info and encouraged her to call if she has any more questions or concerns. The Sw will continue to follow the pt throughout his transitions and will assist with any d/c needs.         09/08/20 1035   Discharge Assessment   Assessment Type Discharge Planning Assessment   Confirmed/corrected address and phone number on facesheet? Yes   Assessment information obtained from? Caregiver   Prior to hospitilization cognitive status: Alert/Oriented   Prior to hospitalization functional status: Assistive Equipment;Needs Assistance   Current cognitive status: Coma/Sedated/Intubated   Current Functional Status: Completely Dependent   Lives With child(bianca), adult;spouse   Able to Return to Prior Arrangements   (TBD)   Is patient able to care for self after discharge? Unable to determine at this time (comments)   Who are your caregiver(s) and their phone number(s)? Brenda Savage(wife)[phone]/ Ifrah Janette(dtr)[phone]   Patient's perception of discharge disposition home health  (pt's current with [facility] and [facility])   Readmission Within the Last 30 Days previous discharge plan unsuccessful   If yes, [facility]   Patient currently being followed by outpatient case management? No   Patient currently receives any other outside agency services? No   Equipment Currently Used at Home walker, rolling;bath bench  (handles in the bathtub)   Do you have any problems affording any of your prescribed medications? No  (the pt receives his meds affordably at [facility])   Is the patient taking medications as prescribed? yes   Does the patient have transportation home? Yes   Transportation Anticipated family or friend will provide   Does the patient receive services at the [facility]? No   Discharge Plan A Home Health   Discharge Plan B Home with family   DME Needed Upon Discharge  other (see comments)  (TBD)    Patient/Family in Agreement with Plan yes   Readmission Questionnaire   At the time of your discharge, did someone talk to you about what your health problems were? Yes   At the time of discharge, did someone talk to you about what to watch out for regarding worsening of your health problem? Yes   At the time of discharge, did someone talk to you about what to do if you experienced worsening of your health problem? Yes   At the time of discharge, did someone talk to you about which medication to take when you left the hospital and which ones to stop taking? Yes   At the time of discharge, did someone talk to you about when and where to follow up with a doctor after you left the hospital? Yes   What do you believe caused you to be sick enough to be re-admitted? pt was found down, not responsive   How often do you need to have someone help you when you read instructions, pamphlets, or other written material from your doctor or pharmacy? Sometimes   Do you have problems taking your medications as prescribed? No   Do you have any problems affording any of  your prescribed medications? No   Do you have problems obtaining/receiving your medications? No   Does the patient have transportation to healthcare appointments? Yes  (the pt's wife and dtr transport him to appointments)   Living Arrangements house   Does the patient have family/friends to help with healtcare needs after discharge? yes   Does your caregiver provide all the help you need? Yes   Are you currently feeling confused? No   Are you currently having problems thinking? No   Are you currently having memory problems? No   Have you felt down, depressed, or hopeless? 0   Have you felt little interest or pleasure in doing things? 0   In the last 7 days, my sleep quality was: fair

## 2020-09-09 NOTE — TELEPHONE ENCOUNTER
Called the pt wife in regards to this message.     Informed her we will reschedule the in clinic appointment with Dr. Lanier to next week, and the attending physician in the hospital will put in a consult for a cardiologist if needed.     ND

## 2020-09-09 NOTE — CONSULTS
Infectious Disease Consult Note    Consulting Physician: Dr. Mary    Reason for Consult: Endocarditis with septic emboli    Chief Complaint: Loss of consciousness for 1 day    History of Present Illness:  Mr. 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      In brief, patient has BCx drawn on 7/31 during a hospital admission for acute diverticulitis. Patient was contacted on 8/5 to return to the hospital for strep mutans bacteremia. Patient was started on Ceftriaxone and a PICC was placed for outpatient antibiotics. Patient returned to the hospital on 8/14 for GI bleed, and at that time had a TTE and SILVANA that confirmed vegetations on mitral and aortic valve > 1 cm. Patient transferred to [facility] for CTS evaluation but conservative management was recommended. Initial ID recs were to continue Ceftriaxone 2g IV q24 hrs with an end date of 8/31, however later extended to 9/15. Patient was noted to have a new consolidation on CXR and was started on Doxycycline for CAP coverage. Per wife, patient started the Doxycycline on 9/4 and was supposed to finish the course on 9/11.     Patient presented to the ED on 9/8/2020 for loss of consciousness. Daughter found  on the ground unresponsive shortly after the wife had gone to bed. Daughter performed some chest compressions prior to EMS arrival but it is unclear if patient ever lost pulses. Patient was intubated for airway protection. Initially afebrile. Tachycardic, and hypertensive. Initial white count 19. Cr 1.5 (BL 1.0-1.2), UA with many bacteria, 5 WBCs with no LE or nitrites.     Patient found to have left PCA infarct and right occipital lobe edema concerning for septic embolism. Patient continued on Rocephin and Doxycycline.     Infectious disease consulted for  recommendations of antibiotics in setting of recent strep mutans endocarditis and pneumonia.    Review of Symptoms:  Unable to obtain given altered mental status    Past Medical History:  No past medical history on file.    Past Surgical History:  No past surgical history on file.    Family History:  No family history on file.      Social History:  Social History     Socioeconomic History    Marital status:      Spouse name: Not on file    Number of children: Not on file    Years of education: Not on file    Highest education level: Not on file   Occupational History    Not on file   Social Needs    Financial resource strain: Not on file    Food insecurity     Worry: Not on file     Inability: Not on file    Transportation needs     Medical: Not on file     Non-medical: Not on file   Tobacco Use    Smoking status: Not on file   Substance and Sexual Activity    Alcohol use: Not on file    Drug use: Not on file    Sexual activity: Not on file   Lifestyle    Physical activity     Days per week: Not on file     Minutes per session: Not on file    Stress: Not on file   Relationships    Social connections     Talks on phone: Not on file     Gets together: Not on file     Attends Oriental orthodox service: Not on file     Active member of club or organization: Not on file     Attends meetings of clubs or organizations: Not on file     Relationship status: Not on file   Other Topics Concern    Not on file   Social History Narrative    Not on file       Allergies:  Review of patient's allergies indicates:   Allergen Reactions    Codeine     Tetanus vaccines and toxoid      Pertinent Medications:  Antibiotics:   Antibiotics (From admission, onward)    Start     Stop Route Frequency Ordered    09/08/20 1230  doxycycline (VIBRAMYCIN) 100mg in dextrose 5% 250 mL IVPB (ready to mix)      -- IV Every 12 hours (non-standard times) 09/08/20 1124    09/08/20 0800  cefTRIAXone (ROCEPHIN) 2 g/50 mL D5W IVPB      -- IV  Every 24 hours (non-standard times) 09/08/20 0443        Physical Exam:  VS (24h):   Vitals:    09/09/20 1245   BP: (!) 94/52   Pulse: 66   Resp: (!) 24   Temp: 98.5 °F (36.9 °C)     Temp:  [98.3 °F (36.8 °C)-99.3 °F (37.4 °C)]     GEN- Patient intubated and sedated, laying in bed  HEENT- PERRL, gag intact, no evidence of trauma. ETT and OG tube in place  NECK- No thyromegaly or other masses  CARDIAC- RRR, no murmurs or rubs  RESP- CTAB, normal work of breathing, no wheezes, crackles, or rhonchi  ABD- Soft, NT, ND, +BS  EXT- No clubbing, cyanosis, minimal BL pitting edema; 2+ DP/PT pulses  NEURO- PERRL, gag intact, not breathing over vent however he is sedated, with drawling from pain in all 4 extremities. Possible intermittent myoclonic contractions to lower extremities  SKIN- Warm and dry; no rashes    Lines:  PICC RUE  ETT  Salgado catheter (9/8)    Labs:  CBC:   Lab Results   Component Value Date    WBC 9.08 09/09/2020    WBC 13.45 (H) 09/08/2020    WBC 19.09 (H) 09/08/2020    HGB 6.2 (L) 09/09/2020    HCT 19.4 (LL) 09/09/2020    MCV 88 09/09/2020     09/09/2020       BMP:   Recent Labs   Lab 09/09/20  0513   GLU 82   *   K 2.8*      CO2 26   BUN 21   CREATININE 1.4   CALCIUM 8.7   MG 1.6       LFT:   Lab Results   Component Value Date    ALT 6 (L) 09/09/2020    AST 9 (L) 09/09/2020    ALKPHOS 41 (L) 09/09/2020    BILITOT 0.2 09/09/2020         Microbiology x 7d:   Microbiology Results (last 7 days)     Procedure Component Value Units Date/Time    Culture, Respiratory with Gram Stain [[id number]] Collected: 09/08/20 1425    Order Status: Completed Specimen: Respiratory from Endotracheal Aspirate Updated: 09/09/20 0147     Gram Stain (Respiratory) <10 epithelial cells per low power field.     Gram Stain (Respiratory) Rare WBC's     Gram Stain (Respiratory) No organisms seen    Blood culture #2 **CANNOT BE ORDERED STAT** [[id number]] Collected: 09/08/20 0211    Order Status: Completed Specimen:  Blood from Peripheral, Forearm, Right Updated: 09/09/20 0115     Blood Culture, Routine No Growth to date    Blood culture #1 **CANNOT BE ORDERED STAT** [[id number]] Collected: 09/08/20 0140    Order Status: Completed Specimen: Blood from Peripheral, Hand, Right Updated: 09/09/20 0115     Blood Culture, Routine No Growth to date        Imaging:  CT Head w/o Contrast 9/8/2020  Broad 1 cm thick scalp hematoma over the left temporoparietal region.  No underlying fracture or intracranial abnormality.    MRI/MRA Brain 9/8/2020  1. Left PCA distribution acute infarction.  2. Focus of restricted diffusion within the right occipital lobe with adjacent vasogenic edema, concerning for underlying lesion or septic embolus.  Further evaluation with contrast-enhanced MRI brain is recommended.  3. MRA head: No evidence of occlusion or high-grade stenosis of the major intracranial arteries.  4. MRA neck: No evidence of occlusion or high-grade stenosis of the major arteries of the neck.  5. Moderate bilateral pleural effusions.    CXR 9/8/2020  - Support tubes and lines remain in satisfactory position.  - Pulmonary vascular congestion with bilateral parahilar and basilar pulmonary opacities.  Findings may be related to mild to moderate pulmonary edema although bilateral pneumonia cannot be excluded.  - Small layering pleural effusions cannot be excluded.    TTE 9/8/2020  · Mild left ventricular enlargement.  · Moderate mitral regurgitation.  · Intermediate central venous pressure (8 mmHg).  · The estimated PA systolic pressure is 28 mmHg.  · Low normal left ventricular systolic function. The estimated ejection fraction is 50%.  · Normal LV diastolic function.  · Normal right ventricular systolic function.  · Mild aortic regurgitation.     Assessment and Plan:    Strep mutans endocarditis with concern for septic embolic stroke  - Would broaden to Cefepime and Flagyl for gram negative and anaerobe coverage for now  - Continue  Doxycycline for atypical CAP coverage to complete 7 day course  - Continue to follow up blood cultures and sputum cultures     Cipriano Joseph MD  Internal/Emergency Medicine, PGY-III

## 2020-09-10 NOTE — PROGRESS NOTES
[facility]   Vascular Neurology  Comprehensive Stroke Center  Telestroke Rounding Progress Note    Assessment/Plan:     Embolic stroke involving left posterior cerebral artery  75 y/o male with HTN, recent endocarditis (8/2020), recent GIB (8/2020), diverticulitis, GERD presented with unresponsiveness, hypoxia requiring intubation, and seizure like activity.  Patient continues with altered mental status, does not open eyes, does not follow commands.  Spontaneous movement on left; weak withdrawal on right. Initial CT with no early infarct signs or hemorrhage. MRI with left PCA infarct and left thalamic infarct along with small right PCA infarct.  MRA demonstrates fetal circulation to left PCA.  Two different distributions makes a cardioembolic source most likely. Repeat Echo shows EF of 50%, no wall motion abnormality, no left atrial enlargement, no thrombus.  Mild aortic regurgitation, moderate mitral valve regurgitation.  Etiology most likely septic embolic in setting of endocarditis.  He is high risk for repeat events.  Current has been receiving treatment since previous admission.    His mentation is most likely due to involvement of thalamus, however would still get EEG to investigate for seizure activity as this may be contributing to his mentation as well.   If there is no seizure activity and his mentation does not improve after leukocytosis resolves, would recommend modafinil to improve mentation.     Recommendations:    Antithrombotics for secondary stroke prevention: Antiplatelets: Aspirin: 325 mg daily    Statins for secondary stroke prevention and hyperlipidemia, if present:   Statins: Atorvastatin- 40 mg daily    Aggressive risk factor modification: HTN, Endocarditis     Rehab efforts: The patient has been evaluated by a stroke team provider and the therapy needs have been fully considered based off the presenting complaints and exam findings. The following therapy evaluations are needed: PT  evaluate and treat, OT evaluate and treat, SLP evaluate and treat     Diagnostics ordered/pending: TTE to assess cardiac function/status , Other: EEG    VTE prophylaxis: Enoxaparin 40 mg SQ every 24 hours    BP parameters: Infarct: No intervention, SBP <160 - no longer in acute window.  Can move towards normotension. Patient at goal without medication.      -Continue hourly neuro exams to monitor for new events.  -Stroke team to sign off at this time; please call [phone] for any new neurological deficits, questions, or concerns.  Continue current regiment.    Thalamic infarct, acute  See above      Embolic stroke involving right posterior cerebral artery  See above      Bacterial endocarditis  Recent admission and treatment  -Stroke Risk Factor  -Recommend repeat Echo   -Last SILVANA 8/17/2020  · An 18 mm mobile vegetation is present of the both the mitral and aortic valves.  · Severe mitral regurgitation with anteriorly oriented jet due to P2 scallop flail.  · Mild aortic regurgitation.  · Normal left ventricular systolic function. The estimated ejection fraction is 65%.  · Normal right ventricular systolic function.      Essential hypertension  -Stroke Risk Factor  -Goal SBP <160 - now past acute stroke phase.              STROKE DOCUMENTATION   Acute Stroke Times   Symptom Onset Date: 09/08/20  Symptom Onset Time: 0030  Stroke Team Arrival Time: 0130  CT Interpretation Time: 0131    NIH Scale:  1a. Level of Consciousness: 2-->Not alert, requires repeated stimulation to attend, or is obtunded and requires strong or painful stimulation to make movements (not stereotyped)  1b. LOC Questions: 2-->Answers neither question correctly  1c. LOC Commands: 2-->Performs neither task correctly  2. Best Gaze: 0-->Normal  3. Visual: 0-->No visual loss  4. Facial Palsy: 1-->Minor paralysis (flattened nasolabial fold, asymmetry on smiling)  5a. Motor Arm, Left: 3-->No effort against gravity, limb falls  5b. Motor Arm, Right:  2-->Some effort against gravity, limb cannot get to or maintain (if cued) 90 (or 45) degrees, drifts down to bed, but has some effort against gravity  6a. Motor Leg, Left: 3-->No effort against gravity, leg falls to bed immediately  6b. Motor Leg, Right: 3-->No effort against gravity, leg falls to bed immediately  7. Limb Ataxia: 0-->Absent  8. Sensory: 1-->Mild-to-moderate sensory loss, patient feels pinprick is less sharp or is dull on the affected side, or there is a loss of superficial pain with pinprick, but patient is aware of being touched  9. Best Language: 3-->Mute, global aphasia, no usable speech or auditory comprehension  10. Dysarthria: (UN) Intubated or other physical barrier  11. Extinction and Inattention (formerly Neglect): 0-->No abnormality  Total (NIH Stroke Scale): 22       Modified Hudspeth Score: 0  Roni Coma Scale:    ABCD2 Score:    [id number] Score:   HAS -BLED Score:   ICH Score:   Hunt & Newman Classification:      Hemorrhagic change of an Ischemic Stroke: Does this patient have an ischemic stroke with hemorrhagic changes? No     Neurologic Chief Complaint: Unresponsive    Subjective:     Interval History: Patient is seen for follow-up neurological assessment and treatment recommendations: No acute issues or events since admission.  Remains intubated on sedation due to agitation.  Echo completed.  EEG pending. More alert today.    HPI, Past Medical, Family, and Social History remains the same as documented in the initial encounter.     Review of Systems   Constitutional: Negative for fever.   Gastrointestinal: Negative for diarrhea and vomiting.   Neurological: Positive for weakness.   Psychiatric/Behavioral: Positive for agitation.     Scheduled Meds:   aspirin  325 mg Per NG tube Daily    atorvastatin  40 mg Per NG tube Daily    ceFEPime (MAXIPIME) IVPB  2 g Intravenous Q24H    doxycycline (VIBRAMYCIN) IVPB  100 mg Intravenous Q12H    enoxaparin  40 mg Subcutaneous Q24H     metronidazole  500 mg Intravenous Q8H    pantoprazole  40 mg Intravenous Daily    [START ON 9/11/2020] vancomycin (VANCOCIN) IVPB  2,250 mg Intravenous Q24H     Continuous Infusions:   propofoL Stopped (09/10/20 1325)     PRN Meds:sodium chloride, dextrose 50%, fentaNYL **FOLLOWED BY** [START ON 9/15/2020] fentaNYL, glucagon (human recombinant), insulin aspart U-100, lorazepam, sodium chloride 0.9%, Pharmacy to dose Vancomycin consult **AND** vancomycin - pharmacy to dose    Objective:     Vital Signs (Most Recent):  Temp: 98.9 °F (37.2 °C) (09/10/20 1215)  Pulse: 75 (09/10/20 1340)  Resp: (!) 25 (09/10/20 1340)  BP: (!) 149/58 (09/10/20 1230)  SpO2: 99 % (09/10/20 1340)  BP Location: Left arm    Vital Signs Range (Last 24H):  Temp:  [98.2 °F (36.8 °C)-98.9 °F (37.2 °C)]   Pulse:  [62-89]   Resp:  [20-32]   BP: ()/(50-69)   SpO2:  [93 %-100 %]   BP Location: Left arm    Physical Exam  Constitutional:       General: He is not in acute distress.  Eyes:      Pupils: Pupils are equal, round, and reactive to light.   Pulmonary:      Effort: Pulmonary effort is normal.      Comments: Intubated        Neurological Exam:   LOC: Opens eyes to voice; does not track; does not follow commands  Language: Intubated  Visual Fields: Does not blink to threat bilaterally  EOM (CN III, IV, VI): Oculocephalic Reflex  Normal  Pupils (CN II, III): PERRL  Gag Reflex: present  Motor: Spontaneous movement in all 4 extremities  Sensation: Withdraws to painful stimuli in all 4 extremities    Laboratory:  CMP:   Recent Labs   Lab 09/10/20  0407   CALCIUM 9.5   ALBUMIN 2.3*   PROT 6.1      K 4.7   CO2 19*      BUN 24*   CREATININE 1.5*   ALKPHOS 45*   ALT 7*   AST 13   BILITOT 0.4     BMP:   Recent Labs   Lab 09/10/20  0407      K 4.7      CO2 19*   BUN 24*   CREATININE 1.5*   CALCIUM 9.5     CBC:   Recent Labs   Lab 09/10/20  0916   WBC 11.62   RBC 3.14*   HGB 8.9*   HCT 27.5*      MCV 88   MCH 28.3    MCHC 32.4     Lipid Panel:   Recent Labs   Lab 09/08/20  0140   CHOL 105*   LDLCALC 63.0   HDL 28*   TRIG 70     Coagulation:   Recent Labs   Lab 09/08/20  0140   INR 1.1     Hgb A1C:   Recent Labs   Lab 09/08/20  1808   HGBA1C 4.8     TSH:   Recent Labs   Lab 09/08/20  0140   TSH 5.424*       Diagnostic Results     Brain Imaging  9/8/2020 CT Head  No early infarct signs.  No hemorrhage     9/8/2020 MRI Brain  Diffusion restriction with the left PCA territory and left thalamus.  Small diffusion restriction in right occipital lobe. No hemorrahge.       Vessel Imaging  9/8/2020 MRA brain and neck  Left PCA fetal circulation.  No high grade stenosis or occlusion  Cardiac Imaging   9/8/2020 TTE  · Mild left ventricular enlargement.  · Moderate mitral regurgitation.  · Intermediate central venous pressure (8 mmHg).  · The estimated PA systolic pressure is 28 mmHg.  · Low normal left ventricular systolic function. The estimated ejection fraction is 50%.  · Normal LV diastolic function.  · Normal right ventricular systolic function.  · Mild aortic regurgitation.     IP Unit  Spoke hospital nurse at bedside with patient assisting consultant.  Exam completed with assistance from nurse.    Patient information was obtained from spouse/SO.     The attending portion of this evaluation, treatment, and documentation was performed per Lesley Mazariegos NP via audiovisual.    Billing code:     Lesley Mazariegos NP  Comprehensive Stroke Center  Department of Vascular Neurology   [facility] ICU 5th Floor

## 2020-09-10 NOTE — EICU
Called with Positive Blood cx from 9/8/20  1 40 am : Gram stain aer bottle: Gram positive cocci in clusters resembling Staph   Repeat blood cxs 2 sets, start Vancomycin.

## 2020-09-10 NOTE — ASSESSMENT & PLAN NOTE
73 y/o male with HTN, recent endocarditis (8/2020), recent GIB (8/2020), diverticulitis, GERD presented with unresponsiveness, hypoxia requiring intubation, and seizure like activity.  Patient continues with altered mental status, does not open eyes, does not follow commands.  Spontaneous movement on left; weak withdrawal on right. Initial CT with no early infarct signs or hemorrhage. MRI with left PCA infarct and left thalamic infarct along with small right PCA infarct.  MRA demonstrates fetal circulation to left PCA.  Two different distributions makes a cardioembolic source most likely. Repeat Echo shows EF of 50%, no wall motion abnormality, no left atrial enlargement, no thrombus.  Mild aortic regurgitation, moderate mitral valve regurgitation.  Etiology most likely septic embolic in setting of endocarditis.  He is high risk for repeat events.  Current has been receiving treatment since previous admission.    His mentation is most likely due to involvement of thalamus, however would still get EEG to investigate for seizure activity as this may be contributing to his mentation as well.   If there is no seizure activity and his mentation does not improve after leukocytosis resolves, would recommend modafinil to improve mentation.     Recommendations:    Antithrombotics for secondary stroke prevention: Antiplatelets: Aspirin: 325 mg daily    Statins for secondary stroke prevention and hyperlipidemia, if present:   Statins: Atorvastatin- 40 mg daily    Aggressive risk factor modification: HTN, Endocarditis     Rehab efforts: The patient has been evaluated by a stroke team provider and the therapy needs have been fully considered based off the presenting complaints and exam findings. The following therapy evaluations are needed: PT evaluate and treat, OT evaluate and treat, SLP evaluate and treat     Diagnostics ordered/pending: TTE to assess cardiac function/status , Other: EEG    VTE prophylaxis: Enoxaparin 40 mg  SQ every 24 hours    BP parameters: Infarct: No intervention, SBP <160 - no longer in acute window.  Can move towards normotension. Patient at goal without medication.      -Continue hourly neuro exams to monitor for new events.  -Stroke team to sign off at this time; please call [phone] for any questions or concerns.  Continue current regiment.

## 2020-09-10 NOTE — PLAN OF CARE
Problem: Occupational Therapy Goal  Goal: Occupational Therapy Goal  Description: Goals to be met by: 10/10/2020      Patient will increase functional independence with ADLs by performing:    Pt will demonstrate 3/5 strength BUE shoulder flexion with no c/o pain.   Pt will sit EOB min A 5-7 minutes to complete self care skills.   UE Dressing with Minimal Assistance.  LE Dressing with Moderate Assistance.  Grooming while seated or standing with Minimal Assistance.  Toileting from bedside commode with Moderate Assistance for hygiene and clothing management.   Toilet transfer to bedside commode with Moderate Assistance.  Increased functional strength to WFL for self care.  Upper extremity exercise program x8 reps per handout, with assistance as needed.     Outcome: Ongoing, Progressing   Pt would benefit from continued OT to address deficits in self care and functional mobility. Recommendations TBD pending progress with therapies; DME needs TBD pending progress

## 2020-09-10 NOTE — PROGRESS NOTES
"Progress Note  [facility]    Attending: Dr. Osorio  Fellow: Dr. Witt  Intern: Dr. Damon  Admit Date: 9/8/2020  Today's Date: 09/10/2020      SUBJECTIVE:     Mr. Savage is a 74M w/hx of hypertension, ulcerative colitis, GERD, chronic bronchitis, autoimmune hemolytic anemia, mitral regurgitation, and diverticulitits who presented to [facility] after being found unresponsive on the kitchen floor by his daughter at 1am on 9/8. Per the patient's wife, patient was in a reasonably normal state of health until 7/2020. He complained of progressive fatigue and dry cough for approximately 6 months. Over the past 4-6 weeks, he has been hospitalized multiple times. His most recent admission was on 7/31 at [facility] for severe fatigue and diagnosed with acute diverticulitis. He was discharged on 8/2 with antibiotics. Patient returned on 8/3/20 after blood cultures grew Strep mutans. He was started on ceftriaxone and a PICC line was placed and he was discharged home. He then presented to [facility] on 8/13/20 due to melena and was found to have GI bleed. EGD with cauterization/clipping for acute gastritis. TTE was performed which showed aortic and mitral vegetations both >1cm. SILVANA confirmed finding and patient was transferred to [facility] for CT surg evaluation but deemed too high risk secondary to acute bleed. Patient discharged on home on  8/21/2020 with IV rocephin. ID continued to follow outpatient.      The patient's wife reports last seeing him "normal" (talking, ambulating well with walker) at approximately 10pm prior to her going to bed. The patient decided to stay up to watch television. At approximately 1am, the wife heard the daughter scream after finding the patient on the floor in the kitchen (away from his walker). Unclear if the patient loss a pulse prior to EMS arrival, but daughter did a few chest compressions before EMS arrived. Per daughter, patient with left-sided facial droop " and EMS noted saturations in the 70s     In the ED, the patient was unresponsive to sternal rub. Pupillary and gag reflex present. Patient intubated for airway protection and hypoxemia? No seizure like activity was observed in ED.     Interval Hx: Patient remained on ventilator overnight, no acute events.  Transitioned to spontaneous with PS 5 PEEP 5. Pt has more frequent spontaneous movements.    OBJECTIVE:     Vital Signs Trends/Hx Reviewed  Vitals:    09/10/20 0530 09/10/20 0600 09/10/20 0630 09/10/20 0758   BP: (!) 99/57  (!) 112/59    Pulse: 63 69 71 62   Resp: (!) 24 (!) 24 (!) 25 (!) 24   Temp:       TempSrc:       SpO2: 96% 98% 98% 95%   Weight:       Height:           Ventilator settings:  /24/5/35% %  Physical Exam:  Physical Exam  Constitutional:       Appearance: He is ill-appearing.      Comments: intubated   HENT:      Head: Normocephalic and atraumatic.      Mouth/Throat:      Comments: Gag reflex present  Eyes:      Comments: Pupillary reflex present   Neck:      Musculoskeletal: Normal range of motion and neck supple.   Cardiovascular:      Rate and Rhythm: Normal rate and regular rhythm.      Heart sounds: Murmur present.   Pulmonary:      Effort: Pulmonary effort is normal.      Breath sounds: Normal breath sounds. No wheezing, rhonchi or rales.   Abdominal:      General: Abdomen is flat. Bowel sounds are normal. There is no distension.      Tenderness: There is no abdominal tenderness. There is no guarding.   Musculoskeletal: Normal range of motion.      Right lower leg: No edema.      Left lower leg: No edema.   Skin:     General: Skin is warm and dry.      Findings: No erythema.   Neurological:      Comments: Intubated and sedated  Spontaneous terrie LE movements             Laboratory:  No results for input(s): PH, PCO2, PO2, HCO3, POCSATURATED, BE in the last 24 hours.  No results for input(s): WBC, RBC, HGB, HCT, PLT, MCV, MCH, MCHC in the last 24 hours.  Recent Labs   Lab  09/10/20  0407      K 4.7      CO2 19*   BUN 24*   CREATININE 1.5*   MG 2.3       Microbiology Data:   Microbiology Results (last 7 days)     Procedure Component Value Units Date/Time    Blood culture [[id number]] Collected: 09/10/20 0609    Order Status: Sent Specimen: Blood from Antecubital, Left Updated: 09/10/20 06    Blood culture [[id number]]     Order Status: Sent Specimen: Blood     Blood culture #1 **CANNOT BE ORDERED STAT** [[id number]] Collected: 20 0140    Order Status: Completed Specimen: Blood from Peripheral, Hand, Right Updated: 09/10/20 0338     Blood Culture, Routine Gram stain aer bottle: Gram positive cocci in clusters resembling Staph      Results called to and read back by: Dashawn Luciano RN  09/10/2020        03:37    Blood culture #2 **CANNOT BE ORDERED STAT** [[id number]] Collected: 20 0211    Order Status: Completed Specimen: Blood from Peripheral, Forearm, Right Updated: 20 1812     Blood Culture, Routine No Growth to date      No Growth to date    Culture, Respiratory with Gram Stain [[id number]] Collected: 20 1425    Order Status: Completed Specimen: Respiratory from Endotracheal Aspirate Updated: 20 014     Gram Stain (Respiratory) <10 epithelial cells per low power field.     Gram Stain (Respiratory) Rare WBC's     Gram Stain (Respiratory) No organisms seen         Micro:   Bcx Gram + cocci in clusters resembling staph    resp cx negative    Imagin/8 CT head  Broad 1 cm thick scalp hematoma over the left temporoparietal region.  No underlying fracture or intracranial abnormality.      CXR x2  1) mild prominence of the interstitium and no acute findings.  2) correct ETT placement     MRI/MRA Brain  MRI/MRA:   1. Left PCA distribution acute infarction.  2. Focus of restricted diffusion within the right occipital lobe with adjacent vasogenic edema, concerning for underlying lesion or septic embolus.  Further evaluation with  contrast-enhanced MRI brain is recommended.  3. MRA head: No evidence of occlusion or high-grade stenosis of the major intracranial arteries.  4. MRA neck: No evidence of occlusion or high-grade stenosis of the major arteries of the neck.  5. Moderate bilateral pleural effusions.    9/9 CXR  Worsened bilateral opacities -   Pulmonary vascular congestion with bilateral parahilar and basilar pulmonary opacities.  Findings may be related to mild to moderate pulmonary edema although bilateral pneumonia cannot be excluded.         Infusions:     propofoL 20 mcg/kg/min (09/10/20 0649)       Scheduled Medications:    aspirin  325 mg Per NG tube Daily    atorvastatin  40 mg Per NG tube Daily    ceFEPime (MAXIPIME) IVPB  2 g Intravenous Q24H    doxycycline (VIBRAMYCIN) IVPB  100 mg Intravenous Q12H    enoxaparin  40 mg Subcutaneous Q24H    metronidazole  500 mg Intravenous Q8H    pantoprazole  40 mg Intravenous Daily    [START ON 9/11/2020] vancomycin (VANCOCIN) IVPB  2,250 mg Intravenous Q24H    vancomycin (VANCOCIN) IVPB  2,500 mg Intravenous Once       PRN Medications:   sodium chloride, dextrose 50%, glucagon (human recombinant), insulin aspart U-100, lorazepam, sodium chloride 0.9%, Pharmacy to dose Vancomycin consult **AND** vancomycin - pharmacy to dose    Problem List:   Patient Active Problem List   Diagnosis    Loss of consciousness    Essential hypertension    Ulcerative colitis    Chronic bronchitis    Autoimmune hemolytic anemia    Mitral regurgitation    CVA (cerebral vascular accident)    Bacterial endocarditis    History of gastrointestinal bleeding    GERD (gastroesophageal reflux disease)    Embolic stroke involving left posterior cerebral artery    Unresponsive    Embolic stroke involving right posterior cerebral artery    Thalamic infarct, acute       ASSESSMENT & RECOMMENDATIONS     Mr. Savage is a 74yoM with PMHx of recent mitral and aortic valve endocarditis, HTN, UC, GERD,  chronic bronchitis, autoimmune hemolytic anemia, mitral regurgitation, and diverticulitits who presents with embolic CVA.    Neuro  Embolic CVA  -initially found down by daughter in middle of night. In ED non responsive to sternal rub, pupillary and gag reflex present.  -9/8 CT head - L temporoparietal hematoma  -MRI revealed left PCA distribution acute infarction and focus of restricted diffusion within the right occipital lobe with adjacent vasogenic edema, concerning for underlying lesion or septic embolus.  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 SILVANA confirmed finding and patient was transferred to [facility] for [facility] surg evaluation but deemed too high risk secondary to acute bleed. Patient discharged on home on  8/21/2020 with IV rocephin.  -no pressor support required currently    Elevated troponin  -likely 2/2 demand from CVA  -EKG sinus tachycardia w/ left anterior fascicular block   -trending down 0.082-->0.160-->0.147, d/c trending    HTN  -permissive HTN in setting of CVA, goal SBP <220    Resp  AHRF 2/2 aspiration vs endocarditis MR vs DAH  -resp cx negative  -CXR 9/9 compared to 9/9 revealed worsened terrie opacities  -continue doxy, cefepime added 9/10  -vent management: tolerating spontaneous 5/5 well, propofol discontinued    Renal  HENRY on CKD3a  -Cr 1.5-->1.4-->1.5  -baseline near 1.2-1.3    GI/FEN  Hx GI bleeds 2/2 diverticulitis and gastritis  -On 7/31 pt was hospitalized at [facility] for severe fatigue and diagnosed with acute diverticulitis. He was discharged on  8/2 with antibiotics. Patient returned on 8/3/20 after blood cultures grew Strep mutans. He was started on ceftriaxone and a PICC line was placed and he was discharged home. He then presented to [facility] on 8/13/20 due to melena and was found to have GI bleed. EGD with cauterization/clipping for acute gastritis.   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 SILVANA confirmed finding and [facility] but deemed too high risk secondary to acute bleed. Patient discharged on home on 8/21/2020 with IV rocephin.  -9/8 Bcx G+ cocci resembling staph  -recommend pulling PICC line as likely source, can use PIVs  -abxs changed: d/c rocephin, started vanc/cefepime/flagyl, continue doxy  -refer to ID recs for further management    Endocrine  -SSI and accuchecks    Feeding: TF  Analgesia: none  Sedation: none  Thrombo Ppx: lovenox  Head Up: 30 degrees  Ulcer Ppx: PPI  Glycemic Control: SSI  SAT/SBT: SAT passed, SBT pending EEG  Bowel Regimen: none  Indwelling Catheter Removal: ETT, R PICC, JOLENE garcia   De-escalation of abxs: d/c rocephin, cont doxy, added vanc/cef/flagyl    Dispo: patient will have EEG done today, continue mechanical ventilation    Thank you for allowing us to participate in the care of this patient. Please call with questions.    Pedro Damon,   [facility]  [facility]

## 2020-09-10 NOTE — PLAN OF CARE
Patient is on ventilator with settings documented on the flow sheet. All volumes and alarms are on and working. Will continue to monitor.

## 2020-09-10 NOTE — PROGRESS NOTES
Pt with HAPI prevention orders, bedside nurse aware, no pressure injuries noted at this time, foam boots in place on feet, pillows in use for turning.

## 2020-09-10 NOTE — PROGRESS NOTES
Infectious Disease Follow up Note      Impression/Plan:     Strep mutans endocarditis with concern for septic embolic stroke  - Continue Cefepime for now  - Previously on Rocephin with end date 9/15  - Continue to follow up cultures     Pneumonia  - Continue Doxycycline for atypical CAP coverage to complete 7 day course (end date 9/11)  - Continue Flagyl for now for anaerobic coverage    Bacteremia  - Gram positive cocci in clusters in 1/2 bottles on 9/8  - Agree with Vancomycin until speciation. Suspect contaminant.   - Follow up repeat blood cultures     Cipriano Joseph MD  Internal/Emergency Medicine, PGY-III     Thanks! Please call for any questions [phone]. We will continue to follow.     H&P:  Mr. 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       In brief, patient has BCx drawn on 7/31 during a hospital admission for acute diverticulitis. Patient was contacted on 8/5 to return to the hospital for strep mutans bacteremia. Patient was started on Ceftriaxone and a PICC was placed for outpatient antibiotics. Patient returned to the hospital on 8/14 for GI bleed, and at that time had a TTE and SILVANA that confirmed vegetations on mitral and aortic valve > 1 cm. Patient transferred to [facility] for CTS evaluation but conservative management was recommended. Initial ID recs were to continue Ceftriaxone 2g IV q24 hrs with an end date of 8/31, however later extended to 9/15. Patient was noted to have a new consolidation on CXR and was started on Doxycycline for CAP coverage. Per wife, patient started the Doxycycline on 9/4 and was supposed to finish the course on 9/11.      Patient presented to the ED on 9/8/2020 for loss of consciousness. Daughter found  on the ground unresponsive shortly after the wife had gone  to bed. Daughter performed some chest compressions prior to EMS arrival but it is unclear if patient ever lost pulses. Patient was intubated for airway protection. Initially afebrile. Tachycardic, and hypertensive. Initial white count 19. Cr 1.5 (BL 1.0-1.2), UA with many bacteria, 5 WBCs with no LE or nitrites.      Patient found to have left PCA infarct and right occipital lobe edema concerning for septic embolism. Patient continued on Rocephin and Doxycycline.      Infectious disease consulted for recommendations of antibiotics in setting of recent strep mutans endocarditis and pneumonia.    Subjective and Interval History:  Hospital Day 2  Wife at bedside thought patient was making some more purposeful movements this morning. Patient with SBT this morning but had to restart sedation due to agitation.     9/8 BCx G positive cocci in clusters resembling staph 1/2 9/8 Resp culture with normal tyree  9/10 BCx in process    Review of Symptoms:  ROS unable to be obtained due to intubation and sedation.     Medications:  Antibiotics:   Antibiotics (From admission, onward)    Start     Stop Route Frequency Ordered    09/11/20 0700  vancomycin (VANCOCIN) 2,250 mg in dextrose 5 % 500 mL IVPB      -- IV Every 24 hours (non-standard times) 09/10/20 0545    09/10/20 1930  doxycycline (VIBRAMYCIN) 100mg in dextrose 5% 250 mL IVPB (ready to mix)      09/16 1929 IV Every 12 hours (non-standard times) 09/10/20 0733    09/10/20 0800  cefepime in dextrose 5 % IVPB 2 g      09/20 0759 IV Every 24 hours (non-standard times) 09/10/20 0733    09/10/20 0640  vancomycin - pharmacy to dose  (vancomycin IVPB)      -- IV pharmacy to manage frequency 09/10/20 0540    09/09/20 1930  metronidazole IVPB 500 mg      -- IV Every 8 hours (non-standard times) 09/09/20 1832        Objective:  Physical Exam:  VS (24h):  Temp:  [98.2 °F (36.8 °C)-98.9 °F (37.2 °C)] 98.9 °F (37.2 °C)  Pulse:  [62-89] 75  Resp:  [20-32] 25  SpO2:  [93 %-100 %] 99  %  BP: ()/(50-69) 149/58     Physical Exam  GEN- Intubated, sedated, ETT in place, breathing spontaneously  HEENT- PERRL, ETT in place, OG tube in placem no asymmetry   NECK- No thyromegaly or other masses  CARDIAC- RRR, no murmurs or rubs  RESP- CTAB, normal work of breathing, no wheezes, crackles, or rhonchi  ABD- Soft, NT, ND, +BS; no masses or HSM  EXT- No clubbing, cyanosis, or edema; 2+ DP/PT pulses  NEURO- PERRL, gag intact, moving all 4 extremities to pain  SKIN- Warm and dry; no rashes    Lines:  A line RUE (9/10)  PIV  ETT    Labs:  CBC:   Lab Results   Component Value Date    WBC 11.62 09/10/2020    WBC 9.08 09/09/2020    WBC 13.45 (H) 09/08/2020    WBC 19.09 (H) 09/08/2020    HCT 27.5 (L) 09/10/2020     09/10/2020       BMP:   Recent Labs   Lab 09/10/20  0407   GLU 79      K 4.7      CO2 19*   BUN 24*   CREATININE 1.5*   CALCIUM 9.5   MG 2.3       LFT:   Lab Results   Component Value Date    ALT 7 (L) 09/10/2020    AST 13 09/10/2020    ALKPHOS 45 (L) 09/10/2020    BILITOT 0.4 09/10/2020     Microbiology x 7d:   Microbiology Results (last 7 days)     Procedure Component Value Units Date/Time    Blood culture [[id number]] Collected: 09/10/20 0916    Order Status: Sent Specimen: Blood Updated: 09/10/20 1424    Narrative:      Collection has been rescheduled by [facility] at 09/10/2020 06:10 Reason:   unable to collect 2nd set of blood cultures and lavender top  Collection has been rescheduled by [facility] at 09/10/2020 06:10 Reason:   unable to collect 2nd set of blood cultures and lavender top    Blood culture [[id number]] Collected: 09/10/20 0609    Order Status: Sent Specimen: Blood from Antecubital, Left Updated: 09/10/20 1118    Culture, Respiratory with Gram Stain [[id number]] Collected: 09/08/20 1425    Order Status: Completed Specimen: Respiratory from Endotracheal Aspirate Updated: 09/10/20 0848     Respiratory Culture Normal respiratory tyree     Gram Stain (Respiratory) <10 epithelial  cells per low power field.     Gram Stain (Respiratory) Rare WBC's     Gram Stain (Respiratory) No organisms seen    Blood culture #1 **CANNOT BE ORDERED STAT** [[id number]] Collected: 09/08/20 0140    Order Status: Completed Specimen: Blood from Peripheral, Hand, Right Updated: 09/10/20 0338     Blood Culture, Routine Gram stain aer bottle: Gram positive cocci in clusters resembling Staph      Results called to and read back by: Dashawn Luciano RN  09/10/2020        03:37    Blood culture #2 **CANNOT BE ORDERED STAT** [[id number]] Collected: 09/08/20 0211    Order Status: Completed Specimen: Blood from Peripheral, Forearm, Right Updated: 09/09/20 1812     Blood Culture, Routine No Growth to date      No Growth to date        Imaging:  CT Head w/o Contrast 9/8/2020  Broad 1 cm thick scalp hematoma over the left temporoparietal region.  No underlying fracture or intracranial abnormality.     MRI/MRA Brain 9/8/2020  1. Left PCA distribution acute infarction.  2. Focus of restricted diffusion within the right occipital lobe with adjacent vasogenic edema, concerning for underlying lesion or septic embolus.  Further evaluation with contrast-enhanced MRI brain is recommended.  3. MRA head: No evidence of occlusion or high-grade stenosis of the major intracranial arteries.  4. MRA neck: No evidence of occlusion or high-grade stenosis of the major arteries of the neck.  5. Moderate bilateral pleural effusions.     CXR 9/8/2020  - Support tubes and lines remain in satisfactory position.  - Pulmonary vascular congestion with bilateral parahilar and basilar pulmonary opacities.  Findings may be related to mild to moderate pulmonary edema although bilateral pneumonia cannot be excluded.  - Small layering pleural effusions cannot be excluded.     TTE 9/8/2020  · Mild left ventricular enlargement.  · Moderate mitral regurgitation.  · Intermediate central venous pressure (8 mmHg).  · The estimated PA systolic pressure is 28  mmHg.  · Low normal left ventricular systolic function. The estimated ejection fraction is 50%.  · Normal LV diastolic function.  · Normal right ventricular systolic function.  · Mild aortic regurgitation.    Assessment:    Active Hospital Problems    Diagnosis    *CVA (cerebral vascular accident)    Loss of consciousness     Limited history:  LOC could have been cardiogenic or seizure.  Brainstem (or bihemispheric stroke) in DDx.  Not a tPA candidate due to recent GI bleed. Rec MRI and vascular imaging when able.      Essential hypertension    Ulcerative colitis    Chronic bronchitis    Autoimmune hemolytic anemia    Mitral regurgitation    Bacterial endocarditis     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      History of gastrointestinal bleeding     Diverticular       GERD (gastroesophageal reflux disease)    Embolic stroke involving left posterior cerebral artery    Embolic stroke involving right posterior cerebral artery    Thalamic infarct, acute    Unresponsive       Plan:  See top of page.

## 2020-09-10 NOTE — SUBJECTIVE & OBJECTIVE
Neurologic Chief Complaint: Unresponsive    Subjective:     Interval History: Patient is seen for follow-up neurological assessment and treatment recommendations: No acute issues or events since admission.  Remains intubated on sedation due to agitation.  Echo completed.  EEG pending. More alert today.    HPI, Past Medical, Family, and Social History remains the same as documented in the initial encounter.     Review of Systems   Constitutional: Negative for fever.   Gastrointestinal: Negative for diarrhea and vomiting.   Neurological: Positive for weakness.   Psychiatric/Behavioral: Positive for agitation.     Scheduled Meds:   aspirin  325 mg Per NG tube Daily    atorvastatin  40 mg Per NG tube Daily    ceFEPime (MAXIPIME) IVPB  2 g Intravenous Q24H    doxycycline (VIBRAMYCIN) IVPB  100 mg Intravenous Q12H    enoxaparin  40 mg Subcutaneous Q24H    metronidazole  500 mg Intravenous Q8H    pantoprazole  40 mg Intravenous Daily    [START ON 9/11/2020] vancomycin (VANCOCIN) IVPB  2,250 mg Intravenous Q24H     Continuous Infusions:   propofoL Stopped (09/10/20 1325)     PRN Meds:sodium chloride, dextrose 50%, fentaNYL **FOLLOWED BY** [START ON 9/15/2020] fentaNYL, glucagon (human recombinant), insulin aspart U-100, lorazepam, sodium chloride 0.9%, Pharmacy to dose Vancomycin consult **AND** vancomycin - pharmacy to dose    Objective:     Vital Signs (Most Recent):  Temp: 98.9 °F (37.2 °C) (09/10/20 1215)  Pulse: 75 (09/10/20 1340)  Resp: (!) 25 (09/10/20 1340)  BP: (!) 149/58 (09/10/20 1230)  SpO2: 99 % (09/10/20 1340)  BP Location: Left arm    Vital Signs Range (Last 24H):  Temp:  [98.2 °F (36.8 °C)-98.9 °F (37.2 °C)]   Pulse:  [62-89]   Resp:  [20-32]   BP: ()/(50-69)   SpO2:  [93 %-100 %]   BP Location: Left arm    Physical Exam  Constitutional:       General: He is not in acute distress.  Eyes:      Pupils: Pupils are equal, round, and reactive to light.   Pulmonary:      Effort: Pulmonary effort is  normal.      Comments: Intubated        Neurological Exam:   LOC: Opens eyes to voice; does not track; does not follow commands  Language: Intubated  Visual Fields: Does not blink to threat bilaterally  EOM (CN III, IV, VI): Oculocephalic Reflex  Normal  Pupils (CN II, III): PERRL  Gag Reflex: present  Motor: Spontaneous movement in all 4 extremities  Sensation: Withdraws to painful stimuli in all 4 extremities    Laboratory:  CMP:   Recent Labs   Lab 09/10/20  0407   CALCIUM 9.5   ALBUMIN 2.3*   PROT 6.1      K 4.7   CO2 19*      BUN 24*   CREATININE 1.5*   ALKPHOS 45*   ALT 7*   AST 13   BILITOT 0.4     BMP:   Recent Labs   Lab 09/10/20  0407      K 4.7      CO2 19*   BUN 24*   CREATININE 1.5*   CALCIUM 9.5     CBC:   Recent Labs   Lab 09/10/20  0916   WBC 11.62   RBC 3.14*   HGB 8.9*   HCT 27.5*      MCV 88   MCH 28.3   MCHC 32.4     Lipid Panel:   Recent Labs   Lab 09/08/20  0140   CHOL 105*   LDLCALC 63.0   HDL 28*   TRIG 70     Coagulation:   Recent Labs   Lab 09/08/20  0140   INR 1.1     Hgb A1C:   Recent Labs   Lab 09/08/20  1808   HGBA1C 4.8     TSH:   Recent Labs   Lab 09/08/20  0140   TSH 5.424*       Diagnostic Results     Brain Imaging  9/8/2020 CT Head  No early infarct signs.  No hemorrhage     9/8/2020 MRI Brain  Diffusion restriction with the left PCA territory and left thalamus.  Small diffusion restriction in right occipital lobe. No hemorrahge.       Vessel Imaging  9/8/2020 MRA brain and neck  Left PCA fetal circulation.  No high grade stenosis or occlusion  Cardiac Imaging   9/8/2020 TTE  · Mild left ventricular enlargement.  · Moderate mitral regurgitation.  · Intermediate central venous pressure (8 mmHg).  · The estimated PA systolic pressure is 28 mmHg.  · Low normal left ventricular systolic function. The estimated ejection fraction is 50%.  · Normal LV diastolic function.  · Normal right ventricular systolic function.  · Mild aortic regurgitation.

## 2020-09-10 NOTE — PT/OT/SLP EVAL
Occupational Therapy   Evaluation    Name: Lopez Savage  MRN: [id number]  Admitting Diagnosis:  CVA (cerebral vascular accident)      Recommendations:     Discharge Recommendations: (TBD, possibly IPR pending progress)  Discharge Equipment Recommendations:  (TBD pending progress)  Barriers to discharge:       Assessment:     Lopez Savage is a 74 y.o. male with a medical diagnosis of CVA (cerebral vascular accident).  He presents with improving ability to follow motor commands, limited this date 2/2 fatigue as session continued and recent removal of sedation. Pt spontaneously moving x4 extremities, purposefully bringing R hand to face in attempts to pull endotracheal tube from mouth. Pt spontaneously reached R hand to PT to shake hands when PT introduced herself.     Pt with possible seizure activity x2 this session, whole body trembling, unfocused eye gaze and unable to follow commands or to look to speakers/faces ~30-45 seconds in duration. Nsg present for 2nd episode.    Performance deficits affecting function: impaired endurance, impaired sensation, impaired self care skills, impaired balance, impaired functional mobilty, weakness, impaired cognition, decreased coordination, decreased upper extremity function, decreased lower extremity function, decreased safety awareness, impaired coordination, impaired fine motor, impaired cardiopulmonary response to activity, gait instability, visual deficits, abnormal tone, decreased ROM.      Rehab Prognosis: Good; patient would benefit from acute skilled OT services to address these deficits and reach maximum level of function.       Plan:     Patient to be seen   to address the above listed problems via self-care/home management, therapeutic activities, therapeutic exercises, neuromuscular re-education  · Plan of Care Expires: 10/10/20  · Plan of Care Reviewed with:      Subjective     Chief Complaint: Pt grimaced with UE shoulder flexion PROM past ~0-90* but otherwise  "tolerated well  Patient/Family Comments/goals: To return to PLOF    Occupational Profile:  Living Environment: Pt lives with spouse and adult daughter in [place], 0STE, tub/sh with suction grab bar  Previous level of function: Indep ADLs and functional mobility though spouse reports that pt has been less mobile/active since recent hospitalizations  Roles and Routines: Caretaker to self and home. Pt able t participate in light meal prep and cleaning. Daughter completes grocery shopping. Spouse reports pt enjoys the [employer] and "dabbles in woodworking".   Equipment Used at Home:  walker, rolling, shower chair(suction GB in shower)  Assistance upon Discharge: Family    Pain/Comfort:  · Pain Rating 1: (unable to state linda, no overt s&s of pain including grimace or cry)    Patients cultural, spiritual, Shinto conflicts given the current situation:      Objective:     Communicated with: nsg prior to session.  Patient found HOB elevated with blood pressure cuff, PICC line, ventilator, peripheral IV, garcia catheter, pressure relief boots, SCD, telemetry, pulse ox (continuous)(OG tube) upon OT entry to room.    General Precautions: Standard, fall   Orthopedic Precautions:N/A   Braces: N/A     Occupational Performance:    Bed Mobility:    · Patient completed Rolling/Turning to Right with total assistance  Patient completed Scooting/Bridging with dependent  x2 with draw sheet    Functional Mobility/Transfers:  · NT  · Functional Mobility: Nsg requested bed level eval    Activities of Daily Living:  · None attempted this date, Pt independently moved R hand to face but did not do so on command.    Cognitive/Visual Perceptual:  Cognitive/Psychosocial Skills:     -       Oriented to: Pt turned face towards speakers calling name. Pt responded with increased consistently to commands from spouse but also attended to unknown speakers/faces inconsistently   -       Follows Commands/attention:Inattentive, Easily distracted and " follows one step commands inconsistently   -       Communication: limited utterances but moaning and some attempts to speak though intubated at this time  -       Memory: limited ability to fully evaluate cognition 2/2 intubation and lethargy. Will continue to follow up  -       Safety awareness/insight to disability: impaired   -       Mood/Affect/Coping skills/emotional control: Lethargic    Postural examination/scapula alignment:    -       Rounded shoulders  Skin integrity: Visible skin intact  Sensation:    -       Pt able to withdraw x4 extremities to pinch   Motor Planning:    -       Pt able to complete occasional purposeful movement BUE but limited awareness/lethargy throughout session; will continue to assess  Dominant hand:    -       R handed  Upper Extremity Range of Motion: BUE shoulder flex/ext ~0-15* noted AROM, PROM WFL, slight grimace noted with PROM to full end range shoulder flex LUE    Upper Extremity Strength:  BUE 1 to 2+/5, pt able to resist against OT in attempts to reach toward endotracheal tube   Strength:  B hands 3/5  Fine Motor Coordination:    -       Impaired  Gross motor coordination:   Impaired but no true hemiplegia noted at this time.     AMPAC 6 Click ADL:  AMPAC Total Score: 6    Treatment & Education:  Spouse educated on role of OT and POC.   Pt performing skills as listed above.  Spouse educated on levels of rehabilitation and current plan of care for pt. Spouse verbalized understanding but may require follow up to ensure carryover of learning  Education:    Patient left left sidelying with all lines intact, call button in reach, nsg notified and spouse and nsg present    GOALS:   Multidisciplinary Problems     Occupational Therapy Goals        Problem: Occupational Therapy Goal    Goal Priority Disciplines Outcome Interventions   Occupational Therapy Goal     OT, PT/OT Ongoing, Progressing    Description: Goals to be met by: 10/10/2020      Patient will increase  functional independence with ADLs by performing:    Pt will demonstrate 3/5 strength BUE shoulder flexion with no c/o pain.   Pt will sit EOB min A 5-7 minutes to complete self care skills.   UE Dressing with Minimal Assistance.  LE Dressing with Moderate Assistance.  Grooming while seated or standing with Minimal Assistance.  Toileting from bedside commode with Moderate Assistance for hygiene and clothing management.   Toilet transfer to bedside commode with Moderate Assistance.  Increased functional strength to WFL for self care.  Upper extremity exercise program x8 reps per handout, with assistance as needed.                      History:     No past medical history on file.    No past surgical history on file.    Time Tracking:     OT Date of Treatment: 09/10/20  OT Start Time: 1111  OT Stop Time: 1200  OT Total Time (min): 49 min    Billable Minutes:Evaluation 10  Therapeutic Activity 13 Co Tx PT    Carolyne Badillo, OT  9/10/2020

## 2020-09-10 NOTE — PLAN OF CARE
Problem: Physical Therapy Goal  Goal: Physical Therapy Goal  Description: Goals to be met by: 10/10/20     Patient will increase functional independence with mobility by performin. Supine to sit with Moderate Assistance  2. Sit to supine with Moderate Assistance  3. Rolling with Moderate Assistance.  4. Sit to stand transfer with Moderate Assistance using Rolling Walker  5. Bed to chair transfer with Moderate Assistance using Rolling Walker  6. Gait  x 5-10 feet with Moderate Assistance using Rolling Walker.   7. Increased functional strength to 4/5 for ADLs/amb.  8. Lower extremity exercise program x10 reps with independence    Outcome: Ongoing, Progressing  Patient evaluated; full report to follow; d/c recs TBD (possibly IPR pending progress).

## 2020-09-10 NOTE — PROGRESS NOTES
Patient seen and examined by me. I agree with the trainee's separate note except as modified.     Major overnight events: None reported. Blood cxs + for staph species, started on vanco.      Vitals: Afebrile, HR 70s, MAP 80, 98% on 30%.      Significant lab findings: Hgb up to 8.9. creatinine 1.5. 9/8 blood cx + for staph species     Key exam findings: with propofol off, opens eyes and is more interactive   Ventilator:               Intubation day #: 3   SAT: pausing propofol    SBT: on pressure support currently   RASS: -2  Sedation: Propofol  Vasopressors: none  Other drips: none  Lines and invasive devices: R PICC  Fluid Balance: 24 hr: -40cc  Overall: +920cc  Creatinine: 1.5  Antibiotics/Day #: ceftriaxone, doxy, flagyl, vanco.   Glucose management: 80-90s  Prophylaxis:               DVT: enoxaparin              GI: PPI  PT/OT: not yet appropriate   GOC/Family discussion:  Full code, wife updated at bedside     Impression: improved mental status today is encouraging. Staph species is concerning for new bacteremia     Recommendations:   -EEG today   -would pull PICC line and use peripherals   -cont cefepime, doxy, and vanco. May be able to stop flagyl. ID following   -discussed with primary team at bedside    Critical care time (30min) spent personally by me on the following activities: development of treatment plan with patient or surrogate and bedside caregivers, discussions with consultants, evaluation of patient's response to treatment, examination of patient, ordering and performing treatments and interventions, ordering and review of laboratory studies, ordering and review of radiographic studies, pulse oximetry, re-evaluation of patient's condition. This critical care time did not overlap with that of any other provider or involve time for any procedures.

## 2020-09-10 NOTE — PROGRESS NOTES
"[facility] ICU 5th Floor  Adult Nutrition  Progress Note    SUMMARY       Recommendations    Recommendation:   1. Continue current TF regimen of Isosource 1.5 advancing to goal rate of 60 mL/hr while intubated. If propofol to be reinitiated please reconsult for updated TF recs.    2. If pt extubated, consult SLP for swallow eval.   3. RD to follow to monitor nutrition status.      Goals:   Pt TF to be advanced to goal rate by RD follow up, or if extubated pt to recieve diet by RD follow up    Nutrition Goal Status: new  Communication of RD Recs: reviewed with RN    Reason for Assessment    Reason For Assessment: RD follow-up  Diagnosis: stroke/CVA  Relevant Medical History: GERD, diverticulitis, endocarditis, HTN, COPD  General Information Comments: Pt remains intubated and sedated with wife at bedside. Pt recieving Isosource 1.5 trickle feeds. Discussed with RN. She reports she will administer meds, hold TF for a few hours and then restart and begin advancing towards goal rate. Wife had no questions at this time. PICC, PIV and NGT ib place. Pt recieving propofol. NFPE completed 9/8: 9/8-appears nourished at this time.   Nutrition Discharge Planning: d/c needs to be determined    Nutrition Risk Screen    Nutrition Risk Screen: no indicators present    Nutrition/Diet History    Food Preferences: unable to assess  Food Allergies: NKFA  Factors Affecting Nutritional Intake: NPO    Anthropometrics    Temp: 98.9 °F (37.2 °C)  Height: 5' 9" (175.3 cm)  Height (inches): 69 in  Weight Method: Estimated  Weight: 117.5 kg (259 lb 0.7 oz)  Weight (lb): 259.04 lb  Ideal Body Weight (IBW), Male: 160 lb  % Ideal Body Weight, Male (lb): 155 %  BMI (Calculated): 38.2  BMI Grade: 35 - 39.9 - obesity - grade II       Lab/Procedures/Meds    Pertinent Labs Reviewed: reviewed  Pertinent Labs Comments: Na 135, K 2.8, eGFR 49, Alk Phos 41, total Pro 5.6, Alb 2.1, AST 9, ALT 6, Chol 105, HDL 28  Pertinent Medications " Reviewed: reviewed  Pertinent Medications Comments: aspirin, statin, rocephin, doxycycline, enoxaparin, pantoprazole, propofol    Physical Findings/Assessment    Chapo Score: 16    Estimated/Assessed Needs    Weight Used For Calorie Calculations: 117.5 kg (259 lb 0.7 oz)  Energy Calorie Requirements (kcal): 1984  Energy Need Method: [facility] (modified)  Protein Requirements: 94(1.3 gm/kg IBW)  Weight Used For Protein Calculations: 72.7 kg (160 lb 4.4 oz)(IBW)     Estimated Fluid Requirement Method: RDA Method  RDA Method (mL): 1984         Nutrition Prescription Ordered    Current Diet Order: NPO  Current Nutrition Support Formula Ordered: Isosource 1.5  Current Nutrition Support Rate Ordered: 10 (ml)  Current Nutrition Support Frequency Ordered: mL/hr    Evaluation of Received Nutrient/Fluid Intake    Enteral Calories (kcal): 360  Enteral Protein (gm): 16.5  Enteral (Free Water) Fluid (mL): 183.5  Other Calories (kcal): 356(propofol to be stopped)  Total Calories (kcal/kg): 716  % Kcal Needs: 36  % Protein Needs: 17  I/O: 1170.8/398  Energy Calories Required: not meeting needs  Protein Required: not meeting needs  Fluid Required: not meeting needs  Comments: LBM 9/7  Tolerance: tolerating  % Intake of Estimated Energy Needs: 25 - 50 %  % Meal Intake: NPO    Nutrition Risk    Level of Risk/Frequency of Follow-up: (2 x/week)     Assessment and Plan    * CVA (cerebral vascular accident)  Contributing Nutrition Diagnosis  Inadequate energy intake    Related to (etiology):   CVA s/p intubation    Signs and Symptoms (as evidenced by):   NPO on vent    Interventions:  Collaboration with other providers    Nutrition Diagnosis Status:   Continues, pt receiving trickle feeds of Isosource 1.5            Monitor and Evaluation    Food and Nutrient Intake: energy intake, enteral nutrition intake  Food and Nutrient Adminstration: diet order, enteral and parenteral nutrition administration  Physical Activity and Function:  nutrition-related ADLs and IADLs  Anthropometric Measurements: weight  Biochemical Data, Medical Tests and Procedures: electrolyte and renal panel  Nutrition-Focused Physical Findings: overall appearance     Malnutrition Assessment                             Subcutaneous Fat Loss (Final Summary): well nourished  Muscle Loss Evaluation (Final Summary): well nourished         Nutrition Follow-Up    RD Follow-up?: Yes

## 2020-09-10 NOTE — PROGRESS NOTES
[facility] Hospital Medicine Progress Note    Primary Team: [facility] Hospitalist Team A  Attending Physician: Rush Mary MD  Resident: Marry  Intern: Harshal    Subjective:      No acute events overnight. Pt is intubated and sedated at this time.      Objective:     Last 24 Hour Vital Signs:  BP  Min: 89/52  Max: 116/56  Temp  Av.6 °F (37 °C)  Min: 98.2 °F (36.8 °C)  Max: 98.9 °F (37.2 °C)  Pulse  Av.8  Min: 63  Max: 76  Resp  Av.1  Min: 23  Max: 28  SpO2  Av.4 %  Min: 95 %  Max: 100 %  I/O last 3 completed shifts:  In: 2175.9 [I.V.:332.6; Blood:603.3; NG/GT:90; IV Piggyback:1150]  Out: 1906 [Urine:1606; Drains:300]    Physical Examination:  General: intubated, sedated  Cardiac: regular rate, normal rhythm, no murmurs appreciated with vent, no extra heart sounds  Pulm: patient on ventilator, ETT in place   Abdomen: obese abdomen, non distended, normoactive bowel sounds  Extremities: +2 peripheral pulses, no pitting edema, atraumatic  Skin: no rashes, lesions or ulcers present     Laboratory:  Laboratory Data Reviewed: yes  Pertinent Findings:  K 4.7  Mag 2.3    Microbiology Data Reviewed: yes  Pertinent Findings:   blood cultures x1: NG   blood culture x1: gram positive cocci    resp culture: no organisms    blood culture x1: in process   blood culture x1: pending collection       Other Results:  EKG (my interpretation): none new     Radiology Data Reviewed: yes  Pertinent Findings:  X-Ray Chest AP Portable   Final Result      Support tubes and lines remain in satisfactory position.      Pulmonary vascular congestion with bilateral parahilar and basilar pulmonary opacities.  Findings may be related to mild to moderate pulmonary edema although bilateral pneumonia cannot be excluded.      Small layering pleural effusions cannot be excluded.         Electronically signed by: Petar Henry MD   Date:    2020   Time:    08:21      MRA Neck without contrast   Final Result   Addendum 1  of 1      Dr. Hurtado discussed critical findings with Tyra by telephone at 18:38 on    09/08/2020.         Electronically signed by: Thad Hurtado   Date:    09/08/2020   Time:    18:45      Final      MRI Brain Without Contrast   Final Result   Addendum 1 of 1      Dr. Hurtado discussed critical findings with Tyra by telephone at 18:38 on    09/08/2020.         Electronically signed by: Thad Hurtado   Date:    09/08/2020   Time:    18:45      Final      MRA Brain   Final Result   Addendum 1 of 1      Dr. Hurtado discussed critical findings with Tyra by telephone at 18:38 on    09/08/2020.         Electronically signed by: Thad Hurtado   Date:    09/08/2020   Time:    18:45      Final      X-Ray Abdomen AP 1 View   Final Result      Splenomegaly         Electronically signed by: Prasanna Jackson Jr   Date:    09/08/2020   Time:    11:58      X-Ray Chest AP Portable   Final Result      Status post endotracheal tube placement without evidence of a pneumothorax.         Electronically signed by: Kris Renteria   Date:    09/08/2020   Time:    02:21      X-Ray Chest AP Portable   Final Result      Right-sided PICC line as described with mild prominence of the interstitium and no acute findings.         Electronically signed by: Prasanna Lui   Date:    09/08/2020   Time:    01:48      CT Head Without Contrast   Final Result      Broad 1 cm thick scalp hematoma over the left temporoparietal region.  No underlying fracture or intracranial abnormality.         Electronically signed by: Prasanna Lui   Date:    09/08/2020   Time:    01:44      X-Ray Chest AP Portable    (Results Pending)   X-ray Abdomen for NG Tube Placement (Nursing should notify Radiology after placement)    (Results Pending)         Current Medications:     Infusions:   propofoL 20 mcg/kg/min (09/10/20 0649)        Scheduled:   aspirin  325 mg Per NG tube Daily    atorvastatin  40 mg Per NG tube Daily    ceFEPime (MAXIPIME) IVPB  2 g  Intravenous Q24H    doxycycline (VIBRAMYCIN) IVPB  100 mg Intravenous Q12H    enoxaparin  40 mg Subcutaneous Q24H    metronidazole  500 mg Intravenous Q8H    pantoprazole  40 mg Intravenous Daily    [START ON 9/11/2020] vancomycin (VANCOCIN) IVPB  2,250 mg Intravenous Q24H    vancomycin (VANCOCIN) IVPB  2,500 mg Intravenous Once        PRN:  sodium chloride, dextrose 50%, glucagon (human recombinant), insulin aspart U-100, lorazepam, sodium chloride 0.9%, Pharmacy to dose Vancomycin consult **AND** vancomycin - pharmacy to dose    Antibiotics and Day Number of Therapy:  vanc 9/8-current  Ceftriaxone 9/8-9/9  Cefepime 9/10-current  Doxycycline 9/8-current     Lines and Day Number of Therapy:  PICC   NG  ETT  Salgado    Assessment:     Lopez Savage is a 74 y.o.male with  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    Mitral regurgitation 09/08/2020    CVA (cerebral vascular accident) 09/08/2020    Bacterial endocarditis 09/08/2020    History of gastrointestinal bleeding 09/08/2020    GERD (gastroesophageal reflux disease) 09/08/2020    Embolic stroke involving left posterior cerebral artery 09/08/2020    Embolic stroke involving right posterior cerebral artery 09/08/2020    Thalamic infarct, 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    Unresponsive    who presented after being found down, with confirmed PCA ischemic stroke, currently intubated and sedated pending improvement.      Plan:     Acute hypoxic respiratory failure in the setting of cardiovascular event 2/2 septic emboli   - patient found unresponsive at home before arrival to ED  - non responsive to sternal rub, pupillary and gag reflex present  - CT head negative for acute intracranial bleed  - MRI head showed PCA infarct and right occipital lobe edema   - tPA contraindicated  due to recent GI bleed  - pt currently intubated for airway protection   - PT/OT consulted  - Neuro vascular is consulted  - vent settings this AM: spontaneous PS, peep 5, FiO2 30%  - pt on aspirin 325 mg, atorvastatin 40 mg daily, tight glucose control, BP parameters SBP <220  - ID on board, recommending broadening to Cefepime and flagyl    Staph bacteremia  - 1/2 blood cultures from admission positive for gram positive cocci  - pt on vanc, cefepime, flagyl and doxy      Normocytic Anemia  - H/H on admission 9.1/29.4, MCV 91  - has had extensive workup at [facility] for anemia in past including autoimmune, luekemia and lymphoma which were negative  -  2 units of blood ordered for transfusion yesterday, post CBC not drawn still pending     Seizure-like activity   - after admission when propofol was weans, nursing witnessed seizure like activity from pt, right arm and had jerking  - no previous reports from family  - EEG ordered, still not completed      Intermediate Level Troponin  - Troponin 0.08 on admission  -   - EKG sinus tachycardia w/ left anterior fascicular block   - secondary to demand from CVA      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   - initially treated at [facility] main [facility] and discharged with three week course of IV rocephin  - strepto mutans; 6 week course set to end on 9/14/2020  - SILVANA at [facility] showed aortic valve vegetations  - cefepime, vanc, flagyl and doxy on board  - ID on board, appreciate their recs      Leukocytosis (resolved)   - WBC 19 on admission  - likely 2/2 to endocarditis  - no reported fevers recently, afebrile since admit  - will continue to monitor     COPD, stable  - pt does not require home O2  - home montelukast and fluticasone   - pt currently intubated      H/o GI Bleed  - h/o bleeding diverticuli and gastric ulcers s/p cauterization  - has required blood transfusions  - most recent GI bleed in August  2020   - transfusing 2 units for anemia      HENRY vs CKD  - Cr 1.5 on admission, baseline near 1.2-1.3  - eGFR 31  - creatinine this AM: 1.4      Hyperglycemia  - glucose 189 on admission, 128 on recent POCT  - ordered SSI  - A1c ordered  - will obtain tight glycemia control for neurological protection      Hypokalemia  - K 3.2 on admission  - received IV KCl 40mEq in ED  - K this AM: 2.8, will replete with IV riders      HTN  - holding home BP in setting of possible ischemic stroke  - hypertensive to 180's systolic, has normalized  - Losartan 50mg at home, holding  - maintaining goal of SBP <220     HLD  - atorvastatin 40mg, continuing for secondary stroke prevention      Diet: NPO  PPx: lovenox 40mg     Code Status:      Full Code      Jermaine Tuttle MD  [facility] -[facility]    hospitals Medicine Hospitalist Pager numbers:   hospitals Hospitalist Medicine Team A (Tyra/Ruby): [phone]  hospitals Hospitalist Medicine Team B (Tor/James):  [phone]

## 2020-09-10 NOTE — PLAN OF CARE
Recommendation:   1. Continue current TF regimen of Isosource 1.5 advancing to goal rate of 60 mL/hr while intubated. If propofol to be reinitiated please reconsult for updated TF recs.    2. If pt extubated, consult SLP for swallow eval.   3. RD to follow to monitor nutrition status.      Goals:   Pt TF to be advanced to goal rate by RD follow up, or if extubated pt to recieve diet by RD follow up    Nutrition Goal Status: new  Communication of RD Recs: reviewed with RN      Problem: Nutrition Impairment (Mechanical Ventilation, Invasive)  Goal: Optimal Nutrition Delivery  Outcome: Ongoing, Progressing     Problem: Functional Ability Impaired (Stroke, Ischemic/Transient Ischemic Attack)  Goal: Optimal Functional Ability  Outcome: Ongoing, Progressing

## 2020-09-10 NOTE — PLAN OF CARE
Patient is resting in bed on ventilator. Afebrile overnight. SHANNA orientation, pt does not follow simple commands. Pt received 1 unit of PRBC overnight with no complications. All lines and drains are intact and fluids are infusing without difficulty. Propofol infusing continuously. Soft wrist restraints are in use with no signs of injury and + pulses on both wrists. Pt tube feeding initiated, pt tolerating well. Bed in lowest position. Siderails x4. Call light within reach. Will continue to monitor and report off to oncoming nurse.

## 2020-09-11 PROBLEM — R94.01 ABNORMAL EEG: Status: ACTIVE | Noted: 2020-01-01

## 2020-09-11 NOTE — CONSULTS
[facility]UR[facility] CONSULT  EVALUATION    Reason for consult:  seizure  Informant:  Primary team   Other sources of information : chart review    CC:  Facial Droop (Pt presents to ED via [facility] secondary to unresponsive and right sided facial droop. Per EMS, patient collapsed and family reported right sided facial droop. [facility] also reports patient's family was performing CPR but patient had pulse upon theor arrival. Patient last knwon normal was 45 minutes pta. 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 Of note, patient was recently discharged 2 weeks ago for tx of endocarditis and since then has had worsening cough,with home health doing portable CXR w concerns for fluids/pneumonia and given course of doxy (finished course prior to this admission).     He was witnessed to have an episode in which he fell to the ground, had some jerking and hit the back of his head, and was unresponsive thereafter w no purposeful movements. Upon EMS arrival, he had O2 sat of 70%, BP in 140/70 range, with normal blood glucose.   CTH obtained showed moderate to large scalp hematoma over L temporal and parietal bone, otherwise no bleeding noted. Patient was not tPA candidate due to recent GI bleed, was intubated for airway protection, no seizure activity noted in ED.   Was placed on aspirin 325mg and MRI brain was ordered.     Upon transport to the ICU, patient's propofol was disconnected and patient was then noted on day of admission 9/8 to have rhythmic shaking in R upper and lower extremities and biting on ETT. MRI was pending, EEG was considered. He was evaluated by both medicine and critical care, continued to have jerking movements  in which 2mg ativan was given and propofol was started.     Has also been evaluated by vascular neurology:  Initial CT with no early infarct signs or hemorrhage. MRI with left PCA infarct and left thalamic infarct along with small right PCA infarct.  MRA demonstrates fetal circulation to left PCA.  Two different distributions makes a cardioembolic source most likely.  Given his recent history of endocarditis this is most likely the etiology.  His mentation is most likely due to involvement of thalamus, however would still get EEG to investigate for seizure activity as this may be contributing to his mentation as well.   If there is no seizure activity and his mentation does not improve after leukocytosis resolves, would recommend modafinil to improve mentation.   Recommendations were for aspirin 325mg for daily stroke prevention + atorvastatin 40mg daily.     MRI and MRA with evidence of L PCA acute infract, focus of restricted diffusion within R occipital lobe w adjacent vasogenic edema concerning for septic emboli. Vessel imaging negative for significant stenosis.     Was successfully extubated to RA today, noted to have more frequent extremity movements (R>L), tracking, intermittently following commands, moving extremities although has generalized deconditioning. Upon working with PT again today, reported seizure w bowel incontinence. Was also noted by primary team to have 1 minute rhythmic movement of R arm and torso, w R sided eye deviation (self aborted). And per nursing , wife possibly noted ~2 episodes also concerning for seizures.     EEG noted to have irregular slowing in the R frontal region with L posterior periodic epileptiform discharges.    Neurology consulted for seizure recommendations. Per primary, plan to repeat MRI brain wo contrast to evaluate for new areas of septic emboli or seizure foci.    Also of note, patient's mentation seems to be improving, off all sedation at this time. Propofol was  turned off yesterday afternoon 9/10, and precedex was turned off morning of 9/11.     ROS:   12pt ROS negative other then mentioned above    Histories:     Allergies:  Codeine and Tetanus vaccines and toxoid    Current Medications:    Current Facility-Administered Medications   Medication Dose Route Frequency Provider Last Rate Last Dose    0.9%  NaCl infusion (for blood administration)   Intravenous Q24H PRN Luis Manuel Henry MD        aspirin tablet 325 mg  325 mg Per NG tube Daily Jermaine Tuttle MD   325 mg at 09/11/20 0855    atorvastatin tablet 40 mg  40 mg Per NG tube Daily Jermaine Tuttle MD   40 mg at 09/11/20 0855    cefTRIAXone (ROCEPHIN) 2 g/50 mL D5W IVPB  2 g Intravenous Q24H Cipriano Joseph MD   2 g at 09/11/20 1521    dexmedetomidine (PRECEDEX) 400mcg/100mL 0.9% NaCL infusion  0.2 mcg/kg/hr Intravenous Continuous Jacey Rivers MD   Stopped at 09/11/20 1040    dextrose 50% injection 12.5 g  12.5 g Intravenous PRN Jermaine Tuttle MD        doxycycline (VIBRAMYCIN) 100mg in dextrose 5% 250 mL IVPB (ready to mix)  100 mg Intravenous Q12H Jermaine Tuttle  mL/hr at 09/11/20 1100 100 mg at 09/11/20 1100    enoxaparin injection 40 mg  40 mg Subcutaneous Q24H Jermaine Tuttle MD   40 mg at 09/10/20 1800    fentaNYL injection 50 mcg  50 mcg Intravenous Q15 Min PRN Damari Witt MD   50 mcg at 09/10/20 1215    Followed by    [START ON 9/15/2020] fentaNYL injection 50 mcg  50 mcg Intravenous Q1H PRN Damari Witt MD        glucagon (human recombinant) injection 1 mg  1 mg Intramuscular PRN Jermaine Tuttle MD        insulin aspart U-100 pen 0-5 Units  0-5 Units Subcutaneous Q6H PRN Jermaine Tuttle MD        levETIRAcetam in NaCl (iso-os) IVPB 500 mg  500 mg Intravenous Q12H Carla Caputo MD        lorazepam injection 2 mg  2 mg Intravenous Q1H PRN Damari Witt MD   2 mg at 09/08/20 1818    metronidazole IVPB 500 mg  500 mg Intravenous Q8H Jermaine Tuttle MD  "100 mL/hr at 09/11/20 1421 500 mg at 09/11/20 1421    pantoprazole injection 40 mg  40 mg Intravenous Daily Jermaine Tuttle MD   40 mg at 09/11/20 0901    sodium chloride 0.9% flush 10 mL  10 mL Intravenous PRN Jermaine Tuttle MD        vancomycin (VANCOCIN) 2,250 mg in dextrose 5 % 500 mL IVPB  2,250 mg Intravenous Q24H Rush Mary MD   2,250 mg at 09/11/20 0637    vancomycin - pharmacy to dose   Intravenous pharmacy to manage frequency Shawna Chavez MD             Past Medical/Surgical/Family/Social History:  PMHx: No past medical history on file.   Surgeries: No past surgical history on file.   Family  Hx: No family history on file.   Social Hx:   Social History     Tobacco Use    Smoking status: Not on file   Substance Use Topics    Alcohol use: Not on file    Drug use: Not on file         Current Evaluation:     Vital Signs:   Vitals:    09/11/20 1500   BP: 107/67   Pulse: 71   Resp: (!) 22   Temp:       awakens to voice, does not consistently follow commands  Patient extubated today.  Opens eyes to repeated voice. Will track. Will also turn head when asked but not following other commands.  Did state name "Lopez" and respond with "yes" when name called.  Unable to identify pen.  Appears to have no blink to threat on right and has left gaze preference  ory based on exam findings.  Awaiting repeat MRI.    Exam: discussed with primary and RN   Extubated, off sedation and awakens to voice. Oriented to self and was able to say "yes" without clear evidence of dysarthria (although patient was noted to be mumbling). Intermittently following commands  PERRL, L gaze preference (no nystagmus noted)  Moving extremities spontaneously (minimal) however difficult to assess for focality given deconditioning    LABORATORY STUDIES:  Labs:  Recent Labs   Lab 09/09/20  0513 09/10/20  0916 09/11/20  0449   WBC 9.08 11.62 11.22   HGB 6.2* 8.9* 8.3*   HCT 19.4* 27.5* 26.4*    212 196   MCV 88 88 89 "       Recent Labs   Lab 09/09/20  0513 09/10/20  0407 09/11/20  0449   * 138 137   K 2.8* 4.7 3.7    105 106   CO2 26 19* 24   BUN 21 24* 22   GLU 82 79 101   CALCIUM 8.7 9.5 9.1   PROT 5.6* 6.1 6.4   ALBUMIN 2.1* 2.3* 2.4*   BILITOT 0.2 0.4 0.4   AST 9* 13 9*   ALKPHOS 41* 45* 48*   ALT 6* 7* 5*       Recent Labs   Lab 09/08/20  0140   INR 1.1       Recent Labs   Lab 09/08/20  0140 09/08/20  1808 09/09/20  0513 09/10/20  0407 09/11/20  0449   * 92 82 79 101       Recent Labs   Lab 09/08/20  0140 09/08/20  1808   HGBA1C  --  4.8   LDLCALC 63.0  --        Thyroid:   Recent Labs   Lab 09/08/20  0140   TSH 5.424*   FREET4 0.90       FLP:   Recent Labs   Lab 09/08/20  0140   CHOL 105*   HDL 28*   LDLCALC 63.0   TRIG 70   CHOLHDL 26.7       RADIOLOGY STUDIES: imaging has been personally reviewed    MRI brain without contrast - acute infract in L PCA region in L occipital and L parietal lobes seen on DWI.   Also region of restricted diffusion within R occipital lobe w associated vasogenic edema.  Encephalomalacia noted in region in R frontal lobe  Chronic microvascular changes    MRA H&N no significant stenosis or LOV    OTHER STUDIES/PROCEDURES:  EEG 9/10/20:  irregular slowing in the R frontal region, with L posterior periodic epileptiform discharges. No seizures recorded during this study.       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 CTA negative for significant stenosis, patient placed on aspirin 325mg for 2/2 stroke prevention by vascular neurology. Other 2/2 stroke risk factors - A1C 4.8, LDL  63 (on atorvastatin 40), TSH elevated but with free T4.     Patient consulted to general neurology for seizure recommendations, given the clinical events stated in HPI as well as EEG showing L posterior periodic epileptiform discharges (L PCA stroke as the nidus). EEG also shows irregular slowing in the R hemisphere, likely from vasogenic edema and irritation from septic emboli region. However even without the EEG findings, given the clinical presentation + described seizure activity + MRI findings would still lean more towards treating for seizures. Per vascular neurology, concern that the EEG does not clinically reflect MRI findings, so repeat MRI brain without contrast was ordered in which vascular neurology will plan to followup.     Seizure recommendations:  1. Load Keppra with 3g (typically would load with 60mg/kg, however that dosage is high per pharmacy. No need to adjust loading dose based on creatinine clearance).   2. Continue maintenance Keppra 500mg BID  3. Avoid medications that would lower seizure threshold, such as cefepime or tramadol  4. Continue PT, OT, ST    Of note, patient's mentation seems to be improving. Given severity of his illness as well as multiple findings seen on MRI, it will take time for him to regain function. Recovering also will be prolonged given patient on donepezil so has some degree of dementia (?).     Would also recommend enforcing delirium protocol.     Remainder of care deferred to primary. Please call with further questions.    Case discussed with Dr. Toro    Thank you for your consult.    Electronically signed by: Radha Jean MD 9/11/2020  [facility]

## 2020-09-11 NOTE — PROGRESS NOTES
"Progress Note  [facility]    Attending: Dr. Osorio  Fellow: Dr. Witt  Intern: Dr. Dmaon  Admit Date: 9/8/2020  Today's Date: 09/11/2020      SUBJECTIVE:     Mr. Savage is a 74M w/hx of hypertension, ulcerative colitis, GERD, chronic bronchitis, autoimmune hemolytic anemia, mitral regurgitation, and diverticulitits who presented to [facility] after being found unresponsive on the kitchen floor by his daughter at 1am on 9/8. Per the patient's wife, patient was in a reasonably normal state of health until 7/2020. He complained of progressive fatigue and dry cough for approximately 6 months. Over the past 4-6 weeks, he has been hospitalized multiple times. His most recent admission was on 7/31 at [facility] for severe fatigue and diagnosed with acute diverticulitis. He was discharged on 8/2 with antibiotics. Patient returned on 8/3/20 after blood cultures grew Strep mutans. He was started on ceftriaxone and a PICC line was placed and he was discharged home. He then presented to [facility] on 8/13/20 due to melena and was found to have GI bleed. EGD with cauterization/clipping for acute gastritis. TTE was performed which showed aortic and mitral vegetations both >1cm. SILVANA confirmed finding and patient was transferred to [facility] for CT surg evaluation but deemed too high risk secondary to acute bleed. Patient discharged on home on  8/21/2020 with IV rocephin. ID continued to follow outpatient.      The patient's wife reports last seeing him "normal" (talking, ambulating well with walker) at approximately 10pm prior to her going to bed. The patient decided to stay up to watch television. At approximately 1am, the wife heard the daughter scream after finding the patient on the floor in the kitchen (away from his walker). Unclear if the patient loss a pulse prior to EMS arrival, but daughter did a few chest compressions before EMS arrived. Per daughter, patient with left-sided facial droop " and EMS noted saturations in the 70s     In the ED, the patient was unresponsive to sternal rub. Pupillary and gag reflex present. Patient intubated for airway protection and hypoxemia? No seizure like activity was observed in ED.     Interval Hx: Patient remained on pressure support overnight PS 5 PEEP 5. Pt having more frequent extremity movements, tracks, follows commands intermittently.  Successfully extubated to RA around noon today.    OBJECTIVE:     Vital Signs Trends/Hx Reviewed  Vitals:    09/11/20 0630 09/11/20 0645 09/11/20 0715 09/11/20 0730   BP: (!) 109/53      Pulse: 68 67 (!) 55 66   Resp: (!) 22 (!) 22 (!) 21 (!) 22   Temp:   99.3 °F (37.4 °C)    TempSrc:   Oral    SpO2: 100% 99% 99% 98%   Weight:       Height:           Ventilator settings:  /24/5/35% %  Physical Exam:  Physical Exam  Constitutional:       Appearance: He is ill-appearing.   HENT:      Head: Normocephalic and atraumatic.      Mouth/Throat:      Comments: Gag reflex present  Eyes:      Comments: Pupillary reflex present   Neck:      Musculoskeletal: Normal range of motion and neck supple.   Cardiovascular:      Rate and Rhythm: Normal rate and regular rhythm.      Heart sounds: Murmur present.   Pulmonary:      Effort: Pulmonary effort is normal.      Breath sounds: Normal breath sounds. No wheezing, rhonchi or rales.   Abdominal:      General: Abdomen is flat. Bowel sounds are normal. There is no distension.      Tenderness: There is no abdominal tenderness. There is no guarding.   Musculoskeletal: Normal range of motion.      Right lower leg: No edema.      Left lower leg: No edema.   Skin:     General: Skin is warm and dry.      Findings: No erythema.   Neurological:      Mental Status: He is disoriented.      Comments: Visual response to name, nonverbal         Laboratory:  No results for input(s): PH, PCO2, PO2, HCO3, POCSATURATED, BE in the last 24 hours.  Recent Labs   Lab 09/11/20  0449   WBC 11.22   RBC 2.98*    HGB 8.3*   HCT 26.4*      MCV 89   MCH 27.9   MCHC 31.4*     Recent Labs   Lab 20  0449      K 3.7      CO2 24   BUN 22   CREATININE 1.5*   MG 2.3       Microbiology Data:   Microbiology Results (last 7 days)     Procedure Component Value Units Date/Time    Blood culture #2 **CANNOT BE ORDERED STAT** [[id number]] Collected: 20 0211    Order Status: Completed Specimen: Blood from Peripheral, Forearm, Right Updated: 09/10/20 1812     Blood Culture, Routine No Growth to date      No Growth to date      No Growth to date    Blood culture [[id number]] Collected: 09/10/20 0916    Order Status: Sent Specimen: Blood Updated: 09/10/20 1424    Narrative:      Collection has been rescheduled by [facility] at 09/10/2020 06:10 Reason:   unable to collect 2nd set of blood cultures and lavender top  Collection has been rescheduled by [facility] at 09/10/2020 06:10 Reason:   unable to collect 2nd set of blood cultures and lavender top    Blood culture [[id number]] Collected: 09/10/20 0609    Order Status: Sent Specimen: Blood from Antecubital, Left Updated: 09/10/20 1118    Culture, Respiratory with Gram Stain [[id number]] Collected: 20 1425    Order Status: Completed Specimen: Respiratory from Endotracheal Aspirate Updated: 09/10/20 0848     Respiratory Culture Normal respiratory tyree     Gram Stain (Respiratory) <10 epithelial cells per low power field.     Gram Stain (Respiratory) Rare WBC's     Gram Stain (Respiratory) No organisms seen    Blood culture #1 **CANNOT BE ORDERED STAT** [[id number]] Collected: 20 0140    Order Status: Completed Specimen: Blood from Peripheral, Hand, Right Updated: 09/10/20 0338     Blood Culture, Routine Gram stain aer bottle: Gram positive cocci in clusters resembling Staph      Results called to and read back by: Dashawn Luciano RN  09/10/2020        03:37         Micro:   Bcx coag neg staph   resp cx negative    Imagin/8 CT head  Broad 1 cm thick scalp  hematoma over the left temporoparietal region.  No underlying fracture or intracranial abnormality.     9/8 CXR x2  1) mild prominence of the interstitium and no acute findings.  2) correct ETT placement    9/8 MRI/MRA Brain  MRI/MRA:   1. Left PCA distribution acute infarction.  2. Focus of restricted diffusion within the right occipital lobe with adjacent vasogenic edema, concerning for underlying lesion or septic embolus.  Further evaluation with contrast-enhanced MRI brain is recommended.  3. MRA head: No evidence of occlusion or high-grade stenosis of the major intracranial arteries.  4. MRA neck: No evidence of occlusion or high-grade stenosis of the major arteries of the neck.  5. Moderate bilateral pleural effusions.    9/8 TTE  · Mild left ventricular enlargement.  · Moderate mitral regurgitation.  · Intermediate central venous pressure (8 mmHg).  · The estimated PA systolic pressure is 28 mmHg.  · Low normal left ventricular systolic function. The estimated ejection fraction is 50%.  · Normal LV diastolic function.  · Normal right ventricular systolic function.  · Mild aortic regurgitation.    9/9 CXR  Worsened bilateral opacities -   Pulmonary vascular congestion with bilateral parahilar and basilar pulmonary opacities.  Findings may be related to mild to moderate pulmonary edema although bilateral pneumonia cannot be excluded.      9/10 EEG  IMPRESSION: This is an abnormal EEG during lethargic state.     Diffuse disorganized slowing of the background was noted.  Nearly   continuous right frontal central parietal slowing was noted left   posterior maximum pseudo periodic 1-2 hertz epileptiform   discharges were seen.  -The overall degree of slowing disorganization is suggestive of a mild-to-moderate degree of encephalopathy nonspecific to the cause. The presence of asymmetric irregular slowing in the right hemisphere is likely secondary to a structural abnormality and may be associated with cerebral  edema.  The presence of left posterior pseudo periodic epileptiform discharges is suggestive of an irritative region likely secondary to focal ischemia.  There is increased risk of focal seizures from this region.  During this study no seizures were recorded       Infusions:     dexmedetomidine (PRECEDEX) infusion 0.3 mcg/kg/hr (09/11/20 0721)    propofoL Stopped (09/10/20 1539)       Scheduled Medications:    aspirin  325 mg Per NG tube Daily    atorvastatin  40 mg Per NG tube Daily    ceFEPime (MAXIPIME) IVPB  2 g Intravenous Q24H    doxycycline (VIBRAMYCIN) IVPB  100 mg Intravenous Q12H    enoxaparin  40 mg Subcutaneous Q24H    metronidazole  500 mg Intravenous Q8H    pantoprazole  40 mg Intravenous Daily    vancomycin (VANCOCIN) IVPB  2,250 mg Intravenous Q24H       PRN Medications:   sodium chloride, dextrose 50%, fentaNYL **FOLLOWED BY** [START ON 9/15/2020] fentaNYL, glucagon (human recombinant), insulin aspart U-100, lorazepam, sodium chloride 0.9%, Pharmacy to dose Vancomycin consult **AND** vancomycin - pharmacy to dose    Problem List:   Patient Active Problem List   Diagnosis    Loss of consciousness    Essential hypertension    Ulcerative colitis    Chronic bronchitis    Autoimmune hemolytic anemia    Mitral regurgitation    CVA (cerebral vascular accident)    Bacterial endocarditis    History of gastrointestinal bleeding    GERD (gastroesophageal reflux disease)    Embolic stroke involving left posterior cerebral artery    Unresponsive    Embolic stroke involving right posterior cerebral artery    Thalamic infarct, acute       ASSESSMENT & RECOMMENDATIONS     Mr. Savage is a 74yoM with PMHx of recent mitral and aortic valve endocarditis, HTN, UC, GERD, chronic bronchitis, autoimmune hemolytic anemia, mitral regurgitation, and diverticulitits who presents with embolic CVA.    Neuro   Embolic CVA  -initially found down by daughter in middle of night. In ED non responsive to  sternal rub, pupillary and gag reflex present.  -9/8 CT head - L temporoparietal hematoma  -MRI revealed left PCA distribution acute infarction and focus of restricted diffusion within the right occipital lobe with adjacent vasogenic edema, concerning for underlying lesion or septic embolus.  -tPA contraindicated due to recent GI bleed  -continue ASA, statin, glycemic control  -permissive HTN, goal SBP<220  -neurovascular consulting    Seizure like activity  -after admission when propofol was weaned, nursing witnessed seizure like activity from pt, right arm and had jerking  -9/10 EEG revealed nearly continuous right frontal central parietal slowing was noted left posterior maximum pseudo periodic 1-2 hertz epileptiform   discharges were seen.   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 SILVANA confirmed finding and patient was transferred to [facility] surg evaluation but deemed too high risk secondary to acute bleed. Patient discharged on home on  8/21/2020 with IV rocephin.  -no pressor support required currently  -repeat TTE on 9/8 did not show evidence of vegetations     Elevated troponin  -likely 2/2 demand from CVA  -EKG sinus tachycardia w/ left anterior fascicular block   -trending down 0.082-->0.160-->0.147, d/c trending    HTN  -permissive HTN in setting of CVA, goal SBP <220    Resp  AHRF 2/2 aspiration vs endocarditis MR vs DAH  -resp cx negative  -CXR 9/9 compared to 9/9 revealed worsened terrie opacities  -continue doxy, cefepime added 9/10  -9/11 extubated to RA, off sedation    Renal  HENRY on CKD3a  -Cr 1.5-->1.4-->1.5  -baseline near 1.2-1.3    GI/FEN  Hx GI bleeds 2/2 diverticulitis and gastritis  -On 7/31 pt was hospitalized  at [facility] for severe fatigue and diagnosed with acute diverticulitis. He was discharged on 8/2 with antibiotics. Patient returned on 8/3/20 after blood cultures grew Strep mutans. He was started on ceftriaxone and a PICC line was placed and he was discharged home. He then presented to [facility] on 8/13/20 due to melena and was found to have GI bleed. EGD with cauterization/clipping for acute gastritis.   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 SILVANA confirmed finding and [facility] but deemed too high risk secondary to acute bleed. Patient discharged on home on 8/21/2020 with IV rocephin.  -9/8 Bcx G+ cocci resembling staph  -9/11 PICC line pulled  -completed doxy regimen, cont vanc/cefepime/flagyl  -refer to ID recs for further management    Endocrine  -glucose wnl, SSI and accuchecks    Feeding: NPO pending speech eval  Analgesia: none  Sedation: none  Thrombo Ppx: lovenox  Head Up: 30 degrees  Ulcer Ppx: PPI  Glycemic Control: SSI  SAT/SBT: n/a  Bowel Regimen: none  Indwelling Catheter Removal: R PICC, garcia  De-escalation of abxs: d/c doxy, cont vanc/cef/flagyl    Dispo: patient successfully extubated. Reported seizure with bowel incontince. Rec neuro consult and start Keppra    Thank you for allowing us to participate in the care of this patient. Please call with questions.    Pedro Damon, DO  [facility]  [facility]

## 2020-09-11 NOTE — PROGRESS NOTES
Patient wife called and she was given full status update on her . She sounds like she is in good spirits. She verbalized that she will come back in the am to visit with him again.

## 2020-09-11 NOTE — PT/OT/SLP PROGRESS
Occupational Therapy   Treatment    Name: Lopez Savage  MRN: [id number]  Admitting Diagnosis:  CVA (cerebral vascular accident)       Recommendations:     Discharge Recommendations: (TBD, possibly IPR pending progress)  Discharge Equipment Recommendations:  (TBD pending progress)  Barriers to discharge:  (medical status)    Assessment:     Lopez Savage is a 74 y.o. male with a medical diagnosis of CVA (cerebral vascular accident).  He presents with decreased level of awareness and seizure-like activty. Performance deficits affecting function are weakness, impaired endurance, impaired sensation, impaired self care skills, impaired functional mobilty, decreased coordination, impaired cognition, impaired balance, gait instability, visual deficits, decreased safety awareness, decreased lower extremity function, decreased upper extremity function, decreased ROM, impaired coordination, impaired fine motor, abnormal tone, impaired cardiopulmonary response to activity.     Rehab Prognosis:  Good; patient would benefit from acute skilled OT services to address these deficits and reach maximum level of function.       Plan:     Patient to be seen 5 x/week to address the above listed problems via self-care/home management, therapeutic activities, therapeutic exercises, neuromuscular re-education  · Plan of Care Expires: 10/10/20  · Plan of Care Reviewed with: patient, spouse    Subjective     Pain/Comfort:       Objective:     Communicated with: nsg prior to session.  Patient found HOB elevated with SCD, telemetry, peripheral IV, pulse ox (continuous), blood pressure cuff(midline catheter LUE) upon OT entry to room.    General Precautions: Standard, fall(possibly seizure precautions)   Orthopedic Precautions:N/A   Braces:       Occupational Performance:     Bed Mobility:    · Patient completed Rolling/Turning to Left with  total assistance and 2 persons  · Patient completed Scooting/Bridging with total assistance and 2  "persons  · Patient completed Supine to Sit with total assistance and 2 persons  · Patient completed Sit to Supine with dependent     Functional Mobility/Transfers:  · Functional Mobility: Pt with poor static seated balance but able to tolerate seated EOB ~20 minutes with increased awareness noted at end of trial with pt stating, "Why don't we try it." (when asked about swabbing his teeth) but once provided with tooth swab, pt with full body extension, unable to respond to verbal cues, absent gaze, and then began full body shaking, loss of bowels noted.    Activities of Daily Living:  · Grooming: total assistance to swab teeth seated EOB but pt attempted to hold swab and able to maintain in hand but limited by seizure like activity; Max physical cues to open mouth with possible resistance to task but improved after increased trials, possible 2/2 pain as pt with small open sore inner L lower lip      AMPAC 6 Click ADL:  7    Treatment & Education:  Pt educated on role of OT and POC.   Pt performing skills as listed above.   Pt able to sit EOB ~20 minutes and following one step commands occasionally. Pt with increased speech, attentiveness to speakers/faces and commands while seated EOB but pt then experienced seizure-like activity ~45 seconds with pt suddenly inattentive, extended posture and tremulous/shaking throughout body. Pt returned to sidelying and pericare provided 2/2 emptying of bowels during seizure like activity. Nsg notified and aware    Patient left HOB elevated with all lines intact, call button in reach, nsg notified and nsg and spouse presentEducation:      GOALS:   Multidisciplinary Problems     Occupational Therapy Goals        Problem: Occupational Therapy Goal    Goal Priority Disciplines Outcome Interventions   Occupational Therapy Goal     OT, PT/OT Ongoing, Progressing    Description: Goals to be met by: 10/10/2020      Patient will increase functional independence with ADLs by " performing:    Pt will demonstrate 3/5 strength BUE shoulder flexion with no c/o pain.   Pt will sit EOB min A 5-7 minutes to complete self care skills.   UE Dressing with Minimal Assistance.  LE Dressing with Moderate Assistance.  Grooming while seated or standing with Minimal Assistance.  Toileting from bedside commode with Moderate Assistance for hygiene and clothing management.   Toilet transfer to bedside commode with Moderate Assistance.  Increased functional strength to WFL for self care.  Upper extremity exercise program x8 reps per handout, with assistance as needed.                      Time Tracking:     OT Date of Treatment: 09/11/20  OT Start Time: 1238  OT Stop Time: 1317  OT Total Time (min): 39 min    Billable Minutes:Self Care/Home Management 13  Therapeutic Activity 10    Carolyne Badillo OT  9/12/2020

## 2020-09-11 NOTE — ASSESSMENT & PLAN NOTE
-Seizure like activity on day of admit and again seen today.  -EEG shows slowing within the right hemisphere and left posterior pseudo periodic epileptiform discharges.    -Prior MRI did not show any major regions of ischemia on the right.  This may be due to new areas of infarct not previously seen  -Recommend repeat MRI  -Recommend keppra 500mg Q12 hours with no loading dose due to renal dysfunction

## 2020-09-11 NOTE — PROGRESS NOTES
Patient seen and examined by me. I agree with the trainee's separate note except as modified.     Major overnight events: None reported      Vitals: Afebrile, HR 60s, MAP 70s, 100% on 30% pressure support      Significant lab findings: Stable hemoglobin and creatinine. No new + cxs     EEG:    -This is an abnormal EEG during lethargic state.  Diffuse disorganized slowing of the background was noted.  Nearly continuous right frontal central parietal slowing was noted left posterior maximum pseudo periodic 1-2 hertz epileptiform discharges were seen.   -The overall degree of slowing disorganization is suggestive of a mild-to-moderate degree of encephalopathy nonspecific to the cause. The presence of asymmetric irregular slowing in the right hemisphere is likely secondary to a structural abnormality and may be associated with cerebral edema.  The presence of left posterior pseudo periodic epileptiform discharges is suggestive of an irritative region likely secondary to focal ischemia.  There is increased risk of focal seizures from this region.  During this study no seizures were recorded     Key exam findings: awakens to voice, does not consistently follow commands  Ventilator:               Intubation day #: 4              Settings: pressure support 5/5  SAT: conducting now  SBT: on spontaneous mode   RASS: -1   Sedation: precedex  Vasopressors: none  Other drips: none  Lines and invasive devices: reviewed   Fluid Balance: 24 hr: -420c   Overall: +597cc  Creatinine: 1.5  Antibiotics/Day #: doxy (final day), vanco (2) , cefepime (2) , flagyl (3)  Nutrition: TFs  Glucose management: 80-120s BG  Prophylaxis:               DVT: enoxaparin              GI: PPI  PT/OT: not yet appropriate   GOC/Family discussion: Full code. Discussed with wife at bedside today     Recommendations:   -since EEG findings do not correlate anatomically with MRI, might consider repeat MRI as there possibly could be new area of emboli  -discuss  with neurology role of AEDs given possible seizure focus on EEG  -as the day has progressed, he was progressively more alert and doing very well on pressure support, so the decision was made for extubation.     Critical care time (35min) spent personally by me on the following activities: development of treatment plan with patient or surrogate and bedside caregivers, discussions with consultants, evaluation of patient's response to treatment, examination of patient, ordering and performing treatments and interventions, ordering and review of laboratory studies, ordering and review of radiographic studies, pulse oximetry, re-evaluation of patient's condition. This critical care time did not overlap with that of any other provider or involve time for any procedures.

## 2020-09-11 NOTE — CARE UPDATE
This note also relates to the following rows which could not be included:  Oxygen Concentration (%) - Cannot attach notes to unvalidated device data  SpO2 - Cannot attach notes to unvalidated device data  Pulse - Cannot attach notes to unvalidated device data  Resp - Cannot attach notes to unvalidated device data  Ventilation Type - Cannot attach notes to unvalidated device data  Vent Mode - Cannot attach notes to unvalidated device data  PEEP/CPAP - Cannot attach notes to unvalidated device data  Pressure Support - Cannot attach notes to unvalidated device data  Insp Rise Time  - Cannot attach notes to unvalidated device data  Peak Airway Pressure - Cannot attach notes to unvalidated device data  Mean Airway Pressure - Cannot attach notes to unvalidated device data  Plateau Pressure - Cannot attach notes to unvalidated device data  Exhaled Vt - Cannot attach notes to unvalidated device data  Total Ve - Cannot attach notes to unvalidated device data  I:E Ratio Measured - Cannot attach notes to unvalidated device data  Inspired Tidal Volume (VTI) - Cannot attach notes to unvalidated device data  Ve High Alarm - Cannot attach notes to unvalidated device data  Resp Rate High Alarm - Cannot attach notes to unvalidated device data  Apnea Rate - Cannot attach notes to unvalidated device data  Apnea Volume (mL) - Cannot attach notes to unvalidated device data  Apnea Oxygen Concentration  - Cannot attach notes to unvalidated device data  Apnea Flow Rate (L/min) - Cannot attach notes to unvalidated device data  T Apnea - Cannot attach notes to unvalidated device data    Pt. Received on vent with settings on the flow sheet. Pt. Sleeping in no apparent distress.  Vent check ok, alarms working and are audible. Vent volumes ok.

## 2020-09-11 NOTE — PROGRESS NOTES
"[facility]  Vascular Neurology  Comprehensive Stroke Center  Telestroke Progress Note    Assessment/Plan:     Embolic stroke involving left posterior cerebral artery  75 y/o male with HTN, recent endocarditis (8/2020), recent GIB (8/2020), diverticulitis, GERD presented with unresponsiveness, hypoxia requiring intubation, and seizure like activity.  Patient continues with altered mental status, does not open eyes, does not follow commands.  Spontaneous movement on left; weak withdrawal on right. Initial CT with no early infarct signs or hemorrhage. MRI with left PCA infarct and left thalamic infarct along with small right PCA infarct.  MRA demonstrates fetal circulation to left PCA.  Two different distributions makes a cardioembolic source most likely. Repeat Echo shows EF of 50%, no wall motion abnormality, no left atrial enlargement, no thrombus.  Mild aortic regurgitation, moderate mitral valve regurgitation.  Etiology most likely septic emboli in setting of endocarditis.  He is high risk for repeat events.  Has been receiving treatment since previous admission.    9/11: Patient extubated today.  Opens eyes to repeated voice. Will track. Will also turn head when asked but not following other commands.  Did state name "Lopez" and respond with "yes" when name called.  Unable to identify pen.  Appears to have no blink to threat on right and has left gaze preference.  Moves all extremities with left > right.  Concern for new areas of infarct in left MCA territory based on exam findings.  Awaiting repeat MRI.    Recommendations:    Antithrombotics for secondary stroke prevention: Antiplatelets: Aspirin: 325 mg daily    Statins for secondary stroke prevention and hyperlipidemia, if present:   Statins: Atorvastatin- 40 mg daily    Aggressive risk factor modification: HTN, Endocarditis     Rehab efforts: The patient has been evaluated by a stroke team provider and the therapy needs have been fully considered " based off the presenting complaints and exam findings. The following therapy evaluations are needed: PT evaluate and treat, OT evaluate and treat, SLP evaluate and treat     Diagnostics ordered/pending: TTE to assess cardiac function/status , Other: EEG    VTE prophylaxis: Enoxaparin 40 mg SQ every 24 hours    BP parameters: Infarct: No intervention, SBP <160 - no longer in acute window.  Can move towards normotension. Patient at goal without medication.      -Continue hourly neuro exams to monitor for new events.  -Stroke team to follow MRI results.  Continue current regiment.    Abnormal EEG  -Seizure like activity on day of admit and again seen today.  -EEG shows slowing within the right hemisphere and left posterior pseudo periodic epileptiform discharges.    -Prior MRI did not show any major regions of ischemia on the right.  This may be due to new areas of infarct not previously seen  -Recommend repeat MRI  -Recommend keppra 500mg Q12 hours with no loading dose due to renal dysfunction  -Recommend general neurology input      Thalamic infarct, acute  See above      Embolic stroke involving right posterior cerebral artery  See above      Bacterial endocarditis  Recent admission and treatment  -Stroke Risk Factor  -Recommend repeat Echo   -Last SILVANA 8/17/2020  · An 18 mm mobile vegetation is present of the both the mitral and aortic valves.  · Severe mitral regurgitation with anteriorly oriented jet due to P2 scallop flail.  · Mild aortic regurgitation.  · Normal left ventricular systolic function. The estimated ejection fraction is 65%.  · Normal right ventricular systolic function.      Essential hypertension  -Stroke Risk Factor  -Goal SBP <160 - now past acute stroke phase.  -Currently at goal with no medications                STROKE DOCUMENTATION   Acute Stroke Times   Symptom Onset Date: 09/08/20  Symptom Onset Time: 0030  Stroke Team Arrival Time: 0130  CT Interpretation Time: 0131    NIH Scale:  1a.  Level of Consciousness: 1-->Not alert, but arousable by minor stimulation to obey, answer, or respond  1b. LOC Questions: 2-->Answers neither question correctly  1c. LOC Commands: 1-->Performs one task correctly  2. Best Gaze: 2-->Forced deviation, or total gaze paresis not overcome by the oculocephalic maneuver  3. Visual: 2-->Complete hemianopia  4. Facial Palsy: 0-->Normal symmetrical movements  5a. Motor Arm, Left: 1-->Drift, limb holds 90 (or 45) degrees, but drifts down before full 10 seconds, does not hit bed or other support  5b. Motor Arm, Right: 2-->Some effort against gravity, limb cannot get to or maintain (if cued) 90 (or 45) degrees, drifts down to bed, but has some effort against gravity  6a. Motor Leg, Left: 3-->No effort against gravity, leg falls to bed immediately  6b. Motor Leg, Right: 3-->No effort against gravity, leg falls to bed immediately  7. Limb Ataxia: 0-->Absent  8. Sensory: 0-->Normal, no sensory loss  9. Best Language: 2-->Severe aphasia, all communication is through fragmentary expression, great need for inference, questioning, and guessing by the listener. Range of information that can be exchanged is limited, listener carries burden of. . . (see row details)  10. Dysarthria: 1-->Mild-to-moderate dysarthria, patient slurs at least some words and, at worst, can be understood with some difficulty  11. Extinction and Inattention (formerly Neglect): 0-->No abnormality  Total (NIH Stroke Scale): 20       Modified Geneva Score: 0  Roni Coma Scale:    ABCD2 Score:    [id number] Score:   HAS -BLED Score:   ICH Score:   Hunt & Newman Classification:      Hemorrhagic change of an Ischemic Stroke: Does this patient have an ischemic stroke with hemorrhagic changes? No     Neurologic Chief Complaint: Unresponsive    Subjective:     Interval History: Patient is seen for follow-up neurological assessment and treatment recommendations: EEG completed with abnormal results.  Seizure activity noted  today with PT - started on Keppra.  Extubated around noon.  Exam findings concerning for new stroke - Repeat MRI pending.      HPI, Past Medical, Family, and Social History remains the same as documented in the initial encounter.     Review of Systems   Constitutional: Negative for fever.   Gastrointestinal: Negative for diarrhea and vomiting.   Neurological: Positive for speech difficulty and weakness.   Psychiatric/Behavioral: Negative for agitation.     Scheduled Meds:   aspirin  325 mg Per NG tube Daily    atorvastatin  40 mg Per NG tube Daily    cefTRIAXone (ROCEPHIN) IVPB  2 g Intravenous Q24H    doxycycline (VIBRAMYCIN) IVPB  100 mg Intravenous Q12H    enoxaparin  40 mg Subcutaneous Q24H    levetiracetam IVPB  500 mg Intravenous Q12H    metronidazole  500 mg Intravenous Q8H    pantoprazole  40 mg Intravenous Daily    vancomycin (VANCOCIN) IVPB  2,250 mg Intravenous Q24H     Continuous Infusions:   dexmedetomidine (PRECEDEX) infusion Stopped (09/11/20 1040)     PRN Meds:sodium chloride, dextrose 50%, fentaNYL **FOLLOWED BY** [START ON 9/15/2020] fentaNYL, glucagon (human recombinant), insulin aspart U-100, lorazepam, sodium chloride 0.9%, Pharmacy to dose Vancomycin consult **AND** vancomycin - pharmacy to dose    Objective:     Vital Signs (Most Recent):  Temp: 98.2 °F (36.8 °C) (09/11/20 1515)  Pulse: 71 (09/11/20 1515)  Resp: (!) 22 (09/11/20 1515)  BP: 107/67 (09/11/20 1500)  SpO2: 99 % (09/11/20 1515)  BP Location: Left arm    Vital Signs Range (Last 24H):  Temp:  [97.8 °F (36.6 °C)-99.3 °F (37.4 °C)]   Pulse:  [54-81]   Resp:  [15-36]   BP: ()/(46-71)   SpO2:  [90 %-100 %]   BP Location: Left arm    Physical Exam  Constitutional:       General: He is not in acute distress.  Eyes:      Pupils: Pupils are equal, round, and reactive to light.   Pulmonary:      Effort: Pulmonary effort is normal. No respiratory distress.         Neurological Exam:   LOC: Opens eyes to repeated voice. Was able  to turn head to look at wife.  Language: Verbalizes only a few words.  Was able to state first name on command.  Was not able to identfy pen.  Visual Fields: Blinks to threat on left; no blink to threat on right  EOM (CN III, IV, VI): Gaze preference  Left - will track from left to right but eyes appear unable to cross midline  Pupils (CN II, III): PERRL  Motor: Full antigravity on LUE; partial antigravity on RUE.  Spontaneous movement to bilaterally lower extremities with left > right  Sensation: Withdraws to painful stimuli in all 4 extremities    Laboratory:  CMP:   Recent Labs   Lab 09/11/20 0449   CALCIUM 9.1   ALBUMIN 2.4*   PROT 6.4      K 3.7   CO2 24      BUN 22   CREATININE 1.5*   ALKPHOS 48*   ALT 5*   AST 9*   BILITOT 0.4     BMP:   Recent Labs   Lab 09/11/20 0449      K 3.7      CO2 24   BUN 22   CREATININE 1.5*   CALCIUM 9.1     CBC:   Recent Labs   Lab 09/11/20 0449   WBC 11.22   RBC 2.98*   HGB 8.3*   HCT 26.4*      MCV 89   MCH 27.9   MCHC 31.4*     Lipid Panel:   Recent Labs   Lab 09/08/20  0140   CHOL 105*   LDLCALC 63.0   HDL 28*   TRIG 70     Coagulation:   Recent Labs   Lab 09/08/20  0140   INR 1.1     Hgb A1C:   Recent Labs   Lab 09/08/20  1808   HGBA1C 4.8     TSH:   Recent Labs   Lab 09/08/20  0140   TSH 5.424*       Diagnostic Results     Brain Imaging  9/8/2020 CT Head  No early infarct signs.  No hemorrhage     9/8/2020 MRI Brain  Diffusion restriction with the left PCA territory and left thalamus.  Small diffusion restriction in right occipital lobe. No hemorrahge.       Vessel Imaging  9/8/2020 MRA brain and neck  Left PCA fetal circulation.  No high grade stenosis or occlusion  Cardiac Imaging   9/8/2020 TTE  · Mild left ventricular enlargement.  · Moderate mitral regurgitation.  · Intermediate central venous pressure (8 mmHg).  · The estimated PA systolic pressure is 28 mmHg.  · Low normal left ventricular systolic function. The estimated ejection  fraction is 50%.  · Normal LV diastolic function.  · Normal right ventricular systolic function.  · Mild aortic regurgitation.        IP Unit  Spoke hospital nurse at bedside with patient assisting consultant.     Patient information was obtained from spouse/SO.     The attending portion of this evaluation, treatment, and documentation was performed per Lesley Mazariegos NP via audiovisual.    Billing code:  (IN [place] INPT CONSULT 25MIN)    Consult End Time: 4:47 PM         Lesley Mazariegos NP  [facility]  Department of Vascular Neurology   [facility]  [phone]

## 2020-09-11 NOTE — PROGRESS NOTES
Patient wife called, I was with her  at the time of the call. I informed the Nurse to tell her that I will return her call.

## 2020-09-11 NOTE — PT/OT/SLP EVAL
Physical Therapy Evaluation/Treatment    Patient Name:  Lopez Savage   MRN:  [id number]    Recommendations:     Discharge Recommendations:  (TBD-possibly IPR pending progress)   Discharge Equipment Recommendations: (TBD pending progress)   Barriers to discharge: significant decline in functional/medical status; increased physical assistance required    Assessment:     Lopez Savage is a 74 y.o. male admitted with a medical diagnosis of CVA (cerebral vascular accident).  He presents with the following impairments/functional limitations:  weakness, impaired endurance, impaired self care skills, impaired functional mobilty, gait instability, impaired balance, impaired cognition, decreased coordination, decreased upper extremity function, decreased lower extremity function, decreased safety awareness, visual deficits, abnormal tone, decreased ROM, impaired fine motor, impaired coordination, impaired cardiopulmonary response to activity Patient lethargic, recently taken off sedation; able to follow some motor commands; spontaneously moving all 4 extremities R side>L side; purposely moved R hand to face to try to remove ETT from mouth; reached out to PT to shake hands when introduced self to pt. NOTE: Seizure like activity x 2 with whole body tremors, unfocused gaze and unable to follow commands; lasted for 30-45 seconds; nurse present for second episode.    Rehab Prognosis: Good; patient would benefit from acute skilled PT services to address these deficits and reach maximum level of function.    Recent Surgery: * No surgery found *      Plan:     During this hospitalization, patient to be seen 6 x/week to address the identified rehab impairments via gait training, therapeutic activities, therapeutic exercises, neuromuscular re-education(as appropriate) and progress toward the following goals:    · Plan of Care Expires:  10/10/20    Subjective     Chief Complaint: Pt grimaced with UE shld fl past ~0-90* but otherwise  "tolerated well  Patient/Family Comments/goals: to return to PLOF  Pain/Comfort:  · Pain Rating 1: (pt unable to state pain level but no overt s/s of pain as grimacing/crying throughout session)    Patients cultural, spiritual, Shinto conflicts given the current situation: no    Living Environment:  Pt lives with spouse and adult daughter in [facility], 0STE, tub/sh with suction grab bar  Previous level of function: Indep ADLs and functional mobility though spouse reports that pt has been less mobile/active since recent hospitalizations  Roles and Routines: Caretaker to self and home. Pt able t participate in light meal prep and cleaning. Daughter completes grocery shopping. Spouse reports pt enjoys the [employer] and "dabbles in woodworking".   Equipment Used at Home:  walker, rolling, shower chair(suction GB in shower)  Assistance upon Discharge: Family    Objective:     Communicated with nurse prior to session.  Patient found with pressure relief boots, restraints, PICC line, blood pressure cuff, pulse ox (continuous), telemetry, SCD, garcia catheter  upon PT entry to room.    General Precautions: Standard, fall   Orthopedic Precautions:N/A   Braces: N/A     Exams:  · Cognitive Exam: Pt able to turn toward speaker calling his name and responds to one step commands inconsistently; easily distracted; moans with some attempts to speak though intubated  · Postural examination/scapula alignment:        - Rounded shoulders        · Gross Motor Coordination:  Impaired, sluggish; moves RLE >LLE  · Skin Integrity: Visible skin intact  · Sensation: pt withdraws all 4 extremities to pinching  · RLE ROM: passively WFL  · RLE Strength: 2+ to 3- grossly  · LLE ROM: passively WFL  · LLE Strength: 2 to 2+ grossly    Functional Mobility:  · Bed Mobility: Patient completed Rolling/Turning to Right with total assistance  · Patient completed Scooting/Bridging with dependent x2 with draw sheet    Therapeutic Activities and " Exercises:   Pt's wife educated on role of PT/POC; pt performed skills as described above; performed BLE AA/PROM to BLEs 10-12 reps in all ranges of available motion; PT/OT educated pt's wife on rehab and current POC; wife verbalized understanding.    AM-PAC 6 CLICK MOBILITY  Total Score:6     Patient left right sidelying with all lines intact, call button in reach and nurse present.    GOALS:   Multidisciplinary Problems     Physical Therapy Goals        Problem: Physical Therapy Goal    Goal Priority Disciplines Outcome Goal Variances Interventions   Physical Therapy Goal     PT, PT/OT Ongoing, Progressing     Description: Goals to be met by: 10/10/20     Patient will increase functional independence with mobility by performin. Supine to sit with Moderate Assistance  2. Sit to supine with Moderate Assistance  3. Rolling with Moderate Assistance.  4. Sit to stand transfer with Moderate Assistance using Rolling Walker  5. Bed to chair transfer with Moderate Assistance using Rolling Walker  6. Gait  x 5-10 feet with Moderate Assistance using Rolling Walker.   7. Increased functional strength to 4/5 for ADLs/amb.  8. Lower extremity exercise program x10 reps with independence                     History:     No past medical history on file.    No past surgical history on file.    Time Tracking:     PT Received On: 09/10/20  PT Start Time: 1111     PT Stop Time: 1200  PT Total Time (min): 49 min with OT    Billable Minutes: Evaluation 10 minutes and Therapeutic Exercise 15 reps      Mary Prajapati, PT  09/10/2020

## 2020-09-11 NOTE — ASSESSMENT & PLAN NOTE
"73 y/o male with HTN, recent endocarditis (8/2020), recent GIB (8/2020), diverticulitis, GERD presented with unresponsiveness, hypoxia requiring intubation, and seizure like activity.  Patient continues with altered mental status, does not open eyes, does not follow commands.  Spontaneous movement on left; weak withdrawal on right. Initial CT with no early infarct signs or hemorrhage. MRI with left PCA infarct and left thalamic infarct along with small right PCA infarct.  MRA demonstrates fetal circulation to left PCA.  Two different distributions makes a cardioembolic source most likely. Repeat Echo shows EF of 50%, no wall motion abnormality, no left atrial enlargement, no thrombus.  Mild aortic regurgitation, moderate mitral valve regurgitation.  Etiology most likely septic emboli in setting of endocarditis.  He is high risk for repeat events.  Has been receiving treatment since previous admission.    9/11: Patient extubated today.  Opens eyes to repeated voice. Will track. Will also turn head when asked but not following other commands.  Did state name "Lopez" and respond with "yes" when name called.  Unable to identify pen.  Appears to have no blink to threat on right and has left gaze preference.  Moves all extremities with left > right.  Concern for new areas of infarct in left MCA territory based on exam findings.  Awaiting repeat MRI.    Recommendations:    Antithrombotics for secondary stroke prevention: Antiplatelets: Aspirin: 325 mg daily    Statins for secondary stroke prevention and hyperlipidemia, if present:   Statins: Atorvastatin- 40 mg daily    Aggressive risk factor modification: HTN, Endocarditis     Rehab efforts: The patient has been evaluated by a stroke team provider and the therapy needs have been fully considered based off the presenting complaints and exam findings. The following therapy evaluations are needed: PT evaluate and treat, OT evaluate and treat, SLP evaluate and treat "     Diagnostics ordered/pending: TTE to assess cardiac function/status , Other: EEG    VTE prophylaxis: Enoxaparin 40 mg SQ every 24 hours    BP parameters: Infarct: No intervention, SBP <160 - no longer in acute window.  Can move towards normotension. Patient at goal without medication.      -Continue hourly neuro exams to monitor for new events.  -Stroke team to follow MRI results.  Continue current regiment.

## 2020-09-11 NOTE — PROGRESS NOTES
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 Continue Rocephin until the original end date of 9/15.   - Continue to follow up cultures     Pneumonia  - Last day of Doxycycline for atypical CAP coverage to complete 7 day course (end date 9/11)  - Continue Flagyl for now for anaerobic coverage for total of 7 days. (end date 9/15)    Bacteremia  - Gram positive cocci in clusters in 1/2 bottles on 9/8  - Agree with Vancomycin until speciation. Suspect contaminant.   - Follow up repeat blood cultures     Cipriano Joseph MD  Internal/Emergency Medicine, PGY-III     Thanks! Please call for any questions [phone]. We will continue to follow.     H&P:  Mr. 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       In brief, patient has BCx drawn on 7/31 during a hospital admission for acute diverticulitis. Patient was contacted on 8/5 to return to the hospital for strep mutans bacteremia. Patient was started on Ceftriaxone and a PICC was placed for outpatient antibiotics. Patient returned to the hospital on 8/14 for GI bleed, and at that time had a TTE and SILVANA that confirmed vegetations on mitral and aortic valve > 1 cm. [place] [facility] [place] but conservative management was recommended. Initial ID recs were to continue Ceftriaxone 2g IV q24 hrs with an end date of 8/31, however later extended to 9/15. Patient was noted to have a new consolidation on CXR and was started on Doxycycline for CAP coverage. Per wife, patient started the Doxycycline on 9/4 and was supposed to finish the course on 9/11.      Patient presented to the ED on 9/8/2020 for loss of  consciousness. Daughter found  on the ground unresponsive shortly after the wife had gone to bed. Daughter performed some chest compressions prior to EMS arrival but it is unclear if patient ever lost pulses. Patient was intubated for airway protection. Initially afebrile. Tachycardic, and hypertensive. Initial white count 19. Cr 1.5 (BL 1.0-1.2), UA with many bacteria, 5 WBCs with no LE or nitrites.      Patient found to have left PCA infarct and right occipital lobe edema concerning for septic embolism. Patient continued on Rocephin and Doxycycline.      Infectious disease consulted for recommendations of antibiotics in setting of recent strep mutans endocarditis and pneumonia.    Subjective and Interval History:  Hospital Day 3  No acute events overnight. Later in the morning, primary team was concerned of seizure like activity. EEG with epileptiform discharged seen. ROS unable to be performed given intubation and sedation.     9/8 BCx G positive cocci in clusters resembling staph 1/2 9/8 Resp culture with normal tyree  9/10 BCx in process    Review of Symptoms:  ROS unable to be obtained due to intubation and sedation.     Medications:  Antibiotics:   Antibiotics (From admission, onward)    Start     Stop Route Frequency Ordered    09/11/20 2056  cefepime in dextrose 5 % IVPB 2 g      09/20 2059 IV Every 12 hours (non-standard times) 09/11/20 1022    09/11/20 0700  vancomycin (VANCOCIN) 2,250 mg in dextrose 5 % 500 mL IVPB      -- IV Every 24 hours (non-standard times) 09/10/20 0545    09/10/20 1051  doxycycline (VIBRAMYCIN) 100mg in dextrose 5% 250 mL IVPB (ready to mix)      09/16 2229 IV Every 12 hours (non-standard times) 09/10/20 0733    09/10/20 0640  vancomycin - pharmacy to dose  (vancomycin IVPB)      -- IV pharmacy to manage frequency 09/10/20 0540    09/09/20 1930  metronidazole IVPB 500 mg      -- IV Every 8 hours (non-standard times) 09/09/20 1832        Objective:  Physical Exam:  VS  (24h):  Temp:  [98.5 °F (36.9 °C)-99.3 °F (37.4 °C)] 99.3 °F (37.4 °C)  Pulse:  [55-89] 66  Resp:  [15-36] 21  SpO2:  [93 %-100 %] 99 %  BP: ()/(46-69) 106/59     Physical Exam  GEN- Intubated, sedated, ETT in place, breathing spontaneously  HEENT- PERRL, ETT in place, OG tube in placem no asymmetry   NECK- No thyromegaly or other masses  CARDIAC- RRR, no murmurs or rubs  RESP- CTAB, normal work of breathing, no wheezes, crackles, or rhonchi  ABD- Soft, NT, ND, +BS; no masses or HSM  EXT- No clubbing, cyanosis, or edema; 2+ DP/PT pulses  NEURO- PERRL, gag intact, moving all 4 extremities to pain  SKIN- Warm and dry; no rashes    Lines:  A line   PIV  ETT    Labs:  CBC:   Lab Results   Component Value Date    WBC 11.22 09/11/2020    WBC 11.62 09/10/2020    WBC 9.08 09/09/2020    WBC 13.45 (H) 09/08/2020    WBC 19.09 (H) 09/08/2020    HCT 26.4 (L) 09/11/2020     09/11/2020       BMP:   Recent Labs   Lab 09/11/20  0449         K 3.7      CO2 24   BUN 22   CREATININE 1.5*   CALCIUM 9.1   MG 2.3       LFT:   Lab Results   Component Value Date    ALT 5 (L) 09/11/2020    AST 9 (L) 09/11/2020    ALKPHOS 48 (L) 09/11/2020    BILITOT 0.4 09/11/2020     Microbiology x 7d:   Microbiology Results (last 7 days)     Procedure Component Value Units Date/Time    Blood culture #1 **CANNOT BE ORDERED STAT** [[id number]] Collected: 09/08/20 0140    Order Status: Completed Specimen: Blood from Peripheral, Hand, Right Updated: 09/11/20 0954     Blood Culture, Routine Gram stain aer bottle: Gram positive cocci in clusters resembling Staph      Results called to and read back by: Dashawn Luciano RN  09/10/2020        03:37    Culture, Respiratory with Gram Stain [[id number]] Collected: 09/08/20 1425    Order Status: Completed Specimen: Respiratory from Endotracheal Aspirate Updated: 09/11/20 0925     Respiratory Culture Normal respiratory tyree      No S aureus or Pseudomonas isolated.     Gram Stain  (Respiratory) <10 epithelial cells per low power field.     Gram Stain (Respiratory) Rare WBC's     Gram Stain (Respiratory) No organisms seen    Blood culture #2 **CANNOT BE ORDERED STAT** [[id number]] Collected: 09/08/20 0211    Order Status: Completed Specimen: Blood from Peripheral, Forearm, Right Updated: 09/10/20 1812     Blood Culture, Routine No Growth to date      No Growth to date      No Growth to date    Blood culture [[id number]] Collected: 09/10/20 0916    Order Status: Sent Specimen: Blood Updated: 09/10/20 1424    Narrative:      Collection has been rescheduled by [facility] at 09/10/2020 06:10 Reason:   unable to collect 2nd set of blood cultures and lavender top  Collection has been rescheduled by [facility] at 09/10/2020 06:10 Reason:   unable to collect 2nd set of blood cultures and lavender top    Blood culture [[id number]] Collected: 09/10/20 0609    Order Status: Sent Specimen: Blood from Antecubital, Left Updated: 09/10/20 1118        Imaging:  CT Head w/o Contrast 9/8/2020  Broad 1 cm thick scalp hematoma over the left temporoparietal region.  No underlying fracture or intracranial abnormality.     MRI/MRA Brain 9/8/2020  1. Left PCA distribution acute infarction.  2. Focus of restricted diffusion within the right occipital lobe with adjacent vasogenic edema, concerning for underlying lesion or septic embolus.  Further evaluation with contrast-enhanced MRI brain is recommended.  3. MRA head: No evidence of occlusion or high-grade stenosis of the major intracranial arteries.  4. MRA neck: No evidence of occlusion or high-grade stenosis of the major arteries of the neck.  5. Moderate bilateral pleural effusions.     CXR 9/8/2020  - Support tubes and lines remain in satisfactory position.  - Pulmonary vascular congestion with bilateral parahilar and basilar pulmonary opacities.  Findings may be related to mild to moderate pulmonary edema although bilateral pneumonia cannot be excluded.  - Small layering pleural  effusions cannot be excluded.     TTE 9/8/2020  · Mild left ventricular enlargement.  · Moderate mitral regurgitation.  · Intermediate central venous pressure (8 mmHg).  · The estimated PA systolic pressure is 28 mmHg.  · Low normal left ventricular systolic function. The estimated ejection fraction is 50%.  · Normal LV diastolic function.  · Normal right ventricular systolic function.  · Mild aortic regurgitation.    Assessment:    Active Hospital Problems    Diagnosis    *CVA (cerebral vascular accident)    Loss of consciousness     Limited history:  LOC could have been cardiogenic or seizure.  Brainstem (or bihemispheric stroke) in DDx.  Not a tPA candidate due to recent GI bleed. Rec MRI and vascular imaging when able.      Essential hypertension    Ulcerative colitis    Chronic bronchitis    Autoimmune hemolytic anemia    Mitral regurgitation    Bacterial endocarditis     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      History of gastrointestinal bleeding     Diverticular       GERD (gastroesophageal reflux disease)    Embolic stroke involving left posterior cerebral artery    Embolic stroke involving right posterior cerebral artery    Thalamic infarct, acute    Unresponsive       Plan:  See top of page.

## 2020-09-11 NOTE — PLAN OF CARE
Problem: Occupational Therapy Goal  Goal: Occupational Therapy Goal  Description: Goals to be met by: 10/10/2020      Patient will increase functional independence with ADLs by performing:    Pt will demonstrate 3/5 strength BUE shoulder flexion with no c/o pain.   Pt will sit EOB min A 5-7 minutes to complete self care skills.   UE Dressing with Minimal Assistance.  LE Dressing with Moderate Assistance.  Grooming while seated or standing with Minimal Assistance.  Toileting from bedside commode with Moderate Assistance for hygiene and clothing management.   Toilet transfer to bedside commode with Moderate Assistance.  Increased functional strength to WFL for self care.  Upper extremity exercise program x8 reps per handout, with assistance as needed.     Outcome: Ongoing, Progressing    Pt progressing towards OT goals. Pt able to sit EOB ~20 minutes and following one step commands occasionally. Pt with increased speech, attentiveness to speakers/faces and commands while seated EOB but pt then experienced seizure-like activity ~45 seconds with pt suddenly inattentive, extended posture and tremulous/shaking throughout body. Pt returned to sidelying and pericare provided 2/2 emptying of bowels during seizure like activity. Nsg notified and aware  Cont OT POC

## 2020-09-11 NOTE — ASSESSMENT & PLAN NOTE
-Stroke Risk Factor  -Goal SBP <160 - now past acute stroke phase.  -Currently at goal with no medications

## 2020-09-11 NOTE — SUBJECTIVE & OBJECTIVE
Neurologic Chief Complaint: Unresponsive    Subjective:     Interval History: Patient is seen for follow-up neurological assessment and treatment recommendations: No acute issues or events since admission.  Remains intubated on sedation due to agitation.  Echo completed.  EEG pending. More alert today.    HPI, Past Medical, Family, and Social History remains the same as documented in the initial encounter.     Review of Systems   Constitutional: Negative for fever.   Gastrointestinal: Negative for diarrhea and vomiting.   Neurological: Positive for speech difficulty and weakness.   Psychiatric/Behavioral: Negative for agitation.     Scheduled Meds:   aspirin  325 mg Per NG tube Daily    atorvastatin  40 mg Per NG tube Daily    cefTRIAXone (ROCEPHIN) IVPB  2 g Intravenous Q24H    doxycycline (VIBRAMYCIN) IVPB  100 mg Intravenous Q12H    enoxaparin  40 mg Subcutaneous Q24H    levetiracetam IVPB  500 mg Intravenous Q12H    metronidazole  500 mg Intravenous Q8H    pantoprazole  40 mg Intravenous Daily    vancomycin (VANCOCIN) IVPB  2,250 mg Intravenous Q24H     Continuous Infusions:   dexmedetomidine (PRECEDEX) infusion Stopped (09/11/20 1040)     PRN Meds:sodium chloride, dextrose 50%, fentaNYL **FOLLOWED BY** [START ON 9/15/2020] fentaNYL, glucagon (human recombinant), insulin aspart U-100, lorazepam, sodium chloride 0.9%, Pharmacy to dose Vancomycin consult **AND** vancomycin - pharmacy to dose    Objective:     Vital Signs (Most Recent):  Temp: 98.2 °F (36.8 °C) (09/11/20 1515)  Pulse: 71 (09/11/20 1515)  Resp: (!) 22 (09/11/20 1515)  BP: 107/67 (09/11/20 1500)  SpO2: 99 % (09/11/20 1515)  BP Location: Left arm    Vital Signs Range (Last 24H):  Temp:  [97.8 °F (36.6 °C)-99.3 °F (37.4 °C)]   Pulse:  [54-81]   Resp:  [15-36]   BP: ()/(46-71)   SpO2:  [90 %-100 %]   BP Location: Left arm    Physical Exam  Constitutional:       General: He is not in acute distress.  Eyes:      Pupils: Pupils are equal,  round, and reactive to light.   Pulmonary:      Effort: Pulmonary effort is normal. No respiratory distress.         Neurological Exam:   LOC: Opens eyes to repeated voice. Was able to turn head to look at wife.  Language: Verbalizes only a few words.  Was able to state first name on command.  Was not able to identfy pen.  Visual Fields: Blinks to threat on left; no blink to threat on right  EOM (CN III, IV, VI): Gaze preference  Left - will track from left to right but eyes appear unable to cross midline  Pupils (CN II, III): PERRL  Motor: Full antigravity on LUE; partial antigravity on RUE.  Spontaneous movement to bilaterally lower extremities with left > right  Sensation: Withdraws to painful stimuli in all 4 extremities    Laboratory:  CMP:   Recent Labs   Lab 09/11/20 0449   CALCIUM 9.1   ALBUMIN 2.4*   PROT 6.4      K 3.7   CO2 24      BUN 22   CREATININE 1.5*   ALKPHOS 48*   ALT 5*   AST 9*   BILITOT 0.4     BMP:   Recent Labs   Lab 09/11/20 0449      K 3.7      CO2 24   BUN 22   CREATININE 1.5*   CALCIUM 9.1     CBC:   Recent Labs   Lab 09/11/20 0449   WBC 11.22   RBC 2.98*   HGB 8.3*   HCT 26.4*      MCV 89   MCH 27.9   MCHC 31.4*     Lipid Panel:   Recent Labs   Lab 09/08/20  0140   CHOL 105*   LDLCALC 63.0   HDL 28*   TRIG 70     Coagulation:   Recent Labs   Lab 09/08/20  0140   INR 1.1     Hgb A1C:   Recent Labs   Lab 09/08/20  1808   HGBA1C 4.8     TSH:   Recent Labs   Lab 09/08/20  0140   TSH 5.424*       Diagnostic Results     Brain Imaging  9/8/2020 CT Head  No early infarct signs.  No hemorrhage     9/8/2020 MRI Brain  Diffusion restriction with the left PCA territory and left thalamus.  Small diffusion restriction in right occipital lobe. No hemorrahge.       Vessel Imaging  9/8/2020 MRA brain and neck  Left PCA fetal circulation.  No high grade stenosis or occlusion  Cardiac Imaging   9/8/2020 TTE  · Mild left ventricular enlargement.  · Moderate mitral  regurgitation.  · Intermediate central venous pressure (8 mmHg).  · The estimated PA systolic pressure is 28 mmHg.  · Low normal left ventricular systolic function. The estimated ejection fraction is 50%.  · Normal LV diastolic function.  · Normal right ventricular systolic function.  · Mild aortic regurgitation.

## 2020-09-11 NOTE — CARE UPDATE
This note also relates to the following rows which could not be included:  Oxygen Concentration (%) - Cannot attach notes to unvalidated device data  Pulse - Cannot attach notes to unvalidated device data  Resp - Cannot attach notes to unvalidated device data  Ventilation Type - Cannot attach notes to unvalidated device data  Vent Mode - Cannot attach notes to unvalidated device data  PEEP/CPAP - Cannot attach notes to unvalidated device data  Pressure Support - Cannot attach notes to unvalidated device data  Insp Rise Time  - Cannot attach notes to unvalidated device data  Peak Airway Pressure - Cannot attach notes to unvalidated device data  Mean Airway Pressure - Cannot attach notes to unvalidated device data  Plateau Pressure - Cannot attach notes to unvalidated device data  Exhaled Vt - Cannot attach notes to unvalidated device data  Total Ve - Cannot attach notes to unvalidated device data  I:E Ratio Measured - Cannot attach notes to unvalidated device data  Inspired Tidal Volume (VTI) - Cannot attach notes to unvalidated device data  Ve High Alarm - Cannot attach notes to unvalidated device data  Resp Rate High Alarm - Cannot attach notes to unvalidated device data  Apnea Rate - Cannot attach notes to unvalidated device data  Apnea Volume (mL) - Cannot attach notes to unvalidated device data  Apnea Oxygen Concentration  - Cannot attach notes to unvalidated device data  Apnea Flow Rate (L/min) - Cannot attach notes to unvalidated device data  T Apnea - Cannot attach notes to unvalidated device data    Pt. Extubated to room air per Dr. Witt.

## 2020-09-11 NOTE — PROGRESS NOTES
[facility] Hospital Medicine Progress Note    Primary Team: [facility] Hospitalist Team A  Attending Physician: Rush Mary MD  Resident: Marry  Intern: Harshal    Subjective:      No acute events overnight. Pt is intubated and sedated at this time. His eyes are open and tracks the examiner. He tried to give me thumbs up. During the exam pt started to have right UE and torso rhythmic movements with eyes deviating to the right side. Episode lasted 1 minute. Urine output not documented but <500 per nurse     Objective:     Last 24 Hour Vital Signs:  BP  Min: 83/55  Max: 149/58  Temp  Av.8 °F (37.1 °C)  Min: 98.5 °F (36.9 °C)  Max: 98.9 °F (37.2 °C)  Pulse  Av.6  Min: 56  Max: 89  Resp  Av  Min: 20  Max: 32  SpO2  Av %  Min: 93 %  Max: 100 %  I/O last 3 completed shifts:  In: 1822.7 [I.V.:311.4; Blood:336.3; NG/GT:225; IV Piggyback:950]  Out:  [Urine:1955; Drains:50]    Physical Examination:    General: intubated, sedated on precedex  Cardiac: regular rate, normal rhythm, no murmurs appreciated with vent, no extra heart sounds  Pulm: patient on ventilator, ETT in place   Abdomen: obese abdomen, non distended, normoactive bowel sounds  Extremities: +2 peripheral pulses, no pitting edema, atraumatic  Skin: no rashes, lesions or ulcers present     Neuro: Pt eyes spontaneously open. Not tracking examiner. Following some commands on sedation(tried to give me thumbs up with his right hand)     GCS score: [id number]    CN: blink to threat positive. Corneal reflex present. Gag present.   Reflexes: patelar 2+ on the left side. 1+ on the right. Babinski unable to get(pt moving the feet) No clonus     Laboratory:  Laboratory Data Reviewed: yes  Pertinent Findings:    WBC 11.22  K 4.7--> 3.7  Mag 2.3    Microbiology Data Reviewed: yes  Pertinent Findings:   blood cultures x1: NG   blood culture x1: gram positive cocci    resp culture: no organisms    blood culture x1: in process   blood culture x1-->  collected on 9/10 pending      Other Results:  EKG (my interpretation): none new     Radiology Data Reviewed: yes  Pertinent Findings:  X-Ray Chest AP Portable   Final Result      Support tubes and lines remain in satisfactory position.      Pulmonary vascular congestion with bilateral parahilar and basilar pulmonary opacities.  Findings may be related to mild to moderate pulmonary edema although bilateral pneumonia cannot be excluded.      Small layering pleural effusions cannot be excluded.         Electronically signed by: Petar Henry MD   Date:    09/09/2020   Time:    08:21      MRA Neck without contrast   Final Result   Addendum 1 of 1      Dr. Hurtado discussed critical findings with Tyra by telephone at 18:38 on    09/08/2020.         Electronically signed by: Thad Hurtado   Date:    09/08/2020   Time:    18:45      Final      MRI Brain Without Contrast   Final Result   Addendum 1 of 1      Dr. Hurtado discussed critical findings with Tyra by telephone at 18:38 on    09/08/2020.         Electronically signed by: Thad Hurtado   Date:    09/08/2020   Time:    18:45      Final      MRA Brain   Final Result   Addendum 1 of 1      Dr. Hurtado discussed critical findings with Tyra by telephone at 18:38 on    09/08/2020.         Electronically signed by: Thad Hurtado   Date:    09/08/2020   Time:    18:45      Final      X-Ray Abdomen AP 1 View   Final Result      Splenomegaly         Electronically signed by: Prasanna Jackson Jr   Date:    09/08/2020   Time:    11:58      X-Ray Chest AP Portable   Final Result      Status post endotracheal tube placement without evidence of a pneumothorax.         Electronically signed by: Kris Renteria   Date:    09/08/2020   Time:    02:21      X-Ray Chest AP Portable   Final Result      Right-sided PICC line as described with mild prominence of the interstitium and no acute findings.         Electronically signed by: Prasanna Lui   Date:    09/08/2020   Time:    01:48       CT Head Without Contrast   Final Result      Broad 1 cm thick scalp hematoma over the left temporoparietal region.  No underlying fracture or intracranial abnormality.         Electronically signed by: Prasanna Lui   Date:    09/08/2020   Time:    01:44      X-Ray Chest AP Portable    (Results Pending)   X-ray Abdomen for NG Tube Placement (Nursing should notify Radiology after placement)    (Results Pending)         Current Medications:     Infusions:   dexmedetomidine (PRECEDEX) infusion 0.4 mcg/kg/hr (09/11/20 0354)    propofoL Stopped (09/10/20 1539)        Scheduled:   aspirin  325 mg Per NG tube Daily    atorvastatin  40 mg Per NG tube Daily    ceFEPime (MAXIPIME) IVPB  2 g Intravenous Q24H    doxycycline (VIBRAMYCIN) IVPB  100 mg Intravenous Q12H    enoxaparin  40 mg Subcutaneous Q24H    metronidazole  500 mg Intravenous Q8H    pantoprazole  40 mg Intravenous Daily    vancomycin (VANCOCIN) IVPB  2,250 mg Intravenous Q24H        PRN:  sodium chloride, dextrose 50%, fentaNYL **FOLLOWED BY** [START ON 9/15/2020] fentaNYL, glucagon (human recombinant), insulin aspart U-100, lorazepam, sodium chloride 0.9%, Pharmacy to dose Vancomycin consult **AND** vancomycin - pharmacy to dose    Antibiotics and Day Number of Therapy:    vanc 9/8-current  Ceftriaxone 9/8-9/9  Cefepime 9/10-current  Doxycycline 9/8-current     Lines and Day Number of Therapy:  PICC   NG  ETT  Salgado    Assessment:     Lopez Savage is a 74 y.o.male with  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    Mitral regurgitation 09/08/2020    CVA (cerebral vascular accident) 09/08/2020    Bacterial endocarditis 09/08/2020    History of gastrointestinal bleeding 09/08/2020    GERD (gastroesophageal reflux disease) 09/08/2020    Embolic  stroke involving left posterior cerebral artery 09/08/2020    Embolic stroke involving right posterior cerebral artery 09/08/2020    Thalamic infarct, 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    Unresponsive    who presented after being found down, with confirmed PCA ischemic stroke, currently intubated and sedated pending improvement.      Plan:     Acute hypoxic respiratory failure in the setting of cardiovascular event 2/2 septic emboli   - patient found unresponsive at home before arrival to ED  - initially non responsive to sternal rub, pupillary and gag reflex present. As of now pt responds to verbal stimuli and and brainstem reflexes intact  - CT head negative for acute intracranial bleed  - MRI head showed PCA infarct and right occipital lobe edema   - tPA contraindicated due to recent GI bleed  - pt currently intubated for airway protection   - PT/OT consulted  - Neuro vascular is consulted  - vent settings this AM: spontaneous PS, peep 5, FiO2 30% satting 100%  - pt on aspirin 325 mg, atorvastatin 40 mg daily, tight glucose control, BP parameters SBP <220  - ID on board, recommending to continue Cefepime and flagyl    Staph bacteremia  - 1/2 blood cultures from admission positive for gram positive cocci  - pt on vanc, cefepime, flagyl and doxy      Normocytic Anemia  - H/H on admission 9.1/29.4, MCV 91  - has had extensive workup at [facility] for anemia in past including autoimmune, luekemia and lymphoma which were negative  -  S/p 2 units of blood ordered for transfusion on 9/9, H&H today 8.3/26.4    Seizure-like activity   - after admission when propofol was weans, nursing witnessed seizure like activity from pt, right arm and had jerking. I witnessed seizure like activity described in subjective concerning for left sided seizure focus. 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   - initially treated at [facility] and discharged with three week course of IV rocephin  - strepto mutans; 6 week course set to end on 9/14/2020  - SILVANA at [facility] showed aortic valve vegetations  - cefepime, vanc, flagyl and doxy on board  - ID on board, appreciate their recs      Leukocytosis (resolved)   - WBC 19 on admission  - likely 2/2 to endocarditis  - no reported fevers recently, afebrile since admit  - will continue to monitor     COPD, stable  - pt does not require home O2  - home montelukast and fluticasone   - pt currently intubated      H/o GI Bleed  - h/o bleeding diverticuli and gastric ulcers s/p cauterization  - has required blood transfusions  - most recent GI bleed in August 2020   - transfusing 2 units for anemia      HENRY vs CKD  - Cr 1.5 on admission, baseline near 1.2-1.3  - eGFR 31  - creatinine this AM: 1.5     Hyperglycemia  - glucose 189 on admission, 128 on recent POCT  - ordered SSI  - A1c 4.8  - will obtain tight glycemia control for neurological protection      Hypokalemia  - K 3.2 on admission  - received IV KCl 40mEq in ED  - K this AM: 3.7     HTN  - holding home BP in setting of possible ischemic stroke  - hypertensive to 180's systolic, has normalized  - Losartan 50mg at home, holding  - maintaining goal of SBP <220     HLD  - atorvastatin 40mg, continuing for secondary stroke prevention      Diet: NPO  PPx: lovenox 40mg     Code Status:      Full Code      Carla Caputo MD  [facility] Internal Medicine HO-I    [facility] Medicine Hospitalist Pager numbers:   [facility] Hospitalist Medicine Team A (Tyra/Ruby): [phone]  [facility] Hospitalist Medicine Team B (Tor/James):  [phone]

## 2020-09-11 NOTE — PROCEDURES
DATE: 9/10/20    EEG NUMBER: OK     REFERRING PHYSICIAN:  Dr. Tuttle      This EEG was performed to assess for subclinical seizures      ELECTROENCEPHALOGRAM REPORT     METHODOLOGY:  Electroencephalographic (EEG) is recorded with electrodes placed according to the International 10-20 placement system.  Thirty two (32) channels of digital signal (sampling rate of 512/sec), including T1 and T2, were simultaneously recorded from the scalp and may include EKG, EMG, and/or eye monitors.  Recording band pass was 0.1 to 512 Hz.  Digital video recording of the patient is simultaneously recorded with the EEG.  The patient is instructed to report clinical symptoms which may occur during the recording session.  EEG and video recording are stored and archived in digital format.  Activation procedures, which include photic stimulation, hyperventilation and instructing patients to perform simple tasks, are done in selected patients.     The EEG is displayed on a monitor screen and can be reviewed using different montages.  Computer-assisted analysis is employed to detect spike and electrographic seizure activity.  The entire record is submitted for computer analysis.  The entire recording is visually reviewed, and the times identified by computer analysis as being spikes or seizures are reviewed again.     Compressed spectral analysis (CSA) is also performed on the activity recorded from each individual channel.  This is displayed as a power display of frequencies from 0 to 30 Hz over time.  The CSA is reviewed looking for asymmetries in power between homologous areas of the scalp, then compared with the original EEG recording.     Blekko software was also utilized in the review of this study.  This software suite analyzes the EEG recording in multiple domains.  Coherence and rhythmicity are computed to identify EEG sections which may contain organized seizures.  Each channel undergoes analysis to detect the presence of  spike and sharp waves which have special and morphological characteristics of epileptic activity.  The routine EEG recording is converted from special into frequency domain.  This is then displayed comparing homologous areas to identify areas of significant asymmetry.  Algorithm to identify non-cortically generated artifact is used to separate artifact from the EEG.     EEG FINDINGS:  The recording was obtained with a number of standard bipolar and referential montages during lethargic state.  In this state the background was diffusely disorganized with an admixture of theta range frequencies with a nonsustained posterior dominant rhythm of 8-9 hertz which was symmetric.  Mixed generalized delta range slowing was noted which occurred in bursts.  The background was punctuated by left posterior maximum spike and polyspike and wave periodic 1-2 per sec epileptiform discharges.  At times these had a broad field involving the right hemisphere.  Right frontal central focal mixed delta range slowing was also noted which was nearly continuous.  No clear state changes were appreciated  No evolving electrographic seizures were visualized.    The EKG channel revealed a sinus rhythm.  Frequent PVCs were noted     IMPRESSION:  This is an abnormal EEG during lethargic state.  Diffuse disorganized slowing of the background was noted.  Nearly continuous right frontal central parietal slowing was noted left posterior maximum pseudo periodic 1-2 hertz epileptiform discharges were seen.      CLINICAL CORRELATION:  The patient is a 74 year-old male with a history of stroke is being evaluated for nonconvulsive status epilepticus.  He is currently not maintained on any antiseizure medications.  He was recently maintained on propofol.  This is an abnormal EEG during with Arctic state.  The overall degree of slowing disorganization is suggestive of a mild-to-moderate degree of encephalopathy nonspecific to the cause.  The presence of  asymmetric irregular slowing in the right hemisphere is likely secondary to a structural abnormality and may be associated with cerebral edema.  The presence of left posterior pseudo periodic epileptiform discharges is suggestive of an irritative region likely secondary to focal ischemia.  There is increased risk of focal seizures from this region.  During this study no seizures were recorded.

## 2020-09-11 NOTE — PROGRESS NOTES
Pharmacist Renal Dose Adjustment Note    Lopez Savage is a 74 y.o. male being treated with the medication cefepime    Patient Data:    Vital Signs (Most Recent):  Temp: 99.3 °F (37.4 °C) (09/11/20 0715)  Pulse: 66 (09/11/20 0858)  Resp: (!) 21 (09/11/20 0858)  BP: (!) 106/59 (09/11/20 0830)  SpO2: 99 % (09/11/20 0858)   Vital Signs (72h Range):  Temp:  [98.2 °F (36.8 °C)-99.3 °F (37.4 °C)]   Pulse:  [55-89]   Resp:  [8-40]   BP: ()/(46-70)   SpO2:  [92 %-100 %]      Recent Labs   Lab 09/09/20  0513 09/10/20  0407 09/11/20  0449   CREATININE 1.4 1.5* 1.5*     Serum creatinine: 1.5 mg/dL (H) 09/11/20 0449  Estimated creatinine clearance: 55.5 mL/min (A)    Medication:Cefepime  dose: 2g frequency q 24 hours will be changed to medication:cefepime dose:2g  frequency: q 12 hours    Pharmacist's Name: Marlin Hensley  Pharmacist's Extension: [phone]

## 2020-09-11 NOTE — PROGRESS NOTES
[facility]  Vascular Neurology  Tele-Stroke Progress Note      EEG with several abnormal areas not correlating to the original infarcts seen on MRI.  May indicate new areas of infarct.    Recommend repeat MRI brain without contrast.    Patient with additional seizure activity today while working with PT.     Recommend Keppra 500mg every 12 hours without loading dose due to CrCl of 33mL/min    Would also recommend having general neurology input on case.    Full progress note to follow.      Lesley Mazariegos, NP-C  Vascular Neurology  [phone]

## 2020-09-12 NOTE — PROGRESS NOTES
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 Patient w area of infact in L PCA + septic emboli noted in R hemisphere. Neurology consulted for seizure recommendations, with EEG noted to have irregular slowing in the R frontal region with L posterior periodic epileptiform discharges.    Interim period: repeat MRI was obtained, which showed multiple areas of microhemorrhages and petechial hemorrhage, possible new area of infarct. Otherwise on exam this AM, patient able to open eyes to stimulation, intermittently followed commands.Has since been loaded with Keppra 4g and continue on Keppra 500mg BID. Continues to have possible seizure activity.   ID has since signed off, to continue current treatment of infections.         Current Evaluation:     Vital Signs:   Vitals:    09/12/20 1530   BP: (!) 147/67   Pulse: 82   Resp: (!) 28   Temp:        Exam:   Extubated, off sedation and awakens to voice. Oriented to self and place and was able to say "yes" without clear evidence of dysarthria (although patient was noted to be mumbling). Intermittently following commands  PERRL, no gaze preferences  Moving extremities spontaneously (minimal) however difficult to assess for focality given deconditioning  Reflexes symmetric in both upper and bilateral extremities, no clonus    LABORATORY STUDIES:  Labs:  Recent Labs   Lab 09/10/20  0916 09/11/20  0449 09/12/20  0622   WBC 11.62 11.22 13.41*   HGB 8.9* 8.3* 10.2*   HCT 27.5* 26.4* 33.9*    196 222   MCV 88 89 92       Recent Labs   Lab 09/10/20  0407 09/11/20  0449 09/12/20  0622 09/12/20  1251 "    137 141 140   K 4.7 3.7 3.4* 3.8    106 108 108   CO2 19* 24 22* 22*   BUN 24* 22 22 21   GLU 79 101 83 93   CALCIUM 9.5 9.1 9.5 8.9   PROT 6.1 6.4 6.8  --    ALBUMIN 2.3* 2.4* 2.6*  --    BILITOT 0.4 0.4 0.3  --    AST 13 9* 11  --    ALKPHOS 45* 48* 52*  --    ALT 7* 5* 6*  --          Recent Labs   Lab 09/08/20  0140 09/08/20  1808   HGBA1C  --  4.8   LDLCALC 63.0  --        Thyroid:   Recent Labs   Lab 09/08/20  0140   TSH 5.424*   FREET4 0.90       FLP:   Recent Labs   Lab 09/08/20  0140   CHOL 105*   HDL 28*   LDLCALC 63.0   TRIG 70   CHOLHDL 26.7       RADIOLOGY STUDIES: imaging has been personally reviewed    9/8/20 MRI brain without contrast - acute infract in L PCA region in L occipital and L parietal lobes seen on DWI.   Also region of restricted diffusion within R occipital lobe w associated vasogenic edema.  Encephalomalacia noted in region in R frontal lobe  Chronic microvascular changes  MRA H&N no significant stenosis or LVO    9/12/20 new small area of infarct in L cerebellum.  Areas concerning for microhemorrhages/petchial hemorrhages.    OTHER STUDIES/PROCEDURES:  EEG 9/10/20:  irregular slowing in the R frontal region, with L posterior periodic epileptiform discharges. No seizures recorded during this study.       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 Repeat MRI reviewed w SWAN sequence, concerning for microhemorrhages and possible cerebral amyloid angiopathy (strong possibility given questionable hx of dementia). Patient has been loaded with Keppra and continued on maintenance, however continues to have seizure-like episodes.     Recommendations:     1. Seizure management - continue Keppra 500mg BID  maintenance therapy. If patient begins to cluster and have repeat seizures and not back to baseline, concern patient will go into NCSE and needs to be transferred to place where we are able to obtain stat EEG (which we cannot do at our facility).     Can consider at that point adding on another AED like Vimpat load of 200mg (check EKG first for QT prolongation) and continue 100mg Q12 maintenance. Or ICU team can consider intubation and place on propofol (wont be able to titrate to burst suppression 2/2 to no EEG) until patient can be transferred if we are unable to transfer in a time appropriate fashion.     Of note, patient also has multiple reasons for provoked seizure w underlying disorder - patient will continue to seizure until underlying factors are corrected and throwing on additional AED is not the solution. This is on top of the discharges seen on EEG that we are treating with Keppra.     2. In terms of stroke management, would not continue antiplatelet (which was recommended by vascular neurology at [facility]). There is risk of septic emboli increased bleeding, and in setting of possible cerebral amyloid angiopathy with microhemorrhages seen on SWAN and GRE sequence, would stop aspirin.    3. Patient's mentation continues to wax and wane - continue delirium precautions and primary to address underlying factors.     4. PT, OT, ST      Remainder of care deferred to primary. Please call with further questions. Will continue to follow.    Recommendations conveyed to primary team.     Electronically signed by: Radha Jean MD 9/12/2020  [facility]IV

## 2020-09-12 NOTE — PLAN OF CARE
Problem: Physical Therapy Goal  Goal: Physical Therapy Goal  Description: Goals to be met by: 10/10/20     Patient will increase functional independence with mobility by performin. Supine to sit with Moderate Assistance  2. Sit to supine with Moderate Assistance  3. Rolling with Moderate Assistance.  4. Sit to stand transfer with Moderate Assistance using Rolling Walker  5. Bed to chair transfer with Moderate Assistance using Rolling Walker  6. Gait  x 5-10 feet with Moderate Assistance using Rolling Walker.   7. Increased functional strength to 4/5 for ADLs/amb.  8. Lower extremity exercise program x10 reps with independence    Outcome: Ongoing, Progressing  Pt progressing toward goals; pt able to follow one step commands intermittently; sat at EOB x ~20 minutes with primarily max/totalA to maintain; pt with increased focus on visual a auditory cues while seated at EOB; increased purposeful movementpt experienced seizure-like activity~45 seconds with sudden inattention, extended posture and tremors throughout body; returned to sidelying, performed pericare 2/2 pt having BM during seizure- like activity; nurse informed; cont with POC.

## 2020-09-12 NOTE — PT/OT/SLP PROGRESS
Physical Therapy      Patient Name:  Lopez Savage   MRN:  [id number]    Patient not seen today secondary RN ( Gunjan) asked to hold PT today due to pt just given ativan for seizure activity. PT will follw up in am.    Latrice Lizama, PT   9/12/2020

## 2020-09-12 NOTE — PROGRESS NOTES
[facility] Hospital Medicine Progress Note    Primary Team: [facility] Hospitalist Team A  Attending Physician: Rush Mary MD  Resident: Marry  Intern: Harshal    Subjective:      No acute events overnight. Pt is satting 98 on room air. He opens his eyes to tactile stimuli but falls back to sleep immediately. VS have been stable. UOP adequate per nurse. No signs of distress.      Objective:     Last 24 Hour Vital Signs:  BP  Min: 95/47  Max: 143/67  Temp  Av.9 °F (36.6 °C)  Min: 97.6 °F (36.4 °C)  Max: 98.2 °F (36.8 °C)  Pulse  Av.1  Min: 54  Max: 85  Resp  Av.9  Min: 19  Max: 30  SpO2  Av %  Min: 90 %  Max: 100 %  I/O last 3 completed shifts:  In: 1972.6 [I.V.:222.6; NG/GT:150; IV Piggyback:1600]  Out:  [Urine:]    Physical Examination:    General: sedated after keppra  Cardiac: regular rate, normal rhythm, no murmurs appreciated with vent, no extra heart sounds  Pulm: diffuse wheezing anteriorly. Unable to listen to his back.   Abdomen: obese abdomen, non distended, normoactive bowel sounds  Extremities: +2 peripheral pulses, no pitting edema, atraumatic  Skin: no rashes, lesions or ulcers present     Neuro: Pt eyes open to sternal rub. Not tracking examiner. Not following commands on sedation    CN reflexes present  Reflexes: patelar 2+ on the left side. 1+ on the right. brachioradialis 2+ bilaterally. Babinski unable to get(pt moving the feet) No clonus     Laboratory:  Laboratory Data Reviewed: yes  Pertinent Findings:    WBC 11.22->13.41  K 4.7--> 3.7  Mag 2.3    Microbiology Data Reviewed: yes  Pertinent Findings:   blood cultures x1: NG   blood culture x1: gram positive cocci    resp culture: no organisms    blood culture x1: in process   blood culture x1--> collected on 9/10 pending      Other Results:  EKG (my interpretation): none new     Radiology Data Reviewed: yes  Pertinent Findings:  X-Ray Chest AP Portable   Final Result      Support tubes and lines remain in  satisfactory position.      Pulmonary vascular congestion with bilateral parahilar and basilar pulmonary opacities.  Findings may be related to mild to moderate pulmonary edema although bilateral pneumonia cannot be excluded.      Small layering pleural effusions cannot be excluded.         Electronically signed by: Petar Henry MD   Date:    09/09/2020   Time:    08:21      MRA Neck without contrast   Final Result   Addendum 1 of 1      Dr. Hurtado discussed critical findings with Tyra by telephone at 18:38 on    09/08/2020.         Electronically signed by: Thad Hurtado   Date:    09/08/2020   Time:    18:45      Final      MRI Brain Without Contrast   Final Result   Addendum 1 of 1      Dr. Hurtado discussed critical findings with Tyra by telephone at 18:38 on    09/08/2020.         Electronically signed by: Thad Hurtado   Date:    09/08/2020   Time:    18:45      Final      MRA Brain   Final Result   Addendum 1 of 1      Dr. Hurtado discussed critical findings with Tyra by telephone at 18:38 on    09/08/2020.         Electronically signed by: Thad Hurtado   Date:    09/08/2020   Time:    18:45      Final      X-Ray Abdomen AP 1 View   Final Result      Splenomegaly         Electronically signed by: Prasanna Jackson Jr   Date:    09/08/2020   Time:    11:58      X-Ray Chest AP Portable   Final Result      Status post endotracheal tube placement without evidence of a pneumothorax.         Electronically signed by: Kris Renteria   Date:    09/08/2020   Time:    02:21      X-Ray Chest AP Portable   Final Result      Right-sided PICC line as described with mild prominence of the interstitium and no acute findings.         Electronically signed by: Prasanna Lui   Date:    09/08/2020   Time:    01:48      CT Head Without Contrast   Final Result      Broad 1 cm thick scalp hematoma over the left temporoparietal region.  No underlying fracture or intracranial abnormality.         Electronically signed by: Prasanna  Hu   Date:    09/08/2020   Time:    01:44      X-Ray Chest AP Portable    (Results Pending)   X-ray Abdomen for NG Tube Placement (Nursing should notify Radiology after placement)    (Results Pending)   MRI Brain Without Contrast    (Results Pending)     MRI brain on 09/11 - pending    Current Medications:     Infusions:   dexmedetomidine (PRECEDEX) infusion Stopped (09/11/20 1040)        Scheduled:   aspirin  325 mg Per NG tube Daily    atorvastatin  40 mg Per NG tube Daily    cefTRIAXone (ROCEPHIN) IVPB  2 g Intravenous Q24H    doxycycline (VIBRAMYCIN) IVPB  100 mg Intravenous Q12H    enoxaparin  40 mg Subcutaneous Q24H    levetiracetam IVPB  500 mg Intravenous Q12H    metronidazole  500 mg Intravenous Q8H    pantoprazole  40 mg Intravenous Daily        PRN:  sodium chloride, dextrose 50%, fentaNYL **FOLLOWED BY** [START ON 9/15/2020] fentaNYL, glucagon (human recombinant), insulin aspart U-100, lorazepam, sodium chloride 0.9%, Pharmacy to dose Vancomycin consult **AND** vancomycin - pharmacy to dose    Antibiotics and Day Number of Therapy:    vanc 9/8-current  Ceftriaxone 9/8-9/9 restarted again -present  Cefepime 9/10- stopped on 9/11  Doxycycline 9/8-current     Lines and Day Number of Therapy:    Midline  PIV  Salgado    Assessment:     Lopez Savage is a 74 y.o.male with  Patient Active Problem List    Diagnosis Date Noted    Abnormal EEG 09/11/2020    Loss of consciousness 09/08/2020    Essential hypertension 09/08/2020    Ulcerative colitis 09/08/2020    Chronic bronchitis 09/08/2020    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    Mitral regurgitation 09/08/2020    CVA (cerebral vascular accident) 09/08/2020    Bacterial endocarditis 09/08/2020    History of gastrointestinal bleeding 09/08/2020    GERD (gastroesophageal reflux disease) 09/08/2020    Embolic stroke involving left posterior cerebral artery 09/08/2020    Embolic stroke involving right posterior cerebral artery  09/08/2020    Thalamic infarct, 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    Unresponsive    who presented after being found down, with confirmed PCA ischemic stroke, currently intubated and sedated pending improvement.      Plan:     Acute hypoxic respiratory failure in the setting of cardiovascular event 2/2 septic emboli   - patient found unresponsive at home before arrival to ED  - initially non responsive to sternal rub, pupillary and gag reflex present. As of now pt responds to verbal stimuli and and brainstem reflexes intact  - CT head negative for acute intracranial bleed  - MRI head showed PCA infarct and right occipital lobe edema   - New MRI on 9/11 pending official read to rule out acute intracranial events causing pt's seizure activity   - tPA contraindicated due to recent GI bleed  - pt currently extubated  - PT/OT consulted  - Neuro vascular is consulted  - pt on aspirin 325 mg, atorvastatin 40 mg daily, tight glucose control, BP parameters SBP <220  - ID on board, recommending to switch Cefepime to ceftriaxone end date 9/15 ( cefepime low seizure threshold). Following cultures    Staph bacteremia  - 1/2 blood cultures from admission positive for gram positive cocci  - pt on vanc    Pneumonia  - Last day of Doxycycline for atypical CAP coverage to complete 7 day course (end date 9/13)  - Continue Flagyl for now for anaerobic coverage (end date 9/15)     Normocytic Anemia  - H/H on admission 9.1/29.4, MCV 91  - has had extensive workup at [facility] for anemia in past including autoimmune, luekemia and lymphoma which were negative  -  S/p 2 units of blood ordered for transfusion on 9/9, H&H today 10.2/33.9    Seizure-like activity   - Multiple witnessed seizure like activity described concerning for left sided seizure focus. Currently not concerning for CSE but needs rule out NCSE  - no previous reports from family  - EEG shows irregular slowing in the R frontal region, with L posterior periodic epileptiform discharges. No  seizures recorded during this study.   - neurology recommended keppra loading with 3g and maintenance 500 BID  -avoid cefepime and tramadol     Hypokalemia  - K 3.2 on admission  - received IV KCl 40mEq in ED  - K this AM: 3.4. giving 60 meq total. 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   - initially treated at [facility] and discharged with three week course of IV rocephin  - strepto mutans; 6 week course set to end on 9/14/2020  - SILVANA at [facility] showed aortic valve vegetations  - ceftriaxone, vanc, flagyl and doxy on board  - ID on board, appreciate their recs      Leukocytosis   - WBC 19 on admission trended down but now 13.4  - initially likely 2/2 to endocarditis  - no reported fevers recently, afebrile since admit  - will continue to monitor     COPD, stable  - pt does not require home O2  - home montelukast and fluticasone   - pt currently satting 98 on room air     H/o GI Bleed  - h/o bleeding diverticuli and gastric ulcers s/p cauterization  - has required blood transfusions  - most recent GI bleed in August 2020   - transfusing 2 units for anemia      HENRY vs CKD  - Cr 1.5 on admission, baseline near 1.2-1.3  - eGFR 31  - creatinine this AM: 1.4     Hyperglycemia (resolved)  - glucose 189 on admission, 107 on recent POCT  - ordered SSI  - A1c 4.8  - will obtain tight glycemia control for neurological protection        HTN  - holding home BP in setting of possible ischemic stroke  - hypertensive to 180's systolic, has normalized  - Losartan 50mg at home, holding  - maintaining goal of SBP <220     HLD  - atorvastatin 40mg, continuing for secondary stroke prevention      Diet: NPO  PPx: lovenox 40mg     Code Status:      Full  Code      Carla Caputo MD  [facility] Internal Medicine -I    [facility] Medicine Hospitalist Pager numbers:   [facility] Hospitalist Medicine Team A (Tyra/Ruby): [phone]  [facility] Hospitalist Medicine Team B (Tor/James):  [phone]

## 2020-09-12 NOTE — NURSING
Pt had another episode of what appears to be sz activity. Vss. Dr. Mary and team @ [facility] Ativan given as ordered. Wife @ bs.

## 2020-09-12 NOTE — PROGRESS NOTES
Critical care consults.     Patient seen and examined by me.    Major overnight events: Some further, brief seizure activity       Vitals: Afebrile, HR 80s, MAP 80s, 98% on RA     Significant lab findings: WBC up to 13, Hgb 10  MRI brain:   1. Since 09/08/2020 MRI, there is a new small focus of acute to subacute ischemia in the left cerebellum.  2. Elsewhere, there is continued maturation of ischemia in the bilateral medial occipital and parietal lobes as well as the left cingulate gyrus.  3. At least partially on the basis of differences in technique, with SWI sequences utilized on the current examination versus T2* GRE previously, there are numerous diffuse bilateral cortical and subcortical foci of susceptibility related signal loss.  Newly evident susceptibility within the medial occipital lobes may represent petechial hemorrhage associated with the evolving areas of ischemia.  No large parenchymal hemorrhage is identified.  Additionally, there is evidence of susceptibility related signal loss about the cerebral sulci without FLAIR signal abnormality suggested of superficial siderosis as sequela of prior subarachnoid hemorrhage.  Overall constellation of findings suggests cerebral amyloid angiopathy.  Component of chronic hypertensive arteriopathy also considered.    Blood cx was + for coag neg staph     Key exam findings: while we were speaking with him, he had a brief 10-20 seizure whereby he had tonic movements of his upper and lower extremities and moved his head to the left.     Physical Exam   Constitutional: No distress.   HENT:   Head: Normocephalic and atraumatic.   Eyes: Right eye exhibits no discharge. Left eye exhibits no discharge. Scleral icterus is present.   Neck: No tracheal deviation present.   Cardiovascular: Normal rate, regular rhythm and normal heart sounds.   Pulmonary/Chest: Effort normal. No stridor. No respiratory distress. He has no wheezes.   Abdominal: Soft. He exhibits no  distension. There is no abdominal tenderness.   Musculoskeletal:         General: Edema present.   Neurological:   Much more alert today, able to speak some words, but then had a seizure (see above)   Skin: Skin is warm. He is diaphoretic.       Lines and invasive devices: midline   Creatinine: 1.4  Antibiotics/Day #: ceftriaxone (day 5 here), flagyl (4)  Nutrition: none yet  Glucose management: good range   Prophylaxis:               DVT: enoxaparin               GI: n/a  PT/OT: working with them   GOC/Family discussion:         Recommendations:   -stop doxy (completed his course)  -d/w neurology re: findings of cerebral amyloid angiopathy and safety of antiplatelets   -consider stopping flagyl   -ceftriaxone until 9/14  -should start TFs  -on Keppra. Would ask neuro to weigh in on any changes necessary given ongoing seizures.     Critical care time (30min) spent personally by me on the following activities: development of treatment plan with patient or surrogate and bedside caregivers, discussions with consultants, evaluation of patient's response to treatment, examination of patient, ordering and performing treatments and interventions, ordering and review of laboratory studies, ordering and review of radiographic studies, pulse oximetry, re-evaluation of patient's condition. This critical care time did not overlap with that of any other provider or involve time for any procedures.

## 2020-09-12 NOTE — PLAN OF CARE
"[facility] Medicine Plan of Care    Patient has had several seizures with concern for clusters of seizures or non-convulsive status epilepticus. Patient currently is extubated and on nasal cannula. He is off sedation and awakens to voice. Oriented to self and was able to say "yes." Transfer to [facility] for continuous EEG monitoring and further neurocritical care evaluation initiated and Dr. Latham accepted the patient for transfer pending bed availability. Currently on Keppra and has been loaded with vimpat. Currently stable on nasal cannula and does not require intubation for airway stabilization during transfer.     Huy Hudson MD  [facility] Internal Medicine HO-II  "

## 2020-09-12 NOTE — PT/OT/SLP PROGRESS
Physical Therapy Treatment    Patient Name:  Lopez Savage   MRN:  [id number]    Recommendations:     Discharge Recommendations:  (TBD, possibly IPR pending progress)   Discharge Equipment Recommendations: (TBD pending progress)   Barriers to discharge: significant decline in functional/medical status; increased physical assistance required    Assessment:     Lopez Savage is a 74 y.o. male admitted with a medical diagnosis of CVA (cerebral vascular accident).  He presents with the following impairments/functional limitations:  weakness, impaired endurance, impaired self care skills, impaired functional mobilty, gait instability, impaired balance, impaired cognition, decreased upper extremity function, decreased lower extremity function, decreased safety awareness, decreased ROM, impaired coordination, impaired fine motor, impaired cardiopulmonary response to activity Patient progressing toward goals- improved command following with visual and auditory cutes, improved tolerance to activity    Rehab Prognosis: Good; patient would benefit from acute skilled PT services to address these deficits and reach maximum level of function.    Recent Surgery: * No surgery found *      Plan:     During this hospitalization, patient to be seen 6 x/week to address the identified rehab impairments via gait training, therapeutic activities, therapeutic exercises, neuromuscular re-education(as appropriate) and progress toward the following goals:    · Plan of Care Expires:  10/10/20    Subjective     Pain/Comfort:  · Pain Rating 1: (pt unable to rate, minimal s/s of pain-grimacing, moaning with ROM)      Objective:     Communicated with nurse prior to session.  Patient found with blood pressure cuff, peripheral IV, telemetry, SCD(midline catheter LUE) upon PT entry to room.     General Precautions: Standard, fall(seizure precautions)   Orthopedic Precautions:N/A   Braces: N/A     Functional Mobility:  · Bed Mobility:     · Rolling  Left:  dependence  · Scooting: dependence and of 2 persons  · Supine to Sit: dependence and of 2 persons  · Sit to Supine: dependence and of 2 persons      AM-PAC 6 CLICK MOBILITY  Turning over in bed (including adjusting bedclothes, sheets and blankets)?: 2  Sitting down on and standing up from a chair with arms (e.g., wheelchair, bedside commode, etc.): 1  Moving from lying on back to sitting on the side of the bed?: 2  Moving to and from a bed to a chair (including a wheelchair)?: 1  Need to walk in hospital room?: 1  Climbing 3-5 steps with a railing?: 1  Basic Mobility Total Score: 8       Therapeutic Activities and Exercises:   Pt progressing toward goals; pt able to follow one step commands intermittently; skills performed as described above; sat at EOB x ~20 minutes with primarily max/totalA to maintain; performed laterals and ant/post shifting to promote midline orientation; pt with increased focus on visual and auditory cues while seated at EOB; oral and facial care at EOB; able to intermittently focus increased purposeful movement; pt experienced seizure-like activity~45 seconds with sudden inattention, extended posture and tremors throughout body; returned to sidelying, performed pericare 2/2 pt having BM during seizure- like activity; nurse informed; cont with POC.    Patient left HOB elevated with all lines intact, call button in reach and nurse and wife present..    GOALS:   Multidisciplinary Problems     Physical Therapy Goals        Problem: Physical Therapy Goal    Goal Priority Disciplines Outcome Goal Variances Interventions   Physical Therapy Goal     PT, PT/OT Ongoing, Progressing     Description: Goals to be met by: 10/10/20     Patient will increase functional independence with mobility by performin. Supine to sit with Moderate Assistance  2. Sit to supine with Moderate Assistance  3. Rolling with Moderate Assistance.  4. Sit to stand transfer with Moderate Assistance using Rolling  Walker  5. Bed to chair transfer with Moderate Assistance using Rolling Walker  6. Gait  x 5-10 feet with Moderate Assistance using Rolling Walker.   7. Increased functional strength to 4/5 for ADLs/amb.  8. Lower extremity exercise program x10 reps with independence                     Time Tracking:     PT Received On: 09/11/20  PT Start Time: 1238     PT Stop Time: 1317  PT Total Time (min): 39 min with OT    Billable Minutes: Therapeutic Activity 15 minutes    Treatment Type: Treatment  PT/PTA: PT     PTA Visit Number: 0     Mary Prajapati, PT  09/11/2020

## 2020-09-12 NOTE — PLAN OF CARE
No acute changes over night, patient is responsive, but not able to speak appropriately, unable to determine orientation level. Frequent neuro check was obtained. Patient does not follow commands, reorientation provided. Presence of voluntary movement of the extremities noted: x1 witnessed seizure like activity noted which lasted 5 seconds. VS have been stable overnight. UOP adequate, garcia care provided. No signs of distress noted. MRI result pending. Wife was updated. All questions and concerns were address. Will continue to monitor

## 2020-09-12 NOTE — PROGRESS NOTES
Pharmacokinetic Assessment Follow Up: IV Vancomycin    Vancomycin serum concentration assessment(s):    The trough level was drawn correctly and can be used to guide therapy at this time. The measurement is above the desired definitive target range of 15 to 20 mcg/mL.    Vancomycin Regimen Plan:    Discontinue the scheduled vancomycin regimen and re-dose when the random level is less than 20 mcg/mL, next level to be drawn at 0600 on 9-13-20.    Drug levels (last 3 results):  Recent Labs   Lab Result Units 09/12/20  0622   Vancomycin-Trough ug/mL 32.9*       Pharmacy will continue to follow and monitor vancomycin.    Please contact pharmacy at extension [phone] for questions regarding this assessment.    Thank you for the consult,   Martin Guerrero       Patient brief summary:  Lopez Savage is a 74 y.o. male initiated on antimicrobial therapy with IV Vancomycin for treatment of bacteremia    The patient's current regimen is Vancomycin 2250 mg IVPB q24h    Drug Allergies:   Review of patient's allergies indicates:   Allergen Reactions    Codeine     Tetanus vaccines and toxoid        Actual Body Weight:   117.5 kg    Renal Function:   Estimated Creatinine Clearance: 59.4 mL/min (based on SCr of 1.4 mg/dL).,     Dialysis Method (if applicable):  N/A    CBC (last 72 hours):  Recent Labs   Lab Result Units 09/10/20  0916 09/11/20 0449 09/12/20  0622   WBC K/uL 11.62 11.22 13.41*   Hemoglobin g/dL 8.9* 8.3* 10.2*   Hematocrit % 27.5* 26.4* 33.9*   Platelets K/uL 212 196 222   Gran% % 76.6* 80.9* 84.2*   Lymph% % 11.0* 7.4* 5.7*   Mono% % 7.5 7.8 6.9   Eosinophil% % 3.5 2.6 1.7   Basophil% % 0.9 0.6 1.0   Differential Method  Automated Automated Automated       Metabolic Panel (last 72 hours):  Recent Labs   Lab Result Units 09/10/20  0407 09/11/20 0449 09/12/20  0622   Sodium mmol/L 138 137 141   Potassium mmol/L 4.7 3.7 3.4*   Chloride mmol/L 105 106 108   CO2 mmol/L 19* 24 22*   Glucose mg/dL 79 101 83   BUN, Bld mg/dL  24* 22 22   Creatinine mg/dL 1.5* 1.5* 1.4   Albumin g/dL 2.3* 2.4* 2.6*   Total Bilirubin mg/dL 0.4 0.4 0.3   Alkaline Phosphatase U/L 45* 48* 52*   AST U/L 13 9* 11   ALT U/L 7* 5* 6*   Magnesium mg/dL 2.3 2.3 2.2   Phosphorus mg/dL 4.0  --   --        Vancomycin Administrations:  vancomycin given in the last 96 hours                     vancomycin (VANCOCIN) 2,250 mg in dextrose 5 % 500 mL IVPB (mg) 2,250 mg New Bag 09/11/20 0637    vancomycin (VANCOCIN) 2,500 mg in dextrose 5 % 500 mL IVPB (mg) 2,500 mg New Bag 09/10/20 0649    vancomycin (VANCOCIN) 2,250 mg in dextrose 5 % 500 mL IVPB (mg) 2,250 mg New Bag 09/08/20 0925                    Microbiologic Results:  Microbiology Results (last 7 days)       Procedure Component Value Units Date/Time    Blood culture #2 **CANNOT BE ORDERED STAT** [[id number]] Collected: 09/08/20 0211    Order Status: Completed Specimen: Blood from Peripheral, Forearm, Right Updated: 09/11/20 1812     Blood Culture, Routine No Growth to date      No Growth to date      No Growth to date      No Growth to date    Blood culture #1 **CANNOT BE ORDERED STAT** [[id number]]  (Abnormal) Collected: 09/08/20 0140    Order Status: Completed Specimen: Blood from Peripheral, Hand, Right Updated: 09/11/20 1245     Blood Culture, Routine Gram stain aer bottle: Gram positive cocci in clusters resembling Staph      Results called to and read back by: Dashawn Luciano RN  09/10/2020        03:37      COAGULASE-NEGATIVE STAPHYLOCOCCUS SPECIES  Organism is a probable contaminant      Culture, Respiratory with Gram Stain [[id number]] Collected: 09/08/20 1425    Order Status: Completed Specimen: Respiratory from Endotracheal Aspirate Updated: 09/11/20 0925     Respiratory Culture Normal respiratory tyree      No S aureus or Pseudomonas isolated.     Gram Stain (Respiratory) <10 epithelial cells per low power field.     Gram Stain (Respiratory) Rare WBC's     Gram Stain (Respiratory) No organisms seen    Blood  culture [[id number]] Collected: 09/10/20 0916    Order Status: Sent Specimen: Blood Updated: 09/10/20 1424    Narrative:      Collection has been rescheduled by [facility] at 09/10/2020 06:10 Reason:   unable to collect 2nd set of blood cultures and lavender top  Collection has been rescheduled by [facility] at 09/10/2020 06:10 Reason:   unable to collect 2nd set of blood cultures and lavender top    Blood culture [[id number]] Collected: 09/10/20 0609    Order Status: Sent Specimen: Blood from Antecubital, Left Updated: 09/10/20 1118

## 2020-09-12 NOTE — PROGRESS NOTES
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        Pneumonia  - Stop Doxycycline   - Continue Flagyl for now for anaerobic coverage for total of 7 days. (end date 9/15)    Bacteremia  - Coag-neg staph in 1/2 bottles on 9/8  - Suspect contaminant.     -ID will sign off, call with questions     Thanks! Please call for any questions [phone]. We will continue to follow.     H&P:  Mr. 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       In brief, patient has BCx drawn on 7/31 during a hospital admission for acute diverticulitis. Patient was contacted on 8/5 to return to the hospital for strep mutans bacteremia. Patient was started on Ceftriaxone and a PICC was placed for outpatient antibiotics. Patient returned to the hospital on 8/14 for GI bleed, and at that time had a TTE and SILVANA that confirmed vegetations on mitral and aortic valve > 1 cm. [place] [facility] [place] but conservative management was recommended. Initial ID recs were to continue Ceftriaxone 2g IV q24 hrs with an end date of 8/31, however later extended to 9/15. Patient was noted to have a new consolidation on CXR and was started on Doxycycline for CAP coverage. Per wife, patient started the Doxycycline on 9/4 and was supposed to finish the course on 9/11.      Patient presented to the ED on 9/8/2020 for loss of consciousness. Daughter found  on the ground unresponsive shortly after the wife had gone to bed. Daughter performed some chest compressions prior to EMS arrival but it is unclear if patient ever lost pulses. Patient was intubated for airway protection. Initially afebrile.  Tachycardic, and hypertensive. Initial white count 19. Cr 1.5 (BL 1.0-1.2), UA with many bacteria, 5 WBCs with no LE or nitrites.      Patient found to have left PCA infarct and right occipital lobe edema concerning for septic embolism. Patient continued on Rocephin and Doxycycline.      Infectious disease consulted for recommendations of antibiotics in setting of recent strep mutans endocarditis and pneumonia.    Subjective and Interval History:  Hospital Day 4  No acute events overnight. Later in the morning, primary team was concerned of seizure like activity. EEG with epileptiform discharged seen. ROS unable to be performed given intubation and sedation.     9/8 BCx G positive cocci in clusters resembling staph 1/2 9/8 Resp culture with normal tyree  9/10 BCx in process    Review of Symptoms:  ROS unable to be obtained due to intubation and sedation.     Medications:  Antibiotics:   Antibiotics (From admission, onward)    Start     Stop Route Frequency Ordered    09/11/20 1530  cefTRIAXone (ROCEPHIN) 2 g/50 mL D5W IVPB      09/14 2359 IV Every 24 hours (non-standard times) 09/11/20 1417    09/10/20 1051  doxycycline (VIBRAMYCIN) 100mg in dextrose 5% 250 mL IVPB (ready to mix)      09/16 2229 IV Every 12 hours (non-standard times) 09/10/20 0733    09/10/20 0640  vancomycin - pharmacy to dose  (vancomycin IVPB)      -- IV pharmacy to manage frequency 09/10/20 0540    09/09/20 1930  metronidazole IVPB 500 mg      09/16 1929 IV Every 8 hours (non-standard times) 09/09/20 1832        Objective:  Physical Exam:  VS (24h):  Temp:  [97.6 °F (36.4 °C)-98.3 °F (36.8 °C)] 98.3 °F (36.8 °C)  Pulse:  [68-85] 78  Resp:  [19-54] 25  SpO2:  [95 %-100 %] 96 %  BP: ()/(47-89) 139/70     Physical Exam  GEN- Intubated, sedated, ETT in place, breathing spontaneously  HEENT- PERRL, ETT in place, OG tube in placem no asymmetry   NECK- No thyromegaly or other masses  CARDIAC- RRR, no murmurs or rubs  RESP- CTAB, normal work of  breathing, no wheezes, crackles, or rhonchi  ABD- Soft, NT, ND, +BS; no masses or HSM  EXT- No clubbing, cyanosis, or edema; 2+ DP/PT pulses  NEURO- PERRL, gag intact, moving all 4 extremities to pain  SKIN- Warm and dry; no rashes    Lines:  A line   PIV  ETT    Labs:  CBC:   Lab Results   Component Value Date    WBC 13.41 (H) 09/12/2020    WBC 11.22 09/11/2020    WBC 11.62 09/10/2020    WBC 9.08 09/09/2020    WBC 13.45 (H) 09/08/2020    HCT 33.9 (L) 09/12/2020     09/12/2020       BMP:   Recent Labs   Lab 09/12/20  0622 09/12/20  1251   GLU 83 93    140   K 3.4* 3.8    108   CO2 22* 22*   BUN 22 21   CREATININE 1.4 1.3   CALCIUM 9.5 8.9   MG 2.2  --        LFT:   Lab Results   Component Value Date    ALT 6 (L) 09/12/2020    AST 11 09/12/2020    ALKPHOS 52 (L) 09/12/2020    BILITOT 0.3 09/12/2020     Microbiology x 7d:   Microbiology Results (last 7 days)     Procedure Component Value Units Date/Time    Blood culture #2 **CANNOT BE ORDERED STAT** [[id number]] Collected: 09/08/20 0211    Order Status: Completed Specimen: Blood from Peripheral, Forearm, Right Updated: 09/11/20 1812     Blood Culture, Routine No Growth to date      No Growth to date      No Growth to date      No Growth to date    Blood culture #1 **CANNOT BE ORDERED STAT** [[id number]]  (Abnormal) Collected: 09/08/20 0140    Order Status: Completed Specimen: Blood from Peripheral, Hand, Right Updated: 09/11/20 1245     Blood Culture, Routine Gram stain aer bottle: Gram positive cocci in clusters resembling Staph      Results called to and read back by: Dashawn Luciano RN  09/10/2020        03:37      COAGULASE-NEGATIVE STAPHYLOCOCCUS SPECIES  Organism is a probable contaminant      Culture, Respiratory with Gram Stain [[id number]] Collected: 09/08/20 1425    Order Status: Completed Specimen: Respiratory from Endotracheal Aspirate Updated: 09/11/20 0925     Respiratory Culture Normal respiratory tyree      No S aureus or Pseudomonas  isolated.     Gram Stain (Respiratory) <10 epithelial cells per low power field.     Gram Stain (Respiratory) Rare WBC's     Gram Stain (Respiratory) No organisms seen    Blood culture [[id number]] Collected: 09/10/20 0916    Order Status: Sent Specimen: Blood Updated: 09/10/20 1424    Narrative:      Collection has been rescheduled by [facility] at 09/10/2020 06:10 Reason:   unable to collect 2nd set of blood cultures and lavender top  Collection has been rescheduled by [facility] at 09/10/2020 06:10 Reason:   unable to collect 2nd set of blood cultures and lavender top    Blood culture [[id number]] Collected: 09/10/20 0609    Order Status: Sent Specimen: Blood from Antecubital, Left Updated: 09/10/20 1118        Imaging:  CT Head w/o Contrast 9/8/2020  Broad 1 cm thick scalp hematoma over the left temporoparietal region.  No underlying fracture or intracranial abnormality.     MRI/MRA Brain 9/8/2020  1. Left PCA distribution acute infarction.  2. Focus of restricted diffusion within the right occipital lobe with adjacent vasogenic edema, concerning for underlying lesion or septic embolus.  Further evaluation with contrast-enhanced MRI brain is recommended.  3. MRA head: No evidence of occlusion or high-grade stenosis of the major intracranial arteries.  4. MRA neck: No evidence of occlusion or high-grade stenosis of the major arteries of the neck.  5. Moderate bilateral pleural effusions.     CXR 9/8/2020  - Support tubes and lines remain in satisfactory position.  - Pulmonary vascular congestion with bilateral parahilar and basilar pulmonary opacities.  Findings may be related to mild to moderate pulmonary edema although bilateral pneumonia cannot be excluded.  - Small layering pleural effusions cannot be excluded.     TTE 9/8/2020  · Mild left ventricular enlargement.  · Moderate mitral regurgitation.  · Intermediate central venous pressure (8 mmHg).  · The estimated PA systolic pressure is 28 mmHg.  · Low normal left  ventricular systolic function. The estimated ejection fraction is 50%.  · Normal LV diastolic function.  · Normal right ventricular systolic function.  · Mild aortic regurgitation.    Assessment:    Active Hospital Problems    Diagnosis    *CVA (cerebral vascular accident)    Abnormal EEG    Loss of consciousness     Limited history:  LOC could have been cardiogenic or seizure.  Brainstem (or bihemispheric stroke) in DDx.  Not a tPA candidate due to recent GI bleed. Rec MRI and vascular imaging when able.      Essential hypertension    Ulcerative colitis    Chronic bronchitis    Autoimmune hemolytic anemia    Mitral regurgitation    Bacterial endocarditis     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      History of gastrointestinal bleeding     Diverticular       GERD (gastroesophageal reflux disease)    Embolic stroke involving left posterior cerebral artery    Embolic stroke involving right posterior cerebral artery    Thalamic infarct, acute    Unresponsive       Plan:  See top of page.

## 2020-09-12 NOTE — PLAN OF CARE
09/12/20 1804   Final Note   Assessment Type Final Discharge Note   Anticipated Discharge Disposition   (pt being transferred to [facility])

## 2020-09-12 NOTE — NURSING
Pt with poss. sz activity noted, Pulm MDs @ bs when this happened, pt became less responsive, tensed up ext's, eyes squeezed shut, would not open or speak to MD, myself or his wife.  Only lasted a minute then pt awake again and speaking.

## 2020-09-13 PROBLEM — I63.9 CEREBELLAR STROKE: Status: ACTIVE | Noted: 2020-01-01

## 2020-09-13 PROBLEM — G93.6 CYTOTOXIC BRAIN EDEMA: Status: ACTIVE | Noted: 2020-01-01

## 2020-09-13 PROBLEM — G93.40 ENCEPHALOPATHY ACUTE: Status: ACTIVE | Noted: 2020-01-01

## 2020-09-13 PROBLEM — G40.901 STATUS EPILEPTICUS: Status: ACTIVE | Noted: 2020-01-01

## 2020-09-13 NOTE — NURSING TRANSFER
Nursing Transfer Note      9/13/2020     Transfer To: neuro icu-[facility] ROOM  [id number]    Transfer via stretcher    Transfer with cardiac monitoring    Transported by [facility]    Medicines sent: YES    Chart send with patient: Yes    Notified: spouse    Pt left with eyeglasses on face and rn was notified of his departure.

## 2020-09-13 NOTE — DISCHARGE SUMMARY
"***,    I'm contacting you from HIM Chart Correction regarding the erroneous *** Telephone Encounter located on Lopez Savage's/MRN *** chart. Please use the directions below to remove the erroneous telephone encounter from the patient's record. If you would like help completing this correction, please contact HIM Chart Correction at [phone].    1.  Click the blue patient name link in the Details portion of this message  ***.  Select Create Addendum  ***.  Go to the Visit Information section of the Visit Navigator  ***.  Type Error in the Reason for Call and Erroneous Entry as the Comment and Close  ***.  Go to the Documentation section of the Visit Navigator  ***.  Cancel the open note, click the Addend button, delete all of the information, type "This note was entered in error" and Close  ***.  Click the Delete icon and confirm by selecting Delete Note  ***.  Go to the SmartSets section of the Visit Navigator  ***.  Select the Erroneous Encounter SmartSet  ***.  Open and Sign the SmartSet and Close  ***.  In the Correction pane of the Tasks window, click the Corrected button, then click the Rajendra as Complete button, enter your password, and select OK  ***.  Sign the Addendum    Again, if you have any questions or would like help making this correction, please contact HIM Chart Correction at [phone]. Thank you very much for your prompt attention to this matter.     ***, [facility] Chart Correction        "

## 2020-09-13 NOTE — NURSING
Patient arrived to [facility] from [facility] joslyn >> linda 372 >> [facility] 2546    Type of stroke/diagnosis:  L PCA, L cerebellar infarct, NCSE    TPA start and end time (if applicable)    Thrombectomy start and end time (if applicable)    Current symptoms: lethargic, does not follow commands    Skin assessment done: Y  Wounds noted: none  ARTURO Armband Applied: yes  Patient Belongings on Admit: glasses on bedside table    NCC notified: Riya Hurtado NP    Pt came in with coude cath.  Was given in report catheter took multiple attempts to insert, ok per MD Lillie to leave garcia in from OSH.

## 2020-09-13 NOTE — NURSING
Pt's wife, Brenda updated with transfer to main campus to Neuro ICU room 9056 and unit phone number of [phone].

## 2020-09-13 NOTE — H&P
[facility]  Neurocritical Care  History & Physical    Admit Date: 9/13/2020  Service Date: 09/13/2020  Length of Stay: 0    Subjective:     Chief Complaint: <principal problem not specified>    History of Present Illness: Mr. 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 The patient presented to [facility] on 9/8/2020 with a primary complaint of loss of consciousness. The patient was recently treated for endocarditis w/ IV rocephin through PICC line at [facility] and discharged approximately 2 weeks ago. Since then he has had a worsening cough and was planning to go to the hospital for evaluation. Per wife home health did portable CXR about a week ago w/ concern for fluid vs pneumonia. Patient was given course of doxycycline and finished course recently. 9/8 Wife went to bed while  was watching TV and about an hour later her daughter found him non responsive on the ground. Daughter did a few chest compressions before EMS arrived. Patient arrived to ED and non responsive to sternal rub. Pupillary and gag reflex present. Patient intubated for airway protection and sedated w/ propofol. No seizure like activity was observed in ED. TPA contraindicated due to recent GI bleed, last occurring in August. Discussed further stroke work up with wife and planned for imaging. CT head negative for acute intracranial bleed. MRI head showed PCA infarct and right occipital lobe edema. 9/8 EEG demonstrated left posterior pseudo periodic epileptiform discharges is suggestive of an irritative region likely secondary to focal ischemia. Patient started on AEDs vimpat and keppra and reported to have several seizures during his stay. Patient was transferred to [facility] today for continuous EEG monitoring and further evaluation.    No past medical history on  file.  No past surgical history on file.   Current Facility-Administered Medications on File Prior to Encounter   Medication Dose Route Frequency Provider Last Rate Last Dose    [COMPLETED] lacosamide oral solution 200 mg  200 mg Per NG tube Once Huy Hudson MD   200 mg at 09/12/20 1929    [DISCONTINUED] 0.9%  NaCl infusion (for blood administration)   Intravenous Q24H PRN Luis Manuel Henry MD        [DISCONTINUED] atorvastatin tablet 40 mg  40 mg Per NG tube Daily Jermaine Tuttle MD   40 mg at 09/13/20 0838    [DISCONTINUED] cefTRIAXone (ROCEPHIN) 2 g/50 mL D5W IVPB  2 g Intravenous Q24H Jermaine Tuttle MD   2 g at 09/12/20 1512    [DISCONTINUED] dextrose 50% injection 12.5 g  12.5 g Intravenous PRN Jermaine Tuttle MD        [DISCONTINUED] doxycycline (VIBRAMYCIN) 100mg in dextrose 5% 250 mL IVPB (ready to mix)  100 mg Intravenous Q12H Jermaine Tuttle  mL/hr at 09/12/20 2130 100 mg at 09/12/20 2130    [DISCONTINUED] enoxaparin injection 40 mg  40 mg Subcutaneous Q24H Jermaine Tuttle MD   40 mg at 09/12/20 1648    [DISCONTINUED] fentaNYL injection 50 mcg  50 mcg Intravenous Q15 Min PRN Damari Witt MD   50 mcg at 09/13/20 0517    [DISCONTINUED] fentaNYL injection 50 mcg  50 mcg Intravenous Q1H PRN Damari Witt MD        [DISCONTINUED] glucagon (human recombinant) injection 1 mg  1 mg Intramuscular PRN Jermaine Tuttle MD        [DISCONTINUED] insulin aspart U-100 pen 0-5 Units  0-5 Units Subcutaneous Q6H PRN Jermaine Tuttle MD        [DISCONTINUED] lacosamide oral solution 100 mg  100 mg Per NG tube Q12H Huy Hudson MD   100 mg at 09/13/20 0838    [DISCONTINUED] levETIRAcetam in NaCl (iso-os) IVPB 500 mg  500 mg Intravenous Q12H Carla Caputo  mL/hr at 09/13/20 0838 500 mg at 09/13/20 0838    [DISCONTINUED] lorazepam injection 2 mg  2 mg Intravenous Q1H PRN Damari Witt MD   2 mg at 09/12/20 1139    [DISCONTINUED] losartan tablet 50 mg  50 mg Oral  Daily Luis Manuel Henry MD   50 mg at 09/13/20 0331    [DISCONTINUED] metronidazole IVPB 500 mg  500 mg Intravenous Q8H Jermaine Tuttle  mL/hr at 09/13/20 0308 500 mg at 09/13/20 0308    [DISCONTINUED] sodium chloride 0.9% flush 10 mL  10 mL Intravenous PRN Jermaine Tuttle MD        [DISCONTINUED] vancomycin - pharmacy to dose   Intravenous pharmacy to manage frequency Shawna Chavez MD         Current Outpatient Medications on File Prior to Encounter   Medication Sig Dispense Refill    atorvastatin (LIPITOR) 40 MG tablet 1 tablet (40 mg total) by Per G Tube route once daily. 90 tablet 3    cefTRIAXone (ROCEPHIN) 2 g/50 mL PgBk IVPB Inject 50 mLs (2 g total) into the vein once daily. for 4 days 4 each 0    doxycycline hyclate (DOXYCYCLINE 100MG IN D5W 250ML, READY TO MIX,) Inject 250 mLs (100 mg total) into the vein every 12 (twelve) hours. for 6 days 3000 mL 0    enoxaparin (LOVENOX) 40 mg/0.4 mL Syrg Inject 0.4 mLs (40 mg total) into the skin once daily. 12 mL 0    insulin aspart U-100 (NOVOLOG) 100 unit/mL (3 mL) InPn pen Inject 0-5 Units into the skin every 6 (six) hours as needed (Hyperglycemia). 3 mL 0    lacosamide (VIMPAT) 10 mg/mL Soln oral solution 10 mLs (100 mg total) by Per NG tube route every 12 (twelve) hours. 600 mL 11    levETIRAcetam in NaCl, iso-os, (KEPPRA) 500 mg/100 mL PgBk Inject 100 mLs (500 mg total) into the vein every 12 (twelve) hours. 6000 mL 0    [START ON 9/14/2020] losartan (COZAAR) 50 MG tablet Take 1 tablet (50 mg total) by mouth once daily. 90 tablet 3    metronidazole (FLAGYL) 500 mg/100 mL IVPB Inject 100 mLs (500 mg total) into the vein every 8 (eight) hours. for 7 days 2100 mL 0      Allergies: Codeine and Tetanus vaccines and toxoid    No family history on file.  Social History     Tobacco Use    Smoking status: Not on file   Substance Use Topics    Alcohol use: Not on file    Drug use: Not on file     Review of Systems   Reason unable to  perform ROS: Level of conciousness.     Objective:     Vitals:    Temp: 97.5 °F (36.4 °C)  Pulse: 80  Rhythm: normal sinus rhythm  BP: (!) 145/65  MAP (mmHg): 93  Resp: 20  SpO2: 100 %  O2 Device (Oxygen Therapy): room air    Temp  Min: 97.2 °F (36.2 °C)  Max: 99.1 °F (37.3 °C)  Pulse  Min: 78  Max: 95  BP  Min: 137/98  Max: 175/79  MAP (mmHg)  Min: 93  Max: 1235  Resp  Min: 20  Max: 48  SpO2  Min: 96 %  Max: 100 %    No intake/output data recorded.           Physical Exam  Constitutional:       Appearance: He is ill-appearing.   HENT:      Head: Normocephalic and atraumatic.      Nose: Nose normal.      Mouth/Throat:      Mouth: Mucous membranes are moist.   Eyes:      Pupils: Pupils are equal, round, and reactive to light.   Neck:      Musculoskeletal: Normal range of motion.   Cardiovascular:      Rate and Rhythm: Normal rate and regular rhythm.   Pulmonary:      Effort: Pulmonary effort is normal. No respiratory distress.      Breath sounds: No wheezing or rales.   Abdominal:      General: There is no distension.      Palpations: Abdomen is soft.      Tenderness: There is no abdominal tenderness.   Neurological:      Comments: - Oriented to self and place, not time. Lethargic.  - No cranial nerve deficits  - No significant weakness noted, all extremities at least antigravity. Wife reports R weaker than L       Unable to test orientation, language, memory, judgment, insight, fund of knowledge, hearing, shoulder shrug, tongue protrusion, coordination, gait due to level of consciousness.    Today I personally reviewed pertinent medications, lines/drains/airways, imaging, cardiology results, laboratory results, microbiology results, notably:    Assessment/Plan:     Neuro  Status epilepticus  - Multiple witnessed seizure-like activity described  - No previous reports from family  - Was started on keppra and vimpat 200 mg BID  - EEG at OSH shows irregular slowing in the R frontal region, with L posterior periodic  epileptiform discharges. No seizures recorded during this study.  - 9/13 Here EEG continues to show periodic discharges in the left posterior region with no seizures.    Plan:   -- Epilepsy recommends continuing vimpat 200 mg BID  -- Continue keppra 500 mg BID  -- Continue EEG monitoring  -- Continue PT/OT  -- Hold lovenox  -- Continue Antibiotics  -- ID consulted for further management of IE  -- Vascular neurology consulted for management of recent stroke          The patient is being Prophylaxed for:  Venous Thromboembolism with: Mechanical  Stress Ulcer with: Not Applicable   Ventilator Pneumonia with: not applicable    Activity Orders          None        Full Code    Ron Molina MD  Neurocritical Care  [facility]

## 2020-09-13 NOTE — HOSPITAL COURSE
9/13/2020 Admit to NCC.  09/14/2020: NAEON. Duonebs scheduled, PT/OT ordered. EEG with continuous periodic discharges per Epilepsy team. Continue current AEDs. Will consult ID for endocarditis, antibiotic recs.  09/15/2020: MRI with and without contrast ordered, which shows multiple septic emboli with abscess formation. ID consulted, ceftriaxone adjusted to CNS doses. NSGY following.  09/16/2020: Patient stable overnight. ID stopped doxycycline and flagyl, and started Vanc. Continue ceftriaxone. Free water flushes started. PT/OT to eval and sit on side of bed.  09/17/2020: Patient with increasing lethargy yesterday evening. ABG WNL. CTH stable, placed on cap EEG overnight. EPC, moderate encephalopathy on EEG overnight. Patient more awake at night, and drowsy during the day. Will implement delirium precautions.  09/19/2020: NAEON. Delirium improved. More alert today.  09/20/2020: NAEON. Sitting up in bed, more awake. Stable for stepdown.

## 2020-09-13 NOTE — PROGRESS NOTES
Spoke with Dr. Issa in regards to patient's bp still in 170's. Per MD to give pain medication if needed. Per MD will continue to watch bp for now, if it does not improve will place something else for bp.

## 2020-09-13 NOTE — ASSESSMENT & PLAN NOTE
- Multiple witnessed seizure-like activity described  - No previous reports from family  - Was started on keppra and vimpat 200 mg BID  - EEG at OSH shows irregular slowing in the R frontal region, with L posterior periodic epileptiform discharges. No seizures recorded during this study.  - 9/13 Here EEG continues to show periodic discharges in the left posterior region with no seizures.    Plan:   -- Epilepsy recommends continuing vimpat 200 mg BID  -- Continue keppra 500 mg BID  -- Continue EEG monitoring  -- Continue PT/OT  -- Hold lovenox  -- Continue Antibiotics  -- ID consulted for further management of IE  -- Vascular neurology consulted for management of recent stroke

## 2020-09-13 NOTE — NURSING
Attempted to call for report to neuro icu room 9037. Nurse unable to take report at this time. Left name and number to reach back the unit.

## 2020-09-13 NOTE — PLAN OF CARE
[facility] Care Plan    POC reviewed with Lopez Savage and family at 1400. Pt verbalized understanding. Questions and concerns addressed. No acute events today. Pt progressing toward goals. Will continue to monitor. See below and flowsheets for full assessment and VS info.     KUB done.  NS @ 75 ml/hr.  cEEG.   Turned q 2.  BM x 1.    Neuro:  [place] Coma Scale  Best Eye Response: 3-->(E3) to speech  Best Motor Response: 6-->(M6) obeys commands  Best Verbal Response: 2-->(V2) incomprehensible speech  [place] Coma Scale Score: 11        24 hr Temp:  [97.2 °F (36.2 °C)-99.1 °F (37.3 °C)]     CV:   Rhythm: normal sinus rhythm  BP goals:   SBP < 180  MAP > 65    Resp:   O2 Device (Oxygen Therapy): room air       Plan: N/A    GI/:  ARTURO Total Score: 2  Diet/Nutrition Received: NPO  Last Bowel Movement: 09/13/20  Voiding Characteristics: urethral catheter (bladder)    Intake/Output Summary (Last 24 hours) at 9/13/2020 1741  Last data filed at 9/13/2020 1500  Gross per 24 hour   Intake --   Output 350 ml   Net -350 ml          Labs/Accuchecks:  Recent Labs   Lab 09/13/20  0538   WBC 14.04*   RBC 3.37*   HGB 9.4*   HCT 29.9*         Recent Labs   Lab 09/13/20  0539      K 3.7   CO2 22*      BUN 22   CREATININE 1.3   ALKPHOS 49*   ALT 5*   AST 10   BILITOT 0.3      Recent Labs   Lab 09/08/20  0140   INR 1.1      Recent Labs   Lab 09/08/20  1028   TROPONINI 0.147*       Electrolytes: N/A - electrolytes WDL  Accuchecks: Q6H    Gtts:   sodium chloride 0.9% 75 mL/hr at 09/13/20 1630    dextrose 10 % in water (D10W)         LDA/Wounds:  Lines/Drains/Airways       Drain                   Urethral Catheter 09/08/20 0315 Coude 14 Fr. 5 days         NG/OG Tube 09/12/20 1644 Wells sump 16 Fr. Left nostril 1 day              Peripheral Intravenous Line                   Midline Catheter Insertion/Assessment  - Single Lumen 09/10/20 2130 Left basilic vein (medial side of arm) 18g x 10cm 2 days         Peripheral IV -  Single Lumen 09/11/20 2215 20 G Left Forearm 1 day                  Wounds: No  Wound care consulted: No    Problem: Adjustment to Illness (Stroke, Ischemic/Transient Ischemic Attack)  Goal: Optimal Coping  Intervention: Support Patient/Family Psychosocial Response to Stroke  Flowsheets (Taken 9/13/2020 1740)  Supportive Measures:   active listening utilized   counseling provided   decision-making supported  Family/Support System Care:   involvement promoted   presence promoted     Problem: Cerebral Tissue Perfusion Risk (Stroke, Ischemic/Transient Ischemic Attack)  Goal: Optimal Cerebral Tissue Perfusion  Intervention: Protect and Optimize Cerebral Perfusion  Flowsheets (Taken 9/13/2020 1740)  Sensory Stimulation Regulation:   care clustered   music/television provided for relaxation  Cerebral Perfusion Promotion:   blood pressure monitored   normothermia promoted

## 2020-09-13 NOTE — HPI
Mr. 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 The patient presented to [facility] on 9/8/2020 with a primary complaint of loss of consciousness. The patient was recently treated for endocarditis w/ IV rocephin through PICC line at [facility] and discharged approximately 2 weeks ago. Since then he has had a worsening cough and was planning to go to the hospital for evaluation. Per wife home health did portable CXR about a week ago w/ concern for fluid vs pneumonia. Patient was given course of doxycycline and finished course recently. 9/8 Wife went to bed while  was watching TV and about an hour later her daughter found him non responsive on the ground. Daughter did a few chest compressions before EMS arrived. Patient arrived to ED and non responsive to sternal rub. Pupillary and gag reflex present. Patient intubated for airway protection and sedated w/ propofol. No seizure like activity was observed in ED. TPA contraindicated due to recent GI bleed, last occurring in August. Discussed further stroke work up with wife and planned for imaging. CT head negative for acute intracranial bleed. MRI head showed PCA infarct and right occipital lobe edema. 9/8 EEG demonstrated left posterior pseudo periodic epileptiform discharges is suggestive of an irritative region likely secondary to focal ischemia. Patient started on AEDs vimpat and keppra and reported to have several seizures during his stay. Patient was transferred to [facility] at [facility] today for continuous EEG monitoring and further evaluation.

## 2020-09-13 NOTE — PROGRESS NOTES
09/13/20 0030   Vital Signs   Pulse 90   Heart Rate Source Monitor   Resp (!) 41   SpO2 99 %   O2 Device (Oxygen Therapy) room air   BP (!) 171/77   MAP (mmHg) 111   BP Location Right arm   BP Method Automatic   Patient Position Lying   [facility] team was notified of patient's bp keep on elevating. Per MD will check patient's home meds list and will place the order.

## 2020-09-13 NOTE — NURSING
Pt with thick oral secretions, abdominal breathing and expiratory wheezing, notified [facility] will order ABG and duonebs PRN.

## 2020-09-13 NOTE — DISCHARGE SUMMARY
[facility] Hospital Medicine Transfer Note    Primary Team: [facility] Hospitalist Team A  Attending Physician: Dex Moreira MD  Resident: Aniore  Intern: Harshal    Date of Admit: 2020  Date of Discharge: 2020    Transfer to: [facility]   Condition: Poor    Discharge Diagnoses     Patient Active Problem List   Diagnosis    Loss of consciousness    Essential hypertension    Ulcerative colitis    Chronic bronchitis    Autoimmune hemolytic anemia    Mitral regurgitation    CVA (cerebral vascular accident)    Bacterial endocarditis    History of gastrointestinal bleeding    GERD (gastroesophageal reflux disease)    Embolic stroke involving left posterior cerebral artery    Unresponsive    Embolic stroke involving right posterior cerebral artery    Thalamic infarct, acute    Abnormal EEG    Anemia    Seizure-like activity    Status epilepticus       Consultants and Procedures     Consultants:  Neurology  Vascular Neurology  Infectious Disease  Pulmonology/Critical Care    Procedures:   Intubation     20 EEG  IMPRESSION:  This is an abnormal EEG during lethargic state.  Diffuse disorganized slowing of the background was noted.  Nearly continuous right frontal central parietal slowing was noted left posterior maximum pseudo periodic 1-2 hertz epileptiform discharges were seen.     Imagin20 CT Head:  FINDINGS:  There is a moderate large is scalp hematoma over the left temporal and parietal bone measuring up to 1 cm thick.  No laceration or foreign body is evident.  The underlying calvarium is intact.     The brain parenchyma appears normal in attenuation with normal gray-white matter differentiation.  There is no evidence of mass or mass effect.     Mastoid effusion on the right is present.  The remainder of the air cells are clear.  The orbits and orbital contents appear normal.     Impression:     Broad 1 cm thick scalp hematoma over the left temporoparietal region.  No  underlying fracture or intracranial abnormality.    9/8/20 MRI Brain w/o Contrast:  Impression:     1. Left PCA distribution acute infarction.  2. Focus of restricted diffusion within the right occipital lobe with adjacent vasogenic edema, concerning for underlying lesion or septic embolus.  Further evaluation with contrast-enhanced MRI brain is recommended.  3. MRA head: No evidence of occlusion or high-grade stenosis of the major intracranial arteries.  4. MRA neck: No evidence of occlusion or high-grade stenosis of the major arteries of the neck.  5. Moderate bilateral pleural effusions.  This report was flagged in Epic as abnormal.    9/11/20 MRI Brain w/o Contrast:  Impression:     1. Since 09/08/2020 MRI, there is a new small focus of acute to subacute ischemia in the left cerebellum.  2. Elsewhere, there is continued maturation of ischemia in the bilateral medial occipital and parietal lobes as well as the left cingulate gyrus.  3. At least partially on the basis of differences in technique, with SWI sequences utilized on the current examination versus T2* GRE previously, there are numerous diffuse bilateral cortical and subcortical foci of susceptibility related signal loss.  Newly evident susceptibility within the medial occipital lobes may represent petechial hemorrhage associated with the evolving areas of ischemia.  No large parenchymal hemorrhage is identified.  Additionally, there is evidence of susceptibility related signal loss about the cerebral sulci without FLAIR signal abnormality suggested of superficial siderosis as sequela of prior subarachnoid hemorrhage.  Overall constellation of findings suggests cerebral amyloid angiopathy.  Component of chronic hypertensive arteriopathy also considered.  4. Additional details, as per report body.    Brief History of Present Illness      Lopez Savage is a 73 y/o male with PMH of diverticulitis, MVP, endocarditis with aortic valve vegetations, HTN, and  COPD. The patient presented to [facility] on 9/8/2020 with a primary complaint of loss of consciousness. The patient was recently treated for endocarditis w/ IV rocephin through PICC line at [facility] and discharged 2 weeks prior to presentation. Since then he had a worsening cough and was planning to go to the hospital for evaluation. Per wife home health did portable CXR about a week prior to presentation w/ concern for fluid vs pneumonia. Patient was given course of doxycycline and finished course day prior to admission. That night, his wife went to bed while  was watching TV and about an hour later her daughter found him non responsive on the ground. Daughter did a few chest compressions before EMS arrived. Patient arrived to ED and non responsive to sternal rub. Pupillary and gag reflex present. Patient intubated for airway protection and sedated w/ propofol. No seizure like activity was observed in ED.     For the full HPI please refer to the History & Physical from this admission.    Hospital Course By Problem with Pertinent Findings     Seizure-like activity in setting of PCA stroke; Multiple witnessed seizure like activity described concerning for left sided seizure focus.   - no previous reports of seizures from family  - 9/8 EEG demonstrated left posterior pseudo periodic epileptiform discharges is suggestive of an irritative region likely secondary to focal ischemia.  There is increased risk of focal seizures from this region, however no seizures recorded during this study.   - neurology recommended keppra loading with 3g and maintenance 500 BID  - Yesterday pt with several seizures, concern for clusters of seizures vs. NCSE  - initiated Vimpat 200 mg load followed by 100 mg BID due to persistent seizures  - to transfer to [facility] today for continuous EEG monitoring   - avoid cefepime and tramadol, other seizure threshold-lowering agents     Acute hypoxic  respiratory failure in the setting of PCA stroke suspect 2/2 septic emboli;  patient found unresponsive at home before arrival to ED. Initially non-responsive to sternal rub, pupillary and gag reflex present. tPA contraindicated due to recent GI bleed.   - CT head negative for acute intracranial bleed  - MRI head showed PCA infarct and right occipital lobe edema   - 9/11 MRI showing since 09/08/2020 MRI, there is a new small focus of acute to subacute ischemia in the left cerebellum.  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   - initially treated at [facility] and discharged with three week course of IV rocephin  - strepto mutans; 6 week course set to end on 9/14/2020  - SILVANA at [facility] showed aortic valve vegetations  - ceftriaxone, vanc per ID rec's     Bilateral Community Acquired Pneumonia  - S/p 7 day course of doxycycline for atypical CAP coverage   - Continue Flagyl for now for anaerobic coverage (end date 9/15)     Normocytic Anemia  - H/H on admission 9.1/29.4, MCV 91  - has had extensive workup at [facility] for anemia in past including autoimmune, leukemia and lymphoma which were negative  -  S/p 2 units of blood ordered for transfusion on 9/9, H&H today 9.4/29.9     Hypokalemia  - K 3.2 on admission  - received IV KCl 40mEq in ED  - K this AM: 3.7     Intermediate Level Troponin; suspect secondary to demand from CVA  - Troponin 0.08 on admission -> 0.147 on 9/8  -   - EKG sinus tachycardia w/ left anterior fascicular block; no new ST elevations/depressions     Leukocytosis   - WBC 19 on admission trended down but now 14.0  - likely 2/2 to endocarditis  - no reported fevers recently, afebrile since admit  - will continue to  monitor     COPD, stable  - pt does not require home O2  - home montelukast and fluticasone   - pt currently satting 98 on room air     H/o GI Bleed; h/o bleeding diverticuli and gastric ulcers s/p cauterization, has required blood transfusions. Most recent GI bleed in August 2020   - s/p transfusion of 2 units on 9/9  - no active bleeding throughout hospitalization, hemodynamically stable      HENRY vs CKD  - Cr 1.5 on admission, baseline near 1.2-1.3  - eGFR 31  - creatinine this AM: 1.3     Hyperglycemia (resolved)  - glucose 189 on admission, 107 on recent POCT  - ordered SSI  - A1c 4.8  - will obtain tight glycemia control for neurological protection      HTN; initially held home BP in setting of possible ischemic stroke.  - hypertensive to 180's systolic, has normalized  - restarted Losartan 25 mg last night due to SBPs in 170s, with improvement to 150s     HLD  - atorvastatin 40mg, continuing for secondary stroke prevention     Discharge Medications      Lopez Savage   Home Medication Instructions ABDOULAYE:[id number]    Printed on:09/13/20 1241   Medication Information                      atorvastatin (LIPITOR) 40 MG tablet  1 tablet (40 mg total) by Per G Tube route once daily.             cefTRIAXone (ROCEPHIN) 2 g/50 mL PgBk IVPB  Inject 50 mLs (2 g total) into the vein once daily. for 4 days             doxycycline hyclate (DOXYCYCLINE 100MG IN D5W 250ML, READY TO MIX,)  Inject 250 mLs (100 mg total) into the vein every 12 (twelve) hours. for 6 days             enoxaparin (LOVENOX) 40 mg/0.4 mL Syrg  Inject 0.4 mLs (40 mg total) into the skin once daily.             insulin aspart U-100 (NOVOLOG) 100 unit/mL (3 mL) InPn pen  Inject 0-5 Units into the skin every 6 (six) hours as needed (Hyperglycemia).             lacosamide (VIMPAT) 10 mg/mL Soln oral solution  10 mLs (100 mg total) by Per NG tube route every 12 (twelve) hours.             levETIRAcetam in NaCl, iso-os, (KEPPRA) 500 mg/100 mL PgBk  Inject  100 mLs (500 mg total) into the vein every 12 (twelve) hours.             losartan (COZAAR) 50 MG tablet  Take 1 tablet (50 mg total) by mouth once daily.             metronidazole (FLAGYL) 500 mg/100 mL IVPB  Inject 100 mLs (500 mg total) into the vein every 8 (eight) hours. for 7 days                 Discharge Information:   Diet:  As per accepting team    Physical Activity:  As tolerated             Instructions:  1. Take all medications as prescribed  2. Keep all follow-up appointments  3. Return to the hospital or call your primary care physicians if any worsening symptoms such as fever, chest pain, shortness of breath, return of symptoms, or any other concerns.    Follow-Up Appointments:  Patient is being transferred to ICU at [facility]; will require f/u with Neurology, PCP, cardiology on discharge.    Marisol Parks MD  [facility] Internal Medicine-Pediatrics, -I

## 2020-09-13 NOTE — NURSING
Attempted to call neuro icu again to call report. Nurse not available to take report at this time. [place] of unit said she would relay the message.

## 2020-09-13 NOTE — SUBJECTIVE & OBJECTIVE
No past medical history on file.  No past surgical history on file.   Current Facility-Administered Medications on File Prior to Encounter   Medication Dose Route Frequency Provider Last Rate Last Dose    [COMPLETED] lacosamide oral solution 200 mg  200 mg Per NG tube Once Huy Hudson MD   200 mg at 09/12/20 1929    [DISCONTINUED] 0.9%  NaCl infusion (for blood administration)   Intravenous Q24H PRN Luis Manuel Henry MD        [DISCONTINUED] atorvastatin tablet 40 mg  40 mg Per NG tube Daily Jermaine Tuttle MD   40 mg at 09/13/20 0838    [DISCONTINUED] cefTRIAXone (ROCEPHIN) 2 g/50 mL D5W IVPB  2 g Intravenous Q24H Jermaine Tuttle MD   2 g at 09/12/20 1512    [DISCONTINUED] dextrose 50% injection 12.5 g  12.5 g Intravenous PRN Jermaine Tuttle MD        [DISCONTINUED] doxycycline (VIBRAMYCIN) 100mg in dextrose 5% 250 mL IVPB (ready to mix)  100 mg Intravenous Q12H Jermaine Tuttle  mL/hr at 09/12/20 2130 100 mg at 09/12/20 2130    [DISCONTINUED] enoxaparin injection 40 mg  40 mg Subcutaneous Q24H Jermaine Tuttle MD   40 mg at 09/12/20 1648    [DISCONTINUED] fentaNYL injection 50 mcg  50 mcg Intravenous Q15 Min PRN Damari Witt MD   50 mcg at 09/13/20 0517    [DISCONTINUED] fentaNYL injection 50 mcg  50 mcg Intravenous Q1H PRN Damari Witt MD        [DISCONTINUED] glucagon (human recombinant) injection 1 mg  1 mg Intramuscular PRN Jermaine Tuttle MD        [DISCONTINUED] insulin aspart U-100 pen 0-5 Units  0-5 Units Subcutaneous Q6H PRN Jermaine Tuttle MD        [DISCONTINUED] lacosamide oral solution 100 mg  100 mg Per NG tube Q12H Huy Hudson MD   100 mg at 09/13/20 0838    [DISCONTINUED] levETIRAcetam in NaCl (iso-os) IVPB 500 mg  500 mg Intravenous Q12H Carla Caputo  mL/hr at 09/13/20 0838 500 mg at 09/13/20 0838    [DISCONTINUED] lorazepam injection 2 mg  2 mg Intravenous Q1H PRN Damari Witt MD   2 mg at 09/12/20 1139    [DISCONTINUED] losartan  tablet 50 mg  50 mg Oral Daily Luis Manuel Henry MD   50 mg at 09/13/20 0331    [DISCONTINUED] metronidazole IVPB 500 mg  500 mg Intravenous Q8H Jermaine Tuttle  mL/hr at 09/13/20 0308 500 mg at 09/13/20 0308    [DISCONTINUED] sodium chloride 0.9% flush 10 mL  10 mL Intravenous PRN Jermaine Tuttle MD        [DISCONTINUED] vancomycin - pharmacy to dose   Intravenous pharmacy to manage frequency Shawna Chavez MD         Current Outpatient Medications on File Prior to Encounter   Medication Sig Dispense Refill    atorvastatin (LIPITOR) 40 MG tablet 1 tablet (40 mg total) by Per G Tube route once daily. 90 tablet 3    cefTRIAXone (ROCEPHIN) 2 g/50 mL PgBk IVPB Inject 50 mLs (2 g total) into the vein once daily. for 4 days 4 each 0    doxycycline hyclate (DOXYCYCLINE 100MG IN D5W 250ML, READY TO MIX,) Inject 250 mLs (100 mg total) into the vein every 12 (twelve) hours. for 6 days 3000 mL 0    enoxaparin (LOVENOX) 40 mg/0.4 mL Syrg Inject 0.4 mLs (40 mg total) into the skin once daily. 12 mL 0    insulin aspart U-100 (NOVOLOG) 100 unit/mL (3 mL) InPn pen Inject 0-5 Units into the skin every 6 (six) hours as needed (Hyperglycemia). 3 mL 0    lacosamide (VIMPAT) 10 mg/mL Soln oral solution 10 mLs (100 mg total) by Per NG tube route every 12 (twelve) hours. 600 mL 11    levETIRAcetam in NaCl, iso-os, (KEPPRA) 500 mg/100 mL PgBk Inject 100 mLs (500 mg total) into the vein every 12 (twelve) hours. 6000 mL 0    [START ON 9/14/2020] losartan (COZAAR) 50 MG tablet Take 1 tablet (50 mg total) by mouth once daily. 90 tablet 3    metronidazole (FLAGYL) 500 mg/100 mL IVPB Inject 100 mLs (500 mg total) into the vein every 8 (eight) hours. for 7 days 2100 mL 0      Allergies: Codeine and Tetanus vaccines and toxoid    No family history on file.  Social History     Tobacco Use    Smoking status: Not on file   Substance Use Topics    Alcohol use: Not on file    Drug use: Not on file     Review of  Systems   Reason unable to perform ROS: Level of conciousness.     Objective:     Vitals:    Temp: 97.5 °F (36.4 °C)  Pulse: 80  Rhythm: normal sinus rhythm  BP: (!) 145/65  MAP (mmHg): 93  Resp: 20  SpO2: 100 %  O2 Device (Oxygen Therapy): room air    Temp  Min: 97.2 °F (36.2 °C)  Max: 99.1 °F (37.3 °C)  Pulse  Min: 78  Max: 95  BP  Min: 137/98  Max: 175/79  MAP (mmHg)  Min: 93  Max: 1235  Resp  Min: 20  Max: 48  SpO2  Min: 96 %  Max: 100 %    No intake/output data recorded.           Physical Exam  Constitutional:       Appearance: He is ill-appearing.   HENT:      Head: Normocephalic and atraumatic.      Nose: Nose normal.      Mouth/Throat:      Mouth: Mucous membranes are moist.   Eyes:      Pupils: Pupils are equal, round, and reactive to light.   Neck:      Musculoskeletal: Normal range of motion.   Cardiovascular:      Rate and Rhythm: Normal rate and regular rhythm.   Pulmonary:      Effort: Pulmonary effort is normal. No respiratory distress.      Breath sounds: No wheezing or rales.   Abdominal:      General: There is no distension.      Palpations: Abdomen is soft.      Tenderness: There is no abdominal tenderness.   Neurological:      Comments: - Oriented to self and place, not time. Lethargic.  - No cranial nerve deficits  - No significant weakness noted, all extremities at least antigravity. Wife reports R weaker than L       Unable to test orientation, language, memory, judgment, insight, fund of knowledge, hearing, shoulder shrug, tongue protrusion, coordination, gait due to level of consciousness.    Today I personally reviewed pertinent medications, lines/drains/airways, imaging, cardiology results, laboratory results, microbiology results, notably:

## 2020-09-13 NOTE — PROGRESS NOTES
"[facility] Hospital Medicine Progress Note    Primary Team: [facility] Hospitalist Team A  Attending Physician: Rush Mary MD  Resident: Marry  Intern: Harshal    Subjective:     NAEON. Patient responsive to verbal and tactile stimuli. No signs of distress. To transfer to [facility] today for continuous EEG monitoring.      Objective:     Last 24 Hour Vital Signs:  BP  Min: 127/60  Max: 175/79  Temp  Av.5 °F (36.9 °C)  Min: 97.6 °F (36.4 °C)  Max: 99.1 °F (37.3 °C)  Pulse  Av.1  Min: 77  Max: 95  Resp  Av.6  Min: 22  Max: 54  SpO2  Av.9 %  Min: 96 %  Max: 99 %  Weight  Av.6 kg (257 lb)  Min: 116.6 kg (257 lb)  Max: 116.6 kg (257 lb)  I/O last 3 completed shifts:  In:  [I.V.:160; IV Piggyback:]  Out:  [Urine:]    Physical Examination:  General: sedated, laying in bed, NAD  Cardiac: regular rate, normal rhythm, no murmurs appreciated  Pulm: CTAB anteriorly. NC in place.  Abdomen: obese abdomen, non distended, normoactive bowel sounds  Extremities: +2 peripheral pulses, no pitting edema, atraumatic  Skin: no rashes, lesions or ulcers present     Neuro: Pt opens eyes on command, responds "yes" when asked if he can hear examiner. PERRL. Not tracking examiner. Otherwise not following commands on sedation.     CN reflexes present  Reflexes: patelar 2+ on the left side. 1+ on the right. brachioradialis 2+ bilaterally. No clonus     Laboratory:  Laboratory Data Reviewed: yes  Pertinent Findings:    Recent Labs   Lab 20  0449 20  0622 20  1251 20  0538 20  0539   WBC 11.22 13.41*  --  14.04*  --    HGB 8.3* 10.2*  --  9.4*  --    HCT 26.4* 33.9*  --  29.9*  --     222  --  233  --    MCV 89 92  --  89  --    RDW 14.6* 14.6*  --  14.4  --     141 140  --  140   K 3.7 3.4* 3.8  --  3.7    108 108  --  109   CO2 24 22* 22*  --  22*   BUN 22 22 21  --  22   CREATININE 1.5* 1.4 1.3  --  1.3    83 93  --  86   PROT 6.4 6.8  --   --  6.4 "   ALBUMIN 2.4* 2.6*  --   --  2.4*   BILITOT 0.4 0.3  --   --  0.3   AST 9* 11  --   --  10   ALKPHOS 48* 52*  --   --  49*   ALT 5* 6*  --   --  5*     Mg 1.9    Microbiology Data Reviewed: yes  Pertinent Findings:  9/8 blood cultures x1: NG  9/8 blood culture x1: gram positive cocci   9/8 resp culture: no organisms   9/9 blood culture x1: in process  9/9 blood culture x1--> collected on 9/10 pending      Other Results:  EKG (my interpretation): none new     Radiology Data Reviewed: yes  Pertinent Findings:  X-Ray Chest AP Portable   Final Result      1. Nasogastric tube as above.   2. Bilateral opacities, improved within the right lung base.         Electronically signed by: Thad Hurtado   Date:    09/12/2020   Time:    17:20      MRI Brain Without Contrast   Final Result   Abnormal      1. Since 09/08/2020 MRI, there is a new small focus of acute to subacute ischemia in the left cerebellum.   2. Elsewhere, there is continued maturation of ischemia in the bilateral medial occipital and parietal lobes as well as the left cingulate gyrus.   3. At least partially on the basis of differences in technique, with SWI sequences utilized on the current examination versus T2* GRE previously, there are numerous diffuse bilateral cortical and subcortical foci of susceptibility related signal loss.  Newly evident susceptibility within the medial occipital lobes may represent petechial hemorrhage associated with the evolving areas of ischemia.  No large parenchymal hemorrhage is identified.  Additionally, there is evidence of susceptibility related signal loss about the cerebral sulci without FLAIR signal abnormality suggested of superficial siderosis as sequela of prior subarachnoid hemorrhage.  Overall constellation of findings suggests cerebral amyloid angiopathy.  Component of chronic hypertensive arteriopathy also considered.   4. Additional details, as per report body.   This report was flagged in Epic as abnormal.          Electronically signed by: Will Myles   Date:    09/12/2020   Time:    08:41      X-Ray Chest AP Portable   Final Result      Support tubes and lines remain in satisfactory position.      Pulmonary vascular congestion with bilateral parahilar and basilar pulmonary opacities.  Findings may be related to mild to moderate pulmonary edema although bilateral pneumonia cannot be excluded.      Small layering pleural effusions cannot be excluded.         Electronically signed by: Petar Henry MD   Date:    09/09/2020   Time:    08:21      MRA Neck without contrast   Final Result   Addendum 1 of 1      Dr. Hurtado discussed critical findings with Tyra by telephone at 18:38 on    09/08/2020.         Electronically signed by: Thad Hurtado   Date:    09/08/2020   Time:    18:45      Final      MRI Brain Without Contrast   Final Result   Addendum 1 of 1      Dr. Hurtado discussed critical findings with Tyra by telephone at 18:38 on    09/08/2020.         Electronically signed by: Thad Hurtado   Date:    09/08/2020   Time:    18:45      Final      MRA Brain   Final Result   Addendum 1 of 1      Dr. Hurtado discussed critical findings with Tyra by telephone at 18:38 on    09/08/2020.         Electronically signed by: Thad Hurtado   Date:    09/08/2020   Time:    18:45      Final      X-Ray Abdomen AP 1 View   Final Result      Splenomegaly         Electronically signed by: Prasanna Jackson Jr   Date:    09/08/2020   Time:    11:58      X-Ray Chest AP Portable   Final Result      Status post endotracheal tube placement without evidence of a pneumothorax.         Electronically signed by: Kris Renteria   Date:    09/08/2020   Time:    02:21      X-Ray Chest AP Portable   Final Result      Right-sided PICC line as described with mild prominence of the interstitium and no acute findings.         Electronically signed by: Prasanna Lui   Date:    09/08/2020   Time:    01:48      CT Head Without Contrast   Final Result       Broad 1 cm thick scalp hematoma over the left temporoparietal region.  No underlying fracture or intracranial abnormality.         Electronically signed by: Prasanna Lui   Date:    09/08/2020   Time:    01:44      X-Ray Chest AP Portable    (Results Pending)   X-ray Abdomen for NG Tube Placement (Nursing should notify Radiology after placement)    (Results Pending)       Current Medications:     Infusions:       Scheduled:   atorvastatin  40 mg Per NG tube Daily    cefTRIAXone (ROCEPHIN) IVPB  2 g Intravenous Q24H    doxycycline (VIBRAMYCIN) IVPB  100 mg Intravenous Q12H    enoxaparin  40 mg Subcutaneous Q24H    lacosamide  100 mg Per NG tube Q12H    levetiracetam IVPB  500 mg Intravenous Q12H    losartan  50 mg Oral Daily    metronidazole  500 mg Intravenous Q8H        PRN:  sodium chloride, dextrose 50%, fentaNYL **FOLLOWED BY** [START ON 9/15/2020] fentaNYL, glucagon (human recombinant), insulin aspart U-100, lorazepam, sodium chloride 0.9%, Pharmacy to dose Vancomycin consult **AND** vancomycin - pharmacy to dose    Antibiotics and Day Number of Therapy:    vanc 9/8-current  Ceftriaxone 9/8-9/9 restarted again -present  Cefepime 9/10- stopped on 9/11  Doxycycline 9/8-current     Lines and Day Number of Therapy:    Midline  PIV  Salgado    Assessment:     Lopez Savage is a 74 y.o.male with  Patient Active Problem List    Diagnosis Date Noted    Anemia     Seizure-like activity     Abnormal EEG 09/11/2020    Loss of consciousness 09/08/2020    Essential hypertension 09/08/2020    Ulcerative colitis 09/08/2020    Chronic bronchitis 09/08/2020    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    Mitral regurgitation 09/08/2020    CVA (cerebral vascular accident) 09/08/2020    Bacterial endocarditis 09/08/2020    History of gastrointestinal bleeding 09/08/2020    GERD (gastroesophageal reflux disease) 09/08/2020    Embolic stroke involving left posterior cerebral artery 09/08/2020    Embolic  stroke involving right posterior cerebral artery 09/08/2020    Thalamic infarct, 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    Unresponsive    who presented after being found down, with confirmed PCA ischemic stroke, now s/p extubation, on multiple abx for bacterial endocarditis but with persistent seizure activity now pending transfer to [facility] for continuous EEG monitoring.      Plan:     Seizure-like activity; Multiple witnessed seizure like activity described concerning for left sided seizure focus.   - no previous reports from family  - EEG shows irregular slowing in the R frontal region, with L posterior periodic epileptiform discharges. No seizures recorded during this study.   - neurology recommended keppra loading with 3g and maintenance 500 BID  - Yesterday pt with several seizures, concern for clusters of seizures vs. NCSE  - initiated Vimpat 200 mg load followed by 100 mg BID due to persistent seizures  - to transfer to [facility] today for continuous EEG monitoring   -avoid cefepime and tramadol     Acute hypoxic respiratory failure in the setting of PCA stroke suspect 2/2 septic emboli;  patient found unresponsive at home before arrival to ED. Initially non-responsive to sternal rub, pupillary and gag reflex present. tPA contraindicated due to recent GI bleed.   - CT head negative for acute intracranial bleed  - MRI head showed PCA infarct and right occipital lobe edema   - 9/11 MRI showing since 09/08/2020 MRI, there is a new small focus of acute to subacute ischemia in the left cerebellum.  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   - initially treated at [facility] and discharged with three week course of IV rocephin  -  strepto mutans; 6 week course set to end on 9/14/2020  - SILVANA at [facility] showed aortic valve vegetations  - ceftriaxone, vanc, flagyl and doxy on board    Bilateral Community Acquired Pneumonia  - S/p 7 day course of doxycycline for atypical CAP coverage   - Continue Flagyl for now for anaerobic coverage (end date 9/15)    Normocytic Anemia  - H/H on admission 9.1/29.4, MCV 91  - has had extensive workup at [facility] for anemia in past including autoimmune, leukemia and lymphoma which were negative  -  S/p 2 units of blood ordered for transfusion on 9/9, H&H today 9.4/29.9    Hypokalemia  - K 3.2 on admission  - received IV KCl 40mEq in ED  - K this AM: 3.4. giving 60 meq total. Will check BMP in noon    Intermediate Level Troponin; suspect secondary to demand from CVA  - Troponin 0.08 on admission -> 0.147 on 9/8  -   - EKG sinus tachycardia w/ left anterior fascicular block; no new ST elevations/depressions    Leukocytosis   - WBC 19 on admission trended down but now 14.0  - likely 2/2 to endocarditis  - no reported fevers recently, afebrile since admit  - will continue to monitor     COPD, stable  - pt does not require home O2  - home montelukast and fluticasone   - pt currently satting 98 on room air     H/o GI Bleed; h/o bleeding diverticuli and gastric ulcers s/p cauterization, has required blood transfusions. Most recent GI bleed in August 2020   - s/p transfusion of 2 units on 9/9  - no active bleeding throughout hospitalization, hemodynamically stable     HENRY vs CKD  - Cr 1.5 on admission, baseline near 1.2-1.3  - eGFR 31  - creatinine this AM: 1.3     Hyperglycemia (resolved)  - glucose 189 on admission, 107 on recent POCT  - ordered SSI  - A1c 4.8  - will obtain tight glycemia control for neurological protection     HTN; initially held home BP in setting of possible ischemic stroke.  - hypertensive to 180's systolic, has normalized  - restarted Losartan 25 mg last night due to SBPs in 170s, with  improvement to 150s     HLD  - atorvastatin 40mg, continuing for secondary stroke prevention      Diet: NPO  PPx: lovenox 40mg     Code Status:      Full Code      Marisol Parks MD  [facility] Internal Medicine HO-I    [facility] Medicine Hospitalist Pager numbers:   [facility] Hospitalist Medicine Team A (Tyra/Ruby): [phone]  [facility] Hospitalist Medicine Team B (Tor/James):  [phone]

## 2020-09-13 NOTE — PROGRESS NOTES
Dr. Issa was notified of patient's sbp still in170's post-one hr of losartan administration. Per MD if bp does not improve within 30 minutes to call him back.

## 2020-09-13 NOTE — DISCHARGE SUMMARY
This note has been moved to another encounter. If you have any questions, please contact HIM Chart Correction at [phone].

## 2020-09-14 NOTE — ASSESSMENT & PLAN NOTE
- Multiple witnessed seizure-like activity described  - No previous reports from family  - Was started on keppra and vimpat 200 mg BID  - EEG at OSH shows irregular slowing in the R frontal region, with L posterior periodic epileptiform discharges. No seizures recorded during this study.  - 9/13 Here EEG continues to show periodic discharges in the left posterior region with no seizures.    Plan:   -- Continue vimpat 200 mg BID  -- Continue keppra 500 mg BID  -- Continue EEG monitoring  --9/14: EEG with periodic discharges per Epilepsy, continue current AED regimen

## 2020-09-14 NOTE — ASSESSMENT & PLAN NOTE
On 9/8 pt presented to [place] ED with unresponsiveness  No tPA due to recent GI bleed  MRI with left PCA infarct and left thalamic infarct along with small right PCA infarct  MRA demonstrates fetal circulation to left PCA  9/11 Repeat MRI new acute/subacute L cerebellum infarct    -Neuro checks q1hr  -Vascular neurology following  -SBP Goal < 180

## 2020-09-14 NOTE — ASSESSMENT & PLAN NOTE
73 y/o Male PMH HTN, recent endocarditis (8/2020), recent GIB (8/2020), diverticulitis, GERD, transferred from [facility] for management of seizures. On 9/8 pt presented to [facility] ED with unresponsiveness, hypoxia requiring intubation, and seizure like activity. Initial CT with no early infarct signs or hemorrhage. No tPA due to recent GI bleed. MRI with left PCA infarct and left thalamic infarct along with small right PCA infarct. MRA demonstrates fetal circulation to left PCA. Two different distributions makes a cardioembolic source most likely. Given his recent history of endocarditis this is the likely etiology.     Pt extubated 9/11 concerns for L MCA infarct due to exam. Repeat MRI new acute/subacute L cerebellum infarct; SWI suggest CAA +/- component of chronic hypertensive arteriopathy.  Continue with previous recommendations.    Antithrombotics for secondary stroke prevention: Antiplatelets: Aspirin: 325 mg daily     Statins for secondary stroke prevention and hyperlipidemia, if present:   Statins: Atorvastatin- 40 mg daily     Aggressive risk factor modification: HTN, Endocarditis     Rehab efforts: The patient has been evaluated by a stroke team provider and the therapy needs have been fully considered based off the presenting complaints and exam findings. The following therapy evaluations are needed: PT evaluate and treat, OT evaluate and treat, SLP evaluate and treat      Diagnostics ordered/pending: none     VTE prophylaxis: Enoxaparin 40 mg SQ every 24 hours, SCDs     BP parameters: Infarct: No intervention, SBP <160 - no longer in acute window. Can move towards normotension. Patient at goal without medication.

## 2020-09-14 NOTE — NURSING
Pt transported to and from MRI on portable cardiac monitor via bed with 1 RN and 1 PCT. Pt back in room, connected to wall monitor, VSS. EEG notified.

## 2020-09-14 NOTE — ASSESSMENT & PLAN NOTE
- Transferred from [facility] for further evaluation, clinical events concerning for seizure  - No prior seizure history per family  - EEG at [facility] with continuous periodic sharp waves most prominent over the left posterior quadrant with a prominent field into the right posterior quadrant, which do not organize    Recommendations:  - Continue vEEG  - Continue Vimpat 200 mg BID, Keppra 500 mg BID  - Avoid medications that lower seizure threshold  - Neurochecks  - Management of acute infection per NCC, ID    Plan of care discussed with NCC team, patient and wife at bedside. Will continue to follow peripherally, please call with any questions.

## 2020-09-14 NOTE — NURSING
Pt's work of breathing has increased and expiratory wheezing has worsened. Notified NCC, orders to have RT give breathing treatment now.    1045: RT at bedside administering duoneb.

## 2020-09-14 NOTE — SUBJECTIVE & OBJECTIVE
Medications Prior to Admission   Medication Sig Dispense Refill Last Dose    atorvastatin (LIPITOR) 40 MG tablet 1 tablet (40 mg total) by Per G Tube route once daily. 90 tablet 3     cefTRIAXone (ROCEPHIN) 2 g/50 mL PgBk IVPB Inject 50 mLs (2 g total) into the vein once daily. for 4 days 4 each 0     doxycycline hyclate (DOXYCYCLINE 100MG IN D5W 250ML, READY TO MIX,) Inject 250 mLs (100 mg total) into the vein every 12 (twelve) hours. for 6 days 3000 mL 0     enoxaparin (LOVENOX) 40 mg/0.4 mL Syrg Inject 0.4 mLs (40 mg total) into the skin once daily. 12 mL 0     insulin aspart U-100 (NOVOLOG) 100 unit/mL (3 mL) InPn pen Inject 0-5 Units into the skin every 6 (six) hours as needed (Hyperglycemia). 3 mL 0     lacosamide (VIMPAT) 10 mg/mL Soln oral solution 10 mLs (100 mg total) by Per NG tube route every 12 (twelve) hours. 600 mL 11     levETIRAcetam in NaCl, iso-os, (KEPPRA) 500 mg/100 mL PgBk Inject 100 mLs (500 mg total) into the vein every 12 (twelve) hours. 6000 mL 0     losartan (COZAAR) 50 MG tablet Take 1 tablet (50 mg total) by mouth once daily. 90 tablet 3     metronidazole (FLAGYL) 500 mg/100 mL IVPB Inject 100 mLs (500 mg total) into the vein every 8 (eight) hours. for 7 days 2100 mL 0        Review of patient's allergies indicates:   Allergen Reactions    Codeine     Tetanus vaccines and toxoid        No past medical history on file.  No past surgical history on file.  Family History     None        Tobacco Use    Smoking status: Not on file   Substance and Sexual Activity    Alcohol use: Not on file    Drug use: Not on file    Sexual activity: Not on file     Review of Systems   Unable to perform ROS: Mental status change     Objective:     Weight: 114 kg (251 lb 5.2 oz)  Body mass index is 37.11 kg/m².  Vital Signs (Most Recent):  Temp: 98.5 °F (36.9 °C) (09/14/20 1500)  Pulse: 83 (09/14/20 1800)  Resp: (!) 24 (09/14/20 1800)  BP: (!) 164/81 (09/14/20 1800)  SpO2: 100 % (09/14/20 1800)  "Vital Signs (24h Range):  Temp:  [98 °F (36.7 °C)-98.5 °F (36.9 °C)] 98.5 °F (36.9 °C)  Pulse:  [75-88] 83  Resp:  [18-39] 24  SpO2:  [96 %-100 %] 100 %  BP: (143-178)/(68-95) 164/81     Date 09/14/20 0700 - 09/15/20 0659   Shift 0591-7029 7119-4731 7278-0768 24 Hour Total   INTAKE   I.V.(mL/kg) 383(3.4) 120(1.1)  503(4.4)   NG/GT 60   60   IV Piggyback 200   200   Shift Total(mL/kg) 643(5.6) 120(1.1)  763(6.7)   OUTPUT   Urine(mL/kg/hr) 50(0.1) 400  450   Shift Total(mL/kg) 50(0.4) 400(3.5)  450(3.9)   Weight (kg) 114 114 114 114              Resp Rate Total:  [21 br/min-25 br/min] 23 br/min         NG/OG Tube 09/12/20 1644 Wilcox sump 16 Fr. Left nostril (Active)   Placement Check placement verified by x-ray;placement verified by distal tube length measurement;placement verified by aspirate characteristics 09/14/20 1500   Tolerance no signs/symptoms of discomfort 09/14/20 1500   Securement secured to nostril center w/ adhesive device 09/14/20 1500   Clamp Status/Tolerance clamped;no abdominal discomfort;no abdominal distention;no emesis;no nausea;no residual;no restlessness 09/14/20 1500   Suction Setting/Drainage Method suction at the bedside 09/14/20 0305   Insertion Site Appearance no redness, warmth, tenderness, skin breakdown, drainage 09/14/20 0305   Drainage None 09/14/20 0305   Flush/Irrigation flushed w/;water;no resistance met 09/14/20 0305   Intake (mL) 60 mL 09/14/20 1030   Water Bolus (mL) 60 mL 09/13/20 0900            Urethral Catheter 09/08/20 0315 Coude 14 Fr. (Active)   Site Assessment Clean;Intact 09/14/20 1500   Collection Container Urimeter 09/14/20 1500   Securement Method secured to top of thigh w/ adhesive device 09/14/20 1500   Catheter Care Performed yes 09/14/20 1500   Reason for Continuing Urinary Catheterization Critically ill in ICU requiring intensive monitoring 09/14/20 1500   CAUTI Prevention Bundle StatLock in place w 1" slack;Intact seal between catheter & drainage tubing;Drainage " bag/urimeter off the floor;Green sheeting clip in use;No dependent loops or kinks;Drainage bag/urimeter not overfilled (<2/3 full);Drainage bag/urimeter below bladder 09/14/20 0701   Output (mL) 400 mL 09/14/20 1800       Neurosurgery Physical Exam   Awake, alert, eyes closed  Oriented to self, hospital  L gaze preference, can cross midline  PERRL  FCx4  ANDERSON spontaneously, symmetrically  BUE restraints  cEEG in place    Significant Labs:  Recent Labs   Lab 09/13/20  0539 09/14/20  0338   GLU 86 84    142   K 3.7 3.6    111*   CO2 22* 21*   BUN 22 25*   CREATININE 1.3 1.2   CALCIUM 9.2 9.0   MG 1.9 1.9     Recent Labs   Lab 09/13/20  0538 09/14/20  0338   WBC 14.04* 13.00*   HGB 9.4* 9.7*   HCT 29.9* 32.3*    233     Recent Labs   Lab 09/14/20 0338   INR 1.2   APTT 30.3     Microbiology Results (last 7 days)     Procedure Component Value Units Date/Time    Blood culture [[id number]] Collected: 09/14/20 1840    Order Status: Sent Specimen: Blood from Peripheral, Foot, Right Updated: 09/14/20 1843    Blood culture [[id number]] Collected: 09/14/20 1835    Order Status: Sent Specimen: Blood from Peripheral, Foot, Left Updated: 09/14/20 1843    Urine culture [[id number]] Collected: 09/13/20 1438    Order Status: No result Specimen: Urine Updated: 09/13/20 1858          Significant Diagnostics:  X-ray Chest 1 View    Result Date: 9/14/2020  Mild interval increase in the degree of pulmonary vascular congestion and interstitial/alveolar edema. Electronically signed by: Graham Smith MD Date:    09/14/2020 Time:    11:28    Mri Brain W Wo Contrast    Result Date: 9/14/2020  Numerous scattered punctate foci of cortical enhancement throughout both cerebral hemispheres.  Additional rim enhancing collection in the right occipital lobe with central diffusion restriction and surrounding vasogenic edema.  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 Persistent gyriform  diffusion restriction the posterior left cerebral hemisphere as well as the hippocampus and dorsal lateral thalamus, although slightly improved from prior studies.  The distribution be typical for vascular territory infarct, most suggestive of seizure related signal changes (status epilepticus).  Other encephalitis to be excluded clinically. New small left frontal subdural hygroma without significant intracranial mass effect. Stable small subacute left cerebellar infarct. Mild chronic microvascular ischemic disease with remote right frontal and right occipital infarcts. Numerous regions of prior parenchymal and leptomeningeal hemorrhage involving both cerebral hemispheres appearance raising the possibility underlying cerebral amyloid angiopathy.  No new hemorrhage. Clinical correlation and follow-up advised. This report was flagged in Epic as abnormal. Electronically signed by resident: Sea Lopez MD Date:    09/14/2020 Time:    15:58 Electronically signed by: Roosevelt Escoto MD Date:    09/14/2020 Time:    16:49

## 2020-09-14 NOTE — PLAN OF CARE
09/14/20 1708   Post-Acute Status   Post-Acute Authorization Placement   Post-Acute Placement Status Referrals Sent  (rehab)     [facility] met with Pt and Pt wife at bedside. Discussed therapy recs for rehab and provided list. Addressed questions and reported need to send referrals to obtain accepting options. Pt wife agreeable and reported [facility] is preference. She is also okay with [facility], [facility], and [facility] rehab as second choices. Pt choice form signed and placed in chart.     [facility] sent referrals via .    Yumiko Louie LCSW  Neurocritical Care   [facility]  [id number]

## 2020-09-14 NOTE — SUBJECTIVE & OBJECTIVE
Interval History: No acute events overnight.    Review of Systems: Unable to obtain a complete ROS due to level of consciousness.     Vitals:   Temp: 98 °F (36.7 °C)  Pulse: 78  Rhythm: normal sinus rhythm  BP: (!) 146/71  MAP (mmHg): 100  Resp: (!) 21  SpO2: 100 %  O2 Device (Oxygen Therapy): room air    Temp  Min: 97.5 °F (36.4 °C)  Max: 98.4 °F (36.9 °C)  Pulse  Min: 76  Max: 88  BP  Min: 143/95  Max: 178/78  MAP (mmHg)  Min: 93  Max: 126  Resp  Min: 20  Max: 36  SpO2  Min: 96 %  Max: 100 %    09/13 0701 - 09/14 0700  In: 1928.8 [I.V.:1018.8]  Out: 1060 [Urine:1060]   Unmeasured Output  Stool Occurrence: 1  Pad Count: 1     Examination:   Constitutional: Well-nourished and -developed. No apparent distress.   Eyes: Conjunctiva clear, anicteric. Lids no lesions.  Head/Ears/Nose/Mouth/Throat/Neck: Moist mucous membranes. External ears, nose atraumatic.   Cardiovascular: Regular rhythm. No leg edema.  Respiratory: Comfortable respirations. Clear to auscultation.  Gastrointestinal: Soft, nondistended, nontender. + bowel sounds.    Neurologic:  -GCS E 4 V 4 M 6  -Drowsy, responds to voice. Oriented to person. Delayed responses, dysarthria present. Follows commands.  -Cranial nerves: EOM intact, PERRL 3mm, no facial droop, +cough  -Motor: Moves all extremities spontaneously, antigravity  -Sensation: Intact to light touch.  Unable to test insight, fund of knowledge, coordination, gait due to level of consciousness.    Medications:   Continuoussodium chloride 0.9%, Last Rate: 30 mL/hr at 09/14/20 1100    Scheduledalbuterol-ipratropium, 3 mL, Q4H  atorvastatin, 40 mg, Daily  cefTRIAXone (ROCEPHIN) IVPB, 2 g, Q24H  doxycycline (VIBRAMYCIN) IVPB, 100 mg, Q12H  lacosamide, 200 mg, Q12H  levETIRAcetam in NaCl (iso-os), 500 mg, Q12H  metronidazole, 500 mg, Q8H    PRNdextrose 50%, 12.5 g, PRN  glucagon (human recombinant), 1 mg, PRN  insulin aspart U-100, 1-10 Units, Q6H PRN  labetalol, 10 mg, Q4H PRN  sodium chloride 0.9%, 10  mL, PRN       Today I independently reviewed pertinent medications, lines/drains/airways, imaging, cardiology results, laboratory results, microbiology results, notably:     ISTAT: No results for input(s): PH, PCO2, PO2, POCSATURATED, HCO3, BE, POCNA, POCK, POCTCO2, POCGLU, POCICA, POCLAC, SAMPLE in the last 24 hours.   Chem:   Recent Labs   Lab 09/14/20 0338      K 3.6   *   CO2 21*   GLU 84   BUN 25*   CREATININE 1.2   ESTGFRAFRICA >60.0   EGFRNONAA 59.6*   CALCIUM 9.0   MG 1.9   PHOS 4.3   ANIONGAP 10   PROT 6.5   ALBUMIN 2.4*   BILITOT 0.3   ALKPHOS 55   AST 13   ALT 7*     Heme:   Recent Labs   Lab 09/14/20 0338   WBC 13.00*   HGB 9.7*   HCT 32.3*      INR 1.2     Endo:   Recent Labs   Lab 09/13/20 1952 09/14/20 0310   POCTGLUCOSE 107 81

## 2020-09-14 NOTE — CONSULTS
[facility]  Vascular Neurology  Comprehensive Stroke Center  Consult Note    Inpatient consult to Vascular (Stroke) Neurology  Consult performed by: Henry West NP  Consult ordered by: Ron Molina MD        Assessment/Plan:     Patient is a 74 y.o. year old male with:    * Status epilepticus  EEG   Management per primary     Embolic stroke involving left posterior cerebral artery  75 y/o Male PMH HTN, recent endocarditis (8/2020), recent GIB (8/2020), diverticulitis, GERD, transferred from [facility] for management of seizures. On 9/8 pt presented to [place] ED with unresponsiveness, hypoxia requiring intubation, and seizure like activity. Initial CT with no early infarct signs or hemorrhage. No tPA due to recent GI bleed. MRI with left PCA infarct and left thalamic infarct along with small right PCA infarct. MRA demonstrates fetal circulation to left PCA. Two different distributions makes a cardioembolic source most likely. Given his recent history of endocarditis this is the likely etiology.     Pt extubated 9/11 concerns for L MCA infarct due to exam. Repeat MRI new acute/subacute L cerebellum infarct; SWI suggest CAA +/- component of chronic hypertensive arteriopathy.  Continue with previous recommendations.    Antithrombotics for secondary stroke prevention: Antiplatelets: Aspirin: 325 mg daily     Statins for secondary stroke prevention and hyperlipidemia, if present:   Statins: Atorvastatin- 40 mg daily     Aggressive risk factor modification: HTN, Endocarditis     Rehab efforts: The patient has been evaluated by a stroke team provider and the therapy needs have been fully considered based off the presenting complaints and exam findings. The following therapy evaluations are needed: PT evaluate and treat, OT evaluate and treat, SLP evaluate and treat      Diagnostics ordered/pending: none     VTE prophylaxis: Enoxaparin 40 mg SQ every 24 hours, SCDs     BP parameters: Infarct: No  intervention, SBP <160 - no longer in acute window. Can move towards normotension. Patient at goal without medication.      Cytotoxic brain edema  Area of cytotoxic cerebral edema identified when reviewing brain imaging in the territory of the left posterior cerebral artery, L thalamus, right posterior cerebral artery. There is no mass effect associated with it. We will continue to monitor the patients clinical exam for any worsening of symptoms which may indicate expansion of the stroke or the area of the edema resulting in the clinical change. The pattern is suggestive of embolic etiology.        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 Level of Consciousness: 1-->Not alert, but arousable by minor stimulation to obey, answer, or respond  1b. LOC Questions: 2-->Answers neither question correctly  1c. LOC Commands: 1-->Performs one task correctly  2. Best Gaze: 1-->Partial gaze palsy, gaze is abnormal in one or both eyes, but forced deviation or total gaze paresis is not present  3. Visual: 1-->Partial hemianopia  4. Facial Palsy: 0-->Normal symmetrical movements  5a. Motor Arm, Left: 2-->Some effort against gravity, limb cannot get to or maintain (if cued) 90 (or 45) degrees, drifts down to bed, but has some effort against gravity  5b. Motor Arm, Right: 2-->Some effort against gravity, limb cannot get to or maintain (if cued) 90 (or 45) degrees, drifts down to bed, but has some effort against gravity  6a. Motor Leg, Left: 3-->No effort against gravity, leg falls to bed immediately  6b. Motor Leg, Right: 3-->No effort against gravity, leg falls to bed immediately  7. Limb Ataxia: 0-->Absent  8. Sensory: 0-->Normal, no sensory loss  9. Best Language: 2-->Severe aphasia, all communication is through fragmentary expression, great  need for inference, questioning, and guessing by the listener. Range of information that can be exchanged is limited, listener carries burden of. . . (see row details)  10. Dysarthria: 2-->Severe dysarthria, patients speech is so slurred as to be unintelligible in the absence of or out of proportion to any dysphasia, or is mute/anarthric  11. Extinction and Inattention (formerly Neglect): 0-->No abnormality  Total (NIH Stroke Scale): 20    Modified [place] Score: 0  [place] Coma Scale:    ABCD2 Score:    [id number] Score:   HAS -BLED Score:   ICH Score:   Hunt & Newman Classification:       Thrombolysis Candidate? No, Out of window     Delays to Thrombolysis?  No    Interventional Revascularization Candidate?   Is the patient eligible for mechanical endovascular reperfusion (BELINDA)?  No; No large vessel occlusion      Hemorrhagic change of an Ischemic Stroke: Does this patient have an ischemic stroke with hemorrhagic changes? No     Subjective:     History of Present Illness:  Lopez Savage is a 74 y.o. male with HTN, recent endocarditis (8/2020), recent GIB (8/2020), diverticulitis, GERD, transferred from [facility] for management of seizures. On 9/8 pt presented to [place] ED with unresponsiveness, hypoxia requiring intubation, and seizure like activity. Initial CT with no early infarct signs or hemorrhage. No tPA due to recent GI bleed. MRI with left PCA infarct and left thalamic infarct along with small right PCA infarct. MRA demonstrates fetal circulation to left PCA. Two different distributions makes a cardioembolic source most likely. Given his recent history of endocarditis this is the likely etiology. Recommended  mg + Lipitor 40 mg for secondary stroke prevention. EEG demonstrated left posterior pseudo periodic epileptiform discharges. Started on AEDs. Pt extubated 9/11 concerns for L MCA infarct due to exam. Repeat MRI new acute/subacute L cerebellum infarct; SWI suggest CAA +/- component of chronic  hypertensive arteriopathy. Transferred for further evaluation.     Previous Discharge note: patient has had multiple hospitalizations in the past 6 weeks. In late July patient was hospitalized for HENRY and acute diverticulitis and was discharged home on oral antibiotics. 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 He was started on ceftriaxone and a PICC line was put in place and he was again discharged home. Patient then returned to the hospital on 8/14 due to melena and was found to have GI bleed. He underwent EGD with cauterization/clipping for acute gastritis. He remained on the ceftriaxone. During this hospital stay, TTE was performed which showed aortic and mitral vegetations both >1cm. This was subsequently confirmed on SILVANA. Cardiology was consulted and they recommended transfer to [facility] for cardiothoracic surgery consultation given patient may need double valve replacement.  Patient admitted to hospital medicine AM of 8/18. Patient was evaluated by cardiothoracic surgery and determined to be too high risk for surgery and to continue antibiotic management. Patient with episode of melena AM of 8/19, transfused 1u pRBC's for symptomatic anemia and active bleeding. GI consulted and patient started on IV ppi bid.   EGD with gastritis with hemorrhage on 8/20.      Prior history can be found under MRN [id number].        No past medical history on file.  No past surgical history on file.  No family history on file.  Social History     Tobacco Use    Smoking status: Not on file   Substance Use Topics    Alcohol use: Not on file    Drug use: Not on file     Review of patient's allergies indicates:   Allergen Reactions    Codeine     Tetanus vaccines and toxoid        Medications: I have reviewed the current medication administration record.    Medications Prior to Admission   Medication Sig Dispense Refill Last Dose    atorvastatin (LIPITOR) 40 MG tablet 1  tablet (40 mg total) by Per G Tube route once daily. 90 tablet 3     cefTRIAXone (ROCEPHIN) 2 g/50 mL PgBk IVPB Inject 50 mLs (2 g total) into the vein once daily. for 4 days 4 each 0     doxycycline hyclate (DOXYCYCLINE 100MG IN D5W 250ML, READY TO MIX,) Inject 250 mLs (100 mg total) into the vein every 12 (twelve) hours. for 6 days 3000 mL 0     enoxaparin (LOVENOX) 40 mg/0.4 mL Syrg Inject 0.4 mLs (40 mg total) into the skin once daily. 12 mL 0     insulin aspart U-100 (NOVOLOG) 100 unit/mL (3 mL) InPn pen Inject 0-5 Units into the skin every 6 (six) hours as needed (Hyperglycemia). 3 mL 0     lacosamide (VIMPAT) 10 mg/mL Soln oral solution 10 mLs (100 mg total) by Per NG tube route every 12 (twelve) hours. 600 mL 11     levETIRAcetam in NaCl, iso-os, (KEPPRA) 500 mg/100 mL PgBk Inject 100 mLs (500 mg total) into the vein every 12 (twelve) hours. 6000 mL 0     [START ON 9/14/2020] losartan (COZAAR) 50 MG tablet Take 1 tablet (50 mg total) by mouth once daily. 90 tablet 3     metronidazole (FLAGYL) 500 mg/100 mL IVPB Inject 100 mLs (500 mg total) into the vein every 8 (eight) hours. for 7 days 2100 mL 0        Review of Systems   Constitutional: Negative for fever.   Eyes: Positive for visual disturbance.   Respiratory: Negative for shortness of breath.    Gastrointestinal: Negative for diarrhea and vomiting.   Neurological: Positive for speech difficulty and weakness.   Psychiatric/Behavioral: Positive for confusion.     Objective:     Vital Signs (Most Recent):  Temp: 98.4 °F (36.9 °C) (09/13/20 1905)  Pulse: 83 (09/13/20 2105)  Resp: (!) 24 (09/13/20 2105)  BP: (!) 164/83 (09/13/20 2105)  SpO2: 98 % (09/13/20 2105)    Vital Signs Range (Last 24H):  Temp:  [97.2 °F (36.2 °C)-99.1 °F (37.3 °C)]   Pulse:  [78-95]   Resp:  [20-43]   BP: (137-172)/(65-98)   SpO2:  [96 %-100 %]     Physical Exam  Constitutional:       Comments: drowsy   Eyes:      Comments: L gaze preference; cross midline   Cardiovascular:       Rate and Rhythm: Normal rate.   Pulmonary:      Effort: Pulmonary effort is normal.   Skin:     General: Skin is warm and dry.   Neurological:      Mental Status: He is easily aroused.      Cranial Nerves: Dysarthria present.      Motor: Weakness present.   Psychiatric:         Speech: Speech is slurred.         Neurological Exam:   LOC: drowsy  Attention Span: Good   Language: Expressive aphasia, Receptive aphasia  Articulation: Dysarthria  Orientation: Untestable due to severe aphasia   EOM (CN III, IV, VI): Gaze preference  left  Facial Movement (CN VII): Symmetric facial expression    Motor: Arm left  Paresis: 3/5  Leg left  Paresis: 2/5  Arm right  Paresis: 3/5  Leg right Paresis: 2/5  Tone: Normal tone throughout      Laboratory:  CMP:   Recent Labs   Lab 09/13/20  0539   CALCIUM 9.2   ALBUMIN 2.4*   PROT 6.4      K 3.7   CO2 22*      BUN 22   CREATININE 1.3   ALKPHOS 49*   ALT 5*   AST 10   BILITOT 0.3     Lipid Panel:   Recent Labs   Lab 09/08/20  0140   CHOL 105*   LDLCALC 63.0   HDL 28*   TRIG 70     Hgb A1C:   Recent Labs   Lab 09/08/20  1808   HGBA1C 4.8     TSH:   Recent Labs   Lab 09/08/20  0140   TSH 5.424*       Diagnostic Results:       Brain Imaging  9/11/20   1. Since 09/08/2020 MRI, there is a new small focus of acute to subacute ischemia in the left cerebellum.  2. Elsewhere, there is continued maturation of ischemia in the bilateral medial occipital and parietal lobes as well as the left cingulate gyrus.  3. At least partially on the basis of differences in technique, with SWI sequences utilized on the current examination versus T2* GRE previously, there are numerous diffuse bilateral cortical and subcortical foci of susceptibility related signal loss.  Newly evident susceptibility within the medial occipital lobes may represent petechial hemorrhage associated with the evolving areas of ischemia.  No large parenchymal hemorrhage is identified.  Additionally, there is evidence  of susceptibility related signal loss about the cerebral sulci without FLAIR signal abnormality suggested of superficial siderosis as sequela of prior subarachnoid hemorrhage.  Overall constellation of findings suggests cerebral amyloid angiopathy.  Component of chronic hypertensive arteriopathy also considered.  4. Additional details, as per report body.    9/8/2020 CT Head  No early infarct signs.  No hemorrhage     9/8/2020 MRI Brain  Diffusion restriction with the left PCA territory and left thalamus.  Small diffusion restriction in right occipital lobe. No hemorrahge.       Vessel Imaging  9/8/2020 MRA brain and neck  Left PCA fetal circulation.  No high grade stenosis or occlusion  Cardiac Imaging   9/8/2020 TTE  · Mild left ventricular enlargement.  · Moderate mitral regurgitation.  · Intermediate central venous pressure (8 mmHg).  · The estimated PA systolic pressure is 28 mmHg.  · Low normal left ventricular systolic function. The estimated ejection fraction is 50%.  · Normal LV diastolic function.  · Normal right ventricular systolic function.  · Mild aortic regurgitation.      Henry West NP  Comprehensive Stroke Center  Department of Vascular Neurology   [facility]

## 2020-09-14 NOTE — ASSESSMENT & PLAN NOTE
74M with known bacterial endocarditis with septic emboli c/b L PCA infarct, scattered punctate infarctions and R occipital lesion concerning for intracranial abscess:    -Continue NCC admit      -q1h neurochecks in ICU, q2h neurochecks in stepdown, q4h neurochecks on floor  --SBP <160 (cardene ggt; hydralazine & labetalol PRN; transition to home meds when appropriate)  --Na >135  --VN following  --AED per NCC/Epilepsy  --Abx per ID, f/up BCx - 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 Waterman

## 2020-09-14 NOTE — ASSESSMENT & PLAN NOTE
Duonebs scheduled q4hrs  Monitor respiratory status, at risk for intubation  Continuous pulse oximetry, ABG prn  CXR today

## 2020-09-14 NOTE — PT/OT/SLP EVAL
Occupational Therapy   Evaluation    Name: Lopez Savage  MRN: [id number]  Admitting Diagnosis:  Status epilepticus      Recommendations:     Discharge Recommendations: rehabilitation facility  Discharge Equipment Recommendations:  (TBD pending progress)  Barriers to discharge:  Other (Comment)(Pt requires increased assistance at current functional level)    Assessment:     Lopez Savage is a 74 y.o. male with a medical diagnosis of Status epilepticus.  He presents with performance deficits affecting function: weakness, impaired endurance, impaired self care skills, impaired functional mobilty, gait instability, impaired balance, impaired cognition, decreased coordination, decreased upper extremity function, decreased lower extremity function, decreased safety awareness, pain, impaired coordination, impaired cardiopulmonary response to activity. No OOB activity performed this date due to increased assistance for bed mobility, impaired commands following, increased work of breathing, and eyes closed entire session. Pt was able to follow ~25% of simple commands but constant verbal cues throughout for pt to engage in therapy evaluation. Pt was found soiled and performed B rolling with total A for hygiene. OT recommending IP Rehab in order to improve overall return to PLOF. Pt would benefit from continued skilled acute OT services in order to maximize independence and safety with ADLs and functional mobility to ensure safe return to PLOF in the least restrictive environment.    Rehab Prognosis: Good; patient would benefit from acute skilled OT services to address these deficits and reach maximum level of function.       Plan:     Patient to be seen 4 x/week to address the above listed problems via therapeutic activities, therapeutic exercises, neuromuscular re-education, cognitive retraining, sensory integration  · Plan of Care Expires: 10/13/20  · Plan of Care Reviewed with: patient    Subjective     Chief Complaint:  "nonsensical speech   Patient/Family Comments/goals: Per pt's wife, to get better and return to PLOF.    Pt's wife stated, "They sat him up at the other hospital."     Occupational Profile:  Living Environment: Pt's wife provided living information. Pt lives with his wife and daughter in a H with no ANNAMARIA. Pt has a tub/shower combo with bath bench and grab bar present. Pt is retired and does not drive  Previous level of function: PTA, pt was mod (I) for functional mobility using RW for the last 2 weeks. PTA, pt recently began requiring assistance for ADLs but is able to assist with ADL performance.   Roles and Routines: home dweller   Equipment Used at Home:  walker, rolling, bath bench   Assistance upon Discharge: Pt will have assistance from wife and daughter.     Pain/Comfort:  · Pain Rating 1: (no pain reported but moaning noted during bed mobility)    Patients cultural, spiritual, Protestant conflicts given the current situation: no    Objective:     Communicated with: RN prior to session.  Patient found HOB elevated with SCD, NG tube, blood pressure cuff, EEG, pressure relief boots, telemetry, pulse ox (continuous), PICC line, restraints upon OT entry to room. RN ok'd to perform evaluation. Pt's wife present.     General Precautions: Standard, fall, seizure, aspiration   Orthopedic Precautions:N/A   Braces: N/A     Occupational Performance:    Bed Mobility:    · Patient completed Rolling/Turning to Left with  total assistance x2 person assist   · Patient completed Rolling/Turning to Right with total assistance x 2 person assist   · Patient completed Scooting/Bridging with total assistance x 2 person assist for supine scooting     Functional Mobility/Transfers:  · Not performed this date due to impaired command following, increased assistance for B rolling, increased work of breathing laying flat, and eyes closed 100% of session.     Activities of Daily Living:  · Toileting: total assistance pt found soiled in " BM and required total A for hygiene via B rolling     Cognitive/Visual Perceptual:  Cognitive/Psychosocial Skills:     -       Oriented to: Person   -       Follows Commands/attention:Follows one-step commands 25% of session  -       Communication: dysarthria  -       Memory: impaired commands following therefore difficult to fully assess memory; overall impaired   -       Safety awareness/insight to disability: impaired   -       Mood/Affect/Coping skills/emotional control: eyes close 100% of session, withdrawn  Visual/Perceptual:      -unable to formally assess as pt did not open eyes during session      Physical Exam:  Balance:    - not assessed this date  Postural examination/scapula alignment:    -       Rounded shoulders  Skin integrity: Visible skin intact  Edema:  None noted  Sensation:    -       Intact   Dominant hand:    -       right   Upper Extremity Range of Motion:     -       Right Upper Extremity: Pt demonstrated shoulder flexion and elbow flexion to be WFL. During PROM all extremities WFL   -       Left Upper Extremity: Pt demonstrated shoulder flexion and elbow flexion to be WFL. During PROM all extremities WFL   Upper Extremity Strength:    -       Right Upper Extremity: Deficits: 2+/5   -       Left Upper Extremity: Deficits: 2+/5    Strength:    -       Right Upper Extremity: 3-/5   -       Left Upper Extremity: 3-/5     AMPAC 6 Click ADL:  AMPAC Total Score: 6    Treatment & Education:  - Pt educated on role of OT, POC, and goals for therapy.    - Daily orientation provided   - Pt was able to follow 25% of commands when assessing MMT while being supported in bed.   - EOB sitting deferred this date due to impaired commands following and increased work of breathing during B rolling   - Time provided for therapeutic counseling and discussion of health disposition.   - Importance of OOB ax's with staff member assistance and sitting OOB majority of day.   - Pt completed ADLs and functional  mobility for treatment session as noted above   - Pt's wife verbalized understanding and expressed no further concerns/questions.  - whiteboard updated     Education:    Patient left HOB elevated with all lines intact, call button in reach, RN notified and pt's wife present  present    GOALS:   Multidisciplinary Problems     Occupational Therapy Goals        Problem: Occupational Therapy Goal    Goal Priority Disciplines Outcome Interventions   Occupational Therapy Goal     OT, PT/OT Ongoing, Progressing    Description: Goals to be met by: 9/28/2020     Patient will increase functional independence with ADLs by performing:    Pt will follow 75% of motor commands with <2 verbal cues for repetition of task to maximize engagement in functional task.  Pt will sit at EOB for approx 10 minutes with max A and unilateral UE support to complete grooming task  Pt will complete sit>stand from EOB with bed in highest position with mod A in prep for functional transfers to [facility]  Pt will perform grooming sitting EOB with mod A                                History:     No past medical history on file.    No past surgical history on file.    Time Tracking:     OT Date of Treatment: 09/14/20  OT Start Time: 1104  OT Stop Time: 1127  OT Total Time (min): 23 min (co-eval with PT)    Billable Minutes:Evaluation 10  Self Care/Home Management 10    Franchesca Echeverria OT  9/14/2020

## 2020-09-14 NOTE — ASSESSMENT & PLAN NOTE
08/2020  On IV rocephin, PO doxy, flagyl  Likely etiology of stroke   Management per primary   -blood cultures this admission NGTD, but recent history of strep mutans endocarditis  -recent admission 8/2020 for this issue, CTS evaluated for aortic and mitral vegetations found on SILVANA and determined patient is a poor surgical candidate  -ID consulted

## 2020-09-14 NOTE — PLAN OF CARE
PT Eval complete and POC established.    Patito Baird, PT, DPT  2020      Problem: Physical Therapy Goal  Goal: Physical Therapy Goal  Description: Goals to be met by: 2020    Patient will increase functional independence with mobility by performin. Pt will perform bed mobility (rolling L/R, scooting, and bridging) with CGA  2. Pt will perform supine to/from sit with CGA.  3. Pt will sit EOB x 15 mins with B UE support with Rony assistance.  4. Pt will perform sit to stand with max assistance and LRAD.  5. Pt will ambulate 15 feet with max assistance and LRAD.  6. Pt will perform there-ex from handout x 15 reps to improve strength for functional mobility.        Outcome: Ongoing, Progressing

## 2020-09-14 NOTE — PLAN OF CARE
Cont AED   Review EEG with epilepsy team  duonebs q4hrly  Decrease NS 30/hr  CXR   Curbside ID for antibiotics regimen for IE.   Mobilize to cardiac chair position

## 2020-09-14 NOTE — HOSPITAL COURSE
9/13>9/14: no electrographic seizures, continuous periodic sharp waves most prominent over the left posterior quadrant but with a prominent field into the right posterior quadrant as well, which do not organize  9/14>9/15: Some improvement noted from prior day, left posterior periodic discharges which do not organize. Clinically, non-repressible RUE myoclonus noted today consistent with EPC.   9/15>9/16: EEG with significant improvement overnight, intermittent runs of left posterior discharges that are not evolving. No noted RUE myoclonus on exam. Patient more alert, able to state wifes name at bedside. Plan to discontinue EEG, continue Vimpat 200 mg BID.

## 2020-09-14 NOTE — ASSESSMENT & PLAN NOTE
EEG   Management per primary     cEEG 9/14 negative for ongoing seizures, continues on vimpat, keppra

## 2020-09-14 NOTE — HOSPITAL COURSE
No further episodes of seizures on cEEG, continues on keppra, vimpat. MRI brain 9/14 suggest septic emboli as the source for stroke, likely prior endocarditis. ID consulted. Blood cultures NGTD this admission.     Patient stepped down to HM on 9/20. Since has been slowly progressing, ongoing treatment of endocarditis. Blood cultures negative since 9/08. Acute mental status change per PCP and patient's wife on 9/22. Now disoriented, not recognizing wife, uncooperative with exam. Vascular neuro reconsulted. Will repeat EEG. Repeat MRI showing no new stroke, some resolution of prior L PCA territory infect suggesting etiology was more likely seizure related. Persistent r cerebellar abscess with surrounding edema. Presentation more consistent with ongoing infection/ecephalitis rather than embolic stroke. Recommending gen neuro consult for further evaluation.

## 2020-09-14 NOTE — ASSESSMENT & PLAN NOTE
- ID consulted for assistance in antibiotic management  - Blood culture 7/31 positive for strep mutans

## 2020-09-14 NOTE — CONSULTS
[facility]  Infectious Disease  Consult Note    Patient Name: Lopez Savage  MRN: [id number]  Admission Date: 9/13/2020  Hospital Length of Stay: 1 days  Attending Physician: Dex Moreira MD  Primary Care Provider: Prasanna Haywood MD       Inpatient consult to Infectious Diseases  Consult performed by: LIVAN Wallace Jr.  Consult ordered by: Ron Molina MD      Consult received.  Full consult to follow.    LIVAN Cervantes  Infectious Disease  [facility]

## 2020-09-14 NOTE — SUBJECTIVE & OBJECTIVE
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 cEEG not showing active seizures on keppra, vimpat. Patient follows some commands but overall disoriented, lethargic. Repeat MRI today, pending read. ID on board for recs regarding endocarditis.     HPI, Past Medical, Family, and Social History remains the same as documented in the initial encounter.       Scheduled Meds:   albuterol-ipratropium  3 mL Nebulization Q4H    atorvastatin  40 mg Per G Tube Daily    cefTRIAXone (ROCEPHIN) IVPB  2 g Intravenous Q24H    doxycycline (VIBRAMYCIN) IVPB  100 mg Intravenous Q12H    heparin (porcine)  5,000 Units Subcutaneous Q8H    lacosamide  200 mg Per NG tube Q12H    levETIRAcetam in NaCl (iso-os)  500 mg Intravenous Q12H    metronidazole  500 mg Intravenous Q8H     Continuous Infusions:   sodium chloride 0.9% 30 mL/hr at 09/14/20 1600     PRN Meds:dextrose 50%, glucagon (human recombinant), insulin aspart U-100, labetalol, sodium chloride 0.9%    Objective:     Vital Signs (Most Recent):  Temp: 98.5 °F (36.9 °C) (09/14/20 1500)  Pulse: 83 (09/14/20 1615)  Resp: (!) 34 (09/14/20 1615)  BP: (!) 164/81 (09/14/20 1615)  SpO2: 97 % (09/14/20 1615)  BP Location: Right arm    Vital Signs Range (Last 24H):  Temp:  [98 °F (36.7 °C)-98.5 °F (36.9 °C)]   Pulse:  [76-88]   Resp:  [20-39]   BP: (143-178)/(68-95)   SpO2:  [96 %-100 %]   BP Location: Right arm    Physical Exam  Constitutional:       Comments: drowsy   Eyes:      Comments: L gaze preference; cross midline   Cardiovascular:      Rate and Rhythm: Normal rate.   Pulmonary:      Effort: Pulmonary effort is normal.   Skin:     General: Skin is warm and dry.   Neurological:      Mental Status: He is easily aroused.      Cranial Nerves: Dysarthria present.      Motor: Weakness present.   Psychiatric:         Speech: Speech is slurred.         Neurological Exam:   LOC: alert and drowsy  Attention Span:  poor  Language: Expressive aphasia, Receptive aphasia  Articulation: Dysarthria  Orientation: Person, Place, Time , Untestable due to severe aphasia   Visual Fields: Full  unable to assess  EOM (CN III, IV, VI): Gaze preference  left  Facial Movement (CN VII): Symmetric facial expression    Motor: Arm left  Paresis: 3/5  Leg left  Paresis: 2/5  Arm right  Paresis: 3/5  Leg right Paresis: 2/5    Laboratory:  BMP:   Recent Labs   Lab 09/14/20  0338      K 3.6   *   CO2 21*   BUN 25*   CREATININE 1.2   CALCIUM 9.0     CBC:   Recent Labs   Lab 09/14/20  0338   WBC 13.00*   RBC 3.55*   HGB 9.7*   HCT 32.3*      MCV 91   MCH 27.3   MCHC 30.0*       Diagnostic Results     Brain Imaging   9/11/20   1. Since 09/08/2020 MRI, there is a new small focus of acute to subacute ischemia in the left cerebellum.  2. Elsewhere, there is continued maturation of ischemia in the bilateral medial occipital and parietal lobes as well as the left cingulate gyrus.  3. At least partially on the basis of differences in technique, with SWI sequences utilized on the current examination versus T2* GRE previously, there are numerous diffuse bilateral cortical and subcortical foci of susceptibility related signal loss.  Newly evident susceptibility within the medial occipital lobes may represent petechial hemorrhage associated with the evolving areas of ischemia.  No large parenchymal hemorrhage is identified.  Additionally, there is evidence of susceptibility related signal loss about the cerebral sulci without FLAIR signal abnormality suggested of superficial siderosis as sequela of prior subarachnoid hemorrhage.  Overall constellation of findings suggests cerebral amyloid angiopathy.  Component of chronic hypertensive arteriopathy also considered.      Cardiac Imaging   TTE 9/08/20:  · Mild left ventricular enlargement.  · Moderate mitral regurgitation.  · Intermediate central venous pressure (8 mmHg).  · The estimated PA  systolic pressure is 28 mmHg.  · Low normal left ventricular systolic function. The estimated ejection fraction is 50%.  · Normal LV diastolic function.  · Normal right ventricular systolic function.  · Mild aortic regurgitation.

## 2020-09-14 NOTE — PLAN OF CARE
Admit Date:  9/13/2020 10:13 AM      Admit Diagnosis:  Status epilepticus [G40.901]      CM met with patient and wife, Brenda,  in room for [facility] Planning Assessment.  Patient is unable to answer questions.  Per wife, the patient lives his wife and adult daughter in a single story house with no step(s) to enter.   Per wife, the patient was independent with ADLS and used a rolling walker for ambulation.  Patient will have assistance from his wife upon discharge.   Discharge Planning Booklet given to patient/family and discussed.  All questions addressed.  CM will follow for needs.         09/14/20 6619   Discharge Assessment   Assessment Type Discharge Planning Assessment   Confirmed/corrected address and phone number on facesheet? Yes   Assessment information obtained from? Caregiver   Communicated expected length of stay with patient/caregiver yes   Prior to hospitilization cognitive status: Alert/Oriented   Prior to hospitalization functional status: Assistive Equipment;Independent   Current cognitive status: Unable to Assess   Current Functional Status: Needs Assistance   Facility Arrived From: [facility]   Lives With spouse;child(bianca), adult   Is patient able to care for self after discharge? Unable to determine at this time (comments)   Who are your caregiver(s) and their phone number(s)? Brenda Savage (wife) [phone], Ifrah Savage (wife) [phone]   Patient's perception of discharge disposition other (comments)  (SHANNA)   Readmission Within the Last 30 Days previous discharge plan unsuccessful   If yes, most recent facility name: [facility]   Patient currently being followed by outpatient case management? No   Patient currently receives any other outside agency services? No   Equipment Currently Used at Home walker, rolling;shower chair   Do you have any problems affording any of your prescribed medications? No   Is the patient taking medications as prescribed? yes   Does the patient have  transportation home? Yes   Transportation Anticipated family or friend will provide   Does the patient receive services at the [facility]? No   Discharge Plan A Skilled Nursing Facility   Discharge Plan B Rehab   DME Needed Upon Discharge  other (see comments)  (tbd)   Patient/Family in Agreement with Plan yes   Readmission Questionnaire   At the time of your discharge, did someone talk to you about what your health problems were? Yes   At the time of discharge, did someone talk to you about what to watch out for regarding worsening of your health problem? Yes   At the time of discharge, did someone talk to you about what to do if you experienced worsening of your health problem? Yes   At the time of discharge, did someone talk to you about when and where to follow up with a doctor after you left the hospital? Yes   How often do you need to have someone help you when you read instructions, pamphlets, or other written material from your doctor or pharmacy? Sometimes   Do you have problems taking your medications as prescribed? No   Do you have any problems affording any of  your prescribed medications? No   Do you have problems obtaining/receiving your medications? No   Does the patient have transportation to healthcare appointments? Yes   Living Arrangements house   Does the patient have family/friends to help with healtcare needs after discharge? yes   Does your caregiver provide all the help you need? Yes  (per wife)   Are you currently feeling confused?   (jitendra)   Are you currently having problems thinking?   (jitendra)   Are you currently having memory problems?   (jitendra)   Have you felt down, depressed, or hopeless? Unable to Assess   Have you felt little interest or pleasure in doing things? Unable to assess   In the last 7 days, my sleep quality was: fair                PCP:  Prasanna Haywood MD  [phone]        Pharmacy:    [id number] - [place] - [address], [address]  [address] [address],  [address]  [place] [address]  Phone: [phone] Fax: [phone]        Emergency Contacts:  Extended Emergency Contact Information  Primary Emergency Contact: Brenda Savage  Mobile Phone: [phone]  Relation: Spouse   needed? No  Secondary Emergency Contact: Ifrah Savage  Mobile Phone: [phone]  Relation: Daughter   needed? No      Insurance:    Payor: [employer] / Plan: [email or web address]  / Product Type: Medicare Advantage /       Wendy Beltran RN, CCRN-K, [facility]  Neuro-Critical Care   X [phone]      09/14/2020  2:34 PM

## 2020-09-14 NOTE — HPI
Lopez Savage is a 74 y.o. male with HTN, recent endocarditis (8/2020), recent GIB (8/2020), diverticulitis, GERD, transferred from [facility] for management of seizures. On 9/8 pt presented to [facility] ED with unresponsiveness, hypoxia requiring intubation, and seizure like activity. Initial CT with no early infarct signs or hemorrhage. No tPA due to recent GI bleed. MRI with left PCA infarct and left thalamic infarct along with small right PCA infarct. MRA demonstrates fetal circulation to left PCA. Two different distributions makes a cardioembolic source most likely. Given his recent history of endocarditis this is the likely etiology. Recommended  mg + Lipitor 40 mg for secondary stroke prevention. EEG demonstrated left posterior pseudo periodic epileptiform discharges. Started on AEDs. Pt extubated 9/11 concerns for L MCA infarct due to exam. Repeat MRI new acute/subacute L cerebellum infarct; SWI suggest CAA +/- component of chronic hypertensive arteriopathy. Transferred for further evaluation.     Previous Discharge note: patient has had multiple hospitalizations in the past 6 weeks. In late July patient was hospitalized for HENRY and acute diverticulitis and was discharged home on oral antibiotics. 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 He was started on ceftriaxone and a PICC line was put in place and he was again discharged home. Patient then returned to the hospital on 8/14 due to melena and was found to have GI bleed. He underwent EGD with cauterization/clipping for acute gastritis. He remained on the ceftriaxone. During this hospital stay, TTE was performed which showed aortic and mitral vegetations both >1cm. This was subsequently confirmed on SILVANA. Cardiology was consulted and they recommended transfer to [facility] for cardiothoracic surgery consultation given patient may need double valve replacement.  Patient admitted to hospital  medicine AM of 8/18. Patient was evaluated by cardiothoracic surgery and determined to be too high risk for surgery and to continue antibiotic management. Patient with episode of melena AM of 8/19, transfused 1u pRBC's for symptomatic anemia and active bleeding. GI consulted and patient started on IV ppi bid.   EGD with gastritis with hemorrhage on 8/20.      Prior history can be found under MRN [id number].

## 2020-09-14 NOTE — PLAN OF CARE
[facility] Care Plan    POC reviewed with Lopez Savage and family at 1400. Wife verbalized understanding. Questions and concerns addressed. No acute events today. Pt progressing toward goals. Will continue to monitor. See below and flowsheets for full assessment and VS info.     MRI W WO completed.  CXR done.  NS @ 30 ml/hr.  Wound care completed.  BM x 1.      Neuro:  [place] Coma Scale  Best Eye Response: 3-->(E3) to speech  Best Motor Response: 6-->(M6) obeys commands  Best Verbal Response: 4-->(V4) confused  [place] Coma Scale Score: 13  Assessment Qualifiers: patient not sedated/intubated, no eye obstruction present        24 hr Temp:  [98 °F (36.7 °C)-98.5 °F (36.9 °C)]     CV:   Rhythm: normal sinus rhythm  BP goals:   SBP < 180  MAP > 65    Resp:   O2 Device (Oxygen Therapy): room air       Plan: N/A    GI/:  ARTURO Total Score: 2  Diet/Nutrition Received: NPO  Last Bowel Movement: 09/14/20  Voiding Characteristics: urethral catheter (bladder)    Intake/Output Summary (Last 24 hours) at 9/14/2020 1555  Last data filed at 9/14/2020 1500  Gross per 24 hour   Intake 2601.75 ml   Output 760 ml   Net 1841.75 ml     Unmeasured Output  Stool Occurrence: 1  Pad Count: 1    Labs/Accuchecks:  Recent Labs   Lab 09/14/20  0338   WBC 13.00*   RBC 3.55*   HGB 9.7*   HCT 32.3*         Recent Labs   Lab 09/14/20  0338      K 3.6   CO2 21*   *   BUN 25*   CREATININE 1.2   ALKPHOS 55   ALT 7*   AST 13   BILITOT 0.3      Recent Labs   Lab 09/14/20  0338   INR 1.2   APTT 30.3      Recent Labs   Lab 09/08/20  1028 09/14/20  0338   CPK  --  25   CPKMB  --  1.2   TROPONINI 0.147*  --    MB  --  4.8       Electrolytes: N/A - electrolytes WDL  Accuchecks: Q6H    Gtts:   sodium chloride 0.9% 30 mL/hr at 09/14/20 1500       LDA/Wounds:  Lines/Drains/Airways       Drain                   Urethral Catheter 09/08/20 0315 Coude 14 Fr. 6 days         NG/OG Tube 09/12/20 1644 Reyes cunningham 16 Fr. Left nostril 1 day               Peripheral Intravenous Line                   Midline Catheter Insertion/Assessment  - Single Lumen 09/10/20 2130 Left basilic vein (medial side of arm) 18g x 10cm 3 days         Peripheral IV - Single Lumen 09/11/20 2215 20 G Left Forearm 2 days                  Wounds: Yes  Wound care consulted: Yes    Problem: Bowel Elimination Impaired (Stroke, Ischemic/Transient Ischemic Attack)  Goal: Effective Bowel Elimination  Intervention: Promote Effective Bowel Elimination  Flowsheets (Taken 9/14/2020 1554)  Bowel Dysfunction Management:   abdomen massaged   adaptive equipment provided  Bowel Program: maintenance program followed     Problem: Cerebral Tissue Perfusion Risk (Stroke, Ischemic/Transient Ischemic Attack)  Goal: Optimal Cerebral Tissue Perfusion  Intervention: Protect and Optimize Cerebral Perfusion  Flowsheets (Taken 9/14/2020 1554)  Sensory Stimulation Regulation:   auditory stimulation provided   care clustered  Cerebral Perfusion Promotion: blood pressure monitored     Problem: Eating/Swallowing Impairment (Stroke, Ischemic/Transient Ischemic Attack)  Goal: Oral Intake without Aspiration  Intervention: Optimize Eating and Swallowing  Flowsheets (Taken 9/14/2020 1554)  Swallowing Techniques: Dysphagia: appropriate positioning encouraged     Problem: Hemodynamic Instability (Stroke, Ischemic/Transient Ischemic Attack)  Goal: Vital Signs Remain in Desired Range  Intervention: Optimize Blood Flow  Flowsheets (Taken 9/14/2020 1554)  Stabilization Measures: airway opened     Problem: Pain (Stroke, Ischemic/Transient Ischemic Attack)  Goal: Acceptable Pain Control  Intervention: Monitor and Manage Pain  Flowsheets (Taken 9/14/2020 1554)  Complementary Therapy:   massage therapy   music therapy  Pain Management Interventions:   breathing exercises   care clustered   diversional activity provided

## 2020-09-14 NOTE — HPI
Mr. Savage is a 75 yo male with significant medical history of HTN, recent endocarditis with aortic valve vegetations (8/20), recent upper GIB (8/20), diverticulitis, GERD, COPD who presented as transfer from [facility] for higher level of care and continuous EEG monitoring. Per chart, patient presented to [facility] for evaluation of loss of consciousness. Patient was found unresponsive on the ground by daughter, EMS activated. Patient unresponsive to sternal rub on arrival to ER, intubated for airway protection and sedated on Propofol. MRI brain completed which showed PCA infarct and right occipital lobe edema. EEG completed 9/8 while at [facility] which showed left posterior pseudo periodic epileptiform discharges. Patient had multiple clinical events that were concerning for seizure while at OSH, started on Vimpat and Keppra for management. Patient transferred to [facility] for further evaluation. Neurology Epilepsy following for assistance in management.

## 2020-09-14 NOTE — PROCEDURES
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  Evaluate for evidence of epileptiform activity.    METHODOLOGY   Electroencephalographic (EEG) recording is with electrodes placed according to the International 10-20 placement system.  Thirty two (32) channels of digital signal (sampling rate of 512/sec) including T1 and T2 was simultaneously recorded from the scalp and may include  EKG, EMG, and/or eye monitors.  Recording band pass was 0.1 to 512 hz.  Digital video recording of the patient is simultaneously recorded with the EEG.  The patient is instructed report clinical symptoms which may occur during the recording session.  EEG and video recording is stored and archived in digital format.  Activation procedures which include photic stimulation, hyperventilation and instructing patients to perform simple task are done in selected patients.   The EEG is displayed on a monitor screen and can be reviewed using different montages.  Computer assisted analysis is employed to detect spike and electrographic seizure activity.   The entire record is submitted for computer analysis.  The entire recording is visually reviewed and the times identified by computer analysis as being spikes or seizures are reviewed again.  Compresses spectral analysis (CSA) is also performed on the activity recorded from each individual channel.  This is displayed as a power display of frequencies from 0 to 30 Hz over time.   The CSA is reviewed looking for asymmetries in power between homologous areas of the scalp and then compared with the original EEG recording.     Popego software is also utilized in the  review of this study.  This software suite analyzes the EEG recording in multiple domains.  Coherence and rhythmicity is computed to identify EEG sections which may contain organized seizures.  Each channel undergoes analysis to detect presence of spike and sharp waves which have special and morphological characteristic of epileptic activity.  The routine EEG recording is converted from spacial into frequency domain.  This is then displayed comparing homologous areas to identify areas of significant asymmetry.  Algorithm to identify non-cortically generated artifact is used to separate eye movement, EMG and other artifact from the EEG.      RECORDING TIMES  Start on 09/13/2020 at 11:41 a.m.  Stop on 09/14/2020 at 07:00 a.m.  A total of 19 hr and 16 min of EEG recording is obtained.    EEG FINDINGS  Background activity:   The background is continuous and asymmetric.  There is prominent multifocal and generalized delta slowing with admixed theta frequencies as well as overriding faster frequencies more prominent over the right hemisphere.  There are continuous periodic sharp waves over the posterior quadrants, left> right, at approximately 1 hz, for the entire recording session, which do not organize.    There is a single pushbutton activation at 12:13 p.m. when the patient has slow movements of his legs as he reposition them in the bed.  This activity does not appear ictal and there is no significant change in the electrographic record at this time.    Sleep:  There is poorly formed sleep architecture    Activation procedures:   Hyperventilation is not performed  Photic stimulation is not performed    Cardiac Monitor:   Occasional PVCs    Impression:   This is an abnormal continuous EEG monitoring study because of continuous periodic sharp waves most prominent over the left posterior quadrant but with a prominent field into the right posterior quadrant as well, which do not organize.  This suggests an area of  cortical dysfunction and potential seizure focus in this region.  There is a single pushbutton activation is the patient appears to reposition his legs in bed with no EEG correlate.  There are no electrographic seizures.    LTM Summary 09/13/2020 - TBD:  Patient events/Seizures:  No electrographic seizures.  Pushbutton for an episode of shifting movements of the legs in bed with no EEG correlate.      Interictal findings:  continuous periodic sharp waves over the left posterior quadrant with a prominent field into the right posterior quadrant as well at approximately 1 hz  Other notable abnormalities:   moderate encephalopathy    Tina Winlsow MD PhD  Neurology-Epilepsy  [facility].  [facility]

## 2020-09-14 NOTE — CONSULTS
Wound care consult received from nursing for assessment of sacrum. 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 The patient admitted to [facility] for status epilepticus.     Upon assessment to sacrum noted intact skin with blanchable redness. No skin breakdown noted. Nurse reports that patient is incontinent of stool and has had many episodes of diarrhea. Patient is high risk for moisture related skin breakdown. Recommend nursing to continue with pressure injury prevention interventions and to use clear critic-aid moisture barrier ointment BID/PRN after incontinent episodes to protect skin from moisture and prevent skin breakdown.       Nursing and MD team notified with recommendations.   Wound care to sign off. Please re-consult for any other concerns [phone]     Sacrum/buttocks

## 2020-09-14 NOTE — CONSULTS
Mr. 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 The patient presented to [facility] on 9/8/2020 with a primary complaint of loss of consciousness. The patient was recently treated for endocarditis w/ IV rocephin through PICC line at [facility] and discharged approximately 2 weeks ago. CTS was re consulted to evaluate.       Please see consult noted from 6/18/20 () and 8/18/20 () pt was deem twice to be non operable under MRN# [id number]. No change to surgical plan.     Thank you  Jody Beltrán [facility]

## 2020-09-14 NOTE — HPI
Mr. 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 The patient presented to [facility] on 9/8/2020 with a primary complaint of loss of consciousness. The patient was recently treated for endocarditis w/ IV rocephin through PICC line at [facility] and discharged approximately 2 weeks ago. Since then he has had a worsening cough and was planning to go to the hospital for evaluation. Per wife home health did portable CXR about a week ago w/ concern for fluid vs pneumonia. Patient was given course of doxycycline and finished course recently. 9/8 Wife went to bed while  was watching TV and about an hour later her daughter found him non responsive on the ground. Daughter did a few chest compressions before EMS arrived. Patient arrived to ED and non responsive to sternal rub. Pupillary and gag reflex present. Patient intubated for airway protection and sedated w/ propofol. No seizure like activity was observed in ED. TPA contraindicated due to recent GI bleed, last occurring in August. Discussed further stroke work up with wife and planned for imaging. CT head negative for acute intracranial bleed. MRI head showed PCA infarct and right occipital lobe edema. 9/8 EEG demonstrated left posterior pseudo periodic epileptiform discharges is suggestive of an irritative region likely secondary to focal ischemia. Patient started on AEDs vimpat and keppra and reported to have several seizures during his stay. Patient was transferred to [facility] for continuous EEG monitoring and further evaluation.  BCXs:  9/8 1/4 CONS - suspect contaminant  9/10 - NGTD  9/14 sent     MRI Brain:  Numerous scattered punctate foci of cortical enhancement throughout both cerebral hemispheres.  Additional rim enhancing collection in the right occipital lobe with central diffusion restriction and  surrounding vasogenic edema.  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     Persistent gyriform diffusion restriction the posterior left cerebral hemisphere as well as the hippocampus and dorsal lateral thalamus, although slightly improved from prior studies.  The distribution be typical for vascular territory infarct, most suggestive of seizure related signal changes (status epilepticus).  Other encephalitis to be excluded clinically.   NSGy following for occipital abscess. NPO at this time. currently on doxy flagyl and ceftriaxone.

## 2020-09-14 NOTE — ASSESSMENT & PLAN NOTE
Area of cytotoxic cerebral edema identified when reviewing brain imaging in the territory of the left posterior cerebral artery, L thalamus, right posterior cerebral artery. There is no mass effect associated with it. We will continue to monitor the patients clinical exam for any worsening of symptoms which may indicate expansion of the stroke or the area of the edema resulting in the clinical change. The pattern is suggestive of embolic etiology.

## 2020-09-14 NOTE — CONSULTS
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 The patient presented to [facility] on 9/8/2020 after LOC at home. Per report patient had been c/o worsening SOB, had portable CXR with effusion v PNA. Patient was given course of doxycycline and finished course recently. Reportedly on 9/8 his wife went to bed while  was watching TV and about an hour later her daughter found him non responsive on the ground, short round of chest compressions before intubated in ED, sedated on Prop.  No seizure like activity was observed in ED. TPA contraindicated due to recent GI bleed, last occurring in August.  MRI head showed L PCA infarct and right occipital lobe edema. 9/8 EEG demonstrated left posterior pseudo periodic epileptiform discharges is suggestive of an irritative region likely secondary to focal ischemia. Patient started on AEDs vimpat and keppra and reported to have several seizures during his stay at [place], currently on cEEG in [facility]. NSGY consulted following MRI Brain this evening with 1.2cm R occipital lesion concerning for abscess.     On Lovenox at home, SQH at [facility] [place] [facility].     Medications Prior to Admission   Medication Sig Dispense Refill Last Dose    atorvastatin (LIPITOR) 40 MG tablet 1 tablet (40 mg total) by Per G Tube route once daily. 90 tablet 3     cefTRIAXone (ROCEPHIN) 2 g/50 mL PgBk IVPB Inject 50 mLs (2 g total) into the vein once daily. for 4 days 4 each 0     doxycycline hyclate (DOXYCYCLINE 100MG IN D5W 250ML,  READY TO MIX,) Inject 250 mLs (100 mg total) into the vein every 12 (twelve) hours. for 6 days 3000 mL 0     enoxaparin (LOVENOX) 40 mg/0.4 mL Syrg Inject 0.4 mLs (40 mg total) into the skin once daily. 12 mL 0     insulin aspart U-100 (NOVOLOG) 100 unit/mL (3 mL) InPn pen Inject 0-5 Units into the skin every 6 (six) hours as needed (Hyperglycemia). 3 mL 0     lacosamide (VIMPAT) 10 mg/mL Soln oral solution 10 mLs (100 mg total) by Per NG tube route every 12 (twelve) hours. 600 mL 11     levETIRAcetam in NaCl, iso-os, (KEPPRA) 500 mg/100 mL PgBk Inject 100 mLs (500 mg total) into the vein every 12 (twelve) hours. 6000 mL 0     losartan (COZAAR) 50 MG tablet Take 1 tablet (50 mg total) by mouth once daily. 90 tablet 3     metronidazole (FLAGYL) 500 mg/100 mL IVPB Inject 100 mLs (500 mg total) into the vein every 8 (eight) hours. for 7 days 2100 mL 0        Review of patient's allergies indicates:   Allergen Reactions    Codeine     Tetanus vaccines and toxoid        No past medical history on file.  No past surgical history on file.  Family History     None        Tobacco Use    Smoking status: Not on file   Substance and Sexual Activity    Alcohol use: Not on file    Drug use: Not on file    Sexual activity: Not on file     Review of Systems   Unable to perform ROS: Mental status change     Objective:     Weight: 114 kg (251 lb 5.2 oz)  Body mass index is 37.11 kg/m².  Vital Signs (Most Recent):  Temp: 98.5 °F (36.9 °C) (09/14/20 1500)  Pulse: 83 (09/14/20 1800)  Resp: (!) 24 (09/14/20 1800)  BP: (!) 164/81 (09/14/20 1800)  SpO2: 100 % (09/14/20 1800) Vital Signs (24h Range):  Temp:  [98 °F (36.7 °C)-98.5 °F (36.9 °C)] 98.5 °F (36.9 °C)  Pulse:  [75-88] 83  Resp:  [18-39] 24  SpO2:  [96 %-100 %] 100 %  BP: (143-178)/(68-95) 164/81     Date 09/14/20 0700 - 09/15/20 0659   Shift 1286-0250 8713-7028 9899-5699 24 Hour Total   INTAKE   I.V.(mL/kg) 383(3.4) 120(1.1)  503(4.4)   NG/GT 60   60   IV Piggyback 200  "  200   Shift Total(mL/kg) 643(5.6) 120(1.1)  763(6.7)   OUTPUT   Urine(mL/kg/hr) 50(0.1) 400  450   Shift Total(mL/kg) 50(0.4) 400(3.5)  450(3.9)   Weight (kg) 114 114 114 114              Resp Rate Total:  [21 br/min-25 br/min] 23 br/min         NG/OG Tube 09/12/20 1644 Neche sump 16 Fr. Left nostril (Active)   Placement Check placement verified by x-ray;placement verified by distal tube length measurement;placement verified by aspirate characteristics 09/14/20 1500   Tolerance no signs/symptoms of discomfort 09/14/20 1500   Securement secured to nostril center w/ adhesive device 09/14/20 1500   Clamp Status/Tolerance clamped;no abdominal discomfort;no abdominal distention;no emesis;no nausea;no residual;no restlessness 09/14/20 1500   Suction Setting/Drainage Method suction at the bedside 09/14/20 0305   Insertion Site Appearance no redness, warmth, tenderness, skin breakdown, drainage 09/14/20 0305   Drainage None 09/14/20 0305   Flush/Irrigation flushed w/;water;no resistance met 09/14/20 0305   Intake (mL) 60 mL 09/14/20 1030   Water Bolus (mL) 60 mL 09/13/20 0900            Urethral Catheter 09/08/20 0315 Coude 14 Fr. (Active)   Site Assessment Clean;Intact 09/14/20 1500   Collection Container Urimeter 09/14/20 1500   Securement Method secured to top of thigh w/ adhesive device 09/14/20 1500   Catheter Care Performed yes 09/14/20 1500   Reason for Continuing Urinary Catheterization Critically ill in ICU requiring intensive monitoring 09/14/20 1500   CAUTI Prevention Bundle StatLock in place w 1" slack;Intact seal between catheter & drainage tubing;Drainage bag/urimeter off the floor;Green sheeting clip in use;No dependent loops or kinks;Drainage bag/urimeter not overfilled (<2/3 full);Drainage bag/urimeter below bladder 09/14/20 0701   Output (mL) 400 mL 09/14/20 1800       Neurosurgery Physical Exam   Awake, alert, eyes closed  Oriented to self, hospital  L gaze preference, can cross " midline  PERRL  FCx4  ANDERSON spontaneously, symmetrically  BUE restraints  cEEG in place    Significant Labs:  Recent Labs   Lab 09/13/20  0539 09/14/20  0338   GLU 86 84    142   K 3.7 3.6    111*   CO2 22* 21*   BUN 22 25*   CREATININE 1.3 1.2   CALCIUM 9.2 9.0   MG 1.9 1.9     Recent Labs   Lab 09/13/20  0538 09/14/20  0338   WBC 14.04* 13.00*   HGB 9.4* 9.7*   HCT 29.9* 32.3*    233     Recent Labs   Lab 09/14/20  0338   INR 1.2   APTT 30.3     Microbiology Results (last 7 days)     Procedure Component Value Units Date/Time    Blood culture [[id number]] Collected: 09/14/20 1840    Order Status: Sent Specimen: Blood from Peripheral, Foot, Right Updated: 09/14/20 1843    Blood culture [[id number]] Collected: 09/14/20 1835    Order Status: Sent Specimen: Blood from Peripheral, Foot, Left Updated: 09/14/20 1843    Urine culture [[id number]] Collected: 09/13/20 1438    Order Status: No result Specimen: Urine Updated: 09/13/20 1858          Significant Diagnostics:  X-ray Chest 1 View    Result Date: 9/14/2020  Mild interval increase in the degree of pulmonary vascular congestion and interstitial/alveolar edema. Electronically signed by: Graham Smith MD Date:    09/14/2020 Time:    11:28    Mri Brain W Wo Contrast    Result Date: 9/14/2020  Numerous scattered punctate foci of cortical enhancement throughout both cerebral hemispheres.  Additional rim enhancing collection in the right occipital lobe with central diffusion restriction and surrounding vasogenic edema.  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 Persistent gyriform diffusion restriction the posterior left cerebral hemisphere as well as the hippocampus and dorsal lateral thalamus, although slightly improved from prior studies.  The distribution be typical for vascular territory infarct, most suggestive of seizure related signal changes (status epilepticus).  Other encephalitis to be excluded clinically.  New small left frontal subdural hygroma without significant intracranial mass effect. Stable small subacute left cerebellar infarct. Mild chronic microvascular ischemic disease with remote right frontal and right occipital infarcts. Numerous regions of prior parenchymal and leptomeningeal hemorrhage involving both cerebral hemispheres appearance raising the possibility underlying cerebral amyloid angiopathy.  No new hemorrhage. Clinical correlation and follow-up advised. This report was flagged in Epic as abnormal. Electronically signed by resident: Sea Lopez MD Date:    09/14/2020 Time:    15:58 Electronically signed by: Roosevelt Escoto MD Date:    09/14/2020 Time:    16:49      Assessment/Plan:     Bacterial endocarditis  74M with known bacterial endocarditis with septic emboli c/b L PCA infarct, scattered punctate infarctions and R occipital lesion concerning for intracranial abscess:    -Continue NCC admit      -q1h neurochecks in ICU, q2h neurochecks in stepdown, q4h neurochecks on floor  --SBP <160 (cardene ggt; hydralazine & labetalol PRN; transition to home meds when appropriate)  --Na >135  --VN following  --AED per NCC/Epilepsy  --Abx per ID, f/up BCx - 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 Waterman          Thank you for your consult. I will follow-up with patient. Please contact us if you have any additional questions.    Isidra Rowley MD  Neurosurgery  [facility]

## 2020-09-14 NOTE — HPI
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 The patient presented to [facility] on 9/8/2020 after LOC at home. Per report patient had been c/o worsening SOB, had portable CXR with effusion v PNA. Patient was given course of doxycycline and finished course recently. Reportedly on 9/8 his wife went to bed while  was watching TV and about an hour later her daughter found him non responsive on the ground, short round of chest compressions before intubated in ED, sedated on Prop.  No seizure like activity was observed in ED. TPA contraindicated due to recent GI bleed, last occurring in August.  MRI head showed L PCA infarct and right occipital lobe edema. 9/8 EEG demonstrated left posterior pseudo periodic epileptiform discharges is suggestive of an irritative region likely secondary to focal ischemia. Patient started on AEDs vimpat and keppra and reported to have several seizures during his stay at [place], currently on cEEG in [facility]. NSGY consulted following MRI Brain this evening with 1.2cm R occipital lesion concerning for abscess.     On Lovenox at home, SQH at [facility].     Reconsult for change in mental status today, not interacting with his wife, aphasia, not following commands.

## 2020-09-14 NOTE — PLAN OF CARE
POC reviewed with pt at 0500. Pt verbalized understanding. Questions and concerns addressed. No acute events overnight. CEEG continued.  SBP <180; NSR.  Pt progressing toward goals. Will continue to monitor. See flowsheets for full assessment and VS info.

## 2020-09-14 NOTE — ASSESSMENT & PLAN NOTE
75 y/o Male PMH HTN, recent endocarditis (8/2020), recent GIB (8/2020), diverticulitis, GERD, transferred from [facility] for management of seizures. On 9/8 pt presented to [place] ED with unresponsiveness, hypoxia requiring intubation, and seizure like activity. Initial CT with no early infarct signs or hemorrhage. No tPA due to recent GI bleed. MRI with left PCA infarct and left thalamic infarct along with small right PCA infarct. MRA demonstrates fetal circulation to left PCA. Two different distributions makes a cardioembolic source most likely. Given his recent history of endocarditis this is the likely etiology. Left PCA attenuation could be due to recent seizures. Right infarct with significant vasogenic edema more likely due to septic embolus    Pt extubated 9/11 concerns for L MCA infarct due to exam. Repeat MRI new acute/subacute L cerebellum infarct; SWI suggest CAA +/- component of chronic hypertensive arteriopathy.  Continue with previous recommendations.    Antithrombotics for secondary stroke prevention: Antiplatelets: Aspirin: 325 mg daily     Statins for secondary stroke prevention and hyperlipidemia, if present:   Statins: Atorvastatin- 40 mg daily     Aggressive risk factor modification: HTN, Endocarditis     Rehab efforts: The patient has been evaluated by a stroke team provider and the therapy needs have been fully considered based off the presenting complaints and exam findings. The following therapy evaluations are needed: PT evaluate and treat, OT evaluate and treat, SLP evaluate and treat      Diagnostics ordered/pending: none     VTE prophylaxis: Enoxaparin 40 mg SQ every 24 hours, SCDs     BP parameters: Infarct: No intervention, SBP <160 - no longer in acute window. Can move towards normotension. Patient at goal without medication.

## 2020-09-14 NOTE — PROGRESS NOTES
[facility]  Neurocritical Care  Progress Note    Admit Date: 9/13/2020  Service Date: 09/14/2020  Length of Stay: 1    Subjective:     Chief Complaint: Status epilepticus    History of Present Illness: Mr. 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 The patient presented to [facility] on 9/8/2020 with a primary complaint of loss of consciousness. The patient was recently treated for endocarditis w/ IV rocephin through PICC line at [facility] and discharged approximately 2 weeks ago. Since then he has had a worsening cough and was planning to go to the hospital for evaluation. Per wife home health did portable CXR about a week ago w/ concern for fluid vs pneumonia. Patient was given course of doxycycline and finished course recently. 9/8 Wife went to bed while  was watching TV and about an hour later her daughter found him non responsive on the ground. Daughter did a few chest compressions before EMS arrived. Patient arrived to ED and non responsive to sternal rub. Pupillary and gag reflex present. Patient intubated for airway protection and sedated w/ propofol. No seizure like activity was observed in ED. TPA contraindicated due to recent GI bleed, last occurring in August. Discussed further stroke work up with wife and planned for imaging. CT head negative for acute intracranial bleed. MRI head showed PCA infarct and right occipital lobe edema. 9/8 EEG demonstrated left posterior pseudo periodic epileptiform discharges is suggestive of an irritative region likely secondary to focal ischemia. Patient started on AEDs vimpat and keppra and reported to have several seizures during his stay. Patient was transferred to [facility] today for continuous EEG monitoring and further evaluation.    Hospital Course: 9/13/2020 Admit to  [facility].  09/14/2020: ELIO Caio scheduled, PT/OT ordered. EEG with continuous periodic discharges per Epilepsy team. Continue current AEDs. Will consult ID for endocarditis, antibiotic recs.    Interval History: No acute events overnight.    Review of Systems: Unable to obtain a complete ROS due to level of consciousness.     Vitals:   Temp: 98 °F (36.7 °C)  Pulse: 78  Rhythm: normal sinus rhythm  BP: (!) 146/71  MAP (mmHg): 100  Resp: (!) 21  SpO2: 100 %  O2 Device (Oxygen Therapy): room air    Temp  Min: 97.5 °F (36.4 °C)  Max: 98.4 °F (36.9 °C)  Pulse  Min: 76  Max: 88  BP  Min: 143/95  Max: 178/78  MAP (mmHg)  Min: 93  Max: 126  Resp  Min: 20  Max: 36  SpO2  Min: 96 %  Max: 100 %    09/13 0701 - 09/14 0700  In: 1928.8 [I.V.:1018.8]  Out: 1060 [Urine:1060]   Unmeasured Output  Stool Occurrence: 1  Pad Count: 1     Examination:   Constitutional: Well-nourished and -developed. No apparent distress.   Eyes: Conjunctiva clear, anicteric. Lids no lesions.  Head/Ears/Nose/Mouth/Throat/Neck: Moist mucous membranes. External ears, nose atraumatic.   Cardiovascular: Regular rhythm. No leg edema.  Respiratory: Comfortable respirations. Clear to auscultation.  Gastrointestinal: Soft, nondistended, nontender. + bowel sounds.    Neurologic:  -GCS E 4 V 4 M 6  -Drowsy, responds to voice. Oriented to person. Delayed responses, dysarthria present. Follows commands.  -Cranial nerves: EOM intact, PERRL 3mm, no facial droop, +cough  -Motor: Moves all extremities spontaneously, antigravity  -Sensation: Intact to light touch.  Unable to test insight, fund of knowledge, coordination, gait due to level of consciousness.    Medications:   Continuoussodium chloride 0.9%, Last Rate: 30 mL/hr at 09/14/20 1100    Scheduledalbuterol-ipratropium, 3 mL, Q4H  atorvastatin, 40 mg, Daily  cefTRIAXone (ROCEPHIN) IVPB, 2 g, Q24H  doxycycline (VIBRAMYCIN) IVPB, 100 mg, Q12H  lacosamide, 200 mg, Q12H  levETIRAcetam in NaCl (iso-os), 500 mg,  Q12H  metronidazole, 500 mg, Q8H    PRNdextrose 50%, 12.5 g, PRN  glucagon (human recombinant), 1 mg, PRN  insulin aspart U-100, 1-10 Units, Q6H PRN  labetalol, 10 mg, Q4H PRN  sodium chloride 0.9%, 10 mL, PRN       Today I independently reviewed pertinent medications, lines/drains/airways, imaging, cardiology results, laboratory results, microbiology results, notably:     ISTAT: No results for input(s): PH, PCO2, PO2, POCSATURATED, HCO3, BE, POCNA, POCK, POCTCO2, POCGLU, POCICA, POCLAC, SAMPLE in the last 24 hours.   Chem:   Recent Labs   Lab 09/14/20  0338      K 3.6   *   CO2 21*   GLU 84   BUN 25*   CREATININE 1.2   ESTGFRAFRICA >60.0   EGFRNONAA 59.6*   CALCIUM 9.0   MG 1.9   PHOS 4.3   ANIONGAP 10   PROT 6.5   ALBUMIN 2.4*   BILITOT 0.3   ALKPHOS 55   AST 13   ALT 7*     Heme:   Recent Labs   Lab 09/14/20  0338   WBC 13.00*   HGB 9.7*   HCT 32.3*      INR 1.2     Endo:   Recent Labs   Lab 09/13/20 1952 09/14/20  0310   POCTGLUCOSE 107 81          Assessment/Plan:     Neuro  * Status epilepticus  - Multiple witnessed seizure-like activity described  - No previous reports from family  - Was started on keppra and vimpat 200 mg BID  - EEG at OSH shows irregular slowing in the R frontal region, with L posterior periodic epileptiform discharges. No seizures recorded during this study.  - 9/13 Here EEG continues to show periodic discharges in the left posterior region with no seizures.    Plan:   -- Continue vimpat 200 mg BID  -- Continue keppra 500 mg BID  -- Continue EEG monitoring  --9/14: EEG with periodic discharges per Epilepsy, continue current AED regimen    Cerebellar stroke  On 9/8 pt presented to [place] ED with unresponsiveness  No tPA due to recent GI bleed  MRI with left PCA infarct and left thalamic infarct along with small right PCA infarct  MRA demonstrates fetal circulation to left PCA  9/11 Repeat MRI new acute/subacute L cerebellum infarct    -Neuro checks q1hr  -Vascular  neurology following  -SBP Goal < 180    Pulmonary  Chronic bronchitis  Duonebs scheduled q4hrs  Monitor respiratory status, at risk for intubation  Continuous pulse oximetry, ABG prn  CXR today    Cardiac/Vascular  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  Previously evaluated by CT surgery team her at [facility], and determined to be too high risk for valve replacement surgery, to manage with IV antibiotics    According to medical records, last dose of ceftriaxone should be today 9/14  Will consult ID for other antibiotic regimen recommendations regarding flagyl and doxycycline      Essential hypertension  SBP goal < 180    GI  Ulcerative colitis  Hx of, monitor for GI bleeding    The patient is being Prophylaxed for:  Venous Thromboembolism with: Mechanical  Stress Ulcer with: Not Applicable   Ventilator Pneumonia with: not applicable    Activity Orders          None        Full Code     CC time spent: 31 minutes    Angelique Prince NP  Neurocritical Care  [facility]

## 2020-09-14 NOTE — SUBJECTIVE & OBJECTIVE
No past medical history on file.    No past surgical history on file.    Review of patient's allergies indicates:   Allergen Reactions    Codeine     Tetanus vaccines and toxoid        Medications:  Medications Prior to Admission   Medication Sig    atorvastatin (LIPITOR) 40 MG tablet 1 tablet (40 mg total) by Per G Tube route once daily.    cefTRIAXone (ROCEPHIN) 2 g/50 mL PgBk IVPB Inject 50 mLs (2 g total) into the vein once daily. for 4 days    doxycycline hyclate (DOXYCYCLINE 100MG IN D5W 250ML, READY TO MIX,) Inject 250 mLs (100 mg total) into the vein every 12 (twelve) hours. for 6 days    enoxaparin (LOVENOX) 40 mg/0.4 mL Syrg Inject 0.4 mLs (40 mg total) into the skin once daily.    insulin aspart U-100 (NOVOLOG) 100 unit/mL (3 mL) InPn pen Inject 0-5 Units into the skin every 6 (six) hours as needed (Hyperglycemia).    lacosamide (VIMPAT) 10 mg/mL Soln oral solution 10 mLs (100 mg total) by Per NG tube route every 12 (twelve) hours.    levETIRAcetam in NaCl, iso-os, (KEPPRA) 500 mg/100 mL PgBk Inject 100 mLs (500 mg total) into the vein every 12 (twelve) hours.    losartan (COZAAR) 50 MG tablet Take 1 tablet (50 mg total) by mouth once daily.    metronidazole (FLAGYL) 500 mg/100 mL IVPB Inject 100 mLs (500 mg total) into the vein every 8 (eight) hours. for 7 days     Antibiotics (From admission, onward)    Start     Stop Route Frequency Ordered    09/13/20 2000  cefTRIAXone (ROCEPHIN) 2 g/50 mL D5W IVPB      -- IV Every 24 hours (non-standard times) 09/13/20 1728 09/13/20 1830  metronidazole IVPB 500 mg      -- IV Every 8 hours (non-standard times) 09/13/20 1720 09/13/20 1830  doxycycline (VIBRAMYCIN) 100 mg in dextrose 5 % 250 mL IVPB      -- IV Every 12 hours (non-standard times) 09/13/20 1721        Antifungals (From admission, onward)    None        Antivirals (From admission, onward)    None             There is no immunization history on file for this patient.    Family History      None        Social History     Socioeconomic History    Marital status:      Spouse name: Not on file    Number of children: Not on file    Years of education: Not on file    Highest education level: Not on file   Occupational History    Not on file   Social Needs    Financial resource strain: Not on file    Food insecurity     Worry: Not on file     Inability: Not on file    Transportation needs     Medical: Not on file     Non-medical: Not on file   Tobacco Use    Smoking status: Not on file   Substance and Sexual Activity    Alcohol use: Not on file    Drug use: Not on file    Sexual activity: Not on file   Lifestyle    Physical activity     Days per week: Not on file     Minutes per session: Not on file    Stress: Not on file   Relationships    Social connections     Talks on phone: Not on file     Gets together: Not on file     Attends Evangelical service: Not on file     Active member of club or organization: Not on file     Attends meetings of clubs or organizations: Not on file     Relationship status: Not on file   Other Topics Concern    Not on file   Social History Narrative    Not on file     Review of Systems   Constitutional: Negative for appetite change, chills, diaphoresis, fatigue, fever and unexpected weight change.   HENT: Negative for congestion, ear pain, sore throat and tinnitus.    Eyes: Negative for pain, redness and visual disturbance.   Respiratory: Negative for cough, shortness of breath and wheezing.    Cardiovascular: Negative for chest pain, palpitations and leg swelling.   Gastrointestinal: Negative for abdominal pain, constipation, diarrhea, rectal pain and vomiting.   Endocrine: Negative for cold intolerance and heat intolerance.   Genitourinary: Negative for dysuria, flank pain, frequency, hematuria and urgency.   Musculoskeletal: Negative for arthralgias, back pain, myalgias and neck pain.   Skin: Negative for rash.   Allergic/Immunologic: Negative for  immunocompromised state.   Neurological: Negative for dizziness, light-headedness, numbness and headaches.   Hematological: Negative for adenopathy. Does not bruise/bleed easily.   Psychiatric/Behavioral: Negative for confusion and sleep disturbance. The patient is not nervous/anxious.      Objective:     Vital Signs (Most Recent):  Temp: 98.2 °F (36.8 °C) (09/14/20 0701)  Pulse: 79 (09/14/20 1000)  Resp: (!) 23 (09/14/20 1000)  BP: (!) 156/73 (09/14/20 1000)  SpO2: 99 % (09/14/20 1000) Vital Signs (24h Range):  Temp:  [97.2 °F (36.2 °C)-98.4 °F (36.9 °C)] 98.2 °F (36.8 °C)  Pulse:  [77-88] 79  Resp:  [20-36] 23  SpO2:  [96 %-100 %] 99 %  BP: (143-178)/(65-95) 156/73     Weight: 114 kg (251 lb 5.2 oz)  Body mass index is 37.11 kg/m².    Estimated Creatinine Clearance: 68.2 mL/min (based on SCr of 1.2 mg/dL).    Physical Exam  Constitutional:       General: He is not in acute distress.     Appearance: He is well-developed. He is not diaphoretic.   HENT:      Head: Normocephalic and atraumatic.   Cardiovascular:      Rate and Rhythm: Normal rate and regular rhythm.      Heart sounds: Normal heart sounds. No murmur. No friction rub. No gallop.    Pulmonary:      Effort: Pulmonary effort is normal. No respiratory distress.      Breath sounds: Normal breath sounds. No wheezing or rales.   Abdominal:      General: Bowel sounds are normal. There is no distension.      Palpations: Abdomen is soft. There is no mass.      Tenderness: There is no abdominal tenderness. There is no guarding or rebound.   Skin:     General: Skin is warm and dry.   Neurological:      Mental Status: He is alert and oriented to person, place, and time.   Psychiatric:         Behavior: Behavior normal.         Significant Labs:   Blood Culture:   Recent Labs   Lab 09/08/20  0140 09/08/20  0211 09/10/20  0609 09/10/20  0916   LABBLOO Gram stain aer bottle: Gram positive cocci in clusters resembling Staph  Results called to and read back by: Dashawn  KAIT Luciano  09/10/2020    03:37  COAGULASE-NEGATIVE STAPHYLOCOCCUS SPECIES  Organism is a probable contaminant  * No growth after 5 days. No growth to date  No Growth to date No growth to date  No Growth to date  No Growth to date     CBC:   Recent Labs   Lab 09/13/20  0538 09/14/20  0338   WBC 14.04* 13.00*   HGB 9.4* 9.7*   HCT 29.9* 32.3*    233     CMP:   Recent Labs   Lab 09/12/20  1251 09/13/20  0539 09/14/20  0338    140 142   K 3.8 3.7 3.6    109 111*   CO2 22* 22* 21*   GLU 93 86 84   BUN 21 22 25*   CREATININE 1.3 1.3 1.2   CALCIUM 8.9 9.2 9.0   PROT  --  6.4 6.5   ALBUMIN  --  2.4* 2.4*   BILITOT  --  0.3 0.3   ALKPHOS  --  49* 55   AST  --  10 13   ALT  --  5* 7*   ANIONGAP 10 9 10   EGFRNONAA 54* 54* 59.6*     Wound Culture: No results for input(s): LABAERO in the last 4320 hours.  All pertinent labs within the past 24 hours have been reviewed.    Significant Imaging: I have reviewed all pertinent imaging results/findings within the past 24 hours.   X-Ray Chest 1 View [[id number]] Resulted: 09/14/20 0938   Order Status: Sent Updated: 09/14/20 0938   X-Ray Abdomen AP 1 View [[id number]] Resulted: 09/13/20 1137   Order Status: Completed Updated: 09/13/20 1139   Narrative:     EXAMINATION:   XR ABDOMEN AP 1 VIEW     CLINICAL HISTORY:   ngt;     TECHNIQUE:   AP View(s) of the abdomen was performed.     COMPARISON:   September 8, 2020     FINDINGS:   Esophagogastric catheter tip and side hole project in the middle stomach.  No new abnormality.    Impression:       Esophagogastric catheter tip projects in the middle stomach.       Electronically signed by: Fercho Badillo MD   Date: 09/13/2020   Time: 11:37   Imaging History    2020  Date Procedure Name Status Accession Number Location   09/13/20 11:14 AM X-Ray Abdomen AP 1 View Final [id number]   09/12/20 05:08 PM X-Ray Chest AP Portable Final [id number] [facility]   09/11/20 08:48 PM MRI Brain Without Contrast Final [id number] [facility]    09/08/20 08:46 PM X-Ray Chest AP Portable Final [id number] [facility]   09/08/20 05:08 PM MRA Neck without contrast Final [id number] [facility]   09/08/20 05:08 PM MRI Brain Without Contrast Final [id number] [facility]   09/08/20 05:08 PM MRA Brain Final [id number] [facility]   09/08/20 11:03 AM X-Ray Abdomen AP 1 View Final [id number] [facility]   09/08/20 02:16 AM X-Ray Chest AP Portable Final [id number] [facility]   09/08/20 01:37 AM X-Ray Chest AP Portable Final [id number] [facility]   09/08/20 01:35 AM CT Head Without Contrast Final [id number] [facility]   09/08/20 12:00 AM CARDIAC MONITORING STRIPS Final     09/08/20 04:04 PM Echo Color Flow Doppler? Yes Final [id number] [facility]

## 2020-09-14 NOTE — CONSULTS
[facility]  Neurology-Epilepsy  Consult Note    Patient Name: Lopez Savage  MRN: [id number]  Admission Date: 9/13/2020  Hospital Length of Stay: 1 days  Code Status: Full Code   Attending Provider: Joaquim Edmonds MD   Consulting Provider: Mercedes Aguillon PA-C  Primary Care Physician: Prasanna Haywood MD  Principal Problem:Status epilepticus    Consults   Subjective:     HPI:   Mr. Savage is a 75 yo male with significant medical history of HTN, recent endocarditis with aortic valve vegetations (8/20), recent upper GIB (8/20), diverticulitis, GERD, COPD who presented as transfer from [facility] for higher level of care and continuous EEG monitoring. Per chart, patient presented to [facility] for evaluation of loss of consciousness. Patient was found unresponsive on the ground by daughter, EMS activated. Patient unresponsive to sternal rub on arrival to ER, intubated for airway protection and sedated on Propofol. MRI brain completed which showed PCA infarct and right occipital lobe edema. EEG completed 9/8 while at [facility] which showed left posterior pseudo periodic epileptiform discharges. Patient had multiple clinical events that were concerning for seizure while at OSH, started on Vimpat and Keppra for management. [place] [facility] [place]. Neurology Epilepsy following for assistance in management.    Hospital Course:   9/13>9/14: no electrographic seizures, continuous periodic sharp waves most prominent over the left posterior quadrant but with a prominent field into the right posterior quadrant as well, which do not organize     No past medical history on file.    No past surgical history on file.    Review of patient's allergies indicates:   Allergen Reactions    Codeine     Tetanus vaccines and toxoid        No current facility-administered medications on file prior to encounter.      Current Outpatient Medications on File Prior to Encounter    Medication Sig    atorvastatin (LIPITOR) 40 MG tablet 1 tablet (40 mg total) by Per G Tube route once daily.    cefTRIAXone (ROCEPHIN) 2 g/50 mL PgBk IVPB Inject 50 mLs (2 g total) into the vein once daily. for 4 days    doxycycline hyclate (DOXYCYCLINE 100MG IN D5W 250ML, READY TO MIX,) Inject 250 mLs (100 mg total) into the vein every 12 (twelve) hours. for 6 days    enoxaparin (LOVENOX) 40 mg/0.4 mL Syrg Inject 0.4 mLs (40 mg total) into the skin once daily.    insulin aspart U-100 (NOVOLOG) 100 unit/mL (3 mL) InPn pen Inject 0-5 Units into the skin every 6 (six) hours as needed (Hyperglycemia).    lacosamide (VIMPAT) 10 mg/mL Soln oral solution 10 mLs (100 mg total) by Per NG tube route every 12 (twelve) hours.    levETIRAcetam in NaCl, iso-os, (KEPPRA) 500 mg/100 mL PgBk Inject 100 mLs (500 mg total) into the vein every 12 (twelve) hours.    losartan (COZAAR) 50 MG tablet Take 1 tablet (50 mg total) by mouth once daily.    metronidazole (FLAGYL) 500 mg/100 mL IVPB Inject 100 mLs (500 mg total) into the vein every 8 (eight) hours. for 7 days     Continuous Infusions:   sodium chloride 0.9% 30 mL/hr at 09/14/20 1400       Family History     None        Tobacco Use    Smoking status: Not on file   Substance and Sexual Activity    Alcohol use: Not on file    Drug use: Not on file    Sexual activity: Not on file     Review of Systems   Unable to perform ROS: Mental status change     Objective:     Vital Signs (Most Recent):  Temp: 98 °F (36.7 °C) (09/14/20 1100)  Pulse: 77 (09/14/20 1400)  Resp: (!) 24 (09/14/20 1400)  BP: (!) 160/77 (09/14/20 1400)  SpO2: 100 % (09/14/20 1400) Vital Signs (24h Range):  Temp:  [98 °F (36.7 °C)-98.4 °F (36.9 °C)] 98 °F (36.7 °C)  Pulse:  [76-88] 77  Resp:  [20-39] 24  SpO2:  [96 %-100 %] 100 %  BP: (143-178)/(68-95) 160/77     Weight: 114 kg (251 lb 5.2 oz)  Body mass index is 37.11 kg/m².    Physical Exam  Vitals signs and nursing note reviewed.   Constitutional:        General: He is not in acute distress.     Appearance: He is not diaphoretic.   HENT:      Head: Normocephalic and atraumatic.   Eyes:      Pupils: Pupils are equal, round, and reactive to light.      Comments: Left gaze preference   Neck:      Musculoskeletal: Neck supple.   Pulmonary:      Effort: No respiratory distress.      Breath sounds: No wheezing.   Musculoskeletal:         General: No deformity.   Skin:     General: Skin is warm and dry.         NEUROLOGICAL EXAMINATION:     MENTAL STATUS   Level of consciousness: drowsy       Oriented to person, place as hospital, [facility]      CRANIAL NERVES     CN III, IV, VI   Pupils are equal, round, and reactive to light.       Follows some simple commands, squeeze hands bilateral upper extremities   Left gaze preference       Significant Labs: All pertinent lab results from the past 24 hours have been reviewed.    Significant Studies: I have reviewed all pertinent imaging results/findings within the past 24 hours.    Assessment and Plan:     Encephalopathy acute  - Transferred from [facility] for further evaluation, clinical events concerning for seizure  - No prior seizure history per family  [place] [facility] [place] waves most prominent over the left posterior quadrant with a prominent field into the right posterior quadrant, which do not organize    Recommendations:  - Continue vEEG  - Continue Vimpat 200 mg BID, Keppra 500 mg BID  - Avoid medications that lower seizure threshold  - Neurochecks  - Management of acute infection per NCC, ID    Plan of care discussed with NCC team, patient and wife at bedside. Will continue to follow peripherally, please call with any questions.    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    Chronic bronchitis  - NCC closely monitoring respiratory status, at risk for intubation  - Continuous pulse oximetry    Ulcerative  colitis  - Hx of, NCC monitoring    Essential hypertension  - Vitals reviewed, management per [facility]        VTE Risk Mitigation (From admission, onward)         Ordered     heparin (porcine) injection 5,000 Units  Every 8 hours      09/14/20 1347     IP VTE HIGH RISK PATIENT  Once      09/13/20 1518     Place sequential compression device  Until discontinued      09/13/20 1119                Thank you for your consult. I will follow-up with patient. Please contact us if you have any additional questions.    Mercedes Aguillon PA-C  Neurology-Epilepsy  [facility]  Staff: Dr. Winslow

## 2020-09-14 NOTE — SUBJECTIVE & OBJECTIVE
No past medical history on file.  No past surgical history on file.  No family history on file.  Social History     Tobacco Use    Smoking status: Not on file   Substance Use Topics    Alcohol use: Not on file    Drug use: Not on file     Review of patient's allergies indicates:   Allergen Reactions    Codeine     Tetanus vaccines and toxoid        Medications: I have reviewed the current medication administration record.    Medications Prior to Admission   Medication Sig Dispense Refill Last Dose    atorvastatin (LIPITOR) 40 MG tablet 1 tablet (40 mg total) by Per G Tube route once daily. 90 tablet 3     cefTRIAXone (ROCEPHIN) 2 g/50 mL PgBk IVPB Inject 50 mLs (2 g total) into the vein once daily. for 4 days 4 each 0     doxycycline hyclate (DOXYCYCLINE 100MG IN D5W 250ML, READY TO MIX,) Inject 250 mLs (100 mg total) into the vein every 12 (twelve) hours. for 6 days 3000 mL 0     enoxaparin (LOVENOX) 40 mg/0.4 mL Syrg Inject 0.4 mLs (40 mg total) into the skin once daily. 12 mL 0     insulin aspart U-100 (NOVOLOG) 100 unit/mL (3 mL) InPn pen Inject 0-5 Units into the skin every 6 (six) hours as needed (Hyperglycemia). 3 mL 0     lacosamide (VIMPAT) 10 mg/mL Soln oral solution 10 mLs (100 mg total) by Per NG tube route every 12 (twelve) hours. 600 mL 11     levETIRAcetam in NaCl, iso-os, (KEPPRA) 500 mg/100 mL PgBk Inject 100 mLs (500 mg total) into the vein every 12 (twelve) hours. 6000 mL 0     [START ON 9/14/2020] losartan (COZAAR) 50 MG tablet Take 1 tablet (50 mg total) by mouth once daily. 90 tablet 3     metronidazole (FLAGYL) 500 mg/100 mL IVPB Inject 100 mLs (500 mg total) into the vein every 8 (eight) hours. for 7 days 2100 mL 0        Review of Systems   Constitutional: Negative for fever.   Eyes: Positive for visual disturbance.   Respiratory: Negative for shortness of breath.    Gastrointestinal: Negative for diarrhea and vomiting.   Neurological: Positive for speech difficulty and  weakness.   Psychiatric/Behavioral: Positive for confusion.     Objective:     Vital Signs (Most Recent):  Temp: 98.4 °F (36.9 °C) (09/13/20 1905)  Pulse: 83 (09/13/20 2105)  Resp: (!) 24 (09/13/20 2105)  BP: (!) 164/83 (09/13/20 2105)  SpO2: 98 % (09/13/20 2105)    Vital Signs Range (Last 24H):  Temp:  [97.2 °F (36.2 °C)-99.1 °F (37.3 °C)]   Pulse:  [78-95]   Resp:  [20-43]   BP: (137-172)/(65-98)   SpO2:  [96 %-100 %]     Physical Exam  Constitutional:       Comments: drowsy   Eyes:      Comments: L gaze preference; cross midline   Cardiovascular:      Rate and Rhythm: Normal rate.   Pulmonary:      Effort: Pulmonary effort is normal.   Skin:     General: Skin is warm and dry.   Neurological:      Mental Status: He is easily aroused.      Cranial Nerves: Dysarthria present.      Motor: Weakness present.   Psychiatric:         Speech: Speech is slurred.         Neurological Exam:   LOC: drowsy  Attention Span: Good   Language: Expressive aphasia, Receptive aphasia  Articulation: Dysarthria  Orientation: Untestable due to severe aphasia   EOM (CN III, IV, VI): Gaze preference  left  Facial Movement (CN VII): Symmetric facial expression    Motor: Arm left  Paresis: 3/5  Leg left  Paresis: 2/5  Arm right  Paresis: 3/5  Leg right Paresis: 2/5  Tone: Normal tone throughout      Laboratory:  CMP:   Recent Labs   Lab 09/13/20  0539   CALCIUM 9.2   ALBUMIN 2.4*   PROT 6.4      K 3.7   CO2 22*      BUN 22   CREATININE 1.3   ALKPHOS 49*   ALT 5*   AST 10   BILITOT 0.3     Lipid Panel:   Recent Labs   Lab 09/08/20  0140   CHOL 105*   LDLCALC 63.0   HDL 28*   TRIG 70     Hgb A1C:   Recent Labs   Lab 09/08/20  1808   HGBA1C 4.8     TSH:   Recent Labs   Lab 09/08/20  0140   TSH 5.424*       Diagnostic Results:       Brain Imaging  9/11/20   1. Since 09/08/2020 MRI, there is a new small focus of acute to subacute ischemia in the left cerebellum.  2. Elsewhere, there is continued maturation of ischemia in the bilateral  medial occipital and parietal lobes as well as the left cingulate gyrus.  3. At least partially on the basis of differences in technique, with SWI sequences utilized on the current examination versus T2* GRE previously, there are numerous diffuse bilateral cortical and subcortical foci of susceptibility related signal loss.  Newly evident susceptibility within the medial occipital lobes may represent petechial hemorrhage associated with the evolving areas of ischemia.  No large parenchymal hemorrhage is identified.  Additionally, there is evidence of susceptibility related signal loss about the cerebral sulci without FLAIR signal abnormality suggested of superficial siderosis as sequela of prior subarachnoid hemorrhage.  Overall constellation of findings suggests cerebral amyloid angiopathy.  Component of chronic hypertensive arteriopathy also considered.  4. Additional details, as per report body.    9/8/2020 CT Head  No early infarct signs.  No hemorrhage     9/8/2020 MRI Brain  Diffusion restriction with the left PCA territory and left thalamus.  Small diffusion restriction in right occipital lobe. No hemorrahge.       Vessel Imaging  9/8/2020 MRA brain and neck  Left PCA fetal circulation.  No high grade stenosis or occlusion  Cardiac Imaging   9/8/2020 TTE  · Mild left ventricular enlargement.  · Moderate mitral regurgitation.  · Intermediate central venous pressure (8 mmHg).  · The estimated PA systolic pressure is 28 mmHg.  · Low normal left ventricular systolic function. The estimated ejection fraction is 50%.  · Normal LV diastolic function.  · Normal right ventricular systolic function.  · Mild aortic regurgitation.

## 2020-09-14 NOTE — SUBJECTIVE & OBJECTIVE
No past medical history on file.    No past surgical history on file.    Review of patient's allergies indicates:   Allergen Reactions    Codeine     Tetanus vaccines and toxoid        No current facility-administered medications on file prior to encounter.      Current Outpatient Medications on File Prior to Encounter   Medication Sig    atorvastatin (LIPITOR) 40 MG tablet 1 tablet (40 mg total) by Per G Tube route once daily.    cefTRIAXone (ROCEPHIN) 2 g/50 mL PgBk IVPB Inject 50 mLs (2 g total) into the vein once daily. for 4 days    doxycycline hyclate (DOXYCYCLINE 100MG IN D5W 250ML, READY TO MIX,) Inject 250 mLs (100 mg total) into the vein every 12 (twelve) hours. for 6 days    enoxaparin (LOVENOX) 40 mg/0.4 mL Syrg Inject 0.4 mLs (40 mg total) into the skin once daily.    insulin aspart U-100 (NOVOLOG) 100 unit/mL (3 mL) InPn pen Inject 0-5 Units into the skin every 6 (six) hours as needed (Hyperglycemia).    lacosamide (VIMPAT) 10 mg/mL Soln oral solution 10 mLs (100 mg total) by Per NG tube route every 12 (twelve) hours.    levETIRAcetam in NaCl, iso-os, (KEPPRA) 500 mg/100 mL PgBk Inject 100 mLs (500 mg total) into the vein every 12 (twelve) hours.    losartan (COZAAR) 50 MG tablet Take 1 tablet (50 mg total) by mouth once daily.    metronidazole (FLAGYL) 500 mg/100 mL IVPB Inject 100 mLs (500 mg total) into the vein every 8 (eight) hours. for 7 days     Continuous Infusions:   sodium chloride 0.9% 30 mL/hr at 09/14/20 1400       Family History     None        Tobacco Use    Smoking status: Not on file   Substance and Sexual Activity    Alcohol use: Not on file    Drug use: Not on file    Sexual activity: Not on file     Review of Systems   Unable to perform ROS: Mental status change     Objective:     Vital Signs (Most Recent):  Temp: 98 °F (36.7 °C) (09/14/20 1100)  Pulse: 77 (09/14/20 1400)  Resp: (!) 24 (09/14/20 1400)  BP: (!) 160/77 (09/14/20 1400)  SpO2: 100 % (09/14/20 1400) Vital  Signs (24h Range):  Temp:  [98 °F (36.7 °C)-98.4 °F (36.9 °C)] 98 °F (36.7 °C)  Pulse:  [76-88] 77  Resp:  [20-39] 24  SpO2:  [96 %-100 %] 100 %  BP: (143-178)/(68-95) 160/77     Weight: 114 kg (251 lb 5.2 oz)  Body mass index is 37.11 kg/m².    Physical Exam  Vitals signs and nursing note reviewed.   Constitutional:       General: He is not in acute distress.     Appearance: He is not diaphoretic.   HENT:      Head: Normocephalic and atraumatic.   Eyes:      Pupils: Pupils are equal, round, and reactive to light.      Comments: Left gaze preference   Neck:      Musculoskeletal: Neck supple.   Pulmonary:      Effort: No respiratory distress.      Breath sounds: No wheezing.   Musculoskeletal:         General: No deformity.   Skin:     General: Skin is warm and dry.         NEUROLOGICAL EXAMINATION:     MENTAL STATUS   Level of consciousness: drowsy       Oriented to person, place as hospital, [facility]      CRANIAL NERVES     CN III, IV, VI   Pupils are equal, round, and reactive to light.       Follows some simple commands, squeeze hands bilateral upper extremities   Left gaze preference       Significant Labs: All pertinent lab results from the past 24 hours have been reviewed.    Significant Studies: I have reviewed all pertinent imaging results/findings within the past 24 hours.

## 2020-09-14 NOTE — PROGRESS NOTES
[facility]  Vascular Neurology  Comprehensive Stroke Center  Progress Note    Assessment/Plan:     * Status epilepticus  EEG   Management per primary     cEEG 9/14 negative for ongoing seizures, continues on vimpat, keppra    Cytotoxic brain edema  Area of cytotoxic cerebral edema identified when reviewing brain imaging in the territory of the left posterior cerebral artery, L thalamus, right posterior cerebral artery. There is no mass effect associated with it. We will continue to monitor the patients clinical exam for any worsening of symptoms which may indicate expansion of the stroke or the area of the edema resulting in the clinical change. The pattern is suggestive of embolic etiology.         Embolic stroke involving left posterior cerebral artery  73 y/o Male PMH HTN, recent endocarditis (8/2020), recent GIB (8/2020), diverticulitis, GERD, transferred from [facility] for management of seizures. On 9/8 pt presented to [place] ED with unresponsiveness, hypoxia requiring intubation, and seizure like activity. Initial CT with no early infarct signs or hemorrhage. No tPA due to recent GI bleed. MRI with left PCA infarct and left thalamic infarct along with small right PCA infarct. MRA demonstrates fetal circulation to left PCA. Two different distributions makes a cardioembolic source most likely. Given his recent history of endocarditis this is the likely etiology. Left PCA attenuation could be due to recent seizures. Right infarct with significant vasogenic edema more likely due to septic embolus    Pt extubated 9/11 concerns for L MCA infarct due to exam. Repeat MRI new acute/subacute L cerebellum infarct; SWI suggest CAA +/- component of chronic hypertensive arteriopathy.  Continue with previous recommendations.    Antithrombotics for secondary stroke prevention: Antiplatelets: Aspirin: 325 mg daily     Statins for secondary stroke prevention and hyperlipidemia, if present:   Statins:  Atorvastatin- 40 mg daily     Aggressive risk factor modification: HTN, Endocarditis     Rehab efforts: The patient has been evaluated by a stroke team provider and the therapy needs have been fully considered based off the presenting complaints and exam findings. The following therapy evaluations are needed: PT evaluate and treat, OT evaluate and treat, SLP evaluate and treat      Diagnostics ordered/pending: none     VTE prophylaxis: Enoxaparin 40 mg SQ every 24 hours, SCDs     BP parameters: Infarct: No intervention, SBP <160 - no longer in acute window. Can move towards normotension. Patient at goal without medication.      Bacterial endocarditis  08/2020  On IV rocephin, PO doxy, flagyl  Likely etiology of stroke   Management per primary   -blood cultures this admission NGTD, but recent history of strep mutans endocarditis  -recent admission 8/2020 for this issue, CTS evaluated for aortic and mitral vegetations found on SILVANA and determined patient is a poor surgical candidate  -ID consulted    Essential hypertension  Stroke risk factor   SBP < 160  management per primary           No further episodes of seizures on cEEG, continues on keppra, vimpat. MRI brain 9/14 suggest septic emboli as the source for stroke, likely prior endocarditis. ID consulted. Blood cultures NGTD this admission.     STROKE DOCUMENTATION        NIH Scale: 18          Modified Irwin Score: 0  Roni Coma Scale:    ABCD2 Score:    [id number] Score:   HAS -BLED Score:   ICH Score:   Hunt & Newman Classification:      Hemorrhagic change of an Ischemic Stroke: Does this patient have an ischemic stroke with hemorrhagic changes? No     Neurologic Chief Complaint: acute embolic stroke likely 2/2 to endocarditis    Subjective:     Interval History: NAEO. cEEG not showing active seizures on keppra, vimpat. Patient follows some commands but overall disoriented, lethargic. Repeat MRI today, pending read. ID on board for recs regarding endocarditis.      HPI, Past Medical, Family, and Social History remains the same as documented in the initial encounter.       Scheduled Meds:   albuterol-ipratropium  3 mL Nebulization Q4H    atorvastatin  40 mg Per G Tube Daily    cefTRIAXone (ROCEPHIN) IVPB  2 g Intravenous Q24H    doxycycline (VIBRAMYCIN) IVPB  100 mg Intravenous Q12H    heparin (porcine)  5,000 Units Subcutaneous Q8H    lacosamide  200 mg Per NG tube Q12H    levETIRAcetam in NaCl (iso-os)  500 mg Intravenous Q12H    metronidazole  500 mg Intravenous Q8H     Continuous Infusions:   sodium chloride 0.9% 30 mL/hr at 09/14/20 1600     PRN Meds:dextrose 50%, glucagon (human recombinant), insulin aspart U-100, labetalol, sodium chloride 0.9%    Objective:     Vital Signs (Most Recent):  Temp: 98.5 °F (36.9 °C) (09/14/20 1500)  Pulse: 83 (09/14/20 1615)  Resp: (!) 34 (09/14/20 1615)  BP: (!) 164/81 (09/14/20 1615)  SpO2: 97 % (09/14/20 1615)  BP Location: Right arm    Vital Signs Range (Last 24H):  Temp:  [98 °F (36.7 °C)-98.5 °F (36.9 °C)]   Pulse:  [76-88]   Resp:  [20-39]   BP: (143-178)/(68-95)   SpO2:  [96 %-100 %]   BP Location: Right arm    Physical Exam  Constitutional:       Comments: drowsy   Eyes:      Comments: L gaze preference; cross midline   Cardiovascular:      Rate and Rhythm: Normal rate.   Pulmonary:      Effort: Pulmonary effort is normal.   Skin:     General: Skin is warm and dry.   Neurological:      Mental Status: He is easily aroused.      Cranial Nerves: Dysarthria present.      Motor: Weakness present.   Psychiatric:         Speech: Speech is slurred.         Neurological Exam:   LOC: alert and drowsy  Attention Span: poor  Language: Expressive aphasia, Receptive aphasia  Articulation: Dysarthria  Orientation: Person, Place, Time , Untestable due to severe aphasia   Visual Fields: Full  unable to assess  EOM (CN III, IV, VI): Gaze preference  left  Facial Movement (CN VII): Symmetric facial expression    Motor: Arm left  Paresis:  3/5  Leg left  Paresis: 2/5  Arm right  Paresis: 3/5  Leg right Paresis: 2/5    Laboratory:  BMP:   Recent Labs   Lab 09/14/20  0338      K 3.6   *   CO2 21*   BUN 25*   CREATININE 1.2   CALCIUM 9.0     CBC:   Recent Labs   Lab 09/14/20  0338   WBC 13.00*   RBC 3.55*   HGB 9.7*   HCT 32.3*      MCV 91   MCH 27.3   MCHC 30.0*       Diagnostic Results     Brain Imaging   9/11/20   1. Since 09/08/2020 MRI, there is a new small focus of acute to subacute ischemia in the left cerebellum.  2. Elsewhere, there is continued maturation of ischemia in the bilateral medial occipital and parietal lobes as well as the left cingulate gyrus.  3. At least partially on the basis of differences in technique, with SWI sequences utilized on the current examination versus T2* GRE previously, there are numerous diffuse bilateral cortical and subcortical foci of susceptibility related signal loss.  Newly evident susceptibility within the medial occipital lobes may represent petechial hemorrhage associated with the evolving areas of ischemia.  No large parenchymal hemorrhage is identified.  Additionally, there is evidence of susceptibility related signal loss about the cerebral sulci without FLAIR signal abnormality suggested of superficial siderosis as sequela of prior subarachnoid hemorrhage.  Overall constellation of findings suggests cerebral amyloid angiopathy.  Component of chronic hypertensive arteriopathy also considered.      Cardiac Imaging   TTE 9/08/20:  · Mild left ventricular enlargement.  · Moderate mitral regurgitation.  · Intermediate central venous pressure (8 mmHg).  · The estimated PA systolic pressure is 28 mmHg.  · Low normal left ventricular systolic function. The estimated ejection fraction is 50%.  · Normal LV diastolic function.  · Normal right ventricular systolic function.  · Mild aortic regurgitation.      William Adkins MD  Comprehensive Stroke Center  Department of Vascular Neurology    [facility]

## 2020-09-14 NOTE — PLAN OF CARE
Problem: Occupational Therapy Goal  Goal: Occupational Therapy Goal  Description: Goals to be met by: 9/28/2020     Patient will increase functional independence with ADLs by performing:    Pt will follow 75% of motor commands with <2 verbal cues for repetition of task to maximize engagement in functional task.  Pt will sit at EOB for approx 10 minutes with max A and unilateral UE support to complete grooming task  Pt will complete sit>stand from EOB with bed in highest position with mod A in prep for functional transfers to C  Pt will perform grooming sitting EOB with mod A     Outcome: Ongoing, Progressing   Franchesca Echeverria, OTR/L  Pager: [phone]  9/14/2020

## 2020-09-14 NOTE — CONSULTS
[facility]  Infectious Disease  Consult Note    Patient Name: Lopez Savage  MRN: [id number]  Admission Date: 9/13/2020  Hospital Length of Stay: 1 days  Attending Physician: Joaquim Edmonds MD    Inpatient consult to Infectious Diseases  Consult performed by: LIVAN Wallace Jr.  Consult ordered by: Angelique Prince NP      Consult received.  Full consult to follow.      LIVAN Cervantes  Infectious Disease  [facility]

## 2020-09-15 PROBLEM — I76: Status: ACTIVE | Noted: 2020-01-01

## 2020-09-15 PROBLEM — G06.0 BRAIN ABSCESS: Status: ACTIVE | Noted: 2020-01-01

## 2020-09-15 PROBLEM — I66.9: Status: ACTIVE | Noted: 2020-01-01

## 2020-09-15 NOTE — HOSPITAL COURSE
09/15/2020: Bed mobility MaxA-TA x 2 ppl  Toilet TA.   9/17: PT/OT session not performed 2/2 unresponsive to sternal rub.  9/28: BM Max x 2 ppl. Functional Mobility/Transfers: Deferred this date due. [place] SBA. Bath TA.   9/29: Passed bedside swallow evaluation.  SLP recommending regular diet and thin liquids.

## 2020-09-15 NOTE — PLAN OF CARE
Pt is progressing towards goals as expected. Goals remain appropriate continue with current POC.     Patito Baird, PT, DPT  9/15/2020      Problem: Physical Therapy Goal  Goal: Physical Therapy Goal  Description: Goals to be met by: 2020    Patient will increase functional independence with mobility by performin. Pt will perform bed mobility (rolling L/R, scooting, and bridging) with CGA  2. Pt will perform supine to/from sit with CGA.  3. Pt will sit EOB x 15 mins with B UE support with Rony assistance.  4. Pt will perform sit to stand with max assistance and LRAD.  5. Pt will ambulate 15 feet with max assistance and LRAD.  6. Pt will perform there-ex from handout x 15 reps to improve strength for functional mobility.        Outcome: Ongoing, Progressing

## 2020-09-15 NOTE — PROGRESS NOTES
[facility]  Neurocritical Care  Progress Note    Admit Date: 9/13/2020  Service Date: 09/15/2020  Length of Stay: 2    Subjective:     Chief Complaint: Status epilepticus    History of Present Illness: Mr. 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 The patient presented to [facility] on 9/8/2020 with a primary complaint of loss of consciousness. The patient was recently treated for endocarditis w/ IV rocephin through PICC line at [facility] and discharged approximately 2 weeks ago. Since then he has had a worsening cough and was planning to go to the hospital for evaluation. Per wife home health did portable CXR about a week ago w/ concern for fluid vs pneumonia. Patient was given course of doxycycline and finished course recently. 9/8 Wife went to bed while  was watching TV and about an hour later her daughter found him non responsive on the ground. Daughter did a few chest compressions before EMS arrived. Patient arrived to ED and non responsive to sternal rub. Pupillary and gag reflex present. Patient intubated for airway protection and sedated w/ propofol. No seizure like activity was observed in ED. TPA contraindicated due to recent GI bleed, last occurring in August. Discussed further stroke work up with wife and planned for imaging. CT head negative for acute intracranial bleed. MRI head showed PCA infarct and right occipital lobe edema. 9/8 EEG demonstrated left posterior pseudo periodic epileptiform discharges is suggestive of an irritative region likely secondary to focal ischemia. Patient started on AEDs vimpat and keppra and reported to have several seizures during his stay. Patient was transferred to [facility] today for continuous EEG monitoring and further evaluation.    Hospital Course: 9/13/2020 Admit to  [facility].  09/14/2020: ELIO [facility] scheduled, PT/OT ordered. EEG with continuous periodic discharges per Epilepsy team. Continue current AEDs. Will consult ID for endocarditis, antibiotic recs.  09/15/2020: MRI with and without contrast ordered, which shows multiple septic emboli with abscess formation. ID consulted, ceftriaxone adjusted to CNS doses. NSGY following.    Interval History: Patient with improved wakefulness and interaction this morning on rounds.    Review of Systems: Review of Systems   Respiratory: Positive for cough and sputum production. Negative for shortness of breath and wheezing.    Neurological: Positive for speech change and weakness. Negative for dizziness and headaches.     Vitals:   Temp: 98.6 °F (37 °C)  Pulse: 79  Rhythm: normal sinus rhythm  BP: (!) 167/76  MAP (mmHg): 109  Resp: 20  SpO2: 100 %  O2 Device (Oxygen Therapy): room air    Temp  Min: 98 °F (36.7 °C)  Max: 98.6 °F (37 °C)  Pulse  Min: 75  Max: 89  BP  Min: 140/84  Max: 174/77  MAP (mmHg)  Min: 96  Max: 133  Resp  Min: 16  Max: 37  SpO2  Min: 96 %  Max: 100 %    09/14 0701 - 09/15 0700  In: 1773 [I.V.:803]  Out: 1075 [Urine:1075]   Unmeasured Output  Stool Occurrence: 1  Pad Count: 1     Examination:   Constitutional: Well-nourished and -developed. No apparent distress.   Eyes: Conjunctiva clear, anicteric. Lids no lesions.  Head/Ears/Nose/Mouth/Throat/Neck: Moist mucous membranes. External ears, nose atraumatic.   Cardiovascular: Regular rhythm. No leg edema.  Respiratory: Comfortable respirations. Clear to auscultation.  Gastrointestinal: Soft, nondistended, nontender. + bowel sounds.     Neurologic:  -GCS E 4 V 4 M 6  -Drowsy, responds to voice. Oriented to person and place. Delayed responses, dysarthria present. Follows commands.  -Cranial nerves: EOM intact, PERRL 3mm, no facial droop, +cough  -Motor: Moves all extremities spontaneously, antigravity  -Sensation: Intact to light touch.  Unable to test insight, fund of  knowledge, coordination, gait due to level of consciousness.    Medications:   Continuous Scheduledalbuterol-ipratropium, 3 mL, Q4H  atorvastatin, 40 mg, Daily  cefTRIAXone (ROCEPHIN) IVPB, 2 g, Q12H  heparin (porcine), 5,000 Units, Q8H  lacosamide, 200 mg, Q12H  losartan, 25 mg, Daily  [START ON 9/16/2020] vancomycin (VANCOCIN) IVPB, 1,750 mg, Q24H  vancomycin (VANCOCIN) IVPB, 2,000 mg, Once    PRNdextrose 50%, 12.5 g, PRN  glucagon (human recombinant), 1 mg, PRN  insulin aspart U-100, 1-10 Units, Q6H PRN  labetalol, 10 mg, Q4H PRN  magnesium sulfate IVPB, 2 g, PRN  magnesium sulfate IVPB, 4 g, PRN  potassium chloride in water, 10 mEq, PRN    And  potassium chloride in water, 40 mEq, PRN    And  potassium chloride in water, 10 mEq, PRN  sodium chloride 0.9%, 10 mL, PRN  sodium phosphate IVPB, 15 mmol, PRN  sodium phosphate IVPB, 20.01 mmol, PRN  sodium phosphate IVPB, 30 mmol, PRN  vancomycin - pharmacy to dose, , pharmacy to manage frequency       Today I independently reviewed pertinent medications, lines/drains/airways, imaging, cardiology results, laboratory results, notably:     MRI Brain with and without 9/14      ISTAT: No results for input(s): PH, PCO2, PO2, POCSATURATED, HCO3, BE, POCNA, POCK, POCTCO2, POCGLU, POCICA, POCLAC, SAMPLE in the last 24 hours.   Chem:   Recent Labs   Lab 09/15/20  0408      K 3.3*   *   CO2 22*   GLU 93   BUN 22   CREATININE 1.2   ESTGFRAFRICA >60.0   EGFRNONAA 59.6*   CALCIUM 9.1   MG 2.0   PHOS 4.0   ANIONGAP 8   PROT 6.4   ALBUMIN 2.5*   BILITOT 0.2   ALKPHOS 53*   AST 9*   ALT 6*     Heme:   Recent Labs   Lab 09/15/20  0408   WBC 13.65*   HGB 9.4*   HCT 31.3*        Endo:   Recent Labs   Lab 09/14/20  1850 09/15/20  1225   POCTGLUCOSE 101 114*          Assessment/Plan:     Neuro  * Status epilepticus  - Multiple witnessed seizure-like activity described  - No previous reports from family  - Was started on keppra and vimpat 200 mg BID  - EEG at OSH shows  irregular slowing in the R frontal region, with L posterior periodic epileptiform discharges. No seizures recorded during this study.  - 9/13 Here EEG continues to show periodic discharges in the left posterior region with no seizures.    Plan:   -- Continue vimpat 200 mg BID  --9/14: EEG with periodic discharges per Epilepsy, continue current AED regimen  --9/15: EEG stable. Stop Keppra, continue EEG.    Cerebellar stroke  On 9/8 pt presented to [place] ED with unresponsiveness  No tPA due to recent GI bleed  MRI with left PCA infarct and left thalamic infarct along with small right PCA infarct  MRA demonstrates fetal circulation to left PCA  9/11 Repeat MRI new acute/subacute L cerebellum infarct    -Neuro checks q1hr  -Vascular neurology following  -SBP Goal < 180  -Stable on repeat imaging    Embolic stroke involving left posterior cerebral artery  VN following  2/2 septic embolic    Pulmonary  Chronic bronchitis  Duonebs scheduled q4hrs  Monitor respiratory status, at risk for intubation  Continuous pulse oximetry, ABG prn  Start CPT, pulmonary toileting    Cardiac/Vascular  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      Essential hypertension  SBP goal < 180    ID  Brain abscess  2/2 endocarditis/septic emboli    -Increase ceftriaxone 2gms IV q12hrs  -Continue doxycycline, flagyl, waiting on ID to weigh in on antibiotic coverage  -New blood cultures sent 9/14  -NSGY consulted- no surgical plans at this time    Hematology  Intracranial septic embolism  -See Stroke    GI  Ulcerative colitis  Hx of, monitor for GI bleeding        The patient is being  Prophylaxed for:  Venous Thromboembolism with: Mechanical or Chemical  Stress Ulcer with: Not Applicable   Ventilator Pneumonia with: not applicable    Activity Orders          Diet NPO: NPO starting at 09/14 1938        Full Code     CC time spent: 33 minutes    Angelique Prince NP  Neurocritical Care  [facility]

## 2020-09-15 NOTE — ASSESSMENT & PLAN NOTE
- ID consulted for assistance in antibiotic management  - Blood culture 7/31 positive for strep mutans  - MRI brain w/wo 9/14 showing multiple septic emboli with abscess formation  - ID following, ceftriaxone adjusted to CNS doses, NSGY following

## 2020-09-15 NOTE — SUBJECTIVE & OBJECTIVE
Interval History: naeon, on cEEG.    Medications:  Continuous Infusions:   sodium chloride 0.9% 30 mL/hr at 09/15/20 0605     Scheduled Meds:   albuterol-ipratropium  3 mL Nebulization Q4H    atorvastatin  40 mg Per G Tube Daily    cefTRIAXone (ROCEPHIN) IVPB  2 g Intravenous Q12H    doxycycline (VIBRAMYCIN) IVPB  100 mg Intravenous Q12H    heparin (porcine)  5,000 Units Subcutaneous Q8H    lacosamide  200 mg Per NG tube Q12H    levETIRAcetam in NaCl (iso-os)  500 mg Intravenous Q12H    metronidazole  500 mg Intravenous Q8H     PRN Meds:dextrose 50%, glucagon (human recombinant), insulin aspart U-100, labetalol, sodium chloride 0.9%     Review of Systems  Objective:     Weight: 114 kg (251 lb 5.2 oz)  Body mass index is 37.11 kg/m².  Vital Signs (Most Recent):  Temp: 98.6 °F (37 °C) (09/15/20 0305)  Pulse: 81 (09/15/20 0605)  Resp: (!) 23 (09/15/20 0605)  BP: (!) 167/76 (09/15/20 0605)  SpO2: 96 % (09/15/20 0605) Vital Signs (24h Range):  Temp:  [98 °F (36.7 °C)-98.6 °F (37 °C)] 98.6 °F (37 °C)  Pulse:  [75-89] 81  Resp:  [16-39] 23  SpO2:  [96 %-100 %] 96 %  BP: (140-176)/() 167/76                          NG/OG Tube 09/12/20 1644 [place] sump 16 Fr. Left nostril (Active)   Placement Check placement verified by distal tube length measurement 09/15/20 0305   Tolerance no signs/symptoms of discomfort 09/15/20 0305   Securement secured to nostril center w/ adhesive device 09/15/20 0305   Clamp Status/Tolerance no abdominal discomfort;no abdominal distention;no emesis;no nausea;no residual;no restlessness;clamped 09/15/20 0305   Suction Setting/Drainage Method suction at the bedside 09/15/20 0305   Insertion Site Appearance no redness, warmth, tenderness, skin breakdown, drainage 09/15/20 0305   Drainage None 09/15/20 0305   Flush/Irrigation flushed w/;water;no resistance met 09/15/20 0305   Intake (mL) 0 mL 09/15/20 0605   Water Bolus (mL) 0 mL 09/15/20 0605   Tube Output(mL)(Include Discarded Residual) 0  "mL 09/15/20 0605   Intake (mL) - Formula Tube Feeding 0 09/15/20 0605            Urethral Catheter 09/08/20 0315 [place] 14 Fr. (Active)   Site Assessment Clean;Intact 09/15/20 0305   Collection Container Urimeter 09/15/20 0305   Securement Method secured to top of thigh w/ adhesive device 09/15/20 0305   Catheter Care Performed yes 09/15/20 0305   Reason for Continuing Urinary Catheterization Critically ill in ICU requiring intensive monitoring 09/15/20 0305   CAUTI Prevention Bundle StatLock in place w 1" slack;Drainage bag/urimeter off the floor;Intact seal between catheter & drainage tubing;Green sheeting clip in use;No dependent loops or kinks;Drainage bag/urimeter not overfilled (<2/3 full);Drainage bag/urimeter below bladder 09/14/20 1905   Output (mL) 500 mL 09/15/20 0605       Neurosurgery Physical Exam   Lethargic, oriented to name  PERRL  FCX4    Significant Labs:  Recent Labs   Lab 09/14/20  0338 09/15/20  0408   GLU 84 93    141   K 3.6 3.3*   * 111*   CO2 21* 22*   BUN 25* 22   CREATININE 1.2 1.2   CALCIUM 9.0 9.1   MG 1.9 2.0     Recent Labs   Lab 09/14/20  0338 09/15/20  0408   WBC 13.00* 13.65*   HGB 9.7* 9.4*   HCT 32.3* 31.3*    226     Recent Labs   Lab 09/14/20  0338   INR 1.2   APTT 30.3     Microbiology Results (last 7 days)     Procedure Component Value Units Date/Time    Blood culture [[id number]] Collected: 09/14/20 1840    Order Status: Completed Specimen: Blood from Peripheral, Foot, Right Updated: 09/15/20 0315     Blood Culture, Routine No Growth to date    Blood culture [[id number]] Collected: 09/14/20 1835    Order Status: Completed Specimen: Blood from Peripheral, Foot, Left Updated: 09/15/20 0315     Blood Culture, Routine No Growth to date    Urine culture [[id number]] Collected: 09/13/20 1438    Order Status: No result Specimen: Urine Updated: 09/13/20 1858            Significant Diagnostics:  MRI brain: reviewed  "

## 2020-09-15 NOTE — PROGRESS NOTES
Pharmacokinetic Initial Assessment: IV Vancomycin    Assessment/Plan:    Initiate intravenous vancomycin with loading dose of 2000 mg once followed by a maintenance dose of vancomycin 1750 mg IV every 24 hours  Desired empiric serum trough concentration is 15 to 20 mcg/mL  Draw vancomycin trough level 60 min prior to third dose on 9/17 at approximately 1200  Pharmacy will continue to follow and monitor vancomycin.      Please contact pharmacy at extension [phone] with any questions regarding this assessment.     Thank you for the consult,   Janine Arnett       Patient brief summary:  Lopez Savage is a 74 y.o. male initiated on antimicrobial therapy with IV Vancomycin for treatment of suspected endocarditis    Drug Allergies:   Review of patient's allergies indicates:   Allergen Reactions    Codeine     Tetanus vaccines and toxoid        Actual Body Weight:   114 kg    Renal Function:   Estimated Creatinine Clearance: 68.2 mL/min (based on SCr of 1.2 mg/dL).,     Dialysis Method (if applicable):  N/A    CBC (last 72 hours):  Recent Labs   Lab Result Units 09/13/20  0538 09/14/20  0338 09/15/20  0408   WBC K/uL 14.04* 13.00* 13.65*   Hemoglobin g/dL 9.4* 9.7* 9.4*   Hemoglobin A1C %  --  4.4  --    Hematocrit % 29.9* 32.3* 31.3*   Platelets K/uL 233 233 226   Gran% % 83.7* 78.5* 80.1*   Lymph% % 6.0* 9.2* 7.7*   Mono% % 6.6 8.7 8.6   Eosinophil% % 2.1 2.4 1.9   Basophil% % 0.8 0.7 1.1   Differential Method  Automated Automated Automated       Metabolic Panel (last 72 hours):  Recent Labs   Lab Result Units 09/12/20  1251 09/13/20  0539 09/13/20  1438 09/14/20  0338 09/15/20  0408   Sodium mmol/L 140 140  --  142 141   Potassium mmol/L 3.8 3.7  --  3.6 3.3*   Chloride mmol/L 108 109  --  111* 111*   CO2 mmol/L 22* 22*  --  21* 22*   Glucose mg/dL 93 86  --  84 93   Glucose, UA   --   --  Negative  --   --    BUN, Bld mg/dL 21 22  --  25* 22   Creatinine mg/dL 1.3 1.3  --  1.2 1.2   Albumin g/dL  --  2.4*  --  2.4* 2.5*    Total Bilirubin mg/dL  --  0.3  --  0.3 0.2   Alkaline Phosphatase U/L  --  49*  --  55 53*   AST U/L  --  10  --  13 9*   ALT U/L  --  5*  --  7* 6*   Magnesium mg/dL  --  1.9  --  1.9 2.0   Phosphorus mg/dL  --   --   --  4.3 4.0       Drug levels (last 3 results):  Recent Labs   Lab Result Units 09/13/20  0538   Vancomycin, Random ug/mL 21.5       Microbiologic Results:  Microbiology Results (last 7 days)     Procedure Component Value Units Date/Time    Urine culture [[id number]]  (Abnormal) Collected: 09/13/20 1438    Order Status: Completed Specimen: Urine Updated: 09/15/20 1126     Urine Culture, Routine ZAIN LUSITANIAE  50,000 - 99,999 cfu/ml  Treatment of asymptomatic candiduria is not recommended (except for   specific populations). Candida isolated in the urine typically   represents colonization. If an indwelling urinary catheter is present  it should be removed or replaced.      Narrative:      Specimen Source->Urine    Blood culture [[id number]] Collected: 09/14/20 1840    Order Status: Completed Specimen: Blood from Peripheral, Foot, Right Updated: 09/15/20 0315     Blood Culture, Routine No Growth to date    Blood culture [[id number]] Collected: 09/14/20 1835    Order Status: Completed Specimen: Blood from Peripheral, Foot, Left Updated: 09/15/20 0315     Blood Culture, Routine No Growth to date

## 2020-09-15 NOTE — PT/OT/SLP PROGRESS
Occupational Therapy   Co-Treatment with PT    Name: Lopez Savage  MRN: [id number]  Admitting Diagnosis:  Status epilepticus       Recommendations:     Discharge Recommendations: rehabilitation facility  Discharge Equipment Recommendations:  (TBD pending progression)  Barriers to discharge:  (Pt requires increased assistance at current functional level)    Assessment:     Lopez Savage is a 74 y.o. male with a medical diagnosis of Status epilepticus.  He presents with performance deficits affecting function are weakness, impaired endurance, impaired self care skills, impaired functional mobilty, gait instability, impaired balance, decreased upper extremity function, decreased lower extremity function, impaired cognition, decreased coordination, decreased safety awareness, decreased ROM, impaired coordination, impaired cardiopulmonary response to activity, impaired fine motor. Pt demonstrated improved command following and activity tolerance this date. Pt followed ~50% simple one-step commands and was able to keep eyes open 50% of session. Pt performed bed mobility with max A x 2 person and rolling to the left with mod A x 2 person. Pt tolerated sitting EOB for 10 mins with min <>mod A for EOB sitting balance. At this time, OT recommending IP Rehab in order to improve overall functional independence with transfers, mobility, and ADLs.     Rehab Prognosis:  Good; patient would benefit from acute skilled OT services to address these deficits and reach maximum level of function.       Plan:     Patient to be seen 4 x/week to address the above listed problems via therapeutic activities, therapeutic exercises, neuromuscular re-education, cognitive retraining, sensory integration  · Plan of Care Expires: 10/13/20  · Plan of Care Reviewed with: patient, spouse    Subjective     Pain/Comfort:  · Pain Rating 1: 0/10  · Pain Rating Post-Intervention 1: 0/10    Objective:     Communicated with: RN prior to session.  Patient  found HOB elevated with SCD, blood pressure cuff, garcia catheter, NG tube, pulse ox (continuous), telemetry, PICC line, pressure relief boots, EEG, restraints upon OT entry to room. Pt's wife present during therapy session.     General Precautions: Standard, fall, aspiration, seizure   Orthopedic Precautions:N/A   Braces: N/A     Occupational Performance:     Bed Mobility:    · Patient completed Rolling/Turning to Left with  Moderate assistance and 2 persons  · Patient completed Scooting/Bridging with maximal assistance and 2 persons for anterior scooting as pt was attempt to assist with scooting forward.    · Total A for supine scooting towards HOB x 2 person  · Patient completed Supine to Sit with maximal assistance and 2 persons  · Patient completed Sit to Supine with maximal assistance and 2 persons; pt initiated retuning to supine     Functional Mobility/Transfers:  · Not performed this date due to pt increased assistance for EOB sitting balance and following ~50% simple one-step commands    EOB sitting balance:   · Static sitting balance:   · Pt tolerated sitting EOB for 10 mins with min <>mod A with short bouts of CGA  · Pt with slouched posturing and cervical flexion   · Facilitation provided for neutral spine, upright posture and forward gaze   · As pt began to fatigue, R lateral lean noted requiring mod A to maintain neutral position .  · Pt able to turn head to right and scan room to find wife sitting UIC.      Activities of Daily Living:  · Bathing: total assistance washing back while sitting EOB   · Upper Body Dressing: total assistance doffing soiled gown in front while sitting supported in bed and donning clean gown. Pt assisted by raising B UE to thread gown through arms   · Lower Body Dressing: total assistance donning B  socks     AMPA 6 Click ADL: 6    Treatment & Education:  - Pt educated on role of OT, POC, and goals for therapy.    - Patient and family aware of patient's deficits and  therapy progression.   - Pt is appropriate to transfer with therapy only at this time for EOB sitting   - Time provided for therapeutic counseling and discussion of health disposition.   - Importance of OOB ax's with staff member assistance and sitting OOB majority of day.   - Pt completed ADLs and functional mobility for treatment session as noted above   - Pt verbalized understanding. Pt expressed no further concerns/questions.  - Whiteboard updated     Patient left HOB elevated with all lines intact, call button in reach, RN  notified and pt's wife present  presentEducation:      GOALS:   Multidisciplinary Problems     Occupational Therapy Goals        Problem: Occupational Therapy Goal    Goal Priority Disciplines Outcome Interventions   Occupational Therapy Goal     OT, PT/OT Ongoing, Progressing    Description: Goals to be met by: 9/28/2020     Patient will increase functional independence with ADLs by performing:    Pt will follow 75% of motor commands with <2 verbal cues for repetition of task to maximize engagement in functional task.  Pt will sit at EOB for approx 10 minutes with max A and unilateral UE support to complete grooming task  Pt will complete sit>stand from EOB with bed in highest position with mod A in prep for functional [place] [facility]  Pt will perform grooming sitting EOB with mod A                                Time Tracking:     OT Date of Treatment: 09/15/20  OT Start Time: 1107  OT Stop Time: 1135  OT Total Time (min): 28 min (co-treat with PT)    Billable Minutes:Self Care/Home Management 10  Neuromuscular Re-education 15    Franchesca Echeverria OT  9/15/2020

## 2020-09-15 NOTE — PROCEDURES
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  Evaluate for evidence of epileptiform activity.    METHODOLOGY   Electroencephalographic (EEG) recording is with electrodes placed according to the International 10-20 placement system.  Thirty two (32) channels of digital signal (sampling rate of 512/sec) including T1 and T2 was simultaneously recorded from the scalp and may include  EKG, EMG, and/or eye monitors.  Recording band pass was 0.1 to 512 hz.  Digital video recording of the patient is simultaneously recorded with the EEG.  The patient is instructed report clinical symptoms which may occur during the recording session.  EEG and video recording is stored and archived in digital format.  Activation procedures which include photic stimulation, hyperventilation and instructing patients to perform simple task are done in selected patients.   The EEG is displayed on a monitor screen and can be reviewed using different montages.  Computer assisted analysis is employed to detect spike and electrographic seizure activity.   The entire record is submitted for computer analysis.  The entire recording is visually reviewed and the times identified by computer analysis as being spikes or seizures are reviewed again.  Compresses spectral analysis (CSA) is also performed on the activity recorded from each individual channel.  This is displayed as a power display of frequencies from 0 to 30 Hz over time.   The CSA is reviewed looking for asymmetries in power between homologous areas of the scalp and then compared with the original EEG recording.     [employer] software is also utilized in the review of  this study.  This software suite analyzes the EEG recording in multiple domains.  Coherence and rhythmicity is computed to identify EEG sections which may contain organized seizures.  Each channel undergoes analysis to detect presence of spike and sharp waves which have special and morphological characteristic of epileptic activity.  The routine EEG recording is converted from spacial into frequency domain.  This is then displayed comparing homologous areas to identify areas of significant asymmetry.  Algorithm to identify non-cortically generated artifact is used to separate eye movement, EMG and other artifact from the EEG.      RECORDING TIMES  Start on 09/14/2020 at 07:00 a.m.  Stop on 09/15/2020 at 07:00 a.m.  A total of 23 hr and 32 min of EEG recording is obtained.    EEG FINDINGS  Background activity:   The background is continuous and asymmetric.  There is prominent multifocal and generalized delta slowing with admixed theta frequencies as well as overriding faster frequencies more prominent over the right hemisphere.  There are continuous periodic sharp waves over the posterior quadrants, left> right, at approximately 1 hz, for the entire recording session, which do not organize.  These get slightly less prominent and more discontinuous as the recording session proceeds.    There are no pushbutton activations.    Sleep:  There is poorly formed sleep architecture    Activation procedures:   Hyperventilation is not performed  Photic stimulation is not performed    Cardiac Monitor:   Occasional PVCs    Impression:   This is an abnormal continuous EEG monitoring study because of continuous periodic sharp waves most prominent over the left posterior quadrant but with a prominent field into the right posterior quadrant as well, which do not organize.  This suggests an area of cortical dysfunction and potential seizure focus in this region.  The runs of posterior discharges become somewhat less prominent and more  discontinuous as the recording session progresses which is an overall modest improvement from the previous day's recording session.  Otherwise, there is generalized background slowing consistent with a moderate encephalopathy.    LTM Summary 09/13/2020 - TBD:  Patient events/Seizures:  No electrographic seizures.  Pushbutton for an episode of shifting movements of the legs in bed with no EEG correlate.      Interictal findings:  continuous periodic sharp waves over the left posterior quadrant with a prominent field into the right posterior quadrant as well at approximately 1 hz  Other notable abnormalities:   moderate encephalopathy    Tina Winslow MD PhD  Neurology-Epilepsy  [facility].  [facility]

## 2020-09-15 NOTE — ASSESSMENT & PLAN NOTE
On 9/8 pt presented to [place] ED with unresponsiveness  No tPA due to recent GI bleed  MRI with left PCA infarct and left thalamic infarct along with small right PCA infarct  MRA demonstrates fetal circulation to left PCA  9/11 Repeat MRI new acute/subacute L cerebellum infarct    -Neuro checks q1hr  -Vascular neurology following  -SBP Goal < 180  -Stable on repeat imaging

## 2020-09-15 NOTE — ASSESSMENT & PLAN NOTE
2/2 endocarditis/septic emboli    -Increase ceftriaxone 2gms IV q12hrs  -Continue doxycycline, flagyl, waiting on ID to weigh in on antibiotic coverage  -New blood cultures sent 9/14  -NSGY consulted- no surgical plans at this time

## 2020-09-15 NOTE — ASSESSMENT & PLAN NOTE
- Multiple witnessed seizure-like activity described  - No previous reports from family  - Was started on keppra and vimpat 200 mg BID  - EEG at OSH shows irregular slowing in the R frontal region, with L posterior periodic epileptiform discharges. No seizures recorded during this study.  - 9/13 Here EEG continues to show periodic discharges in the left posterior region with no seizures.    Plan:   -- Continue vimpat 200 mg BID  --9/14: EEG with periodic discharges per Epilepsy, continue current AED regimen  --9/15: EEG stable. Stop Keppra, continue EEG.

## 2020-09-15 NOTE — HPI
Per chart review, Lopez Saavge is a 74-year-old male with PMHx of recent endocarditis with aortic valve vegetations/septic emboli (8/2020), recent GIB (8/2020), Ulcerative colitis, GERD, MVP, and COPD. Patient presented to [facility] after being found non-responsive on floor by daughter. Hospital course complicated by seizure like activity. CT head negative for acute intracranial bleed. MRI head showed PCA infarct and right occipital lobe edema. 9/8 EEG demonstrated left posterior pseudo periodic epileptiform discharges is suggestive of an irritative region likely secondary to focal ischemia. [place] [facility] [place] for further evaluation and management.  Upon admission, NSGY consulted. Repeat MRI new acute/subacute L cerebellum. Hospital course complicated by Bacterial endocarditis (ID following, on PO/IV abx) and dysphagia (NGT in place).     Functional History: Patient lives with wife and daughter in a single story home with no steps to enter.  Prior to admission, (I) with ADLs and mobility up until 2 weeks ago where he used a RW.  DME: [facility].

## 2020-09-15 NOTE — CONSULTS
"  [facility]  Adult Nutrition  Consult Note    SUMMARY     Recommendations    Recommendation:   1. Suggest TF of Impact Peptide @ 10 mL/hr increase to goal rate of 50 mL/hr to provide pt with 1800 kcal, 113 g protein and 924 mL free water.    Additional water per MD. Hold for residuals >500 mL.  2. Suggest MVI   RD to monitor and follow up    Goals: pt to receive nutrition by RD follow up  Nutrition Goal Status: new  Communication of RD Recs: other (comment)(POC)    Reason for Assessment    Reason For Assessment: consult  Diagnosis: (status epilepticus)  Relevant Medical History: GERD; diverticulitis; endocarditis; HTN; COPD  Interdisciplinary Rounds: did not attend  General Information Comments: Pt and family in room. Pt unable to answer questions. Family reported PTA pt appetite was decreasing but pt continued to have 3 meals/day. Wt loss of ~35 lbs over past 6 months per family, unsure of UBW. Noted +2/3 edema. NFPE completed, pt with no muslce/fat loss at this time, will monitor.  Nutrition Discharge Planning: unable to determine at this time    Nutrition Risk Screen    Nutrition Risk Screen: dysphagia or difficulty swallowing    Nutrition/Diet History    Spiritual, Cultural Beliefs, Evangelical Practices, Values that Affect Care: no  Food Allergies: NKFA  Factors Affecting Nutritional Intake: NPO    Anthropometrics    Temp: 98.6 °F (37 °C)  Height Method: Measured  Height: 5' 9" (175.3 cm)  Height (inches): 69 in  Weight Method: Bed Scale  Weight: 114 kg (251 lb 5.2 oz)  Weight (lb): 251.33 lb  Ideal Body Weight (IBW), Male: 160 lb  % Ideal Body Weight, Male (lb): 157.08 %  BMI (Calculated): 37.1  BMI Grade: 35 - 39.9 - obesity - grade II    Lab/Procedures/Meds    Pertinent Labs Reviewed: reviewed  Pertinent Labs Comments: K 3.3  Pertinent Medications Reviewed: reviewed  Pertinent Medications Comments: statin; pantoprazole; heparin     Estimated/Assessed Needs    Weight Used For Calorie " Calculations: 114 kg (251 lb 5.2 oz)  Energy Calorie Requirements (kcal): 1875 kcal/d  Energy Need Method: Power- Romana  Protein Requirements:  g/day  Weight Used For Protein Calculations: 114 kg (251 lb 5.2 oz)  Fluid Requirements (mL): 1 mL/kcal or per MD  RDA Method (mL): 1875    Nutrition Prescription Ordered    Current Diet Order: NPO    Evaluation of Received Nutrient/Fluid Intake    I/O: +1.5 L since admit  Energy Calories Required: not meeting needs  Protein Required: not meeting needs  Fluid Required: not meeting needs  Comments: LBM 9/14  Tolerance: tolerating  % Intake of Estimated Energy Needs: 0 - 25 %  % Meal Intake: NPO    Nutrition Risk    Level of Risk/Frequency of Follow-up: high     Assessment and Plan  Nutrition Problem  inadequate energy intake    Related to (etiology):   inability to consume sufficient energy     Signs and Symptoms (as evidenced by):   Pt NPO with no means of nutrition    Interventions (treatment strategy):  Collaboration of care with other providers  Enteral nutrition    Nutrition Diagnosis Status:   New    Monitor and Evaluation    Food and Nutrient Intake: energy intake, enteral nutrition intake  Food and Nutrient Adminstration: enteral and parenteral nutrition administration  Anthropometric Measurements: weight, weight change  Biochemical Data, Medical Tests and Procedures: electrolyte and renal panel, gastrointestinal profile, glucose/endocrine profile, inflammatory profile, lipid profile  Nutrition-Focused Physical Findings: overall appearance     Malnutrition Assessment  Orbital Region (Subcutaneous Fat Loss): well nourished  Upper Arm Region (Subcutaneous Fat Loss): well nourished   [place]ur[place] Region (Muscle Loss): well nourished  Clavicle Bone Region (Muscle Loss): well nourished  Clavicle and Acromion Bone Region (Muscle Loss): well nourished  Scapular Bone Region (Muscle Loss): well nourished  Dorsal Hand (Muscle Loss): well nourished  Anterior Thigh Region  (Muscle Loss): well nourished  Posterior Calf Region (Muscle Loss): well nourished     Nutrition Follow-Up    RD Follow-up?: Yes

## 2020-09-15 NOTE — SUBJECTIVE & OBJECTIVE
Interval History: Patient with improved wakefulness and interaction this morning on rounds.    Review of Systems: Review of Systems   Respiratory: Positive for cough and sputum production. Negative for shortness of breath and wheezing.    Neurological: Positive for speech change and weakness. Negative for dizziness and headaches.     Vitals:   Temp: 98.6 °F (37 °C)  Pulse: 79  Rhythm: normal sinus rhythm  BP: (!) 167/76  MAP (mmHg): 109  Resp: 20  SpO2: 100 %  O2 Device (Oxygen Therapy): room air    Temp  Min: 98 °F (36.7 °C)  Max: 98.6 °F (37 °C)  Pulse  Min: 75  Max: 89  BP  Min: 140/84  Max: 174/77  MAP (mmHg)  Min: 96  Max: 133  Resp  Min: 16  Max: 37  SpO2  Min: 96 %  Max: 100 %    09/14 0701 - 09/15 0700  In: 1773 [I.V.:803]  Out: 1075 [Urine:1075]   Unmeasured Output  Stool Occurrence: 1  Pad Count: 1     Examination:   Constitutional: Well-nourished and -developed. No apparent distress.   Eyes: Conjunctiva clear, anicteric. Lids no lesions.  Head/Ears/Nose/Mouth/Throat/Neck: Moist mucous membranes. External ears, nose atraumatic.   Cardiovascular: Regular rhythm. No leg edema.  Respiratory: Comfortable respirations. Clear to auscultation.  Gastrointestinal: Soft, nondistended, nontender. + bowel sounds.     Neurologic:  -GCS E 4 V 4 M 6  -Drowsy, responds to voice. Oriented to person and place. Delayed responses, dysarthria present. Follows commands.  -Cranial nerves: EOM intact, PERRL 3mm, no facial droop, +cough  -Motor: Moves all extremities spontaneously, antigravity  -Sensation: Intact to light touch.  Unable to test insight, fund of knowledge, coordination, gait due to level of consciousness.    Medications:   Continuous Scheduledalbuterol-ipratropium, 3 mL, Q4H  atorvastatin, 40 mg, Daily  cefTRIAXone (ROCEPHIN) IVPB, 2 g, Q12H  heparin (porcine), 5,000 Units, Q8H  lacosamide, 200 mg, Q12H  losartan, 25 mg, Daily  [START ON 9/16/2020] vancomycin (VANCOCIN) IVPB, 1,750 mg, Q24H  vancomycin (VANCOCIN)  IVPB, 2,000 mg, Once    PRNdextrose 50%, 12.5 g, PRN  glucagon (human recombinant), 1 mg, PRN  insulin aspart U-100, 1-10 Units, Q6H PRN  labetalol, 10 mg, Q4H PRN  magnesium sulfate IVPB, 2 g, PRN  magnesium sulfate IVPB, 4 g, PRN  potassium chloride in water, 10 mEq, PRN    And  potassium chloride in water, 40 mEq, PRN    And  potassium chloride in water, 10 mEq, PRN  sodium chloride 0.9%, 10 mL, PRN  sodium phosphate IVPB, 15 mmol, PRN  sodium phosphate IVPB, 20.01 mmol, PRN  sodium phosphate IVPB, 30 mmol, PRN  vancomycin - pharmacy to dose, , pharmacy to manage frequency       Today I independently reviewed pertinent medications, lines/drains/airways, imaging, cardiology results, laboratory results, notably:     MRI Brain with and without 9/14      ISTAT: No results for input(s): PH, PCO2, PO2, POCSATURATED, HCO3, BE, POCNA, POCK, POCTCO2, POCGLU, POCICA, POCLAC, SAMPLE in the last 24 hours.   Chem:   Recent Labs   Lab 09/15/20  0408      K 3.3*   *   CO2 22*   GLU 93   BUN 22   CREATININE 1.2   ESTGFRAFRICA >60.0   EGFRNONAA 59.6*   CALCIUM 9.1   MG 2.0   PHOS 4.0   ANIONGAP 8   PROT 6.4   ALBUMIN 2.5*   BILITOT 0.2   ALKPHOS 53*   AST 9*   ALT 6*     Heme:   Recent Labs   Lab 09/15/20  0408   WBC 13.65*   HGB 9.4*   HCT 31.3*        Endo:   Recent Labs   Lab 09/14/20  1850 09/15/20  1225   POCTGLUCOSE 101 114*

## 2020-09-15 NOTE — CONSULTS
Inpatient consult to Physical Medicine Rehab  Consult performed by: Jessica Yates NP  Consult ordered by: Joaquim Edmonds MD  Reason for consult: assess rehab needs        Reviewed patient history and current admission.  Rehab team following.  Full consult to follow.    AMERICA Su, FNP-C  Physical Medicine & Rehabilitation   09/15/2020

## 2020-09-15 NOTE — ASSESSMENT & PLAN NOTE
This is a 74 year old male with PMH of hypertension, ulcerative colitis, GERD, chronic bronchitis, autoimmune hemolytic anemia, mitral regurgitation, and diverticulitis, admitted for subacute bacterial endocarditis  with strep mutans (no intervention per CTS)  in setting of upper GI bleed on 6 weeks of IV ceftriaxone (EOC 9/15) presents to ED for seizure activity and loss of consciousness. CT head negative for acute intracranial bleed. MRI +PCA infarct and right occipital lobe edema. he was started on doxycycline. Metronidazole, and ceftriaxone.     MRI here with Numerous scattered punctate foci of cortical enhancement throughout both cerebral hemispheres.  Additional rim enhancing collection in the right occipital lobe with central diffusion restriction and surrounding vasogenic edema.  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    BCXs:  9/8 1/4 CONS - suspect contaminant  9/10 - NGTD  9/14 NGTD    Recommendations  1. Continue ceftriaxone. Increased dose 2ix68hp  2. Discontinued doxycycline and metronidazole. Started vancomycin. Trough goal closer to 20  3. NSGY following. NPO. If for drainage of abscess please send for cultures.   4. Blood cultures here NGTD. Will follow   5. CXR with Mild interval increase in the degree of pulmonary vascular congestion and interstitial/alveolar edema. resp cultures if able     Seen and discussed with ID staff. ID will follow with you.

## 2020-09-15 NOTE — ASSESSMENT & PLAN NOTE
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

## 2020-09-15 NOTE — ASSESSMENT & PLAN NOTE
74M with known bacterial endocarditis with septic emboli c/b L PCA infarct, scattered punctate infarctions and R occipital lesion concerning for intracranial abscess:    -Continue NCC admit      -q1h neurochecks in ICU, q2h neurochecks in stepdown, q4h neurochecks on floor  --SBP <160 (cardene ggt; hydralazine & labetalol PRN; transition to home meds when appropriate)  --Na >135  --VN following  --AED per NCC/Epilepsy  --Abx per ID, f/up BCx - per report Strep mutans at OSH s/p IV Rocephin and PO Doxy       -f/up ID recs, consider SILVANA  --NO steroids  --HOB >30  --Follow-up full pre-op labs (CBC/CMP/PT-INR/PTT/T&S)  --NPO at this time for possible operative intervention  --PPI given GIB per NCC  --Continue to monitor clinically, notify NSGY immediately with any changes in neuro status    Dispo: ICU

## 2020-09-15 NOTE — ASSESSMENT & PLAN NOTE
-EEG with continuous periodic discharges per Epilepsy team  - on vimpat, keppra    See hospital course for functional, cognitive/speech/language, and nutrition/swallow status.      Recommendations  -  Monitor sleep disturbances and establish consistent sleep-wake cycle  -  Environmental modifications to limit agitation/confusion   -  Reorient patient to person, place, time, and situation on each encounter  -  Avoid restraints  -  May benefit from 24/7 supervision  -  Avoid/limit medications that can worsen delirium (benzodiazepines, antihistamines, anticholinergics, hypnotics, opiates)  -  Encourage mobility, OOB in chair, and early ambulation as appropriate  -  PT/OT evaluate and treat  -  SLP speech and cognitive evaluate and treat  -  Monitor for bowel and bladder dysfunction  -  Monitor for and prevent skin breakdown and pressure ulcers  · Early mobility, repositioning/weight shifting every 20-30 minutes when sitting, turn patient every 2 hours, proper mattress/overlay and chair cushioning, pressure relief/heel protector boots

## 2020-09-15 NOTE — PLAN OF CARE
Recommendations    Recommendation:   1. Suggest TF of Impact Peptide @ 10 mL/hr increase to goal rate of 50 mL/hr to provide pt with 1800 kcal, 113 g protein and 924 mL free water.    Additional water per MD. Hold for residuals >500 mL.  2. Suggest MVI   RD to monitor and follow up    Goals: pt to receive nutrition by RD follow up  Nutrition Goal Status: new  Communication of RD Recs: other (comment)(POC)

## 2020-09-15 NOTE — PLAN OF CARE
[facility] Care Plan    POC reviewed with Lopez Savage and family at 0300. Pt confused and unable to verbalize understanding. Questions and concerns addressed with wife over the phone. CEEG remains in place, no noted seizure activity overnight. SBP maintained <180 without pharmacological intervention. Pt continues using abdominal muscles with breaths, however O2 Sats stable overnight with minimal oral secretions. NS @ 30mL/hr. Pt progressing toward goals. Will continue to monitor. See below and flowsheets for full assessment and VS info.       Neuro:  Willard Coma Scale  Best Eye Response: 3-->(E3) to speech  Best Motor Response: 6-->(M6) obeys commands  Best Verbal Response: 4-->(V4) confused  Roni Coma Scale Score: 13  Assessment Qualifiers: patient not sedated/intubated        24hr Temp:  [98 °F (36.7 °C)-98.6 °F (37 °C)]     CV:   Rhythm: normal sinus rhythm  BP goals:   SBP < 180  MAP > 65    Resp:   O2 Device (Oxygen Therapy): room air       Plan: monitor respiratory status, intubation watch    GI/:  ARTURO Total Score: 2  Diet/Nutrition Received: NPO  Last Bowel Movement: 09/14/20  Voiding Characteristics: urethral catheter (bladder)    Intake/Output Summary (Last 24 hours) at 9/15/2020 0839  Last data filed at 9/15/2020 0605  Gross per 24 hour   Intake 1703 ml   Output 1025 ml   Net 678 ml     Unmeasured Output  Stool Occurrence: 1  Pad Count: 1    Labs/Accuchecks:  Recent Labs   Lab 09/15/20  0408   WBC 13.65*   RBC 3.40*   HGB 9.4*   HCT 31.3*         Recent Labs   Lab 09/15/20  0408      K 3.3*   CO2 22*   *   BUN 22   CREATININE 1.2   ALKPHOS 53*   ALT 6*   AST 9*   BILITOT 0.2      Recent Labs   Lab 09/14/20  0338   INR 1.2   APTT 30.3      Recent Labs   Lab 09/08/20  1028 09/14/20  0338   CPK  --  25   CPKMB  --  1.2   TROPONINI 0.147*  --    MB  --  4.8       Electrolytes: no replacements ordered  Accuchecks:Q6    Gtts:   sodium chloride 0.9% 30 mL/hr at 09/15/20 0605        LDA/Wounds:  Lines/Drains/Airways       Drain                   Urethral Catheter 09/08/20 0315 [place] 14 Fr. 7 days         NG/OG Tube 09/12/20 1644 [place] sump 16 Fr. Left nostril 2 days              Peripheral Intravenous Line                   Midline Catheter Insertion/Assessment  - Single Lumen 09/10/20 2130 Left basilic vein (medial side of arm) 18g x 10cm 4 days         Peripheral IV - Single Lumen 09/11/20 2215 20 G Left Forearm 3 days                  Wounds: YES  Wound care consulted: YES

## 2020-09-15 NOTE — PROGRESS NOTES
[facility]  Neurology-Epilepsy  Progress Note    Patient Name: Lopez Savage  MRN: [id number]  Admission Date: 9/13/2020  Hospital Length of Stay: 2 days  Code Status: Full Code   Attending Provider: Linda Natarajan MD  Primary Care Physician: Prasanna Haywood MD   Principal Problem:Status epilepticus    Subjective:     Hospital Course:   9/13>9/14: no electrographic seizures, continuous periodic sharp waves most prominent over the left posterior quadrant but with a prominent field into the right posterior quadrant as well, which do not organize  9/14>9/15: Some improvement noted from prior day, left posterior periodic discharges which do not organize. Clinically, non-repressible RUE myoclonus noted today consistent with EPC.     Interval History: EEG relatively stable overnight, continues to have periodic discharges most prominent in left occipital area. Right upper extremity non-repressible myoclonus noted, likely representative of EPC.     Current Facility-Administered Medications   Medication Dose Route Frequency Provider Last Rate Last Dose    albuterol-ipratropium 2.5 mg-0.5 mg/3 mL nebulizer solution 3 mL  3 mL Nebulization Q4H Angelique Prince, RUPERTO   3 mL at 09/15/20 1158    atorvastatin tablet 40 mg  40 mg Per G Tube Daily Ron Molina MD   40 mg at 09/15/20 1000    cefTRIAXone (ROCEPHIN) 2 g/50 mL D5W IVPB  2 g Intravenous Q12H Acosta Pereira Jr., PA   2 g at 09/15/20 0920    dextrose 50% injection 12.5 g  12.5 g Intravenous PRN Ambika Lee PA-C        glucagon (human recombinant) injection 1 mg  1 mg Intramuscular PRN Ambika Lee PA-C        heparin (porcine) injection 5,000 Units  5,000 Units Subcutaneous Q8H Angelique Prince NP   5,000 Units at 09/15/20 1332    insulin aspart U-100 pen 1-10 Units  1-10 Units Subcutaneous Q6H PRN Ambika Lee PA-C        labetalol 20 mg/4 mL (5 mg/mL) IV syring  10 mg Intravenous Q4H PRN Ron Molina  MD        lacosamide oral solution 200 mg  200 mg Per NG tube Q12H Ron Molina MD   200 mg at 09/15/20 1000    losartan tablet 25 mg  25 mg Per NG tube Daily Angelique Prince NP   25 mg at 09/15/20 1001    magnesium sulfate 2g in water 50mL IVPB (premix)  2 g Intravenous PRN Angelique Prince NP        magnesium sulfate 2g in water 50mL IVPB (premix)  4 g Intravenous PRN Angelique Prince NP        pantoprazole suspension 40 mg  40 mg Per NG tube Daily Angelique Prince NP   40 mg at 09/15/20 1333    potassium chloride 10 mEq in 100 mL IVPB  10 mEq Intravenous PRN Ha Jenkins MD        And    potassium chloride 10 mEq in 100 mL IVPB  40 mEq Intravenous PRN Ha Jenkins MD        And    potassium chloride 10 mEq in 100 mL IVPB  10 mEq Intravenous PRN Ha Jenkins MD        sodium chloride 0.9% flush 10 mL  10 mL Intravenous PRN Ron Molina MD        sodium phosphate 15 mmol in dextrose 5 % 250 mL IVPB  15 mmol Intravenous PRN Angelique Prince NP        sodium phosphate 20.01 mmol in dextrose 5 % 250 mL IVPB  20.01 mmol Intravenous PRN Angelique Prince NP        sodium phosphate 30 mmol in dextrose 5 % 250 mL IVPB  30 mmol Intravenous PRN Angelique Prince NP        vancomycin - pharmacy to dose   Intravenous pharmacy to manage frequency [facility]        [START ON 9/16/2020] vancomycin 1.75 g in 5 % dextrose 500 mL IVPB  1,750 mg Intravenous Q24H Joaquim Edmonds MD         Continuous Infusions:    Review of Systems   Unable to perform ROS: Mental status change     Objective:     Vital Signs (Most Recent):  Temp: 98.9 °F (37.2 °C) (09/15/20 1100)  Pulse: 70 (09/15/20 1400)  Resp: 16 (09/15/20 1400)  BP: 120/60 (09/15/20 1400)  SpO2: 99 % (09/15/20 1400) Vital Signs (24h Range):  Temp:  [98 °F (36.7 °C)-98.9 °F (37.2 °C)] 98.9 °F (37.2 °C)  Pulse:  [70-93] 70  Resp:  [16-37] 16  SpO2:  [96 %-100 %] 99 %  BP: (120-172)/() 120/60      Weight: 114 kg (251 lb 5.2 oz)  Body mass index is 37.11 kg/m².    Physical Exam  Vitals signs and nursing note reviewed.   Constitutional:       General: He is not in acute distress.     Appearance: He is not diaphoretic.   HENT:      Head: Normocephalic and atraumatic.   Eyes:      Pupils: Pupils are equal, round, and reactive to light.   Neck:      Musculoskeletal: Neck supple.   Pulmonary:      Effort: No respiratory distress.      Breath sounds: No wheezing.   Musculoskeletal:         General: No deformity.   Skin:     General: Skin is warm and dry.         NEUROLOGICAL EXAMINATION:     MENTAL STATUS   Level of consciousness: drowsy       Oriented to person, place as hospital, [facility]      CRANIAL NERVES     CN III, IV, VI   Pupils are equal, round, and reactive to light.       Drowsy, non-repressible right upper extremity myoclonus noted on exam       Significant Labs: All pertinent lab results from the past 24 hours have been reviewed.    Significant Studies: I have reviewed all pertinent imaging results/findings within the past 24 hours.    Assessment and Plan:     Brain abscess  - See management as above    Encephalopathy acute  Epilepsia Partialis Continua  - Transferred from [facility] for further evaluation, clinical events concerning for seizure  - No prior seizure history per family  [place] [facility] [place] sharp waves most prominent over the left posterior quadrant with a prominent field into the right posterior quadrant, which do not organize    Recommendations:  - Continue vEEG - overall EEG stable, clinical RUE myoclonus noted 9/14 consistent with Epilepsia Partialis Continua  - Continue Vimpat 200 mg BID, ok to discontinue Keppra  - Avoid medications that lower seizure threshold  - Neurochecks  - Management of acute infection per NCC, ID    Plan of care discussed with NCC team, patient and wife at bedside. Will continue to follow peripherally, please call with any  questions.    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  - MRI brain w/wo 9/14 showing multiple septic emboli with abscess formation  - ID following, ceftriaxone adjusted to CNS doses, NSGY following     Chronic bronchitis  - NCC closely monitoring respiratory status, at risk for intubation  - Continuous pulse oximetry    Ulcerative colitis  - Hx of, NCC monitoring    Essential hypertension  - Vitals reviewed, management per NCC        VTE Risk Mitigation (From admission, onward)         Ordered     heparin (porcine) injection 5,000 Units  Every 8 hours      09/14/20 1347     IP VTE HIGH RISK PATIENT  Once      09/13/20 1518     Place sequential compression device  Until discontinued      09/13/20 1119                Mercedes Aguillon PA-C  Neurology-Epilepsy  [facility]  Staff: Dr. Winslow

## 2020-09-15 NOTE — ASSESSMENT & PLAN NOTE
- Transferred from [facility] for further evaluation, clinical events concerning for seizure  - No prior seizure history per family  - EEG at [facility] with continuous periodic sharp waves most prominent over the left posterior quadrant with a prominent field into the right posterior quadrant, which do not organize    Recommendations:  - Continue vEEG - overall EEG stable, clinical RUE myoclonus noted 9/14 consistent with Epilepsia Partialis Continue  - Continue Vimpat 200 mg BID, ok to discontinue Keppra  - Avoid medications that lower seizure threshold  - Neurochecks  - Management of acute infection per NCC, ID    Plan of care discussed with NCC team, patient and wife at bedside. Will continue to follow peripherally, please call with any questions.

## 2020-09-15 NOTE — SUBJECTIVE & OBJECTIVE
No past medical history on file.    No past surgical history on file.    Review of patient's allergies indicates:   Allergen Reactions    Codeine     Tetanus vaccines and toxoid        Medications:  Medications Prior to Admission   Medication Sig    atorvastatin (LIPITOR) 40 MG tablet 1 tablet (40 mg total) by Per G Tube route once daily.    cefTRIAXone (ROCEPHIN) 2 g/50 mL PgBk IVPB Inject 50 mLs (2 g total) into the vein once daily. for 4 days    doxycycline hyclate (DOXYCYCLINE 100MG IN D5W 250ML, READY TO MIX,) Inject 250 mLs (100 mg total) into the vein every 12 (twelve) hours. for 6 days    enoxaparin (LOVENOX) 40 mg/0.4 mL Syrg Inject 0.4 mLs (40 mg total) into the skin once daily.    insulin aspart U-100 (NOVOLOG) 100 unit/mL (3 mL) InPn pen Inject 0-5 Units into the skin every 6 (six) hours as needed (Hyperglycemia).    lacosamide (VIMPAT) 10 mg/mL Soln oral solution 10 mLs (100 mg total) by Per NG tube route every 12 (twelve) hours.    levETIRAcetam in NaCl, iso-os, (KEPPRA) 500 mg/100 mL PgBk Inject 100 mLs (500 mg total) into the vein every 12 (twelve) hours.    losartan (COZAAR) 50 MG tablet Take 1 tablet (50 mg total) by mouth once daily.    metronidazole (FLAGYL) 500 mg/100 mL IVPB Inject 100 mLs (500 mg total) into the vein every 8 (eight) hours. for 7 days     Antibiotics (From admission, onward)    Start     Stop Route Frequency Ordered    09/16/20 1300  vancomycin 1.75 g in 5 % dextrose 500 mL IVPB      -- IV Every 24 hours (non-standard times) 09/15/20 1139    09/15/20 1213  vancomycin - pharmacy to dose  (vancomycin IVPB)      -- IV pharmacy to manage frequency 09/15/20 1113    09/14/20 2030  cefTRIAXone (ROCEPHIN) 2 g/50 mL D5W IVPB      -- IV Every 12 hours (non-standard times) 09/14/20 2009        Antifungals (From admission, onward)    None        Antivirals (From admission, onward)    None             There is no immunization history on file for this patient.    Family History      None        Social History     Socioeconomic History    Marital status:      Spouse name: Not on file    Number of children: Not on file    Years of education: Not on file    Highest education level: Not on file   Occupational History    Not on file   Social Needs    Financial resource strain: Not on file    Food insecurity     Worry: Not on file     Inability: Not on file    Transportation needs     Medical: Not on file     Non-medical: Not on file   Tobacco Use    Smoking status: Not on file   Substance and Sexual Activity    Alcohol use: Not on file    Drug use: Not on file    Sexual activity: Not on file   Lifestyle    Physical activity     Days per week: Not on file     Minutes per session: Not on file    Stress: Not on file   Relationships    Social connections     Talks on phone: Not on file     Gets together: Not on file     Attends Mandaen service: Not on file     Active member of club or organization: Not on file     Attends meetings of clubs or organizations: Not on file     Relationship status: Not on file   Other Topics Concern    Not on file   Social History Narrative    Not on file     Review of Systems   Unable to perform ROS: Acuity of condition     Objective:     Vital Signs (Most Recent):  Temp: 98.9 °F (37.2 °C) (09/15/20 1100)  Pulse: 78 (09/15/20 1534)  Resp: 20 (09/15/20 1534)  BP: 120/60 (09/15/20 1400)  SpO2: 98 % (09/15/20 1534) Vital Signs (24h Range):  Temp:  [98 °F (36.7 °C)-98.9 °F (37.2 °C)] 98.9 °F (37.2 °C)  Pulse:  [70-93] 78  Resp:  [16-37] 20  SpO2:  [96 %-100 %] 98 %  BP: (120-172)/() 120/60     Weight: 114 kg (251 lb 5.2 oz)  Body mass index is 37.11 kg/m².    Estimated Creatinine Clearance: 68.2 mL/min (based on SCr of 1.2 mg/dL).    Physical Exam  Vitals signs reviewed.   Constitutional:       Appearance: He is ill-appearing. He is not diaphoretic.   HENT:      Head:      Comments: EEG monitor  Cardiovascular:      Rate and Rhythm: Normal  rate.   Pulmonary:      Effort: Pulmonary effort is normal.   Abdominal:      General: Abdomen is flat. There is no distension.      Palpations: There is no mass.   Musculoskeletal:         General: No swelling, tenderness, deformity or signs of injury.   Skin:     General: Skin is warm and dry.      Coloration: Skin is not jaundiced or pale.   Neurological:      Comments: lethargic         Significant Labs: All pertinent labs within the past 24 hours have been reviewed.    Significant Imaging: I have reviewed all pertinent imaging results/findings within the past 24 hours.

## 2020-09-15 NOTE — PT/OT/SLP PROGRESS
Physical Therapy Treatment    Patient Name:  Lopez Savage   MRN:  [id number]    Recommendations:     Discharge Recommendations:  rehabilitation facility   Discharge Equipment Recommendations: other (see comments)(TBD by next level of care)   Barriers to discharge: Inaccessible home and increased level of assistance    Assessment:     Lopez Savage is a 74 y.o. male admitted with a medical diagnosis of Status epilepticus.  He presents with the following impairments/functional limitations:  weakness, impaired endurance, impaired self care skills, impaired functional mobilty, gait instability, impaired balance, impaired cognition, decreased upper extremity function, decreased lower extremity function, decreased safety awareness, impaired coordination, impaired fine motor. Pt with eyes open ~50% of session, with frequent VCing. Pt followed ~50% of simple 1 step commands. Pt performed rolling/turning with mod A x 2 and sup <> sit with max A x 2. Pt sat EOb with min A - mod A and bout of CGA. PT recommending inpatient rehab upon DC from hospital.     Rehab Prognosis: Good; patient would benefit from acute skilled PT services to address these deficits and reach maximum level of function.    Recent Surgery: * No surgery found *      Plan:     During this hospitalization, patient to be seen 4 x/week to address the identified rehab impairments via gait training, therapeutic activities, therapeutic exercises, neuromuscular re-education and progress toward the following goals:    · Plan of Care Expires:  10/10/20    Subjective     Chief Complaint: none verbalized  Patient/Family Comments/goals: none verbalized  Pain/Comfort:  · Pain Rating 1: 0/10  · Pain Rating Post-Intervention 1: 0/10      Objective:     Communicated with RN prior to session.  Patient found HOB elevated with bed alarm, blood pressure cuff, EEG, garcia catheter, NG tube, pressure relief boots, peripheral IV, pulse ox (continuous), restraints, SCD, telemetry  upon PT entry to room.     General Precautions: Standard, aspiration, fall, seizure   Orthopedic Precautions:N/A   Braces: N/A     Command Followin% of the time following simple 1 step commands  Alert/Awake: eyes open ~50% of session    Functional Mobility:  · Bed Mobility:     · Rolling Left:  moderate assistance and of 2 persons  · Rolling Right: moderate assistance and of 2 persons  · Scooting to HOB via drawsheet: total assistance and of 2 persons  · Supine to Sit: maximal assistance and of 2 persons  · Sit to Supine: maximal assistance and of 2 persons  · EOB:   · Min A to mod A with bouts of CGA  · BUE support  · Slouched posture and c-spine flexion  · Increased assistance needed as pt faitgued  · Transfers: not appropriate this date  · Balance:   · Static Sitting: min A to mod A with bouts of CGA      AM-PAC 6 CLICK MOBILITY  Turning over in bed (including adjusting bedclothes, sheets and blankets)?: 2  Sitting down on and standing up from a chair with arms (e.g., wheelchair, bedside commode, etc.): 1  Moving from lying on back to sitting on the side of the bed?: 2  Moving to and from a bed to a chair (including a wheelchair)?: 1  Need to walk in hospital room?: 1  Climbing 3-5 steps with a railing?: 1  Basic Mobility Total Score: 8       Therapeutic Activities and Exercises:  Patient educated on role of therapy, goals of session, and benefits of mobilizing.   Discussed PT plan of care during hospitalization.   Patient educated on calling for assistance.   Patient educated on how their diagnosis impacts their mobility within PT scope of practice.   Communication board up to date.  All questions answered within PT scope of practice.      Patient left HOB elevated with all lines intact, call button in reach, bed alarm on, restraints reapplied at end of session, RN notified and spouse present..    GOALS:   Multidisciplinary Problems     Physical Therapy Goals        Problem: Physical Therapy Goal    Goal  Priority Disciplines Outcome Goal Variances Interventions   Physical Therapy Goal     PT, PT/OT Ongoing, Progressing     Description: Goals to be met by: 2020    Patient will increase functional independence with mobility by performin. Pt will perform bed mobility (rolling L/R, scooting, and bridging) with CGA  2. Pt will perform supine to/from sit with CGA.  3. Pt will sit EOB x 15 mins with B UE support with Rony assistance.  4. Pt will perform sit to stand with max assistance and LRAD.  5. Pt will ambulate 15 feet with max assistance and LRAD.  6. Pt will perform there-ex from handout x 15 reps to improve strength for functional mobility.                         Time Tracking:     PT Received On: 09/15/20  PT Start Time: 1107     PT Stop Time: 1135  PT Total Time (min): 28 min   Additional staffing present: OT    Billable Minutes: Therapeutic Activity 28    Treatment Type: Treatment  PT/PTA: PT     PTA Visit Number: 0     Patito Baird PT, DPT   9/15/2020

## 2020-09-15 NOTE — PROGRESS NOTES
[facility]  Neurosurgery  Progress Note    Subjective:     History of Present Illness: 74 M with  recent endocarditis (Strep mutans per report, s/p IV Rocephin) with aortic valve vegetations (8/20), recent GIB (8/20), diverticulitis, GERD, MVP, and COPD who was transferred to [facility] on 9/13 for cEEG. The patient presented to [facility] on 9/8/2020 after LOC at home. Per report patient had been c/o worsening SOB, had portable CXR with effusion v PNA. Patient was given course of doxycycline and finished course recently. Reportedly on 9/8 his wife went to bed while  was watching TV and about an hour later her daughter found him non responsive on the ground, short round of chest compressions before intubated in ED, sedated on Prop.  No seizure like activity was observed in ED. TPA contraindicated due to recent GI bleed, last occurring in August.  MRI head showed L PCA infarct and right occipital lobe edema. 9/8 EEG demonstrated left posterior pseudo periodic epileptiform discharges is suggestive of an irritative region likely secondary to focal ischemia. Patient started on AEDs vimpat and keppra and reported to have several seizures during his stay at [place], currently on cEEG in [facility]. NSGY consulted following MRI Brain this evening with 1.2cm R occipital lesion concerning for abscess.     On Lovenox at home, SQH at [facility].     Post-Op Info:  * No surgery found *         Interval History: naeon, on cEEG.    Medications:  Continuous Infusions:   sodium chloride 0.9% 30 mL/hr at 09/15/20 0605     Scheduled Meds:   albuterol-ipratropium  3 mL Nebulization Q4H    atorvastatin  40 mg Per G Tube Daily    cefTRIAXone (ROCEPHIN) IVPB  2 g Intravenous Q12H    doxycycline (VIBRAMYCIN) IVPB  100 mg Intravenous Q12H    heparin (porcine)  5,000 Units Subcutaneous Q8H    lacosamide  200 mg Per NG tube Q12H    levETIRAcetam in NaCl (iso-os)  500 mg Intravenous Q12H    metronidazole   "500 mg Intravenous Q8H     PRN Meds:dextrose 50%, glucagon (human recombinant), insulin aspart U-100, labetalol, sodium chloride 0.9%     Review of Systems  Objective:     Weight: 114 kg (251 lb 5.2 oz)  Body mass index is 37.11 kg/m².  Vital Signs (Most Recent):  Temp: 98.6 °F (37 °C) (09/15/20 0305)  Pulse: 81 (09/15/20 0605)  Resp: (!) 23 (09/15/20 0605)  BP: (!) 167/76 (09/15/20 0605)  SpO2: 96 % (09/15/20 0605) Vital Signs (24h Range):  Temp:  [98 °F (36.7 °C)-98.6 °F (37 °C)] 98.6 °F (37 °C)  Pulse:  [75-89] 81  Resp:  [16-39] 23  SpO2:  [96 %-100 %] 96 %  BP: (140-176)/() 167/76                          NG/OG Tube 09/12/20 1644 [place] sump 16 Fr. Left nostril (Active)   Placement Check placement verified by distal tube length measurement 09/15/20 0305   Tolerance no signs/symptoms of discomfort 09/15/20 0305   Securement secured to nostril center w/ adhesive device 09/15/20 0305   Clamp Status/Tolerance no abdominal discomfort;no abdominal distention;no emesis;no nausea;no residual;no restlessness;clamped 09/15/20 0305   Suction Setting/Drainage Method suction at the bedside 09/15/20 0305   Insertion Site Appearance no redness, warmth, tenderness, skin breakdown, drainage 09/15/20 0305   Drainage None 09/15/20 0305   Flush/Irrigation flushed w/;water;no resistance met 09/15/20 0305   Intake (mL) 0 mL 09/15/20 0605   Water Bolus (mL) 0 mL 09/15/20 0605   Tube Output(mL)(Include Discarded Residual) 0 mL 09/15/20 0605   Intake (mL) - Formula Tube Feeding 0 09/15/20 0605            Urethral Catheter 09/08/20 0315 Coude 14 Fr. (Active)   Site Assessment Clean;Intact 09/15/20 0305   Collection Container Urimeter 09/15/20 0305   Securement Method secured to top of thigh w/ adhesive device 09/15/20 0305   Catheter Care Performed yes 09/15/20 0305   Reason for Continuing Urinary Catheterization Critically ill in ICU requiring intensive monitoring 09/15/20 0305   CAUTI Prevention Bundle StatLock in place w 1" " slack;Drainage bag/urimeter off the floor;Intact seal between catheter & drainage tubing;Green sheeting clip in use;No dependent loops or kinks;Drainage bag/urimeter not overfilled (<2/3 full);Drainage bag/urimeter below bladder 09/14/20 1905   Output (mL) 500 mL 09/15/20 0605       Neurosurgery Physical Exam   Lethargic, oriented to name  PERRL  FCX4    Significant Labs:  Recent Labs   Lab 09/14/20  0338 09/15/20  0408   GLU 84 93    141   K 3.6 3.3*   * 111*   CO2 21* 22*   BUN 25* 22   CREATININE 1.2 1.2   CALCIUM 9.0 9.1   MG 1.9 2.0     Recent Labs   Lab 09/14/20  0338 09/15/20  0408   WBC 13.00* 13.65*   HGB 9.7* 9.4*   HCT 32.3* 31.3*    226     Recent Labs   Lab 09/14/20 0338   INR 1.2   APTT 30.3     Microbiology Results (last 7 days)     Procedure Component Value Units Date/Time    Blood culture [[id number]] Collected: 09/14/20 1840    Order Status: Completed Specimen: Blood from Peripheral, Foot, Right Updated: 09/15/20 0315     Blood Culture, Routine No Growth to date    Blood culture [[id number]] Collected: 09/14/20 1835    Order Status: Completed Specimen: Blood from Peripheral, Foot, Left Updated: 09/15/20 0315     Blood Culture, Routine No Growth to date    Urine culture [[id number]] Collected: 09/13/20 1438    Order Status: No result Specimen: Urine Updated: 09/13/20 1858            Significant Diagnostics:  MRI brain: reviewed    Assessment/Plan:     Bacterial endocarditis  74M with known bacterial endocarditis with septic emboli c/b L PCA infarct, scattered punctate infarctions and R occipital lesion concerning for intracranial abscess:    -Continue NCC admit      -q1h neurochecks in ICU, q2h neurochecks in stepdown, q4h neurochecks on floor  --SBP <160 (cardene ggt; hydralazine & labetalol PRN; transition to home meds when appropriate)  --Na >135  --VN following  --AED per NCC/Epilepsy  --Abx per ID, f/up BCx - per report Strep mutans at OSH s/p IV Rocephin and PO Doxy        -f/up ID recs, consider SILVANA  --NO steroids  --HOB >30  --Follow-up full pre-op labs (CBC/CMP/PT-INR/PTT/T&S)  --NPO at this time for possible operative intervention  --PPI given GIB per NCC  --Continue to monitor clinically, notify NSGY immediately with any changes in neuro status    Dispo: ICU               Sea Cardoso, DO  Neurosurgery  [facility]

## 2020-09-15 NOTE — CONSULTS
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 CT head negative for acute intracranial bleed. MRI +PCA infarct and right occipital lobe edema. he was started on doxycycline. Metronidazole, and ceftriaxone.     MRI here with Numerous scattered punctate foci of cortical enhancement throughout both cerebral hemispheres.  Additional rim enhancing collection in the right occipital lobe with central diffusion restriction and surrounding vasogenic edema.  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    BCXs:  9/8 1/4 CONS - suspect contaminant  9/10 - NGTD  9/14 NGTD    Recommendations  1. Continue ceftriaxone. Increased dose [id number]  2. Discontinued doxycycline and metronidazole. Started vancomycin. Trough goal closer to 20  3. NSGY following. NPO. If for drainage of abscess please send for cultures.   4. Blood cultures here NGTD. Will follow   5. CXR with Mild interval increase in the degree of pulmonary vascular congestion and interstitial/alveolar edema. resp cultures if able     Seen and discussed with ID staff. ID will follow with you.           Thank you for your consult. I will follow-up with patient. Please contact us if you have any additional questions.    Hermilo Jean  DAYAN  Infectious Disease  [facility]    Subjective:     Principal Problem: Status epilepticus    HPI: Mr. 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 The patient presented to [facility] on 9/8/2020 with a primary complaint of loss of consciousness. The patient was recently treated for endocarditis w/ IV rocephin through PICC line at [facility] and discharged approximately 2 weeks ago. Since then he has had a worsening cough and was planning to go to the hospital for evaluation. Per wife home health did portable CXR about a week ago w/ concern for fluid vs pneumonia. Patient was given course of doxycycline and finished course recently. 9/8 Wife went to bed while  was watching TV and about an hour later her daughter found him non responsive on the ground. Daughter did a few chest compressions before EMS arrived. Patient arrived to ED and non responsive to sternal rub. Pupillary and gag reflex present. Patient intubated for airway protection and sedated w/ propofol. No seizure like activity was observed in ED. TPA contraindicated due to recent GI bleed, last occurring in August. Discussed further stroke work up with wife and planned for imaging. CT head negative for acute intracranial bleed. MRI head showed PCA infarct and right occipital lobe edema. 9/8 EEG demonstrated left posterior pseudo periodic epileptiform discharges is suggestive of an irritative region likely secondary to focal ischemia. Patient started on AEDs vimpat and keppra and reported to have several seizures during his stay. Patient was transferred to [facility] for continuous EEG monitoring and further evaluation.  BCXs:  9/8 1/4 CONS - suspect contaminant  9/10 - NGTD  9/14 sent     MRI Brain:  Numerous scattered punctate foci of cortical enhancement throughout both  cerebral hemispheres.  Additional rim enhancing collection in the right occipital lobe with central diffusion restriction and surrounding vasogenic edema.  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     Persistent gyriform diffusion restriction the posterior left cerebral hemisphere as well as the hippocampus and dorsal lateral thalamus, although slightly improved from prior studies.  The distribution be typical for vascular territory infarct, most suggestive of seizure related signal changes (status epilepticus).  Other encephalitis to be excluded clinically.   NSGy following for occipital abscess. NPO at this time. currently on doxy flagyl and ceftriaxone.     No past medical history on file.    No past surgical history on file.    Review of patient's allergies indicates:   Allergen Reactions    Codeine     Tetanus vaccines and toxoid        Medications:  Medications Prior to Admission   Medication Sig    atorvastatin (LIPITOR) 40 MG tablet 1 tablet (40 mg total) by Per G Tube route once daily.    cefTRIAXone (ROCEPHIN) 2 g/50 mL PgBk IVPB Inject 50 mLs (2 g total) into the vein once daily. for 4 days    doxycycline hyclate (DOXYCYCLINE 100MG IN D5W 250ML, READY TO MIX,) Inject 250 mLs (100 mg total) into the vein every 12 (twelve) hours. for 6 days    enoxaparin (LOVENOX) 40 mg/0.4 mL Syrg Inject 0.4 mLs (40 mg total) into the skin once daily.    insulin aspart U-100 (NOVOLOG) 100 unit/mL (3 mL) InPn pen Inject 0-5 Units into the skin every 6 (six) hours as needed (Hyperglycemia).    lacosamide (VIMPAT) 10 mg/mL Soln oral solution 10 mLs (100 mg total) by Per NG tube route every 12 (twelve) hours.    levETIRAcetam in NaCl, iso-os, (KEPPRA) 500 mg/100 mL PgBk Inject 100 mLs (500 mg total) into the vein every 12 (twelve) hours.    losartan (COZAAR) 50 MG tablet Take 1 tablet (50 mg total) by mouth once daily.    metronidazole (FLAGYL) 500 mg/100 mL IVPB Inject  100 mLs (500 mg total) into the vein every 8 (eight) hours. for 7 days     Antibiotics (From admission, onward)    Start     Stop Route Frequency Ordered    09/16/20 1300  vancomycin 1.75 g in 5 % dextrose 500 mL IVPB      -- IV Every 24 hours (non-standard times) 09/15/20 1139    09/15/20 1213  vancomycin - pharmacy to dose  (vancomycin IVPB)      -- IV pharmacy to manage frequency 09/15/20 1113    09/14/20 2030  cefTRIAXone (ROCEPHIN) 2 g/50 mL D5W IVPB      -- IV Every 12 hours (non-standard times) 09/14/20 2009        Antifungals (From admission, onward)    None        Antivirals (From admission, onward)    None             There is no immunization history on file for this patient.    Family History     None        Social History     Socioeconomic History    Marital status:      Spouse name: Not on file    Number of children: Not on file    Years of education: Not on file    Highest education level: Not on file   Occupational History    Not on file   Social Needs    Financial resource strain: Not on file    Food insecurity     Worry: Not on file     Inability: Not on file    Transportation needs     Medical: Not on file     Non-medical: Not on file   Tobacco Use    Smoking status: Not on file   Substance and Sexual Activity    Alcohol use: Not on file    Drug use: Not on file    Sexual activity: Not on file   Lifestyle    Physical activity     Days per week: Not on file     Minutes per session: Not on file    Stress: Not on file   Relationships    Social connections     Talks on phone: Not on file     Gets together: Not on file     Attends Denominational service: Not on file     Active member of club or organization: Not on file     Attends meetings of clubs or organizations: Not on file     Relationship status: Not on file   Other Topics Concern    Not on file   Social History Narrative    Not on file     Review of Systems   Unable to perform ROS: Acuity of condition     Objective:     Vital  Signs (Most Recent):  Temp: 98.9 °F (37.2 °C) (09/15/20 1100)  Pulse: 78 (09/15/20 1534)  Resp: 20 (09/15/20 1534)  BP: 120/60 (09/15/20 1400)  SpO2: 98 % (09/15/20 1534) Vital Signs (24h Range):  Temp:  [98 °F (36.7 °C)-98.9 °F (37.2 °C)] 98.9 °F (37.2 °C)  Pulse:  [70-93] 78  Resp:  [16-37] 20  SpO2:  [96 %-100 %] 98 %  BP: (120-172)/() 120/60     Weight: 114 kg (251 lb 5.2 oz)  Body mass index is 37.11 kg/m².    Estimated Creatinine Clearance: 68.2 mL/min (based on SCr of 1.2 mg/dL).    Physical Exam  Vitals signs reviewed.   Constitutional:       Appearance: He is ill-appearing. He is not diaphoretic.   HENT:      Head:      Comments: EEG monitor  Cardiovascular:      Rate and Rhythm: Normal rate.   Pulmonary:      Effort: Pulmonary effort is normal.   Abdominal:      General: Abdomen is flat. There is no distension.      Palpations: There is no mass.   Musculoskeletal:         General: No swelling, tenderness, deformity or signs of injury.   Skin:     General: Skin is warm and dry.      Coloration: Skin is not jaundiced or pale.   Neurological:      Comments: lethargic         Significant Labs: All pertinent labs within the past 24 hours have been reviewed.    Significant Imaging: I have reviewed all pertinent imaging results/findings within the past 24 hours.

## 2020-09-15 NOTE — SUBJECTIVE & OBJECTIVE
Interval History: EEG relatively stable overnight, continues to have periodic discharges most prominent in left occipital area. Right upper extremity non-repressible myoclonus noted, likely representative of EPC.     Current Facility-Administered Medications   Medication Dose Route Frequency Provider Last Rate Last Dose    albuterol-ipratropium 2.5 mg-0.5 mg/3 mL nebulizer solution 3 mL  3 mL Nebulization Q4H Angelique Prince NP   3 mL at 09/15/20 1158    atorvastatin tablet 40 mg  40 mg Per G Tube Daily Ron Molina MD   40 mg at 09/15/20 1000    cefTRIAXone (ROCEPHIN) 2 g/50 mL D5W IVPB  2 g Intravenous Q12H LIVAN Wallace Jr.   2 g at 09/15/20 0920    dextrose 50% injection 12.5 g  12.5 g Intravenous PRN Ambika Lee PA-C        glucagon (human recombinant) injection 1 mg  1 mg Intramuscular PRN Ambika Lee PA-C        heparin (porcine) injection 5,000 Units  5,000 Units Subcutaneous Q8H Angelique Prince NP   5,000 Units at 09/15/20 1332    insulin aspart U-100 pen 1-10 Units  1-10 Units Subcutaneous Q6H PRN Ambika Lee PA-C        labetalol 20 mg/4 mL (5 mg/mL) IV syring  10 mg Intravenous Q4H PRN Ron Molnia MD        lacosamide oral solution 200 mg  200 mg Per NG tube Q12H Ron Molina MD   200 mg at 09/15/20 1000    losartan tablet 25 mg  25 mg Per NG tube Daily Angelique Prince NP   25 mg at 09/15/20 1001    magnesium sulfate 2g in water 50mL IVPB (premix)  2 g Intravenous PRN Angelique Prince NP        magnesium sulfate 2g in water 50mL IVPB (premix)  4 g Intravenous PRN Angelique Prince NP        pantoprazole suspension 40 mg  40 mg Per NG tube Daily Angelique Prince NP   40 mg at 09/15/20 1333    potassium chloride 10 mEq in 100 mL IVPB  10 mEq Intravenous PRN Ha Jenkins MD        And    potassium chloride 10 mEq in 100 mL IVPB  40 mEq Intravenous PRN Ha Jenkins MD        And    potassium chloride 10 mEq in  100 mL IVPB  10 mEq Intravenous PRN Ha Jenkins MD        sodium chloride 0.9% flush 10 mL  10 mL Intravenous PRN Ron Molina MD        sodium phosphate 15 mmol in dextrose 5 % 250 mL IVPB  15 mmol Intravenous PRN Angelique Prince NP        sodium phosphate 20.01 mmol in dextrose 5 % 250 mL IVPB  20.01 mmol Intravenous PRN Angelique Prince NP        sodium phosphate 30 mmol in dextrose 5 % 250 mL IVPB  30 mmol Intravenous PRN Angelique Prince NP        vancomycin - pharmacy to dose   Intravenous pharmacy to manage frequency Hermilo Jean PA-C        [START ON 9/16/2020] vancomycin 1.75 g in 5 % dextrose 500 mL IVPB  1,750 mg Intravenous Q24H Joaquim Edmonds MD         Continuous Infusions:    Review of Systems   Unable to perform ROS: Mental status change     Objective:     Vital Signs (Most Recent):  Temp: 98.9 °F (37.2 °C) (09/15/20 1100)  Pulse: 70 (09/15/20 1400)  Resp: 16 (09/15/20 1400)  BP: 120/60 (09/15/20 1400)  SpO2: 99 % (09/15/20 1400) Vital Signs (24h Range):  Temp:  [98 °F (36.7 °C)-98.9 °F (37.2 °C)] 98.9 °F (37.2 °C)  Pulse:  [70-93] 70  Resp:  [16-37] 16  SpO2:  [96 %-100 %] 99 %  BP: (120-172)/() 120/60     Weight: 114 kg (251 lb 5.2 oz)  Body mass index is 37.11 kg/m².    Physical Exam  Vitals signs and nursing note reviewed.   Constitutional:       General: He is not in acute distress.     Appearance: He is not diaphoretic.   HENT:      Head: Normocephalic and atraumatic.   Eyes:      Pupils: Pupils are equal, round, and reactive to light.   Neck:      Musculoskeletal: Neck supple.   Pulmonary:      Effort: No respiratory distress.      Breath sounds: No wheezing.   Musculoskeletal:         General: No deformity.   Skin:     General: Skin is warm and dry.         NEUROLOGICAL EXAMINATION:     MENTAL STATUS   Level of consciousness: drowsy       Oriented to person, place as hospital, city      CRANIAL NERVES     CN III, IV, VI   Pupils are equal, round, and  reactive to light.       Drowsy, non-repressible right upper extremity myoclonus noted on exam       Significant Labs: All pertinent lab results from the past 24 hours have been reviewed.    Significant Studies: I have reviewed all pertinent imaging results/findings within the past 24 hours.

## 2020-09-15 NOTE — ASSESSMENT & PLAN NOTE
-ID consulted  -vancomycin and rocephin IV with end date of IV antibiotics: 10/27/2020 -11/10/2020 with repeat MRI

## 2020-09-15 NOTE — PLAN OF CARE
POC reviewed with pt and family at 1400. Pt spouse verbalized understanding. Questions and concerns addressed. No acute events today. Pt progressing toward goals. Will continue to monitor. See flowsheets for full assessment and VS info.     TF started.  Salgado removed.  Midline removed due to L arm swelling. US ordered and completed.  Flexi placed. Tolerated well.  EEG remains on.

## 2020-09-15 NOTE — PLAN OF CARE
Plan of Care  Patient off floor in MRI at time of being seen..  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  BCXs:  9/8 1/4 CONS - suspect contaminant  9/10 - NGTD  9/14 sent    MRI Brain:  Numerous scattered punctate foci of cortical enhancement throughout both cerebral hemispheres.  Additional rim enhancing collection in the right occipital lobe with central diffusion restriction and surrounding vasogenic edema.  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     Persistent gyriform diffusion restriction the posterior left cerebral hemisphere as well as the hippocampus and dorsal lateral thalamus, although slightly improved from prior studies.  The distribution be typical for vascular territory infarct, most suggestive of seizure related signal changes (status epilepticus).  Other encephalitis to be excluded clinically.     New small left frontal subdural hygroma without significant intracranial mass effect.     Stable small subacute left cerebellar infarct.     Mild chronic microvascular ischemic disease with remote right frontal and right occipital infarcts.     Numerous regions of prior parenchymal and leptomeningeal hemorrhage involving both cerebral hemispheres appearance raising the possibility underlying cerebral amyloid angiopathy.  No new hemorrhage.     Assessment  1. 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 R occipital abscess - increase ceftriaxone to 2g IV q12h  3. NSGY consult   4. Discussed with ID staff  5. Will see in am

## 2020-09-16 PROBLEM — R13.11 ORAL PHASE DYSPHAGIA: Status: ACTIVE | Noted: 2020-01-01

## 2020-09-16 PROBLEM — R53.81 DEBILITY: Status: ACTIVE | Noted: 2020-01-01

## 2020-09-16 NOTE — ASSESSMENT & PLAN NOTE
Positive blood culture 7/31 for strep mutans  Previously evaluated by CT surgery team her at [facility], and determined to be too high risk for valve replacement surgery, to manage with IV antibiotics    According to medical records, finished 6 week course of Ceftriaxone for endocarditis coverage  ID consulted for further antibiotic management given septic emboli with abscess formation.  -See brain abscess

## 2020-09-16 NOTE — PLAN OF CARE
POC reviewed with pt and family at 1400. Pt unable to  verbalize understanding. Questions and concerns addressed. No acute events today. Pt progressing toward goals. EEG off. Will continue to monitor. See flowsheets for full assessment and VS info.

## 2020-09-16 NOTE — ASSESSMENT & PLAN NOTE
This is a 74 year old male with PMH of hypertension, ulcerative colitis, GERD, chronic bronchitis, autoimmune hemolytic anemia, mitral regurgitation, and diverticulitis, admitted for subacute bacterial endocarditis  with strep mutans  in setting of upper GI bleed on 6 weeks of IV ceftriaxone (EOC 9/15) presents to ED for seizure activity and loss of consciousness. CT head negative for acute intracranial bleed. MRI +PCA infarct and right occipital lobe edema. He was started on doxycycline. Metronidazole, and ceftriaxone.      TTE 8/14 with vegetation of aortic valve. SILVANA 8/17 with an 18 mm mobile vegetation is of both the mitral and aortic valves. CTS saw pt then, no surgical intervention. Has completed 6 weeks of IV ceftriaxone, end date 9/15/2020.      MRI here with Numerous scattered punctate foci of cortical enhancement throughout both cerebral hemispheres.  Additional rim enhancing collection in the right occipital lobe with central diffusion restriction and surrounding vasogenic edema.  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 NSGY following, no acute surgical intervention. No seizure activity today.     BCXs:  9/8 1/4 CONS - suspect contaminant  9/10 - NGTD  9/14 NGTD    Recommendations  1. Continue ceftriaxone. Increased dose [id number]  2. Continue vancomycin. Trough goal closer to 20  3. NSGY following. No surgical intervention. If worsening mentation or without improvement recommend reimaging and if need for surgical washout for source control. Please send fluid for cultures (gram stain, aerobic, anaerobic, fungal, AFB) if undergoes drainage.   4. Blood cultures here NGTD.  5. CXR with Mild interval increase in the degree of pulmonary vascular congestion and interstitial/alveolar edema. resp cultures if able   6. Anticipate 6-8 weeks of IV vancomycin and ceftriaxone with repeat imaging to assess resolution of brain abscess.   7. Agree with ltac placement. If goes  to LTAC please have ID follow.   8. Pt remained afebrile. No sz activity today. ID will sign off. Please call if with questions    Seen and discussed with ID staff. ID will sign off. Please call if with questions      Infection: brain abscess    Discharge antibiotics:   vancomycin IV 1.75g q24hr and ceftriaxone [id number]      End date of IV antibiotics: 10/27/2020 -11/10/2020 with repeat MRI    Weekly outpatient laboratory on Monday or Tuesday while on IV antibiotics.    CBC    CMP or BMP   CRP   Vancomycin trough. Target 15-20    If vancomycin trough is not at target (15-20) prior to discharge, the please perform vancomycin trough before their fourth outpatient dose.    Fax laboratory results to [facility] at [phone] with attn: [email or web address]    Outpatient Infectious Diseases clinic follow up will be arranged and found in patient calendar.    Prior to discharge, please ensure the patient's follow-up has been scheduled.  If there is still no follow-up scheduled in Infectious Diseases clinic, please send an EPIC message to Caron Lopez in Infectious Diseases.      Seen and discussed with ID staff

## 2020-09-16 NOTE — ASSESSMENT & PLAN NOTE
- Multiple witnessed seizure-like activity described  - No previous reports from family  - Was started on keppra and vimpat 200 mg BID  - EEG at OSH shows irregular slowing in the R frontal region, with L posterior periodic epileptiform discharges. No seizures recorded during this study.  - 9/13 Here EEG continues to show periodic discharges in the left posterior region with no seizures.    Plan:   -- Continue vimpat 200 mg BID  --9/14: EEG with periodic discharges per Epilepsy, continue current AED regimen  --9/15: EEG stable. Stop Keppra, continue EEG.  --9/16: EEG stable. Continue Vimpat

## 2020-09-16 NOTE — ASSESSMENT & PLAN NOTE
Duonebs scheduled q4hrs  Monitor respiratory status, at risk for atelectasis  Continuous pulse oximetry, ABG prn  Continue CPT, pulmonary toileting

## 2020-09-16 NOTE — PROCEDURES
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  Evaluate for evidence of epileptiform activity.    METHODOLOGY   Electroencephalographic (EEG) recording is with electrodes placed according to the International 10-20 placement system.  Thirty two (32) channels of digital signal (sampling rate of 512/sec) including T1 and T2 was simultaneously recorded from the scalp and may include  EKG, EMG, and/or eye monitors.  Recording band pass was 0.1 to 512 hz.  Digital video recording of the patient is simultaneously recorded with the EEG.  The patient is instructed report clinical symptoms which may occur during the recording session.  EEG and video recording is stored and archived in digital format.  Activation procedures which include photic stimulation, hyperventilation and instructing patients to perform simple task are done in selected patients.   The EEG is displayed on a monitor screen and can be reviewed using different montages.  Computer assisted analysis is employed to detect spike and electrographic seizure activity.   The entire record is submitted for computer analysis.  The entire recording is visually reviewed and the times identified by computer analysis as being spikes or seizures are reviewed again.  Compresses spectral analysis (CSA) is also performed on the activity recorded from each individual channel.  This is displayed as a power display of frequencies from 0 to 30 Hz over time.   The CSA is reviewed looking for asymmetries in power between homologous areas of the scalp and then compared with the original EEG recording.     [employer] software is also utilized in the  review of this study.  This software suite analyzes the EEG recording in multiple domains.  Coherence and rhythmicity is computed to identify EEG sections which may contain organized seizures.  Each channel undergoes analysis to detect presence of spike and sharp waves which have special and morphological characteristic of epileptic activity.  The routine EEG recording is converted from spacial into frequency domain.  This is then displayed comparing homologous areas to identify areas of significant asymmetry.  Algorithm to identify non-cortically generated artifact is used to separate eye movement, EMG and other artifact from the EEG.      RECORDING TIMES  Start on 09/15/2020 at 07:00 a.m.  Stop on 09/16/2020 at 13:48 p.m. -> End of the Recording Session  A total of 30 hr and 29 min of EEG recording is obtained.    EEG FINDINGS  Background activity:   The background is continuous and asymmetric.  There is predominantly theta/delta activity with admixed multifocal and generalized delta slowing.  There are occasional runs of periodic jagged sharp waves over the posterior quadrants, left> right, at approximately 1 hz which do not organize and get progressively less frequent and less well organized as the study continues.      There is a pushbutton activation at 21:34 p.m. for reasons that are not stated.  On video, the patient is lying quietly with his eyes closed and no abnormal vocalizations.  His right hand is propped up on the hand railing and appears to have small irregular myoclonic jerks that may be time locked with the discharges seen in the left posterior quadrant.    Sleep:  There are sleep spindles, vertex waves, and K complexes.    Activation procedures:   Hyperventilation is not performed  Photic stimulation is not performed    Cardiac Monitor:   Occasional PVCs    Impression:   This is an abnormal continuous EEG monitoring study because of intermittent runs of continuous periodic sharp waves most  prominent over the left posterior quadrant but with a prominent field into the right posterior quadrant as well, which do not organize.  There is a single pushbutton activation when the patient has irregular right hand jerking that may be associated with these discharges.  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  However, there are no organized electroclinical discrete seizures.  The runs of posterior discharges become somewhat less prominent and more discontinuous as the recording session progresses which is an overall modest improvement from the previous day's recording session.  Otherwise, there is generalized background slowing consistent with a moderate encephalopathy.    LTM Summary 09/13/2020 - 09/16/2020:  Patient events/Seizures:  Some irregular jerking of the right hand associated with left posterior discharges thought to be consistent with epilepsia partialis continua (EPC) a simple motor seizure phenomenon caused by epileptiform discharges near the primary motor strip.  This twitching activity does not need to be treated aggressively and it can last up to years in some patients.  There are no discrete electroclinical seizures.  Early in the extended study, there is a pushbutton for an episode of shifting movements of the legs in bed with no EEG correlate.      Interictal findings:   runs of periodic sharp waves over the left posterior quadrant with a prominent field into the right posterior quadrant as well, at approximately 1 hz  Other notable abnormalities:   moderate encephalopathy    Tina Winslow MD PhD  Neurology-Epilepsy  [facility].  [facility]

## 2020-09-16 NOTE — PROGRESS NOTES
[facility]  Infectious Disease  Progress Note    Patient Name: Lopez Savage  MRN: [id number]  Admission Date: 9/13/2020  Length of Stay: 3 days  Attending Physician: Linda Natarajan MD  Primary Care Provider: Prasanna Haywood MD    Isolation Status: No active isolations  Assessment/Plan:      Brain abscess  See bacterial endocarditis     Bacterial endocarditis  This is a 74 year old male with PMH of hypertension, ulcerative colitis, GERD, chronic bronchitis, autoimmune hemolytic anemia, mitral regurgitation, and diverticulitis, admitted for subacute bacterial endocarditis  with strep mutans  in setting of upper GI bleed on 6 weeks of IV ceftriaxone (EOC 9/15) presents to ED for seizure activity and loss of consciousness. CT head negative for acute intracranial bleed. MRI +PCA infarct and right occipital lobe edema. He was started on doxycycline. Metronidazole, and ceftriaxone.      TTE 8/14 with vegetation of aortic valve. SILVANA 8/17 with an 18 mm mobile vegetation is of both the mitral and aortic valves. CTS saw pt then, no surgical intervention. Has completed 6 weeks of IV ceftriaxone, end date 9/15/2020.      MRI here with Numerous scattered punctate foci of cortical enhancement throughout both cerebral hemispheres.  Additional rim enhancing collection in the right occipital lobe with central diffusion restriction and surrounding vasogenic edema.  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 NSGY following, no acute surgical intervention. No seizure activity today.     BCXs:  9/8 1/4 CONS - suspect contaminant  9/10 - NGTD  9/14 NGTD    Recommendations  1. Continue ceftriaxone. Increased dose [id number]  2. Continue vancomycin. Trough goal closer to 20  3. NSGY following. No surgical intervention. If worsening mentation or without improvement recommend reimaging and if need for surgical washout for source control. Please send fluid for cultures (gram stain,  aerobic, anaerobic, fungal, AFB) if undergoes drainage.   4. Blood cultures here NGTD.  5. CXR with Mild interval increase in the degree of pulmonary vascular congestion and interstitial/alveolar edema. resp cultures if able   6. Anticipate 6-8 weeks of IV vancomycin and ceftriaxone with repeat imaging to assess resolution of brain abscess.   7. Agree with ltac placement. If goes to LTAC please have ID follow.   8. Pt remained afebrile. No sz activity today. ID will sign off. Please call if with questions    Seen and discussed with ID staff. ID will sign off. Please call if with questions      Infection: brain abscess    Discharge antibiotics:   vancomycin IV 1.75g q24hr and ceftriaxone [id number]      End date of IV antibiotics: 10/27/2020 -11/10/2020 with repeat MRI    Weekly outpatient laboratory on Monday or Tuesday while on IV antibiotics.    CBC    CMP or BMP   CRP   Vancomycin trough. Target 15-20    If vancomycin trough is not at target (15-20) prior to discharge, the please perform vancomycin trough before their fourth outpatient dose.    Fax laboratory results to [facility] at [phone] with attn: hermilo jean    Outpatient Infectious Diseases clinic follow up will be arranged and found in patient calendar.    Prior to discharge, please ensure the patient's follow-up has been scheduled.  If there is still no follow-up scheduled in Infectious Diseases clinic, please send an EPIC message to Caron Lopez in Infectious Diseases.      Seen and discussed with ID staff        Thank you for your consult. I will sign off. Please contact us if you have any additional questions.    Hermilo Jean PA-C  Infectious Disease  [facility]  [phone]    Subjective:     Principal Problem:Status epilepticus    HPI: Mr. 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 The patient  presented to [facility] on 9/8/2020 with a primary complaint of loss of consciousness. The patient was recently treated for endocarditis w/ IV rocephin through PICC line at [facility] and discharged approximately 2 weeks ago. Since then he has had a worsening cough and was planning to go to the hospital for evaluation. Per wife home health did portable CXR about a week ago w/ concern for fluid vs pneumonia. Patient was given course of doxycycline and finished course recently. 9/8 Wife went to bed while  was watching TV and about an hour later her daughter found him non responsive on the ground. Daughter did a few chest compressions before EMS arrived. Patient arrived to ED and non responsive to sternal rub. Pupillary and gag reflex present. Patient intubated for airway protection and sedated w/ propofol. No seizure like activity was observed in ED. TPA contraindicated due to recent GI bleed, last occurring in August. Discussed further stroke work up with wife and planned for imaging. CT head negative for acute intracranial bleed. MRI head showed PCA infarct and right occipital lobe edema. 9/8 EEG demonstrated left posterior pseudo periodic epileptiform discharges is suggestive of an irritative region likely secondary to focal ischemia. Patient started on AEDs vimpat and keppra and reported to have several seizures during his stay. Patient was transferred to [facility] for continuous EEG monitoring and further evaluation.  BCXs:  9/8 1/4 CONS - suspect contaminant  9/10 - NGTD  9/14 sent     MRI Brain:  Numerous scattered punctate foci of cortical enhancement throughout both cerebral hemispheres.  Additional rim enhancing collection in the right occipital lobe with central diffusion restriction and surrounding vasogenic edema.  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     Persistent gyriform diffusion restriction the posterior  left cerebral hemisphere as well as the hippocampus and dorsal lateral thalamus, although slightly improved from prior studies.  The distribution be typical for vascular territory infarct, most suggestive of seizure related signal changes (status epilepticus).  Other encephalitis to be excluded clinically.   NSGy following for occipital abscess. NPO at this time. currently on doxy flagyl and ceftriaxone.   Interval History:   No sz activity  No NSGY intervention   Blood cultures here NGTD  Remained afebrile  Continues to be drowsy. Alert at intermittent times  Noted to have superficial thrombus of upper extremity       Review of Systems   Unable to perform ROS: Acuity of condition     Objective:     Vital Signs (Most Recent):  Temp: 98.2 °F (36.8 °C) (09/16/20 1505)  Pulse: 77 (09/16/20 1610)  Resp: (!) 22 (09/16/20 1610)  BP: 138/62 (09/16/20 1605)  SpO2: 95 % (09/16/20 1610) Vital Signs (24h Range):  Temp:  [97.9 °F (36.6 °C)-98.8 °F (37.1 °C)] 98.2 °F (36.8 °C)  Pulse:  [75-92] 77  Resp:  [10-44] 22  SpO2:  [92 %-100 %] 95 %  BP: (131-166)/() 138/62     Weight: 114 kg (251 lb 5.2 oz)  Body mass index is 37.11 kg/m².    Estimated Creatinine Clearance: 68.2 mL/min (based on SCr of 1.2 mg/dL).    Physical Exam  Vitals signs reviewed.   Constitutional:       Appearance: He is ill-appearing. He is not diaphoretic.   Cardiovascular:      Rate and Rhythm: Normal rate.   Pulmonary:      Effort: Pulmonary effort is normal.   Abdominal:      General: Abdomen is flat. There is no distension.      Palpations: There is no mass.   Musculoskeletal:         General: No swelling, tenderness, deformity or signs of injury.   Skin:     General: Skin is warm and dry.      Coloration: Skin is not jaundiced or pale.   Neurological:      Comments: lethargic         Significant Labs: All pertinent labs within the past 24 hours have been reviewed.    Significant Imaging: I have reviewed all pertinent imaging results/findings within  the past 24 hours.

## 2020-09-16 NOTE — PLAN OF CARE
[facility] Care Plan    POC reviewed with Lopez Savage and family at 0300. Pt confused and unable to verbalize understanding. Questions and concerns addressed with wife over the phone. No acute events today. TF @ 30mL/hr, 25mL residuals. SBP maintained <180 without pharmacological intervention. Neuro exam unchanged. Will continue to monitor. See below and flowsheets for full assessment and VS info.       Neuro:  [place] Coma Scale  Best Eye Response: 3-->(E3) to speech  Best Motor Response: 6-->(M6) obeys commands  Best Verbal Response: 4-->(V4) confused  [place] Coma Scale Score: 13  Assessment Qualifiers: patient not sedated/intubated        24 hr Temp:  [98.2 °F (36.8 °C)-98.9 °F (37.2 °C)]     CV:   Rhythm: normal sinus rhythm  BP goals:   SBP < 180  MAP > 65    Resp:   O2 Device (Oxygen Therapy): room air       Plan: q4 duoneb treatments, intubation watch    GI/:  ARTURO Total Score: 2  Diet/Nutrition Received: tube feeding  Last Bowel Movement: 09/16/20  Voiding Characteristics: urethral catheter (bladder)    Intake/Output Summary (Last 24 hours) at 9/16/2020 0624  Last data filed at 9/16/2020 0605  Gross per 24 hour   Intake 1590 ml   Output 525 ml   Net 1065 ml     Unmeasured Output  Urine Occurrence: 1  Stool Occurrence: 0  Emesis Occurrence: 0  Pad Count: 2    Labs/Accuchecks:  Recent Labs   Lab 09/16/20  0142   WBC 13.51*   RBC 3.29*   HGB 9.1*   HCT 30.4*         Recent Labs   Lab 09/16/20  0142      K 3.4*   CO2 23   *   BUN 20   CREATININE 1.2   ALKPHOS 53*   ALT 6*   AST 10   BILITOT 0.2      Recent Labs   Lab 09/14/20  0338   INR 1.2   APTT 30.3      Recent Labs   Lab 09/14/20  0338   CPK 25   CPKMB 1.2   MB 4.8       Electrolytes:K+ being replaced  Accuchecks: Q6    Gtts:       LDA/Wounds:  Lines/Drains/Airways       Drain                   NG/OG Tube 09/12/20 1644 [place] sump 16 Fr. Left nostril 3 days         Rectal Tube 09/15/20 1700 rectal tube w/ balloon (indicate number of mLs)  less than 1 day              Peripheral Intravenous Line                   Peripheral IV - Single Lumen 09/15/20 1700 20 G Right Antecubital less than 1 day         Peripheral IV - Single Lumen 09/15/20 1900 20 G Right Forearm less than 1 day

## 2020-09-16 NOTE — SUBJECTIVE & OBJECTIVE
Interval History: Patient with stable neuro exam. Continued dysarthria.    Review of Systems: Review of Systems   Respiratory: Positive for cough and sputum production. Negative for shortness of breath and wheezing.    Neurological: Positive for speech change and weakness. Negative for dizziness and headaches.     Vitals:   Temp: 97.9 °F (36.6 °C)  Pulse: 84  Rhythm: normal sinus rhythm  BP: 137/63  MAP (mmHg): 90  Resp: (!) 21  SpO2: 100 %  O2 Device (Oxygen Therapy): room air    Temp  Min: 97.9 °F (36.6 °C)  Max: 98.9 °F (37.2 °C)  Pulse  Min: 70  Max: 90  BP  Min: 120/60  Max: 166/74  MAP (mmHg)  Min: 85  Max: 121  Resp  Min: 13  Max: 44  SpO2  Min: 93 %  Max: 100 %    09/15 0701 - 09/16 0700  In: 1585 [I.V.:65]  Out: 325 [Urine:225]   Unmeasured Output  Urine Occurrence: 1  Stool Occurrence: 0  Emesis Occurrence: 0  Pad Count: 2     Examination:   Constitutional: Well-nourished and -developed. No apparent distress.   Eyes: Conjunctiva clear, anicteric. Lids no lesions.  Head/Ears/Nose/Mouth/Throat/Neck: Moist mucous membranes. External ears, nose atraumatic.   Cardiovascular: Regular rhythm. No leg edema.  Respiratory: Comfortable respirations. Clear to auscultation.  Gastrointestinal: Soft, nondistended, nontender. + bowel sounds.     Neurologic:  -GCS E 4 V 4 M 6  -Drowsy, responds to voice. Oriented to person and place. Delayed responses, dysarthria present. Follows commands.  -Cranial nerves: EOM intact, PERRL 3mm, no facial droop, +cough  -Motor: Moves all extremities spontaneously, antigravity  -Sensation: Intact to light touch.  Unable to test insight, fund of knowledge, coordination, gait due to level of consciousness.    Medications:   Continuous Scheduledalbuterol-ipratropium, 3 mL, Q4H  atorvastatin, 40 mg, Daily  cefTRIAXone (ROCEPHIN) IVPB, 2 g, Q12H  heparin (porcine), 5,000 Units, Q8H  lacosamide, 200 mg, Q12H  losartan, 25 mg, Daily  pantoprazole, 40 mg, Daily  vancomycin (VANCOCIN) IVPB, 1,750  mg, Q24H    PRNdextrose 50%, 12.5 g, PRN  glucagon (human recombinant), 1 mg, PRN  insulin aspart U-100, 1-10 Units, Q6H PRN  labetalol, 10 mg, Q4H PRN  magnesium sulfate IVPB, 2 g, PRN  magnesium sulfate IVPB, 4 g, PRN  potassium chloride in water, 10 mEq, PRN    And  potassium chloride in water, 40 mEq, PRN    And  potassium chloride in water, 10 mEq, PRN  sodium chloride 0.9%, 10 mL, PRN  sodium phosphate IVPB, 15 mmol, PRN  sodium phosphate IVPB, 20.01 mmol, PRN  sodium phosphate IVPB, 30 mmol, PRN  vancomycin - pharmacy to dose, , pharmacy to manage frequency       Today I independently reviewed pertinent medications, lines/drains/airways, imaging, cardiology results, laboratory results, notably:     ISTAT: No results for input(s): PH, PCO2, PO2, POCSATURATED, HCO3, BE, POCNA, POCK, POCTCO2, POCGLU, POCICA, POCLAC, SAMPLE in the last 24 hours.   Chem:   Recent Labs   Lab 09/16/20  0142      K 3.4*   *   CO2 23   GLU 89   BUN 20   CREATININE 1.2   ESTGFRAFRICA >60.0   EGFRNONAA 59.6*   CALCIUM 9.3   MG 1.9   PHOS 4.1   ANIONGAP 9   PROT 6.4   ALBUMIN 2.5*   BILITOT 0.2   ALKPHOS 53*   AST 10   ALT 6*     Heme:   Recent Labs   Lab 09/16/20  0142   WBC 13.51*   HGB 9.1*   HCT 30.4*        Endo:   Recent Labs   Lab 09/15/20  1225 09/16/20  0053 09/16/20  0617   POCTGLUCOSE 114* 90 99

## 2020-09-16 NOTE — ASSESSMENT & PLAN NOTE
74M with known bacterial endocarditis with septic emboli c/b L PCA infarct, scattered punctate infarctions and R occipital lesion concerning for intracranial abscess:    -Continue NCC admit      -q1h neurochecks in ICU, q2h neurochecks in stepdown, q4h neurochecks on floor  --SBP <160 (cardene ggt; hydralazine & labetalol PRN; transition to home meds when appropriate)  --Na >135  --VN following  --AED per NCC/Epilepsy  --Abx per ID, f/up BCx - 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   --No acute neurosurgical intervention  --NO steroids  --HOB >30  --Follow-up full pre-op labs (CBC/CMP/PT-INR/PTT/T&S)  --PPI given GIB per NCC  --Continue to monitor clinically, notify NSGY immediately with any changes in neuro status    Dispo: ICU

## 2020-09-16 NOTE — CONSULTS
Inpatient consult to Physical Medicine Rehab  Consult performed by: Jessica Yates NP  Consult ordered by: Linda Natarajan MD  Reason for consult: assess rehab needs      PM&R already following patient.      AMERICA Su, FNP-C  Physical Medicine & Rehabilitation   09/16/2020

## 2020-09-16 NOTE — PLAN OF CARE
Clinical evaluation of swallow and speech/ language/ cognitive evaluation complete. 2/2 difficulty maintaining fully awake and alert state necessary to reduce risk for aspiration, diet initiation not recommended at this time. However when fully awake and alert, should pt desire PO intake, recommend pureed solids and thin liquids via cup sip only- no straw sips- for pleasure purposes only (vs to meet nutritional volume needs). Cognitive-linguistic deficits evident.     Problem: SLP Goal  Goal: SLP Goal  Description: Goals expected to be met by 9/23:  1. Pt will participate in ongoing assessment of swallow to determine safest, least restrictive diet.   2. Pt will orient x4 ind'ly.   3. Pt will complete basic problem solving and reasoning tasks with 70% acc given cues.   4. Pt will participate in ongoing assessment of ability to follow commands.   5. Pt will participate in ongoing assessment of functional reading, writing, visual spatial skills.   Outcome: Ongoing, Progressing     MARVA Vazquez, CCC-SLP  [phone]  9/16/2020

## 2020-09-16 NOTE — PT/OT/SLP EVAL
"Speech Language Pathology Evaluation  Cognitive/Bedside Swallow    Patient Name:  Lopez Savage   MRN:  [id number]   [id number]    Admitting Diagnosis: Status epilepticus    Recommendations:                  General Recommendations:  Dysphagia therapy and Speech/language therapy  Diet recommendations:  Pleasure Feeding(Pureed solids for pleasure purposes only (vs to meet nutritional volume needs) when fully awake and alert), Other (see comments)(Thin liquids via cup sip only- no straws- for pleasure purposes only (vs to meet nutritional volume eneds) when fully awake and alert)   Aspiration Precautions: Strict aspiration precautions   General Precautions: Standard, aspiration, fall, seizure  Communication strategies:  go to room if call light pushed    History:     No past medical history on file.    No past surgical history on file.    Prior Intubation HX:  Intubated 9/8-9/11/20    Subjective     "32." Pt reported as his age.     Pain/Comfort:  Pain Rating 1: 0/10  Pain Rating Post-Intervention 1: 0/10    Objective:   *Additional cognitive-linguistic tasks deferred 2/2 difficulty maintaining fully awake and alert state  Cognitive Status:    · Unable to orient to place, month, and year despite cueing provided  · Answered 1/3 long term memory q's ind'ly, 2/3 given cues  · Answered 2/4 basic problem solving q's ind'ly      Receptive Language:   · Answered 5/7 complex y/n q's ind'ly    Pragmatics:    · Difficulty maintaining fully awake and alert state    Expressive Language:  · Answered 6/6 responsive naming q's ind'ly      Motor Speech:  · WFL    Voice:   · WFL    Oral Musculature Evaluation  Oral Musculature: general weakness  Dentition: scattered dentition  Secretion Management: adequate  Mucosal Quality: adequate  Mandibular Strength and Mobility: WFL  Oral Labial Strength and Mobility: functional retraction, functional pursing  Lingual Strength and Mobility: functional lateral movement, functional " protrusion  Velar Elevation: WFL  Buccal Strength and Mobility: WFL  Volitional Cough: Weak  Volitional Swallow: WFL via cervical palpation  Voice Prior to PO Intake: Clear, dry    Bedside Swallow Eval:   Consistencies Assessed:  · Thin water- multiple cup and straw sips taken in isolation and as regular consistency solid and pureed solid liquid wash  · Pureed apple sauce- 1/2 tsp bite x~3  · Regular consistency solid steven cracker- bite x1    Oral Phase:   · Verbal prompting provided for ongoing mastication of regular consistency solid when bolus was visualized in L buccal cavity. However ongoing mastication unappreciated. Therefore removed from oral cavity via clinician's gloved finger sweep.  · Appeared WFL with thin liquids and pureed solids     Pharyngeal Phase:   · Throat clear observed with straw sip thin liquid x1 taken as pureed solid liquid wash  · Additional overt clinical signs aspiration unappreciated. However difficulty maintaining fully awake and alert state warranting SLP recommendation to limit PO intake to pureed solids and thin liquids via cup sip only- no straws- for pleasure purposes only (vs to meet nutritional volume needs) when fully awake and alert.    Treatment:   Pt asleep upon entry. Wife at bedside. Easily awakened with gentle verbal stimulation. HOB raised. Pleasant confusion evident. Despite extensive verbal stimulation and intermittent tactile stimulation via gentle sternal rub, difficulty maintaining fully awake and alert state observed. Therefore SLP recommending limiting PO intake to pureed solids and thin liquids via cup sip only- no straws- for pleasure purposes only (vs to meet nutritional volume needs) when fully awake and alert. Difficulty maintaining fully awake and alert state also limited cognitive-linguistic evaluation this session.     Education provided to pt and wife re: role of SLP, definition/ risk/ overt clinical signs aspiration, SLP recommendation for pureed  solids and thin liquids via cup sip only- no straw- for pleasure purposes only at this time (vs to meet nutritional volume needs), and ongoing SLP POC. Indication of pt's understanding unappreciated. However pt's wife verbalized understanding of education provided and agreement with SLP POC. No further questions.       Assessment:     Lopez Savage is a 74 y.o. male with an SLP diagnosis of dysphagia and cognitive-linguistic deficits.     Goals:   Multidisciplinary Problems     SLP Goals        Problem: SLP Goal    Goal Priority Disciplines Outcome   SLP Goal     SLP Ongoing, Progressing   Description: Goals expected to be met by 9/23:1. Pt will participate in ongoing assessment of swallow to determine safest, least restrictive diet.   2. Pt will orient x4 ind'ly.   3. Pt will complete basic problem solving and reasoning tasks with 70% acc given cues.   4. Pt will participate in ongoing assessment of ability to follow commands.   5. Pt will participate in ongoing assessment of functional reading, writing, visual spatial skills.                    Plan:     · Patient to be seen:  4 x/week   · Plan of Care expires:  10/15/20  · Plan of Care reviewed with:  patient, spouse   · SLP Follow-Up:  Yes       Discharge recommendations:  Discharge Facility/Level of Care Needs: rehabilitation facility     Time Tracking:     SLP Treatment Date:   09/16/20  Speech Start Time:  1117  Speech Stop Time:  1148     Speech Total Time (min):  31 min    Billable Minutes: Eval 13 , Eval Swallow and Oral Function 10 and Seld Care/Home Management Training 8    MARVA Vazquez, CCC-SLP  [phone]  9/16/2020

## 2020-09-16 NOTE — ASSESSMENT & PLAN NOTE
- Transferred from [facility] for further evaluation, clinical events concerning for seizure  - No prior seizure history per family  - EEG at [facility] with continuous periodic sharp waves most prominent over the left posterior quadrant with a prominent field into the right posterior quadrant, which do not organize    Recommendations:  - Discontinue vEEG - overall EEG improving, clinically patient more alert and no noted RUE myoclonus on exam  - Continue Vimpat 200 mg BID  - Keppra discontinued 9/15  - Avoid medications that lower seizure threshold  - Neurochecks  - Management of acute infection per NCC, ID    Plan of care discussed with NCC team, patient and wife at bedside. Will sign off, please call with any questions.

## 2020-09-16 NOTE — PROGRESS NOTES
[facility]  Neurocritical Care  Progress Note    Admit Date: 9/13/2020  Service Date: 09/16/2020  Length of Stay: 3    Subjective:     Chief Complaint: Status epilepticus    History of Present Illness: Mr. 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 The patient presented to [facility] on 9/8/2020 with a primary complaint of loss of consciousness. The patient was recently treated for endocarditis w/ IV rocephin through PICC line at [facility] and discharged approximately 2 weeks ago. Since then he has had a worsening cough and was planning to go to the hospital for evaluation. Per wife home health did portable CXR about a week ago w/ concern for fluid vs pneumonia. Patient was given course of doxycycline and finished course recently. 9/8 Wife went to bed while  was watching TV and about an hour later her daughter found him non responsive on the ground. Daughter did a few chest compressions before EMS arrived. Patient arrived to ED and non responsive to sternal rub. Pupillary and gag reflex present. Patient intubated for airway protection and sedated w/ propofol. No seizure like activity was observed in ED. TPA contraindicated due to recent GI bleed, last occurring in August. Discussed further stroke work up with wife and planned for imaging. CT head negative for acute intracranial bleed. MRI head showed PCA infarct and right occipital lobe edema. 9/8 EEG demonstrated left posterior pseudo periodic epileptiform discharges is suggestive of an irritative region likely secondary to focal ischemia. Patient started on AEDs vimpat and keppra and reported to have several seizures during his stay. Patient was transferred to [facility] today for continuous EEG monitoring and further evaluation.    Hospital Course: 9/13/2020 Admit to  NCC.  09/14/2020: [facility] scheduled, PT/OT ordered. EEG with continuous periodic discharges per Epilepsy team. Continue current AEDs. Will consult ID for endocarditis, antibiotic recs.  09/15/2020: MRI with and without contrast ordered, which shows multiple septic emboli with abscess formation. ID consulted, ceftriaxone adjusted to CNS doses. NSGY following.  09/16/2020: Patient stable overnight. ID stopped doxycycline and flagyl, and started Vanc. Continue ceftriaxone. Free water flushes started. PT/OT to eval and sit on side of bed.    Interval History: Patient with stable neuro exam. Continued dysarthria.    Review of Systems: Review of Systems   Respiratory: Positive for cough and sputum production. Negative for shortness of breath and wheezing.    Neurological: Positive for speech change and weakness. Negative for dizziness and headaches.     Vitals:   Temp: 97.9 °F (36.6 °C)  Pulse: 84  Rhythm: normal sinus rhythm  BP: 137/63  MAP (mmHg): 90  Resp: (!) 21  SpO2: 100 %  O2 Device (Oxygen Therapy): room air    Temp  Min: 97.9 °F (36.6 °C)  Max: 98.9 °F (37.2 °C)  Pulse  Min: 70  Max: 90  BP  Min: 120/60  Max: 166/74  MAP (mmHg)  Min: 85  Max: 121  Resp  Min: 13  Max: 44  SpO2  Min: 93 %  Max: 100 %    09/15 0701 - 09/16 0700  In: 1585 [I.V.:65]  Out: 325 [Urine:225]   Unmeasured Output  Urine Occurrence: 1  Stool Occurrence: 0  Emesis Occurrence: 0  Pad Count: 2     Examination:   Constitutional: Well-nourished and -developed. No apparent distress.   Eyes: Conjunctiva clear, anicteric. Lids no lesions.  Head/Ears/Nose/Mouth/Throat/Neck: Moist mucous membranes. External ears, nose atraumatic.   Cardiovascular: Regular rhythm. No leg edema.  Respiratory: Comfortable respirations. Clear to auscultation.  Gastrointestinal: Soft, nondistended, nontender. + bowel sounds.     Neurologic:  -GCS E 4 V 4 M 6  -Drowsy, responds to voice. Oriented to person and place. Delayed responses, dysarthria present. Follows  commands.  -Cranial nerves: EOM intact, PERRL 3mm, no facial droop, +cough  -Motor: Moves all extremities spontaneously, antigravity  -Sensation: Intact to light touch.  Unable to test insight, fund of knowledge, coordination, gait due to level of consciousness.    Medications:   Continuous Scheduledalbuterol-ipratropium, 3 mL, Q4H  atorvastatin, 40 mg, Daily  cefTRIAXone (ROCEPHIN) IVPB, 2 g, Q12H  heparin (porcine), 5,000 Units, Q8H  lacosamide, 200 mg, Q12H  losartan, 25 mg, Daily  pantoprazole, 40 mg, Daily  vancomycin (VANCOCIN) IVPB, 1,750 mg, Q24H    PRNdextrose 50%, 12.5 g, PRN  glucagon (human recombinant), 1 mg, PRN  insulin aspart U-100, 1-10 Units, Q6H PRN  labetalol, 10 mg, Q4H PRN  magnesium sulfate IVPB, 2 g, PRN  magnesium sulfate IVPB, 4 g, PRN  potassium chloride in water, 10 mEq, PRN    And  potassium chloride in water, 40 mEq, PRN    And  potassium chloride in water, 10 mEq, PRN  sodium chloride 0.9%, 10 mL, PRN  sodium phosphate IVPB, 15 mmol, PRN  sodium phosphate IVPB, 20.01 mmol, PRN  sodium phosphate IVPB, 30 mmol, PRN  vancomycin - pharmacy to dose, , pharmacy to manage frequency       Today I independently reviewed pertinent medications, lines/drains/airways, imaging, cardiology results, laboratory results, notably:     ISTAT: No results for input(s): PH, PCO2, PO2, POCSATURATED, HCO3, BE, POCNA, POCK, POCTCO2, POCGLU, POCICA, POCLAC, SAMPLE in the last 24 hours.   Chem:   Recent Labs   Lab 09/16/20 0142      K 3.4*   *   CO2 23   GLU 89   BUN 20   CREATININE 1.2   ESTGFRAFRICA >60.0   EGFRNONAA 59.6*   CALCIUM 9.3   MG 1.9   PHOS 4.1   ANIONGAP 9   PROT 6.4   ALBUMIN 2.5*   BILITOT 0.2   ALKPHOS 53*   AST 10   ALT 6*     Heme:   Recent Labs   Lab 09/16/20 0142   WBC 13.51*   HGB 9.1*   HCT 30.4*        Endo:   Recent Labs   Lab 09/15/20  1225 09/16/20  0053 09/16/20  0617   POCTGLUCOSE 114* 90 99          Assessment/Plan:     Neuro  * Status epilepticus  - Multiple  witnessed seizure-like activity described  - No previous reports from family  - Was started on keppra and vimpat 200 mg BID  - EEG at OSH shows irregular slowing in the R frontal region, with L posterior periodic epileptiform discharges. No seizures recorded during this study.  - 9/13 Here EEG continues to show periodic discharges in the left posterior region with no seizures.    Plan:   -- Continue vimpat 200 mg BID  --9/14: EEG with periodic discharges per Epilepsy, continue current AED regimen  --9/15: EEG stable. Stop Keppra, continue EEG.  --9/16: EEG stable. Continue Vimpat    Cerebellar stroke  On 9/8 pt presented to [place] ED with unresponsiveness  No tPA due to recent GI bleed  MRI with left PCA infarct and left thalamic infarct along with small right PCA infarct  MRA demonstrates fetal circulation to left PCA  9/11 Repeat MRI new acute/subacute L cerebellum infarct    -Neuro checks q1hr  -Vascular neurology following  -SBP Goal < 180  -Stable on repeat imaging    Embolic stroke involving left posterior cerebral artery  VN following  2/2 septic embolic    Pulmonary  Chronic bronchitis  Duonebs scheduled q4hrs  Monitor respiratory status, at risk for atelectasis  Continuous pulse oximetry, ABG prn  Continue CPT, pulmonary toileting    Cardiac/Vascular  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/septic emboli    -Continue ceftriaxone 2gms IV q12hrs  -New blood cultures sent 9/14- NGTD  -NSGY consulted- no surgical plans at this time  -Stopped  doxycycline, flagyl per ID recs, started Vanc, Vanc consult to pharmacy    Hematology  Intracranial septic embolism  -See Stroke    GI  Oral phase dysphagia  NGT in place, continuous tube feedings for nutrition  Start enteral water flushes 200 ml q6hrs  SLP following    Ulcerative colitis  Hx of, monitor for GI bleeding    Other  Debility  PT/OT    The patient is being Prophylaxed for:  Venous Thromboembolism with: Mechanical or Chemical  Stress Ulcer with: PPI  Ventilator Pneumonia with: not applicable    Activity Orders          None        Full Code    Angelique Prince NP  Neurocritical Care  [facility]

## 2020-09-16 NOTE — PROGRESS NOTES
Patient's chart was reviewed by a stroke team provider.  Patient non neurologic issues with greater urgency.  There is no new imaging to review.  Pending diagnostics to follow up on include: none  For other recommendations please see our previous note completed on: 9/14 9/16 currently no further seizures on vimpat, keppra, ongoing management of endocarditis per ID, no neurosurgical intervention    There are no new recommendations at this time. Will continue to follow. Discussed patient with staff. Please contact stroke team for any questions or concerns.       William Adkins M.D.  Internal Medicine PGY-2  [facility]

## 2020-09-16 NOTE — PHYSICIAN QUERY
PT Name: Lopez Savage  MR #: [id number]     RESPIRATORY CONDITION CLARIFICATION     CDS/: Margaret Solano RN            Contact information: [email or web address]  This form is a permanent document in the medical record.     Query Date: 2020    By submitting this query, we are merely seeking further clarification of documentation.  Please utilize your independent clinical judgment when addressing the question(s) below.  The Medical Record contains the following   Indicators   Supporting Clinical Findings Location in Medical Record    SOB, YANCEY, Wheezing, Productive Cough, Use of Accessory Muscles, etc.     x RR         ABGs         O2 sat Resp  Av.3  Min: 0  Max: 31  SpO2  Av.3 %  Min: 80 %  Max: 100 %   Progress Note  (saunder/Tyra) IM    Hypoxia/Hypercapnia     x BiPAP/Intubation/Mechanical Ventilation Patient intubated for airway protection and sedated w/ propofol   Progress Note  (saunder/Tyra) IM    Supplemental O2      Home O2, Oxygen Dependence     x Respiratory Distress or Failure Acute hypoxic respiratory failure in the setting of PCA stroke suspect 2/2 septic emboli;  patient found unresponsive at home before arrival to ED. Initially non-responsive to sternal rub, pupillary and gag reflex present. tPA contraindicated due to recent GI bleed.   - CT head negative for acute intracranial bleed  - MRI head showed PCA infarct and right occipital lobe edema   -  MRI showing since 2020 MRI, there is a new small focus of acute to subacute ischemia in the left cerebellum.  - PT/OT consutled  - pt currently extubated, on NC  - Currently pt responds to verbal stimuli and and brainstem reflexes intact  - pt on aspirin 325 mg, atorvastatin 40 mg daily, tight glucose control, BP parameters SBP <220   Progress note IM  (Nataliia)   x Radiology Findings Impression:        Status post endotracheal tube placement without evidence of a  pneumothorax.        Electronically signed by:     Kris Renteria  Date:                                            09/08/2020  Time:                                            02:21                        Narrative:     EXAMINATION:  XR CHEST AP PORTABLE     CLINICAL HISTORY:  Personal history of other medical treatment     TECHNIQUE:  Single frontal view of the chest was performed.     COMPARISON:  September 8, 2020 01:33     FINDINGS:  Single view of the chest indicates that there is endotracheal tube placed which terminates 65 mm away from the josé.  Also a nasogastric tube has been placed which extends beyond the gastro esophageal junction and terminates in the upper aspect of the left abdomen                Progress Note 9/8 (rissa/Tyra) IM   x Acute/Chronic Illness patient presented to [facility] on 9/8/2020 with a primary complaint of loss of consciousness. The patient was recently treated for endocarditis w/ IV rocephin through PICC line at [facility] and discharged 2 weeks ago.    Progress Note 9/8 (rissa/Tyra) IM    Treatment      Other         Please document in your progress notes daily for the duration of treatment until resolved and include in your discharge summary.         ok

## 2020-09-16 NOTE — SUBJECTIVE & OBJECTIVE
Neurologic Chief Complaint: Acute mental status change in the setting of recent embolic stroke 2/2 to endocarditis    Subjective:     Interval History: Patient previously verbal and oriented. This AM, unable to speak, recognize wife or understand/follow commands. Will get spot EEG and repeat MRI.     HPI, Past Medical, Family, and Social History remains the same as documented in the initial encounter.       Scheduled Meds:   albuterol-ipratropium  3 mL Nebulization Q4H    atorvastatin  40 mg Per G Tube Daily    cefTRIAXone (ROCEPHIN) IVPB  2 g Intravenous Q12H    heparin (porcine)  5,000 Units Subcutaneous Q8H    lacosamide  200 mg Per NG tube Q12H    losartan  25 mg Per NG tube Daily    pantoprazole  40 mg Per NG tube Daily    vancomycin (VANCOCIN) IVPB  1,750 mg Intravenous Q24H     Continuous Infusions:  PRN Meds:dextrose 50%, glucagon (human recombinant), insulin aspart U-100, labetalol, magnesium sulfate IVPB, magnesium sulfate IVPB, potassium chloride in water **AND** potassium chloride in water **AND** potassium chloride in water, sodium chloride 0.9%, sodium phosphate IVPB, sodium phosphate IVPB, sodium phosphate IVPB, Pharmacy to dose Vancomycin consult **AND** vancomycin - pharmacy to dose    Objective:     Vital Signs (Most Recent):  Temp: 98 °F (36.7 °C) (09/16/20 1105)  Pulse: 90 (09/16/20 1105)  Resp: (!) 23 (09/16/20 1105)  BP: 138/62 (09/16/20 1105)  SpO2: 96 % (09/16/20 1105)  BP Location: Left arm    Vital Signs Range (Last 24H):  Temp:  [97.9 °F (36.6 °C)-98.8 °F (37.1 °C)]   Pulse:  [70-90]   Resp:  [13-44]   BP: (120-166)/(60-96)   SpO2:  [93 %-100 %]   BP Location: Left arm    Physical Exam  Vitals signs and nursing note reviewed.   Constitutional:       Appearance: He is obese. He is ill-appearing.      Comments: Somnolent but arousable. Mostly uncooperative with exam   Cardiovascular:      Rate and Rhythm: Normal rate.   Pulmonary:      Effort: Pulmonary effort is normal.   Skin:      General: Skin is warm and dry.   Neurological:      Mental Status: He is alert and easily aroused.      Cranial Nerves: Dysarthria present.      Motor: Weakness present.      Comments: Moves all extremities but unable to test strength due to poor cooperation   Psychiatric:         Speech: Speech is slurred.         Neurological Exam:   LOC: drowsy  Attention Span: poor  Language: Global aphasia  Articulation: Dysarthria  Orientation: Not oriented to person, place, and time  Visual Fields: Visual neglect  EOM (CN III, IV, VI): Gaze preference  left  Pupils (CN II, III): PERRL  Facial Movement (CN VII): Unable to test   Motor: Arm left  Normal 5/5  Leg left  Normal 5/5  Arm right  Normal 5/5  Leg right Normal 5/5    Laboratory:  BMP:   Recent Labs   Lab 09/22/20  1522      K 3.6      CO2 32*   BUN 33*   CREATININE 1.3   CALCIUM 10.1     CBC:   Recent Labs   Lab 09/22/20  1522   WBC 8.43   RBC 2.95*   HGB 8.0*   HCT 27.2*      MCV 92   MCH 27.1   MCHC 29.4*     Lipid Panel: No results for input(s): CHOL, LDLCALC, HDL, TRIG in the last 168 hours.  Hgb A1C: No results for input(s): HGBA1C in the last 168 hours.  TSH: No results for input(s): TSH in the last 168 hours.    Diagnostic Results     Brain Imaging   CT head 9/22:  FINDINGS:  Generalized cerebral volume loss with prominence of the sulci, cisterns, and ventricles.  Hypodense area in the right frontal lobe likely a remote infarct.  Hypodense areas in the occipital lobes right greater than left likely representing vasogenic edema from encephalitis/abscess noted on prior MRI.  Remote right occipital infarct.  No evidence of acute intracranial hemorrhage or new major vascular territory infarct.  Small infarct in the left cerebellum is not well visualized by CT.  Stable low-attenuation extra-axial collection over the left cerebral convexity likely a hygroma.  No new abnormal extra-axial fluid collections.  There is opacification of the paranasal  sinuses with an air-fluid level in the left maxillary sinus.  Mastoid air cells are opacified.  There is an NG tube.     Impression:     No significant detrimental changes from prior exam.  No evidence of acute intracranial hemorrhage or new major vascular territory infarct.    MRI 9/14:  Impression:     Numerous scattered punctate foci of cortical enhancement throughout both cerebral hemispheres.  Additional rim enhancing collection in the right occipital lobe with central diffusion restriction and surrounding vasogenic edema.  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     Persistent gyriform diffusion restriction the posterior left cerebral hemisphere as well as the hippocampus and dorsal lateral thalamus, although slightly improved from prior studies.  The distribution be typical for vascular territory infarct, most suggestive of seizure related signal changes (status epilepticus).  Other encephalitis to be excluded clinically.     New small left frontal subdural hygroma without significant intracranial mass effect.     Stable small subacute left cerebellar infarct.     Mild chronic microvascular ischemic disease with remote right frontal and right occipital infarcts.     Numerous regions of prior parenchymal and leptomeningeal hemorrhage involving both cerebral hemispheres appearance raising the possibility underlying cerebral amyloid angiopathy.  No new hemorrhage.

## 2020-09-16 NOTE — ASSESSMENT & PLAN NOTE
On 9/8 pt presented to [facility] ED with unresponsiveness  No tPA due to recent GI bleed  MRI with left PCA infarct and left thalamic infarct along with small right PCA infarct  MRA demonstrates fetal circulation to left PCA  9/11 Repeat MRI new acute/subacute L cerebellum infarct    -Neuro checks q1hr  -Vascular neurology following  -SBP Goal < 180  -Stable on repeat imaging

## 2020-09-16 NOTE — SUBJECTIVE & OBJECTIVE
Interval History:     9/16: Neuro stable, on EEG with L posterior quadrant sharp waves with continued RUE jerking movement overnight.       Medications:  Continuous Infusions:  Scheduled Meds:   albuterol-ipratropium  3 mL Nebulization Q4H    atorvastatin  40 mg Per G Tube Daily    cefTRIAXone (ROCEPHIN) IVPB  2 g Intravenous Q12H    heparin (porcine)  5,000 Units Subcutaneous Q8H    lacosamide  200 mg Per NG tube Q12H    losartan  25 mg Per NG tube Daily    pantoprazole  40 mg Per NG tube Daily    vancomycin (VANCOCIN) IVPB  1,750 mg Intravenous Q24H     PRN Meds:dextrose 50%, glucagon (human recombinant), insulin aspart U-100, labetalol, magnesium sulfate IVPB, magnesium sulfate IVPB, potassium chloride in water **AND** potassium chloride in water **AND** potassium chloride in water, sodium chloride 0.9%, sodium phosphate IVPB, sodium phosphate IVPB, sodium phosphate IVPB, Pharmacy to dose Vancomycin consult **AND** vancomycin - pharmacy to dose     Review of Systems  Objective:     Weight: 114 kg (251 lb 5.2 oz)  Body mass index is 37.11 kg/m².  Vital Signs (Most Recent):  Temp: 97.9 °F (36.6 °C) (09/16/20 0705)  Pulse: 82 (09/16/20 0705)  Resp: 19 (09/16/20 0705)  BP: (!) 142/63 (09/16/20 0705)  SpO2: 100 % (09/16/20 0705) Vital Signs (24h Range):  Temp:  [97.9 °F (36.6 °C)-98.9 °F (37.2 °C)] 97.9 °F (36.6 °C)  Pulse:  [70-90] 82  Resp:  [16-39] 19  SpO2:  [97 %-100 %] 100 %  BP: (120-172)/() 142/63     Date 09/16/20 0700 - 09/17/20 0659   Shift 0282-2088 9556-1121 3914-0395 24 Hour Total   INTAKE   NG/GT 60   60   IV Piggyback 100   100   Shift Total(mL/kg) 160(1.4)   160(1.4)   OUTPUT   Shift Total(mL/kg)       Weight (kg) 114 114 114 114                        NG/OG Tube 09/12/20 1644 [place] sump 16 Fr. Left nostril (Active)   Placement Check placement verified by x-ray 09/16/20 0705   Tolerance no signs/symptoms of discomfort 09/16/20 0705   Securement secured to nostril center 09/16/20 0705    Clamp Status/Tolerance no abdominal distention;no abdominal discomfort 09/16/20 0705   Suction Setting/Drainage Method suction at the bedside 09/16/20 0705   Insertion Site Appearance no redness, warmth, tenderness, skin breakdown, drainage 09/16/20 0705   Drainage None 09/16/20 0705   Flush/Irrigation flushed w/;water 09/16/20 0705   Current Rate (mL/hr) 30 mL/hr 09/16/20 0705   Goal Rate (mL/hr) 50 mL/hr 09/16/20 0705   Intake (mL) 60 mL 09/15/20 2105   Water Bolus (mL) 0 mL 09/15/20 0605   Tube Output(mL)(Include Discarded Residual) 0 mL 09/15/20 0605   Formula Name Impact Peptide 09/16/20 0305   Intake (mL) - Formula Tube Feeding 30 09/16/20 0714            Rectal Tube 09/15/20 1700 rectal tube w/ balloon (indicate number of mLs) (Active)   Balloon Inflation Volume (mL) 40 09/16/20 0705   Reposition drainage bags for BMS & Salgado on opposite sides of bed 09/16/20 0705   Outcome stool evacuated 09/16/20 0705   Stool Color Brown 09/16/20 0705   Insertion Site Appearance no redness, warmth, tenderness, skin breakdown, drainage 09/16/20 0705   Flush/Irrigation flushed w/;water 09/16/20 0705   Rectal Tube Output 100 mL 09/16/20 0505       Neurosurgery Physical Exam  A+Ox2  Not to year  Follows commands x 4  Antigravity in all extremities  SILT        Significant Labs:  Recent Labs   Lab 09/15/20  0408 09/16/20 0142   GLU 93 89    143   K 3.3* 3.4*   * 111*   CO2 22* 23   BUN 22 20   CREATININE 1.2 1.2   CALCIUM 9.1 9.3   MG 2.0 1.9     Recent Labs   Lab 09/15/20  0408 09/16/20  0142   WBC 13.65* 13.51*   HGB 9.4* 9.1*   HCT 31.3* 30.4*    218     No results for input(s): LABPT, INR, APTT in the last 48 hours.  Microbiology Results (last 7 days)     Procedure Component Value Units Date/Time    Blood culture [[id number]] Collected: 09/14/20 1840    Order Status: Completed Specimen: Blood from Peripheral, Foot, Right Updated: 09/15/20 2212     Blood Culture, Routine No Growth to date      No Growth  to date    Blood culture [[id number]] Collected: 09/14/20 1835    Order Status: Completed Specimen: Blood from Peripheral, Foot, Left Updated: 09/15/20 2212     Blood Culture, Routine No Growth to date      No Growth to date    Urine culture [[id number]]  (Abnormal) Collected: 09/13/20 1438    Order Status: Completed Specimen: Urine Updated: 09/15/20 1126     Urine Culture, Routine ZAIN LUSITANIAE  50,000 - 99,999 cfu/ml  Treatment of asymptomatic candiduria is not recommended (except for   specific populations). Candida isolated in the urine typically   represents colonization. If an indwelling urinary catheter is present  it should be removed or replaced.      Narrative:      Specimen Source->Urine        All pertinent labs from the last 24 hours have been reviewed.    Significant Diagnostics:  I have reviewed all pertinent imaging results/findings within the past 24 hours.

## 2020-09-16 NOTE — PT/OT/SLP PROGRESS
"Physical Therapy      Patient Name:  Lopez Savage   MRN:  [id number]    Patient not seen today secondary to Other (Comment)(pt up all night/having woken up multiple times during the night and reporting being tired. Pt stating "i'm to tired" and requesting therapy return tomorrow. Pt's wife in agreement.). Will follow-up 9/17/2020 per PT POC.    Patito Baird, PT    "

## 2020-09-16 NOTE — ASSESSMENT & PLAN NOTE
NGT in place, continuous tube feedings for nutrition  Start enteral water flushes 200 ml q6hrs  SLP following

## 2020-09-16 NOTE — PT/OT/SLP PROGRESS
"Occupational Therapy      Patient Name:  Lopez Savage   MRN:  [id number]    Chart review completed, session attempted.     Patient not seen today secondary to Other (Patient deferring participation with edge of bed and bed level therapy on this date, stating "I'm too tired, please." re: Patient reporting haven woken up numerous times, patient's wife present and agreeing with patient's preference.) OT will follow up 9/17/20 per schedule.    Emma Álvarez OT  9/16/2020  "

## 2020-09-16 NOTE — HOSPITAL COURSE
9/16: Neuro stable, on EEG with L posterior quadrant sharp waves with continued RUE jerking movement overnight.   9/17: JAYESH, AFVSS, Neurostable.

## 2020-09-16 NOTE — PROGRESS NOTES
[facility]  Neurosurgery  Progress Note    Subjective:     History of Present Illness: 74 M with  recent endocarditis (Strep mutans per report, s/p IV Rocephin) with aortic valve vegetations (8/20), recent GIB (8/20), diverticulitis, GERD, MVP, and COPD who was transferred to [facility] on 9/13 for cEEG. The patient presented to [facility] on 9/8/2020 after LOC at home. Per report patient had been c/o worsening SOB, had portable CXR with effusion v PNA. Patient was given course of doxycycline and finished course recently. Reportedly on 9/8 his wife went to bed while  was watching TV and about an hour later her daughter found him non responsive on the ground, short round of chest compressions before intubated in ED, sedated on Prop.  No seizure like activity was observed in ED. TPA contraindicated due to recent GI bleed, last occurring in August.  MRI head showed L PCA infarct and right occipital lobe edema. 9/8 EEG demonstrated left posterior pseudo periodic epileptiform discharges is suggestive of an irritative region likely secondary to focal ischemia. Patient started on AEDs vimpat and keppra and reported to have several seizures during his stay at [facility], currently on cEEG in [facility]. NSGY consulted following MRI Brain this evening with 1.2cm R occipital lesion concerning for abscess.     On Lovenox at home, SQH at [facility].     Post-Op Info:  * No surgery found *         Interval History:     9/16: Neuro stable, on EEG with L posterior quadrant sharp waves with continued RUE jerking movement overnight.       Medications:  Continuous Infusions:  Scheduled Meds:   albuterol-ipratropium  3 mL Nebulization Q4H    atorvastatin  40 mg Per G Tube Daily    cefTRIAXone (ROCEPHIN) IVPB  2 g Intravenous Q12H    heparin (porcine)  5,000 Units Subcutaneous Q8H    lacosamide  200 mg Per NG tube Q12H    losartan  25 mg Per NG tube Daily    pantoprazole  40 mg Per NG tube Daily     vancomycin (VANCOCIN) IVPB  1,750 mg Intravenous Q24H     PRN Meds:dextrose 50%, glucagon (human recombinant), insulin aspart U-100, labetalol, magnesium sulfate IVPB, magnesium sulfate IVPB, potassium chloride in water **AND** potassium chloride in water **AND** potassium chloride in water, sodium chloride 0.9%, sodium phosphate IVPB, sodium phosphate IVPB, sodium phosphate IVPB, Pharmacy to dose Vancomycin consult **AND** vancomycin - pharmacy to dose     Review of Systems  Objective:     Weight: 114 kg (251 lb 5.2 oz)  Body mass index is 37.11 kg/m².  Vital Signs (Most Recent):  Temp: 97.9 °F (36.6 °C) (09/16/20 0705)  Pulse: 82 (09/16/20 0705)  Resp: 19 (09/16/20 0705)  BP: (!) 142/63 (09/16/20 0705)  SpO2: 100 % (09/16/20 0705) Vital Signs (24h Range):  Temp:  [97.9 °F (36.6 °C)-98.9 °F (37.2 °C)] 97.9 °F (36.6 °C)  Pulse:  [70-90] 82  Resp:  [16-39] 19  SpO2:  [97 %-100 %] 100 %  BP: (120-172)/() 142/63     Date 09/16/20 0700 - 09/17/20 0659   Shift 6872-8274 4935-7693 8605-1602 24 Hour Total   INTAKE   NG/GT 60   60   IV Piggyback 100   100   Shift Total(mL/kg) 160(1.4)   160(1.4)   OUTPUT   Shift Total(mL/kg)       Weight (kg) 114 114 114 114                        NG/OG Tube 09/12/20 1644 [place] sump 16 Fr. Left nostril (Active)   Placement Check placement verified by x-ray 09/16/20 0705   Tolerance no signs/symptoms of discomfort 09/16/20 0705   Securement secured to nostril center 09/16/20 0705   Clamp Status/Tolerance no abdominal distention;no abdominal discomfort 09/16/20 0705   Suction Setting/Drainage Method suction at the bedside 09/16/20 0705   Insertion Site Appearance no redness, warmth, tenderness, skin breakdown, drainage 09/16/20 0705   Drainage None 09/16/20 0705   Flush/Irrigation flushed w/;water 09/16/20 0705   Current Rate (mL/hr) 30 mL/hr 09/16/20 0705   Goal Rate (mL/hr) 50 mL/hr 09/16/20 0705   Intake (mL) 60 mL 09/15/20 2105   Water Bolus (mL) 0 mL 09/15/20 0605   Tube  Output(mL)(Include Discarded Residual) 0 mL 09/15/20 0605   Formula Name Impact Peptide 09/16/20 0305   Intake (mL) - Formula Tube Feeding 30 09/16/20 0714            Rectal Tube 09/15/20 1700 rectal tube w/ balloon (indicate number of mLs) (Active)   Balloon Inflation Volume (mL) 40 09/16/20 0705   Reposition drainage bags for BMS & Salgado on opposite sides of bed 09/16/20 0705   Outcome stool evacuated 09/16/20 0705   Stool Color Brown 09/16/20 0705   Insertion Site Appearance no redness, warmth, tenderness, skin breakdown, drainage 09/16/20 0705   Flush/Irrigation flushed w/;water 09/16/20 0705   Rectal Tube Output 100 mL 09/16/20 0505       Neurosurgery Physical Exam  A+Ox2  Not to year  Follows commands x 4  Antigravity in all extremities  SILT        Significant Labs:  Recent Labs   Lab 09/15/20  0408 09/16/20  0142   GLU 93 89    143   K 3.3* 3.4*   * 111*   CO2 22* 23   BUN 22 20   CREATININE 1.2 1.2   CALCIUM 9.1 9.3   MG 2.0 1.9     Recent Labs   Lab 09/15/20  0408 09/16/20  0142   WBC 13.65* 13.51*   HGB 9.4* 9.1*   HCT 31.3* 30.4*    218     No results for input(s): LABPT, INR, APTT in the last 48 hours.  Microbiology Results (last 7 days)     Procedure Component Value Units Date/Time    Blood culture [[id number]] Collected: 09/14/20 1840    Order Status: Completed Specimen: Blood from Peripheral, Foot, Right Updated: 09/15/20 2212     Blood Culture, Routine No Growth to date      No Growth to date    Blood culture [[id number]] Collected: 09/14/20 1835    Order Status: Completed Specimen: Blood from Peripheral, Foot, Left Updated: 09/15/20 2212     Blood Culture, Routine No Growth to date      No Growth to date    Urine culture [[id number]]  (Abnormal) Collected: 09/13/20 1438    Order Status: Completed Specimen: Urine Updated: 09/15/20 1126     Urine Culture, Routine ZAIN LUSITANIAE  50,000 - 99,999 cfu/ml  Treatment of asymptomatic candiduria is not recommended (except for   specific  populations). Candida isolated in the urine typically   represents colonization. If an indwelling urinary catheter is present  it should be removed or replaced.      Narrative:      Specimen Source->Urine        All pertinent labs from the last 24 hours have been reviewed.    Significant Diagnostics:  I have reviewed all pertinent imaging results/findings within the past 24 hours.    Assessment/Plan:     Bacterial endocarditis  74M with known bacterial endocarditis with septic emboli c/b L PCA infarct, scattered punctate infarctions and R occipital lesion concerning for intracranial abscess:    -Continue NCC admit      -q1h neurochecks in ICU, q2h neurochecks in stepdown, q4h neurochecks on floor  --SBP <160 (cardene ggt; hydralazine & labetalol PRN; transition to home meds when appropriate)  --Na >135  --VN following  --AED per NCC/Epilepsy  --Abx per ID, f/up BCx - 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   --No acute neurosurgical intervention  --NO steroids  --HOB >30  --Follow-up full pre-op labs (CBC/CMP/PT-INR/PTT/T&S)  --PPI given GIB per NCC  --Continue to monitor clinically, notify NSGY immediately with any changes in neuro status    Dispo: ICU               Howard Watkins MD  Neurosurgery  [facility]

## 2020-09-16 NOTE — SUBJECTIVE & OBJECTIVE
Interval History:   No sz activity  No NSGY intervention   Blood cultures here NGTD  Remained afebrile  Continues to be drowsy. Alert at intermittent times  Noted to have superficial thrombus of upper extremity       Review of Systems   Unable to perform ROS: Acuity of condition     Objective:     Vital Signs (Most Recent):  Temp: 98.2 °F (36.8 °C) (09/16/20 1505)  Pulse: 77 (09/16/20 1610)  Resp: (!) 22 (09/16/20 1610)  BP: 138/62 (09/16/20 1605)  SpO2: 95 % (09/16/20 1610) Vital Signs (24h Range):  Temp:  [97.9 °F (36.6 °C)-98.8 °F (37.1 °C)] 98.2 °F (36.8 °C)  Pulse:  [75-92] 77  Resp:  [10-44] 22  SpO2:  [92 %-100 %] 95 %  BP: (131-166)/() 138/62     Weight: 114 kg (251 lb 5.2 oz)  Body mass index is 37.11 kg/m².    Estimated Creatinine Clearance: 68.2 mL/min (based on SCr of 1.2 mg/dL).    Physical Exam  Vitals signs reviewed.   Constitutional:       Appearance: He is ill-appearing. He is not diaphoretic.   Cardiovascular:      Rate and Rhythm: Normal rate.   Pulmonary:      Effort: Pulmonary effort is normal.   Abdominal:      General: Abdomen is flat. There is no distension.      Palpations: There is no mass.   Musculoskeletal:         General: No swelling, tenderness, deformity or signs of injury.   Skin:     General: Skin is warm and dry.      Coloration: Skin is not jaundiced or pale.   Neurological:      Comments: lethargic         Significant Labs: All pertinent labs within the past 24 hours have been reviewed.    Significant Imaging: I have reviewed all pertinent imaging results/findings within the past 24 hours.

## 2020-09-16 NOTE — ASSESSMENT & PLAN NOTE
2/2 endocarditis/septic emboli    -Continue ceftriaxone 2gms IV q12hrs  -New blood cultures sent 9/14- NGTD  -NSGY consulted- no surgical plans at this time  -Stopped doxycycline, flagyl per ID recs, started Vanc, Vanc consult to pharmacy

## 2020-09-16 NOTE — SUBJECTIVE & OBJECTIVE
No past medical history on file.  No past surgical history on file.  Review of patient's allergies indicates:   Allergen Reactions    Codeine     Tetanus vaccines and toxoid        Scheduled Medications:    albuterol-ipratropium  3 mL Nebulization Q4H    atorvastatin  40 mg Per G Tube Daily    cefTRIAXone (ROCEPHIN) IVPB  2 g Intravenous Q12H    heparin (porcine)  5,000 Units Subcutaneous Q8H    lacosamide  200 mg Per NG tube Q12H    losartan  25 mg Per NG tube Daily    pantoprazole  40 mg Per NG tube Daily    vancomycin (VANCOCIN) IVPB  1,750 mg Intravenous Q24H       PRN Medications: dextrose 50%, glucagon (human recombinant), insulin aspart U-100, labetalol, magnesium sulfate IVPB, magnesium sulfate IVPB, potassium chloride in water **AND** potassium chloride in water **AND** potassium chloride in water, sodium chloride 0.9%, sodium phosphate IVPB, sodium phosphate IVPB, sodium phosphate IVPB, Pharmacy to dose Vancomycin consult **AND** vancomycin - pharmacy to dose    Family History     Unknown per patient.         Tobacco Use    Smoking status: Not on file   Substance and Sexual Activity    Alcohol use: Not on file    Drug use: Not on file    Sexual activity: Not on file     Review of Systems     Objective:     Vital Signs (Most Recent):  Temp: 97.9 °F (36.6 °C) (09/16/20 0705)  Pulse: 84 (09/16/20 0905)  Resp: (!) 21 (09/16/20 0905)  BP: 137/63 (09/16/20 0905)  SpO2: 100 % (09/16/20 0905)    Vital Signs (24h Range):  Temp:  [97.9 °F (36.6 °C)-98.9 °F (37.2 °C)] 97.9 °F (36.6 °C)  Pulse:  [70-90] 84  Resp:  [13-44] 21  SpO2:  [93 %-100 %] 100 %  BP: (120-166)/(60-96) 137/63     Body mass index is 37.11 kg/m².    Physical Exam  Constitutional:       General: He is not in acute distress.     Appearance: He is well-developed. He is obese.      Comments: Restraints in place    HENT:      Head: Normocephalic and atraumatic.   Eyes:      General:         Right eye: No discharge.         Left eye: No  discharge.   Neck:      Musculoskeletal: Neck supple.   Cardiovascular:      Pulses: Normal pulses.   Pulmonary:      Effort: Pulmonary effort is normal. No respiratory distress.   Abdominal:      General: There is no distension.      Palpations: Abdomen is soft.      Comments: NGT in place    Musculoskeletal:         General: Swelling present. No deformity.   Skin:     General: Skin is warm and dry.      Findings: Bruising present.   Neurological:      Mental Status: He is lethargic and disoriented.      Motor: Weakness present.      Comments: Doesn't follow commands well 2/2 somnolence   Briefly opens eyes but falls back asleep during exam    Psychiatric:         Behavior: Behavior is slowed.         Cognition and Memory: Cognition is impaired.            Diagnostic Results:   Labs: Reviewed  ECG: Reviewed  X-Ray: Reviewed  CT: Reviewed

## 2020-09-16 NOTE — SUBJECTIVE & OBJECTIVE
Interval History: More alert, interactive today, able to state wifes name at bedside. No noted RUE myoclonus on exam. Discontinue EEG.    Current Facility-Administered Medications   Medication Dose Route Frequency Provider Last Rate Last Dose    albuterol-ipratropium 2.5 mg-0.5 mg/3 mL nebulizer solution 3 mL  3 mL Nebulization Q4H Angelique Prince NP   3 mL at 09/16/20 1225    atorvastatin tablet 40 mg  40 mg Per G Tube Daily Ron Molina MD   40 mg at 09/16/20 0906    cefTRIAXone (ROCEPHIN) 2 g/50 mL D5W IVPB  2 g Intravenous Q12H LIVAN Wallace Jr.   2 g at 09/16/20 0904    dextrose 50% injection 12.5 g  12.5 g Intravenous PRN Ambika Lee PA-C        glucagon (human recombinant) injection 1 mg  1 mg Intramuscular PRN Ambika Lee PA-C        heparin (porcine) injection 5,000 Units  5,000 Units Subcutaneous Q8H Angelique Prince NP   5,000 Units at 09/16/20 0600    insulin aspart U-100 pen 1-10 Units  1-10 Units Subcutaneous Q6H PRN Ambika Lee PA-C        labetalol 20 mg/4 mL (5 mg/mL) IV syring  10 mg Intravenous Q4H PRN Ron Molina MD        lacosamide oral solution 200 mg  200 mg Per NG tube Q12H Ron Molina MD   200 mg at 09/16/20 0906    losartan tablet 25 mg  25 mg Per NG tube Daily Angelique Prince NP   25 mg at 09/16/20 0907    magnesium sulfate 2g in water 50mL IVPB (premix)  2 g Intravenous PRN Angelique Prince NP        magnesium sulfate 2g in water 50mL IVPB (premix)  4 g Intravenous PRN Angelique Prince NP        pantoprazole suspension 40 mg  40 mg Per NG tube Daily Angelique Prince NP   40 mg at 09/16/20 0907    potassium chloride 10 mEq in 100 mL IVPB  10 mEq Intravenous PRN Ha Jenkins  mL/hr at 09/16/20 1250 10 mEq at 09/16/20 1250    And    potassium chloride 10 mEq in 100 mL IVPB  40 mEq Intravenous PRN Ha Jenkins MD        And    potassium chloride 10 mEq in 100 mL IVPB  10 mEq Intravenous  PRN Ha Jenkins MD        sodium chloride 0.9% flush 10 mL  10 mL Intravenous PRN Ron Molina MD        sodium phosphate 15 mmol in dextrose 5 % 250 mL IVPB  15 mmol Intravenous PRN Angelique Prince NP        sodium phosphate 20.01 mmol in dextrose 5 % 250 mL IVPB  20.01 mmol Intravenous PRN Angelique Prince NP        sodium phosphate 30 mmol in dextrose 5 % 250 mL IVPB  30 mmol Intravenous PRN Angelique Prince NP        vancomycin - pharmacy to dose   Intravenous pharmacy to manage frequency Hermilo Jean PA-C        vancomycin 1.75 g in 5 % dextrose 500 mL IVPB  1,750 mg Intravenous Q24H Joaquim Edmonds MD         Continuous Infusions:    Review of Systems   Constitutional: Negative for fever.   Eyes: Negative for photophobia.   Respiratory: Negative for wheezing.    Cardiovascular: Negative for chest pain.   Gastrointestinal: Negative for nausea and vomiting.   Neurological: Positive for speech difficulty and weakness. Negative for headaches.   Psychiatric/Behavioral: Negative for agitation. The patient is not nervous/anxious.      Objective:     Vital Signs (Most Recent):  Temp: 98 °F (36.7 °C) (09/16/20 1105)  Pulse: 81 (09/16/20 1225)  Resp: 17 (09/16/20 1225)  BP: 138/62 (09/16/20 1105)  SpO2: 100 % (09/16/20 1225) Vital Signs (24h Range):  Temp:  [97.9 °F (36.6 °C)-98.8 °F (37.1 °C)] 98 °F (36.7 °C)  Pulse:  [70-90] 81  Resp:  [13-44] 17  SpO2:  [93 %-100 %] 100 %  BP: (120-166)/(60-96) 138/62     Weight: 114 kg (251 lb 5.2 oz)  Body mass index is 37.11 kg/m².    Physical Exam  Constitutional:       General: He is not in acute distress.     Appearance: He is not diaphoretic.   HENT:      Head: Normocephalic and atraumatic.   Eyes:      Extraocular Movements: Extraocular movements intact.   Skin:     General: Skin is warm and dry.   Neurological:      Mental Status: He is alert.         NEUROLOGICAL EXAMINATION:     MENTAL STATUS        Alert, oriented to person, place, able  to state wifes name at bedside  Tracking around room, working with SLP       Significant Labs: All pertinent lab results from the past 24 hours have been reviewed.    Significant Studies: I have reviewed all pertinent imaging results/findings within the past 24 hours.

## 2020-09-16 NOTE — PROGRESS NOTES
[facility]  Neurology-Epilepsy  Progress Note    Patient Name: Lopez Savage  MRN: [id number]  Admission Date: 9/13/2020  Hospital Length of Stay: 3 days  Code Status: Full Code   Attending Provider: Linda Natarajan MD  Primary Care Physician: Prasanna Haywood MD   Principal Problem:Status epilepticus    Subjective:     Hospital Course:   9/13>9/14: no electrographic seizures, continuous periodic sharp waves most prominent over the left posterior quadrant but with a prominent field into the right posterior quadrant as well, which do not organize  9/14>9/15: Some improvement noted from prior day, left posterior periodic discharges which do not organize. Clinically, non-repressible RUE myoclonus noted today consistent with EPC.   9/15>9/16: EEG with significant improvement overnight, intermittent runs of left posterior discharges that are not evolving. No noted RUE myoclonus on exam. Patient more alert, able to state wifes name at bedside. Plan to discontinue EEG, continue Vimpat 200 mg BID.    Interval History: More alert, interactive today, able to state wifes name at bedside. No noted RUE myoclonus on exam. Discontinue EEG.    Current Facility-Administered Medications   Medication Dose Route Frequency Provider Last Rate Last Dose    albuterol-ipratropium 2.5 mg-0.5 mg/3 mL nebulizer solution 3 mL  3 mL Nebulization Q4H Angelique Prince NP   3 mL at 09/16/20 1225    atorvastatin tablet 40 mg  40 mg Per G Tube Daily Ron Molina MD   40 mg at 09/16/20 0906    cefTRIAXone (ROCEPHIN) 2 g/50 mL D5W IVPB  2 g Intravenous Q12H LIVAN Wallace Jr.   2 g at 09/16/20 0904    dextrose 50% injection 12.5 g  12.5 g Intravenous PRN Ambika Lee PA-C        glucagon (human recombinant) injection 1 mg  1 mg Intramuscular PRN Ambika Lee PA-C        heparin (porcine) injection 5,000 Units  5,000 Units Subcutaneous Q8H Angelique Prince NP   5,000 Units at 09/16/20 0600     insulin aspart U-100 pen 1-10 Units  1-10 Units Subcutaneous Q6H PRN Ambika Lee PA-C        labetalol 20 mg/4 mL (5 mg/mL) IV syring  10 mg Intravenous Q4H PRN Ron Molina MD        lacosamide oral solution 200 mg  200 mg Per NG tube Q12H Ron Molina MD   200 mg at 09/16/20 0906    losartan tablet 25 mg  25 mg Per NG tube Daily Angelique Prince NP   25 mg at 09/16/20 0907    magnesium sulfate 2g in water 50mL IVPB (premix)  2 g Intravenous PRN Angelique Prince NP        magnesium sulfate 2g in water 50mL IVPB (premix)  4 g Intravenous PRN Angelique Prince NP        pantoprazole suspension 40 mg  40 mg Per NG tube Daily Angelique Prince NP   40 mg at 09/16/20 0907    potassium chloride 10 mEq in 100 mL IVPB  10 mEq Intravenous PRN Ha Jenkins  mL/hr at 09/16/20 1250 10 mEq at 09/16/20 1250    And    potassium chloride 10 mEq in 100 mL IVPB  40 mEq Intravenous PRN Ha Jenkins MD        And    potassium chloride 10 mEq in 100 mL IVPB  10 mEq Intravenous PRN Ha Jenkins MD        sodium chloride 0.9% flush 10 mL  10 mL Intravenous PRN Ron Molina MD        sodium phosphate 15 mmol in dextrose 5 % 250 mL IVPB  15 mmol Intravenous PRN Angelique Prince NP        sodium phosphate 20.01 mmol in dextrose 5 % 250 mL IVPB  20.01 mmol Intravenous PRN Angelique Prince NP        sodium phosphate 30 mmol in dextrose 5 % 250 mL IVPB  30 mmol Intravenous PRN Angelique Prince NP        vancomycin - pharmacy to dose   Intravenous pharmacy to manage frequency Tu-Anh DAYAN Jean        vancomycin 1.75 g in 5 % dextrose 500 mL IVPB  1,750 mg Intravenous Q24H Joaquim Edmonds MD         Continuous Infusions:    Review of Systems   Constitutional: Negative for fever.   Eyes: Negative for photophobia.   Respiratory: Negative for wheezing.    Cardiovascular: Negative for chest pain.   Gastrointestinal: Negative for nausea and vomiting.    Neurological: Positive for speech difficulty and weakness. Negative for headaches.   Psychiatric/Behavioral: Negative for agitation. The patient is not nervous/anxious.      Objective:     Vital Signs (Most Recent):  Temp: 98 °F (36.7 °C) (09/16/20 1105)  Pulse: 81 (09/16/20 1225)  Resp: 17 (09/16/20 1225)  BP: 138/62 (09/16/20 1105)  SpO2: 100 % (09/16/20 1225) Vital Signs (24h Range):  Temp:  [97.9 °F (36.6 °C)-98.8 °F (37.1 °C)] 98 °F (36.7 °C)  Pulse:  [70-90] 81  Resp:  [13-44] 17  SpO2:  [93 %-100 %] 100 %  BP: (120-166)/(60-96) 138/62     Weight: 114 kg (251 lb 5.2 oz)  Body mass index is 37.11 kg/m².    Physical Exam  Constitutional:       General: He is not in acute distress.     Appearance: He is not diaphoretic.   HENT:      Head: Normocephalic and atraumatic.   Eyes:      Extraocular Movements: Extraocular movements intact.   Skin:     General: Skin is warm and dry.   Neurological:      Mental Status: He is alert.         NEUROLOGICAL EXAMINATION:     MENTAL STATUS        Alert, oriented to person, place, able to state wifes name at bedside  Tracking around room, working with SLP       Significant Labs: All pertinent lab results from the past 24 hours have been reviewed.    Significant Studies: I have reviewed all pertinent imaging results/findings within the past 24 hours.    Assessment and Plan:     Brain abscess  - See management as above    Encephalopathy acute  - Transferred from [facility] for further evaluation, clinical events concerning for seizure  - No prior seizure history per family  - EEG at [facility] with continuous periodic sharp waves most prominent over the left posterior quadrant with a prominent field into the right posterior quadrant, which do not organize    Recommendations:  - Discontinue vEEG - overall EEG improving, clinically patient more alert and no noted RUE myoclonus on exam  - Continue Vimpat 200 mg BID  - Keppra discontinued 9/15  - Avoid medications that lower seizure  threshold  - Neurochecks  - Management of acute infection per NCC, ID    Plan of care discussed with NCC team, patient and wife at bedside. Will sign off, please call with any questions.    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  - MRI brain w/wo 9/14 showing multiple septic emboli with abscess formation  - ID following, ceftriaxone adjusted to CNS doses, NSGY following     Chronic bronchitis  - NCC closely monitoring respiratory status, at risk for intubation  - Continuous pulse oximetry    Ulcerative colitis  - Hx of, NCC monitoring    Essential hypertension  - Vitals reviewed, management per NCC        VTE Risk Mitigation (From admission, onward)         Ordered     heparin (porcine) injection 5,000 Units  Every 8 hours      09/14/20 1347     IP VTE HIGH RISK PATIENT  Once      09/13/20 1518     Place sequential compression device  Until discontinued      09/13/20 1119                Mercedes Aguillon PA-C  Neurology-Epilepsy  [facility]  Staff: Dr. Winslow

## 2020-09-17 PROBLEM — G40.109 EPILEPSIA PARTIALIS CONTINUA: Status: ACTIVE | Noted: 2020-01-01

## 2020-09-17 NOTE — NURSING
"1730: Patient became lethargic. Unable to answer any question. Oxygen saturation started to go down in the low 90"s with 7L. Contacted team.   Order: - CT scan              - ABG's               - X- ray.                 - EEG cap.  Patient given Ativan in case he was having a seizure episode.  Patient is still lethargic.move all extremities. Vital sign stable. Switch from venti mask to 2L NC. Oxygen saturation at 100%.  "

## 2020-09-17 NOTE — SUBJECTIVE & OBJECTIVE
Interval History: JAYESH, AFVSS, Neurostable.       Medications:  Continuous Infusions:  Scheduled Meds:   albuterol-ipratropium  3 mL Nebulization Q4H    atorvastatin  40 mg Per G Tube Daily    cefTRIAXone (ROCEPHIN) IVPB  2 g Intravenous Q12H    heparin (porcine)  5,000 Units Subcutaneous Q8H    lacosamide  200 mg Per NG tube Q12H    losartan  25 mg Per NG tube Daily    pantoprazole  40 mg Per NG tube Daily    vancomycin (VANCOCIN) IVPB  1,750 mg Intravenous Q24H     PRN Meds:dextrose 50%, glucagon (human recombinant), insulin aspart U-100, labetalol, magnesium sulfate IVPB, magnesium sulfate IVPB, potassium chloride in water **AND** potassium chloride in water **AND** potassium chloride in water, sodium chloride 0.9%, sodium phosphate IVPB, sodium phosphate IVPB, sodium phosphate IVPB, Pharmacy to dose Vancomycin consult **AND** vancomycin - pharmacy to dose     Review of Systems  Objective:     Weight: 114 kg (251 lb 5.2 oz)  Body mass index is 37.11 kg/m².  Vital Signs (Most Recent):  Temp: 97.8 °F (36.6 °C) (09/17/20 0705)  Pulse: 86 (09/17/20 0905)  Resp: (!) 26 (09/17/20 0905)  BP: (!) 158/70 (09/17/20 0905)  SpO2: 97 % (09/17/20 0905) Vital Signs (24h Range):  Temp:  [97 °F (36.1 °C)-98.4 °F (36.9 °C)] 97.8 °F (36.6 °C)  Pulse:  [] 86  Resp:  [10-39] 26  SpO2:  [90 %-100 %] 97 %  BP: (131-162)/() 158/70     Date 09/17/20 0700 - 09/18/20 0659   Shift 5799-7042 1719-5852 9355-3652 24 Hour Total   INTAKE   NG/   150   IV Piggyback 50   50   Shift Total(mL/kg) 200(1.8)   200(1.8)   OUTPUT   Shift Total(mL/kg)       Weight (kg) 114 114 114 114                        NG/OG Tube 09/12/20 1644 Chambers sump 16 Fr. Left nostril (Active)   Placement Check placement verified by x-ray 09/17/20 0705   Tolerance no signs/symptoms of discomfort 09/17/20 0705   Securement secured to nostril center 09/17/20 0705   Clamp Status/Tolerance no abdominal distention;no abdominal discomfort 09/17/20 0705    Suction Setting/Drainage Method suction at the bedside 09/17/20 0705   Insertion Site Appearance no redness, warmth, tenderness, skin breakdown, drainage 09/17/20 0705   Drainage None 09/17/20 0705   Flush/Irrigation flushed w/;water 09/17/20 0705   Feeding Type continuous 09/17/20 0705   Current Rate (mL/hr) 50 mL/hr 09/17/20 0705   Goal Rate (mL/hr) 50 mL/hr 09/17/20 0705   Intake (mL) 50 mL 09/17/20 0705   Water Bolus (mL) 200 mL 09/16/20 1900   Tube Output(mL)(Include Discarded Residual) 0 mL 09/15/20 0605   Formula Name impact peptide 09/17/20 0705   Intake (mL) - Formula Tube Feeding 50 09/17/20 0805            Rectal Tube 09/15/20 1700 rectal tube w/ balloon (indicate number of mLs) (Active)   Balloon Inflation Volume (mL) 40 09/17/20 0705   Reposition drainage bags for BMS & Salgado on opposite sides of bed 09/17/20 0705   Outcome stool evacuated 09/17/20 0705   Stool Color Brown 09/17/20 0705   Insertion Site Appearance no redness, warmth, tenderness, skin breakdown, drainage 09/17/20 0705   Flush/Irrigation flushed w/;water 09/17/20 0705   Rectal Tube Output 50 mL 09/17/20 0600       Male External Urinary Catheter 09/17/20 0200 Medium (Active)   Collection Container Urimeter 09/17/20 0705   Securement Method secured to top of thigh w/ adhesive device 09/17/20 0705   Skin no redness;no breakdown 09/17/20 0705   Tolerance no signs/symptoms of discomfort 09/17/20 0705   Output (mL) 100 mL 09/17/20 0600   Catheter Change Date 09/17/20 09/17/20 0705       Neurosurgery Physical Exam     AOX2 to name/place,  L gaze can cross midline,   PERRL,   FCx4,   AG in all extremities,   SILT    Significant Labs:  Recent Labs   Lab 09/16/20  0142 09/17/20  0233   GLU 89 98    142   K 3.4* 3.8   * 110   CO2 23 24   BUN 20 21   CREATININE 1.2 1.2   CALCIUM 9.3 9.7   MG 1.9 1.9     Recent Labs   Lab 09/16/20  0142 09/17/20  0233   WBC 13.51* 12.88*   HGB 9.1* 9.6*   HCT 30.4* 30.9*    226     No results for  input(s): LABPT, INR, APTT in the last 48 hours.  Microbiology Results (last 7 days)     Procedure Component Value Units Date/Time    Blood culture [[id number]] Collected: 09/14/20 1840    Order Status: Completed Specimen: Blood from Peripheral, Foot, Right Updated: 09/16/20 2212     Blood Culture, Routine No Growth to date      No Growth to date      No Growth to date    Blood culture [[id number]] Collected: 09/14/20 1835    Order Status: Completed Specimen: Blood from Peripheral, Foot, Left Updated: 09/16/20 2212     Blood Culture, Routine No Growth to date      No Growth to date      No Growth to date    Urine culture [[id number]]  (Abnormal) Collected: 09/13/20 1438    Order Status: Completed Specimen: Urine Updated: 09/15/20 1126     Urine Culture, Routine ZAIN LUSITANIAE  50,000 - 99,999 cfu/ml  Treatment of asymptomatic candiduria is not recommended (except for   specific populations). Candida isolated in the urine typically   represents colonization. If an indwelling urinary catheter is present  it should be removed or replaced.      Narrative:      Specimen Source->Urine        All pertinent labs from the last 24 hours have been reviewed.    Significant Diagnostics:  I have reviewed and interpreted all pertinent imaging results/findings within the past 24 hours.

## 2020-09-17 NOTE — NURSING
Pt seen with off going nurse. EEG monitoring setup to continuous. Wife at bedside. Pt somewhat lethargic due to recently receiving ativan. Pt in 2 point soft restraints due to confusion and pulling at wires and tube. NG tube secured L nostril and HOB 30 degrees. Wife updated regarding POC. Plan to titrate O2 down to acceptable level and keep pt turned and cleaned. Further VS and Assessments within flowsheets. Will continue to monitor.

## 2020-09-17 NOTE — PT/OT/SLP PROGRESS
Speech Language Pathology Treatment    Patient Name:  Lopez Savage   MRN:  [id number]   [id number]    Admitting Diagnosis: Epilepsia partialis continua    Recommendations:                 General Recommendations:  Dysphagia therapy and Speech/language therapy  Diet recommendations:  NPO, Liquid Diet Level: NPO   Aspiration Precautions: Strict aspiration precautions   General Precautions: Standard, aspiration, fall, seizure, NPO  Communication strategies:  go to room if call light pushed    Subjective     Pt asleep throughout session.     Pain/Comfort:  · Pain Rating 1: 0/10  · Pain Rating Post-Intervention 1: 0/10    Objective:     Has the patient been evaluated by SLP for swallowing?   Yes  Keep patient NPO? Yes   Current Respiratory Status: room air      Pt asleep upon entry with wife at bedside. HOB raised, lights in room turned on, max verbal stimulation provided, as well as extensive tactile stimulation via gentle shoulder shake and sternal rub and thermal stimulation via cool, damp cloth to face. However pt remained asleep throughout session. Therefore PO trials and cognitive-linguistic therapy deferred.     Education provided to pt and pt's wife re: updated SLP recommendation for NPO at this time and ongoing SLP POC. Indication of pt's understanding unappreciated. However pt's wife verbalized understanding of education provided and agreement with SLP POC. No further questions.     Assessment:     Lopez Savage is a 74 y.o. male with an SLP diagnosis of dysphagia and cognitive-linguistic deficits.     Goals:   Multidisciplinary Problems     SLP Goals        Problem: SLP Goal    Goal Priority Disciplines Outcome   SLP Goal     SLP Ongoing, Progressing   Description: Goals expected to be met by 9/23:1. Pt will participate in ongoing assessment of swallow to determine safest, least restrictive diet.   2. Pt will orient x4 ind'ly.   3. Pt will complete basic problem solving and reasoning tasks with 70% acc  given cues.   4. Pt will participate in ongoing assessment of ability to follow commands.   5. Pt will participate in ongoing assessment of functional reading, writing, visual spatial skills.                    Plan:     · Patient to be seen:  4 x/week   · Plan of Care expires:  10/15/20  · Plan of Care reviewed with:  patient, spouse   · SLP Follow-Up:  Yes       Discharge recommendations:  rehabilitation facility     Time Tracking:     SLP Treatment Date:   09/17/20  Speech Start Time:  1101  Speech Stop Time:  1110     Speech Total Time (min):  9 min    Billable Minutes: Treatment Swallowing Dysfunction 9    MARVA Vazquez, CCC-SLP  [phone]  9/17/2020

## 2020-09-17 NOTE — ASSESSMENT & PLAN NOTE
Duonebs scheduled q4hrs  Monitor respiratory status, at risk for atelectasis  Continuous pulse oximetry, ABG prn  Continue CPT, pulmonary toileting  CXR stable

## 2020-09-17 NOTE — PLAN OF CARE
Dec'd TIGIST this service date warranting SLP recommendation for NPO. SLP to continue to follow.     Problem: SLP Goal  Goal: SLP Goal  Description: Goals expected to be met by 9/23:  1. Pt will participate in ongoing assessment of swallow to determine safest, least restrictive diet.   2. Pt will orient x4 ind'ly.   3. Pt will complete basic problem solving and reasoning tasks with 70% acc given cues.   4. Pt will participate in ongoing assessment of ability to follow commands.   5. Pt will participate in ongoing assessment of functional reading, writing, visual spatial skills.   Outcome: Ongoing, Progressing     MARVA Vazquez, CCC-SLP  [phone]  9/17/2020

## 2020-09-17 NOTE — ASSESSMENT & PLAN NOTE
74M with known bacterial endocarditis with septic emboli c/b L PCA infarct, scattered punctate infarctions and R occipital lesion concerning for intracranial abscess:    Pt continues to remain neurologically stable. No anticipated need for neurosurgical management at this time. Continue medical management and infectious management.   -- NSGY will sign off at this time.    -Continue NCC admit      -q1h neurochecks in ICU, q2h neurochecks in stepdown, q4h neurochecks on floor  --SBP <160 (cardene ggt; hydralazine & labetalol PRN; transition to home meds when appropriate)  --Na >135  --VN following  --AED per NCC/Epilepsy  --Abx per ID, f/up BCx - 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   --No acute neurosurgical intervention  --NO steroids  --HOB >30  --Follow-up full pre-op labs (CBC/CMP/PT-INR/PTT/T&S)  --PPI given GIB per NCC  --Continue to monitor clinically, notify NSGY immediately with any changes in neuro status    Dispo: ICU

## 2020-09-17 NOTE — PROGRESS NOTES
[facility]  Neurosurgery  Progress Note    Subjective:     History of Present Illness: 74 M with  recent endocarditis (Strep mutans per report, s/p IV Rocephin) with aortic valve vegetations (8/20), recent GIB (8/20), diverticulitis, GERD, MVP, and COPD who was transferred to [facility] on 9/13 for cEEG. The patient presented to [facility] on 9/8/2020 after LOC at home. Per report patient had been c/o worsening SOB, had portable CXR with effusion v PNA. Patient was given course of doxycycline and finished course recently. Reportedly on 9/8 his wife went to bed while  was watching TV and about an hour later her daughter found him non responsive on the ground, short round of chest compressions before intubated in ED, sedated on Prop.  No seizure like activity was observed in ED. TPA contraindicated due to recent GI bleed, last occurring in August.  MRI head showed L PCA infarct and right occipital lobe edema. 9/8 EEG demonstrated left posterior pseudo periodic epileptiform discharges is suggestive of an irritative region likely secondary to focal ischemia. Patient started on AEDs vimpat and keppra and reported to have several seizures during his stay at [place], currently on cEEG in [facility]. NSGY consulted following MRI Brain this evening with 1.2cm R occipital lesion concerning for abscess.     On Lovenox at home, SQH at [facility].     Post-Op Info:  * No surgery found *         Interval History: JAYESH, AFVSS, Neurostable.       Medications:  Continuous Infusions:  Scheduled Meds:   albuterol-ipratropium  3 mL Nebulization Q4H    atorvastatin  40 mg Per G Tube Daily    cefTRIAXone (ROCEPHIN) IVPB  2 g Intravenous Q12H    heparin (porcine)  5,000 Units Subcutaneous Q8H    lacosamide  200 mg Per NG tube Q12H    losartan  25 mg Per NG tube Daily    pantoprazole  40 mg Per NG tube Daily    vancomycin (VANCOCIN) IVPB  1,750 mg Intravenous Q24H     PRN Meds:dextrose 50%, glucagon  (human recombinant), insulin aspart U-100, labetalol, magnesium sulfate IVPB, magnesium sulfate IVPB, potassium chloride in water **AND** potassium chloride in water **AND** potassium chloride in water, sodium chloride 0.9%, sodium phosphate IVPB, sodium phosphate IVPB, sodium phosphate IVPB, Pharmacy to dose Vancomycin consult **AND** vancomycin - pharmacy to dose     Review of Systems  Objective:     Weight: 114 kg (251 lb 5.2 oz)  Body mass index is 37.11 kg/m².  Vital Signs (Most Recent):  Temp: 97.8 °F (36.6 °C) (09/17/20 0705)  Pulse: 86 (09/17/20 0905)  Resp: (!) 26 (09/17/20 0905)  BP: (!) 158/70 (09/17/20 0905)  SpO2: 97 % (09/17/20 0905) Vital Signs (24h Range):  Temp:  [97 °F (36.1 °C)-98.4 °F (36.9 °C)] 97.8 °F (36.6 °C)  Pulse:  [] 86  Resp:  [10-39] 26  SpO2:  [90 %-100 %] 97 %  BP: (131-162)/() 158/70     Date 09/17/20 0700 - 09/18/20 0659   Shift 8642-8253 5749-7124 5159-5163 24 Hour Total   INTAKE   NG/   150   IV Piggyback 50   50   Shift Total(mL/kg) 200(1.8)   200(1.8)   OUTPUT   Shift Total(mL/kg)       Weight (kg) 114 114 114 114                        NG/OG Tube 09/12/20 1644 [place] sump 16 Fr. Left nostril (Active)   Placement Check placement verified by x-ray 09/17/20 0705   Tolerance no signs/symptoms of discomfort 09/17/20 0705   Securement secured to nostril center 09/17/20 0705   Clamp Status/Tolerance no abdominal distention;no abdominal discomfort 09/17/20 0705   Suction Setting/Drainage Method suction at the bedside 09/17/20 0705   Insertion Site Appearance no redness, warmth, tenderness, skin breakdown, drainage 09/17/20 0705   Drainage None 09/17/20 0705   Flush/Irrigation flushed w/;water 09/17/20 0705   Feeding Type continuous 09/17/20 0705   Current Rate (mL/hr) 50 mL/hr 09/17/20 0705   Goal Rate (mL/hr) 50 mL/hr 09/17/20 0705   Intake (mL) 50 mL 09/17/20 0705   Water Bolus (mL) 200 mL 09/16/20 1900   Tube Output(mL)(Include Discarded Residual) 0 mL 09/15/20 0605    Formula Name impact peptide 09/17/20 0705   Intake (mL) - Formula Tube Feeding 50 09/17/20 0805            Rectal Tube 09/15/20 1700 rectal tube w/ balloon (indicate number of mLs) (Active)   Balloon Inflation Volume (mL) 40 09/17/20 0705   Reposition drainage bags for BMS & Salgado on opposite sides of bed 09/17/20 0705   Outcome stool evacuated 09/17/20 0705   Stool Color Brown 09/17/20 0705   Insertion Site Appearance no redness, warmth, tenderness, skin breakdown, drainage 09/17/20 0705   Flush/Irrigation flushed w/;water 09/17/20 0705   Rectal Tube Output 50 mL 09/17/20 0600       Male External Urinary Catheter 09/17/20 0200 Medium (Active)   Collection Container Urimeter 09/17/20 0705   Securement Method secured to top of thigh w/ adhesive device 09/17/20 0705   Skin no redness;no breakdown 09/17/20 0705   Tolerance no signs/symptoms of discomfort 09/17/20 0705   Output (mL) 100 mL 09/17/20 0600   Catheter Change Date 09/17/20 09/17/20 0705       Neurosurgery Physical Exam     AOX2 to name/place,  L gaze can cross midline,   PERRL,   FCx4,   AG in all extremities,   SILT    Significant Labs:  Recent Labs   Lab 09/16/20  0142 09/17/20  0233   GLU 89 98    142   K 3.4* 3.8   * 110   CO2 23 24   BUN 20 21   CREATININE 1.2 1.2   CALCIUM 9.3 9.7   MG 1.9 1.9     Recent Labs   Lab 09/16/20  0142 09/17/20  0233   WBC 13.51* 12.88*   HGB 9.1* 9.6*   HCT 30.4* 30.9*    226     No results for input(s): LABPT, INR, APTT in the last 48 hours.  Microbiology Results (last 7 days)     Procedure Component Value Units Date/Time    Blood culture [[id number]] Collected: 09/14/20 1840    Order Status: Completed Specimen: Blood from Peripheral, Foot, Right Updated: 09/16/20 2212     Blood Culture, Routine No Growth to date      No Growth to date      No Growth to date    Blood culture [[id number]] Collected: 09/14/20 1835    Order Status: Completed Specimen: Blood from Peripheral, Foot, Left Updated: 09/16/20 1716      Blood Culture, Routine No Growth to date      No Growth to date      No Growth to date    Urine culture [[id number]]  (Abnormal) Collected: 09/13/20 1438    Order Status: Completed Specimen: Urine Updated: 09/15/20 1126     Urine Culture, Routine ZAIN LUSITANIAE  50,000 - 99,999 cfu/ml  Treatment of asymptomatic candiduria is not recommended (except for   specific populations). Candida isolated in the urine typically   represents colonization. If an indwelling urinary catheter is present  it should be removed or replaced.      Narrative:      Specimen Source->Urine        All pertinent labs from the last 24 hours have been reviewed.    Significant Diagnostics:  I have reviewed and interpreted all pertinent imaging results/findings within the past 24 hours.    Assessment/Plan:     Bacterial endocarditis  74M with known bacterial endocarditis with septic emboli c/b L PCA infarct, scattered punctate infarctions and R occipital lesion concerning for intracranial abscess:    Pt continues to remain neurologically stable. No anticipated need for neurosurgical management at this time. Continue medical management and infectious management.   -- NSGY will sign off at this time.    -Continue NCC admit      -q1h neurochecks in ICU, q2h neurochecks in stepdown, q4h neurochecks on floor  --SBP <160 (cardene ggt; hydralazine & labetalol PRN; transition to home meds when appropriate)  --Na >135  --VN following  --AED per NCC/Epilepsy  --Abx per ID, f/up BCx - 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   --No acute neurosurgical intervention  --NO steroids  --HOB >30  --Follow-up full pre-op labs (CBC/CMP/PT-INR/PTT/T&S)  --PPI given GIB per NCC  --Continue to monitor clinically, notify NSGY immediately with any changes in  neuro status    Dispo: ICU               Idalia Haddad MD  Neurosurgery  [facility]

## 2020-09-17 NOTE — ASSESSMENT & PLAN NOTE
NGT in place, continuous tube feedings for nutrition  Cont enteral water flushes 200 ml q6hrs  SLP following, will likely need PEG

## 2020-09-17 NOTE — ASSESSMENT & PLAN NOTE
On 9/8 pt presented to [place] ED with unresponsiveness  No tPA due to recent GI bleed  MRI with left PCA infarct and left thalamic infarct along with small right PCA infarct  MRA demonstrates fetal circulation to left PCA  9/11 Repeat MRI new acute/subacute L cerebellum infarct    -Neuro checks q1hr  -Vascular neurology following  -SBP Goal < 180  -Stable on repeat imaging

## 2020-09-17 NOTE — PLAN OF CARE
Per MD: EEG with EPC and moderate encephalopathy. Continued drowsiness today.  Not medically ready for discharge.        09/17/20 1447   Discharge Reassessment   Assessment Type Discharge Planning Reassessment   Provided patient/caregiver education on the expected discharge date and the discharge plan No   Do you have any problems affording any of your prescribed medications? No   Discharge Plan A Rehab   Discharge Plan B Home Health   DME Needed Upon Discharge  other (see comments)  (tbd)   Anticipated Discharge Disposition Rehab   Can the patient/caregiver answer the patient profile reliably? No, cognitively impaired   How does the patient rate their overall health at the present time? Fair   Describe the patient's ability to walk at the present time. Major restrictions/daily assistance from another person   How often would a person be available to care for the patient? Occasionally   Number of comorbid conditions (as recorded on the chart) Three       eWndy Beltran RN, CCRN-K, [facility]  Neuro-Critical Care   [phone]

## 2020-09-17 NOTE — PT/OT/SLP PROGRESS
Physical Therapy      Patient Name:  Lopez Savage   MRN:  [id number]    Attempted PT treatment this afternoon, but was unable to wake the patient. Attempted repositioning and sternal rub, but he was not responsive and sound asleep. Pt's wife and nurse reported that he had been up all night and had been sleeping all day. Will continue to follow for PT treatment as he is able to participate.     Positioned pt more upright in bed to promote lung expansion and clearance.    Daja Boles, PT, DPT  9/17/2020

## 2020-09-17 NOTE — PROGRESS NOTES
Pharmacokinetic Assessment Follow Up: IV Vancomycin    Vancomycin serum concentration assessment(s):    - Trough supratherapeutic at 23.3. Will hold further dosing until level <20.  - Check random level with AM labs tomorrow  - Once level appropriate, recommend starting 1500 mg q24h  - Per ID, Continue IV Vanc and cetriaxone for 6-8 weeks, end date 10/27-11/10 with repeat      Drug levels (last 3 results):  Recent Labs   Lab Result Units 09/17/20  1213   Vancomycin-Trough ug/mL 23.3*       Pharmacy will continue to follow and monitor vancomycin.    Please contact pharmacy at extension [phone] for questions regarding this assessment.    Thank you for the consult,   Kaylah Hobbs, iMD       Patient brief summary:  Lopez Savage is a 74 y.o. male initiated on antimicrobial therapy with IV Vancomycin for treatment of endocarditis/brain abscess      Drug Allergies:   Review of patient's allergies indicates:   Allergen Reactions    Codeine     Tetanus vaccines and toxoid        Actual Body Weight:   114 kg    Renal Function:   Estimated Creatinine Clearance: 68.2 mL/min (based on SCr of 1.2 mg/dL).,     Dialysis Method (if applicable):  N/A

## 2020-09-17 NOTE — PROGRESS NOTES
[facility]  Neurocritical Care  Progress Note    Admit Date: 9/13/2020  Service Date: 09/17/2020  Length of Stay: 4    Subjective:     Chief Complaint: Epilepsia partialis continua    History of Present Illness: Mr. 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 The patient presented to [facility] on 9/8/2020 with a primary complaint of loss of consciousness. The patient was recently treated for endocarditis w/ IV rocephin through PICC line at [facility] and discharged approximately 2 weeks ago. Since then he has had a worsening cough and was planning to go to the hospital for evaluation. Per wife home health did portable CXR about a week ago w/ concern for fluid vs pneumonia. Patient was given course of doxycycline and finished course recently. 9/8 Wife went to bed while  was watching TV and about an hour later her daughter found him non responsive on the ground. Daughter did a few chest compressions before EMS arrived. Patient arrived to ED and non responsive to sternal rub. Pupillary and gag reflex present. Patient intubated for airway protection and sedated w/ propofol. No seizure like activity was observed in ED. TPA contraindicated due to recent GI bleed, last occurring in August. Discussed further stroke work up with wife and planned for imaging. CT head negative for acute intracranial bleed. MRI head showed PCA infarct and right occipital lobe edema. 9/8 EEG demonstrated left posterior pseudo periodic epileptiform discharges is suggestive of an irritative region likely secondary to focal ischemia. Patient started on AEDs vimpat and keppra and reported to have several seizures during his stay. Patient was transferred to [facility] today for continuous EEG monitoring and further evaluation.    Hospital Course: 9/13/2020 Admit to  NCC.  09/14/2020: ELIO [place] scheduled, PT/OT ordered. EEG with continuous periodic discharges per Epilepsy team. Continue current AEDs. Will consult ID for endocarditis, antibiotic recs.  09/15/2020: MRI with and without contrast ordered, which shows multiple septic emboli with abscess formation. ID consulted, ceftriaxone adjusted to CNS doses. NSGY following.  09/16/2020: Patient stable overnight. ID stopped doxycycline and flagyl, and started Vanc. Continue ceftriaxone. Free water flushes started. PT/OT to eval and sit on side of bed.  09/17/2020: Patient with increasing lethargy yesterday evening. ABG WNL. CTH stable, placed on cap EEG overnight. EPC, moderate encephalopathy on EEG overnight. Patient more awake at night, and drowsy during the day. Will implement delirium precautions.    Interval History: No acute events overnight. EEG with EPC and moderate encephalopathy. Continued drowsiness today.    Review of Systems: Unable to obtain a complete ROS due to level of consciousness.     Vitals:   Temp: 97.8 °F (36.6 °C)  Pulse: 86  Rhythm: normal sinus rhythm  BP: (!) 158/70  MAP (mmHg): 100  Resp: (!) 26  SpO2: 97 %  O2 Device (Oxygen Therapy): nasal cannula    Temp  Min: 97 °F (36.1 °C)  Max: 98.4 °F (36.9 °C)  Pulse  Min: 73  Max: 102  BP  Min: 131/60  Max: 162/74  MAP (mmHg)  Min: 87  Max: 119  Resp  Min: 10  Max: 39  SpO2  Min: 90 %  Max: 100 %    09/16 0701 - 09/17 0700  In: 2150   Out: 700 [Urine:450]   Unmeasured Output  Urine Occurrence: 1  Stool Occurrence: 0  Emesis Occurrence: 0  Pad Count: 2     Examination:   Constitutional: Well-nourished and -developed. No apparent distress.   Eyes: Conjunctiva clear, anicteric. Lids no lesions.  Head/Ears/Nose/Mouth/Throat/Neck: Moist mucous membranes. External ears, nose atraumatic. Hearing impaired.  Cardiovascular: Regular rhythm. No leg edema.  Respiratory: Comfortable respirations. Clear to auscultation.  Gastrointestinal: Soft, nondistended, nontender.  + bowel sounds.    Neurologic:  -GCS E 3 V 4 M 6  -Drowsy. Oriented to person. Dysarthric. Delayed responses. Follows commands.  -Cranial nerves: EOM intact, PERRL 3mm, no facial droop, +cough  -Motor: Moves all extremities spontaneously, antigravity  -Sensation: Intact to light touch.  Unable to test insight, fund of knowledge, coordination, gait due to level of consciousness.    Medications:   Continuous Scheduledalbuterol-ipratropium, 3 mL, Q4H  atorvastatin, 40 mg, Daily  cefTRIAXone (ROCEPHIN) IVPB, 2 g, Q12H  heparin (porcine), 5,000 Units, Q8H  lacosamide, 200 mg, Q12H  losartan, 25 mg, Daily  pantoprazole, 40 mg, Daily  vancomycin (VANCOCIN) IVPB, 1,750 mg, Q24H    PRNdextrose 50%, 12.5 g, PRN  glucagon (human recombinant), 1 mg, PRN  insulin aspart U-100, 1-10 Units, Q6H PRN  labetalol, 10 mg, Q4H PRN  magnesium sulfate IVPB, 2 g, PRN  magnesium sulfate IVPB, 4 g, PRN  potassium chloride in water, 10 mEq, PRN    And  potassium chloride in water, 40 mEq, PRN    And  potassium chloride in water, 10 mEq, PRN  sodium chloride 0.9%, 10 mL, PRN  sodium phosphate IVPB, 15 mmol, PRN  sodium phosphate IVPB, 20.01 mmol, PRN  sodium phosphate IVPB, 30 mmol, PRN  vancomycin - pharmacy to dose, , pharmacy to manage frequency       Today I independently reviewed pertinent medications, lines/drains/airways, imaging, cardiology results, laboratory results, notably:     ISTAT:   Recent Labs   Lab 09/16/20  1746   PH 7.335*   PCO2 43.3   PO2 29*   POCSATURATED 51*   HCO3 23.1*   BE -3   POCTCO2 24   SAMPLE VENOUS      Chem:   Recent Labs   Lab 09/17/20  0233      K 3.8      CO2 24   GLU 98   BUN 21   CREATININE 1.2   ESTGFRAFRICA >60.0   EGFRNONAA 59.6*   CALCIUM 9.7   MG 1.9   PHOS 4.4   ANIONGAP 8   PROT 6.7   ALBUMIN 2.6*   BILITOT 0.2   ALKPHOS 59   AST 9*   ALT <5*     Heme:   Recent Labs   Lab 09/17/20  0233   WBC 12.88*   HGB 9.6*   HCT 30.9*        Endo:   Recent Labs   Lab 09/16/20  1119  09/16/20  1628 09/17/20  0053   POCTGLUCOSE 106 129* 95          Assessment/Plan:     Neuro  * Epilepsia partialis continua  - Multiple witnessed seizure-like activity described  - No previous reports from family  - Was started on keppra and vimpat 200 mg BID  - EEG at OSH shows irregular slowing in the R frontal region, with L posterior periodic epileptiform discharges. No seizures recorded during this study.  - 9/13 Here EEG continues to show periodic discharges in the left posterior region with no seizures.    Plan:   -- Continue vimpat 200 mg BID  --9/14: EEG with periodic discharges per Epilepsy, continue current AED regimen  --9/15: EEG stable. Stop Keppra, continue EEG.  --9/16: EEG stable. Continue Vimpat  --9/17: EEG with EPC overnight, moderate encephalopathy. Epilepsy following, appreciate recs. 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 Recommend continuing Vimpat for at least 2 years. Continued drowsiness during the day and wakefulness at night, implement delirium precautions.    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  VN following  2/2 septic embolic    Pulmonary  Chronic bronchitis  Duonebs scheduled q4hrs  Monitor respiratory status, at risk for atelectasis  Continuous pulse oximetry, ABG prn  Continue CPT, pulmonary toileting  CXR stable    Cardiac/Vascular  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  Previously evaluated by CT surgery team her at [facility], and determined to be too high risk for valve replacement surgery, to manage  with IV antibiotics    According to medical records, finished 6 week course of Ceftriaxone for endocarditis coverage  ID consulted for further antibiotic management given septic emboli with abscess formation.  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/septic emboli    -Continue ceftriaxone 2gms IV q12hrs  -New blood cultures sent 9/14- NGTD  -NSGY consulted- no surgical plans at this time  -Stopped doxycycline, flagyl per ID recs, started Vanc, Vanc consult to pharmacy  -ID recs:  Continue IV Vanc and cetriaxone for 6-8 weeks, end date 10/27-11/10 with repeat MRI post treatment    Hematology  Intracranial septic embolism  -See Stroke    GI  Oral phase dysphagia  NGT in place, continuous tube feedings for nutrition  Cont enteral water flushes 200 ml q6hrs  SLP following, will likely need PEG    Ulcerative colitis  Hx of, monitor for GI bleeding  PPI    Other  Debility  PT/OT    The patient is being Prophylaxed for:  Venous Thromboembolism with: Mechanical or Chemical  Stress Ulcer with: PPI  Ventilator Pneumonia with: not applicable    Activity Orders          None        Full Code    Angelique Prince NP  Neurocritical Care  [facility]

## 2020-09-17 NOTE — ASSESSMENT & PLAN NOTE
2/2 endocarditis/septic emboli    -Continue ceftriaxone 2gms IV q12hrs  -New blood cultures sent 9/14- NGTD  -NSGY consulted- no surgical plans at this time  -Stopped doxycycline, flagyl per ID recs, started Vanc, Vanc consult to pharmacy  -ID recs:  Continue IV Vanc and cetriaxone for 6-8 weeks, end date 10/27-11/10 with repeat MRI post treatment

## 2020-09-17 NOTE — PLAN OF CARE
POC reviewed with pt and family at 1400. Pt verbalized understanding. Questions and concerns addressed. No acute events today. Patient slept all day. EEG monitoring off. Hold vanc at 1300 ( vanc trough 23.3).  Will continue to monitor. See flowsheets for full assessment and VS info.

## 2020-09-17 NOTE — SUBJECTIVE & OBJECTIVE
Interval History: No acute events overnight. EEG with EPC and moderate encephalopathy. Continued drowsiness today.    Review of Systems: Unable to obtain a complete ROS due to level of consciousness.     Vitals:   Temp: 97.8 °F (36.6 °C)  Pulse: 86  Rhythm: normal sinus rhythm  BP: (!) 158/70  MAP (mmHg): 100  Resp: (!) 26  SpO2: 97 %  O2 Device (Oxygen Therapy): nasal cannula    Temp  Min: 97 °F (36.1 °C)  Max: 98.4 °F (36.9 °C)  Pulse  Min: 73  Max: 102  BP  Min: 131/60  Max: 162/74  MAP (mmHg)  Min: 87  Max: 119  Resp  Min: 10  Max: 39  SpO2  Min: 90 %  Max: 100 %    09/16 0701 - 09/17 0700  In: 2150   Out: 700 [Urine:450]   Unmeasured Output  Urine Occurrence: 1  Stool Occurrence: 0  Emesis Occurrence: 0  Pad Count: 2     Examination:   Constitutional: Well-nourished and -developed. No apparent distress.   Eyes: Conjunctiva clear, anicteric. Lids no lesions.  Head/Ears/Nose/Mouth/Throat/Neck: Moist mucous membranes. External ears, nose atraumatic. Hearing impaired.  Cardiovascular: Regular rhythm. No leg edema.  Respiratory: Comfortable respirations. Clear to auscultation.  Gastrointestinal: Soft, nondistended, nontender. + bowel sounds.    Neurologic:  -GCS E 3 V 4 M 6  -Drowsy. Oriented to person. Dysarthric. Delayed responses. Follows commands.  -Cranial nerves: EOM intact, PERRL 3mm, no facial droop, +cough  -Motor: Moves all extremities spontaneously, antigravity  -Sensation: Intact to light touch.  Unable to test insight, fund of knowledge, coordination, gait due to level of consciousness.    Medications:   Continuous Scheduledalbuterol-ipratropium, 3 mL, Q4H  atorvastatin, 40 mg, Daily  cefTRIAXone (ROCEPHIN) IVPB, 2 g, Q12H  heparin (porcine), 5,000 Units, Q8H  lacosamide, 200 mg, Q12H  losartan, 25 mg, Daily  pantoprazole, 40 mg, Daily  vancomycin (VANCOCIN) IVPB, 1,750 mg, Q24H    PRNdextrose 50%, 12.5 g, PRN  glucagon (human recombinant), 1 mg, PRN  insulin aspart U-100, 1-10 Units, Q6H  PRN  labetalol, 10 mg, Q4H PRN  magnesium sulfate IVPB, 2 g, PRN  magnesium sulfate IVPB, 4 g, PRN  potassium chloride in water, 10 mEq, PRN    And  potassium chloride in water, 40 mEq, PRN    And  potassium chloride in water, 10 mEq, PRN  sodium chloride 0.9%, 10 mL, PRN  sodium phosphate IVPB, 15 mmol, PRN  sodium phosphate IVPB, 20.01 mmol, PRN  sodium phosphate IVPB, 30 mmol, PRN  vancomycin - pharmacy to dose, , pharmacy to manage frequency       Today I independently reviewed pertinent medications, lines/drains/airways, imaging, cardiology results, laboratory results, notably:     ISTAT:   Recent Labs   Lab 09/16/20  1746   PH 7.335*   PCO2 43.3   PO2 29*   POCSATURATED 51*   HCO3 23.1*   BE -3   POCTCO2 24   SAMPLE VENOUS      Chem:   Recent Labs   Lab 09/17/20  0233      K 3.8      CO2 24   GLU 98   BUN 21   CREATININE 1.2   ESTGFRAFRICA >60.0   EGFRNONAA 59.6*   CALCIUM 9.7   MG 1.9   PHOS 4.4   ANIONGAP 8   PROT 6.7   ALBUMIN 2.6*   BILITOT 0.2   ALKPHOS 59   AST 9*   ALT <5*     Heme:   Recent Labs   Lab 09/17/20  0233   WBC 12.88*   HGB 9.6*   HCT 30.9*        Endo:   Recent Labs   Lab 09/16/20  1119 09/16/20  1628 09/17/20  0053   POCTGLUCOSE 106 129* 95

## 2020-09-17 NOTE — PROGRESS NOTES
Patient's chart was reviewed by a stroke team provider.  Patient non neurologic issues with greater urgency.  There is no new imaging to review.  Pending diagnostics to follow up on include: none  For other recommendations please see our previous note completed on: 9/14 9/17 currently no further seizures on vimpat, although EPC on EEG. Ongoing management of endocarditis per ID recs. Neurosurgery signed off, CT head stable. Likely discharge to LTAC in the coming days for long term IV antibiotics. *If stepdown desired, recommend stepdown to medicine for further management of endocarditis.     There are no new recommendations at this time. We will sign off at this time. Discussed patient with staff, Dr Mark. Please contact stroke team for any questions or concerns.         William Adkins M.D.  Internal Medicine PGY-2  [facility]

## 2020-09-17 NOTE — ASSESSMENT & PLAN NOTE
- Multiple witnessed seizure-like activity described  - No previous reports from family  - Was started on keppra and vimpat 200 mg BID  - EEG at OSH shows irregular slowing in the R frontal region, with L posterior periodic epileptiform discharges. No seizures recorded during this study.  - 9/13 Here EEG continues to show periodic discharges in the left posterior region with no seizures.    Plan:   -- Continue vimpat 200 mg BID  --9/14: EEG with periodic discharges per Epilepsy, continue current AED regimen  --9/15: EEG stable. Stop Keppra, continue EEG.  --9/16: EEG stable. Continue Vimpat  --9/17: EEG with EPC overnight, moderate encephalopathy. Epilepsy following, appreciate recs. 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 Recommend continuing Vimpat for at least 2 years. Continued drowsiness during the day and wakefulness at night, implement delirium precautions.

## 2020-09-17 NOTE — ASSESSMENT & PLAN NOTE
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  -See brain abscess

## 2020-09-18 NOTE — CONSULTS
Placed double lumen PICC to right basilic vein using u/s guidance.  35 cm in length, 38 cm arm circumference and 0 cm exposed.   Lot # [id number].    PICC placed by TARA Kingsley RN      PICC placed for 6-8 weeks of vancomycin & ceftriaxone

## 2020-09-18 NOTE — PT/OT/SLP PROGRESS
Physical Therapy      Patient Name:  Lopez Savage   MRN:  [id number]  Admitting Diagnosis:  Status epilepticus   Recent Surgery: * No surgery found *    Admit Date: 9/13/2020  Length of Stay: 5 days    Patient not seen today due to Other (Comment). Lopez Savage's plan of care and PT goals reviewed on this date and remain appropriate. Will follow-up for progressive mobility as appropriate and per POC.    Patito Baird, PT, DPT  9/18/2020

## 2020-09-18 NOTE — PROGRESS NOTES
Pharmacokinetic Assessment Follow Up: IV Vancomycin    Vancomycin serum concentration assessment(s):    - Vanc level down to 17.9 mcg/mL from 23.3. mcg/mL yesterday  - Calculated half-life is 53 hours based on above two levels  - Will dose by level in the setting of prolonged half life  - Administer vanc 1000 mg x 1  - Draw random level ~24 hrs post-dose, tomorrow 9/19 at 10:00   - Per ID, Continue IV Vanc and cetriaxone for 6-8 weeks, end date 10/27-11/10 with repeat      Drug levels (last 3 results):  Recent Labs   Lab Result Units 09/17/20  1213 09/18/20  0803   Vancomycin, Random ug/mL  --  17.9   Vancomycin-Trough ug/mL 23.3*  --        Pharmacy will continue to follow and monitor vancomycin.  Please contact pharmacy at extension [phone] for questions regarding this assessment.    Thank you for the consult,   Waleska Julien, PharmD, [facility]  Neurocritical Care Pharmacist  [phone]         Patient brief summary:  Lopez Savage is a 75 y.o. male initiated on antimicrobial therapy with IV Vancomycin for treatment of endocarditis/brain abscess      Drug Allergies:   Review of patient's allergies indicates:   Allergen Reactions    Codeine     Tetanus vaccines and toxoid        Actual Body Weight:   114 kg    Renal Function:   Estimated Creatinine Clearance: 67.2 mL/min (based on SCr of 1.2 mg/dL).,     Dialysis Method (if applicable):  N/A

## 2020-09-18 NOTE — PT/OT/SLP PROGRESS
Speech Language Pathology      Lopez Savage   [id number] A    MRN: [id number]    Patient not seen today secondary to (Pt undergoing PICC line placement upon SLP attempt to treat.). If unable to re-attempt pt this service date, will follow-up according to SLP POC if pt medically stable and available.     MARVA Vazquez, CCC-SLP  [phone]  9/18/2020

## 2020-09-18 NOTE — PROGRESS NOTES
1005 Pt oriented to self. Not oriented to situation this assessment. Follows commands. Pupils equal and reactive. RUPERTO Owens notified. No new orders. WCTM

## 2020-09-18 NOTE — PROCEDURES
"Lopez Savage is a 75 y.o. male patient.    Temp: 97.6 °F (36.4 °C) (09/18/20 1105)  Pulse: 85 (09/18/20 1205)  Resp: (!) 21 (09/18/20 1205)  BP: (!) 141/63 (09/18/20 1205)  SpO2: 96 % (09/18/20 1205)  Weight: 114 kg (251 lb 5.2 oz) (09/13/20 1015)  Height: 5' 9" (175.3 cm) (09/13/20 1015)    PICC  Date/Time: 9/18/2020 12:35 PM  Performed by: Tres Kingsley RN  Assisting provider: Deanne Wilson RN  Consent Done: Yes  Time out: Immediately prior to procedure a time out was called to verify the correct patient, procedure, equipment, support staff and site/side marked as required  Indications: med administration and vascular access  Anesthesia: local infiltration  Local anesthetic: lidocaine 1% without epinephrine  Anesthetic Total (mL): 2  Preparation: skin prepped with ChloraPrep  Skin prep agent dried: skin prep agent completely dried prior to procedure  Sterile barriers: all five maximum sterile barriers used - cap, mask, sterile gown, sterile gloves, and large sterile sheet  Hand hygiene: hand hygiene performed prior to central venous catheter insertion  Location details: right basilic  Catheter type: double lumen  Catheter size: 5 Fr  Catheter Length: 35cm    Ultrasound guidance: yes  Vessel Caliber: medium and patent, compressibility normal  Vascular Doppler: not done  Needle advanced into vessel with real time Ultrasound guidance.  Guidewire confirmed in vessel.  Image recorded and saved.  Sterile sheath used.  Number of attempts: 1  Post-procedure: blood return through all ports, chlorhexidine patch and sterile dressing applied  Technical procedures used: 3CG  Specimens: No  Implants: No  Assessment: placement verified by x-ray  Complications: none          Deanne Wilson  9/18/2020  "

## 2020-09-18 NOTE — PROGRESS NOTES
Subjective:  Arousal deficit improved overnight  Slept well with trazodone  Lab samples clotted, no am labs available    Objective:  Vital signs, lab studies, and imaging reviewed by me  Drowsy, easily reoriented x3, cranial II-XII intact, sensation full, moves all extremities with full strength, no dysmetria  Warm and well perfusedNo dependent edema  Breathing comfortably, breath sounds clear  Belly soft, nontender, no hepatosplenomegaly    Assessment/Plan:     75 yo recent discharge for mrsa endocarditis presenting with acute enephalopathy and seizure in setting of newly diagnosed numerous brain abscesses and punctate cerebellar stroke.      Continued drowsiness and confusion this am, but improved from yesterday. On lacosamide monotherapy. Consider spot eeg Monday. Consider repeat MRI if pt doesn't continue to improve. Delirium precautions.      On ctx and vanc (cns dosing). End dates per ID reccs. Will need picc.     Acute urinary retention, start silodocin and cont q6 sc.     Mild hypoxia from atelectasis. Pt/ot, oob as tolerated.     Labs pending for today.

## 2020-09-18 NOTE — PROGRESS NOTES
[facility]  Physical Medicine & Rehab  Progress Note    Patient Name: Lopez Savage  MRN: [id number]  Admission Date: 9/13/2020  Length of Stay: 5 days  Attending Physician: Linda Natarajan MD    Subjective:     Principal Problem:Status epilepticus     (09/18/2020): Patient is seen for follow-up rehab evaluation and recommendations.  Unresponsive when PT attempted session yesterday.    HPI, Past Medical, Surgical, Family, and Social History remains the same as documented in the initial encounter.    Hospital Course:   09/15/2020: Bed mobility MaxA-TA x 2 ppl  Toilet TA.   9/17: PT/OT session not performed 2/2 unresponsive to sternal rub.    No past medical history on file.  No past surgical history on file.  Review of patient's allergies indicates:   Allergen Reactions    Codeine     Tetanus vaccines and toxoid        Scheduled Medications:    albuterol-ipratropium  3 mL Nebulization Q4H    atorvastatin  40 mg Per G Tube Daily    cefTRIAXone (ROCEPHIN) IVPB  2 g Intravenous Q12H    heparin (porcine)  5,000 Units Subcutaneous Q8H    lacosamide  200 mg Per NG tube Q12H    losartan  25 mg Per NG tube Daily    pantoprazole  40 mg Per NG tube Daily    vancomycin (VANCOCIN) IVPB  1,750 mg Intravenous Q24H       PRN Medications: dextrose 50%, glucagon (human recombinant), insulin aspart U-100, labetalol, magnesium sulfate IVPB, magnesium sulfate IVPB, potassium chloride in water **AND** potassium chloride in water **AND** potassium chloride in water, sodium chloride 0.9%, sodium phosphate IVPB, sodium phosphate IVPB, sodium phosphate IVPB, Pharmacy to dose Vancomycin consult **AND** vancomycin - pharmacy to dose    Family History     Unknown per patient.         Tobacco Use    Smoking status: Not on file   Substance and Sexual Activity    Alcohol use: Not on file    Drug use: Not on file    Sexual activity: Not on file     Review of Systems     Objective:     Vital Signs (Most  Recent):  Temp: 97.9 °F (36.6 °C) (09/16/20 0705)  Pulse: 84 (09/16/20 0905)  Resp: (!) 21 (09/16/20 0905)  BP: 137/63 (09/16/20 0905)  SpO2: 100 % (09/16/20 0905)    Vital Signs (24h Range):  Temp:  [97.9 °F (36.6 °C)-98.9 °F (37.2 °C)] 97.9 °F (36.6 °C)  Pulse:  [70-90] 84  Resp:  [13-44] 21  SpO2:  [93 %-100 %] 100 %  BP: (120-166)/(60-96) 137/63     Body mass index is 37.11 kg/m².    Physical Exam  Constitutional:       General: He is not in acute distress.     Appearance: He is well-developed. He is obese.      Comments: Restraints in place    HENT:      Head: Normocephalic and atraumatic.   Eyes:      General:         Right eye: No discharge.         Left eye: No discharge.   Neck:      Musculoskeletal: Neck supple.   Cardiovascular:      Pulses: Normal pulses.   Pulmonary:      Effort: Pulmonary effort is normal. No respiratory distress.   Abdominal:      General: There is no distension.      Palpations: Abdomen is soft.      Comments: NGT in place    Musculoskeletal:         General: Swelling present. No deformity.   Skin:     General: Skin is warm and dry.      Findings: Bruising present.   Neurological:      Mental Status: He is lethargic and disoriented.      Motor: Weakness present.      Comments: Doesn't follow commands well 2/2 somnolence   Briefly opens eyes but falls back asleep during exam    Psychiatric:         Behavior: Behavior is slowed.         Cognition and Memory: Cognition is impaired.       Diagnostic Results:   Labs: Reviewed  ECG: Reviewed  X-Ray: Reviewed  CT: Reviewed  Assessment/Plan:      * Status epilepticus  -EEG with continuous periodic discharges per Epilepsy team  - on vimpat, keppra    See hospital course for functional, cognitive/speech/language, and nutrition/swallow status.      Recommendations  -  Monitor sleep disturbances and establish consistent sleep-wake cycle  -  Environmental modifications to limit agitation/confusion   -  Reorient patient to person, place, time, and  situation on each encounter  -  Avoid restraints  -  May benefit from 24/7 supervision  -  Avoid/limit medications that can worsen delirium (benzodiazepines, antihistamines, anticholinergics, hypnotics, opiates)  -  Encourage mobility, OOB in chair, and early ambulation as appropriate  -  PT/OT evaluate and treat  -  SLP speech and cognitive evaluate and treat  -  Monitor for bowel and bladder dysfunction  -  Monitor for and prevent skin breakdown and pressure ulcers  · Early mobility, repositioning/weight shifting every 20-30 minutes when sitting, turn patient every 2 hours, proper mattress/overlay and chair cushioning, pressure relief/heel protector boots    Oral phase dysphagia  -NGT in place     Debility  -PT/OT/SLP    Encephalopathy acute  -present on exam     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 Will follow progress and discuss with PM&R staff for post acute care recommendation.      Jessica Yates NP  Department of Physical Medicine & Rehab   [facility]

## 2020-09-18 NOTE — PLAN OF CARE
[facility] Care Plan    POC reviewed with Lopez Savage and family at 1400. Wife verbalized understanding. Questions and concerns addressed. Pt confused. SBP less than 180. Afebrile. Bladder scan q6h with intermittent straight cath. TF at goal of 55ml/hr.  Will continue to monitor. See below and flowsheets for full assessment and VS info.       Neuro:  [place] Coma Scale  Best Eye Response: 4-->(E4) spontaneous  Best Motor Response: 6-->(M6) obeys commands  Best Verbal Response: 4-->(V4) confused  [place] Coma Scale Score: 14  Assessment Qualifiers: patient not sedated/intubated  Pupil PERRLA: yes     24 hr Temp:  [97.4 °F (36.3 °C)-98.3 °F (36.8 °C)]     CV:   Rhythm: normal sinus rhythm  BP goals:   SBP < 180  MAP > 65    Resp:   O2 Device (Oxygen Therapy): nasal cannula       Plan: N/A    GI/:  ARTURO Total Score: 2  Diet/Nutrition Received: NPO, tube feeding  Last Bowel Movement: 09/18/20  Voiding Characteristics: incontinence    Intake/Output Summary (Last 24 hours) at 9/18/2020 1732  Last data filed at 9/18/2020 1705  Gross per 24 hour   Intake 1830 ml   Output 780 ml   Net 1050 ml     Unmeasured Output  Urine Occurrence: 1  Stool Occurrence: 0  Emesis Occurrence: 0  Pad Count: 2    Labs/Accuchecks:  Recent Labs   Lab 09/18/20  0803   WBC 11.39   RBC 3.34*   HGB 9.3*   HCT 31.2*         Recent Labs   Lab 09/18/20  0803      K 3.9   CO2 25      BUN 27*   CREATININE 1.2   ALKPHOS 59   ALT 5*   AST 12   BILITOT 0.2      Recent Labs   Lab 09/14/20  0338   INR 1.2   APTT 30.3      Recent Labs   Lab 09/14/20  0338   CPK 25   CPKMB 1.2   MB 4.8       Electrolytes: N/A - electrolytes WDL  Accuchecks: Q6H    Gtts:       LDA/Wounds:  Lines/Drains/Airways       Peripherally Inserted Central Catheter Line              PICC Double Lumen 09/18/20 1227 right basilic less than 1 day              Drain                   NG/OG Tube 09/12/20 1644 [place] sump 16 Fr. Left nostril 6 days         Rectal Tube 09/15/20  1700 rectal tube w/ balloon (indicate number of mLs) 3 days              Peripheral Intravenous Line                   Peripheral IV - Single Lumen 09/16/20 1112 18 G;1 3/4 in Right Upper Arm 2 days         Peripheral IV - Single Lumen 09/17/20 1015 20 G;1 3/4 in Right Forearm 1 day                  Wounds: Yes  Wound care consulted: Yes

## 2020-09-19 NOTE — ASSESSMENT & PLAN NOTE
On 9/8 pt presented to [facility] ED with unresponsiveness  No tPA due to recent GI bleed  MRI with left PCA infarct and left thalamic infarct along with small right PCA infarct  MRA demonstrates fetal circulation to left PCA  9/11 Repeat MRI new acute/subacute L cerebellum infarct    -Neuro checks q1hr  -Vascular neurology following  -SBP Goal < 180  -Stable on repeat imaging

## 2020-09-19 NOTE — PROGRESS NOTES
Pharmacokinetic Assessment Follow Up: IV Vancomycin    Vancomycin serum concentration assessment(s):    - 9.5 hour vanc trough is 18.8 mcg/mL  - Previously calculated half-life prolonged at ~53 hours  - Cautiously schedule vanc 1000 mg Q24H  - Draw trough tomorrow 9/20 at 13:00   - Goal trough 15-20 mcg/mL  - Per ID, Continue IV Vanc and cetriaxone for 6-8 weeks, end date 10/27-11/10 with repeat      Drug levels (last 3 results):  Recent Labs   Lab Result Units 09/17/20  1213 09/18/20  0803 09/19/20  0936   Vancomycin, Random ug/mL  --  17.9 18.8   Vancomycin-Trough ug/mL 23.3*  --   --        Pharmacy will continue to follow and monitor vancomycin.  Please contact pharmacy at extension [phone] for questions regarding this assessment.    Thank you for the consult,   Waleska Julien, PharmD, [facility]  Neurocritical Care Pharmacist  [phone]         Patient brief summary:  Lopez Savage is a 75 y.o. male initiated on antimicrobial therapy with IV Vancomycin for treatment of endocarditis/brain abscess      Drug Allergies:   Review of patient's allergies indicates:   Allergen Reactions    Codeine     Tetanus vaccines and toxoid        Actual Body Weight:   114 kg    Renal Function:   Estimated Creatinine Clearance: 73.3 mL/min (based on SCr of 1.1 mg/dL).,     Dialysis Method (if applicable):  N/A

## 2020-09-19 NOTE — ASSESSMENT & PLAN NOTE
- Multiple witnessed seizure-like activity described  - No previous reports from family  - Was started on keppra and vimpat 200 mg BID  - EEG at OSH shows irregular slowing in the R frontal region, with L posterior periodic epileptiform discharges. No seizures recorded during this study.  - 9/13 Here EEG continues to show periodic discharges in the left posterior region with no seizures.    Plan:   -- Continue vimpat 200 mg BID  --9/14: EEG with periodic discharges per Epilepsy, continue current AED regimen  --9/15: EEG stable. Stop Keppra, continue EEG.  --9/16: EEG stable. Continue Vimpat  --9/17: EEG with EPC overnight, moderate encephalopathy. Epilepsy following, appreciate recs. 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 Recommend continuing Vimpat for at least 2 years. Continued drowsiness during the day and wakefulness at night, implement delirium precautions.  9/19: No clinical seizures overnight.

## 2020-09-19 NOTE — PLAN OF CARE
POC reviewed with pt and wife at 1400. Pt and wife verbalized understanding. Questions and concerns addressed. No acute events today. Pt progressing toward goals. TF remain @ goal of 55cc/hr. PRN Metamucil given for liquid stool, flexi remains in place. Will continue to monitor. See flowsheets for full assessment and VS info.

## 2020-09-19 NOTE — PROGRESS NOTES
[facility]  Neurocritical Care  Progress Note    Admit Date: 9/13/2020  Service Date: 09/19/2020  Length of Stay: 6    Subjective:     Chief Complaint: Status epilepticus    History of Present Illness: Mr. 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 The patient presented to [facility] on 9/8/2020 with a primary complaint of loss of consciousness. The patient was recently treated for endocarditis w/ IV rocephin through PICC line at [facility] and discharged approximately 2 weeks ago. Since then he has had a worsening cough and was planning to go to the hospital for evaluation. Per wife home health did portable CXR about a week ago w/ concern for fluid vs pneumonia. Patient was given course of doxycycline and finished course recently. 9/8 Wife went to bed while  was watching TV and about an hour later her daughter found him non responsive on the ground. Daughter did a few chest compressions before EMS arrived. Patient arrived to ED and non responsive to sternal rub. Pupillary and gag reflex present. Patient intubated for airway protection and sedated w/ propofol. No seizure like activity was observed in ED. TPA contraindicated due to recent GI bleed, last occurring in August. Discussed further stroke work up with wife and planned for imaging. CT head negative for acute intracranial bleed. MRI head showed PCA infarct and right occipital lobe edema. 9/8 EEG demonstrated left posterior pseudo periodic epileptiform discharges is suggestive of an irritative region likely secondary to focal ischemia. Patient started on AEDs vimpat and keppra and reported to have several seizures during his stay. Patient was transferred to [facility] today for continuous EEG monitoring and further evaluation.    Hospital Course: 9/13/2020 Admit to  NCC.  09/14/2020: NAEON. Caio scheduled, PT/OT ordered. EEG with continuous periodic discharges per Epilepsy team. Continue current AEDs. Will consult ID for endocarditis, antibiotic recs.  09/15/2020: MRI with and without contrast ordered, which shows multiple septic emboli with abscess formation. ID consulted, ceftriaxone adjusted to CNS doses. NSGY following.  09/16/2020: Patient stable overnight. ID stopped doxycycline and flagyl, and started Vanc. Continue ceftriaxone. Free water flushes started. PT/OT to eval and sit on side of bed.  09/17/2020: Patient with increasing lethargy yesterday evening. ABG WNL. CTH stable, placed on cap EEG overnight. EPC, moderate encephalopathy on EEG overnight. Patient more awake at night, and drowsy during the day. Will implement delirium precautions.  09/19/2020: NAEON. Delirium improved. More alert today.      Interval History:  NAEON. Pt is drowsy, but arouses with moderate stimulation. High concern for aspiration with him. Will watch him in the ICU overnight and if he remains stable overnight will stepdown tomorrow. Pt will likely need placement and a peg tube. Will broch this topic with family to help him prepare for placement dispo.     Review of Systems   Constitutional: Negative for fever.   HENT: Negative for voice change.    Eyes: Negative for photophobia.   Respiratory: Negative for wheezing.    Cardiovascular: Negative for chest pain.   Gastrointestinal: Negative for nausea and vomiting.   Neurological: Negative for headaches.   Psychiatric/Behavioral: Negative for agitation. The patient is not nervous/anxious.      Objective:     Vitals:  Temp: 98.2 °F (36.8 °C)  Pulse: 87  Rhythm: normal sinus rhythm  BP: (!) 155/67  MAP (mmHg): 97  Resp: (!) 38  SpO2: 96 %  O2 Device (Oxygen Therapy): nasal cannula    Temp  Min: 96.5 °F (35.8 °C)  Max: 98.3 °F (36.8 °C)  Pulse  Min: 66  Max: 88  BP  Min: 108/51  Max: 175/70  MAP (mmHg)  Min: 74  Max: 100  Resp  Min: 17  Max:  42  SpO2  Min: 93 %  Max: 100 %    09/18 0701 - 09/19 0700  In: 1830 [I.V.:30]  Out: 475 [Urine:200]   Unmeasured Output  Urine Occurrence: 1  Stool Occurrence: 1  Emesis Occurrence: 0  Pad Count: 3       Physical Exam  Vitals signs and nursing note reviewed.   Constitutional:       General: He is not in acute distress.     Appearance: Normal appearance. He is normal weight.   HENT:      Head: Normocephalic and atraumatic.      Right Ear: External ear normal.      Left Ear: External ear normal.      Nose: Nose normal.      Mouth/Throat:      Mouth: Mucous membranes are moist.   Eyes:      Extraocular Movements: Extraocular movements intact.      Conjunctiva/sclera: Conjunctivae normal.      Pupils: Pupils are equal, round, and reactive to light.   Neck:      Musculoskeletal: Normal range of motion.   Cardiovascular:      Rate and Rhythm: Normal rate.      Pulses: Normal pulses.      Heart sounds: Normal heart sounds.   Pulmonary:      Effort: Pulmonary effort is normal.      Breath sounds: Normal breath sounds.   Abdominal:      General: Abdomen is flat.      Palpations: Abdomen is soft.      Comments: flexi seal    Skin:     General: Skin is warm and dry.      Capillary Refill: Capillary refill takes less than 2 seconds.   Neurological:      Comments: Pt slightly drowsy, but easily aroused  Only oriented to person  Follows commands, speech is clear with delayed responses  Left gaze preference, can overcome and cross midline  PERRLA  EOMI  Cranial nerves examined and are grossly intact    ANDERSON spontaneously   Generalized weakness              Medications:  Continuous Scheduledalbuterol-ipratropium, 3 mL, Q4H  atorvastatin, 40 mg, Daily  cefTRIAXone (ROCEPHIN) IVPB, 2 g, Q12H  heparin (porcine), 5,000 Units, Q8H  lacosamide, 200 mg, Q12H  losartan, 25 mg, Daily  pantoprazole, 40 mg, Daily  silodosin, 8 mg, Daily  sodium chloride 0.9%, 10 mL, Q6H  traZODone, 25 mg, QHS    PRNdextrose 50%, 12.5 g, PRN  glucagon (human  recombinant), 1 mg, PRN  insulin aspart U-100, 1-10 Units, Q6H PRN  labetalol, 10 mg, Q4H PRN  magnesium sulfate IVPB, 2 g, PRN  magnesium sulfate IVPB, 4 g, PRN  potassium chloride in water, 10 mEq, PRN    And  potassium chloride in water, 40 mEq, PRN    And  potassium chloride in water, 10 mEq, PRN  psyllium husk (aspartame), 1 packet, Daily PRN  sodium chloride 0.9%, 10 mL, PRN  sodium chloride 0.9%, 10 mL, PRN  sodium phosphate IVPB, 15 mmol, PRN  sodium phosphate IVPB, 20.01 mmol, PRN  sodium phosphate IVPB, 30 mmol, PRN  vancomycin - pharmacy to dose, , pharmacy to manage frequency      Today I personally reviewed pertinent imaging, notably: Right PICC catheter tip projects over the mid to distal SVC.  Nasogastric tube tip and side hole course below the diaphragm and beyond the field of view.  There is patchy increased interstitial and parenchymal attenuation bilaterally, having undergone some interval clearing since examination 09/17/2020 on the left, grossly stable on the right.  No pneumothorax or new large focal consolidation.     Diet  No diet orders on file  No diet orders on file        Assessment/Plan:     Neuro  * Status epilepticus  - Multiple witnessed seizure-like activity described  - No previous reports from family  - Was started on keppra and vimpat 200 mg BID  - EEG at OSH shows irregular slowing in the R frontal region, with L posterior periodic epileptiform discharges. No seizures recorded during this study.  - 9/13 Here EEG continues to show periodic discharges in the left posterior region with no seizures.    Plan:   -- Continue vimpat 200 mg BID  --9/14: EEG with periodic discharges per Epilepsy, continue current AED regimen  --9/15: EEG stable. Stop Keppra, continue EEG.  --9/16: EEG stable. Continue Vimpat  --9/17: EEG with EPC overnight, moderate encephalopathy. Epilepsy following, appreciate recs. 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 Recommend  continuing Vimpat for at least 2 years. Continued drowsiness during the day and wakefulness at night, implement delirium precautions.  9/19: No clinical seizures overnight.     Cerebellar stroke  On 9/8 pt presented to [place] ED with unresponsiveness  No tPA due to recent GI bleed  MRI with left PCA infarct and left thalamic infarct along with small right PCA infarct  MRA demonstrates fetal circulation to left PCA  9/11 Repeat MRI new acute/subacute L cerebellum infarct    -Neuro checks q1hr  -Vascular neurology following  -SBP Goal < 180  -Stable on repeat imaging    Embolic stroke involving left posterior cerebral artery  VN following  2/2 septic embolic    Pulmonary  Chronic bronchitis  Duonebs scheduled q4hrs  Monitor respiratory status, at risk for atelectasis  Continuous pulse oximetry, ABG prn  Continue CPT, pulmonary toileting  CXR stable    Cardiac/Vascular  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/septic emboli    -Continue ceftriaxone 2gms IV q12hrs  -New blood cultures sent 9/14- NGTD  -NSGY consulted- no surgical plans at this time  -Stopped doxycycline, flagyl per ID recs, started Vanc, Vanc consult to pharmacy  -ID recs:  Continue IV Vanc and cetriaxone for 6-8 weeks, end date 10/27-11/10 with repeat MRI post treatment    Hematology  Intracranial septic embolism  -See Stroke    GI  Oral phase dysphagia  NGT in place, continuous tube feedings for nutrition  Cont enteral water flushes 200 ml q6hrs  SLP following, will likely need  PEG    Ulcerative colitis  Hx of, monitor for GI bleeding  PPI          The patient is being Prophylaxed for:  Venous Thromboembolism with: Chemical  Stress Ulcer with: Not Applicable   Ventilator Pneumonia with: not applicable    Activity Orders          None        Full Code  Level III  Emma Owens NP  Neurocritical Care  [facility]

## 2020-09-19 NOTE — ASSESSMENT & PLAN NOTE
Positive blood culture 7/31 for strep mutans  Previously evaluated by CT surgery team her at [facility], and determined to be too high risk for valve replacement surgery, to manage with IV antibiotics    According to medical records, finished 6 week course of Ceftriaxone for endocarditis coverage  ID consulted for further antibiotic management given septic emboli with abscess formation.  -See brain abscess

## 2020-09-19 NOTE — SUBJECTIVE & OBJECTIVE
Interval History:  NAEON. Pt is drowsy, but arouses with moderate stimulation. High concern for aspiration with him. Will watch him in the ICU overnight and if he remains stable overnight will stepdown tomorrow. Pt will likely need placement and a peg tube. Will broch this topic with family to help him prepare for placement dispo.     Review of Systems   Constitutional: Negative for fever.   HENT: Negative for voice change.    Eyes: Negative for photophobia.   Respiratory: Negative for wheezing.    Cardiovascular: Negative for chest pain.   Gastrointestinal: Negative for nausea and vomiting.   Neurological: Negative for headaches.   Psychiatric/Behavioral: Negative for agitation. The patient is not nervous/anxious.      Objective:     Vitals:  Temp: 98.2 °F (36.8 °C)  Pulse: 87  Rhythm: normal sinus rhythm  BP: (!) 155/67  MAP (mmHg): 97  Resp: (!) 38  SpO2: 96 %  O2 Device (Oxygen Therapy): nasal cannula    Temp  Min: 96.5 °F (35.8 °C)  Max: 98.3 °F (36.8 °C)  Pulse  Min: 66  Max: 88  BP  Min: 108/51  Max: 175/70  MAP (mmHg)  Min: 74  Max: 100  Resp  Min: 17  Max: 42  SpO2  Min: 93 %  Max: 100 %    09/18 0701 - 09/19 0700  In: 1830 [I.V.:30]  Out: 475 [Urine:200]   Unmeasured Output  Urine Occurrence: 1  Stool Occurrence: 1  Emesis Occurrence: 0  Pad Count: 3       Physical Exam  Vitals signs and nursing note reviewed.   Constitutional:       General: He is not in acute distress.     Appearance: Normal appearance. He is normal weight.   HENT:      Head: Normocephalic and atraumatic.      Right Ear: External ear normal.      Left Ear: External ear normal.      Nose: Nose normal.      Mouth/Throat:      Mouth: Mucous membranes are moist.   Eyes:      Extraocular Movements: Extraocular movements intact.      Conjunctiva/sclera: Conjunctivae normal.      Pupils: Pupils are equal, round, and reactive to light.   Neck:      Musculoskeletal: Normal range of motion.   Cardiovascular:      Rate and Rhythm: Normal rate.       Pulses: Normal pulses.      Heart sounds: Normal heart sounds.   Pulmonary:      Effort: Pulmonary effort is normal.      Breath sounds: Normal breath sounds.   Abdominal:      General: Abdomen is flat.      Palpations: Abdomen is soft.      Comments: flexi seal    Skin:     General: Skin is warm and dry.      Capillary Refill: Capillary refill takes less than 2 seconds.   Neurological:      Comments: Pt slightly drowsy, but easily aroused  Only oriented to person  Follows commands, speech is clear with delayed responses  Left gaze preference, can overcome and cross midline  PERRLA  EOMI  Cranial nerves examined and are grossly intact    ANDERSON spontaneously   Generalized weakness              Medications:  Continuous Scheduledalbuterol-ipratropium, 3 mL, Q4H  atorvastatin, 40 mg, Daily  cefTRIAXone (ROCEPHIN) IVPB, 2 g, Q12H  heparin (porcine), 5,000 Units, Q8H  lacosamide, 200 mg, Q12H  losartan, 25 mg, Daily  pantoprazole, 40 mg, Daily  silodosin, 8 mg, Daily  sodium chloride 0.9%, 10 mL, Q6H  traZODone, 25 mg, QHS    PRNdextrose 50%, 12.5 g, PRN  glucagon (human recombinant), 1 mg, PRN  insulin aspart U-100, 1-10 Units, Q6H PRN  labetalol, 10 mg, Q4H PRN  magnesium sulfate IVPB, 2 g, PRN  magnesium sulfate IVPB, 4 g, PRN  potassium chloride in water, 10 mEq, PRN    And  potassium chloride in water, 40 mEq, PRN    And  potassium chloride in water, 10 mEq, PRN  psyllium husk (aspartame), 1 packet, Daily PRN  sodium chloride 0.9%, 10 mL, PRN  sodium chloride 0.9%, 10 mL, PRN  sodium phosphate IVPB, 15 mmol, PRN  sodium phosphate IVPB, 20.01 mmol, PRN  sodium phosphate IVPB, 30 mmol, PRN  vancomycin - pharmacy to dose, , pharmacy to manage frequency      Today I personally reviewed pertinent imaging, notably: Right PICC catheter tip projects over the mid to distal SVC.  Nasogastric tube tip and side hole course below the diaphragm and beyond the field of view.  There is patchy increased interstitial and parenchymal  attenuation bilaterally, having undergone some interval clearing since examination 09/17/2020 on the left, grossly stable on the right.  No pneumothorax or new large focal consolidation.     Diet  No diet orders on file  No diet orders on file

## 2020-09-20 NOTE — PLAN OF CARE
Continue OT plan of care.    Problem: Occupational Therapy Goal  Goal: Occupational Therapy Goal  Description: Goals to be met by: 9/28/2020     Patient will increase functional independence with ADLs by performing:    Pt will follow 75% of motor commands with <2 verbal cues for repetition of task to maximize engagement in functional task.  Pt will sit at EOB for approx 10 minutes with max A and unilateral UE support to complete grooming task  Pt will complete sit>stand from EOB with bed in highest position with mod A in prep for functional transfers to [facility]  Pt will perform grooming sitting EOB with mod A               Outcome: Ongoing, Progressing

## 2020-09-20 NOTE — PROVIDER TRANSFER
Neuro Critical Care Transfer of Care note    Date of Admit: 9/13/2020  Date of Transfer / Stepdown: 9/20/2020    Brief History of Present Illness:      Lopez Savage is a 75 y.o. male who  has no past medical history on file.. The patient presented to [facility] on 9/13/2020 with a primary complaint of No chief complaint on file.          Hospital Course By Problem with Pertinent Findings:     1. Status epilepticus   On vimpat 200mg q12    2. Infective endocarditis   PICC line. Will need long-term abx.         Consultants and Procedures:     Consultants:  Infectious Disease     Procedures:        Transfer Information:     Diet:  Tube feeds    Physical Activity:        To Do / Pending Studies / Follow ups:      Emma Owens  Neuro Crtical Care

## 2020-09-20 NOTE — PLAN OF CARE
Pt is progressing towards goals as expected. Goals remain appropriate continue with current POC.     Patito Baird, PT, DPT  2020      Problem: Physical Therapy Goal  Goal: Physical Therapy Goal  Description: Goals to be met by: 2020    Patient will increase functional independence with mobility by performin. Pt will perform bed mobility (rolling L/R, scooting, and bridging) with CGA  2. Pt will perform supine to/from sit with CGA.  3. Pt will sit EOB x 15 mins with B UE support with Rony assistance.  4. Pt will perform sit to stand with max assistance and LRAD.  5. Pt will ambulate 15 feet with max assistance and LRAD.  6. Pt will perform there-ex from handout x 15 reps to improve strength for functional mobility.        Outcome: Ongoing, Progressing

## 2020-09-20 NOTE — PROGRESS NOTES
Pharmacokinetic Assessment Follow Up: IV Vancomycin    Vancomycin serum concentration assessment(s):    - Vanc trough essentially before third dose is 19.6 mcg/mL  - Therapeutic, goal trough 15-20 mcg/mL  - At high risk for accumulating, however SCr improving slightly and UOP trending up  - Continue 1000 mg Q24H for now  - Next trough scheduled for 9/22 at 13:00, but low threshold to check sooner based on renal function  - Per ID, Continue IV Vanc and cetriaxone for 6-8 weeks, end date 10/27-11/10 with repeat        Drug levels (last 3 results):  Recent Labs   Lab Result Units 09/18/20  0803 09/19/20  0936 09/20/20  1243   Vancomycin, Random ug/mL 17.9 18.8  --    Vancomycin-Trough ug/mL  --   --  19.6       Pharmacy will continue to follow and monitor vancomycin.  Please contact pharmacy at extension [phone] for questions regarding this assessment.    Thank you for the consult,   Waleska Julien, PharmD, [facility]  Neurocritical Care Pharmacist  [phone]         Patient brief summary:  Lopez Savage is a 75 y.o. male initiated on antimicrobial therapy with IV Vancomycin for treatment of endocarditis/brain abscess      Drug Allergies:   Review of patient's allergies indicates:   Allergen Reactions    Codeine     Tetanus vaccines and toxoid        Actual Body Weight:   114 kg    Renal Function:   Estimated Creatinine Clearance: 73.3 mL/min (based on SCr of 1.1 mg/dL).,     Dialysis Method (if applicable):  N/A

## 2020-09-20 NOTE — PT/OT/SLP PROGRESS
Physical Therapy Treatment    Patient Name:  Lopez Savage   MRN:  [id number]    Recommendations:     Discharge Recommendations:  rehabilitation facility   Discharge Equipment Recommendations: other (see comments)(TBD by next level of care)   Barriers to discharge: Inaccessible home and Decreased caregiver support    Assessment:     Lopez Savage is a 75 y.o. male admitted with a medical diagnosis of Status epilepticus.  He presents with the following impairments/functional limitations:  weakness, impaired endurance, impaired self care skills, impaired functional mobilty, gait instability, impaired balance, decreased coordination, decreased upper extremity function, decreased lower extremity function, decreased safety awareness, impaired fine motor, impaired coordination. PT recommending inpatient rehab upon DC from hospital.     Rehab Prognosis: Good; patient would benefit from acute skilled PT services to address these deficits and reach maximum level of function.    Recent Surgery: * No surgery found *      Plan:     During this hospitalization, patient to be seen 3 x/week to address the identified rehab impairments via gait training, therapeutic activities, therapeutic exercises, neuromuscular re-education and progress toward the following goals:    · Plan of Care Expires:  10/10/20    Subjective     Chief Complaint: light headed EOB; sleepy  Patient/Family Comments/goals: return to PLOF  Pain/Comfort:  · Pain Rating 1: 0/10  · Pain Rating Post-Intervention 1: 0/10      Objective:     Communicated with RN prior to session.  Patient found HOB elevated with bed alarm, blood pressure cuff, bowel management system, Condom Catheter, NG tube, peripheral IV, PICC line, pulse ox (continuous), restraints, SCD, telemetry upon PT entry to room.     General Precautions: Standard, aspiration, fall, NPO, seizure   Orthopedic Precautions:N/A   Braces: N/A     Functional Mobility:  · Bed Mobility:     · Rolling Left:  total  assistance and of 2 persons  · Rolling Right: total assistance and of 2 persons  · Scooting: total assistance and of 2 persons  · Supine to Sit: total assistance and of 2 persons  · Sit to Supine: total assistance and of 2 persons  · Transfers:  Not appropriate this date  · EOB ~10 minutes  · Balance:   · Static Sitting: max A, posterior lean  · Dynamic Sitting: total A      AM-PAC 6 CLICK MOBILITY  Turning over in bed (including adjusting bedclothes, sheets and blankets)?: 1  Sitting down on and standing up from a chair with arms (e.g., wheelchair, bedside commode, etc.): 1  Moving from lying on back to sitting on the side of the bed?: 1  Moving to and from a bed to a chair (including a wheelchair)?: 1  Need to walk in hospital room?: 1  Climbing 3-5 steps with a railing?: 1  Basic Mobility Total Score: 6       Therapeutic Activities and Exercises:  Patient educated on role of therapy, goals of session, and benefits of mobilizing.   Discussed PT plan of care during hospitalization.   Patient educated on calling for assistance.   Patient educated on how their diagnosis impacts their mobility within PT scope of practice.   Communication board up to date.  All questions answered within PT scope of practice.      Patient left HOB elevated with all lines intact, call button in reach, bed alarm on, restraints reapplied at end of session, RM notified and spouse present..    GOALS:   Multidisciplinary Problems     Physical Therapy Goals        Problem: Physical Therapy Goal    Goal Priority Disciplines Outcome Goal Variances Interventions   Physical Therapy Goal     PT, PT/OT Ongoing, Progressing     Description: Goals to be met by: 2020    Patient will increase functional independence with mobility by performin. Pt will perform bed mobility (rolling L/R, scooting, and bridging) with CGA  2. Pt will perform supine to/from sit with CGA.  3. Pt will sit EOB x 15 mins with B UE support with Rony assistance.  4. Pt  will perform sit to stand with max assistance and LRAD.  5. Pt will ambulate 15 feet with max assistance and LRAD.  6. Pt will perform there-ex from handout x 15 reps to improve strength for functional mobility.                         Time Tracking:     PT Received On: 09/20/20  PT Start Time: 1047     PT Stop Time: 1057   Additional time spent in room: 1112  1135 = 13 mins  PT Total Time (min): 10 min +13 mins = 23 mins  Additional staffing present: OT    Billable Minutes: Therapeutic Activity 13 and Neuromuscular Re-education 10    Treatment Type: Treatment  PT/PTA: PT     PTA Visit Number: 0     Patito Baird, PT  09/20/2020

## 2020-09-20 NOTE — ASSESSMENT & PLAN NOTE
Positive blood culture 7/31 for strep mutans  Previously evaluated by CT surgery team her at [facility], and determined to be too high risk for valve replacement surgery, to manage with IV antibiotics    According to medical records, finished 6 week course of Ceftriaxone for endocarditis coverage  ID consulted for further antibiotic management given septic emboli with abscess formation.  -See brain abscess

## 2020-09-20 NOTE — PLAN OF CARE
[facility] Care Plan    POC reviewed with Lopez Savage and family at 1400. Pt and wife verbalized understanding. Questions and concerns addressed. No acute events today. Pt progressing toward goals. TF remain @ 50cc/hr. Pt transferred to floor.  See below and flowsheets for full assessment and VS info.       Neuro:  Elsie Coma Scale  Best Eye Response: 3-->(E3) to speech  Best Motor Response: 6-->(M6) obeys commands  Best Verbal Response: 4-->(V4) confused  Roni Coma Scale Score: 13  Assessment Qualifiers: patient not sedated/intubated  Pupil PERRLA: yes     24 hr Temp:  [97.5 °F (36.4 °C)-98.9 °F (37.2 °C)]     CV:   Rhythm: normal sinus rhythm  BP goals:   SBP < 180  MAP > 65    Resp:   O2 Device (Oxygen Therapy): nasal cannula w/ humidification       Plan: N/A    GI/:  ARTURO Total Score: 19  Diet/Nutrition Received: tube feeding  Last Bowel Movement: 09/20/20  Voiding Characteristics: external catheter    Intake/Output Summary (Last 24 hours) at 9/20/2020 1733  Last data filed at 9/20/2020 1600  Gross per 24 hour   Intake 2630 ml   Output 1875 ml   Net 755 ml     Unmeasured Output  Urine Occurrence: 1  Stool Occurrence: 1  Emesis Occurrence: 0  Pad Count: 2    Labs/Accuchecks:  Recent Labs   Lab 09/20/20  0300   WBC 9.54   RBC 3.01*   HGB 8.2*   HCT 27.4*         Recent Labs   Lab 09/20/20  0300      K 3.9   CO2 28      BUN 31*   CREATININE 1.1   ALKPHOS 56   ALT 5*   AST 11   BILITOT 0.2      Recent Labs   Lab 09/14/20  0338   INR 1.2   APTT 30.3      Recent Labs   Lab 09/14/20  0338   CPK 25   CPKMB 1.2   MB 4.8       Electrolytes: Electrolytes replaced  Accuchecks: Q6H    Gtts:       LDA/Wounds:  Lines/Drains/Airways       Peripherally Inserted Central Catheter Line              PICC Double Lumen 09/18/20 1227 right basilic 2 days              Drain                   NG/OG Tube 09/12/20 1644 [place] sump 16 Fr. Left nostril 8 days         Rectal Tube 09/15/20 1700 rectal tube w/ balloon  (indicate number of mLs) 5 days    Male External Urinary Catheter 09/19/20 0900 Small 1 day              Peripheral Intravenous Line                   Peripheral IV - Single Lumen 09/16/20 1112 18 G;1 3/4 in Right Upper Arm 4 days         Peripheral IV - Single Lumen 09/17/20 1015 20 G;1 3/4 in Right Forearm 3 days                  Wounds: Yes  Wound care consulted: Yes

## 2020-09-20 NOTE — PROGRESS NOTES
[facility]  Neurocritical Care  Progress Note    Admit Date: 9/13/2020  Service Date: 09/20/2020  Length of Stay: 7    Subjective:     Chief Complaint: Status epilepticus    History of Present Illness: Mr. 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 The patient presented to [facility] on 9/8/2020 with a primary complaint of loss of consciousness. The patient was recently treated for endocarditis w/ IV rocephin through PICC line at [facility] and discharged approximately 2 weeks ago. Since then he has had a worsening cough and was planning to go to the hospital for evaluation. Per wife home health did portable CXR about a week ago w/ concern for fluid vs pneumonia. Patient was given course of doxycycline and finished course recently. 9/8 Wife went to bed while  was watching TV and about an hour later her daughter found him non responsive on the ground. Daughter did a few chest compressions before EMS arrived. Patient arrived to ED and non responsive to sternal rub. Pupillary and gag reflex present. Patient intubated for airway protection and sedated w/ propofol. No seizure like activity was observed in ED. TPA contraindicated due to recent GI bleed, last occurring in August. Discussed further stroke work up with wife and planned for imaging. CT head negative for acute intracranial bleed. MRI head showed PCA infarct and right occipital lobe edema. 9/8 EEG demonstrated left posterior pseudo periodic epileptiform discharges is suggestive of an irritative region likely secondary to focal ischemia. Patient started on AEDs vimpat and keppra and reported to have several seizures during his stay. Patient was transferred to [facility] today for continuous EEG monitoring and further evaluation.    Hospital Course: 9/13/2020 Admit to  NCC.  09/14/2020: NAEON. Duonebs scheduled, PT/OT ordered. EEG with continuous periodic discharges per Epilepsy team. Continue current AEDs. Will consult ID for endocarditis, antibiotic recs.  09/15/2020: MRI with and without contrast ordered, which shows multiple septic emboli with abscess formation. ID consulted, ceftriaxone adjusted to CNS doses. NSGY following.  09/16/2020: Patient stable overnight. ID stopped doxycycline and flagyl, and started Vanc. Continue ceftriaxone. Free water flushes started. PT/OT to eval and sit on side of bed.  09/17/2020: Patient with increasing lethargy yesterday evening. ABG WNL. CTH stable, placed on cap EEG overnight. EPC, moderate encephalopathy on EEG overnight. Patient more awake at night, and drowsy during the day. Will implement delirium precautions.  09/19/2020: NAEON. Delirium improved. More alert today.  09/20/2020: NAEON. Sitting up in bed, more awake. Stable for stepdown.      Interval History:  NAEON. Pt more awake and alert today. Stable for stepdown.    Review of Systems   Constitutional: Negative for fever.   HENT: Negative for voice change.    Eyes: Negative for photophobia.   Respiratory: Negative for wheezing.    Cardiovascular: Negative for chest pain.   Gastrointestinal: Negative for nausea and vomiting.   Neurological: Negative for headaches.   Psychiatric/Behavioral: Negative for agitation. The patient is not nervous/anxious.      .  Objective:     Vitals:  Temp: 98.9 °F (37.2 °C)  Pulse: 80  Rhythm: normal sinus rhythm  BP: (!) 99/51  MAP (mmHg): 66  Resp: (!) 21  SpO2: 97 %  O2 Device (Oxygen Therapy): nasal cannula    Temp  Min: 97.8 °F (36.6 °C)  Max: 98.9 °F (37.2 °C)  Pulse  Min: 75  Max: 98  BP  Min: 99/51  Max: 159/73  MAP (mmHg)  Min: 66  Max: 105  Resp  Min: 19  Max: 43  SpO2  Min: 92 %  Max: 100 %    09/19 0701 - 09/20 0700  In: 2725 [I.V.:280]  Out: 1675 [Urine:1375]   Unmeasured Output  Urine Occurrence: 1  Stool Occurrence: 1  Emesis Occurrence:  0  Pad Count: 2       Physical Exam  Vitals signs and nursing note reviewed.   Constitutional:       General: He is not in acute distress.     Appearance: Normal appearance. He is normal weight.   HENT:      Head: Normocephalic and atraumatic.      Right Ear: External ear normal.      Left Ear: External ear normal.      Nose: Nose normal.      Mouth/Throat:      Mouth: Mucous membranes are moist.   Eyes:      Extraocular Movements: Extraocular movements intact.      Conjunctiva/sclera: Conjunctivae normal.      Pupils: Pupils are equal, round, and reactive to light.   Neck:      Musculoskeletal: Normal range of motion.   Cardiovascular:      Rate and Rhythm: Normal rate.      Pulses: Normal pulses.      Heart sounds: Normal heart sounds.   Pulmonary:      Effort: Pulmonary effort is normal.      Breath sounds: Normal breath sounds.   Abdominal:      General: Abdomen is flat.      Palpations: Abdomen is soft.      Comments: flexi seal    Skin:     General: Skin is warm and dry.      Capillary Refill: Capillary refill takes less than 2 seconds.   Neurological:      Comments: Pt slightly drowsy, but easily aroused  Only oriented to person  Follows commands, speech is clear with delayed responses  Left gaze preference, can overcome and cross midline  PERRLA  EOMI  Cranial nerves examined and are grossly intact    ANDERSON spontaneously   Generalized weakness              Medications:  Continuous Scheduledalbuterol-ipratropium, 3 mL, Q4H  atorvastatin, 40 mg, Daily  cefTRIAXone (ROCEPHIN) IVPB, 2 g, Q12H  heparin (porcine), 5,000 Units, Q8H  lacosamide, 200 mg, Q12H  losartan, 25 mg, Daily  pantoprazole, 40 mg, Daily  silodosin, 8 mg, Daily  sodium chloride 0.9%, 10 mL, Q6H  traZODone, 25 mg, QHS  vancomycin (VANCOCIN) IVPB, 1,000 mg, Q24H    PRNdextrose 50%, 12.5 g, PRN  glucagon (human recombinant), 1 mg, PRN  insulin aspart U-100, 1-10 Units, Q6H PRN  labetalol, 10 mg, Q4H PRN  magnesium sulfate IVPB, 2 g, PRN  magnesium  sulfate IVPB, 4 g, PRN  potassium chloride in water, 10 mEq, PRN    And  potassium chloride in water, 40 mEq, PRN    And  potassium chloride in water, 10 mEq, PRN  psyllium husk (aspartame), 1 packet, Daily PRN  sodium chloride 0.9%, 10 mL, PRN  sodium chloride 0.9%, 10 mL, PRN  sodium phosphate IVPB, 15 mmol, PRN  sodium phosphate IVPB, 20.01 mmol, PRN  sodium phosphate IVPB, 30 mmol, PRN  vancomycin - pharmacy to dose, , pharmacy to manage frequency          Diet  No diet orders on file  No diet orders on file        Assessment/Plan:     Neuro  * Status epilepticus  - Multiple witnessed seizure-like activity described  - No previous reports from family  - Was started on keppra and vimpat 200 mg BID  - EEG at OSH shows irregular slowing in the R frontal region, with L posterior periodic epileptiform discharges. No seizures recorded during this study.  - 9/13 Here EEG continues to show periodic discharges in the left posterior region with no seizures.    Plan:   -- Continue vimpat 200 mg BID  --9/14: EEG with periodic discharges per Epilepsy, continue current AED regimen  --9/15: EEG stable. Stop Keppra, continue EEG.  --9/16: EEG stable. Continue Vimpat  --9/17: EEG with EPC overnight, moderate encephalopathy. Epilepsy following, appreciate recs. 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 Recommend continuing Vimpat for at least 2 years. Continued drowsiness during the day and wakefulness at night, implement delirium precautions.  9/19: No clinical seizures overnight.   9/20: No clinical seizures overnight.     Cerebellar stroke  On 9/8 pt presented to [place] ED with unresponsiveness  No tPA due to recent GI bleed  MRI with left PCA infarct and left thalamic infarct along with small right PCA infarct  MRA demonstrates fetal circulation to left PCA  9/11 Repeat MRI new acute/subacute L cerebellum infarct    -Neuro checks q1hr  -Vascular neurology following  -SBP Goal < 180  -Stable  on repeat imaging    Pulmonary  Chronic bronchitis  Duonebs scheduled q4hrs  Monitor respiratory status, at risk for atelectasis  Continuous pulse oximetry, ABG prn  Continue CPT, pulmonary toileting  CXR stable    Cardiac/Vascular  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  Previously evaluated by CT surgery team her at [facility], and determined to be too high risk for valve replacement surgery, to manage with IV antibiotics    According to medical records, finished 6 week course of Ceftriaxone for endocarditis coverage  ID consulted for further antibiotic management given septic emboli with abscess formation.  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/septic emboli    -Continue ceftriaxone 2gms IV q12hrs  -New blood cultures sent 9/14- NGTD  -NSGY consulted- no surgical plans at this time  -Stopped doxycycline, flagyl per ID recs, started Vanc, Vanc consult to pharmacy  -ID recs:  Continue IV Vanc and cetriaxone for 6-8 weeks, end date 10/27-11/10 with repeat MRI post treatment    Hematology  Intracranial septic embolism  -See Stroke    GI  Oral phase dysphagia  NGT in place, continuous tube feedings for nutrition  Cont enteral water flushes 200 ml q6hrs  SLP following, will likely need PEG    Ulcerative colitis  Hx of, monitor for GI bleeding  PPI    Other  Debility  PT/OT          The patient is being Prophylaxed for:  Venous Thromboembolism with: Chemical  Stress Ulcer with: PPI  Ventilator Pneumonia with: not applicable    Activity Orders          None        Full Code  Level II  Emma Owens NP  Neurocritical Care  [facility]

## 2020-09-20 NOTE — PLAN OF CARE
[facility] Care Plan    POC reviewed with Lopez Savage and family at 0300. Pt unable to verbalize understanding. Questions and concerns addressed. No acute events overnight. Pt progressing toward goals. Will continue to monitor. See below and flowsheets for full assessment and VS info.       Neuro:  McCamey Coma Scale  Best Eye Response: 4-->(E4) spontaneous  Best Motor Response: 6-->(M6) obeys commands  Best Verbal Response: 4-->(V4) confused  McCamey Coma Scale Score: 14  Assessment Qualifiers: patient not sedated/intubated, no eye obstruction present  Pupil PERRLA: yes     24hr Temp:  [97.8 °F (36.6 °C)-98.6 °F (37 °C)]     CV:   Rhythm: normal sinus rhythm  BP goals:   SBP < 160  MAP > 65    Resp:   O2 Device (Oxygen Therapy): nasal cannula       Plan: N/A    GI/:  ARTURO Total Score: 2  Diet/Nutrition Received: tube feeding  Last Bowel Movement: 09/19/20  Voiding Characteristics: external catheter    Intake/Output Summary (Last 24 hours) at 9/20/2020 0538  Last data filed at 9/20/2020 0507  Gross per 24 hour   Intake 2665 ml   Output 1675 ml   Net 990 ml     Unmeasured Output  Urine Occurrence: 1  Stool Occurrence: 1  Emesis Occurrence: 0  Pad Count: 2    Labs/Accuchecks:  Recent Labs   Lab 09/20/20  0300   WBC 9.54   RBC 3.01*   HGB 8.2*   HCT 27.4*         Recent Labs   Lab 09/20/20  0300      K 3.9   CO2 28      BUN 31*   CREATININE 1.1   ALKPHOS 56   ALT 5*   AST 11   BILITOT 0.2      Recent Labs   Lab 09/14/20  0338   INR 1.2   APTT 30.3      Recent Labs   Lab 09/14/20  0338   CPK 25   CPKMB 1.2   MB 4.8       Electrolytes: Electrolytes replaced  Accuchecks: Q6H    Gtts:       LDA/Wounds:  Lines/Drains/Airways       Peripherally Inserted Central Catheter Line              PICC Double Lumen 09/18/20 1227 right basilic 1 day              Drain                   NG/OG Tube 09/12/20 1644 [place] sump 16 Fr. Left nostril 7 days         Rectal Tube 09/15/20 1700 rectal tube w/ balloon (indicate  number of mLs) 4 days    Male External Urinary Catheter 09/19/20 0900 Small less than 1 day              Peripheral Intravenous Line                   Peripheral IV - Single Lumen 09/16/20 1112 18 G;1 3/4 in Right Upper Arm 3 days         Peripheral IV - Single Lumen 09/17/20 1015 20 G;1 3/4 in Right Forearm 2 days                  Wounds: Yes  Wound care consulted: Yes

## 2020-09-20 NOTE — SUBJECTIVE & OBJECTIVE
Interval History:  NAEON. Pt more awake and alert today. Stable for stepdown.    Review of Systems   Constitutional: Negative for fever.   HENT: Negative for voice change.    Eyes: Negative for photophobia.   Respiratory: Negative for wheezing.    Cardiovascular: Negative for chest pain.   Gastrointestinal: Negative for nausea and vomiting.   Neurological: Negative for headaches.   Psychiatric/Behavioral: Negative for agitation. The patient is not nervous/anxious.      .  Objective:     Vitals:  Temp: 98.9 °F (37.2 °C)  Pulse: 80  Rhythm: normal sinus rhythm  BP: (!) 99/51  MAP (mmHg): 66  Resp: (!) 21  SpO2: 97 %  O2 Device (Oxygen Therapy): nasal cannula    Temp  Min: 97.8 °F (36.6 °C)  Max: 98.9 °F (37.2 °C)  Pulse  Min: 75  Max: 98  BP  Min: 99/51  Max: 159/73  MAP (mmHg)  Min: 66  Max: 105  Resp  Min: 19  Max: 43  SpO2  Min: 92 %  Max: 100 %    09/19 0701 - 09/20 0700  In: 2725 [I.V.:280]  Out: 1675 [Urine:1375]   Unmeasured Output  Urine Occurrence: 1  Stool Occurrence: 1  Emesis Occurrence: 0  Pad Count: 2       Physical Exam  Vitals signs and nursing note reviewed.   Constitutional:       General: He is not in acute distress.     Appearance: Normal appearance. He is normal weight.   HENT:      Head: Normocephalic and atraumatic.      Right Ear: External ear normal.      Left Ear: External ear normal.      Nose: Nose normal.      Mouth/Throat:      Mouth: Mucous membranes are moist.   Eyes:      Extraocular Movements: Extraocular movements intact.      Conjunctiva/sclera: Conjunctivae normal.      Pupils: Pupils are equal, round, and reactive to light.   Neck:      Musculoskeletal: Normal range of motion.   Cardiovascular:      Rate and Rhythm: Normal rate.      Pulses: Normal pulses.      Heart sounds: Normal heart sounds.   Pulmonary:      Effort: Pulmonary effort is normal.      Breath sounds: Normal breath sounds.   Abdominal:      General: Abdomen is flat.      Palpations: Abdomen is soft.      Comments:  flexi seal    Skin:     General: Skin is warm and dry.      Capillary Refill: Capillary refill takes less than 2 seconds.   Neurological:      Comments: Pt slightly drowsy, but easily aroused  Only oriented to person  Follows commands, speech is clear with delayed responses  Left gaze preference, can overcome and cross midline  PERRLA  EOMI  Cranial nerves examined and are grossly intact    ANDERSON spontaneously   Generalized weakness              Medications:  Continuous Scheduledalbuterol-ipratropium, 3 mL, Q4H  atorvastatin, 40 mg, Daily  cefTRIAXone (ROCEPHIN) IVPB, 2 g, Q12H  heparin (porcine), 5,000 Units, Q8H  lacosamide, 200 mg, Q12H  losartan, 25 mg, Daily  pantoprazole, 40 mg, Daily  silodosin, 8 mg, Daily  sodium chloride 0.9%, 10 mL, Q6H  traZODone, 25 mg, QHS  vancomycin (VANCOCIN) IVPB, 1,000 mg, Q24H    PRNdextrose 50%, 12.5 g, PRN  glucagon (human recombinant), 1 mg, PRN  insulin aspart U-100, 1-10 Units, Q6H PRN  labetalol, 10 mg, Q4H PRN  magnesium sulfate IVPB, 2 g, PRN  magnesium sulfate IVPB, 4 g, PRN  potassium chloride in water, 10 mEq, PRN    And  potassium chloride in water, 40 mEq, PRN    And  potassium chloride in water, 10 mEq, PRN  psyllium husk (aspartame), 1 packet, Daily PRN  sodium chloride 0.9%, 10 mL, PRN  sodium chloride 0.9%, 10 mL, PRN  sodium phosphate IVPB, 15 mmol, PRN  sodium phosphate IVPB, 20.01 mmol, PRN  sodium phosphate IVPB, 30 mmol, PRN  vancomycin - pharmacy to dose, , pharmacy to manage frequency          Diet  No diet orders on file  No diet orders on file

## 2020-09-20 NOTE — ASSESSMENT & PLAN NOTE
On 9/8 pt presented to [place] ED with unresponsiveness  No tPA due to recent GI bleed  MRI with left PCA infarct and left thalamic infarct along with small right PCA infarct  MRA demonstrates fetal circulation to left PCA  9/11 Repeat MRI new acute/subacute L cerebellum infarct    -Neuro checks q1hr  -Vascular neurology following  -SBP Goal < 180  -Stable on repeat imaging

## 2020-09-20 NOTE — PT/OT/SLP PROGRESS
Occupational Therapy   Treatment    Name: Lopez Savage  MRN: [id number]  Admitting Diagnosis:  Status epilepticus       Recommendations:     Discharge Recommendations: rehabilitation facility  Discharge Equipment Recommendations:  (TBD)  Barriers to discharge:  (increased assistance needed)    Assessment:     Lopez Savage is a 75 y.o. male with a medical diagnosis of Status epilepticus.  He presents with performance deficits including weakness, impaired endurance, impaired self care skills, impaired functional mobilty, impaired balance, decreased safety awareness, decreased lower extremity function, decreased upper extremity function, decreased coordination, decreased ROM, impaired coordination, impaired cardiopulmonary response to activity. Pt would continue to benefit from OT to increase functional independence and safety. Recommend rehab upon D/C to [facility].     Rehab Prognosis:  Good; patient would benefit from acute skilled OT services to address these deficits and reach maximum level of function.       Plan:     Patient to be seen 4 x/week to address the above listed problems via self-care/home management, therapeutic activities, therapeutic exercises, neuromuscular re-education  · Plan of Care Expires: 10/13/20  · Plan of Care Reviewed with: patient, spouse    Subjective     Pain/Comfort:  · Pain Rating 1: 0/10  · Pain Rating Post-Intervention 1: 0/10    Objective:     Communicated with: RN prior to session. Patient found with HOB elevated with bed alarm, blood pressure cuff, peripheral IV, pulse ox (continuous), NG tube, SCD, telemetry, pressure relief boots, Condom Catheter, bowel management system, restraints upon OT entry to room, spouse present.    General Precautions: Standard, fall, aspiration   Orthopedic Precautions:N/A   Braces: N/A     Occupational Performance:     Bed Mobility:    · Patient completed Scooting with total assistance and 2 persons in supine to HOB  · Patient completed  Supine to Sit with total assistance and 2 persons  · Patient completed Sit to Supine with total assistance and 2 persons     Functional Mobility/Transfers:  · Not attempted due to impaired postural control and safety    Activities of Daily Living:  · Grooming: maximal assistance hand-over-hand assist on the R to wipe face  · Lower Body Dressing: total assistance to don socks      [facility] 6 Click ADL: 9    Treatment & Education:  Pt awake/alert throughout session, follows commands inconsistently and demo confusion, asking for daughters who were not present; able to sit EOB ~10 min with mostly Max A due to posterior lean with B UE support; completed LE therex with PT while OT provided cues/assist for postural control/midline; completed AAROM to B UEs, requiring increased assist on the L compared to R; pt c/o feeling dizzy sitting EOB-- bp 132/57; returned to supine/sidelying and discussed POC and D/C recs with pt and spouse    Patient left HOB elevated with all lines intact, call button in reach, bed alarm on, restraints reapplied at end of session, RN notified and spouse presentEducation:      GOALS:   Multidisciplinary Problems     Occupational Therapy Goals        Problem: Occupational Therapy Goal    Goal Priority Disciplines Outcome Interventions   Occupational Therapy Goal     OT, PT/OT Ongoing, Progressing    Description: Goals to be met by: 9/28/2020     Patient will increase functional independence with ADLs by performing:    Pt will follow 75% of motor commands with <2 verbal cues for repetition of task to maximize engagement in functional task.  Pt will sit at EOB for approx 10 minutes with max A and unilateral UE support to complete grooming task  Pt will complete sit>stand from EOB with bed in highest position with mod A in prep for functional transfers to [facility]  Pt will perform grooming sitting EOB with mod A                                Time Tracking:     OT Date of Treatment: 09/20/20  OT Start Time:  1113  OT Stop Time: 1136  OT Total Time (min): 23 min    Billable Minutes:Self Care/Home Management 8  Therapeutic Activity 15    ZACH Ceja  9/20/2020

## 2020-09-21 NOTE — PT/OT/SLP PROGRESS
Speech Language Pathology Treatment    Patient Name:  Lopez Savage   MRN:  [id number]  Admitting Diagnosis: Status epilepticus    Recommendations:                 General Recommendations:  Dysphagia therapy and Cognitive-linguistic therapy  Diet recommendations:  NPO, Liquid Diet Level: NPO   Aspiration Precautions: Frequent oral care and Strict aspiration precautions   General Precautions: Standard, aspiration, fall, NPO  Communication strategies:  provide increased time to answer and go to room if call light pushed    Subjective     Spoke with RN prior to entering pt's room . Pt seen bedside for session. Somnolent with eyes closed t/o session.    Pain/Comfort:  · Pain Rating 1: 0/10  · Pain Rating Post-Intervention 1: 0/10    Objective:     Has the patient been evaluated by SLP for swallowing?   Yes  Keep patient NPO? Yes   Current Respiratory Status: nasal cannula      Pt seen for ongoing assessment. Moaning/vocalizing t/o session, but no true words. Eyes remained closed despite max verbal and tactile stim. Pt did not follow simple commands or responds to simple yes/no questions despite max cues. Not appropriate for oral trials due to decreased TIGIST. At end of session, pt remained bedside with call light and all needs in reach. White board updated.      Assessment:     Lopez Savage is a 75 y.o. male with an SLP diagnosis of Dysphagia and Cognitive-Linguistic Impairment.  He presents with decreased TIGIST; not safe for oral intake at this time. Continue NPO.    Goals:   Multidisciplinary Problems     SLP Goals        Problem: SLP Goal    Goal Priority Disciplines Outcome   SLP Goal     SLP Ongoing, Progressing   Description: Goals expected to be met by 9/23:1. Pt will participate in ongoing assessment of swallow to determine safest, least restrictive diet.   2. Pt will orient x4 ind'ly.   3. Pt will complete basic problem solving and reasoning tasks with 70% acc given cues.   4. Pt will participate in ongoing  assessment of ability to follow commands.   5. Pt will participate in ongoing assessment of functional reading, writing, visual spatial skills.                    Plan:     · Patient to be seen:  4 x/week   · Plan of Care expires:  10/15/20  · Plan of Care reviewed with:  patient, spouse   · SLP Follow-Up:  Yes       Discharge recommendations:  rehabilitation facility   Barriers to Discharge:  Level of Skilled Assistance Needed .    Time Tracking:     SLP Treatment Date:   09/21/20  Speech Start Time:  0916  Speech Stop Time:  0924     Speech Total Time (min):  8 min    Billable Minutes: Speech Therapy Individual 8 minutes    Meche Rivers CCC-SLP  09/21/2020

## 2020-09-21 NOTE — PLAN OF CARE
Received patient on bilateral Mitts. Patient found holding NGT which was out of the nostril. Reinserted on the right nare with no problem. [place] insufflated air over epigastrium. Xray obtained. Results available. Paged and sent chat message to MD to verify placement and give order to use NGT. Awaiting for callback. Bilateral wrist restraints applied to protect essential lines. Patient had flexiseal . Pasty  stools noted which leaked around the insertion site. Perianal area noted to have  blanchable erythema at  the area of confluence . Applied protectant paste. Patient was turned & reposition frequently.

## 2020-09-21 NOTE — PLAN OF CARE
Recommendations     1. Continue current TF of Impact Peptide 1.5 @ 50 mL/hr - meeting needs.      2. As medically able, ADAT to regular with texture per SLP.      3. RD following.      Goals: pt to receive nutrition by RD follow up  Nutrition Goal Status: goal met

## 2020-09-21 NOTE — PT/OT/SLP PROGRESS
Physical Therapy Treatment/Co-Treatment with O.T.    Patient Name:  Lopez Savage   MRN:  [id number]    Recommendations:     Discharge Recommendations:  rehabilitation facility   Discharge Equipment Recommendations: other (see comments)(TBD)   Barriers to discharge: Inaccessible home and Decreased caregiver support    Assessment:     Lopez Savage is a 75 y.o. male admitted with a medical diagnosis of Status epilepticus.  He presents with the following impairments/functional limitations:  weakness, impaired endurance, impaired self care skills, impaired functional mobilty, impaired balance, decreased upper extremity function, decreased lower extremity function, decreased safety awareness, abnormal tone, decreased ROM, impaired coordination, edema, impaired cardiopulmonary response to activity . Patient was very somnolent and lethargic, with limited AROM of his limbs, which limits functional mobility.    Rehab Prognosis: Fair; patient would benefit from acute skilled PT services to address these deficits and reach maximum level of function.    Recent Surgery: * No surgery found *      Plan:     During this hospitalization, patient to be seen 3 x/week to address the identified rehab impairments via gait training, therapeutic activities, therapeutic exercises, neuromuscular re-education and progress toward the following goals:    · Plan of Care Expires:  10/10/20    Subjective     Chief Complaint: N/A  Patient/Family Comments/goals: N/A  Pain/Comfort:  · Pain Rating 1: 0/10  · Pain Rating Post-Intervention 1: 0/10      Objective:     Communicated with NSG prior to session.  Patient found HOB elevated with bed alarm, telemetry, restraints, peripheral IV, NG tube upon PT entry to room.     General Precautions: Standard, aspiration, fall, NPO   Orthopedic Precautions:N/A   Braces: N/A     Functional Mobility:  · Bed Mobility:     · Rolling Right: total assistance and of 2 persons  · Scooting: dependence and of 2  persons  · Supine to Sit: total assistance and of 2 persons  · Sit to Supine: total assistance and of 2 persons  · Balance: poor in sitting      AM-PAC 6 CLICK MOBILITY  Turning over in bed (including adjusting bedclothes, sheets and blankets)?: 2  Sitting down on and standing up from a chair with arms (e.g., wheelchair, bedside commode, etc.): 1  Moving from lying on back to sitting on the side of the bed?: 2  Moving to and from a bed to a chair (including a wheelchair)?: 1  Need to walk in hospital room?: 1  Climbing 3-5 steps with a railing?: 1  Basic Mobility Total Score: 8       Therapeutic Activities and Exercises:   Co-treatment performed with O.T. Static sitting balance at EOB x 10 minutes with mod assistance, but progressing to min assistance.  B LE PROM x 30 reps on all available planes of motion. Repositioned in bed for comfort with total assistance of 2.    Patient left HOB elevated with all lines intact and call button in reach..    GOALS:   Multidisciplinary Problems     Physical Therapy Goals        Problem: Physical Therapy Goal    Goal Priority Disciplines Outcome Goal Variances Interventions   Physical Therapy Goal     PT, PT/OT Ongoing, Progressing     Description: Goals to be met by: 2020    Patient will increase functional independence with mobility by performin. Pt will perform bed mobility (rolling L/R, scooting, and bridging) with CGA  2. Pt will perform supine to/from sit with CGA.  3. Pt will sit EOB x 15 mins with B UE support with Rony assistance.  4. Pt will perform sit to stand with max assistance and LRAD.  5. Pt will ambulate 15 feet with max assistance and LRAD.  6. Pt will perform there-ex from handout x 15 reps to improve strength for functional mobility.                         Time Tracking:     PT Received On: 20  PT Start Time: 1006     PT Stop Time: 1030  PT Total Time (min): 24 min     Billable Minutes: Neuromuscular Re-education 24    Treatment Type:  Treatment  PT/PTA: PTA     PTA Visit Number: 1     Raleigh Chino, PTA  09/21/2020

## 2020-09-21 NOTE — PT/OT/SLP PROGRESS
Occupational Therapy   Treatment    Name: Lopez Savage  MRN: [id number]  Admitting Diagnosis:  Status epilepticus       Recommendations:     Discharge Recommendations: rehabilitation facility  Discharge Equipment Recommendations:  other (see comments)(tbd)  Barriers to discharge:       Assessment:     Lopez Savage is a 75 y.o. male with a medical diagnosis of Status epilepticus.  He presents with impaired ADL and mobility performance deficits. Pt found very lethargic requiring extensive attempts at stimulation for participation (RN aware). Pt required total (A)x2 for bed mobility and sat EOB ~10 min for UE ROM and postural support exercises. Pt also required total (A) for grooming tasks this date with limited active participation. Pt with eyes open ~40% session with tactile cueing however unable to visual track nor follow most commands. At this time, pt requires extensive assistance for ADLs and mobility. Pt is not safe to return home and is a high fall risk. Pt would benefit from continued OT skilled services 4x/wk to improve daily living skills to optimize QOL.Pt is recommended to discharge to Rehab at this time.     Performance deficits affecting function are weakness, impaired self care skills, impaired balance, decreased coordination, decreased safety awareness, impaired endurance, impaired functional mobilty, decreased upper extremity function, decreased lower extremity function, impaired cognition, gait instability, decreased ROM, impaired cardiopulmonary response to activity, impaired fine motor, edema.     Rehab Prognosis:  Fair; patient would benefit from acute skilled OT services to address these deficits and reach maximum level of function.       Plan:     Patient to be seen 4 x/week to address the above listed problems via self-care/home management, therapeutic activities, therapeutic exercises, neuromuscular re-education  · Plan of Care Expires: 10/13/20  · Plan of Care Reviewed with:  patient    Subjective     Pain/Comfort:  · Pain Rating 1: 0/10  · Pain Rating Post-Intervention 1: 0/10    Objective:     Communicated with: RN prior to session.  Patient found HOB elevated with blood pressure cuff, restraints, telemetry, peripheral IV, pulse ox (continuous), NG tube upon OT entry to room.    General Precautions: Standard, aspiration, fall, NPO   Orthopedic Precautions:N/A   Braces: N/A     Occupational Performance:     Bed Mobility:    · Patient completed Rolling/Turning to Left with  total assistance and 2 persons  · Patient completed Rolling/Turning to Right with total assistance and 2 persons  · Patient completed Scooting/Bridging with total assistance and 2 persons  · Patient completed Supine to Sit with total assistance and 2 persons  · Patient completed Sit to Supine with total assistance and 2 persons     Functional Mobility/Transfers:  · Patient completed Sit <> Stand Transfer with dependence  with  hand-held assist   · Functional Mobility: OT/PT did complete brief standing of pt to align hips higher in bed x2 trials with dependence. Pt unable to actively participate in t/f.     Activities of Daily Living:  · Grooming: total (A) donning lotion to UEs/LEs with gentle massage for retrograde edema decrease; pt also given face towel to improve alertness with face cleansing with total (A)  · Upper Body Dressing: total assistance adjusting gown fit at EOB   · Lower Body Dressing: total assistance adjusting  sock fit       AMPA 6 Click ADL: 9    Treatment & Education:  Pt educated on role of occupational therapy, POC, and safety during ADLs and functional mobility. Pt and OT discussed importance of safe, continued mobility to optimize daily living skills. Pt unable to verbalize understanding 2/2 being nonverbal throughout session.   Pt completed the following during session: bed mobility, UB/LB dressing, grooming tasks, UE exercises (see below), safe repositioning for skin integrity and  comfort.   Pt completed PROM 1x10 of the following to each UE: shoulder flex/ext, elbow flex/ext, wrist flex/ext, digit flex/ext, attempts at  squeezes (pt actively assisted briefly with RUE)  White board updated during session. Pt given instruction to call for medical staff/nurse for assistance.       Patient left HOB elevated with all lines intact, call button in reach, restraints reapplied at end of session and RN notifiedEducation:      GOALS:   Multidisciplinary Problems     Occupational Therapy Goals        Problem: Occupational Therapy Goal    Goal Priority Disciplines Outcome Interventions   Occupational Therapy Goal     OT, PT/OT Ongoing, Progressing    Description: Goals to be met by: 9/28/2020     Patient will increase functional independence with ADLs by performing:    Pt will follow 75% of motor commands with <2 verbal cues for repetition of task to maximize engagement in functional task.  Pt will sit at EOB for approx 10 minutes with max A and unilateral UE support to complete grooming task  Pt will complete sit>stand from EOB with bed in highest position with mod A in prep for functional transfers to [facility]  Pt will perform grooming sitting EOB with mod A                                Time Tracking:     OT Date of Treatment: 09/21/20  OT Start Time: 1010  OT Stop Time: 1034  OT Total Time (min): 24 min    Billable Minutes:Self Care/Home Management 14 min  Therapeutic Exercise 10 min    Kaylah Dangelo OT  9/21/2020

## 2020-09-21 NOTE — PHYSICIAN QUERY
PT Name: Lopez Savage  MR #: [id number]     RESPIRATORY CONDITION CLARIFICATION     CDS/: Margaret Solano RN            Contact information: [email or web address]  This form is a permanent document in the medical record.     Query Date: 2020    By submitting this query, we are merely seeking further clarification of documentation.  Please utilize your independent clinical judgment when addressing the question(s) below.  The Medical Record contains the following   Indicators   Supporting Clinical Findings Location in Medical Record    SOB, YANCEY, Wheezing, Productive Cough, Use of Accessory Muscles, etc.     x RR         ABGs         O2 sat Resp  Av.3  Min: 0  Max: 31  SpO2  Av.3 %  Min: 80 %  Max: 100 %   Progress Note  (saunder/Tyra) IM    Hypoxia/Hypercapnia     x BiPAP/Intubation/Mechanical Ventilation Patient intubated for airway protection and sedated w/ propofol   Progress Note  (saunder/Tyra) IM    Supplemental O2      Home O2, Oxygen Dependence     x Respiratory Distress or Failure Acute hypoxic respiratory failure in the setting of PCA stroke suspect 2/2 septic emboli;  patient found unresponsive at home before arrival to ED. Initially non-responsive to sternal rub, pupillary and gag reflex present. tPA contraindicated due to recent GI bleed.   - CT head negative for acute intracranial bleed  - MRI head showed PCA infarct and right occipital lobe edema   -  MRI showing since 2020 MRI, there is a new small focus of acute to subacute ischemia in the left cerebellum.  - PT/OT consutled  - pt currently extubated, on NC  - Currently pt responds to verbal stimuli and and brainstem reflexes intact  - pt on aspirin 325 mg, atorvastatin 40 mg daily, tight glucose control, BP parameters SBP <220   Progress note IM  (Nataliia)   x Radiology Findings Impression:        Status post endotracheal tube placement without evidence of a  pneumothorax.        Electronically signed by:     Kris Renteria  Date:                                            09/08/2020  Time:                                            02:21                        Narrative:     EXAMINATION:  XR CHEST AP PORTABLE     CLINICAL HISTORY:  Personal history of other medical treatment     TECHNIQUE:  Single frontal view of the chest was performed.     COMPARISON:  September 8, 2020 01:33     FINDINGS:  Single view of the chest indicates that there is endotracheal tube placed which terminates 65 mm away from the josé.  Also a nasogastric tube has been placed which extends beyond the gastro esophageal junction and terminates in the upper aspect of the left abdomen                Progress Note 9/8 (rissa/Tyra) IM   x Acute/Chronic Illness patient presented to [facility] on 9/8/2020 with a primary complaint of loss of consciousness. The patient was recently treated for endocarditis w/ IV rocephin through PICC line at [facility] and discharged 2 weeks ago.    Progress Note 9/8 (rissa/Tyra) IM    Treatment      Other       Respiratory failure can be acute, chronic or both. It is generally further specified as hypoxic, hypercapnic or both. Lastly, it is important to identify an etiology, if known or suspected.   References:: https://www.acphospitalist.org/archives/2013/10/coding.htm    http://[employer].com/acute-respiratory-failure-know    The noted clinical guidelines are only system guidelines and do not replace the providers clinical judgment.    Provider, please specify diagnosis or diagnoses associated with above clinical findings.       [    ] Acute Respiratory Failure with Hypoxia - ABG pO2 < 60 mmHg or O2 sat of <91% on room air and respiratory symptoms documented   [  x  ] No Respiratory Failure, Maintained on Vent for Routine Care or Airway Protection -  purposely intubated for airway protection (e.g.: angioedema, stroke, trauma);  without meeting the criteria for respiratory failure.   [    ] Other Respiratory Diagnosis (please specify): _________________   [   ] Clinically Undetermined     Please document in your progress notes daily for the duration of treatment until resolved and include in your discharge summary.

## 2020-09-21 NOTE — PLAN OF CARE
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 The patient presented to [facility] on 9/8/2020 with a primary complaint of loss of consciousness. The patient was recently treated for endocarditis w/ IV rocephin through PICC line at [facility] and discharged approximately 2 weeks ago. Since then he has had a worsening cough and was planning to go to the hospital for evaluation. Per wife home health did portable CXR about a week ago w/ concern for fluid vs pneumonia. Patient was given course of doxycycline and finished course recently. 9/8 Wife went to bed while  was watching TV and about an hour later her daughter found him non responsive on the ground. Daughter did a few chest compressions before EMS arrived. Patient arrived to ED and non responsive to sternal rub. Pupillary and gag reflex present. Patient intubated for airway protection and sedated w/ propofol. No seizure like activity was observed in ED. TPA contraindicated due to recent GI bleed, last occurring in August. Discussed further stroke work up with wife and planned for imaging. CT head negative for acute intracranial bleed. MRI head showed PCA infarct and right occipital lobe edema. 9/8 EEG demonstrated left posterior pseudo periodic epileptiform discharges is suggestive of an irritative region likely secondary to focal ischemia. Patient started on AEDs vimpat and keppra and reported to have several seizures during his stay. Patient was transferred to [facility] today for continuous EEG monitoring and further evaluation.    Hospital/ICU Course:      9/13/2020  Admit to NCC.  09/14/2020: NAEON. Duonebs scheduled, PT/OT ordered. EEG with continuous periodic discharges per Epilepsy team. Continue current AEDs. Will consult ID for endocarditis, antibiotic recs.  09/15/2020: MRI with and without contrast ordered, which shows multiple septic emboli with abscess formation. ID consulted, ceftriaxone adjusted to CNS doses. NSGY following.  09/16/2020: Patient stable overnight. ID stopped doxycycline and flagyl, and started Vanc. Continue ceftriaxone. Free water flushes started. PT/OT to eval and sit on side of bed.  09/17/2020: Patient with increasing lethargy yesterday evening. ABG WNL. CTH stable, placed on cap EEG overnight. EPC, moderate encephalopathy on EEG overnight. Patient more awake at night, and drowsy during the day. Will implement delirium precautions.  09/19/2020: NAEON. Delirium improved. More alert today.  09/20/2020: NAEON. Sitting up in bed, more awake. Stable for stepdown.      Plan :       1. Status epilepticus   On vimpat 200mg q12     2. Infective endocarditis   1. Continue ceftriaxone. Increased dose [id number]  2. Continue vancomycin. Trough goal closer to 20  PICC line. Will need long-term abx.     Brain abscess  NSGY following. No surgical intervention. If worsening mentation or without improvement recommend reimaging and if need for surgical washout for source control. Please send fluid for cultures (gram stain, aerobic, anaerobic, fungal, AFB) if undergoes drainage.     On NG tubes at this time, cont SLP and PT OT evals    May need to involve palliative care based on further trajectory of pt's hospitalization course       Signing Physician:     Phyllis Sprague MD  Department of Hospital Medicine   [facility]  Pager [phone]  Spectra [phone]  9/20/2020

## 2020-09-21 NOTE — CONSULTS
Wound consult received on patient's scalp, buttocks, and right forearm.    Moisture associated dermatitis due to incontinence noted to buttocks. Recommend applying [employer]icaid clear barrier cream with miconazole BID/prn to buttocks.    Right forearm, dry crust noted, appear to be a resolving skin tear, recommend dressing with foam dressing, change weekly/prn.    Right and left frontal scalp with dry yellow/brown crust, right scalp linear and left scalp circular in appearance. Unknown etiology, possible medical device related skin breakdown from EEG vs Ring worm?. Recommend applying criticaid clear barrier cream with miconazole BID.    Discussed with Dr. Phyllis Sprague. MD approved recommendations.  Nursing to continue care. Wound care to follow prn.      buttocks    Right forearm    Left scalp    Right scalp

## 2020-09-21 NOTE — PROGRESS NOTES
"[facility] - ICU 14 WT  Adult Nutrition  Progress Note    SUMMARY       Recommendations    1. Continue current TF of Impact Peptide 1.5 @ 50 mL/hr - meeting needs.     2. As medically able, ADAT to regular with texture per SLP.     3. RD following.     Goals: pt to receive nutrition by RD follow up  Nutrition Goal Status: goal met  Communication of RD Recs: (POC)    Reason for Assessment    Reason For Assessment: RD follow-up  Diagnosis: (status epilepticus)  Relevant Medical History: GERD; diverticulitis; endocarditis; HTN; COPD  Interdisciplinary Rounds: did not attend  General Information Comments: Pt resting in bed with no family member at bedside. Noted bilateral wrist restraints. Spoke with RN, pt tolerating TF well. NFPE completed 9/15, pt appears nourished.   Nutrition Discharge Planning: unable to determine at this time    Nutrition Risk Screen    Nutrition Risk Screen: tube feeding or parenteral nutrition    Nutrition/Diet History    Spiritual, Cultural Beliefs, Quaker Practices, Values that Affect Care: no  Food Allergies: NKFA  Factors Affecting Nutritional Intake: NPO    Anthropometrics    Temp: 98.9 °F (37.2 °C)  Height Method: Measured  Height: 5' 9" (175.3 cm)  Height (inches): 69 in  Weight Method: Bed Scale  Weight: 114 kg (251 lb 5.2 oz)  Weight (lb): 251.33 lb  Ideal Body Weight (IBW), Male: 160 lb  % Ideal Body Weight, Male (lb): 157.08 %  BMI (Calculated): 37.1  BMI Grade: 35 - 39.9 - obesity - grade II     Lab/Procedures/Meds    Pertinent Labs Reviewed: reviewed  Pertinent Labs Comments: BUN 26, GFR 59.2, phos 4.9, ALT 8  Pertinent Medications Reviewed: reviewed  Pertinent Medications Comments: statin, losartan    Estimated/Assessed Needs    Weight Used For Calorie Calculations: 114 kg (251 lb 5.2 oz)  Energy Calorie Requirements (kcal): 1875 kcal/d  Energy Need Method: [place]  Protein Requirements:  g/day  Weight Used For Protein Calculations: 114 kg (251 lb 5.2 " oz)  Fluid Requirements (mL): 1 mL/kcal or per MD     RDA Method (mL): 1875     Nutrition Prescription Ordered    Current Diet Order: NPO  Current Nutrition Support Formula Ordered: Impact Peptide 1.5  Current Nutrition Support Rate Ordered: 50 (ml)  Current Nutrition Support Frequency Ordered: mL/hr    Evaluation of Received Nutrient/Fluid Intake    Enteral Calories (kcal): 1800  Enteral Protein (gm): 113  Enteral (Free Water) Fluid (mL): 924  % Kcal Needs: 96  % Protein Needs: 100  I/O: +7.096L since admit  Energy Calories Required: meeting needs  Protein Required: meeting needs  Fluid Required: (per Md)  Comments: LBM 9/20  Tolerance: tolerating  % Intake of Estimated Energy Needs: 75 - 100 %  % Meal Intake: NPO    Nutrition Risk    Level of Risk/Frequency of Follow-up: (f/u 1 x wk)     Assessment and Plan    Nutrition Problem  inadequate energy intake     Related to (etiology):   inability to consume sufficient energy      Signs and Symptoms (as evidenced by):   Pt NPO with no means of nutrition     Interventions (treatment strategy):  Collaboration of care with other providers  Enteral nutrition     Nutrition Diagnosis Status:   Resolved      Monitor and Evaluation    Food and Nutrient Intake: energy intake, enteral nutrition intake  Food and Nutrient Adminstration: enteral and parenteral nutrition administration  Anthropometric Measurements: weight, weight change  Biochemical Data, Medical Tests and Procedures: electrolyte and renal panel, gastrointestinal profile, glucose/endocrine profile, inflammatory profile, lipid profile  Nutrition-Focused Physical Findings: overall appearance     Malnutrition Assessment                 Orbital Region (Subcutaneous Fat Loss): well nourished  Upper Arm Region (Subcutaneous Fat Loss): well nourished   Anglican Region (Muscle Loss): well nourished  Clavicle Bone Region (Muscle Loss): well nourished  Clavicle and Acromion Bone Region (Muscle Loss): well nourished  Scapular  Bone Region (Muscle Loss): well nourished  Dorsal Hand (Muscle Loss): well nourished  Anterior Thigh Region (Muscle Loss): well nourished  Posterior Calf Region (Muscle Loss): well nourished                 Nutrition Follow-Up    RD Follow-up?: Yes

## 2020-09-21 NOTE — PROGRESS NOTES
Progress Note  Hospital Medicine  [facility] [place]       Patient Name: Lopez Savage  MRN:  [id number]  Hospital Medicine Team: [facility] X Phyllis Sprague MD  Date of Admission:  9/13/2020     Length of Stay:  LOS: 8 days   Expected Discharge Date: 9/25/2020  Principal Problem:  Status epilepticus     Subjective:     Interval History/Overnight Events:    Stepped down from neuro ICU   Sleepy and lethargic on rounds   Was more awake earlier in the AM   Awakes with sternal rub  Receiving NG tube feeds  On 2L NC   VSS   Labs stable   On IV abx     Family updated at bedside      albuterol-ipratropium  3 mL Nebulization Q4H    atorvastatin  40 mg Per NG tube Daily    cefTRIAXone (ROCEPHIN) IVPB  2 g Intravenous Q12H    enoxaparin  40 mg Subcutaneous Q24H    lacosamide  200 mg Per NG tube Q12H    losartan  25 mg Per NG tube Daily    miconazole nitrate 2%   Topical (Top) BID    pantoprazole  40 mg Per NG tube Daily    silodosin  8 mg Per NG tube Daily    sodium chloride 0.9%  10 mL Intravenous Q6H    traZODone  25 mg Per NG tube QHS    vancomycin (VANCOCIN) IVPB  1,000 mg Intravenous Q24H           dextrose 50%, glucagon (human recombinant), insulin aspart U-100, labetalol, psyllium husk (aspartame), sodium chloride 0.9%, Flushing PICC Protocol **AND** sodium chloride 0.9% **AND** sodium chloride 0.9%, Pharmacy to dose Vancomycin consult **AND** vancomycin - pharmacy to dose    Review of Systems   Unable to obtain 2/2 AMS    Objective:     Temp:  [97.5 °F (36.4 °C)-98.9 °F (37.2 °C)]   Pulse:  [72-87]   Resp:  [16-28]   BP: (110-147)/(54-65)   SpO2:  [96 %-100 %]         Weight change:    Body mass index is 37.11 kg/m².       Physical Exam:  Constitutional: lethargic , chronically ill looking,   HENT: NC/AT, external ears normal  Eyes: PERRL, EOMI, conjunctiva normal,  Neck: normal ROM, supple  CV: RRR, no m/r/g, no carotid bruits, +2 peripheral pulses.  Pulmonary/Chest wall:  Breathing comfortably w/o distress,  Decreased breath sounds at lung bases  GI: Soft, non-tender, (+) BS, (+) BM  Musculoskeletal: Normal ROM, no atrophy,   Neurological: lethargic , unable to test    Skin: warm, dry   +pallor  Psych lethargic     Labs:    Chemistries:   Recent Labs   Lab 09/19/20  0440 09/20/20  0300 09/20/20  1257 09/21/20  0300    141  --  140   K 3.9 3.9  --  3.9    106  --  103   CO2 28 28  --  30*   BUN 28* 31*  --  26*   CREATININE 1.1 1.1  --  1.2   CALCIUM 9.9 9.9  --  10.0   PROT 6.3 6.1  --  6.5   BILITOT 0.1 0.2  --  0.2   ALKPHOS 59 56  --  52*   ALT 6* 5*  --  8*   AST 10 11  --  12   MG 1.8 1.7 2.3 2.3   PHOS 4.7* 4.5  --  4.9*        WBC:   Recent Labs   Lab 09/17/20  0233 09/18/20  0803 09/19/20  0440 09/20/20  0300 09/21/20  0300   WBC 12.88* 11.39 9.37 9.54 8.44     Bands:     CBC/Anemia Labs: Coags:    Recent Labs   Lab 09/19/20  0440 09/20/20  0300 09/21/20  0300   WBC 9.37 9.54 8.44   HGB 8.5* 8.2* 8.4*   HCT 29.1* 27.4* 28.3*    196 201   MCV 94 91 93   RDW 14.9* 14.9* 15.0*    No results for input(s): PT, INR, APTT in the last 168 hours.     Diagnostic Results:    MRI brain 9/14/20    Numerous scattered punctate foci of cortical enhancement throughout both cerebral hemispheres.  Additional rim enhancing collection in the right occipital lobe with central diffusion restriction and surrounding vasogenic edema.  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     Persistent gyriform diffusion restriction the posterior left cerebral hemisphere as well as the hippocampus and dorsal lateral thalamus, although slightly improved from prior studies.  The distribution be typical for vascular territory infarct, most suggestive of seizure related signal changes (status epilepticus).  Other encephalitis to be excluded clinically.     New small left frontal subdural hygroma without significant intracranial mass effect.     Stable  small subacute left cerebellar infarct.     Mild chronic microvascular ischemic disease with remote right frontal and right occipital infarcts.     Numerous regions of prior parenchymal and leptomeningeal hemorrhage involving both cerebral hemispheres appearance raising the possibility underlying cerebral amyloid angiopathy.  No new hemorrhage.    CT head 9/16    FINDINGS:  There is stable cerebral atrophy and chronic small vessel ischemic changes.  There is an area of rounded hypodensity in the right occipital lobe, which corresponds to an overall finding of abscess formation and vasogenic edema in a patient with known endocarditis is noted on the MRI of the brain from 09/14/2020.  There is a remote right frontal infarct.  There are also areas of hypodensity seen in the left cerebellar hemisphere and left occipital lobe, which may be related to age indeterminate infarcts.  There is no acute intracranial hemorrhage, territorial infarct or mass effect, or midline shift..    Assessment and Plan     Hospital Course:    Mr. 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         Encephalopathy acute  - likely multifactorial - 2/2 stroke ,  brain abscess, cytotoxic edema. hosp acquired delirium   - cont PT OT   - SLP- currently with NG tube   - may repeat brain imaging If ongoing lethargy  And may consider repeat EEG    - patient with  overall complicated hospitalization course with multiple serious complications . Need to monitor clinical condition and for improvement. Prognosis may be guarded and patient may likely be debilitated going forward.  May need palliative care consult during this admission     Status epilepticus  - Multiple witnessed seizure-like activity described  - No previous reports from family  - Was started on keppra and vimpat 200 mg BID  - EEG at OSH shows irregular slowing in the R frontal region, with L posterior periodic epileptiform discharges. No seizures recorded during this study.  - 9/13 [facility] EEG continues to show periodic discharges in the left posterior region with no seizures.  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 If worsening mentation or without improvement recommend reimaging and if need for surgical washout for source control. Please send fluid for cultures (gram stain, aerobic, anaerobic, fungal, AFB) if undergoes drainage.     -ID recs:  Continue IV Vanc and cetriaxone for 6-8 weeks, end date 10/27-11/10 with repeat MRI post treatment  Continue ceftriaxone 2gms IV q12hrs. Anticipate 6-8 weeks of IV vancomycin and ceftriaxone with repeat imaging to assess resolution of brain abscess.     Cerebellar stroke/ Embolic stroke involving left posterior cerebral artery  On 9/8 pt presented to [place] ED with unresponsiveness  No tPA due to recent GI bleed  MRI with left PCA infarct and left thalamic  "infarct along with small right PCA infarct  MRA demonstrates fetal circulation to left PCA  9/11 extubated  . Repeat MRI new acute/subacute L cerebellum infarct     -Neuro checks  -Vascular neurology was consulted - and recommended medical management - ASA , statin , PT OT SLP, BP control     Cytotoxic brain edema  Per prior documentation - "Area of cytotoxic cerebral edema identified when reviewing brain imaging in the territory of the left posterior cerebral artery, L thalamus, right posterior cerebral artery. There is no mass effect associated with it. We will continue to monitor the patients clinical exam for any worsening of symptoms which may indicate expansion of the stroke or the area of the edema resulting in the clinical change. The pattern is suggestive of embolic etiology"    Oral phase dysphagia  NGT in place, continuous tube feedings for nutrition  Cont enteral water flushes 200 ml q6hrs  SLP following, will likely need PEG  Encephalopathy limiting SLP eval    Chronic bronchitis  Duonebs scheduled q4hrs  Monitor respiratory status, at risk for atelectasis  Continuous pulse oximetry, ABG prn  Continue CPT, pulmonary toileting    Diet:  NPO - tube feeds  GI PPx:   DVT PPx:  lovenox   Goals of Care:  Full     High Risk Conditions:  IE, brain abscess, seizures     Disposition:    TBD    Likely LTAC ultimately     Patient's note was created using MModal Dictation.  Any errors in syntax may not have been identified and edited on initial review prior to signing this note.    Signing Physician:     Phyllis Sprague MD  Department of Hospital Medicine   [facility] [place]  Pager [phone]  Spectra [phone]  9/21/2020      "

## 2020-09-21 NOTE — PLAN OF CARE
Problem: Occupational Therapy Goal  Goal: Occupational Therapy Goal  Description: Goals to be met by: 9/28/2020     Patient will increase functional independence with ADLs by performing:    Pt will follow 75% of motor commands with <2 verbal cues for repetition of task to maximize engagement in functional task.  Pt will sit at EOB for approx 10 minutes with max A and unilateral UE support to complete grooming task  Pt will complete sit>stand from EOB with bed in highest position with mod A in prep for functional transfers to BSC  Pt will perform grooming sitting EOB with mod A     Goals remain appropriate. Continue OT as tolerated.  Kaylah Dangelo OT  9/21/2020    Outcome: Ongoing, Progressing

## 2020-09-22 NOTE — CONSULTS
Consult acknowledged. Please, see progress note on this date for re-consult of this patient, known to our team.    William Adkins M.D.  Internal Medicine PGY-2  [facility]

## 2020-09-22 NOTE — PROGRESS NOTES
Spoke w dr scott who ok'd for pt to be stuck in left arm despite pink band limb alert from dvt to obtain labs

## 2020-09-22 NOTE — PT/OT/SLP PROGRESS
"Speech Language Pathology Treatment    Patient Name:  Lopez Savage   MRN:  [id number]   [id number]    Admitting Diagnosis: Status epilepticus    Recommendations:                 General Recommendations:  Dysphagia therapy and Cognitive-linguistic therapy  Diet recommendations:  NPO, Liquid Diet Level: NPO   Aspiration Precautions: Frequent oral care and Strict aspiration precautions   General Precautions: Standard, aspiration, fall, NPO  Communication strategies:  provide increased time to answer and go to room if call light pushed    Subjective     Pt seen bedside for session. Patient with eyes open.   Only vocalization- "F**k you."     Pain/Comfort:  Pain Rating 1: 0/10    Objective:     Has the patient been evaluated by SLP for swallowing?   Yes  Keep patient NPO? Yes   Current Respiratory Status: nasal cannula      Pt seen for ongoing assessment. Patient turning head away from all offered trials including, ice chip, water, straw, apple sauce, pudding. Patient not answering simple y/n questions, however, eyes open. SLP provided education on importance of participating with evaluation, SLP role, and SLP POC. Patient responded by cursing at SLP and session was ended.        Assessment:     Lopez Savage is a 75 y.o. male with an SLP diagnosis of Dysphagia and Cognitive-Linguistic Impairment.  Patient did not participate in therapy and refusing all po trials. ST will continue to follow.     Goals:   Multidisciplinary Problems     SLP Goals        Problem: SLP Goal    Goal Priority Disciplines Outcome   SLP Goal     SLP Ongoing, Not Progressing   Description: Goals expected to be met by 9/23:1. Pt will participate in ongoing assessment of swallow to determine safest, least restrictive diet.   2. Pt will orient x4 ind'ly.   3. Pt will complete basic problem solving and reasoning tasks with 70% acc given cues.   4. Pt will participate in ongoing assessment of ability to follow commands.   5. Pt will participate " in ongoing assessment of functional reading, writing, visual spatial skills.                    Plan:     · Patient to be seen:  4 x/week   · Plan of Care expires:  10/15/20  · Plan of Care reviewed with:  patient   · SLP Follow-Up:  Yes       Discharge recommendations:  rehabilitation facility   Barriers to Discharge:  Level of Skilled Assistance Needed .    Time Tracking:     SLP Treatment Date:   09/22/20  Speech Start Time:  0856  Speech Stop Time:  0904     Speech Total Time (min):  8 min     Billable Minutes: Speech Therapy Individual 8 minutes    MARVA Mccray, CCC-SLP  09/22/2020

## 2020-09-22 NOTE — ASSESSMENT & PLAN NOTE
EEG   Management per primary     cEEG 9/14 negative for ongoing seizures, continues on vimpat, keppra    -Repeat EEG on 9/22 for acute mental status change

## 2020-09-22 NOTE — ASSESSMENT & PLAN NOTE
08/2020- initial diagnosis with strep mutans bacteremia, SILVANA showing mitral and aortic valve vegetations  Likely etiology of stroke   Management per primary   -blood cultures this admission NGTD  -recent admission 8/2020 for this issue, CTS evaluated and determined patient is a poor surgical candidate

## 2020-09-22 NOTE — PROGRESS NOTES
[facility] - ICU 14 WT  Vascular Neurology  Comprehensive Stroke Center  Progress Note    Assessment/Plan:     * Status epilepticus  EEG   Management per primary     cEEG 9/14 negative for ongoing seizures, continues on vimpat, keppra    -Repeat EEG on 9/22 for acute mental status change    Cytotoxic brain edema  Area of cytotoxic cerebral edema identified when reviewing brain imaging in the territory of the left posterior cerebral artery, L thalamus, right posterior cerebral artery. There is no mass effect associated with it. We will continue to monitor the patients clinical exam for any worsening of symptoms which may indicate expansion of the stroke or the area of the edema resulting in the clinical change. The pattern is suggestive of embolic etiology.           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 On 9/8 pt presented to [facility] ED with unresponsiveness, hypoxia requiring intubation, and seizure like activity. Initial CT with no early infarct signs or hemorrhage. No tPA due to recent GI bleed. MRI with left PCA infarct and left thalamic infarct along with small right PCA infarct. MRA demonstrates fetal circulation to left PCA. Two different distributions makes a cardioembolic source most likely. Given his recent history of endocarditis this is the likely etiology. Left PCA attenuation could be due to recent seizures. Right infarct with significant vasogenic edema more likely due to septic embolus.    Pt extubated 9/11 concerns for L MCA infarct due to exam. Repeat MRI new acute/subacute L cerebellum infarct; SWI suggest CAA +/- component of chronic hypertensive arteriopathy.  Continue with previous recommendations.    9/22 reconsult for acute mental status change. Patient confused, aphasic, not following commands. Moving all  extremities. Concern for new embolic stroke vs NCSE vs delirium. Pending MRI, EEG    Antithrombotics for secondary stroke prevention: Antiplatelets: Aspirin: 325 mg daily     Statins for secondary stroke prevention and hyperlipidemia, if present:   Statins: Atorvastatin- 40 mg daily     Aggressive risk factor modification: HTN, Endocarditis     Rehab efforts: The patient has been evaluated by a stroke team provider and the therapy needs have been fully considered based off the presenting complaints and exam findings. The following therapy evaluations are needed: PT evaluate and treat, OT evaluate and treat, SLP evaluate and treat      Diagnostics ordered/pending: repeat MRI, EEG     VTE prophylaxis: Enoxaparin 40 mg SQ every 24 hours     BP parameters: Infarct: No intervention, SBP <160 - no longer in acute window. Can move towards normotension. 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 MRI brain 9/14 suggest septic emboli as the source for stroke, likely prior endocarditis. ID consulted. Blood cultures NGTD this admission.     Patient stepped down to HM on 9/20. Since has been slowly progressing, ongoing treatment of endocarditis. Blood cultures negative since 9/08. Acute mental status change per PCP and patient's wife on 9/22. Now disoriented, not recognizing wife, uncooperative with exam. Vascular neurp reconsulted. Will repeat EEG, MRI to assess for new stroke or NC-status.    STROKE DOCUMENTATION        NIH Scale:  1a.  Level of Consciousness: 2-->Not alert, requires repeated stimulation to attend, or is obtunded and requires strong or painful stimulation to make movements (not stereotyped)  1b. LOC Questions: 2-->Answers neither question correctly  1c. LOC Commands: 2-->Performs neither task correctly  2. Best Gaze: 1-->Partial gaze palsy, gaze is abnormal in one or both eyes, but forced deviation or total gaze paresis is not present  3. Visual: 2-->Complete hemianopia  4. Facial Palsy: 0-->Normal symmetrical movements  5a. Motor Arm, Left: 2-->Some effort against gravity, limb cannot get to or maintain (if cued) 90 (or 45) degrees, drifts down to bed, but has some effort against gravity  5b. Motor Arm, Right: 1-->Drift, limb holds 90 (or 45) degrees, but drifts down before full 10 secs, does not hit bed or other support  6a. Motor Leg, Left: 3-->No effort against gravity, leg falls to bed immediately  6b. Motor Leg, Right: 2-->Some effort against gravity, leg falls to bed by 5 secs, but has some effort against gravity  7. Limb Ataxia: 0-->Absent  8. Sensory: 0-->Normal, no sensory loss  9. Best Language: 2-->Severe aphasia, all communication is through fragmentary expression, great need for inference, questioning, and guessing by the listener. Range of information that can be exchanged is limited, listener carries burden of. . . (see row details)  10. Dysarthria: 0-->Normal  11. Extinction and Inattention (formerly Neglect): 0-->No abnormality  Total (NIH Stroke Scale): 19       Modified Leopold Score: 0  Lemhi Coma Scale:    ABCD2 Score:    [id number] Score:   HAS -BLED Score:   ICH Score:   Hunt & Newman Classification:      Hemorrhagic change of an Ischemic Stroke: Does this patient have an ischemic stroke with hemorrhagic changes? No     Neurologic Chief Complaint: Acute mental status change in the setting of recent embolic stroke 2/2 to endocarditis    Subjective:     Interval History: Patient previously verbal and  oriented. This AM, unable to speak, recognize wife or understand/follow commands. Will get spot EEG and repeat MRI.     HPI, Past Medical, Family, and Social History remains the same as documented in the initial encounter.       Scheduled Meds:   albuterol-ipratropium  3 mL Nebulization Q4H    atorvastatin  40 mg Per G Tube Daily    cefTRIAXone (ROCEPHIN) IVPB  2 g Intravenous Q12H    heparin (porcine)  5,000 Units Subcutaneous Q8H    lacosamide  200 mg Per NG tube Q12H    losartan  25 mg Per NG tube Daily    pantoprazole  40 mg Per NG tube Daily    vancomycin (VANCOCIN) IVPB  1,750 mg Intravenous Q24H     Continuous Infusions:  PRN Meds:dextrose 50%, glucagon (human recombinant), insulin aspart U-100, labetalol, magnesium sulfate IVPB, magnesium sulfate IVPB, potassium chloride in water **AND** potassium chloride in water **AND** potassium chloride in water, sodium chloride 0.9%, sodium phosphate IVPB, sodium phosphate IVPB, sodium phosphate IVPB, Pharmacy to dose Vancomycin consult **AND** vancomycin - pharmacy to dose    Objective:     Vital Signs (Most Recent):  Temp: 98 °F (36.7 °C) (09/16/20 1105)  Pulse: 90 (09/16/20 1105)  Resp: (!) 23 (09/16/20 1105)  BP: 138/62 (09/16/20 1105)  SpO2: 96 % (09/16/20 1105)  BP Location: Left arm    Vital Signs Range (Last 24H):  Temp:  [97.9 °F (36.6 °C)-98.8 °F (37.1 °C)]   Pulse:  [70-90]   Resp:  [13-44]   BP: (120-166)/(60-96)   SpO2:  [93 %-100 %]   BP Location: Left arm    Physical Exam  Vitals signs and nursing note reviewed.   Constitutional:       Appearance: He is obese. He is ill-appearing.      Comments: Somnolent but arousable. Mostly uncooperative with exam   Cardiovascular:      Rate and Rhythm: Normal rate.   Pulmonary:      Effort: Pulmonary effort is normal.   Skin:     General: Skin is warm and dry.   Neurological:      Mental Status: He is alert and easily aroused.      Cranial Nerves: Dysarthria present.      Motor: Weakness present.       Comments: Moves all extremities but unable to test strength due to poor cooperation   Psychiatric:         Speech: Speech is slurred.         Neurological Exam:   LOC: drowsy  Attention Span: poor  Language: Global aphasia  Articulation: Dysarthria  Orientation: Not oriented to person, place, and time  Visual Fields: Visual neglect  EOM (CN III, IV, VI): Gaze preference  left  Pupils (CN II, III): PERRL  Facial Movement (CN VII): Unable to test   Motor: Arm left  Normal 5/5  Leg left  Normal 5/5  Arm right  Normal 5/5  Leg right Normal 5/5    Laboratory:  BMP:   Recent Labs   Lab 09/22/20  1522      K 3.6      CO2 32*   BUN 33*   CREATININE 1.3   CALCIUM 10.1     CBC:   Recent Labs   Lab 09/22/20  1522   WBC 8.43   RBC 2.95*   HGB 8.0*   HCT 27.2*      MCV 92   MCH 27.1   MCHC 29.4*     Lipid Panel: No results for input(s): CHOL, LDLCALC, HDL, TRIG in the last 168 hours.  Hgb A1C: No results for input(s): HGBA1C in the last 168 hours.  TSH: No results for input(s): TSH in the last 168 hours.    Diagnostic Results     Brain Imaging   CT head 9/22:  FINDINGS:  Generalized cerebral volume loss with prominence of the sulci, cisterns, and ventricles.  Hypodense area in the right frontal lobe likely a remote infarct.  Hypodense areas in the occipital lobes right greater than left likely representing vasogenic edema from encephalitis/abscess noted on prior MRI.  Remote right occipital infarct.  No evidence of acute intracranial hemorrhage or new major vascular territory infarct.  Small infarct in the left cerebellum is not well visualized by CT.  Stable low-attenuation extra-axial collection over the left cerebral convexity likely a hygroma.  No new abnormal extra-axial fluid collections.  There is opacification of the paranasal sinuses with an air-fluid level in the left maxillary sinus.  Mastoid air cells are opacified.  There is an NG tube.     Impression:     No significant detrimental changes from  prior exam.  No evidence of acute intracranial hemorrhage or new major vascular territory infarct.    MRI 9/14:  Impression:     Numerous scattered punctate foci of cortical enhancement throughout both cerebral hemispheres.  Additional rim enhancing collection in the right occipital lobe with central diffusion restriction and surrounding vasogenic edema.  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     Persistent gyriform diffusion restriction the posterior left cerebral hemisphere as well as the hippocampus and dorsal lateral thalamus, although slightly improved from prior studies.  The distribution be typical for vascular territory infarct, most suggestive of seizure related signal changes (status epilepticus).  Other encephalitis to be excluded clinically.     New small left frontal subdural hygroma without significant intracranial mass effect.     Stable small subacute left cerebellar infarct.     Mild chronic microvascular ischemic disease with remote right frontal and right occipital infarcts.     Numerous regions of prior parenchymal and leptomeningeal hemorrhage involving both cerebral hemispheres appearance raising the possibility underlying cerebral amyloid angiopathy.  No new hemorrhage.      William Adkins MD  Comprehensive Stroke Center  Department of Vascular Neurology   [facility] - ICU 14 WT

## 2020-09-22 NOTE — ASSESSMENT & PLAN NOTE
75 y/o Male PMH HTN, recent endocarditis (8/2020), recent GIB (8/2020), diverticulitis, GERD, transferred from [facility] for management of seizures. On 9/8 pt presented to [place] ED with unresponsiveness, hypoxia requiring intubation, and seizure like activity. Initial CT with no early infarct signs or hemorrhage. No tPA due to recent GI bleed. MRI with left PCA infarct and left thalamic infarct along with small right PCA infarct. MRA demonstrates fetal circulation to left PCA. Two different distributions makes a cardioembolic source most likely. Given his recent history of endocarditis this is the likely etiology. Left PCA attenuation could be due to recent seizures. Right infarct with significant vasogenic edema more likely due to septic embolus.    Pt extubated 9/11 concerns for L MCA infarct due to exam. Repeat MRI new acute/subacute L cerebellum infarct; SWI suggest CAA +/- component of chronic hypertensive arteriopathy.  Continue with previous recommendations.    9/22 reconsult for acute mental status change. Patient confused, aphasic, not following commands. Moving all extremities. Concern for new embolic stroke vs NCSE vs delirium. Pending MRI, EEG    Antithrombotics for secondary stroke prevention: Antiplatelets: Aspirin: 325 mg daily     Statins for secondary stroke prevention and hyperlipidemia, if present:   Statins: Atorvastatin- 40 mg daily     Aggressive risk factor modification: HTN, Endocarditis     Rehab efforts: The patient has been evaluated by a stroke team provider and the therapy needs have been fully considered based off the presenting complaints and exam findings. The following therapy evaluations are needed: PT evaluate and treat, OT evaluate and treat, SLP evaluate and treat      Diagnostics ordered/pending: repeat MRI, EEG     VTE prophylaxis: Enoxaparin 40 mg SQ every 24 hours     BP parameters: Infarct: No intervention, SBP <160 - no longer in acute window. Can move towards  normotension. Patient at goal with home meds

## 2020-09-22 NOTE — CARE UPDATE
"RAPID RESPONSE NURSE PROACTIVE ROUNDING NOTE     Time of Visit: 1215    Admit Date: 2020  LOS: 9  Code Status: Full Code   Date of Visit: 2020  : 1945  Age: 75 y.o.  Sex: male  Race: White  Bed: 60133/10112 A:   MRN: [id number]  Was the patient discharged from an ICU this admission? yes   Was the patient discharged from a PACU within last 24 hours?  no  Did the patient receive conscious sedation/general anesthesia in last 24 hours?  no  Was the patient in the ED within the past 24 hours?  no  Was the patient started on NIPPV within the past 24 hours?  no  Attending Physician: Phyllis Sprague MD  Primary Service: [facility]    ASSESSMENT     Notified by charge RN via phone call.  Reason for alert: AMS    Diagnosis: Status epilepticus    Abnormal Vital Signs: BP (!) 140/64 (BP Location: Right leg, Patient Position: Lying)   Pulse 92   Temp 98.1 °F (36.7 °C) (Oral)   Resp (!) 23   Ht 5' 9" (1.753 m)   Wt 114 kg (251 lb 5.2 oz)   SpO2 99%   BMI 37.11 kg/m²      Clinical Issues: Neuro    Patient  has no past medical history on file.      Called to bedside by RN d/t wife's concern as to why pt wasn't "acting like himself". Pt awake and alert, but globally aphasic. Pt responds to voice and tracks with eyes. Per bedside RN, pt more alert than yesterday. Dr. Sprague at bedside. Head CT completed and negative for acute changes. Case discussed between primary MD and vascular neurology.      INTERVENTIONS/ RECOMMENDATIONS     - Continue to monitor pt per unit protocol.    Discussed plan of care with RNSera and charge RNJuan.    PHYSICIAN ESCALATION     Yes/No  yes    Orders received and case discussed with Dr. Sprague.    Disposition: Remain in room 57632.    FOLLOW-UP     Call back the Rapid Response Nurse, Kaya Mtz RN at [phone] for additional questions or concerns.          "

## 2020-09-22 NOTE — CARE UPDATE
Rapid Response Nurse Chart Check     Chart check completed, abnormal VS noted. Call [phone] for further concerns or assistance.

## 2020-09-22 NOTE — PLAN OF CARE
Problem: SLP Goal  Goal: SLP Goal  Description: Goals expected to be met by 9/23:  1. Pt will participate in ongoing assessment of swallow to determine safest, least restrictive diet.   2. Pt will orient x4 ind'ly.   3. Pt will complete basic problem solving and reasoning tasks with 70% acc given cues.   4. Pt will participate in ongoing assessment of ability to follow commands.   5. Pt will participate in ongoing assessment of functional reading, writing, visual spatial skills.   Outcome: Ongoing, Not Progressing  Patient refusing all trials. Minimal participation. ST will continue to follow.   GATO Suero., CCC-SLP  09/22/2020

## 2020-09-23 PROBLEM — Z51.5 PALLIATIVE CARE ENCOUNTER: Status: ACTIVE | Noted: 2020-01-01

## 2020-09-23 NOTE — PT/OT/SLP PROGRESS
Speech Language Pathology Treatment    Patient Name:  Lopez Savage   MRN:  [id number]   [id number] A    Admitting Diagnosis: Status epilepticus    Recommendations:                 General Recommendations:  Dysphagia therapy and Speech/language therapy  Diet recommendations:  NPO, Liquid Diet Level: NPO   Aspiration Precautions: Strict aspiration precautions   General Precautions: Standard, aspiration, fall, NPO  Communication strategies:  go to room if call light pushed    Subjective     Verbalizations unappreciated this session.     Pain/Comfort:  · Pain Rating 1: 0/10  · Pain Rating Post-Intervention 1: 0/10    Objective:     Has the patient been evaluated by SLP for swallowing?   Yes  Keep patient NPO? Yes   Current Respiratory Status: room air      Pt asleep upon entry. Wife at bedside. Following clinician's introduction, pt's wife reported she was excited clinician was here as pt ate pureed chocolate pudding with NSG prior to transfer to current unit without overt clinical signs aspiration. Therefore she was hopeful clinician would recommend diet initiation and NG tube would be able to be removed. Extensive education provided re: even if pt appeared safe for diet initiation, NG tube likely to remain until pt consistently demonstrating ability to meet nutritional volume needs PO.     Although lights in room turned on, HOB raised, max verbal stimulation provided by clinician, NSG, and wife, as well as tactile stimulation provided via gentle shoulder shake and gentle sternal rub and thermal stimulation provided via cool, damp cloth to face, pt remained asleep. Therefore PO trials and cognitive-linguistic therapy deferred.     Additional education provided re: ongoing SLP POC. Indication of pt's understanding unappreciated. However pt's wife verbalized understanding of education provided and agreement with SLP POC. White board updated. No further questions.     Results discussed with NSG who verbalized  understanding of information provided.      Assessment:     Lopez Savage is a 75 y.o. male with an SLP diagnosis of dysphagia and cognitive-linguistic deficits.     Goals:   Multidisciplinary Problems     SLP Goals        Problem: SLP Goal    Goal Priority Disciplines Outcome   SLP Goal     SLP Ongoing, Progressing   Description: Goals expected to be met by 9/30:1. Pt will participate in ongoing assessment of swallow to determine safest, least restrictive diet.   2. Pt will orient x4 ind'ly.   3. Pt vocalize x3 within an SLP session.   4. Pt will follow basic 1-step commands with 70% acc ind'ly.   5. Pt will answer basic y/n q's with 70% acc ind'ly.   6. Pt will participate in ongoing assessment of cognitive-linguistic skills.                    Plan:     · Patient to be seen:  4 x/week   · Plan of Care expires:  10/15/20  · Plan of Care reviewed with:  patient, spouse   · SLP Follow-Up:  Yes       Discharge recommendations:  rehabilitation facility     Time Tracking:     SLP Treatment Date:   09/23/20  Speech Start Time:  0932  Speech Stop Time:  0942     Speech Total Time (min):  10 min    Billable Minutes: Treatment Swallowing Dysfunction 10    MARVA Vazquez, CCC-SLP  [phone]  9/23/2020

## 2020-09-23 NOTE — ASSESSMENT & PLAN NOTE
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 Reconsulted for change in mental status today and aphasia    CT head reviewed, appears grossly unchanged from previous    No acute neurosurgical management at this time  Agree with vascular neurology workup, likely due to septic emboli, will follow up MRI  Will follow EEG workup        -q1h neurochecks in ICU, q2h neurochecks in stepdown, q4h neurochecks on floor  --SBP <160 (cardene ggt; hydralazine & labetalol PRN; transition to home meds when appropriate)  --Na >135  --VN following  --AED per NCC/Epilepsy  --Abx per ID, f/up BCx - 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   --NO steroids  --HOB >30  --PPI given GIB per NCC  --Continue to monitor clinically, notify NSGY immediately with any changes in neuro status    Dispo: ICU

## 2020-09-23 NOTE — CONSULTS
"[facility] - ICU 14 WT  Neurology  Consult Note    Patient Name: Lopez Savage  MRN: [id number]  Admission Date: 9/13/2020  Hospital Length of Stay: 10 days  Code Status: Full Code   Attending Provider: Phyllis Sprague MD   Consulting Provider: Luh Holloway PA-C  Primary Care Physician: Prasanna Haywood MD  Principal Problem:Status epilepticus    Inpatient consult to Neurology  Consult performed by: Luh Holloway PA-C  Consult ordered by: Phyllis Sprague MD         Subjective:     Chief Complaint:  ?Encephalitis     HPI:   75 y.o. 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 Per chart review, daughter found pt unresponsive on kitchen floor at 1 am and started chest compressions until EMS arrived. Upon arrival to ED, he was intubated for airway protection and sedated with propofol. Tele-stroke consult performed 9/8 and CT head unremarkable for early signs of infarct, hemorrhage and mass effect. tPA not given due to recent GI bleed. He was evaluated by Vascular Neurology 9/8 and MRI 9/8 read as " Left PCA distribution acute infarction. Focus of restricted diffusion within the right occipital lobe with adjacent vasogenic edema, concerning for underlying lesion or septic embolus." MRA head/neck with no evidence of occlusion or high grade stenosis. On 9/8 pm, he was witnessed to have rhythmic shaking R upper and lower extremities, eye twitching and biting down on ET tube. MRI brain repeated 9/11, 9/14 and 9/22 remarkable for "continued maturing multifocal ischemia, possibly septic embolic in etiology when correlating with prior imaging, predominantly involving bilateral posterior cerebral artery and left posterior inferior cerebellar artery territories. No new or " "progressive areas of ischemia or new intracranial hemorrhage. Stable small left frontal subdural hygroma without significant mass effect. Chronic parenchymal microhemorrhages and superficial siderosis again raise the possibility of cerebral amyloid angiopathy and/or chronic hypertensive arteriopathy." Extensive EEG monitoring this admission, most recently 9/13-9/17/20 captured some irregular jerking of R hand associated with L posterior discharges c/w EPC. Currently on Vimpat 200 mg BID. Neurology consulted 9/23 for "brain abscesses from septic emboli/new onset seizures and recent status and strokes. AMS concerning for either encephalitis/meningitis vs. ongoing seizures."     No past medical history on file.    No past surgical history on file.    Review of patient's allergies indicates:   Allergen Reactions    Codeine     Tetanus vaccines and toxoid      No current facility-administered medications on file prior to encounter.      Current Outpatient Medications on File Prior to Encounter   Medication Sig    atorvastatin (LIPITOR) 40 MG tablet 1 tablet (40 mg total) by Per G Tube route once daily.    enoxaparin (LOVENOX) 40 mg/0.4 mL Syrg Inject 0.4 mLs (40 mg total) into the skin once daily.    insulin aspart U-100 (NOVOLOG) 100 unit/mL (3 mL) InPn pen Inject 0-5 Units into the skin every 6 (six) hours as needed (Hyperglycemia).    lacosamide (VIMPAT) 10 mg/mL Soln oral solution 10 mLs (100 mg total) by Per NG tube route every 12 (twelve) hours.    levETIRAcetam in NaCl, iso-os, (KEPPRA) 500 mg/100 mL PgBk Inject 100 mLs (500 mg total) into the vein every 12 (twelve) hours.    losartan (COZAAR) 50 MG tablet Take 1 tablet (50 mg total) by mouth once daily.     Family History     None        Tobacco Use    Smoking status: Not on file   Substance and Sexual Activity    Alcohol use: Not on file    Drug use: Not on file    Sexual activity: Not on file     Review of Systems   Unable to perform ROS: Mental " status change   Constitutional: Positive for activity change and fatigue.   Neurological: Positive for seizures and speech difficulty. Negative for tremors.   Psychiatric/Behavioral: Positive for confusion, decreased concentration and sleep disturbance.     Objective:     Vital Signs (Most Recent):  Temp: 98.6 °F (37 °C) (09/22/20 0846)  Pulse: 84 (09/23/20 1613)  Resp: 15 (09/23/20 1613)  BP: (!) 173/68 (09/23/20 0635)  SpO2: 100 % (09/23/20 1613) Vital Signs (24h Range):  Pulse:  [70-97] 84  Resp:  [15-37] 15  SpO2:  [94 %-100 %] 100 %  BP: (159-186)/(67-88) 173/68     Weight: 114 kg (251 lb 5.2 oz)  Body mass index is 37.11 kg/m².    Physical Exam  Neurological:      Coordination: Finger-nose-finger test: not following command.       NEUROLOGICAL EXAMINATION:     MENTAL STATUS   Attention: decreased. Concentration: decreased.   Level of consciousness: arousable by tactile stimuli       Eyes closed   Does not follow simple commands      CRANIAL NERVES        Face symmetric at rest   Does not blink to threat on R     MOTOR EXAM   Muscle bulk: normal  Overall muscle tone: increased       Not following commands to assess strength but does withdraw to noxious stimuli in all extremities      REFLEXES        Brisk DTRs throughout     GAIT AND COORDINATION     Gait  Gait: (deferred)     Coordination   Finger-nose-finger test: not following command.    Tremor   Resting tremor: absent    Significant Labs:   Hemoglobin A1c:   Recent Labs   Lab 09/08/20  1808 09/14/20  0338   HGBA1C 4.8 4.4     Blood Culture:   Recent Labs   Lab 09/22/20 1522   LABBLOO No Growth to date  No Growth to date  No Growth to date  No Growth to date     CBC:   Recent Labs   Lab 09/22/20 1522 09/23/20 0622   WBC 8.43 10.11   HGB 8.0* 8.5*   HCT 27.2* 27.8*    209     CMP:   Recent Labs   Lab 09/22/20 1522 09/23/20 0622   GLU 90 109    140   K 3.6 3.3*    102   CO2 32* 30*   BUN 33* 31*   CREATININE 1.3 1.3   CALCIUM 10.1  10.4   MG  --  2.0  2.0   PROT 6.6 6.9   ALBUMIN 2.4* 2.5*   BILITOT 0.2 0.2   ALKPHOS 50* 53*   AST 15 14   ALT 10 10   ANIONGAP 6* 8   EGFRNONAA 53.8* 53.8*     Inflammatory Markers: No results for input(s): SEDRATE, CRP, PROCAL in the last 48 hours.  Urine Culture: No results for input(s): LABURIN in the last 48 hours.  Urine Studies: No results for input(s): COLORU, APPEARANCEUA, PHUR, SPECGRAV, PROTEINUA, GLUCUA, KETONESU, BILIRUBINUA, OCCULTUA, NITRITE, UROBILINOGEN, LEUKOCYTESUR, RBCUA, WBCUA, BACTERIA, SQUAMEPITHEL, HYALINECASTS in the last 48 hours.    Invalid input(s): WRIGHTSUR  All pertinent lab results from the past 24 hours have been reviewed.    Significant Imaging: I have reviewed and interpreted all pertinent imaging results/findings within the past 24 hours.     MRI brain WO contrast (9/22/20):  1. Relative to 09/14/2020, continued maturing multifocal ischemia, possibly septic embolic in etiology when correlating with prior imaging, predominantly involving bilateral posterior cerebral artery and left posterior inferior cerebellar artery territories.  2. No new or progressive areas of ischemia or new intracranial hemorrhage.  3. Stable small left frontal subdural hygroma without significant mass effect.  4. Chronic parenchymal microhemorrhages and superficial siderosis again raise the possibility of cerebral amyloid angiopathy and/or chronic hypertensive arteriopathy.      Assessment and Plan:     Encephalopathy  75 y.o. male with recent endocarditis with mitral and aortic valve vegetations and recent GI bleed presented to [facility] ED 9/8 via EMS after being found unresponsive at home. On arrival, he was intubated for airway protection. MRI brain and MRA head/neck 9/8 with Left PCA distribution acute infarction. Focus of restricted diffusion within the right occipital lobe with adjacent vasogenic edema, concerning for underlying lesion or septic embolus. MRA head/neck with no evidence of occlusion or  "high grade stenosis. On 9/8 pm, he was witnessed to have rhythmic shaking R upper and lower extremities, eye twitching and biting down on ET tube concerning for seizure activity. EEG 9/13-9/17 captured R hand jerking associated with L posterior discharges c/w EPC per Dr. Winslow. He was treated with Keppra 500 mg BID and Vimpat 200 mg BID. Keppra was discontinued on 9/15 per epilepsy team recs. MRI brain repeated x 3, most recently on 9/22 showing "continued maturing multifocal ischemia, possibly septic embolic in etiology, predominantly involving bilateral posterior cerebral artery and left posterior inferior cerebellar artery territories. No new or progressive areas of ischemia or new intracranial hemorrhage. Stable small left frontal subdural hygroma without significant mass effect. Chronic parenchymal microhemorrhages and superficial siderosis again raise the possibility of cerebral amyloid angiopathy and/or chronic hypertensive arteriopathy." Vascular Neuro felt given different distributions seen on imaging and recent endocarditis, cardioembolic etiology was felt most likely. Most recent MRI L PCA territory decreased attenuation more c/w seizure changes rather then embolic stroke and persistent R posterior abscess. General Neurology asked to weigh in on 9/23 to evaluate for encephalitis and seizure management.     Recommendations:  --LP with IR (failed bedside attempt 9/23 afternoon)  Please obtain opening pressure  Cell ct with diff, glu, protein, Cx, viral panel, freeze and hold   --repeat 24 hr vEEG  --continue Vimpat 200 mg BID and seizure precautions    VTE Risk Mitigation (From admission, onward)         Ordered     enoxaparin injection 40 mg  Every 24 hours      09/23/20 1350     IP VTE HIGH RISK PATIENT  Once      09/13/20 1518     Place sequential compression device  Until discontinued      09/13/20 1119              Thank you for your consult. I will follow-up with patient. Please contact us if you have " any additional questions.    Luh Holloway PA-C  General Neurology Consult  Neuro Consult [facility] # [id number]

## 2020-09-23 NOTE — PROCEDURES
"Lopez Savage is a 75 y.o. male patient.    Temp: 98.6 °F (37 °C) (09/22/20 0846)  Pulse: 77 (09/23/20 1200)  Resp: 16 (09/23/20 1159)  BP: (!) 173/68 (09/23/20 0635)  SpO2: 96 % (09/23/20 1200)  Weight: 114 kg (251 lb 5.2 oz) (09/13/20 1015)  Height: 5' 9" (175.3 cm) (09/13/20 1015)       Lumbar Puncture    Date/Time: 9/23/2020 3:12 PM  Location procedure was performed: [facility] NEUROLOGY  Performed by: Mukesh Katz MD  Authorized by: Mukesh Katz MD   Assisting provider: Rosalva Farfan MD  Pre-operative diagnosis:  Infectious r/o  Consent Done: Yes  Indications: evaluation for infection and evaluation for altered mental status  Anesthesia: local infiltration    Anesthesia:  Local Anesthetic: lidocaine 1% with epinephrine  Anesthetic total: 10 mL  Patient sedated: no  Preparation: Patient was prepped and draped in the usual sterile fashion.  Lumbar space: L3-L4 interspace  Patient's position: right lateral decubitus  Comments: Unsuccessful LP secondary to poor patient compliance with direction therefore inability to position optimally mixed with some possible spinal disease          Mukesh Katz  9/23/2020  "

## 2020-09-23 NOTE — SUBJECTIVE & OBJECTIVE
Interval History: Pt s/p embolic stroke. Pt has endocarditis.     No past medical history on file.    No past surgical history on file.    Review of patient's allergies indicates:   Allergen Reactions    Codeine     Tetanus vaccines and toxoid        Medications:  Continuous Infusions:  Scheduled Meds:   albuterol-ipratropium  3 mL Nebulization Q4H    atorvastatin  40 mg Per NG tube Daily    cefTRIAXone (ROCEPHIN) IVPB  2 g Intravenous Q12H    enoxaparin  40 mg Subcutaneous Q24H    lacosamide  200 mg Per NG tube Q12H    losartan  25 mg Per NG tube Daily    miconazole nitrate 2%   Topical (Top) BID    pantoprazole  40 mg Per NG tube Daily    silodosin  8 mg Per NG tube Daily    sodium chloride 0.9%  10 mL Intravenous Q6H    traZODone  25 mg Per NG tube QHS    vancomycin (VANCOCIN) IVPB  1,250 mg Intravenous Q48H     PRN Meds:acetaminophen, dextrose 50%, glucagon (human recombinant), insulin aspart U-100, labetalol, psyllium husk (aspartame), sodium chloride 0.9%, Flushing PICC Protocol **AND** sodium chloride 0.9% **AND** sodium chloride 0.9%, Pharmacy to dose Vancomycin consult **AND** vancomycin - pharmacy to dose    Family History     None        Tobacco Use    Smoking status: Not on file   Substance and Sexual Activity    Alcohol use: Not on file    Drug use: Not on file    Sexual activity: Not on file       Review of Systems   Unable to perform ROS: Mental status change     Objective:     Vital Signs (Most Recent):  Temp: 98.6 °F (37 °C) (09/22/20 0846)  Pulse: 79 (09/23/20 0918)  Resp: (!) 27 (09/23/20 0918)  BP: (!) 173/68 (09/23/20 0635)  SpO2: 100 % (09/23/20 0918) Vital Signs (24h Range):  Pulse:  [79-97] 79  Resp:  [21-37] 27  SpO2:  [97 %-100 %] 100 %  BP: (140-186)/(64-88) 173/68     Weight: 114 kg (251 lb 5.2 oz)  Body mass index is 37.11 kg/m².    Physical Exam  Constitutional:       Appearance: He is obese. He is ill-appearing.      Comments: Not responding to voice or touch   HENT:       Head: Normocephalic and atraumatic.   Cardiovascular:      Rate and Rhythm: Normal rate and regular rhythm.   Pulmonary:      Breath sounds: Rhonchi present.   Abdominal:      Comments: NG tube in place with tube feeds.    Musculoskeletal:      Comments: Moves extremities.      Skin:     General: Skin is warm and dry.   Neurological:      Comments: Pt sleeping. Opens eyes intermittently. Not responding to voice.    Psychiatric:      Comments: Pt not responding to voice, not following commands.          Review of Symptoms        Comments:  Unable to assess. Pt sleeping. No distress noted.           Performance Status:  10    ECOG Performance Status Grade:  4 - Completely disabled      Advance Care Planning   Advance Directives:   Living Will: No    [place]POST: No    Do Not Resuscitate Status: No    Medical Power of : No    Agent's Name:  Wife is next of kin    Decision Making:  Family answered questions         Significant Labs: All pertinent labs within the past 24 hours have been reviewed.  CBC:   Recent Labs   Lab 09/23/20 0622   WBC 10.11   HGB 8.5*   HCT 27.8*   MCV 92        BMP:  Recent Labs   Lab 09/23/20 0622         K 3.3*      CO2 30*   BUN 31*   CREATININE 1.3   CALCIUM 10.4   MG 2.0  2.0     LFT:  Lab Results   Component Value Date    AST 14 09/23/2020    ALKPHOS 53 (L) 09/23/2020    BILITOT 0.2 09/23/2020     Albumin:   Albumin   Date Value Ref Range Status   09/23/2020 2.5 (L) 3.5 - 5.2 g/dL Final     Protein:   Total Protein   Date Value Ref Range Status   09/23/2020 6.9 6.0 - 8.4 g/dL Final     Lactic acid:   Lab Results   Component Value Date    LACTATE 0.5 09/22/2020    LACTATE 2.0 09/08/2020       Significant Imaging: I have reviewed all pertinent imaging results/findings within the past 24 hours.

## 2020-09-23 NOTE — SUBJECTIVE & OBJECTIVE
Neurologic Chief Complaint: Acute mental status change in the setting of recent embolic stroke and abscess 2/2 to endocarditis    Subjective:     Interval History: Unable to speak, recognize wife or understand/follow commands. Waxing and waning level of orientation/cognition. MRI with no new stroke. Gen neuro consulted for possible encephalitis.    HPI, Past Medical, Family, and Social History remains the same as documented in the initial encounter.       Scheduled Meds:   albuterol-ipratropium  3 mL Nebulization Q4H    atorvastatin  40 mg Per NG tube Daily    cefTRIAXone (ROCEPHIN) IVPB  2 g Intravenous Q12H    enoxaparin  40 mg Subcutaneous Q24H    lacosamide  200 mg Per NG tube Q12H    losartan  25 mg Per NG tube Daily    miconazole nitrate 2%   Topical (Top) BID    pantoprazole  40 mg Per NG tube Daily    silodosin  8 mg Per NG tube Daily    sodium chloride 0.9%  10 mL Intravenous Q6H    traZODone  25 mg Per NG tube QHS    vancomycin (VANCOCIN) IVPB  1,250 mg Intravenous Q48H     Continuous Infusions:  PRN Meds:acetaminophen, dextrose 50%, glucagon (human recombinant), insulin aspart U-100, labetalol, psyllium husk (aspartame), sodium chloride 0.9%, Flushing PICC Protocol **AND** sodium chloride 0.9% **AND** sodium chloride 0.9%, Pharmacy to dose Vancomycin consult **AND** vancomycin - pharmacy to dose    Objective:     Vital Signs (Most Recent):  Temp: 98.6 °F (37 °C) (09/22/20 0846)  Pulse: 77 (09/23/20 1200)  Resp: 16 (09/23/20 1159)  BP: (!) 173/68 (09/23/20 0635)  SpO2: 96 % (09/23/20 1200)  BP Location: Right arm    Vital Signs Range (Last 24H):  Pulse:  [70-97]   Resp:  [16-37]   BP: (159-186)/(67-88)   SpO2:  [94 %-100 %]   BP Location: Right arm    Physical Exam  Vitals signs and nursing note reviewed.   Constitutional:       Appearance: He is obese. He is ill-appearing.      Comments: Somnolent but arousable. Mostly uncooperative with exam   Cardiovascular:      Rate and Rhythm: Normal  rate.   Pulmonary:      Effort: Pulmonary effort is normal.   Skin:     General: Skin is warm and dry.   Neurological:      Mental Status: He is alert and easily aroused.      Cranial Nerves: Dysarthria present.      Motor: Weakness present.      Comments: Moves all extremities but unable to test strength due to poor cooperation   Psychiatric:         Speech: Speech is slurred.         Neurological Exam:   LOC: drowsy  Attention Span: poor  Language: Global aphasia  Articulation: Dysarthria  Orientation: Not oriented to person, place, and time  Visual Fields: Visual neglect  EOM (CN III, IV, VI): Gaze preference  left  Pupils (CN II, III): PERRL  Facial Movement (CN VII): Unable to test   Motor: Arm left  Normal 5/5  Leg left  Normal 5/5  Arm right  Normal 5/5  Leg right Normal 5/5    Laboratory:  BMP:   Recent Labs   Lab 09/23/20 0622      K 3.3*      CO2 30*   BUN 31*   CREATININE 1.3   CALCIUM 10.4     CBC:   Recent Labs   Lab 09/23/20 0622   WBC 10.11   RBC 3.03*   HGB 8.5*   HCT 27.8*      MCV 92   MCH 28.1   MCHC 30.6*     Lipid Panel: No results for input(s): CHOL, LDLCALC, HDL, TRIG in the last 168 hours.  Hgb A1C: No results for input(s): HGBA1C in the last 168 hours.  TSH: No results for input(s): TSH in the last 168 hours.    Diagnostic Results     Brain Imaging   CT head 9/22:  FINDINGS:  Generalized cerebral volume loss with prominence of the sulci, cisterns, and ventricles.  Hypodense area in the right frontal lobe likely a remote infarct.  Hypodense areas in the occipital lobes right greater than left likely representing vasogenic edema from encephalitis/abscess noted on prior MRI.  Remote right occipital infarct.  No evidence of acute intracranial hemorrhage or new major vascular territory infarct.  Small infarct in the left cerebellum is not well visualized by CT.  Stable low-attenuation extra-axial collection over the left cerebral convexity likely a hygroma.  No new abnormal  extra-axial fluid collections.  There is opacification of the paranasal sinuses with an air-fluid level in the left maxillary sinus.  Mastoid air cells are opacified.  There is an NG tube.     Impression:     No significant detrimental changes from prior exam.  No evidence of acute intracranial hemorrhage or new major vascular territory infarct.    MRI 9/14:  Impression:     Numerous scattered punctate foci of cortical enhancement throughout both cerebral hemispheres.  Additional rim enhancing collection in the right occipital lobe with central diffusion restriction and surrounding vasogenic edema.  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     Persistent gyriform diffusion restriction the posterior left cerebral hemisphere as well as the hippocampus and dorsal lateral thalamus, although slightly improved from prior studies.  The distribution be typical for vascular territory infarct, most suggestive of seizure related signal changes (status epilepticus).  Other encephalitis to be excluded clinically.     New small left frontal subdural hygroma without significant intracranial mass effect.     Stable small subacute left cerebellar infarct.     Mild chronic microvascular ischemic disease with remote right frontal and right occipital infarcts.     Numerous regions of prior parenchymal and leptomeningeal hemorrhage involving both cerebral hemispheres appearance raising the possibility underlying cerebral amyloid angiopathy.  No new hemorrhage.    MRI 9/23:  Impression:     1. Relative to 09/14/2020, continued maturing multifocal ischemia, possibly septic embolic in etiology when correlating with prior imaging, predominantly involving bilateral posterior cerebral artery and left posterior inferior cerebellar artery territories.  2. No new or progressive areas of ischemia or new intracranial hemorrhage.  3. Stable small left frontal subdural hygroma without significant mass  effect.  4. Chronic parenchymal microhemorrhages and superficial siderosis again raise the possibility of cerebral amyloid angiopathy and/or chronic hypertensive arteriopathy.  5. Additional details, as per report body.

## 2020-09-23 NOTE — PLAN OF CARE
Recommend ongoing NPO with cont'd NG tube for all nutrition, hydration, medication. Cognitive-linguistic goals updated to reflect pt's current level of functioning.     Problem: SLP Goal  Goal: SLP Goal  Description: Goals expected to be met by 9/30:  1. Pt will participate in ongoing assessment of swallow to determine safest, least restrictive diet.   2. Pt will orient x4 ind'ly.   3. Pt vocalize x3 within an SLP session.   4. Pt will follow basic 1-step commands with 70% acc ind'ly.   5. Pt will answer basic y/n q's with 70% acc ind'ly.   6. Pt will participate in ongoing assessment of cognitive-linguistic skills.   Outcome: Ongoing, Progressing     MARVA Vazquez, CCC-SLP  [phone]  9/23/2020

## 2020-09-23 NOTE — PT/OT/SLP PROGRESS
"Physical Therapy Treatment    Patient Name:  Lopez Savage   MRN:  [id number]    Recommendations:     Discharge Recommendations:  rehabilitation facility   Discharge Equipment Recommendations: other (see comments)(TBD)   Barriers to discharge: Decreased caregiver support and decreased level of functional mobility    Assessment:     Lopez Savage is a 75 y.o. male admitted with a medical diagnosis of Status epilepticus.  He presents with the following impairments/functional limitations:  weakness, impaired endurance, impaired self care skills, impaired functional mobilty, gait instability, impaired balance, decreased coordination, impaired cognition, decreased upper extremity function, decreased lower extremity function, decreased safety awareness, abnormal tone, decreased ROM, impaired fine motor, impaired skin, edema, impaired cardiopulmonary response to activity Co treat with OT due to patient's increased level of required assistance and decreased tolerance to activity. Patient lethargic and difficult to arouse, frequent v/t cues to open eyes. Alertness improved once sitting EOB. Patient was TotalA for sitting EOB, non verbal and not following commands this session. Plan to continue per POC and monitor for progress.     Rehab Prognosis: Good; patient would benefit from acute skilled PT services to address these deficits and reach maximum level of function.    Recent Surgery: * No surgery found *      Plan:     During this hospitalization, patient to be seen 3 x/week to address the identified rehab impairments via gait training, therapeutic activities, therapeutic exercises, neuromuscular re-education and progress toward the following goals:    · Plan of Care Expires:  10/10/20    Subjective     Chief Complaint: none stated, non verbal on this date  Patient/Family Comments/goals: "I am really glad ya'll came" -wife  Pain/Comfort:  · Pain Rating 1: 0/10  · Pain Rating Post-Intervention 1: 0/10      Objective: "     Communicated with nurse prior to session.  Patient found HOB elevated with telemetry, pulse ox (continuous), blood pressure cuff, NG tube upon PT entry to room.     General Precautions: Standard, fall   Orthopedic Precautions:N/A   Braces: N/A     Functional Mobility:  · Bed Mobility:     · Rolling Right: total assistance x 2 persons  · Scooting: total assistance x 2 persons  · Supine to Sit: total assistance x 2 persons  · Sit to Supine: total assistance x 2 persons  · Sitting Balance: Total Assistance of 1 person      AM-PAC 6 CLICK MOBILITY  Turning over in bed (including adjusting bedclothes, sheets and blankets)?: 2  Sitting down on and standing up from a chair with arms (e.g., wheelchair, bedside commode, etc.): 1  Moving from lying on back to sitting on the side of the bed?: 2  Moving to and from a bed to a chair (including a wheelchair)?: 1  Need to walk in hospital room?: 1  Climbing 3-5 steps with a railing?: 1  Basic Mobility Total Score: 8       Therapeutic Activities and Exercises:  Pt and wife educated on plan and goals with physical therapy.   V/t cues in sitting for head and trunk control, to obtain midline. Provided facilitation at cervical extensors and trunk. Cues for head turns and for eye contact with wife to improve alertness and engagement with enviroment  Patient sat EOB ~15min wit PT/OT.  Instruction provided for bed mobility.   Patient left HOB elevated with all lines intact, call button in reach, restraints reapplied at end of session and wife present..    GOALS:   Multidisciplinary Problems     Physical Therapy Goals        Problem: Physical Therapy Goal    Goal Priority Disciplines Outcome Goal Variances Interventions   Physical Therapy Goal     PT, PT/OT Ongoing, Progressing     Description: Goals to be met by: 2020    Patient will increase functional independence with mobility by performin. Pt will perform bed mobility (rolling L/R, scooting, and bridging) with CGA  2.  Pt will perform supine to/from sit with CGA.  3. Pt will sit EOB x 15 mins with B UE support with Rony assistance.  4. Pt will perform sit to stand with max assistance and LRAD.  5. Pt will ambulate 15 feet with max assistance and LRAD.  6. Pt will perform there-ex from handout x 15 reps to improve strength for functional mobility.                         Time Tracking:     PT Received On: 09/23/20  PT Start Time: 1213     PT Stop Time: 1236  PT Total Time (min): 23 min     Billable Minutes: Therapeutic Activity 10 and Neuromuscular Re-education 13    Treatment Type: Treatment  PT/PTA: PT     PTA Visit Number: 0     Tali Farley, PT  09/23/2020

## 2020-09-23 NOTE — CARE UPDATE
Rapid Response Nurse Follow-up Note     Followed up with patient for proactive rounding.   No acute issues at this time. Reviewed plan of care with bedside RN, Shaniquae   Team will continue to follow.  Please call Rapid Response RN, Emma Gomez RN with any questions or concerns at [phone].

## 2020-09-23 NOTE — HPI
"75 y.o. 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 Per chart review, daughter found pt unresponsive on kitchen floor at 1 am and started chest compressions until EMS arrived. Upon arrival to ED, he was intubated for airway protection and sedated with propofol. Tele-stroke consult performed 9/8 and CT head unremarkable for early signs of infarct, hemorrhage and mass effect. tPA not given due to recent GI bleed. He was evaluated by Vascular Neurology 9/8 and MRI 9/8 read as " Left PCA distribution acute infarction. Focus of restricted diffusion within the right occipital lobe with adjacent vasogenic edema, concerning for underlying lesion or septic embolus." MRA head/neck with no evidence of occlusion or high grade stenosis. On 9/8 pm, he was witnessed to have rhythmic shaking R upper and lower extremities, eye twitching and biting down on ET tube. MRI brain repeated 9/11, 9/14 and 9/22 remarkable for "continued maturing multifocal ischemia, possibly septic embolic in etiology when correlating with prior imaging, predominantly involving bilateral posterior cerebral artery and left posterior inferior cerebellar artery territories. No new or progressive areas of ischemia or new intracranial hemorrhage. Stable small left frontal subdural hygroma without significant mass effect. Chronic parenchymal microhemorrhages and superficial siderosis again raise the possibility of cerebral amyloid angiopathy and/or chronic hypertensive arteriopathy." Extensive EEG monitoring this admission, most recently 9/13-9/17/20 captured some irregular jerking of R hand associated with L posterior discharges c/w EPC. Currently on Vimpat 200 mg BID. Neurology consulted 9/23 for "brain abscesses from septic emboli/new " "onset seizures and recent status and strokes. AMS concerning for either encephalitis/meningitis vs. ongoing seizures."     "

## 2020-09-23 NOTE — PROGRESS NOTES
Progress Note  Hospital Medicine  [facility] [place]       Patient Name: Lopez Savage  MRN:  [id number]  Hospital Medicine Team: [facility] X Phyllis Sprague MD  Date of Admission:  9/13/2020     Length of Stay:  LOS: 9 days   Expected Discharge Date: 9/25/2020  Principal Problem:  Status epilepticus     Subjective:     Interval History/Overnight Events:    Disoriented and confused today . Awake but with nonsensical speech and possible R sided neglect.  Unable to recognize his wife or follow directions.  Acute change from prior mental status   STAT CT head obtained and is stale  Labs stable   VSS    Neurosurgery and vascular neuro re consulted   Obtaining MRI brain and another EEG. eval for worsening of brain abscess, another stroke vs non convulsive status epilepticus.   Blood cx and repeat infectious work up ordered     Tenuous status     palliate care consulted for tomorrow for family support and illness education     Family updated at bedside      albuterol-ipratropium  3 mL Nebulization Q4H    atorvastatin  40 mg Per NG tube Daily    cefTRIAXone (ROCEPHIN) IVPB  2 g Intravenous Q12H    enoxaparin  40 mg Subcutaneous Q24H    lacosamide  200 mg Per NG tube Q12H    losartan  25 mg Per NG tube Daily    miconazole nitrate 2%   Topical (Top) BID    pantoprazole  40 mg Per NG tube Daily    silodosin  8 mg Per NG tube Daily    sodium chloride 0.9%  10 mL Intravenous Q6H    traZODone  25 mg Per NG tube QHS    [START ON 9/23/2020] vancomycin (VANCOCIN) IVPB  750 mg Intravenous Q24H           dextrose 50%, glucagon (human recombinant), insulin aspart U-100, labetalol, psyllium husk (aspartame), sodium chloride 0.9%, [place] PICC Protocol **AND** sodium chloride 0.9% **AND** sodium chloride 0.9%, Pharmacy to dose Vancomycin consult **AND** vancomycin - pharmacy to dose    Review of Systems   Unable to obtain 2/2 AMS    Objective:     Temp:  [98.6 °F (37 °C)]   Pulse:  []    Resp:  [19-28]   BP: (129-159)/(60-67)   SpO2:  [97 %-100 %]         Weight change:    Body mass index is 37.11 kg/m².       Physical Exam:  Constitutional: lethargic , chronically ill looking,   HENT: NC/AT, external ears normal  Eyes: PERRL, EOMI, conjunctiva normal,  Neck: normal ROM, supple  CV: RRR, no m/r/g, no carotid bruits, +2 peripheral pulses.  Pulmonary/Chest wall: Breathing comfortably w/o distress,  Decreased breath sounds at lung bases  GI: Soft, non-tender, (+) BS, (+) BM  Musculoskeletal: Normal ROM, no atrophy,   Neurological   awake but not following commands   Moves extremities spontaneously   Skin: warm, dry   +pallor  Psych awake but not following commands     Labs:    Chemistries:   Recent Labs   Lab 09/19/20  0440 09/20/20 0300 09/20/20  1257 09/21/20 0300 09/22/20  1522    141  --  140 140   K 3.9 3.9  --  3.9 3.6    106  --  103 102   CO2 28 28  --  30* 32*   BUN 28* 31*  --  26* 33*   CREATININE 1.1 1.1  --  1.2 1.3   CALCIUM 9.9 9.9  --  10.0 10.1   PROT 6.3 6.1  --  6.5 6.6   BILITOT 0.1 0.2  --  0.2 0.2   ALKPHOS 59 56  --  52* 50*   ALT 6* 5*  --  8* 10   AST 10 11  --  12 15   MG 1.8 1.7 2.3 2.3  --    PHOS 4.7* 4.5  --  4.9*  --         WBC:   Recent Labs   Lab 09/18/20  0803 09/19/20  0440 09/20/20  0300 09/21/20  0300 09/22/20  1522   WBC 11.39 9.37 9.54 8.44 8.43     Bands:     CBC/Anemia Labs: Coags:    Recent Labs   Lab 09/20/20  0300 09/21/20  0300 09/22/20  1522   WBC 9.54 8.44 8.43   HGB 8.2* 8.4* 8.0*   HCT 27.4* 28.3* 27.2*    201 199   MCV 91 93 92   RDW 14.9* 15.0* 15.2*    No results for input(s): PT, INR, APTT in the last 168 hours.     Diagnostic Results:    MRI brain 9/14/20    Numerous scattered punctate foci of cortical enhancement throughout both cerebral hemispheres.  Additional rim enhancing collection in the right occipital lobe with central diffusion restriction and surrounding vasogenic edema.  Findings concerning for septo-embolic  encephalitis with associated abscess formation.     Persistent gyriform diffusion restriction the posterior left cerebral hemisphere as well as the hippocampus and dorsal lateral thalamus, although slightly improved from prior studies.  The distribution be typical for vascular territory infarct, most suggestive of seizure related signal changes (status epilepticus).  Other encephalitis to be excluded clinically.     New small left frontal subdural hygroma without significant intracranial mass effect.     Stable small subacute left cerebellar infarct.     Mild chronic microvascular ischemic disease with remote right frontal and right occipital infarcts.     Numerous regions of prior parenchymal and leptomeningeal hemorrhage involving both cerebral hemispheres appearance raising the possibility underlying cerebral amyloid angiopathy.  No new hemorrhage.    CT head 9/16    FINDINGS:  There is stable cerebral atrophy and chronic small vessel ischemic changes.  There is an area of rounded hypodensity in the right occipital lobe, which corresponds to an overall finding of abscess formation and vasogenic edema in a patient with known endocarditis is noted on the MRI of the brain from 09/14/2020.  There is a remote right frontal infarct.  There are also areas of hypodensity seen in the left cerebellar hemisphere and left occipital lobe, which may be related to age indeterminate infarcts.  There is no acute intracranial hemorrhage, territorial infarct or mass effect, or midline shift..    Assessment and Plan     Hospital Course:    Mr. Lopez Savage was admitted to Hospital Medicine for management of  Status epilepticus     Active Hospital Problems    Diagnosis  POA    *Status epilepticus [G40.901]  Yes    Debility [R53.81]  Yes    Oral phase dysphagia [R13.11]  Yes    Seizure [R56.9]  Yes    Intracranial septic embolism [I76, I66.9]  Yes    Brain abscess [G06.0]  Yes    Cerebellar stroke [I63.9]  Yes    Cytotoxic  brain edema [G93.6]  Yes    Encephalopathy acute [G93.40]  Yes    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         Encephalopathy acute  - likely multifactorial - 2/2 stroke , brain abscess, cytotoxic edema. hosp acquired delirium   - cont PT OT   - SLP- currently with NG tube   - may repeat brain imaging If ongoing lethargy  And may consider repeat EEG    - patient with  overall complicated hospitalization course with multiple serious complications . Need to monitor clinical condition and for improvement. Prognosis may be guarded and patient may likely be debilitated going forward.  May need palliative care consult during this admission     Status epilepticus  - Multiple witnessed seizure-like activity described  - No previous reports from family  - Was started on keppra and vimpat 200 mg BID  - EEG at OSH shows irregular slowing in the R frontal region, with L posterior periodic epileptiform discharges. No seizures recorded during this study.  - 9/13 [facility] EEG continues to show periodic discharges in the left posterior region with no seizures.  -- Continue vimpat 200 mg BID    Bacterial endocarditis  Brain abscess  Positive blood culture 7/31 for strep mutans  Previously evaluated by CT surgery team her at [facility], and determined to be too high risk for valve replacement surgery, to manage with IV antibiotics     Brain abscess - 2/2 endocarditis/septic emboli  NSGY consulted-  No surgical intervention. If worsening mentation or without improvement recommend reimaging and if need for surgical washout for source control. Please send fluid for cultures (gram stain, aerobic, anaerobic,  "fungal, AFB) if undergoes drainage.     -ID recs:  Continue IV Vanc and cetriaxone for 6-8 weeks, end date 10/27-11/10 with repeat MRI post treatment  Continue ceftriaxone 2gms IV q12hrs. Anticipate 6-8 weeks of IV vancomycin and ceftriaxone with repeat imaging to assess resolution of brain abscess.     Cerebellar stroke/ Embolic stroke involving left posterior cerebral artery  On 9/8 pt presented to [place] ED with unresponsiveness  No tPA due to recent GI bleed  MRI with left PCA infarct and left thalamic infarct along with small right PCA infarct  MRA demonstrates fetal circulation to left PCA  9/11 extubated  . Repeat MRI new acute/subacute L cerebellum infarct     -Neuro checks  -Vascular neurology was consulted - and recommended medical management - ASA , statin , PT OT SLP, BP control     Cytotoxic brain edema  Per prior documentation - "Area of cytotoxic cerebral edema identified when reviewing brain imaging in the territory of the left posterior cerebral artery, L thalamus, right posterior cerebral artery. There is no mass effect associated with it. We will continue to monitor the patients clinical exam for any worsening of symptoms which may indicate expansion of the stroke or the area of the edema resulting in the clinical change. The pattern is suggestive of embolic etiology"    Oral phase dysphagia  NGT in place, continuous tube feedings for nutrition  Cont enteral water flushes 200 ml q6hrs  SLP following, will likely need PEG  Encephalopathy limiting SLP eval    Chronic bronchitis  Duonebs scheduled q4hrs  Monitor respiratory status, at risk for atelectasis  Continuous pulse oximetry, ABG prn  Continue CPT, pulmonary toileting    Diet:  NPO - tube feeds  GI PPx:   DVT PPx:  lovenox   Goals of Care:  Full     High Risk Conditions:  IE, brain abscess, seizures     Disposition:    TBD    Likely LTAC ultimately     Patient's note was created using MModal Dictation.  Any errors in syntax may not have been " identified and edited on initial review prior to signing this note.    Signing Physician:     Phyllis Sprague MD  [facility]   [facility]  Pager [phone] Hruccxv [phone]  9/22/2020

## 2020-09-23 NOTE — ASSESSMENT & PLAN NOTE
73 y/o Male PMH HTN, recent endocarditis (8/2020), recent GIB (8/2020), diverticulitis, GERD, transferred from [facility] for management of seizures. On 9/8 pt presented to [facility] ED with unresponsiveness, hypoxia requiring intubation, and seizure like activity. Initial CT with no early infarct signs or hemorrhage. No tPA due to recent GI bleed. MRI with left PCA infarct and left thalamic infarct along with small right PCA infarct. MRA demonstrates fetal circulation to left PCA. Two different distributions makes a cardioembolic source most likely. Given his recent history of endocarditis this is the likely etiology. Left PCA attenuation could be due to recent seizures. Right infarct with significant vasogenic edema more likely due to septic embolus.    Pt extubated 9/11 concerns for L MCA infarct due to exam. Repeat MRI new acute/subacute L cerebellum infarct; SWI suggest CAA +/- component of chronic hypertensive arteriopathy.  Continue with previous recommendations.    9/22 reconsult for acute mental status change. Patient confused, aphasic, not following commands. Moving all extremities. Concern for new embolic stroke vs NCSE vs delirium.     9/23 MRI without new stroke. L PCA territory with decreased attenuation more consistent seizure as prior etiology rather than embolic stroke. Persistent R cerebellar abscess. Presentation more concerning for encephalitis. Gen neuro to consulted.    Antithrombotics for secondary stroke prevention: Antiplatelets: Aspirin: 325 mg daily     Statins for secondary stroke prevention and hyperlipidemia, if present:   Statins: Atorvastatin- 40 mg daily     Aggressive risk factor modification: HTN, Endocarditis     Rehab efforts: The patient has been evaluated by a stroke team provider and the therapy needs have been fully considered based off the presenting complaints and exam findings. The following therapy evaluations are needed: PT evaluate and treat, OT evaluate and treat, SLP  evaluate and treat      Diagnostics ordered/pending: none     VTE prophylaxis: Enoxaparin 40 mg SQ every 24 hours     BP parameters: Infarct: No intervention, SBP <160 - no longer in acute window. Can move towards normotension. Patient at goal with home meds

## 2020-09-23 NOTE — PROGRESS NOTES
Pharmacokinetic Assessment Follow Up: IV Vancomycin    Vancomycin serum concentration assessment(s):    · Vancomycin random=17.6 mcg/mL    Vancomycin Regimen Plan:    · Half life calculated using 2 previous levels=46 hours. Will start 1250mg IV q48h. Will check a level prior to next dose just to ensure patient isn't subtherapeutic, but based on currently levels this seems to be most appropriate schedule.   · Next level Friday at 0900.    Drug levels (last 3 results):  Recent Labs   Lab Result Units 09/20/20  1243 09/22/20  1522 09/23/20  0622   Vancomycin, Random ug/mL  --   --  17.6   Vancomycin-Trough ug/mL 19.6 22.1*  --        Pharmacy will continue to follow and monitor vancomycin.    Please contact pharmacy at extension [phone] for questions regarding this assessment.    Thank you for the consult,   Jocelyne Hanson, PharmD, [facility]  [phone]       Patient brief summary:  Lopez Savage is a 75 y.o. male initiated on antimicrobial therapy with IV Vancomycin for treatment of endocarditis      Actual Body Weight:   114kg    Renal Function:   Estimated Creatinine Clearance: 62.1 mL/min (based on SCr of 1.3 mg/dL).,       CBC (last 72 hours):  Recent Labs   Lab Result Units 09/21/20  0300 09/22/20  1522 09/23/20  0622   WBC K/uL 8.44 8.43 10.11   Hemoglobin g/dL 8.4* 8.0* 8.5*   Hematocrit % 28.3* 27.2* 27.8*   Platelets K/uL 201 199 209   Gran% % 73.6* 73.2* 76.4*   Lymph% % 11.0* 11.2* 8.4*   Mono% % 9.0 9.3 9.5   Eosinophil% % 5.1 5.0 4.5   Basophil% % 0.8 0.9 0.7   Differential Method  Automated Automated Automated       Metabolic Panel (last 72 hours):  Recent Labs   Lab Result Units 09/20/20  1257 09/21/20  0300 09/22/20  1522 09/23/20  0622   Sodium mmol/L  --  140 140 140   Potassium mmol/L  --  3.9 3.6 3.3*   Chloride mmol/L  --  103 102 102   CO2 mmol/L  --  30* 32* 30*   Glucose mg/dL  --  88 90 109   BUN, Bld mg/dL  --  26* 33* 31*   Creatinine mg/dL  --  1.2 1.3 1.3   Albumin g/dL  --  2.4* 2.4* 2.5*   Total  Bilirubin mg/dL  --  0.2 0.2 0.2   Alkaline Phosphatase U/L  --  52* 50* 53*   AST U/L  --  12 15 14   ALT U/L  --  8* 10 10   Magnesium mg/dL 2.3 2.3  --  2.0  2.0   Phosphorus mg/dL  --  4.9*  --  3.8  3.8       Vancomycin Administrations:  vancomycin given in the last 96 hours                   vancomycin in dextrose 5 % 1 gram/250 mL IVPB 1,000 mg (mg) 1,000 mg New Bag 09/21/20 1628     1,000 mg New Bag 09/20/20 1301     1,000 mg New Bag 09/19/20 1433                Microbiologic Results:  Microbiology Results (last 7 days)     Procedure Component Value Units Date/Time    Blood culture [[id number]] Collected: 09/22/20 1522    Order Status: Completed Specimen: Blood from Antecubital, Left Arm Updated: 09/23/20 0116     Blood Culture, Routine No Growth to date    Narrative:      Collection has been rescheduled by BDW at 09/22/2020 14:57 Reason:   Patient unavailable  Collection has been rescheduled by BDW at 09/22/2020 14:57 Reason:   Patient unavailable    Blood culture [[id number]] Collected: 09/22/20 1522    Order Status: Completed Specimen: Blood from Peripheral, Left Hand Updated: 09/23/20 0116     Blood Culture, Routine No Growth to date    Narrative:      Collection has been rescheduled by BDW at 09/22/2020 14:57 Reason:   Patient unavailable  Collection has been rescheduled by BDW at 09/22/2020 14:57 Reason:   Patient unavailable    Blood culture [[id number]]     Order Status: Canceled Specimen: Blood     Blood culture [[id number]]     Order Status: Canceled Specimen: Blood     Blood culture [[id number]] Collected: 09/14/20 1840    Order Status: Completed Specimen: Blood from Peripheral, Foot, Right Updated: 09/19/20 2212     Blood Culture, Routine No growth after 5 days.    Blood culture [[id number]] Collected: 09/14/20 1835    Order Status: Completed Specimen: Blood from Peripheral, Foot, Left Updated: 09/19/20 2212     Blood Culture, Routine No growth after 5 days.

## 2020-09-23 NOTE — SUBJECTIVE & OBJECTIVE
Medications Prior to Admission   Medication Sig Dispense Refill Last Dose    atorvastatin (LIPITOR) 40 MG tablet 1 tablet (40 mg total) by Per G Tube route once daily. 90 tablet 3     [] cefTRIAXone (ROCEPHIN) 2 g/50 mL PgBk IVPB Inject 50 mLs (2 g total) into the vein once daily. for 4 days 4 each 0     [] doxycycline hyclate (DOXYCYCLINE 100MG IN D5W 250ML, READY TO MIX,) Inject 250 mLs (100 mg total) into the vein every 12 (twelve) hours. for 6 days 3000 mL 0     enoxaparin (LOVENOX) 40 mg/0.4 mL Syrg Inject 0.4 mLs (40 mg total) into the skin once daily. 12 mL 0     insulin aspart U-100 (NOVOLOG) 100 unit/mL (3 mL) InPn pen Inject 0-5 Units into the skin every 6 (six) hours as needed (Hyperglycemia). 3 mL 0     lacosamide (VIMPAT) 10 mg/mL Soln oral solution 10 mLs (100 mg total) by Per NG tube route every 12 (twelve) hours. 600 mL 11     levETIRAcetam in NaCl, iso-os, (KEPPRA) 500 mg/100 mL PgBk Inject 100 mLs (500 mg total) into the vein every 12 (twelve) hours. 6000 mL 0     losartan (COZAAR) 50 MG tablet Take 1 tablet (50 mg total) by mouth once daily. 90 tablet 3     [] metronidazole (FLAGYL) 500 mg/100 mL IVPB Inject 100 mLs (500 mg total) into the vein every 8 (eight) hours. for 7 days 2100 mL 0        Review of patient's allergies indicates:   Allergen Reactions    Codeine     Tetanus vaccines and toxoid        No past medical history on file.  No past surgical history on file.  Family History     None        Tobacco Use    Smoking status: Not on file   Substance and Sexual Activity    Alcohol use: Not on file    Drug use: Not on file    Sexual activity: Not on file     Review of Systems   Neurological: Tremors:        Objective:     Weight: 114 kg (251 lb 5.2 oz)  Body mass index is 37.11 kg/m².  Vital Signs (Most Recent):  Temp: 98.6 °F (37 °C) (20 0846)  Pulse: 92 (20 1938)  Resp: (!) 27 (20 193)  BP: (!) 140/64 (20 1212)  SpO2: 100 %  (09/22/20 1938) Vital Signs (24h Range):  Temp:  [98.1 °F (36.7 °C)-98.6 °F (37 °C)] 98.6 °F (37 °C)  Pulse:  [] 92  Resp:  [15-28] 27  SpO2:  [98 %-100 %] 100 %  BP: ()/(49-66) 140/64     Date 09/22/20 0700 - 09/23/20 0659   Shift 0758-7212 9050-4956 9085-6444 24 Hour Total   INTAKE   NG/   600   IV Piggyback 50   50   Shift Total(mL/kg) 650(5.7)   650(5.7)   OUTPUT   Urine(mL/kg/hr)  325  325   Shift Total(mL/kg)  325(2.9)  325(2.9)   Weight (kg) 114 114 114 114                        NG/OG Tube 09/20/20 2310 14 Fr. Right nostril (Active)   Tube advanced (cm) 4 09/21/20 0859   Advancement advanced manually 09/21/20 0859   Tolerance no signs/symptoms of discomfort 09/21/20 2000   Securement other (see comments) 09/22/20 0800   Clamp Status/Tolerance unclamped 09/21/20 2000   Insertion Site Appearance no redness, warmth, tenderness, skin breakdown, drainage 09/21/20 2000   Feeding Type by pump 09/21/20 2000   Feeding Action feeding continued 09/21/20 2000   Current Rate (mL/hr) 50 mL/hr 09/21/20 2000   Goal Rate (mL/hr) 50 mL/hr 09/21/20 2000   Water Bolus (mL) 200 mL 09/22/20 1100   Rate Formula Tube Feeding (mL/hr) 50 mL/hr 09/21/20 2000   Intake (mL) - Formula Tube Feeding 400 09/22/20 1300       Neurosurgery Physical Exam    AOx0  E4V3M5  PERRLA  CN intact  BUE localizes aggressively  BLE WD    Significant Labs:  Recent Labs   Lab 09/21/20  0300 09/22/20  1522   GLU 88 90    140   K 3.9 3.6    102   CO2 30* 32*   BUN 26* 33*   CREATININE 1.2 1.3   CALCIUM 10.0 10.1   MG 2.3  --      Recent Labs   Lab 09/21/20  0300 09/22/20  1522   WBC 8.44 8.43   HGB 8.4* 8.0*   HCT 28.3* 27.2*    199     No results for input(s): LABPT, INR, APTT in the last 48 hours.  Microbiology Results (last 7 days)     Procedure Component Value Units Date/Time    Blood culture [[id number]] Collected: 09/22/20 1522    Order Status: Sent Specimen: Blood from Peripheral, Left Hand Updated: 09/22/20 1542     Narrative:      Collection has been rescheduled by BDW at 09/22/2020 14:57 Reason:   Patient unavailable  Collection has been rescheduled by BDW at 09/22/2020 14:57 Reason:   Patient unavailable    Blood culture [[id number]] Collected: 09/22/20 1522    Order Status: Sent Specimen: Blood from Antecubital, Left Arm Updated: 09/22/20 1544    Narrative:      Collection has been rescheduled by BDW at 09/22/2020 14:57 Reason:   Patient unavailable  Collection has been rescheduled by BDW at 09/22/2020 14:57 Reason:   Patient unavailable    Blood culture [[id number]]     Order Status: Canceled Specimen: Blood     Blood culture [[id number]]     Order Status: Canceled Specimen: Blood     Blood culture [[id number]] Collected: 09/14/20 1840    Order Status: Completed Specimen: Blood from Peripheral, Foot, Right Updated: 09/19/20 2212     Blood Culture, Routine No growth after 5 days.    Blood culture [[id number]] Collected: 09/14/20 1835    Order Status: Completed Specimen: Blood from Peripheral, Foot, Left Updated: 09/19/20 2212     Blood Culture, Routine No growth after 5 days.        All pertinent labs from the last 24 hours have been reviewed.    Significant Diagnostics:  I have reviewed and interpreted all pertinent imaging results/findings within the past 24 hours.

## 2020-09-23 NOTE — CONSULTS
[facility] - ICU 14 WT  Palliative Medicine  Consult Note    Patient Name: Lopez Savage  MRN: [id number]  Admission Date: 9/13/2020  Hospital Length of Stay: 10 days  Code Status: Full Code   Attending Provider: Phyllis Sprague MD  Consulting Provider: ELSIE Lopez  Primary Care Physician: Prasanna Haywood MD  Principal Problem:Status epilepticus    Patient information was obtained from spouse/SO and ER records.      Inpatient consult to Palliative Care  Consult performed by: Sandra Parker, CNS  Consult ordered by: Phyllis Sprague MD        Assessment/Plan:     Palliative care encounter  Impression: Pt is a 76 y/o male with PMH of HTN, recent endocarditis (8/2020), recent GIB (8/2020), diverticulitis, GERD. pt transferred from [facility] for management of seizures. On 9/8 pt presented to [facility] ED with unresponsiveness, hypoxia requiring intubation, and seizure like activity. MRI with left PCA infarct and left thalamic infarct along with small right PCA infarct. Two different distributions makes a cardioembolic source most likely. Given his recent history of endocarditis this is the likely etiology Right infarct with significant vasogenic edema more likely due to septic embolus. Pt is somnolent. Pt does not follow commands. Pt has NG tube feeds in place. Per wife prior to 9/22, pt was alert and oriented. Pt is a full code.     Reason for consult: Support/advance care planning Communicated with Dr. Sprague.     Met with pt's wife who is at bedside. Wife is aware pt has endocarditis, and embolic stroke. Wife is concerned about pt's mental status deteriorating on 9/22. Per wife pt was alert and oriented prior to that.  MRI results showed mature focal ischemia. Education done with wife. Also, let pt's wife know Vascular neurology is seeing pt concerning this mental status change and possible causes. Per wife, she has been speaking to  about possible LTAC but voices  concern about pt's mental status and questions concerning if he will return to baseline.     Advance care planning:   MPOA: Pt's wife is next of kin, followed by two adult sons.   Code status: Full    Symptom management:   No distress noted.     Plan:   Vascular neurology seeing pt. Wife awaiting to speak to team.  Will continue to reinforce realistic expectations.   Will follow.         Thank you for your consult. I will follow-up with patient. Please contact us if you have any additional questions.    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 Per chart review, the patient presented to [facility] on 9/8/2020 with a primary complaint of loss of consciousness. The patient was recently treated for endocarditis w/ IV rocephin through PICC line at [facility] and discharged approximately 2 weeks ago. Since then he has had a worsening cough and was planning to go to the hospital for evaluation. Per wife, home health did portable CXR about a week ago w/ concern for fluid vs pneumonia. Patient was given course of doxycycline and finished course recently. 9/8- per chart review, wife went to bed while  was watching TV and about an hour later her daughter found him non-responsive on the ground. Daughter did a few chest compressions before EMS arrived. Patient arrived to ED and was no- responsive to sternal rub. Pupillary and gag reflex present. Patient intubated for airway protection and sedated w/ propofol. No seizure like activity was observed in ED. TPA contraindicated due to recent GI bleed, last occurring in August. Discussed further stroke work up with wife and planned for imaging. CT head negative for acute intracranial bleed. MRI head showed PCA infarct and right occipital lobe edema. 9/8 EEG demonstrated left posterior pseudo periodic epileptiform  discharges is suggestive of an irritative region likely secondary to focal ischemia. Patient started on AEDs vimpat and keppra and reported to have several seizures during his stay. Patient was transferred to [facility] today for continuous EEG monitoring and further evaluation.    Pt is a a full code.       Hospital Course:  No notes on file    Interval History: Pt s/p embolic stroke. Pt has endocarditis.     No past medical history on file.    No past surgical history on file.    Review of patient's allergies indicates:   Allergen Reactions    Codeine     Tetanus vaccines and toxoid        Medications:  Continuous Infusions:  Scheduled Meds:   albuterol-ipratropium  3 mL Nebulization Q4H    atorvastatin  40 mg Per NG tube Daily    cefTRIAXone (ROCEPHIN) IVPB  2 g Intravenous Q12H    enoxaparin  40 mg Subcutaneous Q24H    lacosamide  200 mg Per NG tube Q12H    losartan  25 mg Per NG tube Daily    miconazole nitrate 2%   Topical (Top) BID    pantoprazole  40 mg Per NG tube Daily    silodosin  8 mg Per NG tube Daily    sodium chloride 0.9%  10 mL Intravenous Q6H    traZODone  25 mg Per NG tube QHS    vancomycin (VANCOCIN) IVPB  1,250 mg Intravenous Q48H     PRN Meds:acetaminophen, dextrose 50%, glucagon (human recombinant), insulin aspart U-100, labetalol, psyllium husk (aspartame), sodium chloride 0.9%, [place] PICC Protocol **AND** sodium chloride 0.9% **AND** sodium chloride 0.9%, Pharmacy to dose Vancomycin consult **AND** vancomycin - pharmacy to dose    Family History     None        Tobacco Use    Smoking status: Not on file   Substance and Sexual Activity    Alcohol use: Not on file    Drug use: Not on file    Sexual activity: Not on file       Review of Systems   Unable to perform ROS: Mental status change     Objective:     Vital Signs (Most Recent):  Temp: 98.6 °F (37 °C) (09/22/20 0846)  Pulse: 79 (09/23/20 0918)  Resp: (!) 27 (09/23/20 0918)  BP: (!) 173/68 (09/23/20  0635)  SpO2: 100 % (09/23/20 0918) Vital Signs (24h Range):  Pulse:  [79-97] 79  Resp:  [21-37] 27  SpO2:  [97 %-100 %] 100 %  BP: (140-186)/(64-88) 173/68     Weight: 114 kg (251 lb 5.2 oz)  Body mass index is 37.11 kg/m².    Physical Exam  Constitutional:       Appearance: He is obese. He is ill-appearing.      Comments: Not responding to voice or touch   HENT:      Head: Normocephalic and atraumatic.   Cardiovascular:      Rate and Rhythm: Normal rate and regular rhythm.   Pulmonary:      Breath sounds: Rhonchi present.   Abdominal:      Comments: NG tube in place with tube feeds.    Musculoskeletal:      Comments: Moves extremities.      Skin:     General: Skin is warm and dry.   Neurological:      Comments: Pt sleeping. Opens eyes intermittently. Not responding to voice.    Psychiatric:      Comments: Pt not responding to voice, not following commands.          Review of Symptoms        Comments:  Unable to assess. Pt sleeping. No distress noted.           Performance Status:  10    ECOG Performance Status Grade:  4 - Completely disabled      Advance Care Planning   Advance Directives:   Living Will: No    LaPOST: No    Do Not Resuscitate Status: No    Medical Power of : No    Agent's Name:  Wife is next of kin    Decision Making:  Family answered questions         Significant Labs: All pertinent labs within the past 24 hours have been reviewed.  CBC:   Recent Labs   Lab 09/23/20  0622   WBC 10.11   HGB 8.5*   HCT 27.8*   MCV 92        BMP:  Recent Labs   Lab 09/23/20 0622         K 3.3*      CO2 30*   BUN 31*   CREATININE 1.3   CALCIUM 10.4   MG 2.0  2.0     LFT:  Lab Results   Component Value Date    AST 14 09/23/2020    ALKPHOS 53 (L) 09/23/2020    BILITOT 0.2 09/23/2020     Albumin:   Albumin   Date Value Ref Range Status   09/23/2020 2.5 (L) 3.5 - 5.2 g/dL Final     Protein:   Total Protein   Date Value Ref Range Status   09/23/2020 6.9 6.0 - 8.4 g/dL Final     Lactic  acid:   Lab Results   Component Value Date    LACTATE 0.5 09/22/2020    LACTATE 2.0 09/08/2020       Significant Imaging: I have reviewed all pertinent imaging results/findings within the past 24 hours.      20 minutes of advance care planning completed.       Sandra Parker, CNS  Palliative Medicine  [facility] - ICU 14 WT

## 2020-09-23 NOTE — HPI
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 Per chart review, the patient presented to [facility] on 9/8/2020 with a primary complaint of loss of consciousness. The patient was recently treated for endocarditis w/ IV rocephin through PICC line at [facility] and discharged approximately 2 weeks ago. Since then he has had a worsening cough and was planning to go to the hospital for evaluation. [facility], [facility] about a week ago w/ concern for fluid vs pneumonia. Patient was given course of doxycycline and finished course recently. 9/8- per chart review, wife went to bed while  was watching TV and about an hour later her daughter found him non-responsive on the ground. Daughter did a few chest compressions before EMS arrived. Patient arrived to ED and was no- responsive to sternal rub. Pupillary and gag reflex present. Patient intubated for airway protection and sedated w/ propofol. No seizure like activity was observed in ED. TPA contraindicated due to recent GI bleed, last occurring in August. Discussed further stroke work up with wife and planned for imaging. CT head negative for acute intracranial bleed. MRI head showed PCA infarct and right occipital lobe edema. 9/8 EEG demonstrated left posterior pseudo periodic epileptiform discharges is suggestive of an irritative region likely secondary to focal ischemia. Patient started on AEDs vimpat and keppra and reported to have several seizures during his stay. Patient was transferred to [facility] today for continuous EEG monitoring and further evaluation.    Pt is a a full code.

## 2020-09-23 NOTE — ASSESSMENT & PLAN NOTE
Impression: Pt is a 76 y/o male with PMH of HTN, recent endocarditis (8/2020), recent GIB (8/2020), diverticulitis, GERD. pt transferred from [facility] for management of seizures. On 9/8 pt presented to [facility] ED with unresponsiveness, hypoxia requiring intubation, and seizure like activity. MRI with left PCA infarct and left thalamic infarct along with small right PCA infarct. Two different distributions makes a cardioembolic source most likely. Given his recent history of endocarditis this is the likely etiology Right infarct with significant vasogenic edema more likely due to septic embolus. Pt is somnolent. Pt does not follow commands. Pt has NG tube feeds in place. Per wife prior to 9/22, pt was alert and oriented. Pt is a full code.     Reason for consult: Support/advance care planning Communicated with Dr. Sprague.     Met with pt's wife who is at bedside. Wife is aware pt has endocarditis, and embolic stroke. Wife is concerned about pt's mental status deteriorating on 9/22. Per wife pt was alert and oriented prior to that.  MRI results showed mature focal ischemia. Education done with wife. Also, let pt's wife know Vascular neurology is seeing pt concerning this mental status change and possible causes. Per wife, she has been speaking to  about possible LTAC but voices concern about pt's mental status and questions concerning if he will return to baseline.     Advance care planning:   MPOA: Pt's wife is next of kin, followed by two adult sons.   Code status: Full    Symptom management:   No distress noted.     Plan:   Vascular neurology seeing pt. Wife awaiting to speak to team.  Will continue to reinforce realistic expectations.   Will follow.

## 2020-09-23 NOTE — PT/OT/SLP PROGRESS
"Occupational Therapy   Treatment    Name: Lopez Savage  MRN: [id number]  Admitting Diagnosis:  Status epilepticus       Recommendations:     Discharge Recommendations: rehabilitation facility  Discharge Equipment Recommendations:  other (see comments)(TBD)  Barriers to discharge:  Other (Comment)(increased assistance at current level of function)    Assessment:     Lopez Savage is a 75 y.o. male with a medical diagnosis of Status epilepticus.  Pt with fair tolerance of session.  He was non-verbal throughout the session with minimal engagement.  Pt requires total A of 2 people for bed mobility and maximal assistance for sitting balance due to R, posterior lean.  He repeatedly tried to reach for his NG tube despite verbal and tactile cues.  Pt arousal noted when his wife spoke to him with increased volume.  He presents with the following deficits. Performance deficits affecting function are weakness, impaired endurance, impaired self care skills, impaired functional mobilty, gait instability, impaired balance, impaired cognition, decreased coordination, decreased upper extremity function, decreased lower extremity function, decreased safety awareness, abnormal tone, decreased ROM, impaired fine motor, edema, impaired cardiopulmonary response to activity.     Rehab Prognosis:  Fair; patient would benefit from acute skilled OT services to address these deficits and reach maximum level of function.       Plan:     Patient to be seen 4 x/week to address the above listed problems via self-care/home management, therapeutic exercises, therapeutic activities, neuromuscular re-education  · Plan of Care Expires: 10/13/20  · Plan of Care Reviewed with: patient, spouse    Subjective   Pt's wife stated, "He didn't sleep well last night.  He was able to talk to his family the other day, but he hasn't been able to since yesterday."  Pain/Comfort:  Pain Rating 1: 0/10  Pain Rating Post-Intervention 1: 0/10    Objective: "     Communicated with: RN and PT prior to session.  Patient found sleeping with HOB elevated with telemetry, pulse ox (continuous), NG tube, blood pressure cuff, peripheral IV, restraints with his wife present upon OT entry to room.    General Precautions: Standard, NPO, aspiration, fall   Orthopedic Precautions:N/A   Braces: N/A     Occupational Performance:     Bed Mobility:    · Patient completed Rolling/Turning to Right with total assistance and 2 persons  · Patient completed Scooting/Bridging with total assistance and 2 persons  · Patient completed Supine to Sit with total assistance and 2 persons  · Patient completed Sit to Supine with total assistance and 2 persons     Functional Mobility/Transfers:  Not performed due to pt's current level of function.    Activities of Daily Living:  · Deferred      [facility] 6 Click ADL: 6    Treatment & Education:  - Pt's wife edu on role of OT, POC, benefit of performing EOB activity.  - White board updated    -Pt performed the following therex while seated EOB: BUE PROM shoulder flexion x 1 set of 5 reps each.  Increased tone noted.      -Therapist with [place] assist to place both of pt's hands on the bed while sitting EOB.  Pt able to keep his L hand on EOB, but he immediately would move his R hand back into his lap despite multiple cues.      -Noted that pt's head was turned toward the L while sitting EOB.  Pt unable to look toward the R initially.  Therapist instructed pt's wife to move to the pt's right.  After she did this and spoke to pt with an increased volume, pt was able to look toward her to the R.  However, when pt was supine, noted that his head was turned toward the R.      Patient left HOB elevated with all lines intact, call button in reach and his wife and RN presentEducation:      GOALS:   Multidisciplinary Problems     Occupational Therapy Goals        Problem: Occupational Therapy Goal    Goal Priority Disciplines Outcome Interventions   Occupational Therapy  Goal     OT, PT/OT Ongoing, Progressing    Description: Goals to be met by: 9/28/2020     Patient will increase functional independence with ADLs by performing:    Pt will follow 75% of motor commands with <2 verbal cues for repetition of task to maximize engagement in functional task.  Pt will sit at EOB for approx 10 minutes with max A and unilateral UE support to complete grooming task  Pt will complete sit>stand from EOB with bed in highest position with mod A in prep for functional transfers to [facility]  Pt will perform grooming sitting EOB with mod A                                Time Tracking:     OT Date of Treatment: 09/23/20  OT Start Time: 1213  OT Stop Time: 1236  OT Total Time (min): 23 min (co-treat with PT due to pt's limited activity tolerance)    Billable Minutes:Therapeutic Activity 15 min.  Therapeutic Exercise 8 min.    Pedro March, OT  9/23/2020

## 2020-09-23 NOTE — PROGRESS NOTES
09/22/20 2021   Vital Signs   Pulse 91   Heart Rate Source Monitor   SpO2 97 %   Flow (L/min) 1   O2 Device (Oxygen Therapy) nasal cannula   BP (!) 159/67   MAP (mmHg) 90   BP Location Right arm   BP Method Automatic   Patient Position Lying     Pt transported to 1st floor MRI with Charge KAIT Hoffman and PCT Amando. Pt VS WDL and patient resting comfortable in bed. Will return pt to room [address]446 once procedure is completed. Will continue to monitor.

## 2020-09-23 NOTE — PROGRESS NOTES
Pharmacokinetic Assessment Follow Up: IV Vancomycin    Vancomycin serum concentration assessment(s):    The trough level was drawn correctly and can be used to guide therapy at this time. The measurement is above the desired definitive target range of 15 to 20 mcg/mL.    Vancomycin Regimen Plan:    Check random level with AM labs on 9/23, as well as renal function. Serum creatinine has been steadily increasing (1.1 on 9/20, 1.2 on 9/21, and 1.3 on 9/22). Then, if appropriate, continue with the following regimen:    Change regimen to Vancomycin 750 mg IV every 24 hours with next serum trough concentration measured at 0400 prior to 3rd dose on 9/25.        Drug levels (last 3 results):  Recent Labs   Lab Result Units 09/20/20  1243 09/22/20  1522   Vancomycin-Trough ug/mL 19.6 22.1*       Pharmacy will continue to follow and monitor vancomycin.    Please contact pharmacy at extension [phone] for questions regarding this assessment.    Thank you for the consult,   Mercedes Garcia       Patient brief summary:  Lopez Savage is a 75 y.o. male initiated on antimicrobial therapy with IV Vancomycin for treatment of endocarditis    The patient's current regimen is Vancomycin 1000mg Q24H.    Drug Allergies:   Review of patient's allergies indicates:   Allergen Reactions    Codeine     Tetanus vaccines and toxoid        Actual Body Weight:   114 kg    Renal Function:   Estimated Creatinine Clearance: 62.1 mL/min (based on SCr of 1.3 mg/dL).,     Dialysis Method (if applicable):  N/A    CBC (last 72 hours):  Recent Labs   Lab Result Units 09/20/20  0300 09/21/20  0300 09/22/20  1522   WBC K/uL 9.54 8.44 8.43   Hemoglobin g/dL 8.2* 8.4* 8.0*   Hematocrit % 27.4* 28.3* 27.2*   Platelets K/uL 196 201 199   Gran% % 76.6* 73.6* 73.2*   Lymph% % 9.6* 11.0* 11.2*   Mono% % 8.1 9.0 9.3   Eosinophil% % 4.6 5.1 5.0   Basophil% % 0.6 0.8 0.9   Differential Method  Automated Automated Automated       Metabolic Panel (last 72  hours):  Recent Labs   Lab Result Units 09/20/20  0300 09/20/20  1257 09/21/20  0300 09/22/20  1522   Sodium mmol/L 141  --  140 140   Potassium mmol/L 3.9  --  3.9 3.6   Chloride mmol/L 106  --  103 102   CO2 mmol/L 28  --  30* 32*   Glucose mg/dL 97  --  88 90   BUN, Bld mg/dL 31*  --  26* 33*   Creatinine mg/dL 1.1  --  1.2 1.3   Albumin g/dL 2.3*  --  2.4* 2.4*   Total Bilirubin mg/dL 0.2  --  0.2 0.2   Alkaline Phosphatase U/L 56  --  52* 50*   AST U/L 11  --  12 15   ALT U/L 5*  --  8* 10   Magnesium mg/dL 1.7 2.3 2.3  --    Phosphorus mg/dL 4.5  --  4.9*  --        Vancomycin Administrations:  vancomycin given in the last 96 hours                   vancomycin in dextrose 5 % 1 gram/250 mL IVPB 1,000 mg (mg) 1,000 mg New Bag 09/21/20 1628     1,000 mg New Bag 09/20/20 1301     1,000 mg New Bag 09/19/20 1433                Microbiologic Results:  Microbiology Results (last 7 days)     Procedure Component Value Units Date/Time    Blood culture [[id number]] Collected: 09/22/20 1522    Order Status: Sent Specimen: Blood from Peripheral, Left Hand Updated: 09/22/20 1544    Narrative:      Collection has been rescheduled by BDW at 09/22/2020 14:57 Reason:   Patient unavailable  Collection has been rescheduled by BDW at 09/22/2020 14:57 Reason:   Patient unavailable    Blood culture [[id number]] Collected: 09/22/20 1522    Order Status: Sent Specimen: Blood from Antecubital, Left Arm Updated: 09/22/20 1544    Narrative:      Collection has been rescheduled by BDW at 09/22/2020 14:57 Reason:   Patient unavailable  Collection has been rescheduled by BDW at 09/22/2020 14:57 Reason:   Patient unavailable    Blood culture [[id number]]     Order Status: Canceled Specimen: Blood     Blood culture [[id number]]     Order Status: Canceled Specimen: Blood     Blood culture [[id number]] Collected: 09/14/20 1840    Order Status: Completed Specimen: Blood from Peripheral, Foot, Right Updated: 09/19/20 2212     Blood Culture, Routine No  growth after 5 days.    Blood culture [[id number]] Collected: 09/14/20 1835    Order Status: Completed Specimen: Blood from Peripheral, Foot, Left Updated: 09/19/20 2212     Blood Culture, Routine No growth after 5 days.

## 2020-09-23 NOTE — PLAN OF CARE
Advance Care Planning   [facility]   Palliative Care   Psychosocial Assessment    Patient Name: Lopez Savage  MRN: [id number]  Admission Date: 9/13/2020  Hospital Length of Stay: 10 days  Code Status: Full Code   Attending Provider: Phyllis Sprague MD  Palliative Care Provider: VALENTINE Montano, APRN, AGCNS  Primary Care Physician: Prasanna Haywood MD  Principal Problem:Status epilepticus    Reason for Referral: assistance with clarification of goals of care  Consult Order Date: 9/23/20  Primary CM/SW: Nano    Present during Interview: patient, spouse/SO and Pal Care APRN and this SW.      Primary Language:English   Needed: no      Past Medical Situation:   PMH: No past medical history on file.  Mental Health/Substance Use History: n/a  Risk of Abuse, neglect or exploitation: n/a  Current or Previous Trauma and/or evidence of PTSD: none noted  Non-traditional Health practices:     Understanding of diagnosis and prognosis: Will benefit from continued follow up regarding px.  Experience/Comfort level with health care system:    Patients Mental Status: not oriented.    Socio-Economic Factors/Resources:  Address: [address] [place] [address]  Phone Number: [phone] (home)     Marital Status:  x 50 years  Perceived impact on household composition: Lives with wife and daughter Ifrah  Children: 2 daughters: Deborah and Ifrah    Patient/Family perceptions about Caregiving Needs; availability and capacity: Wife thinks pt will need LTAC    Family Dynamics/Relationships: Pt and wife have been  for 50 years. They have two daughters Ifrah and Deborah. Ifrah lives with them and Deborah lives in [facility]. They have a rescue hound.    Extended family supportive.     Patient/Family Strengths/Resilience: family  Patient/Family Coping Strategies: relies on family    Activities of Daily Living: independent prior some assistance with IV abx.  Support  Systems-Family & Community (Home Health, HME etc):  Family HH, [employer] for Home IV abx, rolling walker, tub bench, bars in bathtub.    Transportation:  no    Work/Education History: Pt retired in . 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   Self-Care Activities/Hobbies: enjoyed going to park, swimming , walking.     History: no    Financial Resources:[employer]      Advanced Care Planning & Legal Concerns:   Advanced Directives/Living Will: no  LaPOST/POLST: no   Planning:  no    Power of : no  Surrogate Decision Maker: wife    Emergency Contacts:  Wife: Brenda Savage: [phone]  Dtr: Ifrah Savage: [phone]    Spirituality, Culture & Coping Mechanisms:  F- Amanda and Belief: Oriental orthodox     I - Importance: strong amanda.    C - Community/Culture Values:     A - Address in Care: Anointed . Father Jarek following      Goals/Hopes/Expectations: Wife's hoping to go to LTAC  Fears/Anxiety/Concerns: Concerned him mental status has deteriorated.         Preferences about EOL Environment: (own bed, family nearby, pets, music, etc)  tbd    Complicated Bereavement Risk Assessment Tool (CBRAT)  Reference:  [facility] Clinical Practice Group (May 2016). Bereavement Risk Screening and Management Guidelines.  Retrieved from: http://www.grpcc.com.au/wp-content/uploads//VKWRB-Baelvykcgoz-Uemgdbswm-and-Management-Guideline-2016.pdf      Bereaved Client Characteristics   ? Under 18      no  ? Was a Twin   no  ? Young Spouse   no  ? Elderly Spouse    yes  ? Isolated    no  ? Lacks Meaningful Social Support   no  ? Dissatisfied with help available during illness   no  ? New to [employer] no  ? New to Decision-Making   no    Illness  ? Inherited Disorder   no  ? Stigmatized Disease in the family/community   no  ? Lengthy/Burdensome   yes     Bereaved Client's History of Loss   ? Cumulative Multiple Losses    no  ? Previous Mental Health Illnesses   no  ? Current Mental Health Illness   no  ? Other Significant Health Issues   no   ? Migrant/Refugee   no Death  ? Sudden or Unexpected   yes  ? Traumatic Circumstances Associated with Death   no  ? Significant Cultural/Social Burdens as a result of Death   no   Relationship with   ? Profound Lifelong Partner   yes  ? Highly Dependent    no  ? Antagonistic   no  ? Ambivalent   no  ? Deeply Connected   no  ? Culturally Defined   no   Risk Factors Scores  0-2  Low  3-5  Moderate  5+  High  All persons scoring moderate to high presume to be at risk**    (** It is acknowledged that protective factors and resilience may outweigh apparent risk factors.      Total Risk Factors Score:   Mild to Moderate    Will benefit from continued follow up and bereavement support.       Discharge Planning Needs/Plan of Care:       Visit to bedside with Pal Care AMERICA Parker. Introduction to Pal Care for support and goals if needed.    Wife aware of pt's endocarditis and embolic stroke. Wife stated that pt was able to have a conversation and eating on the . Wife concerned that pt's mental status started deteriorating on .     Wife stated that she was speaking with CM about pt going to LTAC. Explained to wife benefits of LTAC.     Explained that Pal Care would continue to follow them to support them.     Will follow.    Corina Arboleda, KAMERONW, [facility] Hospital[facility]-SW

## 2020-09-23 NOTE — CONSULTS
[facility]  Neurosurgery  Consult Note    Consults  Subjective:     Chief Complaint/Reason for Admission: Altered mental status    History of Present Illness: 74 M with  recent endocarditis (Strep mutans per report, s/p IV Rocephin) with aortic valve vegetations (), recent GIB (), diverticulitis, GERD, MVP, and COPD who was transferred to [facility] on  for cEEG. The patient presented to [facility] on 2020 after LOC at home. Per report patient had been c/o worsening SOB, had portable CXR with effusion v PNA. Patient was given course of doxycycline and finished course recently. Reportedly on  his wife went to bed while  was watching TV and about an hour later her daughter found him non responsive on the ground, short round of chest compressions before intubated in ED, sedated on Prop.  No seizure like activity was observed in ED. TPA contraindicated due to recent GI bleed, last occurring in August.  MRI head showed L PCA infarct and right occipital lobe edema.  EEG demonstrated left posterior pseudo periodic epileptiform discharges is suggestive of an irritative region likely secondary to focal ischemia. Patient started on AEDs vimpat and keppra and reported to have several seizures during his stay at [place], currently on cEEG in [facility]. NSGY consulted following MRI Brain this evening with 1.2cm R occipital lesion concerning for abscess.     On Lovenox at home, SQH at [facility].     Reconsult for change in mental status today, not interacting with his wife, aphasia, not following commands.     Medications Prior to Admission   Medication Sig Dispense Refill Last Dose    atorvastatin (LIPITOR) 40 MG tablet 1 tablet (40 mg total) by Per G Tube route once daily. 90 tablet 3     [] cefTRIAXone (ROCEPHIN) 2 g/50 mL PgBk IVPB Inject 50 mLs (2 g total) into the vein once daily. for 4 days 4 each 0     [] doxycycline hyclate (DOXYCYCLINE 100MG IN  D5W 250ML, READY TO MIX,) Inject 250 mLs (100 mg total) into the vein every 12 (twelve) hours. for 6 days 3000 mL 0     enoxaparin (LOVENOX) 40 mg/0.4 mL Syrg Inject 0.4 mLs (40 mg total) into the skin once daily. 12 mL 0     insulin aspart U-100 (NOVOLOG) 100 unit/mL (3 mL) InPn pen Inject 0-5 Units into the skin every 6 (six) hours as needed (Hyperglycemia). 3 mL 0     lacosamide (VIMPAT) 10 mg/mL Soln oral solution 10 mLs (100 mg total) by Per NG tube route every 12 (twelve) hours. 600 mL 11     levETIRAcetam in NaCl, iso-os, (KEPPRA) 500 mg/100 mL PgBk Inject 100 mLs (500 mg total) into the vein every 12 (twelve) hours. 6000 mL 0     losartan (COZAAR) 50 MG tablet Take 1 tablet (50 mg total) by mouth once daily. 90 tablet 3     [] metronidazole (FLAGYL) 500 mg/100 mL IVPB Inject 100 mLs (500 mg total) into the vein every 8 (eight) hours. for 7 days 2100 mL 0        Review of patient's allergies indicates:   Allergen Reactions    Codeine     Tetanus vaccines and toxoid        No past medical history on file.  No past surgical history on file.  Family History     None        Tobacco Use    Smoking status: Not on file   Substance and Sexual Activity    Alcohol use: Not on file    Drug use: Not on file    Sexual activity: Not on file     Review of Systems   Neurological: Tremors:        Objective:     Weight: 114 kg (251 lb 5.2 oz)  Body mass index is 37.11 kg/m².  Vital Signs (Most Recent):  Temp: 98.6 °F (37 °C) (20 0846)  Pulse: 92 (20)  Resp: (!) 27 (20)  BP: (!) 140/64 (20 1212)  SpO2: 100 % (20) Vital Signs (24h Range):  Temp:  [98.1 °F (36.7 °C)-98.6 °F (37 °C)] 98.6 °F (37 °C)  Pulse:  [] 92  Resp:  [15-28] 27  SpO2:  [98 %-100 %] 100 %  BP: ()/(49-66) 140/64     Date 20 0700 - 20 0659   Shift 5123-5618 6252-6342 7384-9295 24 Hour Total   INTAKE   NG/   600   IV Piggyback 50   50   Shift Total(mL/kg) 650(5.7)    650(5.7)   OUTPUT   Urine(mL/kg/hr)  325  325   Shift Total(mL/kg)  325(2.9)  325(2.9)   Weight (kg) 114 114 114 114                        NG/OG Tube 09/20/20 2310 14 Fr. Right nostril (Active)   Tube advanced (cm) 4 09/21/20 0859   Advancement advanced manually 09/21/20 0859   Tolerance no signs/symptoms of discomfort 09/21/20 2000   Securement other (see comments) 09/22/20 0800   Clamp Status/Tolerance unclamped 09/21/20 2000   Insertion Site Appearance no redness, warmth, tenderness, skin breakdown, drainage 09/21/20 2000   Feeding Type by pump 09/21/20 2000   Feeding Action feeding continued 09/21/20 2000   Current Rate (mL/hr) 50 mL/hr 09/21/20 2000   Goal Rate (mL/hr) 50 mL/hr 09/21/20 2000   Water Bolus (mL) 200 mL 09/22/20 1100   Rate Formula Tube Feeding (mL/hr) 50 mL/hr 09/21/20 2000   Intake (mL) - Formula Tube Feeding 400 09/22/20 1300       Neurosurgery Physical Exam    AOx0  E4V3M5  PERRLA  CN intact  BUE localizes aggressively  BLE WD    Significant Labs:  Recent Labs   Lab 09/21/20  0300 09/22/20  1522   GLU 88 90    140   K 3.9 3.6    102   CO2 30* 32*   BUN 26* 33*   CREATININE 1.2 1.3   CALCIUM 10.0 10.1   MG 2.3  --      Recent Labs   Lab 09/21/20  0300 09/22/20  1522   WBC 8.44 8.43   HGB 8.4* 8.0*   HCT 28.3* 27.2*    199     No results for input(s): LABPT, INR, APTT in the last 48 hours.  Microbiology Results (last 7 days)     Procedure Component Value Units Date/Time    Blood culture [[id number]] Collected: 09/22/20 1522    Order Status: Sent Specimen: Blood from Peripheral, Left Hand Updated: 09/22/20 1544    Narrative:      Collection has been rescheduled by BDW at 09/22/2020 14:57 Reason:   Patient unavailable  Collection has been rescheduled by BDW at 09/22/2020 14:57 Reason:   Patient unavailable    Blood culture [[id number]] Collected: 09/22/20 1522    Order Status: Sent Specimen: Blood from Antecubital, Left Arm Updated: 09/22/20 1544    Narrative:      Collection has  been rescheduled by BDW at 09/22/2020 14:57 Reason:   Patient unavailable  Collection has been rescheduled by BDW at 09/22/2020 14:57 Reason:   Patient unavailable    Blood culture [[id number]]     Order Status: Canceled Specimen: Blood     Blood culture [[id number]]     Order Status: Canceled Specimen: Blood     Blood culture [[id number]] Collected: 09/14/20 1840    Order Status: Completed Specimen: Blood from Peripheral, Foot, Right Updated: 09/19/20 2212     Blood Culture, Routine No growth after 5 days.    Blood culture [[id number]] Collected: 09/14/20 1835    Order Status: Completed Specimen: Blood from Peripheral, Foot, Left Updated: 09/19/20 2212     Blood Culture, Routine No growth after 5 days.        All pertinent labs from the last 24 hours have been reviewed.    Significant Diagnostics:  I have reviewed and interpreted all pertinent imaging results/findings within the past 24 hours.    Assessment/Plan:     Bacterial endocarditis  74M with known bacterial endocarditis with septic emboli c/b L PCA infarct, scattered punctate infarctions and R occipital lesion concerning for intracranial abscess. Reconsulted for change in mental status today and aphasia    CT head reviewed, appears grossly unchanged from previous    No acute neurosurgical management at this time  Agree with vascular neurology workup, likely due to septic emboli, will follow up MRI  Will follow EEG workup        -q1h neurochecks in ICU, q2h neurochecks in stepdown, q4h neurochecks on floor  --SBP <160 (cardene ggt; hydralazine & labetalol PRN; transition to home meds when appropriate)  --Na >135  --VN following  --AED per NCC/Epilepsy  --Abx per ID, f/up BCx - 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   --NO steroids  --HOB >30  --PPI given  GIB per NCC  --Continue to monitor clinically, notify NSGY immediately with any changes in neuro status    Dispo: ICU               Thank you for your consult. I will follow-up with patient. Please contact us if you have any additional questions.    Idalia Haddad MD  Neurosurgery  [facility]

## 2020-09-23 NOTE — PROGRESS NOTES
[facility] - ICU 14 WT  Vascular Neurology  Comprehensive Stroke Center  Progress Note    Assessment/Plan:     * Status epilepticus  EEG   Management per primary     cEEG 9/14 negative for ongoing seizures, continues on vimpat, keppra    -Repeat EEG on 9/22 for acute mental status change    Cytotoxic brain edema  Area of cytotoxic cerebral edema identified when reviewing brain imaging in the territory of the left posterior cerebral artery, L thalamus, right posterior cerebral artery. There is no mass effect associated with it. We will continue to monitor the patients clinical exam for any worsening of symptoms which may indicate expansion of the stroke or the area of the edema resulting in the clinical change. The pattern is suggestive of embolic etiology.           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 On 9/8 pt presented to [place] ED with unresponsiveness, hypoxia requiring intubation, and seizure like activity. Initial CT with no early infarct signs or hemorrhage. No tPA due to recent GI bleed. MRI with left PCA infarct and left thalamic infarct along with small right PCA infarct. MRA demonstrates fetal circulation to left PCA. Two different distributions makes a cardioembolic source most likely. Given his recent history of endocarditis this is the likely etiology. Left PCA attenuation could be due to recent seizures. Right infarct with significant vasogenic edema more likely due to septic embolus.    Pt extubated 9/11 concerns for L MCA infarct due to exam. Repeat MRI new acute/subacute L cerebellum infarct; SWI suggest CAA +/- component of chronic hypertensive arteriopathy.  Continue with previous recommendations.    9/22 reconsult for acute mental status change. Patient confused, aphasic, not following commands. Moving all  extremities. Concern for new embolic stroke vs NCSE vs delirium.     9/23 MRI without new stroke. L PCA territory with decreased attenuation more consistent seizure as prior etiology rather than embolic stroke. Persistent R posterior abscess. Presentation more concerning for encephalitis. Gen neuro to consulted. Stroke will sign off at this time.     Antithrombotics for secondary stroke prevention: Antiplatelets: Aspirin: 325 mg daily     Statins for secondary stroke prevention and hyperlipidemia, if present:   Statins: Atorvastatin- 40 mg daily     Aggressive risk factor modification: HTN, Endocarditis     Rehab efforts: The patient has been evaluated by a stroke team provider and the therapy needs have been fully considered based off the presenting complaints and exam findings. The following therapy evaluations are needed: PT evaluate and treat, OT evaluate and treat, SLP evaluate and treat      Diagnostics ordered/pending: none     VTE prophylaxis: Enoxaparin 40 mg SQ every 24 hours     BP parameters: Infarct: No intervention, SBP <160 - no longer in acute window. Can move towards normotension. 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 MRI brain 9/14 suggest septic emboli as the source for stroke, likely prior endocarditis. ID consulted. Blood cultures NGTD this admission.     Patient stepped down to  on 9/20. Since has been slowly progressing, ongoing treatment of endocarditis. Blood cultures negative since 9/08. Acute  mental status change per PCP and patient's wife on 9/22. Now disoriented, not recognizing wife, uncooperative with exam. Vascular neuro reconsulted. Will repeat EEG. Repeat MRI showing no new stroke, some resolution of prior L PCA territory infect suggesting etiology was more likely seizure related. Persistent r cerebellar abscess with surrounding edema. Presentation more consistent with ongoing infection/ecephalitis rather than embolic stroke. Recommending gen neuro consult for further evaluation.       STROKE DOCUMENTATION        NIH Scale:  1a. Level of Consciousness: 1-->Not alert, but arousable by minor stimulation to obey, answer, or respond  1b. LOC Questions: 2-->Answers neither question correctly  1c. LOC Commands: 2-->Performs neither task correctly  2. Best Gaze: 1-->Partial gaze palsy, gaze is abnormal in one or both eyes, but forced deviation or total gaze paresis is not present  3. Visual: 2-->Complete hemianopia  4. Facial Palsy: 0-->Normal symmetrical movements  5a. Motor Arm, Left: 2-->Some effort against gravity, limb cannot get to or maintain (if cued) 90 (or 45) degrees, drifts down to bed, but has some effort against gravity  5b. Motor Arm, Right: 1-->Drift, limb holds 90 (or 45) degrees, but drifts down before full 10 secs, does not hit bed or other support  6a. Motor Leg, Left: 3-->No effort against gravity, leg falls to bed immediately  6b. Motor Leg, Right: 2-->Some effort against gravity, leg falls to bed by 5 secs, but has some effort against gravity  7. Limb Ataxia: 0-->Absent  8. Sensory: 0-->Normal, no sensory loss  9. Best Language: 2-->Severe aphasia, all communication is through fragmentary expression, great need for inference, questioning, and guessing by the listener. Range of information that can be exchanged is limited, listener carries burden of. . . (see row details)  10. Dysarthria: 0-->Normal  11. Extinction and Inattention (formerly Neglect): 0-->No abnormality  Total (NIH  Stroke Scale): 18       Modified [place] Score: 0  [place] Coma Scale:    ABCD2 Score:    [id number] Score:   HAS -BLED Score:   ICH Score:   Hunt & Newman Classification:      Hemorrhagic change of an Ischemic Stroke: Does this patient have an ischemic stroke with hemorrhagic changes? No     Neurologic Chief Complaint: Acute mental status change in the setting of recent embolic stroke and abscess 2/2 to endocarditis    Subjective:     Interval History: Unable to speak, recognize wife or understand/follow commands. Waxing and waning level of orientation/cognition. MRI with no new stroke. Gen neuro consulted for possible encephalitis.    HPI, Past Medical, Family, and Social History remains the same as documented in the initial encounter.       Scheduled Meds:   albuterol-ipratropium  3 mL Nebulization Q4H    atorvastatin  40 mg Per NG tube Daily    cefTRIAXone (ROCEPHIN) IVPB  2 g Intravenous Q12H    enoxaparin  40 mg Subcutaneous Q24H    lacosamide  200 mg Per NG tube Q12H    losartan  25 mg Per NG tube Daily    miconazole nitrate 2%   Topical (Top) BID    pantoprazole  40 mg Per NG tube Daily    silodosin  8 mg Per NG tube Daily    sodium chloride 0.9%  10 mL Intravenous Q6H    traZODone  25 mg Per NG tube QHS    vancomycin (VANCOCIN) IVPB  1,250 mg Intravenous Q48H     Continuous Infusions:  PRN Meds:acetaminophen, dextrose 50%, glucagon (human recombinant), insulin aspart U-100, labetalol, psyllium husk (aspartame), sodium chloride 0.9%, Flushing PICC Protocol **AND** sodium chloride 0.9% **AND** sodium chloride 0.9%, Pharmacy to dose Vancomycin consult **AND** vancomycin - pharmacy to dose    Objective:     Vital Signs (Most Recent):  Temp: 98.6 °F (37 °C) (09/22/20 0846)  Pulse: 77 (09/23/20 1200)  Resp: 16 (09/23/20 1159)  BP: (!) 173/68 (09/23/20 0635)  SpO2: 96 % (09/23/20 1200)  BP Location: Right arm    Vital Signs Range (Last 24H):  Pulse:  [70-97]   Resp:  [16-37]   BP: (159-186)/(67-88)    SpO2:  [94 %-100 %]   BP Location: Right arm    Physical Exam  Vitals signs and nursing note reviewed.   Constitutional:       Appearance: He is obese. He is ill-appearing.      Comments: Somnolent but arousable. Mostly uncooperative with exam   Cardiovascular:      Rate and Rhythm: Normal rate.   Pulmonary:      Effort: Pulmonary effort is normal.   Skin:     General: Skin is warm and dry.   Neurological:      Mental Status: He is alert and easily aroused.      Cranial Nerves: Dysarthria present.      Motor: Weakness present.      Comments: Moves all extremities but unable to test strength due to poor cooperation   Psychiatric:         Speech: Speech is slurred.         Neurological Exam:   LOC: drowsy  Attention Span: poor  Language: Global aphasia  Articulation: Dysarthria  Orientation: Not oriented to person, place, and time  Visual Fields: Visual neglect  EOM (CN III, IV, VI): Gaze preference  left  Pupils (CN II, III): PERRL  Facial Movement (CN VII): Unable to test   Motor: Arm left  Normal 5/5  Leg left  Normal 5/5  Arm right  Normal 5/5  Leg right Normal 5/5    Laboratory:  BMP:   Recent Labs   Lab 09/23/20 0622      K 3.3*      CO2 30*   BUN 31*   CREATININE 1.3   CALCIUM 10.4     CBC:   Recent Labs   Lab 09/23/20 0622   WBC 10.11   RBC 3.03*   HGB 8.5*   HCT 27.8*      MCV 92   MCH 28.1   MCHC 30.6*     Lipid Panel: No results for input(s): CHOL, LDLCALC, HDL, TRIG in the last 168 hours.  Hgb A1C: No results for input(s): HGBA1C in the last 168 hours.  TSH: No results for input(s): TSH in the last 168 hours.    Diagnostic Results     Brain Imaging   CT head 9/22:  FINDINGS:  Generalized cerebral volume loss with prominence of the sulci, cisterns, and ventricles.  Hypodense area in the right frontal lobe likely a remote infarct.  Hypodense areas in the occipital lobes right greater than left likely representing vasogenic edema from encephalitis/abscess noted on prior MRI.  Remote  right occipital infarct.  No evidence of acute intracranial hemorrhage or new major vascular territory infarct.  Small infarct in the left cerebellum is not well visualized by CT.  Stable low-attenuation extra-axial collection over the left cerebral convexity likely a hygroma.  No new abnormal extra-axial fluid collections.  There is opacification of the paranasal sinuses with an air-fluid level in the left maxillary sinus.  Mastoid air cells are opacified.  There is an NG tube.     Impression:     No significant detrimental changes from prior exam.  No evidence of acute intracranial hemorrhage or new major vascular territory infarct.    MRI 9/14:  Impression:     Numerous scattered punctate foci of cortical enhancement throughout both cerebral hemispheres.  Additional rim enhancing collection in the right occipital lobe with central diffusion restriction and surrounding vasogenic edema.  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     Persistent gyriform diffusion restriction the posterior left cerebral hemisphere as well as the hippocampus and dorsal lateral thalamus, although slightly improved from prior studies.  The distribution be typical for vascular territory infarct, most suggestive of seizure related signal changes (status epilepticus).  Other encephalitis to be excluded clinically.     New small left frontal subdural hygroma without significant intracranial mass effect.     Stable small subacute left cerebellar infarct.     Mild chronic microvascular ischemic disease with remote right frontal and right occipital infarcts.     Numerous regions of prior parenchymal and leptomeningeal hemorrhage involving both cerebral hemispheres appearance raising the possibility underlying cerebral amyloid angiopathy.  No new hemorrhage.    MRI 9/23:  Impression:     1. Relative to 09/14/2020, continued maturing multifocal ischemia, possibly septic embolic in etiology when  correlating with prior imaging, predominantly involving bilateral posterior cerebral artery and left posterior inferior cerebellar artery territories.  2. No new or progressive areas of ischemia or new intracranial hemorrhage.  3. Stable small left frontal subdural hygroma without significant mass effect.  4. Chronic parenchymal microhemorrhages and superficial siderosis again raise the possibility of cerebral amyloid angiopathy and/or chronic hypertensive arteriopathy.  5. Additional details, as per report body.      William Adkins MD  Comprehensive Stroke Center  Department of Vascular Neurology   [facility] - ICU 14 WT

## 2020-09-23 NOTE — PLAN OF CARE
Problem: Occupational Therapy Goal  Goal: Occupational Therapy Goal  Description: Goals to be met by: 9/28/2020     Patient will increase functional independence with ADLs by performing:    Pt will follow 75% of motor commands with <2 verbal cues for repetition of task to maximize engagement in functional task.  Pt will sit at EOB for approx 10 minutes with max A and unilateral UE support to complete grooming task  Pt will complete sit>stand from EOB with bed in highest position with mod A in prep for functional transfers to C  Pt will perform grooming sitting EOB with mod A               Outcome: Ongoing, Progressing     OT goals remain appropriate.    Pedro March, OT   09/23/2020

## 2020-09-23 NOTE — SUBJECTIVE & OBJECTIVE
No past medical history on file.    No past surgical history on file.    Review of patient's allergies indicates:   Allergen Reactions    Codeine     Tetanus vaccines and toxoid      No current facility-administered medications on file prior to encounter.      Current Outpatient Medications on File Prior to Encounter   Medication Sig    atorvastatin (LIPITOR) 40 MG tablet 1 tablet (40 mg total) by Per G Tube route once daily.    enoxaparin (LOVENOX) 40 mg/0.4 mL Syrg Inject 0.4 mLs (40 mg total) into the skin once daily.    insulin aspart U-100 (NOVOLOG) 100 unit/mL (3 mL) InPn pen Inject 0-5 Units into the skin every 6 (six) hours as needed (Hyperglycemia).    lacosamide (VIMPAT) 10 mg/mL Soln oral solution 10 mLs (100 mg total) by Per NG tube route every 12 (twelve) hours.    levETIRAcetam in NaCl, iso-os, (KEPPRA) 500 mg/100 mL PgBk Inject 100 mLs (500 mg total) into the vein every 12 (twelve) hours.    losartan (COZAAR) 50 MG tablet Take 1 tablet (50 mg total) by mouth once daily.     Family History     None        Tobacco Use    Smoking status: Not on file   Substance and Sexual Activity    Alcohol use: Not on file    Drug use: Not on file    Sexual activity: Not on file     Review of Systems   Unable to perform ROS: Mental status change   Constitutional: Positive for activity change and fatigue.   Neurological: Positive for seizures and speech difficulty. Negative for tremors.   Psychiatric/Behavioral: Positive for confusion, decreased concentration and sleep disturbance.     Objective:     Vital Signs (Most Recent):  Temp: 98.6 °F (37 °C) (09/22/20 0846)  Pulse: 84 (09/23/20 1613)  Resp: 15 (09/23/20 1613)  BP: (!) 173/68 (09/23/20 0635)  SpO2: 100 % (09/23/20 1613) Vital Signs (24h Range):  Pulse:  [70-97] 84  Resp:  [15-37] 15  SpO2:  [94 %-100 %] 100 %  BP: (159-186)/(67-88) 173/68     Weight: 114 kg (251 lb 5.2 oz)  Body mass index is 37.11 kg/m².    Physical Exam  Neurological:       Coordination: Finger-nose-finger test: not following command.       NEUROLOGICAL EXAMINATION:     MENTAL STATUS   Attention: decreased. Concentration: decreased.   Level of consciousness: arousable by tactile stimuli       Eyes closed   Does not follow simple commands      CRANIAL NERVES        Face symmetric at rest   Does not blink to threat on R     MOTOR EXAM   Muscle bulk: normal  Overall muscle tone: increased       Not following commands to assess strength but does withdraw to noxious stimuli in all extremities      REFLEXES        Brisk DTRs throughout     GAIT AND COORDINATION     Gait  Gait: (deferred)     Coordination   Finger-nose-finger test: not following command.    Tremor   Resting tremor: absent    Significant Labs:   Hemoglobin A1c:   Recent Labs   Lab 09/08/20  1808 09/14/20  0338   HGBA1C 4.8 4.4     Blood Culture:   Recent Labs   Lab 09/22/20  1522   LABBLOO No Growth to date  No Growth to date  No Growth to date  No Growth to date     CBC:   Recent Labs   Lab 09/22/20  1522 09/23/20  0622   WBC 8.43 10.11   HGB 8.0* 8.5*   HCT 27.2* 27.8*    209     CMP:   Recent Labs   Lab 09/22/20  1522 09/23/20  0622   GLU 90 109    140   K 3.6 3.3*    102   CO2 32* 30*   BUN 33* 31*   CREATININE 1.3 1.3   CALCIUM 10.1 10.4   MG  --  2.0  2.0   PROT 6.6 6.9   ALBUMIN 2.4* 2.5*   BILITOT 0.2 0.2   ALKPHOS 50* 53*   AST 15 14   ALT 10 10   ANIONGAP 6* 8   EGFRNONAA 53.8* 53.8*     Inflammatory Markers: No results for input(s): SEDRATE, CRP, PROCAL in the last 48 hours.  Urine Culture: No results for input(s): LABURIN in the last 48 hours.  Urine Studies: No results for input(s): COLORU, APPEARANCEUA, PHUR, SPECGRAV, PROTEINUA, GLUCUA, KETONESU, BILIRUBINUA, OCCULTUA, NITRITE, UROBILINOGEN, LEUKOCYTESUR, RBCUA, WBCUA, BACTERIA, SQUAMEPITHEL, HYALINECASTS in the last 48 hours.    Invalid input(s): WRIGHTSUR  All pertinent lab results from the past 24 hours have been  reviewed.    Significant Imaging: I have reviewed and interpreted all pertinent imaging results/findings within the past 24 hours.     MRI brain WO contrast (9/22/20):  1. Relative to 09/14/2020, continued maturing multifocal ischemia, possibly septic embolic in etiology when correlating with prior imaging, predominantly involving bilateral posterior cerebral artery and left posterior inferior cerebellar artery territories.  2. No new or progressive areas of ischemia or new intracranial hemorrhage.  3. Stable small left frontal subdural hygroma without significant mass effect.  4. Chronic parenchymal microhemorrhages and superficial siderosis again raise the possibility of cerebral amyloid angiopathy and/or chronic hypertensive arteriopathy.

## 2020-09-23 NOTE — ASSESSMENT & PLAN NOTE
"75 y.o. male with recent endocarditis with mitral and aortic valve vegetations and recent GI bleed presented to [place] ED 9/8 via EMS after being found unresponsive at home. On arrival, he was intubated for airway protection. MRI brain and MRA head/neck 9/8 with Left PCA distribution acute infarction. Focus of restricted diffusion within the right occipital lobe with adjacent vasogenic edema, concerning for underlying lesion or septic embolus. MRA head/neck with no evidence of occlusion or high grade stenosis. On 9/8 pm, he was witnessed to have rhythmic shaking R upper and lower extremities, eye twitching and biting down on ET tube concerning for seizure activity. EEG 9/13-9/17 captured R hand jerking associated with L posterior discharges c/w EPC per Dr. Winslow. He was treated with Keppra 500 mg BID and Vimpat 200 mg BID. Keppra was discontinued on 9/15 per epilepsy team recs. MRI brain repeated x 3, most recently on 9/22 showing "continued maturing multifocal ischemia, possibly septic embolic in etiology, predominantly involving bilateral posterior cerebral artery and left posterior inferior cerebellar artery territories. No new or progressive areas of ischemia or new intracranial hemorrhage. Stable small left frontal subdural hygroma without significant mass effect. Chronic parenchymal microhemorrhages and superficial siderosis again raise the possibility of cerebral amyloid angiopathy and/or chronic hypertensive arteriopathy." Vascular Neuro felt given different distributions seen on imaging and recent endocarditis, cardioembolic etiology was felt most likely. Most recent MRI L PCA territory decreased attenuation more c/w seizure changes rather then embolic stroke and persistent R posterior abscess. General Neurology asked to weigh in on 9/23 to evaluate for encephalitis and seizure management.     Recommendations:  --LP with IR (failed bedside attempt 9/23 afternoon)  Please obtain opening pressure  Cell ct with " diff, glu, protein, Cx, viral panel, freeze and hold   --repeat 24 hr vEEG  --continue Vimpat 200 mg BID and seizure precautions

## 2020-09-24 NOTE — ASSESSMENT & PLAN NOTE
Impression: Pt is a 76 y/o male with PMH of HTN, recent endocarditis (8/2020), recent GIB (8/2020), diverticulitis, GERD. pt transferred from [facility] for management of seizures. On 9/8 pt presented to [place] ED with unresponsiveness, hypoxia requiring intubation, and seizure like activity. MRI with left PCA infarct and left thalamic infarct along with small right PCA infarct. Two different distributions makes a cardioembolic source most likely. Given his recent history of endocarditis this is the likely etiology Right infarct with significant vasogenic edema more likely due to septic embolus. Pt is alert today, answering some simple questions. Planning for LP.     Reason for consult: Support/advance care planning Communicated with Dr. Sprague.     Today: Met with pt's wife who is at bedside. Pt alert today and speaking slowly. Wife is hopeful pt will continue to improve. Wife aware possible LTAC placement in future. Support given.     9-23-20  Met with pt's wife who is at bedside. Wife is aware pt has endocarditis, and embolic stroke. Wife is concerned about pt's mental status deteriorating on 9/22. Per wife pt was alert and oriented prior to that.  MRI results showed mature focal ischemia. Education done with wife. Also, let pt's wife know Vascular neurology is seeing pt concerning this mental status change and possible causes. Per wife, she has been speaking to  about possible LTAC but voices concern about pt's mental status and questions concerning if he will return to baseline.     Advance care planning:   MPOA: Pt's wife is next of kin, followed by two adult sons.   Code status: Full    Symptom management:   No distress noted.     Plan:  Will continue to reinforce realistic expectations.   LP puncture pending.  Possible LTAC placement when pt ready to d/c. Pt on  IV antibiotics for endocarditis.   Will follow.

## 2020-09-24 NOTE — PROGRESS NOTES
Progress Note  Hospital Medicine  [facility] [place]       Patient Name: Lopez Savage  MRN:  [id number]  Hospital Medicine Team: [facility] X Phyllis Sprague MD  Date of Admission:  9/13/2020     Length of Stay:  LOS: 10 days   Expected Discharge Date: 9/25/2020  Principal Problem:  Status epilepticus     Subjective:     Interval History/Overnight Events:    Mental status remains the same as yesterday - Disoriented and confused . Awake but with nonsensical speech  Labs stable   VSS    Stroke team believe repeat MRI is stable   General neurology consulted. Recommended LP and EEG.  Attempted LP at bedside but failed as pt unable to tolerate. Needs LP with flouro with radiology with  Anesthesia- ordered placed     Worsening CXR.  abx adjusted  And CTX changed to cefepime to cover for pseudomonas PNA.  EEG ordered and pending      Family updated at bedside      albuterol-ipratropium  3 mL Nebulization Q4H    atorvastatin  40 mg Per NG tube Daily    ceFEPime (MAXIPIME) IVPB  2 g Intravenous Q8H    [START ON 9/24/2020] enoxaparin  40 mg Subcutaneous Q24H    lacosamide  200 mg Per NG tube Q12H    losartan  25 mg Per NG tube Daily    miconazole nitrate 2%   Topical (Top) BID    pantoprazole  40 mg Per NG tube Daily    silodosin  8 mg Per NG tube Daily    sodium chloride 0.9%  10 mL Intravenous Q6H    traZODone  25 mg Per NG tube QHS    vancomycin (VANCOCIN) IVPB  1,250 mg Intravenous Q48H           acetaminophen, dextrose 50%, glucagon (human recombinant), insulin aspart U-100, labetalol, psyllium husk (aspartame), sodium chloride 0.9%, Flushing PICC Protocol **AND** sodium chloride 0.9% **AND** sodium chloride 0.9%, Pharmacy to dose Vancomycin consult **AND** vancomycin - pharmacy to dose    Review of Systems   Unable to obtain 2/2 AMS    Objective:     Temp:  [98 °F (36.7 °C)-98.2 °F (36.8 °C)]   Pulse:  [70-97]   Resp:  [15-37]   BP: (115-186)/(59-88)   SpO2:  [94 %-100 %]          Weight change:    Body mass index is 37.11 kg/m².       Physical Exam:  Constitutional: lethargic , chronically ill looking,   HENT: NC/AT, external ears normal  Eyes: PERRL, EOMI, conjunctiva normal,  Neck: normal ROM, supple  CV: RRR, no m/r/g, no carotid bruits, +2 peripheral pulses.  Pulmonary/Chest wall: Breathing comfortably w/o distress,  Decreased breath sounds at lung bases  GI: Soft, non-tender, (+) BS, (+) BM  Musculoskeletal: Normal ROM, no atrophy,   Neurological   awake but not following commands   Moves extremities spontaneously   Skin: warm, dry   +pallor  Psych awake but not following commands     Labs:    Chemistries:   Recent Labs   Lab 09/20/20 0300 09/20/20  1257 09/21/20 0300 09/22/20  1522 09/23/20 0622     --  140 140 140   K 3.9  --  3.9 3.6 3.3*     --  103 102 102   CO2 28  --  30* 32* 30*   BUN 31*  --  26* 33* 31*   CREATININE 1.1  --  1.2 1.3 1.3   CALCIUM 9.9  --  10.0 10.1 10.4   PROT 6.1  --  6.5 6.6 6.9   BILITOT 0.2  --  0.2 0.2 0.2   ALKPHOS 56  --  52* 50* 53*   ALT 5*  --  8* 10 10   AST 11  --  12 15 14   MG 1.7 2.3 2.3  --  2.0  2.0   PHOS 4.5  --  4.9*  --  3.8  3.8        WBC:   Recent Labs   Lab 09/19/20  0440 09/20/20  0300 09/21/20  0300 09/22/20  1522 09/23/20  0622   WBC 9.37 9.54 8.44 8.43 10.11     Bands:     CBC/Anemia Labs: Coags:    Recent Labs   Lab 09/21/20 0300 09/22/20  1522 09/23/20  0622   WBC 8.44 8.43 10.11   HGB 8.4* 8.0* 8.5*   HCT 28.3* 27.2* 27.8*    199 209   MCV 93 92 92   RDW 15.0* 15.2* 15.3*    No results for input(s): PT, INR, APTT in the last 168 hours.     Diagnostic Results:    MRI brain 9/14/20    Numerous scattered punctate foci of cortical enhancement throughout both cerebral hemispheres.  Additional rim enhancing collection in the right occipital lobe with central diffusion restriction and surrounding vasogenic edema.  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     Persistent gyriform diffusion restriction the posterior left cerebral hemisphere as well as the hippocampus and dorsal lateral thalamus, although slightly improved from prior studies.  The distribution be typical for vascular territory infarct, most suggestive of seizure related signal changes (status epilepticus).  Other encephalitis to be excluded clinically.     New small left frontal subdural hygroma without significant intracranial mass effect.     Stable small subacute left cerebellar infarct.     Mild chronic microvascular ischemic disease with remote right frontal and right occipital infarcts.     Numerous regions of prior parenchymal and leptomeningeal hemorrhage involving both cerebral hemispheres appearance raising the possibility underlying cerebral amyloid angiopathy.  No new hemorrhage.    CT head 9/16    FINDINGS:  There is stable cerebral atrophy and chronic small vessel ischemic changes.  There is an area of rounded hypodensity in the right occipital lobe, which corresponds to an overall finding of abscess formation and vasogenic edema in a patient with known endocarditis is noted on the MRI of the brain from 09/14/2020.  There is a remote right frontal infarct.  There are also areas of hypodensity seen in the left cerebellar hemisphere and left occipital lobe, which may be related to age indeterminate infarcts.  There is no acute intracranial hemorrhage, territorial infarct or mass effect, or midline shift..    Assessment and Plan     Hospital Course:    Mr. Lopez Savage was admitted to Hospital Medicine for management of  Status epilepticus     Active Hospital Problems    Diagnosis  POA    *Status epilepticus [G40.901]  Yes    Palliative care encounter [Z51.5]  Not Applicable    Goals of care, counseling/discussion [Z71.89]  Not Applicable    Advance care planning [Z71.89]  Not Applicable    Debility [R53.81]  Yes    Oral phase dysphagia [R13.11]  Yes    Seizure [R56.9]   Yes    Intracranial septic embolism [I76, I66.9]  Yes    Brain abscess [G06.0]  Yes    Cerebellar stroke [I63.9]  Yes    Cytotoxic brain edema [G93.6]  Yes    Encephalopathy acute [G93.40]  Yes    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         Encephalopathy acute  - likely multifactorial - 2/2 stroke , brain abscess, cytotoxic edema. hosp acquired delirium   - cont PT OT   - SLP- currently with NG tube   - Stroke team believe repeat MRI is stable   General neurology consulted. Recommended LP and EEG.  Attempted LP at bedside but failed as pt unable to tolerate. Needs LP with flouro with radiology with  Anesthesia- ordered placed     Worsening CXR.  abx adjusted  And CTX changed to cefepime to cover for pseudomonas PNA.  EEG ordered and pending       palliative care team consulted     Status epilepticus  - Multiple witnessed seizure-like activity described  - No previous reports from family  - Was started on keppra and vimpat 200 mg BID  - EEG at OSH shows irregular slowing in the R frontal region, with L posterior periodic epileptiform discharges. No seizures recorded during this study.  - 9/13 [facility] EEG continues to show periodic discharges in the left posterior region with no seizures.  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 If worsening mentation or without  "improvement recommend reimaging and if need for surgical washout for source control. Please send fluid for cultures (gram stain, aerobic, anaerobic, fungal, AFB) if undergoes drainage.     -ID recs:  Continue IV Vanc and cetriaxone for 6-8 weeks, end date 10/27-11/10 with repeat MRI post treatment  Continue ceftriaxone 2gms IV q12hrs. Anticipate 6-8 weeks of IV vancomycin and ceftriaxone with repeat imaging to assess resolution of brain abscess.     Cerebellar stroke/ Embolic stroke involving left posterior cerebral artery  On 9/8 pt presented to [place] ED with unresponsiveness  No tPA due to recent GI bleed  MRI with left PCA infarct and left thalamic infarct along with small right PCA infarct  MRA demonstrates fetal circulation to left PCA  9/11 extubated  . Repeat MRI new acute/subacute L cerebellum infarct     -Neuro checks  -Vascular neurology was consulted - and recommended medical management - ASA , statin , PT OT SLP, BP control     Cytotoxic brain edema  Per prior documentation - "Area of cytotoxic cerebral edema identified when reviewing brain imaging in the territory of the left posterior cerebral artery, L thalamus, right posterior cerebral artery. There is no mass effect associated with it. We will continue to monitor the patients clinical exam for any worsening of symptoms which may indicate expansion of the stroke or the area of the edema resulting in the clinical change. The pattern is suggestive of embolic etiology"    Oral phase dysphagia  NGT in place, continuous tube feedings for nutrition  Cont enteral water flushes 200 ml q6hrs  SLP following, will likely need PEG  Encephalopathy limiting SLP eval  Needs PEG tube placement     Chronic bronchitis  Duonebs scheduled q4hrs  Monitor respiratory status, at risk for atelectasis  Continuous pulse oximetry, ABG prn  Continue CPT, pulmonary toileting    Diet:  NPO - tube feeds  GI PPx:   DVT PPx:  lovenox   Goals of Care:  Full     High Risk " Conditions:  IE, brain abscess, seizures     Disposition:    TBD    Likely LTAC ultimately     Patient's note was created using MModal Dictation.  Any errors in syntax may not have been identified and edited on initial review prior to signing this note.    Signing Physician:     Phyllis Sprague MD  Department of Hospital Medicine   [facility] [place]  Pager [phone]  Spectra [phone]  9/23/2020

## 2020-09-24 NOTE — SUBJECTIVE & OBJECTIVE
Interval History: Pt s/p embolic stroke. Pt has endocarditis.     No past medical history on file.    No past surgical history on file.    Review of patient's allergies indicates:   Allergen Reactions    Codeine     Tetanus vaccines and toxoid        Medications:  Continuous Infusions:  Scheduled Meds:   albuterol-ipratropium  3 mL Nebulization Q4H    atorvastatin  40 mg Per NG tube Daily    cefTRIAXone (ROCEPHIN) IVPB  2 g Intravenous Q12H    ciprofloxacin HCl  750 mg Per NG tube Q12H    enoxaparin  40 mg Subcutaneous Q24H    lacosamide  200 mg Per NG tube Q12H    losartan  25 mg Per NG tube Daily    miconazole nitrate 2%   Topical (Top) BID    pantoprazole  40 mg Per NG tube Daily    silodosin  8 mg Per NG tube Daily    sodium chloride 0.9%  10 mL Intravenous Q6H    traZODone  25 mg Per NG tube QHS    vancomycin (VANCOCIN) IVPB  1,250 mg Intravenous Q48H     PRN Meds:acetaminophen, dextrose 50%, glucagon (human recombinant), insulin aspart U-100, labetalol, psyllium husk (aspartame), sodium chloride 0.9%, Flushing PICC Protocol **AND** sodium chloride 0.9% **AND** sodium chloride 0.9%, Pharmacy to dose Vancomycin consult **AND** vancomycin - pharmacy to dose    Family History     None        Tobacco Use    Smoking status: Not on file   Substance and Sexual Activity    Alcohol use: Not on file    Drug use: Not on file    Sexual activity: Not on file       Review of Systems   Unable to perform ROS: Mental status change     Objective:     Vital Signs (Most Recent):  Temp: 98.5 °F (36.9 °C) (09/24/20 0758)  Pulse: 96 (09/24/20 0914)  Resp: (!) 29 (09/24/20 0914)  BP: (!) 146/66 (09/24/20 0758)  SpO2: 97 % (09/24/20 0914) Vital Signs (24h Range):  Temp:  [97.8 °F (36.6 °C)-98.5 °F (36.9 °C)] 98.5 °F (36.9 °C)  Pulse:  [70-98] 96  Resp:  [15-36] 29  SpO2:  [94 %-100 %] 97 %  BP: (115-146)/(59-66) 146/66     Weight: 114 kg (251 lb 5.2 oz)  Body mass index is 37.11 kg/m².    Physical  Exam  Constitutional:       Appearance: He is obese. He is ill-appearing.   HENT:      Head: Normocephalic and atraumatic.   Cardiovascular:      Rate and Rhythm: Normal rate and regular rhythm.   Pulmonary:      Breath sounds: Rhonchi present.   Abdominal:      Comments: NG tube in place with tube feeds.    Musculoskeletal:      Comments: Moves extremities.      Skin:     General: Skin is warm and dry.   Neurological:      Mental Status: He is alert.      Comments: Pt sleeping. Opens eyes intermittently. Not responding to voice.    Psychiatric:         Speech: Speech is delayed.         Behavior: Behavior is slowed.      Comments: Confusion noted.          Review of Symptoms        Comments:  Unable to assess. Pt sleeping. No distress noted.           Performance Status:  10    ECOG Performance Status Grade:  4 - Completely disabled      Advance Care Planning   Advance Directives:   Living Will: No    LaPOST: No    Do Not Resuscitate Status: No    Medical Power of : No    Agent's Name:  Wife is next of kin    Decision Making:  Family answered questions         Significant Labs: All pertinent labs within the past 24 hours have been reviewed.  CBC:   Recent Labs   Lab 09/24/20 0237   WBC 9.57   HGB 8.2*   HCT 27.2*   MCV 94        BMP:  Recent Labs   Lab 09/24/20 0237   GLU 91      K 4.0      CO2 32*   BUN 35*   CREATININE 1.4   CALCIUM 10.2   MG 2.0     LFT:  Lab Results   Component Value Date    AST 12 09/24/2020    ALKPHOS 51 (L) 09/24/2020    BILITOT 0.2 09/24/2020     Albumin:   Albumin   Date Value Ref Range Status   09/24/2020 2.4 (L) 3.5 - 5.2 g/dL Final     Protein:   Total Protein   Date Value Ref Range Status   09/24/2020 6.6 6.0 - 8.4 g/dL Final     Lactic acid:   Lab Results   Component Value Date    LACTATE 0.5 09/22/2020    LACTATE 2.0 09/08/2020       Significant Imaging: I have reviewed all pertinent imaging results/findings within the past 24 hours.

## 2020-09-24 NOTE — PT/OT/SLP PROGRESS
"Speech Language Pathology Treatment    Patient Name:  Lopez Savage   MRN:  [id number]   [id number]    Admitting Diagnosis: Status epilepticus    Recommendations:                 General Recommendations:  Dysphagia therapy and Speech/language therapy  Diet recommendations:  Pleasure Feeding(When fully awake and alert, pureed solids for pleasure purposes (vs to meet nutritional volume needs).), Liquid Diet Level: Other (see comments)(When fully awake and alert, thin liquids for pleasure/ quality of life purposes (vs to meet nutritional volume needs).)   Aspiration Precautions:  · Fully awake and alert for PO intake  · Fully upright position for PO intake  · Small bites/ sips  · Slow rate of eating/ drinking  · 1 bite/ sip @ a time  · Refrain from talking prior to swallow completion   · Discontinue PO upon: coughing, throat clearing, choking, wet vocal quality, wet breath sounds, watery eyes, reddened facial features  General Precautions: Standard, aspiration, fall  Communication strategies:  go to room if call light pushed    Subjective     "Yellow is my favorite color." Pt perseverating throughout session.     Pain/Comfort:  · Pain Rating 1: 0/10  · Pain Rating Post-Intervention 1: 0/10    Objective:     Has the patient been evaluated by SLP for swallowing?   Yes  Keep patient NPO? No   Current Respiratory Status: nasal cannula      Pt awake upon entry with wife at bedside. HOB raised. Perseveration evident throughout session. Although pt did not complete rote speech task to count to 10 despite repetitive model and verbal prompting as well as tactile cueing provided, and he followed 0/3 basic 1-step commands despite repetitive model and verbal prompting provided, he completed 0/3 confrontational naming trials ind'ly, 3/3 given cues. PO trials thin water provided via multiple straw sips taken in isolation and as pureed and regular consistency solid liquid wash, pureed vanilla pudding provided via multiple 1/2 tsp " bites, and regular consistency solid steven cracker bite provided x1. Oral phase appeared WFL. Coughing noted during mastication of regular consistency solid, appeared likely 2/2 dry, crumbly cracker consistency. However additional overt clinical signs aspiration unappreciated. Although pt tolerated pureed solids and thin liquids this session, concern remains for waxing/ waning TIGIST given pt's presentation across SLP sessions initial SLP Clinical Evaluation of Swallow completed 9/16/20 (see notes for details). Therefore SLP recommending pureed solids and thin liquids for pleasure purposes upon request when pt fully awake and alert (vs to meet nutritional volume needs). Education provided to pt and wife re: updated SLP recommendations as outlined above, strict and consistent implementation of all aspiration precautions listed above, and ongoing SLP POC. Indication of pt's understanding unappreciated. However pt's wife verbalized understanding of education provided and agreement with SLP POC. No further questions.     Results discussed with NSG who verbalized understanding of information provided.      Assessment:     Lopez Savage is a 75 y.o. male with an SLP diagnosis of dysphagia and cognitive-linguistic deficits.     Goals:   Multidisciplinary Problems     SLP Goals        Problem: SLP Goal    Goal Priority Disciplines Outcome   SLP Goal     SLP Ongoing, Progressing   Description: Goals expected to be met by 9/30:1. Pt will participate in ongoing assessment of swallow to determine safest, least restrictive diet.   2. Pt will orient x4 ind'ly.   3. Pt vocalize x3 within an SLP session.   4. Pt will follow basic 1-step commands with 70% acc ind'ly.   5. Pt will answer basic y/n q's with 70% acc ind'ly.   6. Pt will participate in ongoing assessment of cognitive-linguistic skills.                    Plan:     · Patient to be seen:  4 x/week   · Plan of Care expires:  10/15/20  · Plan of Care reviewed with:   patient, spouse   · SLP Follow-Up:  Yes       Discharge recommendations:  rehabilitation facility     Time Tracking:     SLP Treatment Date:   09/24/20  Speech Start Time:  1124  Speech Stop Time:  1144     Speech Total Time (min):  20 min    Billable Minutes: Speech Therapy Individual 8 and Treatment Swallowing Dysfunction 12    MARVA Vazquez, CCC-SLP  [phone]  9/24/2020

## 2020-09-24 NOTE — SUBJECTIVE & OBJECTIVE
Subjective:     Interval History:   EEG from overnight reviewed with Dr. Loo, no epileptiform discharges just diffuse slowing (L>R at times)   Awaiting LP with IR   Wife feels pt is more alert and interactive today    Current Facility-Administered Medications   Medication Dose Route Frequency Provider Last Rate Last Dose    acetaminophen tablet 650 mg  650 mg Per NG tube Q6H PRN Pedro Desai MD   650 mg at 09/23/20 0635    albuterol-ipratropium 2.5 mg-0.5 mg/3 mL nebulizer solution 3 mL  3 mL Nebulization Q4H Angelique Prince NP   3 mL at 09/24/20 1150    atorvastatin tablet 40 mg  40 mg Per NG tube Daily Linda Natarajan MD   40 mg at 09/24/20 1058    cefTRIAXone (ROCEPHIN) 2 g/50 mL D5W IVPB  2 g Intravenous Q12H Isidoro Garcia MD   2 g at 09/24/20 0911    ciprofloxacin HCl tablet 750 mg  750 mg Per NG tube Q12H Isidoro Garcia MD   750 mg at 09/24/20 1057    dextrose 50% injection 12.5 g  12.5 g Intravenous PRN Ambika Lee PA-C        enoxaparin injection 40 mg  40 mg Subcutaneous Q24H Phyllis Sprague MD        glucagon (human recombinant) injection 1 mg  1 mg Intramuscular PRN Ambika Lee PA-C        insulin aspart U-100 pen 1-10 Units  1-10 Units Subcutaneous Q6H PRN Ambika eLe PA-C        labetalol 20 mg/4 mL (5 mg/mL) IV syring  10 mg Intravenous Q4H PRN Ron Molina MD   10 mg at 09/23/20 0616    lacosamide oral solution 200 mg  200 mg Per NG tube Q12H Ron Molina MD   200 mg at 09/24/20 1057    losartan tablet 25 mg  25 mg Per NG tube Daily Angelique Prince NP   25 mg at 09/24/20 1058    miconazole nitrate 2% ointment   Topical (Top) BID Phyllis Sprague MD        pantoprazole suspension 40 mg  40 mg Per NG tube Daily Angelique Prince NP   40 mg at 09/24/20 1058    psyllium husk (aspartame) 3.4 gram packet 1 packet  1 packet Per NG tube Daily PRN Emma Owens, NP   1 packet at 09/20/20 1143    silodosin capsule 8 mg  8 mg  Per NG tube Daily Linda Natarajan MD   8 mg at 09/24/20 1057    sodium chloride 0.9% flush 10 mL  10 mL Intravenous PRN Ron Molina MD        sodium chloride 0.9% flush 10 mL  10 mL Intravenous Q6H Linda Natarajan MD   10 mL at 09/24/20 0633    And    sodium chloride 0.9% flush 10 mL  10 mL Intravenous PRN Linda Natarajan MD        trazodone split tablet 25 mg  25 mg Per NG tube QHS Angelique Prince NP   25 mg at 09/23/20 2057    vancomycin - pharmacy to dose   Intravenous pharmacy to manage frequency William-Makayla Jean PA-C        vancomycin 1.25 g in dextrose 5% 250 mL IVPB (ready to mix)  1,250 mg Intravenous Q48H Phyllis Sprague MD         Review of Systems   Unable to perform ROS: Mental status change   Constitutional: Positive for activity change and fatigue.   HENT: Positive for trouble swallowing and voice change.    Musculoskeletal: Negative for neck stiffness.   Neurological: Positive for speech difficulty.   Psychiatric/Behavioral: Positive for confusion, decreased concentration and sleep disturbance.     Objective:     Vital Signs (Most Recent):  Temp: 98.5 °F (36.9 °C) (09/24/20 0758)  Pulse: 92 (09/24/20 1150)  Resp: (!) 30 (09/24/20 1150)  BP: (!) 146/66 (09/24/20 0758)  SpO2: 99 % (09/24/20 1150) Vital Signs (24h Range):  Temp:  [97.8 °F (36.6 °C)-98.5 °F (36.9 °C)] 98.5 °F (36.9 °C)  Pulse:  [77-98] 92  Resp:  [15-36] 30  SpO2:  [96 %-100 %] 99 %  BP: (115-146)/(59-66) 146/66     Weight: 114 kg (251 lb 5.2 oz)  Body mass index is 37.11 kg/m².    Physical Exam  Eyes:      Extraocular Movements: EOM normal.   Neurological:      Coordination: Finger-nose-finger test: not following command.       NEUROLOGICAL EXAMINATION:     MENTAL STATUS   Follows 1 step commands.   Attention: decreased. Concentration: decreased.   Level of consciousness: alert       Eyes open  Tracks to voice  Able to identify his wife at bedside  Follows some simple commands (thumbs up, squeezes hand and wiggles toes)       CRANIAL NERVES     CN III, IV, VI   Extraocular motions are normal.   Nystagmus: none   Ophthalmoparesis: none    CN VIII   Hearing: intact       Face symmetric at rest      MOTOR EXAM   Muscle bulk: normal  Overall muscle tone: increased       Blt wrist restraints in place  Moves all extremities against gravity spontaneously   No abnormal movements noted   Not following commands to further assess strength      GAIT AND COORDINATION     Gait  Gait: (deferred)     Coordination   Finger-nose-finger test: not following command.    Tremor   Resting tremor: absent    Significant Labs:   CBC:   Recent Labs   Lab 09/22/20  1522 09/23/20  0622 09/24/20  0237   WBC 8.43 10.11 9.57   HGB 8.0* 8.5* 8.2*   HCT 27.2* 27.8* 27.2*    209 196     CMP:   Recent Labs   Lab 09/22/20  1522 09/23/20 0622 09/24/20  0237   GLU 90 109 91    140 143   K 3.6 3.3* 4.0    102 103   CO2 32* 30* 32*   BUN 33* 31* 35*   CREATININE 1.3 1.3 1.4   CALCIUM 10.1 10.4 10.2   MG  --  2.0  2.0 2.0   PROT 6.6 6.9 6.6   ALBUMIN 2.4* 2.5* 2.4*   BILITOT 0.2 0.2 0.2   ALKPHOS 50* 53* 51*   AST 15 14 12   ALT 10 10 11   ANIONGAP 6* 8 8   EGFRNONAA 53.8* 53.8* 49.1*     Inflammatory Markers: No results for input(s): SEDRATE, CRP, PROCAL in the last 48 hours.  Urine Culture: No results for input(s): LABURIN in the last 48 hours.  Urine Studies: No results for input(s): COLORU, APPEARANCEUA, PHUR, SPECGRAV, PROTEINUA, GLUCUA, KETONESU, BILIRUBINUA, OCCULTUA, NITRITE, UROBILINOGEN, LEUKOCYTESUR, RBCUA, WBCUA, BACTERIA, SQUAMEPITHEL, HYALINECASTS in the last 48 hours.    Invalid input(s): WRIGHTSUR  All pertinent lab results from the past 24 hours have been reviewed.    Significant Imaging: I have reviewed and interpreted all pertinent imaging results/findings within the past 24 hours.     MRI Brain WO contrast (9/22/20):  1. Relative to 09/14/2020, continued maturing multifocal ischemia, possibly septic embolic in etiology when  correlating with prior imaging, predominantly involving bilateral posterior cerebral artery and left posterior inferior cerebellar artery territories.  2. No new or progressive areas of ischemia or new intracranial hemorrhage.  3. Stable small left frontal subdural hygroma without significant mass effect.  4. Chronic parenchymal microhemorrhages and superficial siderosis again raise the possibility of cerebral amyloid angiopathy and/or chronic hypertensive arteriopathy.

## 2020-09-24 NOTE — PROGRESS NOTES
"[facility] - ICU 14 WT  Neurology  Progress Note    Patient Name: Lopez Savage  MRN: [id number]  Admission Date: 9/13/2020  Hospital Length of Stay: 11 days  Code Status: Full Code   Attending Provider: Isidoro Garcia MD  Primary Care Physician: Prasanna Haywood MD   Principal Problem:Status epilepticus    HPI:   75 y.o. 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 Per chart review, daughter found pt unresponsive on kitchen floor at 1 am and started chest compressions until EMS arrived. Upon arrival to ED, he was intubated for airway protection and sedated with propofol. Tele-stroke consult performed 9/8 and CT head unremarkable for early signs of infarct, hemorrhage and mass effect. tPA not given due to recent GI bleed. He was evaluated by Vascular Neurology 9/8 and MRI 9/8 read as " Left PCA distribution acute infarction. Focus of restricted diffusion within the right occipital lobe with adjacent vasogenic edema, concerning for underlying lesion or septic embolus." MRA head/neck with no evidence of occlusion or high grade stenosis. On 9/8 pm, he was witnessed to have rhythmic shaking R upper and lower extremities, eye twitching and biting down on ET tube. MRI brain repeated 9/11, 9/14 and 9/22 remarkable for "continued maturing multifocal ischemia, possibly septic embolic in etiology when correlating with prior imaging, predominantly involving bilateral posterior cerebral artery and left posterior inferior cerebellar artery territories. No new or progressive areas of ischemia or new intracranial hemorrhage. Stable small left frontal subdural hygroma without significant mass effect. Chronic parenchymal microhemorrhages and superficial siderosis again raise the possibility of " "cerebral amyloid angiopathy and/or chronic hypertensive arteriopathy." Extensive EEG monitoring this admission, most recently 9/13-9/17/20 captured some irregular jerking of R hand associated with L posterior discharges c/w EPC. Currently on Vimpat 200 mg BID. Neurology consulted 9/23 for "brain abscesses from septic emboli/new onset seizures and recent status and strokes. AMS concerning for either encephalitis/meningitis vs. ongoing seizures."       Subjective:     Interval History:   EEG from overnight reviewed with Dr. Loo, no epileptiform discharges just diffuse slowing (L>R at times)   Awaiting LP with IR   Wife feels pt is more alert and interactive today    Current Facility-Administered Medications   Medication Dose Route Frequency Provider Last Rate Last Dose    acetaminophen tablet 650 mg  650 mg Per NG tube Q6H PRN Pedro Desai MD   650 mg at 09/23/20 0635    albuterol-ipratropium 2.5 mg-0.5 mg/3 mL nebulizer solution 3 mL  3 mL Nebulization Q4H Angelique Prince NP   3 mL at 09/24/20 1150    atorvastatin tablet 40 mg  40 mg Per NG tube Daily Linda Natarajan MD   40 mg at 09/24/20 1058    cefTRIAXone (ROCEPHIN) 2 g/50 mL D5W IVPB  2 g Intravenous Q12H Isidoro Garcia MD   2 g at 09/24/20 0911    ciprofloxacin HCl tablet 750 mg  750 mg Per NG tube Q12H Isidoro Garcia MD   750 mg at 09/24/20 1057    dextrose 50% injection 12.5 g  12.5 g Intravenous PRN Ambika Lee PA-C        enoxaparin injection 40 mg  40 mg Subcutaneous Q24H Phyllis Sprague MD        glucagon (human recombinant) injection 1 mg  1 mg Intramuscular PRN Ambika Lee PA-C        insulin aspart U-100 pen 1-10 Units  1-10 Units Subcutaneous Q6H PRN Ambika Lee PA-C        labetalol 20 mg/4 mL (5 mg/mL) IV syring  10 mg Intravenous Q4H PRN Ron Molina MD   10 mg at 09/23/20 0616    lacosamide oral solution 200 mg  200 mg Per NG tube Q12H Ron Molina MD   200 mg at 09/24/20 1057    " losartan tablet 25 mg  25 mg Per NG tube Daily Angelique Prince NP   25 mg at 09/24/20 1058    miconazole nitrate 2% ointment   Topical (Top) BID Phyllis Sprague MD        pantoprazole suspension 40 mg  40 mg Per NG tube Daily Angelique Prince NP   40 mg at 09/24/20 1058    psyllium husk (aspartame) 3.4 gram packet 1 packet  1 packet Per NG tube Daily PRN Emma Owens NP   1 packet at 09/20/20 1143    silodosin capsule 8 mg  8 mg Per NG tube Daily Linda Natarajan MD   8 mg at 09/24/20 1057    sodium chloride 0.9% flush 10 mL  10 mL Intravenous PRN Ron Molina MD        sodium chloride 0.9% flush 10 mL  10 mL Intravenous Q6H Linda Natarajan MD   10 mL at 09/24/20 0633    And    sodium chloride 0.9% flush 10 mL  10 mL Intravenous PRN Linda Natarajan MD        trazodone split tablet 25 mg  25 mg Per NG tube QHS Angelique Prince NP   25 mg at 09/23/20 2057    vancomycin - pharmacy to dose   Intravenous pharmacy to manage frequency Hermilo Jean PA-C        vancomycin 1.25 g in dextrose 5% 250 mL IVPB (ready to mix)  1,250 mg Intravenous Q48H Phyllis Sprague MD         Review of Systems   Unable to perform ROS: Mental status change   Constitutional: Positive for activity change and fatigue.   HENT: Positive for trouble swallowing and voice change.    Musculoskeletal: Negative for neck stiffness.   Neurological: Positive for speech difficulty.   Psychiatric/Behavioral: Positive for confusion, decreased concentration and sleep disturbance.     Objective:     Vital Signs (Most Recent):  Temp: 98.5 °F (36.9 °C) (09/24/20 0758)  Pulse: 92 (09/24/20 1150)  Resp: (!) 30 (09/24/20 1150)  BP: (!) 146/66 (09/24/20 0758)  SpO2: 99 % (09/24/20 1150) Vital Signs (24h Range):  Temp:  [97.8 °F (36.6 °C)-98.5 °F (36.9 °C)] 98.5 °F (36.9 °C)  Pulse:  [77-98] 92  Resp:  [15-36] 30  SpO2:  [96 %-100 %] 99 %  BP: (115-146)/(59-66) 146/66     Weight: 114 kg (251 lb 5.2 oz)  Body mass index is 37.11  kg/m².    Physical Exam  Eyes:      Extraocular Movements: EOM normal.   Neurological:      Coordination: Finger-nose-finger test: not following command.       NEUROLOGICAL EXAMINATION:     MENTAL STATUS   Follows 1 step commands.   Attention: decreased. Concentration: decreased.   Level of consciousness: alert       Eyes open  Tracks to voice  Able to identify his wife at bedside  Follows some simple commands (thumbs up, squeezes hand and wiggles toes)      CRANIAL NERVES     CN III, IV, VI   Extraocular motions are normal.   Nystagmus: none   Ophthalmoparesis: none    CN VIII   Hearing: intact       Face symmetric at rest      MOTOR EXAM   Muscle bulk: normal  Overall muscle tone: increased       Blt wrist restraints in place  Moves all extremities against gravity spontaneously   No abnormal movements noted   Not following commands to further assess strength      GAIT AND COORDINATION     Gait  Gait: (deferred)     Coordination   Finger-nose-finger test: not following command.    Tremor   Resting tremor: absent    Significant Labs:   CBC:   Recent Labs   Lab 09/22/20  1522 09/23/20  0622 09/24/20  0237   WBC 8.43 10.11 9.57   HGB 8.0* 8.5* 8.2*   HCT 27.2* 27.8* 27.2*    209 196     CMP:   Recent Labs   Lab 09/22/20  1522 09/23/20  0622 09/24/20  0237   GLU 90 109 91    140 143   K 3.6 3.3* 4.0    102 103   CO2 32* 30* 32*   BUN 33* 31* 35*   CREATININE 1.3 1.3 1.4   CALCIUM 10.1 10.4 10.2   MG  --  2.0  2.0 2.0   PROT 6.6 6.9 6.6   ALBUMIN 2.4* 2.5* 2.4*   BILITOT 0.2 0.2 0.2   ALKPHOS 50* 53* 51*   AST 15 14 12   ALT 10 10 11   ANIONGAP 6* 8 8   EGFRNONAA 53.8* 53.8* 49.1*     Inflammatory Markers: No results for input(s): SEDRATE, CRP, PROCAL in the last 48 hours.  Urine Culture: No results for input(s): LABURIN in the last 48 hours.  Urine Studies: No results for input(s): COLORU, APPEARANCEUA, PHUR, SPECGRAV, PROTEINUA, GLUCUA, KETONESU, BILIRUBINUA, OCCULTUA, NITRITE, UROBILINOGEN,  "LEUKOCYTESUR, RBCUA, WBCUA, BACTERIA, SQUAMEPITHEL, HYALINECASTS in the last 48 hours.    Invalid input(s): WRIGHTSUR  All pertinent lab results from the past 24 hours have been reviewed.    Significant Imaging: I have reviewed and interpreted all pertinent imaging results/findings within the past 24 hours.     MRI Brain WO contrast (9/22/20):  1. Relative to 09/14/2020, continued maturing multifocal ischemia, possibly septic embolic in etiology when correlating with prior imaging, predominantly involving bilateral posterior cerebral artery and left posterior inferior cerebellar artery territories.  2. No new or progressive areas of ischemia or new intracranial hemorrhage.  3. Stable small left frontal subdural hygroma without significant mass effect.  4. Chronic parenchymal microhemorrhages and superficial siderosis again raise the possibility of cerebral amyloid angiopathy and/or chronic hypertensive arteriopathy.    Assessment and Plan:     Encephalopathy  75 y.o. male with recent endocarditis with mitral and aortic valve vegetations and recent GI bleed presented to [place] ED 9/8 via EMS after being found unresponsive at home. On arrival, he was intubated for airway protection. MRI brain and MRA head/neck 9/8 with Left PCA distribution acute infarction. Focus of restricted diffusion within the right occipital lobe with adjacent vasogenic edema, concerning for underlying lesion or septic embolus. MRA head/neck with no evidence of occlusion or high grade stenosis. On 9/8 pm, he was witnessed to have rhythmic shaking R upper and lower extremities, eye twitching and biting down on ET tube concerning for seizure activity. EEG 9/13-9/17 captured R hand jerking associated with L posterior discharges c/w EPC per Dr. Winslow. He was treated with Keppra 500 mg BID and Vimpat 200 mg BID. Keppra was discontinued on 9/15 per epilepsy team recs. MRI brain repeated x 3, most recently on 9/22 showing "continued maturing multifocal " "ischemia, possibly septic embolic in etiology, predominantly involving bilateral posterior cerebral artery and left posterior inferior cerebellar artery territories." Vascular Neuro felt given different distributions seen on imaging and recent endocarditis, cardioembolic etiology was felt most likely. Most recent MRI L PCA territory decreased attenuation more c/w seizure changes rather then embolic stroke and persistent R posterior abscess. General Neurology asked to weigh in on 9/23 to evaluate for encephalitis and seizure management.     9/24--more alert and interactive  Follows some simple commands  EEG overnight without epileptiform discharges, but diffuse slowing is present (L slower than R at times)     Recommendations:  --LP with IR (tentatively scheduled for 9/25)  Please obtain opening pressure  Cell ct with diff, glu, protein, Cx, viral panel, freeze and hold   --continue vEEG till 24 hr is completed (Dr. Cinda samayoa)  --continue Vimpat 200 mg BID and seizure precautions    VTE Risk Mitigation (From admission, onward)         Ordered     enoxaparin injection 40 mg  Every 24 hours      09/23/20 1350     IP VTE HIGH RISK PATIENT  Once      09/13/20 1518     Place sequential compression device  Until discontinued      09/13/20 1119              Neurology will continue to follow along. Please call with any questions or clarifications    Luh Holloway PA-C  General Neurology Consult  Neuro Consult Spectralink # [phone]  "

## 2020-09-24 NOTE — ASSESSMENT & PLAN NOTE
"75 y.o. male with recent endocarditis with mitral and aortic valve vegetations and recent GI bleed presented to [place] ED 9/8 via EMS after being found unresponsive at home. On arrival, he was intubated for airway protection. MRI brain and MRA head/neck 9/8 with Left PCA distribution acute infarction. Focus of restricted diffusion within the right occipital lobe with adjacent vasogenic edema, concerning for underlying lesion or septic embolus. MRA head/neck with no evidence of occlusion or high grade stenosis. On 9/8 pm, he was witnessed to have rhythmic shaking R upper and lower extremities, eye twitching and biting down on ET tube concerning for seizure activity. EEG 9/13-9/17 captured R hand jerking associated with L posterior discharges c/w EPC per Dr. Winslow. He was treated with Keppra 500 mg BID and Vimpat 200 mg BID. Keppra was discontinued on 9/15 per epilepsy team recs. MRI brain repeated x 3, most recently on 9/22 showing "continued maturing multifocal ischemia, possibly septic embolic in etiology, predominantly involving bilateral posterior cerebral artery and left posterior inferior cerebellar artery territories. No new or progressive areas of ischemia or new intracranial hemorrhage. Stable small left frontal subdural hygroma without significant mass effect. Chronic parenchymal microhemorrhages and superficial siderosis again raise the possibility of cerebral amyloid angiopathy and/or chronic hypertensive arteriopathy." Vascular Neuro felt given different distributions seen on imaging and recent endocarditis, cardioembolic etiology was felt most likely. Most recent MRI L PCA territory decreased attenuation more c/w seizure changes rather then embolic stroke and persistent R posterior abscess. General Neurology asked to weigh in on 9/23 to evaluate for encephalitis and seizure management.     9/24--more alert and interactive  Follows some simple commands  EEG overnight without epileptiform discharges, but " diffuse slowing is present (L slower than R at times)     Recommendations:  --LP with IR (tentatively scheduled for 9/25)  Please obtain opening pressure  Cell ct with diff, glu, protein, Cx, viral panel, freeze and hold   --continue vEEG till 24 hr is completed (Dr. Cinda samayoa)  --continue Vimpat 200 mg BID and seizure precautions

## 2020-09-24 NOTE — PLAN OF CARE
Patient was responsive to name and verbal commands. Tube feeding delivered via NGT at goal rate, no gastric residual noted. Patient was ordered for LP , Dr. Kaufman contacted if tube feeding be held . MD instructed this author to continue tube feeding. Bilateral wrist restraints maintained to protect essential lines. Trial release wasn't effective. Wife at the bedside, updated of Care Plan.

## 2020-09-24 NOTE — PROCEDURES
EXTENDED  ELECTROENCEPHALOGRAM  REPORT    DATE OF SERVICE:  09/23/2020  EEG NUMBER: FH [id number]  REQUESTED BY:  Dr. scott  LOCATION OF SERVICE:  [id number]  METHODOLOGY   Electroencephalographic (EEG) recording is with electrodes placed according to the International 10-20 placement system.  Thirty two (32) channels of digital signal (sampling rate of 512/sec) including T1 and T2 was simultaneously recorded from the scalp and may include  EKG, EMG, and/or eye monitors.  Recording band pass was 0.1 to 512 hz.  Digital video recording of the patient is simultaneously recorded with the EEG.  The patient is instructed report clinical symptoms which may occur during the recording session.  EEG and video recording is stored and archived in digital format.  Activation procedures which include photic stimulation, hyperventilation and instructing patients to perform simple task are done in selected patients.   The EEG is displayed on a monitor screen and can be reviewed using different montages.  Computer assisted analysis is employed to detect spike and electrographic seizure activity.   The entire record is submitted for computer analysis.  The entire recording is visually reviewed and the times identified by computer analysis as being spikes or seizures are reviewed again.  Compresses spectral analysis (CSA) is also performed on the activity recorded from each individual channel.  This is displayed as a power display of frequencies from 0 to 30 Hz over time.   The CSA is reviewed looking for asymmetries in power between homologous areas of the scalp and then compared with the original EEG recording.     [employer] software was also utilized in the review of this study.  This software suite analyzes the EEG recording in multiple domains.  Coherence and rhythmicity is computed to identify EEG sections which may contain organized seizures.  Each channel undergoes analysis to detect presence of spike and sharp waves which have  special and morphological characteristic of epileptic activity.  The routine EEG recording is converted from spacial into frequency domain.  This is then displayed comparing homologous areas to identify areas of significant asymmetry.  Algorithm to identify non-cortically generated artifact is used to separate eye movement, EMG and other artifact from the EEG.      RECORDING TIMES  Start on 2020/09/23 at 3:58 p.m.   Stop on 2020/09/24 at 00:00     A total of 14 hours and 52 minutes of EEG recording was obtained.    EEG FINGINGS  Recording was obtained at the patient's bedside in the ICU.  Patient was resting quietly in bed and moving around spontaneously in interacting with family members times.    Background consists of an irregular 6-7 hertz theta frequency noted diffusely.  Slower theta and at times 1-2 hertz delta was intermixed.  The pattern in general was symmetrical however there were portions in which lateralized features were noted.  Occasionally the delta was not symmetrical with being a little slower over the left hemisphere particularly in the temporal  leads.  Over during the night patient appeared to be asleep polymorphic delta was more prominent over the right hemisphere.  Throughout the recording there is no spike or sharp wave activity noted.      Activation procedures:   Photic Stimulation - Not performed  Hyperventilation - Not performed  Somatosensory stimulation - not performed    Cognitive testing:   Not performed    Cardiac Monitor:   Single lead EKG was obtained and showed a regular cardiac rhythm, with a  rate of approximately       IMPRESSION:  Markedly abnormal EEG  General move background is very disorganized slow with frequencies into the low delta range noted.  The there portions of the recording in which asymmetries were of seen with more delta noted over the left hemisphere in the waking state.No epileptic activity was recorded.

## 2020-09-24 NOTE — PROGRESS NOTES
[facility] - ICU 14   Palliative Medicine  Progress Note    Patient Name: Lopez Savage  MRN: [id number]  Admission Date: 9/13/2020  Hospital Length of Stay: 11 days  Code Status: Full Code   Attending Provider: Isidoro Garcia MD  Consulting Provider: Sandra Parker, CNS  Primary Care Physician: Prasanna Haywood MD  Principal Problem:Status epilepticus    Patient information was obtained from spouse/SO and ER records.      Assessment/Plan:     Palliative care encounter  Impression: Pt is a 76 y/o male with PMH of HTN, recent endocarditis (8/2020), recent GIB (8/2020), diverticulitis, GERD. pt transferred from [facility] for management of seizures. On 9/8 pt presented to [place] ED with unresponsiveness, hypoxia requiring intubation, and seizure like activity. MRI with left PCA infarct and left thalamic infarct along with small right PCA infarct. Two different distributions makes a cardioembolic source most likely. Given his recent history of endocarditis this is the likely etiology Right infarct with significant vasogenic edema more likely due to septic embolus. Pt is alert today, answering some simple questions. Planning for LP.     Reason for consult: Support/advance care planning Communicated with Dr. Sprague.     Today: Met with pt's wife who is at bedside. Pt alert today and speaking slowly. Wife is hopeful pt will continue to improve. Wife aware possible LTAC placement in future. Support given.     9-23-20  Met with pt's wife who is at bedside. Wife is aware pt has endocarditis, and embolic stroke. Wife is concerned about pt's mental status deteriorating on 9/22. Per wife pt was alert and oriented prior to that.  MRI results showed mature focal ischemia. Education done with wife. Also, let pt's wife know Vascular neurology is seeing pt concerning this mental status change and possible causes. Per wife, she has been speaking to  about possible LTAC but voices concern about pt's  mental status and questions concerning if he will return to baseline.     Advance care planning:   MPOA: Pt's wife is next of kin, followed by two adult sons.   Code status: Full    Symptom management:   No distress noted.     Plan:  Will continue to reinforce realistic expectations.   LP puncture pending.  Possible LTAC placement when pt ready to d/c. Pt on  IV antibiotics for endocarditis.   Will follow.         I will follow-up with patient. Please contact us if you have any additional questions.    Subjective:     Chief Complaint: No chief complaint on file.      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 Per chart review, the patient presented to [facility] on 9/8/2020 with a primary complaint of loss of consciousness. The patient was recently treated for endocarditis w/ IV rocephin through PICC line at [facility] and discharged approximately 2 weeks ago. Since then he has had a worsening cough and was planning to go to the hospital for evaluation. Per wife, [facility] did portable CXR about a week ago w/ concern for fluid vs pneumonia. Patient was given course of doxycycline and finished course recently. 9/8- per chart review, wife went to bed while  was watching TV and about an hour later her daughter found him non-responsive on the ground. Daughter did a few chest compressions before EMS arrived. Patient arrived to ED and was no- responsive to sternal rub. Pupillary and gag reflex present. Patient intubated for airway protection and sedated w/ propofol. No seizure like activity was observed in ED. TPA contraindicated due to recent GI bleed, last occurring in August. Discussed further stroke work up with wife and planned for imaging. CT head negative for acute intracranial bleed. MRI head showed PCA infarct and right occipital lobe edema. 9/8 EEG  demonstrated left posterior pseudo periodic epileptiform discharges is suggestive of an irritative region likely secondary to focal ischemia. Patient started on AEDs vimpat and keppra and reported to have several seizures during his stay. Patient was transferred to [facility] today for continuous EEG monitoring and further evaluation.    Pt is a a full code.       Hospital Course:  No notes on file    Interval History: Pt s/p embolic stroke. Pt has endocarditis.     No past medical history on file.    No past surgical history on file.    Review of patient's allergies indicates:   Allergen Reactions    Codeine     Tetanus vaccines and toxoid        Medications:  Continuous Infusions:  Scheduled Meds:   albuterol-ipratropium  3 mL Nebulization Q4H    atorvastatin  40 mg Per NG tube Daily    cefTRIAXone (ROCEPHIN) IVPB  2 g Intravenous Q12H    ciprofloxacin HCl  750 mg Per NG tube Q12H    enoxaparin  40 mg Subcutaneous Q24H    lacosamide  200 mg Per NG tube Q12H    losartan  25 mg Per NG tube Daily    miconazole nitrate 2%   Topical (Top) BID    pantoprazole  40 mg Per NG tube Daily    silodosin  8 mg Per NG tube Daily    sodium chloride 0.9%  10 mL Intravenous Q6H    traZODone  25 mg Per NG tube QHS    vancomycin (VANCOCIN) IVPB  1,250 mg Intravenous Q48H     PRN Meds:acetaminophen, dextrose 50%, glucagon (human recombinant), insulin aspart U-100, labetalol, psyllium husk (aspartame), sodium chloride 0.9%, Flushing PICC Protocol **AND** sodium chloride 0.9% **AND** sodium chloride 0.9%, Pharmacy to dose Vancomycin consult **AND** vancomycin - pharmacy to dose    Family History     None        Tobacco Use    Smoking status: Not on file   Substance and Sexual Activity    Alcohol use: Not on file    Drug use: Not on file    Sexual activity: Not on file       Review of Systems   Unable to perform ROS: Mental status change     Objective:     Vital Signs (Most Recent):  Temp: 98.5 °F (36.9 °C)  (09/24/20 0758)  Pulse: 96 (09/24/20 0914)  Resp: (!) 29 (09/24/20 0914)  BP: (!) 146/66 (09/24/20 0758)  SpO2: 97 % (09/24/20 0914) Vital Signs (24h Range):  Temp:  [97.8 °F (36.6 °C)-98.5 °F (36.9 °C)] 98.5 °F (36.9 °C)  Pulse:  [70-98] 96  Resp:  [15-36] 29  SpO2:  [94 %-100 %] 97 %  BP: (115-146)/(59-66) 146/66     Weight: 114 kg (251 lb 5.2 oz)  Body mass index is 37.11 kg/m².    Physical Exam  Constitutional:       Appearance: He is obese. He is ill-appearing.   HENT:      Head: Normocephalic and atraumatic.   Cardiovascular:      Rate and Rhythm: Normal rate and regular rhythm.   Pulmonary:      Breath sounds: Rhonchi present.   Abdominal:      Comments: NG tube in place with tube feeds.    Musculoskeletal:      Comments: Moves extremities.      Skin:     General: Skin is warm and dry.   Neurological:      Mental Status: He is alert.      Comments: Pt sleeping. Opens eyes intermittently. Not responding to voice.    Psychiatric:         Speech: Speech is delayed.         Behavior: Behavior is slowed.      Comments: Confusion noted.          Review of Symptoms        Comments:  Unable to assess. Pt sleeping. No distress noted.           Performance Status:  10    ECOG Performance Status Grade:  4 - Completely disabled      Advance Care Planning   Advance Directives:   Living Will: No    LaPOST: No    Do Not Resuscitate Status: No    Medical Power of : No    Agent's Name:  Wife is next of kin    Decision Making:  Family answered questions         Significant Labs: All pertinent labs within the past 24 hours have been reviewed.  CBC:   Recent Labs   Lab 09/24/20 0237   WBC 9.57   HGB 8.2*   HCT 27.2*   MCV 94        BMP:  Recent Labs   Lab 09/24/20 0237   GLU 91      K 4.0      CO2 32*   BUN 35*   CREATININE 1.4   CALCIUM 10.2   MG 2.0     LFT:  Lab Results   Component Value Date    AST 12 09/24/2020    ALKPHOS 51 (L) 09/24/2020    BILITOT 0.2 09/24/2020     Albumin:   Albumin   Date  Value Ref Range Status   09/24/2020 2.4 (L) 3.5 - 5.2 g/dL Final     Protein:   Total Protein   Date Value Ref Range Status   09/24/2020 6.6 6.0 - 8.4 g/dL Final     Lactic acid:   Lab Results   Component Value Date    LACTATE 0.5 09/22/2020    LACTATE 2.0 09/08/2020       Significant Imaging: I have reviewed all pertinent imaging results/findings within the past 24 hours.      > 50% of 35 min visit spent in chart review, face to face discussion of goals of care,  symptom assessment, coordination of care and emotional support.    Sandra Parker, CNS  Palliative Medicine  [facility] - ICU 14 [facility]

## 2020-09-24 NOTE — CARE UPDATE
Plan for LP with anesthesia tomorrow 9/25  NPO at midnight (order placed)  Hold lovenox tonight (nursing communication placed)    Roosevelt Steele M.D.  PGY-III Radiology  Pager: [phone]

## 2020-09-24 NOTE — ANESTHESIA PREPROCEDURE EVALUATION
"[facility]  Anesthesia Pre-Operative Evaluation         Patient Name: Lopez Savage  YOB: 1945  MRN: [id number]    SUBJECTIVE:     Pre-operative evaluation for Procedure(s) (LRB):  Lumbar Puncture (N/A)     09/24/2020    Lopez Savage is a 75 y.o. 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 Upon arrival to ED, he was intubated for airway protection and sedated with propofol. Tele-stroke consult performed 9/8 and CT head unremarkable for early signs of infarct, hemorrhage and mass effect. tPA not given due to recent GI bleed. He was evaluated by Vascular Neurology 9/8 and MRI 9/8 read as " Left PCA distribution acute infarction." MRI brain repeated 9/11, 9/14 and 9/22 remarkable for "continued maturing multifocal ischemia, possibly septic embolic in etiology when correlating with prior imaging, predominantly involving bilateral posterior cerebral artery and left posterior inferior cerebellar artery territories. Extensive EEG monitoring this admission, most recently 9/13-9/17/20 captured some irregular jerking of R hand associated with L posterior discharges c/w EPC. AMS concerning for either encephalitis/meningitis vs. ongoing seizures.    Extubated 9/11/20 to room air.    Patient now presents for the above procedure(s).    TTE 9/8/20:  · Mild left ventricular enlargement.  · Moderate mitral regurgitation.  · Intermediate central venous pressure (8 mmHg).  · The estimated PA systolic pressure is 28 mmHg.  · Low normal left ventricular systolic function. The estimated ejection fraction is 50%.  · Normal LV diastolic function.  · Normal right ventricular systolic function.  · Mild aortic regurgitation.    LDA:   PICC Double Lumen " 09/18/20 1227 right basilic (Active)   Verification by X-ray No 09/19/20 1502   Site Assessment No redness;No swelling;No warmth 09/23/20 1935   Line Securement Device Secured with sutureless device 09/23/20 1935   Dressing Type Biopatch in place 09/23/20 1935   Dressing Status Clean;Dry;Intact 09/23/20 1935   Dressing Intervention Integrity maintained 09/23/20 1935   Date on Dressing 09/18/20 09/23/20 1935   Dressing Due to be Changed 09/25/20 09/23/20 1935   Left Lumen Patency/Care Blood return present 09/23/20 1935   Right Lumen Patency/Care Flushed w/o difficulty 09/23/20 1935   Current Insertion Depth (cm) 35 cm 09/18/20 1227   Current Exposed Catheter (cm) 0 cm 09/18/20 1227   Extremity Circumference (cm) 38 cm 09/20/20 1925   Waveform Normal 09/18/20 1227   Line Necessity Review Longterm central access required 09/22/20 2000   Number of days: 6            Peripheral IV - Single Lumen 09/16/20 1112 18 G;1 3/4 in Right Upper Arm (Active)   Site Assessment Clean;Dry;Intact;No redness 09/24/20 0000   Line Status Saline locked 09/23/20 1935   Dressing Status Clean;Dry;Intact 09/22/20 2000   Dressing Intervention Integrity maintained 09/22/20 2000   Dressing Change Due 09/20/20 09/20/20 1502   Site Change Due 09/20/20 09/20/20 1502   Reason Not Rotated Not due 09/20/20 1502   Number of days: 8            Peripheral IV - Single Lumen 09/17/20 1015 20 G;1 3/4 in Right Forearm (Active)   Site Assessment Clean;Dry;Intact;No redness 09/24/20 0000   Line Status Flushed;Saline locked 09/22/20 2000   Dressing Status Clean;Dry;Intact 09/22/20 2000   Dressing Intervention Integrity maintained 09/22/20 2000   Dressing Change Due 09/21/20 09/20/20 1502   Site Change Due 09/21/20 09/20/20 1502   Reason Not Rotated Not due 09/20/20 1502   Number of days: 7            NG/OG Tube 09/20/20 2310 14 Fr. Right nostril (Active)   Placement Check placement verified by x-ray 09/23/20 1935   Tube advanced (cm) 4 09/21/20 4987    Advancement advanced manually 09/21/20 0859   Tolerance no signs/symptoms of discomfort 09/23/20 1935   Securement secured to nostril center 09/23/20 1935   Clamp Status/Tolerance unclamped 09/23/20 1935   Insertion Site Appearance no redness, warmth, tenderness, skin breakdown, drainage 09/23/20 1935   Feeding Type by pump 09/23/20 1935   Feeding Action feeding held 09/22/20 2000   Current Rate (mL/hr) 50 mL/hr 09/23/20 1935   Goal Rate (mL/hr) 50 mL/hr 09/23/20 1935   Water Bolus (mL) 200 mL 09/24/20 0600   Rate Formula Tube Feeding (mL/hr) 50 mL/hr 09/22/20 2000   Formula Name Peptide 09/23/20 1935   Intake (mL) - Formula Tube Feeding 300 09/24/20 0600   Number of days: 3       Prev airway: ETT 7.5; 24@lip    Drips: None documented.      Patient Active Problem List   Diagnosis    Loss of consciousness    Essential hypertension    Ulcerative colitis    Chronic bronchitis    Autoimmune hemolytic anemia    Mitral regurgitation    CVA (cerebral vascular accident)    Bacterial endocarditis    History of gastrointestinal bleeding    GERD (gastroesophageal reflux disease)    Embolic stroke involving left posterior cerebral artery    Unresponsive    Embolic stroke involving right posterior cerebral artery    Thalamic infarct, acute    Abnormal EEG    Anemia    Seizure-like activity    Status epilepticus    Cerebellar stroke    Cytotoxic brain edema    Encephalopathy acute    Intracranial septic embolism    Brain abscess    Seizure    Debility    Oral phase dysphagia    Palliative care encounter    Goals of care, counseling/discussion    Advance care planning       Review of patient's allergies indicates:   Allergen Reactions    Codeine     Tetanus vaccines and toxoid        Current Inpatient Medications:   albuterol-ipratropium  3 mL Nebulization Q4H    atorvastatin  40 mg Per NG tube Daily    cefTRIAXone (ROCEPHIN) IVPB  2 g Intravenous Q12H    ciprofloxacin HCl  750 mg Per NG tube  Q12H    enoxaparin  40 mg Subcutaneous Q24H    lacosamide  200 mg Per NG tube Q12H    losartan  25 mg Per NG tube Daily    miconazole nitrate 2%   Topical (Top) BID    pantoprazole  40 mg Per NG tube Daily    silodosin  8 mg Per NG tube Daily    sodium chloride 0.9%  10 mL Intravenous Q6H    traZODone  25 mg Per NG tube QHS    vancomycin (VANCOCIN) IVPB  1,250 mg Intravenous Q48H       No current facility-administered medications on file prior to encounter.      Current Outpatient Medications on File Prior to Encounter   Medication Sig Dispense Refill    atorvastatin (LIPITOR) 40 MG tablet 1 tablet (40 mg total) by Per G Tube route once daily. 90 tablet 3    enoxaparin (LOVENOX) 40 mg/0.4 mL Syrg Inject 0.4 mLs (40 mg total) into the skin once daily. 12 mL 0    insulin aspart U-100 (NOVOLOG) 100 unit/mL (3 mL) InPn pen Inject 0-5 Units into the skin every 6 (six) hours as needed (Hyperglycemia). 3 mL 0    lacosamide (VIMPAT) 10 mg/mL Soln oral solution 10 mLs (100 mg total) by Per NG tube route every 12 (twelve) hours. 600 mL 11    levETIRAcetam in NaCl, iso-os, (KEPPRA) 500 mg/100 mL PgBk Inject 100 mLs (500 mg total) into the vein every 12 (twelve) hours. 6000 mL 0    losartan (COZAAR) 50 MG tablet Take 1 tablet (50 mg total) by mouth once daily. 90 tablet 3       No past surgical history on file.    Social History     Socioeconomic History    Marital status:      Spouse name: Not on file    Number of children: Not on file    Years of education: Not on file    Highest education level: Not on file   Occupational History    Not on file   Social Needs    Financial resource strain: Not on file    Food insecurity     Worry: Not on file     Inability: Not on file    Transportation needs     Medical: Not on file     Non-medical: Not on file   Tobacco Use    Smoking status: Not on file   Substance and Sexual Activity    Alcohol use: Not on file    Drug use: Not on file    Sexual activity:  Not on file   Lifestyle    Physical activity     Days per week: Not on file     Minutes per session: Not on file    Stress: Not on file   Relationships    Social connections     Talks on phone: Not on file     Gets together: Not on file     Attends Che[employer] service: Not on file     Active member of club or organization: Not on file     Attends meetings of clubs or organizations: Not on file     Relationship status: Not on file   Other Topics Concern    Not on file   Social History Narrative    Not on file       OBJECTIVE:     Vital Signs Range (Last 24H):  Temp:  [36.6 °C (97.8 °F)-37 °C (98.6 °F)]   Pulse:  [77-98]   Resp:  [15-36]   BP: (115-146)/(58-66)   SpO2:  [95 %-100 %]       Significant Labs:  Lab Results   Component Value Date    WBC 9.57 09/24/2020    HGB 8.2 (L) 09/24/2020    HCT 27.2 (L) 09/24/2020     09/24/2020    CHOL 72 (L) 09/14/2020    TRIG 45 09/14/2020    HDL 26 (L) 09/14/2020    ALT 11 09/24/2020    AST 12 09/24/2020     09/24/2020    K 4.0 09/24/2020     09/24/2020    CREATININE 1.4 09/24/2020    BUN 35 (H) 09/24/2020    CO2 32 (H) 09/24/2020    TSH 2.301 09/14/2020    INR 1.2 09/14/2020    HGBA1C 4.4 09/14/2020       Diagnostic Studies: No relevant studies.    EKG:   Results for orders placed or performed during the hospital encounter of 09/08/20   EKG 12-lead    Collection Time: 09/12/20  4:48 PM    Narrative    Test Reason : R94.31,    Vent. Rate : 085 BPM     Atrial Rate : 085 BPM     P-R Int : 198 ms          QRS Dur : 088 ms      QT Int : 364 ms       P-R-T Axes : 044 -29 023 degrees     QTc Int : 433 ms    Sinus rhythm with occasional Premature ventricular complexes  Anterior infarct (cited on or before 08-SEP-2020)  Abnormal ECG  When compared with ECG of 08-SEP-2020 10:55,  Premature ventricular complexes are now Present  QRS duration has decreased  Questionable change in initial forces of Anterior leads  ST now depressed in Inferior leads  QT has  shortened  Confirmed by Arnulfo BALDERAS MD, oMhan ANTON ([id number]) on 9/14/2020 9:03:08 AM    Referred By: [email or web address]   SELF           Confirmed By:Mohan Arredondo III, MD       2D ECHO:  TTE:  Results for orders placed or performed during the hospital encounter of 09/08/20   Echo Color Flow Doppler? Yes   Result Value Ref Range    BSA 2.34 m2    STJ 3.62 cm    AV mean gradient 7 mmHg    Ao peak pedro 2.01 m/s    Ao VTI 31.63 cm    IVS 0.71 0.6 - 1.1 cm    LA size 3.79 cm    Left Atrium Major Axis 6.53 cm    Left Atrium Minor Axis 5.49 cm    LVIDd 5.99 3.5 - 6.0 cm    LVIDs 3.81 2.1 - 4.0 cm    LVOT diameter 2.15 cm    LVOT peak VTI 24.79 cm    Posterior Wall 0.78 0.6 - 1.1 cm    MV Peak A Pedro 1.04 m/s    E wave decelartion time 172.72 msec    MV Peak E Pedro 1.27 m/s    RA Major Axis 4.83 cm    RA Width 3.34 cm    TR Max Pedro 2.24 m/s    TDI LATERAL 0.11 m/s    TDI SEPTAL 0.08 m/s    LA WIDTH 3.79 cm    Ao root annulus 3.12 cm    MV stenosis pressure 1/2 time 50.09 ms    LV Diastolic Volume 179.25 mL    LV Systolic Volume 62.16 mL    LVOT peak pedro 1.33 m/s    Mr max pedro 0.04 m/s    LV LATERAL E/E' RATIO 11.55 m/s    LV SEPTAL E/E' RATIO 15.88 m/s    FS 36 %    LA volume 72.83 cm3    LV mass 169.98 g    Left Ventricle Relative Wall Thickness 0.26 cm    AV valve area 2.84 cm2    AV Velocity Ratio 0.66     AV index (prosthetic) 0.78     MV valve area p 1/2 method 4.39 cm2    E/A ratio 1.22     Mean e' 0.10 m/s    LVOT area 3.6 cm2    LVOT stroke volume 89.95 cm3    AV peak gradient 16 mmHg    E/E' ratio 13.37 m/s    LV Systolic Volume Index 27.0 mL/m2    LV Diastolic Volume Index 77.74 mL/m2    LA Volume Index 31.6 mL/m2    LV Mass Index 74 g/m2    Triscuspid Valve Regurgitation Peak Gradient 20 mmHg    Right Atrial Pressure (from IVC) 8 mmHg    TV rest pulmonary artery pressure 28 mmHg    Narrative    · Mild left ventricular enlargement.  · Moderate mitral regurgitation.  · Intermediate central venous pressure (8 mmHg).  · The  estimated PA systolic pressure is 28 mmHg.  · Low normal left ventricular systolic function. The estimated ejection   fraction is 50%.  · Normal LV diastolic function.  · Normal right ventricular systolic function.  · Mild aortic regurgitation.          SILVANA:  No results found for this or any previous visit.    ASSESSMENT/PLAN:         Anesthesia Evaluation    I have reviewed the Patient Summary Reports.    I have reviewed the Nursing Notes. I have reviewed the NPO Status.   I have reviewed the Medications.     Review of Systems  Anesthesia Hx:  No problems with previous Anesthesia Denies Hx of Anesthetic complications  Neg history of prior surgery. Denies Family Hx of Anesthesia complications.   Denies Personal Hx of Anesthesia complications.   Hematology/Oncology:  Hematology Normal   Oncology Normal     EENT/Dental:EENT/Dental Normal   Cardiovascular:   Hypertension Valvular problems/Murmurs, MVP no hyperlipidemia Recent endocarditis with mitral and aortic valve vegetations   Pulmonary:   COPD Denies Sleep Apnea.    Renal/:  Renal/ Normal     Hepatic/GI:   PUD, GERD    Musculoskeletal:   Denies Arthritis.     Neurological:   CVA Seizures    Endocrine:  Endocrine Normal    Psych:  Psychiatric Normal           Physical Exam  General:  Well nourished    Airway/Jaw/Neck:  Airway Findings: Mouth Opening: Normal Tongue: Normal  General Airway Assessment: Adult  Mallampati: III  TM Distance: Normal, at least 6 cm  Jaw/Neck Findings:  Neck ROM: Normal ROM     Eyes/Ears/Nose:  EYES/EARS/NOSE FINDINGS: Normal   Dental:  Dental Findings: In tact    Chest/Lungs:  Chest/Lungs Findings: Normal Respiratory Rate     Heart/Vascular:  Heart Findings: Normal Heart murmur: negative       Mental Status:  Mental Status Findings:  Confusion, Flat Affect         Anesthesia Plan  Type of Anesthesia, risks & benefits discussed:  Anesthesia Type:  general, MAC  Patient's Preference:   Intra-op Monitoring Plan: standard ASA  monitors  Intra-op Monitoring Plan Comments:   Post Op Pain Control Plan: multimodal analgesia, IV/PO Opioids PRN and per primary service following discharge from PACU  Post Op Pain Control Plan Comments:   Induction:   IV  Beta Blocker:  Patient is not currently on a Beta-Blocker (No further documentation required).       Informed Consent: Patient representative understands risks and agrees with Anesthesia plan.  Questions answered. Anesthesia consent signed with patient representative.  ASA Score: 4     Day of Surgery Review of History & Physical: I have interviewed and examined the patient. I have reviewed the patient's H&P dated: 9/24/20.   H&P update referred to the surgeon.         Ready For Surgery From Anesthesia Perspective.

## 2020-09-24 NOTE — PLAN OF CARE
Improved TIGIST this session. Although diet initiation not yet recommended 2/2 concerns for waxing/ waning TIGIST, pt appears safe for thin liquids and pureed solids provided sparingly upon request when fully awake and alert for pleasure purposes (vs to meet nutritional volume needs).     Problem: SLP Goal  Goal: SLP Goal  Description: Goals expected to be met by 9/30:  1. Pt will participate in ongoing assessment of swallow to determine safest, least restrictive diet.   2. Pt will orient x4 ind'ly.   3. Pt vocalize x3 within an SLP session.   4. Pt will follow basic 1-step commands with 70% acc ind'ly.   5. Pt will answer basic y/n q's with 70% acc ind'ly.   6. Pt will participate in ongoing assessment of cognitive-linguistic skills.   Outcome: Ongoing, Progressing     MARVA Vazquez, CCC-SLP  [phone]  9/24/2020

## 2020-09-24 NOTE — PLAN OF CARE
[facility]   Palliative Care   Follow Up Note:    Patient Name: Lopez Savage  MRN: [id number]  Admission Date: 9/13/2020  Hospital Length of Stay: 11 days  Code Status: Full Code   Attending Provider: Isidoro Garcia MD  Palliative Care Provider: VALENTINE Montano, APRN, AGCNS  Primary Care Physician: Prasanna Haywood MD  Principal Problem:Status epilepticus      Visit to bedside with [facility] APRN. Pt's wife at bedside. Pt awake today. Able to answer some questions after time and repeat of question.     Support provided to pt's wife. Plan is still for LTAC placement once pt ready for next level of care.    Will continue to follow.    Corina Arboleda, WIL, ACHP-SW

## 2020-09-24 NOTE — PROGRESS NOTES
[facility]   Palliative Medicine  Progress Note    Patient Name: Lopez Savage  MRN: [id number]  Admission Date: 9/13/2020  Hospital Length of Stay: 11 days  Code Status: Full Code   Attending Provider: Isidoro Garcia MD  Consulting Provider: ELSIE Lopez  Primary Care Physician: Prasanna Haywood MD  Principal Problem:Status epilepticus    Patient information was obtained from patient and ER records.      Assessment/Plan:     Palliative care encounter  Impression: Pt is a 74 y/o male with PMH of HTN, recent endocarditis (8/2020), recent GIB (8/2020), diverticulitis, GERD. pt transferred from [facility] for management of seizures. On 9/8 pt presented to [facility] ED with unresponsiveness, hypoxia requiring intubation, and seizure like activity. MRI with left PCA infarct and left thalamic infarct along with small right PCA infarct. Two different distributions makes a cardioembolic source most likely. Given his recent history of endocarditis this is the likely etiology Right infarct with significant vasogenic edema more likely due to septic embolus. Pt is alert today, answering some simple questions. Planning for LP.     Reason for consult: Support/advance care planning Communicated with Dr. Sprague.     Today: Met with pt's wife who is at bedside. Pt alert today and speaking slowly. Wife is hopeful pt will continue to improve. Wife aware possible LTAC placement in future. Support given.     9-23-20  Met with pt's wife who is at bedside. Wife is aware pt has endocarditis, and embolic stroke. Wife is concerned about pt's mental status deteriorating on 9/22. Per wife pt was alert and oriented prior to that.  MRI results showed mature focal ischemia. Education done with wife. Also, let pt's wife know Vascular neurology is seeing pt concerning this mental status change and possible causes. Per wife, she has been speaking to  about possible LTAC but voices concern about pt's  mental status and questions concerning if he will return to baseline.     Advance care planning:   MPOA: Pt's wife is next of kin, followed by two adult sons.   Code status: Full    Symptom management:   No distress noted.     Plan:  Will continue to reinforce realistic expectations.   LP puncture pending.  Possible LTAC placement when pt ready to d/c. Pt on  IV antibiotics for endocarditis.   Will follow.         I will follow-up with patient. Please contact us if you have any additional questions.    Subjective:     Chief Complaint: No chief complaint on file.      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 Per chart review, the patient presented to [facility] on 9/8/2020 with a primary complaint of loss of consciousness. The patient was recently treated for endocarditis w/ IV rocephin through PICC line at [facility] and discharged approximately 2 weeks ago. Since then he has had a worsening cough and was planning to go to the hospital for evaluation. Per wife, [facility] did portable CXR about a week ago w/ concern for fluid vs pneumonia. Patient was given course of doxycycline and finished course recently. 9/8- per chart review, wife went to bed while  was watching TV and about an hour later her daughter found him non-responsive on the ground. Daughter did a few chest compressions before EMS arrived. Patient arrived to ED and was no- responsive to sternal rub. Pupillary and gag reflex present. Patient intubated for airway protection and sedated w/ propofol. No seizure like activity was observed in ED. TPA contraindicated due to recent GI bleed, last occurring in August. Discussed further stroke work up with wife and planned for imaging. CT head negative for acute intracranial bleed. MRI head showed PCA infarct and right occipital lobe edema. 9/8 EEG  demonstrated left posterior pseudo periodic epileptiform discharges is suggestive of an irritative region likely secondary to focal ischemia. Patient started on AEDs vimpat and keppra and reported to have several seizures during his stay. Patient was transferred to [facility] today for continuous EEG monitoring and further evaluation.    Pt is a a full code.       Hospital Course:  No notes on file    No new subjective & objective note has been filed under this hospital service since the last note was generated.      > 50% of 35 min visit spent in chart review, face to face discussion of goals of care,  symptom assessment, coordination of care and emotional support.    Sandra Parker, CNS  Palliative Medicine  [facility]

## 2020-09-25 NOTE — ASSESSMENT & PLAN NOTE
ASSESSMENT     Lopez Savage is a 75 y.o. male with a past psychiatric history of depression, who presented to the [facility] for status epilepticus. Psychiatry originally consulted for management of agitation. Somnolent on initial interview -- per history, alterations in cognition and attention appear to be waxing and waning with improvement since admission.    IMPRESSION  Delirium 2/2 general medical condition      RECOMMENDATION(S)      1. Scheduled Medication(s):  - Agree with trazadone 25 mg nightly per NG tube  - Melatonin 3 mg nightly    2. PRN Medication(s):  - Haldol 1 mg IV vs 2 mg PO/IM Q6H for non-redirectable agitation    3.  Monitor:  Please obtain daily EKG to monitor QTc    4. Legal Status/Precaution(s):  Patient does not meet criteria for PEC or inpatient psychiatric admission at this time. Recommend to rescind PEC if one was placed. Patient is not currently an imminent danger to self or others and is not gravely disabled due to a psychiatric illness.    5. Other:  [facility] ICU - positive 9/25  DELIRIUM BEHAVIOR MANAGEMENT   PLEASE utilize CHEMICAL restraints with PRN meds first for agitation. Minimize use of PHYSICAL restraints   Keep window shades open and room lit during day and room dim at night in order to promote normal sleep-wake cycles   Encourage family at bedside. [place] patient often to situation, location, date   Continue to Limit or Discontinue use of Narcotics, Benzos and Anti-cholinergic medications as they may worsen delirium   Continue medical workup for causative etiology of Delirium

## 2020-09-25 NOTE — PROCEDURES
Radiology Post-Procedure Note    Pre Op Diagnosis: encepahlopathy    Post Op Diagnosis: Same    Procedure: lumbar puncture    Procedure performed by:   MD Javier Willis MD    Written Informed Consent Obtained: Yes    Specimen Removed: 8 mL CSF    Estimated Blood Loss: Minimal    Findings: Following written informed consent and sterile prep and drape, a 22 gauge spinal needle was inserted at L3-L4 intralaminar space under fluoroscopic surveillance.  8 mL clear CSF removed and sent to the lab for further analysis. Opening pressure: 34. There were no complications.    Patient tolerated procedure well.    oRosevelt Steele M.D.  PGY-III Radiology  Pager: [phone]

## 2020-09-25 NOTE — PT/OT/SLP PROGRESS
Speech Language Pathology      Lopez Savage   [facility] 2ND FLR Periop Pool*    MRN: [id number]    Patient not seen today secondary to Unavailable (Comment)(Pt BELKYS for LP upon SLP attempt to treat at 1225.). If unable to follow up later this service date, will follow up according to SLP POC if pt medically stable and available.     MARVA Vazquez, CCC-SLP  [phone]  9/25/2020

## 2020-09-25 NOTE — SUBJECTIVE & OBJECTIVE
Interval History: JAYESH    Medications:  Continuous Infusions:  Scheduled Meds:   albuterol-ipratropium  3 mL Nebulization Q4H    atorvastatin  40 mg Per NG tube Daily    cefTRIAXone (ROCEPHIN) IVPB  2 g Intravenous Q12H    enoxaparin  40 mg Subcutaneous Q24H    lacosamide  200 mg Per NG tube Q12H    losartan  25 mg Per NG tube Daily    miconazole nitrate 2%   Topical (Top) BID    pantoprazole  40 mg Per NG tube Daily    silodosin  8 mg Per NG tube Daily    sodium chloride 0.9%  10 mL Intravenous Q6H    traZODone  25 mg Per NG tube QHS     PRN Meds:acetaminophen, dextrose 50%, fentaNYL, glucagon (human recombinant), insulin aspart U-100, labetalol, ondansetron, psyllium husk (aspartame), sodium chloride 0.9%, Flushing PICC Protocol **AND** sodium chloride 0.9% **AND** sodium chloride 0.9%, sodium chloride 0.9%, Pharmacy to dose Vancomycin consult **AND** vancomycin - pharmacy to dose     Review of Systems  Objective:     Weight: 114 kg (251 lb 5.2 oz)  Body mass index is 37.11 kg/m².  Vital Signs (Most Recent):  Temp: 99.1 °F (37.3 °C) (09/25/20 1246)  Pulse: 93 (09/25/20 1315)  Resp: 20 (09/25/20 1315)  BP: (!) 101/58 (09/25/20 1315)  SpO2: (!) 94 % (09/25/20 1315) Vital Signs (24h Range):  Temp:  [98.2 °F (36.8 °C)-99.1 °F (37.3 °C)] 99.1 °F (37.3 °C)  Pulse:  [84-94] 93  Resp:  [20-28] 20  SpO2:  [94 %-100 %] 94 %  BP: (100-150)/(53-67) 101/58     Date 09/25/20 0700 - 09/26/20 0659   Shift 8442-7630 2324-8978 5414-5437 24 Hour Total   INTAKE   I.V.(mL/kg) 100(0.9)   100(0.9)   NG/   300   Shift Total(mL/kg) 400(3.5)   400(3.5)   OUTPUT   Urine(mL/kg/hr) 200   200   Shift Total(mL/kg) 200(1.8)   200(1.8)   Weight (kg) 114 114 114 114                        NG/OG Tube 09/20/20 2310 14 Fr. Right nostril (Active)   Placement Check placement verified by x-ray 09/25/20 0800   Tube advanced (cm) 4 09/21/20 0859   Advancement advanced manually 09/21/20 0859   Tolerance no signs/symptoms of discomfort  09/25/20 0800   Securement secured to nostril center 09/25/20 1246   Clamp Status/Tolerance clamped 09/25/20 1246   Insertion Site Appearance no redness, warmth, tenderness, skin breakdown, drainage 09/25/20 1246   Feeding Type continuous 09/24/20 0814   Feeding Action feeding held 09/25/20 0800   Current Rate (mL/hr) 50 mL/hr 09/24/20 0814   Goal Rate (mL/hr) 50 mL/hr 09/24/20 0814   Intake (mL) 100 mL 09/25/20 0856   Water Bolus (mL) 200 mL 09/25/20 0856   Rate Formula Tube Feeding (mL/hr) 50 mL/hr 09/22/20 2000   Formula Name Peptide 09/23/20 1935   Intake (mL) - Formula Tube Feeding 0 09/25/20 0856       Neurosurgery Physical Exam     [id number]  Eyes open, more interactive than yesterday  Not speaking  Moves all spontaneusly and purposeful, but not following commands    Significant Labs:  Recent Labs   Lab 09/24/20  0237 09/25/20  0356   GLU 91 95    142   K 4.0 3.9    104   CO2 32* 32*   BUN 35* 38*   CREATININE 1.4 1.5*   CALCIUM 10.2 10.2   MG 2.0 1.9     Recent Labs   Lab 09/24/20  0237 09/25/20  0356   WBC 9.57 9.58   HGB 8.2* 7.7*   HCT 27.2* 25.7*    182     No results for input(s): LABPT, INR, APTT in the last 48 hours.  Microbiology Results (last 7 days)     Procedure Component Value Units Date/Time    Gram stain [[id number]] Collected: 09/25/20 1230    Order Status: Sent Specimen: CSF (Spinal Fluid) from CSF Tap, Tube 1 Updated: 09/25/20 1301    CSF culture [[id number]] Collected: 09/25/20 1230    Order Status: Sent Specimen: CSF (Spinal Fluid) from CSF Tap, Tube 1 Updated: 09/25/20 1300    Blood culture [[id number]] Collected: 09/22/20 1522    Order Status: Completed Specimen: Blood from Peripheral, Left Hand Updated: 09/24/20 1612     Blood Culture, Routine No Growth to date      No Growth to date      No Growth to date    Narrative:      Collection has been rescheduled by BDW at 09/22/2020 14:57 Reason:   Patient unavailable  Collection has been rescheduled by BDW at 09/22/2020 14:57  Reason:   Patient unavailable    Blood culture [[id number]] Collected: 09/22/20 1522    Order Status: Completed Specimen: Blood from Antecubital, Left Arm Updated: 09/24/20 1612     Blood Culture, Routine No Growth to date      No Growth to date      No Growth to date    Narrative:      Collection has been rescheduled by BDW at 09/22/2020 14:57 Reason:   Patient unavailable  Collection has been rescheduled by BDW at 09/22/2020 14:57 Reason:   Patient unavailable    Blood culture [[id number]]     Order Status: Canceled Specimen: Blood     Blood culture [[id number]]     Order Status: Canceled Specimen: Blood     Blood culture [[id number]] Collected: 09/14/20 1840    Order Status: Completed Specimen: Blood from Peripheral, Foot, Right Updated: 09/19/20 2212     Blood Culture, Routine No growth after 5 days.    Blood culture [[id number]] Collected: 09/14/20 1835    Order Status: Completed Specimen: Blood from Peripheral, Foot, Left Updated: 09/19/20 2212     Blood Culture, Routine No growth after 5 days.        All pertinent labs from the last 24 hours have been reviewed.    Significant Diagnostics:  I have reviewed and interpreted all pertinent imaging results/findings within the past 24 hours.

## 2020-09-25 NOTE — TRANSFER OF CARE
"Anesthesia Transfer of Care Note    Patient: Lopez Savage    Procedure(s) Performed: Procedure(s) (LRB):  Lumbar Puncture (N/A)    Patient location: PACU    Anesthesia Type: MAC    Transport from OR: Transported from OR on 6-10 L/min O2 by face mask with adequate spontaneous ventilation    Post pain: adequate analgesia    Post assessment: no apparent anesthetic complications    Post vital signs: stable    Level of consciousness: awake    Nausea/Vomiting: no nausea/vomiting    Complications: none    Transfer of care protocol was followed      Last vitals:   Visit Vitals  BP (!) 101/58 (BP Location: Left arm, Patient Position: Lying)   Pulse 93   Temp 37.3 °C (99.1 °F) (Temporal)   Resp 20   Ht 5' 9" (1.753 m)   Wt 114 kg (251 lb 5.2 oz)   SpO2 (!) 94%   BMI 37.11 kg/m²     "

## 2020-09-25 NOTE — NURSING TRANSFER
Nursing Transfer Note      9/25/2020     Transfer To: [id number]    Transfer via bed    Transfer with cardiac monitoring    Transported by RN    Medicines sent: n/a    Chart send with patient: Yes    Notified:     Patient reassessed at: 9/25/2020    Upon arrival to floor:

## 2020-09-25 NOTE — PROGRESS NOTES
I talked to the EEG people about patient going for a lumbar puncture today in IR. They said to just disconnect the head piece from the box and then plug back in once patient arrives back on the floor.

## 2020-09-25 NOTE — PLAN OF CARE
Patient currently not medically stable for discharge.  LP scheduled.  ID consult.  Palliative care following.  When medically stable, anticipate discharge plan A - Rehab or plan B - LTAC.  PT/OT Recs:  Rehab.    CM to follow for discharge planning needs.  MEGHANN Tejeda RN  Case Management  [phone]        09/25/20 1541   Discharge Reassessment   Assessment Type Discharge Planning Reassessment   Provided patient/caregiver education on the expected discharge date and the discharge plan Yes   Do you have any problems affording any of your prescribed medications? TBD   Discharge Plan A Rehab   Discharge Plan B Long-term acute care facility (LTAC)   DME Needed Upon Discharge  other (see comments)  (TBD)   Anticipated Discharge Disposition Rehab   Can the patient/caregiver answer the patient profile reliably? Yes, cognitively intact   Describe the patient's ability to walk at the present time. Major restrictions/daily assistance from another person   Number of comorbid conditions (as recorded on the chart) Four   Post-Acute Status   Post-Acute Authorization Placement   Post-Acute Placement Status Awaiting Internal Medical Clearance   Discharge Delays None known at this time

## 2020-09-25 NOTE — HPI
"Lopez Savage is a 75 y.o. male with a past psychiatric history of depression who [place] [facility] [place] Status epilepticus. Psychiatry was consulted for "severe agitation requiring constaint, h/o brain abscesses and seizures".    Per Primary Team:  male with PMH of diverticulitis, MVP, endocarditis with aortic valve vegetations, HTN, and COPD. The patient presented to [facility] on 9/8/2020 with a primary complaint of loss of consciousness. The patient was recently treated for endocarditis w/ IV rocephin through PICC line at [facility] main campus and discharged 2 weeks ago. Since then he has had a worsening cough and was planning to go to the hospital for evaluation. Per wife home health did portable CXR about a week ago w/ concern for fluid vs pneumonia. Patient was given course of doxycycline and finished course yesterday. Last night wife went to bed while  was watching TV and about an hour later her daughter found him non responsive on the ground. Daughter did a few chest compressions before EMS arrived. Patient arrived to ED and non responsive to sternal rub. Pupillary and gag reflex present. Patient intubated for airway protection and sedated w/ propofol. No seizure like activity was observed in ED. TPA contraindicated due to recent GI bleed, last occurring in August. Discussed further stroke work up with wife and plan to obtain MRI head.    Per Psychiatry:  Seen and evaluated in room with wife at bedside. Patient somnolent, lethargic, returned from LP this afternoon. Hard of hearing. Wakes to physical stimuli, mumbles and returns to sleep. Per wife -- overall his confusion waxes and wanes. He knows where his name and where he is. She denies any agitation or aggression. Sleeping throughout the night with no issues. Does mutter to himself at times but has not endorsed any AVH to wife. No SI/HI/plan. Appetite improving with advancement by SLP, has NG tube at this time. Restraints have " been since admission, however when seen by psychiatry patient has one soft restraint to left arm. Would not participate in Bramasol.    At baseline, patient with no cognitive issues or deficits. No recent changes in behavior or mood prior to admission.    Collateral:   See above    Medical Review of Systems:  Review of systems not obtained due to patient factors altered mental status.    Psychiatric Review of Systems-is patient experiencing or having changes in  sleep: SHANNA  appetite: SHANNA  weight: SHANNA   energy/anergy: SHANNA  interest/pleasure/anhedonia: SHANNA  somatic symptoms: SHANNA  libido: SHANNA  anxiety/panic: SHANNA  guilty/hopelessness: SHANNA  concentration: SHANNA  S.I.B.s/risky behavior: SHANNA  any drugs: SHANNA  alcohol: SHANNA    Allergies:  Codeine and Tetanus vaccines and toxoid    Past Medical/Surgical History:  History reviewed. No pertinent past medical history.  History reviewed. No pertinent surgical history.    History obtained by wife at bedside.    Past Psychiatric History:  Previous Medication Trials: yes, currently on Lexapro 10 mg for depression  Previous Psychiatric Hospitalizations: no   Previous Suicide Attempts: no   History of Violence: no  Outpatient Psychiatrist: no    Social History:  Marital Status:  for 50 years  Children: 2   Employment Status/Info: 2013 retired  Education: high school diploma/GED  Special Ed: no  Housing Status: with family, wife and daughter  History of phys/sexual abuse: no  Access to gun: yes    Substance Abuse History:  Recreational Drugs: denies  Use of Alcohol: denied  Rehab History: no   Tobacco Use: no  Use of OTC: no    Legal History:  Past Charges/Incarcerations: denies   Pending charges: no     Family Psychiatric History:   denies    Psychosocial Stressors: health  Functioning Relationships: good support system

## 2020-09-25 NOTE — PROGRESS NOTES
Pharmacokinetic Assessment Follow Up: IV Vancomycin    Vancomycin serum concentration assessment(s):    · Vancomycin random=17.3 mcg/mL-->patient was supposed to have a level drawn at 0900, but it was not done and dose was given. Added on a level to AM labs which is a 42h level.    Vancomycin Regimen Plan:    · Patient currently on 1.25g IV q48h, level at 42h=17.3 mcg/mL. Level likely still within range at time of true trough. SCr has been trending up, 1.5 today, but appears level is still ok. Will check next level Sunday at 0900 to try to get a true trough.    Drug levels (last 3 results):  Recent Labs   Lab Result Units 09/22/20  1522 09/23/20  0622 09/25/20  0356   Vancomycin, Random ug/mL  --  17.6 17.3   Vancomycin-Trough ug/mL 22.1*  --   --        Pharmacy will continue to follow and monitor vancomycin.    Please contact pharmacy at extension [phone] for questions regarding this assessment.    Thank you for the consult,   Jocelyne Hanson, PharmD, [facility]  [phone]       Patient brief summary:  Lopez Savage is a 75 y.o. male initiated on antimicrobial therapy with IV Vancomycin for treatment of endocarditis/brain abscess    Actual Body Weight:   114kg    Renal Function:   Estimated Creatinine Clearance: 53.8 mL/min (A) (based on SCr of 1.5 mg/dL (H)).,       Metabolic Panel (last 72 hours):  Recent Labs   Lab Result Units 09/22/20  1522 09/23/20  0622 09/24/20  0237 09/25/20  0356 09/25/20  1230   Sodium mmol/L 140 140 143 142  --    Potassium mmol/L 3.6 3.3* 4.0 3.9  --    Chloride mmol/L 102 102 103 104  --    CO2 mmol/L 32* 30* 32* 32*  --    Glucose mg/dL 90 109 91 95  --    Glucose, CSF mg/dL  --   --   --   --  56   BUN, Bld mg/dL 33* 31* 35* 38*  --    Creatinine mg/dL 1.3 1.3 1.4 1.5*  --    Albumin g/dL 2.4* 2.5* 2.4* 2.3*  --    Total Bilirubin mg/dL 0.2 0.2 0.2 0.2  --    Alkaline Phosphatase U/L 50* 53* 51* 48*  --    AST U/L 15 14 12 13  --    ALT U/L 10 10 11 7*  --    Magnesium mg/dL  --  2.0  2.0  2.0 1.9  --    Phosphorus mg/dL  --  3.8  3.8 4.8* 4.5  --        Vancomycin Administrations:  vancomycin given in the last 96 hours                   vancomycin in dextrose 5 % 1 gram/250 mL IVPB 1,000 mg (mg) 1,000 mg New Bag 09/21/20 1628     1,000 mg New Bag 09/20/20 1301     1,000 mg New Bag 09/19/20 1433                Microbiologic Results:  Microbiology Results (last 7 days)     Procedure Component Value Units Date/Time    CSF culture [[id number]] Collected: 09/25/20 1230    Order Status: Sent Specimen: CSF (Spinal Fluid) from CSF Tap, Tube 1 Updated: 09/25/20 1300    Gram stain [[id number]] Collected: 09/25/20 1230    Order Status: Canceled Specimen: CSF (Spinal Fluid) from CSF Tap, Tube 1     Blood culture [[id number]] Collected: 09/22/20 1522    Order Status: Completed Specimen: Blood from Peripheral, Left Hand Updated: 09/24/20 1612     Blood Culture, Routine No Growth to date      No Growth to date      No Growth to date    Narrative:      Collection has been rescheduled by BDW at 09/22/2020 14:57 Reason:   Patient unavailable  Collection has been rescheduled by BDW at 09/22/2020 14:57 Reason:   Patient unavailable    Blood culture [[id number]] Collected: 09/22/20 1522    Order Status: Completed Specimen: Blood from Antecubital, Left Arm Updated: 09/24/20 1612     Blood Culture, Routine No Growth to date      No Growth to date      No Growth to date    Narrative:      Collection has been rescheduled by BDW at 09/22/2020 14:57 Reason:   Patient unavailable  Collection has been rescheduled by BDW at 09/22/2020 14:57 Reason:   Patient unavailable    Blood culture [[id number]]     Order Status: Canceled Specimen: Blood     Blood culture [[id number]]     Order Status: Canceled Specimen: Blood     Blood culture [[id number]] Collected: 09/14/20 1840    Order Status: Completed Specimen: Blood from Peripheral, Foot, Right Updated: 09/19/20 2212     Blood Culture, Routine No growth after 5 days.    Blood culture  [[id number]] Collected: 09/14/20 1835    Order Status: Completed Specimen: Blood from Peripheral, Foot, Left Updated: 09/19/20 5247     Blood Culture, Routine No growth after 5 days.

## 2020-09-25 NOTE — ASSESSMENT & PLAN NOTE
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 Reconsulted for change in mental status today and aphasia    CT head and MRI reviewed, appears grossly unchanged from previous    No acute neurosurgical management at this time  Agree with vascular neurology workup, likely due to septic emboli  EEG no seizures  -- Will follow LP results, if suggestion of uncontrolled infection, will discuss surgical mgt for source control        -q1h neurochecks in ICU, q2h neurochecks in stepdown, q4h neurochecks on floor  --SBP <160 (cardene ggt; hydralazine & labetalol PRN; transition to home meds when appropriate)  --Na >135  --VN following  --AED per NCC/Epilepsy  --Abx per ID, f/up BCx - 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   --NO steroids  --HOB >30  --PPI given GIB per NCC  --Continue to monitor clinically, notify NSGY immediately with any changes in neuro status    Dispo: ICU

## 2020-09-25 NOTE — PROGRESS NOTES
[facility]  Neurosurgery  Progress Note    Subjective:     History of Present Illness: 74 M with  recent endocarditis (Strep mutans per report, s/p IV Rocephin) with aortic valve vegetations (8/20), recent GIB (8/20), diverticulitis, GERD, MVP, and COPD who was transferred to [facility] on 9/13 for cEEG. The patient presented to [facility] on 9/8/2020 after LOC at home. Per report patient had been c/o worsening SOB, had portable CXR with effusion v PNA. Patient was given course of doxycycline and finished course recently. Reportedly on 9/8 his wife went to bed while  was watching TV and about an hour later her daughter found him non responsive on the ground, short round of chest compressions before intubated in ED, sedated on Prop.  No seizure like activity was observed in ED. TPA contraindicated due to recent GI bleed, last occurring in August.  MRI head showed L PCA infarct and right occipital lobe edema. 9/8 EEG demonstrated left posterior pseudo periodic epileptiform discharges is suggestive of an irritative region likely secondary to focal ischemia. Patient started on AEDs vimpat and keppra and reported to have several seizures during his stay at [place], currently on cEEG in [facility]. NSGY consulted following MRI Brain this evening with 1.2cm R occipital lesion concerning for abscess.     On Lovenox at home, SQH at [facility].     Reconsult for change in mental status today, not interacting with his wife, aphasia, not following commands.     Post-Op Info:  Procedure(s) (LRB):  Lumbar Puncture (N/A)   Day of Surgery     Interval History: JAYESH    Medications:  Continuous Infusions:  Scheduled Meds:   albuterol-ipratropium  3 mL Nebulization Q4H    atorvastatin  40 mg Per NG tube Daily    cefTRIAXone (ROCEPHIN) IVPB  2 g Intravenous Q12H    enoxaparin  40 mg Subcutaneous Q24H    lacosamide  200 mg Per NG tube Q12H    losartan  25 mg Per NG tube Daily    miconazole nitrate 2%    Topical (Top) BID    pantoprazole  40 mg Per NG tube Daily    silodosin  8 mg Per NG tube Daily    sodium chloride 0.9%  10 mL Intravenous Q6H    traZODone  25 mg Per NG tube QHS     PRN Meds:acetaminophen, dextrose 50%, fentaNYL, glucagon (human recombinant), insulin aspart U-100, labetalol, ondansetron, psyllium husk (aspartame), sodium chloride 0.9%, Flushing PICC Protocol **AND** sodium chloride 0.9% **AND** sodium chloride 0.9%, sodium chloride 0.9%, Pharmacy to dose Vancomycin consult **AND** vancomycin - pharmacy to dose     Review of Systems  Objective:     Weight: 114 kg (251 lb 5.2 oz)  Body mass index is 37.11 kg/m².  Vital Signs (Most Recent):  Temp: 99.1 °F (37.3 °C) (09/25/20 1246)  Pulse: 93 (09/25/20 1315)  Resp: 20 (09/25/20 1315)  BP: (!) 101/58 (09/25/20 1315)  SpO2: (!) 94 % (09/25/20 1315) Vital Signs (24h Range):  Temp:  [98.2 °F (36.8 °C)-99.1 °F (37.3 °C)] 99.1 °F (37.3 °C)  Pulse:  [84-94] 93  Resp:  [20-28] 20  SpO2:  [94 %-100 %] 94 %  BP: (100-150)/(53-67) 101/58     Date 09/25/20 0700 - 09/26/20 0659   Shift 8559-8860 8109-0089 2771-4904 24 Hour Total   INTAKE   I.V.(mL/kg) 100(0.9)   100(0.9)   NG/   300   Shift Total(mL/kg) 400(3.5)   400(3.5)   OUTPUT   Urine(mL/kg/hr) 200   200   Shift Total(mL/kg) 200(1.8)   200(1.8)   Weight (kg) 114 114 114 114                        NG/OG Tube 09/20/20 2310 14 Fr. Right nostril (Active)   Placement Check placement verified by x-ray 09/25/20 0800   Tube advanced (cm) 4 09/21/20 0859   Advancement advanced manually 09/21/20 0859   Tolerance no signs/symptoms of discomfort 09/25/20 0800   Securement secured to nostril center 09/25/20 1246   Clamp Status/Tolerance clamped 09/25/20 1246   Insertion Site Appearance no redness, warmth, tenderness, skin breakdown, drainage 09/25/20 1246   Feeding Type continuous 09/24/20 0814   Feeding Action feeding held 09/25/20 0800   Current Rate (mL/hr) 50 mL/hr 09/24/20 0814   Goal Rate (mL/hr) 50 mL/hr  09/24/20 0814   Intake (mL) 100 mL 09/25/20 0856   Water Bolus (mL) 200 mL 09/25/20 0856   Rate Formula Tube Feeding (mL/hr) 50 mL/hr 09/22/20 2000   Formula Name Peptide 09/23/20 1935   Intake (mL) - Formula Tube Feeding 0 09/25/20 0856       Neurosurgery Physical Exam     [id number]  Eyes open, more interactive than yesterday  Not speaking  Moves all spontaneusly and purposeful, but not following commands    Significant Labs:  Recent Labs   Lab 09/24/20  0237 09/25/20  0356   GLU 91 95    142   K 4.0 3.9    104   CO2 32* 32*   BUN 35* 38*   CREATININE 1.4 1.5*   CALCIUM 10.2 10.2   MG 2.0 1.9     Recent Labs   Lab 09/24/20 0237 09/25/20  0356   WBC 9.57 9.58   HGB 8.2* 7.7*   HCT 27.2* 25.7*    182     No results for input(s): LABPT, INR, APTT in the last 48 hours.  Microbiology Results (last 7 days)     Procedure Component Value Units Date/Time    Gram stain [[id number]] Collected: 09/25/20 1230    Order Status: Sent Specimen: CSF (Spinal Fluid) from CSF Tap, Tube 1 Updated: 09/25/20 1301    CSF culture [[id number]] Collected: 09/25/20 1230    Order Status: Sent Specimen: CSF (Spinal Fluid) from CSF Tap, Tube 1 Updated: 09/25/20 1300    Blood culture [[id number]] Collected: 09/22/20 1522    Order Status: Completed Specimen: Blood from Peripheral, Left Hand Updated: 09/24/20 1612     Blood Culture, Routine No Growth to date      No Growth to date      No Growth to date    Narrative:      Collection has been rescheduled by BDW at 09/22/2020 14:57 Reason:   Patient unavailable  Collection has been rescheduled by BDW at 09/22/2020 14:57 Reason:   Patient unavailable    Blood culture [[id number]] Collected: 09/22/20 1522    Order Status: Completed Specimen: Blood from Antecubital, Left Arm Updated: 09/24/20 1612     Blood Culture, Routine No Growth to date      No Growth to date      No Growth to date    Narrative:      Collection has been rescheduled by BDW at 09/22/2020 14:57 Reason:   Patient  unavailable  Collection has been rescheduled by FUAD at 09/22/2020 14:57 Reason:   Patient unavailable    Blood culture [[id number]]     Order Status: Canceled Specimen: Blood     Blood culture [[id number]]     Order Status: Canceled Specimen: Blood     Blood culture [[id number]] Collected: 09/14/20 1840    Order Status: Completed Specimen: Blood from Peripheral, Foot, Right Updated: 09/19/20 2212     Blood Culture, Routine No growth after 5 days.    Blood culture [[id number]] Collected: 09/14/20 1835    Order Status: Completed Specimen: Blood from Peripheral, Foot, Left Updated: 09/19/20 2212     Blood Culture, Routine No growth after 5 days.        All pertinent labs from the last 24 hours have been reviewed.    Significant Diagnostics:  I have reviewed and interpreted all pertinent imaging results/findings within the past 24 hours.    Assessment/Plan:     Bacterial endocarditis  74M with known bacterial endocarditis with septic emboli c/b L PCA infarct, scattered punctate infarctions and R occipital lesion concerning for intracranial abscess. Reconsulted for change in mental status today and aphasia    CT head and MRI reviewed, appears grossly unchanged from previous    No acute neurosurgical management at this time  Agree with vascular neurology workup, likely due to septic emboli  EEG no seizures  -- Will follow LP results, if suggestion of uncontrolled infection, will discuss surgical mgt for source control        -q1h neurochecks in ICU, q2h neurochecks in stepdown, q4h neurochecks on floor  --SBP <160 (cardene ggt; hydralazine & labetalol PRN; transition to home meds when appropriate)  --Na >135  --VN following  --AED per NCC/Epilepsy  --Abx per ID, f/up BCx - 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   --NO  steroids  --HOB >30  --PPI given GIB per NCC  --Continue to monitor clinically, notify NSGY immediately with any changes in neuro status    Dispo: ICU               Idalia Haddad MD  Neurosurgery  [facility]

## 2020-09-25 NOTE — PROGRESS NOTES
"[facility]  Neurology  Progress Note    Patient Name: Lopez Savage  MRN: [id number]  Admission Date: 9/13/2020  Hospital Length of Stay: 12 days  Code Status: Full Code   Attending Provider: Isidoro Garcia MD  Primary Care Physician: Prasanna Haywood MD   Principal Problem:Status epilepticus    HPI:   75 y.o. 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 Per chart review, daughter found pt unresponsive on kitchen floor at 1 am and started chest compressions until EMS arrived. Upon arrival to ED, he was intubated for airway protection and sedated with propofol. Tele-stroke consult performed 9/8 and CT head unremarkable for early signs of infarct, hemorrhage and mass effect. tPA not given due to recent GI bleed. He was evaluated by Vascular Neurology 9/8 and MRI 9/8 read as " Left PCA distribution acute infarction. Focus of restricted diffusion within the right occipital lobe with adjacent vasogenic edema, concerning for underlying lesion or septic embolus." MRA head/neck with no evidence of occlusion or high grade stenosis. On 9/8 pm, he was witnessed to have rhythmic shaking R upper and lower extremities, eye twitching and biting down on ET tube. MRI brain repeated 9/11, 9/14 and 9/22 remarkable for "continued maturing multifocal ischemia, possibly septic embolic in etiology when correlating with prior imaging, predominantly involving bilateral posterior cerebral artery and left posterior inferior cerebellar artery territories. No new or progressive areas of ischemia or new intracranial hemorrhage. Stable small left frontal subdural hygroma without significant mass effect. Chronic parenchymal microhemorrhages and superficial siderosis again raise the possibility of " "cerebral amyloid angiopathy and/or chronic hypertensive arteriopathy." Extensive EEG monitoring this admission, most recently 9/13-9/17/20 captured some irregular jerking of R hand associated with L posterior discharges c/w EPC. Currently on Vimpat 200 mg BID. Neurology consulted 9/23 for "brain abscesses from septic emboli/new onset seizures and recent status and strokes. AMS concerning for either encephalitis/meningitis vs. ongoing seizures."       Subjective:     Interval History:   Patient seen at bedside, awakens to voice. Able to state his name, states that he is in the hospital. Follows appendicular commands and central commands. Fairly lethargic, states that he is "tired". Asked for an extra blanket.     Current Facility-Administered Medications   Medication Dose Route Frequency Provider Last Rate Last Dose    acetaminophen tablet 650 mg  650 mg Per NG tube Q6H PRN Pedro Desai MD   650 mg at 09/23/20 0635    albuterol-ipratropium 2.5 mg-0.5 mg/3 mL nebulizer solution 3 mL  3 mL Nebulization Q4H Angelique Prince NP   3 mL at 09/25/20 0910    atorvastatin tablet 40 mg  40 mg Per NG tube Daily Linda Natarajan MD   40 mg at 09/25/20 0852    cefTRIAXone (ROCEPHIN) 2 g/50 mL D5W IVPB  2 g Intravenous Q12H Isidoro Garcia MD   2 g at 09/25/20 0905    dextrose 50% injection 12.5 g  12.5 g Intravenous PRN Ambika Lee PA-C        enoxaparin injection 40 mg  40 mg Subcutaneous Q24H Isidoro Garcia MD   Stopped at 09/24/20 1700    fentaNYL injection 25 mcg  25 mcg Intravenous Q5 Min PRN Anel Garcia MD        glucagon (human recombinant) injection 1 mg  1 mg Intramuscular PRN Ambika Lee PA-C        insulin aspart U-100 pen 1-10 Units  1-10 Units Subcutaneous Q6H PRN Ambika Lee PA-C        labetalol 20 mg/4 mL (5 mg/mL) IV syring  10 mg Intravenous Q4H PRN Ron Molina MD   10 mg at 09/23/20 0616    lacosamide oral solution 200 mg  200 mg Per NG tube Q12H Ron CHI" MD Tracy   200 mg at 09/25/20 0853    losartan tablet 25 mg  25 mg Per NG tube Daily Angelique Prince NP   25 mg at 09/25/20 0852    miconazole nitrate 2% ointment   Topical (Top) BID Phyllis Sprague MD        ondansetron injection 4 mg  4 mg Intravenous Daily PRN Anel Garcia MD        pantoprazole suspension 40 mg  40 mg Per NG tube Daily Angelique Prince NP   40 mg at 09/25/20 0853    psyllium husk (aspartame) 3.4 gram packet 1 packet  1 packet Per NG tube Daily PRN Emma Owens NP   1 packet at 09/20/20 1143    silodosin capsule 8 mg  8 mg Per NG tube Daily Linda Natarajan MD   8 mg at 09/25/20 0853    sodium chloride 0.9% flush 10 mL  10 mL Intravenous PRN Ron Molina MD        sodium chloride 0.9% flush 10 mL  10 mL Intravenous Q6H Linda Natarajan MD   10 mL at 09/25/20 0534    And    sodium chloride 0.9% flush 10 mL  10 mL Intravenous PRN Linda Natarajan MD        sodium chloride 0.9% flush 10 mL  10 mL Intravenous PRN Anel Garcia MD        trazodone split tablet 25 mg  25 mg Per NG tube QHS Angelique Prince NP   25 mg at 09/24/20 2054    vancomycin - pharmacy to dose   Intravenous [facility] manage [facility]         Review of Systems   Unable to perform ROS: Mental status change   Constitutional: Positive for activity change and fatigue.   HENT: Positive for trouble swallowing and voice change.    Musculoskeletal: Negative for neck stiffness.   Neurological: Positive for speech difficulty.   Psychiatric/Behavioral: Positive for confusion, decreased concentration and sleep disturbance.     Objective:     Vital Signs (Most Recent):  Temp: 99.1 °F (37.3 °C) (09/25/20 1246)  Pulse: 93 (09/25/20 1330)  Resp: 20 (09/25/20 1330)  BP: (!) 97/56 (09/25/20 1400)  SpO2: 100 % (09/25/20 1330) Vital Signs (24h Range):  Temp:  [98.2 °F (36.8 °C)-99.1 °F (37.3 °C)] 99.1 °F (37.3 °C)  Pulse:  [84-94] 93  Resp:  [20-28] 20  SpO2:  [94 %-100 %] 100 %  BP:  ()/(53-67) 97/56     Weight: 114 kg (251 lb 5.2 oz)  Body mass index is 37.11 kg/m².      NEUROLOGICAL EXAMINATION:     MENTAL STATUS   Oriented to person.   Follows 1 step commands.   Attention: decreased. Concentration: decreased.   Level of consciousness: alert       Eyes open  Tracks to voice  Able to identify his wife at bedside, states his name, states he is in the hospital.   Sticks tongue out, able to give a thumbs up.      CRANIAL NERVES     CN III, IV, VI   Extraocular motions are normal.   Nystagmus: none   Ophthalmoparesis: none    CN VIII   Hearing: intact       Face symmetric at rest      MOTOR EXAM   Muscle bulk: normal  Overall muscle tone: increased       Blt wrist restraints in place  Moves all extremities against gravity spontaneously   No abnormal movements noted   Not following commands to further assess strength      GAIT AND COORDINATION     Gait  Gait: (deferred)     Coordination   Finger-nose-finger test: not following command.    Tremor   Resting tremor: absent    Significant Labs:   CBC:   Recent Labs   Lab 09/24/20 0237 09/25/20  0356   WBC 9.57 9.58   HGB 8.2* 7.7*   HCT 27.2* 25.7*    182     CMP:   Recent Labs   Lab 09/24/20 0237 09/25/20  0356   GLU 91 95    142   K 4.0 3.9    104   CO2 32* 32*   BUN 35* 38*   CREATININE 1.4 1.5*   CALCIUM 10.2 10.2   MG 2.0 1.9   PROT 6.6 6.5   ALBUMIN 2.4* 2.3*   BILITOT 0.2 0.2   ALKPHOS 51* 48*   AST 12 13   ALT 11 7*   ANIONGAP 8 6*   EGFRNONAA 49.1* 45.2*     Inflammatory Markers: No results for input(s): SEDRATE, CRP, PROCAL in the last 48 hours.  Urine Culture: No results for input(s): LABURIN in the last 48 hours.  Urine Studies: No results for input(s): COLORU, APPEARANCEUA, PHUR, SPECGRAV, PROTEINUA, GLUCUA, KETONESU, BILIRUBINUA, OCCULTUA, NITRITE, UROBILINOGEN, LEUKOCYTESUR, RBCUA, WBCUA, BACTERIA, SQUAMEPITHEL, HYALINECASTS in the last 48 hours.    Invalid input(s): WRIGHTSUR  All pertinent lab results from the  "past 24 hours have been reviewed.    Significant Imaging: I have reviewed and interpreted all pertinent imaging results/findings within the past 24 hours.     MRI Brain WO contrast (9/22/20):  1. Relative to 09/14/2020, continued maturing multifocal ischemia, possibly septic embolic in etiology when correlating with prior imaging, predominantly involving bilateral posterior cerebral artery and left posterior inferior cerebellar artery territories.  2. No new or progressive areas of ischemia or new intracranial hemorrhage.  3. Stable small left frontal subdural hygroma without significant mass effect.  4. Chronic parenchymal microhemorrhages and superficial siderosis again raise the possibility of cerebral amyloid angiopathy and/or chronic hypertensive arteriopathy.    Assessment and Plan:     Brain abscess  75 y.o. male with recent endocarditis with mitral and aortic valve vegetations and recent GI bleed presented to [facility] ED 9/8 via EMS after being found unresponsive at home. On arrival, he was intubated for airway protection. MRI brain and MRA head/neck 9/8 with Left PCA distribution acute infarction. Focus of restricted diffusion within the right occipital lobe with adjacent vasogenic edema, concerning for underlying lesion or septic embolus. MRA head/neck with no evidence of occlusion or high grade stenosis. On 9/8 pm, he was witnessed to have rhythmic shaking R upper and lower extremities, eye twitching and biting down on ET tube concerning for seizure activity. EEG 9/13-9/17 captured R hand jerking associated with L posterior discharges c/w EPC per Dr. Winslow. He was treated with Keppra 500 mg BID and Vimpat 200 mg BID. Keppra was discontinued on 9/15 per epilepsy team recs. MRI brain repeated x 3, most recently on 9/22 showing "continued maturing multifocal ischemia, possibly septic embolic in etiology, predominantly involving bilateral posterior cerebral artery and left posterior inferior cerebellar artery " "territories. No new or progressive areas of ischemia or new intracranial hemorrhage. Stable small left frontal subdural hygroma without significant mass effect. Chronic parenchymal microhemorrhages and superficial siderosis again raise the possibility of cerebral amyloid angiopathy and/or chronic hypertensive arteriopathy." Vascular Neuro felt given different distributions seen on imaging and recent endocarditis, cardioembolic etiology was felt most likely. Most recent MRI L PCA territory decreased attenuation more c/w seizure changes rather then embolic stroke and persistent R posterior abscess. General Neurology asked to weigh in on 9/23 to evaluate for encephalitis and seizure management.     9/24--more alert and interactive  Follows some simple commands  EEG overnight without epileptiform discharges, but diffuse slowing is present (L slower than R at times)     Recommendations:  --LP with IR - scheduled for 9/25   - Please obtain opening pressure   - Cell ct with diff, glu, protein, Cx, viral panel, freeze and hold   --continue vEEG till 24 hr is completed (Dr. Cinda samayoa)   - No electrographic seizures noted   --continue Vimpat 200 mg BID and seizure precautions          VTE Risk Mitigation (From admission, onward)         Ordered     enoxaparin injection 40 mg  Every 24 hours      09/23/20 1350     IP VTE HIGH RISK PATIENT  Once      09/13/20 1518     Place sequential compression device  Until discontinued      09/13/20 1119                Rosalva Farfan MD  Neurology  [facility]  "

## 2020-09-25 NOTE — SUBJECTIVE & OBJECTIVE
"  Subjective:     Interval History:   Patient seen at bedside, awakens to voice. Able to state his name, states that he is in the hospital. Follows appendicular commands and central commands. Fairly lethargic, states that he is "tired". Asked for an extra blanket.     Current Facility-Administered Medications   Medication Dose Route Frequency Provider Last Rate Last Dose    acetaminophen tablet 650 mg  650 mg Per NG tube Q6H PRN Pedro Desai MD   650 mg at 09/23/20 0635    albuterol-ipratropium 2.5 mg-0.5 mg/3 mL nebulizer solution 3 mL  3 mL Nebulization Q4H Angelique Prince, NP   3 mL at 09/25/20 0910    atorvastatin tablet 40 mg  40 mg Per NG tube Daily Linda Natarajan MD   40 mg at 09/25/20 0852    cefTRIAXone (ROCEPHIN) 2 g/50 mL D5W IVPB  2 g Intravenous Q12H Isidoro Garcia MD   2 g at 09/25/20 0905    dextrose 50% injection 12.5 g  12.5 g Intravenous PRN Ambika Lee PA-C        enoxaparin injection 40 mg  40 mg Subcutaneous Q24H Isidoro Garcia MD   Stopped at 09/24/20 1700    fentaNYL injection 25 mcg  25 mcg Intravenous Q5 Min PRN Anel Garcia MD        glucagon (human recombinant) injection 1 mg  1 mg Intramuscular PRN Ambika Lee PA-C        insulin aspart U-100 pen 1-10 Units  1-10 Units Subcutaneous Q6H PRN Ambika Lee PA-C        labetalol 20 mg/4 mL (5 mg/mL) IV syring  10 mg Intravenous Q4H PRN Ron Molina MD   10 mg at 09/23/20 0616    lacosamide oral solution 200 mg  200 mg Per NG tube Q12H Ron Molina MD   200 mg at 09/25/20 0853    losartan tablet 25 mg  25 mg Per NG tube Daily Angelique Prince, NP   25 mg at 09/25/20 0852    miconazole nitrate 2% ointment   Topical (Top) BID Phyllis Sprague MD        ondansetron injection 4 mg  4 mg Intravenous Daily PRN Anel Garcia MD        pantoprazole suspension 40 mg  40 mg Per NG tube Daily Angelique Prince, RUPERTO   40 mg at 09/25/20 0808    psyllium husk (aspartame) 3.4 gram packet " 1 packet  1 packet Per NG tube Daily PRN Emma Owens, NP   1 packet at 09/20/20 1143    silodosin capsule 8 mg  8 mg Per NG tube Daily Linda Natarajan MD   8 mg at 09/25/20 0853    sodium chloride 0.9% flush 10 mL  10 mL Intravenous PRN Ron Molina MD        sodium chloride 0.9% flush 10 mL  10 mL Intravenous Q6H Linda Natarajan MD   10 mL at 09/25/20 0534    And    sodium chloride 0.9% flush 10 mL  10 mL Intravenous PRN Linda Natarajan MD        sodium chloride 0.9% flush 10 mL  10 mL Intravenous PRN Anel Garcia MD        trazodone split tablet 25 mg  25 mg Per NG tube QHS Angelique Prince NP   25 mg at 09/24/20 2054    vancomycin - pharmacy to dose   Intravenous pharmacy to manage frequency William-Makayla Jean PA-C         Review of Systems   Unable to perform ROS: Mental status change   Constitutional: Positive for activity change and fatigue.   HENT: Positive for trouble swallowing and voice change.    Musculoskeletal: Negative for neck stiffness.   Neurological: Positive for speech difficulty.   Psychiatric/Behavioral: Positive for confusion, decreased concentration and sleep disturbance.     Objective:     Vital Signs (Most Recent):  Temp: 99.1 °F (37.3 °C) (09/25/20 1246)  Pulse: 93 (09/25/20 1330)  Resp: 20 (09/25/20 1330)  BP: (!) 97/56 (09/25/20 1400)  SpO2: 100 % (09/25/20 1330) Vital Signs (24h Range):  Temp:  [98.2 °F (36.8 °C)-99.1 °F (37.3 °C)] 99.1 °F (37.3 °C)  Pulse:  [84-94] 93  Resp:  [20-28] 20  SpO2:  [94 %-100 %] 100 %  BP: ()/(53-67) 97/56     Weight: 114 kg (251 lb 5.2 oz)  Body mass index is 37.11 kg/m².      NEUROLOGICAL EXAMINATION:     MENTAL STATUS   Oriented to person.   Follows 1 step commands.   Attention: decreased. Concentration: decreased.   Level of consciousness: alert       Eyes open  Tracks to voice  Able to identify his wife at bedside, states his name, states he is in the hospital.   Sticks tongue out, able to give a thumbs up.      CRANIAL  NERVES     CN III, IV, VI   Extraocular motions are normal.   Nystagmus: none   Ophthalmoparesis: none    CN VIII   Hearing: intact       Face symmetric at rest      MOTOR EXAM   Muscle bulk: normal  Overall muscle tone: increased       Blt wrist restraints in place  Moves all extremities against gravity spontaneously   No abnormal movements noted   Not following commands to further assess strength      GAIT AND COORDINATION     Gait  Gait: (deferred)     Coordination   Finger-nose-finger test: not following command.    Tremor   Resting tremor: absent    Significant Labs:   CBC:   Recent Labs   Lab 09/24/20 0237 09/25/20  0356   WBC 9.57 9.58   HGB 8.2* 7.7*   HCT 27.2* 25.7*    182     CMP:   Recent Labs   Lab 09/24/20 0237 09/25/20  0356   GLU 91 95    142   K 4.0 3.9    104   CO2 32* 32*   BUN 35* 38*   CREATININE 1.4 1.5*   CALCIUM 10.2 10.2   MG 2.0 1.9   PROT 6.6 6.5   ALBUMIN 2.4* 2.3*   BILITOT 0.2 0.2   ALKPHOS 51* 48*   AST 12 13   ALT 11 7*   ANIONGAP 8 6*   EGFRNONAA 49.1* 45.2*     Inflammatory Markers: No results for input(s): SEDRATE, CRP, PROCAL in the last 48 hours.  Urine Culture: No results for input(s): LABURIN in the last 48 hours.  Urine Studies: No results for input(s): COLORU, APPEARANCEUA, PHUR, SPECGRAV, PROTEINUA, GLUCUA, KETONESU, BILIRUBINUA, OCCULTUA, NITRITE, UROBILINOGEN, LEUKOCYTESUR, RBCUA, WBCUA, BACTERIA, SQUAMEPITHEL, HYALINECASTS in the last 48 hours.    Invalid input(s): WRIGHTSUR  All pertinent lab results from the past 24 hours have been reviewed.    Significant Imaging: I have reviewed and interpreted all pertinent imaging results/findings within the past 24 hours.     MRI Brain WO contrast (9/22/20):  1. Relative to 09/14/2020, continued maturing multifocal ischemia, possibly septic embolic in etiology when correlating with prior imaging, predominantly involving bilateral posterior cerebral artery and left posterior inferior cerebellar artery  territories.  2. No new or progressive areas of ischemia or new intracranial hemorrhage.  3. Stable small left frontal subdural hygroma without significant mass effect.  4. Chronic parenchymal microhemorrhages and superficial siderosis again raise the possibility of cerebral amyloid angiopathy and/or chronic hypertensive arteriopathy.

## 2020-09-25 NOTE — PROGRESS NOTES
Progress Note  Hospital Medicine  [facility] [place]       Patient Name: Lopez Savage  MRN:  [id number]  Hospital Medicine Team: [facility] MED X Isidoro Garcia MD  Date of Admission:  9/13/2020     Length of Stay:  LOS: 11 days   Expected Discharge Date: 9/25/2020  Principal Problem:  Status epilepticus     Subjective:     Interval History/Overnight Events:      - patient more alert today and responding to more commands; did well with SLP  - plan is to have LP done in [place] tomorrow and anesthesia has been added for the case; stop tube feeds at midnight    Family updated at bedside   - will need rehab after      albuterol-ipratropium  3 mL Nebulization Q4H    atorvastatin  40 mg Per NG tube Daily    cefTRIAXone (ROCEPHIN) IVPB  2 g Intravenous Q12H    ciprofloxacin HCl  750 mg Per NG tube Q12H    enoxaparin  40 mg Subcutaneous Q24H    lacosamide  200 mg Per NG tube Q12H    losartan  25 mg Per NG tube Daily    miconazole nitrate 2%   Topical (Top) BID    pantoprazole  40 mg Per NG tube Daily    silodosin  8 mg Per NG tube Daily    sodium chloride 0.9%  10 mL Intravenous Q6H    traZODone  25 mg Per NG tube QHS    vancomycin (VANCOCIN) IVPB  1,250 mg Intravenous Q48H           acetaminophen, dextrose 50%, glucagon (human recombinant), insulin aspart U-100, labetalol, psyllium husk (aspartame), sodium chloride 0.9%, Flushing PICC Protocol **AND** sodium chloride 0.9% **AND** sodium chloride 0.9%, Pharmacy to dose Vancomycin consult **AND** vancomycin - pharmacy to dose    Review of Systems   Unable to obtain 2/2 AMS    Objective:     Temp:  [97.8 °F (36.6 °C)-98.8 °F (37.1 °C)]   Pulse:  [84-96]   Resp:  [20-36]   BP: (119-146)/(58-66)   SpO2:  [95 %-100 %]         Weight change:    Body mass index is 37.11 kg/m².       Physical Exam:  Constitutional: lethargic , chronically ill looking,   HENT: NC/AT, external ears normal  Eyes: PERRL, EOMI, conjunctiva normal,  Neck: normal ROM,  supple  CV: RRR, no m/r/g, no carotid bruits, +2 peripheral pulses.  Pulmonary/Chest wall: Breathing comfortably w/o distress,  Decreased breath sounds at lung bases  GI: Soft, non-tender, (+) BS, (+) BM  Musculoskeletal: Normal ROM, no atrophy,   Neurological   awake but not following commands   Moves extremities spontaneously   Skin: warm, dry   +pallor  Psych awake but not following commands     Labs:    Chemistries:   Recent Labs   Lab 09/21/20  0300 09/22/20  1522 09/23/20  0622 09/24/20  0237    140 140 143   K 3.9 3.6 3.3* 4.0    102 102 103   CO2 30* 32* 30* 32*   BUN 26* 33* 31* 35*   CREATININE 1.2 1.3 1.3 1.4   CALCIUM 10.0 10.1 10.4 10.2   PROT 6.5 6.6 6.9 6.6   BILITOT 0.2 0.2 0.2 0.2   ALKPHOS 52* 50* 53* 51*   ALT 8* 10 10 11   AST 12 15 14 12   MG 2.3  --  2.0  2.0 2.0   PHOS 4.9*  --  3.8  3.8 4.8*        WBC:   Recent Labs   Lab 09/20/20 0300 09/21/20 0300 09/22/20  1522 09/23/20  0622 09/24/20  0237   WBC 9.54 8.44 8.43 10.11 9.57     Bands:     CBC/Anemia Labs: Coags:    Recent Labs   Lab 09/22/20 1522 09/23/20  0622 09/24/20  0237   WBC 8.43 10.11 9.57   HGB 8.0* 8.5* 8.2*   HCT 27.2* 27.8* 27.2*    209 196   MCV 92 92 94   RDW 15.2* 15.3* 15.5*    No results for input(s): PT, INR, APTT in the last 168 hours.     Diagnostic Results:    MRI brain 9/14/20    Numerous scattered punctate foci of cortical enhancement throughout both cerebral hemispheres.  Additional rim enhancing collection in the right occipital lobe with central diffusion restriction and surrounding vasogenic edema.  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     Persistent gyriform diffusion restriction the posterior left cerebral hemisphere as well as the hippocampus and dorsal lateral thalamus, although slightly improved from prior studies.  The distribution be typical for vascular territory infarct, most suggestive of seizure related signal changes (status  epilepticus).  Other encephalitis to be excluded clinically.     New small left frontal subdural hygroma without significant intracranial mass effect.     Stable small subacute left cerebellar infarct.     Mild chronic microvascular ischemic disease with remote right frontal and right occipital infarcts.     Numerous regions of prior parenchymal and leptomeningeal hemorrhage involving both cerebral hemispheres appearance raising the possibility underlying cerebral amyloid angiopathy.  No new hemorrhage.    CT head 9/16    FINDINGS:  There is stable cerebral atrophy and chronic small vessel ischemic changes.  There is an area of rounded hypodensity in the right occipital lobe, which corresponds to an overall finding of abscess formation and vasogenic edema in a patient with known endocarditis is noted on the MRI of the brain from 09/14/2020.  There is a remote right frontal infarct.  There are also areas of hypodensity seen in the left cerebellar hemisphere and left occipital lobe, which may be related to age indeterminate infarcts.  There is no acute intracranial hemorrhage, territorial infarct or mass effect, or midline shift..    Assessment and Plan     Hospital Course:    Mr. Lopez Savage was admitted to Hospital Medicine for management of  Status epilepticus     Active Hospital Problems    Diagnosis  POA    *Status epilepticus [G40.901]  Yes    Palliative care encounter [Z51.5]  Not Applicable    Goals of care, counseling/discussion [Z71.89]  Not Applicable    Advance care planning [Z71.89]  Not Applicable    Debility [R53.81]  Yes    Oral phase dysphagia [R13.11]  Yes    Seizure [R56.9]  Yes    Intracranial septic embolism [I76, I66.9]  Yes    Brain abscess [G06.0]  Yes    Cerebellar stroke [I63.9]  Yes    Cytotoxic brain edema [G93.6]  Yes    Encephalopathy acute [G93.40]  Yes    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         Encephalopathy acute  - likely multifactorial - 2/2 stroke , brain abscess, cytotoxic edema. hosp acquired delirium   - cont PT OT   - SLP- currently with NG tube   - Stroke team believe repeat MRI is stable   General neurology consulted. Recommended LP and EEG.  Attempted LP at bedside but failed as pt unable to tolerate. Needs LP with flouro with radiology with  Anesthesia- ordered placed     Worsening CXR.  abx adjusted  And CTX changed to cefepime to cover for pseudomonas PNA.  EEG ordered and pending       palliative care team consulted     Status epilepticus  - Multiple witnessed seizure-like activity described  - No previous reports from family  - Was started on keppra and vimpat 200 mg BID  - EEG at OSH shows irregular slowing in the R frontal region, with L posterior periodic epileptiform discharges. No seizures recorded during this study.  - [facility] continues to show periodic discharges in the left posterior region with no seizures.  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 If worsening mentation or without improvement recommend reimaging and if need for surgical washout for source control. Please send fluid for cultures (gram stain, aerobic, anaerobic, fungal, AFB) if undergoes drainage.     -ID recs:  Continue IV Vanc and cetriaxone for 6-8 weeks, end date 10/27-11/10 with repeat MRI post treatment  Continue  "ceftriaxone 2gms IV q12hrs. Anticipate 6-8 weeks of IV vancomycin and ceftriaxone with repeat imaging to assess resolution of brain abscess.     Cerebellar stroke/ Embolic stroke involving left posterior cerebral artery  On 9/8 pt presented to [place] ED with unresponsiveness  No tPA due to recent GI bleed  MRI with left PCA infarct and left thalamic infarct along with small right PCA infarct  MRA demonstrates fetal circulation to left PCA  9/11 extubated  . Repeat MRI new acute/subacute L cerebellum infarct     -Neuro checks  -Vascular neurology was consulted - and recommended medical management - ASA , statin , PT OT SLP, BP control     Cytotoxic brain edema  Per prior documentation - "Area of cytotoxic cerebral edema identified when reviewing brain imaging in the territory of the left posterior cerebral artery, L thalamus, right posterior cerebral artery. There is no mass effect associated with it. We will continue to monitor the patients clinical exam for any worsening of symptoms which may indicate expansion of the stroke or the area of the edema resulting in the clinical change. The pattern is suggestive of embolic etiology"    Oral phase dysphagia  NGT in place, continuous tube feedings for nutrition  Cont enteral water flushes 200 ml q6hrs  SLP following, will likely need PEG  Encephalopathy limiting SLP eval  Needs PEG tube placement     Chronic bronchitis  Duonebs scheduled q4hrs  Monitor respiratory status, at risk for atelectasis  Continuous pulse oximetry, ABG prn  Continue CPT, pulmonary toileting    Diet:  NPO - tube feeds  GI PPx:   DVT PPx:  lovenox   Goals of Care:  Full     High Risk Conditions:  IE, brain abscess, seizures     Disposition:    TBD    Likely LTAC ultimately       "

## 2020-09-25 NOTE — PLAN OF CARE
CSF findings reviewed.    WBC 21  RBC 6  Prot 31  Gluc 56    Discussed w/ staff and Dr. Garcia. Would recommend ID consult to get their input.   Consider addition of Ampicillin at this time at appropriate dose for CNS penetration.   Orders placed for RPR, VDRL.     Rosalva Farfan MD  PGY- IV  General Neurology

## 2020-09-25 NOTE — PROCEDURES
EXTENDED  ELECTROENCEPHALOGRAM  REPORT    DATE OF SERVICE:  09/24/2020  EEG NUMBER: [id number]  REQUESTED BY:  Dr. scott  LOCATION OF SERVICE:  [id number]  METHODOLOGY   Electroencephalographic (EEG) recording is with electrodes placed according to the International 10-20 placement system.  Thirty two (32) channels of digital signal (sampling rate of 512/sec) including T1 and T2 was simultaneously recorded from the scalp and may include  EKG, EMG, and/or eye monitors.  Recording band pass was 0.1 to 512 hz.  Digital video recording of the patient is simultaneously recorded with the EEG.  The patient is instructed report clinical symptoms which may occur during the recording session.  EEG and video recording is stored and archived in digital format.  Activation procedures which include photic stimulation, hyperventilation and instructing patients to perform simple task are done in selected patients.   The EEG is displayed on a monitor screen and can be reviewed using different montages.  Computer assisted analysis is employed to detect spike and electrographic seizure activity.   The entire record is submitted for computer analysis.  The entire recording is visually reviewed and the times identified by computer analysis as being spikes or seizures are reviewed again.  Compresses spectral analysis (CSA) is also performed on the activity recorded from each individual channel.  This is displayed as a power display of frequencies from 0 to 30 Hz over time.   The CSA is reviewed looking for asymmetries in power between homologous areas of the scalp and then compared with the original EEG recording.     [employer] software was also utilized in the review of this study.  This software suite analyzes the EEG recording in multiple domains.  Coherence and rhythmicity is computed to identify EEG sections which may contain organized seizures.  Each channel undergoes analysis to detect presence of spike and sharp waves which have  special and morphological characteristic of epileptic activity.  The routine EEG recording is converted from spacial into frequency domain.  This is then displayed comparing homologous areas to identify areas of significant asymmetry.  Algorithm to identify non-cortically generated artifact is used to separate eye movement, EMG and other artifact from the EEG.      RECORDING TIMES  Start on 2020/09/24 at 7:00 a.m.  Stop on 2020/09/25 at 7:00 a.m.    A total of 24 hours of EEG recording was obtained.    EEG FINGINGS  Recording was obtained at the patient's bedside in the ICU.  Patient was resting quietly in bed and moving around spontaneously in interacting with family members times.  Awake state  The background consists of an irregular 7-8 hertz theta frequency noted diffusely.  Slower theta and at times 1-2 hertz delta was intermixed.  At times the background was symmetrical however during most of the recording lateralized features were noted.  Polymorphic theta and delta was noted predominantly over the right hemisphere without anterior-posterior gradient.  However no spike or sharp wave component was noted.   Sleep state  The background during sleep consists of mixture of theta and some delta components intermixed.  In general there is low more slowing noted over the right hemisphere the asymmetry was not as marked is during waking state.  No spike or sharp wave activity was noted nor no rhythmic buildup indicate the occurrence of his subclinical or nonconvulsive seizure process.  Normal sleep architecture was not recorded.    Activation procedures:   Photic Stimulation - Not performed  Hyperventilation - Not performed  Somatosensory stimulation - not performed    Cognitive testing:   Not performed    Cardiac Monitor:   Single lead EKG was obtained and showed a regular cardiac rhythm with an occasional PVC and a cardiac rate of approximately       IMPRESSION:  Markedly abnormal EEG  The background is very  disorganized slow with indicating presence of a diffuse cortical dysfunction and encephalopathy.  There is significant asymmetry with slower frequencies noted over the right hemisphere indicating a lateralized probably structural process involving hemisphere.  There is no evidence present recordings of an irritative process.

## 2020-09-25 NOTE — H&P
Inpatient Radiology Pre-procedure Note    History of Present Illness:  Lopez Savage is a 75 y.o. male who presents for lumbar puncture.    Admission H&P reviewed.  No past medical history on file.  No past surgical history on file.    Review of Systems:   As documented in primary team H&P    Home Meds:   Prior to Admission medications    Medication Sig Start Date End Date Taking? Authorizing Provider   atorvastatin (LIPITOR) 40 MG tablet 1 tablet (40 mg total) by Per G Tube route once daily. 9/13/20 9/13/21  Huy Hudson MD   enoxaparin (LOVENOX) 40 mg/0.4 mL Syrg Inject 0.4 mLs (40 mg total) into the skin once daily. 9/12/20 10/12/20  Huy Hudson MD   insulin aspart U-100 (NOVOLOG) 100 unit/mL (3 mL) InPn pen Inject 0-5 Units into the skin every 6 (six) hours as needed (Hyperglycemia). 9/12/20 10/12/20  Huy Hudson MD   lacosamide (VIMPAT) 10 mg/mL Soln oral solution 10 mLs (100 mg total) by Per NG tube route every 12 (twelve) hours. 9/13/20 9/13/21  Huy Hudson MD   levETIRAcetam in NaCl, iso-os, (KEPPRA) 500 mg/100 mL PgBk Inject 100 mLs (500 mg total) into the vein every 12 (twelve) hours. 9/12/20 10/12/20  Huy Hudson MD   losartan (COZAAR) 50 MG tablet Take 1 tablet (50 mg total) by mouth once daily. 9/14/20 9/14/21  Huy Hudson MD     Scheduled Meds:    albuterol-ipratropium  3 mL Nebulization Q4H    atorvastatin  40 mg Per NG tube Daily    cefTRIAXone (ROCEPHIN) IVPB  2 g Intravenous Q12H    ciprofloxacin HCl  750 mg Per NG tube Q12H    enoxaparin  40 mg Subcutaneous Q24H    lacosamide  200 mg Per NG tube Q12H    losartan  25 mg Per NG tube Daily    miconazole nitrate 2%   Topical (Top) BID    pantoprazole  40 mg Per NG tube Daily    silodosin  8 mg Per NG tube Daily    sodium chloride 0.9%  10 mL Intravenous Q6H    traZODone  25 mg Per NG tube QHS    vancomycin (VANCOCIN) IVPB  1,250 mg Intravenous Q48H     Continuous Infusions:   PRN Meds:acetaminophen,  dextrose 50%, glucagon (human recombinant), insulin aspart U-100, labetalol, psyllium husk (aspartame), sodium chloride 0.9%, Flushing PICC Protocol **AND** sodium chloride 0.9% **AND** sodium chloride 0.9%, Pharmacy to dose Vancomycin consult **AND** vancomycin - pharmacy to dose  Anticoagulants/Antiplatelets: Lovenox, held last dose    Allergies:   Review of patient's allergies indicates:   Allergen Reactions    Codeine     Tetanus vaccines and toxoid      Sedation Hx: have not been any systemic reactions    Labs:  No results for input(s): INR in the last 168 hours.    Invalid input(s):  PT,  PTT    Recent Labs   Lab 09/25/20  0356   WBC 9.58   HGB 7.7*   HCT 25.7*   MCV 93         Recent Labs   Lab 09/25/20  0356   GLU 95      K 3.9      CO2 32*   BUN 38*   CREATININE 1.5*   CALCIUM 10.2   MG 1.9   ALT 7*   AST 13   ALBUMIN 2.3*   BILITOT 0.2         Vitals:  Temp: 98.5 °F (36.9 °C) (09/25/20 0743)  Pulse: 88 (09/25/20 0910)  Resp: (!) 22 (09/25/20 0910)  BP: (!) 150/67 (09/25/20 0743)  SpO2: 95 % (09/25/20 0910)     Physical Exam:  ASA: per anesthesia  Mallampati: per anesthesia    General: no acute distress  Mental Status: obstunded  HEENT: normocephalic, atraumatic  Chest: unlabored breathing  Heart: regular heart rate  Abdomen: nondistended      Plan: lumbar puncture  Sedation Plan: per anesthesia    Roosevelt Steele M.D.  PGY-III Radiology  Pager: [phone]

## 2020-09-25 NOTE — SUBJECTIVE & OBJECTIVE
Patient History           Medical as of 9/25/2020    Past Medical History: Patient provided no pertinent medical history.           Surgical as of 9/25/2020    Past Surgical History: Patient provided no pertinent surgical history.           Family as of 9/25/2020    None           Tobacco Use as of 9/25/2020     Smoking Status Smoking Start Date Smoking Quit Date Packs/Day Years Used    Never Assessed -- -- -- --    Types Comments Smokeless Tobacco Status Smokeless Tobacco Quit Date Source    -- -- Unknown -- --            Alcohol Use as of 9/25/2020    None           Drug Use as of 9/25/2020    None           Sexual Activity as of 9/25/2020    None           Activities of Daily Living as of 9/25/2020    None           Social Documentation as of 9/25/2020    None           Occupational as of 9/25/2020    None           Socioeconomic as of 9/25/2020     Marital Status Spouse Name Number of Children Years Education Education Level Preferred Language Ethnicity Race Source     -- -- -- -- English /White White --    Financial Resource Strain Food Insecurity: Worry Food Insecurity: Inability Transportation Needs: Medical Transportation Needs: Non-medical    -- -- -- -- --            Pertinent History     Question Response Comments    Lives with -- --    Place in Birth Order -- --    Lives in -- --    Number of Siblings -- --    Raised by -- --    Legal Involvement -- --    Childhood Trauma -- --    Criminal History of -- --    Financial Status -- --    Highest Level of Education -- --    Does patient have access to a firearm? -- --     Service -- --    Primary Leisure Activity -- --    Spirituality -- --        History reviewed. No pertinent past medical history.  History reviewed. No pertinent surgical history.  Family History     None        Tobacco Use    Smoking status: Not on file   Substance and Sexual Activity    Alcohol use: Not on file    Drug use: Not on file    Sexual activity:  "Not on file     Review of patient's allergies indicates:   Allergen Reactions    Codeine     Tetanus vaccines and toxoid        No current facility-administered medications on file prior to encounter.      Current Outpatient Medications on File Prior to Encounter   Medication Sig    atorvastatin (LIPITOR) 40 MG tablet 1 tablet (40 mg total) by Per G Tube route once daily.    enoxaparin (LOVENOX) 40 mg/0.4 mL Syrg Inject 0.4 mLs (40 mg total) into the skin once daily.    insulin aspart U-100 (NOVOLOG) 100 unit/mL (3 mL) InPn pen Inject 0-5 Units into the skin every 6 (six) hours as needed (Hyperglycemia).    lacosamide (VIMPAT) 10 mg/mL Soln oral solution 10 mLs (100 mg total) by Per NG tube route every 12 (twelve) hours.    levETIRAcetam in NaCl, iso-os, (KEPPRA) 500 mg/100 mL PgBk Inject 100 mLs (500 mg total) into the vein every 12 (twelve) hours.    losartan (COZAAR) 50 MG tablet Take 1 tablet (50 mg total) by mouth once daily.     Psychotherapeutics (From admission, onward)    Start     Stop Route Frequency Ordered    09/17/20 2100  trazodone split tablet 25 mg      -- PER NG TUBE Nightly 09/17/20 1509        Review of Systems   Unable to perform ROS: Mental status change     Strengths and Liabilities: Strength: Patient has positive support network., Liability: Patient has poor health., Liability: Patient is unstable.    Objective:     Vital Signs (Most Recent):  Temp: 99.1 °F (37.3 °C) (09/25/20 1246)  Pulse: 90 (09/25/20 1712)  Resp: 18 (09/25/20 1712)  BP: (!) 102/55 (09/25/20 1509)  SpO2: (!) 20 % (09/25/20 1712) Vital Signs (24h Range):  Temp:  [98.2 °F (36.8 °C)-99.1 °F (37.3 °C)] 99.1 °F (37.3 °C)  Pulse:  [86-94] 90  Resp:  [18-28] 18  SpO2:  [20 %-100 %] 20 %  BP: ()/(53-67) 102/55     Height: 5' 9" (175.3 cm)  Weight: 114 kg (251 lb 5.2 oz)  Body mass index is 37.11 kg/m².      Intake/Output Summary (Last 24 hours) at 9/25/2020 7524  Last data filed at 9/25/2020 1512  Gross per 24 hour " "  Intake 700 ml   Output 200 ml   Net 500 ml       Physical Exam      Physical Exam:  General appearance: NAD  Head: NCAT  Lungs: no increased work of breath, on RA  Heart: +2 radial pulses b/l  Abdomen: soft, NT/ND  Extremities: extremities normal, soft restraint to L wrist  Skin: warm, dry, pallor    Mental Status Exam:  Appearance: unremarkable, lying in bed  Behavior/Cooperation: eye contact minimal   Sensorium: somnolent, lethargic  Speech: mumbled  Mood: SHANNA  Affect: SHANNA  Thought Process: SHANNA  Thought Content: SHANNA   Orientation: SHANNA  Memory: SHANNA  Attention Span/Concentration: SHANNA  Insight: SHANNA  Judgment: SHANNA    CAM-ICU:  1. Acute change and/or fluctuating course of mental status: Yes  2. Inattention (SAVEAHAART): Yes  · "Squeeze my hand, only when you hear, the letter 'A'."  3. Altered Level of Consciousness: Yes  4. Disorganized Thinking (Errors >1/6): N/A  · "Will a stone float on water?"  · "Are there fish in the sea?"  · "Does one pound weigh more than two?"  · "Can you use a hammer to pound a nail?"  · Command(s):  · "Hold up 2 fingers."  · "Now do the same thing with the other hand."    Score: 1+2 AND, either 3 or 4 present = Positive CAM-ICU      Significant Labs:   Recent Results (from the past 48 hour(s))   Phosphorus    Collection Time: 09/24/20  2:37 AM   Result Value Ref Range    Phosphorus 4.8 (H) 2.7 - 4.5 mg/dL   Magnesium    Collection Time: 09/24/20  2:37 AM   Result Value Ref Range    Magnesium 2.0 1.6 - 2.6 mg/dL   CBC auto differential    Collection Time: 09/24/20  2:37 AM   Result Value Ref Range    WBC 9.57 3.90 - 12.70 K/uL    RBC 2.89 (L) 4.60 - 6.20 M/uL    Hemoglobin 8.2 (L) 14.0 - 18.0 g/dL    Hematocrit 27.2 (L) 40.0 - 54.0 %    Mean Corpuscular Volume 94 82 - 98 fL    Mean Corpuscular Hemoglobin 28.4 27.0 - 31.0 pg    Mean Corpuscular Hemoglobin Conc 30.1 (L) 32.0 - 36.0 g/dL    RDW 15.5 (H) 11.5 - 14.5 %    Platelets 196 150 - 350 K/uL    MPV 10.9 9.2 - 12.9 fL    Immature " Granulocytes 0.6 (H) 0.0 - 0.5 %    Gran # (ANC) 7.1 1.8 - 7.7 K/uL    Immature Grans (Abs) 0.06 (H) 0.00 - 0.04 K/uL    Lymph # 0.9 (L) 1.0 - 4.8 K/uL    Mono # 1.0 0.3 - 1.0 K/uL    Eos # 0.4 0.0 - 0.5 K/uL    Baso # 0.07 0.00 - 0.20 K/uL    nRBC 0 0 /100 WBC    Gran% 74.5 (H) 38.0 - 73.0 %    Lymph% 9.1 (L) 18.0 - 48.0 %    Mono% 10.6 4.0 - 15.0 %    Eosinophil% 4.5 0.0 - 8.0 %    Basophil% 0.7 0.0 - 1.9 %    Differential Method Automated    Comprehensive metabolic panel    Collection Time: 09/24/20  2:37 AM   Result Value Ref Range    Sodium 143 136 - 145 mmol/L    Potassium 4.0 3.5 - 5.1 mmol/L    Chloride 103 95 - 110 mmol/L    CO2 32 (H) 23 - 29 mmol/L    Glucose 91 70 - 110 mg/dL    BUN, Bld 35 (H) 8 - 23 mg/dL    Creatinine 1.4 0.5 - 1.4 mg/dL    Calcium 10.2 8.7 - 10.5 mg/dL    Total Protein 6.6 6.0 - 8.4 g/dL    Albumin 2.4 (L) 3.5 - 5.2 g/dL    Total Bilirubin 0.2 0.1 - 1.0 mg/dL    Alkaline Phosphatase 51 (L) 55 - 135 U/L    AST 12 10 - 40 U/L    ALT 11 10 - 44 U/L    Anion Gap 8 8 - 16 mmol/L    eGFR if African American 56.8 (A) >60 mL/min/1.73 m^2    eGFR if non  49.1 (A) >60 mL/min/1.73 m^2   POCT glucose    Collection Time: 09/24/20  7:22 AM   Result Value Ref Range    POCT Glucose 107 70 - 110 mg/dL   POCT glucose    Collection Time: 09/24/20 11:25 AM   Result Value Ref Range    POCT Glucose 106 70 - 110 mg/dL   COVID-19 Rapid Screening    Collection Time: 09/24/20  3:15 PM   Result Value Ref Range    SARS-CoV-2 RNA, Amplification, Qual Negative Negative   POCT glucose    Collection Time: 09/24/20  4:06 PM   Result Value Ref Range    POCT Glucose 110 70 - 110 mg/dL   Phosphorus    Collection Time: 09/25/20  3:56 AM   Result Value Ref Range    Phosphorus 4.5 2.7 - 4.5 mg/dL   Magnesium    Collection Time: 09/25/20  3:56 AM   Result Value Ref Range    Magnesium 1.9 1.6 - 2.6 mg/dL   CBC auto differential    Collection Time: 09/25/20  3:56 AM   Result Value Ref Range    WBC 9.58 3.90 -  12.70 K/uL    RBC 2.77 (L) 4.60 - 6.20 M/uL    Hemoglobin 7.7 (L) 14.0 - 18.0 g/dL    Hematocrit 25.7 (L) 40.0 - 54.0 %    Mean Corpuscular Volume 93 82 - 98 fL    Mean Corpuscular Hemoglobin 27.8 27.0 - 31.0 pg    Mean Corpuscular Hemoglobin Conc 30.0 (L) 32.0 - 36.0 g/dL    RDW 15.7 (H) 11.5 - 14.5 %    Platelets 182 150 - 350 K/uL    MPV 11.0 9.2 - 12.9 fL    Immature Granulocytes 0.4 0.0 - 0.5 %    Gran # (ANC) 7.1 1.8 - 7.7 K/uL    Immature Grans (Abs) 0.04 0.00 - 0.04 K/uL    Lymph # 0.9 (L) 1.0 - 4.8 K/uL    Mono # 1.1 (H) 0.3 - 1.0 K/uL    Eos # 0.4 0.0 - 0.5 K/uL    Baso # 0.04 0.00 - 0.20 K/uL    nRBC 0 0 /100 WBC    Gran% 74.1 (H) 38.0 - 73.0 %    Lymph% 9.8 (L) 18.0 - 48.0 %    Mono% 11.3 4.0 - 15.0 %    Eosinophil% 4.0 0.0 - 8.0 %    Basophil% 0.4 0.0 - 1.9 %    Differential Method Automated    Comprehensive metabolic panel    Collection Time: 09/25/20  3:56 AM   Result Value Ref Range    Sodium 142 136 - 145 mmol/L    Potassium 3.9 3.5 - 5.1 mmol/L    Chloride 104 95 - 110 mmol/L    CO2 32 (H) 23 - 29 mmol/L    Glucose 95 70 - 110 mg/dL    BUN, Bld 38 (H) 8 - 23 mg/dL    Creatinine 1.5 (H) 0.5 - 1.4 mg/dL    Calcium 10.2 8.7 - 10.5 mg/dL    Total Protein 6.5 6.0 - 8.4 g/dL    Albumin 2.3 (L) 3.5 - 5.2 g/dL    Total Bilirubin 0.2 0.1 - 1.0 mg/dL    Alkaline Phosphatase 48 (L) 55 - 135 U/L    AST 13 10 - 40 U/L    ALT 7 (L) 10 - 44 U/L    Anion Gap 6 (L) 8 - 16 mmol/L    eGFR if African American 52.3 (A) >60 mL/min/1.73 m^2    eGFR if non  45.2 (A) >60 mL/min/1.73 m^2   Vancomycin, random    Collection Time: 09/25/20  3:56 AM   Result Value Ref Range    Vancomycin, Random 17.3 Not established ug/mL   POCT glucose    Collection Time: 09/25/20  7:42 AM   Result Value Ref Range    POCT Glucose 92 70 - 110 mg/dL   Glucose, CSF    Collection Time: 09/25/20 12:30 PM   Result Value Ref Range    Glucose, CSF 56 40 - 70 mg/dL   Protein, CSF    Collection Time: 09/25/20 12:30 PM   Result Value Ref  Range    Protein, CSF 31 15 - 40 mg/dL   CSF culture    Collection Time: 09/25/20 12:30 PM    Specimen: CSF Tap, Tube 1; CSF (Spinal Fluid)   Result Value Ref Range    Gram Stain Result No WBC's     Gram Stain Result No organisms seen    CSF cell count with differential    Collection Time: 09/25/20 12:30 PM   Result Value Ref Range    Heme Aliquot 0.5 mL    Appearance, CSF Clear Clear    Color, CSF Colorless Colorless    WBC, CSF 24 (H) 0 - 5 /cu mm    RBC, CSF 21 (A) 0 /cu mm    Segmented Neutrophils, CSF 2 0 - 6 %    Lymphs, CSF 71 40 - 80 %    Mono/Macrophage, CSF 27 15 - 45 %   CSF cell count with differential    Collection Time: 09/25/20 12:30 PM   Result Value Ref Range    Heme Aliquot 0.5 mL    Appearance, CSF Clear Clear    Color, CSF Colorless Colorless    WBC, CSF 21 (H) 0 - 5 /cu mm    RBC, CSF 6 (A) 0 /cu mm    Lymphs, CSF 71 40 - 80 %    Mono/Macrophage, CSF 27 15 - 45 %    Eosinophils, CSF 1 (A) %    Baso, CSF 1 (A) %      No results found for: PHENYTOIN, PHENOBARB, VALPROATE, CBMZ      Significant Imaging:   Results for orders placed or performed during the hospital encounter of 09/08/20   EKG 12-lead    Collection Time: 09/12/20  4:48 PM    Narrative    Test Reason : R94.31,    Vent. Rate : 085 BPM     Atrial Rate : 085 BPM     P-R Int : 198 ms          QRS Dur : 088 ms      QT Int : 364 ms       P-R-T Axes : 044 -29 023 degrees     QTc Int : 433 ms    Sinus rhythm with occasional Premature ventricular complexes  Anterior infarct (cited on or before 08-SEP-2020)  Abnormal ECG  When compared with ECG of 08-SEP-2020 10:55,  Premature ventricular complexes are now Present  QRS duration has decreased  Questionable change in initial forces of Anterior leads  ST now depressed in Inferior leads  QT has shortened  Confirmed by Arnulfo BALDERAS MD, Mohan ANTON ([id number]) on 9/14/2020 9:03:08 AM    Referred By: [email or web address]   SELF           Confirmed By:Mohan Arredondo III, MD

## 2020-09-25 NOTE — ASSESSMENT & PLAN NOTE
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   --NO steroids  --HOB >30  --PPI given GIB per NCC  --Continue to monitor clinically, notify NSGY immediately with any changes in neuro status    Dispo: ICU

## 2020-09-25 NOTE — SUBJECTIVE & OBJECTIVE
Interval History: JAYESH    Medications:  Continuous Infusions:  Scheduled Meds:   albuterol-ipratropium  3 mL Nebulization Q4H    atorvastatin  40 mg Per NG tube Daily    cefTRIAXone (ROCEPHIN) IVPB  2 g Intravenous Q12H    ciprofloxacin HCl  750 mg Per NG tube Q12H    enoxaparin  40 mg Subcutaneous Q24H    lacosamide  200 mg Per NG tube Q12H    losartan  25 mg Per NG tube Daily    miconazole nitrate 2%   Topical (Top) BID    pantoprazole  40 mg Per NG tube Daily    silodosin  8 mg Per NG tube Daily    sodium chloride 0.9%  10 mL Intravenous Q6H    traZODone  25 mg Per NG tube QHS    vancomycin (VANCOCIN) IVPB  1,250 mg Intravenous Q48H     PRN Meds:acetaminophen, dextrose 50%, glucagon (human recombinant), insulin aspart U-100, labetalol, psyllium husk (aspartame), sodium chloride 0.9%, [place] PICC Protocol **AND** sodium chloride 0.9% **AND** sodium chloride 0.9%, Pharmacy to dose Vancomycin consult **AND** vancomycin - pharmacy to dose     Review of Systems  Objective:     Weight: 114 kg (251 lb 5.2 oz)  Body mass index is 37.11 kg/m².  Vital Signs (Most Recent):  Temp: 98.2 °F (36.8 °C) (09/24/20 1930)  Pulse: 88 (09/24/20 2353)  Resp: 20 (09/24/20 2234)  BP: 136/61 (09/24/20 1930)  SpO2: 96 % (09/24/20 2353) Vital Signs (24h Range):  Temp:  [97.8 °F (36.6 °C)-98.8 °F (37.1 °C)] 98.2 °F (36.8 °C)  Pulse:  [84-96] 88  Resp:  [20-36] 20  SpO2:  [95 %-100 %] 96 %  BP: (119-146)/(58-66) 136/61                          NG/OG Tube 09/20/20 2310 14 Fr. Right nostril (Active)   Placement Check placement verified by aspirate characteristics 09/24/20 0814   Tube advanced (cm) 4 09/21/20 0859   Advancement advanced manually 09/21/20 0859   Tolerance no signs/symptoms of discomfort 09/24/20 0814   Securement secured to nostril center w/ adhesive device 09/24/20 0814   Clamp Status/Tolerance unclamped 09/24/20 0814   Insertion Site Appearance no redness, warmth, tenderness, skin breakdown, drainage 09/24/20 0814    Feeding Type continuous 09/24/20 0814   Feeding Action feeding continued 09/24/20 0814   Current Rate (mL/hr) 50 mL/hr 09/24/20 0814   Goal Rate (mL/hr) 50 mL/hr 09/24/20 0814   Water Bolus (mL) 400 mL 09/24/20 1800   Rate Formula Tube Feeding (mL/hr) 50 mL/hr 09/22/20 2000   Formula Name Peptide 09/23/20 1935   Intake (mL) - Formula Tube Feeding 550 09/24/20 1800       Neurosurgery Physical Exam     AOX1  Aphasic,  L gaze can cross midline,   PERRL,    Moves all extremities purposefully, not following commands,   AG in all extremities,     Significant Labs:  Recent Labs   Lab 09/23/20 0622 09/24/20  0237    91    143   K 3.3* 4.0    103   CO2 30* 32*   BUN 31* 35*   CREATININE 1.3 1.4   CALCIUM 10.4 10.2   MG 2.0  2.0 2.0     Recent Labs   Lab 09/23/20 0622 09/24/20  0237   WBC 10.11 9.57   HGB 8.5* 8.2*   HCT 27.8* 27.2*    196     No results for input(s): LABPT, INR, APTT in the last 48 hours.  Microbiology Results (last 7 days)     Procedure Component Value Units Date/Time    Blood culture [[id number]] Collected: 09/22/20 1522    Order Status: Completed Specimen: Blood from Peripheral, Left Hand Updated: 09/24/20 1612     Blood Culture, Routine No Growth to date      No Growth to date      No Growth to date    Narrative:      Collection has been rescheduled by BDW at 09/22/2020 14:57 Reason:   Patient unavailable  Collection has been rescheduled by BDW at 09/22/2020 14:57 Reason:   Patient unavailable    Blood culture [[id number]] Collected: 09/22/20 1522    Order Status: Completed Specimen: Blood from Antecubital, Left Arm Updated: 09/24/20 1612     Blood Culture, Routine No Growth to date      No Growth to date      No Growth to date    Narrative:      Collection has been rescheduled by BDW at 09/22/2020 14:57 Reason:   Patient unavailable  Collection has been rescheduled by BDW at 09/22/2020 14:57 Reason:   Patient unavailable    Blood culture [[id number]]     Order Status:  Canceled Specimen: Blood     Blood culture [[id number]]     Order Status: Canceled Specimen: Blood     Blood culture [[id number]] Collected: 09/14/20 1840    Order Status: Completed Specimen: Blood from Peripheral, Foot, Right Updated: 09/19/20 2212     Blood Culture, Routine No growth after 5 days.    Blood culture [[id number]] Collected: 09/14/20 1835    Order Status: Completed Specimen: Blood from Peripheral, Foot, Left Updated: 09/19/20 2212     Blood Culture, Routine No growth after 5 days.        All pertinent labs from the last 24 hours have been reviewed.    Significant Diagnostics:  I have reviewed and interpreted all pertinent imaging results/findings within the past 24 hours.

## 2020-09-25 NOTE — PLAN OF CARE
Cirpo was discontinued today. Patient had a lumbar puncture under fluoroscopy in IR today. Patient's restraints  2020 @ 1443. Patient still tries to pull at NGT. Wife is at bedside. Psych was consulted and is currently at bedside. 24 hour EEG was completed today. Patient currently has NS running at 250ml/hr for 4 hours (1000ml total).Tube feeding is running at 50ml/hr. 200ml water bolus is ordered 4 times daily. 2 water boluses have been given already. Patient is being started on ampicillin IVPB.

## 2020-09-25 NOTE — ASSESSMENT & PLAN NOTE
"75 y.o. male with recent endocarditis with mitral and aortic valve vegetations and recent GI bleed presented to [facility] ED 9/8 via EMS after being found unresponsive at home. On arrival, he was intubated for airway protection. MRI brain and MRA head/neck 9/8 with Left PCA distribution acute infarction. Focus of restricted diffusion within the right occipital lobe with adjacent vasogenic edema, concerning for underlying lesion or septic embolus. MRA head/neck with no evidence of occlusion or high grade stenosis. On 9/8 pm, he was witnessed to have rhythmic shaking R upper and lower extremities, eye twitching and biting down on ET tube concerning for seizure activity. EEG 9/13-9/17 captured R hand jerking associated with L posterior discharges c/w EPC per Dr. Winslow. He was treated with Keppra 500 mg BID and Vimpat 200 mg BID. Keppra was discontinued on 9/15 per epilepsy team recs. MRI brain repeated x 3, most recently on 9/22 showing "continued maturing multifocal ischemia, possibly septic embolic in etiology, predominantly involving bilateral posterior cerebral artery and left posterior inferior cerebellar artery territories. No new or progressive areas of ischemia or new intracranial hemorrhage. Stable small left frontal subdural hygroma without significant mass effect. Chronic parenchymal microhemorrhages and superficial siderosis again raise the possibility of cerebral amyloid angiopathy and/or chronic hypertensive arteriopathy." Vascular Neuro felt given different distributions seen on imaging and recent endocarditis, cardioembolic etiology was felt most likely. Most recent MRI L PCA territory decreased attenuation more c/w seizure changes rather then embolic stroke and persistent R posterior abscess. General Neurology asked to weigh in on 9/23 to evaluate for encephalitis and seizure management.     9/24--more alert and interactive  Follows some simple commands  EEG overnight without epileptiform discharges, but " diffuse slowing is present (L slower than R at times)     Recommendations:  --LP with IR - scheduled for 9/25   - Please obtain opening pressure   - Cell ct with diff, glu, protein, Cx, viral panel, freeze and hold   --continue vEEG till 24 hr is completed (Dr. Cinda samayoa)   - No electrographic seizures noted   --continue Vimpat 200 mg BID and seizure precautions

## 2020-09-25 NOTE — PT/OT/SLP PROGRESS
Occupational Therapy      Patient Name:  Lopez Savage   MRN:  [id number]    Patient not seen today secondary to pt off the floor for lumbar puncture in the am and OT unable to follow-up in the pm. OT will follow-up 9/26/2020.    Pedro March, OT  9/25/2020

## 2020-09-26 NOTE — PROGRESS NOTES
[facility] - ICU 14 WT  Infectious Disease  Progress Note    Patient Name: Lopez Savage  MRN: [id number]  Admission Date: 9/13/2020  Length of Stay: 13 days  Attending Physician: Isidoro Garcia MD  Primary Care Provider: Prasanna Haywood MD    Isolation Status: No active isolations  Assessment/Plan:      Encephalopathy acute  reconsulted for acute encephalopathy.   Repeat MRI MRI Brain WO contrast (9/22/20):  1. Relative to 09/14/2020, continued maturing multifocal ischemia, possibly septic embolic in etiology when correlating with prior imaging, predominantly involving bilateral posterior cerebral artery and left posterior inferior cerebellar artery territories.  2. No new or progressive areas of ischemia or new intracranial hemorrhage.  3. Stable small left frontal subdural hygroma without significant mass effect.  4. Chronic parenchymal microhemorrhages and superficial siderosis again raise the possibility of cerebral amyloid angiopathy and/or chronic hypertensive arteriopathy.    LP with WBC 21, RBC 6, protein 31, gluc 56  HSV, RPR, FTA ab, VDRL studies pending  CSF gram stain and cultures NGTD  Continue current antimicrobial therapy at this time  Mentation improved this AM per wife. No sz activity  Will tailor accordingly based on above studies    Seen and discussed with ID staff    Bacterial endocarditis  This is a 74 year old male with PMH of hypertension, ulcerative colitis, GERD, chronic bronchitis, autoimmune hemolytic anemia, mitral regurgitation, and diverticulitis, admitted for subacute bacterial endocarditis  with strep mutans  in setting of upper GI bleed on 6 weeks of IV ceftriaxone (EOC 9/15) presents to ED for seizure activity and loss of consciousness. CT head negative for acute intracranial bleed. MRI +PCA infarct and right occipital lobe edema. He was started on doxycycline. Metronidazole, and ceftriaxone.      TTE 8/14 with vegetation of aortic valve. SILVANA 8/17 with an 18 mm mobile  vegetation is of both the mitral and aortic valves. CTS saw pt then, no surgical intervention. Has completed 6 weeks of IV ceftriaxone, end date 9/15/2020.      MRI here with Numerous scattered punctate foci of cortical enhancement throughout both cerebral hemispheres.  Additional rim enhancing collection in the right occipital lobe with central diffusion restriction and surrounding vasogenic edema.  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 NSGY following, no acute surgical intervention. No seizure activity today.     BCXs:  9/8 1/4 CONS - suspect contaminant  9/10 - NGTD  9/14 NGTD    Recommendations  1. Continue ceftriaxone. Increased dose 0cm83xo  2. Continue vancomycin. Trough goal closer to 20  3. NSGY following. No surgical intervention. Underwent LP yesterday. Concerns for HSV vs neurosyphilis.mentation much improved today. Studies pending. Ampicillin added per primary team. Continue therapy for now. Will tailor abx accordingly to lab and culture findings.   4. Blood cultures here NGTD.   6. Anticipate 6-8 weeks of IV vancomycin and ceftriaxone with repeat imaging to assess resolution of brain abscess   End date of IV antibiotics: 10/27/2020 -11/10/2020 with repeat MRI  8. Pt remained afebrile. No sz activity today.        Anticipated Disposition:     Thank you for your consult. I will follow-up with patient. Please contact us if you have any additional questions.    Hermilo Jean PA-C  Infectious Disease  [facility] - ICU 14 WT  [phone]    Subjective:     Principal Problem:Status epilepticus    HPI: Mr. 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 The patient presented to [facility] on 9/8/2020 with a primary complaint of loss of consciousness. The patient was recently treated for endocarditis w/ IV rocephin through  PICC line at [facility] and discharged approximately 2 weeks ago. Since then he has had a worsening cough and was planning to go to the hospital for evaluation. Per wife home health did portable CXR about a week ago w/ concern for fluid vs pneumonia. Patient was given course of doxycycline and finished course recently. 9/8 Wife went to bed while  was watching TV and about an hour later her daughter found him non responsive on the ground. Daughter did a few chest compressions before EMS arrived. Patient arrived to ED and non responsive to sternal rub. Pupillary and gag reflex present. Patient intubated for airway protection and sedated w/ propofol. No seizure like activity was observed in ED. TPA contraindicated due to recent GI bleed, last occurring in August. Discussed further stroke work up with wife and planned for imaging. CT head negative for acute intracranial bleed. MRI head showed PCA infarct and right occipital lobe edema. 9/8 EEG demonstrated left posterior pseudo periodic epileptiform discharges is suggestive of an irritative region likely secondary to focal ischemia. Patient started on AEDs vimpat and keppra and reported to have several seizures during his stay. Patient was transferred to [facility] at [facility] for continuous EEG monitoring and further evaluation.  BCXs:  9/8 1/4 CONS - suspect contaminant  9/10 - NGTD  9/14 sent     MRI Brain:  Numerous scattered punctate foci of cortical enhancement throughout both cerebral hemispheres.  Additional rim enhancing collection in the right occipital lobe with central diffusion restriction and surrounding vasogenic edema.  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     Persistent gyriform diffusion restriction the posterior left cerebral hemisphere as well as the hippocampus and dorsal lateral thalamus, although slightly improved from prior studies.  The distribution be typical for vascular territory  infarct, most suggestive of seizure related signal changes (status epilepticus).  Other encephalitis to be excluded clinically.   NSGy following for occipital abscess. NPO at this time. currently on doxy flagyl and ceftriaxone.   Interval History:   ID reconsulted as was noted to have worsening mentation past few days. LP yesterday demonstrated WBC 21. RBC 6, prot 321, gluc 56.    Repeat imaging with continued maturing multifocal ischemia, possibly septic embolic in etiology when correlating with prior imaging, predominantly involving bilateral posterior cerebral artery and left posterior inferior cerebellar artery territories.  2. No new or progressive areas of ischemia or new intracranial hemorrhage.  3. Stable small left frontal subdural hygroma without significant mass effect.    Review of Systems   Unable to perform ROS: Acuity of condition     Objective:     Vital Signs (Most Recent):  Temp: 98.6 °F (37 °C) (09/26/20 1618)  Pulse: 93 (09/26/20 1618)  Resp: (!) 26 (09/26/20 1618)  BP: 131/60 (09/26/20 1618)  SpO2: 96 % (09/26/20 1618) Vital Signs (24h Range):  Temp:  [98.2 °F (36.8 °C)-98.7 °F (37.1 °C)] 98.6 °F (37 °C)  Pulse:  [79-94] 93  Resp:  [15-28] 26  SpO2:  [20 %-100 %] 96 %  BP: (123-133)/(58-78) 131/60     Weight: 114 kg (251 lb 5.2 oz)  Body mass index is 37.11 kg/m².    Estimated Creatinine Clearance: 53.8 mL/min (A) (based on SCr of 1.5 mg/dL (H)).    Physical Exam  Vitals signs reviewed.   Constitutional:       Appearance: He is ill-appearing. He is not diaphoretic.      Comments: NG tube in place    Cardiovascular:      Rate and Rhythm: Normal rate.   Pulmonary:      Effort: Pulmonary effort is normal.   Abdominal:      General: Abdomen is flat. There is no distension.      Palpations: There is no mass.   Musculoskeletal:         General: No swelling, tenderness, deformity or signs of injury.   Skin:     General: Skin is warm and dry.      Coloration: Skin is not jaundiced or pale.    Neurological:      Comments: Alert  More communicative today per nursing  Answering questions         Significant Labs: All pertinent labs within the past 24 hours have been reviewed.    Significant Imaging: I have reviewed all pertinent imaging results/findings within the past 24 hours.

## 2020-09-26 NOTE — PT/OT/SLP PROGRESS
"Physical Therapy Treatment    Patient Name:  Lopez Savage   MRN:  [id number]    Recommendations:     Discharge Recommendations:  rehabilitation facility   Discharge Equipment Recommendations: other (see comments)(TBD)   Barriers to discharge: Inaccessible home and Decreased caregiver support    Assessment:     Lopez Savage is a 75 y.o. male admitted with a medical diagnosis of Status epilepticus.  He presents with the following impairments/functional limitations:  weakness, impaired endurance, impaired self care skills, impaired functional mobilty, gait instability, impaired balance, impaired cognition, decreased coordination, decreased upper extremity function, decreased lower extremity function, decreased safety awareness, abnormal tone, decreased ROM, impaired fine motor, edema, impaired cardiopulmonary response to activity Patient tolerated treatment well focusing on bed mobility and therex. Patient will continue to improve with skilled physical therapy services in order to return to functional baseline.    Rehab Prognosis: Good and Fair; patient would benefit from acute skilled PT services to address these deficits and reach maximum level of function.    Recent Surgery: Procedure(s) (LRB):  Lumbar Puncture (N/A) 1 Day Post-Op    Plan:     During this hospitalization, patient to be seen 3 x/week to address the identified rehab impairments via gait training, therapeutic activities, therapeutic exercises, neuromuscular re-education and progress toward the following goals:    · Plan of Care Expires:  10/10/20    Subjective     Chief Complaint: none, pt   Patient/Family Comments/goals: "He really needs more of this" -wife  Pain/Comfort:  · Pain Rating 1: 0/10  · Pain Rating Post-Intervention 1: 0/10      Objective:     Communicated with nursing prior to session.  Patient found HOB elevated with peripheral IV, blood pressure cuff, NG tube, pulse ox (continuous), telemetry upon PT entry to room.     General " "Precautions: Standard, fall, aspiration   Orthopedic Precautions:N/A   Braces: N/A     Functional Mobility:  · Bed Mobility:     · Scooting: total assistance      AM-PAC 6 CLICK MOBILITY  Turning over in bed (including adjusting bedclothes, sheets and blankets)?: 2  Sitting down on and standing up from a chair with arms (e.g., wheelchair, bedside commode, etc.): 1  Moving from lying on back to sitting on the side of the bed?: 2  Moving to and from a bed to a chair (including a wheelchair)?: 1  Need to walk in hospital room?: 1  Climbing 3-5 steps with a railing?: 1  Basic Mobility Total Score: 8       Therapeutic Activities and Exercises:  PROM BU/LE 1 x 15 with 20s stretch at end, all available range of motion, pt with some active pushing against therapist, able to follow cues ~ 25% of the time  Pt's wife edu'd on tech/safety of performing t/o the day, she verbalized understanding "he really needs more of this"  Pt able to follow ~50% of one step commands during ROM    Patient left HOB elevated with call button in reach and nurse notified..    GOALS:   Multidisciplinary Problems     Physical Therapy Goals        Problem: Physical Therapy Goal    Goal Priority Disciplines Outcome Goal Variances Interventions   Physical Therapy Goal     PT, PT/OT Ongoing, Progressing     Description: Goals to be met by: 2020    Patient will increase functional independence with mobility by performin. Pt will perform bed mobility (rolling L/R, scooting, and bridging) with CGA  2. Pt will perform supine to/from sit with CGA.  3. Pt will sit EOB x 15 mins with B UE support with Rony assistance.  4. Pt will perform sit to stand with max assistance and LRAD.  5. Pt will ambulate 15 feet with max assistance and LRAD.  6. Pt will perform there-ex from handout x 15 reps to improve strength for functional mobility.                         Time Tracking:     PT Received On: 20  PT Start Time: 1440     PT Stop Time: 1525  PT " Total Time (min): 45 min     Billable Minutes: Therapeutic Activity 25 and Therapeutic Exercise 20    Treatment Type: Treatment  PT/PTA: PTA     PTA Visit Number: 1     Jordana Deras PTA  09/26/2020

## 2020-09-26 NOTE — CONSULTS
"[facility] - ICU 14 WT  Psychiatry  Consult Note    Patient Name: Lopez Savage  MRN: [id number]   Code Status: Full Code  Admission Date: 9/13/2020  Hospital Length of Stay: 13 days  Attending Physician: Isidoro Garcia MD  Primary Care Provider: Prasanna Haywood MD    Current Legal Status: Uncontested    Patient information was obtained from spouse/SO and ER records.   Inpatient consult to Psychiatry  Consult performed by: Israel Barber MD  Consult ordered by: Isidoro Garcia MD        Subjective:     Principal Problem:Status epilepticus    Chief Complaint:  Acute agitation 2/2 delirium    HPI: Lopez Savage is a 75 y.o. male with a past psychiatric history of depression who [place] [facility] [place]. Psychiatry was consulted for "severe agitation requiring constaint, h/o brain abscesses and seizures".    Per Primary Team:  male with PMH of diverticulitis, MVP, endocarditis with aortic valve vegetations, HTN, and COPD. The patient presented to [facility] on 9/8/2020 with a primary complaint of loss of consciousness. The patient was recently treated for endocarditis w/ IV rocephin through PICC line at [facility] and discharged 2 weeks ago. Since then he has had a worsening cough and was planning to go to the hospital for evaluation. Per wife home health did portable CXR about a week ago w/ concern for fluid vs pneumonia. Patient was given course of doxycycline and finished course yesterday. Last night wife went to bed while  was watching TV and about an hour later her daughter found him non responsive on the ground. Daughter did a few chest compressions before EMS arrived. Patient arrived to ED and non responsive to sternal rub. Pupillary and gag reflex present. Patient intubated for airway protection and sedated w/ propofol. No seizure like activity was observed in ED. TPA contraindicated due to recent GI bleed, last occurring in August. " Discussed further stroke work up with wife and plan to obtain MRI head.    Per Psychiatry:  Seen and evaluated in room with wife at bedside. Patient somnolent, lethargic, returned from LP this afternoon. Hard of hearing. Wakes to physical stimuli, mumbles and returns to sleep. Per wife -- overall his confusion waxes and wanes. He knows where his name and where he is. She denies any agitation or aggression. Sleeping throughout the night with no issues. Does mutter to himself at times but has not endorsed any AVH to wife. No SI/HI/plan. Appetite improving with advancement by SLP, has NG tube at this time. Restraints have been since admission, however when seen by psychiatry patient has one soft restraint to left arm. Would not participate in Flimper.    At baseline, patient with no cognitive issues or deficits. No recent changes in behavior or mood prior to admission.    Collateral:   See above    Medical Review of Systems:  Review of systems not obtained due to patient factors altered mental status.    Psychiatric Review of Systems-is patient experiencing or having changes in  sleep: SHANNA  appetite: SHANNA  weight: SHANNA   energy/anergy: SHANNA  interest/pleasure/anhedonia: SHANNA  somatic symptoms: SHANNA  libido: SHANNA  anxiety/panic: SHANNA  guilty/hopelessness: SHANNA  concentration: SHANNA  S.I.B.s/risky behavior: SHANNA  any drugs: SHANNA  alcohol: SHANNA    Allergies:  Codeine and Tetanus vaccines and toxoid    Past Medical/Surgical History:  History reviewed. No pertinent past medical history.  History reviewed. No pertinent surgical history.    History obtained by wife at bedside.    Past Psychiatric History:  Previous Medication Trials: yes, currently on Lexapro 10 mg for depression  Previous Psychiatric Hospitalizations: no   Previous Suicide Attempts: no   History of Violence: no  Outpatient Psychiatrist: no    Social History:  Marital Status:  for 50 years  Children: 2   Employment Status/Info: 2013 retired  Education: high  school diploma/GED  Special Ed: no  Housing Status: with family, wife and daughter  History of phys/sexual abuse: no  Access to gun: yes    Substance Abuse History:  Recreational Drugs: denies  Use of Alcohol: denied  Rehab History: no   Tobacco Use: no  Use of OTC: no    Legal History:  Past Charges/Incarcerations: denies   Pending charges: no     Family Psychiatric History:   denies    Psychosocial Stressors: health  Functioning Relationships: good support system      Hospital Course: No notes on file         Patient History           Medical as of 9/25/2020    Past Medical History: Patient provided no pertinent medical history.           Surgical as of 9/25/2020    Past Surgical History: Patient provided no pertinent surgical history.           Family as of 9/25/2020    None           Tobacco Use as of 9/25/2020     Smoking Status Smoking Start Date Smoking Quit Date Packs/Day Years Used    Never Assessed -- -- -- --    Types Comments Smokeless Tobacco Status Smokeless Tobacco Quit Date Source    -- -- Unknown -- --            Alcohol Use as of 9/25/2020    None           Drug Use as of 9/25/2020    None           Sexual Activity as of 9/25/2020    None           Activities of Daily Living as of 9/25/2020    None           Social Documentation as of 9/25/2020    None           Occupational as of 9/25/2020    None           Socioeconomic as of 9/25/2020     Marital Status Spouse Name Number of Children Years Education Education Level Preferred Language Ethnicity Race Source     -- -- -- -- English /White White --    Financial Resource Strain Food Insecurity: Worry Food Insecurity: Inability Transportation Needs: Medical Transportation Needs: Non-medical    -- -- -- -- --            Pertinent History     Question Response Comments    Lives with -- --    Place in Birth Order -- --    Lives in -- --    Number of Siblings -- --    Raised by -- --    Legal Involvement -- --    Childhood Trauma -- --     Criminal History of -- --    Financial Status -- --    Highest Level of Education -- --    Does patient have access to a firearm? -- --     Service -- --    Primary Leisure Activity -- --    Spirituality -- --        History reviewed. No pertinent past medical history.  History reviewed. No pertinent surgical history.  Family History     None        Tobacco Use    Smoking status: Not on file   Substance and Sexual Activity    Alcohol use: Not on file    Drug use: Not on file    Sexual activity: Not on file     Review of patient's allergies indicates:   Allergen Reactions    Codeine     Tetanus vaccines and toxoid        No current facility-administered medications on file prior to encounter.      Current Outpatient Medications on File Prior to Encounter   Medication Sig    atorvastatin (LIPITOR) 40 MG tablet 1 tablet (40 mg total) by Per G Tube route once daily.    enoxaparin (LOVENOX) 40 mg/0.4 mL Syrg Inject 0.4 mLs (40 mg total) into the skin once daily.    insulin aspart U-100 (NOVOLOG) 100 unit/mL (3 mL) InPn pen Inject 0-5 Units into the skin every 6 (six) hours as needed (Hyperglycemia).    lacosamide (VIMPAT) 10 mg/mL Soln oral solution 10 mLs (100 mg total) by Per NG tube route every 12 (twelve) hours.    levETIRAcetam in NaCl, iso-os, (KEPPRA) 500 mg/100 mL PgBk Inject 100 mLs (500 mg total) into the vein every 12 (twelve) hours.    losartan (COZAAR) 50 MG tablet Take 1 tablet (50 mg total) by mouth once daily.     Psychotherapeutics (From admission, onward)    Start     Stop Route Frequency Ordered    09/17/20 2100  trazodone split tablet 25 mg      -- PER NG TUBE Nightly 09/17/20 1509        Review of Systems   Unable to perform ROS: Mental status change     Strengths and Liabilities: Strength: Patient has positive support network., Liability: Patient has poor health., Liability: Patient is unstable.    Objective:     Vital Signs (Most Recent):  Temp: 99.1 °F (37.3 °C) (09/25/20  "1246)  Pulse: 90 (09/25/20 1712)  Resp: 18 (09/25/20 1712)  BP: (!) 102/55 (09/25/20 1509)  SpO2: (!) 20 % (09/25/20 1712) Vital Signs (24h Range):  Temp:  [98.2 °F (36.8 °C)-99.1 °F (37.3 °C)] 99.1 °F (37.3 °C)  Pulse:  [86-94] 90  Resp:  [18-28] 18  SpO2:  [20 %-100 %] 20 %  BP: ()/(53-67) 102/55     Height: 5' 9" (175.3 cm)  Weight: 114 kg (251 lb 5.2 oz)  Body mass index is 37.11 kg/m².      Intake/Output Summary (Last 24 hours) at 9/25/2020 1829  Last data filed at 9/25/2020 1512  Gross per 24 hour   Intake 700 ml   Output 200 ml   Net 500 ml       Physical Exam      Physical Exam:  General appearance: NAD  Head: NCAT  Lungs: no increased work of breath, on RA  Heart: +2 radial pulses b/l  Abdomen: soft, NT/ND  Extremities: extremities normal, soft restraint to L wrist  Skin: warm, dry, pallor    Mental Status Exam:  Appearance: unremarkable, lying in bed  Behavior/Cooperation: eye contact minimal   Sensorium: somnolent, lethargic  Speech: mumbled  Mood: SHANNA  Affect: SHANNA  Thought Process: [facility]  Thought Content: [facility]   Orientation: [facility]  Memory: [facility]  Attention Span/Concentration: [facility]  Insight: SHANNA  Judgment: SHANNA    CAM-ICU:  1. Acute change and/or fluctuating course of mental status: Yes  2. Inattention (SAVEAHAART): Yes  · "Squeeze my hand, only when you hear, the letter 'A'."  3. Altered Level of Consciousness: Yes  4. Disorganized Thinking (Errors >1/6): N/A  · "Will a stone float on water?"  · "Are there fish in the sea?"  · "Does one pound weigh more than two?"  · "Can you use a hammer to pound a nail?"  · Command(s):  · "Hold up 2 fingers."  · "Now do the same thing with the other hand."    Score: 1+2 AND, either 3 or 4 present = Positive CAM-ICU      Significant Labs:   Recent Results (from the past 48 hour(s))   Phosphorus    Collection Time: 09/24/20  2:37 AM   Result Value Ref Range    Phosphorus 4.8 (H) 2.7 - 4.5 mg/dL   Magnesium    Collection Time: 09/24/20  2:37 AM   Result Value Ref Range    " Magnesium 2.0 1.6 - 2.6 mg/dL   CBC auto differential    Collection Time: 09/24/20  2:37 AM   Result Value Ref Range    WBC 9.57 3.90 - 12.70 K/uL    RBC 2.89 (L) 4.60 - 6.20 M/uL    Hemoglobin 8.2 (L) 14.0 - 18.0 g/dL    Hematocrit 27.2 (L) 40.0 - 54.0 %    Mean Corpuscular Volume 94 82 - 98 fL    Mean Corpuscular Hemoglobin 28.4 27.0 - 31.0 pg    Mean Corpuscular Hemoglobin Conc 30.1 (L) 32.0 - 36.0 g/dL    RDW 15.5 (H) 11.5 - 14.5 %    Platelets 196 150 - 350 K/uL    MPV 10.9 9.2 - 12.9 fL    Immature Granulocytes 0.6 (H) 0.0 - 0.5 %    Gran # (ANC) 7.1 1.8 - 7.7 K/uL    Immature Grans (Abs) 0.06 (H) 0.00 - 0.04 K/uL    Lymph # 0.9 (L) 1.0 - 4.8 K/uL    Mono # 1.0 0.3 - 1.0 K/uL    Eos # 0.4 0.0 - 0.5 K/uL    Baso # 0.07 0.00 - 0.20 K/uL    nRBC 0 0 /100 WBC    Gran% 74.5 (H) 38.0 - 73.0 %    Lymph% 9.1 (L) 18.0 - 48.0 %    Mono% 10.6 4.0 - 15.0 %    Eosinophil% 4.5 0.0 - 8.0 %    Basophil% 0.7 0.0 - 1.9 %    Differential Method Automated    Comprehensive metabolic panel    Collection Time: 09/24/20  2:37 AM   Result Value Ref Range    Sodium 143 136 - 145 mmol/L    Potassium 4.0 3.5 - 5.1 mmol/L    Chloride 103 95 - 110 mmol/L    CO2 32 (H) 23 - 29 mmol/L    Glucose 91 70 - 110 mg/dL    BUN, Bld 35 (H) 8 - 23 mg/dL    Creatinine 1.4 0.5 - 1.4 mg/dL    Calcium 10.2 8.7 - 10.5 mg/dL    Total Protein 6.6 6.0 - 8.4 g/dL    Albumin 2.4 (L) 3.5 - 5.2 g/dL    Total Bilirubin 0.2 0.1 - 1.0 mg/dL    Alkaline Phosphatase 51 (L) 55 - 135 U/L    AST 12 10 - 40 U/L    ALT 11 10 - 44 U/L    Anion Gap 8 8 - 16 mmol/L    eGFR if African American 56.8 (A) >60 mL/min/1.73 m^2    eGFR if non  49.1 (A) >60 mL/min/1.73 m^2   POCT glucose    Collection Time: 09/24/20  7:22 AM   Result Value Ref Range    POCT Glucose 107 70 - 110 mg/dL   POCT glucose    Collection Time: 09/24/20 11:25 AM   Result Value Ref Range    POCT Glucose 106 70 - 110 mg/dL   COVID-19 Rapid Screening    Collection Time: 09/24/20  3:15 PM   Result  Value Ref Range    SARS-CoV-2 RNA, Amplification, Qual Negative Negative   POCT glucose    Collection Time: 09/24/20  4:06 PM   Result Value Ref Range    POCT Glucose 110 70 - 110 mg/dL   Phosphorus    Collection Time: 09/25/20  3:56 AM   Result Value Ref Range    Phosphorus 4.5 2.7 - 4.5 mg/dL   Magnesium    Collection Time: 09/25/20  3:56 AM   Result Value Ref Range    Magnesium 1.9 1.6 - 2.6 mg/dL   CBC auto differential    Collection Time: 09/25/20  3:56 AM   Result Value Ref Range    WBC 9.58 3.90 - 12.70 K/uL    RBC 2.77 (L) 4.60 - 6.20 M/uL    Hemoglobin 7.7 (L) 14.0 - 18.0 g/dL    Hematocrit 25.7 (L) 40.0 - 54.0 %    Mean Corpuscular Volume 93 82 - 98 fL    Mean Corpuscular Hemoglobin 27.8 27.0 - 31.0 pg    Mean Corpuscular Hemoglobin Conc 30.0 (L) 32.0 - 36.0 g/dL    RDW 15.7 (H) 11.5 - 14.5 %    Platelets 182 150 - 350 K/uL    MPV 11.0 9.2 - 12.9 fL    Immature Granulocytes 0.4 0.0 - 0.5 %    Gran # (ANC) 7.1 1.8 - 7.7 K/uL    Immature Grans (Abs) 0.04 0.00 - 0.04 K/uL    Lymph # 0.9 (L) 1.0 - 4.8 K/uL    Mono # 1.1 (H) 0.3 - 1.0 K/uL    Eos # 0.4 0.0 - 0.5 K/uL    Baso # 0.04 0.00 - 0.20 K/uL    nRBC 0 0 /100 WBC    Gran% 74.1 (H) 38.0 - 73.0 %    Lymph% 9.8 (L) 18.0 - 48.0 %    Mono% 11.3 4.0 - 15.0 %    Eosinophil% 4.0 0.0 - 8.0 %    Basophil% 0.4 0.0 - 1.9 %    Differential Method Automated    Comprehensive metabolic panel    Collection Time: 09/25/20  3:56 AM   Result Value Ref Range    Sodium 142 136 - 145 mmol/L    Potassium 3.9 3.5 - 5.1 mmol/L    Chloride 104 95 - 110 mmol/L    CO2 32 (H) 23 - 29 mmol/L    Glucose 95 70 - 110 mg/dL    BUN, Bld 38 (H) 8 - 23 mg/dL    Creatinine 1.5 (H) 0.5 - 1.4 mg/dL    Calcium 10.2 8.7 - 10.5 mg/dL    Total Protein 6.5 6.0 - 8.4 g/dL    Albumin 2.3 (L) 3.5 - 5.2 g/dL    Total Bilirubin 0.2 0.1 - 1.0 mg/dL    Alkaline Phosphatase 48 (L) 55 - 135 U/L    AST 13 10 - 40 U/L    ALT 7 (L) 10 - 44 U/L    Anion Gap 6 (L) 8 - 16 mmol/L    eGFR if African American 52.3 (A)  >60 mL/min/1.73 m^2    eGFR if non  45.2 (A) >60 mL/min/1.73 m^2   Vancomycin, random    Collection Time: 09/25/20  3:56 AM   Result Value Ref Range    Vancomycin, Random 17.3 Not established ug/mL   POCT glucose    Collection Time: 09/25/20  7:42 AM   Result Value Ref Range    POCT Glucose 92 70 - 110 mg/dL   Glucose, CSF    Collection Time: 09/25/20 12:30 PM   Result Value Ref Range    Glucose, CSF 56 40 - 70 mg/dL   Protein, CSF    Collection Time: 09/25/20 12:30 PM   Result Value Ref Range    Protein, CSF 31 15 - 40 mg/dL   CSF culture    Collection Time: 09/25/20 12:30 PM    Specimen: CSF Tap, Tube 1; CSF (Spinal Fluid)   Result Value Ref Range    Gram Stain Result No WBC's     Gram Stain Result No organisms seen    CSF cell count with differential    Collection Time: 09/25/20 12:30 PM   Result Value Ref Range    Heme Aliquot 0.5 mL    Appearance, CSF Clear Clear    Color, CSF Colorless Colorless    WBC, CSF 24 (H) 0 - 5 /cu mm    RBC, CSF 21 (A) 0 /cu mm    Segmented Neutrophils, CSF 2 0 - 6 %    Lymphs, CSF 71 40 - 80 %    Mono/Macrophage, CSF 27 15 - 45 %   CSF cell count with differential    Collection Time: 09/25/20 12:30 PM   Result Value Ref Range    Heme Aliquot 0.5 mL    Appearance, CSF Clear Clear    Color, CSF Colorless Colorless    WBC, CSF 21 (H) 0 - 5 /cu mm    RBC, CSF 6 (A) 0 /cu mm    Lymphs, CSF 71 40 - 80 %    Mono/Macrophage, CSF 27 15 - 45 %    Eosinophils, CSF 1 (A) %    Baso, CSF 1 (A) %      No results found for: PHENYTOIN, PHENOBARB, VALPROATE, CBMZ      Significant Imaging:   Results for orders placed or performed during the hospital encounter of 09/08/20   EKG 12-lead    Collection Time: 09/12/20  4:48 PM    Narrative    Test Reason : R94.31,    Vent. Rate : 085 BPM     Atrial Rate : 085 BPM     P-R Int : 198 ms          QRS Dur : 088 ms      QT Int : 364 ms       P-R-T Axes : 044 -29 023 degrees     QTc Int : 433 ms    Sinus rhythm with occasional Premature  ventricular complexes  Anterior infarct (cited on or before 08-SEP-2020)  Abnormal ECG  When compared with ECG of 08-SEP-2020 10:55,  Premature ventricular complexes are now Present  QRS duration has decreased  Questionable change in initial forces of Anterior leads  ST now depressed in Inferior leads  QT has shortened  Confirmed by Arnulfo BALDERAS MD, Mohan ANTON ([id number]) on 9/14/2020 9:03:08 AM    Referred By: [email or web address]   SELF           Confirmed By:Mohan Arredondo III, MD         Assessment/Plan:     Encephalopathy acute  ASSESSMENT     Lopez Savage is a 75 y.o. male with a past psychiatric history of depression, who presented to the [facility] [place] for status epilepticus. Psychiatry originally consulted for management of agitation. Somnolent on initial interview -- per history, alterations in cognition and attention appear to be waxing and waning with improvement since admission.    IMPRESSION  Delirium 2/2 general medical condition      RECOMMENDATION(S)      1. Scheduled Medication(s):  - Agree with trazadone 25 mg nightly per NG tube      2. PRN Medication(s):  - Haldol 1 mg IV vs 2 mg PO/IM Q6H for non-redirectable agitation    3.  Monitor:  Please obtain daily EKG to monitor QTc if Haldol IV is being used.     4. Legal Status/Precaution(s):  Patient does not meet criteria for PEC or inpatient psychiatric admission at this time. Recommend to rescind PEC if one was placed. Patient is not currently an imminent danger to self or others and is not gravely disabled due to a psychiatric illness.    5. Other:  CAM ICU - positive 9/25  DELIRIUM BEHAVIOR MANAGEMENT   PLEASE utilize CHEMICAL restraints with PRN meds first for agitation. Minimize use of PHYSICAL restraints   Keep window shades open and room lit during day and room dim at night in order to promote normal sleep-wake cycles   Encourage family at bedside. [place] patient often to situation, location, date   Continue to Limit or Discontinue use of Narcotics, Benzos and  Anti-cholinergic medications as they may worsen delirium   Continue medical workup for causative etiology of Delirium         Total Time:  45 minutes     Israel Barber MD   Psychiatry PGY-2  [facility] - ICU 14 WT

## 2020-09-26 NOTE — ASSESSMENT & PLAN NOTE
reconsulted for acute encephalopathy.   Repeat MRI MRI Brain WO contrast (9/22/20):  1. Relative to 09/14/2020, continued maturing multifocal ischemia, possibly septic embolic in etiology when correlating with prior imaging, predominantly involving bilateral posterior cerebral artery and left posterior inferior cerebellar artery territories.  2. No new or progressive areas of ischemia or new intracranial hemorrhage.  3. Stable small left frontal subdural hygroma without significant mass effect.  4. Chronic parenchymal microhemorrhages and superficial siderosis again raise the possibility of cerebral amyloid angiopathy and/or chronic hypertensive arteriopathy.    LP with WBC 21, RBC 6, protein 31, gluc 56  HSV, RPR, FTA ab, VDRL studies pending  CSF gram stain and cultures NGTD  Continue current antimicrobial therapy at this time  Mentation improved this AM per wife. No sz activity  Will tailor accordingly based on above studies    Seen and discussed with ID staff

## 2020-09-26 NOTE — ANESTHESIA POSTPROCEDURE EVALUATION
Anesthesia Post Evaluation    Patient: Lopez Savage    Procedure(s) Performed: Procedure(s) (LRB):  Lumbar Puncture (N/A)    Final Anesthesia Type: MAC    Patient location during evaluation: PACU  Patient participation: Yes- Able to Participate  Level of consciousness: awake and alert and oriented  Post-procedure vital signs: reviewed and stable  Pain management: adequate  Airway patency: patent    PONV status at discharge: No PONV  Anesthetic complications: no      Cardiovascular status: hemodynamically stable  Respiratory status: nasal cannula  Hydration status: euvolemic  Follow-up not needed.          Vitals Value Taken Time   /60 09/26/20 0414   Temp 37 °C (98.6 °F) 09/26/20 0410   Pulse 86 09/26/20 0713   Resp 22 09/26/20 0713   SpO2 100 % 09/26/20 0713   Vitals shown include unvalidated device data.      Event Time   Out of Recovery 09/25/2020 13:34:00         Pain/Ja Score: Pain Rating Prior to Med Admin: 3 (9/26/2020 12:42 AM)  Ja Score: 9 (9/25/2020  1:15 PM)

## 2020-09-26 NOTE — SUBJECTIVE & OBJECTIVE
Subjective:     Interval History: No acute events overnight. Patient without new or worsening symptoms or change in examination. Wife at bedside.     Current Facility-Administered Medications   Medication Dose Route Frequency Provider Last Rate Last Dose    0.9%  NaCl infusion (for blood administration)   Intravenous Q24H PRN Isidoro Garcia MD        acetaminophen tablet 650 mg  650 mg Per NG tube Q6H PRN Pedro Desai MD   650 mg at 09/26/20 0042    albuterol-ipratropium 2.5 mg-0.5 mg/3 mL nebulizer solution 3 mL  3 mL Nebulization Q4H Angelique Prince NP   3 mL at 09/26/20 1242    ampicillin (OMNIPEN) 2 g in  mL EXTENDED INFUSION  2 g Intravenous Q4H Isidoro Garcia MD 25 mL/hr at 09/26/20 1214 2 g at 09/26/20 1214    atorvastatin tablet 40 mg  40 mg Per NG tube Daily Linda Natarajan MD   40 mg at 09/26/20 0835    cefTRIAXone (ROCEPHIN) 2 g/50 mL D5W IVPB  2 g Intravenous Q12H Isidoro Garcia MD   2 g at 09/26/20 0914    dextrose 50% injection 12.5 g  12.5 g Intravenous PRN Ambika Lee PA-C        enoxaparin injection 40 mg  40 mg Subcutaneous Q24H Isidoro Garcia MD   40 mg at 09/25/20 2118    glucagon (human recombinant) injection 1 mg  1 mg Intramuscular PRN Ambika Lee PA-C        insulin aspart U-100 pen 1-10 Units  1-10 Units Subcutaneous Q6H PRN Ambika Lee PA-C        labetalol 20 mg/4 mL (5 mg/mL) IV syring  10 mg Intravenous Q4H PRN Ron Molina MD   10 mg at 09/23/20 0616    lacosamide oral solution 200 mg  200 mg Per NG tube Q12H Ron Molina MD   200 mg at 09/26/20 1142    miconazole nitrate 2% ointment   Topical (Top) BID Phyllis Sprague MD        pantoprazole suspension 40 mg  40 mg Per NG tube Daily Angelique Prince NP   40 mg at 09/26/20 0835    psyllium husk (aspartame) 3.4 gram packet 1 packet  1 packet Per NG tube Daily PRN Emma Owens, NP   1 packet at 09/20/20 1143    silodosin capsule 8 mg  8 mg Per NG tube Daily  Linda Natarajan MD   8 mg at 09/26/20 0835    sodium chloride 0.9% flush 10 mL  10 mL Intravenous Q6H Linda Natarajan MD   10 mL at 09/25/20 0534    sodium chloride 0.9% flush 10 mL  10 mL Intravenous PRN Isidoro Garcia MD        trazodone split tablet 25 mg  25 mg Per NG tube QHS Angelique Prince NP   25 mg at 09/25/20 2118    vancomycin - pharmacy to dose   Intravenous pharmacy to manage frequency Anoop Jean PA-C        [START ON 9/27/2020] vancomycin 1.25 g in dextrose 5% 250 mL IVPB (ready to mix)  1,250 mg Intravenous Q48H Isidoro Garcia MD         Review of Systems   Unable to perform ROS: Mental status change   Constitutional: Positive for activity change and fatigue.   HENT: Positive for trouble swallowing and voice change.    Musculoskeletal: Negative for neck stiffness.   Neurological: Positive for speech difficulty.   Psychiatric/Behavioral: Positive for confusion, decreased concentration and sleep disturbance.     Objective:     Vital Signs (Most Recent):  Temp: 98.2 °F (36.8 °C) (09/26/20 0745)  Pulse: 88 (09/26/20 1418)  Resp: 15 (09/26/20 1242)  BP: 133/60 (09/26/20 0745)  SpO2: 95 % (09/26/20 1418) Vital Signs (24h Range):  Temp:  [98.2 °F (36.8 °C)-98.7 °F (37.1 °C)] 98.2 °F (36.8 °C)  Pulse:  [79-94] 88  Resp:  [15-28] 15  SpO2:  [20 %-100 %] 95 %  BP: (123-133)/(60-78) 133/60     Weight: 114 kg (251 lb 5.2 oz)  Body mass index is 37.11 kg/m².      NEUROLOGICAL EXAMINATION:     MENTAL STATUS   Oriented to person.   Follows 1 step commands.   Attention: decreased. Concentration: decreased.   Level of consciousness: alert       Eyes open  Tracks to voice  Able to identify his wife at bedside, states his name, states he is in the hospital.   Sticks tongue out, able to give a thumbs up.      CRANIAL NERVES     CN III, IV, VI   Extraocular motions are normal.   Nystagmus: none   Ophthalmoparesis: none    CN VIII   Hearing: intact       Face symmetric at rest      MOTOR EXAM   Muscle bulk:  normal  Overall muscle tone: increased       Blt wrist restraints in place  Moves all extremities against gravity spontaneously   No abnormal movements noted   Not following commands to further assess strength      GAIT AND COORDINATION     Gait  Gait: (deferred)     Coordination   Finger-nose-finger test: not following command.    Tremor   Resting tremor: absent    Significant Labs:   CBC:   Recent Labs   Lab 09/25/20  0356 09/26/20  0130   WBC 9.58 9.00   HGB 7.7* 7.2*   HCT 25.7* 25.2*    181     CMP:   Recent Labs   Lab 09/25/20  0356 09/26/20  0130   GLU 95 111*    140   K 3.9 3.8    104   CO2 32* 30*   BUN 38* 36*   CREATININE 1.5* 1.5*   CALCIUM 10.2 9.9   MG 1.9 2.0   PROT 6.5 6.5   ALBUMIN 2.3* 2.3*   BILITOT 0.2 0.1   ALKPHOS 48* 46*   AST 13 14   ALT 7* 10   ANIONGAP 6* 6*   EGFRNONAA 45.2* 45.2*     Inflammatory Markers: No results for input(s): SEDRATE, CRP, PROCAL in the last 48 hours.  Urine Culture: No results for input(s): LABURIN in the last 48 hours.  Urine Studies: No results for input(s): COLORU, APPEARANCEUA, PHUR, SPECGRAV, PROTEINUA, GLUCUA, KETONESU, BILIRUBINUA, OCCULTUA, NITRITE, UROBILINOGEN, LEUKOCYTESUR, RBCUA, WBCUA, BACTERIA, SQUAMEPITHEL, HYALINECASTS in the last 48 hours.    Invalid input(s): WRIGHTSUR  All pertinent lab results from the past 24 hours have been reviewed.    Significant Imaging: I have reviewed and interpreted all pertinent imaging results/findings within the past 24 hours.     MRI Brain WO contrast (9/22/20):  1. Relative to 09/14/2020, continued maturing multifocal ischemia, possibly septic embolic in etiology when correlating with prior imaging, predominantly involving bilateral posterior cerebral artery and left posterior inferior cerebellar artery territories.  2. No new or progressive areas of ischemia or new intracranial hemorrhage.  3. Stable small left frontal subdural hygroma without significant mass effect.  4. Chronic parenchymal  microhemorrhages and superficial siderosis again raise the possibility of cerebral amyloid angiopathy and/or chronic hypertensive arteriopathy.    Physical Exam  Eyes:      Extraocular Movements: EOM normal.   Neurological:      Coordination: Finger-nose-finger test: not following command.

## 2020-09-26 NOTE — PROGRESS NOTES
Progress Note  Hospital Medicine  [facility] [place]       Patient Name: Lopez Savage  MRN:  [id number]  Hospital Medicine Team: [facility] MED X Isidoro Garcia MD  Date of Admission:  9/13/2020     Length of Stay:  LOS: 13 days   Expected Discharge Date: 9/28/2020  Principal Problem:  Status epilepticus     Subjective:     Interval History/Overnight Events:      - patient doing ok today, somewhat responsive, still agitated on restraints, will have psych assist with agitations  - went for LP today and concerning for possible neurosyphilis versus HSV versus other meningitis, started on ampicillin and ID re-consulted ; cont tube feeds and SLP  - EEG showed no current seizure activity      albuterol-ipratropium  3 mL Nebulization Q4H    ampicillin (OMNIPEN) 2 g in  mL EXTENDED INFUSION  2 g Intravenous Q4H    atorvastatin  40 mg Per NG tube Daily    cefTRIAXone (ROCEPHIN) IVPB  2 g Intravenous Q12H    enoxaparin  40 mg Subcutaneous Q24H    lacosamide  200 mg Per NG tube Q12H    losartan  25 mg Per NG tube Daily    miconazole nitrate 2%   Topical (Top) BID    pantoprazole  40 mg Per NG tube Daily    silodosin  8 mg Per NG tube Daily    sodium chloride 0.9%  10 mL Intravenous Q6H    traZODone  25 mg Per NG tube QHS    [START ON 9/27/2020] vancomycin (VANCOCIN) IVPB  1,250 mg Intravenous Q48H           acetaminophen, dextrose 50%, glucagon (human recombinant), insulin aspart U-100, labetalol, psyllium husk (aspartame), sodium chloride 0.9%, Flushing PICC Protocol **AND** sodium chloride 0.9% **AND** sodium chloride 0.9%, Pharmacy to dose Vancomycin consult **AND** vancomycin - pharmacy to dose    Review of Systems   Unable to obtain 2/2 AMS    Objective:     Temp:  [98.5 °F (36.9 °C)-99.1 °F (37.3 °C)]   Pulse:  [79-94]   Resp:  [18-28]   BP: ()/(53-67)   SpO2:  [20 %-100 %]         Weight change:    Body mass index is 37.11 kg/m².       Physical Exam:  Constitutional:  lethargic , chronically ill looking,   HENT: NC/AT, external ears normal  Eyes: PERRL, EOMI, conjunctiva normal,  Neck: normal ROM, supple  CV: RRR, no m/r/g, no carotid bruits, +2 peripheral pulses.  Pulmonary/Chest wall: Breathing comfortably w/o distress,  Decreased breath sounds at lung bases  GI: Soft, non-tender, (+) BS, (+) BM  Musculoskeletal: Normal ROM, no atrophy,   Neurological   awake but not following commands   Moves extremities spontaneously   Skin: warm, dry   +pallor  Psych awake but not following commands     Labs:    Chemistries:   Recent Labs   Lab 09/23/20 0622 09/24/20 0237 09/25/20  0356    143 142   K 3.3* 4.0 3.9    103 104   CO2 30* 32* 32*   BUN 31* 35* 38*   CREATININE 1.3 1.4 1.5*   CALCIUM 10.4 10.2 10.2   PROT 6.9 6.6 6.5   BILITOT 0.2 0.2 0.2   ALKPHOS 53* 51* 48*   ALT 10 11 7*   AST 14 12 13   MG 2.0  2.0 2.0 1.9   PHOS 3.8  3.8 4.8* 4.5        WBC:   Recent Labs   Lab 09/21/20  0300 09/22/20  1522 09/23/20  0622 09/24/20  0237 09/25/20  0356   WBC 8.44 8.43 10.11 9.57 9.58     Bands:     CBC/Anemia Labs: Coags:    Recent Labs   Lab 09/23/20 0622 09/24/20 0237 09/25/20  0356   WBC 10.11 9.57 9.58   HGB 8.5* 8.2* 7.7*   HCT 27.8* 27.2* 25.7*    196 182   MCV 92 94 93   RDW 15.3* 15.5* 15.7*    No results for input(s): PT, INR, APTT in the last 168 hours.     Diagnostic Results:    MRI brain 9/14/20    Numerous scattered punctate foci of cortical enhancement throughout both cerebral hemispheres.  Additional rim enhancing collection in the right occipital lobe with central diffusion restriction and surrounding vasogenic edema.  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     Persistent gyriform diffusion restriction the posterior left cerebral hemisphere as well as the hippocampus and dorsal lateral thalamus, although slightly improved from prior studies.  The distribution be typical for vascular territory infarct,  most suggestive of seizure related signal changes (status epilepticus).  Other encephalitis to be excluded clinically.     New small left frontal subdural hygroma without significant intracranial mass effect.     Stable small subacute left cerebellar infarct.     Mild chronic microvascular ischemic disease with remote right frontal and right occipital infarcts.     Numerous regions of prior parenchymal and leptomeningeal hemorrhage involving both cerebral hemispheres appearance raising the possibility underlying cerebral amyloid angiopathy.  No new hemorrhage.    CT head 9/16    FINDINGS:  There is stable cerebral atrophy and chronic small vessel ischemic changes.  There is an area of rounded hypodensity in the right occipital lobe, which corresponds to an overall finding of abscess formation and vasogenic edema in a patient with known endocarditis is noted on the MRI of the brain from 09/14/2020.  There is a remote right frontal infarct.  There are also areas of hypodensity seen in the left cerebellar hemisphere and left occipital lobe, which may be related to age indeterminate infarcts.  There is no acute intracranial hemorrhage, territorial infarct or mass effect, or midline shift..    Assessment and Plan     Hospital Course:    Mr. Lopez Savage was admitted to Hospital Medicine for management of  Status epilepticus     Active Hospital Problems    Diagnosis  POA    *Status epilepticus [G40.901]  Yes    Palliative care encounter [Z51.5]  Not Applicable    Goals of care, counseling/discussion [Z71.89]  Not Applicable    Advance care planning [Z71.89]  Not Applicable    Debility [R53.81]  Yes    Oral phase dysphagia [R13.11]  Yes    Seizure [R56.9]  Yes    Intracranial septic embolism [I76, I66.9]  Yes    Brain abscess [G06.0]  Yes    Cerebellar stroke [I63.9]  Yes    Cytotoxic brain edema [G93.6]  Yes    Encephalopathy acute [G93.40]  Yes    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         Encephalopathy acute  - likely multifactorial - 2/2 stroke , brain abscess, cytotoxic edema. hosp acquired delirium   - cont PT OT   - SLP- currently with NG tube   - Stroke team believe repeat MRI is stable   General neurology consulted. Recommended LP and EEG.    - EEG showed no current seizure activity      - LP done on 9/25 with FL, concerning for possible neurosyphilis versus HSV versus other meningitis, ampicillin added   - ID re-consulted     palliative care team consulted     Status epilepticus  - Multiple witnessed seizure-like activity described  - No previous reports from family  - Was started on keppra and vimpat 200 mg BID  - EEG at OSH shows irregular slowing in the R frontal region, with L posterior periodic epileptiform discharges. No seizures recorded during this study.  - 9/13 [facility] EEG continues to show periodic discharges in the left posterior region with no seizures.  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 If worsening mentation or without improvement recommend reimaging and if need for surgical washout for source control. Please send fluid for cultures (gram stain, aerobic, anaerobic, fungal, AFB) if undergoes drainage.     -ID recs:  Continue IV Vanc and cetriaxone for 6-8 weeks, end date 10/27-11/10 with repeat MRI post treatment  Continue ceftriaxone  "2gms IV q12hrs. Anticipate 6-8 weeks of IV vancomycin and ceftriaxone with repeat imaging to assess resolution of brain abscess.     Cerebellar stroke/ Embolic stroke involving left posterior cerebral artery  On 9/8 pt presented to [place] ED with unresponsiveness  No tPA due to recent GI bleed  MRI with left PCA infarct and left thalamic infarct along with small right PCA infarct  MRA demonstrates fetal circulation to left PCA  9/11 extubated  . Repeat MRI new acute/subacute L cerebellum infarct     -Neuro checks  -Vascular neurology was consulted - and recommended medical management - ASA , statin , PT OT SLP, BP control     Cytotoxic brain edema  Per prior documentation - "Area of cytotoxic cerebral edema identified when reviewing brain imaging in the territory of the left posterior cerebral artery, L thalamus, right posterior cerebral artery. There is no mass effect associated with it. We will continue to monitor the patients clinical exam for any worsening of symptoms which may indicate expansion of the stroke or the area of the edema resulting in the clinical change. The pattern is suggestive of embolic etiology"    Oral phase dysphagia  NGT in place, continuous tube feedings for nutrition  Cont enteral water flushes 200 ml q6hrs  SLP following, will likely need PEG  Encephalopathy limiting SLP eval  Needs PEG tube placement     Chronic bronchitis  Duonebs scheduled q4hrs  Monitor respiratory status, at risk for atelectasis  Continuous pulse oximetry, ABG prn  Continue CPT, pulmonary toileting    Diet:  NPO - tube feeds  GI PPx:   DVT PPx:  lovenox   Goals of Care:  Full     High Risk Conditions:  IE, brain abscess, seizures     Disposition:    TBD    Likely LTAC ultimately       "

## 2020-09-26 NOTE — CONSULTS
[facility] - ICU 14 WT  Infectious Disease  Consult Note    Patient Name: Lopez Savage  MRN: [id number]  Admission Date: 9/13/2020  Hospital Length of Stay: 13 days  Attending Physician: Isidoro Garcia MD  Primary Care Provider: Prasanna Haywood MD     Isolation Status: No active isolations    Inpatient consult to Infectious Diseases  Consult performed by: Hermilo Jean PA-C  Consult ordered by: Isidoro Garcia MD  Reason for consult: re-consult patient with septic emboli to the brain/absceess now with new LP findings concerning for HSV vs neurosyphilis        ID consult received. Chart being reviewed. Full note with recommendations to follow.    Thank you,  Hermilo Jean PA-C  [phone]

## 2020-09-26 NOTE — ASSESSMENT & PLAN NOTE
"75 y.o. male with recent endocarditis with mitral and aortic valve vegetations and recent GI bleed presented to [place] ED 9/8 via EMS after being found unresponsive at home. On arrival, he was intubated for airway protection. MRI brain and MRA head/neck 9/8 with Left PCA distribution acute infarction. Focus of restricted diffusion within the right occipital lobe with adjacent vasogenic edema, concerning for underlying lesion or septic embolus. MRA head/neck with no evidence of occlusion or high grade stenosis. On 9/8 pm, he was witnessed to have rhythmic shaking R upper and lower extremities, eye twitching and biting down on ET tube concerning for seizure activity. EEG 9/13-9/17 captured R hand jerking associated with L posterior discharges c/w EPC per Dr. Winslow. He was treated with Keppra 500 mg BID and Vimpat 200 mg BID. Keppra was discontinued on 9/15 per epilepsy team recs. MRI brain repeated x 3, most recently on 9/22 showing "continued maturing multifocal ischemia, possibly septic embolic in etiology, predominantly involving bilateral posterior cerebral artery and left posterior inferior cerebellar artery territories. No new or progressive areas of ischemia or new intracranial hemorrhage. Stable small left frontal subdural hygroma without significant mass effect. Chronic parenchymal microhemorrhages and superficial siderosis again raise the possibility of cerebral amyloid angiopathy and/or chronic hypertensive arteriopathy." Vascular Neuro felt given different distributions seen on imaging and recent endocarditis, cardioembolic etiology was felt most likely. Most recent MRI L PCA territory decreased attenuation more c/w seizure changes rather then embolic stroke and persistent R posterior abscess. General Neurology asked to weigh in on 9/23 to evaluate for encephalitis and seizure management.     EEG overnight without epileptiform discharges, but diffuse slowing is present (L slower than R at times)   LP " demonstrated WBC 21, RBC 6, Prot 31, Gluc 56.    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 Please contact with any questions or concerns.

## 2020-09-26 NOTE — ASSESSMENT & PLAN NOTE
This is a 74 year old male with PMH of hypertension, ulcerative colitis, GERD, chronic bronchitis, autoimmune hemolytic anemia, mitral regurgitation, and diverticulitis, admitted for subacute bacterial endocarditis  with strep mutans  in setting of upper GI bleed on 6 weeks of IV ceftriaxone (EOC 9/15) presents to ED for seizure activity and loss of consciousness. CT head negative for acute intracranial bleed. MRI +PCA infarct and right occipital lobe edema. He was started on doxycycline. Metronidazole, and ceftriaxone.      TTE 8/14 with vegetation of aortic valve. SILVANA 8/17 with an 18 mm mobile vegetation is of both the mitral and aortic valves. CTS saw pt then, no surgical intervention. Has completed 6 weeks of IV ceftriaxone, end date 9/15/2020.      MRI here with Numerous scattered punctate foci of cortical enhancement throughout both cerebral hemispheres.  Additional rim enhancing collection in the right occipital lobe with central diffusion restriction and surrounding vasogenic edema.  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 NSGY following, no acute surgical intervention. No seizure activity today.     BCXs:  9/8 1/4 CONS - suspect contaminant  9/10 - NGTD  9/14 NGTD    Recommendations  1. Continue ceftriaxone. Increased dose [id number]  2. Continue vancomycin. Trough goal closer to 20  3. NSGY following. No surgical intervention. Underwent LP yesterday. Concerns for HSV vs neurosyphilis.mentation much improved today. Studies pending. Ampicillin added per primary team. Continue therapy for now. Will tailor abx accordingly to lab and culture findings.   4. Blood cultures here NGTD.   6. Anticipate 6-8 weeks of IV vancomycin and ceftriaxone with repeat imaging to assess resolution of brain abscess   End date of IV antibiotics: 10/27/2020 -11/10/2020 with repeat MRI  8. Pt remained afebrile. No sz activity today.

## 2020-09-26 NOTE — PLAN OF CARE
Tube feedings continued at 50ml/hr. Patient receiving ampicillin [id number]. This infusion runs for 4 hours. Patient to receive 400ml water bolus in NGT 4x/day. Patient is currently receiving 1 unit PRBC (started at 1618). Losartan was discontinued. Restraints were discontinued. Patient is more alert and active today. Patient drank water and ate pudding today. Wife has been at bedside all day and will spend the night again tonight. Wife is very interactive with patient.

## 2020-09-26 NOTE — PROGRESS NOTES
"[facility] WT  Neurology  Progress Note    Patient Name: Lopez Savage  MRN: [id number]  Admission Date: 9/13/2020  Hospital Length of Stay: 13 days  Code Status: Full Code   Attending Provider: Isidoro Garcia MD  Primary Care Physician: Prasanna Haywood MD   Principal Problem:Status epilepticus    HPI:   75 y.o. 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 Per chart review, daughter found pt unresponsive on kitchen floor at 1 am and started chest compressions until EMS arrived. Upon arrival to ED, he was intubated for airway protection and sedated with propofol. Tele-stroke consult performed 9/8 and CT head unremarkable for early signs of infarct, hemorrhage and mass effect. tPA not given due to recent GI bleed. He was evaluated by Vascular Neurology 9/8 and MRI 9/8 read as " Left PCA distribution acute infarction. Focus of restricted diffusion within the right occipital lobe with adjacent vasogenic edema, concerning for underlying lesion or septic embolus." MRA head/neck with no evidence of occlusion or high grade stenosis. On 9/8 pm, he was witnessed to have rhythmic shaking R upper and lower extremities, eye twitching and biting down on ET tube. MRI brain repeated 9/11, 9/14 and 9/22 remarkable for "continued maturing multifocal ischemia, possibly septic embolic in etiology when correlating with prior imaging, predominantly involving bilateral posterior cerebral artery and left posterior inferior cerebellar artery territories. No new or progressive areas of ischemia or new intracranial hemorrhage. Stable small left frontal subdural hygroma without significant mass effect. Chronic parenchymal microhemorrhages and superficial siderosis again raise the possibility of " "cerebral amyloid angiopathy and/or chronic hypertensive arteriopathy." Extensive EEG monitoring this admission, most recently 9/13-9/17/20 captured some irregular jerking of R hand associated with L posterior discharges c/w EPC. Currently on Vimpat 200 mg BID. Neurology consulted 9/23 for "brain abscesses from septic emboli/new onset seizures and recent status and strokes. AMS concerning for either encephalitis/meningitis vs. ongoing seizures."       Overview/Hospital Course:  No notes on file        Subjective:     Interval History: No acute events overnight. Patient without new or worsening symptoms or change in examination. Wife at bedside.     Current Facility-Administered Medications   Medication Dose Route Frequency Provider Last Rate Last Dose    0.9%  NaCl infusion (for blood administration)   Intravenous Q24H PRN Isidoro Garcia MD        acetaminophen tablet 650 mg  650 mg Per NG tube Q6H PRN Pedro Desai MD   650 mg at 09/26/20 0042    albuterol-ipratropium 2.5 mg-0.5 mg/3 mL nebulizer solution 3 mL  3 mL Nebulization Q4H Angelique Prince NP   3 mL at 09/26/20 1242    ampicillin (OMNIPEN) 2 g in  mL EXTENDED INFUSION  2 g Intravenous Q4H Isidoro Garcia MD 25 mL/hr at 09/26/20 1214 2 g at 09/26/20 1214    atorvastatin tablet 40 mg  40 mg Per NG tube Daily Linda Natarajan MD   40 mg at 09/26/20 0835    cefTRIAXone (ROCEPHIN) 2 g/50 mL D5W IVPB  2 g Intravenous Q12H Isidoro Garcia MD   2 g at 09/26/20 0914    dextrose 50% injection 12.5 g  12.5 g Intravenous PRN Ambika Lee PA-C        enoxaparin injection 40 mg  40 mg Subcutaneous Q24H Isidoro Garcia MD   40 mg at 09/25/20 2118    glucagon (human recombinant) injection 1 mg  1 mg Intramuscular PRN Ambika Lee PA-C        insulin aspart U-100 pen 1-10 Units  1-10 Units Subcutaneous Q6H PRN Ambika Lee PA-C        labetalol 20 mg/4 mL (5 mg/mL) IV syring  10 mg Intravenous Q4H PRN Ron Molina MD   10 " mg at 09/23/20 0616    lacosamide oral solution 200 mg  200 mg Per NG tube Q12H Ron Molina MD   200 mg at 09/26/20 1142    miconazole nitrate 2% ointment   Topical (Top) BID Phyllis Sprague MD        pantoprazole suspension 40 mg  40 mg Per NG tube Daily Angelique Prince NP   40 mg at 09/26/20 0835    psyllium husk (aspartame) 3.4 gram packet 1 packet  1 packet Per NG tube Daily PRN Emma Owens NP   1 packet at 09/20/20 1143    silodosin capsule 8 mg  8 mg Per NG tube Daily Linda Natarajan MD   8 mg at 09/26/20 0835    sodium chloride 0.9% flush 10 mL  10 mL Intravenous Q6H Linda Natarajan MD   10 mL at 09/25/20 0534    sodium chloride 0.9% flush 10 mL  10 mL Intravenous PRN Isidoro Garcia MD        trazodone split tablet 25 mg  25 mg Per NG tube QHS Angelique Prince NP   25 mg at 09/25/20 2118    vancomycin - pharmacy to dose   Intravenous [facility] manage [facility]        [START ON 9/27/2020] vancomycin 1.25 g in dextrose 5% 250 mL IVPB (ready to mix)  1,250 mg Intravenous Q48H Isidoro Garcia MD         Review of Systems   Unable to perform ROS: Mental status change   Constitutional: Positive for activity change and fatigue.   HENT: Positive for trouble swallowing and voice change.    Musculoskeletal: Negative for neck stiffness.   Neurological: Positive for speech difficulty.   Psychiatric/Behavioral: Positive for confusion, decreased concentration and sleep disturbance.     Objective:     Vital Signs (Most Recent):  Temp: 98.2 °F (36.8 °C) (09/26/20 0745)  Pulse: 88 (09/26/20 1418)  Resp: 15 (09/26/20 1242)  BP: 133/60 (09/26/20 0745)  SpO2: 95 % (09/26/20 1418) Vital Signs (24h Range):  Temp:  [98.2 °F (36.8 °C)-98.7 °F (37.1 °C)] 98.2 °F (36.8 °C)  Pulse:  [79-94] 88  Resp:  [15-28] 15  SpO2:  [20 %-100 %] 95 %  BP: (123-133)/(60-78) 133/60     Weight: 114 kg (251 lb 5.2 oz)  Body mass index is 37.11 kg/m².      NEUROLOGICAL EXAMINATION:     MENTAL  STATUS   Oriented to person.   Follows 1 step commands.   Attention: decreased. Concentration: decreased.   Level of consciousness: alert       Eyes open  Tracks to voice  Able to identify his wife at bedside, states his name, states he is in the hospital.   Sticks tongue out, able to give a thumbs up.      CRANIAL NERVES     CN III, IV, VI   Extraocular motions are normal.   Nystagmus: none   Ophthalmoparesis: none    CN VIII   Hearing: intact       Face symmetric at rest      MOTOR EXAM   Muscle bulk: normal  Overall muscle tone: increased       Blt wrist restraints in place  Moves all extremities against gravity spontaneously   No abnormal movements noted   Not following commands to further assess strength      GAIT AND COORDINATION     Gait  Gait: (deferred)     Coordination   Finger-nose-finger test: not following command.    Tremor   Resting tremor: absent    Significant Labs:   CBC:   Recent Labs   Lab 09/25/20 0356 09/26/20 0130   WBC 9.58 9.00   HGB 7.7* 7.2*   HCT 25.7* 25.2*    181     CMP:   Recent Labs   Lab 09/25/20 0356 09/26/20 0130   GLU 95 111*    140   K 3.9 3.8    104   CO2 32* 30*   BUN 38* 36*   CREATININE 1.5* 1.5*   CALCIUM 10.2 9.9   MG 1.9 2.0   PROT 6.5 6.5   ALBUMIN 2.3* 2.3*   BILITOT 0.2 0.1   ALKPHOS 48* 46*   AST 13 14   ALT 7* 10   ANIONGAP 6* 6*   EGFRNONAA 45.2* 45.2*     Inflammatory Markers: No results for input(s): SEDRATE, CRP, PROCAL in the last 48 hours.  Urine Culture: No results for input(s): LABURIN in the last 48 hours.  Urine Studies: No results for input(s): COLORU, APPEARANCEUA, PHUR, SPECGRAV, PROTEINUA, GLUCUA, KETONESU, BILIRUBINUA, OCCULTUA, NITRITE, UROBILINOGEN, LEUKOCYTESUR, RBCUA, WBCUA, BACTERIA, SQUAMEPITHEL, HYALINECASTS in the last 48 hours.    Invalid input(s): WRIGHTSUR  All pertinent lab results from the past 24 hours have been reviewed.    Significant Imaging: I have reviewed and interpreted all pertinent imaging results/findings  "within the past 24 hours.     MRI Brain WO contrast (9/22/20):  1. Relative to 09/14/2020, continued maturing multifocal ischemia, possibly septic embolic in etiology when correlating with prior imaging, predominantly involving bilateral posterior cerebral artery and left posterior inferior cerebellar artery territories.  2. No new or progressive areas of ischemia or new intracranial hemorrhage.  3. Stable small left frontal subdural hygroma without significant mass effect.  4. Chronic parenchymal microhemorrhages and superficial siderosis again raise the possibility of cerebral amyloid angiopathy and/or chronic hypertensive arteriopathy.    Physical Exam  Eyes:      Extraocular Movements: EOM normal.   Neurological:      Coordination: Finger-nose-finger test: not following command.           Assessment and Plan:     Brain abscess  75 y.o. male with recent endocarditis with mitral and aortic valve vegetations and recent GI bleed presented to [place] ED 9/8 via EMS after being found unresponsive at home. On arrival, he was intubated for airway protection. MRI brain and MRA head/neck 9/8 with Left PCA distribution acute infarction. Focus of restricted diffusion within the right occipital lobe with adjacent vasogenic edema, concerning for underlying lesion or septic embolus. MRA head/neck with no evidence of occlusion or high grade stenosis. On 9/8 pm, he was witnessed to have rhythmic shaking R upper and lower extremities, eye twitching and biting down on ET tube concerning for seizure activity. EEG 9/13-9/17 captured R hand jerking associated with L posterior discharges c/w EPC per Dr. Winslow. He was treated with Keppra 500 mg BID and Vimpat 200 mg BID. Keppra was discontinued on 9/15 per epilepsy team recs. MRI brain repeated x 3, most recently on 9/22 showing "continued maturing multifocal ischemia, possibly septic embolic in etiology, predominantly involving bilateral posterior cerebral artery and left posterior " "inferior cerebellar artery territories. No new or progressive areas of ischemia or new intracranial hemorrhage. Stable small left frontal subdural hygroma without significant mass effect. Chronic parenchymal microhemorrhages and superficial siderosis again raise the possibility of cerebral amyloid angiopathy and/or chronic hypertensive arteriopathy." Vascular Neuro felt given different distributions seen on imaging and recent endocarditis, cardioembolic etiology was felt most likely. Most recent MRI L PCA territory decreased attenuation more c/w seizure changes rather then embolic stroke and persistent R posterior abscess. General Neurology asked to weigh in on 9/23 to evaluate for encephalitis and seizure management.     EEG overnight without epileptiform discharges, but diffuse slowing is present (L slower than R at times)   LP demonstrated WBC 21, RBC 6, Prot 31, Gluc 56.    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 Please contact with any questions or concerns.         VTE Risk Mitigation (From admission, onward)         Ordered     enoxaparin injection 40 mg  Every 24 hours      09/23/20 1350     IP VTE HIGH RISK PATIENT  Once      09/13/20 1518     Place sequential compression device  Until discontinued      09/13/20 1119              Neurology signing off at this time. Please contact with any questions or concerns.     Mukesh Katz MD  Neurology  [facility] WT  "

## 2020-09-26 NOTE — SUBJECTIVE & OBJECTIVE
Interval History:   ID reconsulted as was noted to have worsening mentation past few days. LP yesterday demonstrated WBC 21. RBC 6, prot 321, gluc 56.    Repeat imaging with continued maturing multifocal ischemia, possibly septic embolic in etiology when correlating with prior imaging, predominantly involving bilateral posterior cerebral artery and left posterior inferior cerebellar artery territories.  2. No new or progressive areas of ischemia or new intracranial hemorrhage.  3. Stable small left frontal subdural hygroma without significant mass effect.    Review of Systems   Unable to perform ROS: Acuity of condition     Objective:     Vital Signs (Most Recent):  Temp: 98.6 °F (37 °C) (09/26/20 1618)  Pulse: 93 (09/26/20 1618)  Resp: (!) 26 (09/26/20 1618)  BP: 131/60 (09/26/20 1618)  SpO2: 96 % (09/26/20 1618) Vital Signs (24h Range):  Temp:  [98.2 °F (36.8 °C)-98.7 °F (37.1 °C)] 98.6 °F (37 °C)  Pulse:  [79-94] 93  Resp:  [15-28] 26  SpO2:  [20 %-100 %] 96 %  BP: (123-133)/(58-78) 131/60     Weight: 114 kg (251 lb 5.2 oz)  Body mass index is 37.11 kg/m².    Estimated Creatinine Clearance: 53.8 mL/min (A) (based on SCr of 1.5 mg/dL (H)).    Physical Exam  Vitals signs reviewed.   Constitutional:       Appearance: He is ill-appearing. He is not diaphoretic.      Comments: NG tube in place    Cardiovascular:      Rate and Rhythm: Normal rate.   Pulmonary:      Effort: Pulmonary effort is normal.   Abdominal:      General: Abdomen is flat. There is no distension.      Palpations: There is no mass.   Musculoskeletal:         General: No swelling, tenderness, deformity or signs of injury.   Skin:     General: Skin is warm and dry.      Coloration: Skin is not jaundiced or pale.   Neurological:      Comments: Alert  More communicative today per nursing  Answering questions         Significant Labs: All pertinent labs within the past 24 hours have been reviewed.    Significant Imaging: I have reviewed all pertinent  imaging results/findings within the past 24 hours.

## 2020-09-27 NOTE — PROGRESS NOTES
Pt removed NGT. After the replacement of another NGT. Pt was still attempting to remove his NGT. Order was placed for non-violent soft restraints for Pt trying to removed medical devices. Currently at this time Pt seems to be more aware of his surroundings and is not attempting to pull out his NGT. Restraints have not been applied to Pt, there is still an order for restraints until 9/28/20 4962. Will cont to monitor.

## 2020-09-27 NOTE — NURSING
Paused NGT feeding to clean patient, noted patient making gurgling noise.  MD notified and requested an order for a STAT KUB to confirm NGT placement.  Will restart feeding and use of NGT when placement is verified.  Pt's nurse notified.

## 2020-09-27 NOTE — PROGRESS NOTES
Pharmacokinetic Assessment Follow Up: IV Vancomycin    Vancomycin serum concentration assessment(s):    The trough level was drawn correctly and can be used to guide therapy at this time. The measurement is below the desired definitive target range of 15 to 20 mcg/mL.      Vancomycin Regimen Plan:    The dose 1250 mg IV was given at 1148 today. Next dose will be at 1000 on 09/29. Lab will be drawn an hour before the next dose.     Continue regimen to Vancomycin 1250 mg IV every 48 hours with next serum trough concentration measured at 0900 on 09/29/2020    Drug levels (last 3 results):  Recent Labs   Lab Result Units 09/25/20  0356 09/27/20  1058   Vancomycin, Random ug/mL 17.3  --    Vancomycin-Trough ug/mL  --  13.8       Pharmacy will continue to follow and monitor vancomycin.      Thank you for the consult,   Cheng Jean       Patient brief summary:  Lopez Savage is a 75 y.o. male initiated on antimicrobial therapy with IV Vancomycin for treatment of endocarditis/brain abscess    Drug Allergies:   Review of patient's allergies indicates:   Allergen Reactions    Codeine     Tetanus vaccines and toxoid        Actual Body Weight:   114 kg    Renal Function:   Estimated Creatinine Clearance: 53.8 mL/min (A) (based on SCr of 1.5 mg/dL (H)).,       CBC (last 72 hours):  Recent Labs   Lab Result Units 09/25/20  0356 09/26/20  0130 09/27/20  0426   WBC K/uL 9.58 9.00 8.57   Hemoglobin g/dL 7.7* 7.2* 8.1*   Hematocrit % 25.7* 25.2* 27.1*   Platelets K/uL 182 181 171   Gran% % 74.1* 72.9 73.9*   Lymph% % 9.8* 10.0* 11.2*   Mono% % 11.3 11.0 9.0   Eosinophil% % 4.0 5.0 4.4   Basophil% % 0.4 0.7 0.9   Differential Method  Automated Automated Automated       Metabolic Panel (last 72 hours):  Recent Labs   Lab Result Units 09/25/20  0356 09/25/20  1230 09/26/20  0130 09/27/20  0426   Sodium mmol/L 142  --  140 140   Potassium mmol/L 3.9  --  3.8 3.5   Chloride mmol/L 104  --  104 103   CO2 mmol/L 32*  --  30* 28    Glucose mg/dL 95  --  111* 89   Glucose, CSF mg/dL  --  56  --   --    BUN, Bld mg/dL 38*  --  36* 39*   Creatinine mg/dL 1.5*  --  1.5* 1.5*   Albumin g/dL 2.3*  --  2.3* 2.4*   Total Bilirubin mg/dL 0.2  --  0.1 0.3   Alkaline Phosphatase U/L 48*  --  46* 47*   AST U/L 13  --  14 13   ALT U/L 7*  --  10 9*   Magnesium mg/dL 1.9  --  2.0 1.8   Phosphorus mg/dL 4.5  --  5.1* 4.1       Vancomycin Administrations:  vancomycin given in the last 96 hours                   vancomycin 1.25 g in dextrose 5% 250 mL IVPB (ready to mix) (mg) 1,250 mg New Bag 09/27/20 1148    vancomycin 1.25 g in dextrose 5% 250 mL IVPB (ready to mix) (mg) 1,250 mg New Bag 09/25/20 1010                Microbiologic Results:  Microbiology Results (last 7 days)     Procedure Component Value Units Date/Time    CSF culture [[id number]] Collected: 09/25/20 1230    Order Status: Completed Specimen: CSF (Spinal Fluid) from CSF Tap, Tube 1 Updated: 09/27/20 0720     CSF CULTURE No Growth to date     Gram Stain Result No WBC's      No organisms seen    Narrative:      Add on VDRL By Rosalva Farfan MD Order #[id number]  09/25/2020  17:51     Blood culture [[id number]] Collected: 09/22/20 1522    Order Status: Completed Specimen: Blood from Peripheral, Left Hand Updated: 09/26/20 1612     Blood Culture, Routine No Growth to date      No Growth to date      No Growth to date      No Growth to date      No Growth to date    Narrative:      Collection has been rescheduled by BDW at 09/22/2020 14:57 Reason:   Patient unavailable  Collection has been rescheduled by BDW at 09/22/2020 14:57 Reason:   Patient unavailable    Blood culture [[id number]] Collected: 09/22/20 1522    Order Status: Completed Specimen: Blood from Antecubital, Left Arm Updated: 09/26/20 1612     Blood Culture, Routine No Growth to date      No Growth to date      No Growth to date      No Growth to date      No Growth to date    Narrative:      Collection has been rescheduled by BDW at  09/22/2020 14:57 Reason:   Patient unavailable  Collection has been rescheduled by BDW at 09/22/2020 14:57 Reason:   Patient unavailable    Gram stain [[id number]] Collected: 09/25/20 1230    Order Status: Canceled Specimen: CSF (Spinal Fluid) from CSF Tap, Tube 1     Blood culture [[id number]]     Order Status: Canceled Specimen: Blood     Blood culture [[id number]]     Order Status: Canceled Specimen: Blood

## 2020-09-27 NOTE — PROGRESS NOTES
Progress Note  Hospital Medicine  [facility], [place]       Patient Name: Lopez Savage  MRN:  [id number]  Hospital Medicine Team: [facility] X Isidoro Garcia MD  Date of Admission:  9/13/2020     Length of Stay:  LOS: 13 days   Expected Discharge Date: 9/28/2020  Principal Problem:  Status epilepticus     Subjective:     Interval History/Overnight Events:      - patient is much more responsive today, awake, trying to have conversation with some aphasia, states he is at [facility]; wife at the bedside; she feels ampicillin is what really is the difference of why his mentation improved, ID re-consulted and do not feel patient developed listeria meningitis in the hospital, wife hesitant to stop ampicillin, will defer to ID; awaiting FTA-ABs  - patient's hemoglobin has been slowly going down over the last week to 7.2 and patient is symptomatic, received multiple units of PRBC in the hospital, however no evidence of GI bleeding, brown stools; iron studies show sever iron deficiency anemia, will transfuse 1 unit of PRBC and start IV iron tomorrow for 5 days    - if patient able to tolerate more of a diet, can likely pull NG tube and can plan for discharge to LTAC next week;      albuterol-ipratropium  3 mL Nebulization Q4H    ampicillin (OMNIPEN) 2 g in  mL EXTENDED INFUSION  2 g Intravenous Q4H    atorvastatin  40 mg Per NG tube Daily    cefTRIAXone (ROCEPHIN) IVPB  2 g Intravenous Q12H    enoxaparin  40 mg Subcutaneous Q24H    [START ON 9/27/2020] ferric gluconate (FERRLECIT) IVPB  125 mg Intravenous Daily    lacosamide  200 mg Per NG tube Q12H    miconazole nitrate 2%   Topical (Top) BID    pantoprazole  40 mg Per NG tube Daily    silodosin  8 mg Per NG tube Daily    sodium chloride 0.9%  10 mL Intravenous Q6H    traZODone  25 mg Per NG tube QHS    [START ON 9/27/2020] vancomycin (VANCOCIN) IVPB  1,250 mg Intravenous Q48H           sodium chloride, acetaminophen, dextrose  50%, glucagon (human recombinant), insulin aspart U-100, labetalol, psyllium husk (aspartame), sodium chloride 0.9%, Pharmacy to dose Vancomycin consult **AND** vancomycin - pharmacy to dose    Review of Systems   Unable to obtain 2/2 AMS    Objective:     Temp:  [97 °F (36.1 °C)-98.7 °F (37.1 °C)]   Pulse:  [79-94]   Resp:  [15-28]   BP: (123-133)/(58-78)   SpO2:  [95 %-100 %]         Weight change:    Body mass index is 37.11 kg/m².       Physical Exam:  Constitutional: lethargic , chronically ill looking,   HENT: NC/AT, external ears normal  Eyes: PERRL, EOMI, conjunctiva normal,  Neck: normal ROM, supple  CV: RRR, no m/r/g, no carotid bruits, +2 peripheral pulses.  Pulmonary/Chest wall: Breathing comfortably w/o distress,  Decreased breath sounds at lung bases  GI: Soft, non-tender, (+) BS, (+) BM  Musculoskeletal: Normal ROM, no atrophy,   Neurological   awake but not following commands   Moves extremities spontaneously   Skin: warm, dry   +pallor  Psych awake but not following commands     Labs:    Chemistries:   Recent Labs   Lab 09/24/20 0237 09/25/20 0356 09/26/20 0130    142 140   K 4.0 3.9 3.8    104 104   CO2 32* 32* 30*   BUN 35* 38* 36*   CREATININE 1.4 1.5* 1.5*   CALCIUM 10.2 10.2 9.9   PROT 6.6 6.5 6.5   BILITOT 0.2 0.2 0.1   ALKPHOS 51* 48* 46*   ALT 11 7* 10   AST 12 13 14   MG 2.0 1.9 2.0   PHOS 4.8* 4.5 5.1*        WBC:   Recent Labs   Lab 09/22/20  1522 09/23/20  0622 09/24/20 0237 09/25/20 0356 09/26/20  0130   WBC 8.43 10.11 9.57 9.58 9.00     Bands:     CBC/Anemia Labs: Coags:    Recent Labs   Lab 09/24/20 0237 09/25/20 0356 09/26/20  0130 09/26/20  1339   WBC 9.57 9.58 9.00  --    HGB 8.2* 7.7* 7.2*  --    HCT 27.2* 25.7* 25.2*  --     182 181  --    MCV 94 93 96  --    RDW 15.5* 15.7* 15.9*  --    IRON  --   --   --  20*   FERRITIN  --   --   --  1,261*   FOLATE  --   --   --  6.6   [id number]  --   --   --  468    No results for input(s): PT, INR, APTT in the  last 168 hours.     Diagnostic Results:    MRI brain 9/14/20    Numerous scattered punctate foci of cortical enhancement throughout both cerebral hemispheres.  Additional rim enhancing collection in the right occipital lobe with central diffusion restriction and surrounding vasogenic edema.  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     Persistent gyriform diffusion restriction the posterior left cerebral hemisphere as well as the hippocampus and dorsal lateral thalamus, although slightly improved from prior studies.  The distribution be typical for vascular territory infarct, most suggestive of seizure related signal changes (status epilepticus).  Other encephalitis to be excluded clinically.     New small left frontal subdural hygroma without significant intracranial mass effect.     Stable small subacute left cerebellar infarct.     Mild chronic microvascular ischemic disease with remote right frontal and right occipital infarcts.     Numerous regions of prior parenchymal and leptomeningeal hemorrhage involving both cerebral hemispheres appearance raising the possibility underlying cerebral amyloid angiopathy.  No new hemorrhage.    CT head 9/16    FINDINGS:  There is stable cerebral atrophy and chronic small vessel ischemic changes.  There is an area of rounded hypodensity in the right occipital lobe, which corresponds to an overall finding of abscess formation and vasogenic edema in a patient with known endocarditis is noted on the MRI of the brain from 09/14/2020.  There is a remote right frontal infarct.  There are also areas of hypodensity seen in the left cerebellar hemisphere and left occipital lobe, which may be related to age indeterminate infarcts.  There is no acute intracranial hemorrhage, territorial infarct or mass effect, or midline shift..    Assessment and Plan     Hospital Course:    Mr. Lopez Savage was admitted to [facility] Medicine for management  of  Status epilepticus     Active Hospital Problems    Diagnosis  POA    *Status epilepticus [G40.901]  Yes    Palliative care encounter [Z51.5]  Not Applicable    Goals of care, counseling/discussion [Z71.89]  Not Applicable    Advance care planning [Z71.89]  Not Applicable    Debility [R53.81]  Yes    Oral phase dysphagia [R13.11]  Yes    Seizure [R56.9]  Yes    Intracranial septic embolism [I76, I66.9]  Yes    Brain abscess [G06.0]  Yes    Cerebellar stroke [I63.9]  Yes    Cytotoxic brain edema [G93.6]  Yes    Encephalopathy acute [G93.40]  Yes    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         Encephalopathy acute  - likely multifactorial - 2/2 stroke , brain abscess, cytotoxic edema. hosp acquired delirium   - cont PT OT   - SLP- currently with NG tube   - Stroke team believe repeat MRI is stable   General neurology consulted. Recommended LP and EEG.    - EEG showed no current seizure activity      - LP done on 9/25 with FL, concerning for possible neurosyphilis versus HSV versus other meningitis, ampicillin added   - ID re-consulted   - mentation much improved today       Status epilepticus  - Multiple witnessed seizure-like activity described  - No previous reports from family  - Was started on keppra and vimpat 200 mg BID  - EEG at OSH shows irregular slowing in the R frontal region, with L posterior periodic epileptiform discharges. No seizures recorded during this study.  - 9/13 [facility] [place] continues to show periodic discharges in the left posterior region with no seizures.  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 If worsening mentation or without improvement recommend reimaging and if need for surgical washout for source control. Please send fluid for cultures (gram stain, aerobic, anaerobic, fungal, AFB) if undergoes drainage.     -ID recs:  Continue IV Vanc and cetriaxone for 6-8 weeks, end date 10/27-11/10 with repeat MRI post treatment  Continue ceftriaxone 2gms IV q12hrs. Anticipate 6-8 weeks of IV vancomycin and ceftriaxone with repeat imaging to assess resolution of brain abscess.     Cerebellar stroke/ Embolic stroke involving left posterior cerebral artery  On 9/8 pt presented to [place] ED with unresponsiveness  No tPA due to recent GI bleed  MRI with left PCA infarct and left thalamic infarct along with small right PCA infarct  MRA demonstrates fetal circulation to left PCA  9/11 extubated  . Repeat MRI new acute/subacute L cerebellum infarct     -Neuro checks  -Vascular neurology was consulted - and recommended medical management - ASA , statin , PT OT SLP, BP control     Cytotoxic brain edema  Per prior documentation - "Area of cytotoxic cerebral edema identified when reviewing brain imaging in the territory of the left posterior cerebral artery, L thalamus, right posterior cerebral artery. There is no mass effect associated with it. We will continue to monitor the patients clinical exam for any worsening of symptoms which may indicate expansion of the stroke or the area of the edema resulting in the clinical change. The pattern is suggestive of embolic etiology"    Oral phase dysphagia  NGT in place, continuous tube feedings for nutrition  Cont enteral water flushes 200 ml q6hrs  SLP following, will likely need PEG  Encephalopathy limiting SLP " eval  - tolerating more of a diet,     Chronic bronchitis  Duonebs scheduled q4hrs  Monitor respiratory status, at risk for atelectasis  Continuous pulse oximetry, ABG prn  Continue CPT, pulmonary toileting    # Iron deficiency anemia  - transfusing 1 unit of PRBC on 9/26  - starting IV iron tomorrow     Diet:  Dysphagia  - tube feeds  GI PPx:   DVT PPx:  lovenox   Goals of Care:  Full     High Risk Conditions:  IE, brain abscess, seizures     Disposition:    TBD    Likely LTAC next week

## 2020-09-27 NOTE — PROGRESS NOTES
Spoke with Jose HANNAH, notified MD that CXR was reading to be read for NGT placement verification. Awaiting OK order to be placed, before starting back Tube feedings, water boluses and giving morning meds. Will cont to monitor

## 2020-09-27 NOTE — PLAN OF CARE
"Pt alert; oriented to self. Follows some simple commands appropriately. VSS. Tube feeding presently on hold awaiting official confirmation of NGT placement prior to restarting tube feeding; bolus not given at this time; NGT remains clamped. Required 2 1/2" reinsertion of NGT according to bedside xray per radiology tech; done and tolerated well. Pt compliant. Turned & repositioned every 2 hours. Wife at bedside and aware of plan of care. Blood sugar 109 and 96. Safety maintained. Will continue to monitor.   "

## 2020-09-27 NOTE — PLAN OF CARE
BG monitoring cont; no SSI given during shift. NGT was replaced after Pt pulled his out. reconfirmed for placement by MD; Tube feedings restarted @ 50mL/hr, which is Pts goal. Pt cont to infuse with rocephin, and Vancomycin; Ampicillin DC.  Spouse @ bedside for most of shift will return on Monday. Turn Q2 HAPI precautions maintained. Pt remains free from injury/falls for shift. Educated Pt on meds no questions at this time. VSS.  No complaints of CP, SOB, pain/discomfort  Bed locked in lowest position, call bell within reach. All questions addressed.  Will continue to monitor.

## 2020-09-27 NOTE — HOSPITAL COURSE
09/27/2020  Wife at bedside. Patient doing well today. Improved mentation and alertness.    9/28/20  Patient seen at bedside with wife present.  Today patient is delirious.  Attention to interview is poor and randomly says he was in [place] before coming to the hospital.  Is unable to state the name.  Does attest to being in pain located in his stomach.    On discussion wife is not very agitated when not in restraints.  He is not combative, trying to leave or pulling on IV lines.  He does however try to pull his NG tube likely due to his poor mentation of not understanding its purpose and it also causing irritation.

## 2020-09-28 NOTE — NURSING
Bleeding from R nare has stopped now. O2 sats down to 84% on room air. Placed pt on O2 2L NC. O2 sats up to 97%.

## 2020-09-28 NOTE — PLAN OF CARE
MD informed ze pt may be ready for dc this week to inpt rehab with IV antib.    Pt preference is for [facility]; [facility] already following pt. ZE updated Raza, informing him of dc with IV antib (x2). ZE placed PMR consult.    Viki Carlos, [facility] [id number]

## 2020-09-28 NOTE — CARE UPDATE
Rapid Response Nurse Chart Check     Chart check completed, abnormal VS noted. Charge RN, Dalton contacted. No concerns verbalized at this time. Instructed to call [phone] for further concerns or assistance.

## 2020-09-28 NOTE — PROGRESS NOTES
[facility] - ICU 14 WT  Neurosurgery  Progress Note    Subjective:     History of Present Illness: 74 M with  recent endocarditis (Strep mutans per report, s/p IV Rocephin) with aortic valve vegetations (8/20), recent GIB (8/20), diverticulitis, GERD, MVP, and COPD who was transferred to [facility] on 9/13 for cEEG. The patient presented to [facility] on 9/8/2020 after LOC at home. Per report patient had been c/o worsening SOB, had portable CXR with effusion v PNA. Patient was given course of doxycycline and finished course recently. Reportedly on 9/8 his wife went to bed while  was watching TV and about an hour later her daughter found him non responsive on the ground, short round of chest compressions before intubated in ED, sedated on Prop.  No seizure like activity was observed in ED. TPA contraindicated due to recent GI bleed, last occurring in August.  MRI head showed L PCA infarct and right occipital lobe edema. 9/8 EEG demonstrated left posterior pseudo periodic epileptiform discharges is suggestive of an irritative region likely secondary to focal ischemia. Patient started on AEDs vimpat and keppra and reported to have several seizures during his stay at [place], currently on cEEG in [facility]. NSGY consulted following MRI Brain this evening with 1.2cm R occipital lesion concerning for abscess.     On Lovenox at home, SQH at [facility].     Reconsult for change in mental status today, not interacting with his wife, aphasia, not following commands.     Post-Op Info:  Procedure(s) (LRB):  Lumbar Puncture (N/A)   3 Days Post-Op     Interval History: JAYESH    Medications:  Continuous Infusions:  Scheduled Meds:   albuterol-ipratropium  3 mL Nebulization Q4H    atorvastatin  40 mg Per NG tube Daily    cefTRIAXone (ROCEPHIN) IVPB  2 g Intravenous Q12H    enoxaparin  40 mg Subcutaneous Q24H    ferric gluconate (FERRLECIT) IVPB  125 mg Intravenous Daily    lacosamide  200 mg Per NG tube  Q12H    miconazole nitrate 2%   Topical (Top) BID    pantoprazole  40 mg Per NG tube Daily    silodosin  8 mg Per NG tube Daily    sodium chloride 0.9%  10 mL Intravenous Q6H    traZODone  25 mg Per NG tube QHS    vancomycin (VANCOCIN) IVPB  1,000 mg Intravenous Q24H     PRN Meds:acetaminophen, dextrose 50%, glucagon (human recombinant), haloperidoL, insulin aspart U-100, labetalol, psyllium husk (aspartame), sodium chloride 0.9%, Pharmacy to dose Vancomycin consult **AND** vancomycin - pharmacy to dose     Review of Systems  Objective:     Weight: 114 kg (251 lb 5.2 oz)  Body mass index is 37.11 kg/m².  Vital Signs (Most Recent):  Temp: 98.1 °F (36.7 °C) (09/28/20 1202)  Pulse: 91 (09/28/20 1214)  Resp: (!) 24 (09/28/20 1214)  BP: (!) 159/70 (09/28/20 1202)  SpO2: 100 % (09/28/20 1214) Vital Signs (24h Range):  Temp:  [97.6 °F (36.4 °C)-98.6 °F (37 °C)] 98.1 °F (36.7 °C)  Pulse:  [82-95] 91  Resp:  [17-45] 24  SpO2:  [96 %-100 %] 100 %  BP: (140-172)/(62-74) 159/70                          NG/OG Tube 09/27/20 2205 14 Fr. Right nostril (Active)   Placement Check physician notified 09/28/20 0000   Tube advanced (cm) 4 09/21/20 0859   Advancement advanced manually 09/21/20 0859   Tolerance no signs/symptoms of discomfort 09/27/20 2000   Securement secured to nostril center 09/28/20 0000   Clamp Status/Tolerance unclamped 09/28/20 0000   Insertion Site Appearance no redness, warmth, tenderness, skin breakdown, drainage 09/27/20 2000   Flush/Irrigation flushed w/;water;no resistance met 09/28/20 0600   Feeding Type continuous 09/28/20 0000   Feeding Action feeding continued 09/27/20 2000   Current Rate (mL/hr) 50 mL/hr 09/27/20 2000   Goal Rate (mL/hr) 50 mL/hr 09/27/20 2000   Intake (mL) 50 mL 09/26/20 1629   Water Bolus (mL) 400 mL 09/28/20 0600   Rate Formula Tube Feeding (mL/hr) 50 mL/hr 09/27/20 2000   Formula Name Impact Peptide 09/27/20 2000   Intake (mL) - Formula Tube Feeding 350 09/28/20 0600   Residual  Amount (ml) 0 ml 09/28/20 0400       Neurosurgery Physical Exam     [id number]  AOx1  Garbled speech  PERRL  CN intact  Follows commands x4    Significant Labs:  Recent Labs   Lab 09/27/20 0426 09/28/20 0449   GLU 89 103    140   K 3.5 3.4*    103   CO2 28 28   BUN 39* 35*   CREATININE 1.5* 1.3   CALCIUM 10.1 10.1   MG 1.8 1.7     Recent Labs   Lab 09/27/20 0426 09/28/20 0449   WBC 8.57 9.14   HGB 8.1* 8.5*   HCT 27.1* 27.9*    188     No results for input(s): LABPT, INR, APTT in the last 48 hours.  Microbiology Results (last 7 days)     Procedure Component Value Units Date/Time    CSF culture [[id number]] Collected: 09/25/20 1230    Order Status: Completed Specimen: CSF (Spinal Fluid) from CSF Tap, Tube 1 Updated: 09/28/20 0733     CSF CULTURE No Growth to date     Gram Stain Result No WBC's      No organisms seen    Narrative:      Add on VDRL By Rosalva Farfan MD Order #[id number]  09/25/2020  17:51     Blood culture [[id number]] Collected: 09/22/20 1522    Order Status: Completed Specimen: Blood from Peripheral, Left Hand Updated: 09/27/20 1612     Blood Culture, Routine No growth after 5 days.    Narrative:      Collection has been rescheduled by BDW at 09/22/2020 14:57 Reason:   Patient unavailable  Collection has been rescheduled by BDW at 09/22/2020 14:57 Reason:   Patient unavailable    Blood culture [[id number]] Collected: 09/22/20 1522    Order Status: Completed Specimen: Blood from Antecubital, Left Arm Updated: 09/27/20 1612     Blood Culture, Routine No growth after 5 days.    Narrative:      Collection has been rescheduled by BDW at 09/22/2020 14:57 Reason:   Patient unavailable  Collection has been rescheduled by BDW at 09/22/2020 14:57 Reason:   Patient unavailable    Gram stain [[id number]] Collected: 09/25/20 1230    Order Status: Canceled Specimen: CSF (Spinal Fluid) from CSF Tap, Tube 1     Blood culture [[id number]]     Order Status: Canceled Specimen: Blood     Blood culture  [[id number]]     Order Status: Canceled Specimen: Blood         All pertinent labs from the last 24 hours have been reviewed.    Significant Diagnostics:  I have reviewed and interpreted all pertinent imaging results/findings within the past 24 hours.    Assessment/Plan:     Bacterial endocarditis  74M with known bacterial endocarditis with septic emboli c/b L PCA infarct, scattered punctate infarctions and R occipital lesion concerning for intracranial abscess. Reconsulted for change in mental status and aphasia    Clinically with slow improvement over the weekend, starting to speak and interact appropriately, follows commands with all extremities  LP with 21 WBC and 56 glucose, NGTD, per medicine, may be due to septic emboli  -- Will repeat MRI tomorrow morning to ensure abscess is not growing        -q1h neurochecks in ICU, q2h neurochecks in stepdown, q4h neurochecks on floor  --SBP <160 (cardene ggt; hydralazine & labetalol PRN; transition to home meds when appropriate)  --Na >135  --VN following  --AED per NCC/Epilepsy  --Abx per ID, f/up BCx - 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   --NO steroids  --HOB >30  --PPI given GIB per NCC  --Continue to monitor clinically, notify NSGY immediately with any changes in neuro status    Dispo: ICU               Idalia Haddad MD  Neurosurgery  [facility] - ICU 14 WT

## 2020-09-28 NOTE — PLAN OF CARE
Problem: Adult Inpatient Plan of Care  Goal: Plan of Care Review  Outcome: Ongoing, Progressing  Goal: Patient-Specific Goal (Individualization)  Description: Admit Date:     Admit Dx:    No past medical history on file.    No past surgical history on file.    Individualization:   1.     Restraints: (date/time/why or N/A)  9/17/20 pulling at lines        Outcome: Ongoing, Progressing  Goal: Absence of Hospital-Acquired Illness or Injury  Outcome: Ongoing, Progressing  Goal: Optimal Comfort and Wellbeing  Outcome: Ongoing, Progressing  Goal: Readiness for Transition of Care  Outcome: Ongoing, Progressing  Goal: Rounds/Family Conference  Outcome: Ongoing, Progressing     Problem: Fall Injury Risk  Goal: Absence of Fall and Fall-Related Injury  Outcome: Ongoing, Progressing     Problem: Restraint, Nonbehavioral (Nonviolent)  Goal: Personal Dignity and Safety Maintained  Outcome: Ongoing, Progressing     Problem: Infection  Goal: Infection Symptom Resolution  Outcome: Ongoing, Progressing     Problem: Skin Injury Risk Increased  Goal: Skin Health and Integrity  Outcome: Ongoing, Progressing     Problem: Adjustment to Illness (Stroke, Ischemic/Transient Ischemic Attack)  Goal: Optimal Coping  Outcome: Ongoing, Progressing     Problem: Bowel Elimination Impaired (Stroke, Ischemic/Transient Ischemic Attack)  Goal: Effective Bowel Elimination  Outcome: Ongoing, Progressing     Problem: Cerebral Tissue Perfusion Risk (Stroke, Ischemic/Transient Ischemic Attack)  Goal: Optimal Cerebral Tissue Perfusion  Outcome: Ongoing, Progressing     Problem: Communication Impairment (Stroke, Ischemic/Transient Ischemic Attack)  Goal: Improved Communication Skills  Outcome: Ongoing, Progressing     Problem: Eating/Swallowing Impairment (Stroke, Ischemic/Transient Ischemic Attack)  Goal: Oral Intake without Aspiration  Outcome: Ongoing, Progressing     Problem: Functional Ability Impaired (Stroke, Ischemic/Transient Ischemic  Attack)  Goal: Optimal Functional Ability  Outcome: Ongoing, Progressing     Problem: Hemodynamic Instability (Stroke, Ischemic/Transient Ischemic Attack)  Goal: Vital Signs Remain in Desired Range  Outcome: Ongoing, Progressing     Problem: Pain (Stroke, Ischemic/Transient Ischemic Attack)  Goal: Acceptable Pain Control  Outcome: Ongoing, Progressing     Problem: Sensorimotor Impairment (Stroke, Ischemic/Transient Ischemic Attack)  Goal: Improved Sensorimotor Function  Outcome: Ongoing, Progressing     Problem: Urinary Elimination Impaired (Stroke, Ischemic/Transient Ischemic Attack)  Goal: Effective Urinary Elimination  Outcome: Ongoing, Progressing     Problem: Wound  Goal: Optimal Wound Healing  Outcome: Ongoing, Progressing     Problem: Coping Ineffective  Goal: Effective Coping  Outcome: Ongoing, Progressing

## 2020-09-28 NOTE — PROGRESS NOTES
[facility] - ICU 14 WT  Infectious Disease  Progress Note    Patient Name: Lopez Savage  MRN: [id number]  Admission Date: 9/13/2020  Length of Stay: 15 days  Attending Physician: Isidoro Garcia MD  Primary Care Provider: Prasanna Haywood MD    Isolation Status: No active isolations  Assessment/Plan:      Encephalopathy acute  reconsulted for acute encephalopathy.   Repeat MRI MRI Brain WO contrast (9/22/20):  1. Relative to 09/14/2020, continued maturing multifocal ischemia, possibly septic embolic in etiology when correlating with prior imaging, predominantly involving bilateral posterior cerebral artery and left posterior inferior cerebellar artery territories.  2. No new or progressive areas of ischemia or new intracranial hemorrhage.  3. Stable small left frontal subdural hygroma without significant mass effect.  4. Chronic parenchymal microhemorrhages and superficial siderosis again raise the possibility of cerebral amyloid angiopathy and/or chronic hypertensive arteriopathy.    LP with WBC 21, RBC 6, protein 31, gluc 56  HSV, RPR, FTA ab, VDRL studies negative   CSF gram stain and cultures NGTD  Continue current antimicrobial therapy at this time  Alert. Not oriented x 3.   Repeat MRI tomorrow per NSGY.   Will tailor abx accordingly based on above studies    Discussed with ID staff    Bacterial endocarditis  This is a 74 year old male with PMH of hypertension, ulcerative colitis, GERD, chronic bronchitis, autoimmune hemolytic anemia, mitral regurgitation, and diverticulitis, admitted for subacute bacterial endocarditis  with strep mutans  in setting of upper GI bleed on 6 weeks of IV ceftriaxone (EOC 9/15) presents to ED for seizure activity and loss of consciousness. CT head negative for acute intracranial bleed. MRI +PCA infarct and right occipital lobe edema. He was started on doxycycline. Metronidazole, and ceftriaxone.      TTE 8/14 with vegetation of aortic valve. SILVANA 8/17 with an 18 mm  mobile vegetation is of both the mitral and aortic valves. CTS saw pt then, no surgical intervention. Has completed 6 weeks of IV ceftriaxone, end date 9/15/2020.      MRI here with Numerous scattered punctate foci of cortical enhancement throughout both cerebral hemispheres.  Additional rim enhancing collection in the right occipital lobe with central diffusion restriction and surrounding vasogenic edema.  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 NSGY following, no acute surgical intervention. No seizure activity today.     BCXs:  9/8 1/4 CONS - suspect contaminant  9/10 - NGTD  9/14 NGTD    Recommendations  1. Continue ceftriaxone [id number]  2. Continue vancomycin. Trough goal closer to 20  3. NSGY following. No surgical intervention. Underwent LP HSV and VDRL studies negative. cultures and gram stain NGTD. Repeat MRI tomorrow.   4. Blood cultures here NGTD.   6. Anticipate 6-8 weeks of IV vancomycin and ceftriaxone with repeat imaging to assess resolution of brain abscess. End date of IV antibiotics: 10/27/2020 -11/10/2020   8. Pt remained afebrile. No sz activity today. Alert. HDS.           Thank you for your consult. I will follow-up with patient. Please contact us if you have any additional questions.    Hermilo Jean PA-C  Infectious Disease  [facility] - ICU 14 WT  [phone]    Subjective:     Principal Problem:Status epilepticus    HPI: Mr. 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 The patient presented to [facility] on 9/8/2020 with a primary complaint of loss of consciousness. The patient was recently treated for endocarditis w/ IV rocephin through PICC line at [facility] and discharged approximately 2 weeks ago. Since then he has had a worsening cough and was planning to go to the hospital for  evaluation. Per wife home health did portable CXR about a week ago w/ concern for fluid vs pneumonia. Patient was given course of doxycycline and finished course recently. 9/8 Wife went to bed while  was watching TV and about an hour later her daughter found him non responsive on the ground. Daughter did a few chest compressions before EMS arrived. Patient arrived to ED and non responsive to sternal rub. Pupillary and gag reflex present. Patient intubated for airway protection and sedated w/ propofol. No seizure like activity was observed in ED. TPA contraindicated due to recent GI bleed, last occurring in August. Discussed further stroke work up with wife and planned for imaging. CT head negative for acute intracranial bleed. MRI head showed PCA infarct and right occipital lobe edema. 9/8 EEG demonstrated left posterior pseudo periodic epileptiform discharges is suggestive of an irritative region likely secondary to focal ischemia. Patient started on AEDs vimpat and keppra and reported to have several seizures during his stay. Patient was transferred to [facility] for continuous EEG monitoring and further evaluation.  BCXs:  9/8 1/4 CONS - suspect contaminant  9/10 - NGTD  9/14 sent     MRI Brain:  Numerous scattered punctate foci of cortical enhancement throughout both cerebral hemispheres.  Additional rim enhancing collection in the right occipital lobe with central diffusion restriction and surrounding vasogenic edema.  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     Persistent gyriform diffusion restriction the posterior left cerebral hemisphere as well as the hippocampus and dorsal lateral thalamus, although slightly improved from prior studies.  The distribution be typical for vascular territory infarct, most suggestive of seizure related signal changes (status epilepticus).  Other encephalitis to be excluded clinically.   NSGy following for occipital  abscess. NPO at this time. currently on doxy flagyl and ceftriaxone.   Interval History:    LP demonstrated WBC 21. RBC 6, prot 321, gluc 56.  Much improved this AM  Barium swallow study tomorrow   Repeat MRI tomorrow   On vanc and ctx    Repeat imaging with continued maturing multifocal ischemia, possibly septic embolic in etiology when correlating with prior imaging, predominantly involving bilateral posterior cerebral artery and left posterior inferior cerebellar artery territories.  2. No new or progressive areas of ischemia or new intracranial hemorrhage.  3. Stable small left frontal subdural hygroma without significant mass effect.    Review of Systems   Unable to perform ROS: Mental status change     Objective:     Vital Signs (Most Recent):  Temp: 98.1 °F (36.7 °C) (09/28/20 1202)  Pulse: 91 (09/28/20 1214)  Resp: (!) 24 (09/28/20 1214)  BP: (!) 159/70 (09/28/20 1202)  SpO2: 100 % (09/28/20 1214) Vital Signs (24h Range):  Temp:  [97.6 °F (36.4 °C)-98.6 °F (37 °C)] 98.1 °F (36.7 °C)  Pulse:  [82-95] 91  Resp:  [17-45] 24  SpO2:  [96 %-100 %] 100 %  BP: (140-172)/(62-74) 159/70     Weight: 114 kg (251 lb 5.2 oz)  Body mass index is 37.11 kg/m².    Estimated Creatinine Clearance: 62.1 mL/min (based on SCr of 1.3 mg/dL).    Physical Exam  Vitals signs reviewed.   Constitutional:       Appearance: He is ill-appearing. He is not diaphoretic.      Comments: NG tube in place    HENT:      Head: Normocephalic.      Comments: No mouth sores noted     Nose: Nose normal.      Comments: NG tube  Cardiovascular:      Rate and Rhythm: Normal rate.   Pulmonary:      Effort: Pulmonary effort is normal.   Abdominal:      General: Abdomen is flat. There is no distension.      Palpations: There is no mass.   Musculoskeletal:         General: No swelling, tenderness, deformity or signs of injury.   Skin:     General: Skin is warm and dry.      Coloration: Skin is not jaundiced or pale.   Neurological:      Comments: Alert  Not  oriented to person place or time          Significant Labs: All pertinent labs within the past 24 hours have been reviewed.    Significant Imaging: I have reviewed all pertinent imaging results/findings within the past 24 hours.

## 2020-09-28 NOTE — PROGRESS NOTES
Progress Note  Hospital Medicine  [facility], [place]       Patient Name: Lopez Savage  MRN:  [id number]  Hospital Medicine Team: [facility] X Isidoro Garcia MD  Date of Admission:  9/13/2020     Length of Stay:  LOS: 15 days   Expected Discharge Date: 9/28/2020  Principal Problem:  Status epilepticus     Subjective:     Interval History/Overnight Events:      - patient responded well to 1 unit of PRBC; getting IV iron for 5 days; patient pulled out his NG tube last night and again today, placed back on restraints; psych recs 2 mg Haldol prn agitation, started today;     - ampicillin stopped as per ID as they do not feel he has listeria meningitis and cell count likely all from septic emboli;   - will need rehab once NG tube able to be pulled, tolerating current diet, however not likely eating enough      albuterol-ipratropium  3 mL Nebulization Q4H    atorvastatin  40 mg Per NG tube Daily    cefTRIAXone (ROCEPHIN) IVPB  2 g Intravenous Q12H    enoxaparin  40 mg Subcutaneous Q24H    ferric gluconate (FERRLECIT) IVPB  125 mg Intravenous Daily    lacosamide  200 mg Per NG tube Q12H    miconazole nitrate 2%   Topical (Top) BID    pantoprazole  40 mg Per NG tube Daily    silodosin  8 mg Per NG tube Daily    sodium chloride 0.9%  10 mL Intravenous Q6H    traZODone  25 mg Per NG tube QHS    vancomycin (VANCOCIN) IVPB  1,250 mg Intravenous Q48H           sodium chloride, acetaminophen, dextrose 50%, glucagon (human recombinant), insulin aspart U-100, labetalol, psyllium husk (aspartame), sodium chloride 0.9%, Pharmacy to dose Vancomycin consult **AND** vancomycin - pharmacy to dose    Review of Systems   Unable to obtain 2/2 AMS    Objective:     Temp:  [98.3 °F (36.8 °C)-98.5 °F (36.9 °C)]   Pulse:  [83-97]   Resp:  [18-45]   BP: (136-161)/()   SpO2:  [96 %-100 %]         Weight change:    Body mass index is 37.11 kg/m².       Physical Exam:  Constitutional: lethargic ,  chronically ill looking,   HENT: NC/AT, external ears normal  Eyes: PERRL, EOMI, conjunctiva normal,  Neck: normal ROM, supple  CV: RRR, no m/r/g, no carotid bruits, +2 peripheral pulses.  Pulmonary/Chest wall: Breathing comfortably w/o distress,  Decreased breath sounds at lung bases  GI: Soft, non-tender, (+) BS, (+) BM  Musculoskeletal: Normal ROM, no atrophy,   Neurological   awake but not following commands   Moves extremities spontaneously   Skin: warm, dry   +pallor  Psych awake but not following commands     Labs:    Chemistries:   Recent Labs   Lab 09/25/20 0356 09/26/20  0130 09/27/20  0426    140 140   K 3.9 3.8 3.5    104 103   CO2 32* 30* 28   BUN 38* 36* 39*   CREATININE 1.5* 1.5* 1.5*   CALCIUM 10.2 9.9 10.1   PROT 6.5 6.5 6.7   BILITOT 0.2 0.1 0.3   ALKPHOS 48* 46* 47*   ALT 7* 10 9*   AST 13 14 13   MG 1.9 2.0 1.8   PHOS 4.5 5.1* 4.1        WBC:   Recent Labs   Lab 09/23/20  0622 09/24/20  0237 09/25/20  0356 09/26/20  0130 09/27/20  0426   WBC 10.11 9.57 9.58 9.00 8.57     Bands:     CBC/Anemia Labs: Coags:    Recent Labs   Lab 09/25/20  0356 09/26/20 0130 09/26/20  1339 09/27/20  0426   WBC 9.58 9.00  --  8.57   HGB 7.7* 7.2*  --  8.1*   HCT 25.7* 25.2*  --  27.1*    181  --  171   MCV 93 96  --  93   RDW 15.7* 15.9*  --  15.5*   IRON  --   --  20*  --    FERRITIN  --   --  1,261*  --    FOLATE  --   --  6.6  --    [id number]  --   --  468  --     No results for input(s): PT, INR, APTT in the last 168 hours.     Diagnostic Results:    MRI brain 9/14/20    Numerous scattered punctate foci of cortical enhancement throughout both cerebral hemispheres.  Additional rim enhancing collection in the right occipital lobe with central diffusion restriction and surrounding vasogenic edema.  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     Persistent gyriform diffusion restriction the posterior left cerebral hemisphere as well as the hippocampus  and dorsal lateral thalamus, although slightly improved from prior studies.  The distribution be typical for vascular territory infarct, most suggestive of seizure related signal changes (status epilepticus).  Other encephalitis to be excluded clinically.     New small left frontal subdural hygroma without significant intracranial mass effect.     Stable small subacute left cerebellar infarct.     Mild chronic microvascular ischemic disease with remote right frontal and right occipital infarcts.     Numerous regions of prior parenchymal and leptomeningeal hemorrhage involving both cerebral hemispheres appearance raising the possibility underlying cerebral amyloid angiopathy.  No new hemorrhage.    CT head 9/16    FINDINGS:  There is stable cerebral atrophy and chronic small vessel ischemic changes.  There is an area of rounded hypodensity in the right occipital lobe, which corresponds to an overall finding of abscess formation and vasogenic edema in a patient with known endocarditis is noted on the MRI of the brain from 09/14/2020.  There is a remote right frontal infarct.  There are also areas of hypodensity seen in the left cerebellar hemisphere and left occipital lobe, which may be related to age indeterminate infarcts.  There is no acute intracranial hemorrhage, territorial infarct or mass effect, or midline shift..    Assessment and Plan     Hospital Course:    Mr. Lopez Savage was admitted to Hospital Medicine for management of  Status epilepticus     Active Hospital Problems    Diagnosis  POA    *Status epilepticus [G40.901]  Yes    Palliative care encounter [Z51.5]  Not Applicable    Goals of care, counseling/discussion [Z71.89]  Not Applicable    Advance care planning [Z71.89]  Not Applicable    Debility [R53.81]  Yes    Oral phase dysphagia [R13.11]  Yes    Seizure [R56.9]  Yes    Intracranial septic embolism [I76, I66.9]  Yes    Brain abscess [G06.0]  Yes    Cerebellar stroke [I63.9]  Yes     Cytotoxic brain edema [G93.6]  Yes    Encephalopathy acute [G93.40]  Yes    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         Encephalopathy acute  - likely multifactorial - 2/2 stroke , brain abscess, cytotoxic edema. hosp acquired delirium   - cont PT OT   - SLP- currently with NG tube   - Stroke team believe repeat MRI is stable   General neurology consulted. Recommended LP and EEG.    - EEG showed no current seizure activity      - LP done on 9/25 with FL, concerning for possible neurosyphilis versus HSV versus other meningitis, ampicillin added   - ID re-consulted   - mentation waxing and waning       Status epilepticus  - Multiple witnessed seizure-like activity described  - No previous reports from family  - Was started on keppra and vimpat 200 mg BID  - EEG at OSH shows irregular slowing in the R frontal region, with L posterior periodic epileptiform discharges. No seizures recorded during this study.  - 9/13 [facility] EEG continues to show periodic discharges in the left posterior region with no seizures.  -- Continue vimpat 200 mg BID    Bacterial endocarditis  Brain abscess  Positive blood culture 7/31 for strep mutans  Previously evaluated by CT surgery team her at [facility], and determined to be too high risk for valve replacement surgery, to manage with IV antibiotics     Brain abscess - 2/2 endocarditis/septic emboli  NSGY consulted-  No surgical intervention. If worsening mentation or without improvement recommend reimaging and if need for surgical washout for source control. Please send fluid for cultures (gram stain, aerobic, anaerobic, fungal, AFB) if undergoes drainage.  "    -ID recs:  Continue IV Vanc and cetriaxone for 6-8 weeks, end date 10/27-11/10 with repeat MRI post treatment  Continue ceftriaxone 2gms IV q12hrs. Anticipate 6-8 weeks of IV vancomycin and ceftriaxone with repeat imaging to assess resolution of brain abscess.     Cerebellar stroke/ Embolic stroke involving left posterior cerebral artery  On 9/8 pt presented to [place] ED with unresponsiveness  No tPA due to recent GI bleed  MRI with left PCA infarct and left thalamic infarct along with small right PCA infarct  MRA demonstrates fetal circulation to left PCA  9/11 extubated  . Repeat MRI new acute/subacute L cerebellum infarct     -Neuro checks  -Vascular neurology was consulted - and recommended medical management - ASA , statin , PT OT SLP, BP control     Cytotoxic brain edema  Per prior documentation - "Area of cytotoxic cerebral edema identified when reviewing brain imaging in the territory of the left posterior cerebral artery, L thalamus, right posterior cerebral artery. There is no mass effect associated with it. We will continue to monitor the patients clinical exam for any worsening of symptoms which may indicate expansion of the stroke or the area of the edema resulting in the clinical change. The pattern is suggestive of embolic etiology"    Oral phase dysphagia  NGT in place, continuous tube feedings for nutrition  Cont enteral water flushes 200 ml q6hrs  SLP following, will likely need PEG  Encephalopathy limiting SLP eval  - tolerating more of a diet,     Chronic bronchitis  Duonebs scheduled q4hrs  Monitor respiratory status, at risk for atelectasis  Continuous pulse oximetry, ABG prn  Continue CPT, pulmonary toileting    # Iron deficiency anemia  - transfusing 1 unit of PRBC on 9/26  - starting IV iron tomorrow     Diet:  Dysphagia  - tube feeds  GI PPx:   DVT PPx:  lovenox   Goals of Care:  Full     High Risk Conditions:  IE, brain abscess, seizures     Disposition:    TBD    Likely LTAC next " week

## 2020-09-28 NOTE — NURSING
"At 2025, NGT found out. Charge nurse Dalton attempted to replace NGT assisted by this nurse at bedside. Pt instructed on importance of having NGT for nutrition and to received medications. No evidence of learning noted. Pt continues to resist having NGT placed. Unable to replace NGT at this time due to pt fighting against us. Soft wrist restraints placed on at this time and pt informed of rationale for doing so. At 2035, spoke with Dr. Ramo Main to inform him of pt removing NGT and unable to replace due to pt fighting against it. NMO received to give haldol. See mar for med given. At 2205, pt resting easily now. Will reattempt NGT insertion. Charge nurse Dalton assisted by this nurse at bedside attempting to replace NGT. NGT replaced to R nare on 2nd attempt. At 2223, pt bleeding from R nare; gauze packed in R nare. At 2226, notified Dr. Ramo Main of pt bleeding from nare and action taken. No new orders received. MD stated "there really isn't anything else to do". Radiology Tech notified to come do KUB for NGT placement. At 2300, KUB done by tech and Dr. Main notified by phone. Instructed by him to start tube feeding back now; ok to use. At 2325, Dr. Main here and saw patient.   "

## 2020-09-28 NOTE — PLAN OF CARE
Problem: SLP Goal  Goal: SLP Goal  Description: Goals expected to be met by 9/30:  1. Pt will participate in ongoing assessment of swallow to determine safest, least restrictive diet.   2. Pt will orient x4 ind'ly.   3. Pt vocalize x3 within an SLP session.   4. Pt will follow basic 1-step commands with 70% acc ind'ly.   5. Pt will answer basic y/n q's with 70% acc ind'ly.   6. Pt will participate in ongoing assessment of cognitive-linguistic skills.   Outcome: Ongoing, Progressing    Pt was seen today for ST session.

## 2020-09-28 NOTE — ASSESSMENT & PLAN NOTE
This is a 74 year old male with PMH of hypertension, ulcerative colitis, GERD, chronic bronchitis, autoimmune hemolytic anemia, mitral regurgitation, and diverticulitis, admitted for subacute bacterial endocarditis  with strep mutans  in setting of upper GI bleed on 6 weeks of IV ceftriaxone (EOC 9/15) presents to ED for seizure activity and loss of consciousness. CT head negative for acute intracranial bleed. MRI +PCA infarct and right occipital lobe edema. He was started on doxycycline. Metronidazole, and ceftriaxone.      TTE 8/14 with vegetation of aortic valve. SILVANA 8/17 with an 18 mm mobile vegetation is of both the mitral and aortic valves. CTS saw pt then, no surgical intervention. Has completed 6 weeks of IV ceftriaxone, end date 9/15/2020.      MRI here with Numerous scattered punctate foci of cortical enhancement throughout both cerebral hemispheres.  Additional rim enhancing collection in the right occipital lobe with central diffusion restriction and surrounding vasogenic edema.  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 NSGY following, no acute surgical intervention. No seizure activity today.     BCXs:  9/8 1/4 CONS - suspect contaminant  9/10 - NGTD  9/14 NGTD    Recommendations  1. Continue ceftriaxone [id number]  2. Continue vancomycin. Trough goal closer to 20  3. NSGY following. No surgical intervention. Underwent LP HSV and VDRL studies negative. cultures and gram stain NGTD. Repeat MRI tomorrow.   4. Blood cultures here NGTD.   6. Anticipate 6-8 weeks of IV vancomycin and ceftriaxone with repeat imaging to assess resolution of brain abscess. End date of IV antibiotics: 10/27/2020 -11/10/2020   8. Pt remained afebrile. No sz activity today. Alert. HDS.

## 2020-09-28 NOTE — SUBJECTIVE & OBJECTIVE
Interval History:    LP demonstrated WBC 21. RBC 6, prot 321, gluc 56.  Much improved this AM  Barium swallow study tomorrow   Repeat MRI tomorrow   On vanc and ctx    Repeat imaging with continued maturing multifocal ischemia, possibly septic embolic in etiology when correlating with prior imaging, predominantly involving bilateral posterior cerebral artery and left posterior inferior cerebellar artery territories.  2. No new or progressive areas of ischemia or new intracranial hemorrhage.  3. Stable small left frontal subdural hygroma without significant mass effect.    Review of Systems   Unable to perform ROS: Mental status change     Objective:     Vital Signs (Most Recent):  Temp: 98.1 °F (36.7 °C) (09/28/20 1202)  Pulse: 91 (09/28/20 1214)  Resp: (!) 24 (09/28/20 1214)  BP: (!) 159/70 (09/28/20 1202)  SpO2: 100 % (09/28/20 1214) Vital Signs (24h Range):  Temp:  [97.6 °F (36.4 °C)-98.6 °F (37 °C)] 98.1 °F (36.7 °C)  Pulse:  [82-95] 91  Resp:  [17-45] 24  SpO2:  [96 %-100 %] 100 %  BP: (140-172)/(62-74) 159/70     Weight: 114 kg (251 lb 5.2 oz)  Body mass index is 37.11 kg/m².    Estimated Creatinine Clearance: 62.1 mL/min (based on SCr of 1.3 mg/dL).    Physical Exam  Vitals signs reviewed.   Constitutional:       Appearance: He is ill-appearing. He is not diaphoretic.      Comments: NG tube in place    HENT:      Head: Normocephalic.      Comments: No mouth sores noted     Nose: Nose normal.      Comments: NG tube  Cardiovascular:      Rate and Rhythm: Normal rate.   Pulmonary:      Effort: Pulmonary effort is normal.   Abdominal:      General: Abdomen is flat. There is no distension.      Palpations: There is no mass.   Musculoskeletal:         General: No swelling, tenderness, deformity or signs of injury.   Skin:     General: Skin is warm and dry.      Coloration: Skin is not jaundiced or pale.   Neurological:      Comments: Alert  Not oriented to person place or time          Significant Labs: All  pertinent labs within the past 24 hours have been reviewed.    Significant Imaging: I have reviewed all pertinent imaging results/findings within the past 24 hours.

## 2020-09-28 NOTE — PT/OT/SLP PROGRESS
"Occupational Therapy   Treatment    Name: Lopez Savage  MRN: [id number]  Admitting Diagnosis:  Status epilepticus  3 Days Post-Op    Recommendations:     Discharge Recommendations: rehabilitation facility  Discharge Equipment Recommendations:  other (see comments)(TBD)  Barriers to discharge:  Other (Comment)(increased assistance at current level of function)    Assessment:     Lopez Savage is a 75 y.o. male with a medical diagnosis of Status epilepticus.  Nursing was entering pt's room due to pt being soiled and his bed pads being wet; therapist entered room to assist.  Pt required Max A to roll L and R while nursing performed perineal care.  Pt with improved orientation compared to previous session, and he was verbal this date.   He had improved participation as demonstrated by his ability to wipe his face and willingness to perform BLE PROM/AAROM exercises while in bed.  He presents with the following deficits. Performance deficits affecting function are weakness, impaired endurance, impaired self care skills, impaired functional mobilty, gait instability, impaired balance, impaired cognition, decreased coordination, decreased upper extremity function, decreased lower extremity function, decreased safety awareness, abnormal tone, decreased ROM, impaired fine motor, edema, impaired cardiopulmonary response to activity.     Rehab Prognosis:  Good and Fair; patient would benefit from acute skilled OT services to address these deficits and reach maximum level of function.       Plan:     Patient to be seen 3 x/week to address the above listed problems via self-care/home management, therapeutic activities, therapeutic exercises, neuromuscular re-education, cognitive retraining  · Plan of Care Expires: 10/13/20  · Plan of Care Reviewed with: patient, spouse    Subjective   "I got my money's worth today."  Pain/Comfort:  Pain Rating 1: 0/10  Pain Rating Post-Intervention 1: 0/10    Objective:     Communicated with: " RN prior to session.  Patient found HOB elevated with peripheral IV, blood pressure cuff, NG tube, pulse ox (continuous), telemetry, restraints with his wife present upon OT entry to room.    General Precautions: Standard, fall, aspiration, NPO, seizure   Orthopedic Precautions:N/A   Braces: N/A     Occupational Performance:     Bed Mobility:    · Patient completed Rolling/Turning to Left with  maximal assistance (pt was able to move his RUE across his body toward bed rail)  · Patient completed Rolling/Turning to Right with maximal assistance  · Patient completed Scooting/Bridging with maximal assistance and 2 persons to scoot HOB while supine     Functional Mobility/Transfers:  · Deferred this date due.    Activities of Daily Living:  · Feeding:  NG tube  · Grooming: stand by assistance with verbal cues to wipe his face while avoiding NG tube with RUE while lying down on his L side  · Bathing: total assistance to clean his upper body and torso with wipes while in bed  · Upper Body Dressing: total assistance to doff wet gown/lorenzo clean gown while in bed  · Toileting: total assistance for perineal care while in bed.  Pt had had a BM in bed.      [facility] 6 Click ADL: 9    Treatment & Education:  -Therapist left pt's room at 1335 once pt's MD entered to speak with pt and his wife.  Therapist returned at 1346 to help pt perform the following therex in bed: PROM/AAROM BLE exercises of knee/hip flexion/extension, hip abduction/adduction, and dorsiflexion/plantar flexion x 2 set of 10 reps each.    - Pt and his wife edu on role of OT, POC, benefit of performing therapeutic exercises while in bed.  - White board updated  - Self care tasks completed-- as noted above     Patient left L sidelying with HOB elevated with all lines intact, restraints reapplied, call button in reach and his wife presentEducation:      GOALS:   Multidisciplinary Problems     Occupational Therapy Goals        Problem: Occupational Therapy Goal    Goal  Priority Disciplines Outcome Interventions   Occupational Therapy Goal     OT, PT/OT Ongoing, Progressing    Description: Goals to be met by: 9/28/2020 (Extend to 10/5/2020)    Patient will increase functional independence with ADLs by performing:    Pt will follow 75% of motor commands with <2 verbal cues for repetition of task to maximize engagement in functional task. -not met  Pt will sit at EOB for approx 10 minutes with max A and unilateral UE support to complete grooming task -not met  Pt will complete sit>stand from EOB with bed in highest position with mod A in prep for functional transfers to [facility] -not met  Pt will perform grooming sitting EOB with mod A -not met                               Time Tracking:     OT Date of Treatment: 09/28/20  OT Start Time: 1320(Therapist temporarily left room at 1335 due to MD present in room to talk to pt and his wife.  OT returned to room at 1346.)  OT Stop Time: 1357  OT Total Time (min): 37 min    Billable Minutes:Self Care/Home Management 13 min.  Therapeutic Exercise 13 min.    Pedro March OT  9/28/2020

## 2020-09-28 NOTE — PROGRESS NOTES
"[facility] - ICU 14 WT  Psychiatry  Progress Note    Patient Name: Lopez Savage  MRN: [id number]   Code Status: Full Code  Admission Date: 9/13/2020  Hospital Length of Stay: 15 days  Expected Discharge Date: 9/28/2020  Attending Physician: Isidoro Garcia MD  Primary Care Provider: Prasanna Haywood MD    Current Legal Status: Uncontested    Patient information was obtained from patient and relative(s).       Subjective:     Patient is a 75 y.o., male, presents with:    Principal Problem:Status epilepticus    Chief Complaint: Agitation    HPI: Lopez Savage is a 75 y.o. male with a past psychiatric history of depression who presented to [facility] due to Status epilepticus. Psychiatry was consulted for "severe agitation requiring constaint, h/o brain abscesses and seizures".    Per Primary Team:  male with PMH of diverticulitis, MVP, endocarditis with aortic valve vegetations, HTN, and COPD. The patient presented to [facility] on 9/8/2020 with a primary complaint of loss of consciousness. The patient was recently treated for endocarditis w/ IV rocephin through PICC line at [facility] main [facility] and discharged 2 weeks ago. Since then he has had a worsening cough and was planning to go to the hospital for evaluation. Per wife home health did portable CXR about a week ago w/ concern for fluid vs pneumonia. Patient was given course of doxycycline and finished course yesterday. Last night wife went to bed while  was watching TV and about an hour later her daughter found him non responsive on the ground. Daughter did a few chest compressions before EMS arrived. Patient arrived to ED and non responsive to sternal rub. Pupillary and gag reflex present. Patient intubated for airway protection and sedated w/ propofol. No seizure like activity was observed in ED. TPA contraindicated due to recent GI bleed, last occurring in August. Discussed further stroke work up with wife and plan to obtain MRI " head.    Per Psychiatry:  Seen and evaluated in room with wife at bedside. Patient somnolent, lethargic, returned from LP this afternoon. Hard of hearing. Wakes to physical stimuli, mumbles and returns to sleep. Per wife -- overall his confusion waxes and wanes. He knows where his name and where he is. She denies any agitation or aggression. Sleeping throughout the night with no issues. Does mutter to himself at times but has not endorsed any AVH to wife. No SI/HI/plan. Appetite improving with advancement by SLP, has NG tube at this time. Restraints have been since admission, however when seen by psychiatry patient has one soft restraint to left arm. Would not participate in [employer].    At baseline, patient with no cognitive issues or deficits. No recent changes in behavior or mood prior to admission.    Collateral:   See above    Medical Review of Systems:  Review of systems not obtained due to patient factors altered mental status.    Psychiatric Review of Systems-is patient experiencing or having changes in  sleep: SHANNA  appetite: SHANNA  weight: SHANNA   energy/anergy: SHANNA  interest/pleasure/anhedonia: SHANNA  somatic symptoms: SHANNA  libido: SHANNA  anxiety/panic: SHANNA  guilty/hopelessness: SHANNA  concentration: SHANNA  S.I.B.s/risky behavior: SHANNA  any drugs: SHANNA  alcohol: SHANNA    Allergies:  Codeine and Tetanus vaccines and toxoid    Past Medical/Surgical History:  History reviewed. No pertinent past medical history.  History reviewed. No pertinent surgical history.    History obtained by wife at bedside.    Past Psychiatric History:  Previous Medication Trials: yes, currently on Lexapro 10 mg for depression  Previous Psychiatric Hospitalizations: no   Previous Suicide Attempts: no   History of Violence: no  Outpatient Psychiatrist: no    Social History:  Marital Status:  for 50 years  Children: 2   Employment Status/Info: 2013 retired  Education: high school diploma/GED  Special Ed: no  Housing Status: with family, wife  and daughter  History of phys/sexual abuse: no  Access to gun: yes    Substance Abuse History:  Recreational Drugs: denies  Use of Alcohol: denied  Rehab History: no   Tobacco Use: no  Use of OTC: no    Legal History:  Past Charges/Incarcerations: denies   Pending charges: no     Family Psychiatric History:   denies    Psychosocial Stressors: health  Functioning Relationships: good support system      Hospital Course: 09/27/2020  Wife at bedside. Patient doing well today. Improved mentation and alertness.    9/28/20  Patient seen at bedside with wife present.  Today patient is delirious.  Attention to interview is poor and randomly says he was in [place] before coming to the hospital.  Is unable to state the name.  Does attest to being in pain located in his stomach.    On discussion wife is not very agitated when not in restraints.  He is not combative, trying to leave or pulling on IV lines.  He does however try to pull his NG tube likely due to his poor mentation of not understanding its purpose and it also causing irritation.      Interval History: Patient delirious, no PRNs needed.  In restraints to protect NG tube.     Family History     None        Tobacco Use    Smoking status: Not on file   Substance and Sexual Activity    Alcohol use: Not on file    Drug use: Not on file    Sexual activity: Not on file     Psychotherapeutics (From admission, onward)    Start     Stop Route Frequency Ordered    09/28/20 0113  haloperidoL tablet 2 mg      -- Oral Every 6 hours PRN 09/28/20 0013    09/17/20 2100  trazodone split tablet 25 mg      -- PER NG TUBE Nightly 09/17/20 1509           Review of Systems  Objective:     Vital Signs (Most Recent):  Temp: 98.1 °F (36.7 °C) (09/28/20 1202)  Pulse: 91 (09/28/20 1214)  Resp: (!) 24 (09/28/20 1214)  BP: (!) 159/70 (09/28/20 1202)  SpO2: 100 % (09/28/20 1214) Vital Signs (24h Range):  Temp:  [97.6 °F (36.4 °C)-98.6 °F (37 °C)] 98.1 °F (36.7 °C)  Pulse:  [82-95] 91  Resp:   "[17-45] 24  SpO2:  [96 %-100 %] 100 %  BP: (140-172)/(62-74) 159/70     Height: 5' 9" (175.3 cm)  Weight: 114 kg (251 lb 5.2 oz)  Body mass index is 37.11 kg/m².      Intake/Output Summary (Last 24 hours) at 9/28/2020 1223  Last data filed at 9/28/2020 0600  Gross per 24 hour   Intake 1600 ml   Output --   Net 1600 ml       PSYCHIATRIC   Orientation: Oriented only to self  Speech: Slowed, mumbled  Language: English  Mood: Unable to assess  Affect: Contricted  Thought Process: altered  Associations: loose  Thought Content: Focused on past memories  Memory: Impaired to a degree  Attention and Concentration: Poor  Fund of Knowledge: Appropriate  Insight: Poor  Judgment: Poor      Significant Labs: All pertinent labs within the past 24 hours have been reviewed.    Significant Imaging: I have reviewed all pertinent imaging results/findings within the past 24 hours.       Scheduled Medications:   albuterol-ipratropium  3 mL Nebulization Q4H    atorvastatin  40 mg Per NG tube Daily    cefTRIAXone (ROCEPHIN) IVPB  2 g Intravenous Q12H    enoxaparin  40 mg Subcutaneous Q24H    ferric gluconate (FERRLECIT) IVPB  125 mg Intravenous Daily    furosemide (LASIX) IV  40 mg Intravenous BID    lacosamide  200 mg Per NG tube Q12H    miconazole nitrate 2%   Topical (Top) BID    pantoprazole  40 mg Per NG tube Daily    silodosin  8 mg Per NG tube Daily    sodium chloride 0.9%  10 mL Intravenous Q6H    traZODone  25 mg Per NG tube QHS    vancomycin (VANCOCIN) IVPB  1,000 mg Intravenous Q24H       PRN Medications:  acetaminophen, dextrose 50%, glucagon (human recombinant), haloperidoL, insulin aspart U-100, labetalol, psyllium husk (aspartame), sodium chloride 0.9%, Pharmacy to dose Vancomycin consult **AND** vancomycin - pharmacy to dose    Review of patient's allergies indicates:   Allergen Reactions    Codeine     Tetanus vaccines and toxoid        Assessment/Plan:     Encephalopathy acute  ASSESSMENT     Lopez ACEVEDO" Janette is a 75 y.o. male with a past psychiatric history of depression, who presented to the [facility] for status epilepticus. Psychiatry originally consulted for management of agitation. Somnolent on initial interview -- per history, alterations in cognition and attention appear to be waxing and waning with improvement since admission.    IMPRESSION  Delirium 2/2 general medical condition      RECOMMENDATION(S)      1. Scheduled Medication(s):  - Melatonin 3 mg nightly  - Recommend to discontinue trazodone as it can lower seizure threshold    2. PRN Medication(s):  - Haldol 1 mg IV vs 2 mg PO/IM Q6H for non-redirectable agitation    3.  Monitor:  Please obtain daily EKG to monitor QTc if psychotropics given    4. Legal Status/Precaution(s):  Patient does not meet criteria for PEC or inpatient psychiatric admission at this time. Recommend to rescind PEC if one was placed. Patient is not currently an imminent danger to self or others and is not gravely disabled due to a psychiatric illness.    5. Other:  [facility] ICU - positive 9/25 and 9/26  DELIRIUM BEHAVIOR MANAGEMENT   PLEASE utilize CHEMICAL restraints with PRN meds first for agitation. Minimize use of PHYSICAL restraints   Keep window shades open and room lit during day and room dim at night in order to promote normal sleep-wake cycles   Encourage family at bedside. [place] patient often to situation, location, date   Continue to Limit or Discontinue use of Narcotics, Benzos and Anti-cholinergic medications as they may worsen delirium   Continue medical workup for causative etiology of Delirium       Psychiatry will sign off at this time, please contact the on-call team with questions about this patient.      Need for Continued Hospitalization:  No need for inpatient psychiatric hospitalization. Continue medical care as per the primary team.    Anticipated Disposition:  Per primary    Total time:  25 with greater than 50% of this time spent in counseling and/or  coordination of care.       Silas Molina M.D.  [facility], PGY-2  Case discussed with psychiatry staff: Dr. Bashir

## 2020-09-28 NOTE — SUBJECTIVE & OBJECTIVE
"Interval History: Patient delirious, no PRNs needed.  In restraints to protect NG tube.     Family History     None        Tobacco Use    Smoking status: Not on file   Substance and Sexual Activity    Alcohol use: Not on file    Drug use: Not on file    Sexual activity: Not on file     Psychotherapeutics (From admission, onward)    Start     Stop Route Frequency Ordered    09/28/20 0113  haloperidoL tablet 2 mg      -- Oral Every 6 hours PRN 09/28/20 0013    09/17/20 2100  trazodone split tablet 25 mg      -- PER NG TUBE Nightly 09/17/20 1509           Review of Systems  Objective:     Vital Signs (Most Recent):  Temp: 98.1 °F (36.7 °C) (09/28/20 1202)  Pulse: 91 (09/28/20 1214)  Resp: (!) 24 (09/28/20 1214)  BP: (!) 159/70 (09/28/20 1202)  SpO2: 100 % (09/28/20 1214) Vital Signs (24h Range):  Temp:  [97.6 °F (36.4 °C)-98.6 °F (37 °C)] 98.1 °F (36.7 °C)  Pulse:  [82-95] 91  Resp:  [17-45] 24  SpO2:  [96 %-100 %] 100 %  BP: (140-172)/(62-74) 159/70     Height: 5' 9" (175.3 cm)  Weight: 114 kg (251 lb 5.2 oz)  Body mass index is 37.11 kg/m².      Intake/Output Summary (Last 24 hours) at 9/28/2020 1223  Last data filed at 9/28/2020 0600  Gross per 24 hour   Intake 1600 ml   Output --   Net 1600 ml       PSYCHIATRIC   Orientation: Oriented only to self  Speech: Slowed, mumbled  Language: English  Mood: Unable to assess  Affect: Contricted  Thought Process: altered  Associations: loose  Thought Content: Focused on past memories  Memory: Impaired to a degree  Attention and Concentration: Poor  Fund of Knowledge: Appropriate  Insight: Poor  Judgment: Poor      Significant Labs: All pertinent labs within the past 24 hours have been reviewed.    Significant Imaging: I have reviewed all pertinent imaging results/findings within the past 24 hours.  "

## 2020-09-28 NOTE — ASSESSMENT & PLAN NOTE
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 Reconsulted for change in mental status and aphasia    Clinically with slow improvement over the weekend, starting to speak and interact appropriately, follows commands with all extremities  LP with 21 WBC and 56 glucose, NGTD, per medicine, may be due to septic emboli  -- Will repeat MRI tomorrow morning to ensure abscess is not growing        -q1h neurochecks in ICU, q2h neurochecks in stepdown, q4h neurochecks on floor  --SBP <160 (cardene ggt; hydralazine & labetalol PRN; transition to home meds when appropriate)  --Na >135  --VN following  --AED per NCC/Epilepsy  --Abx per ID, f/up BCx - 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   --NO steroids  --HOB >30  --PPI given GIB per NCC  --Continue to monitor clinically, notify NSGY immediately with any changes in neuro status    Dispo: ICU

## 2020-09-28 NOTE — PROGRESS NOTES
Pharmacokinetic Assessment Follow Up: IV Vancomycin    Vancomycin serum concentration assessment(s):    · Vancomycin random lvl=20.7 mcg/mL    Vancomycin Regimen Plan:    · Decided to do a vanc random this AM since level yesterday remained low. Want to keep vancomycin closer to 20 mcg/mL to adequately penetrate the CNS. SCr improved again today so will start vancomycin 1g IV q24h.   · Next level tomorrow at 1100 to ensure not accumulating as patient has been difficult to get in range.     Drug levels (last 3 results):  Recent Labs   Lab Result Units 09/27/20  1058 09/28/20  0449   Vancomycin, Random ug/mL  --  20.7   Vancomycin-Trough ug/mL 13.8  --        Pharmacy will continue to follow and monitor vancomycin.    Please contact pharmacy at extension [phone] for questions regarding this assessment.    Thank you for the consult,   Jocelyne Hanson, PharmD, [facility]  [phone]     Patient brief summary:  Lopez Savage is a 75 y.o. male initiated on antimicrobial therapy with IV Vancomycin for treatment of meningitis      Drug Allergies:   Review of patient's allergies indicates:   Allergen Reactions    Codeine     Tetanus vaccines and toxoid        Actual Body Weight:   115kg    Renal Function:   Estimated Creatinine Clearance: 62.1 mL/min (based on SCr of 1.3 mg/dL).,       CBC (last 72 hours):  Recent Labs   Lab Result Units 09/26/20  0130 09/27/20 0426 09/28/20  0449   WBC K/uL 9.00 8.57 9.14   Hemoglobin g/dL 7.2* 8.1* 8.5*   Hematocrit % 25.2* 27.1* 27.9*   Platelets K/uL 181 171 188   Gran% % 72.9 73.9* 73.9*   Lymph% % 10.0* 11.2* 9.6*   Mono% % 11.0 9.0 10.2   Eosinophil% % 5.0 4.4 5.0   Basophil% % 0.7 0.9 0.8   Differential Method  Automated Automated Automated       Metabolic Panel (last 72 hours):  Recent Labs   Lab Result Units 09/26/20  0130 09/27/20  0426 09/28/20  0449   Sodium mmol/L 140 140 140   Potassium mmol/L 3.8 3.5 3.4*   Chloride mmol/L 104 103 103   CO2 mmol/L 30* 28 28   Glucose mg/dL 111* 89 103    BUN, Bld mg/dL 36* 39* 35*   Creatinine mg/dL 1.5* 1.5* 1.3   Albumin g/dL 2.3* 2.4* 2.4*   Total Bilirubin mg/dL 0.1 0.3 0.2   Alkaline Phosphatase U/L 46* 47* 44*   AST U/L 14 13 13   ALT U/L 10 9* 8*   Magnesium mg/dL 2.0 1.8 1.7   Phosphorus mg/dL 5.1* 4.1 4.2       Vancomycin Administrations:  vancomycin given in the last 96 hours                   vancomycin in dextrose 5 % 1 gram/250 mL IVPB 1,000 mg (mg) 1,000 mg New Bag 09/28/20 1142    vancomycin 1.25 g in dextrose 5% 250 mL IVPB (ready to mix) (mg) 1,250 mg New Bag 09/27/20 1148    vancomycin 1.25 g in dextrose 5% 250 mL IVPB (ready to mix) (mg) 1,250 mg New Bag 09/25/20 1010                Microbiologic Results:  Microbiology Results (last 7 days)     Procedure Component Value Units Date/Time    CSF culture [[id number]] Collected: 09/25/20 1230    Order Status: Completed Specimen: CSF (Spinal Fluid) from CSF Tap, Tube 1 Updated: 09/28/20 0733     CSF CULTURE No Growth to date     Gram Stain Result No WBC's      No organisms seen    Narrative:      Add on VDRL By Rosalva Farfan MD Order #[id number]  09/25/2020  17:51     Blood culture [[id number]] Collected: 09/22/20 1522    Order Status: Completed Specimen: Blood from Peripheral, Left Hand Updated: 09/27/20 1612     Blood Culture, Routine No growth after 5 days.    Narrative:      Collection has been rescheduled by BDW at 09/22/2020 14:57 Reason:   Patient unavailable  Collection has been rescheduled by BDW at 09/22/2020 14:57 Reason:   Patient unavailable    Blood culture [[id number]] Collected: 09/22/20 1522    Order Status: Completed Specimen: Blood from Antecubital, Left Arm Updated: 09/27/20 1612     Blood Culture, Routine No growth after 5 days.    Narrative:      Collection has been rescheduled by BDW at 09/22/2020 14:57 Reason:   Patient unavailable  Collection has been rescheduled by BDW at 09/22/2020 14:57 Reason:   Patient unavailable    Gram stain [[id number]] Collected: 09/25/20 1230    Order  Status: Canceled Specimen: CSF (Spinal Fluid) from CSF Tap, Tube 1     Blood culture [[id number]]     Order Status: Canceled Specimen: Blood     Blood culture [[id number]]     Order Status: Canceled Specimen: Blood

## 2020-09-28 NOTE — PROGRESS NOTES
[facility]  Neurosurgery  Progress Note    Subjective:     History of Present Illness: 74 M with  recent endocarditis (Strep mutans per report, s/p IV Rocephin) with aortic valve vegetations (8/20), recent GIB (8/20), diverticulitis, GERD, MVP, and COPD who was [place] [facility] [place] cEEG. The patient presented to [facility] on 9/8/2020 after LOC at home. Per report patient had been c/o worsening SOB, had portable CXR with effusion v PNA. Patient was given course of doxycycline and finished course recently. Reportedly on 9/8 his wife went to bed while  was watching TV and about an hour later her daughter found him non responsive on the ground, short round of chest compressions before intubated in ED, sedated on Prop.  No seizure like activity was observed in ED. TPA contraindicated due to recent GI bleed, last occurring in August.  MRI head showed L PCA infarct and right occipital lobe edema. 9/8 EEG demonstrated left posterior pseudo periodic epileptiform discharges is suggestive of an irritative region likely secondary to focal ischemia. Patient started on AEDs vimpat and keppra and reported to have several seizures during his stay at [facility], currently on cEEG in [facility]. NSGY consulted following MRI Brain this evening with 1.2cm R occipital lesion concerning for abscess.     On Lovenox at home, SQH at [facility].     Reconsult for change in mental status today, not interacting with his wife, aphasia, not following commands.     Post-Op Info:  Procedure(s) (LRB):  Lumbar Puncture (N/A)   2 Days Post-Op     Vitals:    09/27/20 2000   BP:    Pulse: 93   Resp: (!) 30   Temp:        exam:   E4V1M5  Eyes open, continues to improve on abx.    Beginning to intermittently and rarely speak   Moves all spontaneusly and purposeful,  And inconsistently/rarely  following commands    Assessment/Plan:     Bacterial endocarditis  74M with known bacterial endocarditis with septic  emboli c/b L PCA infarct, scattered punctate infarctions and R occipital lesion concerning for intracranial abscess. Reconsulted for change in mental status today and aphasia    CT head and MRI reviewed, appears grossly unchanged from previous    No acute neurosurgical management at this time  Agree with vascular neurology workup, likely due to septic emboli  EEG no seizures      -q1h neurochecks in ICU, q2h neurochecks in stepdown, q4h neurochecks on floor  --SBP <160 (cardene ggt; hydralazine & labetalol PRN; transition to home meds when appropriate)  --Na >135  --VN following  --AED per NCC/Epilepsy  --Abx per ID, f/up BCx - 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   --NO steroids  --HOB >30  --PPI given GIB per NCC  --Continue to monitor clinically, notify NSGY immediately with any changes in neuro status      Given elevated wbc count on LP we will consider surgical intervention but patient is improving with conservative management at this time. Therefore no urgent intervention.     Dispo: ICU             Curt Nye MD  Neurosurgery  [facility]

## 2020-09-28 NOTE — PLAN OF CARE
Pt alert and confused. Soft wrist restraints in place and required to prevent removal of NGT. VSS. O2 2L NC in use. Rested well through night with no signs of pain or discomfort. Safety maintained. Skin assessment unchanged. Will continue to monitor.

## 2020-09-28 NOTE — ASSESSMENT & PLAN NOTE
ASSESSMENT     Lopez Savage is a 75 y.o. male with a past psychiatric history of depression, who presented to the [facility] for status epilepticus. Psychiatry originally consulted for management of agitation. Somnolent on initial interview -- per history, alterations in cognition and attention appear to be waxing and waning with improvement since admission.    IMPRESSION  Delirium 2/2 general medical condition      RECOMMENDATION(S)      1. Scheduled Medication(s):  - Melatonin 3 mg nightly  - Recommend to discontinue trazodone as it can lower seizure threshold    2. PRN Medication(s):  - Haldol 1 mg IV vs 2 mg PO/IM Q6H for non-redirectable agitation    3.  Monitor:  Please obtain daily EKG to monitor QTc if psychotropics given    4. Legal Status/Precaution(s):  Patient does not meet criteria for PEC or inpatient psychiatric admission at this time. Recommend to rescind PEC if one was placed. Patient is not currently an imminent danger to self or others and is not gravely disabled due to a psychiatric illness.    5. Other:  [facility] ICU - positive 9/25 and 9/26  DELIRIUM BEHAVIOR MANAGEMENT   PLEASE utilize CHEMICAL restraints with PRN meds first for agitation. Minimize use of PHYSICAL restraints   Keep window shades open and room lit during day and room dim at night in order to promote normal sleep-wake cycles   Encourage family at bedside. [place] patient often to situation, location, date   Continue to Limit or Discontinue use of Narcotics, Benzos and Anti-cholinergic medications as they may worsen delirium   Continue medical workup for causative etiology of Delirium

## 2020-09-28 NOTE — PROGRESS NOTES
"[facility]  Adult Nutrition  Progress Note    SUMMARY       Recommendations    1. Continue current TF of Impact Peptide 1.5 @ 50 mL/hr - meeting needs.      2. As medically able, ADAT to regular with texture per SLP.      3. RD following.     Goals: pt to receive nutrition by RD follow up  Nutrition Goal Status: goal met  Communication of RD Recs: (POC)    Reason for Assessment    Reason For Assessment: RD follow-up  Diagnosis: (status epilepticus)  Relevant Medical History: GERD; diverticulitis; endocarditis; HTN; COPD  Interdisciplinary Rounds: did not attend  General Information Comments: Pt resting in bed with family member at bedside. Noted soft wrist restraints. Pt pulled NG tube, but it has been replaced. Family member reports pt tolerating TF well. NFPE completed 9/15, pt appears nourished.   Nutrition Discharge Planning: unable to determine at this time    Nutrition Risk Screen    Nutrition Risk Screen: tube feeding or parenteral nutrition    Nutrition/Diet History    Spiritual, Cultural Beliefs, Latter-day Practices, Values that Affect Care: no  Food Allergies: NKFA  Factors Affecting Nutritional Intake: NPO    Anthropometrics    Temp: 98.1 °F (36.7 °C)  Height Method: Measured  Height: 5' 9" (175.3 cm)  Height (inches): 69 in  Weight Method: Bed Scale  Weight: 114 kg (251 lb 5.2 oz)  Weight (lb): 251.33 lb  Ideal Body Weight (IBW), Male: 160 lb  % Ideal Body Weight, Male (lb): 157.08 %  BMI (Calculated): 37.1  BMI Grade: 35 - 39.9 - obesity - grade II     Lab/Procedures/Meds    Pertinent Labs Reviewed: reviewed  Pertinent Labs Comments: K 3.4, BUN 35, alk phos 44, ALT 8  Pertinent Medications Reviewed: reviewed  Pertinent Medications Comments: statin    Estimated/Assessed Needs    Weight Used For Calorie Calculations: 114 kg (251 lb 5.2 oz)  Energy Calorie Requirements (kcal): 1875 kcal/d  Energy Need Method: Kelechi Avendano  Protein Requirements:  g/day  Weight Used For " Protein Calculations: 114 kg (251 lb 5.2 oz)  Fluid Requirements (mL): 1 mL/kcal or per MD     RDA Method (mL): 1875    Nutrition Prescription Ordered    Current Diet Order: NPO  Current Nutrition Support Formula Ordered: Impact Peptide 1.5  Current Nutrition Support Rate Ordered: 50 (ml)  Current Nutrition Support Frequency Ordered: mL/hr    Evaluation of Received Nutrient/Fluid Intake    Enteral Calories (kcal): 1800  Enteral Protein (gm): 113  Enteral (Free Water) Fluid (mL): 924  % Kcal Needs: 96  % Protein Needs: 100  Energy Calories Required: meeting needs  Protein Required: meeting needs  Fluid Required: (per Md)  Comments: LBM 9/28  Tolerance: tolerating  % Intake of Estimated Energy Needs: 75 - 100 %  % Meal Intake: NPO    Nutrition Risk    Level of Risk/Frequency of Follow-up: (f/u 1 x wk)     Assessment and Plan    Nutrition Problem  inadequate energy intake     Related to (etiology):   inability to consume sufficient energy      Signs and Symptoms (as evidenced by):   Pt NPO with no means of nutrition     Interventions (treatment strategy):  Collaboration of care with other providers  Enteral nutrition     Nutrition Diagnosis Status:   Resolved      Monitor and Evaluation    Food and Nutrient Intake: energy intake, enteral nutrition intake  Food and Nutrient Adminstration: enteral and parenteral nutrition administration  Anthropometric Measurements: weight, weight change  Biochemical Data, Medical Tests and Procedures: electrolyte and renal panel, gastrointestinal profile, glucose/endocrine profile, inflammatory profile, lipid profile  Nutrition-Focused Physical Findings: overall appearance     Malnutrition Assessment                 Orbital Region (Subcutaneous Fat Loss): well nourished  Upper Arm Region (Subcutaneous Fat Loss): well nourished   Shinto Region (Muscle Loss): well nourished  Clavicle Bone Region (Muscle Loss): well nourished  Clavicle and Acromion Bone Region (Muscle Loss): well  nourished  Scapular Bone Region (Muscle Loss): well nourished  Dorsal Hand (Muscle Loss): well nourished  Anterior Thigh Region (Muscle Loss): well nourished  Posterior Calf Region (Muscle Loss): well nourished                 Nutrition Follow-Up    RD Follow-up?: Yes

## 2020-09-28 NOTE — PLAN OF CARE
Problem: Occupational Therapy Goal  Goal: Occupational Therapy Goal  Description: Goals to be met by: 9/28/2020 (Extend to 10/5/2020)    Patient will increase functional independence with ADLs by performing:    Pt will follow 75% of motor commands with <2 verbal cues for repetition of task to maximize engagement in functional task. -not met  Pt will sit at EOB for approx 10 minutes with max A and unilateral UE support to complete grooming task -not met  Pt will complete sit>stand from EOB with bed in highest position with mod A in prep for functional transfers to BSC -not met  Pt will perform grooming sitting EOB with mod A -not met    Outcome: Ongoing, Progressing    OT goals remain appropriate.    Pedro March, OT   09/28/2020

## 2020-09-28 NOTE — PT/OT/SLP PROGRESS
Speech Language Pathology Treatment    Patient Name:  Lpoez Savage   MRN:  [id number]  Admitting Diagnosis: Status epilepticus    Recommendations:                 General Recommendations:  Dysphagia therapy, Speech/language therapy and Modified barium swallow study  Diet recommendations:  NPO, Liquid Diet Level: NPO   Aspiration Precautions: Continue alternate means of nutrition, Frequent oral care, Ice chips sparingly, Puree for pleasure and Strict aspiration precautions   Rec for MBSS communicated with MD via secure chat    General Precautions: Standard, aspiration, fall, respiratory, NPO, [place]     Subjective   Awake yet weak, confused. Wife at bedside, reports pt has tolerated puree & thins for pleasure. NGT in place. Wife wants decision to be made of PO feeds OR PEG. She expressed concern that NGT has been in for several days.     Pain/Comfort:  · Pain Rating 1: 0/10  · Pain Rating Post-Intervention 2: 0/10    Objective:     Has the patient been evaluated by SLP for swallowing?   Yes  Keep patient NPO? Yes   Current Respiratory Status: nasal cannula      Pt repositioned to upright. Pt tolerated ice chips x2 without difficulty. Given small cup sips x2, pt with anterior loss of some & followed by delayed swallow & multiple swallows. Wife gave pt small straw sip of water, pt with delayed, appeared uncoordinated swallowing & then throat clearing. Given 1 tsp bites of puree x4, pt with delayed swallow yet no overt s/s aspiration. Given small cup sips of nectar, pt again with delayed swallow & multiple spontaneous swallows yet no overt s/s aspiration. SLP educated wife on all recs, she verbalized understanding. Pt vocalized in response to wife & SLP during session & followed basic commands given min A.     Assessment:     Lopez Savage is a 75 y.o. male with an SLP diagnosis of Dysphagia and Cognitive-Linguistic Impairment.  He presents with risk of aspiration. Modified Barium Swallow Study recommended to  objectively assess swallow function to make a decision regarding PO diet VS PEG.     Goals:   Multidisciplinary Problems     SLP Goals        Problem: SLP Goal    Goal Priority Disciplines Outcome   SLP Goal     SLP Ongoing, Progressing   Description: Goals expected to be met by 9/30:1. Pt will participate in ongoing assessment of swallow to determine safest, least restrictive diet.   2. Pt will orient x4 ind'ly.   3. Pt vocalize x3 within an SLP session.   4. Pt will follow basic 1-step commands with 70% acc ind'ly.   5. Pt will answer basic y/n q's with 70% acc ind'ly.   6. Pt will participate in ongoing assessment of cognitive-linguistic skills.                    Plan:     · Patient to be seen:  4 x/week   · Plan of Care expires:  10/15/20  · Plan of Care reviewed with:  patient, spouse   · SLP Follow-Up:  Yes       Discharge recommendations:  (tbd)     Time Tracking:     SLP Treatment Date:   09/28/20  Speech Start Time:  1138  Speech Stop Time:  1205     Speech Total Time (min):  27 min    Billable Minutes: Speech Therapy Individual 8 and Treatment Swallowing Dysfunction 19    Maritza Terry CCC-SLP  09/28/2020

## 2020-09-28 NOTE — SUBJECTIVE & OBJECTIVE
Interval History: JAYESH    Medications:  Continuous Infusions:  Scheduled Meds:   albuterol-ipratropium  3 mL Nebulization Q4H    atorvastatin  40 mg Per NG tube Daily    cefTRIAXone (ROCEPHIN) IVPB  2 g Intravenous Q12H    enoxaparin  40 mg Subcutaneous Q24H    ferric gluconate (FERRLECIT) IVPB  125 mg Intravenous Daily    lacosamide  200 mg Per NG tube Q12H    miconazole nitrate 2%   Topical (Top) BID    pantoprazole  40 mg Per NG tube Daily    silodosin  8 mg Per NG tube Daily    sodium chloride 0.9%  10 mL Intravenous Q6H    traZODone  25 mg Per NG tube QHS    vancomycin (VANCOCIN) IVPB  1,000 mg Intravenous Q24H     PRN Meds:acetaminophen, dextrose 50%, glucagon (human recombinant), haloperidoL, insulin aspart U-100, labetalol, psyllium husk (aspartame), sodium chloride 0.9%, Pharmacy to dose Vancomycin consult **AND** vancomycin - pharmacy to dose     Review of Systems  Objective:     Weight: 114 kg (251 lb 5.2 oz)  Body mass index is 37.11 kg/m².  Vital Signs (Most Recent):  Temp: 98.1 °F (36.7 °C) (09/28/20 1202)  Pulse: 91 (09/28/20 1214)  Resp: (!) 24 (09/28/20 1214)  BP: (!) 159/70 (09/28/20 1202)  SpO2: 100 % (09/28/20 1214) Vital Signs (24h Range):  Temp:  [97.6 °F (36.4 °C)-98.6 °F (37 °C)] 98.1 °F (36.7 °C)  Pulse:  [82-95] 91  Resp:  [17-45] 24  SpO2:  [96 %-100 %] 100 %  BP: (140-172)/(62-74) 159/70                          NG/OG Tube 09/27/20 2205 14 Fr. Right nostril (Active)   Placement Check physician notified 09/28/20 0000   Tube advanced (cm) 4 09/21/20 0859   Advancement advanced manually 09/21/20 0859   Tolerance no signs/symptoms of discomfort 09/27/20 2000   Securement secured to nostril center 09/28/20 0000   Clamp Status/Tolerance unclamped 09/28/20 0000   Insertion Site Appearance no redness, warmth, tenderness, skin breakdown, drainage 09/27/20 2000   Flush/Irrigation flushed w/;water;no resistance met 09/28/20 0600   Feeding Type continuous 09/28/20 0000   Feeding Action  feeding continued 09/27/20 2000   Current Rate (mL/hr) 50 mL/hr 09/27/20 2000   Goal Rate (mL/hr) 50 mL/hr 09/27/20 2000   Intake (mL) 50 mL 09/26/20 1629   Water Bolus (mL) 400 mL 09/28/20 0600   Rate Formula Tube Feeding (mL/hr) 50 mL/hr 09/27/20 2000   Formula Name Impact Peptide 09/27/20 2000   Intake (mL) - Formula Tube Feeding 350 09/28/20 0600   Residual Amount (ml) 0 ml 09/28/20 0400       Neurosurgery Physical Exam     E4V3M6  AOx1  Garbled speech  PERRL  CN intact  Follows commands x4    Significant Labs:  Recent Labs   Lab 09/27/20 0426 09/28/20 0449   GLU 89 103    140   K 3.5 3.4*    103   CO2 28 28   BUN 39* 35*   CREATININE 1.5* 1.3   CALCIUM 10.1 10.1   MG 1.8 1.7     Recent Labs   Lab 09/27/20 0426 09/28/20  0449   WBC 8.57 9.14   HGB 8.1* 8.5*   HCT 27.1* 27.9*    188     No results for input(s): LABPT, INR, APTT in the last 48 hours.  Microbiology Results (last 7 days)     Procedure Component Value Units Date/Time    CSF culture [[id number]] Collected: 09/25/20 1230    Order Status: Completed Specimen: CSF (Spinal Fluid) from CSF Tap, Tube 1 Updated: 09/28/20 0733     CSF CULTURE No Growth to date     Gram Stain Result No WBC's      No organisms seen    Narrative:      Add on VDRL By Rosalva Farfan MD Order #[id number]  09/25/2020  17:51     Blood culture [[id number]] Collected: 09/22/20 1522    Order Status: Completed Specimen: Blood from Peripheral, Left Hand Updated: 09/27/20 1612     Blood Culture, Routine No growth after 5 days.    Narrative:      Collection has been rescheduled by BDW at 09/22/2020 14:57 Reason:   Patient unavailable  Collection has been rescheduled by BDW at 09/22/2020 14:57 Reason:   Patient unavailable    Blood culture [[id number]] Collected: 09/22/20 1522    Order Status: Completed Specimen: Blood from Antecubital, Left Arm Updated: 09/27/20 1612     Blood Culture, Routine No growth after 5 days.    Narrative:      Collection has been rescheduled by BDW  at 09/22/2020 14:57 Reason:   Patient unavailable  Collection has been rescheduled by BDW at 09/22/2020 14:57 Reason:   Patient unavailable    Gram stain [[id number]] Collected: 09/25/20 1230    Order Status: Canceled Specimen: CSF (Spinal Fluid) from CSF Tap, Tube 1     Blood culture [[id number]]     Order Status: Canceled Specimen: Blood     Blood culture [[id number]]     Order Status: Canceled Specimen: Blood         All pertinent labs from the last 24 hours have been reviewed.    Significant Diagnostics:  I have reviewed and interpreted all pertinent imaging results/findings within the past 24 hours.

## 2020-09-29 PROBLEM — R04.0 EPISTAXIS: Status: ACTIVE | Noted: 2020-01-01

## 2020-09-29 NOTE — PT/OT/SLP PROGRESS
Physical Therapy Treatment    Patient Name:  Lopez Savage   MRN:  [id number]    Recommendations:     Discharge Recommendations:  rehabilitation facility   Discharge Equipment Recommendations: other (see comments)(TBD)   Barriers to discharge: Decreased caregiver support and at current level of functional mobility.     Assessment:     Lopez Savage is a 75 y.o. male admitted with a medical diagnosis of Status epilepticus.  He presents with the following impairments/functional limitations:  weakness, impaired endurance, impaired functional mobilty, impaired self care skills, impaired balance, gait instability, decreased lower extremity function, decreased upper extremity function, decreased coordination, impaired cognition, visual deficits, impaired sensation, decreased safety awareness, pain, edema, impaired cardiopulmonary response to activity. Patient demonstrates excellent progress towards goals on this date. Increased alertness and orientation. Patient following commands 3/5 times  And with improved participation in mobility and significantly improved postural control. MaxA x2 for bed mobility and sit to stand transfer. This patient is an excellent candidate for inpatient rehabilitation, has a qualifying diagnosis, shows increasing activity tolerance,  is motivated to participate in treatment, and has a supportive family willing to assist the patient upon discharge.      Rehab Prognosis: Good; patient would benefit from acute skilled PT services to address these deficits and reach maximum level of function.    Recent Surgery: Procedure(s) (LRB):  Lumbar Puncture (N/A) 4 Days Post-Op    Plan:     During this hospitalization, patient to be seen 3 x/week to address the identified rehab impairments via gait training, therapeutic activities, therapeutic exercises, neuromuscular re-education and progress toward the following goals:    · Plan of Care Expires:  10/10/20    Subjective     Chief Complaint: none  "stated  Patient/Family Comments/goals: "Ya'll do so good with him. It is so encouraging to see him stand"  Pain/Comfort:  · Pain Rating 1: 0/10  · Pain Rating Post-Intervention 1: 0/10      Objective:     Communicated with nurse prior to session.  Patient found HOB elevated with Condom Catheter, blood pressure cuff, pulse ox (continuous), telemetry, restraints, NG tube, peripheral IV upon PT entry to room.     General Precautions: Standard, fall   Orthopedic Precautions:N/A   Braces: N/A     Functional Mobility:  · Bed Mobility:     · Rolling Right: maximal assistance  · Scooting to/from EOB in sitting: maximal assistance and of 2 persons, cues to lean trunk foward  · Supine to Sit: maximal assistance with improved initiation mobility with LE, frequent redirecting to tasks  · Sit to Supine: maximal assistance and of 2 persons  · Transfers:     · Sit to Stand:  maximal assistance and of 2 persons with hand-held assist. Achieved ~90% of full stand, patient leaning posteriorly against bed, R foot sliding anteriorly. PT and tech blocking terrie knees and feet. Cues to engage glutes and quads, to stand erect. "Like I was going to pass out" when asked how it felt to stand after returning to sitting. Vitals remained stable.  · Gait: Deferred, patient with posterior lean against bed in standing, unable to achieve weight shifting  · Balance: Fair, Patient able to maintain sitting balance CGA intermittently at EOB, ~20 sec at a time and Taylor to correct. Able to obtain midline posture with cues.  MaxA with increased cognitive demand (answering questions, performing LE exercises)    AM-PAC 6 CLICK MOBILITY  Turning over in bed (including adjusting bedclothes, sheets and blankets)?: 2  Sitting down on and standing up from a chair with arms (e.g., wheelchair, bedside commode, etc.): 2  Moving from lying on back to sitting on the side of the bed?: 2  Moving to and from a bed to a chair (including a wheelchair)?: 2  Need to walk in " hospital room?: 1  Climbing 3-5 steps with a railing?: 1  Basic Mobility Total Score: 10       Therapeutic Activities and Exercises:   Pt educated on plan and goals with physical therapy.   Pt educated on importance of OOB activity to decrease the risks associated with bed rest.   2x10 LAQ while seated EOB, posterior lean increasing with LE exercises and seated balance assistance increasing to MaxA.   V/t cues for postural control. Patient demonstrates excellent improvement in postural reactions.   Patient sat EOB ~10min with PT and tech, improving chest expansion, postural muscle endurance and tolerance to out of bed mobility  Instruction provided for safety and technique for bed mobility and transfers.  All questions and concerns addressed within PT scope of practice.     Patient left HOB elevated with all lines intact, call button in reach and wife and team present..    GOALS:   Multidisciplinary Problems     Physical Therapy Goals        Problem: Physical Therapy Goal    Goal Priority Disciplines Outcome Goal Variances Interventions   Physical Therapy Goal     PT, PT/OT Ongoing, Progressing     Description: Goals to be met by: 2020    Patient will increase functional independence with mobility by performin. Pt will perform bed mobility (rolling L/R, scooting, and bridging) with CGA  2. Pt will perform supine to/from sit with CGA.  3. Pt will sit EOB x 15 mins with B UE support with Rony assistance.  4. Pt will perform sit to stand with max assistance and LRAD.  5. Pt will ambulate 15 feet with max assistance and LRAD.  6. Pt will perform there-ex from handout x 15 reps to improve strength for functional mobility.                         Time Tracking:     PT Received On: 20  PT Start Time: 1110     PT Stop Time: 1138  PT Total Time (min): 28 min     Billable Minutes: Therapeutic Activity 15 and Therapeutic Exercise 13    Treatment Type: Treatment  PT/PTA: PT     PTA Visit Number: 0     Tali  Miguelito, PT  09/29/2020

## 2020-09-29 NOTE — ASSESSMENT & PLAN NOTE
This is a 74 year old male with PMH of hypertension, ulcerative colitis, GERD, chronic bronchitis, autoimmune hemolytic anemia, mitral regurgitation, and diverticulitis, admitted for subacute bacterial endocarditis  with strep mutans  in setting of upper GI bleed on 6 weeks of IV ceftriaxone (EOC 9/15) presents to ED for seizure activity and loss of consciousness. CT head negative for acute intracranial bleed. MRI +PCA infarct and right occipital lobe edema. He was started on doxycycline. Metronidazole, and ceftriaxone.      TTE 8/14 with vegetation of aortic valve. SILVANA 8/17 with an 18 mm mobile vegetation is of both the mitral and aortic valves. CTS saw pt then, no surgical intervention. Has completed 6 weeks of IV ceftriaxone, end date 9/15/2020.      MRI here with Numerous scattered punctate foci of cortical enhancement throughout both cerebral hemispheres.  Additional rim enhancing collection in the right occipital lobe with central diffusion restriction and surrounding vasogenic edema.  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 NSGY following, no acute surgical intervention. No seizure activity today.    Reconsulted for encephalopathy. LP studies  WBC 21, RBC 6, protein 31, gluc 56. HSV, RPR, FTA ab, VDRL studies negative. CSF gram stain and cultures NGTD. Ampicillin discontinued. Mentation improved. Remained afebrile. No leukocytosis. Barium swallow study today.     BCXs:  9/8 1/4 CONS - suspect contaminant  9/10 - NGTD  9/14 NGTD  9/22 NGTD    Recommendations  1. Continue ceftriaxone [id number]  2. Continue vancomycin. Trough goal closer to 20  3. NSGY following. Repeat MRI with decrease in size of right occipital abscess. No surgical intervention at this time.   4. Blood cultures here NGTD.   6. Anticipate 6-8 weeks of IV vancomycin and ceftriaxone with repeat imaging to assess resolution of brain abscess. End date of IV antibiotics: 10/27/2020 -11/10/2020 with weekly  CBC CMP ESR CRP VANC TROUGH SENT TO ID CLINIC  [phone].   9. Start probiotics   8. Pt remained afebrile. No sz activity today. Alert. HDS.

## 2020-09-29 NOTE — ASSESSMENT & PLAN NOTE
Repeat MRI MRI Brain WO contrast (9/29/20):  1. Evolving areas of a scattered signal abnormality within the cerebral hemispheres now with slight volume loss particularly right frontal lobe and bilateral occipital lobes which may represent evolving areas of infarction.  There is continued though slightly reduced scattered areas of enhancement within these regions with additional foci in the bilateral occipital lobes with ring enhancing focus right occipital lobe overall reduced in size which may represent evolving septic emboli.     2. There is a new subcentimeter region of enhancement within the left cerebellum with resolution of previous diffusion restriction in this region concerning for possible additional subacute age infarcts.      3. Continue though reduced diffusion hyperintensity left parasagittal parietal cortex and left mesial temporal lobe which may represent sequela of evolving seizure activity however underlying encephalitis not excluded.     4. Innumerable scattered regions of susceptibility primarily in the cortical and subcortical white matter with additional cerebellar and deep gray foci as well as scattered superficial siderosis similar to prior.  Differential to include underlying amyloid angiopathy with superimposed remote microhemorrhages not excluded.     5. Thin subdural collection overlying the left cerebral convexity relatively stable suggestive for subdural hygroma.     6. There is no restricted diffusion to suggest acute infarction.     LP with WBC 21, RBC 6, protein 31, gluc 56  HSV, RPR, FTA ab, VDRL studies negative   CSF gram stain and cultures NGTD  Continue current antimicrobial therapy at this time    Discussed with ID staff   0 = independent

## 2020-09-29 NOTE — PLAN OF CARE
Problem: Physical Therapy Goal  Goal: Physical Therapy Goal  Description: Goals to be met by: 2020, Goals reviewed on 20 and remain appropriate. Extended to 10/14/2020    Patient will increase functional independence with mobility by performin. Pt will perform bed mobility (rolling L/R, scooting, and bridging) with CGA  2. Pt will perform supine to/from sit with CGA.  3. Pt will sit EOB x 15 mins with B UE support with Rony assistance.  4. Pt will perform sit to stand with max assistance and LRAD.  5. Pt will ambulate 15 feet with max assistance and LRAD.  6. Pt will perform there-ex from handout x 15 reps to improve strength for functional mobility.      Outcome: Ongoing, Progressing      Tali Farley, PT, DPT  2020

## 2020-09-29 NOTE — ASSESSMENT & PLAN NOTE
75M admitted for endocarditis. NGT placed due to dysphagia. Patient also with encephalopathy and has pulled NGT out multiple times requiring replacement. ENT consulted for intermittent, low volume epistaxis. Suspect that it is NGT trauma related. NGT removed as patient now cleared for regular diet/thins per SLP. No active bleeding. No intervention performed.     - Bacitracin ointment to bilateral nares TID x 3 days   - Nasal saline to bilateral nares q 4 hrs   - Afrin nasal spray PRN epistaxis (apply liberally) and hold pressure   - Avoid nasal cannula; recommend humidified 02   - call ENT with questions

## 2020-09-29 NOTE — SUBJECTIVE & OBJECTIVE
Medications:  Continuous Infusions:  Scheduled Meds:   albuterol-ipratropium  3 mL Nebulization Q4H    atorvastatin  40 mg Per NG tube Daily    cefTRIAXone (ROCEPHIN) IVPB  2 g Intravenous Q12H    enoxaparin  40 mg Subcutaneous Q24H    ferric gluconate (FERRLECIT) IVPB  125 mg Intravenous Daily    furosemide (LASIX) IV  40 mg Intravenous BID    lacosamide  200 mg Per NG tube Q12H    Lactobacillus rhamnosus GG  1 capsule Oral Daily    miconazole nitrate 2%   Topical (Top) BID    pantoprazole  40 mg Per NG tube Daily    silodosin  8 mg Per NG tube Daily    sodium chloride 0.9%  10 mL Intravenous Q6H    traZODone  25 mg Per NG tube QHS    vancomycin (VANCOCIN) IVPB  1,000 mg Intravenous Q24H     PRN Meds:acetaminophen, dextrose 50%, glucagon (human recombinant), haloperidoL, insulin aspart U-100, labetalol, oxymetazoline, psyllium husk (aspartame), sodium chloride 0.9%, Pharmacy to dose Vancomycin consult **AND** vancomycin - pharmacy to dose     No current facility-administered medications on file prior to encounter.      Current Outpatient Medications on File Prior to Encounter   Medication Sig    cefTRIAXone (ROCEPHIN) 2 g/50 mL PgBk IVPB Inject 50 mLs (2 g total) into the vein once daily. for 4 days    doxycycline hyclate (DOXYCYCLINE 100MG IN D5W 250ML, READY TO MIX,) Inject 250 mLs (100 mg total) into the vein every 12 (twelve) hours. for 6 days    enoxaparin (LOVENOX) 40 mg/0.4 mL Syrg Inject 0.4 mLs (40 mg total) into the skin once daily.    ergocalciferol, vitamin D2, (VITAMIN D ORAL) Take 1 capsule by mouth once daily.    insulin aspart U-100 (NOVOLOG) 100 unit/mL (3 mL) InPn pen Inject 0-5 Units into the skin every 6 (six) hours as needed (Hyperglycemia).    lacosamide (VIMPAT) 10 mg/mL Soln oral solution 10 mLs (100 mg total) by Per NG tube route every 12 (twelve) hours.    levETIRAcetam in NaCl, iso-os, (KEPPRA) 500 mg/100 mL PgBk Inject 100 mLs (500 mg total) into the vein every 12  (twelve) hours.    losartan (COZAAR) 50 MG tablet Take 1 tablet (50 mg total) by mouth once daily.    pantoprazole (PROTONIX) 40 MG tablet Take 40 mg by mouth 2 (two) times daily.    atorvastatin (LIPITOR) 40 MG tablet 1 tablet (40 mg total) by Per G Tube route once daily.    metronidazole (FLAGYL) 500 mg/100 mL IVPB Inject 100 mLs (500 mg total) into the vein every 8 (eight) hours. for 7 days       Review of patient's allergies indicates:   Allergen Reactions    Codeine     Tetanus vaccines and toxoid        History reviewed. No pertinent past medical history.  History reviewed. No pertinent surgical history.  Family History     None        Tobacco Use    Smoking status: Not on file   Substance and Sexual Activity    Alcohol use: Not on file    Drug use: Not on file    Sexual activity: Not on file     Review of Systems   Unable to perform ROS: Mental status change     Objective:     Vital Signs (Most Recent):  Temp: 98.6 °F (37 °C) (09/29/20 0000)  Pulse: 92 (09/29/20 1530)  Resp: (!) 21 (09/29/20 1530)  BP: (!) 141/62 (09/29/20 0000)  SpO2: 96 % (09/29/20 1530) Vital Signs (24h Range):  Temp:  [98.2 °F (36.8 °C)-98.6 °F (37 °C)] 98.6 °F (37 °C)  Pulse:  [67-92] 92  Resp:  [17-31] 21  SpO2:  [85 %-100 %] 96 %  BP: (141-147)/(62-65) 141/62     Weight: 114 kg (251 lb 5.2 oz)  Body mass index is 37.11 kg/m².        Physical Exam   Constitutional: Alert and awake. Voice clear. NAD.  Eyes: EOM I Bilaterally  Head/Face: Normocephalic. House Brackmann I Bilaterally.  Right Ear: External Auditory Canal WNL.  Auricle WNL.  Left Ear: External Auditory Canal WNL. Auricle WNL.  Nose: External nasal skin without masses/lesions. NGT in left nare with dried bloody crusts, removed crusts. NGT removed. No sign of active bleeding. Irritation of anterior septum on left otherwise mucosa pink and healthy.   Oral Cavity: Gingiva/lips WNL. Oral Tongue mobile. Hard Palate WNL.   Oropharynx: Tonsillar fossa/pharyngeal wall  without lesions. Posterior oropharynx WNL.  Soft palate without masses. Midline uvula.   Neck/Lymphatic: No LAD I-VI bilaterally.  No thyromegaly.  No masses noted on exam.  Neuro/Psychiatric: Cooperative. Word salad. Confused.   Respiratory: Normal respiratory effort, no stridor/stertor, no retractions noted.          Significant Labs:  CBC:   Recent Labs   Lab 09/29/20  0441   WBC 8.75   RBC 3.01*   HGB 8.5*   HCT 28.4*      MCV 94   MCH 28.2   MCHC 29.9*     CMP:   Recent Labs   Lab 09/29/20  0441   GLU 96   CALCIUM 10.3   ALBUMIN 2.5*   PROT 6.8      K 3.4*   CO2 31*      BUN 37*   CREATININE 1.5*   ALKPHOS 49*   ALT 9*   AST 12   BILITOT 0.2       Significant Diagnostics:  I have reviewed all pertinent imaging results/findings within the past 24 hours.   MBSS reviewed - cleared from regular diet and thin liquids per SLP

## 2020-09-29 NOTE — PROGRESS NOTES
Progress Note  Hospital Medicine  [facility], [place]       Patient Name: Lopez Savage  MRN:  [id number]  Hospital Medicine Team[place] [facility] [place] Isidoro Garcia MD  Date of Admission:  9/13/2020     Length of Stay:  LOS: 15 days   Expected Discharge Date: 9/28/2020  Principal Problem:  Status epilepticus     Subjective:     Interval History/Overnight Events:      - patient more alert today, more talkative; SLP recommending [facility] [place], will plan for tomorrow  - patient is having tachypnea, CXR ordered and shows pulmonary edema, will start lasix 40 mg IV BID  - wife at the bedside; several serologies still pending, but can start planning for post acute care and would benefit from rehab, spoke with social work about case, patient prefers [facility], hopefully can pull NG tube tomorrow     albuterol-ipratropium  3 mL Nebulization Q4H    atorvastatin  40 mg Per NG tube Daily    cefTRIAXone (ROCEPHIN) IVPB  2 g Intravenous Q12H    enoxaparin  40 mg Subcutaneous Q24H    ferric gluconate (FERRLECIT) IVPB  125 mg Intravenous Daily    furosemide (LASIX) IV  40 mg Intravenous BID    lacosamide  200 mg Per NG tube Q12H    miconazole nitrate 2%   Topical (Top) BID    pantoprazole  40 mg Per NG tube Daily    silodosin  8 mg Per NG tube Daily    sodium chloride 0.9%  10 mL Intravenous Q6H    traZODone  25 mg Per NG tube QHS    vancomycin (VANCOCIN) IVPB  1,000 mg Intravenous Q24H           acetaminophen, dextrose 50%, glucagon (human recombinant), haloperidoL, insulin aspart U-100, labetalol, psyllium husk (aspartame), sodium chloride 0.9%, Pharmacy to dose Vancomycin consult **AND** vancomycin - pharmacy to dose    Review of Systems   Unable to obtain 2/2 AMS    Objective:     Temp:  [97.6 °F (36.4 °C)-98.4 °F (36.9 °C)]   Pulse:  [82-92]   Resp:  [17-31]   BP: (140-172)/(62-74)   SpO2:  [85 %-100 %]         Weight change:    Body mass index is 37.11 kg/m².       Physical Exam:  Constitutional:  lethargic , chronically ill looking,   HENT: NC/AT, external ears normal  Eyes: PERRL, EOMI, conjunctiva normal,  Neck: normal ROM, supple  CV: RRR, no m/r/g, no carotid bruits, +2 peripheral pulses.  Pulmonary/Chest wall: Breathing comfortably w/o distress,  Decreased breath sounds at lung bases  GI: Soft, non-tender, (+) BS, (+) BM  Musculoskeletal: Normal ROM, no atrophy,   Neurological   awake but not following commands   Moves extremities spontaneously   Skin: warm, dry   +pallor  Psych awake but not following commands     Labs:    Chemistries:   Recent Labs   Lab 09/26/20  0130 09/27/20  0426 09/28/20  0449    140 140   K 3.8 3.5 3.4*    103 103   CO2 30* 28 28   BUN 36* 39* 35*   CREATININE 1.5* 1.5* 1.3   CALCIUM 9.9 10.1 10.1   PROT 6.5 6.7 6.8   BILITOT 0.1 0.3 0.2   ALKPHOS 46* 47* 44*   ALT 10 9* 8*   AST 14 13 13   MG 2.0 1.8 1.7   PHOS 5.1* 4.1 4.2        WBC:   Recent Labs   Lab 09/24/20  0237 09/25/20  0356 09/26/20  0130 09/27/20  0426 09/28/20  0449   WBC 9.57 9.58 9.00 8.57 9.14     Bands:     CBC/Anemia Labs: Coags:    Recent Labs   Lab 09/26/20  0130 09/26/20  1339 09/27/20  0426 09/28/20  0449   WBC 9.00  --  8.57 9.14   HGB 7.2*  --  8.1* 8.5*   HCT 25.2*  --  27.1* 27.9*     --  171 188   MCV 96  --  93 92   RDW 15.9*  --  15.5* 15.5*   IRON  --  20*  --   --    FERRITIN  --  1,261*  --   --    FOLATE  --  6.6  --   --    [id number]  --  468  --   --     No results for input(s): PT, INR, APTT in the last 168 hours.     Diagnostic Results:    MRI brain 9/14/20    Numerous scattered punctate foci of cortical enhancement throughout both cerebral hemispheres.  Additional rim enhancing collection in the right occipital lobe with central diffusion restriction and surrounding vasogenic edema.  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     Persistent gyriform diffusion restriction the posterior left cerebral hemisphere as well as the  hippocampus and dorsal lateral thalamus, although slightly improved from prior studies.  The distribution be typical for vascular territory infarct, most suggestive of seizure related signal changes (status epilepticus).  Other encephalitis to be excluded clinically.     New small left frontal subdural hygroma without significant intracranial mass effect.     Stable small subacute left cerebellar infarct.     Mild chronic microvascular ischemic disease with remote right frontal and right occipital infarcts.     Numerous regions of prior parenchymal and leptomeningeal hemorrhage involving both cerebral hemispheres appearance raising the possibility underlying cerebral amyloid angiopathy.  No new hemorrhage.    CT head 9/16    FINDINGS:  There is stable cerebral atrophy and chronic small vessel ischemic changes.  There is an area of rounded hypodensity in the right occipital lobe, which corresponds to an overall finding of abscess formation and vasogenic edema in a patient with known endocarditis is noted on the MRI of the brain from 09/14/2020.  There is a remote right frontal infarct.  There are also areas of hypodensity seen in the left cerebellar hemisphere and left occipital lobe, which may be related to age indeterminate infarcts.  There is no acute intracranial hemorrhage, territorial infarct or mass effect, or midline shift..    Assessment and Plan     Hospital Course:    Mr. Lopez Savage was admitted to Hospital Medicine for management of  Status epilepticus     Active Hospital Problems    Diagnosis  POA    *Status epilepticus [G40.901]  Yes    Palliative care encounter [Z51.5]  Not Applicable    Goals of care, counseling/discussion [Z71.89]  Not Applicable    Advance care planning [Z71.89]  Not Applicable    Debility [R53.81]  Yes    Oral phase dysphagia [R13.11]  Yes    Seizure [R56.9]  Yes    Intracranial septic embolism [I76, I66.9]  Yes    Brain abscess [G06.0]  Yes    Cerebellar stroke  [I63.9]  Yes    Cytotoxic brain edema [G93.6]  Yes    Encephalopathy acute [G93.40]  Yes    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         Encephalopathy acute  - likely multifactorial - 2/2 stroke , brain abscess, cytotoxic edema. hosp acquired delirium   - cont PT OT   - SLP- currently with NG tube   - Stroke team believe repeat MRI is stable   General neurology consulted. Recommended LP and EEG.    - EEG showed no current seizure activity      - LP done on 9/25 with FL, concerning for possible neurosyphilis versus HSV versus other meningitis, ampicillin added   - ID re-consulted   - mentation waxing and waning       Status epilepticus  - Multiple witnessed seizure-like activity described  - No previous reports from family  - Was started on keppra and vimpat 200 mg BID  - EEG at OSH shows irregular slowing in the R frontal region, with L posterior periodic epileptiform discharges. No seizures recorded during this study.  - [place] [facility] [place] continues to show periodic discharges in the left posterior region with no seizures.  -- Continue vimpat 200 mg BID    Bacterial endocarditis  Brain abscess  Positive blood culture 7/31 for strep mutans  Previously evaluated by CT surgery team her at [facility], and determined to be too high risk for valve replacement surgery, to manage with IV antibiotics     Brain abscess - 2/2 endocarditis/septic emboli  NSGY consulted-  No surgical intervention. If worsening mentation or without improvement recommend reimaging and if need for surgical washout for source control. Please send fluid for cultures (gram stain, aerobic, anaerobic, fungal, AFB) if  "undergoes drainage.     -ID recs:  Continue IV Vanc and cetriaxone for 6-8 weeks, end date 10/27-11/10 with repeat MRI post treatment  Continue ceftriaxone 2gms IV q12hrs. Anticipate 6-8 weeks of IV vancomycin and ceftriaxone with repeat imaging to assess resolution of brain abscess.   - getting MRI brain for the AM    Cerebellar stroke/ Embolic stroke involving left posterior cerebral artery  On 9/8 pt presented to [facility] ED with unresponsiveness  No tPA due to recent GI bleed  MRI with left PCA infarct and left thalamic infarct along with small right PCA infarct  MRA demonstrates fetal circulation to left PCA  9/11 extubated  . Repeat MRI new acute/subacute L cerebellum infarct     -Neuro checks  -Vascular neurology was consulted - and recommended medical management - ASA , statin , PT OT SLP, BP control     Cytotoxic brain edema  Per prior documentation - "Area of cytotoxic cerebral edema identified when reviewing brain imaging in the territory of the left posterior cerebral artery, L thalamus, right posterior cerebral artery. There is no mass effect associated with it. We will continue to monitor the patients clinical exam for any worsening of symptoms which may indicate expansion of the stroke or the area of the edema resulting in the clinical change. The pattern is suggestive of embolic etiology"    Oral phase dysphagia  NGT in place, continuous tube feedings for nutrition  Cont enteral water flushes 200 ml q6hrs  SLP following,  - tolerating more of a diet, MBS in AM    Chronic bronchitis  Duonebs scheduled q4hrs  Monitor respiratory status, at risk for atelectasis  Continuous pulse oximetry, ABG prn  Continue CPT, pulmonary toileting    # Iron deficiency anemia  - transfusing 1 unit of PRBC on 9/26  - starting IV iron tomorrow     Diet:  Dysphagia  - tube feeds  GI PPx:   DVT PPx:  lovenox   Goals of Care:  Full     High Risk Conditions:  IE, brain abscess, seizures     Disposition:    [facility] later " this week

## 2020-09-29 NOTE — ASSESSMENT & PLAN NOTE
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 Reconsulted for change in mental status and aphasia    Less verbal this morning, but overall stable.   MRI this morning reviewed, decrease in size of abscess  -- No neurosurgical intervention needed at this time        -q1h neurochecks in ICU, q2h neurochecks in stepdown, q4h neurochecks on floor  --SBP <160 (cardene ggt; hydralazine & labetalol PRN; transition to home meds when appropriate)  --Na >135  --VN following  --AED per NCC/Epilepsy  --Abx per ID, f/up BCx - 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   --NO steroids  --HOB >30  --PPI given GIB per NCC  --Continue to monitor clinically, notify NSGY immediately with any changes in neuro status    Dispo: ICU

## 2020-09-29 NOTE — PROGRESS NOTES
[facility] - ICU [facility]  Physical Medicine & Rehab  Progress Note    Patient Name: Lopez Savage  MRN: [id number]  Admission Date: 9/13/2020  Length of Stay: 16 days  Attending Physician: Isidoro Garcia MD    Subjective:     Principal Problem:Status epilepticus    Hospital Course:   09/15/2020: Bed mobility MaxA-TA x 2 ppl  Toilet TA.   9/17: PT/OT session not performed 2/2 unresponsive to sternal rub.  9/28: BM Max x 2 ppl. Functional Mobility/Transfers: Deferred this date due. Peak SBA. Bath TA.     Interval History 9/29/2020:  Patient is seen for follow-up PM&R evaluation and recommendations: Participating with therapy. MBSS pending for today.    HPI, Past Medical, Family, and Social History remains the same as documented in the initial encounter.    Scheduled Medications:    albuterol-ipratropium  3 mL Nebulization Q4H    atorvastatin  40 mg Per NG tube Daily    cefTRIAXone (ROCEPHIN) IVPB  2 g Intravenous Q12H    enoxaparin  40 mg Subcutaneous Q24H    ferric gluconate (FERRLECIT) IVPB  125 mg Intravenous Daily    furosemide (LASIX) IV  40 mg Intravenous BID    lacosamide  200 mg Per NG tube Q12H    miconazole nitrate 2%   Topical (Top) BID    pantoprazole  40 mg Per NG tube Daily    silodosin  8 mg Per NG tube Daily    sodium chloride 0.9%  10 mL Intravenous Q6H    traZODone  25 mg Per NG tube QHS    vancomycin (VANCOCIN) IVPB  1,000 mg Intravenous Q24H       Diagnostic Results: Labs: Reviewed    PRN Medications: acetaminophen, dextrose 50%, glucagon (human recombinant), haloperidoL, insulin aspart U-100, labetalol, psyllium husk (aspartame), sodium chloride 0.9%, Pharmacy to dose Vancomycin consult **AND** vancomycin - pharmacy to dose    Review of Systems   Constitutional: Positive for activity change.   HENT: Positive for trouble swallowing.    Musculoskeletal: Positive for gait problem.   Neurological: Positive for weakness.   Psychiatric/Behavioral: Positive for confusion.      Objective:     Vital Signs (Most Recent):  Temp: 98.6 °F (37 °C) (09/29/20 0000)  Pulse: 76 (09/29/20 0738)  Resp: (!) 21 (09/29/20 0738)  BP: (!) 141/62 (09/29/20 0000)  SpO2: 100 % (09/29/20 0738)    Vital Signs (24h Range):  Temp:  [98.1 °F (36.7 °C)-98.6 °F (37 °C)] 98.6 °F (37 °C)  Pulse:  [67-91] 76  Resp:  [17-31] 21  SpO2:  [85 %-100 %] 100 %  BP: (141-159)/(62-70) 141/62     Physical Exam  Constitutional:       General: He is not in acute distress.     Appearance: He is well-developed.      Comments: Wife at bedside    HENT:      Head: Normocephalic and atraumatic.   Eyes:      General:         Right eye: No discharge.         Left eye: No discharge.   Neck:      Musculoskeletal: Neck supple.   Pulmonary:      Effort: Pulmonary effort is normal. No respiratory distress.   Abdominal:      General: There is no distension.      Palpations: Abdomen is soft.      Comments: NGT in place    Musculoskeletal:         General: No deformity.   Skin:     General: Skin is warm and dry.   Neurological:      Motor: Weakness (L side) present.      Comments: Somnolent but awakens w/ stimuli-->Follows commands   Non-verbal today 2/2 somnolent    Psychiatric:         Cognition and Memory: Cognition is impaired.     Assessment/Plan:      * Status epilepticus  -EEG with continuous periodic discharges per Epilepsy team  - on vimpat, keppra    See hospital course for functional, cognitive/speech/language, and nutrition/swallow status.      Recommendations  -  Monitor sleep disturbances and establish consistent sleep-wake cycle  -  Environmental modifications to limit agitation/confusion   -  Reorient patient to person, place, time, and situation on each encounter  -  Avoid restraints  -  May benefit from 24/7 supervision  -  Avoid/limit medications that can worsen delirium (benzodiazepines, antihistamines, anticholinergics, hypnotics, opiates)  -  Encourage mobility, OOB in chair, and early ambulation as appropriate  -  PT/OT  evaluate and treat  -  SLP speech and cognitive evaluate and treat  -  Monitor for bowel and bladder dysfunction  -  Monitor for and prevent skin breakdown and pressure ulcers  · Early mobility, repositioning/weight shifting every 20-30 minutes when sitting, turn patient every 2 hours, proper mattress/overlay and chair cushioning, pressure relief/heel protector boots    Oral phase dysphagia  -NGT in place   -MBSS pending     Debility  -PT/OT/SLP    Brain abscess  -see Bacterial endocarditis  -2/2 endocarditis/septic emboli  -NSGY consulted-  No surgical intervention    Encephalopathy acute  -likely multifactorial - 2/2 stroke , brain abscess, cytotoxic edema. hosp acquired delirium     Embolic stroke involving left posterior cerebral artery  -seen on imaging  -stroke team consulted     Bacterial endocarditis  -ID consulted  -vancomycin and rocephin IV with end date of IV antibiotics: 10/27/2020 -11/10/2020 with repeat MRI    PAC recommendation: Inpatient Rehab pending medical stability.        Jessica Yates NP  Department of Physical Medicine & Rehab   [facility] - [facility]

## 2020-09-29 NOTE — PLAN OF CARE
MBSS complete. When pt fully awake and alert, he appears safe for regular consistency solid diet and thin liquids. 1:1 supervision with meals necessary to ensure pt maintains fully awake and alert state during meals. Additionally nutrition PO to be deferred when fully awake and alert state unappreciated.     Problem: SLP Goal  Goal: SLP Goal  Description: Goals expected to be met by 10/6:  1. Pt will tolerate regular consistency solid diet and thin liquids with timely mastication and A-P transit and without overt clinical signs aspiration.   2. Pt will orient x4 ind'ly.   3. Pt will complete simple naming tasks with 70% acc given cues.   4. Pt will follow basic 1-step commands with 70% acc given cues.   5. Pt will answer basic y/n q's with 70% acc ind'ly.   6. Pt will participate in ongoing assessment of cognitive-linguistic skills.   9/29/2020 1538 by Marlin Locke, CCC-SLP  Outcome: Ongoing, Progressing     MARVA Vazquez, CCC-SLP  [phone]  9/29/2020

## 2020-09-29 NOTE — SUBJECTIVE & OBJECTIVE
Interval History: NAEON    Medications:  Continuous Infusions:  Scheduled Meds:   albuterol-ipratropium  3 mL Nebulization Q4H    atorvastatin  40 mg Per NG tube Daily    cefTRIAXone (ROCEPHIN) IVPB  2 g Intravenous Q12H    enoxaparin  40 mg Subcutaneous Q24H    ferric gluconate (FERRLECIT) IVPB  125 mg Intravenous Daily    furosemide (LASIX) IV  40 mg Intravenous BID    lacosamide  200 mg Per NG tube Q12H    miconazole nitrate 2%   Topical (Top) BID    pantoprazole  40 mg Per NG tube Daily    silodosin  8 mg Per NG tube Daily    sodium chloride 0.9%  10 mL Intravenous Q6H    traZODone  25 mg Per NG tube QHS    vancomycin (VANCOCIN) IVPB  1,000 mg Intravenous Q24H     PRN Meds:acetaminophen, dextrose 50%, glucagon (human recombinant), haloperidoL, insulin aspart U-100, labetalol, psyllium husk (aspartame), sodium chloride 0.9%, Pharmacy to dose Vancomycin consult **AND** vancomycin - pharmacy to dose     Review of Systems  Objective:     Weight: 114 kg (251 lb 5.2 oz)  Body mass index is 37.11 kg/m².  Vital Signs (Most Recent):  Temp: 98.6 °F (37 °C) (09/29/20 0000)  Pulse: 76 (09/29/20 0738)  Resp: (!) 21 (09/29/20 0738)  BP: (!) 141/62 (09/29/20 0000)  SpO2: 100 % (09/29/20 0738) Vital Signs (24h Range):  Temp:  [98.1 °F (36.7 °C)-98.6 °F (37 °C)] 98.6 °F (37 °C)  Pulse:  [67-91] 76  Resp:  [17-31] 21  SpO2:  [85 %-100 %] 100 %  BP: (141-159)/(62-70) 141/62                          NG/OG Tube 09/27/20 2205 14 Fr. Right nostril (Active)   Placement Check physician notified 09/28/20 0000   Tube advanced (cm) 4 09/21/20 0859   Advancement advanced manually 09/21/20 0859   Tolerance no signs/symptoms of discomfort 09/27/20 2000   Securement secured to nostril center 09/28/20 0000   Clamp Status/Tolerance unclamped 09/28/20 0000   Insertion Site Appearance no redness, warmth, tenderness, skin breakdown, drainage 09/27/20 2000   Flush/Irrigation flushed w/;water;no resistance met 09/28/20 0600   Feeding  Type continuous 09/28/20 0000   Feeding Action feeding continued 09/27/20 2000   Current Rate (mL/hr) 50 mL/hr 09/27/20 2000   Goal Rate (mL/hr) 50 mL/hr 09/27/20 2000   Intake (mL) 250 mL 09/29/20 0600   Water Bolus (mL) 200 mL 09/29/20 0600   Rate Formula Tube Feeding (mL/hr) 50 mL/hr 09/27/20 2000   Formula Name Impact Peptide 09/27/20 2000   Intake (mL) - Formula Tube Feeding 350 09/28/20 0600   Residual Amount (ml) 0 ml 09/28/20 0400       Male External Urinary Catheter 09/28/20 1600 (Active)   Output (mL) 700 mL 09/28/20 1800       Neurosurgery Physical Exam     [id number]  AOx0, grunting only this morning  PERRL/EOMI  CN grossly intact  Follows commands by showing thumbs and wiggling toes, L > R    Significant Labs:  Recent Labs   Lab 09/28/20  0449 09/29/20  0441    96    141   K 3.4* 3.4*    102   CO2 28 31*   BUN 35* 37*   CREATININE 1.3 1.5*   CALCIUM 10.1 10.3   MG 1.7 1.9     Recent Labs   Lab 09/28/20  0449 09/29/20  0441   WBC 9.14 8.75   HGB 8.5* 8.5*   HCT 27.9* 28.4*    183     No results for input(s): LABPT, INR, APTT in the last 48 hours.  Microbiology Results (last 7 days)     Procedure Component Value Units Date/Time    CSF culture [[id number]] Collected: 09/25/20 1230    Order Status: Completed Specimen: CSF (Spinal Fluid) from CSF Tap, Tube 1 Updated: 09/29/20 0702     CSF CULTURE No Growth to date     Gram Stain Result No WBC's      No organisms seen    Narrative:      Add on VDRL By Rosalva Farfan MD Order #[id number]  09/25/2020  17:51     Blood culture [[id number]] Collected: 09/22/20 1522    Order Status: Completed Specimen: Blood from Peripheral, Left Hand Updated: 09/27/20 1612     Blood Culture, Routine No growth after 5 days.    Narrative:      Collection has been rescheduled by BDW at 09/22/2020 14:57 Reason:   Patient unavailable  Collection has been rescheduled by BDW at 09/22/2020 14:57 Reason:   Patient unavailable    Blood culture [[id number]] Collected:  09/22/20 1522    Order Status: Completed Specimen: Blood from Antecubital, Left Arm Updated: 09/27/20 1612     Blood Culture, Routine No growth after 5 days.    Narrative:      Collection has been rescheduled by BDW at 09/22/2020 14:57 Reason:   Patient unavailable  Collection has been rescheduled by BDW at 09/22/2020 14:57 Reason:   Patient unavailable    Gram stain [[id number]] Collected: 09/25/20 1230    Order Status: Canceled Specimen: CSF (Spinal Fluid) from CSF Tap, Tube 1     Blood culture [[id number]]     Order Status: Canceled Specimen: Blood     Blood culture [[id number]]     Order Status: Canceled Specimen: Blood         All pertinent labs from the last 24 hours have been reviewed.    Significant Diagnostics:  I have reviewed and interpreted all pertinent imaging results/findings within the past 24 hours.

## 2020-09-29 NOTE — ASSESSMENT & PLAN NOTE
-see Bacterial endocarditis  -2/2 endocarditis/septic emboli  -NSGY consulted-  No surgical intervention

## 2020-09-29 NOTE — CONSULTS
[facility] - ICU [facility]  Otorhinolaryngology-Head & Neck Surgery  Consult Note    Patient Name: Lopez Savage  MRN: [id number]  Code Status: Full Code  Admission Date: 9/13/2020  Hospital Length of Stay: 16 days  Attending Physician: Isidoro Garcia MD  Primary Care Provider: Prasanna Haywood MD    Patient information was obtained from patient, spouse/SO and past medical records.     Inpatient consult to ENT  Consult performed by: Sera Sanches MD  Consult ordered by: Isidoro Garcia MD        Subjective:     Chief Complaint/Reason for Admission: endocarditis     History of Present Illness: Mr. Savage is a 74 y/o male who is currently admitted to hospital medicine for endocarditis with aortic valve vegetations and septic emboli. ENT was consulted for epistaxis. Currently, the patients mental status is poor; therefore, history is obtained from his wife. The patient has also had trouble with dysphagia this admission; therefore, NGT was placed. The patient has pulled out his NGT multiple times requiring replacement. Per the patient's wife, there have been a few episodes of trickling blood from his nostril with the NGT. It has always self resolved. It was never high volume. He has not had trouble with nose bleeding in the past. He is not currently on anticoagulation. He did pass his MBSS today and was cleared from regular diet with thin liquids per SLP.        Medications:  Continuous Infusions:  Scheduled Meds:   albuterol-ipratropium  3 mL Nebulization Q4H    atorvastatin  40 mg Per NG tube Daily    cefTRIAXone (ROCEPHIN) IVPB  2 g Intravenous Q12H    enoxaparin  40 mg Subcutaneous Q24H    ferric gluconate (FERRLECIT) IVPB  125 mg Intravenous Daily    furosemide (LASIX) IV  40 mg Intravenous BID    lacosamide  200 mg Per NG tube Q12H    Lactobacillus rhamnosus GG  1 capsule Oral Daily    miconazole nitrate 2%   Topical (Top) BID    pantoprazole  40 mg Per NG tube Daily    silodosin  8 mg  Per NG tube Daily    sodium chloride 0.9%  10 mL Intravenous Q6H    traZODone  25 mg Per NG tube QHS    vancomycin (VANCOCIN) IVPB  1,000 mg Intravenous Q24H     PRN Meds:acetaminophen, dextrose 50%, glucagon (human recombinant), haloperidoL, insulin aspart U-100, labetalol, oxymetazoline, psyllium husk (aspartame), sodium chloride 0.9%, Pharmacy to dose Vancomycin consult **AND** vancomycin - pharmacy to dose     No current facility-administered medications on file prior to encounter.      Current Outpatient Medications on File Prior to Encounter   Medication Sig    cefTRIAXone (ROCEPHIN) 2 g/50 mL PgBk IVPB Inject 50 mLs (2 g total) into the vein once daily. for 4 days    doxycycline hyclate (DOXYCYCLINE 100MG IN D5W 250ML, READY TO MIX,) Inject 250 mLs (100 mg total) into the vein every 12 (twelve) hours. for 6 days    enoxaparin (LOVENOX) 40 mg/0.4 mL Syrg Inject 0.4 mLs (40 mg total) into the skin once daily.    ergocalciferol, vitamin D2, (VITAMIN D ORAL) Take 1 capsule by mouth once daily.    insulin aspart U-100 (NOVOLOG) 100 unit/mL (3 mL) InPn pen Inject 0-5 Units into the skin every 6 (six) hours as needed (Hyperglycemia).    lacosamide (VIMPAT) 10 mg/mL Soln oral solution 10 mLs (100 mg total) by Per NG tube route every 12 (twelve) hours.    levETIRAcetam in NaCl, iso-os, (KEPPRA) 500 mg/100 mL PgBk Inject 100 mLs (500 mg total) into the vein every 12 (twelve) hours.    losartan (COZAAR) 50 MG tablet Take 1 tablet (50 mg total) by mouth once daily.    pantoprazole (PROTONIX) 40 MG tablet Take 40 mg by mouth 2 (two) times daily.    atorvastatin (LIPITOR) 40 MG tablet 1 tablet (40 mg total) by Per G Tube route once daily.    metronidazole (FLAGYL) 500 mg/100 mL IVPB Inject 100 mLs (500 mg total) into the vein every 8 (eight) hours. for 7 days       Review of patient's allergies indicates:   Allergen Reactions    Codeine     Tetanus vaccines and toxoid        History reviewed. No pertinent  past medical history.  History reviewed. No pertinent surgical history.  Family History     None        Tobacco Use    Smoking status: Not on file   Substance and Sexual Activity    Alcohol use: Not on file    Drug use: Not on file    Sexual activity: Not on file     Review of Systems   Unable to perform ROS: Mental status change     Objective:     Vital Signs (Most Recent):  Temp: 98.6 °F (37 °C) (09/29/20 0000)  Pulse: 92 (09/29/20 1530)  Resp: (!) 21 (09/29/20 1530)  BP: (!) 141/62 (09/29/20 0000)  SpO2: 96 % (09/29/20 1530) Vital Signs (24h Range):  Temp:  [98.2 °F (36.8 °C)-98.6 °F (37 °C)] 98.6 °F (37 °C)  Pulse:  [67-92] 92  Resp:  [17-31] 21  SpO2:  [85 %-100 %] 96 %  BP: (141-147)/(62-65) 141/62     Weight: 114 kg (251 lb 5.2 oz)  Body mass index is 37.11 kg/m².        Physical Exam   Constitutional: Alert and awake. Voice clear. NAD.  Eyes: EOM I Bilaterally  Head/Face: Normocephalic. House Brackmann I Bilaterally.  Right Ear: External Auditory Canal WNL.  Auricle WNL.  Left Ear: External Auditory Canal WNL. Auricle WNL.  Nose: External nasal skin without masses/lesions. NGT in left nare with dried bloody crusts, removed crusts. NGT removed. No sign of active bleeding. Irritation of anterior septum on left otherwise mucosa pink and healthy.   Oral Cavity: Gingiva/lips WNL. Oral Tongue mobile. Hard Palate WNL.   Oropharynx: Tonsillar fossa/pharyngeal wall without lesions. Posterior oropharynx WNL.  Soft palate without masses. Midline uvula.   Neck/Lymphatic: No LAD I-VI bilaterally.  No thyromegaly.  No masses noted on exam.  Neuro/Psychiatric: Cooperative. Word salad. Confused.   Respiratory: Normal respiratory effort, no stridor/stertor, no retractions noted.          Significant Labs:  CBC:   Recent Labs   Lab 09/29/20  0441   WBC 8.75   RBC 3.01*   HGB 8.5*   HCT 28.4*      MCV 94   MCH 28.2   MCHC 29.9*     CMP:   Recent Labs   Lab 09/29/20  0441   GLU 96   CALCIUM 10.3   ALBUMIN 2.5*   PROT  6.8      K 3.4*   CO2 31*      BUN 37*   CREATININE 1.5*   ALKPHOS 49*   ALT 9*   AST 12   BILITOT 0.2       Significant Diagnostics:  I have reviewed all pertinent imaging results/findings within the past 24 hours.   MBSS reviewed - cleared from regular diet and thin liquids per SLP    Assessment/Plan:     Epistaxis  75M admitted for endocarditis. NGT placed due to dysphagia. Patient also with encephalopathy and has pulled NGT out multiple times requiring replacement. ENT consulted for intermittent, low volume epistaxis. Suspect that it is NGT trauma related. NGT removed as patient now cleared for regular diet/thins per SLP. No active bleeding. No intervention performed.     - Bacitracin ointment to bilateral nares TID x 3 days   - Nasal saline to bilateral nares q 4 hrs   - Afrin nasal spray PRN epistaxis (apply liberally) and hold pressure   - Avoid nasal cannula; recommend humidified 02   - call ENT with questions             VTE Risk Mitigation (From admission, onward)         Ordered     enoxaparin injection 40 mg  Every 24 hours      09/23/20 1350     IP VTE HIGH RISK PATIENT  Once      09/13/20 1518     Place sequential compression device  Until discontinued      09/13/20 1119                Thank you for your consult. I will sign off. Please contact us if you have any additional questions.    Sera Sanches MD  Otorhinolaryngology-Head & Neck Surgery  [facility] - ICU [facility]

## 2020-09-29 NOTE — SUBJECTIVE & OBJECTIVE
Interval History 9/29/2020:  Patient is seen for follow-up PM&R evaluation and recommendations: Participating with therapy. MBSS pending for today.    HPI, Past Medical, Family, and Social History remains the same as documented in the initial encounter.    Scheduled Medications:    albuterol-ipratropium  3 mL Nebulization Q4H    atorvastatin  40 mg Per NG tube Daily    cefTRIAXone (ROCEPHIN) IVPB  2 g Intravenous Q12H    enoxaparin  40 mg Subcutaneous Q24H    ferric gluconate (FERRLECIT) IVPB  125 mg Intravenous Daily    furosemide (LASIX) IV  40 mg Intravenous BID    lacosamide  200 mg Per NG tube Q12H    miconazole nitrate 2%   Topical (Top) BID    pantoprazole  40 mg Per NG tube Daily    silodosin  8 mg Per NG tube Daily    sodium chloride 0.9%  10 mL Intravenous Q6H    traZODone  25 mg Per NG tube QHS    vancomycin (VANCOCIN) IVPB  1,000 mg Intravenous Q24H       Diagnostic Results: Labs: Reviewed    PRN Medications: acetaminophen, dextrose 50%, glucagon (human recombinant), haloperidoL, insulin aspart U-100, labetalol, psyllium husk (aspartame), sodium chloride 0.9%, Pharmacy to dose Vancomycin consult **AND** vancomycin - pharmacy to dose    Review of Systems   Constitutional: Positive for activity change.   HENT: Positive for trouble swallowing.    Musculoskeletal: Positive for gait problem.   Neurological: Positive for weakness.   Psychiatric/Behavioral: Positive for confusion.     Objective:     Vital Signs (Most Recent):  Temp: 98.6 °F (37 °C) (09/29/20 0000)  Pulse: 76 (09/29/20 0738)  Resp: (!) 21 (09/29/20 0738)  BP: (!) 141/62 (09/29/20 0000)  SpO2: 100 % (09/29/20 0738)    Vital Signs (24h Range):  Temp:  [98.1 °F (36.7 °C)-98.6 °F (37 °C)] 98.6 °F (37 °C)  Pulse:  [67-91] 76  Resp:  [17-31] 21  SpO2:  [85 %-100 %] 100 %  BP: (141-159)/(62-70) 141/62     Physical Exam  Constitutional:       General: He is not in acute distress.     Appearance: He is well-developed.      Comments: Wife at  bedside    HENT:      Head: Normocephalic and atraumatic.   Eyes:      General:         Right eye: No discharge.         Left eye: No discharge.   Neck:      Musculoskeletal: Neck supple.   Pulmonary:      Effort: Pulmonary effort is normal. No respiratory distress.   Abdominal:      General: There is no distension.      Palpations: Abdomen is soft.      Comments: NGT in place    Musculoskeletal:         General: No deformity.   Skin:     General: Skin is warm and dry.   Neurological:      Motor: Weakness (L side) present.      Comments: Somnolent but awakens w/ stimuli-->Follows commands   Non-verbal today 2/2 somnolent    Psychiatric:         Cognition and Memory: Cognition is impaired.       NEUROLOGICAL EXAMINATION:

## 2020-09-29 NOTE — SUBJECTIVE & OBJECTIVE
Interval History:    LP demonstrated WBC 21. RBC 6, prot 321, gluc 56.  Much improved this AM  Barium swallow study today  Repeat MRI Evolving areas of a scattered signal abnormality within the cerebral hemispheres now with slight volume loss particularly right frontal lobe and bilateral occipital lobes which may represent evolving areas of infarction.  There is continued though slightly reduced scattered areas of enhancement within these regions with additional foci in the bilateral occipital lobes with ring enhancing focus right occipital lobe overall reduced in size which may represent evolving septic emboli.   There is no restricted diffusion to suggest acute infarction.  Clinical correlation and continued follow-up advised.      Review of Systems   Unable to perform ROS: Mental status change     Objective:     Vital Signs (Most Recent):  Temp: 98.6 °F (37 °C) (09/29/20 0000)  Pulse: 82 (09/29/20 1153)  Resp: (!) 21 (09/29/20 1153)  BP: (!) 141/62 (09/29/20 0000)  SpO2: 97 % (09/29/20 1153) Vital Signs (24h Range):  Temp:  [98.1 °F (36.7 °C)-98.6 °F (37 °C)] 98.6 °F (37 °C)  Pulse:  [67-89] 82  Resp:  [17-31] 21  SpO2:  [85 %-100 %] 97 %  BP: (141-149)/(62-66) 141/62     Weight: 114 kg (251 lb 5.2 oz)  Body mass index is 37.11 kg/m².    Estimated Creatinine Clearance: 53.8 mL/min (A) (based on SCr of 1.5 mg/dL (H)).    Physical Exam  Vitals signs reviewed.   Constitutional:       Appearance: He is ill-appearing. He is not diaphoretic.      Comments: NG tube in place   Confused x 3   HENT:      Head: Normocephalic.      Comments: No mouth sores noted     Nose: Nose normal.      Comments: NG tube  Cardiovascular:      Rate and Rhythm: Normal rate.   Pulmonary:      Effort: Pulmonary effort is normal.   Abdominal:      General: Abdomen is flat. There is no distension.      Palpations: There is no mass.   Musculoskeletal:         General: No swelling, tenderness, deformity or signs of injury.   Skin:     General:  Skin is warm and dry.      Coloration: Skin is not jaundiced or pale.   Neurological:      Mental Status: He is alert.      Comments: Alert  Not oriented to person place or time          Significant Labs: All pertinent labs within the past 24 hours have been reviewed.    Significant Imaging: I have reviewed all pertinent imaging results/findings within the past 24 hours.

## 2020-09-29 NOTE — CONSULTS
Inpatient consult to Physical Medicine Rehab  Consult performed by: Jessica Yates NP  Consult ordered by: Isidoro Garcia MD  Reason for consult: assess rehab needs        PM&R already following patient.    AMERICA Su, FNP-C  Physical Medicine & Rehabilitation   09/29/2020

## 2020-09-29 NOTE — PROGRESS NOTES
[facility] - ICU 14 WT  Infectious Disease  Progress Note    Patient Name: Lopez Savage  MRN: [id number]  Admission Date: 9/13/2020  Length of Stay: 16 days  Attending Physician: Isidoro Garcia MD  Primary Care Provider: Prasanna Haywood MD    Isolation Status: No active isolations  Assessment/Plan:      Encephalopathy acute  Repeat MRI MRI Brain WO contrast (9/29/20):  1. Evolving areas of a scattered signal abnormality within the cerebral hemispheres now with slight volume loss particularly right frontal lobe and bilateral occipital lobes which may represent evolving areas of infarction.  There is continued though slightly reduced scattered areas of enhancement within these regions with additional foci in the bilateral occipital lobes with ring enhancing focus right occipital lobe overall reduced in size which may represent evolving septic emboli.     2. There is a new subcentimeter region of enhancement within the left cerebellum with resolution of previous diffusion restriction in this region concerning for possible additional subacute age infarcts.      3. Continue though reduced diffusion hyperintensity left parasagittal parietal cortex and left mesial temporal lobe which may represent sequela of evolving seizure activity however underlying encephalitis not excluded.     4. Innumerable scattered regions of susceptibility primarily in the cortical and subcortical white matter with additional cerebellar and deep gray foci as well as scattered superficial siderosis similar to prior.  Differential to include underlying amyloid angiopathy with superimposed remote microhemorrhages not excluded.     5. Thin subdural collection overlying the left cerebral convexity relatively stable suggestive for subdural hygroma.     6. There is no restricted diffusion to suggest acute infarction.     LP with WBC 21, RBC 6, protein 31, gluc 56  HSV, RPR, FTA ab, VDRL studies negative   CSF gram stain and cultures  NGTD  Continue current antimicrobial therapy at this time    Discussed with ID staff    Bacterial endocarditis  This is a 74 year old male with PMH of hypertension, ulcerative colitis, GERD, chronic bronchitis, autoimmune hemolytic anemia, mitral regurgitation, and diverticulitis, admitted for subacute bacterial endocarditis  with strep mutans  in setting of upper GI bleed on 6 weeks of IV ceftriaxone (EOC 9/15) presents to ED for seizure activity and loss of consciousness. CT head negative for acute intracranial bleed. MRI +PCA infarct and right occipital lobe edema. He was started on doxycycline. Metronidazole, and ceftriaxone.      TTE 8/14 with vegetation of aortic valve. SILVANA 8/17 with an 18 mm mobile vegetation is of both the mitral and aortic valves. CTS saw pt then, no surgical intervention. Has completed 6 weeks of IV ceftriaxone, end date 9/15/2020.      MRI here with Numerous scattered punctate foci of cortical enhancement throughout both cerebral hemispheres.  Additional rim enhancing collection in the right occipital lobe with central diffusion restriction and surrounding vasogenic edema.  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 NSGY following, no acute surgical intervention. No seizure activity today.    Reconsulted for encephalopathy. LP studies  WBC 21, RBC 6, protein 31, gluc 56. HSV, RPR, FTA ab, VDRL studies negative. CSF gram stain and cultures NGTD. Ampicillin discontinued. Mentation improved. Remained afebrile. No leukocytosis. Barium swallow study today.     BCXs:  9/8 1/4 CONS - suspect contaminant  9/10 - NGTD  9/14 NGTD  9/22 NGTD    Recommendations  1. Continue ceftriaxone [id number]  2. Continue vancomycin. Trough goal closer to 20  3. NSGY following. Repeat MRI with decrease in size of right occipital abscess. No surgical intervention at this time.   4. Blood cultures here NGTD.   6. Anticipate 6-8 weeks of IV vancomycin and ceftriaxone with  repeat imaging to assess resolution of brain abscess. End date of IV antibiotics: 10/27/2020 -11/10/2020 with weekly CBC CMP ESR CRP VANC TROUGH SENT TO ID CLINIC  [phone].   9. Start probiotics   8. Pt remained afebrile. No sz activity today. Alert. HDS.       Anticipated Disposition: SNF    Thank you for your consult. I will sign off. Please contact us if you have any additional questions.    Hermilo Jean PA-C  Infectious Disease  [facility] - ICU 14 WT  [phone]    Subjective:     Principal Problem:Status epilepticus    HPI: Mr. 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 The patient presented to [facility] on 9/8/2020 with a primary complaint of loss of consciousness. The patient was recently treated for endocarditis w/ IV rocephin through PICC line at [facility] and discharged approximately 2 weeks ago. Since then he has had a worsening cough and was planning to go to the hospital for evaluation. Per wife home health did portable CXR about a week ago w/ concern for fluid vs pneumonia. Patient was given course of doxycycline and finished course recently. 9/8 Wife went to bed while  was watching TV and about an hour later her daughter found him non responsive on the ground. Daughter did a few chest compressions before EMS arrived. Patient arrived to ED and non responsive to sternal rub. Pupillary and gag reflex present. Patient intubated for airway protection and sedated w/ propofol. No seizure like activity was observed in ED. TPA contraindicated due to recent GI bleed, last occurring in August. Discussed further stroke work up with wife and planned for imaging. CT head negative for acute intracranial bleed. MRI head showed PCA infarct and right occipital lobe edema. 9/8 EEG demonstrated left posterior pseudo periodic epileptiform discharges  is suggestive of an irritative region likely secondary to focal ischemia. Patient started on AEDs vimpat and keppra and reported to have several seizures during his stay. Patient was transferred to [facility] for continuous EEG monitoring and further evaluation.  BCXs:  9/8 1/4 CONS - suspect contaminant  9/10 - NGTD  9/14 sent     MRI Brain:  Numerous scattered punctate foci of cortical enhancement throughout both cerebral hemispheres.  Additional rim enhancing collection in the right occipital lobe with central diffusion restriction and surrounding vasogenic edema.  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     Persistent gyriform diffusion restriction the posterior left cerebral hemisphere as well as the hippocampus and dorsal lateral thalamus, although slightly improved from prior studies.  The distribution be typical for vascular territory infarct, most suggestive of seizure related signal changes (status epilepticus).  Other encephalitis to be excluded clinically.   NSGy following for occipital abscess. NPO at this time. currently on doxy flagyl and ceftriaxone.   Interval History:    LP demonstrated WBC 21. RBC 6, prot 321, gluc 56.  Much improved this AM  Barium swallow study today  Repeat MRI Evolving areas of a scattered signal abnormality within the cerebral hemispheres now with slight volume loss particularly right frontal lobe and bilateral occipital lobes which may represent evolving areas of infarction.  There is continued though slightly reduced scattered areas of enhancement within these regions with additional foci in the bilateral occipital lobes with ring enhancing focus right occipital lobe overall reduced in size which may represent evolving septic emboli.   There is no restricted diffusion to suggest acute infarction.  Clinical correlation and continued follow-up advised.      Review of Systems   Unable to perform ROS: Mental status change      Objective:     Vital Signs (Most Recent):  Temp: 98.6 °F (37 °C) (09/29/20 0000)  Pulse: 82 (09/29/20 1153)  Resp: (!) 21 (09/29/20 1153)  BP: (!) 141/62 (09/29/20 0000)  SpO2: 97 % (09/29/20 1153) Vital Signs (24h Range):  Temp:  [98.1 °F (36.7 °C)-98.6 °F (37 °C)] 98.6 °F (37 °C)  Pulse:  [67-89] 82  Resp:  [17-31] 21  SpO2:  [85 %-100 %] 97 %  BP: (141-149)/(62-66) 141/62     Weight: 114 kg (251 lb 5.2 oz)  Body mass index is 37.11 kg/m².    Estimated Creatinine Clearance: 53.8 mL/min (A) (based on SCr of 1.5 mg/dL (H)).    Physical Exam  Vitals signs reviewed.   Constitutional:       Appearance: He is ill-appearing. He is not diaphoretic.      Comments: NG tube in place   Confused x 3   HENT:      Head: Normocephalic.      Comments: No mouth sores noted     Nose: Nose normal.      Comments: NG tube  Cardiovascular:      Rate and Rhythm: Normal rate.   Pulmonary:      Effort: Pulmonary effort is normal.   Abdominal:      General: Abdomen is flat. There is no distension.      Palpations: There is no mass.   Musculoskeletal:         General: No swelling, tenderness, deformity or signs of injury.   Skin:     General: Skin is warm and dry.      Coloration: Skin is not jaundiced or pale.   Neurological:      Mental Status: He is alert.      Comments: Alert  Not oriented to person place or time          Significant Labs: All pertinent labs within the past 24 hours have been reviewed.    Significant Imaging: I have reviewed all pertinent imaging results/findings within the past 24 hours.

## 2020-09-29 NOTE — PROGRESS NOTES
Spoke to Dr. Garcia. Per MD, pal care can sign off. Plan in place. Will sign off.     Sandra GRIJALVA, APRN, AGCNS

## 2020-09-29 NOTE — PROGRESS NOTES
[facility] - ICU 14 WT  Neurosurgery  Progress Note    Subjective:     History of Present Illness: 74 M with  recent endocarditis (Strep mutans per report, s/p IV Rocephin) with aortic valve vegetations (8/20), recent GIB (8/20), diverticulitis, GERD, MVP, and COPD who was transferred to [facility] on 9/13 for cEEG. The patient presented to [facility] on 9/8/2020 after LOC at home. Per report patient had been c/o worsening SOB, had portable CXR with effusion v PNA. Patient was given course of doxycycline and finished course recently. Reportedly on 9/8 his wife went to bed while  was watching TV and about an hour later her daughter found him non responsive on the ground, short round of chest compressions before intubated in ED, sedated on Prop.  No seizure like activity was observed in ED. TPA contraindicated due to recent GI bleed, last occurring in August.  MRI head showed L PCA infarct and right occipital lobe edema. 9/8 EEG demonstrated left posterior pseudo periodic epileptiform discharges is suggestive of an irritative region likely secondary to focal ischemia. Patient started on AEDs vimpat and keppra and reported to have several seizures during his stay at [facility], currently on cEEG in [facility]. NSGY consulted following MRI Brain this evening with 1.2cm R occipital lesion concerning for abscess.     On Lovenox at home, SQH at [facility].     Reconsult for change in mental status today, not interacting with his wife, aphasia, not following commands.     Post-Op Info:  Procedure(s) (LRB):  Lumbar Puncture (N/A)   4 Days Post-Op     Interval History: MALORIE    Medications:  Continuous Infusions:  Scheduled Meds:   albuterol-ipratropium  3 mL Nebulization Q4H    atorvastatin  40 mg Per NG tube Daily    cefTRIAXone (ROCEPHIN) IVPB  2 g Intravenous Q12H    enoxaparin  40 mg Subcutaneous Q24H    ferric gluconate (FERRLECIT) IVPB  125 mg Intravenous Daily    furosemide (LASIX) IV  40 mg  Intravenous BID    lacosamide  200 mg Per NG tube Q12H    miconazole nitrate 2%   Topical (Top) BID    pantoprazole  40 mg Per NG tube Daily    silodosin  8 mg Per NG tube Daily    sodium chloride 0.9%  10 mL Intravenous Q6H    traZODone  25 mg Per NG tube QHS    vancomycin (VANCOCIN) IVPB  1,000 mg Intravenous Q24H     PRN Meds:acetaminophen, dextrose 50%, glucagon (human recombinant), haloperidoL, insulin aspart U-100, labetalol, psyllium husk (aspartame), sodium chloride 0.9%, Pharmacy to dose Vancomycin consult **AND** vancomycin - pharmacy to dose     Review of Systems  Objective:     Weight: 114 kg (251 lb 5.2 oz)  Body mass index is 37.11 kg/m².  Vital Signs (Most Recent):  Temp: 98.6 °F (37 °C) (09/29/20 0000)  Pulse: 76 (09/29/20 0738)  Resp: (!) 21 (09/29/20 0738)  BP: (!) 141/62 (09/29/20 0000)  SpO2: 100 % (09/29/20 0738) Vital Signs (24h Range):  Temp:  [98.1 °F (36.7 °C)-98.6 °F (37 °C)] 98.6 °F (37 °C)  Pulse:  [67-91] 76  Resp:  [17-31] 21  SpO2:  [85 %-100 %] 100 %  BP: (141-159)/(62-70) 141/62                          NG/OG Tube 09/27/20 2205 14 Fr. Right nostril (Active)   Placement Check physician notified 09/28/20 0000   Tube advanced (cm) 4 09/21/20 0859   Advancement advanced manually 09/21/20 0859   Tolerance no signs/symptoms of discomfort 09/27/20 2000   Securement secured to nostril center 09/28/20 0000   Clamp Status/Tolerance unclamped 09/28/20 0000   Insertion Site Appearance no redness, warmth, tenderness, skin breakdown, drainage 09/27/20 2000   Flush/Irrigation flushed w/;water;no resistance met 09/28/20 0600   Feeding Type continuous 09/28/20 0000   Feeding Action feeding continued 09/27/20 2000   Current Rate (mL/hr) 50 mL/hr 09/27/20 2000   Goal Rate (mL/hr) 50 mL/hr 09/27/20 2000   Intake (mL) 250 mL 09/29/20 0600   Water Bolus (mL) 200 mL 09/29/20 0600   Rate Formula Tube Feeding (mL/hr) 50 mL/hr 09/27/20 2000   Formula Name Impact Peptide 09/27/20 2000   Intake (mL) -  Formula Tube Feeding 350 09/28/20 0600   Residual Amount (ml) 0 ml 09/28/20 0400       Male External Urinary Catheter 09/28/20 1600 (Active)   Output (mL) 700 mL 09/28/20 1800       Neurosurgery Physical Exam     [id number]  AOx0, grunting only this morning  PERRL/EOMI  CN grossly intact  Follows commands by showing thumbs and wiggling toes, L > R    Significant Labs:  Recent Labs   Lab 09/28/20  0449 09/29/20  0441    96    141   K 3.4* 3.4*    102   CO2 28 31*   BUN 35* 37*   CREATININE 1.3 1.5*   CALCIUM 10.1 10.3   MG 1.7 1.9     Recent Labs   Lab 09/28/20  0449 09/29/20  0441   WBC 9.14 8.75   HGB 8.5* 8.5*   HCT 27.9* 28.4*    183     No results for input(s): LABPT, INR, APTT in the last 48 hours.  Microbiology Results (last 7 days)     Procedure Component Value Units Date/Time    CSF culture [[id number]] Collected: 09/25/20 1230    Order Status: Completed Specimen: CSF (Spinal Fluid) from CSF Tap, Tube 1 Updated: 09/29/20 0702     CSF CULTURE No Growth to date     Gram Stain Result No WBC's      No organisms seen    Narrative:      Add on VDRL By Rosalva Farfan MD Order #[id number]  09/25/2020  17:51     Blood culture [[id number]] Collected: 09/22/20 1522    Order Status: Completed Specimen: Blood from Peripheral, Left Hand Updated: 09/27/20 1612     Blood Culture, Routine No growth after 5 days.    Narrative:      Collection has been rescheduled by BDW at 09/22/2020 14:57 Reason:   Patient unavailable  Collection has been rescheduled by BDW at 09/22/2020 14:57 Reason:   Patient unavailable    Blood culture [[id number]] Collected: 09/22/20 1522    Order Status: Completed Specimen: Blood from Antecubital, Left Arm Updated: 09/27/20 1612     Blood Culture, Routine No growth after 5 days.    Narrative:      Collection has been rescheduled by BDW at 09/22/2020 14:57 Reason:   Patient unavailable  Collection has been rescheduled by BDW at 09/22/2020 14:57 Reason:   Patient unavailable    Gram  stain [[id number]] Collected: 09/25/20 1230    Order Status: Canceled Specimen: CSF (Spinal Fluid) from CSF Tap, Tube 1     Blood culture [[id number]]     Order Status: Canceled Specimen: Blood     Blood culture [[id number]]     Order Status: Canceled Specimen: Blood         All pertinent labs from the last 24 hours have been reviewed.    Significant Diagnostics:  I have reviewed and interpreted all pertinent imaging results/findings within the past 24 hours.    Assessment/Plan:     Bacterial endocarditis  74M with known bacterial endocarditis with septic emboli c/b L PCA infarct, scattered punctate infarctions and R occipital lesion concerning for intracranial abscess. Reconsulted for change in mental status and aphasia    Less verbal this morning, but overall stable.   MRI this morning reviewed, decrease in size of abscess  -- No neurosurgical intervention needed at this time        -q1h neurochecks in ICU, q2h neurochecks in stepdown, q4h neurochecks on floor  --SBP <160 (cardene ggt; hydralazine & labetalol PRN; transition to home meds when appropriate)  --Na >135  --VN following  --AED per NCC/Epilepsy  --Abx per ID, f/up BCx - 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   --NO steroids  --HOB >30  --PPI given GIB per NCC  --Continue to monitor clinically, notify NSGY immediately with any changes in neuro status    Dispo: ICU               Idalia Haddad MD  Neurosurgery  [facility] - ICU 14 WT

## 2020-09-29 NOTE — PROCEDURES
Modified Barium Swallow    Patient Name:  Lopez Savage   [id number]    MRN:  [id number]    Recommendations:     Recommendations:                General Recommendations:  Dysphagia therapy and Speech/language therapy  Diet recommendations:  Regular, Thin   Aspiration Precautions:   · Pt requires 1:1 supervision with meals to ensure he maintains fully awake and alert state across the duration of an entire meal  · Fully awake and alert for PO intake- defer PO intake if pt unable to achieve and maintain fully awake and alert state   · Fully upright position for PO intake  · Small bites/ sips  · Slow rate of eating/ drinking  · 1 bite/ sip @ a time  · Refrain from talking prior to swallow completion   · Discontinue PO upon: coughing, throat clearing, choking, wet vocal quality, wet breath sounds, watery eyes, reddened facial features  General Precautions: Standard, fall, aspiration, respiratory  Communication strategies:  go to room if call light pushed    Referral     Reason for Referral  Patient was referred for a Modified Barium Swallow Study to assess the efficiency of his/her swallow function, rule out aspiration and make recommendations regarding safe dietary consistencies, effective compensatory strategies, and safe eating environment.     Diagnosis: Status epilepticus       History:     History reviewed. No pertinent past medical history.    Objective:     Current Respiratory Status: 09/29/20    Alert: yes   · Although awake and alert, confusion evident.     Cooperative: yes    Follows Directions: yes    Visualization  · Patient was seen in the lateral view  · NG tube observed in place  · Supplement O2 in place via nasal cannula    Oral Peripheral Examination  · Oral Musculature: general weakness  · Dentition: scattered dentition  · Secretion Management: adequate  · Mucosal Quality: adequate  · Mandibular Strength and Mobility: WFL  · Oral Labial Strength and Mobility: functional retraction, functional  pursing  · Lingual Strength and Mobility: functional lateral movement, functional protrusion  · Velar Elevation: WFL  · Buccal Strength and Mobility: WFL  · Volitional Cough: Weak  · Volitional Swallow: WFL via cervical palpation  · Voice Prior to PO Intake: Clear, dry    Consistencies Assessed  · Thin liquid- tsp x1, cup sip x4-5, straw sip x1  · Regular consistency solid saltine cracker- ~1/4 cracker via bite x1    Oral Preparation/Oral Phase  · Adequate bolus acceptance without anterior loss across thin liquid trials via tsp and cup sip. Pt observed to blow bubbles with straw across attempted trials x2 prior to accepting the straw sip x1 which he accepted. Encouragement required in order for pt to accept regular consistency solid as he was initially observed to hold bolus between central incisors upon bolus acceptance.   · Timely initiation of bolus manipulation with thin liquids. Initiation of regular consistency solid bolus manipulation was timely following brief encouragement provided in order for pt to transition bolus into oral cavity.   · Timely mastication of regular consistency solid   · Time A-P transit  · Adequate bolus control and formation without premature spillage to the oropharynx   · Adequate BOT retraction without BOT residue     Pharyngeal Phase   · Timely swallow initiation   · Adequate hyolaryngeal excursion and elevation and epiglottic inversion   · Adequate pharyngeal contraction  · No penetration/ aspiration   · No pharyngeal residue     Cervical Esophageal Phase  · UES appeared to accommodate all boluses   · Retrograde flow unappreciated  · Appeared WFL     Assessment:     Impressions  · Pt's oral and pharyngeal phases of swallow appear WFL. However confusion observed this study, as well as his hx of waxing/ waning TIGIST this hospitalization concerning for eventual inattention to bolus across the duration of an entire meal. Therefore 1:1 supervision necessary to ensure pt maintains fully  awake and alert state across the duration of an entire meal and PO intake to be deferred when fully awake and alert state unappreciated.     Prognosis: Good with strict and consistent implementation of all aspiration precautions listed above.     Plan  SLP to continue to follow pt to provide ongoing education, monitor diet tolerance, and to provide cognitive-linguistic therapy.     Education  Extensive education provided to pt and his wife re: results of MBSS, concern for inattention to bolus given confusion observed this session, as well as 2/2 his hx of waxing/ waning TIGIST this hospital, need for 1:1 supervision with meals to ensure pt maintains fully awake and alert state across the duration of an entire meal, PO intake to be deferred when fully awake and alert state unappreciated, strict and consistent implementation of all aspiration precautions listed above, and ongoing SLP POC. Indication of pt's understanding unappreciated. However pt's wife verbalized understanding of all education provided and agreement with SLP POC. No further questions.     Goals:   Multidisciplinary Problems     SLP Goals        Problem: SLP Goal    Goal Priority Disciplines Outcome   SLP Goal     SLP Ongoing, Progressing   Description: Goals expected to be met by 10/6:1. Pt will tolerate regular consistency solid diet and thin liquids with timely mastication and A-P transit and without overt clinical signs aspiration.   2. Pt will orient x4 ind'ly.   3. Pt will complete simple naming tasks with 70% acc given cues.   4. Pt will follow basic 1-step commands with 70% acc given cues.   5. Pt will answer basic y/n q's with 70% acc ind'ly.   6. Pt will participate in ongoing assessment of cognitive-linguistic skills.                    Plan:   · Patient to be seen:  Therapy Frequency: 4 x/week   · Plan of Care expires:  10/15/20  · Plan of Care reviewed with:  patient, spouse        Discharge recommendations:  rehabilitation facility     Time  Tracking:   SLP Treatment Date:   09/29/20  Speech Start Time:  1356  Speech Stop Time:  1430     Speech Total Time (min):  34 min    MARVA Vazquez, CCC-SLP  [phone]  9/29/2020

## 2020-09-29 NOTE — HPI
Mr. Savage is a 76 y/o male who is currently admitted to hospital medicine for endocarditis with aortic valve vegetations and septic emboli. ENT was consulted for epistaxis. Currently, the patients mental status is poor; therefore, history is obtained from his wife. The patient has also had trouble with dysphagia this admission; therefore, NGT was placed. The patient has pulled out his NGT multiple times requiring replacement. Per the patient's wife, there have been a few episodes of trickling blood from his nostril with the NGT. It has always self resolved. It was never high volume. He has not had trouble with nose bleeding in the past. He is not currently on anticoagulation. He did pass his MBSS today and was cleared from regular diet with thin liquids per SLP.

## 2020-09-30 NOTE — PROGRESS NOTES
[facility] - ICU 14 WT  Neurosurgery  Progress Note    Subjective:     History of Present Illness: 74 M with  recent endocarditis (Strep mutans per report, s/p IV Rocephin) with aortic valve vegetations (8/20), recent GIB (8/20), diverticulitis, GERD, MVP, and COPD who was transferred to [facility] [place]/13 for cEEG. The patient presented to [facility] on 9/8/2020 after LOC at home. Per report patient had been c/o worsening SOB, had portable CXR with effusion v PNA. Patient was given course of doxycycline and finished course recently. Reportedly on 9/8 his wife went to bed while  was watching TV and about an hour later her daughter found him non responsive on the ground, short round of chest compressions before intubated in ED, sedated on Prop.  No seizure like activity was observed in ED. TPA contraindicated due to recent GI bleed, last occurring in August.  MRI head showed L PCA infarct and right occipital lobe edema. 9/8 EEG demonstrated left posterior pseudo periodic epileptiform discharges is suggestive of an irritative region likely secondary to focal ischemia. Patient started on AEDs vimpat and keppra and reported to have several seizures during his stay at [place], currently on cEEG in [facility]. NSGY consulted following MRI Brain this evening with 1.2cm R occipital lesion concerning for abscess.     On Lovenox at home, SQH at [facility].     Reconsult for change in mental status today, not interacting with his wife, aphasia, not following commands.     Post-Op Info:  Procedure(s) (LRB):  Lumbar Puncture (N/A)   5 Days Post-Op     Interval History: clinically improving today, conversing, yet confused, more interactive than previously    Medications:  Continuous Infusions:  Scheduled Meds:   albuterol-ipratropium  3 mL Nebulization Q4H    atorvastatin  40 mg Per NG tube Daily    cefTRIAXone (ROCEPHIN) IVPB  2 g Intravenous Q12H    enoxaparin  40 mg Subcutaneous Q24H    ferric  gluconate (FERRLECIT) IVPB  125 mg Intravenous Daily    furosemide (LASIX) IV  40 mg Intravenous BID    lacosamide  200 mg Per NG tube Q12H    Lactobacillus rhamnosus GG  1 capsule Oral Daily    miconazole nitrate 2%   Topical (Top) BID    pantoprazole  40 mg Per NG tube Daily    silodosin  8 mg Per NG tube Daily    sodium chloride  1 spray Each Nostril Q4H While awake    sodium chloride 0.9%  10 mL Intravenous Q6H    traZODone  25 mg Per NG tube QHS    vancomycin (VANCOCIN) IVPB  1,000 mg Intravenous Q24H     PRN Meds:acetaminophen, dextrose 50%, glucagon (human recombinant), haloperidoL, insulin aspart U-100, labetalol, oxymetazoline, psyllium husk (aspartame), sodium chloride 0.9%, Pharmacy to dose Vancomycin consult **AND** vancomycin - pharmacy to dose     Review of Systems  Objective:     Weight: 114 kg (251 lb 5.2 oz)  Body mass index is 37.11 kg/m².  Vital Signs (Most Recent):  Temp: 98.1 °F (36.7 °C) (09/30/20 0400)  Pulse: 86 (09/30/20 0901)  Resp: (!) 30 (09/30/20 0901)  BP: (!) 125/58 (09/30/20 0400)  SpO2: 97 % (09/30/20 0901) Vital Signs (24h Range):  Temp:  [98.1 °F (36.7 °C)-98.5 °F (36.9 °C)] 98.1 °F (36.7 °C)  Pulse:  [75-96] 86  Resp:  [16-30] 30  SpO2:  [91 %-100 %] 97 %  BP: (124-154)/(56-82) 125/58                     Male External Urinary Catheter 09/28/20 1600 (Active)   Collection Container Standard drainage bag 09/29/20 2000   Skin no redness;no breakdown 09/29/20 2000   Tolerance no signs/symptoms of discomfort 09/29/20 2000   Output (mL) 850 mL 09/30/20 0524       Neurosurgery Physical Exam     [id number]  AOx1  Conversing and interacting well, yet confused  Follows commands antigravity all extremities    Significant Labs:  Recent Labs   Lab 09/29/20  0441 09/30/20  0441   GLU 96 84    143   K 3.4* 3.2*    101   CO2 31* 30*   BUN 37* 34*   CREATININE 1.5* 1.6*   CALCIUM 10.3 10.5   MG 1.9 1.9     Recent Labs   Lab 09/29/20 0441 09/30/20 0441   WBC 8.75 8.07   HGB 8.5*  8.5*   HCT 28.4* 28.4*    199     No results for input(s): LABPT, INR, APTT in the last 48 hours.  Microbiology Results (last 7 days)     Procedure Component Value Units Date/Time    CSF culture [[id number]] Collected: 09/25/20 1230    Order Status: Completed Specimen: CSF (Spinal Fluid) from CSF Tap, Tube 1 Updated: 09/30/20 0645     CSF CULTURE No Growth     Gram Stain Result No WBC's      No organisms seen    Narrative:      Add on VDRL By Rosalva Farfan MD Order #[id number]  09/25/2020  17:51     Blood culture [[id number]] Collected: 09/22/20 1522    Order Status: Completed Specimen: Blood from Peripheral, Left Hand Updated: 09/27/20 1612     Blood Culture, Routine No growth after 5 days.    Narrative:      Collection has been rescheduled by BDW at 09/22/2020 14:57 Reason:   Patient unavailable  Collection has been rescheduled by BDW at 09/22/2020 14:57 Reason:   Patient unavailable    Blood culture [[id number]] Collected: 09/22/20 1522    Order Status: Completed Specimen: Blood from Antecubital, Left Arm Updated: 09/27/20 1612     Blood Culture, Routine No growth after 5 days.    Narrative:      Collection has been rescheduled by BDW at 09/22/2020 14:57 Reason:   Patient unavailable  Collection has been rescheduled by BDW at 09/22/2020 14:57 Reason:   Patient unavailable    Gram stain [[id number]] Collected: 09/25/20 1230    Order Status: Canceled Specimen: CSF (Spinal Fluid) from CSF Tap, Tube 1         All pertinent labs from the last 24 hours have been reviewed.    Significant Diagnostics:  I have reviewed and interpreted all pertinent imaging results/findings within the past 24 hours.    Assessment/Plan:     Bacterial endocarditis  74M with known bacterial endocarditis with septic emboli c/b L PCA infarct, scattered punctate infarctions and R occipital lesion concerning for intracranial abscess. Reconsulted for change in mental status and aphasia    Clinically improving  MRI yesterday with decrease in  abscess size  -- NSGY will sign off now, please call with any questions        -q1h neurochecks in ICU, q2h neurochecks in stepdown, q4h neurochecks on floor  --SBP <160 (cardene ggt; hydralazine & labetalol PRN; transition to home meds when appropriate)  --Na >135  --VN following  --AED per NCC/Epilepsy  --Abx per ID, f/up BCx - 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   --NO steroids  --HOB >30  --PPI given GIB per NCC                  Idalia Haddad MD  Neurosurgery  [facility] - ICU 14 WT

## 2020-09-30 NOTE — PHARMACY MED REC
"Admission Medication Reconciliation - Pharmacy Consult Note    The home medication history was taken by Elizabeth Mayo, Pharmacy Tech.     You may go to "Admission" then "Reconcile Home Medications" tabs to review and/or act upon these items.      No issues noted with the medication reconciliation.      Jocelyne Hanson, PharmD, BCPS  [id number]                .    .          "

## 2020-09-30 NOTE — PLAN OF CARE
09/30/20 1029   Post-Acute Status   Post-Acute Authorization Placement   Post-Acute Placement Status Pending Payor Review   Discharge Delays None known at this time   Discharge Plan   Discharge Plan A Rehab     Per MD, pt is medically ready for dc to rehab. DIGNA updated Raza with [employer], he will submit for auth.    Viki Carlos, WIL [phone]

## 2020-09-30 NOTE — PT/OT/SLP PROGRESS
"Speech Language Pathology Treatment    Patient Name:  Lopez Savage   MRN:  [id number]   [id number]    Admitting Diagnosis: Status epilepticus    Recommendations:                 General Recommendations:  Dysphagia therapy and Speech/language therapy  Diet recommendations:  Regular, Liquid Diet Level: Thin   Aspiration Precautions:   · Pt requires 1:1 supervision with meals to ensure he maintains fully awake and alert state across the duration of an entire meal  · Fully awake and alert for PO intake- defer PO intake if pt unable to achieve and maintain fully awake and alert state   · Fully upright position for PO intake  · Small bites/ sips  · Slow rate of eating/ drinking  · 1 bite/ sip @ a time  · Refrain from talking prior to swallow completion   · Discontinue PO upon: coughing, throat clearing, choking, wet vocal quality, wet breath sounds, watery eyes, reddened facial features  General Precautions: Standard, aspiration, fall  Communication strategies:  go to room if call light pushed    Subjective     "At the hospital." Pt oriented to place.     Pain/Comfort:  · Pain Rating 1: 0/10  · Pain Rating Post-Intervention 1: 0/10    Objective:     Has the patient been evaluated by SLP for swallowing?   Yes  Keep patient NPO? No   Current Respiratory Status: nasal cannula      Pt awake and alert upon entry with wife at bedside. HOB raised. Pleasant confusion evident. Pt oriented to place. However unable to orient to month and year. Pt completed 1/5 confrontational naming trials ind'ly, 4/5 given cues and he completed 1/4 phrase completion trials ind'ly. Additionally he followed 3/4 1-step commands ind'ly, 4/4 given cues, and 2/4 2-step commands ind'ly, 3/4 given cues. Pt provided thin water via straw sip x2 taken in isolation and via multiple cup sips taken in isolation and as regular consistency solid liquid wash and 1/2 regular consistency solid steven cracker via bite x1. Oral phase appeared WFL. Although coughing " appreciated on 2/2 straw sips thin liquid taken in isolation, overt clinical signs aspiration unappreciated across thin liquid cup sips taken in isolation and as regular consistency solid liquid wash. Education provided to pt and wife re: strict and consistent implementation of all aspiration precautions listed above with particular emphasis placed for straw avoidance and ongoing SLP POC. Concern remains for pt's complete understanding. However pt's wife verbalized understanding of all education provided and agreement with SLP POC. White board updated. No further questions.     Assessment:     Lopez Savage is a 75 y.o. male with an SLP diagnosis of dysphagia and cognitive-linguistic deficits.     Goals:   Multidisciplinary Problems     SLP Goals        Problem: SLP Goal    Goal Priority Disciplines Outcome   SLP Goal     SLP Ongoing, Progressing   Description: Goals expected to be met by 10/6:1. Pt will tolerate regular consistency solid diet and thin liquids with timely mastication and A-P transit and without overt clinical signs aspiration.   2. Pt will orient x4 ind'ly.   3. Pt will complete simple naming tasks with 70% acc given cues.   4. Pt will follow basic 1-step commands with 70% acc given cues.   5. Pt will answer basic y/n q's with 70% acc ind'ly.   6. Pt will participate in ongoing assessment of cognitive-linguistic skills.                    Plan:     · Patient to be seen:  4 x/week   · Plan of Care expires:  10/15/20  · Plan of Care reviewed with:  patient, spouse   · SLP Follow-Up:  Yes       Discharge recommendations:  rehabilitation facility     Time Tracking:     SLP Treatment Date:   09/30/20  Speech Start Time:  1107  Speech Stop Time:  1134     Speech Total Time (min):  27 min    Billable Minutes: Speech Therapy Individual 14 and Treatment Swallowing Dysfunction 13    MARVA Vazquez, CCC-SLP  [phone]  9/30/2020

## 2020-09-30 NOTE — PROGRESS NOTES
Progress Note  Hospital Medicine  [facility] [place]       Patient Name: Lopez Savage  MRN:  [id number]  Hospital Medicine Team: [facility] HOSP MED X Isidoro Garcia MD  Date of Admission:  9/13/2020     Length of Stay:  LOS: 16 days   Expected Discharge Date: 9/30/2020  Principal Problem:  Status epilepticus     Subjective:     Interval History/Overnight Events:      - patient more alert today, more talkative; passed MBS, NG tube removed and started on a regular diet; MRI brain shows improvement of abscesses ; planning for inpatient rehab this week, medically stable by tomorrow   - had epistaxis, ENT consulted, likely trauma, prn afrin;      albuterol-ipratropium  3 mL Nebulization Q4H    atorvastatin  40 mg Per NG tube Daily    cefTRIAXone (ROCEPHIN) IVPB  2 g Intravenous Q12H    enoxaparin  40 mg Subcutaneous Q24H    ferric gluconate (FERRLECIT) IVPB  125 mg Intravenous Daily    furosemide (LASIX) IV  40 mg Intravenous BID    lacosamide  200 mg Per NG tube Q12H    Lactobacillus rhamnosus GG  1 capsule Oral Daily    miconazole nitrate 2%   Topical (Top) BID    pantoprazole  40 mg Per NG tube Daily    silodosin  8 mg Per NG tube Daily    sodium chloride 0.9%  10 mL Intravenous Q6H    traZODone  25 mg Per NG tube QHS    vancomycin (VANCOCIN) IVPB  1,000 mg Intravenous Q24H           acetaminophen, dextrose 50%, glucagon (human recombinant), haloperidoL, insulin aspart U-100, labetalol, oxymetazoline, psyllium husk (aspartame), sodium chloride 0.9%, Pharmacy to dose Vancomycin consult **AND** vancomycin - pharmacy to dose    Review of Systems   Unable to obtain 2/2 AMS    Objective:     Temp:  [98.5 °F (36.9 °C)-98.6 °F (37 °C)]   Pulse:  [67-96]   Resp:  [16-24]   BP: (130-154)/(56-82)   SpO2:  [91 %-100 %]         Weight change:    Body mass index is 37.11 kg/m².       Physical Exam:  Constitutional: lethargic , chronically ill looking,   HENT: NC/AT, external ears normal  Eyes:  PERRL, EOMI, conjunctiva normal,  Neck: normal ROM, supple  CV: RRR, no m/r/g, no carotid bruits, +2 peripheral pulses.  Pulmonary/Chest wall: Breathing comfortably w/o distress,  Decreased breath sounds at lung bases  GI: Soft, non-tender, (+) BS, (+) BM  Musculoskeletal: Normal ROM, no atrophy,   Neurological   awake but not following commands   Moves extremities spontaneously   Skin: warm, dry   +pallor  Psych awake but not following commands     Labs:    Chemistries:   Recent Labs   Lab 09/27/20  0426 09/28/20 0449 09/29/20 0441    140 141   K 3.5 3.4* 3.4*    103 102   CO2 28 28 31*   BUN 39* 35* 37*   CREATININE 1.5* 1.3 1.5*   CALCIUM 10.1 10.1 10.3   PROT 6.7 6.8 6.8   BILITOT 0.3 0.2 0.2   ALKPHOS 47* 44* 49*   ALT 9* 8* 9*   AST 13 13 12   MG 1.8 1.7 1.9   PHOS 4.1 4.2 5.5*        WBC:   Recent Labs   Lab 09/25/20  0356 09/26/20  0130 09/27/20  0426 09/28/20  0449 09/29/20  0441   WBC 9.58 9.00 8.57 9.14 8.75     Bands:     CBC/Anemia Labs: Coags:    Recent Labs   Lab 09/26/20  1339 09/27/20  0426 09/28/20  0449 09/29/20  0441   WBC  --  8.57 9.14 8.75   HGB  --  8.1* 8.5* 8.5*   HCT  --  27.1* 27.9* 28.4*   PLT  --  171 188 183   MCV  --  93 92 94   RDW  --  15.5* 15.5* 15.4*   IRON 20*  --   --   --    FERRITIN 1,261*  --   --   --    FOLATE 6.6  --   --   --    [id number]  --   --   --     No results for input(s): PT, INR, APTT in the last 168 hours.     Diagnostic Results:    MRI brain 9/14/20    Numerous scattered punctate foci of cortical enhancement throughout both cerebral hemispheres.  Additional rim enhancing collection in the right occipital lobe with central diffusion restriction and surrounding vasogenic edema.  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     Persistent gyriform diffusion restriction the posterior left cerebral hemisphere as well as the hippocampus and dorsal lateral thalamus, although slightly improved from prior  studies.  The distribution be typical for vascular territory infarct, most suggestive of seizure related signal changes (status epilepticus).  Other encephalitis to be excluded clinically.     New small left frontal subdural hygroma without significant intracranial mass effect.     Stable small subacute left cerebellar infarct.     Mild chronic microvascular ischemic disease with remote right frontal and right occipital infarcts.     Numerous regions of prior parenchymal and leptomeningeal hemorrhage involving both cerebral hemispheres appearance raising the possibility underlying cerebral amyloid angiopathy.  No new hemorrhage.    CT head 9/16    FINDINGS:  There is stable cerebral atrophy and chronic small vessel ischemic changes.  There is an area of rounded hypodensity in the right occipital lobe, which corresponds to an overall finding of abscess formation and vasogenic edema in a patient with known endocarditis is noted on the MRI of the brain from 09/14/2020.  There is a remote right frontal infarct.  There are also areas of hypodensity seen in the left cerebellar hemisphere and left occipital lobe, which may be related to age indeterminate infarcts.  There is no acute intracranial hemorrhage, territorial infarct or mass effect, or midline shift..    Assessment and Plan     Hospital Course:    Mr. Lopez Savage was admitted to Hospital Medicine for management of  Status epilepticus     Active Hospital Problems    Diagnosis  POA    *Status epilepticus [G40.901]  Yes    Epistaxis [R04.0]  No    Palliative care encounter [Z51.5]  Not Applicable    Goals of care, counseling/discussion [Z71.89]  Not Applicable    Advance care planning [Z71.89]  Not Applicable    Debility [R53.81]  Yes    Oral phase dysphagia [R13.11]  Yes    Seizure [R56.9]  Yes    Intracranial septic embolism [I76, I66.9]  Yes    Brain abscess [G06.0]  Yes    Cerebellar stroke [I63.9]  Yes    Cytotoxic brain edema [G93.6]  Yes     Encephalopathy acute [G93.40]  Yes    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         Encephalopathy acute  - likely multifactorial - 2/2 stroke , brain abscess, cytotoxic edema. hosp acquired delirium   - cont PT OT   - SLP- currently with NG tube   - Stroke team believe repeat MRI is stable   General neurology consulted. Recommended LP and EEG.    - EEG showed no current seizure activity      - LP done on 9/25 with FL, concerning for possible neurosyphilis versus HSV versus other meningitis, ampicillin added   - ID re-consulted   - mentation waxing and waning, but improving      Status epilepticus  - Multiple witnessed seizure-like activity described  - No previous reports from family  - Was started on keppra and vimpat 200 mg BID  - EEG at OSH shows irregular slowing in the R frontal region, with L posterior periodic epileptiform discharges. No seizures recorded during this study.  - 9/13 [facility] EEG continues to show periodic discharges in the left posterior region with no seizures.  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 If worsening mentation or without improvement recommend reimaging and if need for surgical washout for source control. Please send fluid for cultures (gram stain, aerobic, anaerobic, fungal, AFB) if undergoes drainage.     -ID recs:  Continue IV  "Vanc and cetriaxone for 6-8 weeks, end date 10/27-11/10 with repeat MRI post treatment  Continue ceftriaxone 2gms IV q12hrs. Anticipate 6-8 weeks of IV vancomycin and ceftriaxone with repeat imaging to assess resolution of brain abscess.   - getting MRI brain for the AM    Cerebellar stroke/ Embolic stroke involving left posterior cerebral artery  On 9/8 pt presented to [place] ED with unresponsiveness  No tPA due to recent GI bleed  MRI with left PCA infarct and left thalamic infarct along with small right PCA infarct  MRA demonstrates fetal circulation to left PCA  9/11 extubated  . Repeat MRI new acute/subacute L cerebellum infarct     -Neuro checks  -Vascular neurology was consulted - and recommended medical management - ASA , statin , PT OT SLP, BP control     Cytotoxic brain edema  Per prior documentation - "Area of cytotoxic cerebral edema identified when reviewing brain imaging in the territory of the left posterior cerebral artery, L thalamus, right posterior cerebral artery. There is no mass effect associated with it. We will continue to monitor the patients clinical exam for any worsening of symptoms which may indicate expansion of the stroke or the area of the edema resulting in the clinical change. The pattern is suggestive of embolic etiology"    Oral phase dysphagia  NGT in place, continuous tube feedings for nutrition  Cont enteral water flushes 200 ml q6hrs  SLP following,  - tolerating more of a diet, MBS done advanced to regular diet and NG tube removed    Chronic bronchitis  Duonebs scheduled q4hrs  Monitor respiratory status, at risk for atelectasis  Continuous pulse oximetry, ABG prn  Continue CPT, pulmonary toileting    # Iron deficiency anemia  - transfusing 1 unit of PRBC on 9/26  - starting IV iron tomorrow     Diet:  Dysphagia  - tube feeds  GI PPx:   DVT PPx:  lovenox   Goals of Care:  Full     High Risk Conditions:  IE, brain abscess, seizures     [facility]

## 2020-09-30 NOTE — PLAN OF CARE
09/30/20 1220   Post-Acute Status   Post-Acute Authorization Placement   Post-Acute Placement Status Pending Payor Medical Review   Discharge Delays None known at this time   Discharge Plan   Discharge Plan A Rehab   Discharge Plan B Skilled Nursing Facility      received message from N (Aranza [phone]), informing sw rehab request will be forwarded to medical review.     Viki Carlos, WIL [phone]

## 2020-09-30 NOTE — PLAN OF CARE
Pt alert and cooperative. Remains confused; oriented to self and place. Follows some simple commands appropriately. Restraints not required this shift. Rested well throughout night with tylenol and haldol given. VSS. O2 2L NC in use while sleeping. Blood sugar checks 130 and 96. Tolerated diet well. No complaints. Safety maintained. Will continue to monitor.

## 2020-09-30 NOTE — PT/OT/SLP PROGRESS
Physical Therapy      Patient Name:  Lopez Savage   MRN:  [id number]    Patient not seen today for PT services. Per chart review, pt remains an appropriate candidate for continued therapy. Will follow up per POC and as schedule allows.    Tali Farley, PT   9/30/2020

## 2020-09-30 NOTE — ASSESSMENT & PLAN NOTE
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 Reconsulted for change in mental status and aphasia    Clinically improving  MRI yesterday with decrease in abscess size  -- NSGY will sign off now, please call with any questions        -q1h neurochecks in ICU, q2h neurochecks in stepdown, q4h neurochecks on floor  --SBP <160 (cardene ggt; hydralazine & labetalol PRN; transition to home meds when appropriate)  --Na >135  --VN following  --AED per NCC/Epilepsy  --Abx per ID, f/up BCx - 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   --NO steroids  --HOB >30  --PPI given GIB per NCC

## 2020-09-30 NOTE — PROGRESS NOTES
Pharmacokinetic Assessment Follow Up: IV Vancomycin    Vancomycin serum concentration assessment(s):    · Vancomycin random lvl=27.4 mcg/mL, drawn appropriately    Vancomycin Regimen Plan:    · Vancomycin lvl supratherapeutic. Patient has been very hard to manage with low levels on q48h dosing and high levels on q24h dosing. Renal functioning worsening a bit again, SCr 1.6 today. Will hold dose and get a level in the AM.      Drug levels (last 3 results):  Recent Labs   Lab Result Units 09/28/20 0449 09/30/20  1321   Vancomycin, Random ug/mL 20.7  --    Vancomycin-Trough ug/mL  --  27.4*       Pharmacy will continue to follow and monitor vancomycin.    Please contact pharmacy at extension [phone] for questions regarding this assessment.    Thank you for the consult,   Jocelyne Hanson, PharmD, [facility]  [phone]     Patient brief summary:  Lopez Savage is a 75 y.o. male initiated on antimicrobial therapy with IV Vancomycin for treatment of meningitis & brain abscess      Drug Allergies:   Review of patient's allergies indicates:   Allergen Reactions    Codeine     Tetanus vaccines and toxoid        Actual Body Weight:   115kg    Renal Function:   Estimated Creatinine Clearance: 50.4 mL/min (A) (based on SCr of 1.6 mg/dL (H)).,       CBC (last 72 hours):  Recent Labs   Lab Result Units 09/28/20 0449 09/29/20 0441 09/30/20  0441   WBC K/uL 9.14 8.75 8.07   Hemoglobin g/dL 8.5* 8.5* 8.5*   Hematocrit % 27.9* 28.4* 28.4*   Platelets K/uL 188 183 199   Gran% % 73.9* 73.7* 68.8   Lymph% % 9.6* 10.1* 13.9*   Mono% % 10.2 9.5 10.9   Eosinophil% % 5.0 5.3 4.8   Basophil% % 0.8 0.8 1.1   Differential Method  Automated Automated Automated       Metabolic Panel (last 72 hours):  Recent Labs   Lab Result Units 09/28/20 0449 09/29/20 0441 09/30/20  0441   Sodium mmol/L 140 141 143   Potassium mmol/L 3.4* 3.4* 3.2*   Chloride mmol/L 103 102 101   CO2 mmol/L 28 31* 30*   Glucose mg/dL 103 96 84   BUN, Bld mg/dL 35* 37* 34*    Creatinine mg/dL 1.3 1.5* 1.6*   Albumin g/dL 2.4* 2.5* 2.5*   Total Bilirubin mg/dL 0.2 0.2 0.2   Alkaline Phosphatase U/L 44* 49* 45*   AST U/L 13 12 15   ALT U/L 8* 9* 9*   Magnesium mg/dL 1.7 1.9 1.9   Phosphorus mg/dL 4.2 5.5* 5.5*       Vancomycin Administrations:  vancomycin given in the last 96 hours                   vancomycin in dextrose 5 % 1 gram/250 mL IVPB 1,000 mg (mg) 1,000 mg New Bag 09/28/20 1142    vancomycin 1.25 g in dextrose 5% 250 mL IVPB (ready to mix) (mg) 1,250 mg New Bag 09/27/20 1148    vancomycin 1.25 g in dextrose 5% 250 mL IVPB (ready to mix) (mg) 1,250 mg New Bag 09/25/20 1010                Microbiologic Results:  Microbiology Results (last 7 days)     Procedure Component Value Units Date/Time    CSF culture [[id number]] Collected: 09/25/20 1230    Order Status: Completed Specimen: CSF (Spinal Fluid) from CSF Tap, Tube 1 Updated: 09/30/20 0645     CSF CULTURE No Growth     Gram Stain Result No WBC's      No organisms seen    Narrative:      Add on VDRL By Rosalva Farfan MD Order #[id number]  09/25/2020  17:51     Blood culture [[id number]] Collected: 09/22/20 1522    Order Status: Completed Specimen: Blood from Peripheral, Left Hand Updated: 09/27/20 1612     Blood Culture, Routine No growth after 5 days.    Narrative:      Collection has been rescheduled by BDW at 09/22/2020 14:57 Reason:   Patient unavailable  Collection has been rescheduled by BDW at 09/22/2020 14:57 Reason:   Patient unavailable    Blood culture [[id number]] Collected: 09/22/20 1522    Order Status: Completed Specimen: Blood from Antecubital, Left Arm Updated: 09/27/20 1612     Blood Culture, Routine No growth after 5 days.    Narrative:      Collection has been rescheduled by BDW at 09/22/2020 14:57 Reason:   Patient unavailable  Collection has been rescheduled by BDW at 09/22/2020 14:57 Reason:   Patient unavailable    Gram stain [[id number]] Collected: 09/25/20 1230    Order Status: Canceled Specimen: CSF (Spinal  Fluid) from CSF Tap, Tube 1

## 2020-09-30 NOTE — PT/OT/SLP PROGRESS
Occupational Therapy      Patient Name:  Lopez Savage   MRN:  [id number]    Patient not seen today for OT services. Per chart review, pt remains an appropriate candidate for continued therapy. Will follow up per POC and as schedule allows.    Kaylah Dangelo OT  9/30/2020

## 2020-09-30 NOTE — SUBJECTIVE & OBJECTIVE
Interval History: clinically improving today, conversing, yet confused, more interactive than previously    Medications:  Continuous Infusions:  Scheduled Meds:   albuterol-ipratropium  3 mL Nebulization Q4H    atorvastatin  40 mg Per NG tube Daily    cefTRIAXone (ROCEPHIN) IVPB  2 g Intravenous Q12H    enoxaparin  40 mg Subcutaneous Q24H    ferric gluconate (FERRLECIT) IVPB  125 mg Intravenous Daily    furosemide (LASIX) IV  40 mg Intravenous BID    lacosamide  200 mg Per NG tube Q12H    Lactobacillus rhamnosus GG  1 capsule Oral Daily    miconazole nitrate 2%   Topical (Top) BID    pantoprazole  40 mg Per NG tube Daily    silodosin  8 mg Per NG tube Daily    sodium chloride  1 spray Each Nostril Q4H While awake    sodium chloride 0.9%  10 mL Intravenous Q6H    traZODone  25 mg Per NG tube QHS    vancomycin (VANCOCIN) IVPB  1,000 mg Intravenous Q24H     PRN Meds:acetaminophen, dextrose 50%, glucagon (human recombinant), haloperidoL, insulin aspart U-100, labetalol, oxymetazoline, psyllium husk (aspartame), sodium chloride 0.9%, Pharmacy to dose Vancomycin consult **AND** vancomycin - pharmacy to dose     Review of Systems  Objective:     Weight: 114 kg (251 lb 5.2 oz)  Body mass index is 37.11 kg/m².  Vital Signs (Most Recent):  Temp: 98.1 °F (36.7 °C) (09/30/20 0400)  Pulse: 86 (09/30/20 0901)  Resp: (!) 30 (09/30/20 0901)  BP: (!) 125/58 (09/30/20 0400)  SpO2: 97 % (09/30/20 0901) Vital Signs (24h Range):  Temp:  [98.1 °F (36.7 °C)-98.5 °F (36.9 °C)] 98.1 °F (36.7 °C)  Pulse:  [75-96] 86  Resp:  [16-30] 30  SpO2:  [91 %-100 %] 97 %  BP: (124-154)/(56-82) 125/58                     Male External Urinary Catheter 09/28/20 1600 (Active)   Collection Container Standard drainage bag 09/29/20 2000   Skin no redness;no breakdown 09/29/20 2000   Tolerance no signs/symptoms of discomfort 09/29/20 2000   Output (mL) 850 mL 09/30/20 0524       Neurosurgery Physical Exam     [id number]  AOx1  Conversing and  interacting well, yet confused  Follows commands antigravity all extremities    Significant Labs:  Recent Labs   Lab 09/29/20  0441 09/30/20  0441   GLU 96 84    143   K 3.4* 3.2*    101   CO2 31* 30*   BUN 37* 34*   CREATININE 1.5* 1.6*   CALCIUM 10.3 10.5   MG 1.9 1.9     Recent Labs   Lab 09/29/20  0441 09/30/20  0441   WBC 8.75 8.07   HGB 8.5* 8.5*   HCT 28.4* 28.4*    199     No results for input(s): LABPT, INR, APTT in the last 48 hours.  Microbiology Results (last 7 days)     Procedure Component Value Units Date/Time    CSF culture [[id number]] Collected: 09/25/20 1230    Order Status: Completed Specimen: CSF (Spinal Fluid) from CSF Tap, Tube 1 Updated: 09/30/20 0645     CSF CULTURE No Growth     Gram Stain Result No WBC's      No organisms seen    Narrative:      Add on VDRL By Rosalva Farfan MD Order #[id number]  09/25/2020  17:51     Blood culture [[id number]] Collected: 09/22/20 1522    Order Status: Completed Specimen: Blood from Peripheral, Left Hand Updated: 09/27/20 1612     Blood Culture, Routine No growth after 5 days.    Narrative:      Collection has been rescheduled by BDW at 09/22/2020 14:57 Reason:   Patient unavailable  Collection has been rescheduled by BDW at 09/22/2020 14:57 Reason:   Patient unavailable    Blood culture [[id number]] Collected: 09/22/20 1522    Order Status: Completed Specimen: Blood from Antecubital, Left Arm Updated: 09/27/20 1612     Blood Culture, Routine No growth after 5 days.    Narrative:      Collection has been rescheduled by BDW at 09/22/2020 14:57 Reason:   Patient unavailable  Collection has been rescheduled by BDW at 09/22/2020 14:57 Reason:   Patient unavailable    Gram stain [[id number]] Collected: 09/25/20 1230    Order Status: Canceled Specimen: CSF (Spinal Fluid) from CSF Tap, Tube 1         All pertinent labs from the last 24 hours have been reviewed.    Significant Diagnostics:  I have reviewed and interpreted all pertinent imaging  results/findings within the past 24 hours.

## 2020-10-01 NOTE — PT/OT/SLP PROGRESS
"Speech Language Pathology Treatment    Patient Name:  Lopez Savage   MRN:  [id number]   [id number]    Admitting Diagnosis: Status epilepticus    Recommendations:                 General Recommendations:  Dysphagia therapy and Speech/language therapy  Diet recommendations:  Regular, Liquid Diet Level: Thin   Aspiration Precautions:   · Pt requires 1:1 supervision with meals to ensure he maintains fully awake and alert state across the duration of an entire meal, as well as to ensure strict and consistent implementation of all aspiration precautions   · Fully awake and alert for PO intake- defer PO intake if pt unable to achieve and maintain fully awake and alert state   · NO STRAWS  · Fully upright position for PO intake  · Small bites/ sips   · Slow rate of eating/ drinking  · 1 bite/ sip @ a time  · Alternate bites/ sips  · Refrain from talking prior to swallow completion   · Discontinue PO upon: coughing, throat clearing, choking, wet vocal quality, wet breath sounds, watery eyes, reddened facial features  General Precautions: Standard, fall, aspiration  Communication strategies:  go to room if call light pushed    Subjective     "[place]." Pleasant confusion evident throughout session.     Pain/Comfort:  · Pain Rating 1: 0/10  · Pain Rating Post-Intervention 1: 0/10    Objective:     Has the patient been evaluated by SLP for swallowing?   Yes  Keep patient NPO? No   Current Respiratory Status: room air      Pt awake upon entry. Pleasant confusion evident. HOB raised. Pt observed with multiple clinician fed fork bites of chicken pot pit and steamed zucchini and squash with multiple cup sips thin Coke. Although slightly slow mastication observed, appeared to remain functional. Trace-mild, generalized lingual/ oral cavity residue visualized, resolved when pt provided cup sip liquid wash. Overt clinical signs aspiration unappreciated.     Pt completed 1/5 confrontational naming trials " ind'ly, 2/5 given cues, and he followed 0/4 2-step commands ind'ly, 2/4 given cues.     Although education provided re: strict and consistent implementation of all aspiration precautions listed above and ongoing SLP POC, ongoing education appears warranted. No further questions.     Assessment:     Lopez Savage is a 75 y.o. male with an SLP diagnosis of dysphagia and cognitive-linguistic deficits.     Goals:   Multidisciplinary Problems     SLP Goals        Problem: SLP Goal    Goal Priority Disciplines Outcome   SLP Goal     SLP Ongoing, Progressing   Description: Goals expected to be met by 10/6:1. Pt will tolerate regular consistency solid diet and thin liquids with timely mastication and A-P transit and without overt clinical signs aspiration.   2. Pt will orient x4 ind'ly.   3. Pt will complete simple naming tasks with 70% acc given cues.   4. Pt will follow basic 1-step commands with 70% acc given cues.   5. Pt will answer basic y/n q's with 70% acc ind'ly.   6. Pt will participate in ongoing assessment of cognitive-linguistic skills.                    Plan:     · Patient to be seen:  4 x/week   · Plan of Care expires:  10/15/20  · Plan of Care reviewed with:  patient   · SLP Follow-Up:  Yes       Discharge recommendations:  rehabilitation facility     Time Tracking:     SLP Treatment Date:   10/01/20  Speech Start Time:  1224  Speech Stop Time:  1240     Speech Total Time (min):  16 min    Billable Minutes: Speech Therapy Individual 8 and Treatment Swallowing Dysfunction 8    MARVA Vazquez, CCC-SLP  [phone]  10/1/2020

## 2020-10-01 NOTE — NURSING
This nurse and charge nurse Dalton unable to draw blood from picc line. Picc line flushes well w/o resistance; not drawing blood well. Pt with limb alert on both arms. Dr. Chávez notified and will enter order ok to have blood work drawn from foot. Phlebotomist informed and will do so.

## 2020-10-01 NOTE — PROGRESS NOTES
Pharmacokinetic Assessment Follow Up: IV Vancomycin    Vancomycin serum concentration assessment(s):    · Vancomycin random lvl=33.2 mcg/mL (12hr lvl)    Vancomycin Regimen Plan:    · Although vancomycin was d/c'd yesterday dose was still given so this is actually a 12hr lvl, so not surprising at how high the level is. Will continue to hold vancomycin and check a random in the AM. SCr continues to rise, 1.7 today.     Drug levels (last 3 results):  Recent Labs   Lab Result Units 09/30/20  1321 10/01/20  0406   Vancomycin, Random ug/mL  --  33.2   Vancomycin-Trough ug/mL 27.4*  --        Pharmacy will continue to follow and monitor vancomycin.    Please contact pharmacy at extension [phone] for questions regarding this assessment.    Thank you for the consult,   Jocelyne Hanson, PharmD, [facility]  [phone]     Patient brief summary:  Lopez Savage is a 75 y.o. male initiated on antimicrobial therapy with IV Vancomycin for treatment of meningitis & brain abscess      Drug Allergies:   Review of patient's allergies indicates:   Allergen Reactions    Codeine     Tetanus vaccines and toxoid        Actual Body Weight:   115kg    Renal Function:   Estimated Creatinine Clearance: 47.5 mL/min (A) (based on SCr of 1.7 mg/dL (H)).,       CBC (last 72 hours):  Recent Labs   Lab Result Units 09/29/20  0441 09/30/20  0441 10/01/20  0406   WBC K/uL 8.75 8.07 10.29   Hemoglobin g/dL 8.5* 8.5* 8.8*   Hematocrit % 28.4* 28.4* 28.5*   Platelets K/uL 183 199 204   Gran% % 73.7* 68.8 74.3*   Lymph% % 10.1* 13.9* 10.7*   Mono% % 9.5 10.9 9.6   Eosinophil% % 5.3 4.8 4.1   Basophil% % 0.8 1.1 0.9   Differential Method  Automated Automated Automated       Metabolic Panel (last 72 hours):  Recent Labs   Lab Result Units 09/29/20  0441 09/30/20  0441 10/01/20  0406   Sodium mmol/L 141 143 141   Potassium mmol/L 3.4* 3.2* 3.0*   Chloride mmol/L 102 101 100   CO2 mmol/L 31* 30* 30*   Glucose mg/dL 96 84 99   BUN, Bld mg/dL 37* 34* 32*   Creatinine  mg/dL 1.5* 1.6* 1.7*   Albumin g/dL 2.5* 2.5* 2.5*   Total Bilirubin mg/dL 0.2 0.2 0.2   Alkaline Phosphatase U/L 49* 45* 49*   AST U/L 12 15 13   ALT U/L 9* 9* 9*   Magnesium mg/dL 1.9 1.9 1.7   Phosphorus mg/dL 5.5* 5.5* 4.9*       Vancomycin Administrations:  vancomycin given in the last 96 hours                   vancomycin in dextrose 5 % 1 gram/250 mL IVPB 1,000 mg (mg) 1,000 mg New Bag 09/28/20 1142    vancomycin 1.25 g in dextrose 5% 250 mL IVPB (ready to mix) (mg) 1,250 mg New Bag 09/27/20 1148    vancomycin 1.25 g in dextrose 5% 250 mL IVPB (ready to mix) (mg) 1,250 mg New Bag 09/25/20 1010                Microbiologic Results:  Microbiology Results (last 7 days)     Procedure Component Value Units Date/Time    CSF culture [[id number]] Collected: 09/25/20 1230    Order Status: Completed Specimen: CSF (Spinal Fluid) from CSF Tap, Tube 1 Updated: 09/30/20 0645     CSF CULTURE No Growth     Gram Stain Result No WBC's      No organisms seen    Narrative:      Add on VDRL By Rosalva Farfan MD Order #[id number]  09/25/2020  17:51     Blood culture [[id number]] Collected: 09/22/20 1522    Order Status: Completed Specimen: Blood from Peripheral, Left Hand Updated: 09/27/20 1612     Blood Culture, Routine No growth after 5 days.    Narrative:      Collection has been rescheduled by BDW at 09/22/2020 14:57 Reason:   Patient unavailable  Collection has been rescheduled by BDW at 09/22/2020 14:57 Reason:   Patient unavailable    Blood culture [[id number]] Collected: 09/22/20 1522    Order Status: Completed Specimen: Blood from Antecubital, Left Arm Updated: 09/27/20 1612     Blood Culture, Routine No growth after 5 days.    Narrative:      Collection has been rescheduled by BDW at 09/22/2020 14:57 Reason:   Patient unavailable  Collection has been rescheduled by BDW at 09/22/2020 14:57 Reason:   Patient unavailable    Gram stain [[id number]] Collected: 09/25/20 1230    Order Status: Canceled Specimen: CSF (Spinal Fluid)  from CSF Tap, Tube 1

## 2020-10-01 NOTE — PT/OT/SLP PROGRESS
"Physical Therapy Treatment    Patient Name:  Lopez Savage   MRN:  [id number]    Recommendations:     Discharge Recommendations:  rehabilitation facility   Discharge Equipment Recommendations: other (see comments)(TBD)   Barriers to discharge: Inaccessible home and Decreased caregiver support    Assessment:     Lopez Savage is a 75 y.o. male admitted with a medical diagnosis of Status epilepticus.  He presents with the following impairments/functional limitations:  weakness, impaired self care skills, impaired functional mobilty, impaired balance, visual deficits, impaired cognition, decreased coordination, decreased upper extremity function, decreased lower extremity function, decreased safety awareness, impaired cardiopulmonary response to activity Co treat with OT due to patient's increased level of required assistance and decreased tolerance to activity. Patient more alert and talkative on this date, with improving activity tolerance but with decreased postural control and decreased ability to follow commands. Patient became very drowsy towards end of session, began closing his eyes while sitting EOB and stated that he feels like he could "close his eyes and won't be able to get them back open." MaxA bed mobility. MaxA x2 sit to stand. Severe R lateral lean while sitting EOB. Significant R side inattention noted.This patient is an excellent candidate for inpatient rehabilitation, has a qualifying diagnosis, shows increasing activity tolerance,  is motivated to participate in treatment, and has a supportive family willing to assist the patient upon discharge.     Rehab Prognosis: Good; patient would benefit from acute skilled PT services to address these deficits and reach maximum level of function.    Recent Surgery: Procedure(s) (LRB):  Lumbar Puncture (N/A) 6 Days Post-Op    Plan:     During this hospitalization, patient to be seen 3 x/week to address the identified rehab impairments via gait training, " "therapeutic activities, therapeutic exercises, neuromuscular re-education and progress toward the following goals:    · Plan of Care Expires:  10/10/20    Subjective     Chief Complaint: fatgiue  Patient/Family Comments/goals: patient thanked therapy team for working with him  Pain/Comfort:  · Pain Rating 1: 0/10  · Pain Rating Post-Intervention 1: 0/10      Objective:     Communicated with nurse prior to session.  Patient found HOB elevated with telemetry, pulse ox (continuous), blood pressure cuff, peripheral IV upon PT entry to room.     General Precautions: Standard, fall   Orthopedic Precautions:N/A   Braces: N/A     Functional Mobility:  · Bed Mobility:     · Rolling Right: maximal assistance and of 2 persons  · Scooting: maximal assistance and of 2 persons  · Supine to Sit: maximal assistance and of 2 persons  · Sit to Supine: maximal assistance and of 2 persons  · Transfers:     · Sit to Stand:  maximal assistance and of 2 persons with hand-held assist, 2 trials, blocking of bilateral knees and feet, achieved ~80% of full stand. V/t cues to engage glutes, quads, back extensors, to stand erect and extend knees. Patient stated "I can't my legs are too weak". Patient stood 30sec-1min each time for pericare.  · Sitting Balance: severe R lateral lean and posterior lean, MaxA to correct, maximally v/t cues. Able to maintain CGA for periods of ~8 sec      AM-PAC 6 CLICK MOBILITY  Turning over in bed (including adjusting bedclothes, sheets and blankets)?: 2  Sitting down on and standing up from a chair with arms (e.g., wheelchair, bedside commode, etc.): 2  Moving from lying on back to sitting on the side of the bed?: 2  Moving to and from a bed to a chair (including a wheelchair)?: 2  Need to walk in hospital room?: 1  Climbing 3-5 steps with a railing?: 1  Basic Mobility Total Score: 10       Therapeutic Activities and Exercises:  Pt educated on plan and goals with physical therapy. Pt educated on importance of " OOB activity to decrease the risks associated with bed rest.   Maximal cueing and for postural control, to obtain midline. Provided joint approximation through LUE to encourage increased weightbearing through LUE. Facilitation at trunk and hips. Maximal verbal, tactile and visual cues to orient patient to midline posture.   Patient demonstrated R inattention, maximal cueing to orient patient to objects, people on R side.   Instruction provided for safety and technique for bed mobility and transfers.      Patient left HOB elevated with all lines intact and call button in reach..    GOALS:   Multidisciplinary Problems     Physical Therapy Goals        Problem: Physical Therapy Goal    Goal Priority Disciplines Outcome Goal Variances Interventions   Physical Therapy Goal     PT, PT/OT Ongoing, Progressing     Description: Goals to be met by: 2020, Goals reviewed on 20 and remain appropriate. Extended to 10/14/2020    Patient will increase functional independence with mobility by performin. Pt will perform bed mobility (rolling L/R, scooting, and bridging) with CGA  2. Pt will perform supine to/from sit with CGA.  3. Pt will sit EOB x 15 mins with B UE support with Rony assistance.  4. Pt will perform sit to stand with max assistance and LRAD.  5. Pt will ambulate 15 feet with max assistance and LRAD.  6. Pt will perform there-ex from handout x 15 reps to improve strength for functional mobility.                         Time Tracking:     PT Received On: 10/01/20  PT Start Time: 1005     PT Stop Time: 1037  PT Total Time (min): 32 min     Billable Minutes: Therapeutic Activity 18 and Neuromuscular Re-education 12    Treatment Type: Other (see comments)(eval)  PT/PTA: PT     PTA Visit Number: 0     Tali Farley PT  10/01/2020

## 2020-10-01 NOTE — PROGRESS NOTES
Progress Note  Hospital Medicine  [facility], [place]       Patient Name: Lopez Savage  MRN:  [id number]  Hospital Medicine [place]: [facility] [place] Isidoro Garcia MD  Date of Admission:  9/13/2020     Length of Stay:  LOS: 17 days   Expected Discharge Date: 9/30/2020  Principal Problem:  Status epilepticus     Subjective:     Interval History/Overnight Events:      - patient more alert today, more talkative; passed MBS, NG tube removed and started on a regular diet; MRI brain shows improvement of abscesses ; planning for inpatient rehab this week, medically stable by tomorrow   - awaiting rehab placement      albuterol-ipratropium  3 mL Nebulization Q4H    [START ON 10/1/2020] atorvastatin  40 mg Oral Daily    cefTRIAXone (ROCEPHIN) IVPB  2 g Intravenous Q12H    enoxaparin  40 mg Subcutaneous Q24H    ferric gluconate (FERRLECIT) IVPB  125 mg Intravenous Daily    [START ON 10/1/2020] furosemide  20 mg Oral Daily    lacosamide  200 mg Oral Q12H    Lactobacillus rhamnosus GG  1 capsule Oral Daily    miconazole nitrate 2%   Topical (Top) BID    [START ON 10/1/2020] pantoprazole  40 mg Oral Daily    [START ON 10/1/2020] silodosin  4 mg Oral Daily    sodium chloride  1 spray Each Nostril Q4H While awake    sodium chloride 0.9%  10 mL Intravenous Q6H    traZODone  25 mg Oral QHS           acetaminophen, dextrose 50%, glucagon (human recombinant), haloperidoL, insulin aspart U-100, labetalol, oxymetazoline, psyllium husk (aspartame), sodium chloride 0.9%, Pharmacy to dose Vancomycin consult **AND** vancomycin - pharmacy to dose    Review of Systems   Unable to obtain 2/2 AMS    Objective:     Temp:  [98.1 °F (36.7 °C)-98.4 °F (36.9 °C)]   Pulse:  [75-88]   Resp:  [16-22]   BP: (124-155)/(58-70)   SpO2:  [95 %-99 %]         Weight change:    Body mass index is 37.11 kg/m².       Physical Exam:  Constitutional: lethargic , chronically ill looking,   HENT: NC/AT, external ears normal  Eyes:  PERRL, EOMI, conjunctiva normal,  Neck: normal ROM, supple  CV: RRR, no m/r/g, no carotid bruits, +2 peripheral pulses.  Pulmonary/Chest wall: Breathing comfortably w/o distress,  Decreased breath sounds at lung bases  GI: Soft, non-tender, (+) BS, (+) BM  Musculoskeletal: Normal ROM, no atrophy,   Neurological   awake but not following commands   Moves extremities spontaneously   Skin: warm, dry   +pallor  Psych awake but not following commands     Labs:    Chemistries:   Recent Labs   Lab 09/28/20 0449 09/29/20 0441 09/30/20 0441    141 143   K 3.4* 3.4* 3.2*    102 101   CO2 28 31* 30*   BUN 35* 37* 34*   CREATININE 1.3 1.5* 1.6*   CALCIUM 10.1 10.3 10.5   PROT 6.8 6.8 7.0   BILITOT 0.2 0.2 0.2   ALKPHOS 44* 49* 45*   ALT 8* 9* 9*   AST 13 12 15   MG 1.7 1.9 1.9   PHOS 4.2 5.5* 5.5*        WBC:   Recent Labs   Lab 09/26/20  0130 09/27/20  0426 09/28/20  0449 09/29/20 0441 09/30/20 0441   WBC 9.00 8.57 9.14 8.75 8.07     Bands:     CBC/Anemia Labs: Coags:    Recent Labs   Lab 09/26/20  1339  09/28/20  0449 09/29/20 0441 09/30/20 0441   WBC  --    < > 9.14 8.75 8.07   HGB  --    < > 8.5* 8.5* 8.5*   HCT  --    < > 27.9* 28.4* 28.4*   PLT  --    < > 188 183 199   MCV  --    < > 92 94 94   RDW  --    < > 15.5* 15.4* 15.7*   IRON 20*  --   --   --   --    FERRITIN 1,261*  --   --   --   --    FOLATE 6.6  --   --   --   --    [id number]  --   --   --   --     < > = values in this interval not displayed.    No results for input(s): PT, INR, APTT in the last 168 hours.     Diagnostic Results:    MRI brain 9/14/20    Numerous scattered punctate foci of cortical enhancement throughout both cerebral hemispheres.  Additional rim enhancing collection in the right occipital lobe with central diffusion restriction and surrounding vasogenic edema.  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     Persistent gyriform diffusion restriction the posterior left cerebral  hemisphere as well as the hippocampus and dorsal lateral thalamus, although slightly improved from prior studies.  The distribution be typical for vascular territory infarct, most suggestive of seizure related signal changes (status epilepticus).  Other encephalitis to be excluded clinically.     New small left frontal subdural hygroma without significant intracranial mass effect.     Stable small subacute left cerebellar infarct.     Mild chronic microvascular ischemic disease with remote right frontal and right occipital infarcts.     Numerous regions of prior parenchymal and leptomeningeal hemorrhage involving both cerebral hemispheres appearance raising the possibility underlying cerebral amyloid angiopathy.  No new hemorrhage.    CT head 9/16    FINDINGS:  There is stable cerebral atrophy and chronic small vessel ischemic changes.  There is an area of rounded hypodensity in the right occipital lobe, which corresponds to an overall finding of abscess formation and vasogenic edema in a patient with known endocarditis is noted on the MRI of the brain from 09/14/2020.  There is a remote right frontal infarct.  There are also areas of hypodensity seen in the left cerebellar hemisphere and left occipital lobe, which may be related to age indeterminate infarcts.  There is no acute intracranial hemorrhage, territorial infarct or mass effect, or midline shift..    Assessment and Plan     Hospital Course:    Mr. Lopez Savage was admitted to Hospital Medicine for management of  Status epilepticus     Active Hospital Problems    Diagnosis  POA    *Status epilepticus [G40.901]  Yes    Epistaxis [R04.0]  No    Palliative care encounter [Z51.5]  Not Applicable    Goals of care, counseling/discussion [Z71.89]  Not Applicable    Advance care planning [Z71.89]  Not Applicable    Debility [R53.81]  Yes    Oral phase dysphagia [R13.11]  Yes    Seizure [R56.9]  Yes    Intracranial septic embolism [I76, I66.9]  Yes     Brain abscess [G06.0]  Yes    Cerebellar stroke [I63.9]  Yes    Cytotoxic brain edema [G93.6]  Yes    Encephalopathy acute [G93.40]  Yes    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         Encephalopathy acute  - likely multifactorial - 2/2 stroke , brain abscess, cytotoxic edema. hosp acquired delirium   - cont PT OT   - SLP- currently with NG tube   - Stroke team believe repeat MRI is stable   General neurology consulted. Recommended LP and EEG.    - EEG showed no current seizure activity      - LP done on 9/25 with FL, concerning for possible neurosyphilis versus HSV versus other meningitis, ampicillin added   - ID re-consulted   - mentation waxing and waning, but improving      Status epilepticus  - Multiple witnessed seizure-like activity described  - No previous reports from family  - Was started on keppra and vimpat 200 mg BID  - EEG at OSH shows irregular slowing in the R frontal region, with L posterior periodic epileptiform discharges. No seizures recorded during this study.  - [place] [facility] [place] to show periodic discharges in the left posterior region with no seizures.  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 If worsening mentation or without improvement recommend reimaging and if need for surgical washout for source control. Please send fluid for  "cultures (gram stain, aerobic, anaerobic, fungal, AFB) if undergoes drainage.     -ID recs:  Continue IV Vanc and cetriaxone for 6-8 weeks, end date 10/27-11/10 with repeat MRI post treatment  Continue ceftriaxone 2gms IV q12hrs. Anticipate 6-8 weeks of IV vancomycin and ceftriaxone with repeat imaging to assess resolution of brain abscess.   - getting MRI brain for the AM    Cerebellar stroke/ Embolic stroke involving left posterior cerebral artery  On 9/8 pt presented to [place] ED with unresponsiveness  No tPA due to recent GI bleed  MRI with left PCA infarct and left thalamic infarct along with small right PCA infarct  MRA demonstrates fetal circulation to left PCA  9/11 extubated  . Repeat MRI new acute/subacute L cerebellum infarct     -Neuro checks  -Vascular neurology was consulted - and recommended medical management - ASA , statin , PT OT SLP, BP control     Cytotoxic brain edema  Per prior documentation - "Area of cytotoxic cerebral edema identified when reviewing brain imaging in the territory of the left posterior cerebral artery, L thalamus, right posterior cerebral artery. There is no mass effect associated with it. We will continue to monitor the patients clinical exam for any worsening of symptoms which may indicate expansion of the stroke or the area of the edema resulting in the clinical change. The pattern is suggestive of embolic etiology"    Oral phase dysphagia  NGT in place, continuous tube feedings for nutrition  Cont enteral water flushes 200 ml q6hrs  SLP following,  - tolerating more of a diet, MBS done advanced to regular diet and NG tube removed    Chronic bronchitis  Duonebs scheduled q4hrs  Monitor respiratory status, at risk for atelectasis  Continuous pulse oximetry, ABG prn  Continue CPT, pulmonary toileting    # Iron deficiency anemia  - transfusing 1 unit of PRBC on 9/26  - starting IV iron tomorrow     Diet:  Dysphagia  - tube feeds  GI PPx:   DVT PPx:  lovenox   Goals of Care:  " Full     High Risk Conditions:  IE, brain abscess, seizures     Disposition:    [facility] later this week; medically ready

## 2020-10-01 NOTE — PT/OT/SLP PROGRESS
Occupational Therapy   Treatment    Name: Lopez Savage  MRN: [id number]  Admitting Diagnosis:  Status epilepticus  6 Days Post-Op    Recommendations:     Discharge Recommendations: rehabilitation facility  Discharge Equipment Recommendations:  (TBD pending progress)  Barriers to discharge:  Other (Comment)(Increased assistance at current functional level)    Assessment:     Lopez Savage is a 75 y.o. male with a medical diagnosis of Status epilepticus.  He presents with the following performance deficits affecting function: weakness, impaired functional mobilty, impaired cognition, decreased safety awareness, decreased coordination, gait instability, impaired endurance, impaired self care skills, impaired balance, decreased lower extremity function, decreased upper extremity function, decreased ROM, impaired cardiopulmonary response to activity, impaired fine motor. Pt tolerated therapy session fairly well this date. Pt noted to have a decrease in cognition and command following from previous therapy session. Therapy session focused on increasing activity tolerance while sitting EOB and increasing postural control during unsupported sitting. Pt completed bed mobility with max A x 2 persons, x2 trials sit <> stand transfer from EOB with max A x 2 persons, and toilet hygiene while standing at EOB with total A. At this time, OT is recommending pt d/c to rehab facility due to pt's increased assistance required to complete ADLs and functional mobility safely. Pt should continue receiving skilled OT services to promote independence and safety during completion of functional mobility and ADL activities.      Rehab Prognosis:  Good; patient would benefit from acute skilled OT services to address these deficits and reach maximum level of function.       Plan:     Patient to be seen 3 x/week to address the above listed problems via self-care/home management, therapeutic activities, therapeutic exercises, neuromuscular  re-education  · Plan of Care Expires: 10/13/20  · Plan of Care Reviewed with: patient    Subjective     Pain/Comfort:  · Pain Rating 1: 0/10    Objective:     Communicated with: RN prior to session.  Patient found supine with telemetry, pulse ox (continuous), peripheral IV, blood pressure cuff upon OT entry to room. Pt agreeable to therapy session.  General Precautions: Standard, fall   Orthopedic Precautions:N/A   Braces: N/A     Occupational Performance:     Bed Mobility:    · Patient completed Scooting/Bridging anteriorly towards EOB with maximal assistance  · Patient completed Supine to Sit with maximal assistance and 2 persons  · Assistance provided for BLE management and trunk elevation  · Patient completed Sit to Supine with maximal assistance and 2 persons   · Assistance provided for BLE management and trunk descent     Functional Mobility/Transfers:  · Patient completed x2 trials Sit <> Stand Transfer from EOB with maximal assistance and of 2 persons with no assistive device   · Pt able to clear hips but full upright posture never achieved   · Verbal cues and facilitation provided for hip extension and upright posture   · B knees blocked    Activities of Daily Living:  · Upper Body Dressing: moderate assistance for donning clean hospital gown while sitting supported with HOB elevated  · Toileting: total assistance with hygiene while standing at EOB    EOB Sitting:  -Pt tolerated sitting EOB for ~10 mins with max <> mod assistance   · Pt with significant lateral R lean   · Max verbal and physical cues provided for lateral weight shift to the left and anteriorly  · Max verbal and physical cues provided for BUE support on bed with facilitation for upright posture and forward gaze  · Fatigue noted while sitting EOB     AMPA 6 Click ADL: 10    Treatment & Education:  -Pt educated on role of OT, POC, and goals for therapy  -Pt educated on importance of OOB activities to improve activity tolerance to maintain  functional strength  -Orientation provided throughout therapy session  -Cervical AROM in all available planes performed while pt sitting supported with HOB elevated   -Cognition: decreased command following noted and increased lethargy while sitting EOB  -Pt verbalized understanding and expressed no further questions or concerns  -Whiteboard updated       Patient left with bed in chair position with all lines intact, call button in reach and bed alarm onEducation:      GOALS:   Multidisciplinary Problems     Occupational Therapy Goals        Problem: Occupational Therapy Goal    Goal Priority Disciplines Outcome Interventions   Occupational Therapy Goal     OT, PT/OT Ongoing, Progressing    Description: Goals to be met by: 9/28/2020 (Extend to 10/5/2020)    Patient will increase functional independence with ADLs by performing:    Pt will follow 75% of motor commands with <2 verbal cues for repetition of task to maximize engagement in functional task. -not met  Pt will sit at EOB for approx 10 minutes with max A and unilateral UE support to complete grooming task -not met  Pt will complete sit>stand from EOB with bed in highest position with mod A in prep for functional transfers to [facility] [place] -not met  Pt will perform grooming sitting EOB with mod A -not met                               Time Tracking:     OT Date of Treatment: 10/01/20  OT Start Time: 1005  OT Stop Time: 1035  OT Total Time (min): 30 min (Co-treat with PT)     Billable Minutes:Self Care/Home Management 10  Neuromuscular Re-education 15    DARVIN Newman  10/1/2020

## 2020-10-01 NOTE — PROGRESS NOTES
[facility] - ICU 14 [facility] Medicine  Telemedicine Progress Note    Patient Name: Lopez Savage  MRN: [id number]  Patient Class: IP- Inpatient   Admission Date: 9/13/2020  Length of Stay: 18 days  Attending Physician: Aileen Reyna MD  Primary Care Provider: Prasanna Haywood MD    [facility] Medicine Team: [facility] 10 Aileen Reyna MD  Virtual Telemedicine Progress Note  Start time: 1150  Chief complaint: Status epilepticus  The patient location is: [id number]  The patient arrived at: 9/13/2020 10:13 AM  Present with the patient at the time of the telemed/virtual assessment: n/a  End time:  1203  Total time spent with patient: 13 min  I have assessed findings virtually using a telemedicine platform and with assistance of the bedside nurse or telemedicine presenter.  The attending portion of this evaluation, treatment, and documentation was performed per Aileen Reyna MD via audiovisual.    Patient was transferred to the telemedicine service on: 10/1/2020    Subjective:     Admission CC: No chief complaint on file.    Follow up visit for: Status epilepticus    Interval History / Events Overnight:   The patient is not able to provide adequate history. Additional history was obtained from past medical records and chart review. No significant events reported by Nursing.  Patient complains of nothing specific. Symptoms have been unchanged since yesterday. Associated symptoms include: confusion. Symptoms are stable. Alleviating factors include: nothing.     Data reviewed 10/1/2020: Lab test(s) reviewed: H&H and sCr stable  I have assessed findings virtually using a telemedicine platform and with assistance of the bedside nurse or telemedicine presenter.      Review of Systems   Unable to perform ROS: Mental status change   Constitutional: Negative for fever.   Respiratory: Negative for shortness of breath.      Objective:     Vital Signs (Most Recent):  Temp: 97.6 °F (36.4 °C)  (10/01/20 1200)  Pulse: 90 (10/01/20 1200)  Resp: (!) 31 (10/01/20 1200)  BP: (!) 98/58 (10/01/20 1200)  SpO2: 99 % (10/01/20 1200) Vital Signs (24h Range):  Temp:  [97.6 °F (36.4 °C)-98.3 °F (36.8 °C)] 97.6 °F (36.4 °C)  Pulse:  [84-90] 90  Resp:  [20-31] 31  SpO2:  [96 %-100 %] 99 %  BP: ()/(49-64) 98/58     Weight: 114 kg (251 lb 5.2 oz)  Body mass index is 37.11 kg/m².    Intake/Output Summary (Last 24 hours) at 10/1/2020 1411  Last data filed at 10/1/2020 0900  Gross per 24 hour   Intake 630 ml   Output 1502 ml   Net -872 ml      Physical Exam  Constitutional:       General: He is not in acute distress.     Appearance: Normal appearance. He is not diaphoretic.   Eyes:      General: Lids are normal. No scleral icterus.        Right eye: No discharge.         Left eye: No discharge.      Conjunctiva/sclera: Conjunctivae normal.   Neck:      Trachea: Phonation normal.   Cardiovascular:      Rate and Rhythm: Normal rate.   Pulmonary:      Effort: Pulmonary effort is normal. Tachypnea present. No accessory muscle usage or respiratory distress.   Abdominal:      General: There is no distension.   Neurological:      Mental Status: He is alert. He is disoriented and confused.   Psychiatric:         Attention and Perception: He is inattentive.         Speech: Speech is delayed.         Behavior: Behavior is uncooperative.         Cognition and Memory: Memory is impaired.         Judgment: Judgment is impulsive.         Significant Labs:     Recent Labs   Lab 09/08/20  1808 09/14/20  0338   HGBA1C 4.8 4.4     Recent Labs   Lab 09/30/20  2316 10/01/20  0518 10/01/20  1140   POCTGLUCOSE 104 117* 135*     Recent Labs   Lab 09/14/20  0338   TSH 2.301     Recent Labs   Lab 09/29/20  0441 09/30/20  0441 10/01/20  0406   WBC 8.75 8.07 10.29   HGB 8.5* 8.5* 8.8*   HCT 28.4* 28.4* 28.5*    199 204     Recent Labs   Lab 09/25/20  1230  09/29/20  0441 09/30/20  0441 10/01/20  0406   GRAN  --    < > 73.7*  6.5 68.8   5.6 74.3*  7.7   LYMPH  --    < > 10.1*  0.9* 13.9*  1.1 10.7*  1.1   LYMPHS 71  71  --   --   --   --    MONO  --    < > 9.5  0.8 10.9  0.9 9.6  1.0   EOS  --    < > 0.5 0.4 0.4    < > = values in this interval not displayed.     Recent Labs   Lab 09/29/20  0441 09/30/20  0441 10/01/20  0406    143 141   K 3.4* 3.2* 3.0*    101 100   CO2 31* 30* 30*   BUN 37* 34* 32*   CREATININE 1.5* 1.6* 1.7*   GLU 96 84 99   CALCIUM 10.3 10.5 9.6   ALBUMIN 2.5* 2.5* 2.5*   MG 1.9 1.9 1.7   PHOS 5.5* 5.5* 4.9*     Recent Labs   Lab 09/26/20  1339  09/29/20  0441 09/30/20  0441 10/01/20  0406   ALKPHOS  --    < > 49* 45* 49*   ALT  --    < > 9* 9* 9*   AST  --    < > 12 15 13   PROT  --    < > 6.8 7.0 6.9   BILITOT  --    < > 0.2 0.2 0.2     --   --   --   --     < > = values in this interval not displayed.     Recent Labs   Lab 09/08/20  0149 09/22/20  1522 09/26/20  1339   LACTATE 2.0 0.5  --    FERRITIN  --   --  1,261*     SARS-CoV-2 RNA, Amplification, Qual (no units)   Date Value   09/24/2020 Negative   09/08/2020 Negative           Results for orders placed during the hospital encounter of 09/08/20   Echo Color Flow Doppler? Yes    Narrative · Mild left ventricular enlargement.  · Moderate mitral regurgitation.  · Intermediate central venous pressure (8 mmHg).  · The estimated PA systolic pressure is 28 mmHg.  · Low normal left ventricular systolic function. The estimated ejection   fraction is 50%.  · Normal LV diastolic function.  · Normal right ventricular systolic function.  · Mild aortic regurgitation.          Fl Modified Barium Swallow Speech  Narrative: EXAMINATION:  FL MODIFIED BARIUM SWALLOW SPEECH STUDY    CLINICAL HISTORY:  objective assessment of swallow;    TECHNIQUE:  Video fluoroscopic swallowing examination was performed in conjunction with the speech pathology department.  Barium containing liquid and/or solid food substances were used to assess swallowing.    Fluoroscopy Time:  1.2 minutes    COMPARISON:  None  Impression: Transit: Normal oral transit time.    Penetration/Aspiration: Flash penetration visualized within liquids.  No evidence of aspiration.    Miscellaneous: Partially visualized NG tube.    See speech pathology report for further details.    Electronically signed by resident: Roosevelt Steele  Date:    09/29/2020  Time:    15:14    Electronically signed by: Gurwinder Thomas MD  Date:    09/29/2020  Time:    16:31  MRI Brain W WO Contrast  Narrative: EXAMINATION:  MRI BRAIN W WO CONTRAST    CLINICAL HISTORY:  abscess follow up;    TECHNIQUE:  Sagittal and axial T1, axial T2, axial FLAIR, axial gradient, axial diffusion imaging of the whole brain pre-contrast with postcontrast axial T1 and axial spoiled gradient imaging reformatted in the coronal and sagittal planes.. Ten ml of Gadavist injected intravenously.    COMPARISON:  09/14/2020    FINDINGS:  Continued scattered small to punctate regions of signal abnormality and enhancement within the bilateral parietal and occipital lobes with punctate enhancing focus right cerebellum.  Compared to most recent prior there is resolution of previous diffusion restriction within the left cerebellum now with punctate focus of enhancement.  Overall concerning for subacute aged infarcts and concerning for possible septic emboli in light of prior findings.  There is reduced sized ring enhancing focus in the right occipital lobe with ring enhancing component measuring 0.3 cm previously measuring 0.8.    There are numerous scattered small foci of susceptibility within the cerebral hemispheres with superimposed scattered susceptibility in the cerebral sulci concerning for remote hemorrhages and superficial siderosis with additional punctate foci within the cerebellar hemispheres bilaterally and bilateral thalami left greater than right.    Continued thin extra-axial collection overlying the left cerebral convexity most compatible with subdural  hygroma.  There is thin diffuse pachymeningeal enhancement likely reactive.    There is continued increased diffusion signal hyperintensity within the left parasagittal occipital lobe and left mesial temporal lobe although reduced from prior which may be sequela of seizure activity underlying encephalitis including infection or autoimmune process to be considered in differential.    There is evolving regions of slight volume loss involving bilateral occipital lobes and right frontal lobes most compatible with prior infarcts.  There is a small focus of a focal pachymeningeal enhancement overlying the right frontal lobe directly subjacent to presumed enhancement within the overlying cortex which is unchanged from prior and may be sequela of reactive enhancement and subacute age infarction or underlying cortical mass lesion with extra-axial extension felt less likely but not excluded.    Ventricles relatively stable.  Fluid opacification mastoid air cells bilaterally.    Lobular material within the sphenoid sinus which may represent mucosal thickening with patchy ethmoid air cell opacities.    This report was flagged in Epic as abnormal.  Impression: Evolving areas of a scattered signal abnormality within the cerebral hemispheres now with slight volume loss particularly right frontal lobe and bilateral occipital lobes which may represent evolving areas of infarction.  There is continued though slightly reduced scattered areas of enhancement within these regions with additional foci in the bilateral occipital lobes with ring enhancing focus right occipital lobe overall reduced in size which may represent evolving septic emboli.    There is a new subcentimeter region of enhancement within the left cerebellum with resolution of previous diffusion restriction in this region concerning for possible additional subacute age infarcts.    There is no restricted diffusion to suggest acute infarction.    Continue though reduced  diffusion hyperintensity left parasagittal parietal cortex and left mesial temporal lobe which may represent sequela of evolving seizure activity however underlying encephalitis not excluded.    Innumerable scattered regions of susceptibility primarily in the cortical and subcortical white matter with additional cerebellar and deep gray foci as well as scattered superficial siderosis similar to prior.  Differential to include underlying amyloid angiopathy with superimposed remote microhemorrhages not excluded.    Thin subdural collection overlying the left cerebral convexity relatively stable suggestive for subdural hygroma.    There is no restricted diffusion to suggest acute infarction.  Clinical correlation and continued follow-up advised.    Electronically signed by: Rubio Schmidt DO  Date:    09/29/2020  Time:    07:58        Assessment/Plan:      Active Diagnoses:    Diagnosis Date Noted POA    PRINCIPAL PROBLEM:  Status epilepticus [G40.901] 09/13/2020 Yes    Epistaxis [R04.0] 09/29/2020 No    Palliative care encounter [Z51.5] 09/23/2020 Not Applicable    Goals of care, counseling/discussion [Z71.89]  Not Applicable    Advance care planning [Z71.89]  Not Applicable    Debility [R53.81] 09/16/2020 Yes    Oral phase dysphagia [R13.11] 09/16/2020 Yes    Seizure [R56.9]  Yes    Intracranial septic embolism [I76, I66.9] 09/15/2020 Yes    Brain abscess [G06.0] 09/15/2020 Yes    Cerebellar stroke [I63.9] 09/13/2020 Yes    Cytotoxic brain edema [G93.6] 09/13/2020 Yes    Encephalopathy acute [G93.40] 09/13/2020 Yes    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] 09/08/2020 Yes    Essential hypertension [I10] 09/08/2020 Yes     Chronic    Embolic stroke involving left posterior cerebral artery [I63.432] 09/08/2020 Yes    Chronic bronchitis [J42] 09/08/2020 Yes     Chronic    Ulcerative colitis [K51.90] 09/08/2020 Yes     Chronic      Problems Resolved During this Admission:       Overview / ICU Course:    Lopez ACEVEDO  Janette is a 75 y.o. male admitted for Status epilepticus.    Inpatient Medications Prescribed for Management of Current Problems:     Scheduled Meds:    atorvastatin  40 mg Oral Daily    cefTRIAXone (ROCEPHIN) IVPB  2 g Intravenous Q12H    enoxaparin  40 mg Subcutaneous Q24H    lacosamide  200 mg Oral Q12H    Lactobacillus rhamnosus GG  1 capsule Oral Daily    miconazole nitrate 2%   Topical (Top) BID    pantoprazole  40 mg Oral Daily    silodosin  4 mg Oral Daily    sodium chloride  1 spray Each Nostril Q4H While awake    sodium chloride 0.9%  10 mL Intravenous Q6H    traZODone  25 mg Oral QHS     Continuous Infusions:   As Needed: acetaminophen, albuterol-ipratropium, dextrose 50%, glucagon (human recombinant), haloperidoL, insulin aspart U-100, labetalol, melatonin, oxymetazoline, psyllium husk (aspartame), sodium chloride 0.9%, Pharmacy to dose Vancomycin consult **AND** vancomycin - pharmacy to dose    Assessment and Plan by Problem    Encephalopathy acute  - likely multifactorial - due to stroke , brain abscess, cytotoxic edema, delirium   - Stroke team believes repeat MRI is stable   - General neurology consulted. Recommended LP and EEG.    - EEG showed no current seizure activity      - LP done on 9/25 with FL, concerning for possible neurosyphilis versus HSV versus other meningitis, ampicillin added   - ID re-consulted: Anticipate 6-8 weeks of IV vancomycin (trough near 20) and ceftriaxone with repeat imaging to assess resolution of brain abscess. End date of IV antibiotics: 10/27/2020 -11/10/2020 with weekly CBC CMP ESR CRP VANC TROUGH SENT TO ID CLINIC  [id number].   - mentation waxing and waning, but improving     Status epilepticus  - Multiple witnessed seizure-like activity described  - No previous reports from family  - Was started on Keppra and Vimpat 200 mg BID  - EEG at OSH shows irregular slowing in the R frontal region, with L posterior periodic epileptiform discharges. No  "seizures recorded during this study.  - 9/13 [facility] EEG continues to show periodic discharges in the left posterior region with no seizures.  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 If worsening mentation or without improvement recommend reimaging and if need for surgical washout for source control. Please send fluid for cultures (gram stain, aerobic, anaerobic, fungal, AFB) if undergoes drainage.      -ID recs:  Continue IV Vanc and cetriaxone for 6-8 weeks, end date 10/27-11/10 with repeat MRI post treatment  Continue ceftriaxone 2gms IV q12hrs. Anticipate 6-8 weeks of IV vancomycin and ceftriaxone with repeat imaging to assess resolution of brain abscess.   - MRI brain improved per NSSY.     Cerebellar stroke/ Embolic stroke involving left posterior cerebral artery  On 9/8 pt presented to [place] ED with unresponsiveness  No tPA due to recent GI bleed  MRI with left PCA infarct and left thalamic infarct along with small right PCA infarct  MRA demonstrates fetal circulation to left PCA  9/11 extubated  . Repeat MRI new acute/subacute L cerebellum infarct  -Neuro checks  -Vascular Neurology was consulted - and recommended medical management - ASA , statin , PT OT SLP, BP control      Cytotoxic brain edema  Per prior documentation - "Area of cytotoxic cerebral edema identified when reviewing brain imaging in the territory of the left posterior cerebral artery, L thalamus, right posterior cerebral artery. There is no mass effect associated with it. We will continue to monitor the patients clinical exam for any worsening of symptoms which may indicate expansion of the stroke or the area of the " "edema resulting in the clinical change. The pattern is suggestive of embolic etiology"     Oral phase dysphagia  Required NGT, continuous tube feedings for nutrition and enteral water flushes 200 ml q6hrs  SLP following,  - tolerating more of a diet, MBS done; advanced to regular diet and NG tube removed     Chronic bronchitis  Duonebs scheduled q4hrs  Monitor respiratory status, at risk for atelectasis  Continuous pulse oximetry, ABG prn  Continue CPT, pulmonary toileting     Iron deficiency anemia  - transfusing 1 unit of PRBC on 9/26       - starting IV iron       Diet: Diet Adult Regular (IDDSI Level 7)  GI Prophylaxis: Indicated due to history of GI bleeding  Significant LDAs:   IV Access Type: PICC  Urinary Catheter Indication if present: Patient Does Not Have Urinary Catheter  Other Lines/Tubes/Drains:    HIGH RISK CONDITION(S):   Patient has an abrupt change in neurologic status: Seizure and Weakness     Goals of Care:    Previous admission:  9/8/20  Likely prognosis:  Fair  Code Status: Full Code  Comfort Only: No  Hospice: No  Goals at discharge: remain at home, with caregiver    Discharge Planning   ANA: 10/1/2020     Code Status: Full Code   Is the patient medically ready for discharge?:     Reason for patient still in hospital (select all that apply): Patient trending condition and Pending disposition  Discharge Plan A: Rehab   Discharge Delays: None known at this time    VTE Risk Mitigation (From admission, onward)         Ordered     enoxaparin injection 40 mg  Every 24 hours      09/23/20 1350     IP VTE HIGH RISK PATIENT  Once      09/13/20 1518     Place sequential compression device  Until discontinued      09/13/20 1119                   Aileen Reyna MD  Department of Hospital Medicine   [facility] - ICU 14 [facility]  "

## 2020-10-01 NOTE — PLAN OF CARE
Pt alert; oriented to self only. More talkative. Fidgety when awake. VSS. Rested well for 5 hours with melatonin given. Tolerating diet well. On antibiotic therapy. Blood sugars 104 and 117. Assists with turning self in bed. Safety maintained. Will continue to monitor.

## 2020-10-01 NOTE — PLAN OF CARE
DIGNA left message for Raza at [facility] to follow up auth from [facility].    UPDATE 3pm-Per [facility], still pending auth. DIGNA placed SNF consult order and sent ref to [facility] in the event rehab is denied by [facility].    DIGNA updated pts wife of above info. Wife stated she would want to appeal rehab denial but is willing to consider SNF at [facility] only at this time. She is not agreeable to SNF in [place].    Viki Carlos, [facility] [id number]

## 2020-10-01 NOTE — PLAN OF CARE
Problem: Occupational Therapy Goal  Goal: Occupational Therapy Goal  Description: Goals to be met by: 9/28/2020 (Extend to 10/5/2020)    Patient will increase functional independence with ADLs by performing:    Pt will follow 75% of motor commands with <2 verbal cues for repetition of task to maximize engagement in functional task. -not met  Pt will sit at EOB for approx 10 minutes with max A and unilateral UE support to complete grooming task -not met  Pt will complete sit>stand from EOB with bed in highest position with mod A in prep for functional transfers to BSC -not met  Pt will perform grooming sitting EOB with mod A -not met          Outcome: Ongoing, Progressing    DARVIN Newman

## 2020-10-01 NOTE — PLAN OF CARE
Continue current SLP POC. Goals remain appropriate.     Problem: SLP Goal  Goal: SLP Goal  Description: Goals expected to be met by 10/6:  1. Pt will tolerate regular consistency solid diet and thin liquids with timely mastication and A-P transit and without overt clinical signs aspiration.   2. Pt will orient x4 ind'ly.   3. Pt will complete simple naming tasks with 70% acc given cues.   4. Pt will follow basic 1-step commands with 70% acc given cues.   5. Pt will answer basic y/n q's with 70% acc ind'ly.   6. Pt will participate in ongoing assessment of cognitive-linguistic skills.   Outcome: Ongoing, Progressing     MARVA Vazquez, CCC-SLP  [phone]  10/1/2020

## 2020-10-02 NOTE — PLAN OF CARE
Pt alert and confused; oriented to self only. Speech clear and understandable. VSS. Has been restless throughout night pulling off gown and EKG multiple times. Resting easily now with haldol and tylenol given. Skin assessment unchanged from initial shift assessment done. Continues on antibiotic therapy. Safety maintained. Will continue to monitor.

## 2020-10-02 NOTE — PLAN OF CARE
CM to pt's bedside to meet with pt and pt's wife.  Still waiting on PHN auth for rehab.  The wife is in agreement with (O)SNF only - if auth denial for Rehab.  She does not want to use [facility] - SNF.

## 2020-10-02 NOTE — PLAN OF CARE
DIGNA spoke with Raza with [facility] to follow up on referral. Raza states that auth is still pending. Will follow.    Apoorva Sheehan LMSW  [facility] [place]

## 2020-10-02 NOTE — PT/OT/SLP PROGRESS
"Speech Language Pathology Treatment    Patient Name:  Lopez Savage   MRN:  [id number]   [id number]    Admitting Diagnosis: Status epilepticus    Recommendations:                 General Recommendations:  Dysphagia therapy and Speech/language therapy  Diet recommendations:  Regular, Liquid Diet Level: Thin   Aspiration Precautions:   · Pt requires 1:1 supervision with meals to ensure he maintains fully awake and alert state across the duration of an entire meal, as well as to ensure strict and consistent implementation of all aspiration precautions   · Fully awake and alert for PO intake- defer PO intake if pt unable to achieve and maintain fully awake and alert state   · NO STRAWS  · Fully upright position for PO intake  · Small bites/ sips   · Slow rate of eating/ drinking  · 1 bite/ sip @ a time  · Alternate bites/ sips  · Refrain from talking prior to swallow completion   · Discontinue PO upon: coughing, throat clearing, choking, wet vocal quality, wet breath sounds, watery eyes, reddened facial features  General Precautions: Standard, aspiration, fall  Communication strategies:  go to room if call light pushed    Subjective     "...cheese and crackers.." Empty speech and language of confusion evident.     Pain/Comfort:  · Pain Rating 1: 0/10  · Pain Rating Post-Intervention 1: 0/10    Objective:     Has the patient been evaluated by SLP for swallowing?   Yes  Keep patient NPO? No   Current Respiratory Status: room air      Pt awake and alert upon entry, seated fully upright in bed while wife fed him regular consistency salad, white beans with rice, and cup sips thin Sprite. Trace-mild generalized scattered lingual reside observed. Therefore discussed with pt's wife SLP recommendation to alternate bites/ sips to ensure adequate oral cavity clearance. Overt clinical signs aspiration unappreciated. Empty speech and language of confusion observed. Cognitive-linguistic therapy deferred this session to allow pt to " continue eating lunch. Additional education provided re: ongoing strict and consistent implementation of all aspiration precautions listed above. Indication of pt's understanding unappreciated. However pt's wife verbalized understanding of education provided and agreement with SLP POC. White board current. No further questions.     Assessment:     Lopez Savage is a 75 y.o. male with an SLP diagnosis of dysphagia and cognitive-linguistic deficits.     Goals:   Multidisciplinary Problems     SLP Goals        Problem: SLP Goal    Goal Priority Disciplines Outcome   SLP Goal     SLP Ongoing, Progressing   Description: Goals expected to be met by 10/6:1. Pt will tolerate regular consistency solid diet and thin liquids with timely mastication and A-P transit and without overt clinical signs aspiration.   2. Pt will orient x4 ind'ly.   3. Pt will complete simple naming tasks with 70% acc given cues.   4. Pt will follow basic 1-step commands with 70% acc given cues.   5. Pt will answer basic y/n q's with 70% acc ind'ly.   6. Pt will participate in ongoing assessment of cognitive-linguistic skills.                    Plan:     · Patient to be seen:  4 x/week   · Plan of Care expires:  10/15/20  · Plan of Care reviewed with:  patient, spouse   · SLP Follow-Up:  Yes       Discharge recommendations:  rehabilitation facility     Time Tracking:     SLP Treatment Date:   10/02/20  Speech Start Time:  1226  Speech Stop Time:  1238     Speech Total Time (min):  12 min    Billable Minutes: Treatment Swallowing Dysfunction 12    MARVA Vazquez, CCC-SLP  [phone]  10/2/2020

## 2020-10-02 NOTE — PROGRESS NOTES
Pharmacokinetic Assessment Follow Up: IV Vancomycin    Vancomycin serum concentration assessment(s):    · Vancomycin random lvl=19.8 mcg/mL    Vancomycin Regimen Plan:    · Level this AM is a 36 hour level, thus will put patient back on q48h regimen with slightly higher dose than last time, 1.5g IV q48h. Hopefully this will obtain levels closer to 20 mcg/mL.   · Renal function remains stable.  · Next level prior to next dose just to make sure level is close to range-Sunday at 0900.     Drug levels (last 3 results):  Recent Labs   Lab Result Units 09/30/20  1321 10/01/20  0406 10/02/20  0434   Vancomycin, Random ug/mL  --  33.2 19.8   Vancomycin-Trough ug/mL 27.4*  --   --        Pharmacy will continue to follow and monitor vancomycin.    Please contact pharmacy at extension [phone] for questions regarding this assessment.    Thank you for the consult,   Jocelyne Hanson, PharmD, [facility]  [phone]     Patient brief summary:  Lopez Savage is a 75 y.o. male initiated on antimicrobial therapy with IV Vancomycin for treatment of meningitis & brain abscess      Drug Allergies:   Review of patient's allergies indicates:   Allergen Reactions    Codeine     Tetanus vaccines and toxoid        Actual Body Weight:   115kg    Renal Function:   Estimated Creatinine Clearance: 50.4 mL/min (A) (based on SCr of 1.6 mg/dL (H)).,       CBC (last 72 hours):  Recent Labs   Lab Result Units 09/30/20  0441 10/01/20  0406 10/02/20  0434   WBC K/uL 8.07 10.29 10.09   Hemoglobin g/dL 8.5* 8.8* 9.0*   Hematocrit % 28.4* 28.5* 28.9*   Platelets K/uL 199 204 205   Gran% % 68.8 74.3* 73.4*   Lymph% % 13.9* 10.7* 9.8*   Mono% % 10.9 9.6 9.6   Eosinophil% % 4.8 4.1 6.1   Basophil% % 1.1 0.9 0.8   Differential Method  Automated Automated Automated       Metabolic Panel (last 72 hours):  Recent Labs   Lab Result Units 09/30/20  0441 10/01/20  0406 10/02/20  0434   Sodium mmol/L 143 141 143   Potassium mmol/L 3.2* 3.0* 3.1*   Chloride mmol/L 101 100 103    CO2 mmol/L 30* 30* 29   Glucose mg/dL 84 99 107   BUN, Bld mg/dL 34* 32* 25*   Creatinine mg/dL 1.6* 1.7* 1.6*   Albumin g/dL 2.5* 2.5* 2.6*   Total Bilirubin mg/dL 0.2 0.2 0.2   Alkaline Phosphatase U/L 45* 49* 48*   AST U/L 15 13 16   ALT U/L 9* 9* 11   Magnesium mg/dL 1.9 1.7  --    Phosphorus mg/dL 5.5* 4.9*  --        Vancomycin Administrations:  vancomycin given in the last 96 hours                   vancomycin in dextrose 5 % 1 gram/250 mL IVPB 1,000 mg (mg) 1,000 mg New Bag 09/28/20 1142    vancomycin 1.25 g in dextrose 5% 250 mL IVPB (ready to mix) (mg) 1,250 mg New Bag 09/27/20 1148    vancomycin 1.25 g in dextrose 5% 250 mL IVPB (ready to mix) (mg) 1,250 mg New Bag 09/25/20 1010                Microbiologic Results:  Microbiology Results (last 7 days)     Procedure Component Value Units Date/Time    CSF culture [[id number]] Collected: 09/25/20 1230    Order Status: Completed Specimen: CSF (Spinal Fluid) from CSF Tap, Tube 1 Updated: 09/30/20 0645     CSF CULTURE No Growth     Gram Stain Result No WBC's      No organisms seen    Narrative:      Add on VDRL By Rosalva Farfan MD Order #[id number]  09/25/2020  17:51     Blood culture [[id number]] Collected: 09/22/20 1522    Order Status: Completed Specimen: Blood from Peripheral, Left Hand Updated: 09/27/20 1612     Blood Culture, Routine No growth after 5 days.    Narrative:      Collection has been rescheduled by [facility] at 09/22/2020 14:57 Reason:   Patient unavailable  Collection has been rescheduled by [facility] at 09/22/2020 14:57 Reason:   Patient unavailable    Blood culture [[id number]] Collected: 09/22/20 1522    Order Status: Completed Specimen: Blood from Antecubital, Left Arm Updated: 09/27/20 1612     Blood Culture, Routine No growth after 5 days.    Narrative:      Collection has been rescheduled by [facility] at 09/22/2020 14:57 Reason:   Patient unavailable  Collection has been rescheduled by [facility] at 09/22/2020 14:57 Reason:   Patient unavailable    Gram stain  [[id number]] Collected: 09/25/20 1230    Order Status: Canceled Specimen: CSF (Spinal Fluid) from CSF Tap, Tube 1

## 2020-10-02 NOTE — PROGRESS NOTES
[facility] - ICU 14 [facility] Medicine  Telemedicine Progress Note    Patient Name: Lopez Savage  MRN: [id number]  Patient Class: IP- Inpatient   Admission Date: 9/13/2020  Length of Stay: 19 days  Attending Physician: Aileen Reyna MD  Primary Care Provider: Prasanna Haywood MD    [facility] Medicine Team: [place] VIRTUAL TEAM 10 Aileen Reyna MD  Virtual Telemedicine Progress Note  Start time: 1607  Chief complaint: Status epilepticus  The patient location is: [id number]  The patient arrived at: 9/13/2020 10:13 AM  Present with the patient at the time of the telemed/virtual assessment: n/a  End time:  1612  Total time spent with patient: 5 min  I have assessed findings virtually using a telemedicine platform and with assistance of the bedside nurse or telemedicine presenter.  The attending portion of this evaluation, treatment, and documentation was performed per Aileen Reyna MD via audiovisual.    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 Additional history was obtained from past medical records and chart review. No significant events reported by Nursing.  Patient complains of nothing specific. Symptoms have been unchanged since yesterday. Associated symptoms include: confusion. Symptoms are stable. Alleviating factors include: nothing.     Data reviewed 10/2/2020: Lab test(s) reviewed: H&H and sCr stable.  Hypokalemia  I have assessed findings virtually using a telemedicine platform and with assistance of the bedside nurse or telemedicine presenter.      Review of Systems   Unable to perform ROS: Mental status change   Constitutional: Negative for fever.   Respiratory: Negative for shortness of  breath.      Objective:     Vital Signs (Most Recent):  Temp: 98 °F (36.7 °C) (10/02/20 1602)  Pulse: 87 (10/02/20 1602)  Resp: (!) 21 (10/02/20 1602)  BP: (!) 165/67 (10/02/20 1602)  SpO2: 96 % (10/02/20 1602) Vital Signs (24h Range):  Temp:  [97.5 °F (36.4 °C)-98.2 °F (36.8 °C)] 98 °F (36.7 °C)  Pulse:  [63-89] 87  Resp:  [16-25] 21  SpO2:  [91 %-99 %] 96 %  BP: (138-165)/(63-67) 165/67     Weight: 114 kg (251 lb 5.2 oz)  Body mass index is 37.11 kg/m².    Intake/Output Summary (Last 24 hours) at 10/2/2020 1702  Last data filed at 10/2/2020 0400  Gross per 24 hour   Intake 410 ml   Output --   Net 410 ml      Physical Exam  Constitutional:       General: He is not in acute distress.     Appearance: Normal appearance. He is not diaphoretic.   Eyes:      General: Lids are normal. No scleral icterus.        Right eye: No discharge.         Left eye: No discharge.      Conjunctiva/sclera: Conjunctivae normal.   Neck:      Trachea: Phonation normal.   Cardiovascular:      Rate and Rhythm: Normal rate.   Pulmonary:      Effort: Pulmonary effort is normal. Tachypnea present. No accessory muscle usage or respiratory distress.   Abdominal:      General: There is no distension.   Neurological:      Mental Status: He is alert. He is disoriented and confused.   Psychiatric:         Attention and Perception: He is inattentive.         Speech: Speech is delayed.         Behavior: Behavior is uncooperative.         Cognition and Memory: Memory is impaired.         Judgment: Judgment is impulsive.         Significant Labs:     Recent Labs   Lab 09/08/20  1808 09/14/20  0338   HGBA1C 4.8 4.4     Recent Labs   Lab 10/01/20  0518 10/01/20  1140 10/01/20  1610   POCTGLUCOSE 117* 135* 98     Recent Labs   Lab 09/14/20  0338   TSH 2.301     Recent Labs   Lab 09/30/20  0441 10/01/20  0406 10/02/20  0434   WBC 8.07 10.29 10.09   HGB 8.5* 8.8* 9.0*   HCT 28.4* 28.5* 28.9*    204 205     Recent Labs   Lab 09/30/20  0443  10/01/20  0406 10/02/20  0434   GRAN 68.8  5.6 74.3*  7.7 73.4*  7.4   LYMPH 13.9*  1.1 10.7*  1.1 9.8*  1.0   MONO 10.9  0.9 9.6  1.0 9.6  1.0   EOS 0.4 0.4 0.6*     Recent Labs   Lab 09/29/20  0441 09/30/20  0441 10/01/20  0406 10/02/20  0434    143 141 143   K 3.4* 3.2* 3.0* 3.1*    101 100 103   CO2 31* 30* 30* 29   BUN 37* 34* 32* 25*   CREATININE 1.5* 1.6* 1.7* 1.6*   GLU 96 84 99 107   CALCIUM 10.3 10.5 9.6 10.2   ALBUMIN 2.5* 2.5* 2.5* 2.6*   MG 1.9 1.9 1.7  --    PHOS 5.5* 5.5* 4.9*  --      Recent Labs   Lab 09/26/20  1339  09/30/20  0441 10/01/20  0406 10/02/20  0434   ALKPHOS  --    < > 45* 49* 48*   ALT  --    < > 9* 9* 11   AST  --    < > 15 13 16   PROT  --    < > 7.0 6.9 7.0   BILITOT  --    < > 0.2 0.2 0.2     --   --   --   --     < > = values in this interval not displayed.     Recent Labs   Lab 09/08/20  0149 09/22/20  1522 09/26/20  1339   LACTATE 2.0 0.5  --    FERRITIN  --   --  1,261*     SARS-CoV-2 RNA, Amplification, Qual (no units)   Date Value   09/24/2020 Negative   09/08/2020 Negative           Results for orders placed during the hospital encounter of 09/08/20   Echo Color Flow Doppler? Yes    Narrative · Mild left ventricular enlargement.  · Moderate mitral regurgitation.  · Intermediate central venous pressure (8 mmHg).  · The estimated PA systolic pressure is 28 mmHg.  · Low normal left ventricular systolic function. The estimated ejection   fraction is 50%.  · Normal LV diastolic function.  · Normal right ventricular systolic function.  · Mild aortic regurgitation.          Fl Modified Barium Swallow Speech  Narrative: EXAMINATION:  FL MODIFIED BARIUM SWALLOW SPEECH STUDY    CLINICAL HISTORY:  objective assessment of swallow;    TECHNIQUE:  Video fluoroscopic swallowing examination was performed in conjunction with the speech pathology department.  Barium containing liquid and/or solid food substances were used to assess swallowing.    Fluoroscopy Time:  1.2 minutes    COMPARISON:  None  Impression: Transit: Normal oral transit time.    Penetration/Aspiration: Flash penetration visualized within liquids.  No evidence of aspiration.    Miscellaneous: Partially visualized NG tube.    See speech pathology report for further details.    Electronically signed by resident: Roosevelt Steele  Date:    09/29/2020  Time:    15:14    Electronically signed by: Gurwinder Thomas MD  Date:    09/29/2020  Time:    16:31  MRI Brain W WO Contrast  Narrative: EXAMINATION:  MRI BRAIN W WO CONTRAST    CLINICAL HISTORY:  abscess follow up;    TECHNIQUE:  Sagittal and axial T1, axial T2, axial FLAIR, axial gradient, axial diffusion imaging of the whole brain pre-contrast with postcontrast axial T1 and axial spoiled gradient imaging reformatted in the coronal and sagittal planes.. Ten ml of Gadavist injected intravenously.    COMPARISON:  09/14/2020    FINDINGS:  Continued scattered small to punctate regions of signal abnormality and enhancement within the bilateral parietal and occipital lobes with punctate enhancing focus right cerebellum.  Compared to most recent prior there is resolution of previous diffusion restriction within the left cerebellum now with punctate focus of enhancement.  Overall concerning for subacute aged infarcts and concerning for possible septic emboli in light of prior findings.  There is reduced sized ring enhancing focus in the right occipital lobe with ring enhancing component measuring 0.3 cm previously measuring 0.8.    There are numerous scattered small foci of susceptibility within the cerebral hemispheres with superimposed scattered susceptibility in the cerebral sulci concerning for remote hemorrhages and superficial siderosis with additional punctate foci within the cerebellar hemispheres bilaterally and bilateral thalami left greater than right.    Continued thin extra-axial collection overlying the left cerebral convexity most compatible with subdural  hygroma.  There is thin diffuse pachymeningeal enhancement likely reactive.    There is continued increased diffusion signal hyperintensity within the left parasagittal occipital lobe and left mesial temporal lobe although reduced from prior which may be sequela of seizure activity underlying encephalitis including infection or autoimmune process to be considered in differential.    There is evolving regions of slight volume loss involving bilateral occipital lobes and right frontal lobes most compatible with prior infarcts.  There is a small focus of a focal pachymeningeal enhancement overlying the right frontal lobe directly subjacent to presumed enhancement within the overlying cortex which is unchanged from prior and may be sequela of reactive enhancement and subacute age infarction or underlying cortical mass lesion with extra-axial extension felt less likely but not excluded.    Ventricles relatively stable.  Fluid opacification mastoid air cells bilaterally.    Lobular material within the sphenoid sinus which may represent mucosal thickening with patchy ethmoid air cell opacities.    This report was flagged in Epic as abnormal.  Impression: Evolving areas of a scattered signal abnormality within the cerebral hemispheres now with slight volume loss particularly right frontal lobe and bilateral occipital lobes which may represent evolving areas of infarction.  There is continued though slightly reduced scattered areas of enhancement within these regions with additional foci in the bilateral occipital lobes with ring enhancing focus right occipital lobe overall reduced in size which may represent evolving septic emboli.    There is a new subcentimeter region of enhancement within the left cerebellum with resolution of previous diffusion restriction in this region concerning for possible additional subacute age infarcts.    There is no restricted diffusion to suggest acute infarction.    Continue though reduced  diffusion hyperintensity left parasagittal parietal cortex and left mesial temporal lobe which may represent sequela of evolving seizure activity however underlying encephalitis not excluded.    Innumerable scattered regions of susceptibility primarily in the cortical and subcortical white matter with additional cerebellar and deep gray foci as well as scattered superficial siderosis similar to prior.  Differential to include underlying amyloid angiopathy with superimposed remote microhemorrhages not excluded.    Thin subdural collection overlying the left cerebral convexity relatively stable suggestive for subdural hygroma.    There is no restricted diffusion to suggest acute infarction.  Clinical correlation and continued follow-up advised.    Electronically signed by: Rubio Schmidt DO  Date:    09/29/2020  Time:    07:58        Assessment/Plan:      Active Diagnoses:    Diagnosis Date Noted POA    PRINCIPAL PROBLEM:  Status epilepticus [G40.901] 09/13/2020 Yes    Epistaxis [R04.0] 09/29/2020 No    Palliative care encounter [Z51.5] 09/23/2020 Not Applicable    Goals of care, counseling/discussion [Z71.89]  Not Applicable    Advance care planning [Z71.89]  Not Applicable    Debility [R53.81] 09/16/2020 Yes    Oral phase dysphagia [R13.11] 09/16/2020 Yes    Seizure [R56.9]  Yes    Intracranial septic embolism [I76, I66.9] 09/15/2020 Yes    Brain abscess [G06.0] 09/15/2020 Yes    Cerebellar stroke [I63.9] 09/13/2020 Yes    Cytotoxic brain edema [G93.6] 09/13/2020 Yes    Encephalopathy acute [G93.40] 09/13/2020 Yes    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] 09/08/2020 Yes    Essential hypertension [I10] 09/08/2020 Yes     Chronic    Embolic stroke involving left posterior cerebral artery [I63.432] 09/08/2020 Yes    Chronic bronchitis [J42] 09/08/2020 Yes     Chronic    Ulcerative colitis [K51.90] 09/08/2020 Yes     Chronic      Problems Resolved During this Admission:       Overview / ICU Course:    Lopez ACEVEDO  Janette is a 75 y.o. male admitted for Status epilepticus.    Inpatient Medications Prescribed for Management of Current Problems:     Scheduled Meds:    atorvastatin  40 mg Oral Daily    cefTRIAXone (ROCEPHIN) IVPB  2 g Intravenous Q12H    enoxaparin  40 mg Subcutaneous Q24H    lacosamide  200 mg Oral Q12H    Lactobacillus rhamnosus GG  1 capsule Oral BID    miconazole nitrate 2%   Topical (Top) BID    pantoprazole  40 mg Oral Daily    silodosin  4 mg Oral Daily    sodium chloride  1 spray Each Nostril Q4H While awake    sodium chloride 0.9%  10 mL Intravenous Q6H    traZODone  25 mg Oral QHS    vancomycin (VANCOCIN) IVPB  1,500 mg Intravenous Q48H     Continuous Infusions:   As Needed: acetaminophen, albuterol-ipratropium, haloperidoL, labetalol, melatonin, psyllium husk (aspartame), sodium chloride 0.9%, Pharmacy to dose Vancomycin consult **AND** vancomycin - pharmacy to dose    Assessment and Plan by Problem    Encephalopathy acute  - likely multifactorial - due to stroke , brain abscess, cytotoxic edema, delirium   - Stroke team believes repeat MRI is stable   - General neurology consulted. Recommended LP and EEG.    - EEG showed no current seizure activity      - LP done on 9/25 with FL, concerning for possible neurosyphilis versus HSV versus other meningitis, ampicillin added   - ID re-consulted: Anticipate 6-8 weeks of IV vancomycin (trough near 20) and ceftriaxone with repeat imaging to assess resolution of brain abscess. End date of IV antibiotics: 10/27/2020 -11/10/2020 with weekly CBC CMP ESR CRP VANC TROUGH SENT TO ID CLINIC  [phone].   - mentation waxing and waning, but more alert.  Patient is unable to communicate with the telemedicine platform.  We were unable to obtain authorization for transfer to [facility] today.  Patient will be transferred back to an in house hospital medicine team.     Status epilepticus  - Multiple witnessed seizure-like activity described  - No previous  "reports from family  - Was started on Keppra and Vimpat 200 mg BID  - EEG at OSH shows irregular slowing in the R frontal region, with L posterior periodic epileptiform discharges. No seizures recorded during this study.  - 9/13 [facility] EEG continues to show periodic discharges in the left posterior region with no seizures.  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 If worsening mentation or without improvement recommend reimaging and if need for surgical washout for source control. Please send fluid for cultures (gram stain, aerobic, anaerobic, fungal, AFB) if undergoes drainage.      -ID recs:  Continue IV Vanc and cetriaxone for 6-8 weeks, end date 10/27-11/10 with repeat MRI post treatment  Continue ceftriaxone 2gms IV q12hrs. Anticipate 6-8 weeks of IV vancomycin and ceftriaxone with repeat imaging to assess resolution of brain abscess.   - MRI brain improved per NSSY.     Cerebellar stroke/ Embolic stroke involving left posterior cerebral artery  On 9/8 pt presented to [place] ED with unresponsiveness  No tPA due to recent GI bleed  MRI with left PCA infarct and left thalamic infarct along with small right PCA infarct  MRA demonstrates fetal circulation to left PCA  9/11 extubated  . Repeat MRI new acute/subacute L cerebellum infarct  -Neuro checks  -Vascular Neurology was consulted - and recommended medical management - ASA , statin , PT OT SLP, BP control      Cytotoxic brain edema  Per prior documentation - "Area of cytotoxic cerebral edema identified when reviewing brain imaging in the territory of the left posterior cerebral artery, L thalamus, right posterior cerebral artery. There is " "no mass effect associated with it. We will continue to monitor the patients clinical exam for any worsening of symptoms which may indicate expansion of the stroke or the area of the edema resulting in the clinical change. The pattern is suggestive of embolic etiology"     Oral phase dysphagia  Required NGT, continuous tube feedings for nutrition and enteral water flushes 200 ml q6hrs  SLP following,  - tolerating more of a diet, MBS done; advanced to regular diet and NG tube removed     Chronic bronchitis  Duonebs scheduled q4hrs  Monitor respiratory status, at risk for atelectasis  Continuous pulse oximetry, ABG prn  Continue CPT, pulmonary toileting     Iron deficiency anemia  - transfusing 1 unit of PRBC on 9/26       - starting IV iron       Diet: Diet Adult Regular (IDDSI Level 7)  GI Prophylaxis: Indicated due to history of GI bleeding  Significant LDAs:   IV Access Type: PICC  Urinary Catheter Indication if present: Patient Does Not Have Urinary Catheter  Other Lines/Tubes/Drains:    HIGH RISK CONDITION(S):   Patient has an abrupt change in neurologic status: Seizure and Weakness     Goals of Care:    Previous admission:  9/8/20  Likely prognosis:  Fair  Code Status: Full Code  Comfort Only: No  Hospice: No  Goals at discharge: remain at home, with caregiver    Discharge Planning   ANA: 10/3/2020     Code Status: Full Code   Is the patient medically ready for discharge?:     Reason for patient still in hospital (select all that apply): Patient trending condition and Pending disposition  Discharge Plan A: Rehab   Discharge Delays: None known at this time    VTE Risk Mitigation (From admission, onward)         Ordered     enoxaparin injection 40 mg  Every 24 hours      09/23/20 1350     IP VTE HIGH RISK PATIENT  Once      09/13/20 1518     Place sequential compression device  Until discontinued      09/13/20 1119                   Aileen Reyna MD  Department of Hospital Medicine   [facility] - " ICU 14 [facility]

## 2020-10-03 NOTE — PROGRESS NOTES
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 Wife at bedside. RN noted stable wheezing this AM which responded well to Lasix earlier this week, a/w mild shortness of breath. No relieving or aggravating factors. Wife also notes that he has a hx of COPD and normally uses Breo at home. Will resume Breo and Lasix.     Review of Systems   Constitutional: Positive for malaise/fatigue. Negative for chills, fever and weight loss.   Respiratory: Positive for cough, shortness of breath and wheezing. Negative for hemoptysis and sputum production.    Cardiovascular: Negative for chest pain, palpitations, orthopnea, leg swelling and PND.   Neurological: Positive for weakness. Negative for focal weakness.   All 12 systems otherwise negative.    History reviewed. No pertinent past medical history.       Objective:     Vital Signs (Most Recent):  Temp: 97.7 °F (36.5 °C) (10/03/20 0800)  Pulse: 84 (10/03/20 1241)  Resp: 20 (10/03/20 1241)  BP: (!) 148/63 (10/03/20 1200)  SpO2: 97 % (10/03/20 1241) Vital Signs (24h Range):  Temp:  [97.7 °F (36.5 °C)-98.7 °F (37.1 °C)] 97.7 °F (36.5 °C)  Pulse:  [66-89] 84  Resp:  [16-33] 20  SpO2:  [89 %-97 %] 97 %  BP: (130-165)/(60-81) 148/63     Weight: 114 kg (251 lb 5.2 oz)  Body mass index is 37.11 kg/m².  No intake or output data in the 24 hours ending 10/03/20 1407   Physical Exam  Constitutional:       General: He is not in acute distress.     Appearance:  Normal appearance. He is not diaphoretic.   Eyes:      General: Lids are normal. No scleral icterus.        Right eye: No discharge.         Left eye: No discharge.      Conjunctiva/sclera: Conjunctivae normal.   Neck:      Trachea: Phonation normal.   Cardiovascular:      Rate and Rhythm: Normal rate.   Pulmonary:      Effort: Pulmonary effort is normal. No tachypnea, accessory muscle usage or respiratory distress.   Abdominal:      General: There is no distension.   Neurological:      Mental Status: He is alert and oriented to person, place, and time.   Psychiatric:         Speech: Speech is delayed.         Behavior: Behavior normal.         Thought Content: Thought content normal.         Significant Labs:     Recent Labs   Lab 09/08/20  1808 09/14/20  0338   HGBA1C 4.8 4.4     Recent Labs   Lab 10/01/20  1610   POCTGLUCOSE 98     Recent Labs   Lab 09/14/20  0338   TSH 2.301     Recent Labs   Lab 10/01/20  0406 10/02/20  0434 10/03/20  1101   WBC 10.29 10.09 9.28   HGB 8.8* 9.0* 8.8*   HCT 28.5* 28.9* 28.8*    205 190     Recent Labs   Lab 10/01/20  0406 10/02/20  0434 10/03/20  1101   GRAN 74.3*  7.7 73.4*  7.4 73.2*  6.8   LYMPH 10.7*  1.1 9.8*  1.0 10.3*  1.0   MONO 9.6  1.0 9.6  1.0 9.2  0.9   EOS 0.4 0.6* 0.5     Recent Labs   Lab 09/29/20  0441 09/30/20  0441 10/01/20  0406 10/02/20  0434 10/03/20  1101    143 141 143 143   K 3.4* 3.2* 3.0* 3.1* 3.5    101 100 103 106   CO2 31* 30* 30* 29 28   BUN 37* 34* 32* 25* 22   CREATININE 1.5* 1.6* 1.7* 1.6* 1.6*   GLU 96 84 99 107 95   CALCIUM 10.3 10.5 9.6 10.2 10.1   ALBUMIN 2.5* 2.5* 2.5* 2.6* 2.5*   MG 1.9 1.9 1.7  --   --    PHOS 5.5* 5.5* 4.9*  --   --      Recent Labs   Lab 10/01/20  0406 10/02/20  0434 10/03/20  1101   ALKPHOS 49* 48* 48*   ALT 9* 11 14   AST 13 16 18   PROT 6.9 7.0 6.7   BILITOT 0.2 0.2 0.2     Recent Labs   Lab 09/08/20  0149 09/22/20  1522 09/26/20  1339   LACTATE 2.0 0.5  --    FERRITIN  --   --  1,261*      SARS-CoV-2 RNA, Amplification, Qual (no units)   Date Value   09/24/2020 Negative   09/08/2020 Negative           Results for orders placed during the hospital encounter of 09/08/20   Echo Color Flow Doppler? Yes    Narrative · Mild left ventricular enlargement.  · Moderate mitral regurgitation.  · Intermediate central venous pressure (8 mmHg).  · The estimated PA systolic pressure is 28 mmHg.  · Low normal left ventricular systolic function. The estimated ejection   fraction is 50%.  · Normal LV diastolic function.  · Normal right ventricular systolic function.  · Mild aortic regurgitation.          Fl Modified Barium Swallow Speech  Narrative: EXAMINATION:  FL MODIFIED BARIUM SWALLOW SPEECH STUDY    CLINICAL HISTORY:  objective assessment of swallow;    TECHNIQUE:  Video fluoroscopic swallowing examination was performed in conjunction with the speech pathology department.  Barium containing liquid and/or solid food substances were used to assess swallowing.    Fluoroscopy Time: 1.2 minutes    COMPARISON:  None  Impression: Transit: Normal oral transit time.    Penetration/Aspiration: Flash penetration visualized within liquids.  No evidence of aspiration.    Miscellaneous: Partially visualized NG tube.    See speech pathology report for further details.    Electronically signed by resident: Roosevelt Steele  Date:    09/29/2020  Time:    15:14    Electronically signed by: Gurwinder Thomas MD  Date:    09/29/2020  Time:    16:31  MRI Brain W WO Contrast  Narrative: EXAMINATION:  MRI BRAIN W WO CONTRAST    CLINICAL HISTORY:  abscess follow up;    TECHNIQUE:  Sagittal and axial T1, axial T2, axial FLAIR, axial gradient, axial diffusion imaging of the whole brain pre-contrast with postcontrast axial T1 and axial spoiled gradient imaging reformatted in the coronal and sagittal planes.. Ten ml of Gadavist injected intravenously.    COMPARISON:  09/14/2020    FINDINGS:  Continued scattered small to punctate regions of  signal abnormality and enhancement within the bilateral parietal and occipital lobes with punctate enhancing focus right cerebellum.  Compared to most recent prior there is resolution of previous diffusion restriction within the left cerebellum now with punctate focus of enhancement.  Overall concerning for subacute aged infarcts and concerning for possible septic emboli in light of prior findings.  There is reduced sized ring enhancing focus in the right occipital lobe with ring enhancing component measuring 0.3 cm previously measuring 0.8.    There are numerous scattered small foci of susceptibility within the cerebral hemispheres with superimposed scattered susceptibility in the cerebral sulci concerning for remote hemorrhages and superficial siderosis with additional punctate foci within the cerebellar hemispheres bilaterally and bilateral thalami left greater than right.    Continued thin extra-axial collection overlying the left cerebral convexity most compatible with subdural hygroma.  There is thin diffuse pachymeningeal enhancement likely reactive.    There is continued increased diffusion signal hyperintensity within the left parasagittal occipital lobe and left mesial temporal lobe although reduced from prior which may be sequela of seizure activity underlying encephalitis including infection or autoimmune process to be considered in differential.    There is evolving regions of slight volume loss involving bilateral occipital lobes and right frontal lobes most compatible with prior infarcts.  There is a small focus of a focal pachymeningeal enhancement overlying the right frontal lobe directly subjacent to presumed enhancement within the overlying cortex which is unchanged from prior and may be sequela of reactive enhancement and subacute age infarction or underlying cortical mass lesion with extra-axial extension felt less likely but not excluded.    Ventricles relatively stable.  Fluid opacification  mastoid air cells bilaterally.    Lobular material within the sphenoid sinus which may represent mucosal thickening with patchy ethmoid air cell opacities.    This report was flagged in Epic as abnormal.  Impression: Evolving areas of a scattered signal abnormality within the cerebral hemispheres now with slight volume loss particularly right frontal lobe and bilateral occipital lobes which may represent evolving areas of infarction.  There is continued though slightly reduced scattered areas of enhancement within these regions with additional foci in the bilateral occipital lobes with ring enhancing focus right occipital lobe overall reduced in size which may represent evolving septic emboli.    There is a new subcentimeter region of enhancement within the left cerebellum with resolution of previous diffusion restriction in this region concerning for possible additional subacute age infarcts.    There is no restricted diffusion to suggest acute infarction.    Continue though reduced diffusion hyperintensity left parasagittal parietal cortex and left mesial temporal lobe which may represent sequela of evolving seizure activity however underlying encephalitis not excluded.    Innumerable scattered regions of susceptibility primarily in the cortical and subcortical white matter with additional cerebellar and deep gray foci as well as scattered superficial siderosis similar to prior.  Differential to include underlying amyloid angiopathy with superimposed remote microhemorrhages not excluded.    Thin subdural collection overlying the left cerebral convexity relatively stable suggestive for subdural hygroma.    There is no restricted diffusion to suggest acute infarction.  Clinical correlation and continued follow-up advised.    Electronically signed by: Rubio Schmidt DO  Date:    09/29/2020  Time:    07:58        Assessment/Plan:      Active Diagnoses:    Diagnosis Date Noted POA    PRINCIPAL PROBLEM:  Status  epilepticus [G40.901] 09/13/2020 Yes    Epistaxis [R04.0] 09/29/2020 No    Palliative care encounter [Z51.5] 09/23/2020 Not Applicable    Goals of care, counseling/discussion [Z71.89]  Not Applicable    Advance care planning [Z71.89]  Not Applicable    Debility [R53.81] 09/16/2020 Yes    Oral phase dysphagia [R13.11] 09/16/2020 Yes    Seizure [R56.9]  Yes    Intracranial septic embolism [I76, I66.9] 09/15/2020 Yes    Brain abscess [G06.0] 09/15/2020 Yes    Cerebellar stroke [I63.9] 09/13/2020 Yes    Cytotoxic brain edema [G93.6] 09/13/2020 Yes    Encephalopathy acute [G93.40] 09/13/2020 Yes    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] 09/08/2020 Yes    Essential hypertension [I10] 09/08/2020 Yes     Chronic    Embolic stroke involving left posterior cerebral artery [I63.432] 09/08/2020 Yes    Chronic bronchitis [J42] 09/08/2020 Yes     Chronic    Ulcerative colitis [K51.90] 09/08/2020 Yes     Chronic      Problems Resolved During this Admission:       Overview / ICU Course:    Lopez Savage is a 75 y.o. male admitted for Status epilepticus.    Inpatient Medications Prescribed for Management of Current Problems:     Scheduled Meds:    albuterol-ipratropium  3 mL Nebulization Q4H    atorvastatin  40 mg Oral Daily    cefTRIAXone (ROCEPHIN) IVPB  2 g Intravenous Q12H    enoxaparin  40 mg Subcutaneous Q24H    fluticasone furoate-vilanteroL  1 puff Inhalation Daily    lacosamide  200 mg Oral Q12H    Lactobacillus rhamnosus GG  1 capsule Oral BID    miconazole nitrate 2%   Topical (Top) BID    pantoprazole  40 mg Oral Daily    silodosin  4 mg Oral Daily    sodium chloride  1 spray Each Nostril Q4H While awake    sodium chloride 0.9%  10 mL Intravenous Q6H    traZODone  25 mg Oral QHS    vancomycin (VANCOCIN) IVPB  1,500 mg Intravenous Q48H     Continuous Infusions:   As Needed: acetaminophen, albuterol-ipratropium, haloperidoL, labetalol, melatonin, psyllium husk (aspartame), sodium chloride 0.9%,  Pharmacy to dose Vancomycin consult **AND** vancomycin - pharmacy to dose    Assessment and Plan by Problem    Encephalopathy acute  - likely multifactorial - due to stroke , brain abscess, cytotoxic edema, delirium   - Stroke team believes repeat MRI is stable   - General neurology consulted. Recommended LP and EEG.    - EEG showed no current seizure activity      - LP done on 9/25 with FL, concerning for possible neurosyphilis versus HSV versus other meningitis, ampicillin added   - ID re-consulted: Anticipate 6-8 weeks of IV vancomycin (trough near 20) and ceftriaxone with repeat imaging to assess resolution of brain abscess. End date of IV antibiotics: 10/27/2020 -11/10/2020 with weekly CBC CMP ESR CRP VANC TROUGH SENT TO ID CLINIC  [phone].   - per previous MD, mentation waxes and wanes. Patient is alert and oriented.     Status epilepticus  - Multiple witnessed seizure-like activity described  - No previous reports from family  - Was started on Keppra and Vimpat 200 mg BID  - EEG at OSH shows irregular slowing in the R frontal region, with L posterior periodic epileptiform discharges. No seizures recorded during this study.  - [place] [facility] [place] to show periodic discharges in the left posterior region with no seizures.  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 If worsening mentation or without improvement recommend reimaging and if need for surgical washout for source control. Please send fluid for cultures (gram stain, aerobic, anaerobic, fungal, AFB) if undergoes drainage.      -ID recs:  Continue IV Vanc and cetriaxone for 6-8 weeks, end date 10/27-11/10 with repeat MRI  "post treatment  Continue ceftriaxone 2gms IV q12hrs. Anticipate 6-8 weeks of IV vancomycin and ceftriaxone with repeat imaging to assess resolution of brain abscess.   - MRI brain improved per NSSY.     Cerebellar stroke/ Embolic stroke involving left posterior cerebral artery  On 9/8 pt presented to [place] ED with unresponsiveness  No tPA due to recent GI bleed  MRI with left PCA infarct and left thalamic infarct along with small right PCA infarct  MRA demonstrates fetal circulation to left PCA  9/11 extubated  . Repeat MRI new acute/subacute L cerebellum infarct  -Neuro checks  -Vascular Neurology was consulted - and recommended medical management - ASA , statin, PT OT SLP, BP control      Cytotoxic brain edema  Per prior documentation - "Area of cytotoxic cerebral edema identified when reviewing brain imaging in the territory of the left posterior cerebral artery, L thalamus, right posterior cerebral artery. There is no mass effect associated with it. We will continue to monitor the patients clinical exam for any worsening of symptoms which may indicate expansion of the stroke or the area of the edema resulting in the clinical change. The pattern is suggestive of embolic etiology"     Oral phase dysphagia  Required NGT, continuous tube feedings for nutrition and enteral water flushes 200 ml q6hrs  SLP following,  - MBSS done; advanced to regular diet and NG tube removed     Chronic bronchitis  Duonebs scheduled q4hrs  Monitor respiratory status, at risk for atelectasis  Continuous pulse oximetry, ABG prn  Continue CPT, pulmonary toileting  Resume home Breo  Encourage IS q2h     Iron deficiency anemia  - transfusing 1 unit of PRBC on 9/26       - starting IV iron       Diet: Diet Adult Regular (IDDSI Level 7) [facility]; Low Sodium,2gm  GI Prophylaxis: Indicated due to history of GI bleeding  Significant LDAs:   IV Access Type: PICC  Urinary Catheter Indication if present: Patient Does Not Have Urinary " Catheter  Other Lines/Tubes/Drains:    HIGH RISK CONDITION(S):   Patient has an abrupt change in neurologic status: Seizure and Weakness     Goals of Care:    Previous admission:  9/8/20  Likely prognosis:  Fair  Code Status: Full Code  Comfort Only: No  Hospice: No  Goals at discharge: remain at home, with caregiver    Discharge Planning   ANA: 10/5/2020     Code Status: Full Code   Is the patient medically ready for discharge?:     Reason for patient still in hospital (select all that apply): Patient trending condition and Pending disposition  Discharge Plan A: Rehab   Discharge Delays: None known at this time    VTE Risk Mitigation (From admission, onward)         Ordered     enoxaparin injection 40 mg  Every 24 hours      09/23/20 1350     IP VTE HIGH RISK PATIENT  Once      09/13/20 1518     Place sequential compression device  Until discontinued      09/13/20 1119                   Kaylee Taveras MD  Department of Hospital Medicine   [facility] - ICU 14 [facility]

## 2020-10-04 NOTE — PROGRESS NOTES
Pharmacokinetic Assessment Follow Up: IV Vancomycin    Vancomycin serum concentration assessment(s):    The trough level was drawn correctly and can be used to guide therapy at this time. The measurement is above the desired definitive target range of 15 to 20 mcg/mL.    Vancomycin Regimen Plan:    -Trough level supratherapeutic at 21.1  - Bump in Scr (1.6 --> 1.8)  - Will hold Vancomycin today  - Random level with am labs (10/5)  - Re-dose when the random level is less than 20 mcg/mL    Drug levels (last 3 results):  Recent Labs   Lab Result Units 10/02/20  0434 10/04/20  0943   Vancomycin, Random ug/mL 19.8  --    Vancomycin-Trough ug/mL  --  21.1       Pharmacy will continue to follow and monitor vancomycin.    Please contact pharmacy at extension [phone] for questions regarding this assessment.    Thank you for the consult,   Valentine Pires       Patient brief summary:  Lopez Savage is a 75 y.o. male initiated on antimicrobial therapy with IV Vancomycin for treatment of meningitis & brain abscess        Drug Allergies:   Review of patient's allergies indicates:   Allergen Reactions    Codeine     Tetanus vaccines and toxoid        Actual Body Weight:   114 kg    Renal Function:   Estimated Creatinine Clearance: 44.8 mL/min (A) (based on SCr of 1.8 mg/dL (H)).,     Dialysis Method (if applicable):  N/A    CBC (last 72 hours):  Recent Labs   Lab Result Units 10/02/20  0434 10/03/20  1101 10/04/20  0307   WBC K/uL 10.09 9.28 9.70   Hemoglobin g/dL 9.0* 8.8* 8.6*   Hematocrit % 28.9* 28.8* 28.5*   Platelets K/uL 205 190 211   Gran% % 73.4* 73.2* 75.0*   Lymph% % 9.8* 10.3* 11.8*   Mono% % 9.6 9.2 8.2   Eosinophil% % 6.1 5.8 4.0   Basophil% % 0.8 1.0 0.6   Differential Method  Automated Automated Automated       Metabolic Panel (last 72 hours):  Recent Labs   Lab Result Units 10/02/20  0434 10/03/20  1101 10/04/20  0307   Sodium mmol/L 143 143 142   Potassium mmol/L 3.1* 3.5 3.2*   Chloride mmol/L 103 106 103    CO2 mmol/L 29 28 29   Glucose mg/dL 107 95 96   BUN, Bld mg/dL 25* 22 23   Creatinine mg/dL 1.6* 1.6* 1.8*   Albumin g/dL 2.6* 2.5* 2.6*   Total Bilirubin mg/dL 0.2 0.2 0.2   Alkaline Phosphatase U/L 48* 48* 48*   AST U/L 16 18 15   ALT U/L 11 14 14       Vancomycin Administrations:  vancomycin given in the last 96 hours                   vancomycin 1.5 g in dextrose 5 % 250 mL IVPB (ready to mix) (mg) 1,500 mg New Bag 10/02/20 0928    vancomycin in dextrose 5 % 1 gram/250 mL IVPB 1,000 mg (mg) 1,000 mg New Bag 09/30/20 1600                Microbiologic Results:  Microbiology Results (last 7 days)     Procedure Component Value Units Date/Time    CSF culture [[id number]] Collected: 09/25/20 1230    Order Status: Completed Specimen: CSF (Spinal Fluid) from CSF Tap, Tube 1 Updated: 09/30/20 0645     CSF CULTURE No Growth     Gram Stain Result No WBC's      No organisms seen    Narrative:      Add on VDRL By Rosalva Farfan MD Order #[id number]  09/25/2020  17:51     Blood culture [[id number]] Collected: 09/22/20 1522    Order Status: Completed Specimen: Blood from Peripheral, Left Hand Updated: 09/27/20 1612     Blood Culture, Routine No growth after 5 days.    Narrative:      Collection has been rescheduled by BDW at 09/22/2020 14:57 Reason:   Patient unavailable  Collection has been rescheduled by BDW at 09/22/2020 14:57 Reason:   Patient unavailable    Blood culture [[id number]] Collected: 09/22/20 1522    Order Status: Completed Specimen: Blood from Antecubital, Left Arm Updated: 09/27/20 1612     Blood Culture, Routine No growth after 5 days.    Narrative:      Collection has been rescheduled by BDW at 09/22/2020 14:57 Reason:   Patient unavailable  Collection has been rescheduled by BDW at 09/22/2020 14:57 Reason:   Patient unavailable

## 2020-10-04 NOTE — PROGRESS NOTES
[facility] - ICU 14 [facility] Medicine   Progress Note    Patient Name: Lopez Savage  MRN: [id number]  Patient Class: IP- Inpatient   Admission Date: 9/13/2020  Length of Stay: 21 days  Attending Physician: Kaylee Taveras MD  Primary Care Provider: Prasanna Haywood MD    [facility] Medicine Team: [facility] HOSP MED N Kaylee Taveras MD      Subjective:     Admission CC:  Transferred from 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 UA ordered.     Review of Systems   Constitutional: Positive for malaise/fatigue. Negative for chills, fever and weight loss.   Respiratory: Positive for cough, shortness of breath and wheezing. Negative for hemoptysis and sputum production.    Cardiovascular: Negative for chest pain, palpitations, orthopnea, leg swelling and PND.   Neurological: Positive for weakness. Negative for focal weakness.   All 12 systems otherwise negative.    History reviewed. No pertinent past medical history.       Objective:     Vital Signs (Most Recent):  Temp: 98.3 °F (36.8 °C) (10/04/20 0725)  Pulse: 78 (10/04/20 0939)  Resp: (!) 22 (10/04/20 0939)  BP: (!) 147/67 (10/04/20 0725)  SpO2: 96 % (10/04/20 0939) Vital Signs (24h Range):  Temp:  [97.8 °F (36.6 °C)-98.4 °F (36.9 °C)] 98.3 °F (36.8 °C)  Pulse:  [68-90] 78  Resp:  [20-32] 22  SpO2:  [86 %-100 %] 96 %  BP: (125-147)/(57-67) 147/67     Weight: 114 kg (251 lb 5.2 oz)  Body mass index is 37.11 kg/m².    Intake/Output Summary (Last 24 hours) at 10/4/2020 1224  Last data filed at 10/4/2020 0800  Gross per 24 hour   Intake 480 ml   Output 900 ml   Net -420 ml      Physical Exam  Constitutional:       General: He is not in acute distress.     Appearance: Normal appearance. He is not diaphoretic.   Eyes:      General: Lids are normal. No scleral icterus.        Right eye: No discharge.         Left  eye: No discharge.      Conjunctiva/sclera: Conjunctivae normal.   Neck:      Trachea: Phonation normal.   Cardiovascular:      Rate and Rhythm: Normal rate.   Pulmonary:      Effort: Pulmonary effort is normal. No tachypnea, accessory muscle usage or respiratory distress.   Abdominal:      General: There is no distension.   Neurological:      Mental Status: He is alert and oriented to person, place, and time.   Psychiatric:         Speech: Speech is delayed.         Behavior: Behavior normal.         Thought Content: Thought content normal.         Significant Labs:     Recent Labs   Lab 09/08/20  1808 09/14/20  0338   HGBA1C 4.8 4.4     No results for input(s): POCTGLUCOSE in the last 48 hours.  Recent Labs   Lab 09/14/20  0338   TSH 2.301     Recent Labs   Lab 10/02/20  0434 10/03/20  1101 10/04/20  0307   WBC 10.09 9.28 9.70   HGB 9.0* 8.8* 8.6*   HCT 28.9* 28.8* 28.5*    190 211     Recent Labs   Lab 10/02/20  0434 10/03/20  1101 10/04/20  0307   GRAN 73.4*  7.4 73.2*  6.8 75.0*  7.3   LYMPH 9.8*  1.0 10.3*  1.0 11.8*  1.1   MONO 9.6  1.0 9.2  0.9 8.2  0.8   EOS 0.6* 0.5 0.4     Recent Labs   Lab 09/29/20  0441 09/30/20  0441 10/01/20  0406 10/02/20  0434 10/03/20  1101 10/04/20  0307    143 141 143 143 142   K 3.4* 3.2* 3.0* 3.1* 3.5 3.2*    101 100 103 106 103   CO2 31* 30* 30* 29 28 29   BUN 37* 34* 32* 25* 22 23   CREATININE 1.5* 1.6* 1.7* 1.6* 1.6* 1.8*   GLU 96 84 99 107 95 96   CALCIUM 10.3 10.5 9.6 10.2 10.1 9.7   ALBUMIN 2.5* 2.5* 2.5* 2.6* 2.5* 2.6*   MG 1.9 1.9 1.7  --   --   --    PHOS 5.5* 5.5* 4.9*  --   --   --      Recent Labs   Lab 10/02/20  0434 10/03/20  1101 10/04/20  0307   ALKPHOS 48* 48* 48*   ALT 11 14 14   AST 16 18 15   PROT 7.0 6.7 6.9   BILITOT 0.2 0.2 0.2     Recent Labs   Lab 09/08/20  0149 09/22/20  1522 09/26/20  1339   LACTATE 2.0 0.5  --    FERRITIN  --   --  1,261*     SARS-CoV-2 RNA, Amplification, Qual (no units)   Date Value   09/24/2020 Negative    09/08/2020 Negative           Results for orders placed during the hospital encounter of 09/08/20   Echo Color Flow Doppler? Yes    Narrative · Mild left ventricular enlargement.  · Moderate mitral regurgitation.  · Intermediate central venous pressure (8 mmHg).  · The estimated PA systolic pressure is 28 mmHg.  · Low normal left ventricular systolic function. The estimated ejection   fraction is 50%.  · Normal LV diastolic function.  · Normal right ventricular systolic function.  · Mild aortic regurgitation.          Fl Modified Barium Swallow Speech  Narrative: EXAMINATION:  FL MODIFIED BARIUM SWALLOW SPEECH STUDY    CLINICAL HISTORY:  objective assessment of swallow;    TECHNIQUE:  Video fluoroscopic swallowing examination was performed in conjunction with the speech pathology department.  Barium containing liquid and/or solid food substances were used to assess swallowing.    Fluoroscopy Time: 1.2 minutes    COMPARISON:  None  Impression: Transit: Normal oral transit time.    Penetration/Aspiration: Flash penetration visualized within liquids.  No evidence of aspiration.    Miscellaneous: Partially visualized NG tube.    See speech pathology report for further details.    Electronically signed by resident: Roosevelt Steele  Date:    09/29/2020  Time:    15:14    Electronically signed by: Gurwinder Thomas MD  Date:    09/29/2020  Time:    16:31  MRI Brain W WO Contrast  Narrative: EXAMINATION:  MRI BRAIN W WO CONTRAST    CLINICAL HISTORY:  abscess follow up;    TECHNIQUE:  Sagittal and axial T1, axial T2, axial FLAIR, axial gradient, axial diffusion imaging of the whole brain pre-contrast with postcontrast axial T1 and axial spoiled gradient imaging reformatted in the coronal and sagittal planes.. Ten ml of Gadavist injected intravenously.    COMPARISON:  09/14/2020    FINDINGS:  Continued scattered small to punctate regions of signal abnormality and enhancement within the bilateral parietal and occipital lobes  with punctate enhancing focus right cerebellum.  Compared to most recent prior there is resolution of previous diffusion restriction within the left cerebellum now with punctate focus of enhancement.  Overall concerning for subacute aged infarcts and concerning for possible septic emboli in light of prior findings.  There is reduced sized ring enhancing focus in the right occipital lobe with ring enhancing component measuring 0.3 cm previously measuring 0.8.    There are numerous scattered small foci of susceptibility within the cerebral hemispheres with superimposed scattered susceptibility in the cerebral sulci concerning for remote hemorrhages and superficial siderosis with additional punctate foci within the cerebellar hemispheres bilaterally and bilateral thalami left greater than right.    Continued thin extra-axial collection overlying the left cerebral convexity most compatible with subdural hygroma.  There is thin diffuse pachymeningeal enhancement likely reactive.    There is continued increased diffusion signal hyperintensity within the left parasagittal occipital lobe and left mesial temporal lobe although reduced from prior which may be sequela of seizure activity underlying encephalitis including infection or autoimmune process to be considered in differential.    There is evolving regions of slight volume loss involving bilateral occipital lobes and right frontal lobes most compatible with prior infarcts.  There is a small focus of a focal pachymeningeal enhancement overlying the right frontal lobe directly subjacent to presumed enhancement within the overlying cortex which is unchanged from prior and may be sequela of reactive enhancement and subacute age infarction or underlying cortical mass lesion with extra-axial extension felt less likely but not excluded.    Ventricles relatively stable.  Fluid opacification mastoid air cells bilaterally.    Lobular material within the sphenoid sinus which may  represent mucosal thickening with patchy ethmoid air cell opacities.    This report was flagged in Epic as abnormal.  Impression: Evolving areas of a scattered signal abnormality within the cerebral hemispheres now with slight volume loss particularly right frontal lobe and bilateral occipital lobes which may represent evolving areas of infarction.  There is continued though slightly reduced scattered areas of enhancement within these regions with additional foci in the bilateral occipital lobes with ring enhancing focus right occipital lobe overall reduced in size which may represent evolving septic emboli.    There is a new subcentimeter region of enhancement within the left cerebellum with resolution of previous diffusion restriction in this region concerning for possible additional subacute age infarcts.    There is no restricted diffusion to suggest acute infarction.    Continue though reduced diffusion hyperintensity left parasagittal parietal cortex and left mesial temporal lobe which may represent sequela of evolving seizure activity however underlying encephalitis not excluded.    Innumerable scattered regions of susceptibility primarily in the cortical and subcortical white matter with additional cerebellar and deep gray foci as well as scattered superficial siderosis similar to prior.  Differential to include underlying amyloid angiopathy with superimposed remote microhemorrhages not excluded.    Thin subdural collection overlying the left cerebral convexity relatively stable suggestive for subdural hygroma.    There is no restricted diffusion to suggest acute infarction.  Clinical correlation and continued follow-up advised.    Electronically signed by: Rubio Schmidt DO  Date:    09/29/2020  Time:    07:58        Assessment/Plan:      Active Diagnoses:    Diagnosis Date Noted POA    PRINCIPAL PROBLEM:  Intracranial septic embolism [I76, I66.9] 09/15/2020 Yes    Epistaxis [R04.0] 09/29/2020 No     Palliative care encounter [Z51.5] 09/23/2020 Not Applicable    Goals of care, counseling/discussion [Z71.89]  Not Applicable    Advance care planning [Z71.89]  Not Applicable    Debility [R53.81] 09/16/2020 Yes    Oral phase dysphagia [R13.11] 09/16/2020 Yes    Seizure [R56.9]  Yes    Brain abscess [G06.0] 09/15/2020 Yes    Status epilepticus [G40.901] 09/13/2020 Yes    Cerebellar stroke [I63.9] 09/13/2020 Yes    Cytotoxic brain edema [G93.6] 09/13/2020 Yes    Encephalopathy acute [G93.40] 09/13/2020 Yes    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] 09/08/2020 Yes    Essential hypertension [I10] 09/08/2020 Yes     Chronic    Embolic stroke involving left posterior cerebral artery [I63.432] 09/08/2020 Yes    Chronic bronchitis [J42] 09/08/2020 Yes     Chronic    Ulcerative colitis [K51.90] 09/08/2020 Yes     Chronic      Problems Resolved During this Admission:       Overview / ICU Course:    Lopez Savage is a 75 y.o. male admitted for Intracranial septic embolism.    Inpatient Medications Prescribed for Management of Current Problems:     Scheduled Meds:    albuterol-ipratropium  3 mL Nebulization Q4H    atorvastatin  40 mg Oral Daily    cefTRIAXone (ROCEPHIN) IVPB  2 g Intravenous Q12H    enoxaparin  40 mg Subcutaneous Q24H    fluticasone furoate-vilanteroL  1 puff Inhalation Daily    lacosamide  200 mg Oral Q12H    Lactobacillus rhamnosus GG  1 capsule Oral BID    miconazole nitrate 2%   Topical (Top) BID    pantoprazole  40 mg Oral Daily    potassium chloride  40 mEq Oral Once    silodosin  4 mg Oral Daily    sodium chloride  1 spray Each Nostril Q4H While awake    sodium chloride 0.9%  10 mL Intravenous Q6H    traZODone  25 mg Oral QHS     Continuous Infusions:   As Needed: acetaminophen, albuterol-ipratropium, haloperidoL, labetalol, melatonin, psyllium husk (aspartame), sodium chloride 0.9%, Pharmacy to dose Vancomycin consult **AND** vancomycin - pharmacy to dose      Imaging  reviewed    ECHO reviewed:  · Mild left ventricular enlargement.  · Moderate mitral regurgitation.  · Intermediate central venous pressure (8 mmHg).  · The estimated PA systolic pressure is 28 mmHg.  · Low normal left ventricular systolic function. The estimated ejection fraction is 50%.  · Normal LV diastolic function.  · Normal right ventricular systolic function.  · Mild aortic regurgitation.    Prior records reviewed.      Assessment and Plan by Problem    Encephalopathy acute  - Likely multifactorial - due to stroke , brain abscess, cytotoxic edema, delirium   - Stroke team believes repeat MRI is stable   - General neurology consulted. Recommended LP and EEG.    - EEG showed no current seizure activity      - LP done on 9/25 with FL, concerning for possible neurosyphilis versus HSV versus other meningitis, ampicillin added   - ID re-consulted: Anticipate 6-8 weeks of IV vancomycin (trough near 20) and ceftriaxone with repeat imaging to assess resolution of brain abscess. End date of IV antibiotics: 10/27/2020 -11/10/2020 with weekly CBC CMP ESR CRP VANC TROUGH SENT TO ID CLINIC  [phone].   - per previous MD, mentation waxes and wanes. Patient is alert and oriented.     Status epilepticus  - Multiple witnessed seizure-like activity described  - No previous reports from family  - Was started on Keppra and Vimpat 200 mg BID  - EEG at OSH shows irregular slowing in the R frontal region, with L posterior periodic epileptiform discharges. No seizures recorded during this study.  - 9/13 [facility] EEG continues to show periodic discharges in the left posterior region with no seizures.  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 If worsening mentation or without improvement recommend reimaging and if need for surgical washout for source control. Please send fluid for cultures (gram stain, aerobic, anaerobic, fungal, AFB) if undergoes drainage.      -ID recs:  Continue IV Vanc and cetriaxone for 6-8 weeks, end date 10/27-11/10 with repeat MRI post treatment  Continue ceftriaxone 2gms IV q12hrs. Anticipate 6-8 weeks of IV vancomycin and ceftriaxone with repeat imaging to assess resolution of brain abscess.   - MRI brain improved per NSSY.     Cerebellar stroke/ Embolic stroke involving left posterior cerebral artery  On 9/8 pt presented to [place] ED with unresponsiveness  No tPA due to recent GI bleed  MRI with left PCA infarct and left thalamic infarct along with small right PCA infarct  MRA demonstrates fetal circulation to left PCA  9/11 extubated  . Repeat MRI new acute/subacute L cerebellum infarct  -Neuro checks  -Vascular Neurology was consulted - and recommended medical management - ASA , statin, PT OT SLP, BP control      Cytotoxic brain edema  Per prior documentation - "Area of cytotoxic cerebral edema identified when reviewing brain imaging in the territory of the left posterior cerebral artery, L thalamus, right posterior cerebral artery. There is no mass effect associated with it. We will continue to monitor the patients clinical exam for any worsening of symptoms which may indicate expansion of the stroke or the area of the edema resulting in the clinical change. The pattern is suggestive of embolic etiology"     Oral phase dysphagia  Required NGT, continuous tube feedings for nutrition and enteral water flushes 200 ml q6hrs  SLP following,  - MBSS done; advanced to regular diet and NG tube removed     Chronic bronchitis  Duonebs scheduled q4hrs  Monitor respiratory status, at risk for atelectasis  Continuous pulse oximetry, ABG prn  Continue CPT, pulmonary toileting  Resume home " Pinky Carter IS q2h     Iron deficiency anemia  - transfusing 1 unit of PRBC on 9/26       - starting IV iron       Diet: Diet Adult Regular (IDDSI Level 7) [facility]; Low Sodium,2gm  GI Prophylaxis: Indicated due to history of GI bleeding  Significant LDAs:   IV Access Type: PICC  Urinary Catheter Indication if present: Patient Does Not Have Urinary Catheter  Other Lines/Tubes/Drains:    HIGH RISK CONDITION(S):   Patient has an abrupt change in neurologic status: Seizure and Weakness     Goals of Care:    Previous admission:  9/8/20  Likely prognosis:  Fair  Code Status: Full Code  Comfort Only: No  Hospice: No  Goals at discharge: remain at home, with caregiver    Discharge Planning   ANA: 10/5/2020     Code Status: Full Code   Is the patient medically ready for discharge?: Yes    Reason for patient still in hospital (select all that apply): Patient trending condition and Pending disposition  Discharge Plan A: Rehab   Discharge Delays: None known at this time    VTE Risk Mitigation (From admission, onward)         Ordered     enoxaparin injection 40 mg  Every 24 hours      09/23/20 1350     IP VTE HIGH RISK PATIENT  Once      09/13/20 1518     Place sequential compression device  Until discontinued      09/13/20 1119                   Kaylee Taveras MD  Department of Hospital Medicine   [facility] - ICU 14 [facility]

## 2020-10-05 NOTE — PLAN OF CARE
CM spoke with pt's wife via phone [phone] regarding (O) SNF does not have any beds available today or tomorrow.  CM discussed sending additional SNF referrals out as the pt is medically stable for discharge.  CM discussed concerns for him staying here when he is medically stable as relates to infections, medical, and financial concerns. Wife is very upset and states she does not want him to go to a nursing home.  CM attempted to explain SNF care in the facility  but she is upset.  Discussed financial concerns - when we have an accepting facility and he is medically stable now - the family would be responsible financially for his hospital stay that may not be covered by his insurance.      CM to follow for discharge planning needs.  MEGHANN Tejeda RN  Case Management  [phone]

## 2020-10-05 NOTE — PROGRESS NOTES
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 19 hour random vancomycin level was 15.2. Will give vancomycin 1gm x 1, with further doses based on random AM level tomorrow. Will redose if level <20 (goal 15-20)    Drug levels (last 3 results):  Recent Labs   Lab Result Units 10/04/20  0943 10/05/20  0450   Vancomycin, Random ug/mL  --  15.2   Vancomycin-Trough ug/mL 21.1  --        Pharmacy will continue to follow and monitor vancomycin.    Please contact pharmacy at extension [phone] for questions regarding this assessment.    Thank you for the consult,   Ramiro Mobley       Patient brief summary:  Lopez Savage is a 75 y.o. male initiated on antimicrobial therapy with IV Vancomycin for treatment of endocarditis    The patient's current regimen is Vancomycin 1g x 1    Drug Allergies:   Review of patient's allergies indicates:   Allergen Reactions    Codeine     Tetanus vaccines and toxoid        Actual Body Weight:   114 kg    Renal Function:   Estimated Creatinine Clearance: 40.3 mL/min (A) (based on SCr of 2 mg/dL (H)).,     Dialysis Method (if applicable):  HENRY    CBC (last 72 hours):  Recent Labs   Lab Result Units 10/03/20  1101 10/04/20  0307 10/05/20  0450   WBC K/uL 9.28 9.70 9.01   Hemoglobin g/dL 8.8* 8.6* 8.8*   Hematocrit % 28.8* 28.5* 27.6*   Platelets K/uL 190 211 220   Gran% % 73.2* 75.0* 74.3*   Lymph% % 10.3* 11.8* 11.5*   Mono% % 9.2 8.2 7.9   Eosinophil% % 5.8 4.0 5.0   Basophil% % 1.0 0.6 0.7   Differential Method  Automated Automated Automated       Metabolic Panel (last 72 hours):  Recent Labs   Lab Result Units 10/03/20  1101 10/04/20  0307 10/04/20  1812 10/05/20  0450   Sodium mmol/L 143 142  --  143   Potassium mmol/L 3.5 3.2*  --  3.4*   Chloride mmol/L 106 103  --  105   CO2 mmol/L 28 29  --  28   Glucose mg/dL 95 96  --  92   Glucose, UA   --   --  Negative  --     BUN, Bld mg/dL 22 23  --  23   Creatinine mg/dL 1.6* 1.8*  --  2.0*   Albumin g/dL 2.5* 2.6*  --  2.6*   Total Bilirubin mg/dL 0.2 0.2  --  0.2   Alkaline Phosphatase U/L 48* 48*  --  52*   AST U/L 18 15  --  16   ALT U/L 14 14  --  15       Vancomycin Administrations:  vancomycin given in the last 96 hours                   vancomycin 1.5 g in dextrose 5 % 250 mL IVPB (ready to mix) (mg) 1,500 mg New Bag 10/02/20 0928                Microbiologic Results:  Microbiology Results (last 7 days)     Procedure Component Value Units Date/Time    Urine culture [[id number]] Collected: 10/04/20 1812    Order Status: No result Specimen: Urine Updated: 10/04/20 1852    CSF culture [[id number]] Collected: 09/25/20 1230    Order Status: Completed Specimen: CSF (Spinal Fluid) from CSF Tap, Tube 1 Updated: 09/30/20 0645     CSF CULTURE No Growth     Gram Stain Result No WBC's      No organisms seen    Narrative:      Add on VDRL By Rosalva Farfan MD Order #[id number]  09/25/2020  17:51

## 2020-10-05 NOTE — PLAN OF CARE
CM to pt's bedside to meet with pt and wife regarding insurance denial for rehab.  Referral for (O) SNF pending - will need auth - pending bed availability.  Wife again states she does not want him to go to a Nursing Facility based SNF.  She is concerned for his wellbeing - Covid concerns.    CM to follow for discharge planning needs.  MANDY TejedaN RN  Case Management  [phone]

## 2020-10-05 NOTE — PROGRESS NOTES
"[facility] - ICU 14 WT  Adult Nutrition  Progress Note    SUMMARY       Recommendations    1. Continue low sodium diet as tolerated.     2. If po intake <50%, recommend adding Boost Plus BID.     3. RD following.     Goals: continue to consume % of meals by RD follow-up  Nutrition Goal Status: new  Communication of RD Recs: (POC)    Reason for Assessment    Reason For Assessment: RD follow-up  Diagnosis: (status epilepticus)  Relevant Medical History: GERD; diverticulitis; endocarditis; HTN; COPD  Interdisciplinary Rounds: did not attend  General Information Comments: Pt resting in bed with family member at bedside. TF discontinued. Pt reports good appetite. Per chart review, pt eating 100% of meals. C/o some nausea. No wt changes. NFPE completed 9/15, pt appears nourished.   Nutrition Discharge Planning: low sodium diet    Nutrition Risk Screen    Nutrition Risk Screen: no indicators present    Nutrition/Diet History    Spiritual, Cultural Beliefs, Buddhist Practices, Values that Affect Care: no  Food Allergies: NKFA  Factors Affecting Nutritional Intake: None identified at this time    Anthropometrics    Temp: 98.4 °F (36.9 °C)  Height Method: Measured  Height: 5' 9" (175.3 cm)  Height (inches): 69 in  Weight Method: Bed Scale  Weight: 114 kg (251 lb 5.2 oz)  Weight (lb): 251.33 lb  Ideal Body Weight (IBW), Male: 160 lb  % Ideal Body Weight, Male (lb): 157.08 %  BMI (Calculated): 37.1  BMI Grade: 35 - 39.9 - obesity - grade II     Lab/Procedures/Meds    Pertinent Labs Reviewed: reviewed  Pertinent Labs Comments: K 3.4, Cr 2, GFR 31.9  Pertinent Medications Reviewed: reviewed  Pertinent Medications Comments: stqatin, lactobacillus rhamnosus    Estimated/Assessed Needs    Weight Used For Calorie Calculations: 114 kg (251 lb 5.2 oz)  Energy Calorie Requirements (kcal): 1875 kcal/d  Energy Need Method: Kelechi Avendano  Protein Requirements:  g/day  Weight Used For Protein Calculations: 114 kg " (251 lb 5.2 oz)  Fluid Requirements (mL): 1 mL/kcal or per MD     RDA Method (mL): 1875     Nutrition Prescription Ordered    Current Diet Order: Low sodium     Evaluation of Received Nutrient/Fluid Intake    I/O: +11.292L since 9/21  Comments: LBM 10/4  Tolerance: tolerating  % Intake of Estimated Energy Needs: 75 - 100 %  % Meal Intake: 75 - 100 %    Nutrition Risk    Level of Risk/Frequency of Follow-up: (f/u 1 x wk)     Assessment and Plan    Nutrition Problem  inadequate energy intake     Related to (etiology):   inability to consume sufficient energy      Signs and Symptoms (as evidenced by):   Pt NPO with no means of nutrition     Interventions (treatment strategy):  Collaboration of care with other providers  Enteral nutrition     Nutrition Diagnosis Status:   Resolved       Monitor and Evaluation    Food and Nutrient Intake: energy intake, food and beverage intake  Food and Nutrient Adminstration: diet order  Physical Activity and Function: nutrition-related ADLs and IADLs  Anthropometric Measurements: weight, weight change  Biochemical Data, Medical Tests and Procedures: electrolyte and renal panel, gastrointestinal profile, glucose/endocrine profile, inflammatory profile, lipid profile  Nutrition-Focused Physical Findings: overall appearance     Malnutrition Assessment                 Orbital Region (Subcutaneous Fat Loss): well nourished  Upper Arm Region (Subcutaneous Fat Loss): well nourished   [place] Region (Muscle Loss): well nourished  Clavicle Bone Region (Muscle Loss): well nourished  Clavicle and Acromion Bone Region (Muscle Loss): well nourished  Scapular Bone Region (Muscle Loss): well nourished  Dorsal Hand (Muscle Loss): well nourished  Anterior Thigh Region (Muscle Loss): well nourished  Posterior Calf Region (Muscle Loss): well nourished                 Nutrition Follow-Up    RD Follow-up?: Yes

## 2020-10-05 NOTE — PLAN OF CARE
[facility]     Department of Hospital Medicine     [address] [place] [address]     [phone] after hours  [phone] fax       NURSING HOME ORDERS    10/05/2020    Admit to Nursing Home:    Skilled Bed                                                 Diagnoses:  Active Hospital Problems    Diagnosis  POA    *Intracranial septic embolism [I76, I66.9]  Yes    Epistaxis [R04.0]  No    Palliative care encounter [Z51.5]  Not Applicable    Goals of care, counseling/discussion [Z71.89]  Not Applicable    Advance care planning [Z71.89]  Not Applicable    Debility [R53.81]  Yes    Oral phase dysphagia [R13.11]  Yes    Seizure [R56.9]  Yes    Brain abscess [G06.0]  Yes    Status epilepticus [G40.901]  Yes    Cerebellar stroke [I63.9]  Yes    Cytotoxic brain edema [G93.6]  Yes    Encephalopathy acute [G93.40]  Yes    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       Patient is homebound due to:  Intracranial septic embolism    Allergies:  Review of patient's allergies indicates:   Allergen Reactions    Codeine     Tetanus vaccines and toxoid        Vitals:       Every shift (Skilled Nursing patients)    Diet: cardiac   Supplement:  1 can BID, if po intake < 50%                         Type:  House         Acitivities:    - Up in a chair each morning as tolerated   - Ambulate with assistance to bathroom   - Scheduled walks once each shift (every 8 hours)    LABS:    Weekly CBC CMP ESR CRP VANC TROUGH SENT TO ID CLINIC  [phone].       Nursing Precautions:     - Aspiration precautions:             - Total assistance with  meals            -  Upright 90 degrees befor during and after meals             -  Suction at bedside          - Fall precautions per nursing home protocol   - Seizure precaution per prison protocol   - Decubitus precautions:        -  for positioning   - Pressure reducing foam mattress   - Turn patient every two hours. Use wedge pillows to anchor patient    CONSULTS:     Physical Therapy to evaluate and treat     Occupational Therapy to evaluate and treat     Speech Therapy  to evaluate and treat        MISCELLANEOUS CARE:    Routine Skin for Bedridden Patients:  Apply moisture barrier cream to all    skin folds and wet areas in perineal area daily and after baths and                           all bowel movements.    WOUND CARE       Buttocks: BID/prn clean with bathing cloth, apply barrier cream with miconazole.     Left and right scalp skin breakdown: BID clean with sterile normal saline, apply barrier cream with miconazole.    Right forearm skin tear: weekly/prn clean with sterile normal saline, dress with foam dressing.          Medications: Discontinue all previous medication orders, if any. See new list below.       Lopez Savage   Home Medication Instructions ABDOULAYE:[id number]    Printed on:10/05/20 4003   Medication Information                      albuterol-ipratropium (DUO-NEB) 2.5 mg-0.5 mg/3 mL nebulizer solution  Take 3 mLs by nebulization every 4 (four) hours as needed for Wheezing or Shortness of Breath. Rescue             atorvastatin (LIPITOR) 40 MG tablet  Take 1 tablet (40 mg total) by mouth once daily.             ergocalciferol, vitamin D2, (VITAMIN D ORAL)  Take 1 capsule by mouth once daily.             fluticasone furoate-vilanteroL (BREO) 100-25 mcg/dose diskus inhaler  Inhale 1 puff into the lungs once daily. Controller                          lacosamide (VIMPAT) 50 mg Tab  Take 4 tablets (200 mg total) by mouth every 12 (twelve) hours.             miconazole nitrate 2%  (MICOTIN) 2 % Oint  Apply topically 2 (two) times daily. Buttocks: BID/prn clean with bathing cloth, apply barrier cream with miconazole.  Left and right scalp skin breakdown: BID clean with sterile normal saline, apply barrier cream with miconazole.             pantoprazole (PROTONIX) 40 MG tablet  Take 40 mg by mouth 2 (two) times daily.    Vancomycin IV 1 gram daily  Estimated End Date: 10/27/2020 -11/10/2020 (ID to decide on ultimate end date, has appt on 10/27)    Ceftriaxone IV 2 gram BID  Estimated End Date: 10/27/2020 -11/10/2020 (ID to decide on ultimate end date, has appt on 10/27)                       _________________________________  Kaylee Taveras MD  10/05/2020

## 2020-10-05 NOTE — PLAN OF CARE
10/05/20 1427   Post-Acute Status   Post-Acute Authorization Placement   Post-Acute Placement Status Referrals Sent        sent additional referrals for SNF placement. Awaiting facility decisions.     142 received from [facility] and uploaded to .     Accepting facility [facility] contacted. No answer.     PHN contacted. Auth still in process.         Will con't to follow.     Nano Martins LMSW  Case Management Social Worker   [facility] [address]

## 2020-10-05 NOTE — PLAN OF CARE
Problem: Occupational Therapy Goal  Goal: Occupational Therapy Goal  Description: Goals to be met by: 10/5/2020 (Extend to 10/12/2020)    Patient will increase functional independence with ADLs by performing:    Pt will follow 75% of motor commands with <2 verbal cues for repetition of task to maximize engagement in functional task. -not met  Pt will sit at EOB for approx 10 minutes with max A and unilateral UE support to complete grooming task -not met  Pt will complete sit>stand from EOB with bed in highest position with mod A in prep for functional transfers to BSC -not met  Pt will perform grooming sitting EOB with mod A -not met       Outcome: Ongoing, Progressing    OT goals remain appropriate.    Pedro March, OT   10/05/2020

## 2020-10-05 NOTE — PT/OT/SLP PROGRESS
Occupational Therapy   Treatment    Name: Lopez Savage  MRN: [id number]  Admitting Diagnosis:  Intracranial septic embolism  10 Days Post-Op    Recommendations:     Discharge Recommendations: rehabilitation facility  Discharge Equipment Recommendations:  other (see comments)(TBD)  Barriers to discharge:  Other (Comment)(increased assistance at current level of function)    Assessment:     Lopez Savage is a 75 y.o. male with a medical diagnosis of Intracranial septic embolism.  Co-tx with PT due to pt's decreased activity tolerance and increased level of assistance required.  Pt with good participation this date.  He remains intermittently disoriented, at times alert and able to make eye contact/follow commands, while inattentive at others times.  Therapy was anticipating transferring pt to the [facility]; however, pt was lethargic and somnolent throughout session and therapy deemed Med[facility]air would be unsafe.  Pt required Max A for most ADLs and Max-Max x 2 people for bed mobility.  Pt attempted 3 sit to stand transfers, requiring Max A x 2 for 1st two trials and Mod x 2 for 3rd trial.  He was able to clear his buttocks but was unable to come to complete stand.  Pt with noted R lateral lean while sitting EOB, requiring multiple corrections from therapists.   He presents with the following deficits. Performance deficits affecting function are weakness, impaired endurance, impaired self care skills, impaired functional mobilty, gait instability, impaired balance, decreased coordination, impaired cognition, decreased lower extremity function, decreased upper extremity function, decreased safety awareness, impaired cardiopulmonary response to activity, abnormal tone, impaired coordination, impaired fine motor.     Rehab Prognosis:  Good; patient would benefit from acute skilled OT services to address these deficits and reach maximum level of function.       Plan:     Patient to be seen 4 x/week to address the above  listed problems via self-care/home management, therapeutic activities, therapeutic exercises, neuromuscular re-education, cognitive retraining  · Plan of Care Expires: 10/13/20  · Plan of Care Reviewed with: patient, spouse    Subjective     Pain/Comfort:  · Pain Rating 1: 0/10  · Pain Rating Post-Intervention 1: 0/10    Objective:     Communicated with: RN and PT prior to session.  Patient found HOB elevated with pulse ox (continuous), blood pressure cuff, telemetry with his wife present upon OT entry to room.    General Precautions: Standard, fall   Orthopedic Precautions:N/A   Braces: N/A     Occupational Performance:     Bed Mobility:    · Patient completed Rolling/Turning to Left with  maximal assistance  · Patient completed Rolling/Turning to Right with maximal assistance  · Patient completed Scooting/Bridging toward HOB while supine with maximal assistance and 2 persons with draw sheet.  Pt was able to initiate scooting his hips back toward center of bed while sitting EOB.  · Patient completed Supine to Sit with maximal assistance and 2 persons  · Patient completed Sit to Supine with maximal assistance and 2 persons     Functional Mobility/Transfers:  · Patient completed Sit <> Stand Transfer from EOB x 3 trials with maximal assistance and of 2 persons  with  no assistive device for 1st two trials and Mod x 2 for 3rd trial.  Pt able to clear butt from bed but unable to come to complete stance.  · Functional Mobility: Pt able to take 1 sidestep to the R toward HOB with Max A of 2 people.  Pt was able to take step with his L leg but not his R leg.     Activities of Daily Living:  · Feeding:  setup assistance of tray table for pt to eat his lunch while supine with HOB elevated.  Pt was able to feed himself.  · Upper Body Dressing: maximal assistance with cues to thread LUE through sleeve of front gown while sitting EOB  · Toileting: maximal assistance to use urinal while supine with HOB elevated.  Pt stated he  needed to urinate and immediately began urinating on the bed pads in bed.  Therapist retrieved and placed urinal to catch remaining urine.  Pt required Max A to clean his private area and to change bed pads.       [facility] 6 Click ADL: 12    Treatment & Education:  - Pt and his wife edu on role of OT, POC, benefit of performing EOB/OOB activity, and safety when performing functional transfers and bed mobility.  - White board updated  - Self care tasks completed-- as noted above       Patient left HOB elevated with call button in reach, RN notified about telemetry leads becoming disconnected while performing bed mobility, and his wife presentEducation:      GOALS:   Multidisciplinary Problems     Occupational Therapy Goals        Problem: Occupational Therapy Goal    Goal Priority Disciplines Outcome Interventions   Occupational Therapy Goal     OT, PT/OT Ongoing, Progressing    Description: Goals to be met by: 10/5/2020 (Extend to 10/12/2020)    Patient will increase functional independence with ADLs by performing:    Pt will follow 75% of motor commands with <2 verbal cues for repetition of task to maximize engagement in functional task. -not met  Pt will sit at EOB for approx 10 minutes with max A and unilateral UE support to complete grooming task -not met  Pt will complete sit>stand from EOB with bed in highest position with mod A in prep for functional transfers to BSC -not met  Pt will perform grooming sitting EOB with mod A -not met                               Time Tracking:     OT Date of Treatment: 10/05/20  OT Start Time: 1144  OT Stop Time: 1219  OT Total Time (min): 35 min    Billable Minutes:Self Care/Home Management 18 min.  Therapeutic Activity 17 min.    Pedro March, OT  10/5/2020

## 2020-10-05 NOTE — PLAN OF CARE
Problem: Physical Therapy Goal  Goal: Physical Therapy Goal  Description: Goals to be met by: 2020, Goals reviewed on 20 and remain appropriate. Extended to 10/14/2020    Patient will increase functional independence with mobility by performin. Pt will perform bed mobility (rolling L/R, scooting, and bridging) with CGA  2. Pt will perform supine to/from sit with CGA.  3. Pt will sit EOB x 15 mins with B UE support with Rony assistance.  4. Pt will perform sit to stand with max assistance and LRAD -Met 10/5      A. Rony  5. Pt will ambulate 15 feet with max assistance and LRAD.  6. Pt will perform there-ex from handout x 15 reps to improve strength for functional mobility.        Outcome: Ongoing, Progressing       POC updated to 4x/week due to patient with increasing tolerance to and participation in treatment. Patient demonstrates ability to benefit form 4x/week in order to address functional deficits and progress towards PLOF.    Tali Farley, PT, DPT  10/5/2020

## 2020-10-05 NOTE — PT/OT/SLP PROGRESS
Physical Therapy Treatment    Patient Name:  Lopez Savage   MRN:  [id number]    Recommendations:     Discharge Recommendations:  rehabilitation facility   Discharge Equipment Recommendations: other (see comments)(TBD)   Barriers to discharge: Decreased caregiver support and decreased level of functional mobility     Assessment:     Lopez Savage is a 75 y.o. male admitted with a medical diagnosis of Intracranial septic embolism.  He presents with the following impairments/functional limitations:  weakness, impaired endurance, impaired self care skills, impaired functional mobilty, gait instability, impaired balance, decreased coordination, impaired cognition, decreased lower extremity function, decreased upper extremity function, decreased safety awareness, impaired cardiopulmonary response to activity, abnormal tone, impaired fine motor, impaired coordination Co treat with OT due to patient's increased level of required assistance and decreased tolerance to activity. Attempt to transfer to cardiac deferred 2* patient with increased lethargy, although patient achieved standing with MaxA x 1 attempt/ModA x 2 attempts and able to take 1 step with LLE. MaxA for bed mobility, able to maintain sitting balance with CGA intermittently, decreased R lateral and posterior lean in sitting on this day.  Patient demonstrated improved postural control and tolerance to upright activity. Continues to require redirecting and maximal v/t cues to orient patient to R side. This patient is an excellent candidate for inpatient rehabilitation, has a qualifying diagnosis, shows increasing activity tolerance,  is motivated to participate in treatment, and has a supportive family willing to assist the patient upon discharge.     Rehab Prognosis: Good; patient would benefit from acute skilled PT services to address these deficits and reach maximum level of function.    Recent Surgery: Procedure(s) (LRB):  Lumbar Puncture (N/A) 10 Days  "Post-Op    Plan:     During this hospitalization, patient to be seen 3 x/week to address the identified rehab impairments via gait training, therapeutic activities, therapeutic exercises, neuromuscular re-education and progress toward the following goals:    · Plan of Care Expires:  10/10/20    Subjective     Chief Complaint: fatigue  Patient/Family Comments/goals: "I'm so tired"  Pain/Comfort:  · Pain Rating 1: 0/10  · Pain Rating Post-Intervention 1: 0/10      Objective:     Communicated with nurse prior to session.  Patient found HOB elevated with pulse ox (continuous), blood pressure cuff, telemetry, peripheral IV upon PT entry to room.     General Precautions: Standard, fall   Orthopedic Precautions:N/A   Braces: N/A     Functional Mobility:  · Bed Mobility:     · Rolling Left:  maximal assistance  · Rolling Right: maximal assistance  · Scooting: maximal assistance and of 2 persons  · Supine to Sit: maximal assistance and of 2 persons  · Sit to Supine: maximal assistance and of 2 persons  · Transfers:     · Sit to Stand:  maximal assistance and of 2 persons with hand-held assist for 1st trial, Moderate assistance of 2 persons for 2nd and 3rd trial.   · Bed to Chair: deferred 2* lethargy, PT determined patient not safe to stay sitting up in chair  · Gait: patient able to achieve weightshifting to R and take 1 step with LLE, ModA. Unable to achieve step with R LE despite maximal v/t. [place] knee buckling.       AM-PAC 6 CLICK MOBILITY  Turning over in bed (including adjusting bedclothes, sheets and blankets)?: 2  Sitting down on and standing up from a chair with arms (e.g., wheelchair, bedside commode, etc.): 2  Moving from lying on back to sitting on the side of the bed?: 2  Moving to and from a bed to a chair (including a wheelchair)?: 2  Need to walk in hospital room?: 2  Climbing 3-5 steps with a railing?: 1  Basic Mobility Total Score: 11       Therapeutic Activities and Exercises:  Pt educated on plan and " goals with physical therapy.   Pt educated on importance of OOB activity to decrease the risks associated with bed rest.   Patient sat EOB with PT/OT for ~20min with varying levels of required assistance from CGA-MaxA. Patient requiring MaxA for less amount of time this session and with less severe R lateral and posterior lean, increasing tolerance to upright activity.  Instruction provided for bed mobility and transfers.   Redirected patient to tasks and provided reorientation to R side throughout session.  LAQ x 15 each side  All questions and concerns addressed within PT scope of practice.             Patient left with bed in chair position with all lines intact, call button in reach and wife present..    GOALS:   Multidisciplinary Problems     Physical Therapy Goals        Problem: Physical Therapy Goal    Goal Priority Disciplines Outcome Goal Variances Interventions   Physical Therapy Goal     PT, PT/OT Ongoing, Progressing     Description: Goals to be met by: 2020, Goals reviewed on 20 and remain appropriate. Extended to 10/14/2020    Patient will increase functional independence with mobility by performin. Pt will perform bed mobility (rolling L/R, scooting, and bridging) with CGA  2. Pt will perform supine to/from sit with CGA.  3. Pt will sit EOB x 15 mins with B UE support with Rony assistance.  4. Pt will perform sit to stand with max assistance and LRAD -Met 10/5      A. Rony  5. Pt will ambulate 15 feet with max assistance and LRAD.  6. Pt will perform there-ex from handout x 15 reps to improve strength for functional mobility.                         Time Tracking:     PT Received On: 10/05/20  PT Start Time: 1144     PT Stop Time: 1222  PT Total Time (min): 38 min     Billable Minutes: Therapeutic Activity 25 and Therapeutic Exercise 13    Treatment Type: Treatment  PT/PTA: PT     PTA Visit Number: 0     Tali Farley, PT  10/05/2020

## 2020-10-05 NOTE — PROGRESS NOTES
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 Alert and oriented. Cr up to 2 today, encouraged pt to increase oral hydration. Still with lung wheezing. Denied for rehab placement by insurance company.     Review of Systems   Constitutional: Positive for malaise/fatigue. Negative for chills, fever and weight loss.   Respiratory: Positive for cough, shortness of breath and wheezing. Negative for hemoptysis and sputum production.    Cardiovascular: Negative for chest pain, palpitations, orthopnea, leg swelling and PND.   Neurological: Positive for weakness. Negative for focal weakness.   All 12 systems otherwise negative.    History reviewed. No pertinent past medical history.       Objective:     Vital Signs (Most Recent):  Temp: 98.4 °F (36.9 °C) (10/05/20 0800)  Pulse: 87 (10/05/20 1251)  Resp: 17 (10/05/20 1251)  BP: (!) 141/66 (10/05/20 0800)  SpO2: 95 % (10/05/20 1251) Vital Signs (24h Range):  Temp:  [98.1 °F (36.7 °C)-98.7 °F (37.1 °C)] 98.4 °F (36.9 °C)  Pulse:  [72-92] 87  Resp:  [16-23] 17  SpO2:  [90 %-100 %] 95 %  BP: (136-142)/(58-72) 141/66     Weight: 114 kg (251 lb 5.2 oz)  Body mass index is 37.11 kg/m².    Intake/Output Summary (Last 24 hours) at 10/5/2020 1501  Last data filed at 10/5/2020 0000  Gross per 24 hour   Intake 10 ml   Output --   Net 10 ml      Physical Exam  Constitutional:       General: He is not in acute distress.     Appearance: Normal appearance. He is not  diaphoretic.   Eyes:      General: Lids are normal. No scleral icterus.        Right eye: No discharge.         Left eye: No discharge.      Conjunctiva/sclera: Conjunctivae normal.   Neck:      Trachea: Phonation normal.   Cardiovascular:      Rate and Rhythm: Normal rate.   Pulmonary:      Effort: Pulmonary effort is normal. No tachypnea, accessory muscle usage or respiratory distress.   Abdominal:      General: There is no distension.   Neurological:      Mental Status: He is alert and oriented to person, place, and time.   Psychiatric:         Speech: Speech is delayed.         Behavior: Behavior normal.         Thought Content: Thought content normal.         Significant Labs:     Recent Labs   Lab 09/08/20  1808 09/14/20  0338   HGBA1C 4.8 4.4     No results for input(s): POCTGLUCOSE in the last 48 hours.  Recent Labs   Lab 09/14/20  0338   TSH 2.301     Recent Labs   Lab 10/03/20  1101 10/04/20  0307 10/05/20  0450   WBC 9.28 9.70 9.01   HGB 8.8* 8.6* 8.8*   HCT 28.8* 28.5* 27.6*    211 220     Recent Labs   Lab 10/03/20  1101 10/04/20  0307 10/05/20  0450   GRAN 73.2*  6.8 75.0*  7.3 74.3*  6.7   LYMPH 10.3*  1.0 11.8*  1.1 11.5*  1.0   MONO 9.2  0.9 8.2  0.8 7.9  0.7   EOS 0.5 0.4 0.5     Recent Labs   Lab 09/29/20  0441 09/30/20  0441 10/01/20  0406  10/03/20  1101 10/04/20  0307 10/05/20  0450 10/05/20  1145    143 141   < > 143 142 143 142   K 3.4* 3.2* 3.0*   < > 3.5 3.2* 3.4* 3.5    101 100   < > 106 103 105 105   CO2 31* 30* 30*   < > 28 29 28 29   BUN 37* 34* 32*   < > 22 23 23 22   CREATININE 1.5* 1.6* 1.7*   < > 1.6* 1.8* 2.0* 1.9*   GLU 96 84 99   < > 95 96 92 94   CALCIUM 10.3 10.5 9.6   < > 10.1 9.7 9.8 9.8   ALBUMIN 2.5* 2.5* 2.5*   < > 2.5* 2.6* 2.6*  --    MG 1.9 1.9 1.7  --   --   --   --   --    PHOS 5.5* 5.5* 4.9*  --   --   --   --   --     < > = values in this interval not displayed.     Recent Labs   Lab 10/03/20  1101 10/04/20  0307 10/05/20  0450   ALKPHOS  48* 48* 52*   ALT 14 14 15   AST 18 15 16   PROT 6.7 6.9 7.0   BILITOT 0.2 0.2 0.2     Recent Labs   Lab 09/08/20  0149 09/22/20  1522 09/26/20  1339   LACTATE 2.0 0.5  --    FERRITIN  --   --  1,261*     SARS-CoV-2 RNA, Amplification, Qual (no units)   Date Value   09/24/2020 Negative   09/08/2020 Negative           Results for orders placed during the hospital encounter of 09/08/20   Echo Color Flow Doppler? Yes    Narrative · Mild left ventricular enlargement.  · Moderate mitral regurgitation.  · Intermediate central venous pressure (8 mmHg).  · The estimated PA systolic pressure is 28 mmHg.  · Low normal left ventricular systolic function. The estimated ejection   fraction is 50%.  · Normal LV diastolic function.  · Normal right ventricular systolic function.  · Mild aortic regurgitation.          Fl Modified Barium Swallow Speech  Narrative: EXAMINATION:  FL MODIFIED BARIUM SWALLOW SPEECH STUDY    CLINICAL HISTORY:  objective assessment of swallow;    TECHNIQUE:  Video fluoroscopic swallowing examination was performed in conjunction with the speech pathology department.  Barium containing liquid and/or solid food substances were used to assess swallowing.    Fluoroscopy Time: 1.2 minutes    COMPARISON:  None  Impression: Transit: Normal oral transit time.    Penetration/Aspiration: Flash penetration visualized within liquids.  No evidence of aspiration.    Miscellaneous: Partially visualized NG tube.    See speech pathology report for further details.    Electronically signed by resident: Roosevelt Steele  Date:    09/29/2020  Time:    15:14    Electronically signed by: Gurwinder Thomas MD  Date:    09/29/2020  Time:    16:31  MRI Brain W WO Contrast  Narrative: EXAMINATION:  MRI BRAIN W WO CONTRAST    CLINICAL HISTORY:  abscess follow up;    TECHNIQUE:  Sagittal and axial T1, axial T2, axial FLAIR, axial gradient, axial diffusion imaging of the whole brain pre-contrast with postcontrast axial T1 and axial  spoiled gradient imaging reformatted in the coronal and sagittal planes.. Ten ml of Gadavist injected intravenously.    COMPARISON:  09/14/2020    FINDINGS:  Continued scattered small to punctate regions of signal abnormality and enhancement within the bilateral parietal and occipital lobes with punctate enhancing focus right cerebellum.  Compared to most recent prior there is resolution of previous diffusion restriction within the left cerebellum now with punctate focus of enhancement.  Overall concerning for subacute aged infarcts and concerning for possible septic emboli in light of prior findings.  There is reduced sized ring enhancing focus in the right occipital lobe with ring enhancing component measuring 0.3 cm previously measuring 0.8.    There are numerous scattered small foci of susceptibility within the cerebral hemispheres with superimposed scattered susceptibility in the cerebral sulci concerning for remote hemorrhages and superficial siderosis with additional punctate foci within the cerebellar hemispheres bilaterally and bilateral thalami left greater than right.    Continued thin extra-axial collection overlying the left cerebral convexity most compatible with subdural hygroma.  There is thin diffuse pachymeningeal enhancement likely reactive.    There is continued increased diffusion signal hyperintensity within the left parasagittal occipital lobe and left mesial temporal lobe although reduced from prior which may be sequela of seizure activity underlying encephalitis including infection or autoimmune process to be considered in differential.    There is evolving regions of slight volume loss involving bilateral occipital lobes and right frontal lobes most compatible with prior infarcts.  There is a small focus of a focal pachymeningeal enhancement overlying the right frontal lobe directly subjacent to presumed enhancement within the overlying cortex which is unchanged from prior and may be  sequela of reactive enhancement and subacute age infarction or underlying cortical mass lesion with extra-axial extension felt less likely but not excluded.    Ventricles relatively stable.  Fluid opacification mastoid air cells bilaterally.    Lobular material within the sphenoid sinus which may represent mucosal thickening with patchy ethmoid air cell opacities.    This report was flagged in Epic as abnormal.  Impression: Evolving areas of a scattered signal abnormality within the cerebral hemispheres now with slight volume loss particularly right frontal lobe and bilateral occipital lobes which may represent evolving areas of infarction.  There is continued though slightly reduced scattered areas of enhancement within these regions with additional foci in the bilateral occipital lobes with ring enhancing focus right occipital lobe overall reduced in size which may represent evolving septic emboli.    There is a new subcentimeter region of enhancement within the left cerebellum with resolution of previous diffusion restriction in this region concerning for possible additional subacute age infarcts.    There is no restricted diffusion to suggest acute infarction.    Continue though reduced diffusion hyperintensity left parasagittal parietal cortex and left mesial temporal lobe which may represent sequela of evolving seizure activity however underlying encephalitis not excluded.    Innumerable scattered regions of susceptibility primarily in the cortical and subcortical white matter with additional cerebellar and deep gray foci as well as scattered superficial siderosis similar to prior.  Differential to include underlying amyloid angiopathy with superimposed remote microhemorrhages not excluded.    Thin subdural collection overlying the left cerebral convexity relatively stable suggestive for subdural hygroma.    There is no restricted diffusion to suggest acute infarction.  Clinical correlation and continued  follow-up advised.    Electronically signed by: Rubio Schmidt DO  Date:    09/29/2020  Time:    07:58        Assessment/Plan:      Active Diagnoses:    Diagnosis Date Noted POA    PRINCIPAL PROBLEM:  Intracranial septic embolism [I76, I66.9] 09/15/2020 Yes    Epistaxis [R04.0] 09/29/2020 No    Palliative care encounter [Z51.5] 09/23/2020 Not Applicable    Goals of care, counseling/discussion [Z71.89]  Not Applicable    Advance care planning [Z71.89]  Not Applicable    Debility [R53.81] 09/16/2020 Yes    Oral phase dysphagia [R13.11] 09/16/2020 Yes    Seizure [R56.9]  Yes    Brain abscess [G06.0] 09/15/2020 Yes    Status epilepticus [G40.901] 09/13/2020 Yes    Cerebellar stroke [I63.9] 09/13/2020 Yes    Cytotoxic brain edema [G93.6] 09/13/2020 Yes    Encephalopathy acute [G93.40] 09/13/2020 Yes    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] 09/08/2020 Yes    Essential hypertension [I10] 09/08/2020 Yes     Chronic    Embolic stroke involving left posterior cerebral artery [I63.432] 09/08/2020 Yes    Chronic bronchitis [J42] 09/08/2020 Yes     Chronic    Ulcerative colitis [K51.90] 09/08/2020 Yes     Chronic      Problems Resolved During this Admission:       Overview / ICU Course:    Lopez Savage is a 75 y.o. male admitted for Intracranial septic embolism.    Inpatient Medications Prescribed for Management of Current Problems:     Scheduled Meds:    albuterol-ipratropium  3 mL Nebulization Q4H    atorvastatin  40 mg Oral Daily    cefTRIAXone (ROCEPHIN) IVPB  2 g Intravenous Q12H    enoxaparin  40 mg Subcutaneous Q24H    fluticasone furoate-vilanteroL  1 puff Inhalation Daily    lacosamide  200 mg Oral Q12H    Lactobacillus rhamnosus GG  1 capsule Oral BID    miconazole nitrate 2%   Topical (Top) BID    pantoprazole  40 mg Oral Daily    silodosin  4 mg Oral Daily    sodium chloride  1 spray Each Nostril Q4H While awake    sodium chloride 0.9%  10 mL Intravenous Q6H    traZODone  25 mg Oral QHS      Continuous Infusions:   As Needed: acetaminophen, albuterol-ipratropium, haloperidoL, labetalol, melatonin, psyllium husk (aspartame), sodium chloride 0.9%, Pharmacy to dose Vancomycin consult **AND** vancomycin - pharmacy to dose      Imaging reviewed    ECHO reviewed:  · Mild left ventricular enlargement.  · Moderate mitral regurgitation.  · Intermediate central venous pressure (8 mmHg).  · The estimated PA systolic pressure is 28 mmHg.  · Low normal left ventricular systolic function. The estimated ejection fraction is 50%.  · Normal LV diastolic function.  · Normal right ventricular systolic function.  · Mild aortic regurgitation.    Prior records reviewed.      Assessment and Plan by Problem    Encephalopathy acute  - Likely multifactorial - due to stroke , brain abscess, cytotoxic edema, delirium   - Stroke team believes repeat MRI is stable   - General neurology consulted. Recommended LP and EEG.    - EEG showed no current seizure activity      - LP done on 9/25 with FL, concerning for possible neurosyphilis versus HSV versus other meningitis, ampicillin added   - ID re-consulted: Anticipate 6-8 weeks of IV vancomycin (trough near 20) and ceftriaxone with repeat imaging to assess resolution of brain abscess. End date of IV antibiotics: 10/27/2020 -11/10/2020 with weekly CBC CMP ESR CRP VANC TROUGH SENT TO ID CLINIC  [id number].   - per previous MD, mentation waxes and wanes. Patient is alert and oriented.     Status epilepticus  - Multiple witnessed seizure-like activity described  - No previous reports from family  - Was started on Keppra and Vimpat 200 mg BID  - EEG at OSH shows irregular slowing in the R frontal region, with L posterior periodic epileptiform discharges. No seizures recorded during this study.  - 9/13 [facility] EEG continues to show periodic discharges in the left posterior region with no seizures.  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 If worsening mentation or without improvement recommend reimaging and if need for surgical washout for source control. Please send fluid for cultures (gram stain, aerobic, anaerobic, fungal, AFB) if undergoes drainage.      -ID recs:  Continue IV Vanc and cetriaxone for 6-8 weeks, end date 10/27-11/10 with repeat MRI post treatment  Continue ceftriaxone 2gms IV q12hrs. Anticipate 6-8 weeks of IV vancomycin and ceftriaxone with repeat imaging to assess resolution of brain abscess.   - MRI brain improved per NSSY.     Cerebellar stroke/ Embolic stroke involving left posterior cerebral artery  On 9/8 pt presented to [facility] ED with unresponsiveness  No tPA due to recent GI bleed  MRI with left PCA infarct and left thalamic infarct along with small right PCA infarct  MRA demonstrates fetal circulation to left PCA  9/11 extubated  . Repeat MRI new acute/subacute L cerebellum infarct  -Neuro checks  -Vascular Neurology was consulted - and recommended medical management - ASA , statin, PT OT SLP, BP control      Cytotoxic brain edema  Per prior documentation - "Area of cytotoxic cerebral edema identified when reviewing brain imaging in the territory of the left posterior cerebral artery, L thalamus, right posterior cerebral artery. There is no mass effect associated with it. We will continue to monitor the patients clinical exam for any worsening of symptoms which may indicate expansion of the stroke or the area of the edema resulting in the clinical change. The pattern is suggestive of embolic etiology"     Oral phase dysphagia, resolved  Required NGT, continuous tube feedings for nutrition and enteral water flushes 200 ml q6hrs  SLP " following,  - MBSS done; advanced to regular diet and NG tube removed     Chronic bronchitis  Duonebs scheduled q4hrs  Monitor respiratory status, at risk for atelectasis  Continuous pulse oximetry, ABG prn  Continue CPT, pulmonary toileting  Resume home Breo  Encourage IS q2h     Iron deficiency anemia  - transfusing 1 unit of PRBC on 9/26       - starting IV iron       Diet: Diet Adult Regular (IDDSI Level 7) [facility]; Low Sodium,2gm  GI Prophylaxis: Indicated due to history of GI bleeding  Significant LDAs:   IV Access Type: PICC  Urinary Catheter Indication if present: Patient Does Not Have Urinary Catheter  Other Lines/Tubes/Drains:    HIGH RISK CONDITION(S):   Patient has an abrupt change in neurologic status: Seizure and Weakness     Goals of Care:    Previous admission:  9/8/20  Likely prognosis:  Fair  Code Status: Full Code  Comfort Only: No  Hospice: No  Goals at discharge: remain at home, with caregiver    Discharge Planning   ANA: 6     Code Status: Full Code   Is the patient medically ready for discharge?: Yes    Reason for patient still in hospital (select all that apply): Patient trending condition and Pending disposition  Discharge Plan A: SNF  Discharge Delays: None known at this time    VTE Risk Mitigation (From admission, onward)         Ordered     enoxaparin injection 40 mg  Every 24 hours      09/23/20 1350     IP VTE HIGH RISK PATIENT  Once      09/13/20 1518     Place sequential compression device  Until discontinued      09/13/20 1119                   Kaylee Taveras MD  Department of Hospital Medicine   [facility] - ICU 14 [facility]

## 2020-10-05 NOTE — PLAN OF CARE
Slow progress toward SLP goals.     Problem: SLP Goal  Goal: SLP Goal  Description: Goals expected to be met by 10/6:  1. Pt will tolerate regular consistency solid diet and thin liquids with timely mastication and A-P transit and without overt clinical signs aspiration.   2. Pt will orient x4 ind'ly.   3. Pt will complete simple naming tasks with 70% acc given cues.   4. Pt will follow basic 1-step commands with 70% acc given cues.   5. Pt will answer basic y/n q's with 70% acc ind'ly.   6. Pt will participate in ongoing assessment of cognitive-linguistic skills.   Outcome: Ongoing, Progressing     MARVA Vazquez, CCC-SLP  [phone]  10/5/2020

## 2020-10-05 NOTE — PT/OT/SLP PROGRESS
"Speech Language Pathology Treatment    Patient Name:  Lopez Savage   MRN:  [id number]   [id number]    Admitting Diagnosis: Intracranial septic embolism    Recommendations:                 General Recommendations:  Dysphagia therapy and Speech/language therapy  Diet recommendations:  Regular, Liquid Diet Level: Thin   Aspiration Precautions:  · Pt requires 1:1 supervision with meals to ensure he maintains fully awake and alert state across the duration of an entire meal, as well as to ensure strict and consistent implementation of all aspiration precautions   · Fully awake and alert for PO intake- defer PO intake if pt unable to achieve and maintain fully awake and alert state   · NO STRAWS  · Fully upright position for PO intake  · Small bites/ sips   · Slow rate of eating/ drinking  · 1 bite/ sip @ a time  · Alternate bites/ sips  · Refrain from talking prior to swallow completion   · Discontinue PO upon: coughing, throat clearing, choking, wet vocal quality, wet breath sounds, watery eyes, reddened facial features  General Precautions: Standard, aphasia, fall  Communication strategies:  go to room if call light pushed    Subjective     "I think I better go home with her (wife Cleopatra)." Pleasant confusion evident throughout session.     Pain/Comfort:  · Pain Rating 1: 0/10  · Pain Rating Post-Intervention 1: 0/10    Objective:     Has the patient been evaluated by SLP for swallowing?   Yes  Keep patient NPO? No   Current Respiratory Status: room air      Pt in and out of sleep state upon entry. HOB raised. Drowsy state cont'd to be observed throughout session, as intermittent tactile stimulation warranted via gentle shoulder shake for ongoing participation in session. Pt unable to orient to place, month, or year despite cueing provided. He completed 1/3 word association tasks ind'ly, answered 2/3 long term memory q's ind'ly, and answered 2/3 basic y'n q's ind'ly. Pt observed with multiple cup sips thin water. " Oral phase appeared WFL. Delayed coughing noted x2, appeared likely unrelated to dec'd tolerance for thin liquid given significance of duration of delay prior to occurrence, as well as given pt's wife's report of pt with intermittent coughing throughout the day/ night in the absence of PO intake. However education provided re: confusion as well as drowsy state observed this session concerning for inc'd risk for aspiration. Education also provided re: ongoing strict and consistent implementation of all aspiration precautions listed above and ongoing SLP POC. Indication of pt's understanding unappreciated. However pt's wife verbalized understanding of education provided and agreement with SLP POC. No further questions.     Results discussed with NSG who verbalized understanding of information provided.     Assessment:     Lopez Savage is a 75 y.o. male with an SLP diagnosis of dysphagia and cognitive-linguistic deficits.     Goals:   Multidisciplinary Problems     SLP Goals        Problem: SLP Goal    Goal Priority Disciplines Outcome   SLP Goal     SLP Ongoing, Progressing   Description: Goals expected to be met by 10/6:1. Pt will tolerate regular consistency solid diet and thin liquids with timely mastication and A-P transit and without overt clinical signs aspiration.   2. Pt will orient x4 ind'ly.   3. Pt will complete simple naming tasks with 70% acc given cues.   4. Pt will follow basic 1-step commands with 70% acc given cues.   5. Pt will answer basic y/n q's with 70% acc ind'ly.   6. Pt will participate in ongoing assessment of cognitive-linguistic skills.                    Plan:     · Patient to be seen:  4 x/week   · Plan of Care expires:  10/15/20  · Plan of Care reviewed with:  patient, spouse   · SLP Follow-Up:  Yes       Discharge recommendations:  rehabilitation facility     Time Tracking:     SLP Treatment Date:   10/05/20  Speech Start Time:  0930  Speech Stop Time:  0948     Speech Total Time  (min):  18 min    Billable Minutes: Speech Therapy Individual 10 and Treatment Swallowing Dysfunction 8    MARVA Vazquez, CCC-SLP  [phone]  10/5/2020

## 2020-10-05 NOTE — PLAN OF CARE
Recommendations    1. Continue low sodium diet as tolerated.     2. If po intake <50%, recommend adding Boost Plus BID.     3. RD following.     Goals: continue to consume % of meals by RD follow-up  Nutrition Goal Status: new

## 2020-10-06 NOTE — PROGRESS NOTES
Pharmacokinetic Assessment Follow Up: IV Vancomycin    Vancomycin serum concentration assessment(s):    The random level was drawn correctly and can be used to guide therapy at this time. The measurement is above the desired definitive target range of 15 to 20 mcg/mL.    Vancomycin Regimen Plan: Trough= 21.4  Will continue to hold Vanc today due to supratherapeutic level and next level to be drawn at 0500 on 10/7/20  Re-dose when the random level is less than 20 mcg/mL,    Drug levels (last 3 results):  Recent Labs   Lab Result Units 10/04/20  0943 10/05/20  0450 10/06/20  0450   Vancomycin, Random ug/mL  --  15.2  --    Vancomycin-Trough ug/mL 21.1  --  21.4       Pharmacy will continue to follow and monitor vancomycin.    Please contact pharmacy at extension [phone] for questions regarding this assessment.    Thank you for the consult,   Emanuel Rowell       Patient brief summary:  Lopez Savage is a 75 y.o. male initiated on antimicrobial therapy with IV Vancomycin for treatment of endocarditis    The patient's current regimen is Pulse dosing    Drug Allergies:   Review of patient's allergies indicates:   Allergen Reactions    Codeine     Tetanus vaccines and toxoid        Actual Body Weight:   114 kg    Renal Function:   Estimated Creatinine Clearance: 40.3 mL/min (A) (based on SCr of 2 mg/dL (H)).,     Dialysis Method (if applicable):  N/A    CBC (last 72 hours):  Recent Labs   Lab Result Units 10/04/20  0307 10/05/20  0450 10/06/20  0450   WBC K/uL 9.70 9.01 8.34   Hemoglobin g/dL 8.6* 8.8* 8.9*   Hematocrit % 28.5* 27.6* 29.0*   Platelets K/uL 211 220 204   Gran% % 75.0* 74.3* 72.2   Lymph% % 11.8* 11.5* 12.4*   Mono% % 8.2 7.9 8.6   Eosinophil% % 4.0 5.0 5.2   Basophil% % 0.6 0.7 1.1   Differential Method  Automated Automated Automated       Metabolic Panel (last 72 hours):  Recent Labs   Lab Result Units 10/04/20  0307 10/04/20  1812 10/05/20  0450 10/05/20  1145 10/06/20  0450   Sodium mmol/L 142  --  143 142  142   Potassium mmol/L 3.2*  --  3.4* 3.5 3.5   Chloride mmol/L 103  --  105 105 107   CO2 mmol/L 29  --  28 29 26   Glucose mg/dL 96  --  92 94 96   Glucose, UA   --  Negative  --   --   --    BUN, Bld mg/dL 23  --  23 22 23   Creatinine mg/dL 1.8*  --  2.0* 1.9* 2.0*   Albumin g/dL 2.6*  --  2.6*  --  2.6*   Total Bilirubin mg/dL 0.2  --  0.2  --  0.1   Alkaline Phosphatase U/L 48*  --  52*  --  56   AST U/L 15  --  16  --  19   ALT U/L 14  --  15  --  20       Vancomycin Administrations:  vancomycin given in the last 96 hours                   vancomycin in dextrose 5 % 1 gram/250 mL IVPB 1,000 mg (mg) 1,000 mg New Bag 10/05/20 0830                Microbiologic Results:  Microbiology Results (last 7 days)     Procedure Component Value Units Date/Time    Urine culture [[id number]] Collected: 10/04/20 1812    Order Status: Completed Specimen: Urine Updated: 10/05/20 2228     Urine Culture, Routine No growth    Narrative:      Specimen Source->Urine    CSF culture [[id number]] Collected: 09/25/20 1230    Order Status: Completed Specimen: CSF (Spinal Fluid) from CSF Tap, Tube 1 Updated: 09/30/20 0645     CSF CULTURE No Growth     Gram Stain Result No WBC's      No organisms seen    Narrative:      Add on VDRL By Rosalva Farfan MD Order #[id number]  09/25/2020  17:51

## 2020-10-06 NOTE — PT/OT/SLP PROGRESS
Physical Therapy Treatment    Patient Name:  Lopez Savage   MRN:  [id number]    Recommendations:     Discharge Recommendations:  rehabilitation facility   Discharge Equipment Recommendations: other (see comments)(TBD pending progress)   Barriers to discharge: Inaccessible home and Decreased caregiver support    Assessment:     Lopez Savage is a 75 y.o. male admitted with a medical diagnosis of Intracranial septic embolism.  He presents with the following impairments/functional limitations:  weakness, impaired endurance, impaired self care skills, impaired functional mobilty, gait instability, impaired balance, impaired cognition, decreased coordination, decreased upper extremity function, decreased lower extremity function, decreased safety awareness, impaired coordination, impaired fine motor, impaired cardiopulmonary response to activity Patient tolerated treatment well focusing on bed mobility and sitting balance/tolerance. Patient continues to require assistance and supervision with activity. Patient is motivated to improve each session however still showing the impairments listed above that would continue to improve with skilled physical therapy services in order to return to functional baseline and makes for an excellent inpatient rehab patient.    Rehab Prognosis: Good; patient would benefit from acute skilled PT services to address these deficits and reach maximum level of function.    Recent Surgery: Procedure(s) (LRB):  Lumbar Puncture (N/A) 11 Days Post-Op    Plan:     During this hospitalization, patient to be seen 3 x/week to address the identified rehab impairments via gait training, therapeutic activities, therapeutic exercises, neuromuscular re-education and progress toward the following goals:    · Plan of Care Expires:  10/10/20    Subjective     Chief Complaint: pt with increased lethargy t/o session, spouse stated last night pt was given medicine to help pt sleep  Pain/Comfort:  · Pain Rating  1: 0/10  · Pain Rating Post-Intervention 1: 0/10      Objective:     Communicated with nursing prior to session.  Patient found HOB elevated with telemetry, peripheral IV, pulse ox (continuous) upon PT entry to room.     General Precautions: Standard, fall   Orthopedic Precautions:N/A   Braces: N/A     Functional Mobility:  · Bed Mobility:     · Rolling Left:  moderate assistance  · Rolling Right: moderate assistance  · Scooting: maximal assistance and of 2 persons  · Supine to Sit: maximal assistance and of 2 persons  · Sit to Supine: maximal assistance and of 2 persons  · Transfers:     · Balance: seated EOB 10 minutes static and dynamic while performing ADLs with OT Mod/Min A with occasional CG/SBA with corrected posture      AM-PAC 6 CLICK MOBILITY  Turning over in bed (including adjusting bedclothes, sheets and blankets)?: 2  Sitting down on and standing up from a chair with arms (e.g., wheelchair, bedside commode, etc.): 2  Moving from lying on back to sitting on the side of the bed?: 2  Moving to and from a bed to a chair (including a wheelchair)?: 2  Need to walk in hospital room?: 2  Climbing 3-5 steps with a railing?: 1  Basic Mobility Total Score: 11       Therapeutic Activities and Exercises:  pt with L lateral lean while seated EOB, weight shift to R elbow x 2 CGA to return to midline, pt's seated posture improved following trials, vcs/A for improved hand placement  Discussed/ educated pt and spouse on PT POC, importance of correct/increased change in positioning in bed and LOKI throughout the day  Increased time spent arousing pt t/o session      Patient left with bed in chair position with call button in reach..    GOALS:   Multidisciplinary Problems     Physical Therapy Goals        Problem: Physical Therapy Goal    Goal Priority Disciplines Outcome Goal Variances Interventions   Physical Therapy Goal     PT, PT/OT Ongoing, Progressing     Description: Goals to be met by: 9/28/2020, Goals reviewed on  20 and remain appropriate. Extended to 10/14/2020    Patient will increase functional independence with mobility by performin. Pt will perform bed mobility (rolling L/R, scooting, and bridging) with CGA  2. Pt will perform supine to/from sit with CGA.  3. Pt will sit EOB x 15 mins with B UE support with Rony assistance.  4. Pt will perform sit to stand with max assistance and LRAD -Met 10/5      A. Rony  5. Pt will ambulate 15 feet with max assistance and LRAD.  6. Pt will perform there-ex from handout x 15 reps to improve strength for functional mobility.                         Time Tracking:     PT Received On: 10/06/20  PT Start Time: 922     PT Stop Time: 50  PT Total Time (min): 28 min     Billable Minutes: Therapeutic Activity 28    Treatment Type: Treatment  PT/PTA: PTA     PTA Visit Number: 1     Jordana Deras, PEDRO  10/06/2020

## 2020-10-06 NOTE — PLAN OF CARE
Problem: Occupational Therapy Goal  Goal: Occupational Therapy Goal  Description: Goals to be met by: 10/5/2020 (Extend to 10/12/2020)    Patient will increase functional independence with ADLs by performing:    Pt will follow 75% of motor commands with <2 verbal cues for repetition of task to maximize engagement in functional task. -not met  Pt will sit at EOB for approx 10 minutes with max A and unilateral UE support to complete grooming task -not met  Pt will complete sit>stand from EOB with bed in highest position with mod A in prep for functional transfers to BSC -not met  Pt will perform grooming sitting EOB with mod A -not met     Outcome: Ongoing, Progressing    DARVIN Newman

## 2020-10-06 NOTE — PLAN OF CARE
10/06/20 0944   Post-Acute Status   Post-Acute Authorization Placement   Post-Acute Placement Status Pending Payor Review     [facility] has sent additional SNF referrals as patient spouse only wants hospital placement for this patient and not a nursing home. [facility] has sent a request for [facility] for bed placement for today awaiting a response. [facility] has left a message for Yumiko with N will continue to follow up.    Updated 9:54 am  [facility] contacted [facility] and spoke with Lelo Lind and she reports that [place] coordinator for [facility] will work on it, [facility] will continue to follow up.    Updated 10:07am  [facility] spoke to patient spouse and she is requested placement at possibly [facility], [facility] or [facility].          Kailyn Parra LMSW  [facility]   [phone]

## 2020-10-06 NOTE — PLAN OF CARE
10/06/20 1020   Post-Acute Status   Post-Acute Authorization Placement   Post-Acute Placement Status Authorization Obtained     Patient has received insurance auth from PHN [id number].    SW has left a voicemail for Bianca with [facility] awaiting a call back.       Updated 12:18 pm  SW has contacted [facility] and left a vm for admissions. SW also left a voicemail with admissions for [facility] awaiting a call back.    Updated 3:29pm   SW has been communicating with Wilbur, coordinator with [facility] and patient referral is under review. SW awaiting decision in regard of placement possibly tomorrow for patient. SW also updated patient spouse in regard to the discharge plan of placement and informed that insurance auth has been obtained through [facility]. [facility] will continue to follow up with [facility] and [facility]. SW will have to notify [facility] when patient has an accepting SNF.     Kailyn Parra LMSW  [facility]   [phone]

## 2020-10-06 NOTE — NURSING
End of shift note    Patient remains confused  RA  CXR- see results  Lasix IV x1  Voiding per attends   VSS  NADN

## 2020-10-06 NOTE — PT/OT/SLP PROGRESS
Occupational Therapy   Treatment    Name: Lopez Savage  MRN: [id number]  Admitting Diagnosis:  Intracranial septic embolism  11 Days Post-Op    Recommendations:     Discharge Recommendations: rehabilitation facility  Discharge Equipment Recommendations:  (TBD pending progress)  Barriers to discharge:  (Increased assistance at current functional level)    Assessment:     Lopez Savage is a 75 y.o. male with a medical diagnosis of Intracranial septic embolism.  He presents with the following performance deficits affecting function: weakness, impaired functional mobilty, impaired cognition, impaired endurance, gait instability, impaired self care skills, impaired balance, decreased upper extremity function, decreased lower extremity function, decreased coordination, decreased safety awareness, impaired coordination, impaired fine motor, impaired cardiopulmonary response to activity. Pt tolerated therapy session well this date and continues to be disoriented to time. Pt asleep upon entry and required constant verbal and physical cues throughout therapy session to awaken/alert. Pt completed bed mobility with max A x 2 persons, EOB sitting for 10 mins with max assistance for sitting balance transitioning to min A with bouts of SBA, and ADLs sitting EOB with mod A. At this time, OT is recommending pt d/c to rehab facility due to pt's increased assistance required to complete ADLs and functional mobility safely. Pt should continue receiving skilled OT services to promote independence and safety during completion of functional mobility and ADL activities.      Rehab Prognosis:  Good; patient would benefit from acute skilled OT services to address these deficits and reach maximum level of function.       Plan:     Patient to be seen 4 x/week to address the above listed problems via self-care/home management, therapeutic activities, therapeutic exercises, neuromuscular re-education  · Plan of Care Expires: 10/13/20  · Plan  of Care Reviewed with: patient, spouse    Subjective     Pain/Comfort:  · Pain Rating 1: 0/10    Objective:     Communicated with: RN prior to session.  Patient found supine with peripheral IV, pulse ox (continuous), telemetry upon OT entry to room. Pt agreeable to therapy session    General Precautions: Standard, fall   Orthopedic Precautions:N/A   Braces: N/A     Occupational Performance:     Bed Mobility:    · Patient completed Rolling/Turning to Left and Right with moderate assistance  · Pt able to assist by reaching across to initiate rolling   · Patient completed Scooting/Bridging anteriorly towards EOB with maximal assistance  · Patient completed Scooting/Bridging with max A x 2 persons via drawsheet transfer towards HOB  · Patient completed Supine to Sit with maximal assistance and 2 persons  · Assistance provided for trunk elevation and BLE management  · Patient completed Sit to Supine with maximal assistance and 2 persons   · Assistance provided for trunk descent and BLE management    Functional Mobility/Transfers:  · Deferred due to increased assistance required for bed mobility     Activities of Daily Living:  · Grooming: moderate assistance for oral care and face washing while sitting unsupported at EOB  · Assistance provided for fine motor components  · MARCELA required to initiate grooming tasks   · Verbal cues provided for task termination of oral care  · Pt falling asleep while completing grooming  · Verbal cues provided to maintain pt's alertness     EOB Sitting:  -Pt tolerated sitting EOB for ~10 mins initially requiring max assistance for sitting balance transitioning to min A with bouts of SBA  -Pt combleted x2 trials of weight shifting laterally to R on forearm to encourage increased postural alignment at midline upon return   -Pt completed ADLs sitting EOB as stated above    [facility] 6 Click ADL: 12    Treatment & Education:  -Pt educated on role of OT, POC, and goals for therapy  -Pt educated on  importance of OOB activities to improve activity tolerance to maintain functional strength  -Pt verbalized understanding and expressed no further questions or concerns  -OT played favorite music genre in order to improve motivation and therapeutic engagement in functional task  -Pt left with bed in chair position with BUE abducted and supported on pillows  -Whiteboard updated     Patient left with bed in chair position with all lines intact, call button in reach and wife presentEducation:      GOALS:   Multidisciplinary Problems     Occupational Therapy Goals        Problem: Occupational Therapy Goal    Goal Priority Disciplines Outcome Interventions   Occupational Therapy Goal     OT, PT/OT Ongoing, Progressing    Description: Goals to be met by: 10/5/2020 (Extend to 10/12/2020)    Patient will increase functional independence with ADLs by performing:    Pt will follow 75% of motor commands with <2 verbal cues for repetition of task to maximize engagement in functional task. -not met  Pt will sit at EOB for approx 10 minutes with max A and unilateral UE support to complete grooming task -not met  Pt will complete sit>stand from EOB with bed in highest position with mod A in prep for functional transfers to C -not met  Pt will perform grooming sitting EOB with mod A -not met                               Time Tracking:     OT Date of Treatment: 10/06/20  OT Start Time: 0923  OT Stop Time: 0947  OT Total Time (min): 24 min (Co-treat with PT)    Billable Minutes:Self Care/Home Management 10  Neuromuscular Re-education 14    DARVIN Newman  10/6/2020

## 2020-10-07 NOTE — PROGRESS NOTES
Pharmacokinetic Assessment Follow Up: IV Vancomycin    Vancomycin serum concentration assessment(s):    The random level was drawn correctly and can be used to guide therapy at this time. The measurement is within the desired definitive target range of 15  to 20 mcg/mL.    Vancomycin Regimen Plan:    Continue regimen to pulse dosing of Vancomycin 1000 mg IV once with next serum trough concentration measured at 1400 on 10/8.     Drug levels (last 3 results):  Recent Labs   Lab Result Units 10/05/20  0450 10/06/20  0450 10/07/20  0334   Vancomycin, Random ug/mL 15.2  --  15.7   Vancomycin-Trough ug/mL  --  21.4  --        Pharmacy will continue to follow and monitor vancomycin.    Please contact pharmacy at extension [phone] for questions regarding this assessment.    Thank you for the consult,   Reed Rueda       Patient brief summary:  Lopez Savage is a 75 y.o. male initiated on antimicrobial therapy with IV Vancomycin for treatment of endocarditis    The patient's current regimen is pulse dosing.     Drug Allergies:   Review of patient's allergies indicates:   Allergen Reactions    Codeine     Tetanus vaccines and toxoid        Actual Body Weight:   113.9kg    Renal Function:   Estimated Creatinine Clearance: 36.7 mL/min (A) (based on SCr of 2.2 mg/dL (H)).,     Dialysis Method (if applicable):  N/A    CBC (last 72 hours):  Recent Labs   Lab Result Units 10/05/20  0450 10/06/20  0450 10/07/20  0334   WBC K/uL 9.01 8.34 10.32   Hemoglobin g/dL 8.8* 8.9* 9.3*   Hematocrit % 27.6* 29.0* 30.9*   Platelets K/uL 220 204 214   Gran% % 74.3* 72.2 75.9*   Lymph% % 11.5* 12.4* 10.5*   Mono% % 7.9 8.6 8.1   Eosinophil% % 5.0 5.2 4.5   Basophil% % 0.7 1.1 0.7   Differential Method  Automated Automated Automated       Metabolic Panel (last 72 hours):  Recent Labs   Lab Result Units 10/04/20  1812 10/05/20  0450 10/05/20  1145 10/06/20  0450 10/07/20  0334   Sodium mmol/L  --  143 142 142 143   Potassium mmol/L  --  3.4* 3.5  3.5 3.5   Chloride mmol/L  --  105 105 107 104   CO2 mmol/L  --  28 29 26 28   Glucose mg/dL  --  92 94 96 94   Glucose, UA  Negative  --   --   --   --    BUN, Bld mg/dL  --  23 22 23 22   Creatinine mg/dL  --  2.0* 1.9* 2.0* 2.2*   Albumin g/dL  --  2.6*  --  2.6* 2.8*   Total Bilirubin mg/dL  --  0.2  --  0.1 0.1   Alkaline Phosphatase U/L  --  52*  --  56 61   AST U/L  --  16  --  19 19   ALT U/L  --  15  --  20 20       Vancomycin Administrations:  vancomycin given in the last 96 hours                   vancomycin in dextrose 5 % 1 gram/250 mL IVPB 1,000 mg (mg) 1,000 mg New Bag 10/05/20 0830                Microbiologic Results:  Microbiology Results (last 7 days)     Procedure Component Value Units Date/Time    Urine culture [[id number]] Collected: 10/04/20 1812    Order Status: Completed Specimen: Urine Updated: 10/05/20 2228     Urine Culture, Routine No growth    Narrative:      Specimen Source->Urine

## 2020-10-07 NOTE — PLAN OF CARE
4:11 PM     SW sent updated clinicals to SNF. Awaiting updated labs from tx team.     SW spoke with pt wife at length about current accepting facilities and difference in level of care. Answered questions as presented.     Will f/u with facilities and spouse in Am.        Nano Martins LMSW  Case Management Social Worker   [facility] [address]

## 2020-10-07 NOTE — PROGRESS NOTES
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 Despite CXR findings, echo confirms that CVP is 3. Will administer IV fluids. He has no complaints and wife feels that he is doing well today. Wheezing has resolved Awaiting SNF bed.     Review of Systems   Constitutional: Positive for malaise/fatigue. Negative for chills, fever and weight loss.   Respiratory: Negative for cough, hemoptysis, sputum production, shortness of breath and wheezing.    Cardiovascular: Negative for chest pain, palpitations, orthopnea, leg swelling and PND.   Neurological: Positive for weakness. Negative for focal weakness.   All 12 systems otherwise negative.    History reviewed. No pertinent past medical history.     Social History     Socioeconomic History    Marital status:      Spouse name: Not on file    Number of children: Not on file    Years of education: Not on file    Highest education level: Not on file   Occupational History    Not on file   Social Needs    Financial resource strain: Not on file    Food insecurity     Worry: Not on file     Inability: Not on file    Transportation needs     Medical: Not on file     Non-medical: Not on file   Tobacco Use    Smoking status: Not on file   Substance and Sexual Activity    Alcohol use: Not on file    Drug use: Not on file    Sexual activity: Not on file   Lifestyle    Physical activity     Days  per week: Not on file     Minutes per session: Not on file    Stress: Not on file   Relationships    Social connections     Talks on phone: Not on file     Gets together: Not on file     Attends Religion service: Not on file     Active member of club or organization: Not on file     Attends meetings of clubs or organizations: Not on file     Relationship status: Not on file   Other Topics Concern    Not on file   Social History Narrative    Not on file     History reviewed. No pertinent family history.      Objective:     Vital Signs (Most Recent):  Temp: 98.4 °F (36.9 °C) (10/07/20 0900)  Pulse: 84 (10/07/20 1200)  Resp: (!) 25 (10/07/20 1200)  BP: (!) 154/67 (10/07/20 1200)  SpO2: 98 % (10/07/20 1200) Vital Signs (24h Range):  Temp:  [97.9 °F (36.6 °C)-98.5 °F (36.9 °C)] 98.4 °F (36.9 °C)  Pulse:  [] 84  Resp:  [18-36] 25  SpO2:  [84 %-99 %] 98 %  BP: (144-162)/(63-95) 154/67     Weight: 113.9 kg (251 lb)  Body mass index is 37.07 kg/m².    Intake/Output Summary (Last 24 hours) at 10/7/2020 1337  Last data filed at 10/7/2020 0100  Gross per 24 hour   Intake 340 ml   Output --   Net 340 ml      Physical Exam  Constitutional:       General: He is not in acute distress.     Appearance: Normal appearance. He is not diaphoretic.   Eyes:      General: Lids are normal. No scleral icterus.        Right eye: No discharge.         Left eye: No discharge.      Conjunctiva/sclera: Conjunctivae normal.   Neck:      Trachea: Phonation normal.   Cardiovascular:      Rate and Rhythm: Normal rate.   Pulmonary:      Effort: Pulmonary effort is normal. No tachypnea, accessory muscle usage or respiratory distress.   Abdominal:      General: There is no distension.   Neurological:      Mental Status: He is alert and oriented to person, place, and time.   Psychiatric:         Behavior: Behavior normal.         Thought Content: Thought content normal.         Significant Labs:     Recent Labs   Lab 09/08/20  4351  09/14/20  0338   HGBA1C 4.8 4.4     No results for input(s): POCTGLUCOSE in the last 48 hours.  Recent Labs   Lab 09/14/20  0338   TSH 2.301     Recent Labs   Lab 10/05/20  0450 10/06/20  0450 10/07/20  0334   WBC 9.01 8.34 10.32   HGB 8.8* 8.9* 9.3*   HCT 27.6* 29.0* 30.9*    204 214     Recent Labs   Lab 10/05/20  0450 10/06/20  0450 10/07/20  0334   GRAN 74.3*  6.7 72.2  6.0 75.9*  7.8*   LYMPH 11.5*  1.0 12.4*  1.0 10.5*  1.1   MONO 7.9  0.7 8.6  0.7 8.1  0.8   EOS 0.5 0.4 0.5     Recent Labs   Lab 10/01/20  0406  10/05/20  0450 10/05/20  1145 10/06/20  0450 10/07/20  0334      < > 143 142 142 143   K 3.0*   < > 3.4* 3.5 3.5 3.5      < > 105 105 107 104   CO2 30*   < > 28 29 26 28   BUN 32*   < > 23 22 23 22   CREATININE 1.7*   < > 2.0* 1.9* 2.0* 2.2*   GLU 99   < > 92 94 96 94   CALCIUM 9.6   < > 9.8 9.8 9.7 10.0   ALBUMIN 2.5*   < > 2.6*  --  2.6* 2.8*   MG 1.7  --   --   --   --   --    PHOS 4.9*  --   --   --   --   --     < > = values in this interval not displayed.     Recent Labs   Lab 10/05/20  0450 10/06/20  0450 10/07/20  0334   ALKPHOS 52* 56 61   ALT 15 20 20   AST 16 19 19   PROT 7.0 6.7 7.4   BILITOT 0.2 0.1 0.1     Recent Labs   Lab 09/08/20  0149 09/22/20  1522 09/26/20  1339   LACTATE 2.0 0.5  --    FERRITIN  --   --  1,261*     SARS-CoV-2 RNA, Amplification, Qual (no units)   Date Value   09/24/2020 Negative   09/08/2020 Negative           Results for orders placed during the hospital encounter of 09/08/20   Echo Color Flow Doppler? Yes    Narrative · Mild left ventricular enlargement.  · Moderate mitral regurgitation.  · Intermediate central venous pressure (8 mmHg).  · The estimated PA systolic pressure is 28 mmHg.  · Low normal left ventricular systolic function. The estimated ejection   fraction is 50%.  · Normal LV diastolic function.  · Normal right ventricular systolic function.  · Mild aortic regurgitation.          Echo Color Flow Doppler? Yes  · The left  ventricle is normal in size with low normal systolic function.   The estimated ejection fraction is 50%.  · Normal right ventricular systolic function.  · Normal left ventricular diastolic function.  · Moderate eccentric mitral regurgitation.  · There is a mobile echodensity attached to the posterior leaflet of the   mitral valve. It is approximately 1cm in length at its greatest dimension   and its appearance is concerning for a vegetation.  · Normal central venous pressure (3 mmHg).           Assessment/Plan:      Active Diagnoses:    Diagnosis Date Noted POA    PRINCIPAL PROBLEM:  Intracranial septic embolism [I76, I66.9] 09/15/2020 Yes    Epistaxis [R04.0] 09/29/2020 No    Palliative care encounter [Z51.5] 09/23/2020 Not Applicable    Goals of care, counseling/discussion [Z71.89]  Not Applicable    Advance care planning [Z71.89]  Not Applicable    Debility [R53.81] 09/16/2020 Yes    Oral phase dysphagia [R13.11] 09/16/2020 Yes    Seizure [R56.9]  Yes    Brain abscess [G06.0] 09/15/2020 Yes    Status epilepticus [G40.901] 09/13/2020 Yes    Cerebellar stroke [I63.9] 09/13/2020 Yes    Cytotoxic brain edema [G93.6] 09/13/2020 Yes    Encephalopathy acute [G93.40] 09/13/2020 Yes    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] 09/08/2020 Yes    Essential hypertension [I10] 09/08/2020 Yes     Chronic    Embolic stroke involving left posterior cerebral artery [I63.432] 09/08/2020 Yes    Chronic bronchitis [J42] 09/08/2020 Yes     Chronic    Ulcerative colitis [K51.90] 09/08/2020 Yes     Chronic      Problems Resolved During this Admission:       Overview / ICU Course:    Lopez Savage is a 75 y.o. male admitted for Intracranial septic embolism.    Inpatient Medications Prescribed for Management of Current Problems:     Scheduled Meds:    albuterol-ipratropium  3 mL Nebulization Q4H    atorvastatin  40 mg Oral Daily    cefTRIAXone (ROCEPHIN) IVPB  2 g Intravenous Q12H    enoxaparin  40 mg Subcutaneous Q24H     fluticasone furoate-vilanteroL  1 puff Inhalation Daily    lacosamide  200 mg Oral Q12H    Lactobacillus rhamnosus GG  1 capsule Oral BID    miconazole nitrate 2%   Topical (Top) BID    mupirocin   Nasal BID    pantoprazole  40 mg Oral Daily    silodosin  4 mg Oral Daily    sodium chloride  1 spray Each Nostril Q4H While awake    sodium chloride 0.9%  10 mL Intravenous Q6H     Continuous Infusions:   As Needed: acetaminophen, albuterol-ipratropium, haloperidoL, labetalol, melatonin, psyllium husk (aspartame), sodium chloride 0.9%, Pharmacy to dose Vancomycin consult **AND** vancomycin - pharmacy to dose      Imaging reviewed    ECHO reviewed:  · Mild left ventricular enlargement.  · Moderate mitral regurgitation.  · Intermediate central venous pressure (8 mmHg).  · The estimated PA systolic pressure is 28 mmHg.  · Low normal left ventricular systolic function. The estimated ejection fraction is 50%.  · Normal LV diastolic function.  · Normal right ventricular systolic function.  · Mild aortic regurgitation.    Prior records reviewed.      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 TTE 8/14 with vegetation of aortic valve. SILVANA 8/17 with an 18 mm mobile vegetation  of both the mitral and aortic valves. CTS saw pt then, no surgical intervention. Has completed 6 weeks of IV ceftriaxone, end date 9/15/2020.       MRI here with numerous scattered punctate foci of cortical enhancement throughout both cerebral hemispheres.  Additional rim enhancing collection in the right occipital lobe with central diffusion restriction and surrounding vasogenic edema.  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 NSGY consulted - no acute surgical intervention. Repeat MRI with decrease in size of right occipital abscess.   LP studies  WBC 21, RBC 6, protein 31, gluc 56. HSV,  RPR, FTA ab, VDRL studies negative. CSF gram stain and cultures NGTD. Ampicillin discontinued.      BCXs:  9/8 1/4 CONS - suspect contaminant  9/10 - NGTD  9/14 NGTD  9/22 NGTD     ID recommendations  -Continue ceftriaxone 2g q12hr  -Continue vancomycin. Trough goal closer to 20  -Anticipate 6-8 weeks of IV vancomycin and ceftriaxone with repeat imaging to assess resolution of brain abscess. End date of IV antibiotics: 10/27/2020 -11/10/2020 with weekly CBC CMP ESR CRP VANC TROUGH SENT TO ID CLINIC  [phone].   -Start probiotics     HENRY: Cr 2.2 today secondary to diuretic. Baseline closer to 1.1. CVP confirmed to be 3 on echo today. Will give a liter of fluids and monitor improvement.     Encephalopathy acute  - Likely multifactorial - due to stroke, brain abscess, cytotoxic edema, delirium   - Stroke team believes repeat MRI is stable   - General neurology consulted. Recommended LP and EEG.    - EEG showed no current seizure activity      - LP unremarkable.  - Improved. Patient is currently alert and oriented.  - Continue delirium precautions     Status epilepticus  - Multiple witnessed seizure-like activity described  - No previous reports from family  - EEG at OSH shows irregular slowing in the R frontal region, with L posterior periodic epileptiform discharges. No seizures recorded during this study.  - 9/13 [facility] [place] - periodic discharges in the left posterior region with no seizures.  - Neurology was consulted.  - Continue Vimpat 200 mg BID & seizure precautions. No further evidence of seizures at this time.      Cerebellar stroke/ embolic stroke involving left posterior cerebral artery  On 9/8 pt presented to [facility] with unresponsiveness, hypoxia requiring intubation, and seizure like activity. Initial CT with no early infarct signs or hemorrhage. No tPA due to recent GI bleed. MRI with left PCA infarct and left thalamic infarct along with small right PCA infarct. MRA demonstrates fetal circulation to  left PCA. Two different distributions makes a cardioembolic source most likely. Given his recent history of endocarditis this is the likely etiology. Left PCA attenuation could be due to recent seizures. Right infarct with significant vasogenic edema more likely due to septic embolus. Pt extubated 9/11 with concerns for L MCA infarct due to exam. Repeat MRI new acute/subacute L cerebellum infarct; SWI suggest CAA +/- component of chronic hypertensive arteriopathy. Continue medical management - ASA , statin, PT OT SLP, BP control      Oral phase dysphagia, resolved  Required NGT. MBSS done; advanced to regular diet and NG tube removed. Continue ST     Chronic bronchitis  Duonebs scheduled q4hrs for wheezing which has resolved.   Continue CPT, pulmonary toileting  Resume home Breo  Encourage IS q2h     Iron deficiency anemia  - Transfused 1 unit of PRBC on 9/26       - Received IV iron. H/H stable.      Diet: Diet Adult Regular (IDDSI Level 7) [facility]; Low Sodium,2gm    Goals of Care:    Previous admission:  9/8/20  Likely prognosis:  Fair  Code Status: Full Code  Comfort Only: No  Hospice: No  Goals at discharge: remain at home, with caregiver    Discharge Planning   ANA: 10/8/2020    Code Status: Full Code   Is the patient medically ready for discharge?: No    Reason for patient still in hospital (select all that apply): Patient trending condition and Pending disposition  Discharge Plan A: SNF  Discharge Delays: None known at this time    VTE Risk Mitigation (From admission, onward)         Ordered     enoxaparin injection 40 mg  Every 24 hours      09/23/20 1350     IP VTE HIGH RISK PATIENT  Once      09/13/20 1518     Place sequential compression device  Until discontinued      09/13/20 1119                   Kaylee Taveras MD  Department of Hospital Medicine   [facility] - ICU 14 [facility]

## 2020-10-07 NOTE — CARE UPDATE
Rapid Response Nurse Chart Check     Chart check completed, abnormal VS noted. Bedside RN, Keli contacted. No concerns verbalized at this time. Instructed to call [phone] for further concerns or assistance.
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
## 2020-10-08 PROBLEM — Z51.5 PALLIATIVE CARE ENCOUNTER: Status: RESOLVED | Noted: 2020-01-01 | Resolved: 2020-01-01

## 2020-10-08 PROBLEM — N17.9 AKI (ACUTE KIDNEY INJURY): Status: ACTIVE | Noted: 2020-01-01

## 2020-10-08 PROBLEM — R04.0 EPISTAXIS: Status: RESOLVED | Noted: 2020-01-01 | Resolved: 2020-01-01

## 2020-10-08 PROBLEM — G40.901 STATUS EPILEPTICUS: Status: RESOLVED | Noted: 2020-01-01 | Resolved: 2020-01-01

## 2020-10-08 NOTE — PLAN OF CARE
Patient not medically stable for discharge. Received a dose of lasix with increase Cr 2 > 2.2, administer IV fluids.   HENRY: Cr 2.1 today from 2.2 yesterday.  When medically ready, anticipate discharge to SNF - Awaiting SNF bed.    CM to follow for discharge planning needs.  MEGHANN Tejeda RN  Case Management  [id number]      10/08/20 0938   Discharge Reassessment   Assessment Type Discharge Planning Reassessment   Provided patient/caregiver education on the expected discharge date and the discharge plan Yes   Do you have any problems affording any of your prescribed medications? TBD   Discharge Plan A Skilled Nursing Facility   Discharge Plan B Home with family;Home Health   DME Needed Upon Discharge  other (see comments)  (TBD)   Anticipated Discharge Disposition SNF   Can the patient/caregiver answer the patient profile reliably? Yes, cognitively intact   Describe the patient's ability to walk at the present time. Minor restrictions or changes   Number of comorbid conditions (as recorded on the chart) Four   Post-Acute Status   Post-Acute Authorization Placement   Post-Acute Placement Status Awaiting Internal Medical Clearance   Discharge Delays (!) Patient and Family Barriers

## 2020-10-08 NOTE — PLAN OF CARE
10/08/20 1107   Post-Acute Status   Post-Acute Authorization Placement   Post-Acute Placement Status Referrals Sent       SW sent skilled orders to [facility].    Nano Martins LMSW  Case Management Social Worker   [facility] [address]

## 2020-10-08 NOTE — PLAN OF CARE
Patient discharged to [facility], room 310.  SW set transport and  informed wife who is at pt's bedside.  Nurse to call report to .    Future Appointments   Date Time Provider Department Center   10/27/2020 11:30 AM AMERICA Alonzo, ANP [facility] [place]   11/10/2020  1:00 PM Kodak Dejesus MD [facility] [place]        CM to follow for discharge planning needs.  MEGHANN Tejeda RN  Case Management  [phone]           10/08/20 1642   Final Note   Assessment Type Final Discharge Note   Anticipated Discharge Disposition SNF  ([facility])   Hospital Follow Up  Appt(s) scheduled? Yes   Discharge plans and expectations educations in teach back method with documentation complete? Yes   Post-Acute Status   Post-Acute Authorization Placement   Post-Acute Placement Status Set-up Complete   Discharge Delays None known at this time

## 2020-10-08 NOTE — PLAN OF CARE
[facility]     Department of Hospital Medicine     [address] [place] [address]     [phone] after hours  [phone] fax       NURSING HOME ORDERS    10/08/2020    Admit to Nursing Home:  Skilled Bed        Diagnoses:  Active Hospital Problems    Diagnosis  POA    *Intracranial septic embolism [I76, I66.9]  Yes    Advance care planning [Z71.89]  Not Applicable    Debility [R53.81]  Yes    Oral phase dysphagia [R13.11]  Yes    Seizure [R56.9]  Yes    Brain abscess [G06.0]  Yes    Cerebellar stroke [I63.9]  Yes    Cytotoxic brain edema [G93.6]  Yes    Encephalopathy acute [G93.40]  Yes    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]  Yes     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      Essential hypertension [I10]  Yes     Chronic    Embolic stroke involving left posterior cerebral artery [I63.432]  Yes    Chronic bronchitis [J42]  Yes     Chronic    Ulcerative colitis [K51.90]  Yes     Chronic      Resolved Hospital Problems    Diagnosis Date Resolved POA    Epistaxis [R04.0] 10/08/2020 No    Palliative care encounter [Z51.5] 10/08/2020 Not Applicable    Goals of care, counseling/discussion [Z71.89] 10/08/2020 Not Applicable    Status epilepticus [G40.901] 10/08/2020 Yes       Patient is homebound due to:  Intracranial septic embolism    Allergies:  Review of patient's allergies indicates:   Allergen Reactions    Codeine     Tetanus vaccines and toxoid        Vitals:       Every shift     Diet: Regular, Liquid Diet Level: Thin   Aspiration Precautions:  · Pt requires 1:1 supervision with meals to ensure he maintains fully awake and alert state across the duration of an entire meal, as well as to ensure strict and consistent implementation of all aspiration precautions   · Fully awake and alert for PO intake- defer PO intake if pt unable to achieve and maintain fully awake and alert state   · NO STRAWS  · Fully upright position for PO  intake  · Small bites/ sips   · Slow rate of eating/ drinking  · 1 bite/ sip @ a time  · Alternate bites/ sips  · Refrain from talking prior to swallow completion   Discontinue PO upon: coughing, throat clearing, choking, wet vocal quality, wet breath sounds, watery eyes, reddened facial features    Acitivities:   - Up in a chair each morning as tolerated   - Ambulate with assistance to bathroom   - Weight bearing: as tolerated    LABS:    weekly CBC CMP ESR CRP VANC TROUGH SENT TO [facility]  [phone].     Nursing Precautions:     - Aspiration precautions:             - Total assistance with meals            -  Upright 90 degrees befor during and after meals             -  Suction at bedside          - Fall precautions per nursing home protocol   - Seizure precaution per snf protocol   - Decubitus precautions:        -  for positioning   - Pressure reducing foam mattress   - Turn patient every two hours. Use wedge pillows to anchor patient    CONSULTS:     Physical Therapy to evaluate and treat     Occupational Therapy to evaluate and treat     Speech Therapy  to evaluate and treat     Nutrition to evaluate and recommend diet     Psychiatry to evaluate and follow patients for delirium    MISCELLANEOUS CARE:    N/A                     Medications: Discontinue all previous medication orders, if any. See new list below.     Lopez Savage   Home Medication Instructions ABDOULAYE:[id number]    Printed on:10/08/20 3828   Medication Information                      albuterol-ipratropium (DUO-NEB) 2.5 mg-0.5 mg/3 mL nebulizer solution  Take 3 mLs by nebulization every 4 (four) hours as needed for Wheezing or Shortness of Breath. Rescue             amLODIPine (NORVASC) 5 MG tablet  Take 1 tablet (5 mg total) by mouth once daily.             atorvastatin (LIPITOR) 40 MG tablet  Take 1 tablet (40 mg total) by mouth once daily.             cefTRIAXone (ROCEPHIN) 2 g/50 mL PgBk IVPB  Inject 50 mLs (2 g total)  into the vein every 12 (twelve) hours. for 19 days             ergocalciferol, vitamin D2, (VITAMIN D ORAL)  Take 1 capsule by mouth once daily.             fluticasone furoate-vilanteroL (BREO) 100-25 mcg/dose diskus inhaler  Inhale 1 puff into the lungs once daily. Controller             insulin aspart U-100 (NOVOLOG) 100 unit/mL (3 mL) InPn pen  Inject 0-5 Units into the skin every 6 (six) hours as needed (Hyperglycemia).             lacosamide (VIMPAT) 200 mg Tab tablet  Take 1 tablet (200 mg total) by mouth every 12 (twelve) hours.             Lactobacillus rhamnosus GG (CULTURELLE) 10 billion cell capsule  Take 1 capsule by mouth 2 (two) times daily.             miconazole nitrate 2% (MICOTIN) 2 % Oint  Apply topically 2 (two) times daily. Buttocks: BID/prn clean with bathing cloth, apply barrier cream with miconazole.  Left and right scalp skin breakdown: BID clean with sterile normal saline, apply barrier cream with miconazole.             pantoprazole (PROTONIX) 40 MG tablet  Take 40 mg by mouth 2 (two) times daily.             vancomycin HCl (VANCOMYCIN 1 G/250 ML D5W, READY TO MIX SYSTEM,)  Inject 250 mLs (1 g total) into the vein every 48 hours. for 19 days                       _________________________________  Sami Claire MD  10/08/2020

## 2020-10-08 NOTE — PT/OT/SLP PROGRESS
Occupational Therapy   Treatment    Name: Lopez Savage  MRN: [id number]  Admitting Diagnosis:  Intracranial septic embolism  13 Days Post-Op    Recommendations:     Discharge Recommendations: nursing facility, skilled  Discharge Equipment Recommendations:  bedside commode, bath bench  Barriers to discharge:  Decreased caregiver support    Assessment:     Lopez Savage is a 75 y.o. male with a medical diagnosis of Intracranial septic embolism.  Performance deficits affecting function are weakness, impaired endurance, impaired self care skills, impaired functional mobilty, impaired balance, impaired cognition, decreased coordination, decreased lower extremity function, decreased safety awareness, impaired cardiopulmonary response to activity.     Rehab Prognosis:  Good; patient would benefit from acute skilled OT services to address these deficits and reach maximum level of function.       Plan:     Patient to be seen 4 x/week to address the above listed problems via self-care/home management, therapeutic activities, therapeutic exercises, neuromuscular re-education  · Plan of Care Expires: 10/31/20  · Plan of Care Reviewed with: patient, spouse    Subjective     Pain/Comfort:  · Pain Rating 1: 0/10    Objective:     Communicated with: RN and Spouse prior to session.  Patient found supine with peripheral IV, oxygen, pulse ox (continuous), telemetry upon OT entry to room.    General Precautions: Standard, aspiration, fall   Orthopedic Precautions:N/A   Braces: N/A     Occupational Performance:     Bed Mobility:    · Patient completed Rolling/Turning to Left with  maximal assistance  · Patient completed Rolling/Turning to Right with total assistance     Functional Mobility/Transfers:  · Patient unable to engage 2* max lethargy    Activities of Daily Living:  · Feeding:  discouraged 2* max lethargy, mild/mod disorientation, high aspiration risk unless Patient alert, fully orieted and in high seated position. Spouse  dempnstrates VERY GOOD understanding, agreeable to tray hold at time of visit.     · Upper Body Dressing: maximal assistance    · Lower Body Dressing: total assistance        [facility] 6 Click ADL: 8    Treatment & Education:  Aspiration Precautions:   Upright 90 degrees befor during and after meals   Patient to be awake/alert before oral intake   stimuli minimized while eating   feeding supervision/assistance to be provided   slow intake, small bites/sips to be encouraged   upright posture/appropriate position to be encouraged/maintained   straw use to be monitored   mouth checked for residue/pocketing  Interventions:   *OT provided light adjusted, clutter free environment  *Adaptive device, personal items w/in reach  *Patient awake and alert before intake  *Appropriate positioning encouraged/provided.   SKILLED EDUCATION WAS PROVIDED TO PATIENT FOR:    *Patient education provided re: role of OT, and OTreatment rationales / protocols  *Importance of OOB activities daily in conjunction w/ edu related to importance of performing BUE AROM exercises even while in bed.  *Patient agreeable to therapy session.  *Lights turned on / shade open for session   *Basic self-care tasks and rudimentary functional mobility undertaken -- assist levels noted above, Safe swallow ensured, positioned on high fowlers.  *General in room safety and (S)'d mobility advised, Call button use reviewed  *Communication board up to date, questions/concerns within OT scope addressed  Pt continues to benefit from a collaborative therapeutic program to improve quality of life and sustain focus on impairment resoltion.  Patient progressing steadily towards goals at this time per all reports.       Patient left HOB elevated with all lines intact, call button in reach, RN notified and Spouse presentEducation:      GOALS:   Multidisciplinary Problems     Occupational Therapy Goals        Problem: Occupational Therapy Goal    Goal Priority Disciplines  Outcome Interventions   Occupational Therapy Goal     OT, PT/OT Ongoing, Progressing    Description: Goals to be met by: 10/5/2020 (Extend to 10/12/2020)    Patient will increase functional independence with ADLs by performing:    Pt will follow 75% of motor commands with <2 verbal cues for repetition of task to maximize engagement in functional task. -not met  Pt will sit at EOB for approx 10 minutes with max A and unilateral UE support to complete grooming task -not met  Pt will complete sit>stand from EOB with bed in highest position with mod A in prep for functional transfers to [facility] -not met  Pt will perform grooming sitting EOB with mod A -not met                               Time Tracking:     OT Date of Treatment: 10/08/20  OT Start Time: 1301  OT Stop Time: 1319  OT Total Time (min): 18 min    Billable Minutes:Self Care/Home Management 18    ZACH Robbins  10/8/2020

## 2020-10-08 NOTE — PLAN OF CARE
Problem: Adult Inpatient Plan of Care  Goal: Plan of Care Review  10/8/2020 1807 by Gerald Gaxiola RN  Outcome: Met  10/8/2020 1414 by Gerald Gaxiola RN  Outcome: Ongoing, Progressing  Goal: Patient-Specific Goal (Individualization)  Description: Admit Date:     Admit Dx:    No past medical history on file.    No past surgical history on file.    Individualization:   1.     Restraints: (date/time/why or N/A)  9/17/20 pulling at lines        10/8/2020 1807 by Gerald Gaxiola RN  Outcome: Met  10/8/2020 1414 by Gerald Gaxiola RN  Outcome: Ongoing, Progressing  Goal: Absence of Hospital-Acquired Illness or Injury  10/8/2020 1807 by Gerald Gaxiola RN  Outcome: Met  10/8/2020 1414 by Gerald Gaxiola RN  Outcome: Ongoing, Progressing  Goal: Optimal Comfort and Wellbeing  10/8/2020 1807 by Gerald Gaxiola RN  Outcome: Met  10/8/2020 1414 by Gerald Gaxiola RN  Outcome: Ongoing, Progressing  Goal: Readiness for Transition of Care  10/8/2020 1807 by Gerald Gaxiola RN  Outcome: Met  10/8/2020 1414 by Gerald Gaxiola RN  Outcome: Ongoing, Progressing  Goal: Rounds/Family Conference  10/8/2020 1807 by Gerald Gaxiola RN  Outcome: Met  10/8/2020 1414 by Gerald Gaxiola RN  Outcome: Ongoing, Progressing     Problem: Fall Injury Risk  Goal: Absence of Fall and Fall-Related Injury  10/8/2020 1807 by Gerald Gaxiola RN  Outcome: Met  10/8/2020 1414 by Gerald Gaxiola RN  Outcome: Ongoing, Progressing     Problem: Restraint, Nonbehavioral (Nonviolent)  Goal: Personal Dignity and Safety Maintained  10/8/2020 1807 by Gerald Gaxiola RN  Outcome: Met  10/8/2020 1414 by Gerald Gaxiola RN  Outcome: Ongoing, Progressing     Problem: Infection  Goal: Infection Symptom Resolution  10/8/2020 1807 by Gerald Gaxiola RN  Outcome: Met  10/8/2020 1414 by Gerald Gaxiola RN  Outcome: Ongoing, Progressing     Problem: Skin Injury Risk Increased  Goal: Skin Health and Integrity  10/8/2020 1807 by Gerald Gaxiola RN  Outcome: Met  10/8/2020  1414 by Gerald Gaxiola RN  Outcome: Ongoing, Progressing     Problem: Adjustment to Illness (Stroke, Ischemic/Transient Ischemic Attack)  Goal: Optimal Coping  10/8/2020 1807 by Gerald Gaxiola RN  Outcome: Met  10/8/2020 1414 by Gerald Gaxiola RN  Outcome: Ongoing, Progressing     Problem: Bowel Elimination Impaired (Stroke, Ischemic/Transient Ischemic Attack)  Goal: Effective Bowel Elimination  10/8/2020 1807 by Gerald Gaxiola RN  Outcome: Met  10/8/2020 1414 by Gerald Gaxiola RN  Outcome: Ongoing, Progressing     Problem: Cerebral Tissue Perfusion Risk (Stroke, Ischemic/Transient Ischemic Attack)  Goal: Optimal Cerebral Tissue Perfusion  10/8/2020 1807 by Gerald Gaxiola RN  Outcome: Met  10/8/2020 1414 by Gerald Gaxiola RN  Outcome: Ongoing, Progressing     Problem: Communication Impairment (Stroke, Ischemic/Transient Ischemic Attack)  Goal: Improved Communication Skills  10/8/2020 1807 by Gerald Gaxiola RN  Outcome: Met  10/8/2020 1414 by Gerald Gaxiola RN  Outcome: Ongoing, Progressing     Problem: Eating/Swallowing Impairment (Stroke, Ischemic/Transient Ischemic Attack)  Goal: Oral Intake without Aspiration  10/8/2020 1807 by Gerald Gaxiola RN  Outcome: Met  10/8/2020 1414 by Gerald Gaixola RN  Outcome: Ongoing, Progressing     Problem: Functional Ability Impaired (Stroke, Ischemic/Transient Ischemic Attack)  Goal: Optimal Functional Ability  10/8/2020 1807 by Gerald Gaxiola RN  Outcome: Met  10/8/2020 1414 by Gerald Gaxiola RN  Outcome: Ongoing, Progressing     Problem: Hemodynamic Instability (Stroke, Ischemic/Transient Ischemic Attack)  Goal: Vital Signs Remain in Desired Range  10/8/2020 1807 by Gerald Gaxiola RN  Outcome: Met  10/8/2020 1414 by Gerald Gaxiola RN  Outcome: Ongoing, Progressing     Problem: Pain (Stroke, Ischemic/Transient Ischemic Attack)  Goal: Acceptable Pain Control  10/8/2020 1807 by Gerald Gaxiola RN  Outcome: Met  10/8/2020 1414 by Gerald Gaxiola RN  Outcome: Ongoing,  Progressing     Problem: Sensorimotor Impairment (Stroke, Ischemic/Transient Ischemic Attack)  Goal: Improved Sensorimotor Function  10/8/2020 1807 by Gerald Gaxiola RN  Outcome: Met  10/8/2020 1414 by Gerald Gaxiola RN  Outcome: Ongoing, Progressing     Problem: Urinary Elimination Impaired (Stroke, Ischemic/Transient Ischemic Attack)  Goal: Effective Urinary Elimination  10/8/2020 1807 by Gerald Gaxiola RN  Outcome: Met  10/8/2020 1414 by Gerald Gaxiola RN  Outcome: Ongoing, Progressing     Problem: Wound  Goal: Optimal Wound Healing  10/8/2020 1807 by Gerald Gaxiola RN  Outcome: Met  10/8/2020 1414 by Gerald Gaxiola RN  Outcome: Ongoing, Progressing     Problem: Coping Ineffective  Goal: Effective Coping  10/8/2020 1807 by Gerald Gaxiola RN  Outcome: Met  10/8/2020 1414 by Gerald Gaxiola RN  Outcome: Ongoing, Progressing

## 2020-10-08 NOTE — PLAN OF CARE
Problem: Occupational Therapy Goal  Goal: Occupational Therapy Goal  Description: Goals to be met by: 10/5/2020 (Extend to 10/12/2020)    Patient will increase functional independence with ADLs by performing:    Pt will follow 75% of motor commands with <2 verbal cues for repetition of task to maximize engagement in functional task. -not met  Pt will sit at EOB for approx 10 minutes with max A and unilateral UE support to complete grooming task -not met  Pt will complete sit>stand from EOB with bed in highest position with mod A in prep for functional transfers to [facility] -not met  Pt will perform grooming sitting EOB with mod A -not met              Outcome: Ongoing, Progressing

## 2020-10-08 NOTE — PLAN OF CARE
10/08/20 1512   Post-Acute Status   Post-Acute Authorization Placement   Post-Acute Placement Status Set-up Complete       SW set transport through [facility] via stretcher for 3:45 PM. *This is an estimated time.    Pt d/c to [facility] room 310. Nurse call report to .    Wife informed of d/c at bs.    No further needs.    Nano Martins LMSW  Case Management Social Worker   [facility] [address]

## 2020-10-09 NOTE — PLAN OF CARE
[employer] sent transport auth request to [facility].     Awaiting authorization number.    10:05 AM     Transport Auth provided to [facility].   Auth: [id number]      Nano Martins LMSW  Case Management Social Worker   [facility] [address]

## 2020-10-09 NOTE — PT/OT/SLP EVAL
"Speech Language Pathology  Evaluation    Lopez Savage   MRN: [id number]   Admitting Diagnosis: Intracranial septic embolism    Diet recommendations: Solid Diet Level: Dental Soft  Liquid Diet Level: Thin 1 bite/sip at a time, Alternating bites/sips, set-up assistance with meals, Avoid talking while eating, Eliminate distractions, Feed only when awake/alert, HOB to 90 degrees, Meds whole 1 at a time, Monitor for s/s of aspiration, No straws, Remain upright 30 minutes post meal, Small bites/sips and Strict aspiration precautions    SLP Treatment Date: 10/09/20  Speech Start Time: 0825     Speech Stop Time: 0903     Speech Total (min): 38 min       TREATMENT BILLABLE MINUTES:  Re-eval 15, Eval Swallow and Oral Function 15 and Seld Care/Home Management Training 8    Diagnosis: Intracranial septic embolism    No past medical history on file.  No past surgical history on file.    Has the patient been evaluated by SLP for swallowing? : Yes  Keep patient NPO?: No   General Precautions: Standard, aspiration, fall    Current Respiratory Status: room air     Social Hx: Patient lives with wife and daughter, per pt.  Pt had difficulty providing clear information regarding level of supervision and assistance received PTA.  Pt reports he is able to drive but not longer drives because of visual changes.     Prior diet: currently receiving regular consistency diet and thin liquids    Subjective:  "Someone's always with me." "I'm home alone, until a certain time until my wife and daughter get up." Pt providing unclear information regarding level of supervision and assistance required PTA.  Pt also had difficulty stating whether wife assisted with medication management or not.        Objective:        Oral Musculature Evaluation  Oral Musculature: WFL  Dentition: present and adequate  Secretion Management: adequate  Mucosal Quality: adequate  Mandibular Strength and Mobility: WFL  Oral Labial Strength and Mobility: WFL  Lingual " Strength and Mobility: WFL  Velar Elevation: WFL  Buccal Strength and Mobility: WFL  Volitional Swallow: elicited  Voice Prior to PO Intake: dry, clear, monopitch     Cognitive Status:  Behavioral Observations: alert, confused at times, and cooperative-  Memory and Orientation: Pt was partially oriented to place and situation. He was not oriented to month and year.  Following a brief delay, pt recalled 3/3 unrelated words given cues (2/3 IND). Pt scored 8/15 on BIMS.   Attention: impaired sustained attention as evidenced by presence of perseveration  Problem Solving: Pt provided appropriate responses on 2/3 problem solving trials given cues (1/3 IND). Perseveration in responses noted.   Executive Function: insight/awareness, organization, planning and self-correction    Language: perseveration    Auditory Comprehension: answers simple and complex yes/no questions; Pt followed 2-step command IND and 3-steps with repetition.     Verbal Expression: conversational speech Pt demonstrates word finding difficulty and thought organization impairments during spontaneous speech.  Pt named objects in room on 6/6 trials IND. Ongoing assessment.     Motor Speech: no dysarthria evident, but pt demonstrated monotone and monopitch    Voice: impaired prosody - no use of intonation, pt speaking with increased volume with monotone and monopithgc    Reading: to be assessed    Writing: to be assessed    Visual-Spatial: to be assessed    Bedside Swallow Eval:  Consistencies Assessed: Thin liquids water, juice, and coffee via cup edge  Soft solids scrambled eggs  Solids sausage j luis, Danish toast  Oral Phase: Prolonged mastication with sausage j luis  Slow oral transit time with sausage j luis  Pharyngeal Phase: throat clear x 1 after water x 1    Additional Treatment:  Education provided to pt regarding role of SLP, purpose of swallowing assessment and speech/language/cognitive evaluation, recommendations to downgrade to dental soft diet,  aspiration precautions, ongoing monitoring of diet tolerance, and SLP treatment plan and POC.  Pt in agreement with diet downgrade at this time.   Pt demonstrated some understanding of education provided, but will benefit from continued reinforcement.     Assessment:  Lopez Savage is a 75 y.o. male with a medical diagnosis of Intracranial septic embolism and presents with mild dysphagia and cognitive-linguistic deficits.      Discharge recommendations: Discharge Facility/Level of Care Needs: (supervision upon d/c for cognition)     Diet recommendations: Solid Diet Level: Dental Soft  Liquid Diet Level: Thin     Goals:   Multidisciplinary Problems     SLP Goals        Problem: SLP Goal    Goal Priority Disciplines Outcome   SLP Goal     SLP Ongoing, Progressing   Description: Speech Language Pathology Goals  Goals expected to be met by 10/16:  1. Pt will follow complex commands with 80% accuracy given mod cues.  2. Pt will orient x 4 given max cues and/or external aids.  3. Pt will recall 3/3 novel items after a 2 minute delay given supervision.  4. Pt will complete simple problem solving tasks with 70% accuracy given mod cues.   5. Pt will complete simple sustained attention tasks with 60% accuracy given mod-max cues.   6. Pt will participate in assessment of reading, writing, and visual spatial abilities.   7. Pt will tolerate dental soft diet and thin liquids without s/s of aspiration.                               Plan:   Patient to be seen Therapy Frequency: 5 x/week   Planned Interventions: Cognitive-Linguistic Therapy, Dysphagia Therapy  Plan of Care expires: 11/08/20  Plan of Care reviewed with: patient  SLP Follow-up?: Yes  SLP - Next Visit Date: 10/12/20           MARVA Bryant, ENA-SLP  10/09/2020    MARVA Bryant CCC-SLP  Speech Language Pathologist  [phone]  10/9/2020

## 2020-10-09 NOTE — PLAN OF CARE
Problem: SLP Goal  Goal: SLP Goal  Description: Speech Language Pathology Goals  Goals expected to be met by 10/16:  1. Pt will follow complex commands with 80% accuracy given mod cues.  2. Pt will orient x 4 given max cues and/or external aids.  3. Pt will recall 3/3 novel items after a 2 minute delay given supervision.   4. Pt will complete simple problem solving tasks with 70% accuracy given mod cues.   5. Pt will complete simple sustained attention tasks with 60% accuracy given mod-max cues.   6. Pt will participate in assessment of reading, writing, and visual spatial abilities.   7. Pt will tolerate dental soft diet and thin liquids without s/s of aspiration.             Outcome: Ongoing, Progressing  Bedside swallow evaluation and speech/language/cognitive evaluation completed.  Recommend downgrade to dental soft diet, continue thin liquids.  Will follow 5x/wk to monitor diet tolerance and address cognitive-linguistic deficits.   MARVA Bryant, CCC-SLP  Speech Language Pathologist  [phone]  10/9/2020

## 2020-10-09 NOTE — PROGRESS NOTES
[facility]                                  Skilled Nursing Facility                   Progress Note     Admit Date: 10/8/2020  ANA TBD  Principal Problem:  Intracranial septic embolism   HPI obtained from patient interview and chart review     Chief Complaint: Establish Care/ Initial Visit     HPI:   Mr. 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  Admission to SNF for secondary weakness and debility, IV antibiotics.    The patient originally presented to [facility] on 9/8/2020 with a primary complaint of loss of consciousness. The patient was recently treated for endocarditis w/ IV rocephin through PICC line at [facility] and discharged approximately 2 weeks ago. Since then he has had a worsening cough and was planning to go to the hospital for evaluation. Per wife home health did portable CXR about a week ago w/ concern for fluid vs pneumonia. Patient was given course of doxycycline and finished course recently. 9/8 Wife went to bed while  was watching TV and about an hour later her daughter found him non responsive on the ground. Daughter did a few chest compressions before EMS arrived. Patient arrived to ED and non responsive to sternal rub. Pupillary and gag reflex present. Patient intubated for airway protection and sedated w/ propofol. No seizure like activity was observed in ED. TPA contraindicated due to recent GI bleed, last occurring in August. Discussed further stroke work up with wife and planned for imaging. CT head negative for acute intracranial bleed. MRI head showed PCA infarct and right occipital lobe edema. 9/8 EEG demonstrated left posterior pseudo periodic epileptiform discharges is suggestive  of an irritative region likely secondary to focal ischemia. Patient started on AEDs vimpat and keppra and reported to have several seizures during his stay. Patient was transferred to [facility] today for continuous EEG monitoring and further evaluation.     His course was complicated by acute encephalopathy, status epilepticus, bacterial endocarditis with brain abscess, embolic cerebellar stroke, chronic dysphagia, and chronic bronchitis.  He also had anemia requiring transfusion.  He continued on antibiotics and was recommended to have 6-8 weeks of IV antibiotics.  He is being transferred to skilled nursing for continued IV antibiotics as well as PT/OT.       Patient will be treated at [facility] with PT and OT to improve functional status and ability to perform ADLs.     Past Medical History: Patient has no past medical history on file.    Past Surgical History: Patient has no past surgical history on file.    Social History: Patient     Family History: no significant family hx to report     Allergies: Patient is allergic to codeine and tetanus vaccines and toxoid.    ROS  Constitutional: Negative for fever   Eyes: Negative for blurred vision, double vision   Respiratory: + for cough, shortness of breath   Cardiovascular: Negative for chest pain, palpitations, and leg swelling.   Gastrointestinal: Negative for abdominal pain, constipation, diarrhea, nausea, vomiting.   Genitourinary: Negative for dysuria, frequency   Musculoskeletal:  + generalized weakness.  + for back pain and myalgias.   Skin: Negative for itching and rash.   Neurological: Negative for dizziness.  +intermittent headaches.   Psychiatric/Behavioral: Negative for depression. The patient is not nervous/anxious.      24 hour Vital Sign Range   Temp:  [97.5 °F (36.4 °C)-97.7 °F (36.5 °C)]   Pulse:  [84-92]   Resp:  [18-19]   BP: (122-148)/(60-77)   SpO2:  [93 %-96 %]     PEx  Constitutional: Patient appears debilitated.  No distress  noted  HENT:   Head: Normocephalic and atraumatic.   Eyes: Pupils are equal, round  Neck: Normal range of motion. Neck supple.   Cardiovascular: Normal rate, regular rhythm and normal heart sounds.    Pulmonary/Chest: Effort normal and breath sounds are clear  Abdominal: Soft. Bowel sounds are normal.   Musculoskeletal: Normal range of motion.   Neurological: Alert and oriented to person.  Disoriented to place and time.  Impaired higher level thinking.   Psychiatric: Normal mood and affect. Behavior is normal.   Skin: Skin is warm and dry. Full skin assessment to be completed with next visit.    Recent Labs   Lab 10/09/20  0523      K 3.6      CO2 23   BUN 19   CREATININE 2.1*       Recent Labs   Lab 10/09/20  0523   WBC 8.47   RBC 3.40*   HGB 9.6*   HCT 31.3*      MCV 92   MCH 28.2   MCHC 30.7*         Assessment and Plan:    Intracranial septic embolism  Brain abscess  Acute encephalopathy  - ID recommendation was for 6-8 weeks of IV antibiotics (Rocephin and vancomycin)  - This range would be October 27 through November 10  - He will need repeat brain imaging to follow for resolution of the intracranial lesions to help determine the time course of antibiotics  - Pharmacy is consulted to assist with vancomycin dosing  - Probiotics are ordered  - Follow neurologic checks     Cerebellar stroke  - PT/OT/speech therapy are consulted to assist in his management     Anemia  - stable, continue monitor twice weekly CBCs    Bacterial endocarditis  - Continue antibiotics  - He will need follow-up echocardiogram as he nears the end of his course     Essential hypertension  - Continue amlodipine 5 mg daily  - Adjust antihypertensives based on the trend in his vitals    HLD  - continue atorvastatin 40 mg daily     Chronic bronchitis  - Continue breathing treatments  - Monitor oxygenation     Acute kidney injury  - Previous creatinine was around 1.2.  Recently it has been 2-2.2 over the past 3 days.  -  Monitor the trend in his renal function and electrolytes  - Replace electrolytes as needed  - Monitor vancomycin levels  - Depending on the trend in his renal function, may need to adjust antibiotics/consider nephrology consultation     Seizure  - Continue lacosamide 200 mg BID  - Seizure precautions    Debility   - Continue with PT/OT for gait training and strengthening and restoration of ADL's   - Encourage mobility, OOB in chair, and early ambulation as appropriate  - Fall precautions   - Monitor for bowel and bladder dysfunction  - Monitor for and prevent skin breakdown and pressure ulcers  - Continue DVT prophylaxis with  Lovenox 40 mg daily     Goals of Care:   Restorative  Treat infection  Optimize nutrition  Wound healing  Muscle strengthening  Restoration of ADL's  Improve mobility        Anticipated Disposition:  Home with home health       Future Appointments   Date Time Provider Department Center   10/27/2020 11:30 AM AMERICA Alonzo, ANP [facility] ID [place] [facility]   11/10/2020 11:00 AM Brian Mark MD [facility] STR[facility]KE [facility]   11/10/2020  1:00 PM Kodak Dejesus MD [facility]     I certify that SNF services are required to be given on an inpatient basis because Lopez Savage needs for skilled nursing care and/or skilled rehabilitation are required on a daily basis and such services can only practically be provided in a skilled nursing facility setting and are for an ongoing condition for which she received inpatient care in the hospital.     Total time of the visit 68 minutes [id number]   Non physical exam/ non charting time: 49 minutes   Description of non physical exam/non charting time: counseling patient on clinical conditions and therapies provided regarding importance of maintaining proper p.o. hydration and nutrition, importance of compliance of antibiotics, hypertensive medication regimen, electrolyte replacements and seizure precautions, and the beginning of discharge planning.  Extensive  chart review completed including all consultation notes.  All pertinent laboratory and radiographical images reviewed.        Aranza Jeffrey NP  Department of Hospital Medicine   [facility]     DOS: 10/9/2020       Patient note was created using MModal Dictation.  Any errors in syntax or even information may not have been identified and edited on initial review prior to signing this note.

## 2020-10-09 NOTE — DISCHARGE SUMMARY
[facility] Hospital Medicine Transfer Note     Primary Team: [facility] Hospitalist Team A  Attending Physician: Dex Moreira MD  Resident: Aniore  Intern: Harshal     Date of Admit: 2020  Date of Discharge: 2020     Transfer to: [facility]   Condition: Poor     Discharge Diagnoses          Patient Active Problem List   Diagnosis    Loss of consciousness    Essential hypertension    Ulcerative colitis    Chronic bronchitis    Autoimmune hemolytic anemia    Mitral regurgitation    CVA (cerebral vascular accident)    Bacterial endocarditis    History of gastrointestinal bleeding    GERD (gastroesophageal reflux disease)    Embolic stroke involving left posterior cerebral artery    Unresponsive    Embolic stroke involving right posterior cerebral artery    Thalamic infarct, acute    Abnormal EEG    Anemia    Seizure-like activity    Status epilepticus         Consultants and Procedures      Consultants:  Neurology  Vascular Neurology  Infectious Disease  Pulmonology/Critical Care     Procedures:   Intubation     20 EEG  IMPRESSION:  This is an abnormal EEG during lethargic state.  Diffuse disorganized slowing of the background was noted.  Nearly continuous right frontal central parietal slowing was noted left posterior maximum pseudo periodic 1-2 hertz epileptiform discharges were seen.      Imagin20 CT Head:  FINDINGS:  There is a moderate large is scalp hematoma over the left temporal and parietal bone measuring up to 1 cm thick.  No laceration or foreign body is evident.  The underlying calvarium is intact.     The brain parenchyma appears normal in attenuation with normal gray-white matter differentiation.  There is no evidence of mass or mass effect.     Mastoid effusion on the right is present.  The remainder of the air cells are clear.  The orbits and orbital contents appear normal.     Impression:     Broad 1 cm thick scalp hematoma over the left temporoparietal  region.  No underlying fracture or intracranial abnormality.     9/8/20 MRI Brain w/o Contrast:  Impression:     1. Left PCA distribution acute infarction.  2. Focus of restricted diffusion within the right occipital lobe with adjacent vasogenic edema, concerning for underlying lesion or septic embolus.  Further evaluation with contrast-enhanced MRI brain is recommended.  3. MRA head: No evidence of occlusion or high-grade stenosis of the major intracranial arteries.  4. MRA neck: No evidence of occlusion or high-grade stenosis of the major arteries of the neck.  5. Moderate bilateral pleural effusions.  This report was flagged in Epic as abnormal.     9/11/20 MRI Brain w/o Contrast:  Impression:     1. Since 09/08/2020 MRI, there is a new small focus of acute to subacute ischemia in the left cerebellum.  2. Elsewhere, there is continued maturation of ischemia in the bilateral medial occipital and parietal lobes as well as the left cingulate gyrus.  3. At least partially on the basis of differences in technique, with SWI sequences utilized on the current examination versus T2* GRE previously, there are numerous diffuse bilateral cortical and subcortical foci of susceptibility related signal loss.  Newly evident susceptibility within the medial occipital lobes may represent petechial hemorrhage associated with the evolving areas of ischemia.  No large parenchymal hemorrhage is identified.  Additionally, there is evidence of susceptibility related signal loss about the cerebral sulci without FLAIR signal abnormality suggested of superficial siderosis as sequela of prior subarachnoid hemorrhage.  Overall constellation of findings suggests cerebral amyloid angiopathy.  Component of chronic hypertensive arteriopathy also considered.  4. Additional details, as per report body.     Brief History of Present Illness      Lopez Savage is a 73 y/o male with PMH of diverticulitis, MVP, endocarditis with aortic valve  vegetations, HTN, and COPD. The patient presented to [facility] on 9/8/2020 with a primary complaint of loss of consciousness. The patient was recently treated for endocarditis w/ IV rocephin through PICC line at [facility] and discharged 2 weeks prior to presentation. Since then he had a worsening cough and was planning to go to the hospital for evaluation. Per wife home health did portable CXR about a week prior to presentation w/ concern for fluid vs pneumonia. Patient was given course of doxycycline and finished course day prior to admission. That night, his wife went to bed while  was watching TV and about an hour later her daughter found him non responsive on the ground. Daughter did a few chest compressions before EMS arrived. Patient arrived to ED and non responsive to sternal rub. Pupillary and gag reflex present. Patient intubated for airway protection and sedated w/ propofol. No seizure like activity was observed in ED.      For the full HPI please refer to the History & Physical from this admission.     Hospital Course By Problem with Pertinent Findings      Seizure-like activity in setting of PCA stroke; Multiple witnessed seizure like activity described concerning for left sided seizure focus.   - no previous reports of seizures from family  - 9/8 EEG demonstrated left posterior pseudo periodic epileptiform discharges is suggestive of an irritative region likely secondary to focal ischemia.  There is increased risk of focal seizures from this region, however no seizures recorded during this study.   - neurology recommended keppra loading with 3g and maintenance 500 BID  - Yesterday pt with several seizures, concern for clusters of seizures vs. NCSE  - initiated Vimpat 200 mg load followed by 100 mg BID due to persistent seizures  - to transfer to [facility] today for continuous EEG monitoring   - avoid cefepime and tramadol, other seizure threshold-lowering  agents     Acute hypoxic respiratory failure in the setting of PCA stroke suspect 2/2 septic emboli;  patient found unresponsive at home before arrival to ED. Initially non-responsive to sternal rub, pupillary and gag reflex present. tPA contraindicated due to recent GI bleed.   - CT head negative for acute intracranial bleed  - MRI head showed PCA infarct and right occipital lobe edema   - 9/11 MRI showing since 09/08/2020 MRI, there is a new small focus of acute to subacute ischemia in the left cerebellum.  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   - initially treated at [facility] and discharged with three week course of IV rocephin  - strepto mutans; 6 week course set to end on 9/14/2020  - SILVANA at [facility] showed aortic valve vegetations  - ceftriaxone, vanc per ID rec's     Bilateral Community Acquired Pneumonia  - S/p 7 day course of doxycycline for atypical CAP coverage   - Continue Flagyl for now for anaerobic coverage (end date 9/15)     Normocytic Anemia  - H/H on admission 9.1/29.4, MCV 91  - has had extensive workup at [facility] for anemia in past including autoimmune, leukemia and lymphoma which were negative  -  S/p 2 units of blood ordered for transfusion on 9/9, H&H today 9.4/29.9     Hypokalemia  - K 3.2 on admission  - received IV KCl 40mEq in ED  - K this AM: 3.7     Intermediate Level Troponin; suspect secondary to demand from CVA  - Troponin 0.08 on admission -> 0.147 on 9/8  -   - EKG sinus tachycardia w/ left anterior fascicular block; no new ST elevations/depressions     Leukocytosis   - WBC 19 on admission trended down but now 14.0  - likely 2/2 to endocarditis  - no reported fevers recently, afebrile since admit  -  will continue to monitor     COPD, stable  - pt does not require home O2  - home montelukast and fluticasone   - pt currently satting 98 on room air     H/o GI Bleed; h/o bleeding diverticuli and gastric ulcers s/p cauterization, has required blood transfusions. Most recent GI bleed in August 2020   - s/p transfusion of 2 units on 9/9  - no active bleeding throughout hospitalization, hemodynamically stable      HENRY vs CKD  - Cr 1.5 on admission, baseline near 1.2-1.3  - eGFR 31  - creatinine this AM: 1.3     Hyperglycemia (resolved)  - glucose 189 on admission, 107 on recent POCT  - ordered SSI  - A1c 4.8  - will obtain tight glycemia control for neurological protection      HTN; initially held home BP in setting of possible ischemic stroke.  - hypertensive to 180's systolic, has normalized  - restarted Losartan 25 mg last night due to SBPs in 170s, with improvement to 150s     HLD  - atorvastatin 40mg, continuing for secondary stroke prevention      Discharge Medications                    Lopez Savage   Home Medication Instructions ABDOULAYE:[id number]     Printed on:09/13/20 1241   Medication Information                                       atorvastatin (LIPITOR) 40 MG tablet  1 tablet (40 mg total) by Per G Tube route once daily.                      cefTRIAXone (ROCEPHIN) 2 g/50 mL PgBk IVPB  Inject 50 mLs (2 g total) into the vein once daily. for 4 days                      doxycycline hyclate (DOXYCYCLINE 100MG IN D5W 250ML, READY TO MIX,)  Inject 250 mLs (100 mg total) into the vein every 12 (twelve) hours. for 6 days                      enoxaparin (LOVENOX) 40 mg/0.4 mL Syrg  Inject 0.4 mLs (40 mg total) into the skin once daily.                      insulin aspart U-100 (NOVOLOG) 100 unit/mL (3 mL) InPn pen  Inject 0-5 Units into the skin every 6 (six) hours as needed (Hyperglycemia).                      lacosamide (VIMPAT) 10 mg/mL Soln oral solution  10 mLs (100 mg total) by Per NG tube route every 12  (twelve) hours.                      levETIRAcetam in NaCl, iso-os, (KEPPRA) 500 mg/100 mL PgBk  Inject 100 mLs (500 mg total) into the vein every 12 (twelve) hours.                      losartan (COZAAR) 50 MG tablet  Take 1 tablet (50 mg total) by mouth once daily.                      metronidazole (FLAGYL) 500 mg/100 mL IVPB  Inject 100 mLs (500 mg total) into the vein every 8 (eight) hours. for 7 days                            Discharge Information:   Diet:  As per accepting team     Physical Activity:  As tolerated             Instructions:  1. Take all medications as prescribed  2. Keep all follow-up appointments  3. Return to the hospital or call your primary care physicians if any worsening symptoms such as fever, chest pain, shortness of breath, return of symptoms, or any other concerns.     Follow-Up Appointments:  Patient is being transferred to ICU at [facility]; will require f/u with Neurology, PCP, cardiology on discharge.     Marisol Parks MD  [facility] Internal Medicine-Pediatrics, -I         Cosigned by: Rush Mary MD at 9/14/2020 10:07 AM   Revision History                    Routing History                      [facility] Hospital Medicine Transfer Note     Primary Team: [facility] Hospitalist Team A  Attending Physician: Dex Moreira MD  Resident: Marry  Intern: Harshal     Date of Admit: 9/13/2020  Date of Discharge: 9/13/2020     Transfer to: [facility]   Condition: Poor     Discharge Diagnoses          Patient Active Problem List   Diagnosis    Loss of consciousness    Essential hypertension    Ulcerative colitis    Chronic bronchitis    Autoimmune hemolytic anemia    Mitral regurgitation    CVA (cerebral vascular accident)    Bacterial endocarditis    History of gastrointestinal bleeding    GERD (gastroesophageal reflux disease)    Embolic stroke involving left posterior cerebral artery    Unresponsive    Embolic stroke involving right posterior cerebral artery     Thalamic infarct, acute    Abnormal EEG    Anemia    Seizure-like activity    Status epilepticus         Consultants and Procedures      Consultants:  Neurology  Vascular Neurology  Infectious Disease  Pulmonology/Critical Care     Procedures:   Intubation     20 EEG  IMPRESSION:  This is an abnormal EEG during lethargic state.  Diffuse disorganized slowing of the background was noted.  Nearly continuous right frontal central parietal slowing was noted left posterior maximum pseudo periodic 1-2 hertz epileptiform discharges were seen.      Imagin20 CT Head:  FINDINGS:  There is a moderate large is scalp hematoma over the left temporal and parietal bone measuring up to 1 cm thick.  No laceration or foreign body is evident.  The underlying calvarium is intact.     The brain parenchyma appears normal in attenuation with normal gray-white matter differentiation.  There is no evidence of mass or mass effect.     Mastoid effusion on the right is present.  The remainder of the air cells are clear.  The orbits and orbital contents appear normal.     Impression:     Broad 1 cm thick scalp hematoma over the left temporoparietal region.  No underlying fracture or intracranial abnormality.     20 MRI Brain w/o Contrast:  Impression:     1. Left PCA distribution acute infarction.  2. Focus of restricted diffusion within the right occipital lobe with adjacent vasogenic edema, concerning for underlying lesion or septic embolus.  Further evaluation with contrast-enhanced MRI brain is recommended.  3. MRA head: No evidence of occlusion or high-grade stenosis of the major intracranial arteries.  4. MRA neck: No evidence of occlusion or high-grade stenosis of the major arteries of the neck.  5. Moderate bilateral pleural effusions.  This report was flagged in Epic as abnormal.     20 MRI Brain w/o Contrast:  Impression:     1. Since 2020 MRI, there is a new small focus of acute to subacute ischemia  in the left cerebellum.  2. Elsewhere, there is continued maturation of ischemia in the bilateral medial occipital and parietal lobes as well as the left cingulate gyrus.  3. At least partially on the basis of differences in technique, with SWI sequences utilized on the current examination versus T2* GRE previously, there are numerous diffuse bilateral cortical and subcortical foci of susceptibility related signal loss.  Newly evident susceptibility within the medial occipital lobes may represent petechial hemorrhage associated with the evolving areas of ischemia.  No large parenchymal hemorrhage is identified.  Additionally, there is evidence of susceptibility related signal loss about the cerebral sulci without FLAIR signal abnormality suggested of superficial siderosis as sequela of prior subarachnoid hemorrhage.  Overall constellation of findings suggests cerebral amyloid angiopathy.  Component of chronic hypertensive arteriopathy also considered.  4. Additional details, as per report body.     Brief History of Present Illness      Lopez Savage is a 73 y/o male with PMH of diverticulitis, MVP, endocarditis with aortic valve vegetations, HTN, and COPD. The patient presented to [facility] on 9/8/2020 with a primary complaint of loss of consciousness. The patient was recently treated for endocarditis w/ IV rocephin through PICC line at [facility] and discharged 2 weeks prior to presentation. Since then he had a worsening cough and was planning to go to the hospital for evaluation. Per wife home health did portable CXR about a week prior to presentation w/ concern for fluid vs pneumonia. Patient was given course of doxycycline and finished course day prior to admission. That night, his wife went to bed while  was watching TV and about an hour later her daughter found him non responsive on the ground. Daughter did a few chest compressions before EMS arrived. Patient arrived to ED  and non responsive to sternal rub. Pupillary and gag reflex present. Patient intubated for airway protection and sedated w/ propofol. No seizure like activity was observed in ED.      For the full HPI please refer to the History & Physical from this admission.     Hospital Course By Problem with Pertinent Findings      Seizure-like activity in setting of PCA stroke; Multiple witnessed seizure like activity described concerning for left sided seizure focus.   - no previous reports of seizures from family  - 9/8 EEG demonstrated left posterior pseudo periodic epileptiform discharges is suggestive of an irritative region likely secondary to focal ischemia.  There is increased risk of focal seizures from this region, however no seizures recorded during this study.   - neurology recommended keppra loading with 3g and maintenance 500 BID  - Yesterday pt with several seizures, concern for clusters of seizures vs. NCSE  - initiated Vimpat 200 mg load followed by 100 mg BID due to persistent seizures  - to transfer to [facility] today for continuous EEG monitoring   - avoid cefepime and tramadol, other seizure threshold-lowering agents     Acute hypoxic respiratory failure in the setting of PCA stroke suspect 2/2 septic emboli;  patient found unresponsive at home before arrival to ED. Initially non-responsive to sternal rub, pupillary and gag reflex present. tPA contraindicated due to recent GI bleed.   - CT head negative for acute intracranial bleed  - MRI head showed PCA infarct and right occipital lobe edema   - 9/11 MRI showing since 09/08/2020 MRI, there is a new small focus of acute to subacute ischemia in the left cerebellum.  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 severe mitral valve  insufficiency   - initially treated at [facility] and discharged with three week course of IV rocephin  - strepto mutans; 6 week course set to end on 9/14/2020  - SILVANA at [facility] showed aortic valve vegetations  - ceftriaxone, vanc per ID rec's     Bilateral Community Acquired Pneumonia  - S/p 7 day course of doxycycline for atypical CAP coverage   - Continue Flagyl for now for anaerobic coverage (end date 9/15)     Normocytic Anemia  - H/H on admission 9.1/29.4, MCV 91  - has had extensive workup at [facility] for anemia in past including autoimmune, leukemia and lymphoma which were negative  -  S/p 2 units of blood ordered for transfusion on 9/9, H&H today 9.4/29.9     Hypokalemia  - K 3.2 on admission  - received IV KCl 40mEq in ED  - K this AM: 3.7     Intermediate Level Troponin; suspect secondary to demand from CVA  - Troponin 0.08 on admission -> 0.147 on 9/8  -   - EKG sinus tachycardia w/ left anterior fascicular block; no new ST elevations/depressions     Leukocytosis   - WBC 19 on admission trended down but now 14.0  - likely 2/2 to endocarditis  - no reported fevers recently, afebrile since admit  - will continue to monitor     COPD, stable  - pt does not require home O2  - home montelukast and fluticasone   - pt currently satting 98 on room air     H/o GI Bleed; h/o bleeding diverticuli and gastric ulcers s/p cauterization, has required blood transfusions. Most recent GI bleed in August 2020   - s/p transfusion of 2 units on 9/9  - no active bleeding throughout hospitalization, hemodynamically stable      HENRY vs CKD  - Cr 1.5 on admission, baseline near 1.2-1.3  - eGFR 31  - creatinine this AM: 1.3     Hyperglycemia (resolved)  - glucose 189 on admission, 107 on recent POCT  - ordered SSI  - A1c 4.8  - will obtain tight glycemia control for neurological protection      HTN; initially held home BP in setting of possible ischemic stroke.  - hypertensive to 180's systolic, has normalized  -  restarted Losartan 25 mg last night due to SBPs in 170s, with improvement to 150s     HLD  - atorvastatin 40mg, continuing for secondary stroke prevention      Discharge Medications                    Lopez Savage   Home Medication Instructions ABDOULAYE:[id number]     Printed on:09/13/20 1241   Medication Information                                       atorvastatin (LIPITOR) 40 MG tablet  1 tablet (40 mg total) by Per G Tube route once daily.                      cefTRIAXone (ROCEPHIN) 2 g/50 mL PgBk IVPB  Inject 50 mLs (2 g total) into the vein once daily. for 4 days                      doxycycline hyclate (DOXYCYCLINE 100MG IN D5W 250ML, READY TO MIX,)  Inject 250 mLs (100 mg total) into the vein every 12 (twelve) hours. for 6 days                      enoxaparin (LOVENOX) 40 mg/0.4 mL Syrg  Inject 0.4 mLs (40 mg total) into the skin once daily.                      insulin aspart U-100 (NOVOLOG) 100 unit/mL (3 mL) InPn pen  Inject 0-5 Units into the skin every 6 (six) hours as needed (Hyperglycemia).                      lacosamide (VIMPAT) 10 mg/mL Soln oral solution  10 mLs (100 mg total) by Per NG tube route every 12 (twelve) hours.                      levETIRAcetam in NaCl, iso-os, (KEPPRA) 500 mg/100 mL PgBk  Inject 100 mLs (500 mg total) into the vein every 12 (twelve) hours.                      losartan (COZAAR) 50 MG tablet  Take 1 tablet (50 mg total) by mouth once daily.                      metronidazole (FLAGYL) 500 mg/100 mL IVPB  Inject 100 mLs (500 mg total) into the vein every 8 (eight) hours. for 7 days                            Discharge Information:   Diet:  As per accepting team     Physical Activity:  As tolerated             Instructions:  1. Take all medications as prescribed  2. Keep all follow-up appointments  3. Return to the hospital or call your primary care physicians if any worsening symptoms such as fever, chest pain, shortness of breath, return of symptoms, or any other  concerns.     Follow-Up Appointments:  Patient is being transferred to ICU at [facility]; will require f/u with Neurology, PCP, cardiology on discharge.     Marisol Parks MD  [facility], [facility]         Cosigned by: Rush Mary MD at 2020 10:07 AM   Revision History                    Routing History                      [facility] Hospital Medicine Transfer Note     Primary Team: [facility] Hospitalist Team A  Attending Physician: Dex Moreira MD  Resident: Mentore  Intern: Harshal     Date of Admit: 2020  Date of Discharge: 2020     Transfer to: [facility]   Condition: Poor     Discharge Diagnoses          Patient Active Problem List   Diagnosis    Loss of consciousness    Essential hypertension    Ulcerative colitis    Chronic bronchitis    Autoimmune hemolytic anemia    Mitral regurgitation    CVA (cerebral vascular accident)    Bacterial endocarditis    History of gastrointestinal bleeding    GERD (gastroesophageal reflux disease)    Embolic stroke involving left posterior cerebral artery    Unresponsive    Embolic stroke involving right posterior cerebral artery    Thalamic infarct, acute    Abnormal EEG    Anemia    Seizure-like activity    Status epilepticus         Consultants and Procedures      Consultants:  Neurology  Vascular Neurology  Infectious Disease  Pulmonology/Critical Care     Procedures:   Intubation     20 EEG  IMPRESSION:  This is an abnormal EEG during lethargic state.  Diffuse disorganized slowing of the background was noted.  Nearly continuous right frontal central parietal slowing was noted left posterior maximum pseudo periodic 1-2 hertz epileptiform discharges were seen.      Imagin20 CT Head:  FINDINGS:  There is a moderate large is scalp hematoma over the left temporal and parietal bone measuring up to 1 cm thick.  No laceration or foreign body is evident.  The underlying calvarium is intact.     The  brain parenchyma appears normal in attenuation with normal gray-white matter differentiation.  There is no evidence of mass or mass effect.     Mastoid effusion on the right is present.  The remainder of the air cells are clear.  The orbits and orbital contents appear normal.     Impression:     Broad 1 cm thick scalp hematoma over the left temporoparietal region.  No underlying fracture or intracranial abnormality.     9/8/20 MRI Brain w/o Contrast:  Impression:     1. Left PCA distribution acute infarction.  2. Focus of restricted diffusion within the right occipital lobe with adjacent vasogenic edema, concerning for underlying lesion or septic embolus.  Further evaluation with contrast-enhanced MRI brain is recommended.  3. MRA head: No evidence of occlusion or high-grade stenosis of the major intracranial arteries.  4. MRA neck: No evidence of occlusion or high-grade stenosis of the major arteries of the neck.  5. Moderate bilateral pleural effusions.  This report was flagged in Epic as abnormal.     9/11/20 MRI Brain w/o Contrast:  Impression:     1. Since 09/08/2020 MRI, there is a new small focus of acute to subacute ischemia in the left cerebellum.  2. Elsewhere, there is continued maturation of ischemia in the bilateral medial occipital and parietal lobes as well as the left cingulate gyrus.  3. At least partially on the basis of differences in technique, with SWI sequences utilized on the current examination versus T2* GRE previously, there are numerous diffuse bilateral cortical and subcortical foci of susceptibility related signal loss.  Newly evident susceptibility within the medial occipital lobes may represent petechial hemorrhage associated with the evolving areas of ischemia.  No large parenchymal hemorrhage is identified.  Additionally, there is evidence of susceptibility related signal loss about the cerebral sulci without FLAIR signal abnormality suggested of superficial siderosis as sequela of  prior subarachnoid hemorrhage.  Overall constellation of findings suggests cerebral amyloid angiopathy.  Component of chronic hypertensive arteriopathy also considered.  4. Additional details, as per report body.     Brief History of Present Illness      Lopez Savage is a 73 y/o male with PMH of diverticulitis, MVP, endocarditis with aortic valve vegetations, HTN, and COPD. The patient presented to [facility] on 9/8/2020 with a primary complaint of loss of consciousness. The patient was recently treated for endocarditis w/ IV rocephin through PICC line at [facility] and discharged 2 weeks prior to presentation. Since then he had a worsening cough and was planning to go to the hospital for evaluation. Per wife home health did portable CXR about a week prior to presentation w/ concern for fluid vs pneumonia. Patient was given course of doxycycline and finished course day prior to admission. That night, his wife went to bed while  was watching TV and about an hour later her daughter found him non responsive on the ground. Daughter did a few chest compressions before EMS arrived. Patient arrived to ED and non responsive to sternal rub. Pupillary and gag reflex present. Patient intubated for airway protection and sedated w/ propofol. No seizure like activity was observed in ED.      For the full HPI please refer to the History & Physical from this admission.     Hospital Course By Problem with Pertinent Findings      Seizure-like activity in setting of PCA stroke; Multiple witnessed seizure like activity described concerning for left sided seizure focus.   - no previous reports of seizures from family  - 9/8 EEG demonstrated left posterior pseudo periodic epileptiform discharges is suggestive of an irritative region likely secondary to focal ischemia.  There is increased risk of focal seizures from this region, however no seizures recorded during this study.   - neurology recommended  keppra loading with 3g and maintenance 500 BID  - Yesterday pt with several seizures, concern for clusters of seizures vs. NCSE  - initiated Vimpat 200 mg load followed by 100 mg BID due to persistent seizures  - to transfer to [facility] today for continuous EEG monitoring   - avoid cefepime and tramadol, other seizure threshold-lowering agents     Acute hypoxic respiratory failure in the setting of PCA stroke suspect 2/2 septic emboli;  patient found unresponsive at home before arrival to ED. Initially non-responsive to sternal rub, pupillary and gag reflex present. tPA contraindicated due to recent GI bleed.   - CT head negative for acute intracranial bleed  - MRI head showed PCA infarct and right occipital lobe edema   - 9/11 MRI showing since 09/08/2020 MRI, there is a new small focus of acute to subacute ischemia in the left cerebellum.  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   - initially treated at [facility] and discharged with three week course of IV rocephin  - strepto mutans; 6 week course set to end on 9/14/2020  - SILVANA at [facility] showed aortic valve vegetations  - ceftriaxone, vanc per ID rec's     Bilateral Community Acquired Pneumonia  - S/p 7 day course of doxycycline for atypical CAP coverage   - Continue Flagyl for now for anaerobic coverage (end date 9/15)     Normocytic Anemia  - H/H on admission 9.1/29.4, MCV 91  - has had extensive workup at [facility] for anemia in past including autoimmune, leukemia and lymphoma which were negative  -  S/p 2 units of blood ordered for transfusion on 9/9, H&H today 9.4/29.9     Hypokalemia  - K 3.2 on admission  - received IV KCl 40mEq in ED  - K this AM: 3.7     Intermediate Level  Troponin; suspect secondary to demand from CVA  - Troponin 0.08 on admission -> 0.147 on 9/8  -   - EKG sinus tachycardia w/ left anterior fascicular block; no new ST elevations/depressions     Leukocytosis   - WBC 19 on admission trended down but now 14.0  - likely 2/2 to endocarditis  - no reported fevers recently, afebrile since admit  - will continue to monitor     COPD, stable  - pt does not require home O2  - home montelukast and fluticasone   - pt currently satting 98 on room air     H/o GI Bleed; h/o bleeding diverticuli and gastric ulcers s/p cauterization, has required blood transfusions. Most recent GI bleed in August 2020   - s/p transfusion of 2 units on 9/9  - no active bleeding throughout hospitalization, hemodynamically stable      HENRY vs CKD  - Cr 1.5 on admission, baseline near 1.2-1.3  - eGFR 31  - creatinine this AM: 1.3     Hyperglycemia (resolved)  - glucose 189 on admission, 107 on recent POCT  - ordered SSI  - A1c 4.8  - will obtain tight glycemia control for neurological protection      HTN; initially held home BP in setting of possible ischemic stroke.  - hypertensive to 180's systolic, has normalized  - restarted Losartan 25 mg last night due to SBPs in 170s, with improvement to 150s     HLD  - atorvastatin 40mg, continuing for secondary stroke prevention      Discharge Medications                    Lopez Savage   Home Medication Instructions ABDOULAYE:[id number]     Printed on:09/13/20 1241   Medication Information                                       atorvastatin (LIPITOR) 40 MG tablet  1 tablet (40 mg total) by Per G Tube route once daily.                      cefTRIAXone (ROCEPHIN) 2 g/50 mL PgBk IVPB  Inject 50 mLs (2 g total) into the vein once daily. for 4 days                      doxycycline hyclate (DOXYCYCLINE 100MG IN D5W 250ML, READY TO MIX,)  Inject 250 mLs (100 mg total) into the vein every 12 (twelve) hours. for 6 days                      enoxaparin (LOVENOX) 40  mg/0.4 mL Syrg  Inject 0.4 mLs (40 mg total) into the skin once daily.                      insulin aspart U-100 (NOVOLOG) 100 unit/mL (3 mL) InPn pen  Inject 0-5 Units into the skin every 6 (six) hours as needed (Hyperglycemia).                      lacosamide (VIMPAT) 10 mg/mL Soln oral solution  10 mLs (100 mg total) by Per NG tube route every 12 (twelve) hours.                      levETIRAcetam in NaCl, iso-os, (KEPPRA) 500 mg/100 mL PgBk  Inject 100 mLs (500 mg total) into the vein every 12 (twelve) hours.                      losartan (COZAAR) 50 MG tablet  Take 1 tablet (50 mg total) by mouth once daily.                      metronidazole (FLAGYL) 500 mg/100 mL IVPB  Inject 100 mLs (500 mg total) into the vein every 8 (eight) hours. for 7 days                            Discharge Information:   Diet:  As per accepting team     Physical Activity:  As tolerated             Instructions:  1. Take all medications as prescribed  2. Keep all follow-up appointments  3. Return to the hospital or call your primary care physicians if any worsening symptoms such as fever, chest pain, shortness of breath, return of symptoms, or any other concerns.     Follow-Up Appointments:  Patient is being transferred to ICU at [facility]; will require f/u with Neurology, PCP, cardiology on discharge.     Marisol Parks MD  [facility] Internal Medicine-Pediatrics, -I         Cosigned by: Rush Mary MD at 9/14/2020 10:07 AM   Revision History                    Routing History                      [facility] Hospital Medicine Transfer Note     Primary Team: [facility] Hospitalist Team A  Attending Physician: Dex Moreira MD  Resident: Marry  Intern: Harshal     Date of Admit: 9/13/2020  Date of Discharge: 9/13/2020     Transfer to: [facility]   Condition: Poor     Discharge Diagnoses          Patient Active Problem List   Diagnosis    Loss of consciousness    Essential hypertension    Ulcerative colitis    Chronic  bronchitis    Autoimmune hemolytic anemia    Mitral regurgitation    CVA (cerebral vascular accident)    Bacterial endocarditis    History of gastrointestinal bleeding    GERD (gastroesophageal reflux disease)    Embolic stroke involving left posterior cerebral artery    Unresponsive    Embolic stroke involving right posterior cerebral artery    Thalamic infarct, acute    Abnormal EEG    Anemia    Seizure-like activity    Status epilepticus         Consultants and Procedures      Consultants:  Neurology  Vascular Neurology  Infectious Disease  Pulmonology/Critical Care     Procedures:   Intubation     20 EEG  IMPRESSION:  This is an abnormal EEG during lethargic state.  Diffuse disorganized slowing of the background was noted.  Nearly continuous right frontal central parietal slowing was noted left posterior maximum pseudo periodic 1-2 hertz epileptiform discharges were seen.      Imagin20 CT Head:  FINDINGS:  There is a moderate large is scalp hematoma over the left temporal and parietal bone measuring up to 1 cm thick.  No laceration or foreign body is evident.  The underlying calvarium is intact.     The brain parenchyma appears normal in attenuation with normal gray-white matter differentiation.  There is no evidence of mass or mass effect.     Mastoid effusion on the right is present.  The remainder of the air cells are clear.  The orbits and orbital contents appear normal.     Impression:     Broad 1 cm thick scalp hematoma over the left temporoparietal region.  No underlying fracture or intracranial abnormality.     20 MRI Brain w/o Contrast:  Impression:     1. Left PCA distribution acute infarction.  2. Focus of restricted diffusion within the right occipital lobe with adjacent vasogenic edema, concerning for underlying lesion or septic embolus.  Further evaluation with contrast-enhanced MRI brain is recommended.  3. MRA head: No evidence of occlusion or high-grade stenosis of  the major intracranial arteries.  4. MRA neck: No evidence of occlusion or high-grade stenosis of the major arteries of the neck.  5. Moderate bilateral pleural effusions.  This report was flagged in Epic as abnormal.     9/11/20 MRI Brain w/o Contrast:  Impression:     1. Since 09/08/2020 MRI, there is a new small focus of acute to subacute ischemia in the left cerebellum.  2. Elsewhere, there is continued maturation of ischemia in the bilateral medial occipital and parietal lobes as well as the left cingulate gyrus.  3. At least partially on the basis of differences in technique, with SWI sequences utilized on the current examination versus T2* GRE previously, there are numerous diffuse bilateral cortical and subcortical foci of susceptibility related signal loss.  Newly evident susceptibility within the medial occipital lobes may represent petechial hemorrhage associated with the evolving areas of ischemia.  No large parenchymal hemorrhage is identified.  Additionally, there is evidence of susceptibility related signal loss about the cerebral sulci without FLAIR signal abnormality suggested of superficial siderosis as sequela of prior subarachnoid hemorrhage.  Overall constellation of findings suggests cerebral amyloid angiopathy.  Component of chronic hypertensive arteriopathy also considered.  4. Additional details, as per report body.     Brief History of Present Illness      Lopez Savage is a 75 y/o male with PMH of diverticulitis, MVP, endocarditis with aortic valve vegetations, HTN, and COPD. The patient presented to [facility] on 9/8/2020 with a primary complaint of loss of consciousness. The patient was recently treated for endocarditis w/ IV rocephin through PICC line at [facility] and discharged 2 weeks prior to presentation. Since then he had a worsening cough and was planning to go to the hospital for evaluation. Per wife home health did portable CXR about a week prior to  presentation w/ concern for fluid vs pneumonia. Patient was given course of doxycycline and finished course day prior to admission. That night, his wife went to bed while  was watching TV and about an hour later her daughter found him non responsive on the ground. Daughter did a few chest compressions before EMS arrived. Patient arrived to ED and non responsive to sternal rub. Pupillary and gag reflex present. Patient intubated for airway protection and sedated w/ propofol. No seizure like activity was observed in ED.      For the full HPI please refer to the History & Physical from this admission.     Hospital Course By Problem with Pertinent Findings      Seizure-like activity in setting of PCA stroke; Multiple witnessed seizure like activity described concerning for left sided seizure focus.   - no previous reports of seizures from family  - 9/8 EEG demonstrated left posterior pseudo periodic epileptiform discharges is suggestive of an irritative region likely secondary to focal ischemia.  There is increased risk of focal seizures from this region, however no seizures recorded during this study.   - neurology recommended keppra loading with 3g and maintenance 500 BID  - Yesterday pt with several seizures, concern for clusters of seizures vs. NCSE  - initiated Vimpat 200 mg load followed by 100 mg BID due to persistent seizures  - to transfer to [facility] today for continuous EEG monitoring   - avoid cefepime and tramadol, other seizure threshold-lowering agents     Acute hypoxic respiratory failure in the setting of PCA stroke suspect 2/2 septic emboli;  patient found unresponsive at home before arrival to ED. Initially non-responsive to sternal rub, pupillary and gag reflex present. tPA contraindicated due to recent GI bleed.   - CT head negative for acute intracranial bleed  - MRI head showed PCA infarct and right occipital lobe edema   - 9/11 MRI showing since 09/08/2020 MRI, there is a new small  focus of acute to subacute ischemia in the left cerebellum.  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   - initially treated at [facility] and discharged with three week course of IV rocephin  - strepto mutans; 6 week course set to end on 9/14/2020  - SILVANA at [facility] showed aortic valve vegetations  - ceftriaxone, vanc per ID rec's     Bilateral Community Acquired Pneumonia  - S/p 7 day course of doxycycline for atypical CAP coverage   - Continue Flagyl for now for anaerobic coverage (end date 9/15)     Normocytic Anemia  - H/H on admission 9.1/29.4, MCV 91  - has had extensive workup at [facility] for anemia in past including autoimmune, leukemia and lymphoma which were negative  -  S/p 2 units of blood ordered for transfusion on 9/9, H&H today 9.4/29.9     Hypokalemia  - K 3.2 on admission  - received IV KCl 40mEq in ED  - K this AM: 3.7     Intermediate Level Troponin; suspect secondary to demand from CVA  - Troponin 0.08 on admission -> 0.147 on 9/8  -   - EKG sinus tachycardia w/ left anterior fascicular block; no new ST elevations/depressions     Leukocytosis   - WBC 19 on admission trended down but now 14.0  - likely 2/2 to endocarditis  - no reported fevers recently, afebrile since admit  - will continue to monitor     COPD, stable  - pt does not require home O2  - home montelukast and fluticasone   - pt currently satting 98 on room air     H/o GI Bleed; h/o bleeding diverticuli and gastric ulcers s/p cauterization, has required blood transfusions. Most recent GI bleed in August 2020   - s/p transfusion of 2 units on 9/9  - no active bleeding throughout hospitalization, hemodynamically stable      HENRY vs CKD  - Cr 1.5 on admission,  baseline near 1.2-1.3  - eGFR 31  - creatinine this AM: 1.3     Hyperglycemia (resolved)  - glucose 189 on admission, 107 on recent POCT  - ordered SSI  - A1c 4.8  - will obtain tight glycemia control for neurological protection      HTN; initially held home BP in setting of possible ischemic stroke.  - hypertensive to 180's systolic, has normalized  - restarted Losartan 25 mg last night due to SBPs in 170s, with improvement to 150s     HLD  - atorvastatin 40mg, continuing for secondary stroke prevention      Discharge Medications                    Lopez Savage   Home Medication Instructions ABDOULAYE:[id number]     Printed on:09/13/20 1249   Medication Information                                       atorvastatin (LIPITOR) 40 MG tablet  1 tablet (40 mg total) by Per G Tube route once daily.                      cefTRIAXone (ROCEPHIN) 2 g/50 mL PgBk IVPB  Inject 50 mLs (2 g total) into the vein once daily. for 4 days                      doxycycline hyclate (DOXYCYCLINE 100MG IN D5W 250ML, READY TO MIX,)  Inject 250 mLs (100 mg total) into the vein every 12 (twelve) hours. for 6 days                      enoxaparin (LOVENOX) 40 mg/0.4 mL Syrg  Inject 0.4 mLs (40 mg total) into the skin once daily.                      insulin aspart U-100 (NOVOLOG) 100 unit/mL (3 mL) InPn pen  Inject 0-5 Units into the skin every 6 (six) hours as needed (Hyperglycemia).                      lacosamide (VIMPAT) 10 mg/mL Soln oral solution  10 mLs (100 mg total) by Per NG tube route every 12 (twelve) hours.                      levETIRAcetam in NaCl, iso-os, (KEPPRA) 500 mg/100 mL PgBk  Inject 100 mLs (500 mg total) into the vein every 12 (twelve) hours.                      losartan (COZAAR) 50 MG tablet  Take 1 tablet (50 mg total) by mouth once daily.                      metronidazole (FLAGYL) 500 mg/100 mL IVPB  Inject 100 mLs (500 mg total) into the vein every 8 (eight) hours. for 7 days                            Discharge  Information:   Diet:  As per accepting team     Physical Activity:  As tolerated             Instructions:  1. Take all medications as prescribed  2. Keep all follow-up appointments  3. Return to the hospital or call your primary care physicians if any worsening symptoms such as fever, chest pain, shortness of breath, return of symptoms, or any other concerns.     Follow-Up Appointments:  Patient is being transferred to ICU at [facility]; will require f/u with Neurology, PCP, cardiology on discharge.     Marisol Parks MD  \[facility]\"" Internal Medicine-Pediatrics, HO-I         Cosigned by: Rush Mary MD at 9/14/2020 10:07 AM   Revision History                    Routing History                      \[facility]\"" Hospital Medicine Transfer Note     Primary Team: \[facility]\"" Hospitalist Team A  Attending Physician: Dex Moreira MD  Resident: Marry  Intern: Harshal     Date of Admit: 9/13/2020  Date of Discharge: 9/13/2020     Transfer to: [facility]   Condition: Poor     Discharge Diagnoses          Patient Active Problem List   Diagnosis    Loss of consciousness    Essential hypertension    Ulcerative colitis    Chronic bronchitis    Autoimmune hemolytic anemia    Mitral regurgitation    CVA (cerebral vascular accident)    Bacterial endocarditis    History of gastrointestinal bleeding    GERD (gastroesophageal reflux disease)    Embolic stroke involving left posterior cerebral artery    Unresponsive    Embolic stroke involving right posterior cerebral artery    Thalamic infarct, acute    Abnormal EEG    Anemia    Seizure-like activity    Status epilepticus         Consultants and Procedures      Consultants:  Neurology  Vascular Neurology  Infectious Disease  Pulmonology/Critical Care     Procedures:   Intubation     9/8/20 EEG  IMPRESSION:  This is an abnormal EEG during lethargic state.  Diffuse disorganized slowing of the background was noted.  Nearly continuous right frontal central  parietal slowing was noted left posterior maximum pseudo periodic 1-2 hertz epileptiform discharges were seen.      Imagin20 CT Head:  FINDINGS:  There is a moderate large is scalp hematoma over the left temporal and parietal bone measuring up to 1 cm thick.  No laceration or foreign body is evident.  The underlying calvarium is intact.     The brain parenchyma appears normal in attenuation with normal gray-white matter differentiation.  There is no evidence of mass or mass effect.     Mastoid effusion on the right is present.  The remainder of the air cells are clear.  The orbits and orbital contents appear normal.     Impression:     Broad 1 cm thick scalp hematoma over the left temporoparietal region.  No underlying fracture or intracranial abnormality.     20 MRI Brain w/o Contrast:  Impression:     1. Left PCA distribution acute infarction.  2. Focus of restricted diffusion within the right occipital lobe with adjacent vasogenic edema, concerning for underlying lesion or septic embolus.  Further evaluation with contrast-enhanced MRI brain is recommended.  3. MRA head: No evidence of occlusion or high-grade stenosis of the major intracranial arteries.  4. MRA neck: No evidence of occlusion or high-grade stenosis of the major arteries of the neck.  5. Moderate bilateral pleural effusions.  This report was flagged in Epic as abnormal.     20 MRI Brain w/o Contrast:  Impression:     1. Since 2020 MRI, there is a new small focus of acute to subacute ischemia in the left cerebellum.  2. Elsewhere, there is continued maturation of ischemia in the bilateral medial occipital and parietal lobes as well as the left cingulate gyrus.  3. At least partially on the basis of differences in technique, with SWI sequences utilized on the current examination versus T2* GRE previously, there are numerous diffuse bilateral cortical and subcortical foci of susceptibility related signal loss.  Newly evident  susceptibility within the medial occipital lobes may represent petechial hemorrhage associated with the evolving areas of ischemia.  No large parenchymal hemorrhage is identified.  Additionally, there is evidence of susceptibility related signal loss about the cerebral sulci without FLAIR signal abnormality suggested of superficial siderosis as sequela of prior subarachnoid hemorrhage.  Overall constellation of findings suggests cerebral amyloid angiopathy.  Component of chronic hypertensive arteriopathy also considered.  4. Additional details, as per report body.     Brief History of Present Illness      Lopez Savage is a 75 y/o male with PMH of diverticulitis, MVP, endocarditis with aortic valve vegetations, HTN, and COPD. The patient presented to [facility] on 9/8/2020 with a primary complaint of loss of consciousness. The patient was recently treated for endocarditis w/ IV rocephin through PICC line at [facility] and discharged 2 weeks prior to presentation. Since then he had a worsening cough and was planning to go to the hospital for evaluation. Per wife home health did portable CXR about a week prior to presentation w/ concern for fluid vs pneumonia. Patient was given course of doxycycline and finished course day prior to admission. That night, his wife went to bed while  was watching TV and about an hour later her daughter found him non responsive on the ground. Daughter did a few chest compressions before EMS arrived. Patient arrived to ED and non responsive to sternal rub. Pupillary and gag reflex present. Patient intubated for airway protection and sedated w/ propofol. No seizure like activity was observed in ED.      For the full HPI please refer to the History & Physical from this admission.     Hospital Course By Problem with Pertinent Findings      Seizure-like activity in setting of PCA stroke; Multiple witnessed seizure like activity described concerning for left  sided seizure focus.   - no previous reports of seizures from family  - 9/8 EEG demonstrated left posterior pseudo periodic epileptiform discharges is suggestive of an irritative region likely secondary to focal ischemia.  There is increased risk of focal seizures from this region, however no seizures recorded during this study.   - neurology recommended keppra loading with 3g and maintenance 500 BID  - Yesterday pt with several seizures, concern for clusters of seizures vs. NCSE  - initiated Vimpat 200 mg load followed by 100 mg BID due to persistent seizures  - to transfer to [facility] today for continuous EEG monitoring   - avoid cefepime and tramadol, other seizure threshold-lowering agents     Acute hypoxic respiratory failure in the setting of PCA stroke suspect 2/2 septic emboli;  patient found unresponsive at home before arrival to ED. Initially non-responsive to sternal rub, pupillary and gag reflex present. tPA contraindicated due to recent GI bleed.   - CT head negative for acute intracranial bleed  - MRI head showed PCA infarct and right occipital lobe edema   - 9/11 MRI showing since 09/08/2020 MRI, there is a new small focus of acute to subacute ischemia in the left cerebellum.  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   - initially treated at [facility] and discharged with three week course of IV rocephin  - strepto mutans; 6 week course set to end on 9/14/2020  - SILVANA at [facility] showed aortic valve vegetations  - ceftriaxone, vanc per ID rec's     Bilateral Community Acquired Pneumonia  - S/p 7 day course of doxycycline for atypical CAP coverage   - Continue Flagyl for now for anaerobic coverage (end date 9/15)     Normocytic  Anemia  - H/H on admission 9.1/29.4, MCV 91  - has had extensive workup at [facility] for anemia in past including autoimmune, leukemia and lymphoma which were negative  -  S/p 2 units of blood ordered for transfusion on 9/9, H&H today 9.4/29.9     Hypokalemia  - K 3.2 on admission  - received IV KCl 40mEq in ED  - K this AM: 3.7     Intermediate Level Troponin; suspect secondary to demand from CVA  - Troponin 0.08 on admission -> 0.147 on 9/8  -   - EKG sinus tachycardia w/ left anterior fascicular block; no new ST elevations/depressions     Leukocytosis   - WBC 19 on admission trended down but now 14.0  - likely 2/2 to endocarditis  - no reported fevers recently, afebrile since admit  - will continue to monitor     COPD, stable  - pt does not require home O2  - home montelukast and fluticasone   - pt currently satting 98 on room air     H/o GI Bleed; h/o bleeding diverticuli and gastric ulcers s/p cauterization, has required blood transfusions. Most recent GI bleed in August 2020   - s/p transfusion of 2 units on 9/9  - no active bleeding throughout hospitalization, hemodynamically stable      HENRY vs CKD  - Cr 1.5 on admission, baseline near 1.2-1.3  - eGFR 31  - creatinine this AM: 1.3     Hyperglycemia (resolved)  - glucose 189 on admission, 107 on recent POCT  - ordered SSI  - A1c 4.8  - will obtain tight glycemia control for neurological protection      HTN; initially held home BP in setting of possible ischemic stroke.  - hypertensive to 180's systolic, has normalized  - restarted Losartan 25 mg last night due to SBPs in 170s, with improvement to 150s     HLD  - atorvastatin 40mg, continuing for secondary stroke prevention      Discharge Medications                    Lopez Savage   Home Medication Instructions ABDOULAYE:[id number]     Printed on:09/13/20 1241   Medication Information                                       atorvastatin (LIPITOR) 40 MG tablet  1 tablet (40 mg total) by Per G Tube route once  daily.                      cefTRIAXone (ROCEPHIN) 2 g/50 mL PgBk IVPB  Inject 50 mLs (2 g total) into the vein once daily. for 4 days                      doxycycline hyclate (DOXYCYCLINE 100MG IN D5W 250ML, READY TO MIX,)  Inject 250 mLs (100 mg total) into the vein every 12 (twelve) hours. for 6 days                      enoxaparin (LOVENOX) 40 mg/0.4 mL Syrg  Inject 0.4 mLs (40 mg total) into the skin once daily.                      insulin aspart U-100 (NOVOLOG) 100 unit/mL (3 mL) InPn pen  Inject 0-5 Units into the skin every 6 (six) hours as needed (Hyperglycemia).                      lacosamide (VIMPAT) 10 mg/mL Soln oral solution  10 mLs (100 mg total) by Per NG tube route every 12 (twelve) hours.                      levETIRAcetam in NaCl, iso-os, (KEPPRA) 500 mg/100 mL PgBk  Inject 100 mLs (500 mg total) into the vein every 12 (twelve) hours.                      losartan (COZAAR) 50 MG tablet  Take 1 tablet (50 mg total) by mouth once daily.                      metronidazole (FLAGYL) 500 mg/100 mL IVPB  Inject 100 mLs (500 mg total) into the vein every 8 (eight) hours. for 7 days                            Discharge Information:   Diet:  As per accepting team     Physical Activity:  As tolerated             Instructions:  1. Take all medications as prescribed  2. Keep all follow-up appointments  3. Return to the hospital or call your primary care physicians if any worsening symptoms such as fever, chest pain, shortness of breath, return of symptoms, or any other concerns.     Follow-Up Appointments:  Patient is being transferred to ICU at [facility]; will require f/u with Neurology, PCP, cardiology on discharge.     Marisol Parks MD  [facility] Internal Medicine-Pediatrics, [facility]         Cosigned by: Rush Mary MD at 9/14/2020 10:07 AM   Revision History                    Routing History                      [facility] Hospital Medicine Transfer Note     Primary Team: [facility] Hospitalist Team A  Attending  Physician: Dex Moreira MD  Resident: [place]  Intern: Harshal     Date of Admit: 2020  Date of Discharge: 2020     Transfer to: [facility]   Condition: Poor     Discharge Diagnoses          Patient Active Problem List   Diagnosis    Loss of consciousness    Essential hypertension    Ulcerative colitis    Chronic bronchitis    Autoimmune hemolytic anemia    Mitral regurgitation    CVA (cerebral vascular accident)    Bacterial endocarditis    History of gastrointestinal bleeding    GERD (gastroesophageal reflux disease)    Embolic stroke involving left posterior cerebral artery    Unresponsive    Embolic stroke involving right posterior cerebral artery    Thalamic infarct, acute    Abnormal EEG    Anemia    Seizure-like activity    Status epilepticus         Consultants and Procedures      Consultants:  Neurology  Vascular Neurology  Infectious Disease  Pulmonology/Critical Care     Procedures:   Intubation     20 EEG  IMPRESSION:  This is an abnormal EEG during lethargic state.  Diffuse disorganized slowing of the background was noted.  Nearly continuous right frontal central parietal slowing was noted left posterior maximum pseudo periodic 1-2 hertz epileptiform discharges were seen.      Imagin20 CT Head:  FINDINGS:  There is a moderate large is scalp hematoma over the left temporal and parietal bone measuring up to 1 cm thick.  No laceration or foreign body is evident.  The underlying calvarium is intact.     The brain parenchyma appears normal in attenuation with normal gray-white matter differentiation.  There is no evidence of mass or mass effect.     Mastoid effusion on the right is present.  The remainder of the air cells are clear.  The orbits and orbital contents appear normal.     Impression:     Broad 1 cm thick scalp hematoma over the left temporoparietal region.  No underlying fracture or intracranial abnormality.     20 MRI Brain w/o  Contrast:  Impression:     1. Left PCA distribution acute infarction.  2. Focus of restricted diffusion within the right occipital lobe with adjacent vasogenic edema, concerning for underlying lesion or septic embolus.  Further evaluation with contrast-enhanced MRI brain is recommended.  3. MRA head: No evidence of occlusion or high-grade stenosis of the major intracranial arteries.  4. MRA neck: No evidence of occlusion or high-grade stenosis of the major arteries of the neck.  5. Moderate bilateral pleural effusions.  This report was flagged in Epic as abnormal.     9/11/20 MRI Brain w/o Contrast:  Impression:     1. Since 09/08/2020 MRI, there is a new small focus of acute to subacute ischemia in the left cerebellum.  2. Elsewhere, there is continued maturation of ischemia in the bilateral medial occipital and parietal lobes as well as the left cingulate gyrus.  3. At least partially on the basis of differences in technique, with SWI sequences utilized on the current examination versus T2* GRE previously, there are numerous diffuse bilateral cortical and subcortical foci of susceptibility related signal loss.  Newly evident susceptibility within the medial occipital lobes may represent petechial hemorrhage associated with the evolving areas of ischemia.  No large parenchymal hemorrhage is identified.  Additionally, there is evidence of susceptibility related signal loss about the cerebral sulci without FLAIR signal abnormality suggested of superficial siderosis as sequela of prior subarachnoid hemorrhage.  Overall constellation of findings suggests cerebral amyloid angiopathy.  Component of chronic hypertensive arteriopathy also considered.  4. Additional details, as per report body.     Brief History of Present Illness      Lopez Savage is a 75 y/o male with PMH of diverticulitis, MVP, endocarditis with aortic valve vegetations, HTN, and COPD. The patient presented to [facility] on 9/8/2020  with a primary complaint of loss of consciousness. The patient was recently treated for endocarditis w/ IV rocephin through PICC line at [facility] and discharged 2 weeks prior to presentation. Since then he had a worsening cough and was planning to go to the hospital for evaluation. Per wife home health did portable CXR about a week prior to presentation w/ concern for fluid vs pneumonia. Patient was given course of doxycycline and finished course day prior to admission. That night, his wife went to bed while  was watching TV and about an hour later her daughter found him non responsive on the ground. Daughter did a few chest compressions before EMS arrived. Patient arrived to ED and non responsive to sternal rub. Pupillary and gag reflex present. Patient intubated for airway protection and sedated w/ propofol. No seizure like activity was observed in ED.      For the full HPI please refer to the History & Physical from this admission.     Hospital Course By Problem with Pertinent Findings      Seizure-like activity in setting of PCA stroke; Multiple witnessed seizure like activity described concerning for left sided seizure focus.   - no previous reports of seizures from family  - 9/8 EEG demonstrated left posterior pseudo periodic epileptiform discharges is suggestive of an irritative region likely secondary to focal ischemia.  There is increased risk of focal seizures from this region, however no seizures recorded during this study.   - neurology recommended keppra loading with 3g and maintenance 500 BID  - Yesterday pt with several seizures, concern for clusters of seizures vs. NCSE  - initiated Vimpat 200 mg load followed by 100 mg BID due to persistent seizures  - to transfer to [facility] today for continuous EEG monitoring   - avoid cefepime and tramadol, other seizure threshold-lowering agents     Acute hypoxic respiratory failure in the setting of PCA stroke suspect 2/2 septic emboli;   patient found unresponsive at home before arrival to ED. Initially non-responsive to sternal rub, pupillary and gag reflex present. tPA contraindicated due to recent GI bleed.   - CT head negative for acute intracranial bleed  - MRI head showed PCA infarct and right occipital lobe edema   - 9/11 MRI showing since 09/08/2020 MRI, there is a new small focus of acute to subacute ischemia in the left cerebellum.  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   - initially treated at [facility] and discharged with three week course of IV rocephin  - strepto mutans; 6 week course set to end on 9/14/2020  - SILVANA at [facility] showed aortic valve vegetations  - ceftriaxone, vanc per ID rec's     Bilateral Community Acquired Pneumonia  - S/p 7 day course of doxycycline for atypical CAP coverage   - Continue Flagyl for now for anaerobic coverage (end date 9/15)     Normocytic Anemia  - H/H on admission 9.1/29.4, MCV 91  - has had extensive workup at [facility] for anemia in past including autoimmune, leukemia and lymphoma which were negative  -  S/p 2 units of blood ordered for transfusion on 9/9, H&H today 9.4/29.9     Hypokalemia  - K 3.2 on admission  - received IV KCl 40mEq in ED  - K this AM: 3.7     Intermediate Level Troponin; suspect secondary to demand from CVA  - Troponin 0.08 on admission -> 0.147 on 9/8  -   - EKG sinus tachycardia w/ left anterior fascicular block; no new ST elevations/depressions     Leukocytosis   - WBC 19 on admission trended down but now 14.0  - likely 2/2 to endocarditis  - no reported fevers recently, afebrile since admit  - will continue to monitor     COPD, stable  - pt does not require home O2  - home montelukast and  fluticasone   - pt currently satting 98 on room air     H/o GI Bleed; h/o bleeding diverticuli and gastric ulcers s/p cauterization, has required blood transfusions. Most recent GI bleed in August 2020   - s/p transfusion of 2 units on 9/9  - no active bleeding throughout hospitalization, hemodynamically stable      HENRY vs CKD  - Cr 1.5 on admission, baseline near 1.2-1.3  - eGFR 31  - creatinine this AM: 1.3     Hyperglycemia (resolved)  - glucose 189 on admission, 107 on recent POCT  - ordered SSI  - A1c 4.8  - will obtain tight glycemia control for neurological protection      HTN; initially held home BP in setting of possible ischemic stroke.  - hypertensive to 180's systolic, has normalized  - restarted Losartan 25 mg last night due to SBPs in 170s, with improvement to 150s     HLD  - atorvastatin 40mg, continuing for secondary stroke prevention      Discharge Medications                    Lopez Savage   Home Medication Instructions ABDOULAYE:[id number]     Printed on:09/13/20 1241   Medication Information                                       atorvastatin (LIPITOR) 40 MG tablet  1 tablet (40 mg total) by Per G Tube route once daily.                      cefTRIAXone (ROCEPHIN) 2 g/50 mL PgBk IVPB  Inject 50 mLs (2 g total) into the vein once daily. for 4 days                      doxycycline hyclate (DOXYCYCLINE 100MG IN D5W 250ML, READY TO MIX,)  Inject 250 mLs (100 mg total) into the vein every 12 (twelve) hours. for 6 days                      enoxaparin (LOVENOX) 40 mg/0.4 mL Syrg  Inject 0.4 mLs (40 mg total) into the skin once daily.                      insulin aspart U-100 (NOVOLOG) 100 unit/mL (3 mL) InPn pen  Inject 0-5 Units into the skin every 6 (six) hours as needed (Hyperglycemia).                      lacosamide (VIMPAT) 10 mg/mL Soln oral solution  10 mLs (100 mg total) by Per NG tube route every 12 (twelve) hours.                      levETIRAcetam in NaCl, iso-os, (KEPPRA) 500 mg/100 mL  PgBk  Inject 100 mLs (500 mg total) into the vein every 12 (twelve) hours.                      losartan (COZAAR) 50 MG tablet  Take 1 tablet (50 mg total) by mouth once daily.                      metronidazole (FLAGYL) 500 mg/100 mL IVPB  Inject 100 mLs (500 mg total) into the vein every 8 (eight) hours. for 7 days                            Discharge Information:   Diet:  As per accepting team     Physical Activity:  As tolerated             Instructions:  1. Take all medications as prescribed  2. Keep all follow-up appointments  3. Return to the hospital or call your primary care physicians if any worsening symptoms such as fever, chest pain, shortness of breath, return of symptoms, or any other concerns.     Follow-Up Appointments:  Patient is being transferred to ICU at [facility]; will require f/u with Neurology, PCP, cardiology on discharge.     Marisol Parks MD  [facility] Internal Medicine-Pediatrics, -I         Cosigned by: Rush Mary MD at 9/14/2020 10:07 AM   Revision History                    Routing History

## 2020-10-09 NOTE — PT/OT/SLP EVAL
Occupational Therapy  Evaluation/ Treatment    Lopez Savage   MRN: [id number]   Admitting Diagnosis: Intracranial septic embolism     OT Date of Treatment: 10/09/20   OT Start Time: 1400  OT Stop Time: 1455  OT Total Time (min): 55 min    Billable Minutes:  Evaluation 15  Self Care/Home Management 40    Diagnosis: Intracranial septic embolism    No past medical history on file.   No past surgical history on file.      General Precautions: Standard, aspiration, fall  Orthopedic Precautions: N/A  Braces: N/A    Spiritual, Cultural Beliefs, Gnosticism Practices, Values that Affect Care: no     Patient History:  Lives With: spouse  Living Arrangements: house  Home Accessibility: not wheelchair accessible  Home Layout: Able to live on 1st floor  Stair Railings at Home: none  Transportation Anticipated: health plan transportation  Living Environment Comment: Pt lives in a single stroy home with his wife and adult daughter. He reports that he has a walk in shower with a shower seat but info needs verification.  Equipment Currently Used at Home: (Per chart Pt has a RW, SC and grab bar with suction .)    Prior level of function:    Bed Mobility/Transfers: needs device(RW)  Grooming: independent  Bathing: needs device(shower chair)  Upper Body Dressing: independent  Lower Body Dressing: independent  Toileting: independent  Driving License: Yes  Occupation: Retired  Type of Occupation: Grain Evevator      Dominant hand: right    Subjective:  Communicated with nurse prior to session.    Chief Complaint: Pt indicating that he needs a lot of help to get out of bed.  Patient/Family stated goals: Pt wants to return to PLOF    Pain/Comfort  Pain Rating 1: 0/10  Pain Rating Post-Intervention 1: 0/10    Objective:   Patient found with: peripheral IV    Cognitive Exam:  Oriented to: Person  Follows Commands/attention: Follows one-step commands  Communication: clear/fluent  Memory:  Pt is questionable historian.  Safety  awareness/insight to disability: impaired  Coping skills/emotional control: Appropriate to situation    Visual/perceptual:  Impaired  tracking    Physical Exam:  Postural examination/scapula alignment:    -       Rounded shoulders  -       Posterior pelvic tilt  Skin integrity: Visible skin intact  Edema: None noted (B) UEs    Sensation:      -       Intact    Upper Extremity Range of Motion:  Right Upper Extremity: WFL  Left Upper Extremity: WFL except shld flexion and abduction with both limited to 0-80 AROM and 0-120 A/AOM    Upper Extremity Strength:  Right Upper Extremity: WFL  Left Upper Extremity: WFL except with shld flexion and abduction with both limited to 3-/5 .   Strength: WFLs (R) UE but diminished in (L)    Fine motor coordination:      -   Grossly Intact (R) UE but diminished in (L)    Gross motor coordination: WFL    Occupational Performance:    Bed Mobility:    · Patient completed Rolling/Turning to Left with  moderate assistance  · Patient completed Rolling/Turning to Right with moderate assistance  · Patient completed Scooting/Bridging with maximal assistance  · Patient completed Supine to Sit with maximal assistance  · Patient completed Sit to Supine with maximal assistance     Functional Mobility/Transfers:  · Patient completed Sit <> Stand Transfer with maximal assistance  with  rolling walker     Activities of Daily Living:  · Feeding:  stand by assistance after set up.  · Grooming: minimum assistance with (A) to comb hair. Pt washed face, hands and performed oral care after set up.  · Bathing: maximal assistance sponge bathing seated EOB.  · Upper Body Dressing: moderate assistance donning gown as a robe.  · Lower Body Dressing: maximal assistance with Pt attempting to don socks and pants seated EOB and standing to manage pants over his botom.  · Toileting: total assistance with (A) all aspects.      Additional Treatment:  Pt edu on role of OT, POC, safety when performing self care tasks  , benefit of performing OOB activity, and safety when performing functional transfers and mobility.  - White board updated  - Self care tasks completed-- as noted above     Patient left supine with call button in reach and nurse notified    [facility] 6 Click:  [facility] Total Score: 13    Assessment:  Lopez Savage is a 75 y.o. male with a medical diagnosis of Intracranial septic embolism .  Pt presents with performance deficits of Physical skills including impaired functional mobility, strength, functional endurance, fine and gross motor coordination, functional standing balance, and  functional use of ( L  ) UE and LE . Pt also demonstrating decreased cognitive function affecting problem solving, safety and performance of Functional Activities, self care activities, as well as functional mobility. Pt is motivated and would benefit from OT intervention to further his functional (I)ce and safety.      Rehab identified problem list/impairments: weakness, impaired endurance, impaired self care skills, impaired functional mobilty, gait instability, impaired balance, visual deficits, decreased lower extremity function, decreased upper extremity function, decreased safety awareness, abnormal tone, impaired fine motor    Rehab potential is good    Activity tolerance: Good    Discharge recommendations: home health OT     Barriers to discharge: Decreased caregiver support     Equipment recommendations: (TBD)     GOALS:   Multidisciplinary Problems     Occupational Therapy Goals        Problem: Occupational Therapy Goal    Goal Priority Disciplines Outcome Interventions   Occupational Therapy Goal     OT, PT/OT Ongoing, Progressing    Description: Goals to be met by: 10 28/20     Patient will increase functional independence with ADLs by performing:    Feeding with Modified [place].  UE Dressing with Stand-by Assistance.  LE Dressing with Moderate Assistance.  Grooming while seated with Stand-by Assistance.  Toileting from  bedside commode with Moderate Assistance for hygiene and clothing management.   Bathing from  sitting at sink with Moderate Assistance.  Rolling to Bilateral with Stand-by Assistance.   Supine to sit with Contact Guard Assistance.  Stand pivot transfers with Moderate Assistance.  Upper extremity exercise program x10 reps per handout, with assistance as needed.                     PLAN: Patient to be seen 5 x/week to address the above listed problems via self-care/home management, therapeutic activities, therapeutic exercises, neuromuscular re-education  Plan of Care expires: 11/09/20  Plan of Care reviewed with: patient    Amor Pino OTR/L  10/09/2020

## 2020-10-09 NOTE — PLAN OF CARE
Problem: Physical Therapy Goal  Goal: Physical Therapy Goal  Description: Goals to be met by: 10/28/2020     Patient will increase functional independence with mobility by performin. Supine to sit with MInimal Assistance  2. Sit to supine with Contact Guard Assistance  3. Rolling to Left and Right with Modified [place] using bed rails.  4. Sit to stand transfer with Minimal Assistance with rolling walker  5. Bed to chair transfer with Minimal Assistance using Rolling Walker  6. Gait  x 30 feet with Minimal Assistance using Rolling Walker.   7. Stand for 3 minutes with Contact Guard Assistance using Rolling Walker or UE support on stable surface  8. Lower extremity exercise program x 20 reps per handout, with assistance as needed    Outcome: Ongoing, Progressing   Pt evaluation performed and POC initiated.

## 2020-10-09 NOTE — H&P
[facility] Medicine  Skilled Nursing Facility   History and Physical Exam      Date of Service: 10/08/2020      Patient Name: Lopez Savage  MRN: [id number]  Admission Date: 10/8/2020  Attending Physician: Chi Ruiz MD  Primary Care Provider: Prasanna Haywood MD  Code Status: Full Code      Principal problem:  Intracranial septic embolism      Chief Complaint: No chief complaint on file.         HPI:     Mr. 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 The patient presented to [facility] on 9/8/2020 with a primary complaint of loss of consciousness. The patient was recently treated for endocarditis w/ IV rocephin through PICC line at [facility] and discharged approximately 2 weeks ago. Since then he has had a worsening cough and was planning to go to the hospital for evaluation. Per wife home health did portable CXR about a week ago w/ concern for fluid vs pneumonia. Patient was given course of doxycycline and finished course recently. 9/8 Wife went to bed while  was watching TV and about an hour later her daughter found him non responsive on the ground. Daughter did a few chest compressions before EMS arrived. Patient arrived to ED and non responsive to sternal rub. Pupillary and gag reflex present. Patient intubated for airway protection and sedated w/ propofol. No seizure like activity was observed in ED. TPA contraindicated due to recent GI bleed, last occurring in August. Discussed further stroke work up with wife and planned for imaging. CT head negative for acute intracranial bleed. MRI head showed PCA infarct and right occipital lobe edema. 9/8 EEG demonstrated left posterior pseudo periodic epileptiform discharges is suggestive of an irritative region likely secondary to focal ischemia. Patient started on AEDs vimpat and keppra and reported to have  several seizures during his stay. Patient was transferred to [facility] today for continuous EEG monitoring and further evaluation.    His course was complicated by acute encephalopathy, status epilepticus, bacterial endocarditis with brain abscess, embolic cerebellar stroke, chronic dysphagia, and chronic bronchitis.  He also had anemia requiring transfusion.  He continued on antibiotics and was recommended to have 6-8 weeks of IV antibiotics.  He is being transferred to skilled nursing for continued IV antibiotics as well as PT/OT.   He notes occasional headaches and intermittent blurry vision lately.  He has occasional cough and dyspnea along with nausea and occasional constipation.  He still feels weak in his arms and legs, but noted no focal deficits.    I spoke with his wife by phone, and she noted that his mentation had been fairly normal until he had the stroke in early September.  Since that time he has had difficulty feeding himself as well as difficulty with memory and focus.  She also reported that he is hard of hearing and has chronic vision changes.    Patient admitted with skilled services with PT and OT to improve functional status and ability to perform ADLs.       Past Medical History:   Diverticulitis, mitral valve prolapse, hypertension, COPD, aortic valve vegetation, embolic stroke    Past Surgical History:   Hernia repair as a child, bilateral knee replacements    Social History:   Tobacco Use    Smoking status: Wife reported that he does not smoke.   Substance and Sexual Activity                      Family History:   Family History     He had difficulty remembering tonight          Current Facility-Administered Medications on File Prior to Encounter   Medication    [COMPLETED] potassium chloride packet 20 mEq    [DISCONTINUED] acetaminophen tablet 650 mg    [DISCONTINUED] albuterol-ipratropium 2.5 mg-0.5 mg/3 mL nebulizer solution 3 mL    [DISCONTINUED] albuterol-ipratropium  2.5 mg-0.5 mg/3 mL nebulizer solution 3 mL    [DISCONTINUED] amLODIPine tablet 5 mg    [DISCONTINUED] atorvastatin tablet 40 mg    [DISCONTINUED] cefTRIAXone (ROCEPHIN) 2 g/50 mL D5W IVPB    [DISCONTINUED] enoxaparin injection 40 mg    [DISCONTINUED] fluticasone furoate-vilanteroL 100-25 mcg/dose diskus inhaler 1 puff    [DISCONTINUED] haloperidoL tablet 2 mg    [DISCONTINUED] labetalol 20 mg/4 mL (5 mg/mL) IV syring    [DISCONTINUED] lacosamide tablet 200 mg    [DISCONTINUED] Lactobacillus rhamnosus GG capsule 1 capsule    [DISCONTINUED] melatonin tablet 6 mg    [DISCONTINUED] miconazole nitrate 2% ointment    [DISCONTINUED] mupirocin 2 % ointment    [DISCONTINUED] pantoprazole suspension 40 mg    [DISCONTINUED] polyethylene glycol packet 17 g    [DISCONTINUED] psyllium husk (aspartame) 3.4 gram packet 1 packet    [DISCONTINUED] silodosin capsule 4 mg    [DISCONTINUED] sodium chloride 0.65 % nasal spray 1 spray    [DISCONTINUED] sodium chloride 0.9% flush 10 mL    [DISCONTINUED] sodium chloride 0.9% flush 10 mL    [DISCONTINUED] vancomycin - pharmacy to dose     Current Outpatient Medications on File Prior to Encounter   Medication Sig    albuterol-ipratropium (DUO-NEB) 2.5 mg-0.5 mg/3 mL nebulizer solution Take 3 mLs by nebulization every 4 (four) hours as needed for Wheezing or Shortness of Breath. Rescue    [START ON 10/9/2020] amLODIPine (NORVASC) 5 MG tablet Take 1 tablet (5 mg total) by mouth once daily.    atorvastatin (LIPITOR) 40 MG tablet Take 1 tablet (40 mg total) by mouth once daily.    cefTRIAXone (ROCEPHIN) 2 g/50 mL PgBk IVPB Inject 50 mLs (2 g total) into the vein every 12 (twelve) hours. for 19 days    ergocalciferol, vitamin D2, (VITAMIN D ORAL) Take 1 capsule by mouth once daily.    fluticasone furoate-vilanteroL (BREO) 100-25 mcg/dose diskus inhaler Inhale 1 puff into the lungs once daily. Controller    insulin aspart U-100 (NOVOLOG) 100 unit/mL (3 mL) InPn pen Inject  0-5 Units into the skin every 6 (six) hours as needed (Hyperglycemia).    lacosamide (VIMPAT) 200 mg Tab tablet Take 1 tablet (200 mg total) by mouth every 12 (twelve) hours.    Lactobacillus rhamnosus GG (CULTURELLE) 10 billion cell capsule Take 1 capsule by mouth 2 (two) times daily.    miconazole nitrate 2% (MICOTIN) 2 % Oint Apply topically 2 (two) times daily. Buttocks: BID/prn clean with bathing cloth, apply barrier cream with miconazole.  Left and right scalp skin breakdown: BID clean with sterile normal saline, apply barrier cream with miconazole.    pantoprazole (PROTONIX) 40 MG tablet Take 40 mg by mouth 2 (two) times daily.    [START ON 10/9/2020] vancomycin HCl (VANCOMYCIN 1 G/250 ML D5W, READY TO MIX SYSTEM,) Inject 250 mLs (1 g total) into the vein every 48 hours. for 19 days       Allergies:   Review of patient's allergies indicates:   Allergen Reactions    Codeine     Tetanus vaccines and toxoid        ROS:  Review of Systems   Constitutional: Positive for activity change and appetite change. Negative for chills, diaphoresis and fever.   HENT: Negative for congestion and sore throat.         Hard of hearing.   Eyes: Negative for photophobia.        Intermittent blurry vision   Respiratory: Positive for cough and shortness of breath. Negative for wheezing.         Episodes of coughing and dyspnea.   Cardiovascular:        Occasional chest discomfort.  No palpitations   Gastrointestinal: Positive for nausea (Intermittently). Negative for abdominal pain and vomiting.   Genitourinary: Negative for dysuria and hematuria.   Musculoskeletal: Positive for arthralgias and back pain.   Skin: Negative for pallor.   Neurological: Positive for headaches ( occasional). Negative for speech difficulty.        Memory and focus issues.  Decreased strength diffusely.         Objective:  Temp:  [97.7 °F (36.5 °C)-98.2 °F (36.8 °C)]   Pulse:  [75-87]   Resp:  [20-25]   BP: (136-156)/(61-72)   SpO2:  [93 %-97 %]      There is no height or weight on file to calculate BMI.      Physical Exam  Vitals signs reviewed.   Constitutional:       General: He is not in acute distress.  HENT:      Head: Normocephalic and atraumatic.   Eyes:      Extraocular Movements: Extraocular movements intact.      Conjunctiva/sclera: Conjunctivae normal.   Neck:      Musculoskeletal: Neck supple. No muscular tenderness.   Cardiovascular:      Rate and Rhythm: Normal rate and regular rhythm.      Comments: 2/6 systolic murmur noted  Pulmonary:      Effort: Pulmonary effort is normal.      Breath sounds: Normal breath sounds. No wheezing.   Abdominal:      General: Abdomen is flat. Bowel sounds are normal.      Palpations: Abdomen is soft.      Comments: Mild periumbilical tenderness with no guarding or rebound.   Musculoskeletal:         General: No tenderness.      Right lower leg: No edema.      Left lower leg: No edema.   Skin:     General: Skin is warm and dry.   Neurological:      General: No focal deficit present.      Mental Status: He is alert.      Comments: Speech is easily understood.  He loses his train of thought easily.  Extraocular muscles appear intact.  No obvious facial asymmetry.  He was able to  with both hands and move both feet.         Significant Labs:   CBC:   Recent Labs   Lab 10/07/20  0334 10/08/20  0358   WBC 10.32 9.10   HGB 9.3* 9.5*   HCT 30.9* 32.1*    206     CMP:   Recent Labs   Lab 10/07/20  0334 10/08/20  0358    141   K 3.5 3.2*    102   CO2 28 27   GLU 94 92   BUN 22 21   CREATININE 2.2* 2.1*   CALCIUM 10.0 9.8   PROT 7.4 7.1   ALBUMIN 2.8* 2.8*   BILITOT 0.1 0.2   ALKPHOS 61 51*   AST 19 15   ALT 20 16   ANIONGAP 11 12   EGFRNONAA 28.5* 30.1*       Significant Imaging:  I reviewed his imaging studies from the recent hospital stay.  Echocardiogram from yesterday noted ejection fraction 50% with mobile echodensity attached to the posterior leaflet of the mitral valve.    Assessment and  Plan:  Active Diagnoses:    Diagnosis Date Noted POA    PRINCIPAL PROBLEM:  Intracranial septic embolism [I76, I66.9] 09/15/2020 Yes    HENRY (acute kidney injury) [N17.9] 10/08/2020 Yes    Seizure [R56.9]  Yes    Brain abscess [G06.0] 09/15/2020 Yes    Encephalopathy acute [G93.40] 09/13/2020 Yes    Cerebellar stroke [I63.9] 09/13/2020 Yes    Anemia [D64.9]  Yes    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] 09/08/2020 Yes    Essential hypertension [I10] 09/08/2020 Yes     Chronic    Chronic bronchitis [J42] 09/08/2020 Yes     Chronic      Problems Resolved During this Admission:     Intracranial septic embolism  Brain abscess  Acute encephalopathy  Recommendation was for 6-8 weeks of IV antibiotics (Rocephin and vancomycin)  This range would be October 27 through November 10  He will need repeat brain imaging to follow for resolution of the intracranial lesions to help determine the time course of antibiotics  Pharmacy is consulted to assist with vancomycin dosing  Probiotics are ordered  Follow neurologic checks    Cerebellar stroke  PT/OT/speech therapy are consulted to assist in his management    Anemia  Hemoglobin has been stable to slightly improved lately  Monitor the trend    Bacterial endocarditis  Continue antibiotics  He will need follow-up echocardiogram as he nears the end of his course    Essential hypertension  Continue amlodipine  Adjust antihypertensives based on the trend in his vitals    Chronic bronchitis  Continue breathing treatments  Monitor oxygenation    Acute kidney injury  Previous creatinine was around 1.2.  Recently it has been 2-2.2 over the past 3 days.  Monitor the trend in his renal function and electrolytes  Replace electrolytes as needed  Monitor vancomycin levels  Depending on the trend in his renal function, may need to adjust antibiotics/consider nephrology consultation    Seizure  Continue lacosamide  Seizure precautions    Goals of Care:   Restorative  Treat infection  Optimize  nutrition  Wound healing  Muscle strengthening  Restoration of ADL's  Improve mobility      Anticipated Disposition:  Home with home health      Future Appointments   Date Time Provider Department Center   10/27/2020 11:30 AM AMERICA Alonzo, ANP [facility] [address]   11/10/2020  1:00 PM Kodak Dejesus MD [facility] [address]       I certify that SNF services are required to be given on an inpatient basis because Lopez Savage needs for skilled nursing care and/or skilled rehabilitation are required on a daily basis and such services can only practically be provided in a skilled nursing facility setting and are for an ongoing condition for which she received inpatient care in the hospital.       ARTI Wyatt MD  Department of Hospital Medicine   [facility]

## 2020-10-09 NOTE — PROGRESS NOTES
Pharmacokinetic Assessment Follow Up: IV Vancomycin    Vancomycin serum concentration assessment(s):    · The random level was drawn correctly and can be used to guide therapy at this time.  · The measurement is within the desired definitive target range of < 20 mcg/mL    Vancomycin Regimen Plan:    · Will continue pulse dose regimen of Vancomycin 1000 mg once  · Next random concentration measured at approx 2300 on 10/9  · Patient admitted in PM, expect dose to arrive to  from  late.  · Pharmacy to adjust random level accordingly    Drug levels (last 3 results):  Recent Labs   Lab Result Units 10/06/20  0450 10/07/20  0334 10/08/20  1406   Vancomycin, Random ug/mL  --  15.7 18.7   Vancomycin-Trough ug/mL 21.4  --   --        Pharmacy will continue to follow and monitor vancomycin.    Please contact pharmacy at extension [phone] for questions regarding this assessment.    Thank you for the consult,   Garo Russo       Patient brief summary:  Lopez Savage is a 75 y.o. male initiated on antimicrobial therapy with IV Vancomycin for treatment of endocarditis    The patient's current regimen is pulse dose regimen    Drug Allergies:   Review of patient's allergies indicates:   Allergen Reactions    Codeine     Tetanus vaccines and toxoid        Actual Body Weight:   113.9 kg    Renal Function:   CrCl cannot be calculated (Unknown ideal weight.).,     Dialysis Method (if applicable):  N/A    CBC (last 72 hours):  Recent Labs   Lab Result Units 10/06/20  0450 10/07/20  0334 10/08/20  0358   WBC K/uL 8.34 10.32 9.10   Hemoglobin g/dL 8.9* 9.3* 9.5*   Hematocrit % 29.0* 30.9* 32.1*   Platelets K/uL 204 214 206   Gran% % 72.2 75.9* 75.3*   Lymph% % 12.4* 10.5* 9.3*   Mono% % 8.6 8.1 8.6   Eosinophil% % 5.2 4.5 5.7   Basophil% % 1.1 0.7 0.7   Differential Method  Automated Automated Automated       Metabolic Panel (last 72 hours):  Recent Labs   Lab Result Units 10/06/20  0450 10/07/20  0334 10/08/20  0358   Sodium mmol/L  142 143 141   Potassium mmol/L 3.5 3.5 3.2*   Chloride mmol/L 107 104 102   CO2 mmol/L 26 28 27   Glucose mg/dL 96 94 92   BUN, Bld mg/dL 23 22 21   Creatinine mg/dL 2.0* 2.2* 2.1*   Albumin g/dL 2.6* 2.8* 2.8*   Total Bilirubin mg/dL 0.1 0.1 0.2   Alkaline Phosphatase U/L 56 61 51*   AST U/L 19 19 15   ALT U/L 20 20 16       Vancomycin Administrations:  vancomycin given in the last 96 hours                   vancomycin in dextrose 5 % 1 gram/250 mL IVPB 1,000 mg (mg) 1,000 mg New Bag 10/07/20 1515    vancomycin in dextrose 5 % 1 gram/250 mL IVPB 1,000 mg (mg) 1,000 mg New Bag 10/05/20 0830                Microbiologic Results:  Microbiology Results (last 7 days)     ** No results found for the last 168 hours. **

## 2020-10-09 NOTE — PLAN OF CARE
Swallowing difficulty related to cognitive changes, weakness as evidenced by speech eval of mild dysphagia and orders to assist with meal set up.  New    Plan  Texture modified diet- dysphagia level 6  Sodium modified diet- 2 gm  Collaboration with other providers   Thin liquids   Meal set up

## 2020-10-09 NOTE — CONSULTS
"  [facility] Skilled Nursing Care  Adult Nutrition  Consult Note    SUMMARY   Recommendations  Recommendation: Continue dysphagia soft level 6, 2gm sodium diet  Goals: PO to meet 85% of EEN by next RD follow up  Nutrition Goal Status: new  Communication of RD Recs: (POC)    Reason for Assessment    Reason For Assessment: consult  Diagnosis: (Debility, Intracranial septic embolism)  Relevant Medical History: HTN< COPD, CVA, Diverticulosis,  Interdisciplinary Rounds: did not attend  General Information Comments: pt refused x 2 NFPE, reports he lost wt over the past six months, pt declined oral nutrition supplement, PO % Pt has long delay before answering questions, states "he did not eat for one week"  Nutrition Discharge Planning: DC on Mech soft low sodiuim diet    Nutrition Risk Screen    Nutrition Risk Screen: no indicators present    Nutrition/Diet History  Lives with wife and daughter  Patient Reported Diet/Restrictions/Preferences: general  Typical Food/Fluid Intake: unknown  Food Preferences: none  Spiritual, Cultural Beliefs, Bahai Practices, Values that Affect Care: no  Food Allergies: NKFA  Factors Affecting Nutritional Intake: difficulty/impaired swallowing, impaired cognitive status/motor control    Anthropometrics    Temp: 97.7 °F (36.5 °C)  Height: 5' 9" (175.3 cm)  Height (inches): 69 in  Weight: 113.9 kg (251 lb 1.7 oz)  Weight (lb): 251.11 lb  Ideal Body Weight (IBW), Male: 160 lb  % Ideal Body Weight, Male (lb): 156.94 %  BMI (Calculated): 37.1  BMI Grade: 35 - 39.9 - obesity - grade II  Usual Body Weight (UBW), k.6 kg  % Usual Body Weight: 100.47  % Weight Change From Usual Weight: 0.26 %       Lab/Procedures/Meds    Pertinent Labs Reviewed: reviewed  Pertinent Labs Comments: Hg 9.6, Hct 31.3, Cr 2.1, albumin 2.9, HgA1c 4.4  Pertinent Medications Reviewed: reviewed  Pertinent Medications Comments: pantoprazole, KCl, Abx       Estimated/Assessed Needs    Weight Used For Calorie " Calculations: 113.9 kg (251 lb 1.7 oz)(IBW kg 2/2 obesity)  Energy Calorie Requirements (kcal): 1869  Energy Need Method: [place]-St Jeor(x 1.0 2/2 obesit for PAL)  Protein Requirements: 109-95g  Weight Used For Protein Calculations: 72.7 kg (160 lb 4.4 oz)(IBW kg 2/2 obesity x 1.3-`1.5g/kg)  Fluid Requirements (mL): 1869 or per MD  Estimated Fluid Requirement Method: RDA Method  RDA Method (mL): 1869  CHO Requirement: -      Nutrition Prescription Ordered    Current Diet Order: Dysphagia soft level 6, 2 gm sodium  Oral Nutrition Supplement: -    Evaluation of Received Nutrient/Fluid Intake    Energy Calories Required: meeting needs  Protein Required: meeting needs  Fluid Required: meeting needs  Tolerance: tolerating  % Intake of Estimated Energy Needs: 75 - 100 %  % Meal Intake: 75 - 100 %    Nutrition Risk    Level of Risk/Frequency of Follow-up: low     Assessment and Plan  Swallowing difficulty related to cognitive changes, weakness as evidenced by speech eval of mild dysphagia and orders to assist with meal set up.  New    Plan  Texture modified diet- dysphagia level 6  Sodium modified diet- 2 gm  Collaboration with other providers   Thin liquids   Meal set up    Monitor and Evaluation    Food and Nutrient Intake: food and beverage intake  Food and Nutrient Adminstration: diet order  Anthropometric Measurements: weight change  Biochemical Data, Medical Tests and Procedures: electrolyte and renal panel, inflammatory profile, gastrointestinal profile  Nutrition-Focused Physical Findings: overall appearance     Malnutrition Assessment visual only, pt refused                 Orbital Region (Subcutaneous Fat Loss): well nourished  Upper Arm Region (Subcutaneous Fat Loss): well nourished  Thoracic and Lumbar Region: well nourished   Zoroastrianism Region (Muscle Loss): mild depletion  Clavicle Bone Region (Muscle Loss): well nourished                 Nutrition Follow-Up    RD Follow-up?: Yes

## 2020-10-09 NOTE — PLAN OF CARE
10/09/20 1346   Discharge Assessment   Assessment Type Discharge Planning Assessment   Confirmed/corrected address and phone number on facesheet? Yes   Assessment information obtained from? Patient;Caregiver   Expected Length of Stay (days) 14   Communicated expected length of stay with patient/caregiver yes   Prior to hospitilization cognitive status: Alert/Oriented   Prior to hospitalization functional status: Independent   Current cognitive status: Not Oriented to Place;Not Oriented to Time   Current Functional Status: Needs Assistance   Lives With spouse   Able to Return to Prior Arrangements no   Is patient able to care for self after discharge? Yes   Patient's perception of discharge disposition home or selfcare   Readmission Within the Last 30 Days no previous admission in last 30 days   Patient currently being followed by outpatient case management? No   Patient currently receives any other outside agency services? No   Equipment Currently Used at Home none   Do you have any problems affording any of your prescribed medications? No   Is the patient taking medications as prescribed? yes   Does the patient have transportation home? Yes   Transportation Anticipated health plan transportation   Does the patient receive services at the [facility]? No   Discharge Plan A Home Health   Discharge Plan B Home with family   DME Needed Upon Discharge  other (see comments)  (TBD)     CM conducted discharge planning assessment at the bedside and by phone. Patient is confused. Spoke with wife who states that the patient has been in bed for a month. Says he has been confused since his stroke. He is IV antibiotics. Wife wants home health to follow up after discharge. CM will continue to follow up and assist with needs as indicated.    [id number] - [place] - [address], [address]  [address] [facility], [address]  [place] [address]  Phone: [phone] Fax: [phone]    Payor:  [employer] MA[employer]GED [employer]ARE / Plan: [employer] CHOICES 65 / Product Type: Medicare Advantage /     Jaz Siegel RN Case Manager  [facility]

## 2020-10-09 NOTE — PLAN OF CARE
Problem: Occupational Therapy Goal  Goal: Occupational Therapy Goal  Description: Goals to be met by: 10 28/20     Patient will increase functional independence with ADLs by performing:    Feeding with Modified [place].  UE Dressing with Stand-by Assistance.  LE Dressing with Moderate Assistance.  Grooming while seated with Stand-by Assistance.  Toileting from bedside commode with Moderate Assistance for hygiene and clothing management.   Bathing from  sitting at sink with Moderate Assistance.  Rolling to Bilateral with Stand-by Assistance.   Supine to sit with Contact Guard Assistance.  Stand pivot transfers with Moderate Assistance.  Upper extremity exercise program x10 reps per handout, with assistance as needed.    Outcome: Ongoing, Progressing

## 2020-10-10 NOTE — PROGRESS NOTES
Pharmacokinetic Assessment Follow Up: IV Vancomycin    Vancomycin serum concentration assessment/plan:  - Vancomycin random level resulted at 20.9 mcg/ml which is ~23.5 hours after a dose of 1000mg  - Typically re-dose when vancomycin level is within range of 15 - 20 mcg/ml  - Will continue to pulse dose because patient has unstable renal function  - Plan to re-dose vancomycin 1000mg x1 dose 10/10 @ 0600 to allow a little more time for clearance since level was slightly above re-dosing range  - Will order vancomycin random level 10/11 @ 0600 which is 24 hours post dose  - Pharmacy will continue to follow      Drug levels (last 3 results):  Recent Labs   Lab Result Units 10/07/20  0334 10/08/20  1406 10/09/20  2249   Vancomycin, Random ug/mL 15.7 18.7 20.9       Pharmacy will continue to follow and monitor vancomycin.    Please contact pharmacy at extension [phone] for questions regarding this assessment.    Thank you for the consult,   Genesis Fabian       Patient brief summary:  Lopez Savage is a 75 y.o. male initiated on antimicrobial therapy with IV Vancomycin for treatment of endocarditis    Drug Allergies:   Review of patient's allergies indicates:   Allergen Reactions    Codeine     Tetanus vaccines and toxoid        Actual Body Weight:   114 kg    Renal Function:   Estimated Creatinine Clearance: 38.4 mL/min (A) (based on SCr of 2.1 mg/dL (H)).,       CBC (last 72 hours):  Recent Labs   Lab Result Units 10/07/20  0334 10/08/20  0358 10/09/20  0523   WBC K/uL 10.32 9.10 8.47   Hemoglobin g/dL 9.3* 9.5* 9.6*   Hematocrit % 30.9* 32.1* 31.3*   Platelets K/uL 214 206 222   Gran% % 75.9* 75.3* 73.4*   Lymph% % 10.5* 9.3* 10.7*   Mono% % 8.1 8.6 8.9   Eosinophil% % 4.5 5.7 5.7   Basophil% % 0.7 0.7 0.7   Differential Method  Automated Automated Automated       Metabolic Panel (last 72 hours):  Recent Labs   Lab Result Units 10/07/20  0334 10/08/20  0358 10/09/20  0523   Sodium mmol/L 143 141 141   Potassium mmol/L  3.5 3.2* 3.6   Chloride mmol/L 104 102 104   CO2 mmol/L 28 27 23   Glucose mg/dL 94 92 81   BUN, Bld mg/dL 22 21 19   Creatinine mg/dL 2.2* 2.1* 2.1*   Albumin g/dL 2.8* 2.8* 2.9*   Total Bilirubin mg/dL 0.1 0.2 0.2   Alkaline Phosphatase U/L 61 51* 58   AST U/L 19 15 15   ALT U/L 20 16 15       Vancomycin Administrations:  vancomycin given in the last 96 hours                   vancomycin in dextrose 5 % 1 gram/250 mL IVPB 1,000 mg (mg) 1,000 mg New Bag 10/08/20 2328    vancomycin in dextrose 5 % 1 gram/250 mL IVPB 1,000 mg (mg) 1,000 mg New Bag 10/07/20 1515                Microbiologic Results:  Microbiology Results (last 7 days)     ** No results found for the last 168 hours. **

## 2020-10-10 NOTE — PLAN OF CARE
Problem: Adult Inpatient Plan of Care  Goal: Plan of Care Review  Outcome: Ongoing, Progressing  Plan of Care Reviewed With: patient  Goal: Patient-Specific Goal (Individualization)  Outcome: Ongoing, Progressing  Goal: Absence of Hospital-Acquired Illness or Injury  Outcome: Ongoing, Progressing  Goal: Optimal Comfort and Wellbeing  Outcome: Ongoing, Progressing     Problem: Infection  Goal: Infection Symptom Resolution  Outcome: Ongoing, Progressing     Problem: Wound  Goal: Optimal Wound Healing  Outcome: Ongoing, Progressing     Problem: Pain (Stroke, Hemorrhagic)  Goal: Acceptable Pain Control  Outcome: Ongoing, Progressing     Problem: Fall Injury Risk  Goal: Absence of Fall and Fall-Related Injury  Outcome: Ongoing, Progressing     Problem: Skin Injury Risk Increased  Goal: Skin Health and Integrity  Outcome: Ongoing, Progressing

## 2020-10-11 NOTE — PT/OT/SLP PROGRESS
Physical Therapy  Treatment    Lopez Savage   MRN: [id number]   Admitting Diagnosis: Intracranial septic embolism    PT Received On: 10/11/20          Billable Minutes:  Gait Training 0, Therapeutic Activity 15 and Therapeutic Exercise 0    Treatment Type: Treatment  PT/PTA: PT     PTA Visit Number: 0       General Precautions: Standard, aspiration, fall  Orthopedic Precautions: N/A   Braces: N/A    Spiritual, Cultural Beliefs, Jainism Practices, Values that Affect Care: no    Subjective:  Communicated with patient prior to session.  Agreeable to sesion    Pain/Comfort  Pain Rating 1: 0/10  Pain Rating Post-Intervention 1: 0/10    Objective:  Patient found HOB elevated with Patient found with: peripheral IV     AM-PAC 6 CLICK MOBILITY  Total Score:7    No functional mobility performed due to patient lethargy; sleepy, only briefly openning eyes  Therex:  BLE PROM performed all planes x 20 reps. Cues to patient to facilitate participation      Additional Treatment:  Patient educated on PT POC, patient lethargic, sleepy and did not acknowledge.     Patient left HOB elevated with all lines intact and call button in reach.    Assessment:  Lopez Savage is a 75 y.o. male with a medical diagnosis of Intracranial septic embolism.  . Patient unable to participate due to lethargy, sleepy. He did open his eyes and acknowledge therapist but then back to sleep. Nurse notified. .    Rehab identified problem list/impairments: weakness, impaired functional mobilty, impaired cognition, impaired coordination, gait instability, impaired fine motor, impaired balance, decreased upper extremity function, impaired self care skills, visual deficits, decreased lower extremity function    Rehab potential is good.    Activity tolerance: Poor    Discharge recommendations: home, home health PT     Barriers to discharge: Decreased caregiver support    Equipment recommendations: bedside commode, bath bench     GOALS:   Multidisciplinary  Problems     Physical Therapy Goals        Problem: Physical Therapy Goal    Goal Priority Disciplines Outcome Goal Variances Interventions   Physical Therapy Goal     PT, PT/OT Ongoing, Progressing     Description: Goals to be met by: 10/28/2020     Patient will increase functional independence with mobility by performin. Supine to sit with MInimal Assistance  2. Sit to supine with Contact Guard Assistance  3. Rolling to Left and Right with Modified [place] using bed rails.  4. Sit to stand transfer with Minimal Assistance with rolling walker  5. Bed to chair transfer with Minimal Assistance using Rolling Walker  6. Gait  x 30 feet with Minimal Assistance using Rolling Walker.   7. Stand for 3 minutes with Contact Guard Assistance using Rolling Walker or UE support on stable surface  8. Lower extremity exercise program x 20 reps per handout, with assistance as needed                     PLAN:    Patient to be seen 5 x/week  to address the above listed problems via gait training, therapeutic activities, therapeutic exercises, neuromuscular re-education  Plan of Care expires: 20  Plan of Care reviewed with: patient    Ariana Jimenez, PT  10/11/2020

## 2020-10-11 NOTE — PT/OT/SLP PROGRESS
Occupational Therapy  Treatment    Lopez Savage   MRN: [id number]   Admitting Diagnosis: Intracranial septic embolism    OT Date of Treatment: 10/11/20       Billable Minutes:  Self Care/Home Management 38    General Precautions: Standard, aspiration, fall  Orthopedic Precautions: N/A  Braces: N/A    Spiritual, Cultural Beliefs, Lutheran Practices, Values that Affect Care: no    Subjective:  Communicated with nurse prior to session.  Pt. Reported he was ok (appeared to be a little confused at times during session    Pain/Comfort  Pain Rating 1: 0/10  Pain Rating Post-Intervention 1: 0/10    Objective:  Patient found with: (supine in bed)    Occupational Performance:    Bed Mobility:    · Patient completed Rolling/Turning to Left with  maximal assistance  · Patient completed Rolling/Turning to Right with maximal assistance  · Patient completed Scooting/Bridging with maximal assistance  · Patient completed Supine to Sit with maximal assistance  · Patient completed Sit to Supine with moderate assistance     Functional Mobility/Transfers:  · Patient completed Sit <> Stand Transfer with maximal assistance  with  no assistive device  x multiple trials from EOB with posterior lean noted  · Functional Mobility: not tested    Activities of Daily Living:  · Grooming: contact guard assistance and minimum assistance for balance seated EOB 2/2 leaning to the left to brush teeth  · Bathing: moderate assistance sponge bath seated EOB; anle to wash upper body with vc's and CGA as well as thigh region required assist for below knees and buttocks  · Upper Body Dressing: moderate assistance to don fresh gown seated EOB  · Lower Body Dressing: total assistance to don pants in supine with rolling to manage over hips  · Toileting: total assistance pt. with bowel movement on OT arrival in diaper and unaware ; pt. required complete assist for cleaning    AMPAC 6 Click:  AMPAC Total Score: 13    OT Exercises: not performed    Additional  Treatment:  Pt. Engaged in facilitation activities seated EOB to improve anterior weight shifts as well as midline posture (rather than leaning to left side).  Pt. Required close CGA/min A for dynamic tasks seated EOB    Patient left supine with all lines intact, call button in reach and RT present    ASSESSMENT:  Lopez Savage is a 75 y.o. male with a medical diagnosis of Intracranial septic embolism and presents with deficits in self-care skills, functional mobility and endurance. Pt. tolerated session well and was motivated during session. Pt. Would benefit from continued OT services to maximize safety and I with ADL tasks. Pt. Requires significant assist for mobility at this time.     Rehab identified problem list/impairments: weakness, impaired endurance, impaired self care skills, impaired functional mobilty, gait instability, impaired balance, visual deficits, decreased lower extremity function, decreased upper extremity function, decreased safety awareness, abnormal tone, impaired fine motor    Rehab potential is fair    Activity tolerance: Fair    Discharge recommendations: home health OT  And assist    Barriers to discharge: Decreased caregiver support     Equipment recommendations: (TBD)     GOALS:   Multidisciplinary Problems     Occupational Therapy Goals        Problem: Occupational Therapy Goal    Goal Priority Disciplines Outcome Interventions   Occupational Therapy Goal     OT, PT/OT Ongoing, Progressing    Description: Goals to be met by: 10 28/20     Patient will increase functional independence with ADLs by performing:    Feeding with Modified [place].  UE Dressing with Stand-by Assistance.  LE Dressing with Moderate Assistance.  Grooming while seated with Stand-by Assistance.  Toileting from bedside commode with Moderate Assistance for hygiene and clothing management.   Bathing from  sitting at sink with Moderate Assistance.  Rolling to Bilateral with Stand-by Assistance.   Supine to sit  with Contact Guard Assistance.  Stand pivot transfers with Moderate Assistance.  Upper extremity exercise program x10 reps per handout, with assistance as needed.                     Plan:  Patient to be seen 5 x/week to address the above listed problems via self-care/home management, therapeutic activities, therapeutic exercises, neuromuscular re-education  Plan of Care expires: 11/09/20  Plan of Care reviewed with: patient    ZACH Rosas  10/11/2020

## 2020-10-11 NOTE — PROGRESS NOTES
Pharmacokinetic Assessment Follow Up: IV Vancomycin    Vancomycin serum concentration assessment(s):    - The random level was drawn correctly and can be used to guide therapy at this time  - The measurement is above the desired definitive target range of 15 to 20 mcg/mL  - The last urine output recorded 10/10 was net +50. No urine output recorded as of now   - Renal function is unstable and therefore will continue pulse dosing     Vancomycin Regimen Plan:  - Will not re-dose today as random (12-hr random) was elevated (26.3)   - Next level to be drawn at 1330 on 10/11/2020 (approximately 24-hour after last dose)   - Re-dose when the random level is less than 20 mcg/mL    Drug levels (last 3 results):  Recent Labs   Lab Result Units 10/08/20  1406 10/09/20  2249 10/11/20  0528   Vancomycin, Random ug/mL 18.7 20.9 26.3       Pharmacy will continue to follow and monitor vancomycin.    Please contact pharmacy at extension [phone] for questions regarding this assessment.    Thank you for the consult,   Ondina Hollis       Patient brief summary:  Lopez Savage is a 75 y.o. male initiated on antimicrobial therapy with IV Vancomycin for treatment of endocarditis     The patient's current regimen is pulse dosing     Drug Allergies:   Review of patient's allergies indicates:   Allergen Reactions    Codeine     Tetanus vaccines and toxoid        Actual Body Weight:   113.9 kg     Renal Function:   Estimated Creatinine Clearance: 38.4 mL/min (A) (based on SCr of 2.1 mg/dL (H)).,     Dialysis Method (if applicable):  N/A    CBC (last 72 hours):  Recent Labs   Lab Result Units 10/09/20  0523   WBC K/uL 8.47   Hemoglobin g/dL 9.6*   Hematocrit % 31.3*   Platelets K/uL 222   Gran% % 73.4*   Lymph% % 10.7*   Mono% % 8.9   Eosinophil% % 5.7   Basophil% % 0.7   Differential Method  Automated       Metabolic Panel (last 72 hours):  Recent Labs   Lab Result Units 10/09/20  0523   Sodium mmol/L 141   Potassium mmol/L 3.6   Chloride  mmol/L 104   CO2 mmol/L 23   Glucose mg/dL 81   BUN, Bld mg/dL 19   Creatinine mg/dL 2.1*   Albumin g/dL 2.9*   Total Bilirubin mg/dL 0.2   Alkaline Phosphatase U/L 58   AST U/L 15   ALT U/L 15       Vancomycin Administrations:  vancomycin given in the last 96 hours                   vancomycin in dextrose 5 % 1 gram/250 mL IVPB 1,000 mg (mg) 1,000 mg New Bag 10/10/20 1315    vancomycin in dextrose 5 % 1 gram/250 mL IVPB 1,000 mg (mg) 1,000 mg New Bag 10/08/20 2328    vancomycin in dextrose 5 % 1 gram/250 mL IVPB 1,000 mg (mg) 1,000 mg New Bag 10/07/20 1515                Microbiologic Results:  Microbiology Results (last 7 days)     ** No results found for the last 168 hours. **

## 2020-10-12 PROBLEM — I33.0 BACTERIAL ENDOCARDITIS: Status: ACTIVE | Noted: 2020-01-01

## 2020-10-12 PROBLEM — R56.9 SEIZURE: Status: ACTIVE | Noted: 2020-01-01

## 2020-10-12 NOTE — CLINICAL REVIEW
Clinical Pharmacy Chart Review Note      Admit Date: 10/8/2020   LOS: 4 days       Lopez Savage is a 75 y.o. male admitted to SNF for PT/OT after hospitalization for intracranial septic embolism.    Active Hospital Problems    Diagnosis  POA    *Intracranial septic embolism [I76, I66.9]  Yes    HENRY (acute kidney injury) [N17.9]  Yes    Seizure [R56.9]  Yes    Brain abscess [G06.0]  Yes    Encephalopathy acute [G93.40]  Yes    Cerebellar stroke [I63.9]  Yes    Anemia [D64.9]  Yes    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]  Yes     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      Essential hypertension [I10]  Yes     Chronic    Chronic bronchitis [J42]  Yes     Chronic      Resolved Hospital Problems   No resolved problems to display.     Review of patient's allergies indicates:   Allergen Reactions    Codeine     Tetanus vaccines and toxoid      Patient Active Problem List    Diagnosis Date Noted    HENRY (acute kidney injury) 10/08/2020    Advance care planning     Debility 09/16/2020    Oral phase dysphagia 09/16/2020    Seizure     Intracranial septic embolism 09/15/2020    Brain abscess 09/15/2020    Cerebellar stroke 09/13/2020    Cytotoxic brain edema 09/13/2020    Encephalopathy acute 09/13/2020    Anemia     Seizure-like activity     Abnormal EEG 09/11/2020    Loss of consciousness 09/08/2020    Essential hypertension 09/08/2020    Ulcerative colitis 09/08/2020    Chronic bronchitis 09/08/2020    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    Mitral regurgitation 09/08/2020    CVA (cerebral vascular accident) 09/08/2020    Bacterial endocarditis 09/08/2020    History of gastrointestinal bleeding 09/08/2020    GERD (gastroesophageal reflux disease) 09/08/2020    Embolic stroke involving left posterior cerebral artery 09/08/2020    Embolic stroke involving right posterior cerebral artery 09/08/2020    Thalamic infarct, 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    Unresponsive        Scheduled Meds:     albuterol-ipratropium  3 mL Nebulization Q6H WAKE    amLODIPine  5 mg Oral Daily    atorvastatin  40 mg Oral Daily    cefTRIAXone (ROCEPHIN) IVPB  2 g Intravenous Q12H    enoxaparin  40 mg Subcutaneous Q24H    fluticasone furoate-vilanteroL  1 puff Inhalation Daily    lacosamide  200 mg Oral Q12H    Lactobacillus rhamnosus GG  1 capsule Oral BID    miconazole nitrate 2%   Topical (Top) BID    pantoprazole  40 mg Oral Daily    silodosin  4 mg Oral Daily     Continuous Infusions:   PRN Meds: acetaminophen, acetaminophen, albuterol-ipratropium, aluminum & magnesium hydroxide-simethicone, melatonin, Pharmacy to dose Vancomycin consult **AND** vancomycin - pharmacy to dose    OBJECTIVE:     Vital Signs (Last 24H)  Temp:  [98.3 °F (36.8 °C)]   Pulse:  [85-99]   Resp:  [18-20]   BP: (140)/(60)   SpO2:  [93 %-97 %]     Laboratory:  CBC:   Recent Labs   Lab 10/08/20  0358 10/09/20  0523 10/12/20  0503   WBC 9.10 8.47 8.27   RBC 3.41* 3.40* 3.15*   HGB 9.5* 9.6* 8.9*   HCT 32.1* 31.3* 28.5*    222 188   MCV 94 92 91   MCH 27.9 28.2 28.3   MCHC 29.6* 30.7* 31.2*     BMP:   Recent Labs   Lab 10/08/20  0358 10/09/20  0523 10/12/20  0503   GLU 92 81 79    141 139   K 3.2* 3.6 3.9    104 103   CO2 27 23 26   BUN 21 19 26*   CREATININE 2.1* 2.1* 2.3*   CALCIUM 9.8 10.1 9.6   MG  --   --  1.4*     CMP:   Recent Labs   Lab 10/07/20  0334 10/08/20  0358 10/09/20  0523 10/12/20  0503   GLU 94 92 81 79   CALCIUM 10.0 9.8 10.1 9.6   ALBUMIN 2.8* 2.8* 2.9*  --    PROT 7.4 7.1 7.5  --     141 141 139   K 3.5 3.2* 3.6 3.9   CO2 28 27 23 26    102 104 103   BUN 22 21 19 26*   CREATININE 2.2* 2.1* 2.1* 2.3*   ALKPHOS 61 51* 58  --    ALT 20 16 15  --    AST 19 15 15  --    BILITOT 0.1 0.2 0.2  --      LFTs:   Recent Labs   Lab 10/07/20  0334 10/08/20  0358 10/09/20  0523   ALT 20 16 15   AST 19 15 15   ALKPHOS 61 51* 58   BILITOT 0.1 0.2 0.2   PROT 7.4 7.1 7.5   ALBUMIN 2.8* 2.8* 2.9*     Lab Results    Component Value Date    HGBA1C 4.4 09/14/2020         ASSESSMENT/PLAN:     I have reviewed the medications in compliance with CMS Regulation F756 of the [place]. Based on information gathered, the following items may need to be addressed:    **According to PMH and home medication list, patient takes the following medications at home. These medications are not currently ordered at [facility]:  · Donepezil 10 mg daily  · Escitalopram 10 mg daily  · Fluticasone nasal sp daily  · Losartan 50 mg daily  · Montelukast 10 mg daily    Medications reviewed by PharmD, please re-consult if needed.      Corina Sanchez, Pharm. D.  Clinical Pharmacist  [facility]

## 2020-10-12 NOTE — PLAN OF CARE
"  Problem: SLP Goal  Goal: SLP Goal  Description: Speech Language Pathology Goals  Goals expected to be met by 10/16:  1. Pt will follow complex commands with 80% accuracy given mod cues.  2. Pt will orient x 4 given max cues and/or external aids.  3. Pt will recall 3/3 novel items after a 2 minute delay given supervision.   4. Pt will complete simple problem solving tasks with 70% accuracy given mod cues.   5. Pt will complete simple sustained attention tasks with 60% accuracy given mod-max cues.   6. Pt will participate in assessment of reading, writing, and visual spatial abilities.   7. Pt will tolerate dental soft diet and thin liquids without s/s of aspiration.         Outcome: Ongoing, Progressing  Pt appearing more confused today than last session.  Appearing to see things that are not there.  Asked SLP "Who's crawling underneath the table?" Pt replied "You're lying." when SLP explained no one was crawling under the table. Nurse notified.   MARVA Bryant, CCC-SLP  Speech Language Pathologist  [phone]  10/12/2020         "

## 2020-10-12 NOTE — PROGRESS NOTES
Pharmacokinetic Assessment Follow Up: IV Vancomycin    Vancomycin serum concentration assessment/plan:  - Vancomycin level of 24mcg/ml is still above therapeutic range.   - True half life calculated from 2 vancomycin levels is 60 hours; therefore patient's clearance is very slow.  - Will order a vancomycin random for tomorrow at 13:00.  - Re-dose when vancomycin level is < 15 mcg/ml    Drug levels (last 3 results):  Recent Labs   Lab Result Units 10/09/20  2249 10/11/20  0528 10/11/20  1350   Vancomycin, Random ug/mL 20.9 26.3 24.0       Pharmacy will continue to follow and monitor vancomycin.    Please contact pharmacy at extension [phone] for questions regarding this assessment.    Thank you for the consult,   Genesis Fabian       Patient brief summary:  Lopez Savage is a 75 y.o. male initiated on antimicrobial therapy with IV Vancomycin for treatment of endocarditis    The patient's current regimen is pulse dosing    Drug Allergies:   Review of patient's allergies indicates:   Allergen Reactions    Codeine     Tetanus vaccines and toxoid        Actual Body Weight:   114 kg    Renal Function:   Estimated Creatinine Clearance: 38.4 mL/min (A) (based on SCr of 2.1 mg/dL (H)).,       CBC (last 72 hours):  Recent Labs   Lab Result Units 10/09/20  0523   WBC K/uL 8.47   Hemoglobin g/dL 9.6*   Hematocrit % 31.3*   Platelets K/uL 222   Gran% % 73.4*   Lymph% % 10.7*   Mono% % 8.9   Eosinophil% % 5.7   Basophil% % 0.7   Differential Method  Automated       Metabolic Panel (last 72 hours):  Recent Labs   Lab Result Units 10/09/20  0523   Sodium mmol/L 141   Potassium mmol/L 3.6   Chloride mmol/L 104   CO2 mmol/L 23   Glucose mg/dL 81   BUN, Bld mg/dL 19   Creatinine mg/dL 2.1*   Albumin g/dL 2.9*   Total Bilirubin mg/dL 0.2   Alkaline Phosphatase U/L 58   AST U/L 15   ALT U/L 15       Vancomycin Administrations:  vancomycin given in the last 96 hours                   vancomycin in dextrose 5 % 1 gram/250 mL IVPB 1,000 mg  (mg) 1,000 mg New Bag 10/10/20 1315    vancomycin in dextrose 5 % 1 gram/250 mL IVPB 1,000 mg (mg) 1,000 mg New Bag 10/08/20 7728                Microbiologic Results:  Microbiology Results (last 7 days)     ** No results found for the last 168 hours. **

## 2020-10-12 NOTE — PT/OT/SLP PROGRESS
Occupational Therapy      Patient Name:  Lopez Savage   MRN:  [id number]    Patient not seen today secondary to refusal in A.M and off unit in P.M. Pt. Sent to ED  . Will follow-up with OT POC    DANNA Alfonso  10/12/2020

## 2020-10-12 NOTE — PROGRESS NOTES
"                                                        [facility]                                  Skilled Nursing Facility                   Progress Note     Admit Date: 10/8/2020  ANA TBD  Principal Problem:  Intracranial septic embolism   HPI obtained from patient interview and chart review     Chief Complaint:  Altered mental status    HPI:   Mr. 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  Admission to SNF for secondary weakness and debility, IV antibiotics.    Interval history:  Upon entering the room, patient is lying in bed with his eyes closed. He states, "Im in pain, I hurt" a cannot provide any other explanation.  He will not tell me where this pain is located.  He will not answer any other questions for me at this time.  When I last saw him on Friday, he was able to give me appropriate one-word answers.  SLP agrees that patient appears more confused today than she has experienced previously.  Per notes, patient's mental status known to wax and wane; however, I believe this level of altered mental status is beyond what has been previously experienced especially at SNF.  Renal function slightly worse on today's labs, questionable ability to maintain his own p.o. hydration and nutrition.  For this reason I will send him to [facility] ED for further evaluation.      Past Medical History: Patient has no past medical history on file.    Past Surgical History: Patient has no past surgical history on file.    Social History: Patient     Family History: no significant family hx to report     Allergies: Patient is allergic to codeine and tetanus vaccines and toxoid.    ROS- difficult to obtain as patient would only answer with I am hurting, I am in pain  Constitutional:  Confused beyond baseline  Eyes: " Negative for blurred vision, double vision   Respiratory: + for cough, shortness of breath   Cardiovascular: Negative for chest pain, palpitations, and leg swelling.   Gastrointestinal: Negative for abdominal pain, constipation, diarrhea, nausea, vomiting.   Genitourinary: Negative for dysuria, frequency   Musculoskeletal:  + generalized weakness.  + for back pain and myalgias.   Skin: Negative for itching and rash.   Neurological: Negative for dizziness.  +intermittent headaches.   Psychiatric/Behavioral: Negative for depression. The patient is not nervous/anxious.      24 hour Vital Sign Range   Temp:  [97.7 °F (36.5 °C)-98.3 °F (36.8 °C)]   Pulse:  [85-99]   Resp:  [18-20]   BP: (131-140)/(60-61)   SpO2:  [93 %-97 %]     PEx  Constitutional: Patient appears more weak and debilitated.  No distress noted  HENT:   Head: Normocephalic and atraumatic.   Eyes: Pupils are equal, round  Neck: Normal range of motion. Neck supple.   Cardiovascular: Normal rate, regular rhythm and normal heart sounds.    Pulmonary/Chest: Effort normal and breath sounds are clear  Abdominal: Soft. Bowel sounds are normal.   Musculoskeletal: Normal range of motion.   Neurological:  Lethargic, altered disoriented to person, place and time  Psychiatric:  Flat affect. Behavior is abnormal.   Skin: Skin is warm and dry.    Recent Labs   Lab 10/12/20  0503      K 3.9      CO2 26   BUN 26*   CREATININE 2.3*   MG 1.4*       Recent Labs   Lab 10/12/20  0503   WBC 8.27   RBC 3.15*   HGB 8.9*   HCT 28.5*      MCV 91   MCH 28.3   MCHC 31.2*         Assessment and Plan:       Problems addressed today    Altered mental status  - patient with known history of septic intracranial embolism currently receiving to IV antibiotics for treatment with history of encephalopathy and waxing and waning mental status who appears to have greater altered mental status this morning. Will sent to ED for further evaluation.  ED MD given handoff via  secured chat as she was unable to come to the phone. Ongoing conversations held with ED physician.  Spoke with patient's wife to provide care update and inform of pending transfer.  Spoke with patient's daughter who was upset about her mother being upset regarding the pending transfer.  Message sent to nursing director and  about daughters complaints.        Ongoing problems      Intracranial septic embolism  Brain abscess  Acute encephalopathy  - ID recommendation was for 6-8 weeks of IV antibiotics (Rocephin and vancomycin)  - This range would be October 27 through November 10  - He will need repeat brain imaging to follow for resolution of the intracranial lesions to help determine the time course of antibiotics  - Pharmacy is consulted to assist with vancomycin dosing  - Probiotics are ordered  - Follow neurologic checks     Cerebellar stroke  - PT/OT/speech therapy are consulted to assist in his management     Anemia  - stable, continue monitor twice weekly CBCs    Bacterial endocarditis  - Continue antibiotics  - He will need follow-up echocardiogram as he nears the end of his course     Essential hypertension  - Continue amlodipine 5 mg daily  - Adjust antihypertensives based on the trend in his vitals    HLD  - continue atorvastatin 40 mg daily     Chronic bronchitis  - Continue breathing treatments  - Monitor oxygenation     Acute kidney injury  - Previous creatinine was around 1.2.  Recently it has been 2-2.2 over the past 3 days.  - Monitor the trend in his renal function and electrolytes  - Replace electrolytes as needed  - Monitor vancomycin levels  - Depending on the trend in his renal function, may need to adjust antibiotics/consider nephrology consultation     Seizure  - Continue lacosamide 200 mg BID  - Seizure precautions    Debility   - Continue with PT/OT for gait training and strengthening and restoration of ADL's   - Encourage mobility, OOB in chair, and early ambulation  as appropriate  - Fall precautions   - Monitor for bowel and bladder dysfunction  - Monitor for and prevent skin breakdown and pressure ulcers  - Continue DVT prophylaxis with  Lovenox 40 mg daily     Goals of Care:   Restorative  Treat infection  Optimize nutrition  Wound healing  Muscle strengthening  Restoration of ADL's  Improve mobility        Anticipated Disposition:  Home with home health       Future Appointments   Date Time Provider Department Center   10/27/2020 11:30 AM AMERICA Alonzo, ANP [facility] ID [place] [facility]   11/10/2020 11:00 AM Brian Mark MD [facility]   11/10/2020  1:00 PM Kodak Dejesus MD [facility] ID [place] [facility]       Aranza Jeffrey NP  Department of Hospital Medicine   [facility]     DOS: 10/12/2020       Patient note was created using MModal Dictation.  Any errors in syntax or even information may not have been identified and edited on initial review prior to signing this note.

## 2020-10-12 NOTE — NURSING
Staff nurse made 2 arrempt to contact the ED to give report on pr which was transferred to  ER.Phone continuously rang.

## 2020-10-12 NOTE — PT/OT/SLP PROGRESS
"Speech Language Pathology  Treatment    Lopez Savage   MRN: [id number]   Admitting Diagnosis: Intracranial septic embolism    Diet recommendations: Solid Diet Level: Dental Soft  Liquid Diet Level: Thin 1 bite/sip at a time, Alternating bites/sips, Set-up Assistance with meals, Avoid talking while eating, Eliminate distractions, Feed only when awake/alert, Head turned to the right, Meds whole 1 at a time, Monitor for s/s of aspiration, No straws, Small bites/sips and Strict aspiration precautions    SLP Treatment Date: 10/12/20  Speech Start Time: 0834     Speech Stop Time: 0906     Speech Total (min): 32 min       TREATMENT BILLABLE MINUTES:  Speech Therapy Individual 14, Treatment Swallowing Dysfunction 10 and Seld Care/Home Management Training 8    Has the patient been evaluated by SLP for swallowing? : Yes  Keep patient NPO?: No   General Precautions: Standard, aspiration, fall, dental soft  Current Respiratory Status: nasal cannula       Subjective:  "Who's that going in there?"  "Who's crawling underneath the table? " "You're lying." Pt demonstrating increased confusion today than observed during previous session.  Nurse notified.          Objective:      Pt eating breakfast meal and receiving medications buried in pudding from nurse.  SLP attempted to reposition pt as he was leaning to the left and HOB was not fully upright.  Pt did not wish to reposition to midline, but eventually agreed with encouragement.  HOB was also further elevated.  Pt tolerated meds buried whole in pudding.  Pt self feed scrambled eggs, grits, and pudding, as well as cup sips of water.  No overt s/s of aspiration observed.  Due to positioning, pt dropped food from utensils several times.  Pt was not oriented to place, month, or year.  Following a 2 minute delay, pt recalled 2 items from a "to-do" list with mod-max cues (0/3 IND).  Pt recalled largest single digit from fo3 with 33% accuracy given repetitions.  Pt named 2 items in " concrete categories with 63% accuracy IND/100% given cues.  Pt demonstrating reduced attention and eye contact with SLP on right.  Pt able to turn head to right upon command, but unable to visually scan to locate SLP.  Pt also demonstrated increased confusion compared to previous interaction with SLP last week.  Pt appearing to see things/people who were not there.  See Subjective.  Nurse notified.  Education provided to pt regarding role of SLP, aspiration precautions, orientation, cognitive-linguistic tx, and SLP treatement plan and POC.   Pt's cognitive-linguistic deficits and confusion limiting pt's understanding and carryover.     Assessment:  Lopez Savage is a 75 y.o. male with a medical diagnosis of Intracranial septic embolism and presents with cognitive-linguistic deficits and suspected right neglect.    Discharge recommendations: Discharge Facility/Level of Care Needs: (assistance upon d/c)     Goals:   Multidisciplinary Problems     SLP Goals        Problem: SLP Goal    Goal Priority Disciplines Outcome   SLP Goal     SLP Ongoing, Progressing   Description: Speech Language Pathology Goals  Goals expected to be met by 10/16:  1. Pt will follow complex commands with 80% accuracy given mod cues.  2. Pt will orient x 4 given max cues and/or external aids.  3. Pt will recall 3/3 novel items after a 2 minute delay given supervision.   4. Pt will complete simple problem solving tasks with 70% accuracy given mod cues.   5. Pt will complete simple sustained attention tasks with 60% accuracy given mod-max cues.   6. Pt will participate in assessment of reading, writing, and visual spatial abilities.   7. Pt will tolerate dental soft diet and thin liquids without s/s of aspiration.                               Plan:   Patient to be seen Therapy Frequency: 5 x/week  Planned Interventions: Cognitive-Linguistic Therapy, Dysphagia Therapy  Plan of Care expires: 11/08/20  Plan of Care reviewed with: patient  SLP  Follow-up?: Yes  SLP - Next Visit Date: 10/13/20           MARVA Bryant, CCC-SLP  10/12/2020     MARVA Bryant, CCC-SLP  Speech Language Pathologist  [phone]  10/12/2020

## 2020-10-12 NOTE — PT/OT/SLP PROGRESS
"Physical Therapy  Treatment    Lopez Savage   MRN: [id number]   Admitting Diagnosis: Intracranial septic embolism    PT Received On: 10/12/20          Billable Minutes:  Therapeutic Activity 15 and Neuromuscular Re-education 24    Treatment Type: Treatment  PT/PTA: PT     PTA Visit Number: 0       General Precautions: Standard, aspiration, fall, dental soft  Orthopedic Precautions: N/A   Braces: N/A    Spiritual, Cultural Beliefs, Spiritism Practices, Values that Affect Care: no    Subjective:  Communicated with patient prior to session.  "I want to go back to sleep"    Pain/Comfort  Pain Rating 1: 0/10  Pain Rating Post-Intervention 1: 0/10    Objective:  Patient found supine with HOB elevated with Patient found with: peripheral IV     AM-PAC 6 CLICK MOBILITY  Total Score:10    Bed Mobility:  Sit>Supine:mod A moving first into left sidelying with pt rquiring min A to guide trunk and Mod A to lift LEs onto bed  Supine>Sit: Max A with trunk and LEs  Scooting: max A X 2 persons to scoot forward or backwards at EOB    Transfers:  Sit<>Stand: Mod A X 2 persons from EOB with RW times 6 trials. Pt required tactile cues to postion R hand on RW and LUE wt bearing on bed. Pt has left lateral and posterior lean which is decreased with verbal and tactile cues. Pt reached for left walker hand  with assist needed to position hand properly    Stand Pivot Transfer: not attempted    Gait:  Pt unable to initiate steps with RW. Pt pushes to the left with RLE in ext in standing. Head is turn to left of midline. Pt was able to stand for 12 sec, 36 sec, 8 sec and 6 secs with mod A for balance, alignment and safety      Balance/:  Additional Treatment:  Assistance for static sitting balance at EOB varied from Mod A to brief periods of SBA depending on lean  Posterior left laterally and pt's use of UEs in wt bearing on bed. Pt inconsistently initiated active wt shift towards midline and turning to right with head or trunk. "       Patient left HOB elevated with call button in reach and IV disconnected by nurse during PT session..    Assessment:  Lopez Savage is a 75 y.o. male with a medical diagnosis of Intracranial septic embolism.  Pt more alert today with better participation in PT session. As session progressed, pt initiated more active movement to achieve better midline orientation in sitting and standing. Right sided neglect affecting midline orientation. Pt much more vocal today angaging in appropriate conversation.    Rehab identified problem list/impairments: weakness, impaired functional mobilty, impaired cognition, impaired coordination, gait instability, impaired fine motor, impaired balance, decreased upper extremity function, impaired self care skills, visual deficits, decreased lower extremity function    Rehab potential is good.    Activity tolerance: Good    Discharge recommendations: home, home health PT     Barriers to discharge: Decreased caregiver support    Equipment recommendations: bedside commode, bath bench     GOALS:   Multidisciplinary Problems     Physical Therapy Goals        Problem: Physical Therapy Goal    Goal Priority Disciplines Outcome Goal Variances Interventions   Physical Therapy Goal     PT, PT/OT Ongoing, Progressing     Description: Goals to be met by: 10/28/2020     Patient will increase functional independence with mobility by performin. Supine to sit with MInimal Assistance  2. Sit to supine with Contact Guard Assistance  3. Rolling to Left and Right with Modified [place] using bed rails.  4. Sit to stand transfer with Minimal Assistance with rolling walker  5. Bed to chair transfer with Minimal Assistance using Rolling Walker  6. Gait  x 30 feet with Minimal Assistance using Rolling Walker.   7. Stand for 3 minutes with Contact Guard Assistance using Rolling Walker or UE support on stable surface  8. Lower extremity exercise program x 20 reps per handout, with assistance as  needed                     PLAN:    Patient to be seen 5 x/week  to address the above listed problems via gait training, therapeutic activities, therapeutic exercises, neuromuscular re-education  Plan of Care expires: 11/08/20  Plan of Care reviewed with: patient    Corina Pacheco, PT  10/12/2020

## 2020-10-13 PROBLEM — G40.909 SEIZURE DISORDER: Status: ACTIVE | Noted: 2020-01-01

## 2020-10-16 NOTE — PROGRESS NOTES
Health Maintenance Due   Topic Date Due    Foot Exam  09/18/1955    Shingles Vaccine (1 of 2) 09/18/1996    Eye Exam  09/19/2019    Influenza Vaccine (1) 08/01/2020     Updates were requested from care everywhere.  Chart was reviewed for overdue Proactive [facility] Encounters ([facility]) topics (CRS, Breast Cancer Screening, Eye exam)  Health Maintenance has been updated.  LINKS immunization registry triggered.  Immunizations were reconciled.

## 2020-10-17 PROBLEM — E83.42 HYPOMAGNESEMIA: Status: ACTIVE | Noted: 2020-01-01

## 2020-10-17 PROBLEM — I76 SEPTIC EMBOLISM: Status: ACTIVE | Noted: 2020-01-01

## 2020-10-18 PROBLEM — E87.20 METABOLIC ACIDOSIS: Status: ACTIVE | Noted: 2020-01-01

## 2020-10-19 PROBLEM — A41.9 SEVERE SEPSIS: Status: ACTIVE | Noted: 2020-01-01

## 2020-10-19 PROBLEM — G93.40 ACUTE ENCEPHALOPATHY: Status: ACTIVE | Noted: 2020-01-01

## 2020-10-19 PROBLEM — R65.20 SEVERE SEPSIS: Status: ACTIVE | Noted: 2020-01-01

## 2020-10-22 PROBLEM — R74.01 TRANSAMINITIS: Status: ACTIVE | Noted: 2020-01-01

## 2020-10-22 NOTE — PROCEDURES
EXTENDED  ELECTROENCEPHALOGRAM  REPORT     DATE OF SERVICE:  09/25/2020  EEG NUMBER: FH -3  REQUESTED BY:  Dr. Sprague  LOCATION OF SERVICE:  [id number]  METHODOLOGY              Electroencephalographic (EEG) recording is with electrodes placed according to the International 10-20 placement system.  Thirty two (32) channels of digital signal (sampling rate of 512/sec) including T1 and T2 was simultaneously recorded from the scalp and may include  EKG, EMG, and/or eye monitors.  Recording band pass was 0.1 to 512 hz.  Digital video recording of the patient is simultaneously recorded with the EEG.  The patient is instructed report clinical symptoms which may occur during the recording session.  EEG and video recording is stored and archived in digital format.  Activation procedures which include photic stimulation, hyperventilation and instructing patients to perform simple task are done in selected patients.              The EEG is displayed on a monitor screen and can be reviewed using different montages.  Computer assisted analysis is employed to detect spike and electrographic seizure activity.   The entire record is submitted for computer analysis.  The entire recording is visually reviewed and the times identified by computer analysis as being spikes or seizures are reviewed again.  Compresses spectral analysis (CSA) is also performed on the activity recorded from each individual channel.  This is displayed as a power display of frequencies from 0 to 30 Hz over time.   The CSA is reviewed looking for asymmetries in power between homologous areas of the scalp and then compared with the original EEG recording.                HiWiFi software was also utilized in the review of this study.  This software suite analyzes the EEG recording in multiple domains.  Coherence and rhythmicity is computed to identify EEG sections which may contain organized seizures.  Each channel undergoes analysis to detect presence of  spike and sharp waves which have special and morphological characteristic of epileptic activity.  The routine EEG recording is converted from spacial into frequency domain.  This is then displayed comparing homologous areas to identify areas of significant asymmetry.  Algorithm to identify non-cortically generated artifact is used to separate eye movement, EMG and other artifact from the EEG.       RECORDING TIMES  Start on 2020/09/25 at 7:00   Stop on 2020/09/25 at 16:19 .     A total of 6:59 hours of EEG recording was obtained.     EEG FINGINGS  Recording was obtained at the patient's bedside in the ICU.   Awake state  The background consists of an irregular 7-8 hertz theta frequency noted diffusely.  Slower theta and at times 1-2 hertz delta was intermixed.  At times the background was symmetrical however during most of the recording lateralized features were noted.  Polymorphic theta and delta was noted predominantly over the right hemisphere without anterior-posterior gradient.  However no spike or sharp wave component was noted.   Sleep state  The background during sleep consists of mixture of theta and some delta components intermixed.  In general there is low more slowing noted over the right hemisphere the asymmetry was not as marked is during waking state.  No spike or sharp wave activity was noted nor no rhythmic buildup indicate the occurrence of his subclinical or nonconvulsive seizure process.  Normal sleep architecture was not recorded.     Activation procedures:   Not performed     Cardiac Monitor:   Single lead EKG was obtained and showed a regular cardiac rhythm with an occasional PVC         IMPRESSION:  Markedly abnormal EEG  The background is very disorganized slow with indicating presence of a diffuse cortical dysfunction and encephalopathy.  There is significant asymmetry with slower frequencies noted over the right hemisphere indicating a lateralized probably structural process involving  hemisphere.

## 2020-10-23 PROBLEM — D73.5 SPLENIC INFARCT: Status: ACTIVE | Noted: 2020-01-01

## 2020-10-25 PROBLEM — Z71.89 GOALS OF CARE, COUNSELING/DISCUSSION: Status: ACTIVE | Noted: 2017-10-11

## 2020-10-28 NOTE — HPI
Mr. 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  Admission to SNF for secondary weakness and debility, IV antibiotics.     The patient originally presented to [facility] on 9/8/2020 with a primary complaint of loss of consciousness. The patient was recently treated for endocarditis w/ IV rocephin through PICC line at [facility] and discharged approximately 2 weeks ago. Since then he has had a worsening cough and was planning to go to the hospital for evaluation. Per wife home health did portable CXR about a week ago w/ concern for fluid vs pneumonia. Patient was given course of doxycycline and finished course recently. 9/8 Wife went to bed while  was watching TV and about an hour later her daughter found him non responsive on the ground. Daughter did a few chest compressions before EMS arrived. Patient arrived to ED and non responsive to sternal rub. Pupillary and gag reflex present. Patient intubated for airway protection and sedated w/ propofol. No seizure like activity was observed in ED. TPA contraindicated due to recent GI bleed, last occurring in August. Discussed further stroke work up with wife and planned for imaging. CT head negative for acute intracranial bleed. MRI head showed PCA infarct and right occipital lobe edema. 9/8 EEG demonstrated left posterior pseudo periodic epileptiform discharges is suggestive of an irritative region likely secondary to focal ischemia. Patient started on AEDs vimpat and keppra and reported to have several seizures during his stay. Patient was transferred to [facility] today for continuous EEG monitoring and further evaluation.     His course was complicated by acute encephalopathy, status epilepticus, bacterial endocarditis with brain  abscess, embolic cerebellar stroke, chronic dysphagia, and chronic bronchitis.  He also had anemia requiring transfusion.  He continued on antibiotics and was recommended to have 6-8 weeks of IV antibiotics.  He is being transferred to skilled nursing for continued IV antibiotics as well as PT/OT.        Patient will be treated at [facility] with PT and OT to improve functional status and ability to perform ADLs.

## 2020-10-28 NOTE — HOSPITAL COURSE
Patient prematurely discharge from SNF, he was sent to ED for evaluation of altered mental status.  See epic for full details

## 2020-10-28 NOTE — DISCHARGE SUMMARY
[facility] [place] [facility] Medicine  Discharge Summary      Patient Name: Lopez Savage  MRN: [id number]  Admission Date: 10/8/2020  Hospital Length of Stay: 4 days  Discharge Date and Time:  10/12/2020 10:30 AM  Attending Physician: No att. providers found   Discharging Provider: Aranza Jeffrey NP  Primary Care Provider: Prasanna Haywood MD      HPI:   Mr. 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  Admission to SNF for secondary weakness and debility, IV antibiotics.     The patient originally presented to [facility] on 9/8/2020 with a primary complaint of loss of consciousness. The patient was recently treated for endocarditis w/ IV rocephin through PICC line at [facility] and discharged approximately 2 weeks ago. Since then he has had a worsening cough and was planning to go to the hospital for evaluation. Per wife home health did portable CXR about a week ago w/ concern for fluid vs pneumonia. Patient was given course of doxycycline and finished course recently. 9/8 Wife went to bed while  was watching TV and about an hour later her daughter found him non responsive on the ground. Daughter did a few chest compressions before EMS arrived. Patient arrived to ED and non responsive to sternal rub. Pupillary and gag reflex present. Patient intubated for airway protection and sedated w/ propofol. No seizure like activity was observed in ED. TPA contraindicated due to recent GI bleed, last occurring in August. Discussed further stroke work up with wife and planned for imaging. CT head negative for acute intracranial bleed. MRI head showed PCA infarct and right occipital lobe edema. 9/8 EEG demonstrated left posterior pseudo periodic epileptiform discharges is suggestive of an  irritative region likely secondary to focal ischemia. Patient started on AEDs vimpat and keppra and reported to have several seizures during his stay. Patient was transferred to [facility] today for continuous EEG monitoring and further evaluation.     His course was complicated by acute encephalopathy, status epilepticus, bacterial endocarditis with brain abscess, embolic cerebellar stroke, chronic dysphagia, and chronic bronchitis.  He also had anemia requiring transfusion.  He continued on antibiotics and was recommended to have 6-8 weeks of IV antibiotics.  He is being transferred to skilled nursing for continued IV antibiotics as well as PT/OT.        Patient will be treated at [facility] with PT and OT to improve functional status and ability to perform ADLs.     * No surgery found *      Hospital Course:   Patient prematurely discharge from SNF, he was sent to ED for evaluation of altered mental status.  See epic for full details     Consults:   Consults (From admission, onward)        Status Ordering Provider     Inpatient consult to Registered Dietitian/Nutritionist  Once     Provider:  (Not yet assigned)    HEATHER Purdy          No new Assessment & Plan notes have been filed under this hospital service since the last note was generated.  Service: Hospital Medicine    Final Active Diagnoses:    Diagnosis Date Noted POA    PRINCIPAL PROBLEM:  Intracranial septic embolism [I76, I66.9] 09/15/2020 Yes    HENRY (acute kidney injury) [N17.9] 10/08/2020 Yes    Seizure [R56.9]  Yes    Brain abscess [G06.0] 09/15/2020 Yes    Encephalopathy acute [G93.40] 09/13/2020 Yes    Cerebellar stroke [I63.9] 09/13/2020 Yes    Anemia [D64.9]  Yes    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] 09/08/2020 Yes    Essential hypertension [I10] 09/08/2020 Yes     Chronic    Chronic bronchitis [J42] 09/08/2020 Yes     Chronic      Problems Resolved During this Admission:       Discharged Condition:  poor    Disposition: Short Term Hospital    Follow Up:    Patient Instructions:   No discharge procedures on file.    Significant Diagnostic Studies: Labs: BMP: No results for input(s): GLU, NA, K, CL, CO2, BUN, CREATININE, CALCIUM, MG in the last 48 hours. and CBC No results for input(s): WBC, HGB, HCT, PLT in the last 48 hours.    Pending Diagnostic Studies:     None         Medications:  Reconciled Home Medications:      Medication List      ASK your doctor about these medications    albuterol-ipratropium 2.5 mg-0.5 mg/3 mL nebulizer solution  Commonly known as: DUO-NEB  Take 3 mLs by nebulization every 4 (four) hours as needed for Wheezing or Shortness of Breath. Rescue     amLODIPine 5 MG tablet  Commonly known as: NORVASC  Take 1 tablet (5 mg total) by mouth once daily.     atorvastatin 40 MG tablet  Commonly known as: LIPITOR  Take 1 tablet (40 mg total) by mouth once daily.     cefTRIAXone 2 g/50 mL Pgbk IVPB  Commonly known as: ROCEPHIN  Inject 50 mLs (2 g total) into the vein every 12 (twelve) hours. for 19 days  Ask about: Should I take this medication?     fluticasone furoate-vilanteroL 100-25 mcg/dose diskus inhaler  Commonly known as: BREO  Inhale 1 puff into the lungs once daily. Controller     insulin aspart U-100 100 unit/mL (3 mL) Inpn pen  Commonly known as: NovoLOG  Inject 0-5 Units into the skin every 6 (six) hours as needed (Hyperglycemia).     lacosamide 200 mg Tab tablet  Commonly known as: VIMPAT  Take 1 tablet (200 mg total) by mouth every 12 (twelve) hours.     Lactobacillus rhamnosus GG 10 billion cell capsule  Commonly known as: CULTURELLE  Take 1 capsule by mouth 2 (two) times daily.     miconazole nitrate 2% 2 % Oint  Commonly known as: MICOTIN  Apply topically 2 (two) times daily. Buttocks: BID/prn clean with bathing cloth, apply barrier cream with miconazole.  Left and right scalp skin breakdown: BID clean with sterile normal saline, apply barrier cream with miconazole.      pantoprazole 40 MG tablet  Commonly known as: PROTONIX  Take 40 mg by mouth 2 (two) times daily.     VANCOMYCIN 1 G/250 ML D5W (READY TO MIX SYSTEM)  Inject 250 mLs (1 g total) into the vein every 48 hours. for 19 days     VITAMIN D ORAL  Take 1 capsule by mouth once daily.            Indwelling Lines/Drains at time of discharge:   Lines/Drains/Airways     Peripherally Inserted Central Catheter Line            PICC Double Lumen 09/18/20 1227 right basilic 39 days                Time spent on the discharge of patient: 0 minutes         Aranza Jeffrey NP  Department of Hospital Medicine  [facility]

## 2020-11-10 ENCOUNTER — DOCUMENT SCAN (OUTPATIENT)
Dept: HOME HEALTH SERVICES | Facility: HOSPITAL | Age: 75
End: 2020-11-10
Payer: MEDICARE

## 2020-12-18 LAB — PATHOLOGIST INTERPRETATION AB/XM: NORMAL

## 2021-01-01 NOTE — PLAN OF CARE
Problem: SLP Goal  Goal: SLP Goal  Description: Goals expected to be met by 10/13:  1. Pt will tolerate regular consistency solid diet and thin liquids with timely mastication and A-P transit and without overt clinical signs aspiration.   2. Pt will orient x4 ind'ly.   3. Pt will complete simple naming tasks with 70% acc given cues.   4. Pt will follow basic 1-step commands with 70% acc given cues.   5. Pt will answer basic y/n q's with 70% acc ind'ly.   6. Pt will participate in ongoing assessment of cognitive-linguistic skills.   Outcome: Ongoing, Progressing  Pt seen for ST this am. All goals remain appropriate. Continue ST per POC.    TANIKA Workman-SLP  10/7/2020          stated

## 2021-01-29 NOTE — PROGRESS NOTES
[facility] - ICU 14 WT  Neurosurgery  Progress Note    Subjective:     History of Present Illness: 74 M with  recent endocarditis (Strep mutans per report, s/p IV Rocephin) with aortic valve vegetations (8/20), recent GIB (8/20), diverticulitis, GERD, MVP, and COPD who was transferred to [facility] on 9/13 for cEEG. The patient presented to [facility] on 9/8/2020 after LOC at home. Per report patient had been c/o worsening SOB, had portable CXR with effusion v PNA. Patient was given course of doxycycline and finished course recently. Reportedly on 9/8 his wife went to bed while  was watching TV and about an hour later her daughter found him non responsive on the ground, short round of chest compressions before intubated in ED, sedated on Prop.  No seizure like activity was observed in ED. TPA contraindicated due to recent GI bleed, last occurring in August.  MRI head showed L PCA infarct and right occipital lobe edema. 9/8 EEG demonstrated left posterior pseudo periodic epileptiform discharges is suggestive of an irritative region likely secondary to focal ischemia. Patient started on AEDs vimpat and keppra and reported to have several seizures during his stay at [facility], currently on cEEG in [facility]. NSGY consulted following MRI Brain this evening with 1.2cm R occipital lesion concerning for abscess.     On Lovenox at home, SQH at [facility].     Reconsult for change in mental status today, not interacting with his wife, aphasia, not following commands.     Post-Op Info:  Procedure(s) (LRB):  MPU PROCEDURE (N/A)         Interval History: JAYESH    Medications:  Continuous Infusions:  Scheduled Meds:   albuterol-ipratropium  3 mL Nebulization Q4H    atorvastatin  40 mg Per NG tube Daily    cefTRIAXone (ROCEPHIN) IVPB  2 g Intravenous Q12H    ciprofloxacin HCl  750 mg Per NG tube Q12H    enoxaparin  40 mg Subcutaneous Q24H    lacosamide  200 mg Per NG tube Q12H    losartan  25 mg Per NG  tube Daily    miconazole nitrate 2%   Topical (Top) BID    pantoprazole  40 mg Per NG tube Daily    silodosin  8 mg Per NG tube Daily    sodium chloride 0.9%  10 mL Intravenous Q6H    traZODone  25 mg Per NG tube QHS    vancomycin (VANCOCIN) IVPB  1,250 mg Intravenous Q48H     PRN Meds:acetaminophen, dextrose 50%, glucagon (human recombinant), insulin aspart U-100, labetalol, psyllium husk (aspartame), sodium chloride 0.9%, Flushing PICC Protocol **AND** sodium chloride 0.9% **AND** sodium chloride 0.9%, Pharmacy to dose Vancomycin consult **AND** vancomycin - pharmacy to dose     Review of Systems  Objective:     Weight: 114 kg (251 lb 5.2 oz)  Body mass index is 37.11 kg/m².  Vital Signs (Most Recent):  Temp: 98.2 °F (36.8 °C) (09/24/20 1930)  Pulse: 88 (09/24/20 2353)  Resp: 20 (09/24/20 2234)  BP: 136/61 (09/24/20 1930)  SpO2: 96 % (09/24/20 2353) Vital Signs (24h Range):  Temp:  [97.8 °F (36.6 °C)-98.8 °F (37.1 °C)] 98.2 °F (36.8 °C)  Pulse:  [84-96] 88  Resp:  [20-36] 20  SpO2:  [95 %-100 %] 96 %  BP: (119-146)/(58-66) 136/61                          NG/OG Tube 09/20/20 2310 14 Fr. Right nostril (Active)   Placement Check placement verified by aspirate characteristics 09/24/20 0814   Tube advanced (cm) 4 09/21/20 0859   Advancement advanced manually 09/21/20 0859   Tolerance no signs/symptoms of discomfort 09/24/20 0814   Securement secured to nostril center w/ adhesive device 09/24/20 0814   Clamp Status/Tolerance unclamped 09/24/20 0814   Insertion Site Appearance no redness, warmth, tenderness, skin breakdown, drainage 09/24/20 0814   Feeding Type continuous 09/24/20 0814   Feeding Action feeding continued 09/24/20 0814   Current Rate (mL/hr) 50 mL/hr 09/24/20 0814   Goal Rate (mL/hr) 50 mL/hr 09/24/20 0814   Water Bolus (mL) 400 mL 09/24/20 1800   Rate Formula Tube Feeding (mL/hr) 50 mL/hr 09/22/20 2000   Formula Name Peptide 09/23/20 1935   Intake (mL) - Formula Tube Feeding 550 09/24/20 1800        Neurosurgery Physical Exam     AOX1  Aphasic,  L gaze can cross midline,   PERRL,    Moves all extremities purposefully, not following commands,   AG in all extremities,     Significant Labs:  Recent Labs   Lab 09/23/20 0622 09/24/20  0237    91    143   K 3.3* 4.0    103   CO2 30* 32*   BUN 31* 35*   CREATININE 1.3 1.4   CALCIUM 10.4 10.2   MG 2.0  2.0 2.0     Recent Labs   Lab 09/23/20 0622 09/24/20 0237   WBC 10.11 9.57   HGB 8.5* 8.2*   HCT 27.8* 27.2*    196     No results for input(s): LABPT, INR, APTT in the last 48 hours.  Microbiology Results (last 7 days)     Procedure Component Value Units Date/Time    Blood culture [[id number]] Collected: 09/22/20 1522    Order Status: Completed Specimen: Blood from Peripheral, Left Hand Updated: 09/24/20 1612     Blood Culture, Routine No Growth to date      No Growth to date      No Growth to date    Narrative:      Collection has been rescheduled by BDW at 09/22/2020 14:57 Reason:   Patient unavailable  Collection has been rescheduled by BDW at 09/22/2020 14:57 Reason:   Patient unavailable    Blood culture [[id number]] Collected: 09/22/20 1522    Order Status: Completed Specimen: Blood from Antecubital, Left Arm Updated: 09/24/20 1612     Blood Culture, Routine No Growth to date      No Growth to date      No Growth to date    Narrative:      Collection has been rescheduled by BDW at 09/22/2020 14:57 Reason:   Patient unavailable  Collection has been rescheduled by BDW at 09/22/2020 14:57 Reason:   Patient unavailable    Blood culture [[id number]]     Order Status: Canceled Specimen: Blood     Blood culture [[id number]]     Order Status: Canceled Specimen: Blood     Blood culture [[id number]] Collected: 09/14/20 1840    Order Status: Completed Specimen: Blood from Peripheral, Foot, Right Updated: 09/19/20 2212     Blood Culture, Routine No growth after 5 days.    Blood culture [[id number]] Collected: 09/14/20 1835    Order Status:  Completed Specimen: Blood from Peripheral, Foot, Left Updated: 09/19/20 3642     Blood Culture, Routine No growth after 5 days.        All pertinent labs from the last 24 hours have been reviewed.    Significant Diagnostics:  I have reviewed and interpreted all pertinent imaging results/findings within the past 24 hours.    Assessment/Plan:     Bacterial endocarditis  74M with known bacterial endocarditis with septic emboli c/b L PCA infarct, scattered punctate infarctions and R occipital lesion concerning for intracranial abscess. 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   --NO steroids  --HOB >30  --PPI given GIB per NCC  --Continue to monitor clinically, notify NSGY immediately with any changes in neuro status    Dispo: ICU               Idalia Haddad MD  Neurosurgery  [facility] - ICU 14 WT   no

## 2021-03-18 NOTE — ASSESSMENT & PLAN NOTE
- See management as above   [de-identified] : MAURILIO WHARTON is a 66 year old M w PMHX T2DM, referred to the office for consultation visit, he presents w the cc of having hemorrhoids and notices some bleeding after a BM. Denies constipation/straining. He has had symptoms over the past week, which is on and off. Also has discomfort during a BM. He did not notice any bleeding yesterday or today. Patient rates his pain 1/10, today. \par Most recent colonoscopy was done 3 years ago, stating findings were unremarkable. Denies pshx.

## 2021-10-25 NOTE — TELEPHONE ENCOUNTER
----- Message from Edelmira Ramires sent at 7/2/2018  9:47 AM CDT -----  Contact: Wife. [phone]  Patient would like to speak with you about being told from the nurse to come today for 9:20 and after he got there he was told his appointment was for tomorrow. Please advise    
Informed patient at 9:44a, he can come in to be seen patient states he would not like to come back today, he will come in tomorrow.   
L hip OA

## 2022-08-01 NOTE — ASSESSMENT & PLAN NOTE
reconsulted for acute encephalopathy.   Repeat MRI MRI Brain WO contrast (9/22/20):  1. Relative to 09/14/2020, continued maturing multifocal ischemia, possibly septic embolic in etiology when correlating with prior imaging, predominantly involving bilateral posterior cerebral artery and left posterior inferior cerebellar artery territories.  2. No new or progressive areas of ischemia or new intracranial hemorrhage.  3. Stable small left frontal subdural hygroma without significant mass effect.  4. Chronic parenchymal microhemorrhages and superficial siderosis again raise the possibility of cerebral amyloid angiopathy and/or chronic hypertensive arteriopathy.    LP with WBC 21, RBC 6, protein 31, gluc 56  HSV, RPR, FTA ab, VDRL studies negative   CSF gram stain and cultures NGTD  Continue current antimicrobial therapy at this time  Alert. Not oriented x 3.   Repeat MRI tomorrow per NSGY.   Will tailor abx accordingly based on above studies    Discussed with ID staff   First Trimester Sonogram

## 2023-01-16 NOTE — PATIENT INSTRUCTIONS
Magnetic Resonance Imaging (MRI)     You will be asked to hold very still during the scan.   Magnetic resonance imaging (MRI) is a test that lets your doctor see detailed pictures of the inside of your body. MRI combines the use of strong magnets and radio waves to form an MRI image.  How do I get ready for an MRI?  · You may need to stop eating or drinking before the test. Each health care facility has its own guidelines on this. It also depends on the type of exam you are having. Ask your health care provider if you should stop eating or drinking before the test.  · Ask your provider if you should stop taking any medicine before the test.  · Follow your normal daily routine unless your provider tells you otherwise.  · You'll be asked to remove your watch, jewelry, hearing aids, credit cards, pens, pocket knives, eyeglasses, and other metal objects.  · You may be asked to remove your makeup. Makeup may contain some metal.  · Most MRI tests take 30 to 60 minutes. Depending on the type of MRI you are having, the test may take longer. Give yourself extra time to check in.     MRI uses strong magnets. Metal is affected by magnets and can distort the image. The magnet used in MRI can cause metal objects in your body to move. If you have a metal implant, you may not be able to have an MRI unless the implant is certified as MRI safe. People with these implants should not have an MRI:  · Ear (cochlear) implants  · Certain clips used for brain aneurysms  · Certain metal coils put in blood vessels  · Most defibrillators  · Most pacemakers  Be sure to tell the radiologist or technologist if you:  · Have had any previous surgeries  · Have a pacemaker, surgical clips, metal plate or pins, an artificial joint, staples or screws, ear (cochlear) implants, or other implants  · Wear a medicated adhesive patch  · Have metal splinters in your body  · Have implanted nerve stimulators or drug-infusion ports  · Have tattoos or body  piercings. Some tattoo inks contain metal.  · Work with metal  · Have braces. You must remove any dental work.  · Have a bullet or other metal in your body  Also tell the radiologist or technologist if you:  · Are pregnant or think you may be  · Are afraid of small, enclosed spaces (claustrophobic)  · Are allergic to X-ray dye (contrast medium), iodine, shellfish, or any medicines  · Have other allergies  · Are breastfeeding  · Have a history of cancer  · Have any serious health problems. This includes kidney disease or a liver transplant. You may not be able to have the contrast material used for MRI.      What happens during an MRI?  · You may be asked to wear a hospital gown.  · You may be given earplugs to wear if you need them.  · You may be injected with a special dye (contrast) that improves the MRI image.   · Youll lie down on a platform that slides into the magnet.  What happens after an MRI?  · You can get back to normal activities right away. If you were given contrast, it will pass naturally through your body within a day. You may be told to drink more water or other fluids during this time.   · Your doctor will discuss the test results with you during a follow-up appointment or over the phone.  · Your next appointment is: __________________  Date Last Reviewed: 6/2/2015  © 1614-6304 [employer]. [address] [place] [address], [place] [address]. All rights reserved. This information is not intended as a substitute for professional medical care. Always follow your healthcare professional's instructions.         Klisyri Counseling:  I discussed with the patient the risks of Klisyri including but not limited to erythema, scaling, itching, weeping, crusting, and pain.

## 2023-01-17 NOTE — PT/OT/SLP EVAL
Patient very hard to redirect at this time.  While attempting to obtain a set of vitals patient takes off pulse ox when it begins to read and states that 89% percent is \"good and fine\" for him. When this RN tries to educate patient that pulse ox needs to stay on for a continuous accurate read for at very least a minute, pt will not let this RN reapply pulse ox.     Jaycee Deleon RN  01/17/23 6435     Physical Therapy Evaluation    Patient Name:  Lopez Savage   MRN:  [id number]    Recommendations:     Discharge Recommendations:  rehabilitation facility   Discharge Equipment Recommendations: other (see comments)(TBD pending patient progress)   Barriers to discharge: Inaccessible home and increased level of assistance needed    Assessment:     Lopez Savage is a 74 y.o. male admitted with a medical diagnosis of Status epilepticus.  He presents with the following impairments/functional limitations:  weakness, impaired endurance, impaired self care skills, impaired functional mobilty, gait instability, impaired balance, impaired cognition, decreased coordination, decreased upper extremity function, decreased lower extremity function, decreased safety awareness. Pt with eyes closed 100% of session and sleepy this date. Pt with mild increased work of breathing in supine. Pt required frequent verbal cuing and direction to task. Pt required max A for L and R rolling. PT recommending Rehab upon DC from hospital.    Rehab Prognosis: Good; patient would benefit from acute skilled PT services to address these deficits and reach maximum level of function.    Recent Surgery: * No surgery found *      Plan:     During this hospitalization, patient to be seen 4 x/week to address the identified rehab impairments via gait training, therapeutic activities, therapeutic exercises, neuromuscular re-education and progress toward the following goals:    · Plan of Care Expires:  10/10/20    Subjective     Chief Complaint: none verbalized  Patient/Family Comments/goals: PT unable to understand pt's reason for keeping % of session.  Pain/Comfort:  · Pain Rating 1: 0/10  · Pain Rating Post-Intervention 1: 0/10    Patients cultural, spiritual, Jain conflicts given the current situation: no    Living Environment/Occupational Profile:  Living Environment: Pt's wife provided living information. Pt lives with his wife and daughter  in a H with no ANNAMARIA. Pt has a tub/shower combo with bath bench and grab bar present. Pt is retired and does not drive  Previous level of function: PTA, pt was mod (I) for functional mobility using RW for the last 2 weeks. PTA, pt recently began requiring assistance for ADLs but is able to assist with ADL performance.   Roles and Routines: home dweller   Equipment Used at Home:  walker, rolling, bath bench   Assistance upon Discharge: Pt will have assistance from wife and daughter.     Objective:     Communicated with RN prior to session.  Patient found HOB elevated with SCD, bed alarm, blood pressure cuff, EEG, garcia catheter, NG tube, pressure relief boots, peripheral IV, PICC line, pulse ox (continuous), restraints, telemetry  upon PT entry to room.    General Precautions: Standard, fall, aspiration, seizure   Orthopedic Precautions:N/A   Braces: N/A     Exams:  · Cognitive Exam:  Patient is oriented to Person  · Command Following: inconsistent with simple commands  · Awake/Alert:   · Sleepy throughout session  · % of session  · Gross Motor Coordination:  WFL  · Postural Exam:  SHANNA this date  · RLE ROM: WFL  · RLE Strength: Deficits: hip flexion 2/5, all others grossly 3/5  · LLE ROM: WFL  · LLE Strength: Deficits: hip flexion 2/5, all others grossly 3/5    Functional Mobility:  · Bed Mobility:     · Rolling Left:  maximal assistance  · Rolling Right: maximal assistance  · Scooting to HOB via drawsheet: total assistance and of 2 persons   · Supine to sit: deferred 2/2 mild increase in work of breathing in supine for douglas-hygiene, % of session, sleepy, and decreased/inconsistent command following  · Transfers: deferred  · Balance: SHANNA    Therapeutic Activities and Exercises:  Patient educated on role of therapy, goals of session, and benefits of mobilizing.   Discussed PT plan of care during hospitalization.   Patient educated on calling for assistance.   Patient educated on how their diagnosis impacts  their mobility within PT scope of practice.   Communication board up to date.  All questions answered within PT scope of practice.      AM-PAC 6 CLICK MOBILITY  Total Score:7     Patient left HOB elevated with all lines intact, call button in reach, bed alarm on, restraints reapplied at end of session, RN notified and spouse present.    GOALS:   Multidisciplinary Problems     Physical Therapy Goals        Problem: Physical Therapy Goal    Goal Priority Disciplines Outcome Goal Variances Interventions   Physical Therapy Goal     PT, PT/OT Ongoing, Progressing     Description: Goals to be met by: 2020    Patient will increase functional independence with mobility by performin. Pt will perform bed mobility (rolling L/R, scooting, and bridging) with CGA  2. Pt will perform supine to/from sit with CGA.  3. Pt will sit EOB x 15 mins with B UE support with Rony assistance.  4. Pt will perform sit to stand with max assistance and LRAD.  5. Pt will ambulate 15 feet with max assistance and LRAD.  6. Pt will perform there-ex from handout x 15 reps to improve strength for functional mobility.                         History:     No past medical history on file.    No past surgical history on file.    Time Tracking:     PT Received On: 20  PT Start Time: 1105     PT Stop Time: 1126  PT Total Time (min): 21 min   Additional staffing present: OT    Billable Minutes: Evaluation 13 and Therapeutic Activity 8      Patito Baird, PT  2020

## 2023-03-20 NOTE — ASSESSMENT & PLAN NOTE
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  Please refer to prior PT noted date on 03/20/2023 for functional status.    Recommendations:     Discharge Recommendations:   (home w/ responsible caregiver)  Discharge Equipment Recommendations:  cane, straight, bath bench (pt uses a "4 point cane" for ambulation...possible hurricane...pt declined need for TTB despite PT/OT recommendation)  Equipment to be obtained for discharge:  none  Barriers to discharge: Endurance    Assessment:     Patient was discharged unexpectedly.  Information required to complete an accurate discharge summary is unknown.  Refer to therapy initial evaluation and last progress note for initial and most recent functional status and goal achievement.  Recommendations made may be found in medical record. Patient has not met goals. Pt seen for evaluation only prior to discharge    -continued supervised/assisted out of bed and out of room ambulation w/ progression as tolerated/appropriate (as ordered by M.D.)    Objective:     GOALS: 0 out of 5 STG's met by patient 2/2 patient seen for evaluation only prior to discharge    Multidisciplinary Problems       Physical Therapy Goals       Problem: Physical Therapy    Goal Priority Disciplines Outcome Goal Variances Interventions   Physical Therapy Goal     PT, PT/OT      Description: Goals to " Being evaluated by hematology  Hgb was 6 few days ago, got 1 U PRBC  Iron/ferritin panel -> anemia of chronic diseases  IgG and free light chains are elevated  -F/U with hematology and reach back to us once hematologic issues are stable to readdress whether Mitra[facility] would be beneficial for him or not   be met by: DISCHARGE     Patient will increase functional independence with mobility by performing:    -. Supine to sit with Wayne - NOT MET  -. Sit to supine with Wayne - NOT MET  -. Rolling to Left and Right with Wayne - NOT MET  -. Sit to stand transfer with Modified Wayne - NOT MET  -. Gait  x 130 feet x2 with Modified Wayne using Rolling Walker - NOT MET           Reasons for Discontinuation of Therapy Services  HOSPITAL DISCHARGE       Plan:     Patient Discharged to:  HOME OR SELF CARE - PER CHART .      3/20/2023

## 2025-01-27 NOTE — NURSING
Pt has been getting CPAP supplies from [facility] but is no longer seeing ordering provider. Verbal obtained with HILTON Foy that she will order supplies. Called [facility] and had orders changed to Norah's name.   Report given to KAIT Calderon. (neuro-icu).
